# Patient Record
Sex: MALE | Race: WHITE | NOT HISPANIC OR LATINO | Employment: OTHER | URBAN - METROPOLITAN AREA
[De-identification: names, ages, dates, MRNs, and addresses within clinical notes are randomized per-mention and may not be internally consistent; named-entity substitution may affect disease eponyms.]

---

## 2017-01-04 ENCOUNTER — LAB VISIT (OUTPATIENT)
Dept: LAB | Facility: HOSPITAL | Age: 52
End: 2017-01-04
Attending: INTERNAL MEDICINE
Payer: MEDICARE

## 2017-01-04 ENCOUNTER — ANTI-COAG VISIT (OUTPATIENT)
Dept: CARDIOLOGY | Facility: CLINIC | Age: 52
End: 2017-01-04

## 2017-01-04 DIAGNOSIS — Z79.01 LONG TERM (CURRENT) USE OF ANTICOAGULANTS: ICD-10-CM

## 2017-01-04 DIAGNOSIS — I26.99 OTHER PULMONARY EMBOLISM WITHOUT ACUTE COR PULMONALE, UNSPECIFIED CHRONICITY: ICD-10-CM

## 2017-01-04 LAB
INR PPP: 1.9
PROTHROMBIN TIME: 19.3 SEC

## 2017-01-04 PROCEDURE — 85610 PROTHROMBIN TIME: CPT

## 2017-01-04 PROCEDURE — 36415 COLL VENOUS BLD VENIPUNCTURE: CPT

## 2017-01-05 ENCOUNTER — HOSPITAL ENCOUNTER (OUTPATIENT)
Dept: WOUND CARE | Facility: HOSPITAL | Age: 52
Discharge: HOME | End: 2017-01-05
Attending: PODIATRIST
Payer: MEDICARE

## 2017-01-05 DIAGNOSIS — L97.929: ICD-10-CM

## 2017-01-05 DIAGNOSIS — I83.029: ICD-10-CM

## 2017-01-05 PROCEDURE — 29581 APPL MULTLAYER CMPRN SYS LEG: CPT

## 2017-01-05 NOTE — PROGRESS NOTES
Much of the documentation for this visit was completed in the [employer] system. Please see the attached documentation for further details about this patient's care.     Kristine Alberto RN

## 2017-01-09 ENCOUNTER — HOSPITAL ENCOUNTER (OUTPATIENT)
Dept: WOUND CARE | Facility: HOSPITAL | Age: 52
Discharge: HOME | End: 2017-01-09
Attending: PODIATRIST
Payer: MEDICARE

## 2017-01-09 VITALS
HEART RATE: 104 BPM | HEIGHT: 78 IN | BODY MASS INDEX: 35.75 KG/M2 | DIASTOLIC BLOOD PRESSURE: 77 MMHG | SYSTOLIC BLOOD PRESSURE: 128 MMHG | WEIGHT: 309 LBS

## 2017-01-09 DIAGNOSIS — I87.2 VENOUS (PERIPHERAL) INSUFFICIENCY: Primary | ICD-10-CM

## 2017-01-09 DIAGNOSIS — R60.0 BILATERAL EDEMA OF LOWER EXTREMITY: ICD-10-CM

## 2017-01-09 PROCEDURE — 97597 DBRDMT OPN WND 1ST 20 CM/<: CPT

## 2017-01-09 PROCEDURE — 29581 APPL MULTLAYER CMPRN SYS LEG: CPT

## 2017-01-09 PROCEDURE — 99499 NO LOS: ICD-10-PCS | Mod: ,,, | Performed by: PODIATRIST

## 2017-01-09 PROCEDURE — 99499 UNLISTED E&M SERVICE: CPT | Mod: ,,, | Performed by: PODIATRIST

## 2017-01-09 RX ORDER — PROMETHAZINE HYDROCHLORIDE AND DEXTROMETHORPHAN HYDROBROMIDE 6.25; 15 MG/5ML; MG/5ML
5 SYRUP ORAL EVERY 6 HOURS PRN
Refills: 0 | COMMUNITY
Start: 2017-01-03 | End: 2017-07-10

## 2017-01-09 RX ORDER — CIPROFLOXACIN 500 MG/1
500 TABLET ORAL 2 TIMES DAILY
Refills: 0 | COMMUNITY
Start: 2017-01-03 | End: 2017-01-23 | Stop reason: ALTCHOICE

## 2017-01-10 PROCEDURE — 97597 PR DEBRIDEMENT OPEN WOUND 20 SQ CM<: ICD-10-PCS | Mod: ,,, | Performed by: PODIATRIST

## 2017-01-10 PROCEDURE — 97597 DBRDMT OPN WND 1ST 20 CM/<: CPT | Mod: ,,, | Performed by: PODIATRIST

## 2017-01-10 NOTE — PATIENT INSTRUCTIONS
1. LEAVE FOOT DRESSINGS DRY AND INTACT.    2. ELEVATE AND REST FOOT AS MUCH AS POSSIBLE.    3. WEAR SURGICAL BOOT AT ALL TIMES.     4. CALL IMMEDIATELY IF ANY PROBLEMS SUCH AS THE BANDAGE GETTING WET OR FALLING OFF.    5. CALL IMMEDIATELY IF ANY SIGNS OF INFECTION SUCH AS FEVER, CHILLS, SWEATS, INCREASED REDNESS, OR PAIN.

## 2017-01-11 ENCOUNTER — ANTI-COAG VISIT (OUTPATIENT)
Dept: CARDIOLOGY | Facility: CLINIC | Age: 52
End: 2017-01-11

## 2017-01-11 ENCOUNTER — LAB VISIT (OUTPATIENT)
Dept: LAB | Facility: HOSPITAL | Age: 52
End: 2017-01-11
Attending: INTERNAL MEDICINE
Payer: MEDICARE

## 2017-01-11 DIAGNOSIS — Z79.01 LONG TERM (CURRENT) USE OF ANTICOAGULANTS: ICD-10-CM

## 2017-01-11 DIAGNOSIS — I26.99 OTHER PULMONARY EMBOLISM WITHOUT ACUTE COR PULMONALE, UNSPECIFIED CHRONICITY: ICD-10-CM

## 2017-01-11 LAB
INR PPP: 1.6
PROTHROMBIN TIME: 16.3 SEC

## 2017-01-11 PROCEDURE — 85610 PROTHROMBIN TIME: CPT

## 2017-01-11 PROCEDURE — 36415 COLL VENOUS BLD VENIPUNCTURE: CPT

## 2017-01-16 ENCOUNTER — HOSPITAL ENCOUNTER (OUTPATIENT)
Dept: WOUND CARE | Facility: HOSPITAL | Age: 52
Discharge: HOME | End: 2017-01-16
Attending: PODIATRIST
Payer: MEDICARE

## 2017-01-16 VITALS — SYSTOLIC BLOOD PRESSURE: 114 MMHG | HEART RATE: 80 BPM | DIASTOLIC BLOOD PRESSURE: 64 MMHG

## 2017-01-16 DIAGNOSIS — L97.321 ULCER OF ANKLE, LEFT, LIMITED TO BREAKDOWN OF SKIN: ICD-10-CM

## 2017-01-16 PROCEDURE — 29581 APPL MULTLAYER CMPRN SYS LEG: CPT

## 2017-01-16 RX ORDER — METOPROLOL TARTRATE 25 MG/1
25 TABLET, FILM COATED ORAL DAILY
COMMUNITY
End: 2017-02-20 | Stop reason: ALTCHOICE

## 2017-01-18 ENCOUNTER — LAB VISIT (OUTPATIENT)
Dept: LAB | Facility: HOSPITAL | Age: 52
End: 2017-01-18
Attending: INTERNAL MEDICINE
Payer: MEDICARE

## 2017-01-18 ENCOUNTER — ANTI-COAG VISIT (OUTPATIENT)
Dept: CARDIOLOGY | Facility: CLINIC | Age: 52
End: 2017-01-18

## 2017-01-18 DIAGNOSIS — Z79.01 LONG TERM (CURRENT) USE OF ANTICOAGULANTS: ICD-10-CM

## 2017-01-18 DIAGNOSIS — I26.99 OTHER PULMONARY EMBOLISM WITHOUT ACUTE COR PULMONALE, UNSPECIFIED CHRONICITY: ICD-10-CM

## 2017-01-18 LAB
INR PPP: 1.5
PROTHROMBIN TIME: 16.1 SEC

## 2017-01-18 PROCEDURE — 36415 COLL VENOUS BLD VENIPUNCTURE: CPT

## 2017-01-18 PROCEDURE — 85610 PROTHROMBIN TIME: CPT

## 2017-01-23 ENCOUNTER — HOSPITAL ENCOUNTER (OUTPATIENT)
Dept: WOUND CARE | Facility: HOSPITAL | Age: 52
Discharge: HOME | End: 2017-01-23
Attending: PODIATRIST
Payer: MEDICARE

## 2017-01-23 VITALS
HEART RATE: 89 BPM | BODY MASS INDEX: 36.1 KG/M2 | HEIGHT: 78 IN | DIASTOLIC BLOOD PRESSURE: 68 MMHG | SYSTOLIC BLOOD PRESSURE: 151 MMHG | WEIGHT: 312 LBS

## 2017-01-23 DIAGNOSIS — I87.2 VENOUS (PERIPHERAL) INSUFFICIENCY: Primary | ICD-10-CM

## 2017-01-23 PROCEDURE — 99213 OFFICE O/P EST LOW 20 MIN: CPT | Mod: ,,, | Performed by: PODIATRIST

## 2017-01-23 PROCEDURE — 99213 PR OFFICE/OUTPT VISIT, EST, LEVL III, 20-29 MIN: ICD-10-PCS | Mod: ,,, | Performed by: PODIATRIST

## 2017-01-23 PROCEDURE — 99212 OFFICE O/P EST SF 10 MIN: CPT

## 2017-01-24 NOTE — PATIENT INSTRUCTIONS
Elevate feet when resting.    Wear stockings at all times when awake.     Avoid prolonged periods of standing.

## 2017-01-25 ENCOUNTER — LAB VISIT (OUTPATIENT)
Dept: LAB | Facility: HOSPITAL | Age: 52
End: 2017-01-25
Attending: INTERNAL MEDICINE
Payer: MEDICARE

## 2017-01-25 ENCOUNTER — ANTI-COAG VISIT (OUTPATIENT)
Dept: CARDIOLOGY | Facility: CLINIC | Age: 52
End: 2017-01-25

## 2017-01-25 DIAGNOSIS — I26.99 OTHER PULMONARY EMBOLISM WITHOUT ACUTE COR PULMONALE, UNSPECIFIED CHRONICITY: ICD-10-CM

## 2017-01-25 DIAGNOSIS — Z79.01 LONG TERM (CURRENT) USE OF ANTICOAGULANTS: ICD-10-CM

## 2017-01-25 LAB
INR PPP: 2.5
PROTHROMBIN TIME: 26.1 SEC

## 2017-01-25 PROCEDURE — 36415 COLL VENOUS BLD VENIPUNCTURE: CPT

## 2017-01-25 PROCEDURE — 85610 PROTHROMBIN TIME: CPT

## 2017-01-26 NOTE — PROGRESS NOTES
1/26/2017  8:36 AM LEWIS Allen, PharmD Patient Calls       Comment: Pt taking 10mg daily except 15mg we/sa/mo

## 2017-02-01 ENCOUNTER — ANTI-COAG VISIT (OUTPATIENT)
Dept: CARDIOLOGY | Facility: CLINIC | Age: 52
End: 2017-02-01

## 2017-02-01 ENCOUNTER — LAB VISIT (OUTPATIENT)
Dept: LAB | Facility: HOSPITAL | Age: 52
End: 2017-02-01
Attending: INTERNAL MEDICINE
Payer: MEDICARE

## 2017-02-01 DIAGNOSIS — I26.99 OTHER PULMONARY EMBOLISM WITHOUT ACUTE COR PULMONALE, UNSPECIFIED CHRONICITY: ICD-10-CM

## 2017-02-01 DIAGNOSIS — Z79.01 LONG TERM (CURRENT) USE OF ANTICOAGULANTS: ICD-10-CM

## 2017-02-01 LAB
INR PPP: 3.2
PROTHROMBIN TIME: 32.8 SEC

## 2017-02-01 PROCEDURE — 85610 PROTHROMBIN TIME: CPT

## 2017-02-01 PROCEDURE — 36415 COLL VENOUS BLD VENIPUNCTURE: CPT

## 2017-02-08 ENCOUNTER — ANTI-COAG VISIT (OUTPATIENT)
Dept: CARDIOLOGY | Facility: CLINIC | Age: 52
End: 2017-02-08

## 2017-02-08 ENCOUNTER — LAB VISIT (OUTPATIENT)
Dept: LAB | Facility: HOSPITAL | Age: 52
End: 2017-02-08
Attending: INTERNAL MEDICINE
Payer: MEDICARE

## 2017-02-08 DIAGNOSIS — I26.99 OTHER PULMONARY EMBOLISM WITHOUT ACUTE COR PULMONALE, UNSPECIFIED CHRONICITY: ICD-10-CM

## 2017-02-08 DIAGNOSIS — Z79.01 LONG TERM (CURRENT) USE OF ANTICOAGULANTS: ICD-10-CM

## 2017-02-08 LAB
INR PPP: 2.5
PROTHROMBIN TIME: 26.1 SEC

## 2017-02-08 PROCEDURE — 36415 COLL VENOUS BLD VENIPUNCTURE: CPT

## 2017-02-08 PROCEDURE — 85610 PROTHROMBIN TIME: CPT

## 2017-02-20 ENCOUNTER — LAB VISIT (OUTPATIENT)
Dept: LAB | Facility: HOSPITAL | Age: 52
End: 2017-02-20
Attending: INTERNAL MEDICINE
Payer: MEDICARE

## 2017-02-20 ENCOUNTER — OFFICE VISIT (OUTPATIENT)
Dept: CARDIOLOGY | Facility: CLINIC | Age: 52
End: 2017-02-20
Payer: MEDICARE

## 2017-02-20 ENCOUNTER — ANTI-COAG VISIT (OUTPATIENT)
Dept: CARDIOLOGY | Facility: CLINIC | Age: 52
End: 2017-02-20

## 2017-02-20 VITALS
WEIGHT: 315 LBS | SYSTOLIC BLOOD PRESSURE: 125 MMHG | HEIGHT: 76 IN | BODY MASS INDEX: 38.36 KG/M2 | HEART RATE: 83 BPM | DIASTOLIC BLOOD PRESSURE: 82 MMHG

## 2017-02-20 DIAGNOSIS — I48.19 PERSISTENT ATRIAL FIBRILLATION: ICD-10-CM

## 2017-02-20 DIAGNOSIS — Z86.718 HISTORY OF DVT (DEEP VEIN THROMBOSIS): ICD-10-CM

## 2017-02-20 DIAGNOSIS — E66.01 MORBID OBESITY DUE TO EXCESS CALORIES: ICD-10-CM

## 2017-02-20 DIAGNOSIS — I10 ESSENTIAL HYPERTENSION: ICD-10-CM

## 2017-02-20 DIAGNOSIS — Z79.01 LONG TERM (CURRENT) USE OF ANTICOAGULANTS: ICD-10-CM

## 2017-02-20 DIAGNOSIS — I87.1 MAY-THURNER SYNDROME: ICD-10-CM

## 2017-02-20 DIAGNOSIS — I50.42 CHRONIC COMBINED SYSTOLIC AND DIASTOLIC CONGESTIVE HEART FAILURE: Primary | ICD-10-CM

## 2017-02-20 DIAGNOSIS — I26.99 OTHER PULMONARY EMBOLISM WITHOUT ACUTE COR PULMONALE, UNSPECIFIED CHRONICITY: ICD-10-CM

## 2017-02-20 DIAGNOSIS — I87.2 VENOUS (PERIPHERAL) INSUFFICIENCY: ICD-10-CM

## 2017-02-20 LAB
INR PPP: 2
PROTHROMBIN TIME: 20.6 SEC

## 2017-02-20 PROCEDURE — 36415 COLL VENOUS BLD VENIPUNCTURE: CPT

## 2017-02-20 PROCEDURE — 99214 OFFICE O/P EST MOD 30 MIN: CPT | Mod: S$PBB,,, | Performed by: INTERNAL MEDICINE

## 2017-02-20 PROCEDURE — 99213 OFFICE O/P EST LOW 20 MIN: CPT | Mod: PBBFAC,PO | Performed by: INTERNAL MEDICINE

## 2017-02-20 PROCEDURE — 99999 PR PBB SHADOW E&M-EST. PATIENT-LVL III: CPT | Mod: PBBFAC,,, | Performed by: INTERNAL MEDICINE

## 2017-02-20 PROCEDURE — 85610 PROTHROMBIN TIME: CPT

## 2017-02-20 RX ORDER — FUROSEMIDE 40 MG/1
40 TABLET ORAL DAILY
Qty: 90 TABLET | Refills: 3 | Status: SHIPPED | OUTPATIENT
Start: 2017-02-20 | End: 2017-07-10

## 2017-02-20 RX ORDER — METOPROLOL SUCCINATE 50 MG/1
50 TABLET, EXTENDED RELEASE ORAL DAILY
Qty: 90 TABLET | Refills: 3 | Status: SHIPPED | OUTPATIENT
Start: 2017-02-20 | End: 2017-06-13 | Stop reason: SDUPTHER

## 2017-02-20 NOTE — PROGRESS NOTES
Subjective:   Patient ID:  Drew Farrar is a 51 y.o. male who presents for follow up of Atrial Fibrillation; Congestive Heart Failure; Hypertension; Shortness of Breath; and venous insufficiency s/p EVLT, sclerotherapy and iliac stent      HPI:       He is here today complaining of dyspnea with moderate exertion. He has CHF with EF 30%, bi atrial enlargement, LV enlargement, and PASP 38 mmHg on echo in 2/2016. He has persistent afib with a controlled rate and anticoagulated with coumadin. He has an extensive history of venous insufficiency with ulcers that have finally healed. He is s/p deep venous intervention, multiple EVLTs ad sclerotherapy sessions. He has h/o DVTs and PEs as well. He is compliant with his medications. He is lisinopril and lopressor.         Patient Active Problem List    Diagnosis Date Noted    Chronic combined systolic and diastolic congestive heart failure 02/20/2017    Long term (current) use of anticoagulants 10/31/2016    Other pulmonary embolism without acute cor pulmonale, unspecified chronicity 10/28/2016    Acute pulmonary embolism 10/26/2016    Recurrent pulmonary embolism 10/26/2016    History of DVT (deep vein thrombosis) 09/16/2016    Essential hypertension 09/16/2016    Knee pain, right 07/07/2016    Difficulty walking 07/07/2016    Muscle weakness 07/07/2016    Group A streptococcal infection 03/01/2016    Tinea pedis of both feet 03/01/2016    Bilateral edema of lower extremity 03/01/2016    Morbid obesity 03/01/2016    Persistent atrial fibrillation 03/01/2016    Right knee pain 03/01/2016    NARESH (acute kidney injury) 02/20/2016    Depression 12/24/2014    Ulcer of ankle 03/31/2014    Elevated BP 03/24/2014    May-Thurner syndrome 02/26/2014     1. Successful IVUS guided intervention for May Thurner Syndrome 2. Left common iliac vein treated with 22 x 70 mm Wallstent overlapping with 22 x 45 mm Wallstent post dilation 18 x 40 mm balloon               Stenosis of right iliac vein 2014     S/p venoplasty with  20 x 80 mm Wallstent post dilated with 14 mm balloon in 2014      Chronic venous hypertension with ulcer 2013    Edema 2013     R leg      Venous (peripheral) insufficiency 2013     S/p bilateral iliac vein stents    S/p R GSV EVLT with laser 10/2013    S/p US guided accessory vein sclerotherapy of R calf       Obesity, morbid 2012    Varicose ulcer 2012    Ulcer - lesion 2012     Of left foot        DVT (deep venous thrombosis) 2012    Atrial fibrillation 2012     In NSR now             Right Arm BP - Sittin/80  Left Arm BP - Sittin/82        LABS    LAST HbA1c  Lab Results   Component Value Date    HGBA1C 5.4 2014       Lipid panel  Lab Results   Component Value Date    CHOL 131 2016    CHOL 123 2012     Lab Results   Component Value Date    HDL 29 (L) 2016    HDL 32 (L) 2012     Lab Results   Component Value Date    LDLCALC 83.0 2016    LDLCALC 75.4 2012     Lab Results   Component Value Date    TRIG 95 2016    TRIG 78 2012     Lab Results   Component Value Date    CHOLHDL 22.1 2016    CHOLHDL 26.0 2012            Review of Systems   Constitution: Positive for weight loss. Negative for diaphoresis, weakness, night sweats and weight gain.   HENT: Negative for congestion.    Eyes: Negative for blurred vision, discharge and double vision.   Cardiovascular: Positive for dyspnea on exertion. Negative for chest pain, claudication, cyanosis, irregular heartbeat, leg swelling, near-syncope, orthopnea, palpitations, paroxysmal nocturnal dyspnea and syncope.   Respiratory: Negative for cough, shortness of breath and wheezing.    Endocrine: Negative for cold intolerance, heat intolerance and polyphagia.   Hematologic/Lymphatic: Negative for adenopathy and bleeding problem. Does not bruise/bleed easily.   Skin: Negative for  dry skin and nail changes.   Musculoskeletal: Positive for arthritis. Negative for back pain, falls, joint pain, myalgias and neck pain.   Gastrointestinal: Negative for bloating, abdominal pain, change in bowel habit and constipation.   Genitourinary: Negative for bladder incontinence, dysuria, flank pain, genital sores and missed menses.   Neurological: Negative for aphonia, brief paralysis, difficulty with concentration and dizziness.   Psychiatric/Behavioral: Negative for altered mental status and memory loss. The patient does not have insomnia.    Allergic/Immunologic: Negative for environmental allergies.       Objective:   Physical Exam   Constitutional: He is oriented to person, place, and time. He appears well-developed and well-nourished. He is not intubated.   HENT:   Head: Normocephalic and atraumatic.   Right Ear: External ear normal.   Left Ear: External ear normal.   Mouth/Throat: Oropharynx is clear and moist.   Eyes: Conjunctivae and EOM are normal. Pupils are equal, round, and reactive to light. Right eye exhibits no discharge. Left eye exhibits no discharge. No scleral icterus.   Neck: Normal range of motion. Neck supple. Normal carotid pulses, no hepatojugular reflux and no JVD present. Carotid bruit is not present. No tracheal deviation present. No thyromegaly present.   Cardiovascular: Normal rate, S1 normal and S2 normal.  An irregular rhythm present.  No extrasystoles are present. PMI is not displaced.  Exam reveals no gallop, no S3, no distant heart sounds, no friction rub and no midsystolic click.    No murmur heard.  Pulses:       Carotid pulses are 2+ on the right side, and 2+ on the left side.       Radial pulses are 2+ on the right side, and 2+ on the left side.        Femoral pulses are 2+ on the right side, and 2+ on the left side.       Popliteal pulses are 2+ on the right side, and 2+ on the left side.        Dorsalis pedis pulses are 2+ on the right side, and 2+ on the left side.         Posterior tibial pulses are 2+ on the right side, and 2+ on the left side.   Pulmonary/Chest: Effort normal and breath sounds normal. No accessory muscle usage or stridor. No apnea, no tachypnea and no bradypnea. He is not intubated. No respiratory distress. He has no decreased breath sounds. He has no wheezes. He has no rales. He exhibits no tenderness and no bony tenderness.   Abdominal: He exhibits no distension, no pulsatile liver, no abdominal bruit, no ascites, no pulsatile midline mass and no mass. There is no tenderness. There is no rebound and no guarding.   Musculoskeletal: Normal range of motion. He exhibits no edema or tenderness.   Lymphadenopathy:     He has no cervical adenopathy.       Chronic lymphedema of R leg/calf area   Neurological: He is alert and oriented to person, place, and time. He has normal reflexes. No cranial nerve deficit. Coordination normal.   Skin: Skin is warm. No rash noted. No erythema. No pallor.   Healed venous uclers    Varicose veins   Psychiatric: He has a normal mood and affect. His behavior is normal. Judgment and thought content normal.       Assessment:     1. Chronic combined systolic and diastolic congestive heart failure    2. Persistent atrial fibrillation    3. Essential hypertension    4. Morbid obesity due to excess calories    5. May-Thurner syndrome    6. Venous (peripheral) insufficiency    7. Chronic venous hypertension with ulcer, bilateral    8. Long term (current) use of anticoagulants    9. History of DVT (deep vein thrombosis)        Plan:         Low salt diet  Daily weight   Take an extra dose of lasix for 3 lbs weight gain      Stop lopressor  Start toprol xl 50 mg daily        Echo to assess        Establish care in CHF clinic  Further recommendation to follow  As well as a RHC/LHC for ischemic work up        Return sooner for concerns or questions. If symptoms persist go to the ED  I have reviewed all pertinent data on this patient       I  have reviewed the patient's medical history in detail and updated the computerized patient record.    Orders Placed This Encounter   Procedures    Basic metabolic panel     Standing Status:   Future     Number of Occurrences:   1     Standing Expiration Date:   4/21/2018    2D Echo w/ Color Flow Doppler     Standing Status:   Future     Standing Expiration Date:   2/20/2018       Follow up as scheduled. Return sooner for concerns or questions            He expressed verbal understanding and agreed with the plan        Patient's Medications   New Prescriptions    FUROSEMIDE (LASIX) 40 MG TABLET    Take 1 tablet (40 mg total) by mouth once daily.    METOPROLOL SUCCINATE (TOPROL-XL) 50 MG 24 HR TABLET    Take 1 tablet (50 mg total) by mouth once daily.   Previous Medications    ASCORBIC ACID (VITAMIN C) 500 MG TABLET    Take 500 mg by mouth once daily.    CETIRIZINE (ZYRTEC) 10 MG TABLET    Take 1 tablet (10 mg total) by mouth once daily.    FLUTICASONE (FLONASE) 50 MCG/ACTUATION NASAL SPRAY    1 spray by Each Nare route once daily.    LISINOPRIL (PRINIVIL,ZESTRIL) 2.5 MG TABLET    TAKE 1 TABLET (2.5 MG TOTAL) BY MOUTH ONCE DAILY.    METHIMAZOLE (TAPAZOLE) 5 MG TAB    TAKE 1 TABLET (5 MG TOTAL) BY MOUTH 3 (THREE) TIMES DAILY.    PROMETHAZINE-DEXTROMETHORPHAN (PROMETHAZINE-DM) 6.25-15 MG/5 ML SYRP    Take 5 mLs by mouth every 6 (six) hours as needed for Cough.    WARFARIN (COUMADIN) 10 MG TABLET    Take 1.5-2 tablets (15-20 mg total) by mouth once daily.    WARFARIN (COUMADIN) 10 MG TABLET    TAKE 1 TABLET (10 MG TOTAL) BY MOUTH ONCE DAILY.   Modified Medications    No medications on file   Discontinued Medications    METOPROLOL TARTRATE (LOPRESSOR) 25 MG TABLET    Take 25 mg by mouth once daily.

## 2017-02-20 NOTE — PATIENT INSTRUCTIONS
Heart Disease Education    The heart beats 60 to 100 times per minute, 24 hours a day. This equals almost 1000,000 times a day. It pumps blood with oxygen and nutrients to the tissues and organs of the body. But the heart is a muscle and needs its own supply of blood. Blood flow to the heart is supplied by the coronary arteries. Coronary artery disease (atherosclerosis) is a result of cholesterol, saturated fat, and calcium deposits (plaques) that build up inside the walls. This causes inflammation within the coronary arteries. These plaques narrow the artery and reduce blood flow to the heart muscle. The reduction in blood flow to the heart muscle decreases oxygen supply to the heart. If the narrowing is significant enough, the oxygen supply to one or more regions of the heart can be temporarily or permanently shut down. This can cause chest pain, and possibly death of heart tissue (heart attack).  Types of chest pain  Angina is the name for pain in the heart muscle. Angina is a warning sign of serious heart disease. When untreated it can lead to a heart attack, also known as acute myocardial infarction, or AMI. Angina occurs when there is not enough blood and oxygen flowing to the heart for the amount of work it is doing. This most often happens during physical exertion, when the heart is working hardest. It is usually relieved by rest or nitroglycerin. Angina may also occur after a large meal when extra blood is sent to the digestive organs and less goes to the heart. In the case of advanced or unstable heart disease, angina can occur at rest or awaken you from sleep. Angina usually lasts from a few minutes up to 20 minutes or more. When treated early, the effects of angina can be reversed without permanent damage to the heart. Angina is a serious condition and needs to be evaluated by a medical professional immediately.  There are two types of angina -- stable and unstable:  · Stable angina usually occurs  with a predictable level of activity. Being stable, its character, severity, and occurrence do not change much over time. It usually starts with activity, and resolves with rest or taking your medicine as instructed by your doctor. The symptoms usually do not last long.  · Unstable angina changes or gets worse over time. It is different from whatever you are used to. It may feel different or worse, begin without cause, occur with exercise or exertion, wake you up from sleep, and last longer. It may not respond in the same way as it does when you take your usual medicines for an attack. This type of angina can be a warning sign of an impending heart attack.     A heart attack is usually the result of a blood clot that suddenly forms in a coronary artery that has been narrowed with plaque. When this occurs, blood flow may be cut off to a part of the heart muscle, causing the cells to die. This weakens the pumping action of the heart, which affects the delivery of blood to all the other organs in the body including the brain. This damage is not reversible. However, early treatment can limit the amount of damage.  The pain you feel with angina and a heart attack may have a similar quality. However, it is usually different in intensity and duration. Here are some typical descriptions of a heart attack:  · It is most often experienced as a squeezing, crushing, pressure-like sensation in the center of the chest.  · It is sometimes described as something heavy sitting on my chest.  · It may feel more like a bad case of indigestion.  · The pain may spread from the chest to the arm, shoulder, throat or jaw.  · Sometimes the pain is not felt in the chest at all, but only in the arm, shoulder, throat or jaw.  · There may also be nausea, vomiting, dizziness or light-headedness, sweating and trouble breathing.  · Palpitations, or your heart beating rapidly  · A new, irregular heart beat  · Unexplained weakness  You may not be  "able to tell the difference between "bad" angina and a heart attack at home. Seek help if your symptoms are different than usual. Do not be in denial or just try to "tough it out."  Call 911  This is the fastest and safest way to get to the emergency department. The paramedics can also start treatment on the way to the hospital, saving valuable time for your heart.  · If the angina gets worse, if it continues, or if it stops and returns, call 911 immediately. Do not delay. You may be having a heart attack.  · After you call 911, take a second tablet or spray unless instructed otherwise. When repeating doses, sit down if possible, because it can make you feel lightheaded or dizzy. Wait another 5 minutes. If the angina still does not go away, take a third tablet or spray. Do not take more than 3 tablets or sprays within 15 minutes. Stay on the phone with 911 for further instruction.  · Your healthcare provider may give you slightly different instructions than those above. If so, follow them carefully.  Do not wait until symptoms become severe to call 911.  Other reasons to call 911 include:  · Trouble breathing  · Feeling lightheaded, faint, or dizzy  · Rapid heart beat  · Slower than usual heart rate compared to your normal  · Angina with weakness, dizziness, fainting, heavy sweating, nausea, or vomiting  · Extreme drowsiness, confusion  · Weakness of an arm or leg or one side of the face  · Difficulty with speech or vision  When to seek medical care  Remember, the signs and symptoms of a heart attack are not always like they are on TV. Sometimes they are not so obvious. You may only feel weak, or just not right. If it is not clear or if you have any doubt, call for advice.  · Seek help if there is a change in the type of pain, if it feels different, or if your symptoms are mild.  · Do not drive yourself. Have someone else drive you. If no one can drive, call 911.  · Do not delay. Fast diagnosis and treatment can " "prevent or limit the amount of heart damage during a heart attack.  · Do not go to your doctor's office or a clinic as they may not be able to provide all the testing and treatment required for this condition.  · If your doctor has given you medicine to take when symptoms occur, take them but don't delay getting help trying to locate medicines.  What happens in the emergency department  The emergency department is connected to your local emergency medical system (EMS) through 911. That's why during a cardiac emergency, calling 911 is the fastest way to get help. The goal of the emergency department is to rapidly screen, evaluate, and treat people.  Once you are there, an electrocardiogram (ECG or heart tracing) will be done. Blood samples may be taken to look for the presence of heart enzymes that leak from damaged heart cells and show if a heart attack is occurring. You will often be evaluated by a heart specialist (cardiologist) who decides the best course of action. In the case of severe angina or early heart attack, and depending on the circumstances, powerful "clot busting" medicines can be used to dissolve blood clots in the coronary artery. In other cases, you may be taken to a cardiac catheterization lab. Here, a tiny balloon-tipped catheter is advanced through blood vessels to the heart. There the balloon is inflated pushing open the blood vessel restoring blood flow.  Risk factors for heart disease  Risk factors for heart disease are a combination of genetic and lifestyle. Many risk factors work by either directly or indirectly damaging the blood vessels of the heart, or by increasing the risk of forming blood or cholesterol clots, which then clog up and block the arteries.     Examples of physical lifestyle risk factors:  · Cigarette smoking  · High blood pressure  · High blood cholesterol  · Use of stimulant drugs such as cocaine, crack, and amphetamines  · Eating a high-fat, high-cholesterol " meal  · Diabetes   · Obesity which increases risk for diabetes and high blood pressure  · Lack of regular physical activity     Examples of emotional lifestyle factors:  · Chronic high stress levels release stress hormones. These raise blood pressure and cholesterol level and makes blood clot more easily.  · Held-in anger, hostile or cynical attitude  · Social and emotional isolation, lack of intimacy  · Loss of relationship  · Depression  Other factors that increase the risk of heart attack that you cannot control :  · Age. The older you get beyond 40, the greater is your risk of significant coronary artery disease.  · Gender. More men than women get heart disease; but once past menopause, women who are not taking estrogen replacement have the same risk as men for a heart attack.  · Family history. If your mother, father, brother or sister has coronary artery disease, your risk of having it is higher than a person your age without this family history.  What can you do to decrease your risk  To reduce your risk of heart disease:  · Get regular checkups with your doctor.  · Take your medicines for blood pressure, cholesterol or diabetes as directed.  · Watch your diet. Eat a heart healthy diet choosing fresh foods, less salt, cholesterol, and fat  · Stop smoking. Get help if needed.  · Get regular exercise.  · Manage stress.  · Carry a list of medicines and doses in your wallet.  Date Last Reviewed: 12/30/2015  © 4963-0741 [employer]. [address] [place] [address], [place] [address]. All rights reserved. This information is not intended as a substitute for professional medical care. Always follow your healthcare professional's instructions.

## 2017-02-22 ENCOUNTER — TELEPHONE (OUTPATIENT)
Dept: CARDIOLOGY | Facility: CLINIC | Age: 52
End: 2017-02-22

## 2017-02-22 RX ORDER — LISINOPRIL 2.5 MG/1
2.5 TABLET ORAL DAILY
Qty: 30 TABLET | Refills: 2 | Status: SHIPPED | OUTPATIENT
Start: 2017-02-22 | End: 2017-06-13 | Stop reason: SDUPTHER

## 2017-02-22 NOTE — TELEPHONE ENCOUNTER
----- Message from Gomez Lantigua MD sent at 2/20/2017 11:21 AM CST -----    Please call him        He should stop lopressor        He will start toprol xl 50 mg daily and lasix 40 mg daily        He will follow up with Prema in the CHF clinic for further adjustment to his therapy        Thanks        ZN

## 2017-02-22 NOTE — TELEPHONE ENCOUNTER
----- Message from Yulia Hernandez sent at 2/22/2017  2:25 PM CST -----  Contact: [facility] pharmacy   Would like a refill order for lisinopril (PRINIVIL,ZESTRIL) 2.5 MG tablet

## 2017-03-01 NOTE — PROGRESS NOTES
Pt called to R/s today's appt., R/s'ed to 3/08, advised pt against waiting that long since last INR was low, states unable to make it in before 3/08, appt was R/s'ed

## 2017-03-08 ENCOUNTER — ANTI-COAG VISIT (OUTPATIENT)
Dept: CARDIOLOGY | Facility: CLINIC | Age: 52
End: 2017-03-08
Payer: MEDICARE

## 2017-03-08 DIAGNOSIS — Z79.01 LONG TERM (CURRENT) USE OF ANTICOAGULANTS: Primary | ICD-10-CM

## 2017-03-08 DIAGNOSIS — I26.99 OTHER PULMONARY EMBOLISM WITHOUT ACUTE COR PULMONALE, UNSPECIFIED CHRONICITY: ICD-10-CM

## 2017-03-08 LAB — INR PPP: 4.9 (ref 2–3)

## 2017-03-08 PROCEDURE — 85610 PROTHROMBIN TIME: CPT | Mod: PBBFAC

## 2017-03-08 PROCEDURE — 99211 OFF/OP EST MAY X REQ PHY/QHP: CPT | Mod: PBBFAC

## 2017-03-08 PROCEDURE — 99999 PR PBB SHADOW E&M-EST. PATIENT-LVL I: CPT | Mod: PBBFAC,,,

## 2017-03-08 NOTE — PROGRESS NOTES
INR elevated, likely due to recent stomach virus.  Patient reports being ill 2/16-2/28 with significant weight loss.  He reports he is feeling better and has started to regain weight.  He reports 2 recent nosebleeds that resolved without intervention within 15 minutes.  He was advised to continue to monitor and contact us with concerns.  He reports following medication changes:  Stop lopressor, Start toprol xl 50 mg daily and increased lasix and metolazone doses which should not affect INR. He reports eating cabbage every Tuesday and was reminded to be consistent.  Hold warfarin today, reduce dose 3/9 then resume prior stable regimen.  Repeat INR 7-10 days.  Patient advised to contact clinic with any changes, questions, or concerns.

## 2017-03-20 ENCOUNTER — LAB VISIT (OUTPATIENT)
Dept: LAB | Facility: HOSPITAL | Age: 52
End: 2017-03-20
Attending: INTERNAL MEDICINE
Payer: MEDICARE

## 2017-03-20 ENCOUNTER — ANTI-COAG VISIT (OUTPATIENT)
Dept: CARDIOLOGY | Facility: CLINIC | Age: 52
End: 2017-03-20

## 2017-03-20 DIAGNOSIS — Z79.01 LONG TERM (CURRENT) USE OF ANTICOAGULANTS: ICD-10-CM

## 2017-03-20 DIAGNOSIS — I26.99 OTHER PULMONARY EMBOLISM WITHOUT ACUTE COR PULMONALE, UNSPECIFIED CHRONICITY: ICD-10-CM

## 2017-03-20 LAB
INR PPP: 2.9
PROTHROMBIN TIME: 29.9 SEC

## 2017-03-20 PROCEDURE — 85610 PROTHROMBIN TIME: CPT

## 2017-03-20 PROCEDURE — 36415 COLL VENOUS BLD VENIPUNCTURE: CPT

## 2017-04-05 ENCOUNTER — ANTI-COAG VISIT (OUTPATIENT)
Dept: CARDIOLOGY | Facility: CLINIC | Age: 52
End: 2017-04-05

## 2017-04-05 ENCOUNTER — LAB VISIT (OUTPATIENT)
Dept: LAB | Facility: HOSPITAL | Age: 52
End: 2017-04-05
Attending: INTERNAL MEDICINE
Payer: MEDICARE

## 2017-04-05 DIAGNOSIS — I26.99 OTHER PULMONARY EMBOLISM WITHOUT ACUTE COR PULMONALE, UNSPECIFIED CHRONICITY: ICD-10-CM

## 2017-04-05 DIAGNOSIS — Z79.01 LONG TERM (CURRENT) USE OF ANTICOAGULANTS: ICD-10-CM

## 2017-04-05 LAB
INR PPP: 2.3
PROTHROMBIN TIME: 23.9 SEC

## 2017-04-05 PROCEDURE — 85610 PROTHROMBIN TIME: CPT

## 2017-04-05 PROCEDURE — 36415 COLL VENOUS BLD VENIPUNCTURE: CPT

## 2017-04-05 NOTE — TELEPHONE ENCOUNTER
----- Message from Carol Duque sent at 4/5/2017 10:13 AM CDT -----  Contact: [phone]/[facility] pharmacy   Pt requesting a refill on rx warfarin (COUMADIN) 10 MG tablet . Please advise

## 2017-04-06 RX ORDER — WARFARIN 10 MG/1
10 TABLET ORAL DAILY
Qty: 30 TABLET | Refills: 11 | Status: SHIPPED | OUTPATIENT
Start: 2017-04-06 | End: 2017-07-10 | Stop reason: SDUPTHER

## 2017-04-19 ENCOUNTER — ANTI-COAG VISIT (OUTPATIENT)
Dept: CARDIOLOGY | Facility: CLINIC | Age: 52
End: 2017-04-19

## 2017-04-19 ENCOUNTER — LAB VISIT (OUTPATIENT)
Dept: LAB | Facility: HOSPITAL | Age: 52
End: 2017-04-19
Attending: INTERNAL MEDICINE
Payer: MEDICARE

## 2017-04-19 DIAGNOSIS — Z79.01 LONG TERM (CURRENT) USE OF ANTICOAGULANTS: ICD-10-CM

## 2017-04-19 DIAGNOSIS — I26.99 OTHER PULMONARY EMBOLISM WITHOUT ACUTE COR PULMONALE, UNSPECIFIED CHRONICITY: ICD-10-CM

## 2017-04-19 LAB
INR PPP: 1.6
PROTHROMBIN TIME: 16.9 SEC

## 2017-04-19 PROCEDURE — 85610 PROTHROMBIN TIME: CPT

## 2017-04-19 PROCEDURE — 36415 COLL VENOUS BLD VENIPUNCTURE: CPT

## 2017-04-26 ENCOUNTER — ANTI-COAG VISIT (OUTPATIENT)
Dept: CARDIOLOGY | Facility: CLINIC | Age: 52
End: 2017-04-26
Payer: MEDICARE

## 2017-04-26 DIAGNOSIS — I26.99 OTHER PULMONARY EMBOLISM WITHOUT ACUTE COR PULMONALE, UNSPECIFIED CHRONICITY: ICD-10-CM

## 2017-04-26 DIAGNOSIS — Z79.01 LONG TERM (CURRENT) USE OF ANTICOAGULANTS: Primary | ICD-10-CM

## 2017-04-26 LAB — INR PPP: 2.4 (ref 2–3)

## 2017-04-26 PROCEDURE — 99211 OFF/OP EST MAY X REQ PHY/QHP: CPT | Mod: PBBFAC

## 2017-04-26 PROCEDURE — 99999 PR PBB SHADOW E&M-EST. PATIENT-LVL I: CPT | Mod: PBBFAC,,,

## 2017-04-26 PROCEDURE — 85610 PROTHROMBIN TIME: CPT | Mod: PBBFAC

## 2017-04-26 NOTE — PROGRESS NOTES
Patient missed a dose causing his low INR. I am re-challenging this dose until follow-up. He cannot afford to come every week to the clinic so he will go to [facility] for his next INR. He understands he will alternate between the clinic and lab. I advised him to contact us with any changes or problems. I have reviewed the student's initial findings and agree with their assessment.  I have personally spoken with and assessed the patient in clinic to devise care plan.

## 2017-05-03 ENCOUNTER — ANTI-COAG VISIT (OUTPATIENT)
Dept: CARDIOLOGY | Facility: CLINIC | Age: 52
End: 2017-05-03

## 2017-05-03 ENCOUNTER — LAB VISIT (OUTPATIENT)
Dept: LAB | Facility: HOSPITAL | Age: 52
End: 2017-05-03
Attending: INTERNAL MEDICINE
Payer: MEDICARE

## 2017-05-03 DIAGNOSIS — I26.99 OTHER PULMONARY EMBOLISM WITHOUT ACUTE COR PULMONALE, UNSPECIFIED CHRONICITY: ICD-10-CM

## 2017-05-03 DIAGNOSIS — Z79.01 LONG TERM (CURRENT) USE OF ANTICOAGULANTS: ICD-10-CM

## 2017-05-03 LAB
INR PPP: 5
PROTHROMBIN TIME: 50.1 SEC

## 2017-05-03 PROCEDURE — 85610 PROTHROMBIN TIME: CPT

## 2017-05-03 PROCEDURE — 36415 COLL VENOUS BLD VENIPUNCTURE: CPT

## 2017-05-08 NOTE — PROGRESS NOTES
Pt called 5/8/17 to [facility] appt 5/8/17 to 5/10/17. Pt was told that is was very important that he come today, his last inr was (5.0). He said he cannot come until 5/10/17.

## 2017-05-10 ENCOUNTER — ANTI-COAG VISIT (OUTPATIENT)
Dept: CARDIOLOGY | Facility: CLINIC | Age: 52
End: 2017-05-10
Payer: MEDICARE

## 2017-05-10 DIAGNOSIS — I26.99 OTHER PULMONARY EMBOLISM WITHOUT ACUTE COR PULMONALE, UNSPECIFIED CHRONICITY: ICD-10-CM

## 2017-05-10 DIAGNOSIS — Z79.01 LONG TERM (CURRENT) USE OF ANTICOAGULANTS: Primary | ICD-10-CM

## 2017-05-10 LAB — INR PPP: 4.5 (ref 2–3)

## 2017-05-10 PROCEDURE — 99211 OFF/OP EST MAY X REQ PHY/QHP: CPT | Mod: PBBFAC

## 2017-05-10 PROCEDURE — 85610 PROTHROMBIN TIME: CPT | Mod: PBBFAC

## 2017-05-10 PROCEDURE — 99999 PR PBB SHADOW E&M-EST. PATIENT-LVL I: CPT | Mod: PBBFAC,,,

## 2017-05-10 NOTE — PROGRESS NOTES
INR elevated again this week despite holding a dose and the patient missing a dose. I am reducing his weekly dose with close monitoring. Yesterday he experienced fever that went ahead with bed rest. He denies bleeding or bruising. I advised him to contact us with any changes or problems.

## 2017-05-17 ENCOUNTER — LAB VISIT (OUTPATIENT)
Dept: LAB | Facility: HOSPITAL | Age: 52
End: 2017-05-17
Attending: INTERNAL MEDICINE
Payer: MEDICARE

## 2017-05-17 ENCOUNTER — ANTI-COAG VISIT (OUTPATIENT)
Dept: CARDIOLOGY | Facility: CLINIC | Age: 52
End: 2017-05-17

## 2017-05-17 DIAGNOSIS — Z79.01 LONG TERM (CURRENT) USE OF ANTICOAGULANTS: ICD-10-CM

## 2017-05-17 DIAGNOSIS — I26.99 OTHER PULMONARY EMBOLISM WITHOUT ACUTE COR PULMONALE, UNSPECIFIED CHRONICITY: ICD-10-CM

## 2017-05-17 LAB
INR PPP: 2.4
PROTHROMBIN TIME: 24.5 SEC

## 2017-05-17 PROCEDURE — 36415 COLL VENOUS BLD VENIPUNCTURE: CPT

## 2017-05-17 PROCEDURE — 85610 PROTHROMBIN TIME: CPT

## 2017-05-24 ENCOUNTER — ANTI-COAG VISIT (OUTPATIENT)
Dept: CARDIOLOGY | Facility: CLINIC | Age: 52
End: 2017-05-24
Payer: MEDICARE

## 2017-05-24 DIAGNOSIS — I26.99 OTHER PULMONARY EMBOLISM WITHOUT ACUTE COR PULMONALE, UNSPECIFIED CHRONICITY: ICD-10-CM

## 2017-05-24 DIAGNOSIS — Z79.01 LONG TERM (CURRENT) USE OF ANTICOAGULANTS: Primary | ICD-10-CM

## 2017-05-24 LAB
CTP QC/QA: YES
INR PPP: 2.8 (ref 2–3)
PROTHROMBIN TIME, POC: NORMAL (ref 2–3)

## 2017-05-24 PROCEDURE — 99999 PR PBB SHADOW E&M-EST. PATIENT-LVL I: CPT | Mod: PBBFAC,,,

## 2017-05-24 PROCEDURE — 85610 PROTHROMBIN TIME: CPT | Mod: PBBFAC

## 2017-05-24 PROCEDURE — 99211 OFF/OP EST MAY X REQ PHY/QHP: CPT | Mod: PBBFAC

## 2017-05-24 NOTE — PROGRESS NOTES
Patient here for follow up after sub therapeutic INR. INR is therapeutic today with no changes to medication, diet, or health. He reports a nose bleed lasting ~5-10 minutes on 5/22 with no other complications. I counseled him on situations and s/s of bleeding that would lead to a phone call to clinic or hospital visit and advised the two if he continues to have nose bleed that won't stop bleeding. He will continue current dose until follow up INR in 1 week. Patient advised to contact clinic with any changes, questions, or concerns.

## 2017-05-31 ENCOUNTER — LAB VISIT (OUTPATIENT)
Dept: LAB | Facility: HOSPITAL | Age: 52
End: 2017-05-31
Attending: INTERNAL MEDICINE
Payer: MEDICARE

## 2017-05-31 ENCOUNTER — ANTI-COAG VISIT (OUTPATIENT)
Dept: CARDIOLOGY | Facility: CLINIC | Age: 52
End: 2017-05-31

## 2017-05-31 DIAGNOSIS — I26.99 OTHER PULMONARY EMBOLISM WITHOUT ACUTE COR PULMONALE, UNSPECIFIED CHRONICITY: ICD-10-CM

## 2017-05-31 DIAGNOSIS — Z79.01 LONG TERM (CURRENT) USE OF ANTICOAGULANTS: ICD-10-CM

## 2017-05-31 LAB
INR PPP: 3.1
PROTHROMBIN TIME: 31.4 SEC

## 2017-05-31 PROCEDURE — 85610 PROTHROMBIN TIME: CPT

## 2017-05-31 PROCEDURE — 36415 COLL VENOUS BLD VENIPUNCTURE: CPT

## 2017-06-12 ENCOUNTER — ANTI-COAG VISIT (OUTPATIENT)
Dept: CARDIOLOGY | Facility: CLINIC | Age: 52
End: 2017-06-12
Payer: MEDICARE

## 2017-06-12 DIAGNOSIS — Z79.01 LONG TERM (CURRENT) USE OF ANTICOAGULANTS: Primary | ICD-10-CM

## 2017-06-12 DIAGNOSIS — I26.99 OTHER PULMONARY EMBOLISM WITHOUT ACUTE COR PULMONALE, UNSPECIFIED CHRONICITY: ICD-10-CM

## 2017-06-12 LAB — INR PPP: 5.2 (ref 2–3)

## 2017-06-12 PROCEDURE — 85610 PROTHROMBIN TIME: CPT | Mod: PBBFAC,PO

## 2017-06-12 NOTE — PROGRESS NOTES
INR quite elevated today due to a change in diet. He is trying to loose weight and changed his overall eating habits to incorporate less foods overall. He denies bleeding or bruising. I am holding his dose today and reducing his weekly dose until follow-up. He has maintained greens four times a week and plans to maintain this intake. I advised him to contact us with any changes or problems.

## 2017-06-13 RX ORDER — LISINOPRIL 2.5 MG/1
2.5 TABLET ORAL DAILY
Qty: 30 TABLET | Refills: 2 | Status: SHIPPED | OUTPATIENT
Start: 2017-06-13 | End: 2017-08-21 | Stop reason: SDUPTHER

## 2017-06-13 RX ORDER — METOPROLOL SUCCINATE 50 MG/1
50 TABLET, EXTENDED RELEASE ORAL DAILY
Qty: 90 TABLET | Refills: 3 | Status: SHIPPED | OUTPATIENT
Start: 2017-06-13 | End: 2017-07-10

## 2017-06-13 NOTE — TELEPHONE ENCOUNTER
----- Message from Gail Adams sent at 6/13/2017 11:22 AM CDT -----  Contact: [phone]  Pt calling regarding his prescription/medication. Please advise.

## 2017-06-19 ENCOUNTER — ANTI-COAG VISIT (OUTPATIENT)
Dept: CARDIOLOGY | Facility: CLINIC | Age: 52
End: 2017-06-19
Payer: MEDICARE

## 2017-06-19 DIAGNOSIS — Z79.01 LONG TERM (CURRENT) USE OF ANTICOAGULANTS: Primary | ICD-10-CM

## 2017-06-19 DIAGNOSIS — I26.99 OTHER PULMONARY EMBOLISM WITHOUT ACUTE COR PULMONALE, UNSPECIFIED CHRONICITY: ICD-10-CM

## 2017-06-19 LAB — INR PPP: 2.3 (ref 2–3)

## 2017-06-19 PROCEDURE — 99211 OFF/OP EST MAY X REQ PHY/QHP: CPT | Mod: S$PBB,,,

## 2017-06-19 PROCEDURE — 85610 PROTHROMBIN TIME: CPT | Mod: PBBFAC,PO

## 2017-06-19 NOTE — PROGRESS NOTES
INR slightly subtherapeutic but improved from elevated level last week.  Patient reports no greens this past week but did continue usual green tea on weekend.  Reviewed importance of consistent vit k intake for INR stability.  Resume recently lowered warfarin dose and greens.  Reassess intended vit k intake on follow up next week.  Patient advised to contact clinic with any changes, questions, or concerns.

## 2017-06-27 ENCOUNTER — ANTI-COAG VISIT (OUTPATIENT)
Dept: CARDIOLOGY | Facility: CLINIC | Age: 52
End: 2017-06-27
Payer: MEDICARE

## 2017-06-27 DIAGNOSIS — I26.99 OTHER PULMONARY EMBOLISM WITHOUT ACUTE COR PULMONALE, UNSPECIFIED CHRONICITY: ICD-10-CM

## 2017-06-27 DIAGNOSIS — Z79.01 LONG TERM (CURRENT) USE OF ANTICOAGULANTS: Primary | ICD-10-CM

## 2017-06-27 LAB — INR PPP: 2.6 (ref 2–3)

## 2017-06-27 PROCEDURE — 99211 OFF/OP EST MAY X REQ PHY/QHP: CPT | Mod: S$PBB,25,,

## 2017-06-27 PROCEDURE — 85610 PROTHROMBIN TIME: CPT | Mod: PBBFAC,PO

## 2017-06-27 NOTE — PROGRESS NOTES
Today is quick follow up for subtherapeutic INR.  INR therapeutic today.  Patient denies changes to diet, medications, or health that would affect INR.  Patient will continue weekly warfarin regimen at this time.  Repeat INR ~ 2 weeks to assess stability.  Patient advised to contact clinic with any changes, questions, or concerns.

## 2017-07-10 ENCOUNTER — OFFICE VISIT (OUTPATIENT)
Dept: FAMILY MEDICINE | Facility: HOSPITAL | Age: 52
End: 2017-07-10
Attending: FAMILY MEDICINE
Payer: MEDICARE

## 2017-07-10 VITALS
BODY MASS INDEX: 36.45 KG/M2 | HEART RATE: 74 BPM | WEIGHT: 315 LBS | HEIGHT: 78 IN | DIASTOLIC BLOOD PRESSURE: 71 MMHG | SYSTOLIC BLOOD PRESSURE: 135 MMHG

## 2017-07-10 DIAGNOSIS — I10 ESSENTIAL HYPERTENSION: ICD-10-CM

## 2017-07-10 DIAGNOSIS — I48.91 ATRIAL FIBRILLATION, UNSPECIFIED TYPE: ICD-10-CM

## 2017-07-10 DIAGNOSIS — I26.99 OTHER ACUTE PULMONARY EMBOLISM: ICD-10-CM

## 2017-07-10 DIAGNOSIS — Z00.00 HEALTH CARE MAINTENANCE: Primary | ICD-10-CM

## 2017-07-10 DIAGNOSIS — Z01.818 PREOPERATIVE CLEARANCE: ICD-10-CM

## 2017-07-10 DIAGNOSIS — E74.89 OTHER SPECIFIED DISORDERS OF CARBOHYDRATE METABOLISM: ICD-10-CM

## 2017-07-10 DIAGNOSIS — I87.1 MAY-THURNER SYNDROME: ICD-10-CM

## 2017-07-10 PROCEDURE — 99213 OFFICE O/P EST LOW 20 MIN: CPT | Performed by: FAMILY MEDICINE

## 2017-07-11 NOTE — PROGRESS NOTES
Subjective:       Patient ID: Drew Farrar is a 51 y.o. male.    Chief Complaint: 'I need a clearance'    HPI   52 yo female, w/ h/o development delay, HTN, venous stasis, HFrEF, chronic A-fib and multiple DVTs (on coumadin), presents for clearance for a L eye cataract surgery. Patient states he has blurry vision for a while. Had a previous glaucoma surgery in his L eye. Patient is on coumadin and is therapeutic. Patient states otherwise he does not have any complaints.     Review of Systems   Constitutional: Negative for chills and fever.   HENT: Negative for congestion.    Respiratory: Negative for shortness of breath and wheezing.    Cardiovascular: Negative for chest pain, palpitations and leg swelling.   Gastrointestinal: Negative for abdominal pain, constipation, diarrhea, nausea and vomiting.   Genitourinary: Negative for dysuria.   Neurological: Negative for dizziness and headaches.       Objective:      Vitals:    07/10/17 1101   BP: 135/71   Pulse: 74     Physical Exam   Constitutional: He is oriented to person, place, and time. No distress.   HENT:   Head: Normocephalic and atraumatic.   Eyes: Pupils are equal, round, and reactive to light.   Neck: Normal range of motion. Neck supple.   Cardiovascular: Normal rate, regular rhythm, normal heart sounds and intact distal pulses.  Exam reveals no gallop and no friction rub.    No murmur heard.  Pulmonary/Chest: Effort normal and breath sounds normal. He has no wheezes. He has no rales.   Abdominal: Soft. Bowel sounds are normal. He exhibits no distension. There is no tenderness.   Musculoskeletal: He exhibits no edema.   Neurological: He is alert and oriented to person, place, and time.   Skin: Skin is warm and dry.   Psychiatric: He has a normal mood and affect. His behavior is normal. Judgment and thought content normal.       Assessment:       1. Health care maintenance    2. Other acute pulmonary embolism    3. Atrial fibrillation, unspecified type     4. Essential hypertension    5. May-Thurner syndrome    6. Other specified disorders of carbohydrate metabolism     7. Preoperative clearance        Plan:       Health care maintenance  -     CBC auto differential; Future; Expected date: 07/10/2017  -     Comprehensive metabolic panel; Future; Expected date: 07/10/2017  -     TSH; Future; Expected date: 07/10/2017  -     Lipid panel; Future; Expected date: 07/10/2017  -     Hemoglobin A1c; Future; Expected date: 07/10/2017    Other acute pulmonary embolism  -     On coumadin. Is therapeutic    Preoperative clearance  - talked to Dr. Echols about patient's coumadin. It was agreed upon patient will continue on coumadin and will not be stopped  - patient has stable medication conditions. Is medically optimized for cataract surgery. No need for further labs or imaging.    RTC in 1 year.

## 2017-07-11 NOTE — PROGRESS NOTES
I assume primary medical responsibility for this patient, I have reviewed the case history, findings, diagnosis and treatment plan with the resident and agree that the care is reasonable and necessary. This service has been performed by a resident without the presence of a teaching physician under the primary care exception  Reyna Britton  7/11/2017

## 2017-07-17 ENCOUNTER — ANTI-COAG VISIT (OUTPATIENT)
Dept: CARDIOLOGY | Facility: CLINIC | Age: 52
End: 2017-07-17
Payer: MEDICARE

## 2017-07-17 DIAGNOSIS — I26.99 OTHER PULMONARY EMBOLISM WITHOUT ACUTE COR PULMONALE, UNSPECIFIED CHRONICITY: ICD-10-CM

## 2017-07-17 DIAGNOSIS — Z79.01 LONG TERM (CURRENT) USE OF ANTICOAGULANTS: Primary | ICD-10-CM

## 2017-07-17 LAB — INR PPP: 2.4 (ref 2–3)

## 2017-07-17 PROCEDURE — 99211 OFF/OP EST MAY X REQ PHY/QHP: CPT | Mod: S$PBB,25,,

## 2017-07-17 PROCEDURE — 85610 PROTHROMBIN TIME: CPT | Mod: PBBFAC,PO

## 2017-07-17 NOTE — PROGRESS NOTES
INR slightly subtherapeutic.  Patient reports recent nosebleeds with migraine headache that resolved on own.  He reports being off methimazole x 1 week, restarting 7/19; this DDI may explain low INR.  States he was in the ER for fever, decreased greens from 4 to 3x/week and decreased green tea.  Reviewed importance of consistent vit k intake for INR stability. He was advised he does not need to hold warfarin for upcoming cataract surgery. Continue warfarin dose and repeat INR ~ 3 weeks.  Patient advised to contact clinic with any changes, questions, or concerns.

## 2017-07-26 ENCOUNTER — TELEPHONE (OUTPATIENT)
Dept: FAMILY MEDICINE | Facility: HOSPITAL | Age: 52
End: 2017-07-26

## 2017-07-26 DIAGNOSIS — E05.90 HYPERTHYROIDISM: Primary | ICD-10-CM

## 2017-07-26 RX ORDER — METHIMAZOLE 5 MG/1
10 TABLET ORAL 2 TIMES DAILY
Qty: 360 TABLET | Refills: 3 | Status: ON HOLD | OUTPATIENT
Start: 2017-07-26 | End: 2017-09-08

## 2017-07-26 NOTE — TELEPHONE ENCOUNTER
T4 still elevated. Patient was advised to start methimazole 5mg TID, but patient takes it BID. Will increase methimazole to 10mg BID. Placed work up for hyperthyroidism.

## 2017-07-27 ENCOUNTER — TELEPHONE (OUTPATIENT)
Dept: FAMILY MEDICINE | Facility: HOSPITAL | Age: 52
End: 2017-07-27

## 2017-07-27 NOTE — TELEPHONE ENCOUNTER
----- Message from Moni Howell sent at 7/26/2017  3:45 PM CDT -----  [facility] THEY NEED TO CLARIFIED THE DIRECTION ON PRESCRIPTION methimazole (TAPAZOLE) 5 MG Tab  PLEASE CALL [phone]

## 2017-08-07 ENCOUNTER — ANTI-COAG VISIT (OUTPATIENT)
Dept: CARDIOLOGY | Facility: CLINIC | Age: 52
End: 2017-08-07
Payer: MEDICARE

## 2017-08-07 DIAGNOSIS — Z79.01 LONG TERM (CURRENT) USE OF ANTICOAGULANTS: Primary | ICD-10-CM

## 2017-08-07 DIAGNOSIS — I26.99 OTHER PULMONARY EMBOLISM WITHOUT ACUTE COR PULMONALE, UNSPECIFIED CHRONICITY: ICD-10-CM

## 2017-08-07 LAB — INR PPP: 2 (ref 2–3)

## 2017-08-07 PROCEDURE — 85610 PROTHROMBIN TIME: CPT | Mod: PBBFAC,PO

## 2017-08-07 PROCEDURE — 99211 OFF/OP EST MAY X REQ PHY/QHP: CPT | Mod: S$PBB,,,

## 2017-08-07 NOTE — PROGRESS NOTES
Follow-up visit for subtherapeutic INR two weeks ago. Today patient is still subtherapeutic and downtrending with no overt complications. Patient reported having bruising on his right forearm after his last visit to clinic and showed a picture of extensive bruise that cleared up today. It was difficult to ascertain if the point of care test was the root cause but it has been documented.  Instructed patient on scenarios warranting immediate attention, and patient verbalized understanding. Patient denied any other signs/symptoms of bleeding or bruising. Patient denies any changes in diet/vitamin K intake or medications, and counseled on consistent dietary intake. Patient correctly stated weekly warfarin regimen with little prompting, and denies any issues with missed or doubled warfarin doses. Will increase weekly warfarin regimen with plans to follow-up in 2 weeks. Patient advised to contact clinic with any changes, questions, or concerns.

## 2017-08-07 NOTE — PROGRESS NOTES
I agree with the resident's assessment and plan for this patient.  Bruise was in middle of forearm, with no bruising on finger or palm.  Patient denies bruising from prior fingersticks.  We utilized smaller, pediatric lancet today and opposite hand.

## 2017-08-11 NOTE — PROGRESS NOTES
Patient called to report that Wednesday 8/16 he's having L -eye cataract surgery and reports that he was instructed by Dr. Vargas  ([phone])  to hold coumadin starting this coming Sunday, next INR is due 8/21

## 2017-08-11 NOTE — PROGRESS NOTES
"See the above note.  The pt is scheduled for cataract surgery next week and was instructed to hold warfarin for it.  First, we do not usually hold warfarin for cataract procedures.  Second, the pt has a hx of recurrent PE, recurrent DVT, has a hypercoag state and is CHADs = 4 (HTN, CHF, stroke).  Therefore, he cannot hold his coumadin without a lovenox bridge.  I tried to reach the MD office - Dr. Vargas ([phone]) to discuss the need to hold warfarin, but had to LMFCB.  I am advising the pt to continue his warfarin for the current time and I will try to reach the MD again.  The pt did confirm that he will continue his warfarin over the weekend and we will touch base on 8/14 if I cannot reach the MD today.  The pt is 50yo M with Height: 6'8" (varying entries in EPIC), Weight: 345lbs./156.5kg, Scr: 0.8 and CrCl: Greater than 30mL/min.  "

## 2017-08-14 NOTE — PROGRESS NOTES
Spoke with Dr. Vargas's office and explained patient is high risk to stop warfarin and confirmed that 8/16 procedure is cataract removal.  Per Dr. Vargas, OK to continue warfarin without interruption.  Advised patient of such.  Also offered to repeat INR today 8/14 given recent warfarin dose adjustment and patient declined.

## 2017-08-21 ENCOUNTER — ANTI-COAG VISIT (OUTPATIENT)
Dept: CARDIOLOGY | Facility: CLINIC | Age: 52
End: 2017-08-21
Payer: MEDICARE

## 2017-08-21 ENCOUNTER — OFFICE VISIT (OUTPATIENT)
Dept: PULMONOLOGY | Facility: CLINIC | Age: 52
End: 2017-08-21
Payer: MEDICARE

## 2017-08-21 ENCOUNTER — HOSPITAL ENCOUNTER (OUTPATIENT)
Dept: PULMONOLOGY | Facility: CLINIC | Age: 52
Discharge: HOME | End: 2017-08-21
Payer: MEDICARE

## 2017-08-21 ENCOUNTER — HOSPITAL ENCOUNTER (OUTPATIENT)
Dept: RADIOLOGY | Facility: HOSPITAL | Age: 52
Discharge: HOME | End: 2017-08-21
Attending: INTERNAL MEDICINE
Payer: MEDICARE

## 2017-08-21 VITALS
SYSTOLIC BLOOD PRESSURE: 118 MMHG | BODY MASS INDEX: 36.45 KG/M2 | OXYGEN SATURATION: 98 % | HEIGHT: 78 IN | WEIGHT: 315 LBS | DIASTOLIC BLOOD PRESSURE: 72 MMHG | HEART RATE: 95 BPM

## 2017-08-21 DIAGNOSIS — I87.1 MAY-THURNER SYNDROME: ICD-10-CM

## 2017-08-21 DIAGNOSIS — R06.89 DYSPNEA AND RESPIRATORY ABNORMALITY: Primary | ICD-10-CM

## 2017-08-21 DIAGNOSIS — I26.99 OTHER PULMONARY EMBOLISM WITHOUT ACUTE COR PULMONALE, UNSPECIFIED CHRONICITY: ICD-10-CM

## 2017-08-21 DIAGNOSIS — Z79.01 LONG TERM (CURRENT) USE OF ANTICOAGULANTS: Primary | ICD-10-CM

## 2017-08-21 DIAGNOSIS — I48.20 CHRONIC ATRIAL FIBRILLATION: Primary | ICD-10-CM

## 2017-08-21 DIAGNOSIS — I50.22 CHRONIC SYSTOLIC CONGESTIVE HEART FAILURE: ICD-10-CM

## 2017-08-21 DIAGNOSIS — R06.00 DYSPNEA AND RESPIRATORY ABNORMALITY: Primary | ICD-10-CM

## 2017-08-21 DIAGNOSIS — I26.99 OTHER PULMONARY EMBOLISM WITHOUT ACUTE COR PULMONALE, UNSPECIFIED CHRONICITY: Primary | ICD-10-CM

## 2017-08-21 LAB
INR PPP: 2.7 (ref 2–3)
PRE FEV1 FVC: 83
PRE FEV1: 2.36
PRE FVC: 2.86
PREDICTED FEV1 FVC: 78
PREDICTED FEV1: 5.58
PREDICTED FVC: 6.9

## 2017-08-21 PROCEDURE — 99211 OFF/OP EST MAY X REQ PHY/QHP: CPT | Mod: PBBFAC,25,27 | Performed by: PHARMACIST

## 2017-08-21 PROCEDURE — 99999 PR PBB SHADOW E&M-EST. PATIENT-LVL III: CPT | Mod: PBBFAC,,, | Performed by: INTERNAL MEDICINE

## 2017-08-21 PROCEDURE — 99999 PR PBB SHADOW E&M-EST. PATIENT-LVL I: CPT | Mod: PBBFAC,,, | Performed by: PHARMACIST

## 2017-08-21 PROCEDURE — 85610 PROTHROMBIN TIME: CPT | Mod: PBBFAC | Performed by: PHARMACIST

## 2017-08-21 PROCEDURE — 99204 OFFICE O/P NEW MOD 45 MIN: CPT | Mod: 25,S$PBB,, | Performed by: INTERNAL MEDICINE

## 2017-08-21 PROCEDURE — 3074F SYST BP LT 130 MM HG: CPT | Mod: ,,, | Performed by: INTERNAL MEDICINE

## 2017-08-21 PROCEDURE — 94010 BREATHING CAPACITY TEST: CPT | Mod: 26,S$PBB,, | Performed by: INTERNAL MEDICINE

## 2017-08-21 PROCEDURE — 71020 XR CHEST PA AND LATERAL: CPT | Mod: 26,,, | Performed by: RADIOLOGY

## 2017-08-21 PROCEDURE — 3078F DIAST BP <80 MM HG: CPT | Mod: ,,, | Performed by: INTERNAL MEDICINE

## 2017-08-21 RX ORDER — LISINOPRIL 2.5 MG/1
2.5 TABLET ORAL DAILY
Qty: 30 TABLET | Refills: 3 | Status: ON HOLD | OUTPATIENT
Start: 2017-08-21 | End: 2017-09-08

## 2017-08-21 NOTE — PROGRESS NOTES
"Subjective:       Patient ID: Drew Farrar is a 51 y.o. male.    Chief Complaint: Shortness of Breath and Wheezing    HPI  50 yo male with hx of May Thurner  Syndrome come with his mother for assessment of exertional dyspnea. If he walks 200 feet he is winded and hyperventilates. He did a PFT study and he has primarily a restrictive impaiment  FVC"41% of predicted flow rates are normal with a Fev-1: 83%  His effort is affected by his mental impairment. I do not put much filiberto in the value. His resting Sa02 is 98% and after walking the length of the payan and back, he is panting and has a tachycardia with an irregular rhythm but his Sa02: is 97%.. His chest x-ray shows mild cardiomegaly but clear lung fields.  On coumadin for recurring pulmonary emboli. Has had multiple vein strippings in the lower legs and sclerotherapy. Chronic atrial fibrillation.   Review of Systems   Constitutional: Negative.    HENT: Negative.    Eyes: Negative.    Respiratory: Negative.  Positive for dyspnea on extertion.         Hx of PAD closed in his youth.   Cardiovascular: Positive for leg swelling.        Recurring pulmonary emboli,   Venous insufficienty. Hx of sclero therapy.   Genitourinary: Negative.    Musculoskeletal: Negative.         Had a septic knee   Skin: Negative.    Gastrointestinal: Negative.    Neurological: Negative.         Mental retardation.   Psychiatric/Behavioral: Positive for confusion.       Objective:      Physical Exam   Constitutional: He is oriented to person, place, and time. He appears well-developed and well-nourished.   Large frame  Can't up from chair easily.   HENT:   Head: Normocephalic and atraumatic.   Right Ear: External ear normal.   Left Ear: External ear normal.   Eyes: Conjunctivae and EOM are normal. Pupils are equal, round, and reactive to light.   Neck: Normal range of motion. Neck supple.   Cardiovascular: Normal rate, regular rhythm and normal heart sounds.    TAchycarida with irregular, " irregular rhythm   Pulmonary/Chest: Effort normal and breath sounds normal.   Clear without wheezes or rales   Abdominal: Soft. Bowel sounds are normal.   Musculoskeletal: Normal range of motion.   Neurological: He is alert and oriented to person, place, and time. He has normal reflexes.   Not functioning well with balance   Skin: Skin is warm and dry.   Severe varicose veins and venous insufficiency   Psychiatric: He has a normal mood and affect. His behavior is normal. Judgment and thought content normal.         Assessment:       1. Chronic systolic congestive heart failure        Outpatient Encounter Prescriptions as of 8/21/2017   Medication Sig Dispense Refill    lisinopril (PRINIVIL,ZESTRIL) 2.5 MG tablet Take 1 tablet (2.5 mg total) by mouth once daily. 30 tablet 2    methimazole (TAPAZOLE) 5 MG Tab Take 2 tablets (10 mg total) by mouth 2 (two) times daily. TAKE 1 TABLET (5 MG TOTAL) BY MOUTH 2 (Twice) TIMES DAILY. 360 tablet 3    warfarin (COUMADIN) 10 MG tablet Take 1.5-2 tablets (15-20 mg total) by mouth once daily. 60 tablet 3     Facility-Administered Encounter Medications as of 8/21/2017   Medication Dose Route Frequency Provider Last Rate Last Dose    sodium tetradecyl sulfate 1 % (10 mg/mL) Soln 2 mL  2 mL Subcutaneous Once Gomez Lantigua MD         No orders of the defined types were placed in this encounter.    Plan:        I explained to the mother that his oxygen saturation does not change with exercise. His Chest x-ray is clear. I feel that his exertional dyspnea is cardiac in origin. Will refer back to cardiology for assessment of EF and PAP and see if he can get better rate control.

## 2017-08-21 NOTE — PROGRESS NOTES
Today is quick follow from visit to clinic ~2 weeks ago, previously subtherapeutic. INR therapeutic today with no overt complications. Patient denies any signs/symptoms of bleeding or bruising. Patient denies any changes in diet/vitamin K intake or medications. Patient correctly stated weekly warfarin regimen with no prompting, and denies any issues with missed or doubled warfarin doses. Will maintain weekly warfarin regimen with no changes, with plans to follow-up in ~2-3 weeks. Per patient, he is scheduled to have cataract surgery done on his other eye in October. Patient advised to contact clinic with any changes, questions, or concerns.

## 2017-09-05 ENCOUNTER — INITIAL CONSULT (OUTPATIENT)
Dept: ELECTROPHYSIOLOGY | Facility: CLINIC | Age: 52
DRG: 313 | End: 2017-09-05
Payer: MEDICARE

## 2017-09-05 ENCOUNTER — HOSPITAL ENCOUNTER (INPATIENT)
Facility: HOSPITAL | Age: 52
LOS: 1 days | Discharge: HOME | DRG: 313 | End: 2017-09-08
Attending: EMERGENCY MEDICINE | Admitting: EMERGENCY MEDICINE
Payer: MEDICARE

## 2017-09-05 ENCOUNTER — HOSPITAL ENCOUNTER (OUTPATIENT)
Dept: CARDIOLOGY | Facility: CLINIC | Age: 52
Discharge: HOME | DRG: 313 | End: 2017-09-05
Payer: MEDICARE

## 2017-09-05 VITALS
HEIGHT: 78 IN | DIASTOLIC BLOOD PRESSURE: 82 MMHG | BODY MASS INDEX: 36.45 KG/M2 | SYSTOLIC BLOOD PRESSURE: 132 MMHG | WEIGHT: 315 LBS | HEART RATE: 106 BPM

## 2017-09-05 DIAGNOSIS — I48.91 ATRIAL FIBRILLATION, UNSPECIFIED TYPE: ICD-10-CM

## 2017-09-05 DIAGNOSIS — E05.90 HYPERTHYROIDISM: Chronic | ICD-10-CM

## 2017-09-05 DIAGNOSIS — I50.42 CHRONIC COMBINED SYSTOLIC AND DIASTOLIC CONGESTIVE HEART FAILURE: ICD-10-CM

## 2017-09-05 DIAGNOSIS — E66.01 MORBID OBESITY: ICD-10-CM

## 2017-09-05 DIAGNOSIS — R79.89 ELEVATED TROPONIN: ICD-10-CM

## 2017-09-05 DIAGNOSIS — I26.99 RECURRENT PULMONARY EMBOLISM: ICD-10-CM

## 2017-09-05 DIAGNOSIS — E66.01 MORBID OBESITY, UNSPECIFIED OBESITY TYPE: ICD-10-CM

## 2017-09-05 DIAGNOSIS — I10 ESSENTIAL HYPERTENSION: ICD-10-CM

## 2017-09-05 DIAGNOSIS — Z86.718 HISTORY OF DVT (DEEP VEIN THROMBOSIS): ICD-10-CM

## 2017-09-05 DIAGNOSIS — R07.9 CHEST PAIN: Primary | ICD-10-CM

## 2017-09-05 DIAGNOSIS — Z79.01 LONG TERM (CURRENT) USE OF ANTICOAGULANTS: ICD-10-CM

## 2017-09-05 DIAGNOSIS — I48.19 PERSISTENT ATRIAL FIBRILLATION: ICD-10-CM

## 2017-09-05 DIAGNOSIS — I87.1 MAY-THURNER SYNDROME: ICD-10-CM

## 2017-09-05 DIAGNOSIS — I50.22 CHRONIC SYSTOLIC HEART FAILURE: ICD-10-CM

## 2017-09-05 DIAGNOSIS — I48.21 PERMANENT ATRIAL FIBRILLATION: Primary | ICD-10-CM

## 2017-09-05 LAB
ALBUMIN SERPL BCP-MCNC: 3.3 G/DL
ALP SERPL-CCNC: 160 U/L
ALT SERPL W/O P-5'-P-CCNC: 30 U/L
ANION GAP SERPL CALC-SCNC: 7 MMOL/L
AST SERPL-CCNC: 31 U/L
BASOPHILS # BLD AUTO: 0.02 K/UL
BASOPHILS NFR BLD: 0.3 %
BILIRUB SERPL-MCNC: 0.5 MG/DL
BNP SERPL-MCNC: 103 PG/ML
BUN SERPL-MCNC: 28 MG/DL
CALCIUM SERPL-MCNC: 9.3 MG/DL
CHLORIDE SERPL-SCNC: 108 MMOL/L
CO2 SERPL-SCNC: 24 MMOL/L
CREAT SERPL-MCNC: 0.8 MG/DL
DIFFERENTIAL METHOD: ABNORMAL
EOSINOPHIL # BLD AUTO: 0.2 K/UL
EOSINOPHIL NFR BLD: 2.8 %
ERYTHROCYTE [DISTWIDTH] IN BLOOD BY AUTOMATED COUNT: 15.4 %
EST. GFR  (AFRICAN AMERICAN): >60 ML/MIN/1.73 M^2
EST. GFR  (NON AFRICAN AMERICAN): >60 ML/MIN/1.73 M^2
GLUCOSE SERPL-MCNC: 89 MG/DL
HCT VFR BLD AUTO: 37.4 %
HGB BLD-MCNC: 11.9 G/DL
INR PPP: 1.2
LYMPHOCYTES # BLD AUTO: 1.4 K/UL
LYMPHOCYTES NFR BLD: 23.4 %
MCH RBC QN AUTO: 25.3 PG
MCHC RBC AUTO-ENTMCNC: 31.8 G/DL
MCV RBC AUTO: 79 FL
MONOCYTES # BLD AUTO: 0.5 K/UL
MONOCYTES NFR BLD: 7.8 %
NEUTROPHILS # BLD AUTO: 3.8 K/UL
NEUTROPHILS NFR BLD: 65.7 %
PLATELET # BLD AUTO: 146 K/UL
PMV BLD AUTO: 9.1 FL
POTASSIUM SERPL-SCNC: 4.5 MMOL/L
PROT SERPL-MCNC: 7.6 G/DL
PROTHROMBIN TIME: 12.4 SEC
RBC # BLD AUTO: 4.71 M/UL
SODIUM SERPL-SCNC: 139 MMOL/L
TROPONIN I SERPL DL<=0.01 NG/ML-MCNC: 0.01 NG/ML
TROPONIN I SERPL DL<=0.01 NG/ML-MCNC: 0.01 NG/ML
TROPONIN I SERPL DL<=0.01 NG/ML-MCNC: 0.04 NG/ML
WBC # BLD AUTO: 5.76 K/UL

## 2017-09-05 PROCEDURE — 3075F SYST BP GE 130 - 139MM HG: CPT | Mod: ,,, | Performed by: INTERNAL MEDICINE

## 2017-09-05 PROCEDURE — 80053 COMPREHEN METABOLIC PANEL: CPT

## 2017-09-05 PROCEDURE — 25000003 PHARM REV CODE 250: Performed by: PHYSICIAN ASSISTANT

## 2017-09-05 PROCEDURE — G0378 HOSPITAL OBSERVATION PER HR: HCPCS

## 2017-09-05 PROCEDURE — 99214 OFFICE O/P EST MOD 30 MIN: CPT | Mod: S$PBB,,, | Performed by: INTERNAL MEDICINE

## 2017-09-05 PROCEDURE — 83880 ASSAY OF NATRIURETIC PEPTIDE: CPT

## 2017-09-05 PROCEDURE — 99285 EMERGENCY DEPT VISIT HI MDM: CPT | Mod: GC,,, | Performed by: EMERGENCY MEDICINE

## 2017-09-05 PROCEDURE — 84439 ASSAY OF FREE THYROXINE: CPT

## 2017-09-05 PROCEDURE — 84484 ASSAY OF TROPONIN QUANT: CPT | Mod: 91

## 2017-09-05 PROCEDURE — 84443 ASSAY THYROID STIM HORMONE: CPT

## 2017-09-05 PROCEDURE — 93005 ELECTROCARDIOGRAM TRACING: CPT | Mod: PBBFAC | Performed by: INTERNAL MEDICINE

## 2017-09-05 PROCEDURE — 99213 OFFICE O/P EST LOW 20 MIN: CPT | Mod: PBBFAC | Performed by: INTERNAL MEDICINE

## 2017-09-05 PROCEDURE — 93005 ELECTROCARDIOGRAM TRACING: CPT

## 2017-09-05 PROCEDURE — 3079F DIAST BP 80-89 MM HG: CPT | Mod: ,,, | Performed by: INTERNAL MEDICINE

## 2017-09-05 PROCEDURE — 85610 PROTHROMBIN TIME: CPT

## 2017-09-05 PROCEDURE — 36415 COLL VENOUS BLD VENIPUNCTURE: CPT

## 2017-09-05 PROCEDURE — 99285 EMERGENCY DEPT VISIT HI MDM: CPT | Mod: 25,27

## 2017-09-05 PROCEDURE — 25000003 PHARM REV CODE 250: Performed by: EMERGENCY MEDICINE

## 2017-09-05 PROCEDURE — 93010 ELECTROCARDIOGRAM REPORT: CPT | Mod: 77,,, | Performed by: INTERNAL MEDICINE

## 2017-09-05 PROCEDURE — 99999 PR PBB SHADOW E&M-EST. PATIENT-LVL III: CPT | Mod: PBBFAC,,, | Performed by: INTERNAL MEDICINE

## 2017-09-05 PROCEDURE — 96374 THER/PROPH/DIAG INJ IV PUSH: CPT

## 2017-09-05 PROCEDURE — 99220 PR INITIAL OBSERVATION CARE,LEVL III: CPT | Mod: ,,, | Performed by: PHYSICIAN ASSISTANT

## 2017-09-05 PROCEDURE — 85025 COMPLETE CBC W/AUTO DIFF WBC: CPT

## 2017-09-05 PROCEDURE — 63600175 PHARM REV CODE 636 W HCPCS: Performed by: EMERGENCY MEDICINE

## 2017-09-05 PROCEDURE — 93010 ELECTROCARDIOGRAM REPORT: CPT | Mod: S$PBB,76,, | Performed by: INTERNAL MEDICINE

## 2017-09-05 PROCEDURE — 93010 ELECTROCARDIOGRAM REPORT: CPT | Mod: ,,, | Performed by: INTERNAL MEDICINE

## 2017-09-05 RX ORDER — METHIMAZOLE 5 MG/1
5 TABLET ORAL 2 TIMES DAILY
Status: DISCONTINUED | OUTPATIENT
Start: 2017-09-06 | End: 2017-09-07

## 2017-09-05 RX ORDER — ONDANSETRON 2 MG/ML
4 INJECTION INTRAMUSCULAR; INTRAVENOUS EVERY 12 HOURS PRN
Status: DISCONTINUED | OUTPATIENT
Start: 2017-09-05 | End: 2017-09-08 | Stop reason: HOSPADM

## 2017-09-05 RX ORDER — MORPHINE SULFATE 2 MG/ML
2 INJECTION, SOLUTION INTRAMUSCULAR; INTRAVENOUS
Status: COMPLETED | OUTPATIENT
Start: 2017-09-05 | End: 2017-09-05

## 2017-09-05 RX ORDER — LISINOPRIL 2.5 MG/1
2.5 TABLET ORAL DAILY
Status: DISCONTINUED | OUTPATIENT
Start: 2017-09-06 | End: 2017-09-07

## 2017-09-05 RX ORDER — IBUPROFEN 200 MG
24 TABLET ORAL
Status: DISCONTINUED | OUTPATIENT
Start: 2017-09-05 | End: 2017-09-08 | Stop reason: HOSPADM

## 2017-09-05 RX ORDER — METOPROLOL SUCCINATE 50 MG/1
50 TABLET, EXTENDED RELEASE ORAL DAILY
COMMUNITY
End: 2017-09-05 | Stop reason: DRUGHIGH

## 2017-09-05 RX ORDER — HYDROCODONE BITARTRATE AND ACETAMINOPHEN 5; 325 MG/1; MG/1
1 TABLET ORAL EVERY 4 HOURS PRN
Status: DISCONTINUED | OUTPATIENT
Start: 2017-09-05 | End: 2017-09-08 | Stop reason: HOSPADM

## 2017-09-05 RX ORDER — RAMELTEON 8 MG/1
8 TABLET ORAL NIGHTLY PRN
Status: DISCONTINUED | OUTPATIENT
Start: 2017-09-05 | End: 2017-09-08 | Stop reason: HOSPADM

## 2017-09-05 RX ORDER — ONDANSETRON 8 MG/1
8 TABLET, ORALLY DISINTEGRATING ORAL EVERY 8 HOURS PRN
Status: DISCONTINUED | OUTPATIENT
Start: 2017-09-05 | End: 2017-09-08 | Stop reason: HOSPADM

## 2017-09-05 RX ORDER — FUROSEMIDE 10 MG/ML
20 INJECTION INTRAMUSCULAR; INTRAVENOUS ONCE
Status: COMPLETED | OUTPATIENT
Start: 2017-09-05 | End: 2017-09-06

## 2017-09-05 RX ORDER — NITROGLYCERIN 0.4 MG/1
0.4 TABLET SUBLINGUAL EVERY 5 MIN PRN
Status: COMPLETED | OUTPATIENT
Start: 2017-09-05 | End: 2017-09-05

## 2017-09-05 RX ORDER — METHIMAZOLE 5 MG/1
10 TABLET ORAL 2 TIMES DAILY
Status: DISCONTINUED | OUTPATIENT
Start: 2017-09-05 | End: 2017-09-05

## 2017-09-05 RX ORDER — AMOXICILLIN 250 MG
1 CAPSULE ORAL 2 TIMES DAILY
Status: DISCONTINUED | OUTPATIENT
Start: 2017-09-06 | End: 2017-09-08 | Stop reason: HOSPADM

## 2017-09-05 RX ORDER — METOPROLOL SUCCINATE 100 MG/1
100 TABLET, EXTENDED RELEASE ORAL DAILY
Status: DISCONTINUED | OUTPATIENT
Start: 2017-09-06 | End: 2017-09-08 | Stop reason: HOSPADM

## 2017-09-05 RX ORDER — ASPIRIN 325 MG
325 TABLET ORAL
Status: COMPLETED | OUTPATIENT
Start: 2017-09-05 | End: 2017-09-05

## 2017-09-05 RX ORDER — ENOXAPARIN SODIUM 150 MG/ML
1 INJECTION SUBCUTANEOUS
Status: DISCONTINUED | OUTPATIENT
Start: 2017-09-05 | End: 2017-09-08 | Stop reason: HOSPADM

## 2017-09-05 RX ORDER — ACETAMINOPHEN 325 MG/1
650 TABLET ORAL EVERY 4 HOURS PRN
Status: DISCONTINUED | OUTPATIENT
Start: 2017-09-05 | End: 2017-09-08 | Stop reason: HOSPADM

## 2017-09-05 RX ORDER — METOPROLOL SUCCINATE 100 MG/1
100 TABLET, EXTENDED RELEASE ORAL DAILY
Qty: 30 TABLET | Refills: 11 | Status: SHIPPED | OUTPATIENT
Start: 2017-09-05 | End: 2017-09-25 | Stop reason: SDUPTHER

## 2017-09-05 RX ORDER — IBUPROFEN 200 MG
16 TABLET ORAL
Status: DISCONTINUED | OUTPATIENT
Start: 2017-09-05 | End: 2017-09-08 | Stop reason: HOSPADM

## 2017-09-05 RX ORDER — GLUCAGON 1 MG
1 KIT INJECTION
Status: DISCONTINUED | OUTPATIENT
Start: 2017-09-05 | End: 2017-09-08 | Stop reason: HOSPADM

## 2017-09-05 RX ORDER — WARFARIN SODIUM 5 MG/1
15 TABLET ORAL DAILY
Status: DISCONTINUED | OUTPATIENT
Start: 2017-09-06 | End: 2017-09-06

## 2017-09-05 RX ORDER — PANTOPRAZOLE SODIUM 40 MG/1
40 TABLET, DELAYED RELEASE ORAL DAILY
Status: DISCONTINUED | OUTPATIENT
Start: 2017-09-06 | End: 2017-09-08 | Stop reason: HOSPADM

## 2017-09-05 RX ADMIN — METHIMAZOLE 10 MG: 10 TABLET ORAL at 10:09

## 2017-09-05 RX ADMIN — MORPHINE SULFATE 2 MG: 2 INJECTION, SOLUTION INTRAMUSCULAR; INTRAVENOUS at 05:09

## 2017-09-05 RX ADMIN — NITROGLYCERIN 0.4 MG: 0.4 TABLET SUBLINGUAL at 05:09

## 2017-09-05 RX ADMIN — ASPIRIN 325 MG ORAL TABLET 325 MG: 325 PILL ORAL at 05:09

## 2017-09-05 NOTE — LETTER
September 5, 2017      Jeffrey Calixto MD  [address]  [facility] [address]           Kevin Senait - Arrhythmia  [address]  [facility] [address]  Phone: [phone]  Fax: [phone]          Patient: Drew Farrar   MR Number: [id number]   YOB: 1965   Date of Visit: 9/5/2017       Dear Dr. Jeffrey Calixto:    Thank you for referring Drew Farrar to me for evaluation. Attached you will find relevant portions of my assessment and plan of care.    If you have questions, please do not hesitate to call me. I look forward to following Drew Farrar along with you.    Sincerely,    Shin Begum MD    Enclosure  CC:  No Recipients    If you would like to receive this communication electronically, please contact [email or web address] or [phone] to request more information on [employer] access.    For providers and/or their staff who would like to refer a patient to [facility], please contact us through our one-stop-shop provider referral line, [facility], at [phone].    If you feel you have received this communication in error or would no longer like to receive these types of communications, please e-mail [email or web address]

## 2017-09-05 NOTE — PROVIDER PROGRESS NOTES - EMERGENCY DEPT.
Encounter Date: 9/5/2017    ED Physician Progress Notes        Physician Note:   Atrial fib as noted on multiple prior EKGs.   rapid ventricular response, which is new.  No other acute findings.  No STEMI

## 2017-09-05 NOTE — ED NOTES
Dr. Sommers aware that pt's chest pain went from a zero back to a 9/10 pain. Dr. Sommers stated to give third dose of Nitroglycerin, awaiting additional orders.

## 2017-09-05 NOTE — ED NOTES
Dr. Sommers notified nurse is unable to palpate bilateral dorsalis pedis pulse. Doppler used in attempt to obtain dorsalis pedis pulse without success, Dr. Sommers notified

## 2017-09-05 NOTE — ED NOTES
Patient identifiers verified and correct for Drew Farrar.    LOC: The patient is awake, alert and oriented x 4. Pt is speaking appropriately, no slurred speech.  APPEARANCE: Patient resting comfortably and in no acute distress. Pt is clean and well groomed. No JVD visible.  SKIN: Skin is warm and dry. No tenting observed and capillary refill <3 seconds. No clubbing noted to nail beds. Mucus membranes moist and acyanotic. Discoloration noted to bilateral lower extremities.   MUSCULOSKELETAL: Full range of motion present in all extremities. Hand  equal and leg strength strong +2 bilaterally.  RESPIRATORY: Airway is open and patent. Respirations-unlabored, regular rate, equal bilaterally on inspiration and expiration. No accessory muscle use noted. Lungs clear to auscultation in all fields bilaterally anterior and posterior.   CARDIAC: Patient has irregular heart rate and rhythm.  Significant peripheral edema noted with increased swelling noted to RLE.   ABDOMEN: Soft and non-tender to palpation with no distention noted. Normoactive bowel sounds X4 quadrants.   NEUROLOGIC: Eyes open spontaneously and facial expression symmetrical. Pt behavior appropriate to situation, and pt follows commands.  Pt reports sensation present in all extremities when touched with a finger.

## 2017-09-05 NOTE — ED TRIAGE NOTES
"Pt presents with intermittent chest pain and increasing SOB upon activity, pain started severely at 1pm while at primary care doctor. Pt denies n/v, chills, fever, palpitations. Pt states "My EKG showed atrial fibrillation." Pt has hx of atrial fibrillation.   "

## 2017-09-05 NOTE — ED PROVIDER NOTES
Encounter Date: 9/5/2017       History     Chief Complaint   Patient presents with    Chest Pain     hx afib sent from arrhythmia clinic, with chest pain on R     The patient is a morbidly obese 50 y/o with Hyperthyroidism, persistent A.fib, currently on coumadin (10 mg daily), and hx of DVT and PE s/p [place] filter placement, p/w chest pain.  The pain started at 1 pm today, while he was in the electrophysiology clinic, located in the R hemithorax that goes to the L side, he describes it as lightning, comes and goes, stays for ~15 minutes, and he mentions that the pain is similar to the pain at the time of having a pulmonary emboli. He has been having SOB (mostly exertional, but also at rest) for a while that has progressed over the last 2 weeks which is why he was seeing a cardiologist today. His mother mentions that he is not active at all and can not walk more than 50 feet and needs to sit down and catch his breath. The patient and his mother deny any recent change in the swelling of lower extremities compared to the baseline. He denies and N/V, diaphoresis, palpitation, hemoptysis, or pain in the legs.          Review of patient's allergies indicates:   Allergen Reactions    Food allergy formula [glutamine-c-quercet-selen-brom]      Allergic to green peas; Heart failure.    Peas Hives    Ibuprofen Swelling    Latex, natural rubber Hives    Pcn [penicillins] Hives    Butisol [butabarbital] Rash     Peeling skin     Past Medical History:   Diagnosis Date    *Atrial fibrillation     Atrial fibrillation     Atrial fibrillation Feb 23, 2016    Bipolar disorder     Congenital heart disease     s/p surgical intervention 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 of age    Deep vein thrombosis     DVT of leg (deep venous thrombosis)     left leg    History of prior ablation treatment     10/9/13    Hypertension     Obesity     Stroke     Thyroid disease     Venous ulcer      Past Surgical History:   Procedure Laterality Date     ANGIOPLASTY      CARDIAC SURGERY      open heart surgery 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 old    EYE SURGERY      left eye cataract/right eye glaucoma    TIMO FILTER PLACEMENT      Dr Calix ([facility])    KNEE SURGERY      l and r     MULTIPLE TOOTH EXTRACTIONS      SKIN GRAFT      left leg     Family History   Problem Relation Age of Onset    Diabetes Father     Heart disease Father     Heart disease Maternal Grandmother     Diabetes Maternal Grandfather     Heart disease Maternal Grandfather     Stroke Maternal Grandfather      Social History   Substance Use Topics    Smoking status: Former Smoker     Packs/day: 1.00     Years: 6.00     Types: Cigarettes    Smokeless tobacco: Former User      Comment: quit by age 27yrs old    Alcohol use 0.6 oz/week     1 Glasses of wine per week      Comment: 1 glass of wine a week     Review of Systems   Constitutional: Negative for chills, diaphoresis and fever.   Respiratory: Positive for shortness of breath. Negative for cough and chest tightness.    Cardiovascular: Positive for chest pain and leg swelling (chronic).   Gastrointestinal: Negative for abdominal distention, abdominal pain, nausea and vomiting.   Genitourinary: Negative for dysuria, frequency, hematuria and urgency.   Musculoskeletal: Negative for arthralgias, joint swelling and myalgias.   Skin: Negative for pallor and rash.   Neurological: Negative for dizziness, syncope, weakness, light-headedness and headaches.   Psychiatric/Behavioral: Negative for agitation. The patient is not nervous/anxious.        Physical Exam     Initial Vitals [09/05/17 1414]   BP Pulse Resp Temp SpO2   125/79 99 18 98.7 °F (37.1 °C) 96 %      MAP       94.33         Physical Exam    Vitals reviewed.  Constitutional: He is not diaphoretic. He is Obese . He is cooperative. No distress.   HENT:   Head: Normocephalic and atraumatic.   Mouth/Throat: Mucous membranes are normal.   Eyes: Conjunctivae and EOM are normal. Pupils  are equal, round, and reactive to light.   Neck: Normal range of motion. Neck supple.   Cardiovascular: Normal rate. An irregular rhythm present. Exam reveals no gallop and no friction rub.    No murmur heard.  Pulses:       Radial pulses are 2+ on the right side, and 2+ on the left side.   Could not appreciate the DP pulses 2/2 edema   Pulmonary/Chest: Breath sounds normal. No tachypnea. No respiratory distress. He has no wheezes. He has no rales. He exhibits bony tenderness (on the sternum). He exhibits no edema and no deformity.       Abdominal: Soft. Bowel sounds are normal. He exhibits no distension. There is no tenderness.   Musculoskeletal: Normal range of motion. He exhibits edema. He exhibits no tenderness.   BLLE edema R>L, pitting, negative for  erythema, warmth, tenderness   Neurological: He is alert and oriented to person, place, and time. GCS eye subscore is 4. GCS verbal subscore is 5. GCS motor subscore is 6.   Skin: Skin is dry. No erythema.         ED Course   Procedures  Labs Reviewed   CBC W/ AUTO DIFFERENTIAL - Abnormal; Notable for the following:        Result Value    Hemoglobin 11.9 (*)     Hematocrit 37.4 (*)     MCV 79 (*)     MCH 25.3 (*)     MCHC 31.8 (*)     RDW 15.4 (*)     Platelets 146 (*)     MPV 9.1 (*)     All other components within normal limits   COMPREHENSIVE METABOLIC PANEL - Abnormal; Notable for the following:     BUN, Bld 28 (*)     Albumin 3.3 (*)     Alkaline Phosphatase 160 (*)     Anion Gap 7 (*)     All other components within normal limits   TROPONIN I - Abnormal; Notable for the following:     Troponin I 0.042 (*)     All other components within normal limits   B-TYPE NATRIURETIC PEPTIDE - Abnormal; Notable for the following:      (*)     All other components within normal limits   TROPONIN I        ECG Results          EKG 12-lead (Final result)  Result time 09/05/17 16:43:11    Final result by Interface, Lab [facility] (09/05/17 16:43:11)                  Narrative:    Test Reason : R07.9  Blood Pressure : 125/60 mmHG  Vent. Rate : 117 BPM     Atrial Rate : 108 BPM     P-R Int : 000 ms          QRS Dur : 094 ms      QT Int : 260 ms       P-R-T Axes : 000 062 086 degrees     QTc Int : 362 ms    Atrial fibrillation with rapid ventricular response  Abnormal ECG  When compared with ECG of 05-SEP-2017 13:22,  Borderline criteria for Inferior infarct are no longer Present  Confirmed by ELOINA ROMO MD ([id number]) on 9/5/2017 4:43:01 PM    Referred By: MARY ARREOLA           Confirmed By:ELOINA ROMO MD                            Imaging Results          X-Ray Chest 1 View (Final result)  Result time 09/05/17 17:35:59    Final result by Sina Ramirez Jr., MD (09/05/17 17:35:59)                 Narrative:    Chest single view compared to 08/21/2017.  Heart is enlarged.  There is increased groundglass opacification at the left base compared to prior.  Right lung is hypoaerated but clear.  No pneumothorax.    Impression increased ground glass opacity at the left base.  Developing pleural fluid or consolidation not excluded.  PA and lateral film may be helpful.      Electronically signed by: SINA RAMIREZ MD  Date:     09/05/17  Time:    17:35                                  Medical Decision Making:   Initial Assessment:   The patient is a morbidly obese 52 y/o with Hyperthyroidism, persistent A.fib s/p filter placement and on coumadin, and hx of DVT and PE, p/w chest pain in the R hemithorax that goes to the L side since 1 pm today a/w SOB. On exam the pain is reproducible, and BLLE edema (R>L). The patient is high risk for DVT and Pe, will also r/o ACS. Will obtain EKG, cardiac labs (troponin and BNP), CXR and re-evaluate.  Clinical Tests:   Lab Tests: Ordered and Reviewed  Radiological Study: Ordered and Reviewed  Medical Tests: Ordered and Reviewed       APC / Resident Notes:   6:45 PM  EKG revealed A. Fib with RVR with no significant ischemic change  Troponin is elevated  ~0.042  The patient is chest pain free  I let him know that we are planning to admit him for cardiac monitoring and further managements, he is in agreement.  7:25 PM  The patient is getting admitted for observation by IM-F, Dr. Quiles.    Sridevi Temple MD  PGY-1, Internal Medicine  09/05/2017           Attending Attestation:   Physician Attestation Statement for Resident:  As the supervising MD   Physician Attestation Statement: I have personally seen and examined this patient.   I agree with the above history. -: 52 yo M with pmhx morbid obesity, afib, PE on anticoagulation and s/p irwin filter placement presents with chest pain. Right sided with radiation to left thorax. He was sent from EP clinic because of these symptoms. Doubt PE given anticoagulation. Hemodynamics inconsistent with tamponade or dissection. Concern for angina. Administered ASA. Nitro relieved pain. CXR without acute process. EKG revealed afib. Trop minimally elevated at 0.042. He remained in ED chest pain free and was placed in obs for further cardiac risk stratification.   As the supervising MD I agree with the above PE.    As the supervising MD I agree with the above treatment, course, plan, and disposition.  I have reviewed and agree with the residents interpretation of the following: lab data, x-rays and EKG.                    ED Course      Clinical Impression:   The encounter diagnosis was Chest pain.    Disposition:   Disposition: Placed in Observation  Condition: Stable                        Sridevi Temple MD  Resident  09/06/17 0146       Ariel Sommers MD  09/06/17 0920

## 2017-09-05 NOTE — ED NOTES
Rounding on the patient has been done. The patient has been updated on the plan of care and current status. Pain was assessed and is currently a 7/10. Comfort positioning and restroom needs were addressed. Necessary items were placed with in reach and was advised when a reassessment would take place. The call bell remains at the bedside for any additional patient needs. The patient is resting comfortably on the stretcher, respirations are even and labored, skin warm and dry. Will continue to monitor.

## 2017-09-05 NOTE — PROGRESS NOTES
Subjective:    Patient ID:  Drew Farrar is a 51 y.o. male who presents for evaluation of Atrial Fibrillation    Referring Physician: Jeffrey Calixto MD    HPI  I had the pleasure of seeing Mr. Farrar today in our electrophysiology clinic in consultation for his permanent atrial fibrillation. As you are aware he is a pleasant 51 year-old with patent PDA s/p surgical closure 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 of age, May Thurner syndrome with multple leg/pelvic DVTs with pulmonary emboli, chronic systolic congestive heart failure, permanent atrial fibrillation since his 30s, and hyperthyroidism.  He has had multiple endovascular procedures for his venous disease. He is on chronic coumadin.  He was noted to have an LVEF of 45% in 2014 and 30% in 2016. He notes chronic dyspnea on exertion that has been worsening over the past few weeks. He denies orthopnea or PND however he could not walk from the ECG clinic area to the arrhythmia check in area without stopping to catch his breath (<100 yards distance). He notes also over the past few days he is having worsening right sided pleuritic chest pain with worsening shortness of breath. On review of his labs his TSH has been undetectable since 2014.     With respect to his atrial fibrillation he reports he went into atrial fibrillation when he was undergoing a knee operation in his 30s. He and his mother report he was shocked 3 times and remained in atrial fibrillation. They report he was left in atrial fibrillation afterwards. He reports noting fast heart beat sometimes after exerting himself. He has noted it more frequently as of late. He was seeing Dr. Farhan Calixto for his worsening dyspnea on exertion who wanted him evaluated from a cardiology perspective. He is on toprol xl 50mg daily, lisinopril 2.5mg daily, warfarin, and methimazole. He denies any bleeding issues related to warfarin therapy. Of note, when he last saw Dr. Lantigua in February of 2017 the plan was for him to be seen  in heart failure clinic.     It should be noted towards the end of our visit he began experiencing significant right sided chest pain and we escorted him to the emergency room for further evaluation.    ECHO 2016 CONCLUSIONS     1 - Biatrial enlargement.     2 - Mild left ventricular enlargement.     3 - Moderately depressed left ventricular systolic function (EF 30-35%).     4 - The estimated PA systolic pressure is 38 mmHg.     5 - Intermediate central venous pressure.     I reviewed all available electrocardiograms in EPIC which all show atrial fibrillation with ventricular response rates ranging from 60s to 120 (beginning in 2012).    My interpretation of today's in clinic electrocardiogram is atrial fibrillation with a ventricular response rate of 106 bpm.    Review of Systems   Constitution: Negative for weakness and malaise/fatigue.   HENT: Negative.    Cardiovascular: Positive for chest pain (per HPI), dyspnea on exertion, irregular heartbeat, leg swelling and palpitations. Negative for near-syncope, orthopnea, paroxysmal nocturnal dyspnea and syncope.   Respiratory: Positive for shortness of breath and snoring.    Hematologic/Lymphatic: Negative for bleeding problem. Does not bruise/bleed easily.   Skin: Negative.    Gastrointestinal: Negative for abdominal pain, hematochezia and melena.   Neurological: Negative for dizziness, focal weakness and light-headedness.        Objective:    Physical Exam   Constitutional: He is oriented to person, place, and time. He appears well-developed and well-nourished. No distress.   HENT:   Head: Normocephalic and atraumatic.   Eyes: Conjunctivae are normal. Right eye exhibits no discharge. Left eye exhibits no discharge.   Cardiovascular: An irregularly irregular rhythm present. Tachycardia present.  Exam reveals no gallop and no friction rub.    No murmur heard.  Pulmonary/Chest: Effort normal and breath sounds normal. No respiratory distress. He has no wheezes. He has  no rales.   Abdominal: Soft. Bowel sounds are normal. He exhibits no distension. There is no tenderness.   Musculoskeletal: He exhibits edema (chronic lower extremity edema).   Neurological: He is alert and oriented to person, place, and time.   Skin: Skin is warm and dry. He is not diaphoretic.   Psychiatric: He has a normal mood and affect. His behavior is normal. Judgment and thought content normal.   Vitals reviewed.        Assessment:       1. Permanent atrial fibrillation    2. Chronic combined systolic and diastolic congestive heart failure    3. May-Thurner syndrome    4. Recurrent pulmonary embolism         Plan:       In summary, Mr. Farrar is a pleasant 51 year-old with patent PDA s/p surgical closure 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 of age, May Thurner syndrome with multple leg/pelvic DVTs with pulmonary emboli, chronic systolic congestive heart failure, permanent atrial fibrillation since his 30s, and hyperthyroidism presenting for evaluation for his rate control of atrial fibrillation. It is highly unlikely we will be able to restore sinus rhythm if he has been in atrial fibrillation for over 10 years. All ECGs I have date back to 2012 and all show atrial fibrillation. Will increase metoprolol to 100mg daily for improved rate control and heart failure therapy. I am concerned however that his heart failure and rate control will not be effectively treated until his thyroid is adequately treated. All TSH values since 2014 have been undetectable with elevated Free T4. Will refer to endocrinology. He is also young with worsening LVEF over the past few years. This could be idiopathic or related to chronic hyperthyroidism or related to poorly controlled atrial fibrillation or combination of all the above. Will refer to heart failure clinic as well.    Plan  To ER now for severe pleuritic chest pain at end of our visit with history of multiple PEs  Increase metoprolol succinate to 100mg daily  Update function with ECHO  Refer to  endocrinology for continued hyperthyroidism in setting of AF and systolic cardiomyopathy of unclear etiology  Refer to heart failure clinic  RTC in 2 months with preclinic holter monitor    Thank you for allowing me to participate in the care of this patient. Please do not hesitate to call me with any questions or concerns.    Shin Begum MD, PhD  Cardiac Electrophysiology

## 2017-09-06 ENCOUNTER — DOCUMENTATION ONLY (OUTPATIENT)
Dept: CARDIOLOGY | Facility: CLINIC | Age: 52
End: 2017-09-06

## 2017-09-06 DIAGNOSIS — R07.9 CHEST PAIN: Primary | ICD-10-CM

## 2017-09-06 DIAGNOSIS — I48.91 ATRIAL FIBRILLATION, UNSPECIFIED TYPE: Primary | ICD-10-CM

## 2017-09-06 PROBLEM — I50.20 HEART FAILURE WITH REDUCED EJECTION FRACTION, NYHA CLASS II: Status: ACTIVE | Noted: 2017-09-06

## 2017-09-06 LAB
ANION GAP SERPL CALC-SCNC: 7 MMOL/L
AORTIC VALVE REGURGITATION: ABNORMAL
BASOPHILS # BLD AUTO: 0.01 K/UL
BASOPHILS NFR BLD: 0.2 %
BUN SERPL-MCNC: 26 MG/DL
CALCIUM SERPL-MCNC: 9.4 MG/DL
CHLORIDE SERPL-SCNC: 108 MMOL/L
CO2 SERPL-SCNC: 26 MMOL/L
CREAT SERPL-MCNC: 0.8 MG/DL
DIFFERENTIAL METHOD: ABNORMAL
EOSINOPHIL # BLD AUTO: 0.1 K/UL
EOSINOPHIL NFR BLD: 3.1 %
ERYTHROCYTE [DISTWIDTH] IN BLOOD BY AUTOMATED COUNT: 15.7 %
EST. GFR  (AFRICAN AMERICAN): >60 ML/MIN/1.73 M^2
EST. GFR  (NON AFRICAN AMERICAN): >60 ML/MIN/1.73 M^2
ESTIMATED PA SYSTOLIC PRESSURE: 46.94
GLUCOSE SERPL-MCNC: 92 MG/DL
HCT VFR BLD AUTO: 36 %
HGB BLD-MCNC: 11.7 G/DL
LYMPHOCYTES # BLD AUTO: 1 K/UL
LYMPHOCYTES NFR BLD: 24.1 %
MAGNESIUM SERPL-MCNC: 1.7 MG/DL
MCH RBC QN AUTO: 25.1 PG
MCHC RBC AUTO-ENTMCNC: 32.5 G/DL
MCV RBC AUTO: 77 FL
MITRAL VALVE REGURGITATION: ABNORMAL
MONOCYTES # BLD AUTO: 0.4 K/UL
MONOCYTES NFR BLD: 8.4 %
NEUTROPHILS # BLD AUTO: 2.7 K/UL
NEUTROPHILS NFR BLD: 64.2 %
PLATELET # BLD AUTO: 153 K/UL
PMV BLD AUTO: 9.5 FL
POTASSIUM SERPL-SCNC: 4.3 MMOL/L
RBC # BLD AUTO: 4.66 M/UL
RETIRED EF AND QEF - SEE NOTES: 40 (ref 55–65)
SODIUM SERPL-SCNC: 141 MMOL/L
T4 FREE SERPL-MCNC: 1.88 NG/DL
TRICUSPID VALVE REGURGITATION: ABNORMAL
TROPONIN I SERPL DL<=0.01 NG/ML-MCNC: 0.03 NG/ML
TSH SERPL DL<=0.005 MIU/L-ACNC: <0.01 UIU/ML
WBC # BLD AUTO: 4.15 K/UL

## 2017-09-06 PROCEDURE — C8929 TTE W OR WO FOL WCON,DOPPLER: HCPCS

## 2017-09-06 PROCEDURE — 63600175 PHARM REV CODE 636 W HCPCS: Performed by: PHYSICIAN ASSISTANT

## 2017-09-06 PROCEDURE — 36415 COLL VENOUS BLD VENIPUNCTURE: CPT

## 2017-09-06 PROCEDURE — 85025 COMPLETE CBC W/AUTO DIFF WBC: CPT

## 2017-09-06 PROCEDURE — 99233 SBSQ HOSP IP/OBS HIGH 50: CPT | Mod: GC,,, | Performed by: INTERNAL MEDICINE

## 2017-09-06 PROCEDURE — 93306 TTE W/DOPPLER COMPLETE: CPT | Mod: 26,,, | Performed by: INTERNAL MEDICINE

## 2017-09-06 PROCEDURE — 80048 BASIC METABOLIC PNL TOTAL CA: CPT

## 2017-09-06 PROCEDURE — 25500020 PHARM REV CODE 255: Performed by: HOSPITALIST

## 2017-09-06 PROCEDURE — 25000003 PHARM REV CODE 250: Performed by: PHYSICIAN ASSISTANT

## 2017-09-06 PROCEDURE — G0378 HOSPITAL OBSERVATION PER HR: HCPCS

## 2017-09-06 PROCEDURE — 84484 ASSAY OF TROPONIN QUANT: CPT

## 2017-09-06 PROCEDURE — 99225 PR SUBSEQUENT OBSERVATION CARE,LEVEL II: CPT | Mod: ,,, | Performed by: PHYSICIAN ASSISTANT

## 2017-09-06 PROCEDURE — 83735 ASSAY OF MAGNESIUM: CPT

## 2017-09-06 RX ORDER — WARFARIN SODIUM 5 MG/1
15 TABLET ORAL DAILY
Status: DISCONTINUED | OUTPATIENT
Start: 2017-09-07 | End: 2017-09-07

## 2017-09-06 RX ADMIN — METHIMAZOLE 5 MG: 5 TABLET ORAL at 08:09

## 2017-09-06 RX ADMIN — STANDARDIZED SENNA CONCENTRATE AND DOCUSATE SODIUM 1 TABLET: 8.6; 5 TABLET, FILM COATED ORAL at 08:09

## 2017-09-06 RX ADMIN — METOPROLOL SUCCINATE 100 MG: 100 TABLET, EXTENDED RELEASE ORAL at 08:09

## 2017-09-06 RX ADMIN — FUROSEMIDE 20 MG: 10 INJECTION, SOLUTION INTRAMUSCULAR; INTRAVENOUS at 12:09

## 2017-09-06 RX ADMIN — ENOXAPARIN SODIUM 150 MG: 150 INJECTION SUBCUTANEOUS at 11:09

## 2017-09-06 RX ADMIN — LISINOPRIL 2.5 MG: 2.5 TABLET ORAL at 08:09

## 2017-09-06 RX ADMIN — PANTOPRAZOLE SODIUM 40 MG: 40 TABLET, DELAYED RELEASE ORAL at 08:09

## 2017-09-06 RX ADMIN — IOHEXOL 75 ML: 350 INJECTION, SOLUTION INTRAVENOUS at 03:09

## 2017-09-06 RX ADMIN — WARFARIN SODIUM 15 MG: 5 TABLET ORAL at 08:09

## 2017-09-06 NOTE — HPI
Mr. Farrar is a 52 yo man with hx of permanent Af, May Thurner syndrome s/p multiple venous stents, hx of mult/recurrent DVT/PE s/p IVC filter on coumadin, TTE low EF 30% 2016, hyperthyroidism, patent PDA s/p closure at 18mo old presenting from EP clinic with chest pain and increased FRAIRE.  His CP is R sided, worse with expiration, not associated with exertion or painful to touch; though hx has not been entirely consistent btwn different providers. No associated symptoms, no SOB/palpitations. His CP has been ongoing for several yrs, has previously been associated with anxiety and with past PE. In Ed, rec'd 2 nitro- thinks the 2nd dose may have helped. Now pain and dyspnea free.  He reports slowly progressive worsening dyspnea over the last several months. Difficulty quantifying but reports dyspnea with a long hallway or a flight of stairs which is slightly worse. Has seen Pulm and noted to have restrictive type PFTs, they suspected cardiac etiology. No associated Cp, no PND/orthopnea, no palpitations. Chronic SINA unchanged in setting of hx of venous insufficiency. Last TTE 2016 with newly depressed EF 30%.   Also notably has had undetectable TSH since 2014, currently on methimazole. Also subtherapeutic on INR on presentation.

## 2017-09-06 NOTE — CONSULTS
[facility]  Cardiology  Consult Note    Patient Name: Drew Farrar  MRN: [id number]  Admission Date: 9/5/2017  Hospital Length of Stay: 0 days  Code Status: Full Code   Attending Provider: Cathleen Quiles MD   Consulting Provider: Raghu Walsh MD  Primary Care Physician: Robles Archuleta MD  Principal Problem:Chest pain    Patient information was obtained from patient and ER records.     Inpatient consult to Cardiology  Consult performed by: RAGHU WALSH  Consult ordered by: CHARLES BARTLETT        Subjective:     Chief Complaint:  CP and SOB     HPI:   Mr. Farrar is a 50 yo man with hx of permanent Af, May Thurner syndrome s/p multiple venous stents, hx of mult/recurrent DVT/PE s/p IVC filter on coumadin, TTE low EF 30% 2016, hyperthyroidism, patent PDA s/p closure at 18mo old presenting from EP clinic with chest pain and increased FRAIRE.  His CP is R sided, worse with expiration, not associated with exertion or painful to touch; though hx has not been entirely consistent btwn different providers. No associated symptoms, no SOB/palpitations. His CP has been ongoing for several yrs, has previously been associated with anxiety and with past PE. In Ed, rec'd 2 nitro- thinks the 2nd dose may have helped. Now pain and dyspnea free.  He reports slowly progressive worsening dyspnea over the last several months. Difficulty quantifying but reports dyspnea with a long hallway or a flight of stairs which is slightly worse. Has seen Pulm and noted to have restrictive type PFTs, they suspected cardiac etiology. No associated Cp, no PND/orthopnea, no palpitations. Chronic SINA unchanged in setting of hx of venous insufficiency. Last TTE 2016 with newly depressed EF 30%.   Also notably has had undetectable TSH since 2014, currently on methimazole. Also subtherapeutic on INR on presentation.    Past Medical History:   Diagnosis Date    *Atrial fibrillation     Atrial fibrillation     Atrial  fibrillation Feb 23, 2016    Bipolar disorder     Congenital heart disease     s/p surgical intervention 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 of age    Deep vein thrombosis     DVT of leg (deep venous thrombosis)     left leg    History of prior ablation treatment     10/9/13    Hypertension     Obesity     Stroke     Thyroid disease     Venous ulcer        Past Surgical History:   Procedure Laterality Date    ANGIOPLASTY      CARDIAC SURGERY      open heart surgery 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 old    EYE SURGERY      left eye cataract/right eye glaucoma    TMIO FILTER PLACEMENT      Dr Calix ([facility])    KNEE SURGERY      l and r     MULTIPLE TOOTH EXTRACTIONS      SKIN GRAFT      left leg       Review of patient's allergies indicates:   Allergen Reactions    Food allergy formula [glutamine-c-quercet-selen-brom]      Allergic to green peas; Heart failure.    Peas Hives    Ibuprofen Swelling    Latex, natural rubber Hives    Pcn [penicillins] Hives    Butisol [butabarbital] Rash     Peeling skin       Current Facility-Administered Medications on File Prior to Encounter   Medication    sodium tetradecyl sulfate 1 % (10 mg/mL) Soln 2 mL     Current Outpatient Prescriptions on File Prior to Encounter   Medication Sig    lisinopril (PRINIVIL,ZESTRIL) 2.5 MG tablet Take 1 tablet (2.5 mg total) by mouth once daily.    methimazole (TAPAZOLE) 5 MG Tab Take 2 tablets (10 mg total) by mouth 2 (two) times daily. TAKE 1 TABLET (5 MG TOTAL) BY MOUTH 2 (Twice) TIMES DAILY.    metoprolol succinate (TOPROL-XL) 100 MG 24 hr tablet Take 1 tablet (100 mg total) by mouth once daily.    warfarin (COUMADIN) 10 MG tablet Take 1.5-2 tablets (15-20 mg total) by mouth once daily.     Family History     Problem Relation (Age of Onset)    Diabetes Father, Maternal Grandfather    Heart disease Father, Maternal Grandmother, Maternal Grandfather    Stroke Maternal Grandfather        Social History Main Topics    Smoking status: Former Smoker      Packs/day: 1.00     Years: 6.00     Types: Cigarettes    Smokeless tobacco: Former User      Comment: quit by age 27yrs old    Alcohol use 0.6 oz/week     1 Glasses of wine per week      Comment: 1 glass of wine a week    Drug use: No    Sexual activity: Yes     Partners: Female     Birth control/ protection: None     Review of Systems   Constitution: Negative for fever and malaise/fatigue.   Cardiovascular: Positive for leg swelling. Negative for irregular heartbeat, near-syncope, orthopnea, palpitations, paroxysmal nocturnal dyspnea and syncope.   Respiratory: Negative for cough, shortness of breath and wheezing.    Skin: Negative for color change and rash.   Gastrointestinal: Negative for abdominal pain, nausea and vomiting.   Neurological: Negative for dizziness, focal weakness and headaches.     Objective:     Vital Signs (Most Recent):  Temp: 97.4 °F (36.3 °C) (09/06/17 1119)  Pulse: 80 (09/06/17 1149)  Resp: 18 (09/06/17 1119)  BP: 114/60 (09/06/17 1119)  SpO2: 97 % (09/06/17 1119) Vital Signs (24h Range):  Temp:  [97.3 °F (36.3 °C)-98.7 °F (37.1 °C)] 97.4 °F (36.3 °C)  Pulse:  [] 80  Resp:  [18-22] 18  SpO2:  [96 %-100 %] 97 %  BP: (114-149)/(58-83) 114/60     Weight: (!) 149.7 kg (330 lb)  Body mass index is 36.25 kg/m².    SpO2: 97 %  O2 Device (Oxygen Therapy): room air      Intake/Output Summary (Last 24 hours) at 09/06/17 1255  Last data filed at 09/06/17 0600   Gross per 24 hour   Intake              480 ml   Output             1900 ml   Net            -1420 ml       Lines/Drains/Airways     Peripherally Inserted Central Catheter Line                 PICC Double Lumen 02/25/16 1326 right brachial 558 days          Peripheral Intravenous Line                 Peripheral IV - Single Lumen 09/05/17 1650 Left Hand less than 1 day         Peripheral IV - Single Lumen 09/06/17 1205 Right Forearm less than 1 day                Physical Exam   Constitutional: He is oriented to person, place, and  time. He appears well-nourished. No distress.   HENT:   Head: Atraumatic.   Mouth/Throat: Oropharynx is clear and moist.   Eyes: EOM are normal. No scleral icterus.   Neck: Neck supple. No JVD present. No tracheal deviation present. No thyromegaly present.   Cardiovascular: Normal rate and normal heart sounds.  Exam reveals no gallop and no friction rub.    No murmur heard.  Irregularly irregular   Pulmonary/Chest: Breath sounds normal. He has no wheezes. He has no rales. He exhibits no tenderness.   Decreased inspiratory effort   Abdominal: Soft. Bowel sounds are normal. He exhibits no distension and no mass. There is no tenderness.   Musculoskeletal: He exhibits no edema.   Lymphadenopathy:     He has no cervical adenopathy.   Neurological: He is alert and oriented to person, place, and time. No cranial nerve deficit.   Skin: Skin is warm and dry.   Chronic venous stasis bilateral Le, R slightly larger than L, no difference in warmth, no rash, nontender   Psychiatric: He has a normal mood and affect.       Significant Labs:   CMP   Recent Labs  Lab 09/05/17  1656 09/06/17  0839    141   K 4.5 4.3    108   CO2 24 26   GLU 89 92   BUN 28* 26*   CREATININE 0.8 0.8   CALCIUM 9.3 9.4   PROT 7.6  --    ALBUMIN 3.3*  --    BILITOT 0.5  --    ALKPHOS 160*  --    AST 31  --    ALT 30  --    ANIONGAP 7* 7*   ESTGFRAFRICA >60.0 >60.0   EGFRNONAA >60.0 >60.0   , CBC   Recent Labs  Lab 09/05/17  1656 09/06/17  0839   WBC 5.76 4.15   HGB 11.9* 11.7*   HCT 37.4* 36.0*   * 153   , INR   Recent Labs  Lab 09/05/17  1656   INR 1.2   , Lipid Panel No results for input(s): CHOL, HDL, LDLCALC, TRIG, CHOLHDL in the last 48 hours. and Troponin   Recent Labs  Lab 09/05/17  1954 09/05/17  2304 09/06/17  0431   TROPONINI 0.008 0.015 0.027*       Significant Imaging: EKG: AF, RVR low 100s    Assessment and Plan:   50 yo man with hx of permanent Af, May Thurner syndrome s/p multiple venous stents, hx of mult/recurrent  DVT/PE s/p IVC filter on coumadin, TTE low EF 30% 2016, hyperthyroidism, patent PDA s/p closure at 18mo old presenting from EP clinic with chest pain and increased FRAIRE.      * Chest pain    -nonanginal, most suspicious for new Pe, previously also associated with anxiety. Minimally elevated trop, also in setting of borderline RVR  -CTA pending  -Nuclear stress ordered; though nonanginal type pain, has low EF, no prior ischemic workup in our system so warranted in that setting  -agree with lovenox bridge while CTA pending        Heart failure with reduced ejection fraction, NYHA class II    -appears euvolemic  -last EF 30% 2016, suspect NICM in setting of chronic AF +/- uncontrolled hyperthyroidism  -stress test as above to assess for ischemic involvement  -on Toprol 100, has BP to increase Lisinopril to 5        Hyperthyroidism    -longstanding hx with undetectable TSH  -labs ordered anti TPO, TSI as outpt, yet to be drawn  -on methimazole though ?compliance  -given longstanding hx and role with Af, possibly low Ef, consider Endocrine consult or referral          Persistent atrial fibrillation    -metoprolol 100 as per EP recs, rate controlled <110  -anticoagulated with coumadin (lovenox bridge)          Thank you for your consult. Will follow up studies as above.     Raghu Davis MD  Cardiology PGY4  [phone]  [facility]

## 2017-09-06 NOTE — PROGRESS NOTES
Consent obtained. optison given ivp via right forearm sl . Denies transfusion rxn. Tolerated well. Sl flushed before and after with ns.

## 2017-09-06 NOTE — ASSESSMENT & PLAN NOTE
History of DVT  History of PE  May-Thurner Syndrome   - h/o failed eliquis  - continue coumadin  - INR subtherapeutic 1.2, trend daily  - consult Pharm  - lovenox bridge for now

## 2017-09-06 NOTE — SUBJECTIVE & OBJECTIVE
Past Medical History:   Diagnosis Date    *Atrial fibrillation     Atrial fibrillation     Atrial fibrillation Feb 23, 2016    Bipolar disorder     Congenital heart disease     s/p surgical intervention 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 of age    Deep vein thrombosis     DVT of leg (deep venous thrombosis)     left leg    History of prior ablation treatment     10/9/13    Hypertension     Obesity     Stroke     Thyroid disease     Venous ulcer        Past Surgical History:   Procedure Laterality Date    ANGIOPLASTY      CARDIAC SURGERY      open heart surgery 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 old    EYE SURGERY      left eye cataract/right eye glaucoma    TIMO FILTER PLACEMENT      Dr Calix ([facility])    KNEE SURGERY      l and r     MULTIPLE TOOTH EXTRACTIONS      SKIN GRAFT      left leg       Review of patient's allergies indicates:   Allergen Reactions    Food allergy formula [glutamine-c-quercet-selen-brom]      Allergic to green peas; Heart failure.    Peas Hives    Ibuprofen Swelling    Latex, natural rubber Hives    Pcn [penicillins] Hives    Butisol [butabarbital] Rash     Peeling skin       Current Facility-Administered Medications on File Prior to Encounter   Medication    sodium tetradecyl sulfate 1 % (10 mg/mL) Soln 2 mL     Current Outpatient Prescriptions on File Prior to Encounter   Medication Sig    lisinopril (PRINIVIL,ZESTRIL) 2.5 MG tablet Take 1 tablet (2.5 mg total) by mouth once daily.    methimazole (TAPAZOLE) 5 MG Tab Take 2 tablets (10 mg total) by mouth 2 (two) times daily. TAKE 1 TABLET (5 MG TOTAL) BY MOUTH 2 (Twice) TIMES DAILY.    metoprolol succinate (TOPROL-XL) 100 MG 24 hr tablet Take 1 tablet (100 mg total) by mouth once daily.    warfarin (COUMADIN) 10 MG tablet Take 1.5-2 tablets (15-20 mg total) by mouth once daily.     Family History     Problem Relation (Age of Onset)    Diabetes Father, Maternal Grandfather    Heart disease Father, Maternal Grandmother, Maternal Grandfather     Stroke Maternal Grandfather        Social History Main Topics    Smoking status: Former Smoker     Packs/day: 1.00     Years: 6.00     Types: Cigarettes    Smokeless tobacco: Former User      Comment: quit by age 27yrs old    Alcohol use 0.6 oz/week     1 Glasses of wine per week      Comment: 1 glass of wine a week    Drug use: No    Sexual activity: Yes     Partners: Female     Birth control/ protection: None     Review of Systems   Constitution: Negative for fever and malaise/fatigue.   Cardiovascular: Positive for leg swelling. Negative for irregular heartbeat, near-syncope, orthopnea, palpitations, paroxysmal nocturnal dyspnea and syncope.   Respiratory: Negative for cough, shortness of breath and wheezing.    Skin: Negative for color change and rash.   Gastrointestinal: Negative for abdominal pain, nausea and vomiting.   Neurological: Negative for dizziness, focal weakness and headaches.     Objective:     Vital Signs (Most Recent):  Temp: 97.4 °F (36.3 °C) (09/06/17 1119)  Pulse: 80 (09/06/17 1149)  Resp: 18 (09/06/17 1119)  BP: 114/60 (09/06/17 1119)  SpO2: 97 % (09/06/17 1119) Vital Signs (24h Range):  Temp:  [97.3 °F (36.3 °C)-98.7 °F (37.1 °C)] 97.4 °F (36.3 °C)  Pulse:  [] 80  Resp:  [18-22] 18  SpO2:  [96 %-100 %] 97 %  BP: (114-149)/(58-83) 114/60     Weight: (!) 149.7 kg (330 lb)  Body mass index is 36.25 kg/m².    SpO2: 97 %  O2 Device (Oxygen Therapy): room air      Intake/Output Summary (Last 24 hours) at 09/06/17 1255  Last data filed at 09/06/17 0600   Gross per 24 hour   Intake              480 ml   Output             1900 ml   Net            -1420 ml       Lines/Drains/Airways     Peripherally Inserted Central Catheter Line                 PICC Double Lumen 02/25/16 1326 right brachial 558 days          Peripheral Intravenous Line                 Peripheral IV - Single Lumen 09/05/17 1650 Left Hand less than 1 day         Peripheral IV - Single Lumen 09/06/17 1205 Right Forearm  less than 1 day                Physical Exam   Constitutional: He is oriented to person, place, and time. He appears well-nourished. No distress.   HENT:   Head: Atraumatic.   Mouth/Throat: Oropharynx is clear and moist.   Eyes: EOM are normal. No scleral icterus.   Neck: Neck supple. No JVD present. No tracheal deviation present. No thyromegaly present.   Cardiovascular: Normal rate and normal heart sounds.  Exam reveals no gallop and no friction rub.    No murmur heard.  Irregularly irregular   Pulmonary/Chest: Breath sounds normal. He has no wheezes. He has no rales. He exhibits no tenderness.   Decreased inspiratory effort   Abdominal: Soft. Bowel sounds are normal. He exhibits no distension and no mass. There is no tenderness.   Musculoskeletal: He exhibits no edema.   Lymphadenopathy:     He has no cervical adenopathy.   Neurological: He is alert and oriented to person, place, and time. No cranial nerve deficit.   Skin: Skin is warm and dry.   Chronic venous stasis bilateral Le, R slightly larger than L, no difference in warmth, no rash, nontender   Psychiatric: He has a normal mood and affect.       Significant Labs:   CMP   Recent Labs  Lab 09/05/17 1656 09/06/17  0839    141   K 4.5 4.3    108   CO2 24 26   GLU 89 92   BUN 28* 26*   CREATININE 0.8 0.8   CALCIUM 9.3 9.4   PROT 7.6  --    ALBUMIN 3.3*  --    BILITOT 0.5  --    ALKPHOS 160*  --    AST 31  --    ALT 30  --    ANIONGAP 7* 7*   ESTGFRAFRICA >60.0 >60.0   EGFRNONAA >60.0 >60.0   , CBC   Recent Labs  Lab 09/05/17 1656 09/06/17  0839   WBC 5.76 4.15   HGB 11.9* 11.7*   HCT 37.4* 36.0*   * 153   , INR   Recent Labs  Lab 09/05/17 1656   INR 1.2   , Lipid Panel No results for input(s): CHOL, HDL, LDLCALC, TRIG, CHOLHDL in the last 48 hours. and Troponin   Recent Labs  Lab 09/05/17  1954 09/05/17  2304 09/06/17  0431   TROPONINI 0.008 0.015 0.027*       Significant Imaging: EKG: AF, RVR low 100s

## 2017-09-06 NOTE — PLAN OF CARE
Patient awake & alert in bed when CM rounded. No family at the bedside. Patient was admitted with chest pain. Cards consult noted. Patient lives alone & has equipment to assist with ambulation. Plan to discharge patient home alone or home with home health when medically stable. Patient stated that he will arrange transportation at time of discharge with [employer] ([phone]). Hospital follow up appointment scheduled for the patient with Dr. Robles Archuleta (PCP) on 9/13/17 at 0940. Will continue to follow.

## 2017-09-06 NOTE — HPI
Drew Farrar is a pleasant 51M with PMHx of persistent afib on coumadin, patent PDA s/p surgical closure 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 of age, May Thurner syndrome with h/o multiple PEs and DVTs s/p IVC filter, chronic systolic congestive heart failure (EF 30%), hyperthyroidism presents from EP clinic for evaluation of right sided chest pain during his clinic visit. Patient reports worsening FRAIRE for several months, but has been present for years (his clinic note today mentions his inability to ambulate ~100 yards without FRAIRE). Additionally, he reports CP for 2-3 years, spontaneous and not associated with exertion, always on the right side (under his axilla) and radiates to his sternum, no worse with inspiration or position, denies trauma. His CP lasts about 15 minutes and usually resolves if he calms himself down. He has been told previously that his CP is associated with anxiety. Today's episode was no different than his usual episodes at home. The patient is currently CP free and denies SOB at rest, PND, or orthopnea.

## 2017-09-06 NOTE — H&P
[facility] Hospital Medicine  History & Physical    Patient Name: Drew Farrar  MRN: [id number]  Admission Date: 9/5/2017  Attending Physician: Cathleen Quiles MD   Primary Care Provider: Robles Archuleta MD    [facility] Medicine Team: [facility] MED  Terrance Conklin PA-C     Patient information was obtained from patient, past medical records and ER records.     Subjective:     Principal Problem:Chest pain    Chief Complaint:   Chief Complaint   Patient presents with    Chest Pain     hx afib sent from arrhythmia clinic, with chest pain on R        HPI: Drew Farrar is a pleasant 51M with PMHx of persistent afib on coumadin, patent PDA s/p surgical closure 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 of age, May Thurner syndrome with h/o multiple PEs and DVTs s/p IVC filter, chronic systolic congestive heart failure (EF 30%), hyperthyroidism presents from EP clinic for evaluation of right sided chest pain during his clinic visit. Patient reports worsening FRAIRE for several months, but has been present for years (his clinic note today mentions his inability to ambulate ~100 yards without FRAIRE). Additionally, he reports CP for 2-3 years, spontaneous and not associated with exertion, always on the right side (under his axilla) and radiates to his sternum, no worse with inspiration or position, denies trauma. His CP lasts about 15 minutes and usually resolves if he calms himself down. He has been told previously that his CP is associated with anxiety. Today's episode was no different than his usual episodes at home. The patient is currently CP free and denies SOB at rest, PND, or orthopnea.     Past Medical History:   Diagnosis Date    *Atrial fibrillation     Atrial fibrillation     Atrial fibrillation Feb 23, 2016    Bipolar disorder     Congenital heart disease     s/p surgical intervention 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 of age    Deep vein thrombosis     DVT of leg (deep venous thrombosis)     left leg    History of prior ablation  treatment     10/9/13    Hypertension     Obesity     Stroke     Thyroid disease     Venous ulcer        Past Surgical History:   Procedure Laterality Date    ANGIOPLASTY      CARDIAC SURGERY      open heart surgery 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 old    EYE SURGERY      left eye cataract/right eye glaucoma    TIMO FILTER PLACEMENT      Dr Calix ([facility])    KNEE SURGERY      l and r     MULTIPLE TOOTH EXTRACTIONS      SKIN GRAFT      left leg       Review of patient's allergies indicates:   Allergen Reactions    Food allergy formula [glutamine-c-quercet-selen-brom]      Allergic to green peas; Heart failure.    Peas Hives    Ibuprofen Swelling    Latex, natural rubber Hives    Pcn [penicillins] Hives    Butisol [butabarbital] Rash     Peeling skin       Current Facility-Administered Medications on File Prior to Encounter   Medication    sodium tetradecyl sulfate 1 % (10 mg/mL) Soln 2 mL     Current Outpatient Prescriptions on File Prior to Encounter   Medication Sig    lisinopril (PRINIVIL,ZESTRIL) 2.5 MG tablet Take 1 tablet (2.5 mg total) by mouth once daily.    methimazole (TAPAZOLE) 5 MG Tab Take 2 tablets (10 mg total) by mouth 2 (two) times daily. TAKE 1 TABLET (5 MG TOTAL) BY MOUTH 2 (Twice) TIMES DAILY.    metoprolol succinate (TOPROL-XL) 100 MG 24 hr tablet Take 1 tablet (100 mg total) by mouth once daily.    warfarin (COUMADIN) 10 MG tablet Take 1.5-2 tablets (15-20 mg total) by mouth once daily.    [DISCONTINUED] metoprolol succinate (TOPROL-XL) 50 MG 24 hr tablet Take 50 mg by mouth once daily.     Family History     Problem Relation (Age of Onset)    Diabetes Father, Maternal Grandfather    Heart disease Father, Maternal Grandmother, Maternal Grandfather    Stroke Maternal Grandfather        Social History Main Topics    Smoking status: Former Smoker     Packs/day: 1.00     Years: 6.00     Types: Cigarettes    Smokeless tobacco: Former User      Comment: quit by age 27yrs old     Alcohol use 0.6 oz/week     1 Glasses of wine per week      Comment: 1 glass of wine a week    Drug use: No    Sexual activity: Yes     Partners: Female     Birth control/ protection: None     Review of Systems   Constitutional: Negative for chills, fatigue and fever.   Respiratory: Positive for shortness of breath. Negative for cough and wheezing.    Cardiovascular: Positive for chest pain, palpitations and leg swelling (chronic).   Gastrointestinal: Negative for abdominal distention, abdominal pain, diarrhea, nausea and vomiting.   Genitourinary: Negative for difficulty urinating and dysuria.   Musculoskeletal: Positive for arthralgias and gait problem.   Skin: Positive for wound. Negative for rash.   Neurological: Positive for weakness. Negative for dizziness and headaches.   Psychiatric/Behavioral: Negative for agitation and confusion.     Objective:     Vital Signs (Most Recent):  Temp: 97.7 °F (36.5 °C) (09/05/17 2139)  Pulse: 100 (09/05/17 2139)  Resp: (!) 22 (09/05/17 2139)  BP: (!) 149/83 (09/05/17 2139)  SpO2: 100 % (09/05/17 2139) Vital Signs (24h Range):  Temp:  [97.7 °F (36.5 °C)-98.7 °F (37.1 °C)] 97.7 °F (36.5 °C)  Pulse:  [] 100  Resp:  [18-22] 22  SpO2:  [96 %-100 %] 100 %  BP: (115-149)/(58-83) 149/83     Weight: (!) 149.7 kg (330 lb)  Body mass index is 36.25 kg/m².    Physical Exam   Constitutional: He is oriented to person, place, and time. He appears well-developed and well-nourished. No distress.   HENT:   Head: Normocephalic and atraumatic.   edentulous    Eyes: EOM are normal. Pupils are equal, round, and reactive to light.   Cardiovascular: An irregularly irregular rhythm present.   Right sternal border, right lateral rib TTP   Pulmonary/Chest: Effort normal and breath sounds normal. No respiratory distress. He has no wheezes.   Abdominal: Soft. Bowel sounds are normal. He exhibits no distension. There is no tenderness.   Musculoskeletal: Normal range of motion. He exhibits edema  (R>L chronic, woody).   Neurological: He is alert and oriented to person, place, and time. GCS eye subscore is 4. GCS verbal subscore is 5. GCS motor subscore is 6.   Skin: Skin is warm and dry. He is not diaphoretic.   Psychiatric: He has a normal mood and affect. His speech is normal.   Nursing note and vitals reviewed.       Significant Labs:   CBC:   Recent Labs  Lab 09/05/17  1656   WBC 5.76   HGB 11.9*   HCT 37.4*   *     CMP:   Recent Labs  Lab 09/05/17  1656      K 4.5      CO2 24   GLU 89   BUN 28*   CREATININE 0.8   CALCIUM 9.3   PROT 7.6   ALBUMIN 3.3*   BILITOT 0.5   ALKPHOS 160*   AST 31   ALT 30   ANIONGAP 7*   EGFRNONAA >60.0     Cardiac Markers:   Recent Labs  Lab 09/05/17  1656   *       Significant Imaging: CXR: I have reviewed all pertinent results/findings within the past 24 hours and my personal findings are:  consistent with report  EKG: I have reviewed all pertinent results/findings within the past 24 hours and my personal findings are: afib with RBR    Assessment/Plan:     * Chest pain    - aspects of MSK vs PE vs unstable angina vs afib with RVR  - pending CTA, as INR subtherapeutic to r/o new PE  - some TTP lending to MSK origin, avoid NSAIDS 2/2 coumadin  - relieved with nitro in ED, lending to angina  - dobutamine stress echo in AM  - NPO at MN in case of intervention  - consult cardiology        Elevated troponin    - in setting of EKG in afib with RVR  - 0.042>0.008  - continue to trend        Chronic combined systolic and diastolic congestive heart failure    - BNP below baseline, in obese patient  - CXR with possible pleural effusion  - CT as above  - on ACE and BB, not on daily diuretic for unclear reasons  - lasix 20 mg IV x1        Persistent atrial fibrillation    - tele  - increase metoprolol XL to 100 mg daily per EP note today        Hyperthyroidism    - continue tapazole 5 mg BID  - TSH        Long term (current) use of anticoagulants    History of  DVT  History of PE  May-Thurner Syndrome   - h/o failed eliquis  - continue coumadin  - INR subtherapeutic 1.2, trend daily  - consult Pharm  - lovenox bridge for now        Essential hypertension    - lisinopril 2.5 mg daily, metoprolol  mg          VTE Risk Mitigation         Ordered     warfarin (COUMADIN) tablet 15 mg  Daily     Route:  Oral        09/05/17 2034     High Risk of VTE  Once      09/05/17 2034     Reason for No Pharmacological VTE Prophylaxis  Once      09/05/17 2034             Terrance Conklin PA-C  Department of Hospital Medicine   [facility]

## 2017-09-06 NOTE — ASSESSMENT & PLAN NOTE
-longstanding hx, TSH undetectable since 2014  -unknown etiology; anti-tpo, TSI recently ordered but not yet drawn  -consider endocrinology consult or referral  -on methimazole

## 2017-09-06 NOTE — ASSESSMENT & PLAN NOTE
- BNP below baseline, in obese patient  - CXR with possible pleural effusion  - CT as above  - on ACE and BB, not on daily diuretic for unclear reasons  - lasix 20 mg IV x1

## 2017-09-06 NOTE — ASSESSMENT & PLAN NOTE
-nonanginal, most suspicious for new Pe, previously also associated with anxiety  -CTA pending  -Nuclear stress ordered; though nonanginal type pain, has low Ef, no prior ischemic workup in our system so warranted in that setting  -minimally elevated trop, nonspecific  -agree with lovenox bridge while CTA pending

## 2017-09-06 NOTE — PLAN OF CARE
Problem: Patient Care Overview  Goal: Plan of Care Review  Pt free of falls/injuries throughout shift. No c/o chest pain throughout shift . No acute events over night. VSS. No s/s distress noted. Bed in low-locked position. Call light within reach. Will continue to monitor pt

## 2017-09-06 NOTE — ASSESSMENT & PLAN NOTE
-longstanding hx with undetectable TSH  -labs ordered anti TPO, TSI as outpt, yet to be drawn  -on methimazole though ?compliance  -given longstanding hx and role with Af, possibly low Ef, consider Endocrine consult or referral

## 2017-09-06 NOTE — PLAN OF CARE
09/06/17 1322   Discharge Assessment   Assessment Type Discharge Planning Assessment   Confirmed/corrected address and phone number on facesheet? Yes   Assessment information obtained from? Patient   Expected Length of Stay (days) 2   Communicated expected length of stay with patient/caregiver yes   Prior to hospitilization cognitive status: Alert/Oriented   Prior to hospitalization functional status: Assistive Equipment   Current cognitive status: Alert/Oriented   Current Functional Status: Needs Assistance   Lives With alone   Able to Return to Prior Arrangements yes   Is patient able to care for self after discharge? Yes   Patient's perception of discharge disposition home or selfcare   Readmission Within The Last 30 Days no previous admission in last 30 days   Patient currently being followed by outpatient case management? No   Patient currently receives any other outside agency services? No   Equipment Currently Used at Home cane, straight;walker, standard   Do you have any problems affording any of your prescribed medications? No   Is the patient taking medications as prescribed? yes   Does the patient have transportation home? Yes   Transportation Available agency transportation  ([employer] ([phone]))   Does the patient receive services at the Coumadin Clinic? Yes  ([facility] - Dr. Chowdhury)   Discharge Plan A Home   Discharge Plan B Home Health   Patient/Family In Agreement With Plan yes     See note 9/6/17 0900.

## 2017-09-06 NOTE — ASSESSMENT & PLAN NOTE
- aspects of MSK vs PE vs unstable angina vs afib with RVR  - pending CTA, as INR subtherapeutic to r/o new PE  - some TTP lending to MSK origin, avoid NSAIDS 2/2 coumadin  - relieved with nitro in ED, lending to angina  - dobutamine stress echo in AM  - NPO at MN in case of intervention  - consult cardiology

## 2017-09-06 NOTE — ASSESSMENT & PLAN NOTE
-appears euvolemic  -last EF 30% 2016, suspect NICM in setting of chronic AF +/- uncontrolled hyperthyroidism  -stress test as above to assess for ischemic involvement

## 2017-09-06 NOTE — ASSESSMENT & PLAN NOTE
-metoprolol 100 as per EP recs, rate controlled <110  -anticoagulated with coumadin (lovenox bridge)

## 2017-09-06 NOTE — CONSULTS
PharmD Consult received for:  1.) Warfarin dosing (home regimen: 10 mg PO daily):   · Per Coumadin Clinic, pt's INR goal 2.5-3.5  · Current INR = 1.2  · Received 15 mg warfarin at 9am 9/6 (adjusted time to standard 1700 starting tomorrow 9/7)  · Ok with current dose with Lovenox bridge  · INR daily - ORDERED per protocol  · Will follow along.      Thank you for the consult,    Roseann Clemente, PharmD  [id number]      **Note: Consults are reviewed Monday-Friday 7:30-4pm. The above recommendations are only suggested. The recommendations should be considered in conjunction with all patient factors.**

## 2017-09-06 NOTE — SUBJECTIVE & OBJECTIVE
Past Medical History:   Diagnosis Date    *Atrial fibrillation     Atrial fibrillation     Atrial fibrillation Feb 23, 2016    Bipolar disorder     Congenital heart disease     s/p surgical intervention 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 of age    Deep vein thrombosis     DVT of leg (deep venous thrombosis)     left leg    History of prior ablation treatment     10/9/13    Hypertension     Obesity     Stroke     Thyroid disease     Venous ulcer        Past Surgical History:   Procedure Laterality Date    ANGIOPLASTY      CARDIAC SURGERY      open heart surgery 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 old    EYE SURGERY      left eye cataract/right eye glaucoma    TIMO FILTER PLACEMENT      Dr Calix ([facility])    KNEE SURGERY      l and r     MULTIPLE TOOTH EXTRACTIONS      SKIN GRAFT      left leg       Review of patient's allergies indicates:   Allergen Reactions    Food allergy formula [glutamine-c-quercet-selen-brom]      Allergic to green peas; Heart failure.    Peas Hives    Ibuprofen Swelling    Latex, natural rubber Hives    Pcn [penicillins] Hives    Butisol [butabarbital] Rash     Peeling skin       Current Facility-Administered Medications on File Prior to Encounter   Medication    sodium tetradecyl sulfate 1 % (10 mg/mL) Soln 2 mL     Current Outpatient Prescriptions on File Prior to Encounter   Medication Sig    lisinopril (PRINIVIL,ZESTRIL) 2.5 MG tablet Take 1 tablet (2.5 mg total) by mouth once daily.    methimazole (TAPAZOLE) 5 MG Tab Take 2 tablets (10 mg total) by mouth 2 (two) times daily. TAKE 1 TABLET (5 MG TOTAL) BY MOUTH 2 (Twice) TIMES DAILY.    metoprolol succinate (TOPROL-XL) 100 MG 24 hr tablet Take 1 tablet (100 mg total) by mouth once daily.    warfarin (COUMADIN) 10 MG tablet Take 1.5-2 tablets (15-20 mg total) by mouth once daily.    [DISCONTINUED] metoprolol succinate (TOPROL-XL) 50 MG 24 hr tablet Take 50 mg by mouth once daily.     Family History     Problem Relation (Age of Onset)     Diabetes Father, Maternal Grandfather    Heart disease Father, Maternal Grandmother, Maternal Grandfather    Stroke Maternal Grandfather        Social History Main Topics    Smoking status: Former Smoker     Packs/day: 1.00     Years: 6.00     Types: Cigarettes    Smokeless tobacco: Former User      Comment: quit by age 27yrs old    Alcohol use 0.6 oz/week     1 Glasses of wine per week      Comment: 1 glass of wine a week    Drug use: No    Sexual activity: Yes     Partners: Female     Birth control/ protection: None     Review of Systems   Constitutional: Negative for chills, fatigue and fever.   Respiratory: Positive for shortness of breath. Negative for cough and wheezing.    Cardiovascular: Positive for chest pain, palpitations and leg swelling (chronic).   Gastrointestinal: Negative for abdominal distention, abdominal pain, diarrhea, nausea and vomiting.   Genitourinary: Negative for difficulty urinating and dysuria.   Musculoskeletal: Positive for arthralgias and gait problem.   Skin: Positive for wound. Negative for rash.   Neurological: Positive for weakness. Negative for dizziness and headaches.   Psychiatric/Behavioral: Negative for agitation and confusion.     Objective:     Vital Signs (Most Recent):  Temp: 97.7 °F (36.5 °C) (09/05/17 2139)  Pulse: 100 (09/05/17 2139)  Resp: (!) 22 (09/05/17 2139)  BP: (!) 149/83 (09/05/17 2139)  SpO2: 100 % (09/05/17 2139) Vital Signs (24h Range):  Temp:  [97.7 °F (36.5 °C)-98.7 °F (37.1 °C)] 97.7 °F (36.5 °C)  Pulse:  [] 100  Resp:  [18-22] 22  SpO2:  [96 %-100 %] 100 %  BP: (115-149)/(58-83) 149/83     Weight: (!) 149.7 kg (330 lb)  Body mass index is 36.25 kg/m².    Physical Exam   Constitutional: He is oriented to person, place, and time. He appears well-developed and well-nourished. No distress.   HENT:   Head: Normocephalic and atraumatic.   edentulous    Eyes: EOM are normal. Pupils are equal, round, and reactive to light.   Cardiovascular: An  irregularly irregular rhythm present.   Right sternal border, right lateral rib TTP   Pulmonary/Chest: Effort normal and breath sounds normal. No respiratory distress. He has no wheezes.   Abdominal: Soft. Bowel sounds are normal. He exhibits no distension. There is no tenderness.   Musculoskeletal: Normal range of motion. He exhibits edema (R>L chronic, woody).   Neurological: He is alert and oriented to person, place, and time. GCS eye subscore is 4. GCS verbal subscore is 5. GCS motor subscore is 6.   Skin: Skin is warm and dry. He is not diaphoretic.   Psychiatric: He has a normal mood and affect. His speech is normal.   Nursing note and vitals reviewed.       Significant Labs:   CBC:   Recent Labs  Lab 09/05/17  1656   WBC 5.76   HGB 11.9*   HCT 37.4*   *     CMP:   Recent Labs  Lab 09/05/17  1656      K 4.5      CO2 24   GLU 89   BUN 28*   CREATININE 0.8   CALCIUM 9.3   PROT 7.6   ALBUMIN 3.3*   BILITOT 0.5   ALKPHOS 160*   AST 31   ALT 30   ANIONGAP 7*   EGFRNONAA >60.0     Cardiac Markers:   Recent Labs  Lab 09/05/17  1656   *       Significant Imaging: CXR: I have reviewed all pertinent results/findings within the past 24 hours and my personal findings are:  consistent with report  EKG: I have reviewed all pertinent results/findings within the past 24 hours and my personal findings are: afib with RBR

## 2017-09-07 ENCOUNTER — TELEPHONE (OUTPATIENT)
Dept: ENDOCRINOLOGY | Facility: CLINIC | Age: 52
End: 2017-09-07

## 2017-09-07 ENCOUNTER — CLINICAL SUPPORT (OUTPATIENT)
Dept: CARDIOLOGY | Facility: CLINIC | Age: 52
DRG: 313 | End: 2017-09-07
Payer: MEDICARE

## 2017-09-07 DIAGNOSIS — E05.90 HYPERTHYROIDISM: Primary | Chronic | ICD-10-CM

## 2017-09-07 PROBLEM — I34.0 NON-RHEUMATIC MITRAL REGURGITATION: Status: ACTIVE | Noted: 2017-09-07

## 2017-09-07 PROBLEM — I50.22 CHRONIC SYSTOLIC HEART FAILURE: Status: ACTIVE | Noted: 2017-09-06

## 2017-09-07 LAB
ANION GAP SERPL CALC-SCNC: 9 MMOL/L
BASOPHILS # BLD AUTO: 0.02 K/UL
BASOPHILS NFR BLD: 0.5 %
BUN SERPL-MCNC: 23 MG/DL
CALCIUM SERPL-MCNC: 9.1 MG/DL
CHLORIDE SERPL-SCNC: 106 MMOL/L
CO2 SERPL-SCNC: 25 MMOL/L
CREAT SERPL-MCNC: 0.8 MG/DL
DIASTOLIC DYSFUNCTION: NO
DIFFERENTIAL METHOD: ABNORMAL
EOSINOPHIL # BLD AUTO: 0.1 K/UL
EOSINOPHIL NFR BLD: 3.7 %
ERYTHROCYTE [DISTWIDTH] IN BLOOD BY AUTOMATED COUNT: 15.4 %
EST. GFR  (AFRICAN AMERICAN): >60 ML/MIN/1.73 M^2
EST. GFR  (NON AFRICAN AMERICAN): >60 ML/MIN/1.73 M^2
GLUCOSE SERPL-MCNC: 87 MG/DL
HCT VFR BLD AUTO: 35.8 %
HGB BLD-MCNC: 11.5 G/DL
INR PPP: 1.7
LYMPHOCYTES # BLD AUTO: 0.9 K/UL
LYMPHOCYTES NFR BLD: 24.8 %
MCH RBC QN AUTO: 25.3 PG
MCHC RBC AUTO-ENTMCNC: 32.1 G/DL
MCV RBC AUTO: 79 FL
MONOCYTES # BLD AUTO: 0.4 K/UL
MONOCYTES NFR BLD: 9.6 %
NEUTROPHILS # BLD AUTO: 2.3 K/UL
NEUTROPHILS NFR BLD: 61.1 %
PLATELET # BLD AUTO: 150 K/UL
PMV BLD AUTO: 9.2 FL
POTASSIUM SERPL-SCNC: 4.1 MMOL/L
PROTHROMBIN TIME: 17.4 SEC
RBC # BLD AUTO: 4.55 M/UL
SODIUM SERPL-SCNC: 140 MMOL/L
WBC # BLD AUTO: 3.75 K/UL

## 2017-09-07 PROCEDURE — 80048 BASIC METABOLIC PNL TOTAL CA: CPT

## 2017-09-07 PROCEDURE — 78492 MYOCRD IMG PET MLT RST&STRS: CPT | Mod: 26,,, | Performed by: INTERNAL MEDICINE

## 2017-09-07 PROCEDURE — 99222 1ST HOSP IP/OBS MODERATE 55: CPT | Mod: GC,,, | Performed by: INTERNAL MEDICINE

## 2017-09-07 PROCEDURE — 36415 COLL VENOUS BLD VENIPUNCTURE: CPT

## 2017-09-07 PROCEDURE — 93016 CV STRESS TEST SUPVJ ONLY: CPT | Mod: ,,, | Performed by: INTERNAL MEDICINE

## 2017-09-07 PROCEDURE — 25000003 PHARM REV CODE 250: Performed by: PHYSICIAN ASSISTANT

## 2017-09-07 PROCEDURE — 85610 PROTHROMBIN TIME: CPT

## 2017-09-07 PROCEDURE — 11000001 HC ACUTE MED/SURG PRIVATE ROOM

## 2017-09-07 PROCEDURE — 25000003 PHARM REV CODE 250: Performed by: INTERNAL MEDICINE

## 2017-09-07 PROCEDURE — 99233 SBSQ HOSP IP/OBS HIGH 50: CPT | Mod: ,,, | Performed by: INTERNAL MEDICINE

## 2017-09-07 PROCEDURE — 85025 COMPLETE CBC W/AUTO DIFF WBC: CPT

## 2017-09-07 PROCEDURE — 93018 CV STRESS TEST I&R ONLY: CPT | Mod: ,,, | Performed by: INTERNAL MEDICINE

## 2017-09-07 PROCEDURE — 99225 PR SUBSEQUENT OBSERVATION CARE,LEVEL II: CPT | Mod: ,,, | Performed by: PHYSICIAN ASSISTANT

## 2017-09-07 PROCEDURE — 63600175 PHARM REV CODE 636 W HCPCS: Performed by: PHYSICIAN ASSISTANT

## 2017-09-07 RX ORDER — LISINOPRIL 10 MG/1
10 TABLET ORAL DAILY
Status: DISCONTINUED | OUTPATIENT
Start: 2017-09-08 | End: 2017-09-08 | Stop reason: HOSPADM

## 2017-09-07 RX ORDER — METHIMAZOLE 5 MG/1
20 TABLET ORAL DAILY
Status: DISCONTINUED | OUTPATIENT
Start: 2017-09-08 | End: 2017-09-08 | Stop reason: HOSPADM

## 2017-09-07 RX ORDER — ROSUVASTATIN CALCIUM 10 MG/1
10 TABLET, COATED ORAL NIGHTLY
Status: DISCONTINUED | OUTPATIENT
Start: 2017-09-07 | End: 2017-09-08 | Stop reason: HOSPADM

## 2017-09-07 RX ORDER — ASPIRIN 81 MG/1
81 TABLET ORAL DAILY
Status: DISCONTINUED | OUTPATIENT
Start: 2017-09-08 | End: 2017-09-08 | Stop reason: HOSPADM

## 2017-09-07 RX ORDER — SPIRONOLACTONE 25 MG/1
25 TABLET ORAL DAILY
Status: DISCONTINUED | OUTPATIENT
Start: 2017-09-07 | End: 2017-09-08 | Stop reason: HOSPADM

## 2017-09-07 RX ORDER — LISINOPRIL 5 MG/1
5 TABLET ORAL DAILY
Status: DISCONTINUED | OUTPATIENT
Start: 2017-09-07 | End: 2017-09-07

## 2017-09-07 RX ORDER — FUROSEMIDE 20 MG/1
20 TABLET ORAL DAILY
Status: DISCONTINUED | OUTPATIENT
Start: 2017-09-08 | End: 2017-09-08 | Stop reason: HOSPADM

## 2017-09-07 RX ORDER — LISINOPRIL 5 MG/1
5 TABLET ORAL ONCE
Status: COMPLETED | OUTPATIENT
Start: 2017-09-07 | End: 2017-09-07

## 2017-09-07 RX ORDER — WARFARIN SODIUM 5 MG/1
10 TABLET ORAL DAILY
Status: DISCONTINUED | OUTPATIENT
Start: 2017-09-07 | End: 2017-09-08 | Stop reason: HOSPADM

## 2017-09-07 RX ADMIN — ENOXAPARIN SODIUM 150 MG: 150 INJECTION SUBCUTANEOUS at 11:09

## 2017-09-07 RX ADMIN — WARFARIN SODIUM 10 MG: 5 TABLET ORAL at 06:09

## 2017-09-07 RX ADMIN — LISINOPRIL 5 MG: 5 TABLET ORAL at 10:09

## 2017-09-07 RX ADMIN — LISINOPRIL 5 MG: 5 TABLET ORAL at 02:09

## 2017-09-07 RX ADMIN — STANDARDIZED SENNA CONCENTRATE AND DOCUSATE SODIUM 1 TABLET: 8.6; 5 TABLET, FILM COATED ORAL at 10:09

## 2017-09-07 RX ADMIN — METHIMAZOLE 5 MG: 5 TABLET ORAL at 10:09

## 2017-09-07 RX ADMIN — SPIRONOLACTONE 25 MG: 25 TABLET, FILM COATED ORAL at 02:09

## 2017-09-07 RX ADMIN — METOPROLOL SUCCINATE 100 MG: 100 TABLET, EXTENDED RELEASE ORAL at 10:09

## 2017-09-07 RX ADMIN — ROSUVASTATIN CALCIUM 10 MG: 10 TABLET, FILM COATED ORAL at 09:09

## 2017-09-07 RX ADMIN — PANTOPRAZOLE SODIUM 40 MG: 40 TABLET, DELAYED RELEASE ORAL at 10:09

## 2017-09-07 RX ADMIN — STANDARDIZED SENNA CONCENTRATE AND DOCUSATE SODIUM 1 TABLET: 8.6; 5 TABLET, FILM COATED ORAL at 09:09

## 2017-09-07 NOTE — ASSESSMENT & PLAN NOTE
- in setting of EKG in afib with RVR  - Trop .042->.015->.027  - NPO at MN for nuclear stress tomorrow.

## 2017-09-07 NOTE — HPI
Reason for Consult: Management of hyperthyroidism        HPI:   Patient is a 51 y.o. male with a diagnosis of May Thurner Syndrome, atrial fib, multiple PE's and DVT's whom presented from EP clinic on 9/5/17 with chest pain and dyspnea on exertion. Has had evaluation for new pe, however cta was negative. Currently being optimized from medical standpoint for heart failure. Takes methimazole 5mg BID at home. Endorses compliance with medication. No family history of thyroid disease. Denies fracture history. Denies symptoms related to hyperthyroidism including weight loss, palpitations, tremors, increased BM frequency.

## 2017-09-07 NOTE — SUBJECTIVE & OBJECTIVE
Interval History: No acute events overnight. This AM, pt sitting upright in bed. Pt has no complaints at this time; denies chest pain. Discussed plan of care. Pt aware of NPO status for stress test.     Review of Systems   Constitutional: Negative for chills, diaphoresis, fatigue and fever.   Respiratory: Positive for shortness of breath. Negative for cough and wheezing.    Cardiovascular: Positive for chest pain, palpitations and leg swelling (chronic).   Gastrointestinal: Negative for abdominal distention, abdominal pain, diarrhea, nausea and vomiting.   Musculoskeletal: Positive for arthralgias and gait problem.   Skin: Positive for wound. Negative for rash.   Neurological: Positive for weakness. Negative for dizziness and headaches.   Psychiatric/Behavioral: Negative for agitation and confusion.     Objective:     Vital Signs (Most Recent):  Temp: 98.1 °F (36.7 °C) (09/07/17 1100)  Pulse: 78 (09/07/17 1100)  Resp: 18 (09/07/17 1100)  BP: 106/62 (09/07/17 1100)  SpO2: 100 % (09/07/17 1100) Vital Signs (24h Range):  Temp:  [97.7 °F (36.5 °C)-98.1 °F (36.7 °C)] 98.1 °F (36.7 °C)  Pulse:  [] 78  Resp:  [18-20] 18  SpO2:  [95 %-100 %] 100 %  BP: (106-137)/(52-80) 106/62     Weight: (!) 149.7 kg (330 lb)  Body mass index is 36.25 kg/m².    Intake/Output Summary (Last 24 hours) at 09/07/17 9507  Last data filed at 09/07/17 0000   Gross per 24 hour   Intake                0 ml   Output              325 ml   Net             -325 ml      Physical Exam   Constitutional: He is oriented to person, place, and time. He appears well-developed and well-nourished. No distress.   Cardiovascular: Normal rate, normal heart sounds and intact distal pulses.  An irregularly irregular rhythm present.   No murmur heard.  Pulmonary/Chest: Effort normal and breath sounds normal. No respiratory distress. He has no wheezes.   Abdominal: Soft. Bowel sounds are normal. He exhibits no distension. There is no tenderness.   Musculoskeletal:  Normal range of motion. He exhibits edema (R>L chronic, woody).   Neurological: He is alert and oriented to person, place, and time. No cranial nerve deficit.   Skin: Skin is warm and dry. He is not diaphoretic. No erythema.   Psychiatric: He has a normal mood and affect. His speech is normal.   Nursing note and vitals reviewed.      Significant Labs: All pertinent labs within the past 24 hours have been reviewed.    Significant Imaging: I have reviewed all pertinent imaging results/findings within the past 24 hours.

## 2017-09-07 NOTE — SUBJECTIVE & OBJECTIVE
Interval History: No acute events overnight. This AM, pt sitting upright in bed. Pt denies complaints including chest pain. Discussed plan of care, including NPO and CTA.    Review of Systems   Constitutional: Negative for chills, diaphoresis, fatigue and fever.   Respiratory: Positive for shortness of breath. Negative for cough and wheezing.    Cardiovascular: Positive for chest pain, palpitations and leg swelling (chronic).   Gastrointestinal: Negative for abdominal distention, abdominal pain, diarrhea, nausea and vomiting.   Musculoskeletal: Positive for arthralgias and gait problem.   Skin: Positive for wound. Negative for rash.   Neurological: Positive for weakness. Negative for dizziness and headaches.   Psychiatric/Behavioral: Negative for agitation and confusion.     Objective:     Vital Signs (Most Recent):  Temp: 97.4 °F (36.3 °C) (09/06/17 1119)  Pulse: 92 (09/06/17 1900)  Resp: 18 (09/06/17 1119)  BP: 114/60 (09/06/17 1119)  SpO2: 97 % (09/06/17 1119) Vital Signs (24h Range):  Temp:  [97.3 °F (36.3 °C)-97.7 °F (36.5 °C)] 97.4 °F (36.3 °C)  Pulse:  [] 92  Resp:  [18-22] 18  SpO2:  [97 %-100 %] 97 %  BP: (114-149)/(60-83) 114/60     Weight: (!) 149.7 kg (330 lb)  Body mass index is 36.25 kg/m².    Intake/Output Summary (Last 24 hours) at 09/06/17 2003  Last data filed at 09/06/17 0600   Gross per 24 hour   Intake              480 ml   Output             1900 ml   Net            -1420 ml      Physical Exam   Constitutional: He is oriented to person, place, and time. He appears well-developed and well-nourished. No distress.   Cardiovascular: Normal rate, normal heart sounds and intact distal pulses.  An irregularly irregular rhythm present.   No murmur heard.  Pulmonary/Chest: Effort normal and breath sounds normal. No respiratory distress. He has no wheezes.   Abdominal: Soft. Bowel sounds are normal. He exhibits no distension. There is no tenderness.   Musculoskeletal: Normal range of motion. He  exhibits edema (R>L chronic, woody).   Neurological: He is alert and oriented to person, place, and time. No cranial nerve deficit.   Skin: Skin is warm and dry. He is not diaphoretic. No erythema.   Psychiatric: He has a normal mood and affect. His speech is normal.   Nursing note and vitals reviewed.      Significant Labs: All pertinent labs within the past 24 hours have been reviewed.    Significant Imaging: I have reviewed all pertinent imaging results/findings within the past 24 hours.

## 2017-09-07 NOTE — ASSESSMENT & PLAN NOTE
- BNP below baseline, in obese patient  - CXR with possible pleural effusion  - CT as above  - on ACE and BB, not on daily diuretic for unclear reasons  - lasix 20 mg IV x1  - Repeat 2d shows EF 40%, pulm HTN, mild AR, severe MR, mod TR

## 2017-09-07 NOTE — CONSULTS
PharmD Consult received for:  1.) Warfarin dosing (home regimen: 10 mg PO daily):   · Per [facility]madin Clinic, pt's INR goal 2.5-3.5  · Current INR = 1.7  · Lovenox bridge  · Since INR jumped >0.2-0.3, recommend changing back to pt's home dose of 10 mg daily.  · Will follow along.      Thank you for the consult,    Roseann Clemente PharmD  [id number]      **Note: Consults are reviewed Monday-Friday 7:30-4pm. The above recommendations are only suggested. The recommendations should be considered in conjunction with all patient factors.**

## 2017-09-07 NOTE — PLAN OF CARE
Problem: Patient Care Overview  Goal: Plan of Care Review  Outcome: Ongoing (interventions implemented as appropriate)  Plan of care reviewed with patient. Patient took medications without complaint. No major concerns noted.

## 2017-09-07 NOTE — ASSESSMENT & PLAN NOTE
- aspects of MSK vs PE vs unstable angina vs afib with RVR  - pending CTA, as INR subtherapeutic to r/o new PE  - some TTP lending to MSK origin, avoid NSAIDS 2/2 coumadin  - relieved with nitro in ED, lending to angina  - consult cardiology and appreciate assistance   9/6: Denies active chest pain. CTA negative for PE. NPO at [facility] for nuclear stress tomorrow.

## 2017-09-07 NOTE — PLAN OF CARE
PET stress test read and reviewed.   Negative for ischemia. Flow heterogeneity suggestive of nonobstructive CAD.  Can discharge home from Cardiology perspective.   Can continue on ASA/statin on discharge.  Other meds as per progress note: continue Toprol 100, increase lisinopril to 10, daily furosemide 20, add spironolactone 25.  Will need follow up with his cardiologist Dr. Lantigua in the next several weeks with BMP.

## 2017-09-07 NOTE — HOSPITAL COURSE
Pt admitted for further work up of chest pain. Cardiology consulted. Trop .042->.015->.027. CTA negative for PE. PET stress negative for ischemia with flow heterogeneity suggestive of nonobstructive CAD. Per card recs, continue Toprol 100; start ASA, statin, lisinopril 10 daily, lasix 20 daily, and spironolactone 25 daily. Pt to follow up with Dr. Lantigua in the next several weeks with BMP. Endocrine consulted for longstanding hyperthyroidism with undetectable TSH; per recs, increase methimazole to 20 mg daily. Plan for outpatient NM thyroid uptake scan and f/u with endocrine. Pt discharged home with medication changes per cardiology and endocrine recs in addition to lovenox bridge given subtherapeutic INR. Pt understands lovenox bridge and reports use in the past. Pt educated on importance of medication compliance. Pt to follow up with coumadin clinic, PCP, cardiology, and endocrinology as an outpatient.

## 2017-09-07 NOTE — PROGRESS NOTES
[facility] Hospital Medicine  Progress Note    Patient Name: Drew Farrar  MRN: [id number]  Patient Class: OP- Observation   Admission Date: 9/5/2017  Length of Stay: 0 days  Attending Physician: Cathleen Quiles MD  Primary Care Provider: Robles Archuleta MD    [facility] Medicine Team: [facility] F Roseann Mora PA-C    Subjective:     Principal Problem:Chest pain    HPI:  Drew Farrar is a pleasant 51M with PMHx of persistent afib on coumadin, patent PDA s/p surgical closure 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 of age, May Thurner syndrome with h/o multiple PEs and DVTs s/p IVC filter, chronic systolic congestive heart failure (EF 30%), hyperthyroidism presents from EP clinic for evaluation of right sided chest pain during his clinic visit. Patient reports worsening FRAIRE for several months, but has been present for years (his clinic note today mentions his inability to ambulate ~100 yards without FRAIRE). Additionally, he reports CP for 2-3 years, spontaneous and not associated with exertion, always on the right side (under his axilla) and radiates to his sternum, no worse with inspiration or position, denies trauma. His CP lasts about 15 minutes and usually resolves if he calms himself down. He has been told previously that his CP is associated with anxiety. Today's episode was no different than his usual episodes at home. The patient is currently CP free and denies SOB at rest, PND, or orthopnea.     Hospital Course:  Pt admitted for further work up of chest pain. Cardiology consulted. Trop .042->.015->.027. CTA negative for PE. NPO at [facility] for nuclear stress tomorrow.     Interval History: No acute events overnight. This AM, pt sitting upright in bed. Pt denies complaints including chest pain. Discussed plan of care, including NPO and CTA.    Review of Systems   Constitutional: Negative for chills, diaphoresis, fatigue and fever.   Respiratory: Positive for shortness of breath. Negative for cough and wheezing.     Cardiovascular: Positive for chest pain, palpitations and leg swelling (chronic).   Gastrointestinal: Negative for abdominal distention, abdominal pain, diarrhea, nausea and vomiting.   Musculoskeletal: Positive for arthralgias and gait problem.   Skin: Positive for wound. Negative for rash.   Neurological: Positive for weakness. Negative for dizziness and headaches.   Psychiatric/Behavioral: Negative for agitation and confusion.     Objective:     Vital Signs (Most Recent):  Temp: 97.4 °F (36.3 °C) (09/06/17 1119)  Pulse: 92 (09/06/17 1900)  Resp: 18 (09/06/17 1119)  BP: 114/60 (09/06/17 1119)  SpO2: 97 % (09/06/17 1119) Vital Signs (24h Range):  Temp:  [97.3 °F (36.3 °C)-97.7 °F (36.5 °C)] 97.4 °F (36.3 °C)  Pulse:  [] 92  Resp:  [18-22] 18  SpO2:  [97 %-100 %] 97 %  BP: (114-149)/(60-83) 114/60     Weight: (!) 149.7 kg (330 lb)  Body mass index is 36.25 kg/m².    Intake/Output Summary (Last 24 hours) at 09/06/17 2003  Last data filed at 09/06/17 0600   Gross per 24 hour   Intake              480 ml   Output             1900 ml   Net            -1420 ml      Physical Exam   Constitutional: He is oriented to person, place, and time. He appears well-developed and well-nourished. No distress.   Cardiovascular: Normal rate, normal heart sounds and intact distal pulses.  An irregularly irregular rhythm present.   No murmur heard.  Pulmonary/Chest: Effort normal and breath sounds normal. No respiratory distress. He has no wheezes.   Abdominal: Soft. Bowel sounds are normal. He exhibits no distension. There is no tenderness.   Musculoskeletal: Normal range of motion. He exhibits edema (R>L chronic, woody).   Neurological: He is alert and oriented to person, place, and time. No cranial nerve deficit.   Skin: Skin is warm and dry. He is not diaphoretic. No erythema.   Psychiatric: He has a normal mood and affect. His speech is normal.   Nursing note and vitals reviewed.      Significant Labs: All pertinent labs  within the past 24 hours have been reviewed.    Significant Imaging: I have reviewed all pertinent imaging results/findings within the past 24 hours.    Assessment/Plan:      * Chest pain    - aspects of MSK vs PE vs unstable angina vs afib with RVR  - pending CTA, as INR subtherapeutic to r/o new PE  - some TTP lending to MSK origin, avoid NSAIDS 2/2 coumadin  - relieved with nitro in ED, lending to angina  - consult cardiology and appreciate assistance   9/6: Denies active chest pain. CTA negative for PE. NPO at [facility] for nuclear stress tomorrow.           Elevated troponin    - in setting of EKG in afib with RVR  - Trop .042->.015->.027  - NPO at [facility] for nuclear stress tomorrow.         Chronic combined systolic and diastolic congestive heart failure    - BNP below baseline, in obese patient  - CXR with possible pleural effusion  - CT as above  - on ACE and BB, not on daily diuretic for unclear reasons  - lasix 20 mg IV x1  - Repeat 2d shows EF 40%, pulm HTN, mild AR, severe MR, mod TR        Essential hypertension    - lisinopril 2.5 mg daily, metoprolol  mg  - BP controlled. Will continue to monitor        Long term (current) use of anticoagulants    History of DVT  History of PE  May-Thurner Syndrome   - h/o failed eliquis  - continue coumadin  - INR subtherapeutic 1.2, trend daily  - consult Pharm  - lovenox bridge for now        Persistent atrial fibrillation    - tele  - increase metoprolol XL to 100 mg daily per EP note today        Hyperthyroidism    - continue tapazole 5 mg BID  - TSH <0.010  - Ref to endocrine on discharge          VTE Risk Mitigation         Ordered     warfarin (COUMADIN) tablet 15 mg  Daily     Route:  Oral        09/06/17 1316     High Risk of VTE  Once      09/05/17 2034     Reason for No Pharmacological VTE Prophylaxis  Once      09/05/17 2034              Roseann Mora PA-C  Department of Hospital Medicine   [facility]

## 2017-09-07 NOTE — SUBJECTIVE & OBJECTIVE
Interval HPI:   Overnight events: none  Eating:   NPO  Nausea: No  Hypoglycemia and intervention: No  Fever: No  TPN and/or TF: No  If yes, type of TF/TPN and rate: n/a    PMH, PSH, FH, SH updated and reviewed       Review of Systems   Constitutional: Negative for weight changes.  Eyes: Negative for visual disturbance.  Respiratory: +dyspnea, Negative for cough.   Cardiovascular: + chest pain.  Gastrointestinal: Negative for nausea.  Endocrine: Negative for polyuria, polydipsia.  Musculoskeletal: Negative for back pain.  Skin: Negative for rash.  Neurological: Negative for syncope.  Psychiatric/Behavioral: Negative for depression.      PHYSICAL EXAMINATION:  Vitals:    09/07/17 1100   BP: 106/62   Pulse: 78   Resp: 18   Temp: 98.1 °F (36.7 °C)     Body mass index is 36.25 kg/m².    Physical Exam   Constitutional: obese, Well developed, well nourished, NAD.  ENT: External ears no masses with nose patent; normal hearing.   Neck:  Supple; trachea midline; no thyromegaly.   Cardiovascular: irregular irregular rhythm, no LE edema.     Lungs:  Normal effort; lungs anterior bilaterally clear to auscultation.  Abdomen:  Soft, no masses,  no hernias.  MS: No clubbing or cyanosis of nails noted; normal gait  Skin: No rashes, lesions, or ulcers; no nodules.  Psychiatric: Good judgement and insight; normal mood and affect.  Neurological: Cranial nerves are grossly intact.

## 2017-09-07 NOTE — PROGRESS NOTES
Cardiology Progress Note    SUBJECTIVE:   NAEON. TTE yesterday with EF 40-45%, severe MR (not prev commented on past 2 echos), severe LAE. CTA negative for Pe.  Feeling well this morning, feels at baseline. No Cp, SOB.    OBJECTIVE:     Vital Signs (Most Recent)  Temp: 98.1 °F (36.7 °C) (09/07/17 0746)  Pulse: 102 (09/07/17 0746)  Resp: 18 (09/07/17 0746)  BP: 137/80 (09/07/17 0746)  SpO2: 95 % (09/07/17 0746)    Vital Signs Range (Last 24H):  Temp:  [97.4 °F (36.3 °C)-98.1 °F (36.7 °C)]   Pulse:  []   Resp:  [18-20]   BP: (114-137)/(52-80)   SpO2:  [95 %-100 %]     Physical Exam:  GEN: NAD, alert and oriented  HEENT: EOMI, no LAD, no JVD  Cv: irregularly irregular, no m/r/g  PULM: CTAB, EWOB  ABD: soft, NT/ND  EXT: chronic venous stasis, nonpitting edema, R slightly larger than L  Neuro: no focal deficits    Laboratory:  Chemistry:  Lab Results   Component Value Date     09/07/2017    K 4.1 09/07/2017     09/07/2017    CO2 25 09/07/2017    BUN 23 (H) 09/07/2017    CREATININE 0.8 09/07/2017    CALCIUM 9.1 09/07/2017     (H) 09/05/2017     CBC:   Lab Results   Component Value Date    WBC 3.75 (L) 09/07/2017    HGB 11.5 (L) 09/07/2017    HCT 35.8 (L) 09/07/2017    MCV 79 (L) 09/07/2017     09/07/2017       ASSESSMENT/PLAN:   50 yo man with hx of permanent Af, May Thurner syndrome s/p multiple venous stents, hx of mult/recurrent DVT/PE s/p IVC filter on coumadin, TTE low EF 30% 2016, hyperthyroidism, patent PDA s/p closure at 18mo old presenting from EP clinic with chest pain and increased FRAIRE.         * Chest pain     -nonanginal, most suspicious for new Pe, previously also associated with anxiety. Minimally elevated trop, also in setting of borderline RVR. Though nonanginal pain, no prior stress in our system to workup low EF  -CTA negative  -Nuclear stress unavailable, abnormal echo, thus will obtain PET stress today (on schedule)       Reduced ejection fraction     -without other  clinical signs or sx of CHF with exception of FRAIRE. FRAIRE may represent CHF vs. Severe MR vs OHS vs. Pulmonary hypertension. Less likely pulmonary etiology per last Pulm note  appears euvolemic  -last EF 30% 2016, suspect NICM in setting of chronic AF +/- uncontrolled hyperthyroidism, or now with severe MR seen on echo  -stress test as above to assess for ischemic involvement  -on Toprol 100, has BP to increase Lisinopril to 5  -EF 40-45% yesterday, now also with severe MR; not prev noted in past echos. Will review study, will need to assess for primary vs functional.        Hyperthyroidism     -longstanding hx with undetectable TSH  -labs ordered anti TPO, TSI as outpt, yet to be drawn  -on methimazole though ?compliance  -given longstanding hx and role with AF, low EF, consider Endocrine consult or referral          Persistent atrial fibrillation     -metoprolol 100 as per EP recs, rate controlled <110  -anticoagulated with coumadin  -CV 1 for htn. INR trending toward therapeutic             Plan discussed with attending , further recommendations as per attending addendum. Please call with any questions or concerns.    Raghu Davis MD  Cardiology PGY4  [phone]

## 2017-09-07 NOTE — ASSESSMENT & PLAN NOTE
Etiologies include Grave's diease vs toxic nodule  Agree with methimazole, will up-titrate dose to 20mg daily  Will need nm thyroid uptake scan as outpatient in determining Grave's disease vs toxic nodule -- will have to hold methimazole for at least 5 days prior to this study  TRAB ordered in further evaluating for Grave's disease vs toxic nodule

## 2017-09-08 ENCOUNTER — TELEPHONE (OUTPATIENT)
Dept: CARDIOLOGY | Facility: HOSPITAL | Age: 52
End: 2017-09-08

## 2017-09-08 ENCOUNTER — OUTPATIENT CASE MANAGEMENT (OUTPATIENT)
Dept: ADMINISTRATIVE | Facility: OTHER | Age: 52
End: 2017-09-08

## 2017-09-08 VITALS
RESPIRATION RATE: 18 BRPM | OXYGEN SATURATION: 97 % | HEART RATE: 88 BPM | BODY MASS INDEX: 36.45 KG/M2 | DIASTOLIC BLOOD PRESSURE: 68 MMHG | SYSTOLIC BLOOD PRESSURE: 122 MMHG | HEIGHT: 78 IN | TEMPERATURE: 98 F | WEIGHT: 315 LBS

## 2017-09-08 DIAGNOSIS — E03.9 HYPOTHYROIDISM, UNSPECIFIED TYPE: Primary | ICD-10-CM

## 2017-09-08 LAB
ANION GAP SERPL CALC-SCNC: 11 MMOL/L
BASOPHILS # BLD AUTO: 0.01 K/UL
BASOPHILS NFR BLD: 0.3 %
BUN SERPL-MCNC: 17 MG/DL
CALCIUM SERPL-MCNC: 9.1 MG/DL
CHLORIDE SERPL-SCNC: 107 MMOL/L
CO2 SERPL-SCNC: 22 MMOL/L
CREAT SERPL-MCNC: 0.9 MG/DL
DIFFERENTIAL METHOD: ABNORMAL
EOSINOPHIL # BLD AUTO: 0.1 K/UL
EOSINOPHIL NFR BLD: 4 %
ERYTHROCYTE [DISTWIDTH] IN BLOOD BY AUTOMATED COUNT: 15.2 %
EST. GFR  (AFRICAN AMERICAN): >60 ML/MIN/1.73 M^2
EST. GFR  (NON AFRICAN AMERICAN): >60 ML/MIN/1.73 M^2
GLUCOSE SERPL-MCNC: 81 MG/DL
HCT VFR BLD AUTO: 34.4 %
HGB BLD-MCNC: 11.1 G/DL
INR PPP: 1.9
LYMPHOCYTES # BLD AUTO: 0.9 K/UL
LYMPHOCYTES NFR BLD: 28.3 %
MCH RBC QN AUTO: 25.1 PG
MCHC RBC AUTO-ENTMCNC: 32.3 G/DL
MCV RBC AUTO: 78 FL
MONOCYTES # BLD AUTO: 0.4 K/UL
MONOCYTES NFR BLD: 11.5 %
NEUTROPHILS # BLD AUTO: 1.8 K/UL
NEUTROPHILS NFR BLD: 55.9 %
PLATELET # BLD AUTO: 149 K/UL
PMV BLD AUTO: 9.3 FL
POTASSIUM SERPL-SCNC: 4 MMOL/L
PROTHROMBIN TIME: 18.8 SEC
RBC # BLD AUTO: 4.42 M/UL
SODIUM SERPL-SCNC: 140 MMOL/L
WBC # BLD AUTO: 3.21 K/UL

## 2017-09-08 PROCEDURE — 63600175 PHARM REV CODE 636 W HCPCS: Performed by: PHYSICIAN ASSISTANT

## 2017-09-08 PROCEDURE — 99239 HOSP IP/OBS DSCHRG MGMT >30: CPT | Mod: ,,, | Performed by: PHYSICIAN ASSISTANT

## 2017-09-08 PROCEDURE — 25000003 PHARM REV CODE 250: Performed by: PHYSICIAN ASSISTANT

## 2017-09-08 PROCEDURE — 25000003 PHARM REV CODE 250: Performed by: INTERNAL MEDICINE

## 2017-09-08 PROCEDURE — 85025 COMPLETE CBC W/AUTO DIFF WBC: CPT

## 2017-09-08 PROCEDURE — 83520 IMMUNOASSAY QUANT NOS NONAB: CPT

## 2017-09-08 PROCEDURE — 85610 PROTHROMBIN TIME: CPT

## 2017-09-08 PROCEDURE — 80048 BASIC METABOLIC PNL TOTAL CA: CPT

## 2017-09-08 PROCEDURE — 36415 COLL VENOUS BLD VENIPUNCTURE: CPT

## 2017-09-08 RX ORDER — ROSUVASTATIN CALCIUM 10 MG/1
10 TABLET, COATED ORAL NIGHTLY
Qty: 30 TABLET | Refills: 1 | Status: SHIPPED | OUTPATIENT
Start: 2017-09-08 | End: 2017-11-27

## 2017-09-08 RX ORDER — ASPIRIN 81 MG/1
81 TABLET ORAL DAILY
Refills: 0 | COMMUNITY
Start: 2017-09-09 | End: 2017-09-25

## 2017-09-08 RX ORDER — METHIMAZOLE 10 MG/1
20 TABLET ORAL DAILY
Qty: 60 TABLET | Refills: 1 | Status: SHIPPED | OUTPATIENT
Start: 2017-09-08 | End: 2017-10-19 | Stop reason: SDUPTHER

## 2017-09-08 RX ORDER — POTASSIUM CHLORIDE 750 MG/1
10 TABLET, EXTENDED RELEASE ORAL DAILY
Qty: 30 TABLET | Refills: 1 | Status: SHIPPED | OUTPATIENT
Start: 2017-09-08 | End: 2017-10-19

## 2017-09-08 RX ORDER — ENOXAPARIN SODIUM 150 MG/ML
1 INJECTION SUBCUTANEOUS EVERY 12 HOURS
Qty: 14 ML | Refills: 0 | Status: SHIPPED | OUTPATIENT
Start: 2017-09-08 | End: 2017-09-13

## 2017-09-08 RX ORDER — FUROSEMIDE 20 MG/1
20 TABLET ORAL DAILY
Qty: 30 TABLET | Refills: 1 | Status: SHIPPED | OUTPATIENT
Start: 2017-09-09 | End: 2017-11-27

## 2017-09-08 RX ORDER — SPIRONOLACTONE 25 MG/1
25 TABLET ORAL DAILY
Qty: 30 TABLET | Refills: 1 | Status: SHIPPED | OUTPATIENT
Start: 2017-09-09 | End: 2017-10-19

## 2017-09-08 RX ADMIN — METHIMAZOLE 20 MG: 5 TABLET ORAL at 08:09

## 2017-09-08 RX ADMIN — STANDARDIZED SENNA CONCENTRATE AND DOCUSATE SODIUM 1 TABLET: 8.6; 5 TABLET, FILM COATED ORAL at 09:09

## 2017-09-08 RX ADMIN — ENOXAPARIN SODIUM 150 MG: 150 INJECTION SUBCUTANEOUS at 10:09

## 2017-09-08 RX ADMIN — FUROSEMIDE 20 MG: 20 TABLET ORAL at 08:09

## 2017-09-08 RX ADMIN — LISINOPRIL 10 MG: 10 TABLET ORAL at 08:09

## 2017-09-08 RX ADMIN — PANTOPRAZOLE SODIUM 40 MG: 40 TABLET, DELAYED RELEASE ORAL at 08:09

## 2017-09-08 RX ADMIN — SPIRONOLACTONE 25 MG: 25 TABLET, FILM COATED ORAL at 08:09

## 2017-09-08 RX ADMIN — METOPROLOL SUCCINATE 100 MG: 100 TABLET, EXTENDED RELEASE ORAL at 08:09

## 2017-09-08 NOTE — ASSESSMENT & PLAN NOTE
- BNP below baseline, in obese patient  - CXR with possible pleural effusion  - CT as above  - on ACE and BB, not on daily diuretic for unclear reasons  - lasix 20 mg IV x1  - Repeat 2d shows EF 40%, pulm HTN, mild AR, severe MR, mod TR  9/7: Optimal medical therapy per card recs. See above

## 2017-09-08 NOTE — PLAN OF CARE
Patient in agreement with plan to discharge home with no needs today. CM informed the patient that he will discharge on a lovenox bridge & that the lovenox prescriptions will be sent to [facility]. Patient verbalized understanding, stated that he has received lovenox in the past, & stated that he is comfortable administering lovenox. CM reminded patient of hospital follow up appointment scheduled for the patient with Dr. Robles Archuleta (PCP). Patient stated that he would arrange transportation with [employer] ([phone]) for himself at the time of discharge. CM informed the patient's nurse, Flavio ([id number]), of discharge status. Will continue to follow.

## 2017-09-08 NOTE — PLAN OF CARE
Problem: Patient Care Overview  Goal: Plan of Care Review  Outcome: Ongoing (interventions implemented as appropriate)  VSS and afebrile. Free from injury and falls. AOx4.    Monitor for pain/adverse events.

## 2017-09-08 NOTE — PLAN OF CARE
Problem: Patient Care Overview  Goal: Plan of Care Review  Outcome: Ongoing (interventions implemented as appropriate)  Plan of care reviewed with patient. Patient took medications as ordered. Patient had no major concerns during shift.

## 2017-09-08 NOTE — ASSESSMENT & PLAN NOTE
- aspects of MSK vs PE vs unstable angina vs afib with RVR  - pending CTA, as INR subtherapeutic to r/o new PE  - some TTP lending to MSK origin, avoid NSAIDS 2/2 coumadin  - relieved with nitro in ED, lending to angina  - consult cardiology and appreciate assistance   9/6: CTA negative for PE  9/7: PET stress negative for ischemia with flow heterogeneity suggestive of nonobstructive CAD. Per card recs, continue Toprol 100; start ASA, statin, lisinopril 10 daily, lasix 20 daily, and spironolactone 25 daily. Pt to follow up with Dr. Lantigua in the next several weeks with BMP.   9/8: Pt discharged home with medication changes per cardiology recs. Pt educated on importance of medication compliance. Pt to follow up with PCP and cardiology as an outpatient.

## 2017-09-08 NOTE — ASSESSMENT & PLAN NOTE
- continue tapazole 5 mg BID  - TSH <0.010  - Endocrine consulted for longstanding hyperthyroidism with undetectable TSH; per recs, increase methimazole to 20 mg daily. Plan for outpatient NM thyroid uptake scan and f/u with endocrine (endo to arrange f/u).

## 2017-09-08 NOTE — ASSESSMENT & PLAN NOTE
History of DVT  History of PE  May-Thurner Syndrome   - h/o failed eliquis  - continue coumadin  - Pharm D following  - lovenox bridge while inpatient 2/2 INR subtherapeutic   - Pt discharged home with continued coumadin dose and lovenox bridge given subtherapeutic INR. Pt understands lovenox bridge and reports use in the past. Pt educated on importance of medication compliance. Pt to follow up with coumadin clinic 9/12.

## 2017-09-08 NOTE — ASSESSMENT & PLAN NOTE
History of DVT  History of PE  May-Thurner Syndrome   - h/o failed eliquis  - continue coumadin  - consult Pharm  - lovenox bridge while INR subtherapeutic

## 2017-09-08 NOTE — PLAN OF CARE
09/08/17 1257   Final Note   Assessment Type Final Discharge Note     Patient discharged home with no needs 9/8/17.

## 2017-09-08 NOTE — PROGRESS NOTES
PharmD Consult received for:  1.) Warfarin dosing (home regimen: 10 mg PO daily):   · Per Coumadin Clinic, pt's INR goal 2.5-3.5  · Current INR = 1.9, up from 1.7 yesterday  · Lovenox bridge  · Would continue warfarin 10mg PO daily  · Will follow along.      Thank you for the consult,    Zainab Santoyo, Kamille  [phone]    **Note: Consults are reviewed Monday-Friday 7:30-4pm. The above recommendations are only suggested. The recommendations should be considered in conjunction with all patient factors.**

## 2017-09-08 NOTE — PLAN OF CARE
Problem: Patient Care Overview  Goal: Plan of Care Review  Outcome: Outcome(s) achieved Date Met: 09/08/17  Pt to be D/C to home via personal transport.    VSS and afebrile. Free from injury and falls. AOx4.    AVS to be provided c/ pertinent F/U info indicated.

## 2017-09-08 NOTE — TELEPHONE ENCOUNTER
2 week repeat thyroid labs scheduled and 4 week follow up with Dr Lopez scheduled and mailed to patient

## 2017-09-08 NOTE — PROGRESS NOTES
[facility] Hospital Medicine  Progress Note    Patient Name: Drew Farrar  MRN: [id number]  Patient Class: OP- Observation   Admission Date: 9/5/2017  Length of Stay: 0 days  Attending Physician: Cathleen Quiles MD  Primary Care Provider: Robles Archuleta MD    [facility] Medicine Team: [facility]OSP MED F Roseann Mora PA-C    Subjective:     Principal Problem:Chest pain    HPI:  Drew Farrar is a pleasant 51M with PMHx of persistent afib on coumadin, patent PDA s/p surgical closure 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 of age, May Thurner syndrome with h/o multiple PEs and DVTs s/p IVC filter, chronic systolic congestive heart failure (EF 30%), hyperthyroidism presents from EP clinic for evaluation of right sided chest pain during his clinic visit. Patient reports worsening FRAIRE for several months, but has been present for years (his clinic note today mentions his inability to ambulate ~100 yards without FRAIRE). Additionally, he reports CP for 2-3 years, spontaneous and not associated with exertion, always on the right side (under his axilla) and radiates to his sternum, no worse with inspiration or position, denies trauma. His CP lasts about 15 minutes and usually resolves if he calms himself down. He has been told previously that his CP is associated with anxiety. Today's episode was no different than his usual episodes at home. The patient is currently CP free and denies SOB at rest, PND, or orthopnea.     Hospital Course:  Pt admitted for further work up of chest pain. Cardiology consulted. Trop .042->.015->.027. CTA negative for PE. PET stress negative for ischemia with flow heterogeneity suggestive of nonobstructive CAD. Per card recs, continue Toprol 100; start ASA, statin, lisinopril 10 daily, lasix 20 daily, and spironolactone 25 daily. Pt to follow up with Dr. Lantigua in the next several weeks with BMP. Endocrine consulted for longstanding hyperthyroidism with undetectable TSH; per recs, increase methimazole to 20  mg daily. Plan for outpatient NM thyroid uptake scan and f/u with endocrine.    Interval History: No acute events overnight. This AM, pt sitting upright in bed. Pt has no complaints at this time; denies chest pain. Discussed plan of care. Pt aware of NPO status for stress test.     Review of Systems   Constitutional: Negative for chills, diaphoresis, fatigue and fever.   Respiratory: Positive for shortness of breath. Negative for cough and wheezing.    Cardiovascular: Positive for chest pain, palpitations and leg swelling (chronic).   Gastrointestinal: Negative for abdominal distention, abdominal pain, diarrhea, nausea and vomiting.   Musculoskeletal: Positive for arthralgias and gait problem.   Skin: Positive for wound. Negative for rash.   Neurological: Positive for weakness. Negative for dizziness and headaches.   Psychiatric/Behavioral: Negative for agitation and confusion.     Objective:     Vital Signs (Most Recent):  Temp: 98.1 °F (36.7 °C) (09/07/17 1100)  Pulse: 78 (09/07/17 1100)  Resp: 18 (09/07/17 1100)  BP: 106/62 (09/07/17 1100)  SpO2: 100 % (09/07/17 1100) Vital Signs (24h Range):  Temp:  [97.7 °F (36.5 °C)-98.1 °F (36.7 °C)] 98.1 °F (36.7 °C)  Pulse:  [] 78  Resp:  [18-20] 18  SpO2:  [95 %-100 %] 100 %  BP: (106-137)/(52-80) 106/62     Weight: (!) 149.7 kg (330 lb)  Body mass index is 36.25 kg/m².    Intake/Output Summary (Last 24 hours) at 09/07/17 5187  Last data filed at 09/07/17 0000   Gross per 24 hour   Intake                0 ml   Output              325 ml   Net             -325 ml      Physical Exam   Constitutional: He is oriented to person, place, and time. He appears well-developed and well-nourished. No distress.   Cardiovascular: Normal rate, normal heart sounds and intact distal pulses.  An irregularly irregular rhythm present.   No murmur heard.  Pulmonary/Chest: Effort normal and breath sounds normal. No respiratory distress. He has no wheezes.   Abdominal: Soft. Bowel sounds  are normal. He exhibits no distension. There is no tenderness.   Musculoskeletal: Normal range of motion. He exhibits edema (R>L chronic, woody).   Neurological: He is alert and oriented to person, place, and time. No cranial nerve deficit.   Skin: Skin is warm and dry. He is not diaphoretic. No erythema.   Psychiatric: He has a normal mood and affect. His speech is normal.   Nursing note and vitals reviewed.      Significant Labs: All pertinent labs within the past 24 hours have been reviewed.    Significant Imaging: I have reviewed all pertinent imaging results/findings within the past 24 hours.    Assessment/Plan:      * Chest pain    - aspects of MSK vs PE vs unstable angina vs afib with RVR  - pending CTA, as INR subtherapeutic to r/o new PE  - some TTP lending to MSK origin, avoid NSAIDS 2/2 coumadin  - relieved with nitro in ED, lending to angina  - consult cardiology and appreciate assistance   9/6: CTA negative for PE  9/7: PET stress negative for ischemia with flow heterogeneity suggestive of nonobstructive CAD. Per card recs, continue Toprol 100; start ASA, statin, lisinopril 10 daily, lasix 20 daily, and spironolactone 25 daily. Pt to follow up with Dr. Lantigua in the next several weeks with BMP.         Elevated troponin    - in setting of EKG in afib with RVR  - Trop .042->.015->.027  - See above        Chronic combined systolic and diastolic congestive heart failure    - BNP below baseline, in obese patient  - CXR with possible pleural effusion  - CT as above  - on ACE and BB, not on daily diuretic for unclear reasons  - lasix 20 mg IV x1  - Repeat 2d shows EF 40%, pulm HTN, mild AR, severe MR, mod TR  9/7: Optimal medical therapy per card recs. See above        Essential hypertension    - lisinopril 2.5 mg daily, metoprolol  mg  9/7: Increased to lisinopril 10 and added spironolactone 25 daily. BP controlled. Will continue to monitor        Long term (current) use of anticoagulants    History  of DVT  History of PE  May-Thurner Syndrome   - h/o failed eliquis  - continue coumadin  - consult Pharm  - lovenox bridge while INR subtherapeutic         Persistent atrial fibrillation    - tele  - increase metoprolol XL to 100 mg daily per EP note today        Hyperthyroidism    - continue tapazole 5 mg BID  - TSH <0.010  - Endocrine consulted for longstanding hyperthyroidism with undetectable TSH; per recs, increase methimazole to 20 mg daily. Plan for outpatient NM thyroid uptake scan and f/u with endocrine.          VTE Risk Mitigation         Ordered     warfarin (COUMADIN) tablet 10 mg  Daily     Route:  Oral        09/07/17 0917     High Risk of VTE  Once      09/05/17 2034     Reason for No Pharmacological VTE Prophylaxis  Once      09/05/17 2034              Roseann Mora PA-C  Department of Hospital Medicine   [facility]

## 2017-09-08 NOTE — PROGRESS NOTES
The following patient has been assigned to Kristal Gusman RN with Outpatient Complex Care Management for high risk screening.    Reason: High Risk    Please contact OPCM at [phone] with any questions.    Thank you,    Kristan Juares, SSC

## 2017-09-08 NOTE — TELEPHONE ENCOUNTER
----- Message from Aide Lee MD sent at 9/8/2017  8:51 AM CDT -----  Pt was admitted to [facility] from EP clinic. PE ruled out. Cardiac PET revealed no significant disease. His hyperthyroidism is probably contributing to his cardiomyopathy. Shin Begum (EP) and I strongly recommend that he be evaluated and treated by an Endocrinologist for the hyperthyroidism.  [place]  BOBBY Christensen, FACC, SHERICE   Preventive Cardiology  Tel: [phone]  Cell: [phone]

## 2017-09-08 NOTE — ASSESSMENT & PLAN NOTE
- aspects of MSK vs PE vs unstable angina vs afib with RVR  - pending CTA, as INR subtherapeutic to r/o new PE  - some TTP lending to MSK origin, avoid NSAIDS 2/2 coumadin  - relieved with nitro in ED, lending to angina  - consult cardiology and appreciate assistance   9/6: CTA negative for PE  9/7: PET stress negative for ischemia with flow heterogeneity suggestive of nonobstructive CAD. Per card recs, continue Toprol 100; start ASA, statin, lisinopril 10 daily, lasix 20 daily, and spironolactone 25 daily. Pt to follow up with Dr. Lantigua in the next several weeks with BMP.

## 2017-09-08 NOTE — ASSESSMENT & PLAN NOTE
- lisinopril 2.5 mg daily, metoprolol  mg  9/7: Increased to lisinopril 10 and added spironolactone 25 daily. BP controlled. Will continue to monitor

## 2017-09-08 NOTE — DISCHARGE SUMMARY
[facility] Hospital Medicine  Discharge Summary      Patient Name: Drew Farrar  MRN: [id number]  Admission Date: 9/5/2017  Hospital Length of Stay: 1 days  Discharge Date and Time:  09/08/2017 11:50 AM  Attending Physician: Cathleen Quiles MD   Discharging Provider: Roseann Mora PA-C  Primary Care Provider: Robles Archuleta MD  Hospital Medicine Team: [facility] Roseann Mora PA-C    HPI:   Drew Farrar is a pleasant 51M with PMHx of persistent afib on coumadin, patent PDA s/p surgical closure 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 of age, May Thurner syndrome with h/o multiple PEs and DVTs s/p IVC filter, chronic systolic congestive heart failure (EF 30%), hyperthyroidism presents from EP clinic for evaluation of right sided chest pain during his clinic visit. Patient reports worsening FRAIRE for several months, but has been present for years (his clinic note today mentions his inability to ambulate ~100 yards without FRAIRE). Additionally, he reports CP for 2-3 years, spontaneous and not associated with exertion, always on the right side (under his axilla) and radiates to his sternum, no worse with inspiration or position, denies trauma. His CP lasts about 15 minutes and usually resolves if he calms himself down. He has been told previously that his CP is associated with anxiety. Today's episode was no different than his usual episodes at home. The patient is currently CP free and denies SOB at rest, PND, or orthopnea.     * No surgery found *      Indwelling Lines/Drains at time of discharge:   Lines/Drains/Airways     Peripherally Inserted Central Catheter Line                 PICC Double Lumen 02/25/16 1326 right brachial 560 days              Hospital Course:   Pt admitted for further work up of chest pain. Cardiology consulted. Trop .042->.015->.027. CTA negative for PE. PET stress negative for ischemia with flow heterogeneity suggestive of nonobstructive CAD. Per card recs, continue Toprol 100; start ASA,  statin, lisinopril 10 daily, lasix 20 daily, and spironolactone 25 daily. Pt to follow up with Dr. Lantigua in the next several weeks with BMP. Endocrine consulted for longstanding hyperthyroidism with undetectable TSH; per recs, increase methimazole to 20 mg daily. Plan for outpatient NM thyroid uptake scan and f/u with endocrine. Pt discharged home with medication changes per cardiology and endocrine recs in addition to lovenox bridge given subtherapeutic INR. Pt understands lovenox bridge and reports use in the past. Pt educated on importance of medication compliance. Pt to follow up with coumadin clinic, PCP, cardiology, and endocrinology as an outpatient.     Consults:   Consults         Status Ordering Provider     [facility] Medicine PharmD Consult  Once     Provider:  (Not yet assigned)    Acknowledged CHARLES BARTLETT     Inpatient consult to Cardiology  Once     Provider:  (Not yet assigned)    Completed CHARLES BARTLETT     Inpatient consult to Endocrinology  Once     Provider:  (Not yet assigned)    Completed NENITA CLAROS          Significant Diagnostic Studies: Radiology: X-Ray: CXR: X-Ray Chest 1 View (CXR):   Results for orders placed or performed during the hospital encounter of 09/05/17   X-Ray Chest 1 View    Narrative    Chest single view compared to 08/21/2017.  Heart is enlarged.  There is increased groundglass opacification at the left base compared to prior.  Right lung is hypoaerated but clear.  No pneumothorax.    Impression increased ground glass opacity at the left base.  Developing pleural fluid or consolidation not excluded.  PA and lateral film may be helpful.      Electronically signed by: SINA RAMIREZ MD  Date:     09/05/17  Time:    17:35      CT scan: CTA  Cardiac Graphics: Echocardiogram:   2D echo with color flow doppler:   Results for orders placed or performed during the hospital encounter of 09/05/17   2D echo with color flow doppler   Result Value Ref Range    EF 40 (A) 55 - 65     Mitral Valve Regurgitation SEVERE (A)     Aortic Valve Regurgitation MILD     Est. PA Systolic Pressure 46.94 (A)     Tricuspid Valve Regurgitation MODERATE (A)     and Stress Test    Pending Diagnostic Studies:     Procedure Component Value Units Date/Time    Thyrotropin receptor antibody [[id number]] Collected:  09/08/17 0403    Order Status:  Sent Lab Status:  In process Updated:  09/08/17 0509    Specimen:  Blood from Blood         Final Active Diagnoses:    Diagnosis Date Noted POA    PRINCIPAL PROBLEM:  Chest pain [R07.9] 12/24/2014 Yes    Elevated troponin [R74.8] 09/05/2017 Yes    Chronic combined systolic and diastolic congestive heart failure [I50.42] 02/20/2017 Yes    Essential hypertension [I10] 09/16/2016 Yes    Long term (current) use of anticoagulants [Z79.01] 10/31/2016 Not Applicable    Persistent atrial fibrillation [I48.1] 03/01/2016 Yes    Hyperthyroidism [E05.90] 09/05/2017 Yes     Chronic    Non-rheumatic mitral regurgitation [I34.0] 09/07/2017 Yes    Chronic systolic heart failure [I50.22] 09/06/2017 Yes    Recurrent pulmonary embolism [I26.99] 10/26/2016 Yes    History of DVT (deep vein thrombosis) [Z86.718] 09/16/2016 Not Applicable    May-Thurner syndrome [I87.1] 02/26/2014 Yes      Problems Resolved During this Admission:    Diagnosis Date Noted Date Resolved POA      * Chest pain    - aspects of MSK vs PE vs unstable angina vs afib with RVR  - pending CTA, as INR subtherapeutic to r/o new PE  - some TTP lending to MSK origin, avoid NSAIDS 2/2 coumadin  - relieved with nitro in ED, lending to angina  - consult cardiology and appreciate assistance   9/6: CTA negative for PE  9/7: PET stress negative for ischemia with flow heterogeneity suggestive of nonobstructive CAD. Per card recs, continue Toprol 100; start ASA, statin, lisinopril 10 daily, lasix 20 daily, and spironolactone 25 daily. Pt to follow up with Dr. Lantigua in the next several weeks with BMP.   9/8: Pt discharged  home with medication changes per cardiology recs. Pt educated on importance of medication compliance. Pt to follow up with PCP and cardiology as an outpatient.        Elevated troponin    - in setting of EKG in afib with RVR  - Trop .042->.015->.027  - See above        Chronic combined systolic and diastolic congestive heart failure    - BNP below baseline, in obese patient  - CXR with possible pleural effusion  - CT as above  - on ACE and BB, not on daily diuretic for unclear reasons  - lasix 20 mg IV x1  - Repeat 2d shows EF 40%, pulm HTN, mild AR, severe MR, mod TR  9/7: Optimal medical therapy per card recs. See above        Essential hypertension    - lisinopril 2.5 mg daily, metoprolol  mg  9/7: Increased to lisinopril 10 and added spironolactone 25 daily. BP controlled. Will continue to monitor        Long term (current) use of anticoagulants    History of DVT  History of PE  May-Thurner Syndrome   - h/o failed eliquis  - continue coumadin  - Pharm D following  - lovenox bridge while inpatient 2/2 INR subtherapeutic   - Pt discharged home with continued coumadin dose and lovenox bridge given subtherapeutic INR. Pt understands lovenox bridge and reports use in the past. Pt educated on importance of medication compliance. Pt to follow up with coumadin clinic 9/12.        Persistent atrial fibrillation    - tele  - increase metoprolol XL to 100 mg daily per EP note today        Hyperthyroidism    - continue tapazole 5 mg BID  - TSH <0.010  - Endocrine consulted for longstanding hyperthyroidism with undetectable TSH; per recs, increase methimazole to 20 mg daily. Plan for outpatient NM thyroid uptake scan and f/u with endocrine (endo to arrange f/u).            Discharged Condition: stable    Disposition: Home or Self Care    Follow Up:  Follow-up Information     Robles Archuleta MD On 9/13/2017.    Specialty:  Family Medicine  Why:  at 9:40 AM  Contact information:  [address]  [address]  [place]  [address]  [phone]             Gomez Lantigua MD In 2 weeks.    Specialties:  Cardiology, INTERVENTIONAL CARDIOLOGY  Why:  hospital follow up  Contact information:  [address] [place] [address]  [phone]                 Patient Instructions:     Basic metabolic panel   Standing Status: Future  Standing Exp. Date: 11/07/18     Diet general     Diet Low Sodium, 2gm     Diet Cardiac     Activity as tolerated     Call MD for:  temperature >100.4     Call MD for:  redness, tenderness, or signs of infection (pain, swelling, redness, odor or green/yellow discharge around incision site)     Call MD for:  severe uncontrolled pain     Call MD for:  difficulty breathing or increased cough       Medications:  Reconciled Home Medications:   Current Discharge Medication List      START taking these medications    Details   aspirin (ECOTRIN) 81 MG EC tablet Take 1 tablet (81 mg total) by mouth once daily.  Refills: 0      enoxaparin (LOVENOX) 150 mg/mL Syrg Inject 1 mL (150 mg total) into the skin every 12 (twelve) hours.  Qty: 14 mL, Refills: 0      furosemide (LASIX) 20 MG tablet Take 1 tablet (20 mg total) by mouth once daily.  Qty: 30 tablet, Refills: 1      potassium chloride SA (K-DUR,KLOR-CON) 10 MEQ tablet Take 1 tablet (10 mEq total) by mouth once daily.  Qty: 30 tablet, Refills: 1      rosuvastatin (CRESTOR) 10 MG tablet Take 1 tablet (10 mg total) by mouth every evening.  Qty: 30 tablet, Refills: 1      spironolactone (ALDACTONE) 25 MG tablet Take 1 tablet (25 mg total) by mouth once daily.  Qty: 30 tablet, Refills: 1         CONTINUE these medications which have CHANGED    Details   lisinopril 10 MG Tab Take 1 tablet (10 mg total) by mouth once daily.  Qty: 30 tablet, Refills: 1      methimazole (TAPAZOLE) 10 MG Tab Take 2 tablets (20 mg total) by mouth once daily.  Qty: 60 tablet, Refills: 1    Associated Diagnoses: Hyperthyroidism         CONTINUE these medications which have NOT CHANGED     Details   metoprolol succinate (TOPROL-XL) 100 MG 24 hr tablet Take 1 tablet (100 mg total) by mouth once daily.  Qty: 30 tablet, Refills: 11    Associated Diagnoses: Permanent atrial fibrillation; Chronic combined systolic and diastolic congestive heart failure      warfarin (COUMADIN) 10 MG tablet Take 1.5-2 tablets (15-20 mg total) by mouth once daily.  Qty: 60 tablet, Refills: 3    Associated Diagnoses: Long term (current) use of anticoagulants; Other pulmonary embolism without acute cor pulmonale, unspecified chronicity           Time spent on the discharge of patient: 30 minutes    HOS POC IP DISCHARGE SUMMARY    Roseann Mora PA-C  Department of Hospital Medicine  [facility]

## 2017-09-08 NOTE — ASSESSMENT & PLAN NOTE
- continue tapazole 5 mg BID  - TSH <0.010  - Endocrine consulted for longstanding hyperthyroidism with undetectable TSH; per recs, increase methimazole to 20 mg daily. Plan for outpatient NM thyroid uptake scan and f/u with endocrine.

## 2017-09-11 ENCOUNTER — OUTPATIENT CASE MANAGEMENT (OUTPATIENT)
Dept: ADMINISTRATIVE | Facility: OTHER | Age: 52
End: 2017-09-11

## 2017-09-11 ENCOUNTER — OFFICE VISIT (OUTPATIENT)
Dept: FAMILY MEDICINE | Facility: HOSPITAL | Age: 52
End: 2017-09-11
Attending: FAMILY MEDICINE
Payer: MEDICARE

## 2017-09-11 VITALS
BODY MASS INDEX: 36.45 KG/M2 | HEART RATE: 114 BPM | SYSTOLIC BLOOD PRESSURE: 139 MMHG | WEIGHT: 315 LBS | HEIGHT: 78 IN | DIASTOLIC BLOOD PRESSURE: 82 MMHG

## 2017-09-11 DIAGNOSIS — E05.90 HYPERTHYROIDISM: Primary | Chronic | ICD-10-CM

## 2017-09-11 LAB — TSH RECEP AB SER-ACNC: 33 IU/L

## 2017-09-11 PROCEDURE — 99213 OFFICE O/P EST LOW 20 MIN: CPT | Performed by: FAMILY MEDICINE

## 2017-09-11 NOTE — PROGRESS NOTES
Subjective:       Patient ID: Drew Farrar is a 51 y.o. male.    Chief Complaint: Follow-up (90L)    HPI   52 yo male, w/ h/o development delay, persistent afib on coumadin, patent PDA s/p surgical closure 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 of age, May Thurner syndrome with h/o multiple PEs and DVTs s/p IVC filter, chronic systolic HFrEF (EF 40%), hyperthyroidism,  presents for 90L. Patient was recently admitted into the hosp for cp. Work up was negative for PE. Stress test was neg as well. Patient is currently asymptomatic. Patient's TSH was low and T4 was 1.88. Patient methimazole was increased to 20mg BID. An endo referral was placed as well.     Review of Systems   Constitutional: Negative for chills and fever.   HENT: Negative for congestion.    Respiratory: Negative for shortness of breath and wheezing.    Cardiovascular: Negative for chest pain, palpitations and leg swelling.   Gastrointestinal: Negative for abdominal pain, constipation, diarrhea, nausea and vomiting.   Genitourinary: Negative for dysuria.   Neurological: Negative for light-headedness and headaches.       Objective:      Vitals:    09/11/17 1142   BP: 139/82   Pulse: (!) 114     Physical Exam   Constitutional: He is oriented to person, place, and time. No distress.   HENT:   Head: Normocephalic and atraumatic.   Eyes: Conjunctivae are normal.   Neck: Neck supple. No JVD present.   Cardiovascular: Normal rate and normal heart sounds.  An irregularly irregular rhythm present. Exam reveals no gallop and no friction rub.    No murmur heard.  Pulmonary/Chest: Effort normal and breath sounds normal. He has no wheezes. He has no rales.   Abdominal: Soft. Bowel sounds are normal. He exhibits no distension. There is no tenderness.   Musculoskeletal: He exhibits edema (1+ pitting b/l).   Neurological: He is alert and oriented to person, place, and time.   Skin: Skin is warm and dry.   Psychiatric: He has a normal mood and affect. His behavior is normal. Judgment and  thought content normal.       Assessment:       1. Hyperthyroidism        Plan:       Hyperthyroidism  On methimazole 20mg BID  Patient did get his hyperthyroidism workup up from last visit.   Referral for endo was placed by other MDs  Has a cards appt in 2 weeks per patient    Patient has refills on his medication. Stated he will increase his compliance with his medications. He has a follow up in 2 days for hospital follow up    RTC in 2 days

## 2017-09-11 NOTE — PROGRESS NOTES
First attempt to perform initial assessment for OCPM; left voice message to return call.  SULAIMAN Gusman, OPCM-RN

## 2017-09-12 ENCOUNTER — OUTPATIENT CASE MANAGEMENT (OUTPATIENT)
Dept: ADMINISTRATIVE | Facility: OTHER | Age: 52
End: 2017-09-12

## 2017-09-12 ENCOUNTER — ANTI-COAG VISIT (OUTPATIENT)
Dept: CARDIOLOGY | Facility: CLINIC | Age: 52
End: 2017-09-12
Payer: MEDICARE

## 2017-09-12 ENCOUNTER — PATIENT OUTREACH (OUTPATIENT)
Dept: ADMINISTRATIVE | Facility: CLINIC | Age: 52
End: 2017-09-12

## 2017-09-12 DIAGNOSIS — I26.99 OTHER PULMONARY EMBOLISM WITHOUT ACUTE COR PULMONALE, UNSPECIFIED CHRONICITY: ICD-10-CM

## 2017-09-12 DIAGNOSIS — R07.9 CHEST PAIN, UNSPECIFIED TYPE: Primary | ICD-10-CM

## 2017-09-12 DIAGNOSIS — Z79.01 LONG TERM (CURRENT) USE OF ANTICOAGULANTS: Primary | ICD-10-CM

## 2017-09-12 LAB — INR PPP: 4.3 (ref 2–3)

## 2017-09-12 PROCEDURE — 99211 OFF/OP EST MAY X REQ PHY/QHP: CPT | Mod: S$PBB,25,, | Performed by: INTERNAL MEDICINE

## 2017-09-12 PROCEDURE — 85610 PROTHROMBIN TIME: CPT | Mod: PBBFAC,PO

## 2017-09-12 NOTE — PROGRESS NOTES
Second attempt to perform initial assessment for OPCM; left voice message to return call.  Letter sent.  SULAIMAN Gusman OPCM-RN

## 2017-09-12 NOTE — PATIENT INSTRUCTIONS

## 2017-09-12 NOTE — LETTER
September 12, 2017    Drew Farrar  [address] [place] [address]             [facility] [address] [place] [address] Dear Drew,     I work with [facility] Outpatient Case Management Department. I have been unsuccessful at reaching you to follow-up to see how you have been doing. If you require any future assistance or if any new concerns or problems arise, please do not hesitate to call.     The Outpatient Case Management Department can be reached at [phone] from 8:00AM to 4:30 PM on Monday thru Friday. [facility] also has a program where a nurse is available 24/7 to answer questions or provide medical advice, their number is [phone].    Thanks,      Kristal Gusman, RN  Outpatient Case Management

## 2017-09-12 NOTE — PROGRESS NOTES
INR high. Patient was in the hospital last week for CP. Cardiac work up negative. Endocrine consulted for longstanding hyperthyroidism with undetectable TSH; per recs, increase methimazole to 20 mg daily. Plan for outpatient NM thyroid uptake scan and f/u with endocrine (endo to arrange f/u). INR was very low. Patient admits to missing at least 1 dose prior to admit. Patient reports he was advised to take 20mg daily except 15mg tu. He has already taken his dose today. He was also d/c on lovenox bridge. He reports he took last lovenox dose last night. He reports a few controlled nosebleeds since d/c. We will hold a dose tomorrow since he has already taken his dose today and plan a new dose. Dose was stable at 10mg daily but now with increased methimazole, he will need a higher dose. Repeat INR Monday.

## 2017-09-13 ENCOUNTER — DOCUMENTATION ONLY (OUTPATIENT)
Dept: ADMINISTRATIVE | Facility: OTHER | Age: 52
End: 2017-09-13

## 2017-09-13 ENCOUNTER — OFFICE VISIT (OUTPATIENT)
Dept: FAMILY MEDICINE | Facility: HOSPITAL | Age: 52
End: 2017-09-13
Attending: FAMILY MEDICINE
Payer: MEDICARE

## 2017-09-13 ENCOUNTER — TELEPHONE (OUTPATIENT)
Dept: TRANSPLANT | Facility: CLINIC | Age: 52
End: 2017-09-13

## 2017-09-13 VITALS
WEIGHT: 315 LBS | DIASTOLIC BLOOD PRESSURE: 61 MMHG | SYSTOLIC BLOOD PRESSURE: 118 MMHG | BODY MASS INDEX: 36.45 KG/M2 | HEART RATE: 76 BPM | HEIGHT: 78 IN

## 2017-09-13 DIAGNOSIS — Z79.01 LONG TERM (CURRENT) USE OF ANTICOAGULANTS: ICD-10-CM

## 2017-09-13 DIAGNOSIS — Z86.718 HISTORY OF DVT (DEEP VEIN THROMBOSIS): ICD-10-CM

## 2017-09-13 DIAGNOSIS — I10 ESSENTIAL HYPERTENSION: ICD-10-CM

## 2017-09-13 DIAGNOSIS — I50.22 CHRONIC SYSTOLIC CONGESTIVE HEART FAILURE: Primary | ICD-10-CM

## 2017-09-13 DIAGNOSIS — E05.90 HYPERTHYROIDISM: Primary | Chronic | ICD-10-CM

## 2017-09-13 PROCEDURE — 99214 OFFICE O/P EST MOD 30 MIN: CPT | Performed by: FAMILY MEDICINE

## 2017-09-13 NOTE — PROGRESS NOTES
Subjective:       Patient ID: Drew Farrar is a 51 y.o. male.    Chief Complaint: 90L and Hospital Follow Up (chest pain)    HPI   50 yo male, w/ h/o development delay, persistent afib on coumadin, patent PDA s/p surgical closure 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 of age, May Thurner syndrome with h/o multiple PEs and DVTs s/p IVC filter, chronic systolic HFrEF (EF 40%), hyperthyroidism, presents for hospital follow up. Patient was admitted to the hosp on 9/5/17 for CP. Work up was negative for PE. Patient did not require a stent. States he feels better overall. Is compliant with his medications.     Review of Systems   Constitutional: Negative for chills and fever.   Respiratory: Negative for shortness of breath and wheezing.    Cardiovascular: Negative for chest pain, palpitations and leg swelling.   Gastrointestinal: Negative for abdominal pain, constipation, diarrhea, nausea and vomiting.   Genitourinary: Negative for dysuria.       Objective:      Vitals:    09/13/17 1007   BP: 118/61   Pulse: 76     Physical Exam   Constitutional: He is oriented to person, place, and time. No distress.   HENT:   Head: Normocephalic and atraumatic.   Eyes: Conjunctivae are normal.   Neck: Neck supple.   Cardiovascular: Normal rate, regular rhythm, normal heart sounds and intact distal pulses.  Exam reveals no gallop and no friction rub.    No murmur heard.  Pulmonary/Chest: Effort normal and breath sounds normal. He has no wheezes. He has no rales.   Abdominal: Soft. Bowel sounds are normal. He exhibits no distension. There is no tenderness.   Musculoskeletal: He exhibits edema (1+ pitting edema).   Neurological: He is alert and oriented to person, place, and time.   Skin: Skin is warm and dry.   Psychiatric: He has a normal mood and affect. His behavior is normal. Judgment and thought content normal.       Assessment:       1. Hyperthyroidism    2. History of DVT (deep vein thrombosis)    3. Long term (current) use of anticoagulants    4.  Essential hypertension        Plan:       Hyperthyroidism  - has a f/u with endocrine.   - continue methimazole 20mg BID for now    History of DVT (deep vein thrombosis)  - lovenox was stopped. On coumadin 10mg  - f/u with coumadin clinic to check PT/INR    Essential hypertension, HFrEF  - continue medications as prescribed    RTc in 3 months

## 2017-09-13 NOTE — PROGRESS NOTES
Please note this patient is currently being followed by Kristal Gusman RN in Outpatient Case Management. New referral information will be forwarded to the .    Reason for referral: Chest pain, unspecified type    Please contact [facility] at [phone] with any questions.    Thank you,  Esther Durham

## 2017-09-13 NOTE — PROGRESS NOTES
I was present in clinic when the patient was seen and I discussed the case with  Dr. Archuleta.  I have reviewed and agree with the assessment and plan.

## 2017-09-18 ENCOUNTER — OUTPATIENT CASE MANAGEMENT (OUTPATIENT)
Dept: ADMINISTRATIVE | Facility: OTHER | Age: 52
End: 2017-09-18

## 2017-09-18 ENCOUNTER — ANTI-COAG VISIT (OUTPATIENT)
Dept: CARDIOLOGY | Facility: CLINIC | Age: 52
End: 2017-09-18

## 2017-09-18 ENCOUNTER — HOSPITAL ENCOUNTER (OUTPATIENT)
Dept: CARDIOLOGY | Facility: CLINIC | Age: 52
Discharge: HOME | End: 2017-09-18
Payer: MEDICARE

## 2017-09-18 DIAGNOSIS — I26.99 OTHER PULMONARY EMBOLISM WITHOUT ACUTE COR PULMONALE, UNSPECIFIED CHRONICITY: ICD-10-CM

## 2017-09-18 DIAGNOSIS — I50.42 CHRONIC COMBINED SYSTOLIC AND DIASTOLIC CONGESTIVE HEART FAILURE: ICD-10-CM

## 2017-09-18 DIAGNOSIS — Z79.01 LONG TERM (CURRENT) USE OF ANTICOAGULANTS: ICD-10-CM

## 2017-09-18 NOTE — PROGRESS NOTES
After two failed attempts to perform initial assessment for OPCM, letter will be sent.  Case close.  SULAIMAN Gusman, OPCM-RN

## 2017-09-18 NOTE — LETTER
September 18, 2017    Drew Farrar  [address] [place] [address]             [facility] [address] [place] [address] Dear Drew,    I work with [facility]'s Outpatient Case Management Department. I have been unsuccessful at reaching you to follow-up to see how you have been doing. If you require any future assistance or if any new concerns or problems arise, please do not hesitate to call.     The Outpatient Case Management Department can be reached at [phone] from 8:00AM to 4:30 PM on Monday thru Friday. [facility] also has a program where a nurse is available 24/7 to answer questions or provide medical advice, their number is [phone].    Thanks,      Kristal Gusman, RN  Outpatient Case Management

## 2017-09-25 ENCOUNTER — LAB VISIT (OUTPATIENT)
Dept: LAB | Facility: HOSPITAL | Age: 52
End: 2017-09-25
Attending: INTERNAL MEDICINE
Payer: MEDICARE

## 2017-09-25 ENCOUNTER — ANTI-COAG VISIT (OUTPATIENT)
Dept: CARDIOLOGY | Facility: CLINIC | Age: 52
End: 2017-09-25

## 2017-09-25 ENCOUNTER — INITIAL CONSULT (OUTPATIENT)
Dept: TRANSPLANT | Facility: CLINIC | Age: 52
End: 2017-09-25
Payer: MEDICARE

## 2017-09-25 VITALS
HEART RATE: 69 BPM | SYSTOLIC BLOOD PRESSURE: 108 MMHG | BODY MASS INDEX: 36.45 KG/M2 | HEIGHT: 78 IN | WEIGHT: 315 LBS | DIASTOLIC BLOOD PRESSURE: 57 MMHG

## 2017-09-25 DIAGNOSIS — I50.22 CHRONIC SYSTOLIC CONGESTIVE HEART FAILURE: ICD-10-CM

## 2017-09-25 DIAGNOSIS — Z79.01 LONG TERM (CURRENT) USE OF ANTICOAGULANTS: ICD-10-CM

## 2017-09-25 DIAGNOSIS — I48.21 PERMANENT ATRIAL FIBRILLATION: ICD-10-CM

## 2017-09-25 DIAGNOSIS — I26.99 OTHER PULMONARY EMBOLISM WITHOUT ACUTE COR PULMONALE, UNSPECIFIED CHRONICITY: ICD-10-CM

## 2017-09-25 DIAGNOSIS — I10 ESSENTIAL HYPERTENSION: ICD-10-CM

## 2017-09-25 DIAGNOSIS — I48.19 PERSISTENT ATRIAL FIBRILLATION: ICD-10-CM

## 2017-09-25 DIAGNOSIS — I87.1 MAY-THURNER SYNDROME: ICD-10-CM

## 2017-09-25 DIAGNOSIS — R60.0 BILATERAL EDEMA OF LOWER EXTREMITY: ICD-10-CM

## 2017-09-25 DIAGNOSIS — E66.01 MORBID OBESITY, UNSPECIFIED OBESITY TYPE: ICD-10-CM

## 2017-09-25 DIAGNOSIS — I50.42 CHRONIC COMBINED SYSTOLIC AND DIASTOLIC CONGESTIVE HEART FAILURE: Primary | ICD-10-CM

## 2017-09-25 DIAGNOSIS — I34.0 NON-RHEUMATIC MITRAL REGURGITATION: ICD-10-CM

## 2017-09-25 LAB
ANION GAP SERPL CALC-SCNC: 11 MMOL/L
BNP SERPL-MCNC: 27 PG/ML
BUN SERPL-MCNC: 34 MG/DL
CALCIUM SERPL-MCNC: 9.9 MG/DL
CHLORIDE SERPL-SCNC: 106 MMOL/L
CO2 SERPL-SCNC: 22 MMOL/L
CREAT SERPL-MCNC: 1.1 MG/DL
EST. GFR  (AFRICAN AMERICAN): >60 ML/MIN/1.73 M^2
EST. GFR  (NON AFRICAN AMERICAN): >60 ML/MIN/1.73 M^2
GLUCOSE SERPL-MCNC: 95 MG/DL
INR PPP: 3.4
POTASSIUM SERPL-SCNC: 4.5 MMOL/L
PROTHROMBIN TIME: 34 SEC
SODIUM SERPL-SCNC: 139 MMOL/L

## 2017-09-25 PROCEDURE — 99213 OFFICE O/P EST LOW 20 MIN: CPT | Mod: PBBFAC | Performed by: INTERNAL MEDICINE

## 2017-09-25 PROCEDURE — 36415 COLL VENOUS BLD VENIPUNCTURE: CPT

## 2017-09-25 PROCEDURE — 83880 ASSAY OF NATRIURETIC PEPTIDE: CPT

## 2017-09-25 PROCEDURE — 85610 PROTHROMBIN TIME: CPT

## 2017-09-25 PROCEDURE — 3078F DIAST BP <80 MM HG: CPT | Mod: ,,, | Performed by: INTERNAL MEDICINE

## 2017-09-25 PROCEDURE — 99999 PR PBB SHADOW E&M-EST. PATIENT-LVL III: CPT | Mod: PBBFAC,,, | Performed by: INTERNAL MEDICINE

## 2017-09-25 PROCEDURE — 99214 OFFICE O/P EST MOD 30 MIN: CPT | Mod: S$PBB,,, | Performed by: INTERNAL MEDICINE

## 2017-09-25 PROCEDURE — 3074F SYST BP LT 130 MM HG: CPT | Mod: ,,, | Performed by: INTERNAL MEDICINE

## 2017-09-25 PROCEDURE — 80048 BASIC METABOLIC PNL TOTAL CA: CPT

## 2017-09-25 RX ORDER — METOPROLOL SUCCINATE 100 MG/1
100 TABLET, EXTENDED RELEASE ORAL DAILY
Qty: 30 TABLET | Refills: 11 | Status: SHIPPED | OUTPATIENT
Start: 2017-09-25 | End: 2017-11-08 | Stop reason: SDUPTHER

## 2017-09-25 NOTE — PROGRESS NOTES
Subjective:    Patient ID:  Drew Farrar is a 51 y.o. male who presents for evaluation of Congestive Heart Failure      HPI  Hospital Course:     M with PMHx of persistent afib on coumadin, patent PDA s/p surgical closure 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 of age, May Thurner syndrome with h/o multiple PEs and DVTs s/p IVC filter, chronic systolic congestive heart failure (EF 30%), hyperthyroidism here for hospital follow up. See below     Pt admitted for further work up of chest pain. Cardiology consulted. Trop .042->.015->.027. CTA negative for PE. PET stress negative for ischemia with flow heterogeneity suggestive of nonobstructive CAD. Per card recs, continue Toprol 100; start ASA, statin, lisinopril 10 daily, lasix 20 daily, and spironolactone 25 daily. Pt to follow up with Dr. Lantigua in the next several weeks with BMP. Endocrine consulted for longstanding hyperthyroidism with undetectable TSH; per recs, increase methimazole to 20 mg daily. Plan for outpatient NM thyroid uptake scan and f/u with endocrine. Pt discharged home with medication changes per cardiology and endocrine recs in addition to lovenox bridge given subtherapeutic INR. Pt understands lovenox bridge and reports use in the past. Pt educated on importance of medication compliance. Pt to follow up with coumadin clinic, PCP, cardiology, and endocrinology as an outpatient.     Review of Systems   Constitution: Negative for chills, decreased appetite, diaphoresis, fever, weakness, malaise/fatigue, night sweats, weight gain and weight loss.   HENT: Negative.    Eyes: Negative.    Cardiovascular: Positive for dyspnea on exertion and irregular heartbeat. Negative for chest pain, claudication, cyanosis, leg swelling, near-syncope, orthopnea and palpitations.   Respiratory: Negative for cough, hemoptysis, shortness of breath, sleep disturbances due to breathing, snoring, sputum production and wheezing.    Endocrine: Negative.    Hematologic/Lymphatic: Negative.    Skin:  Negative.    Musculoskeletal: Negative.    Gastrointestinal: Negative for abdominal pain and diarrhea.   Genitourinary: Negative.    Neurological: Negative.    Psychiatric/Behavioral: Negative.         Objective:    Physical Exam   Constitutional: He is oriented to person, place, and time. He appears well-developed and well-nourished.   HENT:   Head: Normocephalic and atraumatic.   Eyes: Conjunctivae and EOM are normal. Pupils are equal, round, and reactive to light.   Neck: Normal range of motion. Neck supple. No JVD present.   Cardiovascular: Normal rate.  An irregularly irregular rhythm present. Exam reveals no gallop and no friction rub.    Murmur heard.  High-pitched blowing holosystolic murmur is present with a grade of 2/6  at the apex  Pulmonary/Chest: Breath sounds normal. No respiratory distress. He has no wheezes. He has no rales. He exhibits no tenderness.   Abdominal: Soft. Bowel sounds are normal. He exhibits no distension and no mass. There is no hepatosplenomegaly, splenomegaly or hepatomegaly. There is no tenderness. There is no rebound, no guarding and no CVA tenderness.   No hepatoslenomegaly   Musculoskeletal: Normal range of motion. He exhibits edema (+4). He exhibits no tenderness.   Neurological: He is alert and oriented to person, place, and time. He has normal reflexes. No cranial nerve deficit. He exhibits normal muscle tone. Coordination normal.   Skin: Skin is warm and dry.   Psychiatric: He has a normal mood and affect.         Assessment:       1. Chronic combined systolic and diastolic congestive heart failure    2. Essential hypertension    3. May-Thurner syndrome    4. Non-rheumatic mitral regurgitation    5. Persistent atrial fibrillation    6. Morbid obesity, unspecified obesity type    7. Bilateral edema of lower extremity         Plan:           Stable. RTC 2 months

## 2017-10-02 ENCOUNTER — ANTI-COAG VISIT (OUTPATIENT)
Dept: CARDIOLOGY | Facility: CLINIC | Age: 52
End: 2017-10-02
Payer: MEDICARE

## 2017-10-02 ENCOUNTER — OFFICE VISIT (OUTPATIENT)
Dept: CARDIOLOGY | Facility: CLINIC | Age: 52
End: 2017-10-02
Payer: MEDICARE

## 2017-10-02 ENCOUNTER — LAB VISIT (OUTPATIENT)
Dept: LAB | Facility: HOSPITAL | Age: 52
End: 2017-10-02
Attending: FAMILY MEDICINE
Payer: MEDICARE

## 2017-10-02 VITALS
HEART RATE: 70 BPM | HEIGHT: 78 IN | DIASTOLIC BLOOD PRESSURE: 64 MMHG | SYSTOLIC BLOOD PRESSURE: 100 MMHG | BODY MASS INDEX: 36.45 KG/M2 | WEIGHT: 315 LBS

## 2017-10-02 DIAGNOSIS — I87.2 VENOUS (PERIPHERAL) INSUFFICIENCY: ICD-10-CM

## 2017-10-02 DIAGNOSIS — Z86.718 HISTORY OF DVT (DEEP VEIN THROMBOSIS): ICD-10-CM

## 2017-10-02 DIAGNOSIS — I26.99 OTHER PULMONARY EMBOLISM WITHOUT ACUTE COR PULMONALE, UNSPECIFIED CHRONICITY: ICD-10-CM

## 2017-10-02 DIAGNOSIS — I50.22 CHRONIC SYSTOLIC HEART FAILURE: ICD-10-CM

## 2017-10-02 DIAGNOSIS — I87.1 STENOSIS OF RIGHT ILIAC VEIN: ICD-10-CM

## 2017-10-02 DIAGNOSIS — I48.19 PERSISTENT ATRIAL FIBRILLATION: Primary | ICD-10-CM

## 2017-10-02 DIAGNOSIS — E66.01 MORBID OBESITY: ICD-10-CM

## 2017-10-02 DIAGNOSIS — I87.1 MAY-THURNER SYNDROME: ICD-10-CM

## 2017-10-02 DIAGNOSIS — Z79.01 LONG TERM CURRENT USE OF ANTICOAGULANT THERAPY: ICD-10-CM

## 2017-10-02 DIAGNOSIS — Z79.01 LONG-TERM (CURRENT) USE OF ANTICOAGULANTS: Primary | ICD-10-CM

## 2017-10-02 LAB — INR PPP: 3.1 (ref 2–3)

## 2017-10-02 PROCEDURE — 99213 OFFICE O/P EST LOW 20 MIN: CPT | Mod: PBBFAC,PO | Performed by: INTERNAL MEDICINE

## 2017-10-02 PROCEDURE — 93005 ELECTROCARDIOGRAM TRACING: CPT | Mod: PBBFAC,PO | Performed by: INTERNAL MEDICINE

## 2017-10-02 PROCEDURE — 99211 OFF/OP EST MAY X REQ PHY/QHP: CPT | Mod: S$PBB,,,

## 2017-10-02 PROCEDURE — 93010 ELECTROCARDIOGRAM REPORT: CPT | Mod: S$PBB,,, | Performed by: INTERNAL MEDICINE

## 2017-10-02 PROCEDURE — 85610 PROTHROMBIN TIME: CPT | Mod: PBBFAC,PO

## 2017-10-02 PROCEDURE — 99999 PR PBB SHADOW E&M-EST. PATIENT-LVL III: CPT | Mod: PBBFAC,,, | Performed by: INTERNAL MEDICINE

## 2017-10-02 PROCEDURE — 99214 OFFICE O/P EST MOD 30 MIN: CPT | Mod: S$PBB,,, | Performed by: INTERNAL MEDICINE

## 2017-10-02 NOTE — PROGRESS NOTES
Patient reported bruising. His INR is within normal limits. He is on a slight downward trend however he remained within range today. He will continue this dose of warfarin until follow-up. I advised him to contact us with any changes or problems.

## 2017-10-02 NOTE — PATIENT INSTRUCTIONS
Left-Sided Congestive Heart Failure (CHF)    The heart is a large muscle that pumps blood throughout the body. Blood carries oxygen to all the organs (including the brain), muscles, and skin of your body. After the body takes the oxygen out of the blood, the blood returns to the heart. The right side of the heart collects that blood and pumps it to the lungs to receive fresh oxygen. This oxygen-rich blood from the lungs then returns to the left side of the heart, where it is pumped back out to the brain and rest of the body, starting the process all over.   Heart failure occurs when the heart muscle is weakened. This can occur after a heart attack or with significant coronary artery disease. This affects the pumping action of the heart.   When the left side of the heart is weakened, it cant handle the blood it is getting from the lungs. Pressure then builds up in the veins of the lungs, causing fluid to leak into the lung tissues. This may be referred to as congestive heart failure. This causes you to feel short of breath, weak, or dizzy. These symptoms are often worse with exertion, such as climbing stairs or walking up hills. Lying flat is uncomfortable and can make your breathing worse. This may make sleeping difficult and force you to use extra pillows to elevate your upper body to help you sleep well.  Causes of heart failure include:  · Coronary artery disease  · Heart attack in the past (also known as acute myocardial infarction, or AMI)  · High blood pressure  · Damaged heart valve  · Diabetes  · Obesity  · Cigarette smoking  · Alcohol abuse  Treatment  Heart failure is a chronic condition. There is no cure. The purpose of medical treatment is to improve the pumping action of the heart, and remove excess water and fluids from the body. A number of medicines can help reach this goal, improve symptoms and keep the heart from becoming weaker. In some cases of severe heart failure, a mechanical device will be  placed in the heart to help the heart pump. Another major goal is to better treat the causes of heart failure, such as diabetes, high blood pressure, and your lifestyle.  Home care  · Check your weight every day. A sudden increase in weight gain could mean worsening heart failure.  ¨ Use the same scale every day.  ¨ Weigh yourself at the same time every day.  ¨ Make sure the scale is on the floor, not on a rug.  ¨ Keep a record of your weight every day, so your healthcare provider can see it. If you are not given a log sheet for this, keep a separate journal for this purpose.   · Cut back on how much salt (sodium) you eat:  ¨ Your provider will tell you how much salt to have daily, usually 2,000 mg or less.  ¨ Avoid high-salt foods. These include olives, pickles, smoked meats, processed foods, and salted potato chips.  ¨ Don't add salt to your food at the table. Use only small amounts of salt when cooking.  ¨ Avoid binging on salt-heavy meals.  · Follow your healthcare provider's recommendations about how much fluid you should have.  · Stop smoking.  · Cut back on the amount of alcohol you drink.  · Lose weight if you are overweight. The excess weight adds a lot of stress on the workload of the heart.  · Stay active. Talk to your provider about an exercise program that is safe for your heart.  · Keep your feet elevated to reduce swelling. Ask your provider about support hose as a preventive treatment for daytime leg swelling.  · Follow your healthcare provider's instructions closely.  Besides taking your medicine as instructed, an important part of treatment includes lifestyle changes. These include diet, physical activity, stopping smoking, and weight control.  Improve your diet. Often in the hospital, people are given a heart healthy diet. This includes more fresh foods, lower saturated fat, less processed foods, and lower salt.  Follow-up care  Follow up with your healthcare provider, or as advised. Make sure to  keep any appointments that were made for you. This can help better control heart failure.  If an X-ray was done, you will be told of any new findings that may affect your care.  Call 911  Call 911 if you:  · Become severely short of breath  · Feel lightheaded, or feel like you might pass out or faint  · Have chest pain or discomfort that is different than usual, the medicines your provider told you to use for this don't help, or the pain lasts longer than 10 to 15 minutes  · Develop a rapid heart rate suddenly  When to seek medical advice  Call your healthcare provider right away if you have any of these signs of worsening heart failure:  · Sudden weight gain --  more than 2 pounds in 1 day, or 5 pounds in 1 week, or whatever weight gain you were told to report by your provider  · Trouble breathing not related to being active  · New or increased swelling of your legs or ankles  · Swelling or pain in your abdomen  · Breathing trouble at night, waking up short of breath or needing more pillows to elevate your upper body to help you breathe  · Frequent coughing that doesnt go away  · Feeling much more tired than usual  Date Last Reviewed: 1/4/2016  © 1279-2205 [employer]. [address] [place] [address], [place] [address]. All rights reserved. This information is not intended as a substitute for professional medical care. Always follow your healthcare professional's instructions.        Understanding Atrial Fibrillation    An arrhythmia is any problem with the speed or pattern of the heartbeat. Atrial fibrillation (AFib) is a common type of arrhythmia. It causes fast, chaotic electrical signals in the atria. This leads to poor functioning of the heart. It also affects how much blood your heart can pump out to the body.  Afib may occur once in a while and go away on its own. Or it may continue for longer periods and need treatment.  AFib can lead to serious problems, such as stroke. Your healthcare provider will  need to monitor and manage it.  What happens during atrial fibrillation?   The heart has an electrical system that sends signals to control the heartbeat. As signals move through the heart, they tell the hearts upper chambers (atria) and lower chambers (ventricles) when to squeeze (contract) and relax. This lets blood move through the heart and out to the body and lungs.  With AFib, the atria receive abnormal signals. This causes them to contract in a fast and irregular way, and out of sync with the ventricles. When this happens, the atria also have a harder time moving blood into the ventricles. Blood may then pool in the atria, which increases the risk for blood clots and stroke. The ventricles also may contract too quickly and irregularly. As a result, they may not pump blood to the body and lungs as well as they should. This can weaken the heart muscle over time and cause heart failure.  What causes atrial fibrillation?  AFib is more common in older adults. It has many possible causes including:  · Coronary artery disease  · Heart valve disease  · Heart attack  · Heart surgery  · High blood pressure  · Thyroid disease  · Diabetes  · Lung disease  · Sleep apnea  · Heavy alcohol use  In some cases of AFib, doctors do not know the cause.  What are the symptoms of atrial fibrillation?  AFib may or may not cause symptoms. If symptoms do occur, they may include:  · A fast, pounding, irregular heartbeat  · Shortness of breath  · Tiredness  · Dizziness or fainting  · Chest pain  How is atrial fibrillation treated?  Treatments for AFib can include any of the options below.  · Medicines. You may be prescribed:  ¨ Heart rate medicines to help slow down the heartbeat  ¨ Heart rhythm medicines to help the heart beat more regularly  ¨ Anti-clotting medicines to help reduce the risk for blood clots and stroke  · Electrical cardioversion. Your healthcare provider uses special pads or paddles to send one or more brief  electrical shocks to the heart. This can help reset the heartbeat to normal.  · Ablation. Long, thin tubes called catheters are threaded through a blood vessel to the heart. There, the catheters send out hot or cold energy to the areas causing the abnormal signals. This energy destroys the problem tissue or cells. This improves the chances that your heart will stay in normal rhythm without using medicines. If your heart rate and rhythm cant be controlled, you may need ablation and a pacemaker. These will help control the heart rate and regularity of the heartbeat.  · Surgery. During surgery, your healthcare provider may use different methods to create scar tissue in the areas of the heart causing the abnormal signals. The scar tissue disrupts the abnormal signals and may stop AFib from occurring.  What are the complications of atrial fibrillation?  These can include:  · Blood clots  · Stroke  · Heart failure. This problem occurs when the heart muscle weakens so much that it can no longer pump blood well.  When should I call my healthcare provider?  Call your healthcare provider right away if you have any of these:  · Symptoms that dont get better with treatment, or get worse  · New symptoms   Date Last Reviewed: 5/1/2016  © 0903-0151 [employer]. [address] [place] [address], [place] [address]. All rights reserved. This information is not intended as a substitute for professional medical care. Always follow your healthcare professional's instructions.

## 2017-10-14 ENCOUNTER — HOSPITAL ENCOUNTER (EMERGENCY)
Facility: HOSPITAL | Age: 52
Discharge: HOME | End: 2017-10-15
Attending: EMERGENCY MEDICINE
Payer: MEDICARE

## 2017-10-14 DIAGNOSIS — E87.5 HYPERKALEMIA: ICD-10-CM

## 2017-10-14 DIAGNOSIS — R07.9 CHEST PAIN, UNSPECIFIED TYPE: Primary | ICD-10-CM

## 2017-10-14 DIAGNOSIS — R79.1 SUPRATHERAPEUTIC INR: ICD-10-CM

## 2017-10-14 LAB
ALBUMIN SERPL BCP-MCNC: 3.6 G/DL
ALP SERPL-CCNC: 143 U/L
ALT SERPL W/O P-5'-P-CCNC: 26 U/L
ANION GAP SERPL CALC-SCNC: 7 MMOL/L
AST SERPL-CCNC: 29 U/L
BASOPHILS # BLD AUTO: 0.02 K/UL
BASOPHILS NFR BLD: 0.3 %
BILIRUB SERPL-MCNC: 0.4 MG/DL
BNP SERPL-MCNC: 88 PG/ML
BUN SERPL-MCNC: 40 MG/DL
CALCIUM SERPL-MCNC: 9.2 MG/DL
CHLORIDE SERPL-SCNC: 111 MMOL/L
CO2 SERPL-SCNC: 19 MMOL/L
CREAT SERPL-MCNC: 1.1 MG/DL
DIFFERENTIAL METHOD: ABNORMAL
EOSINOPHIL # BLD AUTO: 0.2 K/UL
EOSINOPHIL NFR BLD: 3.6 %
ERYTHROCYTE [DISTWIDTH] IN BLOOD BY AUTOMATED COUNT: 16.3 %
EST. GFR  (AFRICAN AMERICAN): >60 ML/MIN/1.73 M^2
EST. GFR  (NON AFRICAN AMERICAN): >60 ML/MIN/1.73 M^2
GLUCOSE SERPL-MCNC: 94 MG/DL
HCT VFR BLD AUTO: 33.4 %
HGB BLD-MCNC: 10.7 G/DL
INR PPP: 4.2
LYMPHOCYTES # BLD AUTO: 1.3 K/UL
LYMPHOCYTES NFR BLD: 21.3 %
MCH RBC QN AUTO: 25.6 PG
MCHC RBC AUTO-ENTMCNC: 32 G/DL
MCV RBC AUTO: 80 FL
MONOCYTES # BLD AUTO: 0.4 K/UL
MONOCYTES NFR BLD: 7.5 %
NEUTROPHILS # BLD AUTO: 4 K/UL
NEUTROPHILS NFR BLD: 67.1 %
PLATELET # BLD AUTO: 171 K/UL
PMV BLD AUTO: 9.1 FL
POCT GLUCOSE: 89 MG/DL (ref 70–110)
POTASSIUM SERPL-SCNC: 5.6 MMOL/L
PROT SERPL-MCNC: 8 G/DL
PROTHROMBIN TIME: 42.7 SEC
RBC # BLD AUTO: 4.18 M/UL
SODIUM SERPL-SCNC: 137 MMOL/L
TROPONIN I SERPL DL<=0.01 NG/ML-MCNC: 0.01 NG/ML
WBC # BLD AUTO: 5.88 K/UL

## 2017-10-14 PROCEDURE — 83880 ASSAY OF NATRIURETIC PEPTIDE: CPT

## 2017-10-14 PROCEDURE — 94644 CONT INHLJ TX 1ST HOUR: CPT

## 2017-10-14 PROCEDURE — 25000242 PHARM REV CODE 250 ALT 637 W/ HCPCS: Performed by: EMERGENCY MEDICINE

## 2017-10-14 PROCEDURE — 63600175 PHARM REV CODE 636 W HCPCS: Performed by: EMERGENCY MEDICINE

## 2017-10-14 PROCEDURE — 85610 PROTHROMBIN TIME: CPT

## 2017-10-14 PROCEDURE — 85025 COMPLETE CBC W/AUTO DIFF WBC: CPT

## 2017-10-14 PROCEDURE — 99285 EMERGENCY DEPT VISIT HI MDM: CPT | Mod: 25

## 2017-10-14 PROCEDURE — 96365 THER/PROPH/DIAG IV INF INIT: CPT

## 2017-10-14 PROCEDURE — 93005 ELECTROCARDIOGRAM TRACING: CPT

## 2017-10-14 PROCEDURE — 80053 COMPREHEN METABOLIC PANEL: CPT

## 2017-10-14 PROCEDURE — 84484 ASSAY OF TROPONIN QUANT: CPT

## 2017-10-14 PROCEDURE — 82962 GLUCOSE BLOOD TEST: CPT

## 2017-10-14 PROCEDURE — 25000003 PHARM REV CODE 250: Performed by: EMERGENCY MEDICINE

## 2017-10-14 PROCEDURE — 96375 TX/PRO/DX INJ NEW DRUG ADDON: CPT

## 2017-10-14 RX ORDER — ALBUTEROL SULFATE 0.83 MG/ML
10 SOLUTION RESPIRATORY (INHALATION)
Status: COMPLETED | OUTPATIENT
Start: 2017-10-14 | End: 2017-10-14

## 2017-10-14 RX ORDER — DEXTROSE 50 % IN WATER (D50W) INTRAVENOUS SYRINGE
25
Status: COMPLETED | OUTPATIENT
Start: 2017-10-14 | End: 2017-10-14

## 2017-10-14 RX ADMIN — ALBUTEROL SULFATE 10 MG: 2.5 SOLUTION RESPIRATORY (INHALATION) at 10:10

## 2017-10-14 RX ADMIN — INSULIN HUMAN 10 UNITS: 100 INJECTION, SOLUTION PARENTERAL at 10:10

## 2017-10-14 RX ADMIN — CALCIUM GLUCONATE 1 G: 94 INJECTION, SOLUTION INTRAVENOUS at 10:10

## 2017-10-14 RX ADMIN — DEXTROSE MONOHYDRATE 25 G: 25 INJECTION, SOLUTION INTRAVENOUS at 10:10

## 2017-10-15 VITALS
BODY MASS INDEX: 36.45 KG/M2 | HEIGHT: 78 IN | HEART RATE: 80 BPM | RESPIRATION RATE: 20 BRPM | WEIGHT: 315 LBS | DIASTOLIC BLOOD PRESSURE: 62 MMHG | OXYGEN SATURATION: 100 % | TEMPERATURE: 99 F | SYSTOLIC BLOOD PRESSURE: 134 MMHG

## 2017-10-15 LAB
ANION GAP SERPL CALC-SCNC: 7 MMOL/L
BUN SERPL-MCNC: 35 MG/DL
CALCIUM SERPL-MCNC: 9.2 MG/DL
CHLORIDE SERPL-SCNC: 112 MMOL/L
CO2 SERPL-SCNC: 19 MMOL/L
CREAT SERPL-MCNC: 1.1 MG/DL
EST. GFR  (AFRICAN AMERICAN): >60 ML/MIN/1.73 M^2
EST. GFR  (NON AFRICAN AMERICAN): >60 ML/MIN/1.73 M^2
GLUCOSE SERPL-MCNC: 71 MG/DL
POTASSIUM SERPL-SCNC: 4.6 MMOL/L
SODIUM SERPL-SCNC: 138 MMOL/L
TROPONIN I SERPL DL<=0.01 NG/ML-MCNC: 0.01 NG/ML

## 2017-10-15 PROCEDURE — 80048 BASIC METABOLIC PNL TOTAL CA: CPT

## 2017-10-15 PROCEDURE — 84484 ASSAY OF TROPONIN QUANT: CPT

## 2017-10-15 NOTE — DISCHARGE INSTRUCTIONS
Hold the next two doses of coumadin and have your INR checked on Monday    Follow up with your primary doctor this week    Return to ED immediately if symptoms worsen

## 2017-10-15 NOTE — ED PROVIDER NOTES
"Encounter Date: 10/14/2017       History     Chief Complaint   Patient presents with    Chest Pain     pt presents to ed per  ems with c/o cp that began approx 2 hours PTA while at a party. pt reports worsening of pain with coughing and deep breaths.      52 y/o male with PMHx of CHF, CM, persistent afib with a controlled rate and anticoagulated with coumadin, DVT, multiple PE's s/p IVC filter, hyperthyroidism, and venous insufficiency with ulcers presents with right anterior chest wall pain that began approximately 2 hours PTA while he was seated at a party.  The patient describes the pain as a "lightning strike" on the right side of his chest, 2 hours ago, pain lasted 10 minutes then resolved.  No associated sob, cough, hemoptysis, FRAIRE, palpitations, syncope or weakness.  Pt reports that he has cp weekly.  No chest pressure.  Pt is compliant with his coumadin.   Pt reports that he feels well at this time.  No etoh or drug use.    Pt has recently been evaluated by both cardiology and endocrinology.            Review of patient's allergies indicates:   Allergen Reactions    Food allergy formula [glutamine-c-quercet-selen-brom]      Allergic to green peas; Heart failure.    Peas Hives    Ibuprofen Swelling    Latex, natural rubber Hives    Pcn [penicillins] Hives    Butisol [butabarbital] Rash     Peeling skin     Past Medical History:   Diagnosis Date    *Atrial fibrillation     Atrial fibrillation     Atrial fibrillation Feb 23, 2016    Bipolar disorder     Congenital heart disease     s/p surgical intervention 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 of age    Deep vein thrombosis     DVT of leg (deep venous thrombosis)     left leg    History of prior ablation treatment     10/9/13    Hypertension     Obesity     Stroke     Thyroid disease     Venous ulcer      Past Surgical History:   Procedure Laterality Date    ANGIOPLASTY      CARDIAC SURGERY      open heart surgery 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 old    EYE SURGERY      left eye " cataract/right eye glaucoma    TIMO FILTER PLACEMENT      Dr Calix ([facility])    KNEE SURGERY      l and r     MULTIPLE TOOTH EXTRACTIONS      SKIN GRAFT      left leg     Family History   Problem Relation Age of Onset    Diabetes Father     Heart disease Father     Heart disease Maternal Grandmother     Diabetes Maternal Grandfather     Heart disease Maternal Grandfather     Stroke Maternal Grandfather      Social History   Substance Use Topics    Smoking status: Former Smoker     Packs/day: 1.00     Years: 6.00     Types: Cigarettes    Smokeless tobacco: Former User      Comment: quit by age 27yrs old    Alcohol use 0.6 oz/week     1 Glasses of wine per week      Comment: 1 glass of wine a week     Review of Systems   Constitutional: Negative for chills, diaphoresis, fatigue and fever.   HENT: Negative for congestion and sinus pressure.    Eyes: Negative for visual disturbance.   Respiratory: Negative for cough, chest tightness and shortness of breath.    Cardiovascular: Positive for chest pain and leg swelling (chronic leg swelling, R>L). Negative for palpitations.   Gastrointestinal: Negative for abdominal pain, diarrhea, nausea and vomiting.   Endocrine: Negative for polydipsia and polyphagia.   Genitourinary: Negative for difficulty urinating and dysuria.   Skin: Negative for pallor.   Neurological: Negative for dizziness, syncope, weakness and headaches.   Psychiatric/Behavioral: Negative for agitation and confusion.   All other systems reviewed and are negative.      Physical Exam     Initial Vitals [10/14/17 2021]   BP Pulse Resp Temp SpO2   130/67 72 20 99.1 °F (37.3 °C) 99 %      MAP       88         Physical Exam    Nursing note and vitals reviewed.  Constitutional: He appears well-developed and well-nourished. He is not diaphoretic. No distress.   HENT:   Head: Normocephalic and atraumatic.   Mouth/Throat: Oropharynx is clear and moist.   Eyes: Conjunctivae and EOM are normal.  No scleral icterus.   Neck: Normal range of motion. Neck supple.   Cardiovascular: Normal rate, regular rhythm and normal heart sounds. Exam reveals no gallop and no friction rub.    No murmur heard.  Pulmonary/Chest: Breath sounds normal. No respiratory distress. He has no wheezes. He has no rhonchi. He has no rales. He exhibits tenderness. He exhibits no crepitus and no edema.       Abdominal: Soft. Bowel sounds are normal. He exhibits no distension. There is no tenderness.   Musculoskeletal: Normal range of motion. He exhibits edema. He exhibits no tenderness.   BLE edema, R>L (this is chronic)   Lymphadenopathy:     He has no cervical adenopathy.   Neurological: He is alert and oriented to person, place, and time. He has normal strength. No sensory deficit.   Skin: Skin is warm and dry. No rash noted. No erythema. No pallor.   Psychiatric: He has a normal mood and affect. His behavior is normal. Judgment and thought content normal.         ED Course   Critical Care  Date/Time: 10/15/2017 1:51 AM  Performed by: SAMEER ROJAS  Authorized by: SAMEER ROJAS   Direct patient critical care time: 15 minutes  Additional history critical care time: 15 minutes  Ordering / reviewing critical care time: 10 minutes  Documentation critical care time: 5 minutes  Total critical care time (exclusive of procedural time) : 45 minutes  Critical care time was exclusive of separately billable procedures and treating other patients and teaching time.  Critical care was necessary to treat or prevent imminent or life-threatening deterioration of the following conditions: metabolic crisis.  Critical care was time spent personally by me on the following activities: evaluation of patient's response to treatment, obtaining history from patient or surrogate, ordering and review of laboratory studies, pulse oximetry, review of old charts, development of treatment plan with patient or surrogate, examination of patient,  re-evaluation of patient's condition, ordering and review of radiographic studies and ordering and performing treatments and interventions.        Labs Reviewed   CBC W/ AUTO DIFFERENTIAL - Abnormal; Notable for the following:        Result Value    RBC 4.18 (*)     Hemoglobin 10.7 (*)     Hematocrit 33.4 (*)     MCV 80 (*)     MCH 25.6 (*)     RDW 16.3 (*)     MPV 9.1 (*)     All other components within normal limits   COMPREHENSIVE METABOLIC PANEL - Abnormal; Notable for the following:     Potassium 5.6 (*)     Chloride 111 (*)     CO2 19 (*)     BUN, Bld 40 (*)     Alkaline Phosphatase 143 (*)     Anion Gap 7 (*)     All other components within normal limits   PROTIME-INR - Abnormal; Notable for the following:     Prothrombin Time 42.7 (*)     INR 4.2 (*)     All other components within normal limits   TROPONIN I   B-TYPE NATRIURETIC PEPTIDE   TROPONIN I     Imaging Results          X-Ray Chest AP Portable (Final result)  Result time 10/14/17 21:14:12    Final result by Shadi Callahan MD (10/14/17 21:14:12)                 Impression:      Stable prominent cardiomegaly with possible mild pulmonary edema.      Electronically signed by: SHADI CALLAHAN MD  Date:     10/14/17  Time:    21:14              Narrative:    Chest AP single view.  Comparison: 9/5/17.    There is stable prominent cardiomegaly.  There is mild diffuse interstitial prominence which could reflect mild pulmonary edema.  No evidence of new focal consolidative process.  No evidence of pneumothorax or significant effusion.  No free air seen beneath the diaphragm.                              EKG Readings: (Independently Interpreted)   Initial Reading: No STEMI. Rhythm: Atrial Fibrillation. Heart Rate: 69. Ectopy: No Ectopy. Clinical Impression: Atrial Fibrillation       X-Rays:   Independently Interpreted Readings:   Chest X-Ray: Normal heart size.  No infiltrates.  No acute abnormalities.     Medical Decision Making:   History:   Old Medical  Records: I decided to obtain old medical records.  Old Records Summarized: other records.       <> Summary of Records:  Recent echo:  EF 40%, Severe MR, Moderate TR, PASP 47, and Severe LAE.   Recent PET stress on 09/07/17: no ischemia or significant disease   CTA chest on 09/06/07:  No PE  Initial Assessment:   52 y/o M with extensive cardiac history presents with right anterior chest pain that began 2 hours pta and resolved after 10 minutes.  Pt reports this is the same pain that he gets weekly.  Pt has recently had PET stress, echo, and CTA chest all done with in the past 6 weeks.    Differential Diagnosis:   DDX:  Atrial fibrillation with RVR, ACS, metabolic derangement, coumadin toxicity, anxiety, costochondritis  Independently Interpreted Test(s):   I have ordered and independently interpreted X-rays - see prior notes.  I have ordered and independently interpreted EKG Reading(s) - see prior notes  Clinical Tests:   Lab Tests: Ordered and Reviewed  Radiological Study: Ordered and Reviewed  Medical Tests: Ordered and Reviewed  ED Management:    Pt has two neg trop and recent neg cardiac work up with neg PET stress, echo, and CTA.  I do not feel this pain is cardiac in nature.  Pt has bee observed on the monitor for several hours and remains rate controlled.      Pt is noted to be hyperkalemic and will be treated with IV meds and albuterol for elevated potassium.  INR is elevated as well.  PT will be instructed to hold his next two doses and f/u with INR clinic in [place] on Monday for recheck.  Otherwise his labs are wnl and he is well appearing.  No repeat episodes of pain in ED.  Pt remains in atrial fib.      Hyperkalemia has been adequately treated.  Second trop is neg.  Pt remains asymptomatic and well appearing.  He has will f/u as previously discussed.  Pt understands that he must return to ED with any worsening of condition.     Pt counseled on their diagnosis and the importance of following up with  PCP.  Pt also cautioned on when to return to ED.  Pt verbalizes understanding of discharge plan and will return to ED immediately if symptoms worsen                     ED Course      Clinical Impression:   The primary encounter diagnosis was Chest pain, unspecified type. Diagnoses of Hyperkalemia and Supratherapeutic INR were also pertinent to this visit.    Disposition:   Disposition: Discharged  Condition: Stable                        Jennifer Spear MD  10/15/17 0144       Jennifer Spear MD  10/15/17 0152

## 2017-10-19 ENCOUNTER — OFFICE VISIT (OUTPATIENT)
Dept: ENDOCRINOLOGY | Facility: CLINIC | Age: 52
End: 2017-10-19
Payer: MEDICARE

## 2017-10-19 ENCOUNTER — ANTI-COAG VISIT (OUTPATIENT)
Dept: CARDIOLOGY | Facility: CLINIC | Age: 52
End: 2017-10-19
Payer: MEDICARE

## 2017-10-19 ENCOUNTER — TELEPHONE (OUTPATIENT)
Dept: ENDOCRINOLOGY | Facility: CLINIC | Age: 52
End: 2017-10-19

## 2017-10-19 ENCOUNTER — LAB VISIT (OUTPATIENT)
Dept: LAB | Facility: HOSPITAL | Age: 52
End: 2017-10-19
Payer: MEDICARE

## 2017-10-19 VITALS
HEIGHT: 78 IN | BODY MASS INDEX: 36.45 KG/M2 | DIASTOLIC BLOOD PRESSURE: 70 MMHG | HEART RATE: 62 BPM | RESPIRATION RATE: 18 BRPM | WEIGHT: 315 LBS | SYSTOLIC BLOOD PRESSURE: 140 MMHG

## 2017-10-19 DIAGNOSIS — R29.898 TALL STATURE: ICD-10-CM

## 2017-10-19 DIAGNOSIS — Z79.01 LONG-TERM (CURRENT) USE OF ANTICOAGULANTS: Primary | ICD-10-CM

## 2017-10-19 DIAGNOSIS — I26.99 OTHER PULMONARY EMBOLISM WITHOUT ACUTE COR PULMONALE, UNSPECIFIED CHRONICITY: ICD-10-CM

## 2017-10-19 DIAGNOSIS — E05.90 HYPERTHYROIDISM: Primary | Chronic | ICD-10-CM

## 2017-10-19 DIAGNOSIS — R76.8 THYROID ANTIBODY POSITIVE: ICD-10-CM

## 2017-10-19 DIAGNOSIS — E66.9 OBESITY (BMI 35.0-39.9 WITHOUT COMORBIDITY): ICD-10-CM

## 2017-10-19 DIAGNOSIS — E05.90 HYPERTHYROIDISM: Chronic | ICD-10-CM

## 2017-10-19 LAB
ALBUMIN SERPL BCP-MCNC: 3.3 G/DL
ALP SERPL-CCNC: 150 U/L
ALT SERPL W/O P-5'-P-CCNC: 25 U/L
ANION GAP SERPL CALC-SCNC: 8 MMOL/L
AST SERPL-CCNC: 27 U/L
BILIRUB SERPL-MCNC: 0.6 MG/DL
BUN SERPL-MCNC: 26 MG/DL
CALCIUM SERPL-MCNC: 9.3 MG/DL
CHLORIDE SERPL-SCNC: 111 MMOL/L
CO2 SERPL-SCNC: 22 MMOL/L
CREAT SERPL-MCNC: 1.1 MG/DL
EST. GFR  (AFRICAN AMERICAN): >60 ML/MIN/1.73 M^2
EST. GFR  (NON AFRICAN AMERICAN): >60 ML/MIN/1.73 M^2
GLUCOSE SERPL-MCNC: 92 MG/DL
POTASSIUM SERPL-SCNC: 4.8 MMOL/L
PROT SERPL-MCNC: 8 G/DL
SODIUM SERPL-SCNC: 141 MMOL/L
T4 FREE SERPL-MCNC: 1.37 NG/DL
T4 FREE SERPL-MCNC: 1.37 NG/DL
TSH SERPL DL<=0.005 MIU/L-ACNC: <0.01 UIU/ML

## 2017-10-19 PROCEDURE — 99213 OFFICE O/P EST LOW 20 MIN: CPT | Mod: PBBFAC | Performed by: INTERNAL MEDICINE

## 2017-10-19 PROCEDURE — 99213 OFFICE O/P EST LOW 20 MIN: CPT | Mod: S$PBB,GC,, | Performed by: INTERNAL MEDICINE

## 2017-10-19 PROCEDURE — 84443 ASSAY THYROID STIM HORMONE: CPT

## 2017-10-19 PROCEDURE — 36415 COLL VENOUS BLD VENIPUNCTURE: CPT

## 2017-10-19 PROCEDURE — 84439 ASSAY OF FREE THYROXINE: CPT

## 2017-10-19 PROCEDURE — 99211 OFF/OP EST MAY X REQ PHY/QHP: CPT | Mod: PBBFAC,27 | Performed by: PHARMACIST

## 2017-10-19 PROCEDURE — 99999 PR PBB SHADOW E&M-EST. PATIENT-LVL I: CPT | Mod: PBBFAC,,, | Performed by: PHARMACIST

## 2017-10-19 PROCEDURE — 99999 PR PBB SHADOW E&M-EST. PATIENT-LVL III: CPT | Mod: PBBFAC,GC,, | Performed by: INTERNAL MEDICINE

## 2017-10-19 PROCEDURE — 80053 COMPREHEN METABOLIC PANEL: CPT

## 2017-10-19 PROCEDURE — 84305 ASSAY OF SOMATOMEDIN: CPT

## 2017-10-19 RX ORDER — METHIMAZOLE 10 MG/1
20 TABLET ORAL 2 TIMES DAILY
Qty: 120 TABLET | Refills: 11 | Status: SHIPPED | OUTPATIENT
Start: 2017-10-19 | End: 2018-01-04 | Stop reason: SDUPTHER

## 2017-10-19 NOTE — PROGRESS NOTES
"Subjective:      Patient ID: Drew Farrar is a 51 y.o. male.    Chief Complaint: No chief complaint on file.     is presenting for follow up of hyperthyroidism.  Dr. Rene    Medical history includes May thurner  Diagnosed with hyperthyroidism around 2012  Last seen hospital as consult for hyperthyroidism.  Suppressed tsh and elevated ft4 from 9/2017 labs  TRAB positive    When hospitalized we had recommended increasing methimazole from 10mg to 20mg daily. Mr. Farrar reports taking 20mg BID since hospital discharge in September.    Mom with history of hyperthyroidism s/p BURGOS  Other family members with thyroid disease.    Gained 20lb in the past year  Reports palpitations    Not currently employed    mildy enlarged jaw. Tall stature at 6'8. Height much taller than expected height based on parental height.    Review of Systems   Constitutional: Positive for unexpected weight change.   Eyes: Negative for visual disturbance.   Respiratory: Negative for shortness of breath.    Cardiovascular: Positive for palpitations. Negative for chest pain.   Gastrointestinal: Negative for abdominal pain.   Musculoskeletal: Negative for myalgias.   Skin: Negative for wound.   Neurological: Negative for headaches.   Hematological: Does not bruise/bleed easily.   Psychiatric/Behavioral: Negative for sleep disturbance.       Objective:     BP (!) 140/70   Pulse 62   Resp 18   Ht 6' 8" (2.032 m)   Wt (!) 151.4 kg (333 lb 12.4 oz)   BMI 36.67 kg/m²      Physical Exam   Constitutional: He is oriented to person, place, and time. He appears well-developed and well-nourished.   Tall stature   HENT:   Head: Normocephalic and atraumatic.   Neck: Normal range of motion. No thyromegaly present.   Cardiovascular: Normal rate and regular rhythm.    Pulmonary/Chest: Effort normal and breath sounds normal.   Musculoskeletal: He exhibits edema.   Neurological: He is alert and oriented to person, place, and time.   Skin: Skin is warm " and dry.   Psychiatric: He has a normal mood and affect. His behavior is normal.   Vitals reviewed.      Assessment:     1. Hyperthyroidism    2. Tall stature    3. Thyroid antibody positive    4. Obesity (BMI 35.0-39.9 without comorbidity)        Plan:   1. 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 Recheck TFT's today and will decide further methimazole dosing. Currently on methiamzole 40mg daily. 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   Recent CTA from this month precludes us from performing thyroid uptake scan within the next few months.  2. Check IGF-1 screening for acromegaly.  3. Management for hyperthyroidism as above.  4. Recommend weight loss, exercise. Periodic a1c monitoring.      Return in about 6 months (around 4/19/2018).    Discussed with Dr. Lazcano    [phone] patient  [phone] Josey ([facility])    Joaquin Lopez MD

## 2017-10-19 NOTE — PROGRESS NOTES
Patient reports going to ER on 10/14 for chest pain and his INR was checked and found to be elevated. He reports however that his coumadin was not adjusted at that time but received a call from a nurse of  a few days later telling him to hold his coumadin Sunday and Monday, which he did. He denies bleeding or bruising issues. INR may have been elevated due to fluid retention he said he experienced. INR is good today but he did miss 2 doses. I will lower weekly dose and keep close watch. Patient was re-educated on situations that would require placing a call to the coumadin clinic, including bleeding or unusual bruising issues, changes in health, diet or medications, upcoming procedures that require warfarin interruption, and missed coumadin dose(s). Patient expressed understanding that avoidance of consistency with these parameters could cause fluctuations in INR, leading to more frequent visits and increase risk of adverse events.

## 2017-10-19 NOTE — PROGRESS NOTES
I have reviewed and concur with the fellow's history, physical, assessment, and plan.  I have personally interviewed the patient and all questions were answered.

## 2017-10-23 ENCOUNTER — TELEPHONE (OUTPATIENT)
Dept: ENDOCRINOLOGY | Facility: CLINIC | Age: 52
End: 2017-10-23

## 2017-10-23 LAB
IGF-I SERPL-MCNC: 84 NG/ML (ref 40–259)
IGF-I Z-SCORE SERPL: -0.84 SD

## 2017-10-25 ENCOUNTER — OFFICE VISIT (OUTPATIENT)
Dept: FAMILY MEDICINE | Facility: HOSPITAL | Age: 52
End: 2017-10-25
Attending: FAMILY MEDICINE
Payer: MEDICARE

## 2017-10-25 VITALS
WEIGHT: 315 LBS | RESPIRATION RATE: 20 BRPM | HEIGHT: 78 IN | HEART RATE: 96 BPM | DIASTOLIC BLOOD PRESSURE: 55 MMHG | BODY MASS INDEX: 36.45 KG/M2 | SYSTOLIC BLOOD PRESSURE: 114 MMHG

## 2017-10-25 DIAGNOSIS — I50.42 CHRONIC COMBINED SYSTOLIC AND DIASTOLIC CONGESTIVE HEART FAILURE: Primary | ICD-10-CM

## 2017-10-25 PROCEDURE — 99214 OFFICE O/P EST MOD 30 MIN: CPT | Performed by: FAMILY MEDICINE

## 2017-10-25 RX ORDER — LISINOPRIL 2.5 MG/1
10 TABLET ORAL DAILY
COMMUNITY
Start: 2017-10-04 | End: 2018-03-26 | Stop reason: SDUPTHER

## 2017-10-25 RX ORDER — LISINOPRIL 10 MG/1
20 TABLET ORAL DAILY
COMMUNITY
Start: 2017-10-05 | End: 2017-11-27 | Stop reason: SDUPTHER

## 2017-10-25 NOTE — PROGRESS NOTES
I assume primary medical responsibility for this patient, I have reviewed the case history, findings, diagnosis and treatment plan with the resident and agree that the care is reasonable and necessary. This service has been performed by a resident without the presence of a teaching physician under the primary care exception  Reyna Britton  10/25/2017

## 2017-10-25 NOTE — PROGRESS NOTES
Subjective:       Patient ID: Drew Farrar is a 52 y.o. male.    Chief Complaint: 'here for ED visit'    HPI     52 year old male with Hx of afib, CHF, and intellectual disability presents for F/U post ED visit on the 14th (10/14/17). The pt reports becoming SOB and developing CP while at a dance. At the ED he was Dx with high potassium and was given F/U on 11/1/17for a holter monitor and a f/u on 11/8/17 for Cards appt. He currently denies any Cp, but does report some SOb while walking from the parking lot to the office. He reports slight leg swelling recently. He denies Headache, palpitations, CP, abd pain, and lightheadedness currently.     Review of Systems   Constitutional: Negative for appetite change, chills, diaphoresis, fatigue and fever.   HENT: Negative for ear pain and rhinorrhea.    Eyes: Negative for pain and redness.   Respiratory: Positive for shortness of breath (slight, when walking). Negative for cough and wheezing.    Cardiovascular: Negative for chest pain, palpitations and leg swelling.   Gastrointestinal: Negative for abdominal distention, abdominal pain, constipation, diarrhea, nausea and vomiting.   Genitourinary: Negative for dysuria and flank pain.   Musculoskeletal: Negative for arthralgias and back pain.   Neurological: Negative for dizziness, syncope, numbness and headaches.       Objective:      Vitals:    10/25/17 1419   BP: (!) 114/55   Pulse: 96   Resp: 20     Physical Exam   Constitutional: He is oriented to person, place, and time. He appears well-developed and well-nourished.   HENT:   Head: Normocephalic.   Eyes: Pupils are equal, round, and reactive to light.   Neck: Normal range of motion. Neck supple.   Cardiovascular: Normal heart sounds and intact distal pulses.  Exam reveals no gallop and no friction rub.    No murmur heard.  Irregularly irregular   Pulmonary/Chest: Effort normal and breath sounds normal. No respiratory distress. He has no wheezes. He has no rales. He  exhibits no tenderness.   Abdominal: Soft. Bowel sounds are normal. He exhibits no distension. There is no tenderness.   Musculoskeletal: Normal range of motion. He exhibits edema (slight leg swelling).   Neurological: He is alert and oriented to person, place, and time.   Skin: Skin is warm and dry.   Psychiatric: He has a normal mood and affect. His behavior is normal. Judgment and thought content normal.       Assessment:       1. Chronic combined systolic and diastolic congestive heart failure        Plan:       Chronic combined systolic and diastolic congestive heart failure    - Pt with cards F/U in November  - Slight weight gain and leg swelling noted, instructed to double lasix for 2-3 days  - return on normal F/U schedule    RTC 1 month

## 2017-10-30 ENCOUNTER — ANTI-COAG VISIT (OUTPATIENT)
Dept: CARDIOLOGY | Facility: CLINIC | Age: 52
End: 2017-10-30
Payer: MEDICARE

## 2017-10-30 DIAGNOSIS — I26.99 OTHER PULMONARY EMBOLISM WITHOUT ACUTE COR PULMONALE, UNSPECIFIED CHRONICITY: ICD-10-CM

## 2017-10-30 DIAGNOSIS — Z79.01 LONG-TERM (CURRENT) USE OF ANTICOAGULANTS: Primary | ICD-10-CM

## 2017-10-30 LAB — INR PPP: 3.7 (ref 2–3)

## 2017-10-30 PROCEDURE — 85610 PROTHROMBIN TIME: CPT | Mod: PBBFAC,PO

## 2017-10-30 NOTE — PROGRESS NOTES
Patient is incorporating less greens in his diet attributing to his INR. He is trying to loose weight and reducing his overall diet. I am reducing his weekly dose due to the change in diet. I advised him to contact us with any changes or problems.

## 2017-11-01 ENCOUNTER — CLINICAL SUPPORT (OUTPATIENT)
Dept: ELECTROPHYSIOLOGY | Facility: CLINIC | Age: 52
End: 2017-11-01
Payer: MEDICARE

## 2017-11-01 DIAGNOSIS — I48.21 PERMANENT ATRIAL FIBRILLATION: ICD-10-CM

## 2017-11-01 PROCEDURE — 93227 XTRNL ECG REC<48 HR R&I: CPT | Mod: S$PBB,,, | Performed by: INTERNAL MEDICINE

## 2017-11-01 PROCEDURE — 93226 XTRNL ECG REC<48 HR SCAN A/R: CPT | Mod: PBBFAC | Performed by: INTERNAL MEDICINE

## 2017-11-08 ENCOUNTER — OFFICE VISIT (OUTPATIENT)
Dept: ELECTROPHYSIOLOGY | Facility: CLINIC | Age: 52
End: 2017-11-08
Payer: MEDICARE

## 2017-11-08 VITALS
HEIGHT: 78 IN | BODY MASS INDEX: 36.45 KG/M2 | DIASTOLIC BLOOD PRESSURE: 56 MMHG | HEART RATE: 81 BPM | SYSTOLIC BLOOD PRESSURE: 112 MMHG | WEIGHT: 315 LBS

## 2017-11-08 DIAGNOSIS — I87.2 VENOUS (PERIPHERAL) INSUFFICIENCY: ICD-10-CM

## 2017-11-08 DIAGNOSIS — I50.22 CHRONIC SYSTOLIC HEART FAILURE: ICD-10-CM

## 2017-11-08 DIAGNOSIS — I50.42 CHRONIC COMBINED SYSTOLIC AND DIASTOLIC CONGESTIVE HEART FAILURE: ICD-10-CM

## 2017-11-08 DIAGNOSIS — I87.1 MAY-THURNER SYNDROME: ICD-10-CM

## 2017-11-08 DIAGNOSIS — I48.91 ATRIAL FIBRILLATION, UNSPECIFIED TYPE: ICD-10-CM

## 2017-11-08 DIAGNOSIS — I48.21 PERMANENT ATRIAL FIBRILLATION: Primary | ICD-10-CM

## 2017-11-08 DIAGNOSIS — I10 ESSENTIAL HYPERTENSION: ICD-10-CM

## 2017-11-08 PROCEDURE — 93005 ELECTROCARDIOGRAM TRACING: CPT | Mod: PBBFAC | Performed by: INTERNAL MEDICINE

## 2017-11-08 PROCEDURE — 99999 PR PBB SHADOW E&M-EST. PATIENT-LVL III: CPT | Mod: PBBFAC,,, | Performed by: INTERNAL MEDICINE

## 2017-11-08 PROCEDURE — 99213 OFFICE O/P EST LOW 20 MIN: CPT | Mod: PBBFAC | Performed by: INTERNAL MEDICINE

## 2017-11-08 PROCEDURE — 99214 OFFICE O/P EST MOD 30 MIN: CPT | Mod: S$PBB,,, | Performed by: INTERNAL MEDICINE

## 2017-11-08 PROCEDURE — 93010 ELECTROCARDIOGRAM REPORT: CPT | Mod: ,,, | Performed by: INTERNAL MEDICINE

## 2017-11-08 RX ORDER — METOPROLOL SUCCINATE 100 MG/1
200 TABLET, EXTENDED RELEASE ORAL DAILY
Qty: 60 TABLET | Refills: 11 | Status: SHIPPED | OUTPATIENT
Start: 2017-11-08 | End: 2018-09-21 | Stop reason: SDUPTHER

## 2017-11-08 NOTE — PROGRESS NOTES
Subjective:    Patient ID:  Drew Farrar is a 52 y.o. male who presents for follow-up of Permanent atrial fibrillation    Referring Physician: Jeffrey Calixto MD  Primary Cardiologist: Gomez Lantigua MD    HPI  I had the pleasure of seeing Mr. Farrar today in our electrophysiology clinic in consultation for his permanent atrial fibrillation. As you are aware he is a pleasant 52 year-old with patent PDA s/p surgical closure 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 of age, May Thurner syndrome with multple leg/pelvic DVTs with pulmonary emboli, chronic systolic congestive heart failure, permanent atrial fibrillation since his 30s, and hyperthyroidism.  He has had multiple endovascular procedures for his venous disease. He is on chronic coumadin.  He was noted to have an LVEF of 45% in 2014 and 30% in 2016. On review of his labs his TSH has been undetectable since 2014.      With respect to his atrial fibrillation he reports he went into atrial fibrillation when he was undergoing a knee operation in his 30s. He and his mother report he was shocked 3 times and remained in atrial fibrillation. They report he was left in atrial fibrillation afterwards. He reports noting fast heart beat sometimes after exerting himself. He has noted it more frequently as of late. He was seeing Dr. Farhan Calixto for his worsening dyspnea on exertion who wanted him evaluated from a cardiology perspective. He is on toprol xl 50mg daily, lisinopril 2.5mg daily, warfarin, and methimazole. He denies any bleeding issues related to warfarin therapy. Of note, when he last saw Dr. Lantigua in February of 2017 the plan was for him to be seen in heart failure clinic.      At our initial visit he began experiencing significant right sided chest pain and we escorted him to the emergency room for further evaluation. CT was performed noting no PE. An echocardiogram was performed noting LVEF of 40-45% (see below). A Cardiac PET stress test was negative for ischemia. He has  started seeing Dr. Castañeda in heart failure clinic and has established care with endocrinology who are working on his hyperthyroidism. Holter monitor noted controlled heart rates with average of 82 bpm.     CONCLUSIONS     1 - Eccentric hypertrophy.     2 - Mildly to moderately depressed left ventricular systolic function (EF 40-45%).     3 - Right ventricular enlargement with moderately depressed systolic function.     4 - Severe left atrial enlargement.     5 - Pulmonary hypertension. The estimated PA systolic pressure is 47 mmHg.     6 - Mild aortic regurgitation.     7 - Severe mitral regurgitation.     8 - Moderate tricuspid regurgitation.     9 - Intermediate central venous pressure.      I reviewed all available electrocardiograms in EPIC which all show atrial fibrillation with ventricular response rates ranging from 60s to 120 (beginning in 2012).     My interpretation of today's in clinic electrocardiogram is atrial fibrillation with a ventricular response rate of 81 bpm.    Review of Systems   Constitution: Negative for weakness and malaise/fatigue.   Cardiovascular: Positive for dyspnea on exertion and palpitations. Negative for irregular heartbeat, orthopnea and paroxysmal nocturnal dyspnea.   Respiratory: Negative for cough and shortness of breath.    Hematologic/Lymphatic: Negative for bleeding problem. Does not bruise/bleed easily.   Gastrointestinal: Negative for bloating, abdominal pain, hematochezia and melena.   Neurological: Negative for dizziness and light-headedness.        Objective:    Physical Exam   Constitutional: He is oriented to person, place, and time. He appears well-developed and well-nourished. No distress.   HENT:   Head: Normocephalic and atraumatic.   Eyes: Conjunctivae are normal. Right eye exhibits no discharge. Left eye exhibits no discharge.   Neck: Neck supple.   Cardiovascular: Normal rate.  An irregularly irregular rhythm present. Exam reveals no gallop and no friction  rub.    No murmur heard.  Pulmonary/Chest: Effort normal and breath sounds normal. No respiratory distress. He has no wheezes.   Abdominal: Soft. Bowel sounds are normal. He exhibits no distension. There is no tenderness.   Musculoskeletal: He exhibits no edema.   Neurological: He is alert and oriented to person, place, and time.   Skin: Skin is warm and dry. He is not diaphoretic.   Psychiatric: He has a normal mood and affect. His behavior is normal. Thought content normal.   Vitals reviewed.        Assessment:       1. Permanent atrial fibrillation    2. May-Thurner syndrome    3. Essential hypertension    4. Chronic systolic heart failure    5. Venous (peripheral) insufficiency    6. Chronic combined systolic and diastolic congestive heart failure         Plan:       In summary, Mr. Farrar is a pleasant 52 year-old with patent PDA s/p surgical closure 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 of age, May Thurner syndrome with multple leg/pelvic DVTs with pulmonary emboli, chronic systolic congestive heart failure (EF 40-45%), permanent atrial fibrillation since his 30s, and hyperthyroidism presenting for evaluation for his rate control of atrial fibrillation. It is highly unlikely we will be able to restore sinus rhythm if he has been in atrial fibrillation for over 10 years. All ECGs I have date back to 2012 and all show atrial fibrillation. Will increase metoprolol to 200mg daily for improved rate control and heart failure therapy. Continue follow-up with endocrinology to achieve euthyroid status. All TSH values since 2014 have been undetectable with elevated Free T4. Continue follow-up with Dr. Castañeda in [facility].    Plan  Increase metoprolol succinate to 200mg daily  RTC in 3 months

## 2017-11-13 ENCOUNTER — HOSPITAL ENCOUNTER (OUTPATIENT)
Dept: WOUND CARE | Facility: HOSPITAL | Age: 52
Discharge: HOME | End: 2017-11-13
Attending: PODIATRIST

## 2017-11-13 NOTE — PROGRESS NOTES
Much of the documentation for this visit was completed in the [facility] [employer] system. Please see the attached documentation for further details about this patient's care.     Lizette Beckett RN

## 2017-11-14 ENCOUNTER — ANTI-COAG VISIT (OUTPATIENT)
Dept: CARDIOLOGY | Facility: CLINIC | Age: 52
End: 2017-11-14
Payer: MEDICARE

## 2017-11-14 DIAGNOSIS — I26.99 OTHER PULMONARY EMBOLISM WITHOUT ACUTE COR PULMONALE, UNSPECIFIED CHRONICITY: ICD-10-CM

## 2017-11-14 DIAGNOSIS — Z79.01 LONG-TERM (CURRENT) USE OF ANTICOAGULANTS: Primary | ICD-10-CM

## 2017-11-14 LAB — INR PPP: 4 (ref 2–3)

## 2017-11-14 PROCEDURE — 99211 OFF/OP EST MAY X REQ PHY/QHP: CPT | Mod: S$PBB,,,

## 2017-11-14 PROCEDURE — 85610 PROTHROMBIN TIME: CPT | Mod: PBBFAC,PO

## 2017-11-15 NOTE — PROGRESS NOTES
INR a little high today. Patient reports increased swelling in legs. No other pertinent changes. No signs or symptoms of bleeding. He already took coumadin today, so we will  tomorrow's dose. Repeat INR Monday and reevaluate dosing. He has never been on a lower dose.

## 2017-11-20 ENCOUNTER — ANTI-COAG VISIT (OUTPATIENT)
Dept: CARDIOLOGY | Facility: CLINIC | Age: 52
End: 2017-11-20

## 2017-11-20 ENCOUNTER — HOSPITAL ENCOUNTER (OUTPATIENT)
Dept: WOUND CARE | Facility: HOSPITAL | Age: 52
Discharge: HOME | End: 2017-11-20
Attending: PODIATRIST
Payer: MEDICARE

## 2017-11-20 ENCOUNTER — LAB VISIT (OUTPATIENT)
Dept: LAB | Facility: HOSPITAL | Age: 52
End: 2017-11-20
Payer: MEDICARE

## 2017-11-20 VITALS — SYSTOLIC BLOOD PRESSURE: 136 MMHG | DIASTOLIC BLOOD PRESSURE: 56 MMHG | TEMPERATURE: 98 F | HEART RATE: 77 BPM

## 2017-11-20 DIAGNOSIS — I26.99 OTHER PULMONARY EMBOLISM WITHOUT ACUTE COR PULMONALE, UNSPECIFIED CHRONICITY: ICD-10-CM

## 2017-11-20 DIAGNOSIS — Z79.01 LONG TERM CURRENT USE OF ANTICOAGULANT THERAPY: ICD-10-CM

## 2017-11-20 DIAGNOSIS — E05.90 HYPERTHYROIDISM: Chronic | ICD-10-CM

## 2017-11-20 LAB
INR PPP: 2.6
PROTHROMBIN TIME: 26 SEC
T4 FREE SERPL-MCNC: 1.05 NG/DL
TSH SERPL DL<=0.005 MIU/L-ACNC: <0.01 UIU/ML

## 2017-11-20 PROCEDURE — 27201912 HC WOUND CARE DEBRIDEMENT SUPPLIES

## 2017-11-20 PROCEDURE — 99212 PR OFFICE/OUTPT VISIT, EST, LEVL II, 10-19 MIN: ICD-10-PCS | Mod: 25,,, | Performed by: PODIATRIST

## 2017-11-20 PROCEDURE — 99212 OFFICE O/P EST SF 10 MIN: CPT | Mod: 25,,, | Performed by: PODIATRIST

## 2017-11-20 PROCEDURE — 36415 COLL VENOUS BLD VENIPUNCTURE: CPT

## 2017-11-20 PROCEDURE — 29580 STRAPPING UNNA BOOT: CPT

## 2017-11-20 PROCEDURE — 11042 DBRDMT SUBQ TIS 1ST 20SQCM/<: CPT

## 2017-11-20 PROCEDURE — 11042 PR DEBRIDEMENT, SKIN, SUB-Q TISSUE,=<20 SQ CM: ICD-10-PCS | Mod: ,,, | Performed by: PODIATRIST

## 2017-11-20 PROCEDURE — 84443 ASSAY THYROID STIM HORMONE: CPT

## 2017-11-20 PROCEDURE — 99213 OFFICE O/P EST LOW 20 MIN: CPT | Mod: 25

## 2017-11-20 PROCEDURE — 11042 DBRDMT SUBQ TIS 1ST 20SQCM/<: CPT | Mod: ,,, | Performed by: PODIATRIST

## 2017-11-20 PROCEDURE — 84439 ASSAY OF FREE THYROXINE: CPT

## 2017-11-20 PROCEDURE — 85610 PROTHROMBIN TIME: CPT

## 2017-11-20 RX ORDER — POTASSIUM CHLORIDE 750 MG/1
10 TABLET, EXTENDED RELEASE ORAL ONCE
COMMUNITY
End: 2017-11-27

## 2017-11-25 ENCOUNTER — PATIENT MESSAGE (OUTPATIENT)
Dept: ENDOCRINOLOGY | Facility: HOSPITAL | Age: 52
End: 2017-11-25

## 2017-11-25 ENCOUNTER — TELEPHONE (OUTPATIENT)
Dept: ENDOCRINOLOGY | Facility: CLINIC | Age: 52
End: 2017-11-25

## 2017-11-25 DIAGNOSIS — E05.90 HYPERTHYROIDISM: Primary | Chronic | ICD-10-CM

## 2017-11-27 ENCOUNTER — OFFICE VISIT (OUTPATIENT)
Dept: TRANSPLANT | Facility: CLINIC | Age: 52
End: 2017-11-27
Payer: MEDICARE

## 2017-11-27 VITALS
WEIGHT: 315 LBS | DIASTOLIC BLOOD PRESSURE: 65 MMHG | HEIGHT: 78 IN | BODY MASS INDEX: 36.45 KG/M2 | HEART RATE: 77 BPM | SYSTOLIC BLOOD PRESSURE: 102 MMHG

## 2017-11-27 DIAGNOSIS — I87.1 MAY-THURNER SYNDROME: ICD-10-CM

## 2017-11-27 DIAGNOSIS — I48.20 CHRONIC ATRIAL FIBRILLATION: Primary | ICD-10-CM

## 2017-11-27 DIAGNOSIS — I34.0 NON-RHEUMATIC MITRAL REGURGITATION: ICD-10-CM

## 2017-11-27 DIAGNOSIS — I50.22 CHRONIC SYSTOLIC HEART FAILURE: ICD-10-CM

## 2017-11-27 DIAGNOSIS — R60.9 EDEMA, UNSPECIFIED TYPE: ICD-10-CM

## 2017-11-27 DIAGNOSIS — I48.21 PERMANENT ATRIAL FIBRILLATION: ICD-10-CM

## 2017-11-27 DIAGNOSIS — I10 ESSENTIAL HYPERTENSION: ICD-10-CM

## 2017-11-27 PROCEDURE — 99213 OFFICE O/P EST LOW 20 MIN: CPT | Mod: PBBFAC | Performed by: INTERNAL MEDICINE

## 2017-11-27 PROCEDURE — 99215 OFFICE O/P EST HI 40 MIN: CPT | Mod: S$PBB,,, | Performed by: INTERNAL MEDICINE

## 2017-11-27 PROCEDURE — 99999 PR PBB SHADOW E&M-EST. PATIENT-LVL III: CPT | Mod: PBBFAC,,, | Performed by: INTERNAL MEDICINE

## 2017-11-27 RX ORDER — TORSEMIDE 20 MG/1
20 TABLET ORAL DAILY
Qty: 30 TABLET | Refills: 11 | Status: SHIPPED | OUTPATIENT
Start: 2017-11-27 | End: 2018-12-16 | Stop reason: SDUPTHER

## 2017-11-27 NOTE — PROGRESS NOTES
Subjective:    Patient ID:  Drew Farrar is a 52 y.o. male who presents for evaluation of Congestive Heart Failure      Congestive Heart Failure   Pertinent negatives include no abdominal pain, chest pain, chills, coughing, diaphoresis, fever or weakness.     Hospital Course:     M with PMHx of persistent afib on coumadin, patent PDA s/p surgical closure 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 of age, May Thurner syndrome with h/o multiple PEs and DVTs s/p IVC filter, chronic systolic congestive heart failure (EF 30%), hyperthyroidism here for follow up.       Review of Systems   Constitution: Negative for chills, decreased appetite, diaphoresis, fever, weakness, malaise/fatigue, night sweats, weight gain and weight loss.   HENT: Negative.    Eyes: Negative.    Cardiovascular: Positive for dyspnea on exertion and irregular heartbeat. Negative for chest pain, claudication, cyanosis, leg swelling, near-syncope, orthopnea and palpitations.   Respiratory: Negative for cough, hemoptysis, shortness of breath, sleep disturbances due to breathing, snoring, sputum production and wheezing.    Endocrine: Negative.    Hematologic/Lymphatic: Negative.    Skin: Negative.    Musculoskeletal: Negative.    Gastrointestinal: Negative for abdominal pain and diarrhea.   Genitourinary: Negative.    Neurological: Negative.    Psychiatric/Behavioral: Negative.         Objective:    Physical Exam   Constitutional: He is oriented to person, place, and time. He appears well-developed and well-nourished.   HENT:   Head: Normocephalic and atraumatic.   Eyes: Conjunctivae and EOM are normal. Pupils are equal, round, and reactive to light.   Neck: Normal range of motion. Neck supple. No JVD present.   Cardiovascular: Normal rate.  An irregularly irregular rhythm present. Exam reveals no gallop and no friction rub.    Murmur heard.  High-pitched blowing holosystolic murmur is present with a grade of 2/6  at the apex  Pulmonary/Chest: Breath sounds normal. No respiratory  distress. He has no wheezes. He has no rales. He exhibits no tenderness.   Abdominal: Soft. Bowel sounds are normal. He exhibits no distension and no mass. There is no hepatosplenomegaly, splenomegaly or hepatomegaly. There is no tenderness. There is no rebound, no guarding and no CVA tenderness.   No hepatoslenomegaly   Musculoskeletal: Normal range of motion. He exhibits edema (+4). He exhibits no tenderness.   Neurological: He is alert and oriented to person, place, and time. He has normal reflexes. No cranial nerve deficit. He exhibits normal muscle tone. Coordination normal.   Skin: Skin is warm and dry.   Psychiatric: He has a normal mood and affect.         Assessment:       1. Chronic atrial fibrillation    2. Chronic systolic heart failure    3. Essential hypertension    4. May-Thurner syndrome    5. Non-rheumatic mitral regurgitation    6. Permanent atrial fibrillation    7. Edema, unspecified type         Plan:       DC furosemide start demadex 20 mg daily    Stable. RTC 2 months

## 2017-12-05 ENCOUNTER — ANTI-COAG VISIT (OUTPATIENT)
Dept: CARDIOLOGY | Facility: CLINIC | Age: 52
End: 2017-12-05
Payer: MEDICARE

## 2017-12-05 DIAGNOSIS — I26.99 OTHER PULMONARY EMBOLISM WITHOUT ACUTE COR PULMONALE, UNSPECIFIED CHRONICITY: ICD-10-CM

## 2017-12-05 DIAGNOSIS — Z79.01 LONG-TERM (CURRENT) USE OF ANTICOAGULANTS: Primary | ICD-10-CM

## 2017-12-05 LAB — INR PPP: 3.4 (ref 2–3)

## 2017-12-05 PROCEDURE — 85610 PROTHROMBIN TIME: CPT | Mod: PBBFAC,PO

## 2017-12-05 PROCEDURE — 99211 OFF/OP EST MAY X REQ PHY/QHP: CPT | Mod: S$PBB,,, | Performed by: PHARMACIST

## 2017-12-05 NOTE — PROGRESS NOTES
Patient reports no bruising and no new medications.  He reports that he has had roughly 3 nosebleeds in the past week, that have occurred at different times through the day.  He also reports that he has been eating salads roughly 6x/week lately, but intends on cutting back down to his previous 2x/week.  With his nosebleeds and decreasing intake of vit k in mind, I am lowering his coumadin dose at this time.  I reminded the patient to call with any problems, changes or questions before the next visit.

## 2017-12-11 ENCOUNTER — HOSPITAL ENCOUNTER (OUTPATIENT)
Dept: WOUND CARE | Facility: HOSPITAL | Age: 52
Discharge: HOME | End: 2017-12-11
Attending: PODIATRIST
Payer: MEDICARE

## 2017-12-11 VITALS
HEIGHT: 72 IN | HEART RATE: 89 BPM | DIASTOLIC BLOOD PRESSURE: 67 MMHG | WEIGHT: 315 LBS | SYSTOLIC BLOOD PRESSURE: 130 MMHG | BODY MASS INDEX: 42.66 KG/M2

## 2017-12-11 DIAGNOSIS — R60.0 BILATERAL EDEMA OF LOWER EXTREMITY: Primary | ICD-10-CM

## 2017-12-11 PROCEDURE — 97597 PR DEBRIDEMENT OPEN WOUND 20 SQ CM<: ICD-10-PCS | Mod: ,,, | Performed by: PODIATRIST

## 2017-12-11 PROCEDURE — 97597 DBRDMT OPN WND 1ST 20 CM/<: CPT

## 2017-12-11 PROCEDURE — 29581 APPL MULTLAYER CMPRN SYS LEG: CPT

## 2017-12-11 PROCEDURE — 99499 UNLISTED E&M SERVICE: CPT | Mod: ,,, | Performed by: PODIATRIST

## 2017-12-11 PROCEDURE — 99499 NO LOS: ICD-10-PCS | Mod: ,,, | Performed by: PODIATRIST

## 2017-12-11 PROCEDURE — 97597 DBRDMT OPN WND 1ST 20 CM/<: CPT | Mod: ,,, | Performed by: PODIATRIST

## 2017-12-18 ENCOUNTER — HOSPITAL ENCOUNTER (OUTPATIENT)
Dept: WOUND CARE | Facility: HOSPITAL | Age: 52
Discharge: HOME | End: 2017-12-18
Attending: PODIATRIST
Payer: MEDICARE

## 2017-12-18 ENCOUNTER — ANTI-COAG VISIT (OUTPATIENT)
Dept: CARDIOLOGY | Facility: CLINIC | Age: 52
End: 2017-12-18
Payer: MEDICARE

## 2017-12-18 VITALS — HEART RATE: 79 BPM | SYSTOLIC BLOOD PRESSURE: 107 MMHG | DIASTOLIC BLOOD PRESSURE: 58 MMHG

## 2017-12-18 DIAGNOSIS — I87.2 VENOUS (PERIPHERAL) INSUFFICIENCY: ICD-10-CM

## 2017-12-18 DIAGNOSIS — Z79.01 LONG-TERM (CURRENT) USE OF ANTICOAGULANTS: Primary | ICD-10-CM

## 2017-12-18 DIAGNOSIS — I26.99 OTHER PULMONARY EMBOLISM WITHOUT ACUTE COR PULMONALE, UNSPECIFIED CHRONICITY: ICD-10-CM

## 2017-12-18 LAB — INR PPP: 1.7 (ref 2–3)

## 2017-12-18 PROCEDURE — 29581 APPL MULTLAYER CMPRN SYS LEG: CPT

## 2017-12-18 PROCEDURE — 97597 DBRDMT OPN WND 1ST 20 CM/<: CPT

## 2017-12-18 PROCEDURE — 99499 UNLISTED E&M SERVICE: CPT | Mod: ,,, | Performed by: PODIATRIST

## 2017-12-18 PROCEDURE — 99499 NO LOS: ICD-10-PCS | Mod: ,,, | Performed by: PODIATRIST

## 2017-12-18 PROCEDURE — 97597 DBRDMT OPN WND 1ST 20 CM/<: CPT | Mod: ,,, | Performed by: PODIATRIST

## 2017-12-18 PROCEDURE — 85610 PROTHROMBIN TIME: CPT | Mod: PBBFAC,PO

## 2017-12-18 PROCEDURE — 99211 OFF/OP EST MAY X REQ PHY/QHP: CPT | Mod: S$PBB,,, | Performed by: PHARMACIST

## 2017-12-18 PROCEDURE — 97597 PR DEBRIDEMENT OPEN WOUND 20 SQ CM<: ICD-10-PCS | Mod: ,,, | Performed by: PODIATRIST

## 2017-12-18 NOTE — PROGRESS NOTES
Patient reports no new medications and no diet changes.  He reports having bruising on his legs and a nosebleed on 12/14, that lasted ~15 minutes.  He missed his warfarin that evening and has not had another nosebleed episode.  Due to missing his dose on 12/14, he took extra on 12/17. His INR is low today, most likely due to this missed dose of warfarin last week.  I am boosting his dose tonight and re-challenging.  I advised nasal saline sprays and a humidifier to cut down on nosebleeds.  Of note, he mentioned that he may be having a c-scope in the near future.  I asked that he calls us if/when that procedure is scheduled and let us know of the date.  He has a hx of recurrent PE, recurrent DVT, has a hypercoag state and is CHADs = 4 (HTN, CHF, stroke).  Therefore, he cannot hold his coumadin without a lovenox bridge.  I reminded the patient to call with any problems, changes or questions before the next visit.

## 2017-12-28 ENCOUNTER — HOSPITAL ENCOUNTER (OUTPATIENT)
Dept: WOUND CARE | Facility: HOSPITAL | Age: 52
Discharge: HOME | End: 2017-12-28
Attending: PODIATRIST
Payer: MEDICARE

## 2017-12-28 DIAGNOSIS — I87.2 VENOUS (PERIPHERAL) INSUFFICIENCY: Primary | ICD-10-CM

## 2017-12-28 PROCEDURE — 29581 APPL MULTLAYER CMPRN SYS LEG: CPT

## 2018-01-03 ENCOUNTER — OFFICE VISIT (OUTPATIENT)
Dept: ORTHOPEDICS | Facility: CLINIC | Age: 53
End: 2018-01-03
Payer: MEDICARE

## 2018-01-03 ENCOUNTER — HOSPITAL ENCOUNTER (OUTPATIENT)
Dept: RADIOLOGY | Facility: HOSPITAL | Age: 53
Discharge: HOME | End: 2018-01-03
Attending: ORTHOPAEDIC SURGERY
Payer: MEDICARE

## 2018-01-03 VITALS — HEIGHT: 78 IN | WEIGHT: 315 LBS | BODY MASS INDEX: 36.45 KG/M2

## 2018-01-03 DIAGNOSIS — M25.561 RIGHT KNEE PAIN, UNSPECIFIED CHRONICITY: ICD-10-CM

## 2018-01-03 DIAGNOSIS — M17.11 PRIMARY OSTEOARTHRITIS OF RIGHT KNEE: Primary | ICD-10-CM

## 2018-01-03 DIAGNOSIS — M25.561 RIGHT KNEE PAIN, UNSPECIFIED CHRONICITY: Primary | ICD-10-CM

## 2018-01-03 PROCEDURE — 73560 X-RAY EXAM OF KNEE 1 OR 2: CPT | Mod: TC,FY,LT

## 2018-01-03 PROCEDURE — 99999 PR PBB SHADOW E&M-EST. PATIENT-LVL III: CPT | Mod: PBBFAC,,, | Performed by: ORTHOPAEDIC SURGERY

## 2018-01-03 PROCEDURE — 20610 DRAIN/INJ JOINT/BURSA W/O US: CPT | Mod: S$PBB,RT,, | Performed by: ORTHOPAEDIC SURGERY

## 2018-01-03 PROCEDURE — 20610 DRAIN/INJ JOINT/BURSA W/O US: CPT | Mod: PBBFAC,PO | Performed by: ORTHOPAEDIC SURGERY

## 2018-01-03 PROCEDURE — 73560 X-RAY EXAM OF KNEE 1 OR 2: CPT | Mod: 26,59,LT, | Performed by: RADIOLOGY

## 2018-01-03 PROCEDURE — 99203 OFFICE O/P NEW LOW 30 MIN: CPT | Mod: S$PBB,25,, | Performed by: ORTHOPAEDIC SURGERY

## 2018-01-03 PROCEDURE — 73562 X-RAY EXAM OF KNEE 3: CPT | Mod: 26,RT,, | Performed by: RADIOLOGY

## 2018-01-03 PROCEDURE — 99213 OFFICE O/P EST LOW 20 MIN: CPT | Mod: PBBFAC,25,PO | Performed by: ORTHOPAEDIC SURGERY

## 2018-01-03 PROCEDURE — 73562 X-RAY EXAM OF KNEE 3: CPT | Mod: TC,FY,RT

## 2018-01-03 RX ORDER — TRIAMCINOLONE ACETONIDE 40 MG/ML
40 INJECTION, SUSPENSION INTRA-ARTICULAR; INTRAMUSCULAR
Status: COMPLETED | OUTPATIENT
Start: 2018-01-03 | End: 2018-01-03

## 2018-01-03 RX ADMIN — TRIAMCINOLONE ACETONIDE 40 MG: 40 INJECTION, SUSPENSION INTRA-ARTICULAR; INTRAMUSCULAR at 12:01

## 2018-01-03 NOTE — PROCEDURES
Large Joint Aspiration/Injection  Date/Time: 1/3/2018 12:27 PM  Performed by: DAILY GARVIN.  Authorized by: DAILY GARVIN.       After obtaining verbal informed consent the patient's right knee was prepped aseptically and injected through an inferior lateral approach using 40 mg of triamcinolone and 1 cc of 1% plain Xylocaine.  The patient was warned about postinjection flare and how to manage it with ice, rest and over-the-counter analgesics.  They're advised to contact me for any severe, uncontrolled pain.

## 2018-01-03 NOTE — PROGRESS NOTES
Subjective:      Patient ID: Drew Farrar is a 52 y.o. male.    Chief Complaint: Pain and Injury of the Right Knee and Knee Injury (twisted right knee 1 week ago)    Knee Injury    Pertinent negatives include no fever.   Pain   Associated symptoms include joint swelling. Pertinent negatives include no abdominal pain, chest pain, congestion or fever.   Injury   Associated symptoms include joint swelling. Pertinent negatives include no abdominal pain, chest pain, congestion or fever.         Drew Farrar is seen for evaluation and treatment of right knee pain.  They have experienced problems with their right knee over the past 1 week Pain is located medially and  anteriorly. Associated symptoms include swelling and giving wayThey have been treated with cane/walker.   Symptoms have recently stayed the same. Ambulation reportedly has been impaired. Self care ADLs are painful.  The patient's history is relevant for prior arthroscopic right knee surgery and patellar realignment done over 10 years ago at another facility.    Review of Systems   Constitution: Negative for fever and weight loss.   HENT: Negative for congestion.    Eyes: Negative for visual disturbance.   Cardiovascular: Positive for palpitations. Negative for chest pain.   Respiratory: Negative for shortness of breath.    Hematologic/Lymphatic: Negative for bleeding problem. Does not bruise/bleed easily.   Skin: Negative for poor wound healing.   Musculoskeletal: Positive for arthritis, joint pain and joint swelling.   Gastrointestinal: Negative for abdominal pain.   Genitourinary: Negative for dysuria.   Neurological: Negative for seizures.   Psychiatric/Behavioral: Negative for altered mental status.   Allergic/Immunologic: Negative for persistent infections.         Objective:            Ortho/SPM Exam    Right Knee        The patient is not in acute distress.   Body habitus is::obese.   The patient walks with a limp.   The skin over the knee  is::intact.  Knee effusion trace   Tendernes is located medial  Range of motion- Flexion diminished range with pain, 120 deg, Extension full.   Ligament exam Lachman: stable, LCL: stable, MCL: stable and PCL: stable  Patellar crepitation absent  Dorsalis Pedis:  present  Posterior Tibial:  present.  Motor normal 5/5 strength in all tested muscle groups.   Sensory normal.    I reviewed for radiographic views of the right knee today.  There is advanced loss the medial joint space with postsurgical changes around the tibial tubercle            Assessment-acute flare of chronic osteoarthritis, aggravated by obesity    Plan-I explained the diagnosis to patient using a model.  Injection was recommended and consent was given.  Weight loss was recommended.  Over-the-counter analgesics.  Patient advised to use a walker or cane.    I also discussed the role of arthroplasty in the management of osteoarthritis.  Considering the patient's comorbidity, it is not safe to recommend arthroplasty in the perceivable future.

## 2018-01-04 ENCOUNTER — OFFICE VISIT (OUTPATIENT)
Dept: ENDOCRINOLOGY | Facility: CLINIC | Age: 53
End: 2018-01-04
Payer: MEDICARE

## 2018-01-04 ENCOUNTER — ANTI-COAG VISIT (OUTPATIENT)
Dept: CARDIOLOGY | Facility: CLINIC | Age: 53
End: 2018-01-04
Payer: MEDICARE

## 2018-01-04 VITALS
SYSTOLIC BLOOD PRESSURE: 110 MMHG | DIASTOLIC BLOOD PRESSURE: 70 MMHG | WEIGHT: 315 LBS | BODY MASS INDEX: 36.45 KG/M2 | HEIGHT: 78 IN | HEART RATE: 78 BPM

## 2018-01-04 DIAGNOSIS — Z79.01 LONG-TERM (CURRENT) USE OF ANTICOAGULANTS: Primary | ICD-10-CM

## 2018-01-04 DIAGNOSIS — I26.99 OTHER PULMONARY EMBOLISM WITHOUT ACUTE COR PULMONALE, UNSPECIFIED CHRONICITY: ICD-10-CM

## 2018-01-04 DIAGNOSIS — I48.20 CHRONIC ATRIAL FIBRILLATION: ICD-10-CM

## 2018-01-04 DIAGNOSIS — I50.42 CHRONIC COMBINED SYSTOLIC AND DIASTOLIC CONGESTIVE HEART FAILURE: ICD-10-CM

## 2018-01-04 DIAGNOSIS — E05.90 HYPERTHYROIDISM: Primary | Chronic | ICD-10-CM

## 2018-01-04 LAB — INR PPP: 1.3 (ref 2–3)

## 2018-01-04 PROCEDURE — 99211 OFF/OP EST MAY X REQ PHY/QHP: CPT | Mod: S$PBB,25,, | Performed by: PHARMACIST

## 2018-01-04 PROCEDURE — 85610 PROTHROMBIN TIME: CPT | Mod: PBBFAC

## 2018-01-04 PROCEDURE — 99214 OFFICE O/P EST MOD 30 MIN: CPT | Mod: S$PBB,,, | Performed by: INTERNAL MEDICINE

## 2018-01-04 PROCEDURE — 99213 OFFICE O/P EST LOW 20 MIN: CPT | Mod: PBBFAC | Performed by: INTERNAL MEDICINE

## 2018-01-04 PROCEDURE — 99999 PR PBB SHADOW E&M-EST. PATIENT-LVL III: CPT | Mod: PBBFAC,,, | Performed by: INTERNAL MEDICINE

## 2018-01-04 RX ORDER — METHIMAZOLE 10 MG/1
20 TABLET ORAL DAILY
Qty: 60 TABLET | Refills: 11
Start: 2018-01-04 | End: 2019-01-17 | Stop reason: SDUPTHER

## 2018-01-04 NOTE — PROGRESS NOTES
Subjective:      Patient ID: Drew Farrar is a 52 y.o. male.    Chief Complaint:  Hyperthyroidism    Referral from Dr. Begum    History of Present Illness   is presenting for follow up of hyperthyroidism. Last seen by Dr. Lopez in 10/2017.     Medical history includes May-thurner, syndrome, chronic HF, and chronic A Fib  Diagnosed with hyperthyroidism around 2012  We were initially consulted in the hospital for hyperthyroidism in 2017.     TRAB positive     When hospitalized we had recommended increasing methimazole from 10mg to 20mg daily.     Currently taking methimazole 20 mg in the morning.  Last FT4 in 11/2017 was normal. TSH chronically suppressed    Still having intermittent A Fib which has been chronic for several years.      Mom with history of hyperthyroidism s/p BURGOS  Other family members with thyroid disease.    Doesn't smoke.    Weight has been stable  Reports palpitations that are stable  No heat or cold intolerance  Bowel movements are regular    Denies proptosis, eyelid swelling, conjunctival erythema or eye pain.      Mildy enlarged jaw. Tall stature at 6'8. Height much taller than expected height based on parental height. IGF 1 was normal.     Review of Systems   Constitutional: Negative for chills and fever.   Gastrointestinal: Negative for nausea and vomiting.       Objective:   Physical Exam   Nursing note and vitals reviewed.  Thyroid upper limits of normal in size on exam  Has fine tremor of outstretched hands  DTR's 2 +    Lab Review:   Results for DREW FARRAR (MRN [id number]) as of 1/4/2018 07:15   Ref. Range 9/5/2017 23:04 9/8/2017 04:03 10/19/2017 15:37 10/19/2017 15:37 11/20/2017 13:10   TSH Latest Ref Range: 0.400 - 4.000 uIU/mL <0.010 (L)  <0.010 (L)  <0.010 (L)   Free T4 Latest Ref Range: 0.71 - 1.51 ng/dL 1.88 (H)  1.37 1.37 1.05   Thyrotropin Receptor Ab Latest Ref Range: 0.00 - 1.75 IU/L  33 (H)          Assessment:     1. Hyperthyroidism    2. Chronic atrial  fibrillation    3. Chronic combined systolic and diastolic congestive heart failure      Plan:     --Likely secondary to Graves disease given positive TRAb  --Will repeat TFT's today  --FT4 has been stable, TSH can take up to a year to normalize with chronic Grave's  --Given cardiac status and chronic uncontrolled Grave's would recommend BURGOS ablation for definitive treatment and he is in agreement  --Had CTA in 9/2017 with IV contrast  --Will get NM scan in 2 months which will be 6 months from receiving IV contrast  --Will then plan to do ablation at that time  --May need ultrasound if cold nodules seen on NM scan  --Already on high dose beta blocker  --Will need to make sure FT4 normal prior to giving BURGOS given cardiac status  --TFT's today to ensure current methimazole dose adequate    Copy to Dr. Kel Lazcano M.D. Staff Endocrinology

## 2018-01-04 NOTE — PROGRESS NOTES
"INR low today. Patient states that he eats Vitamin K foods twice a day almost every day. He had previously states that he eats Vitamin K foods 2x/week.Re educated patient on the impact of Vitamin K foods on coumadin levels. Patient states that he understands and confirmed that he has the literature regarding Vitamin K foods. Patient states that he had an injection on 1/3/18 in his knee. No DDI. I suspect that this patient has missed a dose because he stated that he was instructed to hold his coumadin by his orthopedic. After reviewing the discharge summary from his last orthopedic appointment, I did not find any order indicating he should have held his coumadin. I discussed this finding with the patient and he stated that he did take his coumadin and did not miss any doses. Due to his history of PE and his high risk of clots we will boost his dose today and start this patient on Lovenox 150mg daily starting today and completeing his Lovenox on 1/7. Dosing instructions given both verbally and written to start first dose tonight, then twice daily 1/5-1/7 and one last dose on 1/7. We will follow up with this patient on 1/8/18. Patient denies any bleeding, bruising or other changes.Advised patient to notify us of any changes or concerns.  "This note will not be shared with the patient."     "

## 2018-01-04 NOTE — LETTER
March 6, 2018    Drew JAVIER Leni  [address] [place] [address]             [facility]  [address]  [facility] [address]  Phone: [phone]  Fax: [phone] Dear MR. Farrar:    Our records indicate an appointment was missed to have a PT/INR checked.  We have made multiple attempts to contact you by phone and were unsuccessful.  It is extremely important that our clinic is contacted immediately in order to reschedule the missed appointment, as it is important that you are monitored regularly while taking Coumadin (warfarin).  If monitoring by our clinic is no longer required, please call and inform our clinic so that we may update our records and discontinue further attempts to reach you.      Please call the Coumadin Clinic immediately upon receiving this letter, failure to do so may result in discharge from the clinic.  No response to this letter within 7 days will result in discharge from our clinic.      Contact information for the Coumadin Clinic:    Phone: [phone]    Fax: [phone]      Thank You,    [facility] Staff

## 2018-01-04 NOTE — LETTER
January 4, 2018      Shin Begum MD  [address]  [facility] [address]           [facility] End[facility]/Diab[facility]Metab  [address]  [facility] [address]  Phone: [phone]  Fax: [phone]          Patient: Drew Farrar   MR Number: [id number]   YOB: 1965   Date of Visit: 1/4/2018       Dear Dr. Shin Begum:    Thank you for referring Drew Farrar to me for evaluation. Attached you will find relevant portions of my assessment and plan of care.    If you have questions, please do not hesitate to call me. I look forward to following Drew Farrar along with you.    Sincerely,    Gustavo Lazcano MD    Enclosure  CC:  No Recipients    If you would like to receive this communication electronically, please contact [email or web address] or [phone] to request more information on [employer] access.    For providers and/or their staff who would like to refer a patient to [facility], please contact us through our one-stop-shop provider referral line, [facility], at [phone].    If you feel you have received this communication in error or would no longer like to receive these types of communications, please e-mail [email or web address]

## 2018-01-05 ENCOUNTER — TELEPHONE (OUTPATIENT)
Dept: ENDOCRINOLOGY | Facility: CLINIC | Age: 53
End: 2018-01-05

## 2018-01-05 DIAGNOSIS — E05.90 HYPERTHYROIDISM: Primary | Chronic | ICD-10-CM

## 2018-01-05 NOTE — PROGRESS NOTES
Clarification from previous note, lovenox dose is 150mg every 12 hours and instructions specific for each day given to patient per previous note.   Pt seen by Jacquie LOPEZ . I have reviewed her initial findings and agree with her assessment and plan

## 2018-01-05 NOTE — TELEPHONE ENCOUNTER
Please advise patient that I reviewed his thyroid levels and they are in a good range. I recommend that he continue methimazole 20 mg once daily. Will plan to repeat thyroid studies in 2 months, and will contact him to set up nuclear medicine scan at that time.

## 2018-01-15 ENCOUNTER — HOSPITAL ENCOUNTER (OUTPATIENT)
Dept: WOUND CARE | Facility: HOSPITAL | Age: 53
Discharge: HOME | End: 2018-01-15
Attending: PODIATRIST
Payer: MEDICARE

## 2018-01-15 VITALS
SYSTOLIC BLOOD PRESSURE: 112 MMHG | BODY MASS INDEX: 36.45 KG/M2 | HEIGHT: 78 IN | HEART RATE: 102 BPM | WEIGHT: 315 LBS | DIASTOLIC BLOOD PRESSURE: 72 MMHG

## 2018-01-15 DIAGNOSIS — R60.0 BILATERAL EDEMA OF LOWER EXTREMITY: ICD-10-CM

## 2018-01-15 DIAGNOSIS — I87.2 VENOUS (PERIPHERAL) INSUFFICIENCY: ICD-10-CM

## 2018-01-15 PROCEDURE — 27201912 HC WOUND CARE DEBRIDEMENT SUPPLIES

## 2018-01-15 PROCEDURE — 11042 PR DEBRIDEMENT, SKIN, SUB-Q TISSUE,=<20 SQ CM: ICD-10-PCS | Mod: ,,, | Performed by: PODIATRIST

## 2018-01-15 PROCEDURE — 11042 DBRDMT SUBQ TIS 1ST 20SQCM/<: CPT

## 2018-01-15 PROCEDURE — 11042 DBRDMT SUBQ TIS 1ST 20SQCM/<: CPT | Mod: ,,, | Performed by: PODIATRIST

## 2018-01-15 PROCEDURE — 29581 APPL MULTLAYER CMPRN SYS LEG: CPT

## 2018-01-15 PROCEDURE — 99499 NO LOS: ICD-10-PCS | Mod: ,,, | Performed by: PODIATRIST

## 2018-01-15 PROCEDURE — 99499 UNLISTED E&M SERVICE: CPT | Mod: ,,, | Performed by: PODIATRIST

## 2018-01-15 NOTE — PROGRESS NOTES
Much of the documentation for this visit was completed in the [facility] system. Please see the attached documentation for further details about this patient's care.     Melody Abernathy RN

## 2018-01-22 ENCOUNTER — HOSPITAL ENCOUNTER (OUTPATIENT)
Dept: WOUND CARE | Facility: HOSPITAL | Age: 53
Discharge: HOME | End: 2018-01-22
Attending: PODIATRIST
Payer: MEDICARE

## 2018-01-22 VITALS
DIASTOLIC BLOOD PRESSURE: 82 MMHG | BODY MASS INDEX: 36.45 KG/M2 | HEART RATE: 72 BPM | SYSTOLIC BLOOD PRESSURE: 186 MMHG | WEIGHT: 315 LBS | HEIGHT: 78 IN

## 2018-01-22 DIAGNOSIS — I87.2 VENOUS (PERIPHERAL) INSUFFICIENCY: ICD-10-CM

## 2018-01-22 DIAGNOSIS — Z86.718 HISTORY OF DVT (DEEP VEIN THROMBOSIS): ICD-10-CM

## 2018-01-22 DIAGNOSIS — R60.0 BILATERAL EDEMA OF LOWER EXTREMITY: Primary | ICD-10-CM

## 2018-01-22 PROCEDURE — 99213 PR OFFICE/OUTPT VISIT, EST, LEVL III, 20-29 MIN: ICD-10-PCS | Mod: ,,, | Performed by: PODIATRIST

## 2018-01-22 PROCEDURE — 99213 OFFICE O/P EST LOW 20 MIN: CPT | Mod: ,,, | Performed by: PODIATRIST

## 2018-01-22 PROCEDURE — 99499 NO LOS: ICD-10-PCS | Mod: ,,, | Performed by: PODIATRIST

## 2018-01-22 PROCEDURE — 99499 UNLISTED E&M SERVICE: CPT | Mod: ,,, | Performed by: PODIATRIST

## 2018-01-22 PROCEDURE — 29581 APPL MULTLAYER CMPRN SYS LEG: CPT

## 2018-01-29 ENCOUNTER — HOSPITAL ENCOUNTER (OUTPATIENT)
Dept: WOUND CARE | Facility: HOSPITAL | Age: 53
Discharge: HOME | End: 2018-01-29
Attending: PODIATRIST
Payer: MEDICARE

## 2018-01-29 PROCEDURE — 29581 APPL MULTLAYER CMPRN SYS LEG: CPT

## 2018-02-05 ENCOUNTER — HOSPITAL ENCOUNTER (OUTPATIENT)
Dept: WOUND CARE | Facility: HOSPITAL | Age: 53
Discharge: HOME | End: 2018-02-05
Attending: PODIATRIST
Payer: MEDICARE

## 2018-02-05 VITALS
SYSTOLIC BLOOD PRESSURE: 119 MMHG | WEIGHT: 315 LBS | HEIGHT: 78 IN | DIASTOLIC BLOOD PRESSURE: 69 MMHG | HEART RATE: 96 BPM | BODY MASS INDEX: 36.45 KG/M2

## 2018-02-05 PROCEDURE — 99213 OFFICE O/P EST LOW 20 MIN: CPT | Mod: ,,, | Performed by: PODIATRIST

## 2018-02-05 PROCEDURE — 29581 APPL MULTLAYER CMPRN SYS LEG: CPT

## 2018-02-05 PROCEDURE — 99213 PR OFFICE/OUTPT VISIT, EST, LEVL III, 20-29 MIN: ICD-10-PCS | Mod: ,,, | Performed by: PODIATRIST

## 2018-03-06 ENCOUNTER — TELEPHONE (OUTPATIENT)
Dept: CARDIOLOGY | Facility: HOSPITAL | Age: 53
End: 2018-03-06

## 2018-03-06 NOTE — PROGRESS NOTES
Our records indicate an appointment was missed to have a PT/INR checked.  We have made multiple attempts to contact you by phone and were unsuccessful.  It is extremely important that our clinic is contacted immediately in order to reschedule the missed appointment, as it is important that you are monitored regularly while taking Coumadin (warfarin).  If monitoring by our clinic is no longer required, please call and inform our clinic so that we may update our records and discontinue further attempts to reach you.      Please call the Coumadin Clinic immediately upon receiving this letter, failure to do so may result in discharge from the clinic.  No response to this letter within 7 days will result in discharge from our clinic.      Contact information for the Coumadin Clinic:    Phone: [phone]    Fax: [phone]      Thank You,    [facility] Staff

## 2018-03-06 NOTE — TELEPHONE ENCOUNTER
----- Message from Lakisha Umana LPN sent at 3/6/2018  1:17 PM CST -----  Dr. Patel,     We are leeting you know that we are mailing this missed letter to your patient and if he doesn't respond we will discharge him from our clinic.    Our records indicate an appointment was missed to have a PT/INR checked.  We have made multiple attempts to contact you by phone and were unsuccessful.  It is extremely important that our clinic is contacted immediately in order to reschedule the missed appointment, as it is important that you are monitored regularly while taking Coumadin (warfarin).  If monitoring by our clinic is no longer required, please call and inform our clinic so that we may update our records and discontinue further attempts to reach you.      Please call the Coumadin Clinic immediately upon receiving this letter, failure to do so may result in discharge from the clinic.  No response to this letter within 7 days will result in discharge from our clinic.      Contact information for the Coumadin Clinic:    Phone: [phone]    Fax: [phone]      Thank You,    [facility] Staff

## 2018-03-07 ENCOUNTER — LAB VISIT (OUTPATIENT)
Dept: LAB | Facility: HOSPITAL | Age: 53
End: 2018-03-07
Attending: INTERNAL MEDICINE
Payer: MEDICARE

## 2018-03-07 DIAGNOSIS — E05.90 HYPERTHYROIDISM: ICD-10-CM

## 2018-03-07 LAB
T4 FREE SERPL-MCNC: 0.96 NG/DL
TSH SERPL DL<=0.005 MIU/L-ACNC: 0.28 UIU/ML

## 2018-03-07 PROCEDURE — 36415 COLL VENOUS BLD VENIPUNCTURE: CPT

## 2018-03-07 PROCEDURE — 84443 ASSAY THYROID STIM HORMONE: CPT

## 2018-03-07 PROCEDURE — 84439 ASSAY OF FREE THYROXINE: CPT

## 2018-03-12 ENCOUNTER — HOSPITAL ENCOUNTER (OUTPATIENT)
Dept: WOUND CARE | Facility: HOSPITAL | Age: 53
Discharge: HOME | End: 2018-03-12
Attending: PODIATRIST
Payer: MEDICARE

## 2018-03-12 VITALS
BODY MASS INDEX: 36.45 KG/M2 | WEIGHT: 315 LBS | TEMPERATURE: 99 F | HEART RATE: 90 BPM | SYSTOLIC BLOOD PRESSURE: 110 MMHG | HEIGHT: 78 IN | DIASTOLIC BLOOD PRESSURE: 67 MMHG

## 2018-03-12 DIAGNOSIS — I10 ESSENTIAL HYPERTENSION: ICD-10-CM

## 2018-03-12 DIAGNOSIS — Z86.718 HISTORY OF DVT (DEEP VEIN THROMBOSIS): ICD-10-CM

## 2018-03-12 DIAGNOSIS — I87.2 VENOUS (PERIPHERAL) INSUFFICIENCY: ICD-10-CM

## 2018-03-12 PROCEDURE — 97597 DBRDMT OPN WND 1ST 20 CM/<: CPT

## 2018-03-12 PROCEDURE — 99499 NO LOS: ICD-10-PCS | Mod: ,,, | Performed by: PODIATRIST

## 2018-03-12 PROCEDURE — 97597 DBRDMT OPN WND 1ST 20 CM/<: CPT | Mod: ,,, | Performed by: PODIATRIST

## 2018-03-12 PROCEDURE — 97597 PR DEBRIDEMENT OPEN WOUND 20 SQ CM<: ICD-10-PCS | Mod: ,,, | Performed by: PODIATRIST

## 2018-03-12 PROCEDURE — 99499 UNLISTED E&M SERVICE: CPT | Mod: ,,, | Performed by: PODIATRIST

## 2018-03-12 PROCEDURE — 29581 APPL MULTLAYER CMPRN SYS LEG: CPT | Mod: 59,LT

## 2018-03-12 NOTE — PROGRESS NOTES
Much of the documentation for this visit was completed in the [facility] system. Please see the attached documentation for further details about this patient's care.     Devi Hargrove LPN

## 2018-03-12 NOTE — PATIENT INSTRUCTIONS
Patient instructed to keep dressing dry and intact and to elevate legs when resting.    1. LEAVE FOOT DRESSINGS DRY AND INTACT.    2. ELEVATE AND REST FOOT AS MUCH AS POSSIBLE.    3. WEAR SURGICAL BOOT AT ALL TIMES.     4. CALL IMMEDIATELY IF ANY PROBLEMS SUCH AS THE BANDAGE GETTING WET OR FALLING OFF.    5. CALL IMMEDIATELY IF ANY SIGNS OF INFECTION SUCH AS FEVER, CHILLS, SWEATS, INCREASED REDNESS, OR PAIN.

## 2018-03-16 ENCOUNTER — ANTI-COAG VISIT (OUTPATIENT)
Dept: CARDIOLOGY | Facility: CLINIC | Age: 53
End: 2018-03-16
Payer: MEDICARE

## 2018-03-16 DIAGNOSIS — I26.99 OTHER PULMONARY EMBOLISM WITHOUT ACUTE COR PULMONALE, UNSPECIFIED CHRONICITY: ICD-10-CM

## 2018-03-16 DIAGNOSIS — Z79.01 LONG TERM (CURRENT) USE OF ANTICOAGULANTS: Primary | ICD-10-CM

## 2018-03-16 LAB — INR PPP: 3.8 (ref 2–3)

## 2018-03-16 PROCEDURE — 85610 PROTHROMBIN TIME: CPT | Mod: PBBFAC,PO

## 2018-03-16 PROCEDURE — 99211 OFF/OP EST MAY X REQ PHY/QHP: CPT | Mod: S$PBB,,, | Performed by: PHARMACIST

## 2018-03-16 NOTE — PROGRESS NOTES
INR elevated today. Patient hasn't been here since 1/4 when he was low. He states that he does not want to eat greens anymore, this a decrease from twice weekly. He already took his dose today, will decrease weekly dose so that he gets decrease tomorrow. He has bruises from use and some small nosebleeds, denies any other changes. Follow-up in 10 days. Advised him to call with any changes or concerns.

## 2018-03-16 NOTE — PROGRESS NOTES
Pt seen by Lakisha LOPEZ. I have reviewed her documentation and agree with her assessment and plan.

## 2018-03-19 ENCOUNTER — HOSPITAL ENCOUNTER (OUTPATIENT)
Dept: WOUND CARE | Facility: HOSPITAL | Age: 53
Discharge: HOME | End: 2018-03-19
Attending: PODIATRIST
Payer: MEDICARE

## 2018-03-19 VITALS — HEART RATE: 103 BPM | DIASTOLIC BLOOD PRESSURE: 59 MMHG | SYSTOLIC BLOOD PRESSURE: 116 MMHG

## 2018-03-19 DIAGNOSIS — I87.2 VENOUS (PERIPHERAL) INSUFFICIENCY: ICD-10-CM

## 2018-03-19 DIAGNOSIS — I10 ESSENTIAL HYPERTENSION: ICD-10-CM

## 2018-03-19 DIAGNOSIS — Z86.718 HISTORY OF DVT (DEEP VEIN THROMBOSIS): ICD-10-CM

## 2018-03-19 PROCEDURE — 99213 PR OFFICE/OUTPT VISIT, EST, LEVL III, 20-29 MIN: ICD-10-PCS | Mod: ,,, | Performed by: PODIATRIST

## 2018-03-19 PROCEDURE — 99211 OFF/OP EST MAY X REQ PHY/QHP: CPT

## 2018-03-19 PROCEDURE — 99213 OFFICE O/P EST LOW 20 MIN: CPT | Mod: ,,, | Performed by: PODIATRIST

## 2018-03-19 NOTE — PATIENT INSTRUCTIONS
Patient instructed to elevate legs to reduce swelling and to call [facility] PRN wound exacerbation and/or new skin breakdown. Patient instructed to wear compression stockings to BLE daily and to remove at bedtime.    Elevate feet when resting.    Wear stockings at all times when awake.     Avoid prolonged periods of standing.

## 2018-03-26 ENCOUNTER — OFFICE VISIT (OUTPATIENT)
Dept: ELECTROPHYSIOLOGY | Facility: CLINIC | Age: 53
End: 2018-03-26
Payer: MEDICARE

## 2018-03-26 ENCOUNTER — HOSPITAL ENCOUNTER (OUTPATIENT)
Dept: CARDIOLOGY | Facility: CLINIC | Age: 53
Discharge: HOME | End: 2018-03-26
Payer: MEDICARE

## 2018-03-26 ENCOUNTER — OFFICE VISIT (OUTPATIENT)
Dept: TRANSPLANT | Facility: CLINIC | Age: 53
End: 2018-03-26
Payer: MEDICARE

## 2018-03-26 ENCOUNTER — ANTI-COAG VISIT (OUTPATIENT)
Dept: CARDIOLOGY | Facility: CLINIC | Age: 53
End: 2018-03-26
Payer: MEDICARE

## 2018-03-26 VITALS
SYSTOLIC BLOOD PRESSURE: 105 MMHG | HEART RATE: 61 BPM | DIASTOLIC BLOOD PRESSURE: 70 MMHG | HEIGHT: 78 IN | WEIGHT: 315 LBS | BODY MASS INDEX: 36.45 KG/M2

## 2018-03-26 VITALS
HEART RATE: 75 BPM | HEIGHT: 78 IN | DIASTOLIC BLOOD PRESSURE: 64 MMHG | WEIGHT: 315 LBS | SYSTOLIC BLOOD PRESSURE: 104 MMHG | BODY MASS INDEX: 36.45 KG/M2

## 2018-03-26 DIAGNOSIS — I34.0 NON-RHEUMATIC MITRAL REGURGITATION: ICD-10-CM

## 2018-03-26 DIAGNOSIS — E05.90 HYPERTHYROIDISM: Chronic | ICD-10-CM

## 2018-03-26 DIAGNOSIS — Z86.718 HISTORY OF DVT (DEEP VEIN THROMBOSIS): ICD-10-CM

## 2018-03-26 DIAGNOSIS — E66.01 MORBID OBESITY: ICD-10-CM

## 2018-03-26 DIAGNOSIS — Z79.01 LONG TERM (CURRENT) USE OF ANTICOAGULANTS: ICD-10-CM

## 2018-03-26 DIAGNOSIS — I10 ESSENTIAL HYPERTENSION: ICD-10-CM

## 2018-03-26 DIAGNOSIS — I26.99 OTHER PULMONARY EMBOLISM WITHOUT ACUTE COR PULMONALE, UNSPECIFIED CHRONICITY: ICD-10-CM

## 2018-03-26 DIAGNOSIS — I48.91 ATRIAL FIBRILLATION, UNSPECIFIED TYPE: ICD-10-CM

## 2018-03-26 DIAGNOSIS — R60.0 BILATERAL EDEMA OF LOWER EXTREMITY: ICD-10-CM

## 2018-03-26 DIAGNOSIS — I50.42 CHRONIC COMBINED SYSTOLIC AND DIASTOLIC CONGESTIVE HEART FAILURE: ICD-10-CM

## 2018-03-26 DIAGNOSIS — I48.20 CHRONIC ATRIAL FIBRILLATION: Primary | ICD-10-CM

## 2018-03-26 DIAGNOSIS — Z79.01 LONG TERM (CURRENT) USE OF ANTICOAGULANTS: Primary | ICD-10-CM

## 2018-03-26 DIAGNOSIS — I48.20 CHRONIC ATRIAL FIBRILLATION: ICD-10-CM

## 2018-03-26 DIAGNOSIS — I87.1 MAY-THURNER SYNDROME: ICD-10-CM

## 2018-03-26 LAB — INR PPP: 2 (ref 2–3)

## 2018-03-26 PROCEDURE — 99999 PR PBB SHADOW E&M-EST. PATIENT-LVL III: CPT | Mod: PBBFAC,,, | Performed by: NURSE PRACTITIONER

## 2018-03-26 PROCEDURE — 93005 ELECTROCARDIOGRAM TRACING: CPT | Mod: PBBFAC | Performed by: INTERNAL MEDICINE

## 2018-03-26 PROCEDURE — 93010 ELECTROCARDIOGRAM REPORT: CPT | Mod: S$PBB,,, | Performed by: INTERNAL MEDICINE

## 2018-03-26 PROCEDURE — 99214 OFFICE O/P EST MOD 30 MIN: CPT | Mod: S$PBB,,, | Performed by: NURSE PRACTITIONER

## 2018-03-26 PROCEDURE — 99213 OFFICE O/P EST LOW 20 MIN: CPT | Mod: PBBFAC,27 | Performed by: NURSE PRACTITIONER

## 2018-03-26 PROCEDURE — 85610 PROTHROMBIN TIME: CPT | Mod: PBBFAC

## 2018-03-26 PROCEDURE — 99213 OFFICE O/P EST LOW 20 MIN: CPT | Mod: PBBFAC,25 | Performed by: INTERNAL MEDICINE

## 2018-03-26 PROCEDURE — 99999 PR PBB SHADOW E&M-EST. PATIENT-LVL III: CPT | Mod: PBBFAC,,, | Performed by: INTERNAL MEDICINE

## 2018-03-26 PROCEDURE — 99215 OFFICE O/P EST HI 40 MIN: CPT | Mod: S$PBB,,, | Performed by: INTERNAL MEDICINE

## 2018-03-26 RX ORDER — LISINOPRIL 2.5 MG/1
10 TABLET ORAL DAILY
Qty: 120 TABLET | Refills: 11 | Status: SHIPPED | OUTPATIENT
Start: 2018-03-26 | End: 2019-04-03 | Stop reason: SDUPTHER

## 2018-03-26 NOTE — PROGRESS NOTES
Subjective:    Patient ID:  Drew Farrar is a 52 y.o. male who presents for follow-up of Atrial Fibrillation.     Drew Farrar is a patient of Dr. Begum.     HPI     Referring Physician: Jeffrey Calixto MD  Primary Cardiologist: Gomez Lantigua MD    HPI  I had the pleasure of seeing Mr. Farrar today in our electrophysiology clinic in follow up for his permanent atrial fibrillation. As you are aware he is a pleasant 52 year-old with patent PDA s/p surgical closure 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 of age, May Thurner syndrome with multiple leg/pelvic DVTs with pulmonary emboli, chronic systolic congestive heart failure, permanent atrial fibrillation since his 30s, and hyperthyroidism.  He has had multiple endovascular procedures for his venous disease. He is on chronic coumadin.  He was noted to have an LVEF of 45% in 2014 and 30% in 2016. On review of his labs his TSH has been undetectable since 2014.      With respect to his atrial fibrillation he reports he went into atrial fibrillation when he was undergoing a knee operation in his 30s. He and his mother report he was shocked 3 times and remained in atrial fibrillation. They report he was left in atrial fibrillation thereafter. He reports noting fast heart beat sometimes after exerting himself. He has noted it more frequently as of late. He was seeing Dr. Farhan Calixto for his worsening dyspnea on exertion who wanted him evaluated from a cardiology perspective. He is on Toprol-XL 50 mg daily, lisinopril 2.5mg daily, warfarin, and methimazole. He denied any bleeding issues related to warfarin therapy. Of note, when he last saw Dr. Lantigua in February of 2017 the plan was for him to be seen in heart failure clinic.      At his initial [facility] EP visit with Dr. Begum, he reported experiencing significant right-sided CP and was escorted to the ED for further evaluation. A CT was performed noting no PE. An Echocardiogram was performed noting LVEF of 40-45%. A Cardiac PET stress  "test was negative for ischemia. He subsequently started seeing Dr. Castañeda in heart failure clinic and had established care with endocrinology who had been working on his hyperthyroidism. A Holter Monitor around that time noted controlled heart rates with average of 82 bpm.  At his last office visit, Toprol-XL was increased to 200 mg daily for improved rate control and heart failure therapy.     Since his last office visit, Mr. Farrar states that he is unable to walk distances without becoming SOB/FRAIRE, experiencing palpitations, or becoming fatigued. He states that his HR was elevated at a recent [facility]'s parade>>had to stop walking>>was seen by [facility] at the parade>>was told to rest, and he reports that his HRs were "fast on the screen">>he noted SOB. His mother states that he can't walk for any extended period of time. He denies recurrence, but states that he has not really tried to walk since. He notes occasional CP (intermittent>>sees Dr. Castañeda). He notes SOB when walking <1 block. He notes continued palpitations and increased fatigue. He denies pre-syncope/syncope.    I reviewed today's ECG which demonstrated AF at 79 bpm; , and QTc 449.    Review of Systems   Constitution: Positive for weakness and malaise/fatigue. Negative for diaphoresis.   HENT: Negative for nosebleeds.    Eyes: Negative for double vision.   Cardiovascular: Positive for chest pain, dyspnea on exertion, irregular heartbeat and palpitations. Negative for near-syncope and syncope.   Respiratory: Positive for shortness of breath.    Skin: Negative.    Musculoskeletal: Positive for muscle weakness and stiffness.   Gastrointestinal: Negative for hematemesis and hematochezia.   Genitourinary: Negative for hematuria.   Neurological: Positive for light-headedness.   Psychiatric/Behavioral: Negative for altered mental status.        Objective:    Physical Exam   Constitutional: He is oriented to person, place, and time. " He appears well-developed and well-nourished.   HENT:   Head: Normocephalic and atraumatic.   Eyes: Pupils are equal, round, and reactive to light.   Cardiovascular: Normal rate.  An irregularly irregular rhythm present.   Pulmonary/Chest: Effort normal.   Musculoskeletal:   Mr. Farrar is in a wheelchair today.   Neurological: He is alert and oriented to person, place, and time.   Vitals reviewed.        Assessment:       1. Chronic atrial fibrillation    2. Chronic combined systolic and diastolic congestive heart failure    3. Essential hypertension    4. Non-rheumatic mitral regurgitation    5. History of DVT (deep vein thrombosis)    6. Hyperthyroidism    7. Long term (current) use of anticoagulants    8. Morbid obesity         Plan:       Mr. Farrar is doing well from a rhythm perspective in rate-controlled AF today at 79 bpm; rate-controlled on Toprol-XL and anticoagulated on warfarin. Despite this, he describes an episode at a recent parade, when he had to stop walking 2/2 a rapid HR, SOB/FRAIRE, and fatigue>>no recurrence.     24-Hour Holter now to assess current rate-control.   Follow up with Endocrinology as scheduled (July 2018).   For now, continue current medication regimen.   Follow up in clinic in 3 months, sooner as needed.     Shirin Mcclain, [place], APRN, FNP-C      Mr. Farrar was personally evaluated by Dr. Begum, who agrees with the aforementioned plan.   (A copy of today's note was sent to Dr. Begum.)

## 2018-03-26 NOTE — PROGRESS NOTES
INR low today. Patient reports a nosebleed yesterday and occasional bruising from use. He denies any other changes or concerns. Will increase weekly dose and follow up in 2 weeks. Advised patient to call coumadin clinic with any changes or concerns.

## 2018-03-26 NOTE — PROGRESS NOTES
Subjective:    Patient ID:  Drew Farrar is a 52 y.o. male who presents for evaluation of Congestive Heart Failure (4mo visit)      Congestive Heart Failure   Pertinent negatives include no abdominal pain, chest pain, chills, coughing, diaphoresis, fever or weakness.     Hospital Course:     M with PMHx of persistent afib on coumadin, patent PDA s/p surgical closure 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 of age, May Thurner syndrome with h/o multiple PEs and DVTs s/p IVC filter, chronic systolic congestive heart failure (EF 30%), hyperthyroid, atrial fibrillation here for follow up. FRAIRE FC II-III NYHA      Review of Systems   Constitution: Negative for chills, decreased appetite, diaphoresis, fever, weakness, malaise/fatigue, night sweats, weight gain and weight loss.   HENT: Negative.    Eyes: Negative.    Cardiovascular: Positive for dyspnea on exertion and irregular heartbeat. Negative for chest pain, claudication, cyanosis, leg swelling, near-syncope, orthopnea and palpitations.   Respiratory: Negative for cough, hemoptysis, shortness of breath, sleep disturbances due to breathing, snoring, sputum production and wheezing.    Endocrine: Negative.    Hematologic/Lymphatic: Negative.    Skin: Negative.    Musculoskeletal: Negative.    Gastrointestinal: Negative for abdominal pain and diarrhea.   Genitourinary: Negative.    Neurological: Negative.    Psychiatric/Behavioral: Negative.         Objective:    Physical Exam   Constitutional: He is oriented to person, place, and time. He appears well-developed and well-nourished.   HENT:   Head: Normocephalic and atraumatic.   Eyes: Conjunctivae and EOM are normal. Pupils are equal, round, and reactive to light.   Neck: Normal range of motion. Neck supple. No JVD present.   Cardiovascular: Normal rate.  An irregularly irregular rhythm present. Exam reveals no gallop and no friction rub.    Murmur heard.  High-pitched blowing holosystolic murmur is present with a grade of 2/6  at the  apex  Pulmonary/Chest: Breath sounds normal. No respiratory distress. He has no wheezes. He has no rales. He exhibits no tenderness.   Abdominal: Soft. Bowel sounds are normal. He exhibits no distension and no mass. There is no hepatosplenomegaly, splenomegaly or hepatomegaly. There is no tenderness. There is no rebound, no guarding and no CVA tenderness.   No hepatoslenomegaly   Musculoskeletal: Normal range of motion. He exhibits edema (+4). He exhibits no tenderness.   Neurological: He is alert and oriented to person, place, and time. He has normal reflexes. No cranial nerve deficit. He exhibits normal muscle tone. Coordination normal.   Skin: Skin is warm and dry.   Psychiatric: He has a normal mood and affect.         Assessment:       1. Chronic venous hypertension with ulcer involving left side    2. Chronic atrial fibrillation    3. Chronic combined systolic and diastolic congestive heart failure    4. Essential hypertension    5. May-Thurner syndrome    6. Non-rheumatic mitral regurgitation    7. Long term (current) use of anticoagulants    8. Other pulmonary embolism without acute cor pulmonale, unspecified chronicity    9. Hyperthyroidism    10. Bilateral edema of lower extremity         Plan:       Increase demadex to 40 mg po once a day    Dr Begum to see him

## 2018-03-26 NOTE — PROGRESS NOTES
Pt seen by Jacquie LOPEZ . I have reviewed her initial findings and agree with her assessment and plan

## 2018-04-09 ENCOUNTER — ANTI-COAG VISIT (OUTPATIENT)
Dept: CARDIOLOGY | Facility: CLINIC | Age: 53
End: 2018-04-09
Payer: MEDICARE

## 2018-04-09 DIAGNOSIS — I26.99 OTHER PULMONARY EMBOLISM WITHOUT ACUTE COR PULMONALE, UNSPECIFIED CHRONICITY: ICD-10-CM

## 2018-04-09 DIAGNOSIS — Z79.01 LONG TERM (CURRENT) USE OF ANTICOAGULANTS: Primary | ICD-10-CM

## 2018-04-09 LAB — INR PPP: 2 (ref 2–3)

## 2018-04-09 PROCEDURE — 85610 PROTHROMBIN TIME: CPT | Mod: PBBFAC,PO

## 2018-04-09 NOTE — PROGRESS NOTES
Quick follow-up for low INR 3/26. INR still low. Patient with bruises from use, denies any bleeding or changes. Will boost dose today and increase weekly dose until follow-up next week. Advised him to call with any changes or concerns.

## 2018-04-10 RX ORDER — WARFARIN 10 MG/1
10 TABLET ORAL DAILY
Qty: 30 TABLET | Refills: 8 | Status: SHIPPED | OUTPATIENT
Start: 2018-04-10 | End: 2018-07-17

## 2018-04-12 ENCOUNTER — APPOINTMENT (OUTPATIENT)
Dept: ELECTROPHYSIOLOGY | Facility: CLINIC | Age: 53
End: 2018-04-12
Attending: NURSE PRACTITIONER
Payer: MEDICARE

## 2018-04-12 PROCEDURE — 93226 XTRNL ECG REC<48 HR SCAN A/R: CPT | Mod: PBBFAC | Performed by: INTERNAL MEDICINE

## 2018-04-12 PROCEDURE — 93227 XTRNL ECG REC<48 HR R&I: CPT | Mod: S$PBB,,, | Performed by: INTERNAL MEDICINE

## 2018-04-17 ENCOUNTER — ANTI-COAG VISIT (OUTPATIENT)
Dept: CARDIOLOGY | Facility: CLINIC | Age: 53
End: 2018-04-17
Payer: MEDICARE

## 2018-04-17 DIAGNOSIS — I26.99 OTHER PULMONARY EMBOLISM WITHOUT ACUTE COR PULMONALE, UNSPECIFIED CHRONICITY: ICD-10-CM

## 2018-04-17 DIAGNOSIS — Z79.01 LONG TERM (CURRENT) USE OF ANTICOAGULANTS: Primary | ICD-10-CM

## 2018-04-17 LAB — INR PPP: 4.2 (ref 2–3)

## 2018-04-17 PROCEDURE — 85610 PROTHROMBIN TIME: CPT | Mod: PBBFAC,PO

## 2018-04-17 NOTE — PROGRESS NOTES
Quick follow-up for low INR and increased dose 4/9. INR elevated today. Patient took higher then scheduled daily/weekly dose. He already took his dose today. Will hold coumadin tomorrow and give reduced dose 4/19 then start new dose until follow-up in 2 weeks. Advised him to call with any changes or concerns.

## 2018-05-01 ENCOUNTER — ANTI-COAG VISIT (OUTPATIENT)
Dept: CARDIOLOGY | Facility: CLINIC | Age: 53
End: 2018-05-01
Payer: MEDICARE

## 2018-05-01 DIAGNOSIS — I26.99 OTHER PULMONARY EMBOLISM WITHOUT ACUTE COR PULMONALE, UNSPECIFIED CHRONICITY: ICD-10-CM

## 2018-05-01 DIAGNOSIS — Z79.01 LONG TERM (CURRENT) USE OF ANTICOAGULANTS: Primary | ICD-10-CM

## 2018-05-01 LAB — INR PPP: 1.5 (ref 2–3)

## 2018-05-01 PROCEDURE — 85610 PROTHROMBIN TIME: CPT | Mod: PBBFAC,PO

## 2018-05-01 NOTE — PROGRESS NOTES
Quick follow-up for elevated INR and decreased dose 4/17. INR low today. Patient states he was out of town and ate extra greens this past week and he will resume no green intake again now. He has bruises from use, denies any bleeding or other changes. Will boost coumadin today and re challenge weekly dose until follow-up in 10 days. Advised him to call with any changes or concerns.

## 2018-05-11 ENCOUNTER — ANTI-COAG VISIT (OUTPATIENT)
Dept: CARDIOLOGY | Facility: CLINIC | Age: 53
End: 2018-05-11
Payer: MEDICARE

## 2018-05-11 DIAGNOSIS — I26.99 OTHER PULMONARY EMBOLISM WITHOUT ACUTE COR PULMONALE, UNSPECIFIED CHRONICITY: ICD-10-CM

## 2018-05-11 DIAGNOSIS — Z79.01 LONG TERM (CURRENT) USE OF ANTICOAGULANTS: Primary | ICD-10-CM

## 2018-05-11 LAB — INR PPP: 2.1 (ref 2–3)

## 2018-05-11 PROCEDURE — 85610 PROTHROMBIN TIME: CPT | Mod: PBBFAC,PO

## 2018-05-11 NOTE — PROGRESS NOTES
Quick follow-up for low INR 5/1. INR improving but still low. Patient with bruises from use, denies any bleeding or changes. Will increase weekly dose so he gets increase today and follow-up in 10 days. Advised him to call with any changes or concerns.

## 2018-05-19 ENCOUNTER — HOSPITAL ENCOUNTER (EMERGENCY)
Facility: HOSPITAL | Age: 53
Discharge: HOME | End: 2018-05-19
Attending: EMERGENCY MEDICINE
Payer: MEDICARE

## 2018-05-19 VITALS
TEMPERATURE: 98 F | HEART RATE: 60 BPM | HEIGHT: 78 IN | BODY MASS INDEX: 36.45 KG/M2 | SYSTOLIC BLOOD PRESSURE: 108 MMHG | DIASTOLIC BLOOD PRESSURE: 56 MMHG | RESPIRATION RATE: 16 BRPM | WEIGHT: 315 LBS | OXYGEN SATURATION: 100 %

## 2018-05-19 DIAGNOSIS — R07.89 RIGHT-SIDED CHEST WALL PAIN: Primary | ICD-10-CM

## 2018-05-19 DIAGNOSIS — R07.9 CHEST PAIN: ICD-10-CM

## 2018-05-19 LAB
ALBUMIN SERPL BCP-MCNC: 4.2 G/DL
ALP SERPL-CCNC: 157 U/L
ALT SERPL W/O P-5'-P-CCNC: 20 U/L
ANION GAP SERPL CALC-SCNC: 11 MMOL/L
AST SERPL-CCNC: 32 U/L
BASOPHILS # BLD AUTO: 0.04 K/UL
BASOPHILS NFR BLD: 0.6 %
BILIRUB SERPL-MCNC: 0.5 MG/DL
BNP SERPL-MCNC: 111 PG/ML
BUN SERPL-MCNC: 26 MG/DL
CALCIUM SERPL-MCNC: 9.1 MG/DL
CHLORIDE SERPL-SCNC: 110 MMOL/L
CO2 SERPL-SCNC: 21 MMOL/L
CREAT SERPL-MCNC: 1.2 MG/DL
DIFFERENTIAL METHOD: ABNORMAL
EOSINOPHIL # BLD AUTO: 0.3 K/UL
EOSINOPHIL NFR BLD: 3.6 %
ERYTHROCYTE [DISTWIDTH] IN BLOOD BY AUTOMATED COUNT: 16.7 %
EST. GFR  (AFRICAN AMERICAN): >60 ML/MIN/1.73 M^2
EST. GFR  (NON AFRICAN AMERICAN): >60 ML/MIN/1.73 M^2
GLUCOSE SERPL-MCNC: 97 MG/DL
HCT VFR BLD AUTO: 38.8 %
HGB BLD-MCNC: 12.3 G/DL
INR PPP: 3.3
LYMPHOCYTES # BLD AUTO: 1.6 K/UL
LYMPHOCYTES NFR BLD: 23.4 %
MCH RBC QN AUTO: 27.3 PG
MCHC RBC AUTO-ENTMCNC: 31.7 G/DL
MCV RBC AUTO: 86 FL
MONOCYTES # BLD AUTO: 0.5 K/UL
MONOCYTES NFR BLD: 7.6 %
NEUTROPHILS # BLD AUTO: 4.5 K/UL
NEUTROPHILS NFR BLD: 64.5 %
PLATELET # BLD AUTO: 180 K/UL
PMV BLD AUTO: 9.8 FL
POTASSIUM SERPL-SCNC: 4.9 MMOL/L
PROT SERPL-MCNC: 8.5 G/DL
PROTHROMBIN TIME: 34.5 SEC
RBC # BLD AUTO: 4.51 M/UL
SODIUM SERPL-SCNC: 142 MMOL/L
TROPONIN I SERPL DL<=0.01 NG/ML-MCNC: 0.01 NG/ML
TROPONIN I SERPL DL<=0.01 NG/ML-MCNC: 0.02 NG/ML
WBC # BLD AUTO: 7.01 K/UL

## 2018-05-19 PROCEDURE — 80053 COMPREHEN METABOLIC PANEL: CPT

## 2018-05-19 PROCEDURE — 85025 COMPLETE CBC W/AUTO DIFF WBC: CPT

## 2018-05-19 PROCEDURE — 99284 EMERGENCY DEPT VISIT MOD MDM: CPT

## 2018-05-19 PROCEDURE — 83880 ASSAY OF NATRIURETIC PEPTIDE: CPT

## 2018-05-19 PROCEDURE — 84484 ASSAY OF TROPONIN QUANT: CPT | Mod: 91

## 2018-05-19 PROCEDURE — 85610 PROTHROMBIN TIME: CPT

## 2018-05-19 PROCEDURE — 25000003 PHARM REV CODE 250: Performed by: PHYSICIAN ASSISTANT

## 2018-05-19 PROCEDURE — 93005 ELECTROCARDIOGRAM TRACING: CPT

## 2018-05-19 RX ORDER — CYCLOBENZAPRINE HCL 10 MG
10 TABLET ORAL
Status: COMPLETED | OUTPATIENT
Start: 2018-05-19 | End: 2018-05-19

## 2018-05-19 RX ORDER — CYCLOBENZAPRINE HCL 10 MG
10 TABLET ORAL 3 TIMES DAILY PRN
Qty: 15 TABLET | Refills: 0 | Status: SHIPPED | OUTPATIENT
Start: 2018-05-19 | End: 2018-05-24

## 2018-05-19 RX ORDER — ASPIRIN 325 MG
325 TABLET ORAL
Status: COMPLETED | OUTPATIENT
Start: 2018-05-19 | End: 2018-05-19

## 2018-05-19 RX ADMIN — CYCLOBENZAPRINE HYDROCHLORIDE 10 MG: 10 TABLET, FILM COATED ORAL at 06:05

## 2018-05-19 RX ADMIN — ASPIRIN 325 MG ORAL TABLET 325 MG: 325 PILL ORAL at 06:05

## 2018-05-19 NOTE — ED NOTES
Assumed care of a 52 year old male complaint of chest pain that started at 0500 today ,was just visiting with friends .. Walked into room 23 with heavy respirations holding chest and grunting and moaning, skin damp to touch but color is within normal limits. Placed on cardiac monitor . A fib noted rate 88.. describes the pain and constant in nature ,feels like pressure and an elephant sitting on my chest with sob ..Alert and oriented times three    history of hypertension , a fib and venous insuffiencey , report he is complaint with medication   Dr Sanchez to room for patient assessment    both feet and lower legs purplish gray and scaly . With 2 plus edema noted to left and 3 + to right

## 2018-05-19 NOTE — ED NOTES
Pt resting with eyes closed ,snoring respirations noted . o2 SATs 99 -100 % on 02 at 2 l/m .easily aroused

## 2018-05-19 NOTE — ED PROVIDER NOTES
Encounter Date: 5/19/2018    SCRIBE #1 NOTE: I, Ysabel Owen, am scribing for, and in the presence of,  Dr. Sanchez. I have scribed the following portions of the note - the APC attestation. Other sections scribed: the disposition.       History     Chief Complaint   Patient presents with    Chest Pain     Onset this morning, worsening over the day.  Feels like pressure on chest.       Drew Farrar, a 52 y.o. male with PMH significant for a-fib, CHD, May Thurner syndrome, HTN, PE that presents to the ED for evaluation of sudden onset of CP that feels like a pressure sensation to the middle and left side of chest.  Patient states that the pain started around 5 am this morning.  Pain is worse with inspiration and touch.  There is no radiation of his pain.  Alleviating factors include slow breathing.  Associated symptoms include SOB, worse with ambulation.  Patient was SOB and clutching his chest during his walk to his ED bed.  He denies any N/V.  No treatments tried.        The history is provided by the patient.     Review of patient's allergies indicates:   Allergen Reactions    Food allergy formula [glutamine-c-quercet-selen-brom]      Allergic to green peas; Heart failure.    Peas Hives    Ibuprofen Swelling    Latex, natural rubber Hives    Pcn [penicillins] Hives    Butisol [butabarbital] Rash     Peeling skin     Past Medical History:   Diagnosis Date    *Atrial fibrillation     Atrial fibrillation     Atrial fibrillation Feb 23, 2016    Bipolar disorder     Congenital heart disease     s/p surgical intervention 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 of age    Deep vein thrombosis     DVT of leg (deep venous thrombosis)     left leg    History of prior ablation treatment     10/9/13    Hypertension     Obesity     Stroke     Thyroid disease     Venous ulcer      Past Surgical History:   Procedure Laterality Date    ANGIOPLASTY      CARDIAC SURGERY      open heart surgery 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 old    EYE SURGERY       left eye cataract/right eye glaucoma    TIMO FILTER PLACEMENT      [place]    KNEE SURGERY      l and r     MULTIPLE TOOTH EXTRACTIONS      SKIN GRAFT      left leg     Family History   Problem Relation Age of Onset    Diabetes Father     Heart disease Father     Heart disease Maternal Grandmother     Diabetes Maternal Grandfather     Heart disease Maternal Grandfather     Stroke Maternal Grandfather      Social History   Substance Use Topics    Smoking status: Former Smoker     Packs/day: 1.00     Years: 6.00     Types: Cigarettes    Smokeless tobacco: Former User      Comment: quit by age 27yrs old    Alcohol use 0.6 oz/week     1 Glasses of wine per week      Comment: 1 glass of wine a week     Review of Systems   Constitutional: Negative for fever.   Respiratory: Positive for shortness of breath. Negative for cough and wheezing.    Cardiovascular: Positive for chest pain and leg swelling. Negative for palpitations.   Gastrointestinal: Negative for abdominal pain, nausea and vomiting.   Musculoskeletal: Negative for back pain.   Skin: Positive for color change (bilateral legs ).   Allergic/Immunologic: Negative for immunocompromised state.   Neurological: Negative for speech difficulty and headaches.   Hematological: Bruises/bleeds easily (pt on coumadin ).   Psychiatric/Behavioral: Negative for agitation and confusion.   All other systems reviewed and are negative.      Physical Exam     Initial Vitals [05/19/18 1706]   BP Pulse Resp Temp SpO2   136/89 89 (!) 24 98.4 °F (36.9 °C) 95 %      MAP       104.67         Physical Exam    Nursing note and vitals reviewed.  Constitutional: He appears well-developed and well-nourished. He is Obese . He appears distressed.   HENT:   Head: Normocephalic and atraumatic.   Right Ear: External ear normal.   Left Ear: External ear normal.   Nose: Nose normal.   Mouth/Throat: Oropharynx is clear and moist.   Eyes: EOM are normal.   Neck:  Normal range of motion. Neck supple.   Cardiovascular: Normal rate and regular rhythm.   Pulmonary/Chest: Breath sounds normal. Tachypnea noted. He has no decreased breath sounds. He has no wheezes. He has no rhonchi. He has no rales.         He exhibits tenderness.   Abdominal: Soft. Bowel sounds are normal. He exhibits no distension. There is no tenderness. There is no rebound and no guarding.   Musculoskeletal: Normal range of motion. He exhibits no edema or tenderness.   Lymphadenopathy:   Lymphedema noted to right leg.     Neurological: He is alert and oriented to person, place, and time. No cranial nerve deficit.   Skin: Skin is warm and dry. Capillary refill takes less than 2 seconds. Rash (dry, crusted, silvery rash to bilateral lower legs ) noted. No abscess noted. No erythema.   Psychiatric: He has a normal mood and affect. Thought content normal.         ED Course   Procedures  Labs Reviewed   CBC W/ AUTO DIFFERENTIAL - Abnormal; Notable for the following:        Result Value    RBC 4.51 (*)     Hemoglobin 12.3 (*)     Hematocrit 38.8 (*)     MCHC 31.7 (*)     RDW 16.7 (*)     All other components within normal limits   COMPREHENSIVE METABOLIC PANEL - Abnormal; Notable for the following:     CO2 21 (*)     BUN, Bld 26 (*)     Total Protein 8.5 (*)     Alkaline Phosphatase 157 (*)     All other components within normal limits   B-TYPE NATRIURETIC PEPTIDE - Abnormal; Notable for the following:      (*)     All other components within normal limits   PROTIME-INR - Abnormal; Notable for the following:     Prothrombin Time 34.5 (*)     INR 3.3 (*)     All other components within normal limits   TROPONIN I   TROPONIN I        Imaging Results          X-Ray Chest AP Portable (Final result)  Result time 05/19/18 17:33:41    Final result by Gabriel Toledo MD (05/19/18 17:33:41)                 Impression:      1. Cardiomegaly with congestion/early edema, no large focal consolidation.      Electronically  signed by: Gabriel Toledo MD  Date:    2018  Time:    17:33             Narrative:    EXAMINATION:  XR CHEST AP PORTABLE    CLINICAL HISTORY:  Chest Pain;    TECHNIQUE:  Single frontal view of the chest was performed.    COMPARISON:  10/14/2017    FINDINGS:  The cardiomediastinal silhouette is enlarged, similar to the previous exam.  There is no pleural effusion.  The trachea is midline.  The lungs are symmetrically expanded bilaterally with increased interstitial attenuation bilaterally, suggesting congestive change/early edema.  No large focal consolidation seen.  There is no pneumothorax.  The osseous structures are remarkable for degenerative change..                                   Medical Decision Making:   Initial Assessment:   Chest pain with pressure, SOB   Differential Diagnosis:   ACS, arrhythmia, costochondritis,   Clinical Tests:   Lab Tests: Ordered and Reviewed  The following lab test(s) were unremarkable: CBC, CMP, Troponin, BNP and PT  Radiological Study: Ordered and Reviewed  ED Management:  Patient presents to ED for evaluation of chest pain that started at 5AM this morning.  CP is reproducible.  Aspirin, flexeril and fluids given with improvement of symptoms.                Attending Attestation:     Physician Attestation Statement for NP/PA:   I have conducted a face to face encounter with this patient in addition to the NP/PA, due to NP/PA Request    Other NP/PA Attestation Additions:      Medical Decision Makin:07 PM: On reevaluation of the pt the pain is much improved with just a little discomfort. PA reported pt resting comfortably about 10 minutes ago.    9:04 PM: Repeat Troponin is negative. Overall impression is muscoskeletal, right sided chest pain.  Patient comfortable and stable for discharge.  Rx for Flexeril, otherwise Tylenol as needed for pain.    Clinical impression and plan discussed with patient.    Pt is to call for follow up with PCP in 7 days.  Pt to return to  the ED for any new or concerning symptoms.  Aftercare instructions and return precautions provided to patient.   Pt expressed understanding and agrees with plan.                      Clinical Impression:   The primary encounter diagnosis was Right-sided chest wall pain. A diagnosis of Chest pain was also pertinent to this visit.    Disposition:   Disposition: Discharged  Condition: Stable       I, Dr. Abel Sanchez, personally performed the services described in this documentation.   All medical record entries made by the scribe were at my direction and in my presence.   I have reviewed the chart and agree that the record is accurate and complete.   Abel Sanchez MD.                    Abel Sanchez MD  05/19/18 9188

## 2018-05-20 NOTE — ED NOTES
Patient continues to have some chest pain with movement but feels better . Prescription given to dc instructions

## 2018-05-20 NOTE — DISCHARGE INSTRUCTIONS
Thank you for choosing [facility]! We appreciate you coming to us for your medical care. We hope you feel better soon! Please come back to [facility] for all of your future medical needs.      Sincerely,    Abel Sanchez MD  Medical Director  Emergency Department

## 2018-05-21 ENCOUNTER — ANTI-COAG VISIT (OUTPATIENT)
Dept: CARDIOLOGY | Facility: CLINIC | Age: 53
End: 2018-05-21
Payer: MEDICARE

## 2018-05-21 DIAGNOSIS — I26.99 OTHER PULMONARY EMBOLISM WITHOUT ACUTE COR PULMONALE, UNSPECIFIED CHRONICITY: ICD-10-CM

## 2018-05-21 DIAGNOSIS — Z79.01 LONG TERM (CURRENT) USE OF ANTICOAGULANTS: Primary | ICD-10-CM

## 2018-05-21 LAB — INR PPP: 3.7 (ref 2–3)

## 2018-05-21 PROCEDURE — 85610 PROTHROMBIN TIME: CPT | Mod: PBBFAC,PO

## 2018-05-21 NOTE — PROGRESS NOTES
INR slightly elevated today. Patient states that he went to ER 5/19 for muscle spasms, he is taking flexeril, no DDI expected but med is making him sleepy so he is overall eating less. He had a small nosebleed this am and has some bruises from use. He already took his coumadin dose today so will have him eat a serving of greens today. Will give 1/2 dose tomorrow and then resume weekly dose and diet until follow-up in 2 weeks. Advised him to call with any changes or concerns.

## 2018-06-08 ENCOUNTER — ANTI-COAG VISIT (OUTPATIENT)
Dept: CARDIOLOGY | Facility: CLINIC | Age: 53
End: 2018-06-08
Payer: MEDICARE

## 2018-06-08 DIAGNOSIS — I26.99 OTHER PULMONARY EMBOLISM WITHOUT ACUTE COR PULMONALE, UNSPECIFIED CHRONICITY: ICD-10-CM

## 2018-06-08 DIAGNOSIS — Z79.01 LONG TERM (CURRENT) USE OF ANTICOAGULANTS: Primary | ICD-10-CM

## 2018-06-08 LAB — INR PPP: 4.5 (ref 2–3)

## 2018-06-08 PROCEDURE — 85610 PROTHROMBIN TIME: CPT | Mod: PBBFAC,PO

## 2018-06-08 NOTE — PROGRESS NOTES
Quick follow-up for elevated INR 5/21. INR higher today. Patient states that he has increased back pain and swelling in his leg which is normal for him. He has bruises from use, denies any bleeding or other changes. Will hold coumadin tomorrow since he already took today's dose and have him eat a serving of greens today and then decrease weekly dose until follow-up in 10 days. Advised him to call with any changes or concerns.

## 2018-06-09 NOTE — PROGRESS NOTES
Patient seen by Lakisha LOPEZ. I have reviewed her initial findings and agree with her assessment.  Care plan made together.

## 2018-06-11 DIAGNOSIS — M54.50 LUMBAR SPINE PAIN: Primary | ICD-10-CM

## 2018-06-18 ENCOUNTER — ANTI-COAG VISIT (OUTPATIENT)
Dept: CARDIOLOGY | Facility: CLINIC | Age: 53
End: 2018-06-18
Payer: MEDICARE

## 2018-06-18 DIAGNOSIS — I26.99 OTHER PULMONARY EMBOLISM WITHOUT ACUTE COR PULMONALE, UNSPECIFIED CHRONICITY: ICD-10-CM

## 2018-06-18 DIAGNOSIS — Z79.01 LONG TERM (CURRENT) USE OF ANTICOAGULANTS: Primary | ICD-10-CM

## 2018-06-18 LAB — INR PPP: 1.6 (ref 2–3)

## 2018-06-18 PROCEDURE — 85610 PROTHROMBIN TIME: CPT | Mod: PBBFAC,PO

## 2018-06-18 PROCEDURE — 99211 OFF/OP EST MAY X REQ PHY/QHP: CPT | Mod: S$PBB,25,, | Performed by: INTERNAL MEDICINE

## 2018-06-18 NOTE — PROGRESS NOTES
Quick follow-up for elevated INR and decreased dose 6/8. INR low today. Patient with bruises from use, denies any bleeding or changes(nosebleeds stopped, last one was Tues 6/12). Will boost coumadin today and resume weekly dose until follow-up in 1 week. Advised him to call with any changes or concerns.

## 2018-06-25 ENCOUNTER — OFFICE VISIT (OUTPATIENT)
Dept: ORTHOPEDICS | Facility: CLINIC | Age: 53
End: 2018-06-25
Payer: MEDICARE

## 2018-06-25 ENCOUNTER — ANTI-COAG VISIT (OUTPATIENT)
Dept: CARDIOLOGY | Facility: CLINIC | Age: 53
End: 2018-06-25
Payer: MEDICARE

## 2018-06-25 ENCOUNTER — HOSPITAL ENCOUNTER (OUTPATIENT)
Dept: RADIOLOGY | Facility: HOSPITAL | Age: 53
Discharge: HOME | End: 2018-06-25
Attending: PHYSICIAN ASSISTANT
Payer: MEDICARE

## 2018-06-25 VITALS — BODY MASS INDEX: 36.45 KG/M2 | HEIGHT: 78 IN | WEIGHT: 315 LBS

## 2018-06-25 DIAGNOSIS — G89.29 CHRONIC BILATERAL LOW BACK PAIN WITHOUT SCIATICA: Primary | ICD-10-CM

## 2018-06-25 DIAGNOSIS — M54.50 LUMBAR SPINE PAIN: ICD-10-CM

## 2018-06-25 DIAGNOSIS — M54.50 CHRONIC BILATERAL LOW BACK PAIN WITHOUT SCIATICA: Primary | ICD-10-CM

## 2018-06-25 DIAGNOSIS — I26.99 OTHER PULMONARY EMBOLISM WITHOUT ACUTE COR PULMONALE, UNSPECIFIED CHRONICITY: ICD-10-CM

## 2018-06-25 DIAGNOSIS — Z79.01 LONG TERM (CURRENT) USE OF ANTICOAGULANTS: Primary | ICD-10-CM

## 2018-06-25 LAB — INR PPP: 2.3 (ref 2–3)

## 2018-06-25 PROCEDURE — 72100 X-RAY EXAM L-S SPINE 2/3 VWS: CPT | Mod: TC

## 2018-06-25 PROCEDURE — 99213 OFFICE O/P EST LOW 20 MIN: CPT | Mod: PBBFAC,25 | Performed by: PHYSICIAN ASSISTANT

## 2018-06-25 PROCEDURE — 99214 OFFICE O/P EST MOD 30 MIN: CPT | Mod: S$PBB,,, | Performed by: PHYSICIAN ASSISTANT

## 2018-06-25 PROCEDURE — 99211 OFF/OP EST MAY X REQ PHY/QHP: CPT | Mod: S$PBB,25,,

## 2018-06-25 PROCEDURE — 72120 X-RAY BEND ONLY L-S SPINE: CPT | Mod: 26,,, | Performed by: RADIOLOGY

## 2018-06-25 PROCEDURE — 99999 PR PBB SHADOW E&M-EST. PATIENT-LVL III: CPT | Mod: PBBFAC,,, | Performed by: PHYSICIAN ASSISTANT

## 2018-06-25 PROCEDURE — 85610 PROTHROMBIN TIME: CPT | Mod: PBBFAC

## 2018-06-25 PROCEDURE — 72100 X-RAY EXAM L-S SPINE 2/3 VWS: CPT | Mod: 26,,, | Performed by: RADIOLOGY

## 2018-06-25 NOTE — PROGRESS NOTES
Quick follow up for subtherapeutic INR and dose boost on 6/18. INR subtherapeutic today. Patient denies any bleeding, bruising or other changes that may affect warfarin therapy. We will gently increase his weekly dose and follow up in 1 week. Patient reminded to call coumadin clinic with any changes or concerns.

## 2018-06-25 NOTE — PROGRESS NOTES
DATE: 6/25/2018  PATIENT: Drew Farrar    Supervising Physician: Shin Pena M.D.    CHIEF COMPLAINT: back pain    HISTORY:  Drew Farrar is a 52 y.o. male here for initial evaluation of low back pain (Back - 9). The pain has been present for many years. The patient describes the pain as aching and sharp.  The pain is worse with bending and standing and improved by sitting. There is no associated numbness and tingling. There is no subjective weakness. Prior treatments have included vicodin, but no physical therapy, ESIs or surgery.    The patient denies myelopathic symptoms such as handwriting changes or difficulty with buttons/coins/keys. Denies perineal paresthesias, bowel/bladder dysfunction.    PAST MEDICAL/SURGICAL HISTORY:  Past Medical History:   Diagnosis Date    *Atrial fibrillation     Atrial fibrillation     Atrial fibrillation Feb 23, 2016    Bipolar disorder     Congenital heart disease     s/p surgical intervention 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 of age    Deep vein thrombosis     DVT of leg (deep venous thrombosis)     left leg    History of prior ablation treatment     10/9/13    Hypertension     Obesity     Stroke     Thyroid disease     Venous ulcer      Past Surgical History:   Procedure Laterality Date    ANGIOPLASTY      CARDIAC SURGERY      open heart surgery 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 old    EYE SURGERY      left eye cataract/right eye glaucoma    TIMO FILTER PLACEMENT      Dr Calix ([facility])    KNEE SURGERY      l and r     MULTIPLE TOOTH EXTRACTIONS      SKIN GRAFT      left leg       Current Medications:   Current Outpatient Prescriptions:     lisinopril (PRINIVIL,ZESTRIL) 2.5 MG tablet, Take 4 tablets (10 mg total) by mouth once daily. (Patient taking differently: Take 10 mg by mouth once daily. 1 tab morning, 3 tabs at night), Disp: 120 tablet, Rfl: 11    methIMAzole (TAPAZOLE) 10 MG Tab, Take 2 tablets (20 mg total) by mouth once daily., Disp: 60 tablet, Rfl: 11     metoprolol succinate (TOPROL-XL) 100 MG 24 hr tablet, Take 2 tablets (200 mg total) by mouth once daily. (Patient taking differently: Take 200 mg by mouth 2 (two) times daily. ), Disp: 60 tablet, Rfl: 11    torsemide (DEMADEX) 20 MG Tab, Take 1 tablet (20 mg total) by mouth once daily., Disp: 30 tablet, Rfl: 11    warfarin (COUMADIN) 10 MG tablet, Take 1.5-2 tablets (15-20 mg total) by mouth once daily. (Patient taking differently: Take 15 mg by mouth Daily. Tuesday and Thursday), Disp: 60 tablet, Rfl: 3    warfarin (COUMADIN) 10 MG tablet, TAKE 1 TABLET (10 MG TOTAL) BY MOUTH ONCE DAILY., Disp: 30 tablet, Rfl: 8  No current facility-administered medications for this visit.     Facility-Administered Medications Ordered in Other Visits:     sodium tetradecyl sulfate 1 % (10 mg/mL) Soln 2 mL, 2 mL, Subcutaneous, Once, Gomez Lantigua MD    Social History:   Social History     Social History    Marital status: Single     Spouse name: N/A    Number of children: 2    Years of education: N/A     Occupational History    Not on file.     Social History Main Topics    Smoking status: Former Smoker     Packs/day: 1.00     Years: 6.00     Types: Cigarettes    Smokeless tobacco: Former User      Comment: quit by age 27yrs old    Alcohol use 0.6 oz/week     1 Glasses of wine per week      Comment: 1 glass of wine a week    Drug use: No    Sexual activity: Yes     Partners: Female     Birth control/ protection: None     Other Topics Concern    Not on file     Social History Narrative    No narrative on file       REVIEW OF SYSTEMS:  Constitution: Negative. Negative for chills, fever and night sweats.   Cardiovascular: Negative for chest pain and syncope.   Respiratory: Negative for cough and shortness of breath.   Gastrointestinal: See HPI. Negative for nausea/vomiting. Negative for abdominal pain.  Genitourinary: See HPI. Negative for discoloration or dysuria.  Skin: Negative for dry skin, itching and rash.  "  Hematologic/Lymphatic: Negative for bleeding problem. Does not bruise/bleed easily.   Musculoskeletal: Negative for falls and muscle weakness.   Neurological: See HPI. No seizures.   Endocrine: Negative for polydipsia, polyphagia and polyuria.   Allergic/Immunologic: Negative for hives and persistent infections.    PHYSICAL EXAMINATION:    Ht 6' 8" (2.032 m)   Wt (!) 145.2 kg (320 lb 1.7 oz)   BMI 35.17 kg/m²     General: The patient is a pleasant 52 y.o. male in no apparent distress, the patient is oriented to person, place and time.   Psych: Normal mood and affect  HEENT: Vision grossly intact, hearing intact to the spoken word.  Lungs: Respirations unlabored.  Gait: Antalgic station and gait.   Skin: Dorsal lumbar skin negative for rashes, lesions, hairy patches and surgical scars.  Range of motion: Lumbar range of motion is acceptable. There is no lumbar tenderness to palpation.  Spinal Balance: Global saggital and coronal spinal balance acceptable, no significant for scoliosis and kyphosis.  Musculoskeletal: No pain with the range of motion of the bilateral hips. No trochanteric tenderness to palpation.  Vascular: Bilateral lower extremities warm and well perfused, Dorsalis pedis pulses 2+ bilaterally.  Neurological: Normal strength and tone in all major motor groups in the bilateral lower extremities. Normal sensation to light touch in the L2-S1 dermatomes bilaterally.  Deep tendon reflexes symmetric 1+ in the bilateral lower extremities.  Negative Babinski bilaterally.  Straight leg raise negative bilaterally.     IMAGING:   Today I personally reviewed AP, Lat and Flex/Ex upright L-spine films that demonstrate normal disc spacing and alignment.  No acute abnormalities.     Body mass index is 35.17 kg/m².    Hemoglobin A1C   Date Value Ref Range Status   07/10/2017 5.1 4.0 - 5.6 % Final     Comment:     According to ADA guidelines, hemoglobin A1c <7.0% represents  optimal control in non-pregnant diabetic " patients. Different  metrics may apply to specific patient populations.   Standards of Medical Care in Diabetes-2016.  For the purpose of screening for the presence of diabetes:  <5.7%     Consistent with the absence of diabetes  5.7-6.4%  Consistent with increasing risk for diabetes   (prediabetes)  >or=6.5%  Consistent with diabetes  Currently, no consensus exists for use of hemoglobin A1c  for diagnosis of diabetes for children.  This Hemoglobin A1c assay has significant interference with fetal   hemoglobin   (HbF). The results are invalid for patients with abnormal amounts of   HbF,   including those with known Hereditary Persistence   of Fetal Hemoglobin. Heterozygous hemoglobin variants (HbAS, HbAC,   HbAD, HbAE, HbA2) do not significantly interfere with this assay;   however, presence of multiple variants in a sample may impact the %   interference.     02/14/2014 5.4 4.5 - 6.2 % Final   08/25/2012 5.2 4.0 - 6.2 % Final         ASSESSMENT/PLAN:    Diagnoses and all orders for this visit:    Chronic bilateral low back pain without sciatica  -     Ambulatory Referral to Physical/Occupational Therapy      Referral for PT placed today.  Follow up after therapy if symptoms persist.     We discussed the department policy regarding pain medications.  Patient understands and agrees.       Follow-up if symptoms worsen or fail to improve.

## 2018-06-28 ENCOUNTER — OFFICE VISIT (OUTPATIENT)
Dept: TRANSPLANT | Facility: CLINIC | Age: 53
End: 2018-06-28
Payer: MEDICARE

## 2018-06-28 VITALS
WEIGHT: 315 LBS | BODY MASS INDEX: 42.66 KG/M2 | DIASTOLIC BLOOD PRESSURE: 62 MMHG | SYSTOLIC BLOOD PRESSURE: 119 MMHG | HEIGHT: 72 IN | HEART RATE: 85 BPM

## 2018-06-28 DIAGNOSIS — I10 ESSENTIAL HYPERTENSION: ICD-10-CM

## 2018-06-28 DIAGNOSIS — I87.1 MAY-THURNER SYNDROME: ICD-10-CM

## 2018-06-28 DIAGNOSIS — I50.42 CHRONIC COMBINED SYSTOLIC AND DIASTOLIC CONGESTIVE HEART FAILURE: ICD-10-CM

## 2018-06-28 DIAGNOSIS — I48.20 CHRONIC ATRIAL FIBRILLATION: ICD-10-CM

## 2018-06-28 DIAGNOSIS — E66.01 MORBID OBESITY: ICD-10-CM

## 2018-06-28 DIAGNOSIS — I82.4Y9 DEEP VEIN THROMBOSIS (DVT) OF PROXIMAL LOWER EXTREMITY, UNSPECIFIED CHRONICITY, UNSPECIFIED LATERALITY: ICD-10-CM

## 2018-06-28 DIAGNOSIS — I26.99 OTHER PULMONARY EMBOLISM WITHOUT ACUTE COR PULMONALE, UNSPECIFIED CHRONICITY: ICD-10-CM

## 2018-06-28 DIAGNOSIS — I26.99 RECURRENT PULMONARY EMBOLISM: ICD-10-CM

## 2018-06-28 DIAGNOSIS — R60.9 EDEMA, UNSPECIFIED TYPE: Primary | ICD-10-CM

## 2018-06-28 PROCEDURE — 99215 OFFICE O/P EST HI 40 MIN: CPT | Mod: S$PBB,,, | Performed by: INTERNAL MEDICINE

## 2018-06-28 PROCEDURE — 99999 PR PBB SHADOW E&M-EST. PATIENT-LVL III: CPT | Mod: PBBFAC,,, | Performed by: INTERNAL MEDICINE

## 2018-06-28 PROCEDURE — 99213 OFFICE O/P EST LOW 20 MIN: CPT | Mod: PBBFAC | Performed by: INTERNAL MEDICINE

## 2018-06-28 NOTE — PROGRESS NOTES
Subjective:    Patient ID:  Drew Farrar is a 52 y.o. male who presents for evaluation of Congestive Heart Failure      Congestive Heart Failure   Pertinent negatives include no abdominal pain, chest pain, chills, coughing, diaphoresis, fever or weakness.     Hospital Course:     M with PMHx of persistent afib on coumadin, patent PDA s/p surgical closure 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 of age, May Thurner syndrome with h/o multiple PEs and DVTs s/p IVC filter, chronic systolic congestive heart failure (EF 30%), hyperthyroid, atrial fibrillation here for follow up. FRAIRE FC II-III NYHA. Compliants of back pain      Review of Systems   Constitution: Negative for chills, decreased appetite, diaphoresis, fever, weakness, malaise/fatigue, night sweats, weight gain and weight loss.   HENT: Negative.    Eyes: Negative.    Cardiovascular: Positive for dyspnea on exertion and irregular heartbeat. Negative for chest pain, claudication, cyanosis, leg swelling, near-syncope, orthopnea and palpitations.   Respiratory: Negative for cough, hemoptysis, shortness of breath, sleep disturbances due to breathing, snoring, sputum production and wheezing.    Endocrine: Negative.    Hematologic/Lymphatic: Negative.    Skin: Negative.    Musculoskeletal: Negative.    Gastrointestinal: Negative for abdominal pain and diarrhea.   Genitourinary: Negative.    Neurological: Negative.    Psychiatric/Behavioral: Negative.         Objective:    Physical Exam   Constitutional: He is oriented to person, place, and time. He appears well-developed and well-nourished.   HENT:   Head: Normocephalic and atraumatic.   Eyes: Conjunctivae and EOM are normal. Pupils are equal, round, and reactive to light.   Neck: Normal range of motion. Neck supple. No JVD present.   Cardiovascular: Normal rate.  An irregularly irregular rhythm present. Exam reveals no gallop and no friction rub.    Murmur heard.  High-pitched blowing holosystolic murmur is present with a grade of 2/6  at  the apex  Pulmonary/Chest: Breath sounds normal. No respiratory distress. He has no wheezes. He has no rales. He exhibits no tenderness.   Abdominal: Soft. Bowel sounds are normal. He exhibits no distension and no mass. There is no hepatosplenomegaly, splenomegaly or hepatomegaly. There is no tenderness. There is no rebound, no guarding and no CVA tenderness.   No hepatoslenomegaly   Musculoskeletal: Normal range of motion. He exhibits edema (+4). He exhibits no tenderness.   Neurological: He is alert and oriented to person, place, and time. He has normal reflexes. No cranial nerve deficit. He exhibits normal muscle tone. Coordination normal.   Skin: Skin is warm and dry.   Psychiatric: He has a normal mood and affect.         Assessment:       1. Edema, unspecified type    2. Chronic venous hypertension with ulcer involving left side    3. Chronic atrial fibrillation    4. Chronic combined systolic and diastolic congestive heart failure    5. Essential hypertension    6. May-Thurner syndrome    7. Deep vein thrombosis (DVT) of proximal lower extremity, unspecified chronicity, unspecified laterality    8. Other pulmonary embolism without acute cor pulmonale, unspecified chronicity    9. Recurrent pulmonary embolism    10. Morbid obesity         Plan:       Stable Continue same plan  Dr Begum to see

## 2018-07-03 ENCOUNTER — ANTI-COAG VISIT (OUTPATIENT)
Dept: CARDIOLOGY | Facility: CLINIC | Age: 53
End: 2018-07-03
Payer: MEDICARE

## 2018-07-03 DIAGNOSIS — I26.99 OTHER PULMONARY EMBOLISM WITHOUT ACUTE COR PULMONALE, UNSPECIFIED CHRONICITY: ICD-10-CM

## 2018-07-03 DIAGNOSIS — Z79.01 LONG TERM (CURRENT) USE OF ANTICOAGULANTS: Primary | ICD-10-CM

## 2018-07-03 LAB — INR PPP: 2.1 (ref 2–3)

## 2018-07-03 PROCEDURE — 85610 PROTHROMBIN TIME: CPT | Mod: PBBFAC,PO

## 2018-07-03 NOTE — PROGRESS NOTES
Quick follow-up for low INR and increased dose 5/25. INR lower today. Patient states that he is not feeling well with congestion and a cough, he is not taking any meds for this. He also has some increased swelling in his legs and is calling his doctor today to address this. He has bruises from use, denies any bleeding or other changes. Will increase weekly dose so that he gets the increase today and follow-up in 10 days. INGRID HUGHES assisted with dosing. Advised him to call with changes or concerns.

## 2018-07-16 ENCOUNTER — ANTI-COAG VISIT (OUTPATIENT)
Dept: CARDIOLOGY | Facility: CLINIC | Age: 53
End: 2018-07-16
Payer: MEDICARE

## 2018-07-16 DIAGNOSIS — Z79.01 LONG TERM (CURRENT) USE OF ANTICOAGULANTS: Primary | ICD-10-CM

## 2018-07-16 DIAGNOSIS — I26.99 OTHER PULMONARY EMBOLISM WITHOUT ACUTE COR PULMONALE, UNSPECIFIED CHRONICITY: ICD-10-CM

## 2018-07-16 LAB — INR PPP: 1.4 (ref 2–3)

## 2018-07-16 PROCEDURE — 99211 OFF/OP EST MAY X REQ PHY/QHP: CPT | Mod: S$PBB,25,,

## 2018-07-16 PROCEDURE — 85610 PROTHROMBIN TIME: CPT | Mod: PBBFAC,PO

## 2018-07-16 NOTE — PROGRESS NOTES
INR low. Patient reports being on a diet and trying to lose weight. He denies high vit K foods but did have a large smoothie over the weekend. The smoothie may have had vit K in it. No other changes. No signs or symptoms of bleeding. We will boost dose and plan and increase. Watch closely. Repeat INR in 3 days. If INR is not moving quickly, we will consider lovenox bridge. He will go to lab on Thursday in [place].

## 2018-07-17 ENCOUNTER — OFFICE VISIT (OUTPATIENT)
Dept: ELECTROPHYSIOLOGY | Facility: CLINIC | Age: 53
End: 2018-07-17
Payer: MEDICARE

## 2018-07-17 ENCOUNTER — HOSPITAL ENCOUNTER (OUTPATIENT)
Dept: CARDIOLOGY | Facility: CLINIC | Age: 53
Discharge: HOME | End: 2018-07-17
Payer: MEDICARE

## 2018-07-17 ENCOUNTER — CLINICAL SUPPORT (OUTPATIENT)
Dept: REHABILITATION | Facility: HOSPITAL | Age: 53
End: 2018-07-17
Payer: MEDICARE

## 2018-07-17 VITALS
SYSTOLIC BLOOD PRESSURE: 128 MMHG | WEIGHT: 315 LBS | BODY MASS INDEX: 36.45 KG/M2 | OXYGEN SATURATION: 97 % | HEIGHT: 78 IN | DIASTOLIC BLOOD PRESSURE: 68 MMHG | HEART RATE: 78 BPM

## 2018-07-17 DIAGNOSIS — R26.89 IMPAIRED GAIT AND MOBILITY: ICD-10-CM

## 2018-07-17 DIAGNOSIS — G89.29 CHRONIC MIDLINE LOW BACK PAIN WITHOUT SCIATICA: Primary | ICD-10-CM

## 2018-07-17 DIAGNOSIS — I87.1 MAY-THURNER SYNDROME: ICD-10-CM

## 2018-07-17 DIAGNOSIS — I50.42 CHRONIC COMBINED SYSTOLIC AND DIASTOLIC CONGESTIVE HEART FAILURE: ICD-10-CM

## 2018-07-17 DIAGNOSIS — M54.50 CHRONIC MIDLINE LOW BACK PAIN WITHOUT SCIATICA: Primary | ICD-10-CM

## 2018-07-17 DIAGNOSIS — I48.91 ATRIAL FIBRILLATION, UNSPECIFIED TYPE: ICD-10-CM

## 2018-07-17 DIAGNOSIS — I48.21 PERMANENT ATRIAL FIBRILLATION: Primary | ICD-10-CM

## 2018-07-17 DIAGNOSIS — Z79.01 LONG TERM (CURRENT) USE OF ANTICOAGULANTS: ICD-10-CM

## 2018-07-17 DIAGNOSIS — I10 ESSENTIAL HYPERTENSION: ICD-10-CM

## 2018-07-17 PROCEDURE — 99213 OFFICE O/P EST LOW 20 MIN: CPT | Mod: S$PBB,,, | Performed by: INTERNAL MEDICINE

## 2018-07-17 PROCEDURE — 97161 PT EVAL LOW COMPLEX 20 MIN: CPT | Mod: PN

## 2018-07-17 PROCEDURE — G8978 MOBILITY CURRENT STATUS: HCPCS | Mod: CK,PN

## 2018-07-17 PROCEDURE — 93010 ELECTROCARDIOGRAM REPORT: CPT | Mod: S$PBB,,, | Performed by: INTERNAL MEDICINE

## 2018-07-17 PROCEDURE — G8979 MOBILITY GOAL STATUS: HCPCS | Mod: CJ,PN

## 2018-07-17 PROCEDURE — 99213 OFFICE O/P EST LOW 20 MIN: CPT | Mod: PBBFAC,25 | Performed by: INTERNAL MEDICINE

## 2018-07-17 PROCEDURE — 93005 ELECTROCARDIOGRAM TRACING: CPT | Mod: PBBFAC | Performed by: INTERNAL MEDICINE

## 2018-07-17 PROCEDURE — 99999 PR PBB SHADOW E&M-EST. PATIENT-LVL III: CPT | Mod: PBBFAC,,, | Performed by: INTERNAL MEDICINE

## 2018-07-17 NOTE — PLAN OF CARE
TIME RECORD    Date: 7/17/2018    Start Time:  1610  Stop Time:  1630    PROCEDURES:    TIMED  Procedure Min.                         UNTIMED  Procedure Min.   IE 20         Total Timed Minutes: 0  Total Timed Units: 0  Total Untimed Units:  1  Charges Billed/# of units:  LCE-1     [facility]    PHYSICAL THERAPY EVALUATION  Onset Date: ~3 years ago  Medical Diagnosis: M54.5,G89.29 (ICD-10-CM) - Chronic bilateral low back pain without sciatica  Treatment Diagnosis:   1. Chronic midline low back pain without sciatica     2. Impaired gait and mobility       Physician: Casandra Molina PA-C  Past Medical History:   Diagnosis Date    *Atrial fibrillation     Atrial fibrillation     Atrial fibrillation Feb 23, 2016    Bipolar disorder     Congenital heart disease     s/p surgical intervention 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 of age    Deep vein thrombosis     DVT of leg (deep venous thrombosis)     left leg    History of prior ablation treatment     10/9/13    Hypertension     Obesity     Stroke     Thyroid disease     Venous ulcer      Precautions: Standard, cognitive impairment   Prior Therapy: PT for R knee  Medications: Drew Farrar has a current medication list which includes the following prescription(s): hydrocodone/acetaminophen, lisinopril, methimazole, metoprolol succinate, torsemide, and warfarin, and the following Facility-Administered Medications: sodium tetradecyl sulfate.  Nutrition:  Obese  History of Present Illness: Chronic midline low back pain  Prior Level of Function: Independent  Social History: Pt on disability  Place of Residence (Steps/Adaptations): Pt reports he is in [place]. Pt has grab bars in bathroom and pool. 1 SEBLE home without rails.  Functional Deficits Leading to Referral/Nature of Injury: Chronic midline low back pain  Patient Therapy Goals: Pt defers therapy    Subjective     Drew Farrar reports pain in midline of lower back beginning ~3 years ago, pt reports  it worsened 1 month ago while stretching in the pool and consequently throwing his back out. Pain is the same as 1 month ago. Pt referred to orthopedist, pt given muscle relaxer and is also taking vicodin for pain. X-rays performed to lumbar spine, pt reports normal findings. Pt declined MRI. Pt reports he does not want to participate in therapy, would rather see a chiropractor to have his lower back cracked and to receive EStim and manual therapy.    Pain:  Location: Midline of lower back  Description: Sharp, ache  Activities Which Increase Pain: Deep inhale, R lumbar rotation, supine bicycle  Activities Which Decrease Pain: DKC, hip and knee flexion  Pain Scale: 0/10 at best 0/10 now  10/10 at worst    Objective     Observation: R LE edema   Posture: Standing: B toe out and increased hip abduction; forward trunk and mild R lean, B shoulder abduction, forward head  Gait: no AD; B shoulder abduction and increased arm swing; forward and R lateral trunk lean; wide DARA and decreased vannessa; decreased R stance time and L step length; increased B hip abduction and flexion, decreased B knee flexion, B toe out    Deferred remainder of evaluation    CMS Impairment/Limitation/Restriction for FOTO Lumbar Survey    Therapist reviewed FOTO scores for Drew CONCEPCION GleasonLeni on 7/17/2018.   FOTO documents entered into [employer] - see Media section.    Limitation Score: 42%  Category: Mobility    Current : CK = at least 40% but < 60% impaired, limited or restricted  Goal: CJ = at least 20% but < 40% impaired, limited or restricted       TREATMENT     Pt educated on purpose and importance of physical therapy in decreasing pt's subjective low back pain. Educated pt that therapy would include manual therapy techniques as well as exercise, especially core and hip strengthening, to improve stability and decrease mechanical stresses on lower back for decreased pain and improved QOL. Following education, pt reported he still does not wish to  participate.    Assessment     Initial Assessment (Pertinent finding, problem list and factors affecting outcome): N/A, pt defers treatment     Barriers to Rehab: Willingness to participate    Plan     Certification Period: 7/17/18 to 7/17/18  Recommended Treatment Plan: N/A  Other Recommendations: follow up with referring PASWETHA/MD Xenia Roque, PT  7/17/2018

## 2018-07-17 NOTE — PROGRESS NOTES
Subjective:    Patient ID:  Drew Farrar is a 52 y.o. male who presents for follow-up of Atrial Fibrillation    Referring Physician: Jeffrey Calixto MD  Primary Cardiologist: Gomez Lantigua MD  Advanced Heart Failure Cardiologist: Edward Castañeda MD    HPI  Prior Hx:  I had the pleasure of seeing Mr. Farrar today in our electrophysiology clinic in consultation for his permanent atrial fibrillation. As you are aware he is a pleasant 52 year-old with patent PDA s/p surgical closure 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 of age, May Thurner syndrome with multple leg/pelvic DVTs with pulmonary emboli, chronic systolic congestive heart failure, permanent atrial fibrillation since his 30s, and hyperthyroidism.  He has had multiple endovascular procedures for his venous disease. He is on chronic coumadin.  He was noted to have an LVEF of 45% in 2014 and 30% in 2016. On review of his labs his TSH has been undetectable since 2014.      With respect to his atrial fibrillation he reports he went into atrial fibrillation when he was undergoing a knee operation in his 30s. He and his mother report he was shocked 3 times and remained in atrial fibrillation. They report he was left in atrial fibrillation afterwards. He reports noting fast heart beat sometimes after exerting himself. He has noted it more frequently as of late. He was seeing Dr. Farhan Calixto for his worsening dyspnea on exertion who wanted him evaluated from a cardiology perspective. He is on toprol xl 50mg daily, lisinopril 2.5mg daily, warfarin, and methimazole. He denies any bleeding issues related to warfarin therapy. Of note, when he last saw Dr. Lantigua in February of 2017 the plan was for him to be seen in heart failure clinic.      At our initial visit he began experiencing significant right sided chest pain and we escorted him to the emergency room for further evaluation. CT was performed noting no PE. An echocardiogram was performed noting LVEF of 40-45%. A Cardiac PET  stress test was negative for ischemia. He has started seeing Dr. Castañeda in heart failure clinic and has established care with endocrinology who are working on his hyperthyroidism. Holter monitor noted controlled heart rates with average of 82 bpm.    Interim Hx:  He presents today for 6 month follow-up. He has been feeling well from a heart failure standpoint. No palpitations, chest discomfort, shortness of breath.  Tolerating coumadin.    My interpretation of today's in clinic ECG is AF with controlled ventricular rate of 86 bpm.    Review of Systems   Constitution: Negative for fever, weakness and malaise/fatigue.   HENT: Negative for congestion and sore throat.    Eyes: Negative for blurred vision and visual disturbance.   Cardiovascular: Positive for leg swelling. Negative for chest pain, dyspnea on exertion, irregular heartbeat, near-syncope, orthopnea, palpitations, paroxysmal nocturnal dyspnea and syncope.   Respiratory: Negative for cough and shortness of breath.    Hematologic/Lymphatic: Negative for bleeding problem. Does not bruise/bleed easily.   Skin: Negative.    Musculoskeletal: Negative.    Gastrointestinal: Negative for hematochezia and melena.   Neurological: Negative for dizziness and focal weakness.        Objective:    Physical Exam   Constitutional: He is oriented to person, place, and time. He appears well-developed and well-nourished. No distress.   HENT:   Head: Normocephalic and atraumatic.   Eyes: Conjunctivae are normal. Right eye exhibits no discharge. Left eye exhibits no discharge.   Neck: Neck supple. No JVD present.   Cardiovascular: Normal rate.  An irregularly irregular rhythm present. Exam reveals no gallop and no friction rub.    No murmur heard.  Pulmonary/Chest: Effort normal and breath sounds normal. No respiratory distress. He has no wheezes. He has no rales.   Abdominal: Soft. Bowel sounds are normal. He exhibits no distension. There is no tenderness.   Musculoskeletal: He  exhibits edema.   Neurological: He is alert and oriented to person, place, and time.   Skin: Skin is warm and dry. He is not diaphoretic.   Psychiatric: He has a normal mood and affect. His behavior is normal. Judgment and thought content normal.   Vitals reviewed.        Assessment:       1. Permanent atrial fibrillation    2. Chronic combined systolic and diastolic congestive heart failure    3. Essential hypertension    4. May-Thurner syndrome    5. Long term (current) use of anticoagulants         Plan:       In summary, Mr. Farrar is a pleasant 52 year-old with patent PDA s/p surgical closure 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 of age, May Thurner syndrome with multple leg/pelvic DVTs with pulmonary emboli, chronic systolic congestive heart failure (EF 40-45%), permanent atrial fibrillation since his 30s, and hyperthyroidism presenting for evaluation for his rate control of atrial fibrillation. It is highly unlikely we will be able to restore sinus rhythm if he has been in atrial fibrillation for over 10 years. All ECGs I have date back to 2012 and all show atrial fibrillation. Well rate controlled on 200mg of metoprolol daily. No heart failure symptoms. Thyroid much better now that he is seeing endocrinology.     Plan  Continue current therapy  RTC in 6 months, will order repeat ECHO at that time.    Thank you for allowing me to participate in the care of this patient. Please do not hesitate to call me with any questions or concerns.    Shin Begum MD, PhD  Cardiac Electrophysiology

## 2018-07-19 NOTE — PROGRESS NOTES
Pt called 07/19/18 to [facility] appt 07/19/18 to 07/20/18 they forgot about his appt today. He ride transportation bus.

## 2018-07-20 ENCOUNTER — ANTI-COAG VISIT (OUTPATIENT)
Dept: CARDIOLOGY | Facility: CLINIC | Age: 53
End: 2018-07-20

## 2018-07-20 ENCOUNTER — LAB VISIT (OUTPATIENT)
Dept: LAB | Facility: HOSPITAL | Age: 53
End: 2018-07-20
Attending: INTERNAL MEDICINE
Payer: MEDICARE

## 2018-07-20 DIAGNOSIS — Z79.01 LONG TERM (CURRENT) USE OF ANTICOAGULANTS: ICD-10-CM

## 2018-07-20 DIAGNOSIS — I26.99 OTHER PULMONARY EMBOLISM WITHOUT ACUTE COR PULMONALE, UNSPECIFIED CHRONICITY: ICD-10-CM

## 2018-07-20 LAB
INR PPP: 1.8
PROTHROMBIN TIME: 18.6 SEC

## 2018-07-20 PROCEDURE — 85610 PROTHROMBIN TIME: CPT

## 2018-07-20 PROCEDURE — 36415 COLL VENOUS BLD VENIPUNCTURE: CPT

## 2018-07-24 ENCOUNTER — LAB VISIT (OUTPATIENT)
Dept: LAB | Facility: HOSPITAL | Age: 53
End: 2018-07-24
Attending: INTERNAL MEDICINE
Payer: MEDICARE

## 2018-07-24 ENCOUNTER — ANTI-COAG VISIT (OUTPATIENT)
Dept: CARDIOLOGY | Facility: CLINIC | Age: 53
End: 2018-07-24

## 2018-07-24 DIAGNOSIS — I26.99 OTHER PULMONARY EMBOLISM WITHOUT ACUTE COR PULMONALE, UNSPECIFIED CHRONICITY: ICD-10-CM

## 2018-07-24 DIAGNOSIS — Z79.01 LONG TERM (CURRENT) USE OF ANTICOAGULANTS: ICD-10-CM

## 2018-07-24 LAB
INR PPP: 2.5
PROTHROMBIN TIME: 26.1 SEC

## 2018-07-24 PROCEDURE — 36415 COLL VENOUS BLD VENIPUNCTURE: CPT

## 2018-07-24 PROCEDURE — 85610 PROTHROMBIN TIME: CPT

## 2018-07-26 ENCOUNTER — HOSPITAL ENCOUNTER (OUTPATIENT)
Dept: WOUND CARE | Facility: HOSPITAL | Age: 53
Discharge: HOME | End: 2018-07-26
Attending: PODIATRIST
Payer: MEDICARE

## 2018-07-26 VITALS
BODY MASS INDEX: 36.45 KG/M2 | HEIGHT: 78 IN | DIASTOLIC BLOOD PRESSURE: 67 MMHG | WEIGHT: 315 LBS | SYSTOLIC BLOOD PRESSURE: 146 MMHG | HEART RATE: 75 BPM

## 2018-07-26 DIAGNOSIS — L97.329 NON-PRESSURE CHRONIC ULCER OF LEFT ANKLE, WITH UNSPECIFIED SEVERITY: ICD-10-CM

## 2018-07-26 DIAGNOSIS — L97.321 ULCER OF ANKLE, LEFT, LIMITED TO BREAKDOWN OF SKIN: ICD-10-CM

## 2018-07-26 DIAGNOSIS — N17.9 AKI (ACUTE KIDNEY INJURY): ICD-10-CM

## 2018-07-26 DIAGNOSIS — R60.0 BILATERAL EDEMA OF LOWER EXTREMITY: ICD-10-CM

## 2018-07-26 PROCEDURE — 11042 DBRDMT SUBQ TIS 1ST 20SQCM/<: CPT | Mod: ,,, | Performed by: PODIATRIST

## 2018-07-26 PROCEDURE — 29581 APPL MULTLAYER CMPRN SYS LEG: CPT

## 2018-07-26 PROCEDURE — 99213 OFFICE O/P EST LOW 20 MIN: CPT | Mod: 25

## 2018-07-26 PROCEDURE — 27201912 HC WOUND CARE DEBRIDEMENT SUPPLIES

## 2018-07-26 PROCEDURE — 11042 PR DEBRIDEMENT, SKIN, SUB-Q TISSUE,=<20 SQ CM: ICD-10-PCS | Mod: ,,, | Performed by: PODIATRIST

## 2018-07-26 PROCEDURE — 11042 DBRDMT SUBQ TIS 1ST 20SQCM/<: CPT

## 2018-07-26 PROCEDURE — 99499 UNLISTED E&M SERVICE: CPT | Mod: ,,, | Performed by: PODIATRIST

## 2018-07-26 PROCEDURE — 99499 NO LOS: ICD-10-PCS | Mod: ,,, | Performed by: PODIATRIST

## 2018-07-26 NOTE — PROGRESS NOTES
Subjective:       Patient ID: Drew Farrar is a 52 y.o. male.    Chief Complaint: Wound Consult    HPI  Review of Systems    Objective:      Physical Exam    Assessment:       1. Chronic venous hypertension with ulcer involving left side    2. Ulcer of ankle, left, limited to breakdown of skin    3. Non-pressure chronic ulcer of left ankle, with unspecified severity    4. Bilateral edema of lower extremity    5. NAERSH (acute kidney injury)        Plan:       IN ORDER TO FIND TODAY'S PROGRESS NOTE IN WOUND EXPERT, LOOK IN THE MEDIA TAB.

## 2018-08-02 ENCOUNTER — APPOINTMENT (OUTPATIENT)
Dept: LAB | Facility: HOSPITAL | Age: 53
End: 2018-08-02
Attending: INTERNAL MEDICINE
Payer: MEDICARE

## 2018-08-02 ENCOUNTER — ANTI-COAG VISIT (OUTPATIENT)
Dept: CARDIOLOGY | Facility: CLINIC | Age: 53
End: 2018-08-02

## 2018-08-02 DIAGNOSIS — Z79.01 LONG TERM (CURRENT) USE OF ANTICOAGULANTS: ICD-10-CM

## 2018-08-02 DIAGNOSIS — I26.99 OTHER PULMONARY EMBOLISM WITHOUT ACUTE COR PULMONALE, UNSPECIFIED CHRONICITY: ICD-10-CM

## 2018-08-07 ENCOUNTER — LAB VISIT (OUTPATIENT)
Dept: LAB | Facility: HOSPITAL | Age: 53
End: 2018-08-07
Attending: INTERNAL MEDICINE
Payer: MEDICARE

## 2018-08-07 DIAGNOSIS — Z79.01 LONG TERM (CURRENT) USE OF ANTICOAGULANTS: ICD-10-CM

## 2018-08-07 DIAGNOSIS — I26.99 OTHER PULMONARY EMBOLISM WITHOUT ACUTE COR PULMONALE, UNSPECIFIED CHRONICITY: ICD-10-CM

## 2018-08-07 LAB
INR PPP: 1.5
PROTHROMBIN TIME: 15.6 SEC

## 2018-08-07 PROCEDURE — 85610 PROTHROMBIN TIME: CPT

## 2018-08-07 PROCEDURE — 36415 COLL VENOUS BLD VENIPUNCTURE: CPT

## 2018-08-09 ENCOUNTER — HOSPITAL ENCOUNTER (OUTPATIENT)
Dept: WOUND CARE | Facility: HOSPITAL | Age: 53
Discharge: HOME | End: 2018-08-09
Attending: PODIATRIST
Payer: MEDICARE

## 2018-08-09 ENCOUNTER — ANTI-COAG VISIT (OUTPATIENT)
Dept: CARDIOLOGY | Facility: CLINIC | Age: 53
End: 2018-08-09

## 2018-08-09 VITALS
SYSTOLIC BLOOD PRESSURE: 123 MMHG | WEIGHT: 315 LBS | HEART RATE: 85 BPM | TEMPERATURE: 97 F | DIASTOLIC BLOOD PRESSURE: 58 MMHG | HEIGHT: 78 IN | BODY MASS INDEX: 36.45 KG/M2

## 2018-08-09 DIAGNOSIS — B35.3 TINEA PEDIS OF BOTH FEET: ICD-10-CM

## 2018-08-09 DIAGNOSIS — L97.329 NON-PRESSURE CHRONIC ULCER OF LEFT ANKLE, WITH UNSPECIFIED SEVERITY: ICD-10-CM

## 2018-08-09 DIAGNOSIS — Z79.01 LONG TERM (CURRENT) USE OF ANTICOAGULANTS: ICD-10-CM

## 2018-08-09 DIAGNOSIS — R60.0 BILATERAL EDEMA OF LOWER EXTREMITY: ICD-10-CM

## 2018-08-09 DIAGNOSIS — I26.99 OTHER PULMONARY EMBOLISM WITHOUT ACUTE COR PULMONALE, UNSPECIFIED CHRONICITY: ICD-10-CM

## 2018-08-09 PROCEDURE — 11042 DBRDMT SUBQ TIS 1ST 20SQCM/<: CPT

## 2018-08-09 PROCEDURE — 27201912 HC WOUND CARE DEBRIDEMENT SUPPLIES

## 2018-08-09 PROCEDURE — 99499 NO LOS: ICD-10-PCS | Mod: ,,, | Performed by: PODIATRIST

## 2018-08-09 PROCEDURE — 11042 PR DEBRIDEMENT, SKIN, SUB-Q TISSUE,=<20 SQ CM: ICD-10-PCS | Mod: ,,, | Performed by: PODIATRIST

## 2018-08-09 PROCEDURE — 99499 UNLISTED E&M SERVICE: CPT | Mod: ,,, | Performed by: PODIATRIST

## 2018-08-09 PROCEDURE — 11042 DBRDMT SUBQ TIS 1ST 20SQCM/<: CPT | Mod: ,,, | Performed by: PODIATRIST

## 2018-08-09 NOTE — PROGRESS NOTES
Subjective:       Patient ID: Drew Farrar is a 52 y.o. male.    Chief Complaint: Foot Ulcer    HPI  Review of Systems    Objective:      Physical Exam    Assessment:       1. Chronic venous hypertension with ulcer involving left side    2. Tinea pedis of both feet    3. Bilateral edema of lower extremity    4. Non-pressure chronic ulcer of left ankle, with unspecified severity        Plan:       IN ORDER TO FIND TODAY'S PROGRESS NOTE IN WOUND EXPERT, LOOK IN THE MEDIA TAB.

## 2018-08-09 NOTE — PROGRESS NOTES
INR subtherapeutic today.  Patient confirms dose but reports a nosebleed on Tuesday that lasted 5 min and resolved on it's own.

## 2018-08-16 ENCOUNTER — HOSPITAL ENCOUNTER (OUTPATIENT)
Dept: WOUND CARE | Facility: HOSPITAL | Age: 53
Discharge: HOME | End: 2018-08-16
Attending: PODIATRIST
Payer: MEDICARE

## 2018-08-16 DIAGNOSIS — I83.009 VENOUS STASIS ULCER OF LOWER EXTREMITY, UNSPECIFIED LATERALITY: Primary | ICD-10-CM

## 2018-08-16 DIAGNOSIS — L97.909 VENOUS STASIS ULCER OF LOWER EXTREMITY, UNSPECIFIED LATERALITY: Primary | ICD-10-CM

## 2018-08-16 PROCEDURE — 29581 APPL MULTLAYER CMPRN SYS LEG: CPT

## 2018-08-16 NOTE — PROGRESS NOTES
Much of the documentation for this visit was completed in the Wound Expert system. Please see the attached documentation for further details about this patient's care.     Nona Vargas RN

## 2018-08-17 ENCOUNTER — ANTI-COAG VISIT (OUTPATIENT)
Dept: CARDIOLOGY | Facility: CLINIC | Age: 53
End: 2018-08-17
Payer: MEDICARE

## 2018-08-17 DIAGNOSIS — I26.99 OTHER PULMONARY EMBOLISM WITHOUT ACUTE COR PULMONALE, UNSPECIFIED CHRONICITY: ICD-10-CM

## 2018-08-17 DIAGNOSIS — Z79.01 LONG TERM (CURRENT) USE OF ANTICOAGULANTS: Primary | ICD-10-CM

## 2018-08-17 LAB — INR PPP: 3.3 (ref 2–3)

## 2018-08-17 PROCEDURE — 85610 PROTHROMBIN TIME: CPT | Mod: PBBFAC

## 2018-08-17 NOTE — PROGRESS NOTES
Quick follow up from low INR on 8/7. Patient INR in therapeutic range today. Reports no bleeding, bruising or other changes. Will maintain current weekly dose until follow up in 10 days. Advised patient to call with any concerns or changes.

## 2018-08-23 ENCOUNTER — HOSPITAL ENCOUNTER (OUTPATIENT)
Dept: WOUND CARE | Facility: HOSPITAL | Age: 53
Discharge: HOME | End: 2018-08-23
Attending: PODIATRIST
Payer: MEDICARE

## 2018-08-23 DIAGNOSIS — I83.009 VENOUS STASIS ULCER OF LOWER EXTREMITY, UNSPECIFIED LATERALITY: Primary | ICD-10-CM

## 2018-08-23 DIAGNOSIS — L97.909 VENOUS STASIS ULCER OF LOWER EXTREMITY, UNSPECIFIED LATERALITY: Primary | ICD-10-CM

## 2018-08-23 PROCEDURE — 29581 APPL MULTLAYER CMPRN SYS LEG: CPT

## 2018-08-27 ENCOUNTER — LAB VISIT (OUTPATIENT)
Dept: LAB | Facility: HOSPITAL | Age: 53
End: 2018-08-27
Attending: INTERNAL MEDICINE
Payer: MEDICARE

## 2018-08-27 ENCOUNTER — ANTI-COAG VISIT (OUTPATIENT)
Dept: CARDIOLOGY | Facility: CLINIC | Age: 53
End: 2018-08-27

## 2018-08-27 DIAGNOSIS — Z79.01 LONG TERM (CURRENT) USE OF ANTICOAGULANTS: ICD-10-CM

## 2018-08-27 DIAGNOSIS — I26.99 OTHER PULMONARY EMBOLISM WITHOUT ACUTE COR PULMONALE, UNSPECIFIED CHRONICITY: ICD-10-CM

## 2018-08-27 LAB
INR PPP: 2.5
PROTHROMBIN TIME: 26.3 SEC

## 2018-08-27 PROCEDURE — 36415 COLL VENOUS BLD VENIPUNCTURE: CPT

## 2018-08-27 PROCEDURE — 85610 PROTHROMBIN TIME: CPT

## 2018-08-30 ENCOUNTER — HOSPITAL ENCOUNTER (OUTPATIENT)
Dept: WOUND CARE | Facility: HOSPITAL | Age: 53
Discharge: HOME | End: 2018-08-30
Attending: PODIATRIST
Payer: MEDICARE

## 2018-08-30 VITALS
SYSTOLIC BLOOD PRESSURE: 143 MMHG | BODY MASS INDEX: 36.45 KG/M2 | WEIGHT: 315 LBS | HEIGHT: 78 IN | DIASTOLIC BLOOD PRESSURE: 66 MMHG | HEART RATE: 87 BPM

## 2018-08-30 DIAGNOSIS — I83.009 VENOUS STASIS ULCER OF LOWER EXTREMITY, UNSPECIFIED LATERALITY: Primary | ICD-10-CM

## 2018-08-30 DIAGNOSIS — L97.909 VENOUS STASIS ULCER OF LOWER EXTREMITY, UNSPECIFIED LATERALITY: Primary | ICD-10-CM

## 2018-08-30 PROCEDURE — 99499 NO LOS: ICD-10-PCS | Mod: ,,, | Performed by: PODIATRIST

## 2018-08-30 PROCEDURE — 99499 UNLISTED E&M SERVICE: CPT | Mod: ,,, | Performed by: PODIATRIST

## 2018-08-30 PROCEDURE — 11042 DBRDMT SUBQ TIS 1ST 20SQCM/<: CPT | Mod: ,,, | Performed by: PODIATRIST

## 2018-08-30 PROCEDURE — 27201912 HC WOUND CARE DEBRIDEMENT SUPPLIES

## 2018-08-30 PROCEDURE — 29581 APPL MULTLAYER CMPRN SYS LEG: CPT

## 2018-08-30 PROCEDURE — 11042 PR DEBRIDEMENT, SKIN, SUB-Q TISSUE,=<20 SQ CM: ICD-10-PCS | Mod: ,,, | Performed by: PODIATRIST

## 2018-08-30 PROCEDURE — 11042 DBRDMT SUBQ TIS 1ST 20SQCM/<: CPT

## 2018-08-30 NOTE — PROGRESS NOTES
Much of the documentation for this visit was completed in the Wound Expert system. Please see the attached documentation for further details about this patient's care.     Keri Hernández RN

## 2018-08-30 NOTE — PROGRESS NOTES
Subjective:       Patient ID: Drew Farrar is a 52 y.o. male.    Chief Complaint: Foot Ulcer    HPI  Review of Systems    Objective:      Physical Exam    Assessment:       1. Venous stasis ulcer of lower extremity, unspecified laterality    2. Chronic venous hypertension with ulcer involving left side        Plan:       IN ORDER TO FIND TODAY'S PROGRESS NOTE IN WOUND EXPERT, LOOK IN THE MEDIA TAB.

## 2018-09-06 ENCOUNTER — HOSPITAL ENCOUNTER (OUTPATIENT)
Dept: WOUND CARE | Facility: HOSPITAL | Age: 53
Discharge: HOME | End: 2018-09-06
Attending: PODIATRIST
Payer: MEDICARE

## 2018-09-06 DIAGNOSIS — L97.909 VENOUS STASIS ULCER OF LOWER EXTREMITY, UNSPECIFIED LATERALITY: Primary | ICD-10-CM

## 2018-09-06 DIAGNOSIS — I83.009 VENOUS STASIS ULCER OF LOWER EXTREMITY, UNSPECIFIED LATERALITY: Primary | ICD-10-CM

## 2018-09-06 PROCEDURE — 29581 APPL MULTLAYER CMPRN SYS LEG: CPT

## 2018-09-13 ENCOUNTER — HOSPITAL ENCOUNTER (OUTPATIENT)
Dept: WOUND CARE | Facility: HOSPITAL | Age: 53
Discharge: HOME | End: 2018-09-13
Attending: PODIATRIST
Payer: MEDICARE

## 2018-09-13 DIAGNOSIS — L97.909 VENOUS STASIS ULCER OF LOWER EXTREMITY, UNSPECIFIED LATERALITY: Primary | ICD-10-CM

## 2018-09-13 DIAGNOSIS — I83.009 VENOUS STASIS ULCER OF LOWER EXTREMITY, UNSPECIFIED LATERALITY: Primary | ICD-10-CM

## 2018-09-13 PROCEDURE — 29581 APPL MULTLAYER CMPRN SYS LEG: CPT

## 2018-09-14 ENCOUNTER — ANTI-COAG VISIT (OUTPATIENT)
Dept: CARDIOLOGY | Facility: CLINIC | Age: 53
End: 2018-09-14
Payer: MEDICARE

## 2018-09-14 DIAGNOSIS — I26.99 OTHER PULMONARY EMBOLISM WITHOUT ACUTE COR PULMONALE, UNSPECIFIED CHRONICITY: ICD-10-CM

## 2018-09-14 DIAGNOSIS — Z79.01 LONG TERM (CURRENT) USE OF ANTICOAGULANTS: Primary | ICD-10-CM

## 2018-09-14 LAB — INR PPP: 4.4 (ref 2–3)

## 2018-09-14 PROCEDURE — 85610 PROTHROMBIN TIME: CPT | Mod: PBBFAC,PO

## 2018-09-14 PROCEDURE — 99211 OFF/OP EST MAY X REQ PHY/QHP: CPT | Mod: S$PBB,25,, | Performed by: INTERNAL MEDICINE

## 2018-09-14 NOTE — PROGRESS NOTES
INR elevated today. Patient states that he is overall eating less trying to loose weight and plans to continue this change. He has bruises from use, denies any bleeding or other changes. Will give half dose of coumadin today and decrease weekly dose until follow-up in 10 days. Advised him to call with any changes or concerns.  M. Colosino Pharm D assisted with dosing.

## 2018-09-21 ENCOUNTER — OFFICE VISIT (OUTPATIENT)
Dept: TRANSPLANT | Facility: CLINIC | Age: 53
End: 2018-09-21
Payer: MEDICARE

## 2018-09-21 ENCOUNTER — HOSPITAL ENCOUNTER (OUTPATIENT)
Dept: WOUND CARE | Facility: HOSPITAL | Age: 53
Discharge: HOME | End: 2018-09-21
Attending: PODIATRIST
Payer: MEDICARE

## 2018-09-21 VITALS
SYSTOLIC BLOOD PRESSURE: 122 MMHG | BODY MASS INDEX: 36.45 KG/M2 | WEIGHT: 315 LBS | HEIGHT: 78 IN | DIASTOLIC BLOOD PRESSURE: 73 MMHG | HEART RATE: 79 BPM

## 2018-09-21 VITALS — HEIGHT: 78 IN | BODY MASS INDEX: 36.45 KG/M2 | WEIGHT: 315 LBS

## 2018-09-21 DIAGNOSIS — I87.1 MAY-THURNER SYNDROME: ICD-10-CM

## 2018-09-21 DIAGNOSIS — I34.0 NON-RHEUMATIC MITRAL REGURGITATION: ICD-10-CM

## 2018-09-21 DIAGNOSIS — L97.909 VENOUS STASIS ULCER OF LOWER EXTREMITY, UNSPECIFIED LATERALITY: Primary | ICD-10-CM

## 2018-09-21 DIAGNOSIS — L97.329 NON-PRESSURE CHRONIC ULCER OF LEFT ANKLE, WITH UNSPECIFIED SEVERITY: ICD-10-CM

## 2018-09-21 DIAGNOSIS — I48.21 PERMANENT ATRIAL FIBRILLATION: ICD-10-CM

## 2018-09-21 DIAGNOSIS — E05.90 HYPERTHYROIDISM: Chronic | ICD-10-CM

## 2018-09-21 DIAGNOSIS — I50.42 CHRONIC COMBINED SYSTOLIC AND DIASTOLIC CONGESTIVE HEART FAILURE: ICD-10-CM

## 2018-09-21 DIAGNOSIS — I83.009 VENOUS STASIS ULCER OF LOWER EXTREMITY, UNSPECIFIED LATERALITY: Primary | ICD-10-CM

## 2018-09-21 DIAGNOSIS — R60.0 BILATERAL EDEMA OF LOWER EXTREMITY: ICD-10-CM

## 2018-09-21 DIAGNOSIS — E66.01 MORBID OBESITY: ICD-10-CM

## 2018-09-21 PROCEDURE — 99215 OFFICE O/P EST HI 40 MIN: CPT | Mod: S$PBB,,, | Performed by: INTERNAL MEDICINE

## 2018-09-21 PROCEDURE — 29581 APPL MULTLAYER CMPRN SYS LEG: CPT

## 2018-09-21 PROCEDURE — 99213 OFFICE O/P EST LOW 20 MIN: CPT | Mod: PBBFAC,25 | Performed by: INTERNAL MEDICINE

## 2018-09-21 PROCEDURE — 99999 PR PBB SHADOW E&M-EST. PATIENT-LVL III: CPT | Mod: PBBFAC,,, | Performed by: INTERNAL MEDICINE

## 2018-09-21 RX ORDER — METOPROLOL SUCCINATE 100 MG/1
100 TABLET, EXTENDED RELEASE ORAL DAILY
Qty: 30 TABLET | Refills: 11 | Status: SHIPPED | OUTPATIENT
Start: 2018-09-21 | End: 2019-05-14 | Stop reason: SDUPTHER

## 2018-09-21 NOTE — PROGRESS NOTES
Subjective:    Patient ID:  Drew Farrar is a 52 y.o. male who presents for evaluation of Congestive Heart Failure and Shortness of Breath (walking )      Congestive Heart Failure   Pertinent negatives include no abdominal pain, chest pain, chills, coughing, diaphoresis, fever or weakness.   Shortness of Breath   Pertinent negatives include no abdominal pain, chest pain, claudication, fever, hemoptysis, leg swelling, orthopnea, sputum production or wheezing.     Hospital Course:     M with PMHx of persistent afib on coumadin, patent PDA s/p surgical closure 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 of age, May Thurner syndrome with h/o multiple PEs and DVTs s/p IVC filter, chronic systolic congestive heart failure (EF 30%), hyperthyroid, atrial fibrillation here for follow up. FRAIRE FC II-III NYHA. Compliants of back pain      Review of Systems   Constitution: Negative for chills, decreased appetite, diaphoresis, fever, weakness, malaise/fatigue, night sweats, weight gain and weight loss.   HENT: Negative.    Eyes: Negative.    Cardiovascular: Positive for dyspnea on exertion and irregular heartbeat. Negative for chest pain, claudication, cyanosis, leg swelling, near-syncope, orthopnea and palpitations.   Respiratory: Positive for shortness of breath. Negative for cough, hemoptysis, sleep disturbances due to breathing, snoring, sputum production and wheezing.    Endocrine: Negative.    Hematologic/Lymphatic: Negative.    Skin: Negative.    Musculoskeletal: Negative.    Gastrointestinal: Negative for abdominal pain and diarrhea.   Genitourinary: Negative.    Neurological: Negative.    Psychiatric/Behavioral: Negative.         Objective:    Physical Exam   Constitutional: He is oriented to person, place, and time. He appears well-developed and well-nourished.   HENT:   Head: Normocephalic and atraumatic.   Eyes: Conjunctivae and EOM are normal. Pupils are equal, round, and reactive to light.   Neck: Normal range of motion. Neck supple. No JVD  present.   Cardiovascular: Normal rate. An irregularly irregular rhythm present. Exam reveals no gallop and no friction rub.   Murmur heard.  High-pitched blowing holosystolic murmur is present with a grade of 2/6 at the apex.  Pulmonary/Chest: Breath sounds normal. No respiratory distress. He has no wheezes. He has no rales. He exhibits no tenderness.   Abdominal: Soft. Bowel sounds are normal. He exhibits no distension and no mass. There is no hepatosplenomegaly, splenomegaly or hepatomegaly. There is no tenderness. There is no rebound, no guarding and no CVA tenderness.   No hepatoslenomegaly   Musculoskeletal: Normal range of motion. He exhibits edema (+4). He exhibits no tenderness.   Neurological: He is alert and oriented to person, place, and time. He has normal reflexes. No cranial nerve deficit. He exhibits normal muscle tone. Coordination normal.   Skin: Skin is warm and dry.   Psychiatric: He has a normal mood and affect.         Assessment:       1. Chronic venous hypertension with ulcer involving left side    2. Non-pressure chronic ulcer of left ankle, with unspecified severity    3. Chronic combined systolic and diastolic congestive heart failure    4. May-Thurner syndrome    5. Non-rheumatic mitral regurgitation    6. Permanent atrial fibrillation    7. Morbid obesity    8. Hyperthyroidism    9. Bilateral edema of lower extremity         Plan:       Mr. Frarar is a 52 year-old with patent PDA s/p surgical closure 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 of age, May Thurner syndrome with multple leg/pelvic DVTs with pulmonary emboli, chronic systolic congestive heart failure (EF 40-45%), permanent atrial fibrillation since his 30s, and hyperthyroidism      Stable form cardiac standpoint

## 2018-09-25 ENCOUNTER — ANTI-COAG VISIT (OUTPATIENT)
Dept: CARDIOLOGY | Facility: CLINIC | Age: 53
End: 2018-09-25
Payer: MEDICARE

## 2018-09-25 DIAGNOSIS — I26.99 OTHER PULMONARY EMBOLISM WITHOUT ACUTE COR PULMONALE, UNSPECIFIED CHRONICITY: ICD-10-CM

## 2018-09-25 DIAGNOSIS — Z79.01 LONG TERM (CURRENT) USE OF ANTICOAGULANTS: Primary | ICD-10-CM

## 2018-09-25 LAB — INR PPP: 4.6 (ref 2–3)

## 2018-09-25 PROCEDURE — 85610 PROTHROMBIN TIME: CPT | Mod: PBBFAC,PO

## 2018-09-25 PROCEDURE — 99211 OFF/OP EST MAY X REQ PHY/QHP: CPT | Mod: S$PBB,25,, | Performed by: INTERNAL MEDICINE

## 2018-09-25 NOTE — PROGRESS NOTES
Quick follow-up for elevated INR and decreased dose 9/14. INR higher today. Patient with bruises from use, denies any bleeding or changes. Will give reduced dose of coumadin today and decrease weekly dose until follow-up in 2 weeks. Advised him to call with any changes or concerns. GLORIA HUGHES assisted with dosing.

## 2018-09-28 ENCOUNTER — HOSPITAL ENCOUNTER (OUTPATIENT)
Dept: WOUND CARE | Facility: HOSPITAL | Age: 53
Discharge: HOME | End: 2018-09-28
Attending: PODIATRIST
Payer: MEDICARE

## 2018-09-28 VITALS — DIASTOLIC BLOOD PRESSURE: 63 MMHG | SYSTOLIC BLOOD PRESSURE: 121 MMHG | HEART RATE: 72 BPM

## 2018-09-28 DIAGNOSIS — I83.009 VENOUS STASIS ULCER OF LOWER EXTREMITY, UNSPECIFIED LATERALITY: Primary | ICD-10-CM

## 2018-09-28 DIAGNOSIS — L97.909 VENOUS STASIS ULCER OF LOWER EXTREMITY, UNSPECIFIED LATERALITY: Primary | ICD-10-CM

## 2018-09-28 PROCEDURE — 29581 APPL MULTLAYER CMPRN SYS LEG: CPT

## 2018-10-08 ENCOUNTER — HOSPITAL ENCOUNTER (OUTPATIENT)
Dept: WOUND CARE | Facility: HOSPITAL | Age: 53
Discharge: HOME | End: 2018-10-08
Attending: PODIATRIST
Payer: MEDICARE

## 2018-10-08 DIAGNOSIS — L97.909 VENOUS STASIS ULCER OF LOWER EXTREMITY, UNSPECIFIED LATERALITY: Primary | ICD-10-CM

## 2018-10-08 DIAGNOSIS — I83.009 VENOUS STASIS ULCER OF LOWER EXTREMITY, UNSPECIFIED LATERALITY: Primary | ICD-10-CM

## 2018-10-08 DIAGNOSIS — I26.99 OTHER PULMONARY EMBOLISM WITHOUT ACUTE COR PULMONALE, UNSPECIFIED CHRONICITY: ICD-10-CM

## 2018-10-08 DIAGNOSIS — Z79.01 LONG TERM (CURRENT) USE OF ANTICOAGULANTS: ICD-10-CM

## 2018-10-08 PROCEDURE — 29580 STRAPPING UNNA BOOT: CPT

## 2018-10-08 PROCEDURE — 97597 DBRDMT OPN WND 1ST 20 CM/<: CPT

## 2018-10-08 RX ORDER — WARFARIN 10 MG/1
15-20 TABLET ORAL DAILY
Qty: 90 TABLET | Refills: 3 | Status: ON HOLD | OUTPATIENT
Start: 2018-10-08 | End: 2019-07-10 | Stop reason: HOSPADM

## 2018-10-08 NOTE — TELEPHONE ENCOUNTER
----- Message from Kaycee Santacruz sent at 10/8/2018 10:49 AM CDT -----  Contact: [phone]/self  Patient requesting a refill for warfarin (COUMADIN) 10 MG tablet. Edward Santos. Please advise.

## 2018-10-09 ENCOUNTER — ANTI-COAG VISIT (OUTPATIENT)
Dept: CARDIOLOGY | Facility: CLINIC | Age: 53
End: 2018-10-09
Payer: MEDICARE

## 2018-10-09 DIAGNOSIS — I26.99 OTHER PULMONARY EMBOLISM WITHOUT ACUTE COR PULMONALE, UNSPECIFIED CHRONICITY: ICD-10-CM

## 2018-10-09 DIAGNOSIS — Z79.01 LONG TERM (CURRENT) USE OF ANTICOAGULANTS: Primary | ICD-10-CM

## 2018-10-09 LAB — INR PPP: 1.7 (ref 2–3)

## 2018-10-09 PROCEDURE — 99211 OFF/OP EST MAY X REQ PHY/QHP: CPT | Mod: 25,S$PBB,, | Performed by: INTERNAL MEDICINE

## 2018-10-09 PROCEDURE — 85610 PROTHROMBIN TIME: CPT | Mod: PBBFAC,PO

## 2018-10-15 ENCOUNTER — HOSPITAL ENCOUNTER (OUTPATIENT)
Dept: WOUND CARE | Facility: HOSPITAL | Age: 53
Discharge: HOME | End: 2018-10-15
Attending: PODIATRIST
Payer: MEDICARE

## 2018-10-15 VITALS
HEART RATE: 60 BPM | DIASTOLIC BLOOD PRESSURE: 57 MMHG | SYSTOLIC BLOOD PRESSURE: 119 MMHG | BODY MASS INDEX: 36.45 KG/M2 | WEIGHT: 315 LBS | HEIGHT: 78 IN

## 2018-10-15 DIAGNOSIS — E66.01 MORBID OBESITY: ICD-10-CM

## 2018-10-15 DIAGNOSIS — I83.009 VENOUS STASIS ULCER OF LOWER EXTREMITY, UNSPECIFIED LATERALITY: Primary | ICD-10-CM

## 2018-10-15 DIAGNOSIS — L97.909 VENOUS STASIS ULCER OF LOWER EXTREMITY, UNSPECIFIED LATERALITY: Primary | ICD-10-CM

## 2018-10-15 DIAGNOSIS — R60.9 EDEMA, UNSPECIFIED TYPE: ICD-10-CM

## 2018-10-15 PROCEDURE — 99213 OFFICE O/P EST LOW 20 MIN: CPT | Mod: ,,, | Performed by: PODIATRIST

## 2018-10-15 PROCEDURE — 99213 PR OFFICE/OUTPT VISIT, EST, LEVL III, 20-29 MIN: ICD-10-PCS | Mod: ,,, | Performed by: PODIATRIST

## 2018-10-15 PROCEDURE — 29581 APPL MULTLAYER CMPRN SYS LEG: CPT

## 2018-10-15 NOTE — PROGRESS NOTES
Subjective:       Patient ID: Drew Farrar is a 52 y.o. male.    Chief Complaint: Venous Stasis    HPI  Review of Systems    Objective:      Physical Exam    Assessment:       1. Venous stasis ulcer of lower extremity, unspecified laterality    2. Chronic venous hypertension with ulcer involving left side    3. Morbid obesity    4. Edema, unspecified type        Plan:       IN ORDER TO FIND TODAY'S PROGRESS NOTE IN WOUND EXPERT, LOOK IN THE MEDIA TAB.

## 2018-10-23 ENCOUNTER — OFFICE VISIT (OUTPATIENT)
Dept: URGENT CARE | Facility: CLINIC | Age: 53
End: 2018-10-23
Payer: MEDICARE

## 2018-10-23 VITALS
OXYGEN SATURATION: 100 % | SYSTOLIC BLOOD PRESSURE: 130 MMHG | TEMPERATURE: 98 F | HEIGHT: 78 IN | DIASTOLIC BLOOD PRESSURE: 69 MMHG | HEART RATE: 86 BPM | BODY MASS INDEX: 36.45 KG/M2 | WEIGHT: 315 LBS

## 2018-10-23 DIAGNOSIS — R05.9 COUGH: Primary | ICD-10-CM

## 2018-10-23 DIAGNOSIS — J30.9 ALLERGIC SINUSITIS: ICD-10-CM

## 2018-10-23 DIAGNOSIS — H00.011 HORDEOLUM EXTERNUM OF RIGHT UPPER EYELID: ICD-10-CM

## 2018-10-23 PROCEDURE — 99214 OFFICE O/P EST MOD 30 MIN: CPT | Mod: S$GLB,,, | Performed by: PHYSICIAN ASSISTANT

## 2018-10-23 RX ORDER — AZELASTINE 1 MG/ML
1 SPRAY, METERED NASAL 2 TIMES DAILY
Qty: 30 ML | Refills: 0 | Status: SHIPPED | OUTPATIENT
Start: 2018-10-23 | End: 2019-01-17

## 2018-10-23 RX ORDER — POLYMYXIN B SULFATE AND TRIMETHOPRIM 1; 10000 MG/ML; [USP'U]/ML
1 SOLUTION OPHTHALMIC EVERY 6 HOURS
Qty: 10 ML | Refills: 0 | Status: SHIPPED | OUTPATIENT
Start: 2018-10-23 | End: 2018-10-30

## 2018-10-23 RX ORDER — ERYTHROMYCIN 5 MG/G
OINTMENT OPHTHALMIC NIGHTLY
Qty: 3.5 G | Refills: 0 | Status: SHIPPED | OUTPATIENT
Start: 2018-10-23 | End: 2018-10-30

## 2018-10-23 RX ORDER — PROMETHAZINE HYDROCHLORIDE AND CODEINE PHOSPHATE 6.25; 1 MG/5ML; MG/5ML
5 SOLUTION ORAL EVERY 6 HOURS PRN
Qty: 240 ML | Refills: 0 | Status: SHIPPED | OUTPATIENT
Start: 2018-10-23 | End: 2018-11-02

## 2018-10-23 NOTE — PATIENT INSTRUCTIONS
A cough syrup as needed for cough.  Please be aware this medication will cause drowsiness.  Use nasal spray as prescribed to help with postnasal drip.  Follow-up with your primary care provider next week as scheduled.    Please follow up with your primary care provider within 2-5 days if your signs and symptoms have not resolved or worsen.     If your condition worsens or fails to improve we recommend that you receive another evaluation at the emergency room immediately or contact your primary medical clinic to discuss your concerns.   You must understand that you have received an Urgent Care treatment only and that you may be released before all of your medical problems are known or treated. You, the patient, will arrange for follow up care as instructed.

## 2018-10-23 NOTE — PROGRESS NOTES
"Subjective:       Patient ID: Drew Farrar is a 53 y.o. male.    Vitals:  height is 6' 8" (2.032 m) and weight is 173.3 kg (382 lb) (abnormal). His oral temperature is 97.5 °F (36.4 °C). His blood pressure is 130/69 and his pulse is 86. His oxygen saturation is 100%.     Chief Complaint: Cough and Eye Problem    Cough   This is a new problem. Episode onset: 7 days. The problem has been gradually worsening. The problem occurs every few minutes. The cough is productive of sputum. Associated symptoms include eye redness, headaches and postnasal drip. Pertinent negatives include no chest pain, chills, ear pain, fever, myalgias, sore throat, shortness of breath or wheezing. The symptoms are aggravated by lying down. Treatments tried: Mucinex. The treatment provided mild relief. His past medical history is significant for bronchitis and pneumonia.   Eye Problem    The right eye is affected. This is a new problem. The current episode started 1 to 4 weeks ago. The problem occurs constantly. The problem has been rapidly worsening. There was no injury mechanism. The patient is experiencing no pain. Associated symptoms include an eye discharge and eye redness. Pertinent negatives include no blurred vision, double vision, fever, foreign body sensation, itching, nausea, photophobia, recent URI or vomiting. He has tried nothing for the symptoms.     Review of Systems   Constitution: Negative for chills, fever and malaise/fatigue.   HENT: Positive for congestion and postnasal drip. Negative for ear pain, hoarse voice and sore throat.    Eyes: Positive for discharge and redness. Negative for blurred vision, double vision, itching and photophobia.   Cardiovascular: Negative for chest pain, dyspnea on exertion and leg swelling.   Respiratory: Positive for cough and sputum production. Negative for shortness of breath and wheezing.    Musculoskeletal: Negative for myalgias.   Gastrointestinal: Negative for abdominal pain, nausea and " vomiting.   Neurological: Positive for headaches.       Objective:      Physical Exam   Constitutional: He is oriented to person, place, and time. He appears well-developed and well-nourished. He does not appear ill. No distress.   HENT:   Head: Normocephalic and atraumatic.   Right Ear: Hearing, tympanic membrane, external ear and ear canal normal.   Left Ear: Hearing, tympanic membrane, external ear and ear canal normal.   Nose: Nose normal. No mucosal edema. Right sinus exhibits no maxillary sinus tenderness and no frontal sinus tenderness. Left sinus exhibits no maxillary sinus tenderness and no frontal sinus tenderness.   Mouth/Throat: Uvula is midline and oropharynx is clear and moist. No oropharyngeal exudate, posterior oropharyngeal edema or posterior oropharyngeal erythema.   Eyes: EOM and lids are normal. Pupils are equal, round, and reactive to light. Right eye exhibits discharge, exudate and hordeolum (right upper lid; external). Right eye exhibits no chemosis. No foreign body present in the right eye. Left eye exhibits no chemosis, no discharge, no exudate and no hordeolum. No foreign body present in the left eye. Right conjunctiva is injected. Right conjunctiva has no hemorrhage. Left conjunctiva is not injected. Left conjunctiva has no hemorrhage. No scleral icterus.   Neck: Normal range of motion. Neck supple.   Cardiovascular: Normal rate, regular rhythm and normal heart sounds. Exam reveals no gallop and no friction rub.   No murmur heard.  Pulmonary/Chest: Effort normal and breath sounds normal. No stridor. No respiratory distress. He has no decreased breath sounds. He has no wheezes. He has no rhonchi. He has no rales.   Lymphadenopathy:        Head (right side): No submandibular and no tonsillar adenopathy present.        Head (left side): No submandibular and no tonsillar adenopathy present.   Neurological: He is alert and oriented to person, place, and time.   Skin: Skin is warm and dry. No  rash noted. He is not diaphoretic. No erythema. No pallor.   Psychiatric: He has a normal mood and affect. His behavior is normal.   Nursing note and vitals reviewed.      Assessment:       1. Cough    2. Allergic sinusitis    3. Hordeolum externum of right upper eyelid        Plan:         Cough  -     promethazine-codeine 6.25-10 mg/5 ml (PHENERGAN WITH CODEINE) 6.25-10 mg/5 mL syrup; Take 5 mLs by mouth every 6 (six) hours as needed for Cough.  Dispense: 240 mL; Refill: 0  -     azelastine (ASTELIN) 137 mcg (0.1 %) nasal spray; 1 spray (137 mcg total) by Nasal route 2 (two) times daily.  Dispense: 30 mL; Refill: 0    Allergic sinusitis  -     promethazine-codeine 6.25-10 mg/5 ml (PHENERGAN WITH CODEINE) 6.25-10 mg/5 mL syrup; Take 5 mLs by mouth every 6 (six) hours as needed for Cough.  Dispense: 240 mL; Refill: 0  -     azelastine (ASTELIN) 137 mcg (0.1 %) nasal spray; 1 spray (137 mcg total) by Nasal route 2 (two) times daily.  Dispense: 30 mL; Refill: 0    Hordeolum externum of right upper eyelid  -     polymyxin B sulf-trimethoprim (POLYTRIM) 10,000 unit- 1 mg/mL Drop; Place 1 drop into the right eye every 6 (six) hours. for 7 days  Dispense: 10 mL; Refill: 0  -     erythromycin (ROMYCIN) ophthalmic ointment; Place into the right eye every evening. for 7 days  Dispense: 3.5 g; Refill: 0      Patient Instructions   A cough syrup as needed for cough.  Please be aware this medication will cause drowsiness.  Use nasal spray as prescribed to help with postnasal drip.  Follow-up with your primary care provider next week as scheduled.    Please follow up with your primary care provider within 2-5 days if your signs and symptoms have not resolved or worsen.     If your condition worsens or fails to improve we recommend that you receive another evaluation at the emergency room immediately or contact your primary medical clinic to discuss your concerns.   You must understand that you have received an Urgent Care  treatment only and that you may be released before all of your medical problems are known or treated. You, the patient, will arrange for follow up care as instructed.

## 2018-10-28 DIAGNOSIS — R05.9 COUGH: ICD-10-CM

## 2018-10-28 DIAGNOSIS — J30.9 ALLERGIC SINUSITIS: ICD-10-CM

## 2018-10-29 RX ORDER — PROMETHAZINE HYDROCHLORIDE AND CODEINE PHOSPHATE 6.25; 1 MG/5ML; MG/5ML
SOLUTION ORAL
Qty: 240 ML | Refills: 0 | OUTPATIENT
Start: 2018-10-29

## 2018-10-30 ENCOUNTER — ANTI-COAG VISIT (OUTPATIENT)
Dept: CARDIOLOGY | Facility: CLINIC | Age: 53
End: 2018-10-30

## 2018-10-30 ENCOUNTER — HOSPITAL ENCOUNTER (EMERGENCY)
Facility: HOSPITAL | Age: 53
Discharge: HOME | End: 2018-10-30
Attending: EMERGENCY MEDICINE
Payer: MEDICARE

## 2018-10-30 VITALS
RESPIRATION RATE: 20 BRPM | WEIGHT: 315 LBS | OXYGEN SATURATION: 98 % | BODY MASS INDEX: 36.45 KG/M2 | SYSTOLIC BLOOD PRESSURE: 142 MMHG | TEMPERATURE: 98 F | HEART RATE: 79 BPM | DIASTOLIC BLOOD PRESSURE: 81 MMHG | HEIGHT: 78 IN

## 2018-10-30 DIAGNOSIS — I26.99 OTHER PULMONARY EMBOLISM WITHOUT ACUTE COR PULMONALE, UNSPECIFIED CHRONICITY: ICD-10-CM

## 2018-10-30 DIAGNOSIS — J40 BRONCHITIS: ICD-10-CM

## 2018-10-30 DIAGNOSIS — Z79.01 LONG TERM (CURRENT) USE OF ANTICOAGULANTS: ICD-10-CM

## 2018-10-30 DIAGNOSIS — R05.9 COUGH: Primary | ICD-10-CM

## 2018-10-30 PROCEDURE — 99284 EMERGENCY DEPT VISIT MOD MDM: CPT

## 2018-10-30 PROCEDURE — 25000003 PHARM REV CODE 250: Performed by: EMERGENCY MEDICINE

## 2018-10-30 RX ORDER — BENZONATATE 100 MG/1
100 CAPSULE ORAL ONCE
Status: COMPLETED | OUTPATIENT
Start: 2018-10-30 | End: 2018-10-30

## 2018-10-30 RX ORDER — BENZONATATE 100 MG/1
100 CAPSULE ORAL 3 TIMES DAILY PRN
Qty: 20 CAPSULE | Refills: 0 | Status: SHIPPED | OUTPATIENT
Start: 2018-10-30 | End: 2018-11-06

## 2018-10-30 RX ORDER — AZITHROMYCIN 250 MG/1
250 TABLET, FILM COATED ORAL DAILY
Qty: 6 TABLET | Refills: 0 | Status: SHIPPED | OUTPATIENT
Start: 2018-10-30 | End: 2019-01-17

## 2018-10-30 RX ADMIN — BENZONATATE 100 MG: 100 CAPSULE, LIQUID FILLED ORAL at 10:10

## 2018-10-30 NOTE — ED NOTES
Pt here c/o vomiting when he drinks anything or eats anything but hot tea and water-states he is drinking plenty of both and denies urinary. State she has had cough and does get mid-sternal pain with cough for 1-2 weeks-used the cough syrup given to him as prescribed with no improvement. Producing yellow sputum. Breath sounds with middle and power lobes are diminished with exp wheezing. Pt has right lower leg swelling and abnormal skin-pt states this is at baseline and he has been released from wound care and it is improving slowly. Denies fevers.  Denies other problems.

## 2018-10-30 NOTE — ED PROVIDER NOTES
Encounter Date: 10/30/2018       History     Chief Complaint   Patient presents with    Cough     pt went to urgent care 1-2 weeks ago for c/o cough and gagging. Was prescribed codeine, and had brief improvement, but states symptoms have worsened.     Drew Farrar is a 53 y.o. male who  has a past medical history of *Atrial fibrillation, Atrial fibrillation, Atrial fibrillation (Feb 23, 2016), Bipolar disorder, Congenital heart disease, Deep vein thrombosis, DVT of leg (deep venous thrombosis), History of prior ablation treatment, Hypertension, Obesity, Stroke, Thyroid disease, Venous stasis ulcer of lower extremity, unspecified laterality (12/14/2012), and Venous ulcer.    The patient presents to the ED due to cough.  He reports symptoms started 2 weeks ago, initially improved but has gradually gotten worse.  He was seen in urgent care clinic 1 week ago, given cough medicine and nasal spray, which helped initially; however, his symptoms have worsened over the last week.  He reports a fever a few days ago that improved with Tylenol.  He reports productive yellow sputum.  He denies any chills, nausea/vomiting, headache, neck pain, or any other systemic symptoms. He has not taken any other medications at home for symptoms.          Review of patient's allergies indicates:   Allergen Reactions    Food allergy formula [glutamine-c-quercet-selen-brom]      Allergic to green peas; Heart failure.    Peas Hives    Ibuprofen Swelling    Latex, natural rubber Hives    Pcn [penicillins] Hives    Butisol [butabarbital] Rash     Peeling skin     Past Medical History:   Diagnosis Date    *Atrial fibrillation     Atrial fibrillation     Atrial fibrillation Feb 23, 2016    Bipolar disorder     Congenital heart disease     s/p surgical intervention 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 of age    Deep vein thrombosis     DVT of leg (deep venous thrombosis)     left leg    History of prior ablation treatment     10/9/13    Hypertension      Obesity     Stroke     Thyroid disease     Venous stasis ulcer of lower extremity, unspecified laterality 12/14/2012    Venous ulcer      Past Surgical History:   Procedure Laterality Date    ANGIOPLASTY      ARTHROSCOPY-KNEE W/ CHONDROPLASTY Right 2/23/2016    Performed by [facility]ue[facility] at [facility] OR    CARDIAC SURGERY      open heart surgery 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 old    EYE SURGERY      left eye cataract/right eye glaucoma    TIMO FILTER PLACEMENT      Dr Calix ([facility])    KNEE SURGERY      l and r     MULTIPLE TOOTH EXTRACTIONS      SKIN GRAFT      left leg    SYNOVECTOMY-KNEE Right 2/23/2016    Performed by [facility] at [facility] OR     Family History   Problem Relation Age of Onset    Diabetes Father     Heart disease Father     Heart disease Maternal Grandmother     Diabetes Maternal Grandfather     Heart disease Maternal Grandfather     Stroke Maternal Grandfather      Social History     Tobacco Use    Smoking status: Former Smoker     Packs/day: 1.00     Years: 6.00     Pack years: 6.00     Types: Cigarettes    Smokeless tobacco: Former User    Tobacco comment: quit by age 27yrs old   Substance Use Topics    Alcohol use: Yes     Alcohol/week: 0.6 oz     Types: 1 Glasses of wine per week     Comment: 1 glass of wine a week    Drug use: No     Review of Systems   Constitutional: Negative for chills and fever.   HENT: Positive for sore throat and voice change. Negative for congestion, postnasal drip, rhinorrhea, sinus pressure, sinus pain and trouble swallowing.    Respiratory: Positive for cough. Negative for shortness of breath.    Cardiovascular: Negative for chest pain.   Gastrointestinal: Negative for constipation, diarrhea, nausea and vomiting.   Genitourinary: Negative for dysuria, frequency and urgency.   Musculoskeletal: Negative for back pain.   Skin: Negative for rash and wound.   Neurological: Negative for weakness.   Hematological: Does not bruise/bleed easily.    Psychiatric/Behavioral: Negative for agitation, behavioral problems and confusion.       Physical Exam     Initial Vitals [10/30/18 1010]   BP Pulse Resp Temp SpO2   (!) 142/81 95 20 98.6 °F (37 °C) 98 %      MAP       --         Physical Exam    Nursing note and vitals reviewed.  Constitutional: He appears well-developed and well-nourished. He is not diaphoretic. No distress.   Large body habitus.   HENT:   Head: Normocephalic and atraumatic.   Mouth/Throat: Oropharynx is clear and moist.   Eyes: EOM are normal. Pupils are equal, round, and reactive to light.   Dysconjugate gaze.   Neck: No tracheal deviation present.   Cardiovascular: Normal rate, regular rhythm, normal heart sounds and intact distal pulses.   Pulmonary/Chest: No stridor. No respiratory distress. He has decreased breath sounds. He has no wheezes. He has no rhonchi. He has no rales.   Auscultation limited by body habitus.   Abdominal: Soft. He exhibits no distension and no mass. There is no tenderness.   Musculoskeletal: Normal range of motion. He exhibits no edema.   Chronic venous stasis changes to right leg.   Neurological: He is alert and oriented to person, place, and time. No cranial nerve deficit or sensory deficit.   Skin: Skin is warm and dry. Capillary refill takes less than 2 seconds. No rash noted.   Psychiatric: He has a normal mood and affect. His behavior is normal. Thought content normal.         ED Course   Procedures  Labs Reviewed - No data to display       Imaging Results          X-Ray Chest PA And Lateral (Final result)  Result time 10/30/18 11:25:05    Final result by Mingo Andrade MD (10/30/18 11:25:05)                 Impression:      Borderline CHF/pulmonary edema, similar to the 05/19/2018 exam.      Electronically signed by: Mingo Andrade MD  Date:    10/30/2018  Time:    11:25             Narrative:    EXAMINATION:  XR CHEST PA AND LATERAL    CLINICAL HISTORY:  Cough    TECHNIQUE:  PA and lateral views of the chest  were performed.    COMPARISON:  05/19/2018    FINDINGS:  Enlarged cardiac silhouette, similar to the prior exam.  Prominent perihilar vasculature.  The lungs are clear.  No pneumothorax.  No pleural effusions.                                 Medical Decision Making:   Initial Assessment:   53-year-old male with cough x2 weeks.  Productive yellow sputum.  Vitals and exam grossly unremarkable.  Will obtain CXR and reassess.  Differential Diagnosis:   Differential Diagnosis includes, but is not limited to:  Viral URI, Strep pharyngitis, viral pharyngitis, foreign body aspiration/ingestion, bronchitis, asthma exacerbation, CHF exacerbation, COPD exacerbation, allergy/atopy, influenza, pertussis, PE, pneumonia, lung abscess, fungal infection, TB, epiglottitis.    Clinical Tests:   Radiological Study: Ordered and Reviewed  ED Management:  CXR without acute findings.  Given persistent symptoms greater than 2 weeks, will treat as acute bronchitis with azithromycin.  Rx Tessalon for symptomatic relief.  Vitals stable, patient ready for discharge.  Upon re-evaluation, the patient's status has remained stable.  After complete ED evaluation, clinical impression is most consistent with acute bronchitis.  At this time, I feel there is no emergent condition requiring further evaluation or admission. I believe the patient is stable for discharge from the ED. The patient and any additional family present were updated with test results, overall clinical impression, and recommended further plan of care. All questions were answered. The patient expressed understanding and agreed with current plan for discharge with PCP follow-up within 1 week. Strict return precautions were provided, including return/worsening of current symptoms or any other concerns.                         Clinical Impression:   The primary encounter diagnosis was Cough. A diagnosis of Bronchitis was also pertinent to this visit.                             Hans MYERS  MD Giselle  10/30/18 1149

## 2018-11-02 NOTE — PROGRESS NOTES
Left message to call coumadin clinic, Needs to be advised, called Mom's cell # and Left message to have Patient call coumadin clinic, Needs to be advised

## 2018-11-06 ENCOUNTER — LAB VISIT (OUTPATIENT)
Dept: LAB | Facility: HOSPITAL | Age: 53
End: 2018-11-06
Attending: INTERNAL MEDICINE
Payer: MEDICARE

## 2018-11-06 ENCOUNTER — ANTI-COAG VISIT (OUTPATIENT)
Dept: CARDIOLOGY | Facility: CLINIC | Age: 53
End: 2018-11-06

## 2018-11-06 DIAGNOSIS — I26.99 OTHER PULMONARY EMBOLISM WITHOUT ACUTE COR PULMONALE, UNSPECIFIED CHRONICITY: ICD-10-CM

## 2018-11-06 DIAGNOSIS — Z79.01 LONG TERM (CURRENT) USE OF ANTICOAGULANTS: ICD-10-CM

## 2018-11-06 LAB
INR PPP: 2.1
PROTHROMBIN TIME: 21.9 SEC

## 2018-11-06 PROCEDURE — 85610 PROTHROMBIN TIME: CPT

## 2018-11-06 PROCEDURE — 36415 COLL VENOUS BLD VENIPUNCTURE: CPT

## 2018-11-28 ENCOUNTER — LAB VISIT (OUTPATIENT)
Dept: LAB | Facility: HOSPITAL | Age: 53
End: 2018-11-28
Attending: INTERNAL MEDICINE
Payer: MEDICARE

## 2018-11-28 DIAGNOSIS — Z79.01 LONG TERM (CURRENT) USE OF ANTICOAGULANTS: ICD-10-CM

## 2018-11-28 DIAGNOSIS — I26.99 OTHER PULMONARY EMBOLISM WITHOUT ACUTE COR PULMONALE, UNSPECIFIED CHRONICITY: ICD-10-CM

## 2018-11-28 LAB
INR PPP: 2
PROTHROMBIN TIME: 20.8 SEC

## 2018-11-28 PROCEDURE — 36415 COLL VENOUS BLD VENIPUNCTURE: CPT

## 2018-11-28 PROCEDURE — 85610 PROTHROMBIN TIME: CPT

## 2018-11-29 ENCOUNTER — ANTI-COAG VISIT (OUTPATIENT)
Dept: CARDIOLOGY | Facility: CLINIC | Age: 53
End: 2018-11-29

## 2018-11-29 DIAGNOSIS — Z79.01 LONG TERM (CURRENT) USE OF ANTICOAGULANTS: ICD-10-CM

## 2018-11-29 DIAGNOSIS — I26.99 OTHER PULMONARY EMBOLISM WITHOUT ACUTE COR PULMONALE, UNSPECIFIED CHRONICITY: ICD-10-CM

## 2018-12-03 NOTE — PROGRESS NOTES
Patient unable to be reached regarding low INR 11/28. Closing chart for admin purposes. INR too old to address now. Will continue to try and reach patient to get current INR for assessment.

## 2018-12-10 ENCOUNTER — ANTI-COAG VISIT (OUTPATIENT)
Dept: CARDIOLOGY | Facility: CLINIC | Age: 53
End: 2018-12-10
Payer: MEDICARE

## 2018-12-10 DIAGNOSIS — Z79.01 LONG TERM (CURRENT) USE OF ANTICOAGULANTS: Primary | ICD-10-CM

## 2018-12-10 DIAGNOSIS — I26.99 OTHER PULMONARY EMBOLISM WITHOUT ACUTE COR PULMONALE, UNSPECIFIED CHRONICITY: ICD-10-CM

## 2018-12-10 LAB — INR PPP: 1.8 (ref 2–3)

## 2018-12-10 PROCEDURE — 99211 OFF/OP EST MAY X REQ PHY/QHP: CPT | Mod: S$PBB,,,

## 2018-12-10 PROCEDURE — 85610 PROTHROMBIN TIME: CPT | Mod: PBBFAC,PO

## 2018-12-10 NOTE — PROGRESS NOTES
Patient was unable to be reached regarding low INR 11/28. Reports to clinic today and INR still low. He reports taking dose correctly and denies any missed doses or other changes. Will boost and increase dose and f/u INR later this week if possible. If he remains low will need enoxaparin. He is scheduled for another visit in clinic on 12/17

## 2018-12-17 ENCOUNTER — ANTI-COAG VISIT (OUTPATIENT)
Dept: CARDIOLOGY | Facility: CLINIC | Age: 53
End: 2018-12-17
Payer: MEDICARE

## 2018-12-17 DIAGNOSIS — I26.99 OTHER PULMONARY EMBOLISM WITHOUT ACUTE COR PULMONALE, UNSPECIFIED CHRONICITY: ICD-10-CM

## 2018-12-17 DIAGNOSIS — Z79.01 LONG TERM (CURRENT) USE OF ANTICOAGULANTS: Primary | ICD-10-CM

## 2018-12-17 LAB — INR PPP: 3.6 (ref 2–3)

## 2018-12-17 PROCEDURE — 85610 PROTHROMBIN TIME: CPT | Mod: PBBFAC,PO

## 2018-12-17 PROCEDURE — 99211 OFF/OP EST MAY X REQ PHY/QHP: CPT | Mod: S$PBB,25,, | Performed by: INTERNAL MEDICINE

## 2018-12-17 RX ORDER — TORSEMIDE 20 MG/1
20 TABLET ORAL DAILY
Qty: 30 TABLET | Refills: 2 | Status: SHIPPED | OUTPATIENT
Start: 2018-12-17 | End: 2019-06-28 | Stop reason: SDUPTHER

## 2018-12-17 NOTE — PROGRESS NOTES
Quick follow-up for low INR 2/26. INR much improved, just slightly above goal range. Patient with bruises from use, denies any bleeding or other changes. Will maintain newly increased dose and f/u INR in 10 days. Patient advised to call with any changes or problems.

## 2018-12-27 ENCOUNTER — LAB VISIT (OUTPATIENT)
Dept: LAB | Facility: HOSPITAL | Age: 53
End: 2018-12-27
Attending: INTERNAL MEDICINE
Payer: MEDICARE

## 2018-12-27 ENCOUNTER — ANTI-COAG VISIT (OUTPATIENT)
Dept: CARDIOLOGY | Facility: CLINIC | Age: 53
End: 2018-12-27

## 2018-12-27 DIAGNOSIS — I26.99 OTHER PULMONARY EMBOLISM WITHOUT ACUTE COR PULMONALE, UNSPECIFIED CHRONICITY: ICD-10-CM

## 2018-12-27 DIAGNOSIS — Z79.01 LONG TERM (CURRENT) USE OF ANTICOAGULANTS: ICD-10-CM

## 2018-12-27 LAB
INR PPP: 4.2
PROTHROMBIN TIME: 43.1 SEC

## 2018-12-27 PROCEDURE — 36415 COLL VENOUS BLD VENIPUNCTURE: CPT

## 2018-12-27 PROCEDURE — 85610 PROTHROMBIN TIME: CPT

## 2018-12-27 NOTE — PROGRESS NOTES
INR elevated today. Patient reports taking dose as instructed (recent increase). He had a minor nosebleed this week. Will decrease dose tonight and rechallenge new dose (was subtherapeutic on next lowest weekly plan). Patient may need change in tablet size if we cannot get him within range. Incorporating vit K foods could also be an option.

## 2018-12-28 ENCOUNTER — NURSE TRIAGE (OUTPATIENT)
Dept: ADMINISTRATIVE | Facility: CLINIC | Age: 53
End: 2018-12-28

## 2018-12-29 NOTE — TELEPHONE ENCOUNTER
Mother had questions about diet patient should be eating while taking Coumadin. Home care advice given    Reason for Disposition   Information only question and nurse able to answer    Protocols used: ST NO PROTOCOL CALL: INFORMATION ONLY-A-[place]

## 2019-01-04 ENCOUNTER — ANTI-COAG VISIT (OUTPATIENT)
Dept: CARDIOLOGY | Facility: CLINIC | Age: 54
End: 2019-01-04

## 2019-01-04 ENCOUNTER — LAB VISIT (OUTPATIENT)
Dept: LAB | Facility: HOSPITAL | Age: 54
End: 2019-01-04
Attending: INTERNAL MEDICINE
Payer: MEDICARE

## 2019-01-04 DIAGNOSIS — I26.99 OTHER PULMONARY EMBOLISM WITHOUT ACUTE COR PULMONALE, UNSPECIFIED CHRONICITY: ICD-10-CM

## 2019-01-04 DIAGNOSIS — Z79.01 LONG TERM (CURRENT) USE OF ANTICOAGULANTS: ICD-10-CM

## 2019-01-04 LAB
INR PPP: 3.1
PROTHROMBIN TIME: 31.8 SEC

## 2019-01-04 PROCEDURE — 36415 COLL VENOUS BLD VENIPUNCTURE: CPT

## 2019-01-04 PROCEDURE — 85610 PROTHROMBIN TIME: CPT

## 2019-01-04 NOTE — PROGRESS NOTES
Patient was given lab result, verified correct dose of coumadin, reports less food intake -trying to lose weight, may need surgery- will talk to Dr. Patel first, Patient advised to continue same dose of coumadin and redraw lab 1/14, also to call coumadin clinic if and when surgery is scheduled, appointment booked

## 2019-01-14 ENCOUNTER — ANTI-COAG VISIT (OUTPATIENT)
Dept: CARDIOLOGY | Facility: CLINIC | Age: 54
End: 2019-01-14
Payer: MEDICARE

## 2019-01-14 DIAGNOSIS — I26.99 OTHER PULMONARY EMBOLISM WITHOUT ACUTE COR PULMONALE, UNSPECIFIED CHRONICITY: ICD-10-CM

## 2019-01-14 DIAGNOSIS — Z79.01 LONG TERM (CURRENT) USE OF ANTICOAGULANTS: Primary | ICD-10-CM

## 2019-01-14 LAB — INR PPP: 4.1 (ref 2–3)

## 2019-01-14 PROCEDURE — 99211 PR OFFICE/OUTPT VISIT, EST, LEVL I: ICD-10-PCS | Mod: S$PBB,25,, | Performed by: INTERNAL MEDICINE

## 2019-01-14 PROCEDURE — 85610 PROTHROMBIN TIME: CPT | Mod: PBBFAC,PO

## 2019-01-14 PROCEDURE — 99211 OFF/OP EST MAY X REQ PHY/QHP: CPT | Mod: S$PBB,25,, | Performed by: INTERNAL MEDICINE

## 2019-01-14 NOTE — PROGRESS NOTES
INR elevated today. Patient reports taking dose as instructed but has had a change in diet. He is trying to lose weight and is eating less in general (fruit smoothies), but no significant change to high/mod vit K foods. Will decrease dose for tonight and resume most recent weekly plan. May need to go back to 10mg daily if he remains high (though was subtherapeutic on that dose previously). May also need to consider change to tablet size if we can't get him within range. He will f/u INR next week with other appointments. He was advised to call with any changes or problems.

## 2019-01-17 ENCOUNTER — OFFICE VISIT (OUTPATIENT)
Dept: CARDIOLOGY | Facility: CLINIC | Age: 54
End: 2019-01-17
Payer: MEDICARE

## 2019-01-17 VITALS
DIASTOLIC BLOOD PRESSURE: 64 MMHG | WEIGHT: 315 LBS | BODY MASS INDEX: 42.07 KG/M2 | HEART RATE: 97 BPM | SYSTOLIC BLOOD PRESSURE: 120 MMHG | OXYGEN SATURATION: 96 %

## 2019-01-17 DIAGNOSIS — I87.2 VENOUS INSUFFICIENCY OF LOWER EXTREMITY, UNSPECIFIED LATERALITY: ICD-10-CM

## 2019-01-17 DIAGNOSIS — I48.21 PERMANENT ATRIAL FIBRILLATION: ICD-10-CM

## 2019-01-17 DIAGNOSIS — I87.2 VENOUS (PERIPHERAL) INSUFFICIENCY: ICD-10-CM

## 2019-01-17 DIAGNOSIS — I87.1 MAY-THURNER SYNDROME: ICD-10-CM

## 2019-01-17 DIAGNOSIS — I50.42 CHRONIC COMBINED SYSTOLIC AND DIASTOLIC CONGESTIVE HEART FAILURE: ICD-10-CM

## 2019-01-17 DIAGNOSIS — E05.90 HYPERTHYROIDISM: Chronic | ICD-10-CM

## 2019-01-17 DIAGNOSIS — L97.329 NON-PRESSURE CHRONIC ULCER OF LEFT ANKLE, WITH UNSPECIFIED SEVERITY: Primary | ICD-10-CM

## 2019-01-17 DIAGNOSIS — I83.009 VENOUS STASIS ULCER OF LOWER EXTREMITY, UNSPECIFIED LATERALITY: ICD-10-CM

## 2019-01-17 DIAGNOSIS — L97.909 VENOUS STASIS ULCER OF LOWER EXTREMITY, UNSPECIFIED LATERALITY: ICD-10-CM

## 2019-01-17 DIAGNOSIS — I87.1 STENOSIS OF RIGHT ILIAC VEIN: ICD-10-CM

## 2019-01-17 PROCEDURE — 93005 ELECTROCARDIOGRAM TRACING: CPT | Mod: PBBFAC,PO | Performed by: STUDENT IN AN ORGANIZED HEALTH CARE EDUCATION/TRAINING PROGRAM

## 2019-01-17 PROCEDURE — 99215 PR OFFICE/OUTPT VISIT, EST, LEVL V, 40-54 MIN: ICD-10-PCS | Mod: S$PBB,,, | Performed by: INTERNAL MEDICINE

## 2019-01-17 PROCEDURE — 99999 PR PBB SHADOW E&M-EST. PATIENT-LVL III: ICD-10-PCS | Mod: PBBFAC,,, | Performed by: INTERNAL MEDICINE

## 2019-01-17 PROCEDURE — 99215 OFFICE O/P EST HI 40 MIN: CPT | Mod: S$PBB,,, | Performed by: INTERNAL MEDICINE

## 2019-01-17 PROCEDURE — 93010 ELECTROCARDIOGRAM REPORT: CPT | Mod: S$PBB,,, | Performed by: STUDENT IN AN ORGANIZED HEALTH CARE EDUCATION/TRAINING PROGRAM

## 2019-01-17 PROCEDURE — 99999 PR PBB SHADOW E&M-EST. PATIENT-LVL III: CPT | Mod: PBBFAC,,, | Performed by: INTERNAL MEDICINE

## 2019-01-17 PROCEDURE — 99213 OFFICE O/P EST LOW 20 MIN: CPT | Mod: PBBFAC,PO,25 | Performed by: INTERNAL MEDICINE

## 2019-01-17 PROCEDURE — 93010 EKG 12-LEAD: ICD-10-PCS | Mod: S$PBB,,, | Performed by: STUDENT IN AN ORGANIZED HEALTH CARE EDUCATION/TRAINING PROGRAM

## 2019-01-17 RX ORDER — DIPHENHYDRAMINE HCL 25 MG
50 CAPSULE ORAL ONCE
Status: CANCELLED | OUTPATIENT
Start: 2019-01-17 | End: 2019-01-17

## 2019-01-17 RX ORDER — METHIMAZOLE 10 MG/1
20 TABLET ORAL DAILY
Qty: 60 TABLET | Refills: 3 | Status: SHIPPED | OUTPATIENT
Start: 2019-01-17 | End: 2019-05-14

## 2019-01-17 NOTE — TELEPHONE ENCOUNTER
----- Message from Kelsey Krishnamurthy sent at 1/17/2019  4:31 PM CST -----  Contact:      Josey      [phone]  Needs Advice  /  Pt Mother     Reason for call:  Pt needs an  refill on his Thyroid Medication , called in to the [facility]..  Call back to verify           Communication Preference:  Phone      Additional Information:

## 2019-01-17 NOTE — PATIENT INSTRUCTIONS
Chronic Venous Insufficiency: Treating Ulcers    If leg swelling because of chronic venous insufficiency isnt controlled, an open wound (ulcer) can form. Although ulcers vary in size and shape, they usually appear on the inside of the ankle.  Treating an ulcer  · Visit your healthcare provider. Ulcers need frequent medical care. Special dressings may be applied. You may be given antibiotics to fight infection.  · Your healthcare provider may prescribe medicines, such as aspirin or pentoxifylline, to help the ulcer heal.  · Your healthcare provider may prescribe compression hose to help with the swelling.   · Elevate your legs often to reduce swelling. The ulcer needs oxygen-rich blood to heal. This blood cant reach the ulcer until swelling is reduced.  When to call your healthcare provider  Seek immediate medical attention if:   · You have an increase in pain  · You develop a fever of 100.4°F (38°C) or higher, or as directed by your healthcare provider  · The area around the ulcer becomes red, tender, or both  · The ulcer oozes discolored fluid or smells bad  · Swelling increases suddenly or the dressing feels tight   Date Last Reviewed: 5/1/2016  © 2907-5269 [employer] [address] [place] [id number]. All rights reserved. This information is not intended as a substitute for professional medical care. Always follow your healthcare professional's instructions.

## 2019-01-18 NOTE — PROGRESS NOTES
Subjective:   Patient ID:  Derw Farrar is a 53 y.o. male who presents for follow up of Chronic Venous Insufficiency; Venous Ulcer; Atrial Fibrillation; and Congestive Heart Failure      HPI:       He Is here for L lateral foot venous ulcer treatment. He has history of VTE + PTS + lymphedema. He has CHF with a recent that revealed the following findings: EF 40%, Severe MR, Moderate TR, PASP 47, and Severe LAE. He has persistent afib with a controlled rate and anticoagulated with coumadin. He has an extensive history of venous insufficiency with ulcers that have finally healed but relapsed 2 months ago. He is s/p deep venous intervention, multiple EVLTs ad sclerotherapy sessions. He has h/o DVTs and PEs as well. He is compliant with his medications but not with his diet. He is on lisinopril, toprol xl, aldactone, and lasix with /60 mmHg.  PET stress was non ischemic.               Patient Active Problem List    Diagnosis Date Noted    Non-pressure chronic ulcer of left ankle, with unspecified severity 07/26/2018    Non-rheumatic mitral regurgitation 09/07/2017    Chronic systolic heart failure 09/06/2017     -  Echo 9/2017   EF 40%    Severe MR   Moderate TR   PASP 47   Severe LAE   LVEDD 7.1 cm    LVESD 5.2 cm          Hyperthyroidism 09/05/2017    Elevated troponin 09/05/2017    Preoperative clearance 07/10/2017    Chronic combined systolic and diastolic congestive heart failure 02/20/2017    Long term (current) use of anticoagulants 10/31/2016    Other pulmonary embolism without acute cor pulmonale, unspecified chronicity 10/28/2016    Acute pulmonary embolism 10/26/2016    Recurrent pulmonary embolism 10/26/2016    History of DVT (deep vein thrombosis) 09/16/2016    Essential hypertension 09/16/2016    Knee pain, right 07/07/2016    Difficulty walking 07/07/2016    Muscle weakness 07/07/2016    Group A streptococcal infection 03/01/2016    Tinea pedis of both feet 03/01/2016     Bilateral edema of lower extremity 2016    Morbid obesity 2016    Permanent atrial fibrillation 2016    Right knee pain 2016    NARESH (acute kidney injury) 2016    Chest pain 2014    Depression 2014    Elevated BP 2014    May-Thurner syndrome 2014     1. Successful IVUS guided intervention for May Thurner Syndrome 2. Left common iliac vein treated with 22 x 70 mm Wallstent overlapping with 22 x 45 mm Wallstent post dilation 18 x 40 mm balloon              Stenosis of right iliac vein 2014     S/p venoplasty with  20 x 80 mm Wallstent post dilated with 14 mm balloon in 2014      Chronic venous hypertension with ulcer involving left side 2013    Edema 2013     R leg      Venous (peripheral) insufficiency 2013     S/p bilateral iliac vein stents    S/p R GSV EVLT with laser 10/2013    S/p US guided accessory vein sclerotherapy of R calf       Venous stasis ulcer of lower extremity, unspecified laterality 2012    Ulcer - lesion 2012     Of left foot               Right Arm BP - Sittin/64  Left Arm BP - Sittin/70        LABS    LAST HbA1c  Lab Results   Component Value Date    HGBA1C 5.1 07/10/2017       Lipid panel  Lab Results   Component Value Date    CHOL 124 07/10/2017    CHOL 131 2016    CHOL 123 2012     Lab Results   Component Value Date    HDL 23 (L) 07/10/2017    HDL 29 (L) 2016    HDL 32 (L) 2012     Lab Results   Component Value Date    LDLCALC 70.4 07/10/2017    LDLCALC 83.0 2016    LDLCALC 75.4 2012     Lab Results   Component Value Date    TRIG 153 (H) 07/10/2017    TRIG 95 2016    TRIG 78 2012     Lab Results   Component Value Date    CHOLHDL 18.5 (L) 07/10/2017    CHOLHDL 22.1 2016    CHOLHDL 26.0 2012            Review of Systems   Constitution: Positive for weight loss. Negative for diaphoresis, weakness, night sweats and  weight gain.   HENT: Negative for congestion.    Eyes: Negative for blurred vision, discharge and double vision.   Cardiovascular: Positive for dyspnea on exertion. Negative for chest pain, claudication, cyanosis, irregular heartbeat, leg swelling, near-syncope, orthopnea, palpitations, paroxysmal nocturnal dyspnea and syncope.   Respiratory: Positive for wheezing (with activities). Negative for cough and shortness of breath.    Endocrine: Negative for cold intolerance, heat intolerance and polyphagia.   Hematologic/Lymphatic: Negative for adenopathy and bleeding problem. Does not bruise/bleed easily.   Skin: Positive for poor wound healing (healed). Negative for dry skin and nail changes.   Musculoskeletal: Positive for arthritis. Negative for back pain, falls, joint pain, myalgias and neck pain.   Gastrointestinal: Negative for bloating, abdominal pain, change in bowel habit and constipation.   Genitourinary: Negative for bladder incontinence, dysuria, flank pain, genital sores and missed menses.   Neurological: Negative for aphonia, brief paralysis, difficulty with concentration and dizziness.   Psychiatric/Behavioral: Negative for altered mental status and memory loss. The patient does not have insomnia.    Allergic/Immunologic: Negative for environmental allergies.       Objective:   Physical Exam   Constitutional: He is oriented to person, place, and time. He appears well-developed and well-nourished. He is not intubated.   HENT:   Head: Normocephalic and atraumatic.   Right Ear: External ear normal.   Left Ear: External ear normal.   Mouth/Throat: Oropharynx is clear and moist.   Eyes: Conjunctivae and EOM are normal. Pupils are equal, round, and reactive to light. Right eye exhibits no discharge. Left eye exhibits no discharge. No scleral icterus.   Neck: Normal range of motion. Neck supple. Normal carotid pulses, no hepatojugular reflux and no JVD present. Carotid bruit is not present. No tracheal deviation  present. No thyromegaly present.   Cardiovascular: Normal rate, S1 normal and S2 normal. An irregular rhythm present.  No extrasystoles are present. PMI is not displaced. Exam reveals no gallop, no S3, no distant heart sounds, no friction rub and no midsystolic click.   No murmur heard.  Pulses:       Carotid pulses are 2+ on the right side, and 2+ on the left side.       Radial pulses are 2+ on the right side, and 2+ on the left side.        Femoral pulses are 2+ on the right side, and 2+ on the left side.       Popliteal pulses are 2+ on the right side, and 2+ on the left side.        Dorsalis pedis pulses are 2+ on the right side, and 2+ on the left side.        Posterior tibial pulses are 2+ on the right side, and 2+ on the left side.   Pulmonary/Chest: Effort normal and breath sounds normal. No accessory muscle usage or stridor. No apnea, no tachypnea and no bradypnea. He is not intubated. No respiratory distress. He has no decreased breath sounds. He has no wheezes. He has no rales. He exhibits no tenderness and no bony tenderness.   Abdominal: He exhibits no distension, no pulsatile liver, no abdominal bruit, no ascites, no pulsatile midline mass and no mass. There is no tenderness. There is no rebound and no guarding.   Musculoskeletal: Normal range of motion. He exhibits no edema or tenderness.   Lymphadenopathy:     He has no cervical adenopathy.       Chronic lymphedema of R leg/calf area   Neurological: He is alert and oriented to person, place, and time. He has normal reflexes. No cranial nerve deficit. Coordination normal.   Skin: Skin is warm. No rash noted. No erythema. No pallor.       Lateral left foot wound with fat layer exposed       Foul smell      Dark drainage      Varicose veins   Psychiatric: He has a normal mood and affect. His behavior is normal. Judgment and thought content normal.       1/17/2019            Assessment:     1. Non-pressure chronic ulcer of left ankle, with unspecified  severity    2. Venous insufficiency of lower extremity, unspecified laterality    3. Venous (peripheral) insufficiency    4. Venous stasis ulcer of lower extremity, unspecified laterality    5. May-Thurner syndrome    6. Stenosis of right iliac vein    7. Permanent atrial fibrillation    8. Chronic combined systolic and diastolic congestive heart failure        Plan:         Venous insufficiency ultrasound   To assess for reflux   Either EVLT or sclerotherapy   Compression stockings       Exercise  Low salt diet  Daily weight   Take an extra dose of lasix for 3 lbs weight gain        Continue with CHF clinic recommendations  Maintain relationship with [facility]  Follow up with endocrine for treatment of thyroid disorder            Return sooner for concerns or questions. If symptoms persist go to the ED  I have reviewed all pertinent data on this patient       I have reviewed the patient's medical history in detail and updated the computerized patient record.    Orders Placed This Encounter   Procedures    US Lower Extremity Veins Bilateral Insuf     Standing Status:   Future     Standing Expiration Date:   1/17/2020     Order Specific Question:   May the Radiologist modify the order per protocol to meet the clinical needs of the patient?     Answer:   Yes    Ultrasound Lower Extremity Veins Bilateral Insuf (Cupid Only)     Standing Status:   Future     Standing Expiration Date:   1/17/2020    IN OFFICE EKG 12-LEAD (to Muse)     Order Specific Question:   Diagnosis     Answer:   Atrial fibrillation [427.31.ICD-9-CM]    Case Request-Cath Lab: Sclerotherapy, Ablation, Vein, Peripheral, Percutaneous, Endovenous, Using Laser     Standing Status:   Standing     Number of Occurrences:   1     Order Specific Question:   CPT Code:     Answer:   CT ENDOVENOUS LASER, 1ST VEIN [97836]     Order Specific Question:   CPT Code:     Answer:   CT INJECTION THERAPY VEIN,ONE VEIN [75035]     Order Specific Question:    Medical Necessity:     Answer:   Medically Urgent [101]       Follow up as scheduled. Return sooner for concerns or questions            He expressed verbal understanding and agreed with the plan           Medication List           Accurate as of 1/17/19 10:14 PM. If you have any questions, ask your nurse or doctor.               CHANGE how you take these medications    lisinopril 2.5 MG tablet  Commonly known as:  PRINIVIL,ZESTRIL  Take 4 tablets (10 mg total) by mouth once daily.  What changed:  additional instructions        CONTINUE taking these medications    methIMAzole 10 MG Tab  Commonly known as:  TAPAZOLE  Take 2 tablets (20 mg total) by mouth once daily.     metoprolol succinate 100 MG 24 hr tablet  Commonly known as:  TOPROL-XL  Take 1 tablet (100 mg total) by mouth once daily.     torsemide 20 MG Tab  Commonly known as:  DEMADEX  TAKE 1 TABLET (20 MG TOTAL) BY MOUTH ONCE DAILY.     VICODIN ORAL     warfarin 10 MG tablet  Commonly known as:  COUMADIN  Take 1.5-2 tablets (15-20 mg total) by mouth once daily.        STOP taking these medications    azelastine 137 mcg (0.1 %) nasal spray  Commonly known as:  ASTELIN  Stopped by:  Gomez Lantigua MD     azithromycin 250 MG tablet  Commonly known as:  Z-CLARA  Stopped by:  Gomez Lantigua MD           Where to Get Your Medications      These medications were sent to [facility]/pharmacy #[id number] - [place] - [address] AT [facility]  [address] [place] [address]    Phone:  [phone]   · methIMAzole 10 MG Tab              Addendum 1/29/2019        US result       There is no evidence of hemodynamically significant venous reflux (reflux lasting greater than 500 ms) in either right  greater or lesser saphenous vein.  No reflux seen within the left lesser saphenous vein.    Reflux noted within accessory veins left lateral ankle region.    Status post left EVLT    There is no evidence of bilateral lower extremity deep venous  thrombosis.        He will benefit from sclerotherapy of large accessory of lateral aspect of foot causing persistent ulcer from venous hypertension        Orders will be placed

## 2019-01-21 ENCOUNTER — OFFICE VISIT (OUTPATIENT)
Dept: TRANSPLANT | Facility: CLINIC | Age: 54
End: 2019-01-21
Payer: MEDICARE

## 2019-01-21 ENCOUNTER — ANTI-COAG VISIT (OUTPATIENT)
Dept: CARDIOLOGY | Facility: CLINIC | Age: 54
End: 2019-01-21
Payer: MEDICARE

## 2019-01-21 VITALS
SYSTOLIC BLOOD PRESSURE: 126 MMHG | HEART RATE: 94 BPM | BODY MASS INDEX: 36.45 KG/M2 | HEIGHT: 78 IN | DIASTOLIC BLOOD PRESSURE: 71 MMHG | OXYGEN SATURATION: 96 % | WEIGHT: 315 LBS

## 2019-01-21 DIAGNOSIS — Z79.01 LONG TERM (CURRENT) USE OF ANTICOAGULANTS: ICD-10-CM

## 2019-01-21 DIAGNOSIS — I50.22 CHRONIC SYSTOLIC HEART FAILURE: ICD-10-CM

## 2019-01-21 DIAGNOSIS — I50.42 CHRONIC COMBINED SYSTOLIC AND DIASTOLIC CONGESTIVE HEART FAILURE: ICD-10-CM

## 2019-01-21 DIAGNOSIS — L97.329 NON-PRESSURE CHRONIC ULCER OF LEFT ANKLE, WITH UNSPECIFIED SEVERITY: ICD-10-CM

## 2019-01-21 DIAGNOSIS — I87.1 MAY-THURNER SYNDROME: ICD-10-CM

## 2019-01-21 DIAGNOSIS — I26.99 OTHER PULMONARY EMBOLISM WITHOUT ACUTE COR PULMONALE, UNSPECIFIED CHRONICITY: ICD-10-CM

## 2019-01-21 DIAGNOSIS — Z79.01 LONG TERM (CURRENT) USE OF ANTICOAGULANTS: Primary | ICD-10-CM

## 2019-01-21 LAB — INR PPP: 2.6 (ref 2–3)

## 2019-01-21 PROCEDURE — 99999 PR PBB SHADOW E&M-EST. PATIENT-LVL III: ICD-10-PCS | Mod: PBBFAC,,, | Performed by: INTERNAL MEDICINE

## 2019-01-21 PROCEDURE — 99215 OFFICE O/P EST HI 40 MIN: CPT | Mod: S$PBB,,, | Performed by: INTERNAL MEDICINE

## 2019-01-21 PROCEDURE — 99211 OFF/OP EST MAY X REQ PHY/QHP: CPT | Mod: S$PBB,25,, | Performed by: PHARMACIST

## 2019-01-21 PROCEDURE — 99213 OFFICE O/P EST LOW 20 MIN: CPT | Mod: PBBFAC | Performed by: INTERNAL MEDICINE

## 2019-01-21 PROCEDURE — 99999 PR PBB SHADOW E&M-EST. PATIENT-LVL III: CPT | Mod: PBBFAC,,, | Performed by: INTERNAL MEDICINE

## 2019-01-21 PROCEDURE — 99215 PR OFFICE/OUTPT VISIT, EST, LEVL V, 40-54 MIN: ICD-10-PCS | Mod: S$PBB,,, | Performed by: INTERNAL MEDICINE

## 2019-01-21 PROCEDURE — 85610 PROTHROMBIN TIME: CPT | Mod: PBBFAC

## 2019-01-21 PROCEDURE — 99211 PR OFFICE/OUTPT VISIT, EST, LEVL I: ICD-10-PCS | Mod: S$PBB,25,, | Performed by: PHARMACIST

## 2019-01-21 RX ORDER — NEOMYCIN SULFATE, POLYMYXIN B SULFATE, AND DEXAMETHASONE 3.5; 10000; 1 MG/G; [USP'U]/G; MG/G
OINTMENT OPHTHALMIC
COMMUNITY
Start: 2019-01-18 | End: 2019-05-14

## 2019-01-21 RX ORDER — ACETAZOLAMIDE 250 MG/1
TABLET ORAL
COMMUNITY
Start: 2019-01-18 | End: 2019-04-30

## 2019-01-21 RX ORDER — OFLOXACIN 3 MG/ML
SOLUTION/ DROPS OPHTHALMIC
COMMUNITY
Start: 2019-01-18 | End: 2019-04-30

## 2019-01-21 NOTE — PROGRESS NOTES
Subjective:    Patient ID:  Drew Farrar is a 53 y.o. male who presents for evaluation of Congestive Heart Failure (4 mos. f/u)      Congestive Heart Failure   Pertinent negatives include no abdominal pain, chest pain, chills, coughing, diaphoresis, fever or weakness.   Shortness of Breath   Pertinent negatives include no abdominal pain, chest pain, claudication, fever, hemoptysis, leg swelling, orthopnea, sputum production or wheezing.     Hospital Course:     M with PMHx of persistent afib on coumadin, patent PDA s/p surgical closure 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 of age, May Thurner syndrome with h/o multiple PEs and DVTs s/p IVC filter, chronic systolic congestive heart failure (EF 30%), hyperthyroid, atrial fibrillation here for follow up. Since last visit he is stable  FRAIRE FC II-III NYHA.       Review of Systems   Constitution: Negative for chills, decreased appetite, diaphoresis, fever, weakness, malaise/fatigue, night sweats, weight gain and weight loss.   HENT: Negative.    Eyes: Negative.    Cardiovascular: Positive for dyspnea on exertion and irregular heartbeat. Negative for chest pain, claudication, cyanosis, leg swelling, near-syncope, orthopnea and palpitations.   Respiratory: Positive for shortness of breath. Negative for cough, hemoptysis, sleep disturbances due to breathing, snoring, sputum production and wheezing.    Endocrine: Negative.    Hematologic/Lymphatic: Negative.    Skin: Negative.    Musculoskeletal: Negative.    Gastrointestinal: Negative for abdominal pain and diarrhea.   Genitourinary: Negative.    Neurological: Negative.    Psychiatric/Behavioral: Negative.         Objective:    Physical Exam   Constitutional: He is oriented to person, place, and time. He appears well-developed and well-nourished.   HENT:   Head: Normocephalic and atraumatic.   Eyes: Conjunctivae and EOM are normal. Pupils are equal, round, and reactive to light.   Neck: Normal range of motion. Neck supple. No JVD present.    Cardiovascular: Normal rate. An irregularly irregular rhythm present. Exam reveals no gallop and no friction rub.   Murmur heard.  High-pitched blowing holosystolic murmur is present with a grade of 2/6 at the apex.  Pulmonary/Chest: Breath sounds normal. No respiratory distress. He has no wheezes. He has no rales. He exhibits no tenderness.   Abdominal: Soft. Bowel sounds are normal. He exhibits no distension and no mass. There is no hepatosplenomegaly, splenomegaly or hepatomegaly. There is no tenderness. There is no rebound, no guarding and no CVA tenderness.   No hepatoslenomegaly   Musculoskeletal: Normal range of motion. He exhibits edema (+4). He exhibits no tenderness.   Neurological: He is alert and oriented to person, place, and time. He has normal reflexes. No cranial nerve deficit. He exhibits normal muscle tone. Coordination normal.   Skin: Skin is warm and dry.   Psychiatric: He has a normal mood and affect.         Assessment:       1. Chronic venous hypertension with ulcer involving left side    2. Non-pressure chronic ulcer of left ankle, with unspecified severity    3. Chronic combined systolic and diastolic congestive heart failure    4. Chronic systolic heart failure    5. Long term (current) use of anticoagulants    6. May-Thurner syndrome         Plan:       Mr. Farrar is a 52 year-old with patent PDA s/p surgical closure 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 of age, May Thurner syndrome with multple leg/pelvic DVTs with pulmonary emboli, chronic systolic congestive heart failure (EF 40-45%), permanent atrial fibrillation since his 30s, and hyperthyroidism      Stable form cardiac standpoint    RTC 4 months

## 2019-01-21 NOTE — PROGRESS NOTES
Quick follow up for elevated INR on 1/14. INR within therapeutic range today. Patient states that he is going to have eye surgery on 1/30 and 2/13  and will need to hold coumadin X 3 days for each procedure. We will resume his current dose. Start coumadin hold on 1/27 and follow up on 1/28 for further instructions. Patient states that he understands instructions given to him today. Patient was re-educated on situations that would require placing a call to the Coumadin Clinic, including bleeding or unusual bruising issues, changes in health, diet or medications,upcoming procedures that require warfarin interruption, and missed Coumadin dose(s). Patient expressed understanding that avoidance of consistency with these parameters could cause fluctuations in INR, leading to more frequent visits and increase risk of adverse events. Care plan made with J Cordaro Pharm D.

## 2019-01-22 ENCOUNTER — HOSPITAL ENCOUNTER (OUTPATIENT)
Dept: RADIOLOGY | Facility: HOSPITAL | Age: 54
Discharge: HOME | End: 2019-01-22
Attending: INTERNAL MEDICINE
Payer: MEDICARE

## 2019-01-22 DIAGNOSIS — I87.2 VENOUS INSUFFICIENCY OF LOWER EXTREMITY, UNSPECIFIED LATERALITY: ICD-10-CM

## 2019-01-22 PROCEDURE — 93970 EXTREMITY STUDY: CPT | Mod: 26,,, | Performed by: RADIOLOGY

## 2019-01-22 PROCEDURE — 93970 US LOWER EXTREMITY VEINS BILATERAL INSUFFICIENCY: ICD-10-PCS | Mod: 26,,, | Performed by: RADIOLOGY

## 2019-01-22 PROCEDURE — 93970 EXTREMITY STUDY: CPT | Mod: TC

## 2019-01-23 ENCOUNTER — PATIENT MESSAGE (OUTPATIENT)
Dept: CARDIOLOGY | Facility: CLINIC | Age: 54
End: 2019-01-23

## 2019-01-23 NOTE — PROGRESS NOTES
He has a hx of recurrent PE, recurrent DVT, has a hypercoag state and is CHADs = 4 (HTN, CHF, stroke).  Therefore, he cannot hold his coumadin without a lovenox bridge. We will give him specific instructions on coumadin and lovenox at next week's appointment.

## 2019-01-28 NOTE — PROGRESS NOTES
Patient missed appointment today. HE was unable to go to lab as well. Patient states that he will go to lab tomorrow. Patient has surgery on 1/30 and will continue to hold until further instructions. We will have to bring him with lovenox post procedure. Labs ordered for tomorrow.

## 2019-01-29 ENCOUNTER — APPOINTMENT (OUTPATIENT)
Dept: LAB | Facility: HOSPITAL | Age: 54
End: 2019-01-29
Attending: INTERNAL MEDICINE
Payer: MEDICARE

## 2019-01-29 ENCOUNTER — OFFICE VISIT (OUTPATIENT)
Dept: FAMILY MEDICINE | Facility: HOSPITAL | Age: 54
End: 2019-01-29
Attending: FAMILY MEDICINE
Payer: MEDICARE

## 2019-01-29 ENCOUNTER — LAB VISIT (OUTPATIENT)
Dept: LAB | Facility: HOSPITAL | Age: 54
End: 2019-01-29
Attending: INTERNAL MEDICINE

## 2019-01-29 VITALS
BODY MASS INDEX: 36.45 KG/M2 | HEIGHT: 78 IN | WEIGHT: 315 LBS | DIASTOLIC BLOOD PRESSURE: 53 MMHG | RESPIRATION RATE: 20 BRPM | HEART RATE: 85 BPM | SYSTOLIC BLOOD PRESSURE: 132 MMHG

## 2019-01-29 DIAGNOSIS — I48.21 PERMANENT ATRIAL FIBRILLATION: ICD-10-CM

## 2019-01-29 DIAGNOSIS — Z01.818 PREOPERATIVE CLEARANCE: Primary | ICD-10-CM

## 2019-01-29 DIAGNOSIS — I10 ESSENTIAL HYPERTENSION: ICD-10-CM

## 2019-01-29 DIAGNOSIS — Z79.01 LONG TERM (CURRENT) USE OF ANTICOAGULANTS: ICD-10-CM

## 2019-01-29 DIAGNOSIS — I50.42 CHRONIC COMBINED SYSTOLIC AND DIASTOLIC CONGESTIVE HEART FAILURE: ICD-10-CM

## 2019-01-29 DIAGNOSIS — I26.99 OTHER PULMONARY EMBOLISM WITHOUT ACUTE COR PULMONALE, UNSPECIFIED CHRONICITY: ICD-10-CM

## 2019-01-29 LAB
CREAT SERPL-MCNC: 1.1 MG/DL
EST. GFR  (AFRICAN AMERICAN): >60 ML/MIN/1.73 M^2
EST. GFR  (NON AFRICAN AMERICAN): >60 ML/MIN/1.73 M^2
INR PPP: 1.7
PROTHROMBIN TIME: 17.4 SEC

## 2019-01-29 PROCEDURE — 82565 ASSAY OF CREATININE: CPT

## 2019-01-29 PROCEDURE — 85610 PROTHROMBIN TIME: CPT

## 2019-01-29 PROCEDURE — 36415 COLL VENOUS BLD VENIPUNCTURE: CPT | Mod: PO

## 2019-01-29 PROCEDURE — 99214 OFFICE O/P EST MOD 30 MIN: CPT | Performed by: FAMILY MEDICINE

## 2019-01-29 RX ORDER — DUREZOL 0.5 MG/ML
EMULSION OPHTHALMIC
COMMUNITY
Start: 2019-01-23 | End: 2019-04-30

## 2019-01-29 NOTE — PROGRESS NOTES
There is reflux in the veins of the left foot     We will schedule you for sclerotherapy       Thanks      Maribel

## 2019-01-30 ENCOUNTER — ANTI-COAG VISIT (OUTPATIENT)
Dept: CARDIOLOGY | Facility: CLINIC | Age: 54
End: 2019-01-30

## 2019-01-30 DIAGNOSIS — I26.99 OTHER PULMONARY EMBOLISM WITHOUT ACUTE COR PULMONALE, UNSPECIFIED CHRONICITY: ICD-10-CM

## 2019-01-30 DIAGNOSIS — Z79.01 LONG TERM (CURRENT) USE OF ANTICOAGULANTS: ICD-10-CM

## 2019-01-30 NOTE — PROGRESS NOTES
I have reviewed the notes, assessments, and/or procedures performed by Dr. clinton, I concur with her/his documentation of Drew Farrar.

## 2019-02-04 NOTE — PROGRESS NOTES
INR from 1/29 not able to be addressed. Patient was not able to be reached and did not return calls. Current INR needed to address and plan for next procedure. Book in CC asap

## 2019-02-08 ENCOUNTER — HOSPITAL ENCOUNTER (EMERGENCY)
Facility: HOSPITAL | Age: 54
Discharge: HOME | End: 2019-02-08
Attending: EMERGENCY MEDICINE
Payer: MEDICARE

## 2019-02-08 VITALS
BODY MASS INDEX: 36.45 KG/M2 | TEMPERATURE: 98 F | OXYGEN SATURATION: 98 % | RESPIRATION RATE: 20 BRPM | WEIGHT: 315 LBS | SYSTOLIC BLOOD PRESSURE: 122 MMHG | DIASTOLIC BLOOD PRESSURE: 80 MMHG | HEART RATE: 80 BPM | HEIGHT: 78 IN

## 2019-02-08 DIAGNOSIS — M54.50 ACUTE EXACERBATION OF CHRONIC LOW BACK PAIN: Primary | ICD-10-CM

## 2019-02-08 DIAGNOSIS — L97.529 ULCER OF LEFT FOOT, UNSPECIFIED ULCER STAGE: ICD-10-CM

## 2019-02-08 DIAGNOSIS — G89.29 ACUTE EXACERBATION OF CHRONIC LOW BACK PAIN: Primary | ICD-10-CM

## 2019-02-08 DIAGNOSIS — M54.9 BACK PAIN: ICD-10-CM

## 2019-02-08 LAB
INR PPP: 0.9
PROTHROMBIN TIME: 9.7 SEC

## 2019-02-08 PROCEDURE — 25000003 PHARM REV CODE 250: Performed by: NURSE PRACTITIONER

## 2019-02-08 PROCEDURE — 85610 PROTHROMBIN TIME: CPT

## 2019-02-08 PROCEDURE — 99284 EMERGENCY DEPT VISIT MOD MDM: CPT | Mod: 25

## 2019-02-08 RX ORDER — METHOCARBAMOL 750 MG/1
1500 TABLET, FILM COATED ORAL
Status: COMPLETED | OUTPATIENT
Start: 2019-02-08 | End: 2019-02-08

## 2019-02-08 RX ORDER — OXYCODONE AND ACETAMINOPHEN 7.5; 325 MG/1; MG/1
1 TABLET ORAL EVERY 4 HOURS PRN
Status: DISCONTINUED | OUTPATIENT
Start: 2019-02-08 | End: 2019-02-08 | Stop reason: HOSPADM

## 2019-02-08 RX ORDER — HYDROCODONE BITARTRATE AND ACETAMINOPHEN 5; 325 MG/1; MG/1
1 TABLET ORAL EVERY 6 HOURS PRN
Qty: 10 TABLET | Refills: 0 | Status: SHIPPED | OUTPATIENT
Start: 2019-02-08 | End: 2019-12-09

## 2019-02-08 RX ADMIN — OXYCODONE HYDROCHLORIDE AND ACETAMINOPHEN 1 TABLET: 7.5; 325 TABLET ORAL at 03:02

## 2019-02-08 RX ADMIN — METHOCARBAMOL TABLETS 1500 MG: 750 TABLET, COATED ORAL at 03:02

## 2019-02-08 NOTE — ED NOTES
53 year old male presents to ed via ems cc of back pain. Patient reports was trying to get out of bath tub when back gave out on him denies hitting head no loc. Patient states hx of back pain.

## 2019-02-08 NOTE — ED NOTES
APPEARANCE: Alert, oriented and in no acute distress.  CARDIAC: Normal rate and rhythm, no murmur heard.   PERIPHERAL VASCULAR: peripheral pulses present. Normal cap refill. No edema. Warm to touch.    RESPIRATORY:Normal rate and effort, breath sounds clear bilaterally throughout chest. Respirations are equal and unlabored no obvious signs of distress.  GASTRO: soft, bowel sounds normal, no tenderness, patient is morbidly obese denies any gastro symptoms   NEURO: 5/5 strength major flexors/extensors bilaterally. Sensory intact to light touch bilaterally. [place] coma scale: eyes open spontaneously-4, oriented & converses-5, obeys commands-6. No neurological abnormalities.   MENTAL STATUS: awake, alert and aware of environment.  EYE: PERRL, both eyes: pupils brisk and reactive to light. Normal size.  ENT: EARS: no obvious drainage. NOSE: no active bleeding.

## 2019-02-08 NOTE — ED PROVIDER NOTES
Encounter Date: 2/8/2019       History     Chief Complaint   Patient presents with    Back Pain     chronic back pain patient arrived ej ems. ems states patient was taking a bath tried to get out tub and couldnt get out of tub.      53-year-old male presents the ED via EMS for back pain. Patient reports history of chronic back pain. He was previously prescribed Vicodin, but he has been out for about a week.  He reports that his PCP is no longer prescribing pain medication for him.  Today he got in the bathtub but reports that he was unable to get out due to back pain. He reports lumbar back pain. No trauma, fever/chills, weakness, numbness/tingling, abdominal pain, loss of bowel or bladder control, saddle anesthesia, or leg weakness. Patient reports pain is worse with movement.  Patient reports this pain feels similar to his chronic back pain. No other complaints at this time.      The history is provided by the patient.   Back Pain    This is a chronic problem. The problem occurs constantly. The problem has been unchanged. The pain is associated with no known injury. The pain is the same all the time. Pertinent negatives include no chest pain, no fever, no numbness, no abdominal pain, no bowel incontinence, no perianal numbness, no bladder incontinence, no dysuria, no pelvic pain, no leg pain, no paresthesias, no paresis, no tingling and no weakness. He has tried nothing for the symptoms. Risk factors include obesity and lack of exercise.     Review of patient's allergies indicates:   Allergen Reactions    Food allergy formula [glutamine-c-quercet-selen-brom]      Allergic to green peas; Heart failure.    Peas Hives    Ibuprofen Swelling    Latex, natural rubber Hives    Pcn [penicillins] Hives    Butisol [butabarbital] Rash     Peeling skin     Past Medical History:   Diagnosis Date    *Atrial fibrillation     Atrial fibrillation     Atrial fibrillation Feb 23, 2016    Bipolar disorder     Congenital  heart disease     s/p surgical intervention 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 of age    Deep vein thrombosis     DVT of leg (deep venous thrombosis)     left leg    History of prior ablation treatment     10/9/13    Hypertension     Obesity     Stroke     Thyroid disease     Venous stasis ulcer of lower extremity, unspecified laterality 12/14/2012    Venous ulcer      Past Surgical History:   Procedure Laterality Date    ANGIOPLASTY      ARTHROSCOPY-KNEE W/ CHONDROPLASTY Right 2/23/2016    Performed by [facility]ue[facility] at [facility] OR    CARDIAC SURGERY      open heart surgery 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 old    EYE SURGERY      left eye cataract/right eye glaucoma    TIMO FILTER PLACEMENT      Dr Calix ([facility])    KNEE SURGERY      l and r     MULTIPLE TOOTH EXTRACTIONS      SKIN GRAFT      left leg    SYNOVECTOMY-KNEE Right 2/23/2016    Performed by [facility] at [facility] OR     Family History   Problem Relation Age of Onset    Diabetes Father     Heart disease Father     Heart disease Maternal Grandmother     Diabetes Maternal Grandfather     Heart disease Maternal Grandfather     Stroke Maternal Grandfather      Social History     Tobacco Use    Smoking status: Former Smoker     Packs/day: 1.00     Years: 6.00     Pack years: 6.00     Types: Cigarettes    Smokeless tobacco: Former User    Tobacco comment: quit by age 27yrs old   Substance Use Topics    Alcohol use: Yes     Alcohol/week: 0.6 oz     Types: 1 Glasses of wine per week     Comment: 1 glass of wine a week    Drug use: No     Review of Systems   Constitutional: Positive for activity change. Negative for fever.   HENT: Negative for congestion.    Respiratory: Negative for cough and shortness of breath.    Cardiovascular: Negative for chest pain.   Gastrointestinal: Negative for abdominal pain, bowel incontinence, diarrhea and vomiting.   Genitourinary: Negative for bladder incontinence, difficulty urinating, dysuria and pelvic pain.   Musculoskeletal:  Positive for back pain. Negative for gait problem.   Skin: Positive for wound (left foot wound- chronic).   Neurological: Negative for tingling, weakness, numbness and paresthesias.   Hematological: Bruises/bleeds easily.   Psychiatric/Behavioral: Negative for confusion.       Physical Exam     Initial Vitals [02/08/19 1431]   BP Pulse Resp Temp SpO2   (!) 117/58 70 20 98.1 °F (36.7 °C) 98 %      MAP       --         Physical Exam    Nursing note and vitals reviewed.  Constitutional: Vital signs are normal. He appears well-developed and well-nourished. He is Obese . He is active and cooperative. He is easily aroused.  Non-toxic appearance. He does not have a sickly appearance. He does not appear ill. No distress.   HENT:   Head: Normocephalic and atraumatic.   Eyes: Conjunctivae are normal.   Neck: Normal range of motion.   Cardiovascular: Normal rate, regular rhythm and normal heart sounds.   Pulses:       Dorsalis pedis pulses are 2+ on the right side, and 2+ on the left side.   Pulmonary/Chest: Effort normal and breath sounds normal.   Abdominal: Soft. Normal appearance and bowel sounds are normal. There is no tenderness.   Neurological: He is alert, oriented to person, place, and time and easily aroused. He has normal strength. No sensory deficit. GCS eye subscore is 4. GCS verbal subscore is 5. GCS motor subscore is 6.   SLR positive on right  5/5 strength bilateral hips, knees, and ankles.    Skin: Skin is warm, dry and intact. Capillary refill takes less than 2 seconds. No rash noted.   Venous stasis changes to bilateral lower legs.    Venous ulcer to left lateral foot without drainage.   Psychiatric: He has a normal mood and affect. His speech is normal and behavior is normal. Judgment and thought content normal. Cognition and memory are normal.         ED Course   Procedures  Labs Reviewed - No data to display       Imaging Results          X-Ray Lumbar Spine Ap And Lateral (Final result)  Result time  02/08/19 15:37:22    Final result by Stan Kaufman Jr., MD (02/08/19 15:37:22)                 Impression:      No significant abnormality identified though the lateral is under penetrated.      Electronically signed by: Stan Kaufman MD  Date:    02/08/2019  Time:    15:37             Narrative:    EXAMINATION:  XR LUMBAR SPINE AP AND LATERAL    CLINICAL HISTORY:  low back pain;Dorsalgia, unspecified    TECHNIQUE:  AP, lateral and spot images were performed of the lumbar spine.    COMPARISON:  June 25, 2018.    FINDINGS:  Bones are demineralized.  There appears to be facet arthropathy particularly lower lumbar spine.  Vertebral body heights disc spaces and alignment is satisfactory.  Radiographs are under penetrated.  Venous vascular stents and IVC filter noted.                                 Medical Decision Making:   Initial Assessment:   53-year-old male past medical history of chronic back pain is here for an exacerbation of back pain.  No trauma. No red flag signs or symptoms.  He is out of his vicodin. The patient has positive SLR on right.  He has a venous ulcer on his left lateral foot without signs of infection.  Differential Diagnosis:   Chronic back pain, strain, sprain, spasm, discogenic pain, lumbar radiculopathy, venous ulcer, cellulitis  Clinical Tests:   Radiological Study: Ordered and Reviewed  ED Management:  X-ray, p.o. pain medications  X-rays negative for acute change.  Pt has no signs of cellulitis.  His foot ulcer was cleaned and dressed in the ED.  Advised f/u with podiatry within one week.      Pt reports improvement of back pain in the ED.  He is ambulatory.      Drew Farrar presents with  nontraumatic back pain secondary to chronic back pain.  No red flags on presentation or physical exam. Unlikely to be spinal stenosis as there are no neurologic findings, does not appear to be infectious given no fever, no point tenderness, coulg be DJD or disc herniation but xray normal and no  red flags that would prompt further imaging. Likely exacerbation of chronic pain. I will d/c home with [place]. I did review narcotic prescription precautions.  INR low, advised calling his provider tomorrow for dose adjustment.  An Epic message was sent to his pharmacist, , regarding his INR.  Pt states that he is supposed to stop coumadin this week for cataract surgery. Will instruct pt to follow up with PCP in one week if symptoms do not improve. We disscused at length warning signs for return including but not limited to increased pain, change in gait, sensation changes around the rectum or perineum, bowel or bladder issues, fever and the pt understands. The pt is comfortable going home at this time.     After taking into careful account the historical factors and physical exam findings of the patient's presentation today, in conjunction with the empirical and objective data obtained on ED workup, no acute emergent medical condition requiring admission has been identified. The patient appears to be low risk for an emergent medical condition and I feel it is safe and appropriate at this time for the patient to be discharged to follow-up as detailed in their discharge instructions for reevaluation and possible continued outpatient workup and management. I have discussed the specifics of the workup with the patient and the patient has verbalized understanding of the details of the workup, the diagnosis, the treatment plan, and the need for outpatient follow-up.  Although the patient has no emergent etiology today this does not preclude the development of an emergent condition so in addition, I have advised the patient that they can return to the ED and/or activate EMS at any time with worsening of their symptoms, change of their symptoms, or with any other medical complaint.  The patient remained comfortable and stable during their visit in the ED.  Discharge and follow-up instructions discussed with the  patient who expressed understanding and willingness to comply with my recommendations.       This medical record was prepared using voice dictation software. There may be phonetic errors.                        Clinical Impression:   The primary encounter diagnosis was Acute exacerbation of chronic low back pain. Diagnoses of Back pain and Ulcer of left foot, unspecified ulcer stage were also pertinent to this visit.                             PAZ Bañuelos  02/08/19 1820       PAZ Bañuelos  02/08/19 1822

## 2019-02-11 ENCOUNTER — ANTI-COAG VISIT (OUTPATIENT)
Dept: CARDIOLOGY | Facility: CLINIC | Age: 54
End: 2019-02-11

## 2019-02-11 DIAGNOSIS — I26.99 OTHER PULMONARY EMBOLISM WITHOUT ACUTE COR PULMONALE, UNSPECIFIED CHRONICITY: ICD-10-CM

## 2019-02-11 DIAGNOSIS — Z79.01 LONG TERM (CURRENT) USE OF ANTICOAGULANTS: Primary | ICD-10-CM

## 2019-02-13 RX ORDER — ENOXAPARIN SODIUM 150 MG/ML
150 INJECTION SUBCUTANEOUS EVERY 12 HOURS
Qty: 20 SYRINGE | Refills: 0 | Status: SHIPPED | OUTPATIENT
Start: 2019-02-13 | End: 2019-04-30

## 2019-02-13 NOTE — PROGRESS NOTES
INR baseline and from ER 2/8. patient has been unreachable for the past 2 weeks until today (2/13). He should have been on lovenox bridging as noted in clearance notes. He reports holding his coumadin for eye surgery today and will be starting it back tomorrow. He will also need to start on lovenox. Patient does not need to hold coumadin for cataract surgeries

## 2019-02-15 ENCOUNTER — HOSPITAL ENCOUNTER (OUTPATIENT)
Dept: WOUND CARE | Facility: HOSPITAL | Age: 54
Discharge: HOME | End: 2019-02-15
Attending: EMERGENCY MEDICINE
Payer: MEDICARE

## 2019-02-15 VITALS
SYSTOLIC BLOOD PRESSURE: 139 MMHG | WEIGHT: 315 LBS | DIASTOLIC BLOOD PRESSURE: 57 MMHG | HEART RATE: 57 BPM | HEIGHT: 78 IN | BODY MASS INDEX: 36.45 KG/M2

## 2019-02-15 DIAGNOSIS — L97.909 VENOUS STASIS ULCER OF LOWER EXTREMITY, UNSPECIFIED LATERALITY: Primary | ICD-10-CM

## 2019-02-15 DIAGNOSIS — R60.9 EDEMA, UNSPECIFIED TYPE: ICD-10-CM

## 2019-02-15 DIAGNOSIS — I83.009 VENOUS STASIS ULCER OF LOWER EXTREMITY, UNSPECIFIED LATERALITY: Primary | ICD-10-CM

## 2019-02-15 PROCEDURE — 29581 APPL MULTLAYER CMPRN SYS LEG: CPT

## 2019-02-15 PROCEDURE — 99213 OFFICE O/P EST LOW 20 MIN: CPT | Mod: 25

## 2019-02-15 NOTE — PROGRESS NOTES
Subjective:       Patient ID: Drew Farrar is a 53 y.o. male.    Chief Complaint: Wound Consult (readmit)    2/15/19: Readmitted for venous ulcer to left lateral foot which developed about 2 weeks ago. Pulses noted with doppler and capillary refill <3.Dr Gutierrez assessed patient and wound care plan ordered for compression therapy and hydrafera blue to wound bed. No apparent signs of infection noted. Patient to follw-up in clinic in 2 weeks.DB     No c/o noted.  Review of Systems    Objective:    Wound as noted w/o s/s of infection.  Physical Exam    Assessment:       1. Venous stasis ulcer of lower extremity, unspecified laterality    2. Edema, unspecified type           Wound 02/15/19 1000 Venous Ulcer lateral Foot #1 (Active)   02/15/19 1000    Pre-existing: No   Primary Wound Type: Venous ulcer   Side: Left   Orientation: lateral   Location: Foot   Wound/PI Number (optional): #1   Ankle-Brachial Index:    Pulses:  doppler pulses, capillary refill<3   Removal Indication and Assessment:    Wound Outcome:    (Retired) Wound Type:    (Retired) Wound Length (cm):    (Retired) Wound Width (cm):    (Retired) Depth (cm):    Wound Description (Comments):    Removal Indications:    Wound Image   2/15/2019 11:27 AM   Dressing Appearance Open to air 2/15/2019 11:06 AM   Drainage Amount Moderate 2/15/2019 11:06 AM   Drainage Characteristics/Odor Serosanguineous 2/15/2019 11:06 AM   Appearance Red;Yellow 2/15/2019 11:06 AM   Tissue loss description Full thickness 2/15/2019 11:06 AM   Red (%), Wound Tissue Color 95 % 2/15/2019 11:06 AM   Yellow (%), Wound Tissue Color 5 % 2/15/2019 11:06 AM   Periwound Area Intact;Dry;Hemosiderin Staining 2/15/2019 11:06 AM   Wound Edges Defined 2/15/2019 11:06 AM   Wound Length (cm) 1.2 cm 2/15/2019 11:06 AM   Wound Width (cm) 1.9 cm 2/15/2019 11:06 AM   Wound Depth (cm) 0.2 cm 2/15/2019 11:06 AM   Wound Volume (cm^3) 0.46 cm^3 2/15/2019 11:06 AM   Wound Surface Area (cm^2) 2.28 cm^2  2/15/2019 11:06 AM   Care Cleansed with:;Sterile normal saline;Applied:;Moisturizing agent 2/15/2019 11:06 AM   Dressing Applied;Foam;Compression wrap;Other (see comments) 2/15/2019 11:06 AM   Periwound Care Moisturizer applied 2/15/2019 11:06 AM   Compression Four layer compression 2/15/2019 11:06 AM   Off Loading Off loading shoe 2/15/2019 11:06 AM   Dressing Change Due 03/01/19 2/15/2019 11:06 AM          Cleanse wound with:NS  Lidocaine: PRN     Periwound care:Moisture barrier PRN  Primary dressing:Hydrafera blue classic moistened with Normal Saline.  Secondary dressing:Cover with adaptic and plain foam(taina).  Offloading:Foam and Darco shoe  Edema control: Profore toes to knee  Frequency:Weekly  Follow-up:2 weeks  Patient instructed on wound care at home for week not seen in clinic, Voiced understanding.  Plan:                Follow-up in about 2 weeks (around 3/1/2019).

## 2019-02-18 ENCOUNTER — ANTI-COAG VISIT (OUTPATIENT)
Dept: CARDIOLOGY | Facility: CLINIC | Age: 54
End: 2019-02-18
Payer: MEDICARE

## 2019-02-18 DIAGNOSIS — Z79.01 LONG TERM (CURRENT) USE OF ANTICOAGULANTS: Primary | ICD-10-CM

## 2019-02-18 DIAGNOSIS — I26.99 OTHER PULMONARY EMBOLISM WITHOUT ACUTE COR PULMONALE, UNSPECIFIED CHRONICITY: ICD-10-CM

## 2019-02-18 LAB — INR PPP: 2.7 (ref 2–3)

## 2019-02-18 PROCEDURE — 99211 OFF/OP EST MAY X REQ PHY/QHP: CPT | Mod: S$PBB,25,, | Performed by: INTERNAL MEDICINE

## 2019-02-18 PROCEDURE — 85610 PROTHROMBIN TIME: CPT | Mod: PBBFAC,PO

## 2019-02-18 PROCEDURE — 99211 PR OFFICE/OUTPT VISIT, EST, LEVL I: ICD-10-PCS | Mod: S$PBB,25,, | Performed by: INTERNAL MEDICINE

## 2019-02-18 NOTE — PROGRESS NOTES
INR within goal range today. Patient with bruises on arms and hands from use. Denies any bleeding. Patient was scheduled to hold and bridge for a procedure but found out it was not necessary for the laser eye surgery so he says he continued his usual warfarin dose. He does, however have a vascular procedure this week and says he was told by Dr. Roa to skip his warfarin the night prior. Will boost following and recheck INR in one week. Patient also says he has cataract surgery on 2/27 and thinks he is supposed to hold. We don't typically interrupt warfarin with those procedures so I will touch base with the ophthalmologist just to be sure.    Update - spoke to Dr. Echols's office ([phone]). Since patient did not hold for the last eye procedure he had she says it is ok to continue warfarin for the 2/27 procedure.

## 2019-02-21 ENCOUNTER — HOSPITAL ENCOUNTER (OUTPATIENT)
Facility: HOSPITAL | Age: 54
Discharge: STILL ADMITTED OR ADMINISTRATIVE | End: 2019-02-21
Attending: INTERNAL MEDICINE | Admitting: INTERNAL MEDICINE
Payer: MEDICARE

## 2019-02-21 VITALS
HEIGHT: 78 IN | TEMPERATURE: 98 F | BODY MASS INDEX: 36.45 KG/M2 | OXYGEN SATURATION: 96 % | WEIGHT: 315 LBS | DIASTOLIC BLOOD PRESSURE: 52 MMHG | HEART RATE: 71 BPM | SYSTOLIC BLOOD PRESSURE: 95 MMHG | RESPIRATION RATE: 20 BRPM

## 2019-02-21 DIAGNOSIS — R07.9 CHEST PAIN: ICD-10-CM

## 2019-02-21 DIAGNOSIS — I87.2 VENOUS INSUFFICIENCY OF LOWER EXTREMITY, UNSPECIFIED LATERALITY: ICD-10-CM

## 2019-02-21 DIAGNOSIS — I87.2 VENOUS (PERIPHERAL) INSUFFICIENCY: ICD-10-CM

## 2019-02-21 DIAGNOSIS — I83.009 VENOUS STASIS ULCER OF LOWER EXTREMITY, UNSPECIFIED LATERALITY: ICD-10-CM

## 2019-02-21 DIAGNOSIS — L97.329 NON-PRESSURE CHRONIC ULCER OF LEFT ANKLE, WITH UNSPECIFIED SEVERITY: ICD-10-CM

## 2019-02-21 DIAGNOSIS — L97.909 VENOUS STASIS ULCER OF LOWER EXTREMITY, UNSPECIFIED LATERALITY: ICD-10-CM

## 2019-02-21 PROCEDURE — 76942 ECHO GUIDE FOR BIOPSY: CPT | Performed by: INTERNAL MEDICINE

## 2019-02-21 PROCEDURE — 93005 ELECTROCARDIOGRAM TRACING: CPT | Mod: 59

## 2019-02-21 PROCEDURE — 76942 PR U/S GUIDANCE FOR NEEDLE GUIDANCE: ICD-10-PCS | Mod: 26,,, | Performed by: INTERNAL MEDICINE

## 2019-02-21 PROCEDURE — 36471 PR INJECTION THERAPY VEIN,MULT VEINS: ICD-10-PCS | Mod: LT,,, | Performed by: INTERNAL MEDICINE

## 2019-02-21 PROCEDURE — 25000003 PHARM REV CODE 250: Performed by: INTERNAL MEDICINE

## 2019-02-21 PROCEDURE — 76942 ECHO GUIDE FOR BIOPSY: CPT | Mod: 26,,, | Performed by: INTERNAL MEDICINE

## 2019-02-21 PROCEDURE — 36471 NJX SCLRSNT MLT INCMPTNT VN: CPT | Performed by: INTERNAL MEDICINE

## 2019-02-21 PROCEDURE — 63600175 PHARM REV CODE 636 W HCPCS: Performed by: INTERNAL MEDICINE

## 2019-02-21 PROCEDURE — 36471 NJX SCLRSNT MLT INCMPTNT VN: CPT | Mod: LT,,, | Performed by: INTERNAL MEDICINE

## 2019-02-21 RX ORDER — HYDROCODONE BITARTRATE AND ACETAMINOPHEN 7.5; 325 MG/1; MG/1
TABLET ORAL
Status: DISCONTINUED | OUTPATIENT
Start: 2019-02-21 | End: 2019-02-21 | Stop reason: HOSPADM

## 2019-02-21 RX ORDER — DIAZEPAM 5 MG/1
TABLET ORAL
Status: DISCONTINUED | OUTPATIENT
Start: 2019-02-21 | End: 2019-02-21 | Stop reason: HOSPADM

## 2019-02-21 RX ORDER — DIPHENHYDRAMINE HCL 25 MG
50 CAPSULE ORAL ONCE
Status: DISCONTINUED | OUTPATIENT
Start: 2019-02-21 | End: 2019-02-21

## 2019-02-21 RX ORDER — HYDROCODONE BITARTRATE AND ACETAMINOPHEN 7.5; 325 MG/1; MG/1
1 TABLET ORAL EVERY 6 HOURS PRN
Status: DISCONTINUED | OUTPATIENT
Start: 2019-02-21 | End: 2019-02-21 | Stop reason: HOSPADM

## 2019-02-21 RX ORDER — DIAZEPAM 5 MG/1
10 TABLET ORAL ONCE
Status: DISCONTINUED | OUTPATIENT
Start: 2019-02-21 | End: 2019-02-21 | Stop reason: HOSPADM

## 2019-02-21 RX ORDER — NITROGLYCERIN 0.4 MG/1
0.4 TABLET SUBLINGUAL EVERY 5 MIN PRN
Status: DISCONTINUED | OUTPATIENT
Start: 2019-02-21 | End: 2019-02-21 | Stop reason: HOSPADM

## 2019-02-21 RX ADMIN — NITROGLYCERIN 0.4 MG: 0.4 TABLET SUBLINGUAL at 12:02

## 2019-02-21 NOTE — NURSING
Pt being discharged per MD order. Vitals stable. Pt ambulated in unit with no problems and dressed self. Left lower leg site WNL, no swelling, bleeding, oozing, tenderness, or hematoma present. All discharge instructions given and pt verbalizes full understanding to all instructions and all questions answered. Pt was taken down to private car via wheelchair. Upon arrival to front of hospital pt grabbed his chest and stated he had 7/10 CP. MD Ndcorinnau notified immediately. Pt taken back up to cath pre/post and stat EKG order.

## 2019-02-21 NOTE — NURSING
MD Roa arrived at  and observed 12 lead EKG. Pt vitals stable but still complaining of 7/10 chest pain. Non-radiating. No SOB. MD ordered to take pt to ED to get work up. Pt taken down to ED. Mother notified via patient. Vitals stable. Chest pain unchanged. Pt with no other complaints.

## 2019-02-22 ENCOUNTER — HOSPITAL ENCOUNTER (OUTPATIENT)
Dept: WOUND CARE | Facility: HOSPITAL | Age: 54
Discharge: HOME | End: 2019-02-22
Attending: EMERGENCY MEDICINE
Payer: MEDICARE

## 2019-02-22 DIAGNOSIS — R60.0 BILATERAL EDEMA OF LOWER EXTREMITY: ICD-10-CM

## 2019-02-22 DIAGNOSIS — L97.909 VENOUS STASIS ULCER OF LOWER EXTREMITY, UNSPECIFIED LATERALITY: Primary | ICD-10-CM

## 2019-02-22 DIAGNOSIS — I83.009 VENOUS STASIS ULCER OF LOWER EXTREMITY, UNSPECIFIED LATERALITY: Primary | ICD-10-CM

## 2019-02-22 DIAGNOSIS — R60.9 EDEMA, UNSPECIFIED TYPE: ICD-10-CM

## 2019-02-22 PROCEDURE — 29581 APPL MULTLAYER CMPRN SYS LEG: CPT

## 2019-02-22 NOTE — PROGRESS NOTES
Subjective:       Patient ID: Drew Farrar is a 53 y.o. male.    Chief Complaint: Venous Ulcer and Wound Care    2/15/19: Readmitted for venous ulcer to left lateral foot which developed about 2 weeks ago. Pulses noted with doppler and capillary refill <3.Dr Gutierrez assessed patient and wound care plan ordered for compression therapy and hydrafera blue to wound bed. No apparent signs of infection noted. Patient to follw-up in clinic in 2 weeks.DB   2/21/19: Nurse visit for dressing change. Refused care to skin tear on left 2nd toe. See notes. RTC 1 week for  Visit.      Review of Systems    Objective:      Physical Exam    Assessment:       1. Venous stasis ulcer of lower extremity, unspecified laterality    2. Edema, unspecified type    3. Chronic venous hypertension with ulcer involving left side    4. Bilateral edema of lower extremity           Wound 02/15/19 1000 Venous Ulcer lateral Foot #1 (Active)   02/15/19 1000    Pre-existing: No   Primary Wound Type: Venous ulcer   Side: Left   Orientation: lateral   Location: Foot   Wound/PI Number (optional): #1   Ankle-Brachial Index:    Pulses:  doppler pulses, capillary refill<3   Removal Indication and Assessment:    Wound Outcome:    (Retired) Wound Type:    (Retired) Wound Length (cm):    (Retired) Wound Width (cm):    (Retired) Depth (cm):    Wound Description (Comments):    Removal Indications:    Dressing Appearance Open to air 2/22/2019 11:00 AM   Drainage Amount Moderate 2/22/2019 11:00 AM   Drainage Characteristics/Odor Serosanguineous;No odor 2/22/2019 11:00 AM   Appearance Red;Yellow;Fibrin;Slough;Granulating 2/22/2019 11:00 AM   Tissue loss description Full thickness 2/22/2019 11:00 AM   Red (%), Wound Tissue Color 95 % 2/22/2019 11:00 AM   Yellow (%), Wound Tissue Color 5 % 2/22/2019 11:00 AM   Periwound Area Intact;Dry 2/22/2019 11:00 AM   Wound Edges Defined 2/22/2019 11:00 AM   Wound Length (cm) 1.2 cm 2/22/2019 11:00 AM   Wound Width (cm) 1.9  cm 2/22/2019 11:00 AM   Wound Depth (cm) 0.2 cm 2/22/2019 11:00 AM   Wound Volume (cm^3) 0.46 cm^3 2/22/2019 11:00 AM   Wound Surface Area (cm^2) 2.28 cm^2 2/22/2019 11:00 AM   Care Cleansed with:;Sterile normal saline 2/22/2019 11:00 AM   Dressing Applied;Methylene blue/gentian violet;Non-adherent;Foam;Compression wrap 2/22/2019 11:00 AM   Periwound Care Dry periwound area maintained;Absorptive dressing applied 2/22/2019 11:00 AM   Compression Four layer compression 2/22/2019 11:00 AM   Dressing Change Due 03/01/19 2/22/2019 11:00 AM       Here for nurse visit. Arrived with skin tear to left second toe. Refused dressing. Cleansed with normal saline and wrapped loosely with 4x4 gauze. Wound care provided as ordered. Denies any pain or problems. Ambulating without cane. Mild edema. Wearing own shoe.    Cleanse wound with:NS  Lidocaine: PRN     Periwound care:Moisture barrier PRN  Primary dressing:Hydrafera blue classic moistened with Normal Saline.  Secondary dressing:Cover with adaptic and plain foam(taina).  Offloading:Foam and Darco shoe  Edema control: Profore toes to knee  Frequency:Weekly  Follow-up:3/1/19 Dr. Gutierrez    Plan:                Follow-up in about 7 days (around 3/1/2019) for Dr. Gutierrez.

## 2019-02-25 NOTE — PROGRESS NOTES
On 2/25/19 and 2/26/19, I left a voice message instructing the patient per PharmD he does not have to hold Coumadin for 2/27/19 eye procedure and reminded him he has a lab INR apptoinment today.    UPDATE:  2/27/19   He returned my call and was notified he does not have to hold Coumadin for eye procedure on 2/27/19 and he states he was started on long term use of Doxycycline 100 mg BID which he started on 2/18/19 and has 2 refills to be taken.  PharmD notified of new medication.

## 2019-02-27 ENCOUNTER — LAB VISIT (OUTPATIENT)
Dept: LAB | Facility: HOSPITAL | Age: 54
End: 2019-02-27
Attending: INTERNAL MEDICINE
Payer: MEDICARE

## 2019-02-27 ENCOUNTER — ANTI-COAG VISIT (OUTPATIENT)
Dept: CARDIOLOGY | Facility: CLINIC | Age: 54
End: 2019-02-27

## 2019-02-27 DIAGNOSIS — Z79.01 LONG TERM (CURRENT) USE OF ANTICOAGULANTS: ICD-10-CM

## 2019-02-27 DIAGNOSIS — I26.99 OTHER PULMONARY EMBOLISM WITHOUT ACUTE COR PULMONALE, UNSPECIFIED CHRONICITY: ICD-10-CM

## 2019-02-27 LAB
INR PPP: 5.8
PROTHROMBIN TIME: 58.8 SEC

## 2019-02-27 PROCEDURE — 85610 PROTHROMBIN TIME: CPT

## 2019-02-27 PROCEDURE — 36415 COLL VENOUS BLD VENIPUNCTURE: CPT

## 2019-02-27 NOTE — PROGRESS NOTES
Patient called to reschedule 2/25/19 missed appointment to today 2/27/19, also reports having cataract surgery today -2/27, reports he's taking Doxycycline since last week and has 2 more refills, verified coumadin: last Thursday -2/21 -15mg, 2/22 -15mg, 2/23 -15mg, 2/24 -20mg, 2/25-0mg, 2/26 -0mg

## 2019-02-27 NOTE — PROGRESS NOTES
INR very high. Patient reported a different dose to MA earlier. Both times he said he held dose Monday and Tue. Difficult to see how INR could be so high if he held 2 doses. Either he took too much coumadin this past week or possible lab error. There is a possible interaction with doxycyline but we do not expect this much of a change. Will adjust dose and repeat INR stat Friday AM.     Update: now pt says doxy d/c. No notes to reflect this. But dose plan stays the same for now

## 2019-02-27 NOTE — PROGRESS NOTES
Aubree with [facility] called with critical INR 5.8 drawn today.  I routed to Chely Deleon MA to question patient then advise per Pharm D orders.  I notified INGRID Mirza Pharm D of critical result.

## 2019-02-28 NOTE — H&P
Patient ID:  Drew Farrar is a 53 y.o. male who presents for follow up of Chronic Venous Insufficiency; Venous Ulcer; Atrial Fibrillation; and Congestive Heart Failure        HPI:         He Is here for L lateral foot venous ulcer treatment. He has history of VTE + PTS + lymphedema. He has CHF with a recent that revealed the following findings: EF 40%, Severe MR, Moderate TR, PASP 47, and Severe LAE. He has persistent afib with a controlled rate and anticoagulated with coumadin. He has an extensive history of venous insufficiency with ulcers that have finally healed but relapsed 2 months ago. He is s/p deep venous intervention, multiple EVLTs ad sclerotherapy sessions. He has h/o DVTs and PEs as well. He is compliant with his medications but not with his diet. He is on lisinopril, toprol xl, aldactone, and lasix with /60 mmHg.  PET stress was non ischemic.                           Patient Active Problem List     Diagnosis Date Noted    Non-pressure chronic ulcer of left ankle, with unspecified severity 07/26/2018    Non-rheumatic mitral regurgitation 09/07/2017    Chronic systolic heart failure 09/06/2017       -  Echo 9/2017              EF 40%               Severe MR              Moderate TR              PASP 47              Severe LAE              LVEDD 7.1 cm               LVESD 5.2 cm             Hyperthyroidism 09/05/2017    Elevated troponin 09/05/2017    Preoperative clearance 07/10/2017    Chronic combined systolic and diastolic congestive heart failure 02/20/2017    Long term (current) use of anticoagulants 10/31/2016    Other pulmonary embolism without acute cor pulmonale, unspecified chronicity 10/28/2016    Acute pulmonary embolism 10/26/2016    Recurrent pulmonary embolism 10/26/2016    History of DVT (deep vein thrombosis) 09/16/2016    Essential hypertension 09/16/2016    Knee pain, right 07/07/2016    Difficulty walking 07/07/2016    Muscle weakness 07/07/2016     Group A streptococcal infection 2016    Tinea pedis of both feet 2016    Bilateral edema of lower extremity 2016    Morbid obesity 2016    Permanent atrial fibrillation 2016    Right knee pain 2016    NARESH (acute kidney injury) 2016    Chest pain 2014    Depression 2014    Elevated BP 2014    May-Thurner syndrome 2014       1. Successful IVUS guided intervention for May Thurner Syndrome 2. Left common iliac vein treated with 22 x 70 mm Wallstent overlapping with 22 x 45 mm Wallstent post dilation 18 x 40 mm balloon                 Stenosis of right iliac vein 2014       S/p venoplasty with  20 x 80 mm Wallstent post dilated with 14 mm balloon in 2014       Chronic venous hypertension with ulcer involving left side 2013    Edema 2013       R leg       Venous (peripheral) insufficiency 2013       S/p bilateral iliac vein stents     S/p R GSV EVLT with laser 10/2013     S/p US guided accessory vein sclerotherapy of R calf        Venous stasis ulcer of lower extremity, unspecified laterality 2012    Ulcer - lesion 2012       Of left foot                     Right Arm BP - Sittin/64  Left Arm BP - Sittin/70           LABS     LAST HbA1c        Lab Results   Component Value Date     HGBA1C 5.1 07/10/2017         Lipid panel        Lab Results   Component Value Date     CHOL 124 07/10/2017     CHOL 131 2016     CHOL 123 2012            Lab Results   Component Value Date     HDL 23 (L) 07/10/2017     HDL 29 (L) 2016     HDL 32 (L) 2012            Lab Results   Component Value Date     LDLCALC 70.4 07/10/2017     LDLCALC 83.0 2016     LDLCALC 75.4 2012            Lab Results   Component Value Date     TRIG 153 (H) 07/10/2017     TRIG 95 2016     TRIG 78 2012            Lab Results   Component Value Date     CHOLHDL 18.5 (L) 07/10/2017      CHOLHDL 22.1 11/21/2016     CHOLHDL 26.0 08/25/2012               Review of Systems   Constitution: Positive for weight loss. Negative for diaphoresis, weakness, night sweats and weight gain.   HENT: Negative for congestion.    Eyes: Negative for blurred vision, discharge and double vision.   Cardiovascular: Positive for dyspnea on exertion. Negative for chest pain, claudication, cyanosis, irregular heartbeat, leg swelling, near-syncope, orthopnea, palpitations, paroxysmal nocturnal dyspnea and syncope.   Respiratory: Positive for wheezing (with activities). Negative for cough and shortness of breath.    Endocrine: Negative for cold intolerance, heat intolerance and polyphagia.   Hematologic/Lymphatic: Negative for adenopathy and bleeding problem. Does not bruise/bleed easily.   Skin: Positive for poor wound healing (healed). Negative for dry skin and nail changes.   Musculoskeletal: Positive for arthritis. Negative for back pain, falls, joint pain, myalgias and neck pain.   Gastrointestinal: Negative for bloating, abdominal pain, change in bowel habit and constipation.   Genitourinary: Negative for bladder incontinence, dysuria, flank pain, genital sores and missed menses.   Neurological: Negative for aphonia, brief paralysis, difficulty with concentration and dizziness.   Psychiatric/Behavioral: Negative for altered mental status and memory loss. The patient does not have insomnia.    Allergic/Immunologic: Negative for environmental allergies.         Objective:   Physical Exam   Constitutional: He is oriented to person, place, and time. He appears well-developed and well-nourished. He is not intubated.   HENT:   Head: Normocephalic and atraumatic.   Right Ear: External ear normal.   Left Ear: External ear normal.   Mouth/Throat: Oropharynx is clear and moist.   Eyes: Conjunctivae and EOM are normal. Pupils are equal, round, and reactive to light. Right eye exhibits no discharge. Left eye exhibits no discharge. No  scleral icterus.   Neck: Normal range of motion. Neck supple. Normal carotid pulses, no hepatojugular reflux and no JVD present. Carotid bruit is not present. No tracheal deviation present. No thyromegaly present.   Cardiovascular: Normal rate, S1 normal and S2 normal. An irregular rhythm present.  No extrasystoles are present. PMI is not displaced. Exam reveals no gallop, no S3, no distant heart sounds, no friction rub and no midsystolic click.   No murmur heard.  Pulses:       Carotid pulses are 2+ on the right side, and 2+ on the left side.       Radial pulses are 2+ on the right side, and 2+ on the left side.        Femoral pulses are 2+ on the right side, and 2+ on the left side.       Popliteal pulses are 2+ on the right side, and 2+ on the left side.        Dorsalis pedis pulses are 2+ on the right side, and 2+ on the left side.        Posterior tibial pulses are 2+ on the right side, and 2+ on the left side.   Pulmonary/Chest: Effort normal and breath sounds normal. No accessory muscle usage or stridor. No apnea, no tachypnea and no bradypnea. He is not intubated. No respiratory distress. He has no decreased breath sounds. He has no wheezes. He has no rales. He exhibits no tenderness and no bony tenderness.   Abdominal: He exhibits no distension, no pulsatile liver, no abdominal bruit, no ascites, no pulsatile midline mass and no mass. There is no tenderness. There is no rebound and no guarding.   Musculoskeletal: Normal range of motion. He exhibits no edema or tenderness.   Lymphadenopathy:     He has no cervical adenopathy.       Chronic lymphedema of R leg/calf area   Neurological: He is alert and oriented to person, place, and time. He has normal reflexes. No cranial nerve deficit. Coordination normal.   Skin: Skin is warm. No rash noted. No erythema. No pallor.       Lateral left foot wound with fat layer exposed       Foul smell      Dark drainage      Varicose veins   Psychiatric: He has a normal mood  and affect. His behavior is normal. Judgment and thought content normal.         1/17/2019                Assessment:      1. Non-pressure chronic ulcer of left ankle, with unspecified severity    2. Venous insufficiency of lower extremity, unspecified laterality    3. Venous (peripheral) insufficiency    4. Venous stasis ulcer of lower extremity, unspecified laterality    5. May-Thurner syndrome    6. Stenosis of right iliac vein    7. Permanent atrial fibrillation    8. Chronic combined systolic and diastolic congestive heart failure          Plan:            Venous insufficiency ultrasound   To assess for reflux              Either EVLT or sclerotherapy              Compression stockings                   Exercise  Low salt diet  Daily weight              Take an extra dose of lasix for 3 lbs weight gain           Continue with CHF clinic recommendations  Maintain relationship with [facility]  Follow up with endocrine for treatment of thyroid disorder                 Return sooner for concerns or questions. If symptoms persist go to the ED  I have reviewed all pertinent data on this patient         I have reviewed the patient's medical history in detail and updated the computerized patient record.           Orders Placed This Encounter   Procedures    US Lower Extremity Veins Bilateral Insuf       Standing Status:   Future       Standing Expiration Date:   1/17/2020       Order Specific Question:   May the Radiologist modify the order per protocol to meet the clinical needs of the patient?       Answer:   Yes    Ultrasound Lower Extremity Veins Bilateral Insuf (Cupid Only)       Standing Status:   Future       Standing Expiration Date:   1/17/2020    IN OFFICE EKG 12-LEAD (to Muse)       Order Specific Question:   Diagnosis       Answer:   Atrial fibrillation [427.31.ICD-9-CM]    Case Request-Cath Lab: Sclerotherapy, Ablation, Vein, Peripheral, Percutaneous, Endovenous, Using Laser       Standing Status:    Standing       Number of Occurrences:   1       Order Specific Question:   CPT Code:       Answer:   OR ENDOVENOUS LASER, 1ST VEIN [13186]       Order Specific Question:   CPT Code:       Answer:   OR INJECTION THERAPY VEIN,ONE VEIN [10798]       Order Specific Question:   Medical Necessity:       Answer:   Medically Urgent [101]         Follow up as scheduled. Return sooner for concerns or questions                 He expressed verbal understanding and agreed with the plan                      Medication List                     Accurate as of 1/17/19 10:14 PM. If you have any questions, ask your nurse or doctor.                              CHANGE how you take these medications    lisinopril 2.5 MG tablet  Commonly known as:  PRINIVIL,ZESTRIL  Take 4 tablets (10 mg total) by mouth once daily.  What changed:  additional instructions                      CONTINUE taking these medications          methIMAzole 10 MG Tab  Commonly known as:  TAPAZOLE  Take 2 tablets (20 mg total) by mouth once daily.      metoprolol succinate 100 MG 24 hr tablet  Commonly known as:  TOPROL-XL  Take 1 tablet (100 mg total) by mouth once daily.      torsemide 20 MG Tab  Commonly known as:  DEMADEX  TAKE 1 TABLET (20 MG TOTAL) BY MOUTH ONCE DAILY.      VICODIN ORAL      warfarin 10 MG tablet  Commonly known as:  COUMADIN  Take 1.5-2 tablets (15-20 mg total) by mouth once daily.                      STOP taking these medications          azelastine 137 mcg (0.1 %) nasal spray  Commonly known as:  ASTELIN  Stopped by:  Gomez Lantigua MD      azithromycin 250 MG tablet  Commonly known as:  Z-CLARA  Stopped by:  Gomez Lantigua MD                          Where to Get Your Medications             These medications were sent to [facility]/pharmacy #[id number] - [place] - [address] AT [facility]  [address] [place] [address]     Phone:  [phone]   · methIMAzole 10 MG Tab                  Addendum  1/29/2019           US result         There is no evidence of hemodynamically significant venous reflux (reflux lasting greater than 500 ms) in either right  greater or lesser saphenous vein.  No reflux seen within the left lesser saphenous vein.    Reflux noted within accessory veins left lateral ankle region.    Status post left EVLT    There is no evidence of bilateral lower extremity deep venous thrombosis.           He will benefit from sclerotherapy of large accessory of lateral aspect of foot causing persistent ulcer from venous hypertension           Orders will be placed

## 2019-03-01 ENCOUNTER — HOSPITAL ENCOUNTER (OUTPATIENT)
Dept: WOUND CARE | Facility: HOSPITAL | Age: 54
Discharge: HOME | End: 2019-03-01
Attending: EMERGENCY MEDICINE
Payer: MEDICARE

## 2019-03-01 ENCOUNTER — ANTI-COAG VISIT (OUTPATIENT)
Dept: CARDIOLOGY | Facility: CLINIC | Age: 54
End: 2019-03-01

## 2019-03-01 VITALS
TEMPERATURE: 99 F | SYSTOLIC BLOOD PRESSURE: 121 MMHG | DIASTOLIC BLOOD PRESSURE: 71 MMHG | HEART RATE: 71 BPM | RESPIRATION RATE: 20 BRPM

## 2019-03-01 DIAGNOSIS — I83.009 VENOUS STASIS ULCER OF LOWER EXTREMITY, UNSPECIFIED LATERALITY: Primary | ICD-10-CM

## 2019-03-01 DIAGNOSIS — I26.99 OTHER PULMONARY EMBOLISM WITHOUT ACUTE COR PULMONALE, UNSPECIFIED CHRONICITY: ICD-10-CM

## 2019-03-01 DIAGNOSIS — I87.2 VENOUS (PERIPHERAL) INSUFFICIENCY: ICD-10-CM

## 2019-03-01 DIAGNOSIS — R60.9 EDEMA, UNSPECIFIED TYPE: ICD-10-CM

## 2019-03-01 DIAGNOSIS — L97.909 VENOUS STASIS ULCER OF LOWER EXTREMITY, UNSPECIFIED LATERALITY: Primary | ICD-10-CM

## 2019-03-01 DIAGNOSIS — Z79.01 LONG TERM (CURRENT) USE OF ANTICOAGULANTS: ICD-10-CM

## 2019-03-01 DIAGNOSIS — L97.329 NON-PRESSURE CHRONIC ULCER OF LEFT ANKLE, WITH UNSPECIFIED SEVERITY: ICD-10-CM

## 2019-03-01 PROCEDURE — 87186 SC STD MICRODIL/AGAR DIL: CPT | Mod: 59

## 2019-03-01 PROCEDURE — 87077 CULTURE AEROBIC IDENTIFY: CPT

## 2019-03-01 PROCEDURE — 87070 CULTURE OTHR SPECIMN AEROBIC: CPT | Mod: 59

## 2019-03-01 PROCEDURE — 87076 CULTURE ANAEROBE IDENT EACH: CPT

## 2019-03-01 PROCEDURE — 29580 STRAPPING UNNA BOOT: CPT

## 2019-03-01 PROCEDURE — 87075 CULTR BACTERIA EXCEPT BLOOD: CPT

## 2019-03-01 NOTE — PROGRESS NOTES
Subjective:       Patient ID: Drew Farrar is a 53 y.o. male.    Chief Complaint: Venous Ulcer (BLE)    2/15/19: Readmitted for venous ulcer to left lateral foot which developed about 2 weeks ago. Pulses noted with doppler and capillary refill <3.Dr Gutierrez assessed patient and wound care plan ordered for compression therapy and hydrafera blue to wound bed. No apparent signs of infection noted. Patient to follw-up in clinic in 2 weeks.DB   2/21/19: Nurse visit for dressing change. Refused care to skin tear on left 2nd toe. See notes. RTC 1 week for DrRosy Visit.  3/1/19: Follow up with Dr. Waller today in clinic new wound to right posterior leg, culture of both wounds taken today in clinic new wound care orders to both legs for xeroform and unna with calamine toe to knee follow up in 1 week with Dr. Gutierrez      Review of Systems   Constitutional: Negative.    HENT: Negative.    Eyes: Negative.    Respiratory: Negative.    Cardiovascular: Positive for leg swelling.   Gastrointestinal: Negative.    Genitourinary: Negative.    Musculoskeletal: Negative.    Skin: Positive for color change, rash and wound.   Neurological: Negative.    Psychiatric/Behavioral: Negative.        Objective:      Physical Exam   Constitutional: He is oriented to person, place, and time. He appears well-developed and well-nourished.   HENT:   Head: Normocephalic.   Eyes: Conjunctivae and EOM are normal. Pupils are equal, round, and reactive to light.   Neck: Normal range of motion. Neck supple.   Cardiovascular: Normal rate, regular rhythm, normal heart sounds and intact distal pulses.   Pulmonary/Chest: Effort normal and breath sounds normal.   Abdominal: Soft. Bowel sounds are normal.   Musculoskeletal: Normal range of motion.   Neurological: He is alert and oriented to person, place, and time. He has normal reflexes.   Skin: Skin is warm and dry.            Assessment:       1. Venous stasis ulcer of lower extremity, unspecified  laterality    2. Venous (peripheral) insufficiency    3. Edema, unspecified type    4. Chronic venous hypertension with ulcer involving left side           Wound 02/15/19 1000 Venous Ulcer lateral Foot #1 (Active)   02/15/19 1000    Pre-existing: No   Primary Wound Type: Venous ulcer   Side: Left   Orientation: lateral   Location: Foot   Wound/PI Number (optional): #1   Ankle-Brachial Index:    Pulses:  doppler pulses, capillary refill<3   Removal Indication and Assessment:    Wound Outcome:    (Retired) Wound Type:    (Retired) Wound Length (cm):    (Retired) Wound Width (cm):    (Retired) Depth (cm):    Wound Description (Comments):    Removal Indications:    Wound Image   3/1/2019 11:57 AM   Dressing Appearance Intact;Moist drainage 3/1/2019 11:57 AM   Drainage Amount Small 3/1/2019 11:57 AM   Drainage Characteristics/Odor Serosanguineous 3/1/2019 11:57 AM   Appearance Red;Yellow;Moist;Fibrin;Slough 3/1/2019 11:57 AM   Tissue loss description Partial thickness 3/1/2019 11:57 AM   Red (%), Wound Tissue Color 50 % 3/1/2019 11:57 AM   Yellow (%), Wound Tissue Color 50 % 3/1/2019 11:57 AM   Periwound Area Intact;Edematous;Redness 3/1/2019 11:57 AM   Wound Edges Defined 3/1/2019 11:57 AM   Wound Length (cm) 1.2 cm 3/1/2019 11:57 AM   Wound Width (cm) 1.9 cm 3/1/2019 11:57 AM   Wound Depth (cm) 0.2 cm 3/1/2019 11:57 AM   Wound Volume (cm^3) 0.46 cm^3 3/1/2019 11:57 AM   Wound Surface Area (cm^2) 2.28 cm^2 3/1/2019 11:57 AM   Care Cleansed with:;Soap and water 3/1/2019 11:57 AM   Dressing Applied;Foam;Non-adherent;Compression wrap 3/1/2019 11:57 AM   Periwound Care Absorptive dressing applied 3/1/2019 11:57 AM            Wound 03/01/19 1110 Venous Ulcer Leg #2 (Active)   03/01/19 1110    Pre-existing: Yes   Primary Wound Type: Venous ulcer   Side:    Orientation:    Location: Leg   Wound/PI Number (optional): #2   Ankle-Brachial Index:    Pulses:    Removal Indication and Assessment:    Wound Outcome:    (Retired) Wound  Type:    (Retired) Wound Length (cm):    (Retired) Wound Width (cm):    (Retired) Depth (cm):    Wound Description (Comments):    Removal Indications:    Dressing Appearance Open to air;Moist drainage 3/1/2019 11:57 AM   Drainage Amount Small 3/1/2019 11:57 AM   Drainage Characteristics/Odor Serosanguineous 3/1/2019 11:57 AM   Appearance Red;Yellow;Slough;Fibrin 3/1/2019 11:57 AM   Tissue loss description Partial thickness 3/1/2019 11:57 AM   Red (%), Wound Tissue Color 90 % 3/1/2019 11:57 AM   Yellow (%), Wound Tissue Color 10 % 3/1/2019 11:57 AM   Periwound Area Intact;Dry;Hemosiderin Staining;Edematous 3/1/2019 11:57 AM   Wound Edges Irregular 3/1/2019 11:57 AM   Wound Length (cm) 3.4 cm 3/1/2019 11:57 AM   Wound Width (cm) 6 cm 3/1/2019 11:57 AM   Wound Depth (cm) 0.1 cm 3/1/2019 11:57 AM   Wound Volume (cm^3) 2.04 cm^3 3/1/2019 11:57 AM   Wound Surface Area (cm^2) 20.4 cm^2 3/1/2019 11:57 AM   Care Cleansed with:;Sterile normal saline 3/1/2019 11:57 AM   Dressing Applied;Non-adherent;Compression wrap 3/1/2019 11:57 AM   Periwound Care Absorptive dressing applied;Moisturizer applied 3/1/2019 11:57 AM   Compression Unna's Boot 3/1/2019 11:57 AM   Dressing Change Due 03/08/19 3/1/2019 11:57 AM   Left Foot #1    Cleanse wound with:NS  Lidocaine: PRN   Periwound care:Moisture barrier PRN  Primary dressing:Xeroform  Secondary dressing: small abd pad and plain foam(taina) to dorsal foot   Offloading:Foam and Darco shoe  Edema control: Calamine Unna Boot toes to knee  Frequency:Weekly  Follow-up: week Dr. Gutierrez    Right Posterior Leg #2    Cleanse wound with:NS  Lidocaine: PRN   Periwound care:  Primary dressing:Xeroform  Secondary dressing: small abd pad    Offloading: Patient's  shoe  Edema control: Calamine Unna Boot toes to knee  Frequency:Weekly  Follow-up: week Dr. Gutierrez  Culture of both wounds taken today in clinic  Plan:                1 week Dr. Gutierrez

## 2019-03-06 LAB
BACTERIA SPEC AEROBE CULT: NORMAL

## 2019-03-07 LAB
BACTERIA SPEC ANAEROBE CULT: NORMAL

## 2019-03-07 NOTE — PROGRESS NOTES
3/7/19 - patient called to report that he is restarting doxycyline QID. He was advised to keep INR 3/8 as scheduled and we will make a plan with him tomorrow to include DDI.

## 2019-03-07 NOTE — DISCHARGE SUMMARY
[facility]  Cardiology  Discharge Summary      Patient Name: Drew Farrar  MRN: [id number]  Admission Date: 2/21/2019  Hospital Length of Stay: 0 days  Discharge Date and Time: 2/21/2019  Attending Physician: No att. providers found  Discharging Provider: Gomez Lantigua MD  Primary Care Physician: Robles Archuleta MD    HPI:     · Sclerotherapy of left medial and lateral ankle ankle accessory veins                    : Gomez Lantigua           Procedure: sclerotherapy of left medial and lateral ankle ankle accessory veins for venous insufficiency complicated with a non healing ulcer despite 20-30 mmHg stockings, wound care, and EVLT.        Patient was brought to the cath lab pre and post area under sterile condition. After consent was obtained explaining in details the risks and benefits of sclerotherapy consent was signed and placed in the chart. Left ankle was prepped. With ultrasound guidance access was obtained in the lateral veins above the wound and medial veins at the level of the ankle. These veinsn were injected with a total of 40 mg polidocanol 1% solution (20 mg/2 ml). He tolerated the procedure well. The access was covered with a sterile dressing.         Gomez Lantigua    Procedure(s) (LRB):  Sclerotherapy (N/A)     Indwelling Lines/Drains at time of discharge:  Lines/Drains/Airways          None          Hospital Course     See HPI    Consults:     Significant Diagnostic Studies:     images    Pending Diagnostic Studies:     None          Final Active Diagnoses:    Diagnosis Date Noted POA    Non-pressure chronic ulcer of left ankle, with unspecified severity [L97.329] 07/26/2018 Yes      Problems Resolved During this Admission:       Discharged Condition: stable     Follow Up:    DC home      Follow up with PCP      Follow up with Dr. Lantigua or primary cardiologist as scheduled      Cardiac diet      Resume normal activities in 3 days    Patient Instructions:        Medications:      Discharge Medication List as of 2/21/2019 11:51 AM      CONTINUE these medications which have NOT CHANGED    Details   acetaZOLAMIDE (DIAMOX) 250 MG tablet Starting Fri 1/18/2019, Historical Med      DUREZOL 0.05 % Drop ophthalmic solution Starting Wed 1/23/2019, Historical Med      HYDROcodone-acetaminophen (NORCO) 5-325 mg per tablet Take 1 tablet by mouth every 6 (six) hours as needed for Pain., Starting Fri 2/8/2019, Print      lisinopril (PRINIVIL,ZESTRIL) 2.5 MG tablet Take 4 tablets (10 mg total) by mouth once daily., Starting Mon 3/26/2018, Normal      methIMAzole (TAPAZOLE) 10 MG Tab Take 2 tablets (20 mg total) by mouth once daily., Starting Thu 1/17/2019, Normal      metoprolol succinate (TOPROL-XL) 100 MG 24 hr tablet Take 1 tablet (100 mg total) by mouth once daily., Starting Fri 9/21/2018, Until Sat 9/21/2019, Normal      ofloxacin (OCUFLOX) 0.3 % ophthalmic solution Starting Fri 1/18/2019, Historical Med      torsemide (DEMADEX) 20 MG Tab TAKE 1 TABLET (20 MG TOTAL) BY MOUTH ONCE DAILY., Starting Mon 12/17/2018, Normal      warfarin (COUMADIN) 10 MG tablet Take 1.5-2 tablets (15-20 mg total) by mouth once daily., Starting Mon 10/8/2018, Normal      enoxaparin (LOVENOX) 150 mg/mL Syrg Inject 1 mL (150 mg total) into the skin every 12 (twelve) hours., Starting Wed 2/13/2019, Normal      neomycin-polymyxin-dexamethasone (DEXACINE) 3.5 mg/g-10,000 unit/g-0.1 % Oint Starting Fri 1/18/2019, Historical Med                Time spent on the discharge of patient: 25 minutes    Gomez Lantigua MD  Cardiology  [facility]

## 2019-03-08 ENCOUNTER — PATIENT MESSAGE (OUTPATIENT)
Dept: CARDIOLOGY | Facility: CLINIC | Age: 54
End: 2019-03-08

## 2019-03-08 ENCOUNTER — HOSPITAL ENCOUNTER (OUTPATIENT)
Dept: WOUND CARE | Facility: HOSPITAL | Age: 54
Discharge: HOME | End: 2019-03-08
Attending: EMERGENCY MEDICINE
Payer: MEDICARE

## 2019-03-08 ENCOUNTER — ANTI-COAG VISIT (OUTPATIENT)
Dept: CARDIOLOGY | Facility: CLINIC | Age: 54
End: 2019-03-08

## 2019-03-08 VITALS
SYSTOLIC BLOOD PRESSURE: 143 MMHG | WEIGHT: 315 LBS | HEIGHT: 78 IN | HEART RATE: 84 BPM | DIASTOLIC BLOOD PRESSURE: 70 MMHG | BODY MASS INDEX: 36.45 KG/M2

## 2019-03-08 DIAGNOSIS — I83.009 VENOUS STASIS ULCER OF LOWER EXTREMITY, UNSPECIFIED LATERALITY: ICD-10-CM

## 2019-03-08 DIAGNOSIS — I26.99 OTHER PULMONARY EMBOLISM WITHOUT ACUTE COR PULMONALE, UNSPECIFIED CHRONICITY: ICD-10-CM

## 2019-03-08 DIAGNOSIS — I87.2 VENOUS (PERIPHERAL) INSUFFICIENCY: ICD-10-CM

## 2019-03-08 DIAGNOSIS — R60.9 EDEMA, UNSPECIFIED TYPE: Primary | ICD-10-CM

## 2019-03-08 DIAGNOSIS — Z79.01 LONG TERM (CURRENT) USE OF ANTICOAGULANTS: ICD-10-CM

## 2019-03-08 DIAGNOSIS — L97.909 VENOUS STASIS ULCER OF LOWER EXTREMITY, UNSPECIFIED LATERALITY: ICD-10-CM

## 2019-03-08 PROCEDURE — 29581 APPL MULTLAYER CMPRN SYS LEG: CPT | Mod: 50

## 2019-03-08 NOTE — PROGRESS NOTES
Subjective:       Patient ID: Drew Farrar is a 53 y.o. male.    Chief Complaint: Venous Ulcer (BLE)    2/15/19: Readmitted for venous ulcer to left lateral foot which developed about 2 weeks ago. Pulses noted with doppler and capillary refill <3.Dr Gutierrez assessed patient and wound care plan ordered for compression therapy and hydrafera blue to wound bed. No apparent signs of infection noted. Patient to follw-up in clinic in 2 weeks.DB   2/21/19: Nurse visit for dressing change. Refused care to skin tear on left 2nd toe. See notes. RTC 1 week for  Visit.  3/1/19: Follow up with Dr. Waller today in clinic new wound to right posterior leg, culture of both wounds taken today in clinic new wound care orders to both legs for xeroform and unna with calamine toe to knee follow up in 1 week with Dr. Gutierrez  3/8/19: Follow up with Dr. Gutierrez today in clinic wounds improving, edema noted to BLE, profore toe to knee applied to both legs, RX given to patient for PO Doxycycline, follow up in 1 week.     No c/o noted.  Review of Systems    Objective:    Wounds as noted w/o Sophia's sign.  C&S as per report.  Physical Exam    Assessment:       No diagnosis found.       Wound 02/15/19 1000 Venous Ulcer lateral Foot #1 (Active)   02/15/19 1000    Pre-existing: No   Primary Wound Type: Venous ulcer   Side: Left   Orientation: lateral   Location: Foot   Wound/PI Number (optional): #1   Ankle-Brachial Index:    Pulses:  doppler pulses, capillary refill<3   Removal Indication and Assessment:    Wound Outcome:    (Retired) Wound Type:    (Retired) Wound Length (cm):    (Retired) Wound Width (cm):    (Retired) Depth (cm):    Wound Description (Comments):    Removal Indications:    Dressing Appearance Intact;Moist drainage 3/8/2019 11:49 AM   Drainage Amount Small 3/8/2019 11:49 AM   Drainage Characteristics/Odor Serosanguineous 3/8/2019 11:49 AM   Appearance Red;Yellow;Moist;Slough;Fibrin 3/8/2019 11:49 AM   Tissue loss  description Partial thickness 3/8/2019 11:49 AM   Red (%), Wound Tissue Color 60 % 3/8/2019 11:49 AM   Yellow (%), Wound Tissue Color 40 % 3/8/2019 11:49 AM   Periwound Area Intact;Edematous;Redness 3/8/2019 11:49 AM   Wound Edges Defined 3/8/2019 11:49 AM   Wound Length (cm) 1.2 cm 3/8/2019 11:49 AM   Wound Width (cm) 1.6 cm 3/8/2019 11:49 AM   Wound Depth (cm) 0.2 cm 3/8/2019 11:49 AM   Wound Volume (cm^3) 0.38 cm^3 3/8/2019 11:49 AM   Wound Surface Area (cm^2) 1.92 cm^2 3/8/2019 11:49 AM   Care Cleansed with:;Sterile normal saline 3/8/2019 11:49 AM   Dressing Applied;Compression wrap;Foam 3/8/2019 11:49 AM   Periwound Care Moisturizer applied;Absorptive dressing applied 3/8/2019 11:49 AM   Compression Four layer compression 3/8/2019 11:49 AM   Dressing Change Due 03/15/19 3/8/2019 11:49 AM            Wound 03/01/19 1110 Venous Ulcer Leg #2 (Active)   03/01/19 1110    Pre-existing: Yes   Primary Wound Type: Venous ulcer   Side:    Orientation:    Location: Leg   Wound/PI Number (optional): #2   Ankle-Brachial Index:    Pulses:    Removal Indication and Assessment:    Wound Outcome:    (Retired) Wound Type:    (Retired) Wound Length (cm):    (Retired) Wound Width (cm):    (Retired) Depth (cm):    Wound Description (Comments):    Removal Indications:    Wound Image   3/8/2019 11:49 AM   Dressing Appearance Intact;Dried drainage 3/8/2019 11:49 AM   Drainage Amount Scant 3/8/2019 11:49 AM   Drainage Characteristics/Odor Serosanguineous 3/8/2019 11:49 AM   Appearance Red;Granulating;Pink 3/8/2019 11:49 AM   Tissue loss description Partial thickness 3/8/2019 11:49 AM   Red (%), Wound Tissue Color 100 % 3/8/2019 11:49 AM   Periwound Area Intact;Dry;Hemosiderin Staining;Edematous 3/8/2019 11:49 AM   Wound Edges Irregular 3/8/2019 11:49 AM   Wound Length (cm) 3 cm 3/8/2019 11:49 AM   Wound Width (cm) 5.5 cm 3/8/2019 11:49 AM   Wound Depth (cm) 0.1 cm 3/8/2019 11:49 AM   Wound Volume (cm^3) 1.65 cm^3 3/8/2019 11:49 AM    Wound Surface Area (cm^2) 16.5 cm^2 3/8/2019 11:49 AM   Care Cleansed with:;Sterile normal saline 3/8/2019 11:49 AM   Dressing Abd pad;Compression wrap 3/8/2019 11:49 AM   Periwound Care Absorptive dressing applied;Moisturizer applied 3/8/2019 11:49 AM   Compression Four layer compression 3/8/2019 11:49 AM   Dressing Change Due 03/15/19 3/8/2019 11:49 AM       Left Foot #1    Cleanse wound with:NS  Lidocaine: PRN   Periwound care:Moisture barrier PRN  Primary dressing:small abd pad.  Secondary dressing: na  Offloading:Foam and Darco shoe  Edema control: profore toes to knee  Frequency:Weekly  Follow-up:1 week Dr. Gutierrez    Right Posterior Leg #2    Cleanse wound with:NS  Lidocaine: PRN   Periwound care:  Primary dressing:small abd pad   Secondary dressing:na  Offloading: Darco shoe  Edema control: Profore toes to knee  Frequency:Weekly  Follow-up: week Dr. Gutierrez  RX written today for PO Doxycycline, patient instructed to take twice a day until entire bottle is gone with lots of water, verbalized understanding.    Plan:                Follow up 1 week Dr. Gutierrez

## 2019-03-08 NOTE — PROGRESS NOTES
Patient called and was given lab result, reports 3/07 he started Doxycycline -100mg -3 times a day- #30 not sure for how long but has 2 refills left, and today started Doxycycline -600mg -twice a day for 14 days, had nose bleed 3/07/19 lasting 45 minutes, verified coumadin: 10mg daily except 5mg-Thursday but also skipped -3/06, and took 5mg-yesterday Thursday

## 2019-03-08 NOTE — PROGRESS NOTES
INR slightly elevated today. Patient has restarted doxycycline yesterday so will lower weekly dose and f/u closely.

## 2019-03-08 NOTE — PROGRESS NOTES
Patient's report of new mediations does not make sense (doxycycine would not be dosed at 600mg BID and even 100mg TID is unusual). I called the [facility] and [facility] on file and they only report recent Rx fill of doxycycline so unsure what this second mediation is. Based on doxycycline TID #30 w/2 refills - should last patient about a month. Will continue same lowered dose plan with close f/u.

## 2019-03-14 ENCOUNTER — TELEPHONE (OUTPATIENT)
Dept: CARDIOLOGY | Facility: HOSPITAL | Age: 54
End: 2019-03-14

## 2019-03-14 DIAGNOSIS — I87.2 VENOUS INSUFFICIENCY OF BOTH LOWER EXTREMITIES: ICD-10-CM

## 2019-03-14 DIAGNOSIS — I87.8 VENOUS STASIS: Primary | ICD-10-CM

## 2019-03-14 NOTE — TELEPHONE ENCOUNTER
He can have another ultrasound         Regarding the infection he will be referred to infectious disease      Follow up with me after ultrasound        Thanks        ZN

## 2019-03-15 ENCOUNTER — ANTI-COAG VISIT (OUTPATIENT)
Dept: CARDIOLOGY | Facility: CLINIC | Age: 54
End: 2019-03-15

## 2019-03-15 ENCOUNTER — HOSPITAL ENCOUNTER (OUTPATIENT)
Dept: WOUND CARE | Facility: HOSPITAL | Age: 54
Discharge: HOME | End: 2019-03-15
Attending: EMERGENCY MEDICINE
Payer: MEDICARE

## 2019-03-15 VITALS
BODY MASS INDEX: 36.45 KG/M2 | DIASTOLIC BLOOD PRESSURE: 72 MMHG | HEART RATE: 82 BPM | SYSTOLIC BLOOD PRESSURE: 120 MMHG | HEIGHT: 78 IN | WEIGHT: 315 LBS

## 2019-03-15 DIAGNOSIS — I87.2 VENOUS (PERIPHERAL) INSUFFICIENCY: ICD-10-CM

## 2019-03-15 DIAGNOSIS — L97.909 VENOUS STASIS ULCER OF LOWER EXTREMITY, UNSPECIFIED LATERALITY: ICD-10-CM

## 2019-03-15 DIAGNOSIS — I83.009 VENOUS STASIS ULCER OF LOWER EXTREMITY, UNSPECIFIED LATERALITY: ICD-10-CM

## 2019-03-15 DIAGNOSIS — R60.9 EDEMA, UNSPECIFIED TYPE: Primary | ICD-10-CM

## 2019-03-15 LAB — INR PPP: 8.3

## 2019-03-15 PROCEDURE — 29581 APPL MULTLAYER CMPRN SYS LEG: CPT

## 2019-03-15 NOTE — PROGRESS NOTES
Subjective:       Patient ID: Drew Farrar is a 53 y.o. male.    Chief Complaint: Venous Ulcer and Wound Care    2/15/19: Readmitted for venous ulcer to left lateral foot which developed about 2 weeks ago. Pulses noted with doppler and capillary refill <3.Dr Gutierrez assessed patient and wound care plan ordered for compression therapy and hydrafera blue to wound bed. No apparent signs of infection noted. Patient to follw-up in clinic in 2 weeks.DB   2/21/19: Nurse visit for dressing change. Refused care to skin tear on left 2nd toe. See notes. RTC 1 week for DrRosy Visit.  3/1/19: Follow up with Dr. Waller today in clinic new wound to right posterior leg, culture of both wounds taken today in clinic new wound care orders to both legs for xeroform and unna with calamine toe to knee follow up in 1 week with Dr. Gutierrez  3/8/19: Follow up with Dr. Gutierrez today in clinic wounds improving, edema noted to BLE, profore toe to knee applied to both legs, RX given to patient for PO Doxycycline, follow up in 1 week.   3/15/19: Here for f/u with Dr. Gutierrez. U/S scheduled Thursday. Will need right leg rewrapped. Patient states eye DrRosy Gave him the same antibiotic dose and strength after eye surgery. Dr instructed patient to take one bottle of antibiotics for both wounds and eyes until finished. Gentamycin added to wound care orders.     Pt. Denies pain @ wound sites.  Review of Systems    Objective:    Wounds as noted w/o Sophia's sign or s/s of infection.  Physical Exam    Assessment:       1. Edema, unspecified type    2. Venous stasis ulcer of lower extremity, unspecified laterality    3. Venous (peripheral) insufficiency           Wound 02/15/19 1000 Venous Ulcer lateral Foot #1 (Active)   02/15/19 1000    Pre-existing: No   Primary Wound Type: Venous ulcer   Side: Left   Orientation: lateral   Location: Foot   Wound/PI Number (optional): #1   Ankle-Brachial Index:    Pulses:  doppler pulses, capillary refill<3    Removal Indication and Assessment:    Wound Outcome:    (Retired) Wound Type:    (Retired) Wound Length (cm):    (Retired) Wound Width (cm):    (Retired) Depth (cm):    Wound Description (Comments):    Removal Indications:    Dressing Appearance Intact;Moist drainage 3/15/2019 12:00 PM   Drainage Amount Small 3/15/2019 12:00 PM   Drainage Characteristics/Odor Serosanguineous 3/15/2019 12:00 PM   Appearance Red;Yellow;Wet;Fibrin;Slough 3/15/2019 12:00 PM   Tissue loss description Partial thickness 3/15/2019 12:00 PM   Red (%), Wound Tissue Color 60 % 3/15/2019 12:00 PM   Yellow (%), Wound Tissue Color 40 % 3/15/2019 12:00 PM   Periwound Area Intact;Edematous;Redness 3/15/2019 12:00 PM   Wound Edges Defined 3/15/2019 12:00 PM   Wound Length (cm) 1.1 cm 3/15/2019 12:00 PM   Wound Width (cm) 1.6 cm 3/15/2019 12:00 PM   Wound Depth (cm) 0.2 cm 3/15/2019 12:00 PM   Wound Volume (cm^3) 0.35 cm^3 3/15/2019 12:00 PM   Wound Surface Area (cm^2) 1.76 cm^2 3/15/2019 12:00 PM   Care Cleansed with:;Sterile normal saline 3/15/2019 12:00 PM   Dressing Applied;Abd pad;Foam;Compression wrap;Other (see comments) 3/15/2019 12:00 PM   Periwound Care Absorptive dressing applied 3/15/2019 12:00 PM   Compression Four layer compression 3/15/2019 12:00 PM   Dressing Change Due 03/22/19 3/15/2019 12:00 PM            Wound 03/01/19 1110 Venous Ulcer Leg #2 (Active)   03/01/19 1110    Pre-existing: Yes   Primary Wound Type: Venous ulcer   Side: Right   Orientation:    Location: Leg   Wound/PI Number (optional): #2   Ankle-Brachial Index:    Pulses:    Removal Indication and Assessment:    Wound Outcome:    (Retired) Wound Type:    (Retired) Wound Length (cm):    (Retired) Wound Width (cm):    (Retired) Depth (cm):    Wound Description (Comments):    Removal Indications:    Dressing Appearance Intact;Dry 3/15/2019 12:00 PM   Drainage Amount Scant 3/15/2019 12:00 PM   Drainage Characteristics/Odor Serosanguineous;Malodorous 3/15/2019 12:00 PM    Appearance Granulating;Hypergranulation;Pink;Yellow;Fibrin;Slough 3/15/2019 12:00 PM   Tissue loss description Partial thickness 3/15/2019 12:00 PM   Red (%), Wound Tissue Color 90 % 3/15/2019 12:00 PM   Yellow (%), Wound Tissue Color 10 % 3/15/2019 12:00 PM   Periwound Area Intact;Dry;Hemosiderin Staining;Edematous 3/15/2019 12:00 PM   Wound Edges Irregular 3/15/2019 12:00 PM   Wound Length (cm) 4.5 cm 3/15/2019 12:00 PM   Wound Width (cm) 2.2 cm 3/15/2019 12:00 PM   Wound Depth (cm) 0.1 cm 3/15/2019 12:00 PM   Wound Volume (cm^3) 0.99 cm^3 3/15/2019 12:00 PM   Wound Surface Area (cm^2) 9.9 cm^2 3/15/2019 12:00 PM   Care Cleansed with:;Sterile normal saline 3/15/2019 12:00 PM   Dressing Applied;Abd pad;Compression wrap;Other (see comments) 3/15/2019 12:00 PM   Periwound Care Absorptive dressing applied 3/15/2019 12:00 PM   Compression Four layer compression 3/15/2019 12:00 PM   Dressing Change Due 03/21/19 3/15/2019 12:00 PM   Ambulating well in darco shoes bilateral. Denies any pain.     Here for f/u with Dr. Gutierrez. U/S scheduled Thursday. Will need right leg rewrapped. Patient states eye  Gave him the same antibiotic dose and strength after eye surgery. Dr instructed patient to take one bottle of antibiotics for both wounds and eyes until finished. Gentamycin added to wound care orders.     Left Foot #1    Cleanse wound with:NS  Lidocaine: PRN   Periwound care:Moisture barrier PRN  Primary dressing: gentamycin to wound bed, small abd pad.  Secondary dressing: na  Offloading:Foam and Darco shoe  Edema control: profore toes to knee  Frequency:Weekly  Follow-up:1 week Dr. Gutierrez    Right Posterior Leg #2    Cleanse wound with:NS  Lidocaine: PRN   Periwound care:  Primary dressing:gentamycin to wound bed, small abd pad   Secondary dressing:na  Offloading: Darco shoe  Edema control: Profore toes to knee  Frequency:Weekly  Follow-up: week Dr. Gutierrez  Other orders:Continue PO Doxycycline, patient instructed  to take twice a day until entire bottle is gone with lots of water, verbalized understanding.    Plan:                Follow-up in about 1 week (around 3/22/2019) for Dr. Gutierrez.

## 2019-03-18 ENCOUNTER — ANTI-COAG VISIT (OUTPATIENT)
Dept: CARDIOLOGY | Facility: CLINIC | Age: 54
End: 2019-03-18

## 2019-03-18 DIAGNOSIS — I26.99 OTHER PULMONARY EMBOLISM WITHOUT ACUTE COR PULMONALE, UNSPECIFIED CHRONICITY: ICD-10-CM

## 2019-03-18 DIAGNOSIS — Z79.01 LONG TERM (CURRENT) USE OF ANTICOAGULANTS: ICD-10-CM

## 2019-03-20 PROCEDURE — 29580 STRAPPING UNNA BOOT: CPT

## 2019-03-21 ENCOUNTER — HOSPITAL ENCOUNTER (OUTPATIENT)
Dept: RADIOLOGY | Facility: HOSPITAL | Age: 54
Discharge: HOME | End: 2019-03-21
Attending: INTERNAL MEDICINE
Payer: MEDICARE

## 2019-03-21 DIAGNOSIS — I87.8 VENOUS STASIS: ICD-10-CM

## 2019-03-21 DIAGNOSIS — I87.2 VENOUS INSUFFICIENCY OF BOTH LOWER EXTREMITIES: ICD-10-CM

## 2019-03-21 PROCEDURE — 93970 EXTREMITY STUDY: CPT | Mod: TC

## 2019-03-21 PROCEDURE — 93970 EXTREMITY STUDY: CPT | Mod: 26,,, | Performed by: RADIOLOGY

## 2019-03-21 PROCEDURE — 93970 US LOWER EXTREMITY VEINS BILATERAL INSUFFICIENCY: ICD-10-PCS | Mod: 26,,, | Performed by: RADIOLOGY

## 2019-03-22 ENCOUNTER — HOSPITAL ENCOUNTER (OUTPATIENT)
Dept: WOUND CARE | Facility: HOSPITAL | Age: 54
Discharge: HOME | End: 2019-03-22
Attending: EMERGENCY MEDICINE
Payer: MEDICARE

## 2019-03-22 DIAGNOSIS — L97.909 VENOUS STASIS ULCER OF LOWER EXTREMITY, UNSPECIFIED LATERALITY: ICD-10-CM

## 2019-03-22 DIAGNOSIS — Z12.11 COLON CANCER SCREENING: ICD-10-CM

## 2019-03-22 DIAGNOSIS — I83.009 VENOUS STASIS ULCER OF LOWER EXTREMITY, UNSPECIFIED LATERALITY: ICD-10-CM

## 2019-03-22 DIAGNOSIS — R60.9 EDEMA, UNSPECIFIED TYPE: Primary | ICD-10-CM

## 2019-03-22 DIAGNOSIS — I87.2 VENOUS (PERIPHERAL) INSUFFICIENCY: ICD-10-CM

## 2019-03-22 DIAGNOSIS — L97.329 NON-PRESSURE CHRONIC ULCER OF LEFT ANKLE, WITH UNSPECIFIED SEVERITY: ICD-10-CM

## 2019-03-22 PROCEDURE — 29581 APPL MULTLAYER CMPRN SYS LEG: CPT | Mod: 50

## 2019-03-22 NOTE — PROGRESS NOTES
Subjective:       Patient ID: Drew Farrar is a 53 y.o. male.    Chief Complaint: Venous Ulcer and Wound Care    HPI  Review of Systems    Objective:      Physical Exam    Assessment:       1. Edema, unspecified type    2. Venous stasis ulcer of lower extremity, unspecified laterality    3. Non-pressure chronic ulcer of left ankle, with unspecified severity    4. Chronic venous hypertension with ulcer involving left side    5. Venous (peripheral) insufficiency           Wound 02/15/19 1000 Venous Ulcer lateral Foot #1 (Active)   02/15/19 1000    Pre-existing: No   Primary Wound Type: Venous ulcer   Side: Left   Orientation: lateral   Location: Foot   Wound/PI Number (optional): #1   Ankle-Brachial Index:    Pulses:  doppler pulses, capillary refill<3   Removal Indication and Assessment:    Wound Outcome:    (Retired) Wound Type:    (Retired) Wound Length (cm):    (Retired) Wound Width (cm):    (Retired) Depth (cm):    Wound Description (Comments):    Removal Indications:    Dressing Appearance Intact;Moist drainage 3/22/2019 10:00 AM   Drainage Amount Small 3/22/2019 10:00 AM   Drainage Characteristics/Odor Serosanguineous 3/22/2019 10:00 AM   Appearance Red;Yellow;Wet;Fibrin;Slough 3/22/2019 10:00 AM   Tissue loss description Partial thickness 3/22/2019 10:00 AM   Red (%), Wound Tissue Color 60 % 3/22/2019 10:00 AM   Yellow (%), Wound Tissue Color 40 % 3/22/2019 10:00 AM   Periwound Area Intact;Edematous;Redness 3/22/2019 10:00 AM   Wound Edges Defined 3/22/2019 10:00 AM   Wound Length (cm) 1.1 cm 3/22/2019 10:00 AM   Wound Width (cm) 1.6 cm 3/22/2019 10:00 AM   Wound Depth (cm) 0.2 cm 3/22/2019 10:00 AM   Wound Volume (cm^3) 0.35 cm^3 3/22/2019 10:00 AM   Wound Surface Area (cm^2) 1.76 cm^2 3/22/2019 10:00 AM   Care Cleansed with:;Sterile normal saline;Soap and water 3/22/2019 10:00 AM   Dressing Applied;Compression wrap;Other (see comments) 3/22/2019 10:00 AM   Periwound Care Absorptive dressing applied  3/22/2019 10:00 AM   Compression Four layer compression 3/22/2019 10:00 AM   Dressing Change Due 03/29/19 3/22/2019 10:00 AM            Wound 03/01/19 1110 Venous Ulcer Leg #2 (Active)   03/01/19 1110    Pre-existing: Yes   Primary Wound Type: Venous ulcer   Side: Right   Orientation:    Location: Leg   Wound/PI Number (optional): #2   Ankle-Brachial Index:    Pulses:    Removal Indication and Assessment:    Wound Outcome:    (Retired) Wound Type:    (Retired) Wound Length (cm):    (Retired) Wound Width (cm):    (Retired) Depth (cm):    Wound Description (Comments):    Removal Indications:    Dressing Appearance Intact;Dry 3/22/2019 10:00 AM   Drainage Amount Scant 3/22/2019 10:00 AM   Drainage Characteristics/Odor Serosanguineous 3/22/2019 10:00 AM   Appearance Granulating;Pink;Yellow;Fibrin;Slough;Hypergranulation 3/22/2019 10:00 AM   Tissue loss description Partial thickness 3/22/2019 10:00 AM   Red (%), Wound Tissue Color 90 % 3/22/2019 10:00 AM   Yellow (%), Wound Tissue Color 10 % 3/22/2019 10:00 AM   Periwound Area Intact;Dry;Edematous;Hemosiderin Staining 3/22/2019 10:00 AM   Wound Edges Irregular 3/22/2019 10:00 AM   Wound Length (cm) 4.5 cm 3/22/2019 10:00 AM   Wound Width (cm) 2.2 cm 3/22/2019 10:00 AM   Wound Depth (cm) 0.1 cm 3/22/2019 10:00 AM   Wound Volume (cm^3) 0.99 cm^3 3/22/2019 10:00 AM   Wound Surface Area (cm^2) 9.9 cm^2 3/22/2019 10:00 AM   Care Cleansed with:;Soap and water;Sterile normal saline 3/22/2019 10:00 AM   Dressing Applied;Compression wrap;Abd pad 3/22/2019 10:00 AM   Periwound Care Absorptive dressing applied;Dry periwound area maintained 3/22/2019 10:00 AM   Compression Four layer compression 3/22/2019 10:00 AM   Dressing Change Due 03/29/19 3/22/2019 10:00 AM       Patient here for nurse visit. States had ultrasound done on right leg yesterday. Leg edematous and unwrapped. No drainage or open areas noted. Left leg has large round reoccurring rash red, raised and itching.  Betamethasone and miconazole applied to area. Antifungal powder applied to periwound.  Wound care orders carried out. RTC in 1 week for f/u with Dr. Gutierrez.  Left Foot #1    Cleanse wound with:NS  Lidocaine: PRN   Periwound care:Moisture barrier PRN  Primary dressing: gentamycin to wound bed, small abd pad.  Secondary dressing: na  Offloading:Foam and Darco shoe  Edema control: profore toes to knee  Frequency:Weekly  Follow-up:1 week Dr. Gutierrez    Right Posterior Leg #2    Cleanse wound with:NS  Lidocaine: PRN   Periwound care:  Primary dressing:gentamycin to wound bed, small abd pad   Secondary dressing:na  Offloading: Darco shoe  Edema control: Profore toes to knee  Frequency:Weekly  Follow-up: week Dr. Gutierrez  Other orders:Continue PO Doxycycline, patient instructed to take twice a day until entire bottle is gone with lots of water, verbalized understanding.    Plan:                Follow-up in about 1 week (around 3/29/2019) for Dr. Gutierrez.

## 2019-03-25 ENCOUNTER — ANTI-COAG VISIT (OUTPATIENT)
Dept: CARDIOLOGY | Facility: CLINIC | Age: 54
End: 2019-03-25

## 2019-03-25 ENCOUNTER — LAB VISIT (OUTPATIENT)
Dept: LAB | Facility: HOSPITAL | Age: 54
End: 2019-03-25
Attending: INTERNAL MEDICINE
Payer: MEDICARE

## 2019-03-25 DIAGNOSIS — Z79.01 LONG TERM (CURRENT) USE OF ANTICOAGULANTS: ICD-10-CM

## 2019-03-25 DIAGNOSIS — I26.99 OTHER PULMONARY EMBOLISM WITHOUT ACUTE COR PULMONALE, UNSPECIFIED CHRONICITY: ICD-10-CM

## 2019-03-25 LAB
INR PPP: 1.9 (ref 0.8–1.2)
PROTHROMBIN TIME: 19.5 SEC (ref 9–12.5)

## 2019-03-25 PROCEDURE — 36415 COLL VENOUS BLD VENIPUNCTURE: CPT

## 2019-03-25 PROCEDURE — 85610 PROTHROMBIN TIME: CPT

## 2019-03-25 NOTE — PROGRESS NOTES
No c/o noted.  Wound has > granulation & < slough than on prev. Exam; no s/s of infection noted.  Wound care as ordered; f/u as per appt.

## 2019-03-29 ENCOUNTER — PATIENT MESSAGE (OUTPATIENT)
Dept: CARDIOLOGY | Facility: CLINIC | Age: 54
End: 2019-03-29

## 2019-03-29 ENCOUNTER — HOSPITAL ENCOUNTER (OUTPATIENT)
Dept: WOUND CARE | Facility: HOSPITAL | Age: 54
Discharge: HOME | End: 2019-03-29
Attending: EMERGENCY MEDICINE
Payer: MEDICARE

## 2019-03-29 VITALS
BODY MASS INDEX: 36.45 KG/M2 | HEIGHT: 78 IN | DIASTOLIC BLOOD PRESSURE: 63 MMHG | WEIGHT: 315 LBS | HEART RATE: 95 BPM | SYSTOLIC BLOOD PRESSURE: 138 MMHG

## 2019-03-29 DIAGNOSIS — L97.909 VENOUS STASIS ULCER OF LOWER EXTREMITY, UNSPECIFIED LATERALITY: Primary | ICD-10-CM

## 2019-03-29 DIAGNOSIS — I83.009 VENOUS STASIS ULCER OF LOWER EXTREMITY, UNSPECIFIED LATERALITY: Primary | ICD-10-CM

## 2019-03-29 DIAGNOSIS — I87.2 VENOUS (PERIPHERAL) INSUFFICIENCY: ICD-10-CM

## 2019-03-29 PROCEDURE — 11042 DBRDMT SUBQ TIS 1ST 20SQCM/<: CPT

## 2019-03-29 PROCEDURE — 29581 APPL MULTLAYER CMPRN SYS LEG: CPT | Mod: RT

## 2019-03-29 PROCEDURE — 27201912 HC WOUND CARE DEBRIDEMENT SUPPLIES

## 2019-03-29 NOTE — PROGRESS NOTES
Subjective:       Patient ID: Drew Farrar is a 53 y.o. male.    Chief Complaint: Wound Care and Venous Stasis    2/15/19: Readmitted for venous ulcer to left lateral foot which developed about 2 weeks ago. Pulses noted with doppler and capillary refill <3.Dr Gutierrez assessed patient and wound care plan ordered for compression therapy and hydrafera blue to wound bed. No apparent signs of infection noted. Patient to follw-up in clinic in 2 weeks.DB   2/21/19: Nurse visit for dressing change. Refused care to skin tear on left 2nd toe. See notes. RTC 1 week for DrRosy Visit.  3/1/19: Follow up with Dr. Waller today in clinic new wound to right posterior leg, culture of both wounds taken today in clinic new wound care orders to both legs for xeroform and unna with calamine toe to knee follow up in 1 week with Dr. Gutierrez  3/8/19: Follow up with Dr. Gutierrez today in clinic wounds improving, edema noted to BLE, profore toe to knee applied to both legs, RX given to patient for PO Doxycycline, follow up in 1 week.   3/15/19: Here for f/u with Dr. Gutierrez. U/S scheduled Thursday. Will need right leg rewrapped. Patient states eye DrRosy Gave him the same antibiotic dose and strength after eye surgery. Dr instructed patient to take one bottle of antibiotics for both wounds and eyes until finished. Gentamycin added to wound care orders.   3/29/19:  Wound slowly improving. No changes in wound care orders. Wound debrided per Dr Gutierrez.  No c/o noted.  Review of Systems    Objective:    Wound appears smaller w/o s/s of infection; some slough & biofilm has recurred.  No Sophia's sign noted.  Physical Exam    Assessment:       1. Venous stasis ulcer of lower extremity, unspecified laterality    2. Venous (peripheral) insufficiency           Wound 02/15/19 1000 Venous Ulcer lateral Foot #1 (Active)   02/15/19 1000    Pre-existing: No   Primary Wound Type: Venous ulcer   Side: Left   Orientation: lateral   Location: Foot    Wound/PI Number (optional): #1   Ankle-Brachial Index:    Pulses:  doppler pulses, capillary refill<3   Removal Indication and Assessment:    Wound Outcome:    (Retired) Wound Type:    (Retired) Wound Length (cm):    (Retired) Wound Width (cm):    (Retired) Depth (cm):    Wound Description (Comments):    Removal Indications:    Wound WDL ex 3/29/2019 11:00 AM   Dressing Appearance Dry;Intact 3/29/2019 11:00 AM   Drainage Amount Small 3/29/2019 11:00 AM   Drainage Characteristics/Odor Serosanguineous 3/29/2019 11:00 AM   Appearance Red;Yellow 3/29/2019 11:00 AM   Tissue loss description Partial thickness 3/29/2019 11:00 AM   Red (%), Wound Tissue Color 90 % 3/29/2019 11:00 AM   Yellow (%), Wound Tissue Color 10 % 3/29/2019 11:00 AM   Periwound Area Dry;Intact 3/29/2019 11:00 AM   Wound Edges Defined 3/29/2019 11:00 AM   Wound Length (cm) 1.5 cm 3/29/2019 11:00 AM   Wound Width (cm) 1 cm 3/29/2019 11:00 AM   Wound Depth (cm) 0.2 cm 3/29/2019 11:00 AM   Wound Volume (cm^3) 0.3 cm^3 3/29/2019 11:00 AM   Wound Surface Area (cm^2) 1.5 cm^2 3/29/2019 11:00 AM   Care Cleansed with:;Sterile normal saline 3/29/2019 11:00 AM   Dressing Applied;Abd pad;Other (see comments) 3/29/2019 11:00 AM   Periwound Care Moisturizer applied;Absorptive dressing applied 3/29/2019 11:00 AM   Compression Four layer compression 3/29/2019 11:00 AM   Dressing Change Due 04/02/19 3/29/2019 11:00 AM            Wound 03/01/19 1110 Venous Ulcer Leg #2 (Active)   03/01/19 1110    Pre-existing: Yes   Primary Wound Type: Venous ulcer   Side: Right   Orientation:    Location: Leg   Wound/PI Number (optional): #2   Ankle-Brachial Index:    Pulses:    Removal Indication and Assessment:    Wound Outcome:    (Retired) Wound Type:    (Retired) Wound Length (cm):    (Retired) Wound Width (cm):    (Retired) Depth (cm):    Wound Description (Comments):    Removal Indications:    Dressing Appearance Dry;Intact 3/29/2019 11:00 AM   Periwound Area Intact;Dry  3/29/2019 11:00 AM   Care Cleansed with:;Soap and water 3/29/2019 11:00 AM   Dressing Other (see comments) 3/29/2019 11:00 AM   Compression Other (see comments) 3/29/2019 11:00 AM   Dressing Change Due 04/05/19 3/29/2019 11:00 AM       Left Foot #1    Cleanse wound with:NS  Lidocaine: PRN   Periwound care:Moisture barrier PRN  Primary dressing: gentamycin to wound bed, small abd pad.  Secondary dressing: na  Offloading:Foam and Darco shoe  Edema control: profore toes to knee  Frequency:Weekly  Follow-up:1 week Dr. Gutierrez    Right Posterior Leg #2    Cleanse wound with:NS  Lidocaine: PRN   Periwound care:  Primary dressing:gentamycin to wound bed, small abd pad   Secondary dressing:na  Offloading: Darco shoe  Edema control: Profore toes to knee  Frequency:Weekly  Follow-up: week Dr. Gutierrez  Wound debrided per Dr Gutierrez.post measurements 1.5x1.0x0.2    Plan:                Follow up in about 4 days (around 4/2/2019) for nurse visit.

## 2019-04-01 ENCOUNTER — OFFICE VISIT (OUTPATIENT)
Dept: CARDIOLOGY | Facility: CLINIC | Age: 54
End: 2019-04-01
Payer: MEDICARE

## 2019-04-01 VITALS
OXYGEN SATURATION: 93 % | HEART RATE: 104 BPM | SYSTOLIC BLOOD PRESSURE: 124 MMHG | WEIGHT: 315 LBS | DIASTOLIC BLOOD PRESSURE: 74 MMHG | BODY MASS INDEX: 40.2 KG/M2

## 2019-04-01 DIAGNOSIS — R60.0 BILATERAL EDEMA OF LOWER EXTREMITY: ICD-10-CM

## 2019-04-01 DIAGNOSIS — I48.21 PERMANENT ATRIAL FIBRILLATION: ICD-10-CM

## 2019-04-01 DIAGNOSIS — I10 ESSENTIAL HYPERTENSION: ICD-10-CM

## 2019-04-01 DIAGNOSIS — I50.42 CHRONIC COMBINED SYSTOLIC AND DIASTOLIC CONGESTIVE HEART FAILURE: ICD-10-CM

## 2019-04-01 DIAGNOSIS — I87.1 STENOSIS OF RIGHT ILIAC VEIN: ICD-10-CM

## 2019-04-01 DIAGNOSIS — I50.22 CHRONIC SYSTOLIC HEART FAILURE: ICD-10-CM

## 2019-04-01 DIAGNOSIS — I87.2 VENOUS (PERIPHERAL) INSUFFICIENCY: ICD-10-CM

## 2019-04-01 DIAGNOSIS — L97.909 VENOUS STASIS ULCER OF LOWER EXTREMITY, UNSPECIFIED LATERALITY: ICD-10-CM

## 2019-04-01 DIAGNOSIS — I83.009 VENOUS STASIS ULCER OF LOWER EXTREMITY, UNSPECIFIED LATERALITY: ICD-10-CM

## 2019-04-01 PROCEDURE — 99213 OFFICE O/P EST LOW 20 MIN: CPT | Mod: PBBFAC,PO | Performed by: INTERNAL MEDICINE

## 2019-04-01 PROCEDURE — 99999 PR PBB SHADOW E&M-EST. PATIENT-LVL III: CPT | Mod: PBBFAC,,, | Performed by: INTERNAL MEDICINE

## 2019-04-01 PROCEDURE — 99215 PR OFFICE/OUTPT VISIT, EST, LEVL V, 40-54 MIN: ICD-10-PCS | Mod: S$PBB,,, | Performed by: INTERNAL MEDICINE

## 2019-04-01 PROCEDURE — 99999 PR PBB SHADOW E&M-EST. PATIENT-LVL III: ICD-10-PCS | Mod: PBBFAC,,, | Performed by: INTERNAL MEDICINE

## 2019-04-01 PROCEDURE — 99215 OFFICE O/P EST HI 40 MIN: CPT | Mod: S$PBB,,, | Performed by: INTERNAL MEDICINE

## 2019-04-01 NOTE — PROCEDURES
"Debridement  Date/Time: 3/29/2019 10:16 AM  Performed by: Pool Gutierrez MD  Authorized by: Pool Gutierrez MD     Time out: Immediately prior to procedure a "time out" was called to verify the correct patient, procedure, equipment, support staff and site/side marked as required.    Consent Done?:  Yes (Verbal)    Preparation: Patient was prepped and draped in usual sterile fashion    Local anesthesia used?: Yes    Local anesthetic:  Topical anesthetic    Wound Details:    Location:  Right leg    Type of Debridement:  Excisional       Length (cm):  1.5       Area (sq cm):  1.5       Width (cm):  1       Percent Debrided (%):  100       Depth (cm):  0.2       Total Area Debrided (sq cm):  1.5    Depth of debridement:  Subcutaneous tissue    Tissue debrided:  Subcutaneous and Dermis    Devitalized tissue debrided:  Biofilm, Fibrin and Slough    Instruments:  Curette    Bleeding:  Minimal  Patient tolerance:  Patient tolerated the procedure well with no immediate complications      "

## 2019-04-01 NOTE — PATIENT INSTRUCTIONS
Chronic Venous Insufficiency: Treating Ulcers    If leg swelling because of chronic venous insufficiency isnt controlled, an open wound (ulcer) can form. Although ulcers vary in size and shape, they usually appear on the inside of the ankle.  Treating an ulcer  · Visit your healthcare provider. Ulcers need frequent medical care. Special dressings may be applied. You may be given antibiotics to fight infection.  · Your healthcare provider may prescribe medicines, such as aspirin or pentoxifylline, to help the ulcer heal.  · Your healthcare provider may prescribe compression hose to help with the swelling.   · Elevate your legs often to reduce swelling. The ulcer needs oxygen-rich blood to heal. This blood cant reach the ulcer until swelling is reduced.  When to call your healthcare provider  Seek immediate medical attention if:   · You have an increase in pain  · You develop a fever of 100.4°F (38°C) or higher, or as directed by your healthcare provider  · The area around the ulcer becomes red, tender, or both  · The ulcer oozes discolored fluid or smells bad  · Swelling increases suddenly or the dressing feels tight   Date Last Reviewed: 5/1/2016  © 9133-7799 [employer]. [address] [place] [id number]. All rights reserved. This information is not intended as a substitute for professional medical care. Always follow your healthcare professional's instructions.

## 2019-04-03 ENCOUNTER — LAB VISIT (OUTPATIENT)
Dept: LAB | Facility: HOSPITAL | Age: 54
End: 2019-04-03
Attending: INTERNAL MEDICINE
Payer: MEDICARE

## 2019-04-03 DIAGNOSIS — I26.99 OTHER PULMONARY EMBOLISM WITHOUT ACUTE COR PULMONALE, UNSPECIFIED CHRONICITY: ICD-10-CM

## 2019-04-03 DIAGNOSIS — Z79.01 LONG TERM (CURRENT) USE OF ANTICOAGULANTS: ICD-10-CM

## 2019-04-03 LAB
CREAT SERPL-MCNC: 0.8 MG/DL (ref 0.5–1.4)
EST. GFR  (AFRICAN AMERICAN): >60 ML/MIN/1.73 M^2
EST. GFR  (NON AFRICAN AMERICAN): >60 ML/MIN/1.73 M^2

## 2019-04-03 PROCEDURE — 82565 ASSAY OF CREATININE: CPT

## 2019-04-03 PROCEDURE — 36415 COLL VENOUS BLD VENIPUNCTURE: CPT

## 2019-04-03 RX ORDER — LISINOPRIL 2.5 MG/1
10 TABLET ORAL DAILY
Qty: 120 TABLET | Refills: 6 | Status: SHIPPED | OUTPATIENT
Start: 2019-04-03 | End: 2019-06-20

## 2019-04-04 ENCOUNTER — HOSPITAL ENCOUNTER (OUTPATIENT)
Dept: WOUND CARE | Facility: HOSPITAL | Age: 54
Discharge: HOME | End: 2019-04-04
Attending: EMERGENCY MEDICINE
Payer: MEDICARE

## 2019-04-04 ENCOUNTER — ANTI-COAG VISIT (OUTPATIENT)
Dept: CARDIOLOGY | Facility: CLINIC | Age: 54
End: 2019-04-04
Payer: MEDICARE

## 2019-04-04 DIAGNOSIS — R60.0 BILATERAL EDEMA OF LOWER EXTREMITY: ICD-10-CM

## 2019-04-04 DIAGNOSIS — I26.99 OTHER PULMONARY EMBOLISM WITHOUT ACUTE COR PULMONALE, UNSPECIFIED CHRONICITY: ICD-10-CM

## 2019-04-04 DIAGNOSIS — Z79.01 LONG TERM (CURRENT) USE OF ANTICOAGULANTS: ICD-10-CM

## 2019-04-04 DIAGNOSIS — I83.009 VENOUS STASIS ULCER OF LOWER EXTREMITY, UNSPECIFIED LATERALITY: Primary | ICD-10-CM

## 2019-04-04 DIAGNOSIS — R60.9 EDEMA, UNSPECIFIED TYPE: ICD-10-CM

## 2019-04-04 DIAGNOSIS — L97.329 NON-PRESSURE CHRONIC ULCER OF LEFT ANKLE, WITH UNSPECIFIED SEVERITY: ICD-10-CM

## 2019-04-04 DIAGNOSIS — I87.2 VENOUS (PERIPHERAL) INSUFFICIENCY: ICD-10-CM

## 2019-04-04 DIAGNOSIS — L97.909 VENOUS STASIS ULCER OF LOWER EXTREMITY, UNSPECIFIED LATERALITY: Primary | ICD-10-CM

## 2019-04-04 PROCEDURE — 29581 APPL MULTLAYER CMPRN SYS LEG: CPT

## 2019-04-04 PROCEDURE — 93793 ANTICOAG MGMT PT WARFARIN: CPT | Mod: ,,, | Performed by: PHARMACIST

## 2019-04-04 PROCEDURE — 93793 PR ANTICOAGULANT MGMT FOR PT TAKING WARFARIN: ICD-10-PCS | Mod: ,,, | Performed by: PHARMACIST

## 2019-04-04 NOTE — PROGRESS NOTES
"Subjective:       Patient ID: Drew Farrar is a 53 y.o. male.    Chief Complaint: Wound Care      2/15/19: Readmitted for venous ulcer to left lateral foot which developed about 2 weeks ago. Pulses noted with doppler and capillary refill <3.Dr Gutierrez assessed patient and wound care plan ordered for compression therapy and hydrafera blue to wound bed. No apparent signs of infection noted. Patient to follw-up in clinic in 2 weeks.DB   2/21/19: Nurse visit for dressing change. Refused care to skin tear on left 2nd toe. See notes. RTC 1 week for DrRosy Visit.  3/1/19: Follow up with Dr. Waller today in clinic new wound to right posterior leg, culture of both wounds taken today in clinic new wound care orders to both legs for xeroform and unna with calamine toe to knee follow up in 1 week with Dr. Gutierrez  3/8/19: Follow up with Dr. Gutierrez today in clinic wounds improving, edema noted to BLE, profore toe to knee applied to both legs, RX given to patient for PO Doxycycline, follow up in 1 week.   3/15/19: Here for f/u with Dr. Gutierrez. U/S scheduled Thursday. Will need right leg rewrapped. Patient states eye DrRosy Gave him the same antibiotic dose and strength after eye surgery. Dr instructed patient to take one bottle of antibiotics for both wounds and eyes until finished. Gentamycin added to wound care orders.   3/29/19:  Wound slowly improving. No changes in wound care orders. Wound debrided per Dr Gutierrez.  4/4/19: Patient showed up to clinic today stated " I need my dressing changed. It fell off."  Doppler pluses checked and present.  Patient refusing care to right, no stocking present on leg at this visit. Patient stated " No they said this leg is discharged, Dr. Lantigua has to drain the fluid out this leg with a needle. No wound care to this leg anymore when I come."      Review of Systems    Objective:      Physical Exam    Assessment:       1. Venous stasis ulcer of lower extremity, unspecified laterality  "   2. Venous (peripheral) insufficiency    3. Non-pressure chronic ulcer of left ankle, with unspecified severity    4. Bilateral edema of lower extremity           Wound 02/15/19 1000 Venous Ulcer lateral Foot #1 (Active)   02/15/19 1000    Pre-existing: No   Primary Wound Type: Venous ulcer   Side: Left   Orientation: lateral   Location: Foot   Wound/PI Number (optional): #1   Ankle-Brachial Index:    Pulses:  doppler pulses, capillary refill<3   Removal Indication and Assessment:    Wound Outcome:    (Retired) Wound Type:    (Retired) Wound Length (cm):    (Retired) Wound Width (cm):    (Retired) Depth (cm):    Wound Description (Comments):    Removal Indications:    Wound Image   4/4/2019 11:23 AM   Dressing Appearance Open to air;Intact;Moist drainage 4/4/2019 10:54 AM   Drainage Amount Small 4/4/2019 10:54 AM   Drainage Characteristics/Odor Serosanguineous 4/4/2019 10:54 AM   Appearance Red;Yellow;Fibrin;Slough;Granulating;Moist 4/4/2019 10:54 AM   Tissue loss description Full thickness 4/4/2019 10:54 AM   Red (%), Wound Tissue Color 95 % 4/4/2019 10:54 AM   Yellow (%), Wound Tissue Color 5 % 4/4/2019 10:54 AM   Periwound Area Intact;Dry;Edematous 4/4/2019 10:54 AM   Wound Edges Defined 4/4/2019 10:54 AM   Wound Length (cm) 1.5 cm 4/4/2019 10:54 AM   Wound Width (cm) 1 cm 4/4/2019 10:54 AM   Wound Depth (cm) 0.2 cm 4/4/2019 10:54 AM   Wound Volume (cm^3) 0.3 cm^3 4/4/2019 10:54 AM   Wound Surface Area (cm^2) 1.5 cm^2 4/4/2019 10:54 AM   Care Cleansed with:;Sterile normal saline;Soap and water 4/4/2019 10:54 AM   Dressing Applied;Compression wrap;Abd pad;Foam 4/4/2019 10:54 AM   Periwound Care Absorptive dressing applied;Dry periwound area maintained 4/4/2019 10:54 AM   Compression Four layer compression 4/4/2019 10:54 AM   Off Loading Off loading shoe 4/4/2019 10:54 AM   Dressing Change Due 04/08/19 4/4/2019 10:54 AM            Wound 03/01/19 1110 Venous Ulcer Leg #2 (Active)   03/01/19 1110    Pre-existing:  Yes   Primary Wound Type: Venous ulcer   Side: Right   Orientation:    Location: Leg   Wound/PI Number (optional): #2   Ankle-Brachial Index:    Pulses:    Removal Indication and Assessment:    Wound Outcome:    (Retired) Wound Type:    (Retired) Wound Length (cm):    (Retired) Wound Width (cm):    (Retired) Depth (cm):    Wound Description (Comments):    Removal Indications:    Wound Image   4/4/2019 11:23 AM   Dressing Appearance Open to air;Intact;Dry 4/4/2019 10:56 AM   Drainage Amount None 4/4/2019 11:23 AM   Appearance Epithelialization 4/4/2019 10:56 AM   Tissue loss description Not applicable 4/4/2019 11:23 AM   Periwound Area Edematous 4/4/2019 10:56 AM   Wound Length (cm) 0 cm 4/4/2019 10:56 AM   Wound Width (cm) 0 cm 4/4/2019 10:56 AM   Wound Depth (cm) 0 cm 4/4/2019 10:56 AM   Wound Volume (cm^3) 0 cm^3 4/4/2019 10:56 AM   Wound Surface Area (cm^2) 0 cm^2 4/4/2019 10:56 AM         Left Foot #1    Cleanse wound with:NS  Lidocaine: PRN   Periwound care:Moisture barrier PRN  Primary dressing: gentamycin to wound bed, aquacel foam. Apply antifungal ointment left posterior leg itching.  Secondary dressing: na  Offloading:Foam and Darco shoe  Edema control: profore toes to knee  Frequency:Weekly  Follow-up: Dr. Gutierrez 4/8/19    Right Posterior Leg #2  Patient Refuses care to this leg    Cleanse wound with:Wash leg with soap and water  Offloading: Darco shoe  Edema control: Compression stocking  Frequency:Weekly  Follow-up: 1week Dr. Guteirrez                Plan:                Monday 4/8/19 Dr. Gutierrez

## 2019-04-04 NOTE — PROGRESS NOTES
INR subtherapeutic today.  Patient reports that he took a lower dose than prescribed.  No other changes reported.

## 2019-04-08 ENCOUNTER — HOSPITAL ENCOUNTER (OUTPATIENT)
Dept: WOUND CARE | Facility: HOSPITAL | Age: 54
Discharge: HOME | End: 2019-04-08
Attending: EMERGENCY MEDICINE
Payer: MEDICARE

## 2019-04-08 VITALS
DIASTOLIC BLOOD PRESSURE: 59 MMHG | WEIGHT: 315 LBS | HEART RATE: 96 BPM | HEIGHT: 78 IN | SYSTOLIC BLOOD PRESSURE: 116 MMHG | BODY MASS INDEX: 36.45 KG/M2

## 2019-04-08 DIAGNOSIS — L97.909 VENOUS STASIS ULCER OF LOWER EXTREMITY, UNSPECIFIED LATERALITY: Primary | ICD-10-CM

## 2019-04-08 DIAGNOSIS — I83.009 VENOUS STASIS ULCER OF LOWER EXTREMITY, UNSPECIFIED LATERALITY: Primary | ICD-10-CM

## 2019-04-08 DIAGNOSIS — R60.9 EDEMA, UNSPECIFIED TYPE: ICD-10-CM

## 2019-04-08 DIAGNOSIS — R60.0 BILATERAL EDEMA OF LOWER EXTREMITY: ICD-10-CM

## 2019-04-08 PROCEDURE — 11720 DEBRIDE NAIL 1-5: CPT

## 2019-04-08 PROCEDURE — 27201912 HC WOUND CARE DEBRIDEMENT SUPPLIES

## 2019-04-08 PROCEDURE — 11042 DBRDMT SUBQ TIS 1ST 20SQCM/<: CPT

## 2019-04-08 PROCEDURE — 29581 APPL MULTLAYER CMPRN SYS LEG: CPT

## 2019-04-08 NOTE — PROGRESS NOTES
"Subjective:       Patient ID: Drew Farrar is a 53 y.o. male.    Chief Complaint: Venous Ulcer and Wound Care      2/15/19: Readmitted for venous ulcer to left lateral foot which developed about 2 weeks ago. Pulses noted with doppler and capillary refill <3.Dr Gutierrez assessed patient and wound care plan ordered for compression therapy and hydrafera blue to wound bed. No apparent signs of infection noted. Patient to follw-up in clinic in 2 weeks.DB   2/21/19: Nurse visit for dressing change. Refused care to skin tear on left 2nd toe. See notes. RTC 1 week for DrRosy Visit.  3/1/19: Follow up with Dr. Waller today in clinic new wound to right posterior leg, culture of both wounds taken today in clinic new wound care orders to both legs for xeroform and unna with calamine toe to knee follow up in 1 week with Dr. Gutierrez  3/8/19: Follow up with Dr. Gutierrez today in clinic wounds improving, edema noted to BLE, profore toe to knee applied to both legs, RX given to patient for PO Doxycycline, follow up in 1 week.   3/15/19: Here for f/u with Dr. Gutierrez. U/S scheduled Thursday. Will need right leg rewrapped. Patient states eye DrRosy Gave him the same antibiotic dose and strength after eye surgery. Dr instructed patient to take one bottle of antibiotics for both wounds and eyes until finished. Gentamycin added to wound care orders.   3/29/19:  Wound slowly improving. No changes in wound care orders. Wound debrided per Dr Gutierrez.  4/4/19: Patient showed up to clinic today stated " I need my dressing changed. It fell off."  Doppler pluses checked and present.  Patient refusing care to right, no stocking present on leg at this visit. Patient stated " No they said this leg is discharged, Dr. Lantigua has to drain the fluid out this leg with a needle. No wound care to this leg anymore when I come."    4/8/19: Here for f/u with Dr. Gutierrez. Lidocaine 4% applied to wound bed. Toenails on right foot trimmed, wound debrided " with curette per Dr. Gutierrez.  Pt. Denies pain @ wound site.  Review of Systems    Objective:    No Sophia's sign or s/s of infection, but brownish-yellow adherent slough & biofilm have recurred.  Physical Exam    Assessment:       1. Venous stasis ulcer of lower extremity, unspecified laterality    2. Bilateral edema of lower extremity           Wound 02/15/19 1000 Venous Ulcer lateral Foot #1 (Active)   02/15/19 1000    Pre-existing: No   Primary Wound Type: Venous ulcer   Side: Left   Orientation: lateral   Location: Foot   Wound/PI Number (optional): #1   Ankle-Brachial Index:    Pulses:  doppler pulses, capillary refill<3   Removal Indication and Assessment:    Wound Outcome:    (Retired) Wound Type:    (Retired) Wound Length (cm):    (Retired) Wound Width (cm):    (Retired) Depth (cm):    Wound Description (Comments):    Removal Indications:    Dressing Appearance Open to air 4/8/2019 12:00 PM   Drainage Amount Small 4/8/2019 12:00 PM   Drainage Characteristics/Odor Serosanguineous 4/8/2019 12:00 PM   Appearance Red;Yellow;Moist 4/8/2019 12:00 PM   Tissue loss description Full thickness 4/8/2019 12:00 PM   Red (%), Wound Tissue Color 75 % 4/8/2019 12:00 PM   Yellow (%), Wound Tissue Color 25 % 4/8/2019 12:00 PM   Periwound Area Intact;Dry 4/8/2019 12:00 PM   Wound Edges Defined 4/8/2019 12:00 PM   Wound Length (cm) 1.2 cm 4/8/2019 12:00 PM   Wound Width (cm) 1.7 cm 4/8/2019 12:00 PM   Wound Depth (cm) 0.2 cm 4/8/2019 12:00 PM   Wound Volume (cm^3) 0.41 cm^3 4/8/2019 12:00 PM   Wound Surface Area (cm^2) 2.04 cm^2 4/8/2019 12:00 PM   Care Cleansed with:;Soap and water;Sterile normal saline;Applied:;Skin Barrier;Debrided 4/8/2019 12:00 PM   Dressing Applied;Foam;Cast padding;Compression wrap 4/8/2019 12:00 PM   Periwound Care Absorptive dressing applied 4/8/2019 12:00 PM   Compression Four layer compression 4/8/2019 12:00 PM   Off Loading Off loading shoe 4/8/2019 12:00 PM   Dressing Change Due 04/15/19 4/8/2019  12:00 PM            Wound 03/01/19 1110 Venous Ulcer Leg #2 (Active)   03/01/19 1110    Pre-existing: Yes   Primary Wound Type: Venous ulcer   Side: Right   Orientation:    Location: Leg   Wound/PI Number (optional): #2   Ankle-Brachial Index:    Pulses:    Removal Indication and Assessment:    Wound Outcome:    (Retired) Wound Type:    (Retired) Wound Length (cm):    (Retired) Wound Width (cm):    (Retired) Depth (cm):    Wound Description (Comments):    Removal Indications:    Compression Compression Stocking (30-40 mmHg) 4/8/2019 12:00 PM   Here for f/u with Dr. Gutierrez. Lidocaine 4% applied to wound bed. Toenails on right foot trimmed, wound debrided with curette per Dr. Gutierrez.      Left Foot #1    Cleanse wound with:NS  Lidocaine: PRN   Periwound care:Moisture barrier PRN  Primary dressing: gentamycin to wound bed, aquacel foam. Apply antifungal ointment left posterior leg itching.  Secondary dressing: na  Offloading:Foam and Darco shoe  Edema control: profore toes to knee  Frequency:Weekly  Follow-up: Dr. Gutierrez 4/22/19    Right Posterior Leg #2  Patient Refuses care to this leg    Cleanse wound with:Wash leg with soap and water  Offloading: Darco shoe  Edema control: Compression stocking  Frequency:Weekly  Follow-up: 4/22/19 Dr. Gutierrez    Plan:                Follow up in about 1 week (around 4/15/2019) for Dr. Guiterrez.

## 2019-04-08 NOTE — PROCEDURES
"Debridement  Date/Time: 4/8/2019 4:48 PM  Performed by: Pool Gutierrez MD  Authorized by: Pool Gutierrez MD     Time out: Immediately prior to procedure a "time out" was called to verify the correct patient, procedure, equipment, support staff and site/side marked as required.    Consent Done?:  Yes (Verbal)    Preparation: Patient was prepped and draped in usual sterile fashion    Local anesthesia used?: Yes    Local anesthetic:  Topical anesthetic    Wound Details:    Location:  Left leg    Type of Debridement:  Excisional       Length (cm):  1.2       Area (sq cm):  2.04       Width (cm):  1.7       Percent Debrided (%):  100       Depth (cm):  0.2       Total Area Debrided (sq cm):  2.04    Depth of debridement:  Subcutaneous tissue    Tissue debrided:  Subcutaneous and Dermis    Devitalized tissue debrided:  Biofilm, Fibrin and Slough    Instruments:  Curette    Bleeding:  Minimal  Patient tolerance:  Patient tolerated the procedure well with no immediate complications      "

## 2019-04-15 ENCOUNTER — HOSPITAL ENCOUNTER (EMERGENCY)
Facility: HOSPITAL | Age: 54
Discharge: HOME | End: 2019-04-15
Attending: EMERGENCY MEDICINE
Payer: MEDICARE

## 2019-04-15 VITALS
OXYGEN SATURATION: 98 % | RESPIRATION RATE: 18 BRPM | HEIGHT: 78 IN | BODY MASS INDEX: 36.45 KG/M2 | DIASTOLIC BLOOD PRESSURE: 75 MMHG | HEART RATE: 80 BPM | TEMPERATURE: 98 F | WEIGHT: 315 LBS | SYSTOLIC BLOOD PRESSURE: 128 MMHG

## 2019-04-15 DIAGNOSIS — R79.1 SUPRATHERAPEUTIC INR: Primary | ICD-10-CM

## 2019-04-15 DIAGNOSIS — R50.9 FEVER, UNSPECIFIED FEVER CAUSE: ICD-10-CM

## 2019-04-15 DIAGNOSIS — R11.10 NON-INTRACTABLE VOMITING, PRESENCE OF NAUSEA NOT SPECIFIED, UNSPECIFIED VOMITING TYPE: ICD-10-CM

## 2019-04-15 DIAGNOSIS — L97.921 NON-PRESSURE CHRONIC ULCER OF LEFT LOWER LEG, LIMITED TO BREAKDOWN OF SKIN: ICD-10-CM

## 2019-04-15 LAB
ALBUMIN SERPL BCP-MCNC: 3.4 G/DL (ref 3.5–5.2)
ALP SERPL-CCNC: 117 U/L (ref 55–135)
ALT SERPL W/O P-5'-P-CCNC: 20 U/L (ref 10–44)
ANION GAP SERPL CALC-SCNC: 9 MMOL/L (ref 8–16)
AST SERPL-CCNC: 22 U/L (ref 10–40)
BASOPHILS # BLD AUTO: 0.03 K/UL (ref 0–0.2)
BASOPHILS NFR BLD: 0.3 % (ref 0–1.9)
BILIRUB SERPL-MCNC: 0.5 MG/DL (ref 0.1–1)
BUN SERPL-MCNC: 32 MG/DL (ref 6–20)
CALCIUM SERPL-MCNC: 9.2 MG/DL (ref 8.7–10.5)
CHLORIDE SERPL-SCNC: 109 MMOL/L (ref 95–110)
CO2 SERPL-SCNC: 20 MMOL/L (ref 23–29)
CREAT SERPL-MCNC: 0.9 MG/DL (ref 0.5–1.4)
DIFFERENTIAL METHOD: ABNORMAL
EOSINOPHIL # BLD AUTO: 0.1 K/UL (ref 0–0.5)
EOSINOPHIL NFR BLD: 1.2 % (ref 0–8)
ERYTHROCYTE [DISTWIDTH] IN BLOOD BY AUTOMATED COUNT: 14.9 % (ref 11.5–14.5)
EST. GFR  (AFRICAN AMERICAN): >60 ML/MIN/1.73 M^2
EST. GFR  (NON AFRICAN AMERICAN): >60 ML/MIN/1.73 M^2
GLUCOSE SERPL-MCNC: 105 MG/DL (ref 70–110)
HCT VFR BLD AUTO: 34 % (ref 40–54)
HGB BLD-MCNC: 10.6 G/DL (ref 14–18)
INR PPP: 4.2 (ref 0.8–1.2)
LYMPHOCYTES # BLD AUTO: 1 K/UL (ref 1–4.8)
LYMPHOCYTES NFR BLD: 9.2 % (ref 18–48)
MCH RBC QN AUTO: 26 PG (ref 27–31)
MCHC RBC AUTO-ENTMCNC: 31.2 G/DL (ref 32–36)
MCV RBC AUTO: 84 FL (ref 82–98)
MONOCYTES # BLD AUTO: 0.9 K/UL (ref 0.3–1)
MONOCYTES NFR BLD: 8.1 % (ref 4–15)
NEUTROPHILS # BLD AUTO: 8.6 K/UL (ref 1.8–7.7)
NEUTROPHILS NFR BLD: 81.2 % (ref 38–73)
PLATELET # BLD AUTO: 227 K/UL (ref 150–350)
PMV BLD AUTO: 9.7 FL (ref 9.2–12.9)
POTASSIUM SERPL-SCNC: 4.6 MMOL/L (ref 3.5–5.1)
PROT SERPL-MCNC: 7.4 G/DL (ref 6–8.4)
PROTHROMBIN TIME: 42.8 SEC (ref 9–12.5)
RBC # BLD AUTO: 4.07 M/UL (ref 4.6–6.2)
SODIUM SERPL-SCNC: 138 MMOL/L (ref 136–145)
WBC # BLD AUTO: 10.58 K/UL (ref 3.9–12.7)

## 2019-04-15 PROCEDURE — 25000003 PHARM REV CODE 250: Performed by: EMERGENCY MEDICINE

## 2019-04-15 PROCEDURE — 85610 PROTHROMBIN TIME: CPT

## 2019-04-15 PROCEDURE — 99284 EMERGENCY DEPT VISIT MOD MDM: CPT

## 2019-04-15 PROCEDURE — 85025 COMPLETE CBC W/AUTO DIFF WBC: CPT

## 2019-04-15 PROCEDURE — 80053 COMPREHEN METABOLIC PANEL: CPT

## 2019-04-15 RX ORDER — DOXYCYCLINE 100 MG/1
100 CAPSULE ORAL EVERY 12 HOURS
Qty: 14 CAPSULE | Refills: 0 | Status: SHIPPED | OUTPATIENT
Start: 2019-04-15 | End: 2019-04-22

## 2019-04-15 RX ORDER — ONDANSETRON 4 MG/1
4 TABLET, FILM COATED ORAL EVERY 8 HOURS PRN
Qty: 12 TABLET | Refills: 0 | Status: SHIPPED | OUTPATIENT
Start: 2019-04-15 | End: 2019-04-30

## 2019-04-15 RX ORDER — ONDANSETRON 4 MG/1
4 TABLET, ORALLY DISINTEGRATING ORAL
Status: COMPLETED | OUTPATIENT
Start: 2019-04-15 | End: 2019-04-15

## 2019-04-15 RX ADMIN — ONDANSETRON 4 MG: 4 TABLET, ORALLY DISINTEGRATING ORAL at 11:04

## 2019-04-15 RX ADMIN — MICONAZOLE NITRATE: 2 OINTMENT TOPICAL at 12:04

## 2019-04-15 NOTE — CONSULTS
Wound consult for left foot wound.     Pt normally seen at [facility] wound [facility], wound cleaned and dressed according to order by Dr. Meek.     Wound cleaned with NS. Gentamycin applied to wound bed, covered with Aquacel foam dressing, left leg wrapped with a Profore 4 layer compression wrap.    Wound is pink, small dry ss drainage on dressing, wound bed is red/pink, no odor, slight amount of slough in wound, vick wound is red and pt states this is normal for him.

## 2019-04-15 NOTE — DISCHARGE INSTRUCTIONS
Please do not take your coumadin for one dose and follow up with coumadin clinic in 1 day. Please follow up with your PCP and wound care. Please return if you notice persistent fever, vomiting, worsening pain to your lower extremities or any other concern.

## 2019-04-15 NOTE — ED NOTES
53 year old male presents to ed cc of fever with n/v x 3/4 days. Patient states has had decreased appetite at home last emesis yesterday at 1700. Patient has hx of chronic bilateral leg wounds that are seen and elevated by wound care.

## 2019-04-15 NOTE — PROGRESS NOTES
Patient called to reschedule today's coumadin clinic appointment to 4/22 due to having vomiting/fever/coughing yesterday and today, states he has called his PCP and was instructed to take Robitussin w/codeine, he reports fever today is 104, advised patient to call PCP again today and give fever temperature reading, last INR was 1.4 on 4/04, please advise, patient's call back # [phone]

## 2019-04-15 NOTE — ED NOTES
APPEARANCE: Alert, oriented and in no acute distress.  CARDIAC: Normal rate and rhythm, no murmur heard.   RESPIRATORY:Normal rate and effort, breath sounds clear bilaterally throughout chest. Respirations are equal and unlabored no obvious signs of distress.  SKIN: Skin is warm and dry, normal skin turgor, mucous membranes moist.  NEURO: 5/5 strength major flexors/extensors bilaterally. Sensory intact to light touch bilaterally. Ehsan coma scale: eyes open spontaneously-4, oriented & converses-5, obeys commands-6. No neurological abnormalities.   MENTAL STATUS: awake, alert and aware of environment.  EYE: PERRL, both eyes: pupils brisk and reactive to light. Normal size.  ENT: EARS: no obvious drainage. NOSE: no active bleeding.

## 2019-04-15 NOTE — ED PROVIDER NOTES
Encounter Date: 4/15/2019    SCRIBE #1 NOTE: I, Suma Paul, am scribing for, and in the presence of,  Dr. Meek. I have scribed the entire note.       History     Chief Complaint   Patient presents with    Fever     pt has chronic wound to left foot, and complains of fever, with n/v that began last night      Drew Farrar is a 53 y.o. male who  has a past medical history of *Atrial fibrillation, Atrial fibrillation, Atrial fibrillation (Feb 23, 2016), Bipolar disorder, Congenital heart disease, Deep vein thrombosis, DVT of leg (deep venous thrombosis), History of prior ablation treatment, Hypertension, Obesity, Stroke, Thyroid disease, Venous stasis ulcer of lower extremity, unspecified laterality (12/14/2012), and Venous ulcer.    The patient presents to the ED due to fever. He reports onset of symptoms was last night. The patient notes last night he had a temperature of 100.4 that increased to 100.6.  The patient also had 2 episodes of vomiting with last episode of vomiting being this morning. He denies having any pain currently. The patient denies any pain in arm, back, chest or abdomen. He also denies any cough, congestion, sore throat or body aches. He has been taking Ibuprofen. The patient does note he has a chronic left leg wound which he states appears the same.    The history is provided by the patient.     Review of patient's allergies indicates:   Allergen Reactions    Food allergy formula [glutamine-c-quercet-selen-brom]      Allergic to green peas; Heart failure.    Peas Hives    Ibuprofen Swelling    Latex, natural rubber Hives    Pcn [penicillins] Hives    Butisol [butabarbital] Rash     Peeling skin     Past Medical History:   Diagnosis Date    *Atrial fibrillation     Atrial fibrillation     Atrial fibrillation Feb 23, 2016    Bipolar disorder     Congenital heart disease     s/p surgical intervention 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 of age    Deep vein thrombosis     DVT of leg (deep venous  thrombosis)     left leg    History of prior ablation treatment     10/9/13    Hypertension     Obesity     Stroke     Thyroid disease     Venous stasis ulcer of lower extremity, unspecified laterality 12/14/2012    Venous ulcer      Past Surgical History:   Procedure Laterality Date    ANGIOPLASTY      ARTHROSCOPY-KNEE W/ CHONDROPLASTY Right 2/23/2016    [facility] OR    CARDIAC SURGERY      open heart surgery 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 old    EYE SURGERY      left eye cataract/right eye glaucoma    TIMO FILTER PLACEMENT      Dr Calix ([facility])    KNEE SURGERY      l and r     MULTIPLE TOOTH EXTRACTIONS      Sclerotherapy N/A 2/21/2019    Performed by Gomez Lantigua [facility] at [facility] CATH LAB/EP    SKIN GRAFT      left leg    SYNOVECTOMY-KNEE Right 2/23/2016    Performed by [facility] OR     Family History   Problem Relation Age of Onset    Diabetes Father     Heart disease Father     Heart disease Maternal Grandmother     Diabetes Maternal Grandfather     Heart disease Maternal Grandfather     Stroke Maternal Grandfather      Social History     Tobacco Use    Smoking status: Former Smoker     Packs/day: 1.00     Years: 6.00     Pack years: 6.00     Types: Cigarettes    Smokeless tobacco: Former User    Tobacco comment: quit by age 27yrs old   Substance Use Topics    Alcohol use: Yes     Alcohol/week: 0.6 oz     Types: 1 Glasses of wine per week     Comment: 1 glass of wine a week    Drug use: No     Review of Systems   Constitutional: Positive for fever. Negative for chills.   HENT: Negative for congestion, ear pain, rhinorrhea and sore throat.    Respiratory: Negative for cough, shortness of breath and wheezing.    Cardiovascular: Negative for chest pain and palpitations.   Gastrointestinal: Negative for abdominal pain, diarrhea, nausea and vomiting.   Genitourinary: Negative for dysuria and hematuria.   Musculoskeletal: Negative for back pain, myalgias  and neck pain.   Skin: Negative for rash.   Neurological: Negative for dizziness, weakness, light-headedness and headaches.   Psychiatric/Behavioral: Negative for confusion.       Physical Exam     Initial Vitals [04/15/19 1048]   BP Pulse Resp Temp SpO2   133/66 101 20 99.1 °F (37.3 °C) 97 %      MAP       --         Physical Exam    Nursing note and vitals reviewed.  Constitutional: He appears well-developed and well-nourished. He is not diaphoretic. No distress.   HENT:   Head: Normocephalic and atraumatic.   Mouth/Throat: Oropharynx is clear and moist.   Eyes: EOM are normal. Pupils are equal, round, and reactive to light.   Neck: No tracheal deviation present.   Cardiovascular: Normal rate, regular rhythm, normal heart sounds and intact distal pulses.   Pulmonary/Chest: Breath sounds normal. No stridor. No respiratory distress.   Abdominal: Soft. He exhibits no distension and no mass. There is no tenderness.   Musculoskeletal: Normal range of motion. He exhibits no edema.   Neurological: He is alert and oriented to person, place, and time. No cranial nerve deficit or sensory deficit.   Skin: Skin is warm and dry. Capillary refill takes less than 2 seconds. No rash noted.   Venous stasis changes to bilateral LE   Psychiatric: He has a normal mood and affect. His behavior is normal. Thought content normal.         ED Course   Procedures  Labs Reviewed   CBC W/ AUTO DIFFERENTIAL - Abnormal; Notable for the following components:       Result Value    RBC 4.07 (*)     Hemoglobin 10.6 (*)     Hematocrit 34.0 (*)     MCH 26.0 (*)     MCHC 31.2 (*)     RDW 14.9 (*)     Gran # (ANC) 8.6 (*)     Gran% 81.2 (*)     Lymph% 9.2 (*)     All other components within normal limits   COMPREHENSIVE METABOLIC PANEL - Abnormal; Notable for the following components:    CO2 20 (*)     BUN, Bld 32 (*)     Albumin 3.4 (*)     All other components within normal limits   PROTIME-INR - Abnormal; Notable for the following components:     Prothrombin Time 42.8 (*)     INR 4.2 (*)     All other components within normal limits          Imaging Results          X-Ray Chest AP Portable (Final result)  Result time 04/15/19 11:51:54    Final result by Florian Clement MD (04/15/19 11:51:54)                 Impression:      1. Enlarged cardiac silhouette and mild pulmonary vascular congestion.      Electronically signed by: Florian Clement MD  Date:    04/15/2019  Time:    11:51             Narrative:    EXAMINATION:  XR CHEST AP PORTABLE    CLINICAL HISTORY:  cough;    TECHNIQUE:  Single frontal view of the chest was performed.    COMPARISON:  Radiograph 02/21/2019.    FINDINGS:  Mediastinal structures are midline.  There is haziness of the hilar contours.  Cardiac silhouette is enlarged.  Lung volumes are symmetric.  There is mild pulmonary vascular congestion.  No consolidation.  No pneumothorax or pleural effusions.  No free air beneath the diaphragm.  No acute osseous abnormalities.                                 Medical Decision Making:   Initial Assessment:   This is a 53 y.o male presents low grade temperature since earlier today. He reports no change to chronic wounds of LE. Will obtain labs, urine, chest xray and reassess.   Differential Diagnosis:   Differential Diagnosis includes, but is not limited to:  Sepsis, bacteremia, UTI, pneumonia, cellulitis, abscess, indwelling line/catheter infection, cholecystitis, viral URI, gastroenteritis, viral syndrome, sinusitis, otitis media/externa, neoplasm, drug reaction, serotonin syndrome, intoxication/withdrawal syndrome.    Clinical Tests:   Lab Tests: Ordered and Reviewed  Radiological Study: Ordered and Reviewed  ED Management:  11:10 AM Patient refuses removal of LE bandage    Consulted wound care who changed bandages and dressed wounds. On re-evaluation, patient with venous stasis changes, no bullae crepitus or drainage.     VItal signs and lab work do not suggest sepsis. He did have emesis  however is currently PO tolerant. Given presence of chronic wounds and low grade feve will DC with doxycycline and recommend wound care follow up, however patient currently without complaints and the etiology of the fever is unknown. There are no findings to suggest pneumonia, meningitis or acute life threatening etiology.      INR noted to be elevated - will recommend holding today. Recommend follow up at coumadin clinic and with PCP  Return precautions for worsening fever, vomiting, redness to legs dyspnea or any other concerns.     After taking into careful account the historical factors and physical exam findings of the patient's presentation today, in conjunction with the empirical and objective data obtained on ED workup, no acute emergent medical condition has been identified. The patient appears to be low risk for an emergent medical condition and I feel it is safe and appropriate at this time for the patient to be discharged to follow-up as detailed in their discharge instructions for reevaluation and possible continued outpatient workup and management. I have discussed the specifics of the workup with the patient and the patient has verbalized understanding of the details of the workup, the diagnosis, the treatment plan, and the need for outpatient follow-up.  Although the patient has no emergent etiology today this does not preclude the development of an emergent condition so in addition, I have advised the patient that they can return to the ED and/or activate EMS at any time with worsening of their symptoms, change of their symptoms, or with any other medical complaint.  The patient remained comfortable and stable during their visit in the ED.  Discharge and follow-up instructions discussed with the patient who expressed understanding and willingness to comply with my recommendations.                       ED Course as of Apr 18 1019   Mon Apr 15, 2019   1323 Discussed with Dr. Gray, cardiology,  recommends holding coumadin for one dose and to follow up in coumadin clinic    [RN]   7056 Patient did not want to wait to check urine and would like to go home.     [RN]      ED Course User Index  [RN] Zane Meek Jr., MD     Clinical Impression:     1. Supratherapeutic INR    2. Non-pressure chronic ulcer of left lower leg, limited to breakdown of skin    3. Fever, unspecified fever cause    4. Non-intractable vomiting, presence of nausea not specified, unspecified vomiting type        Disposition:   Disposition: Discharged  Condition: Stable    Scribe attestation I, Zane Meek,  personally performed the services described in this documentation. All medical record entries made by the scribe were at my direction and in my presence.  I have reviewed the chart and agree that the record reflects my personal performance and is accurate and complete. Zane Meek M.D. 10:26 AM04/18/2019                      Zane Meek Jr., MD  04/18/19 1027

## 2019-04-15 NOTE — PROGRESS NOTES
I spoke to the patient and states he started on 4/14/19 vomiting, productive cough, and having fever which he states his thermometer gave a reading of 104.  He is still vomiting and has a productive cough today and has not taking his temperature.  He called his PCP who can not see him for an appointment today.  I advised him to go to the Emergency Department to be evaluated by a physician which he verbalized understanding.

## 2019-04-16 ENCOUNTER — ANTI-COAG VISIT (OUTPATIENT)
Dept: CARDIOLOGY | Facility: CLINIC | Age: 54
End: 2019-04-16
Payer: MEDICARE

## 2019-04-16 DIAGNOSIS — I26.99 OTHER PULMONARY EMBOLISM WITHOUT ACUTE COR PULMONALE, UNSPECIFIED CHRONICITY: ICD-10-CM

## 2019-04-16 DIAGNOSIS — Z79.01 LONG TERM (CURRENT) USE OF ANTICOAGULANTS: ICD-10-CM

## 2019-04-16 PROCEDURE — 93793 PR ANTICOAGULANT MGMT FOR PT TAKING WARFARIN: ICD-10-PCS | Mod: ,,,

## 2019-04-16 PROCEDURE — 93793 ANTICOAG MGMT PT WARFARIN: CPT | Mod: ,,,

## 2019-04-16 NOTE — PROGRESS NOTES
Patient went to the Emergency Department on 4/15/19 for Supratherapeutic INR; Non-pressure chronic ulcer of left lower leg, limited to breakdown of skin;Fever, unspecified fever cause; Non-intractable vomiting, presence of nausea not specified, unspecified vomiting type, he discharged with Doxcycline hyclate 100 mg every 12 hours for 7 days and Zofran 4 mg every 8 hours prn, per discharge AVS he was continued on his Coumadin with an INR of 4.2 per INR with labs done during visit

## 2019-04-22 ENCOUNTER — HOSPITAL ENCOUNTER (OUTPATIENT)
Dept: WOUND CARE | Facility: HOSPITAL | Age: 54
Discharge: HOME | End: 2019-04-22
Attending: EMERGENCY MEDICINE
Payer: MEDICARE

## 2019-04-22 ENCOUNTER — ANTI-COAG VISIT (OUTPATIENT)
Dept: CARDIOLOGY | Facility: CLINIC | Age: 54
End: 2019-04-22
Payer: MEDICARE

## 2019-04-22 DIAGNOSIS — I83.009 VENOUS STASIS ULCER OF LOWER EXTREMITY, UNSPECIFIED LATERALITY: Primary | ICD-10-CM

## 2019-04-22 DIAGNOSIS — Z79.01 LONG TERM (CURRENT) USE OF ANTICOAGULANTS: ICD-10-CM

## 2019-04-22 DIAGNOSIS — I26.99 OTHER PULMONARY EMBOLISM WITHOUT ACUTE COR PULMONALE, UNSPECIFIED CHRONICITY: ICD-10-CM

## 2019-04-22 DIAGNOSIS — L97.909 VENOUS STASIS ULCER OF LOWER EXTREMITY, UNSPECIFIED LATERALITY: Primary | ICD-10-CM

## 2019-04-22 PROCEDURE — 29581 APPL MULTLAYER CMPRN SYS LEG: CPT

## 2019-04-22 PROCEDURE — 11042 DBRDMT SUBQ TIS 1ST 20SQCM/<: CPT

## 2019-04-22 PROCEDURE — 93793 PR ANTICOAGULANT MGMT FOR PT TAKING WARFARIN: ICD-10-PCS | Mod: ,,,

## 2019-04-22 PROCEDURE — 93793 ANTICOAG MGMT PT WARFARIN: CPT | Mod: ,,,

## 2019-04-22 PROCEDURE — 27201912 HC WOUND CARE DEBRIDEMENT SUPPLIES

## 2019-04-22 NOTE — PROGRESS NOTES
"Subjective:       Patient ID: Drew Farrra is a 53 y.o. male.    Chief Complaint: Non-healing Wound (left foot)      2/15/19: Readmitted for venous ulcer to left lateral foot which developed about 2 weeks ago. Pulses noted with doppler and capillary refill <3.Dr Gutierrez assessed patient and wound care plan ordered for compression therapy and hydrafera blue to wound bed. No apparent signs of infection noted. Patient to follw-up in clinic in 2 weeks.DB   2/21/19: Nurse visit for dressing change. Refused care to skin tear on left 2nd toe. See notes. RTC 1 week for DrRosy Visit.  3/1/19: Follow up with Dr. Waller today in clinic new wound to right posterior leg, culture of both wounds taken today in clinic new wound care orders to both legs for xeroform and unna with calamine toe to knee follow up in 1 week with Dr. Gutierrez  3/8/19: Follow up with Dr. Gutierrez today in clinic wounds improving, edema noted to BLE, profore toe to knee applied to both legs, RX given to patient for PO Doxycycline, follow up in 1 week.   3/15/19: Here for f/u with Dr. Gutierrez. U/S scheduled Thursday. Will need right leg rewrapped. Patient states eye DrRosy Gave him the same antibiotic dose and strength after eye surgery. Dr instructed patient to take one bottle of antibiotics for both wounds and eyes until finished. Gentamycin added to wound care orders.   3/29/19:  Wound slowly improving. No changes in wound care orders. Wound debrided per Dr Gutierrez.  4/4/19: Patient showed up to clinic today stated " I need my dressing changed. It fell off."  Doppler pluses checked and present.  Patient refusing care to right, no stocking present on leg at this visit. Patient stated " No they said this leg is discharged, Dr. Lantigua has to drain the fluid out this leg with a needle. No wound care to this leg anymore when I come."    4/8/19: Here for f/u with Dr. Gutierrez. Lidocaine 4% applied to wound bed. Toenails on right foot trimmed, wound debrided " with curette per Dr. Gutierrez.  4/22/19: F/U with Dr. Gutierrez. Seen in ER 4/15/19 for elevated temp, Increased pain, swelling, and redness of left foot. Treated with Doxycycline, now completed. Left anterior #1 wound healed. Has new site #3 to left lateral foot.  Pt. Denies pain @ wound site.  Review of Systems    Objective:    No Sophia's sign or s/s of infection, but brownish-yellow adherent slough & biofilm have recurred.  Physical Exam    Assessment:       No diagnosis found.       Wound 03/01/19 1110 Venous Ulcer Leg #2 (Active)   03/01/19 1110    Pre-existing: Yes   Primary Wound Type: Venous ulcer   Side: Right   Orientation:    Location: Leg   Wound/PI Number (optional): #2   Ankle-Brachial Index:    Pulses:    Removal Indication and Assessment:    Wound Outcome:    (Retired) Wound Type:    (Retired) Wound Length (cm):    (Retired) Wound Width (cm):    (Retired) Depth (cm):    Wound Description (Comments):    Removal Indications:    Dressing Appearance Open to air 4/22/2019  1:17 PM   Drainage Amount None 4/22/2019  1:17 PM   Appearance Pink;Epithelialization 4/22/2019  1:17 PM   Tissue loss description Not applicable 4/22/2019  1:17 PM   Periwound Area Edematous 4/22/2019  1:17 PM   Wound Length (cm) 0 cm 4/22/2019  1:17 PM   Wound Width (cm) 0 cm 4/22/2019  1:17 PM   Wound Depth (cm) 0 cm 4/22/2019  1:17 PM   Wound Volume (cm^3) 0 cm^3 4/22/2019  1:17 PM   Wound Surface Area (cm^2) 0 cm^2 4/22/2019  1:17 PM   Care Cleansed with:;Soap and water 4/22/2019  1:17 PM   Compression Compression Stocking (30-40 mmHg) 4/22/2019  1:17 PM   Dressing Change Due 04/29/19 4/22/2019  1:17 PM            Wound 04/22/19 1130 Venous Ulcer lateral Foot #3 (Active)   04/22/19 1130    Pre-existing: Yes   Primary Wound Type: Venous ulcer   Side: Left   Orientation: lateral   Location: Foot   Wound/PI Number (optional): #3   Ankle-Brachial Index:    Pulses: doppler   Removal Indication and Assessment:    Wound Outcome:     (Retired) Wound Type:    (Retired) Wound Length (cm):    (Retired) Wound Width (cm):    (Retired) Depth (cm):    Wound Description (Comments):    Removal Indications:    Wound Image   4/22/2019  1:17 PM   Dressing Appearance Intact;Dried drainage 4/22/2019  1:17 PM   Drainage Amount Scant 4/22/2019  1:17 PM   Drainage Characteristics/Odor Serosanguineous 4/22/2019  1:17 PM   Appearance Yellow;Red 4/22/2019  1:17 PM   Tissue loss description Partial thickness 4/22/2019  1:17 PM   Red (%), Wound Tissue Color 70 % 4/22/2019  1:17 PM   Yellow (%), Wound Tissue Color 30 % 4/22/2019  1:17 PM   Periwound Area Dry;[place] 4/22/2019  1:17 PM   Wound Edges Jagged 4/22/2019  1:17 PM   Wound Length (cm) 1.5 cm 4/22/2019  1:17 PM   Wound Width (cm) 1 cm 4/22/2019  1:17 PM   Wound Depth (cm) 0.2 cm 4/22/2019  1:17 PM   Wound Volume (cm^3) 0.3 cm^3 4/22/2019  1:17 PM   Wound Surface Area (cm^2) 1.5 cm^2 4/22/2019  1:17 PM   Care Cleansed with:;Sterile normal saline 4/22/2019  1:17 PM   Dressing Calcium alginate;Foam;Compression wrap;Other (see comments) 4/22/2019  1:17 PM   Periwound Care Skin barrier film applied 4/22/2019  1:17 PM   Compression Four layer compression 4/22/2019  1:17 PM   Dressing Change Due 04/29/19 4/22/2019  1:17 PM       [REMOVED]      Wound 02/15/19 1000 Venous Ulcer lateral Foot #1 (Removed)   02/15/19 1000    Pre-existing: No   Primary Wound Type: Venous ulcer   Side: Left   Orientation: lateral   Location: Foot   Wound/PI Number (optional): #1   Ankle-Brachial Index:    Pulses:  doppler pulses, capillary refill<3   Removal Indication and Assessment:    Wound Outcome: Healed   (Retired) Wound Type:    (Retired) Wound Length (cm):    (Retired) Wound Width (cm):    (Retired) Depth (cm):    Wound Description (Comments):    Removal Indications:    Removed 04/22/19 1130   Wound Image   4/22/2019  1:17 PM   Dressing Appearance Dry;Intact 4/22/2019  1:17 PM   Drainage Amount None 4/22/2019  1:17 PM   Red (%),  Wound Tissue Color 100 % 4/22/2019  1:17 PM   Periwound Area Dry;Intact 4/22/2019  1:17 PM   Wound Length (cm) 0 cm 4/22/2019  1:17 PM   Wound Width (cm) 0 cm 4/22/2019  1:17 PM   Wound Depth (cm) 0 cm 4/22/2019  1:17 PM   Wound Volume (cm^3) 0 cm^3 4/22/2019  1:17 PM   Wound Surface Area (cm^2) 0 cm^2 4/22/2019  1:17 PM   Here for f/u with Dr. Gutierrez. Lidocaine 4% applied to wound bed. Wound debrided with curette per Dr. Gutierrez.      Left Foot #3    Cleanse wound with:NS  Lidocaine: PRN   Periwound care:Moisture barrier PRN  Primary dressing: Santyl, aquacel foam over site, and aquacel foam over left ankle abrasions.  Secondary dressing: Profore toes to knee  Offloading: Darco shoe  Edema control: profore toes to knee  Frequency:Weekly  Follow-up: Dr. Gutierrez 4/29/19    Right Posterior Leg #2  Patient Refuses care to this leg    Cleanse wound with:Wash leg with soap and water  Offloading: Darco shoe  Edema control: Compression stocking  Frequency:Weekly  Follow-up: 4/29/19 Dr. Gutierrez    Plan:                No follow-ups on file.

## 2019-04-24 ENCOUNTER — HOSPITAL ENCOUNTER (INPATIENT)
Facility: HOSPITAL | Age: 54
LOS: 3 days | Discharge: HOME HEALTH | DRG: 872 | End: 2019-04-27
Attending: EMERGENCY MEDICINE | Admitting: FAMILY MEDICINE
Payer: MEDICARE

## 2019-04-24 DIAGNOSIS — I48.91 ATRIAL FIBRILLATION WITH RAPID VENTRICULAR RESPONSE: ICD-10-CM

## 2019-04-24 DIAGNOSIS — I10 ESSENTIAL HYPERTENSION: ICD-10-CM

## 2019-04-24 DIAGNOSIS — I87.1 MAY-THURNER SYNDROME: ICD-10-CM

## 2019-04-24 DIAGNOSIS — L03.119 CELLULITIS OF LOWER EXTREMITY, UNSPECIFIED LATERALITY: Primary | ICD-10-CM

## 2019-04-24 DIAGNOSIS — K52.9 ACUTE GASTROENTERITIS: ICD-10-CM

## 2019-04-24 DIAGNOSIS — R60.0 LOWER EXTREMITY EDEMA: ICD-10-CM

## 2019-04-24 DIAGNOSIS — L03.818 CELLULITIS OF OTHER SPECIFIED SITE: ICD-10-CM

## 2019-04-24 DIAGNOSIS — R50.81 FEVER IN OTHER DISEASES: ICD-10-CM

## 2019-04-24 DIAGNOSIS — R53.81 PHYSICAL DECONDITIONING: ICD-10-CM

## 2019-04-24 DIAGNOSIS — I50.9 CHF (CONGESTIVE HEART FAILURE): ICD-10-CM

## 2019-04-24 DIAGNOSIS — I50.22 CHRONIC SYSTOLIC HEART FAILURE: ICD-10-CM

## 2019-04-24 DIAGNOSIS — R07.9 CHEST PAIN: ICD-10-CM

## 2019-04-24 PROBLEM — A41.9 SEPSIS: Status: ACTIVE | Noted: 2019-04-24

## 2019-04-24 PROBLEM — B37.49 CANDIDA INFECTION OF GENITAL REGION: Status: ACTIVE | Noted: 2019-04-24

## 2019-04-24 LAB
ALBUMIN SERPL BCP-MCNC: 3.5 G/DL (ref 3.5–5.2)
ALP SERPL-CCNC: 116 U/L (ref 55–135)
ALT SERPL W/O P-5'-P-CCNC: 22 U/L (ref 10–44)
ANION GAP SERPL CALC-SCNC: 10 MMOL/L (ref 8–16)
AST SERPL-CCNC: 24 U/L (ref 10–40)
BASOPHILS # BLD AUTO: 0.01 K/UL (ref 0–0.2)
BASOPHILS NFR BLD: 0.1 % (ref 0–1.9)
BILIRUB SERPL-MCNC: 0.6 MG/DL (ref 0.1–1)
BILIRUB UR QL STRIP: NEGATIVE
BUN SERPL-MCNC: 19 MG/DL (ref 6–20)
CALCIUM SERPL-MCNC: 9.2 MG/DL (ref 8.7–10.5)
CHLORIDE SERPL-SCNC: 113 MMOL/L (ref 95–110)
CLARITY UR: CLEAR
CO2 SERPL-SCNC: 19 MMOL/L (ref 23–29)
COLOR UR: YELLOW
CREAT SERPL-MCNC: 0.9 MG/DL (ref 0.5–1.4)
DIFFERENTIAL METHOD: ABNORMAL
EOSINOPHIL # BLD AUTO: 0.1 K/UL (ref 0–0.5)
EOSINOPHIL NFR BLD: 0.8 % (ref 0–8)
ERYTHROCYTE [DISTWIDTH] IN BLOOD BY AUTOMATED COUNT: 15.5 % (ref 11.5–14.5)
EST. GFR  (AFRICAN AMERICAN): >60 ML/MIN/1.73 M^2
EST. GFR  (NON AFRICAN AMERICAN): >60 ML/MIN/1.73 M^2
GLUCOSE SERPL-MCNC: 93 MG/DL (ref 70–110)
GLUCOSE UR QL STRIP: NEGATIVE
HCT VFR BLD AUTO: 35.2 % (ref 40–54)
HGB BLD-MCNC: 10.6 G/DL (ref 14–18)
HGB UR QL STRIP: ABNORMAL
INR PPP: 2.4 (ref 0.8–1.2)
KETONES UR QL STRIP: NEGATIVE
LACTATE SERPL-SCNC: 1.3 MMOL/L (ref 0.5–2.2)
LEUKOCYTE ESTERASE UR QL STRIP: NEGATIVE
LYMPHOCYTES # BLD AUTO: 0.5 K/UL (ref 1–4.8)
LYMPHOCYTES NFR BLD: 4.7 % (ref 18–48)
MCH RBC QN AUTO: 25.3 PG (ref 27–31)
MCHC RBC AUTO-ENTMCNC: 30.1 G/DL (ref 32–36)
MCV RBC AUTO: 84 FL (ref 82–98)
MONOCYTES # BLD AUTO: 0.3 K/UL (ref 0.3–1)
MONOCYTES NFR BLD: 3.4 % (ref 4–15)
NEUTROPHILS # BLD AUTO: 9.1 K/UL (ref 1.8–7.7)
NEUTROPHILS NFR BLD: 90.8 % (ref 38–73)
NITRITE UR QL STRIP: NEGATIVE
PH UR STRIP: 5 [PH] (ref 5–8)
PLATELET # BLD AUTO: 187 K/UL (ref 150–350)
PMV BLD AUTO: 9.5 FL (ref 9.2–12.9)
POTASSIUM SERPL-SCNC: 4.8 MMOL/L (ref 3.5–5.1)
PROT SERPL-MCNC: 7.2 G/DL (ref 6–8.4)
PROT UR QL STRIP: NEGATIVE
PROTHROMBIN TIME: 24.5 SEC (ref 9–12.5)
RBC # BLD AUTO: 4.19 M/UL (ref 4.6–6.2)
SODIUM SERPL-SCNC: 142 MMOL/L (ref 136–145)
SP GR UR STRIP: 1.02 (ref 1–1.03)
TROPONIN I SERPL DL<=0.01 NG/ML-MCNC: <0.006 NG/ML (ref 0–0.03)
URN SPEC COLLECT METH UR: ABNORMAL
UROBILINOGEN UR STRIP-ACNC: NEGATIVE EU/DL
WBC # BLD AUTO: 9.98 K/UL (ref 3.9–12.7)

## 2019-04-24 PROCEDURE — 80053 COMPREHEN METABOLIC PANEL: CPT

## 2019-04-24 PROCEDURE — 83605 ASSAY OF LACTIC ACID: CPT

## 2019-04-24 PROCEDURE — 93010 ELECTROCARDIOGRAM REPORT: CPT | Mod: ,,, | Performed by: INTERNAL MEDICINE

## 2019-04-24 PROCEDURE — 99285 EMERGENCY DEPT VISIT HI MDM: CPT | Mod: 25

## 2019-04-24 PROCEDURE — 63600175 PHARM REV CODE 636 W HCPCS: Performed by: EMERGENCY MEDICINE

## 2019-04-24 PROCEDURE — 87040 BLOOD CULTURE FOR BACTERIA: CPT

## 2019-04-24 PROCEDURE — 96361 HYDRATE IV INFUSION ADD-ON: CPT

## 2019-04-24 PROCEDURE — 96374 THER/PROPH/DIAG INJ IV PUSH: CPT

## 2019-04-24 PROCEDURE — 63600175 PHARM REV CODE 636 W HCPCS: Performed by: STUDENT IN AN ORGANIZED HEALTH CARE EDUCATION/TRAINING PROGRAM

## 2019-04-24 PROCEDURE — 11000001 HC ACUTE MED/SURG PRIVATE ROOM

## 2019-04-24 PROCEDURE — 84484 ASSAY OF TROPONIN QUANT: CPT

## 2019-04-24 PROCEDURE — 85610 PROTHROMBIN TIME: CPT

## 2019-04-24 PROCEDURE — 81003 URINALYSIS AUTO W/O SCOPE: CPT

## 2019-04-24 PROCEDURE — 25000003 PHARM REV CODE 250: Performed by: STUDENT IN AN ORGANIZED HEALTH CARE EDUCATION/TRAINING PROGRAM

## 2019-04-24 PROCEDURE — 25500020 PHARM REV CODE 255: Performed by: FAMILY MEDICINE

## 2019-04-24 PROCEDURE — C9113 INJ PANTOPRAZOLE SODIUM, VIA: HCPCS | Performed by: STUDENT IN AN ORGANIZED HEALTH CARE EDUCATION/TRAINING PROGRAM

## 2019-04-24 PROCEDURE — 96375 TX/PRO/DX INJ NEW DRUG ADDON: CPT

## 2019-04-24 PROCEDURE — 85025 COMPLETE CBC W/AUTO DIFF WBC: CPT

## 2019-04-24 PROCEDURE — 93005 ELECTROCARDIOGRAM TRACING: CPT

## 2019-04-24 PROCEDURE — 93010 EKG 12-LEAD: ICD-10-PCS | Mod: ,,, | Performed by: INTERNAL MEDICINE

## 2019-04-24 PROCEDURE — 25000003 PHARM REV CODE 250: Performed by: EMERGENCY MEDICINE

## 2019-04-24 RX ORDER — CEFEPIME HYDROCHLORIDE 2 G/50ML
2 INJECTION, SOLUTION INTRAVENOUS
Status: DISCONTINUED | OUTPATIENT
Start: 2019-04-24 | End: 2019-04-24

## 2019-04-24 RX ORDER — ACETAMINOPHEN 500 MG
1000 TABLET ORAL
Status: COMPLETED | OUTPATIENT
Start: 2019-04-24 | End: 2019-04-24

## 2019-04-24 RX ORDER — METOPROLOL SUCCINATE 25 MG/1
100 TABLET, EXTENDED RELEASE ORAL DAILY
Status: DISCONTINUED | OUTPATIENT
Start: 2019-04-25 | End: 2019-04-27 | Stop reason: HOSPADM

## 2019-04-24 RX ORDER — LISINOPRIL 2.5 MG/1
2.5 TABLET ORAL DAILY
Status: DISCONTINUED | OUTPATIENT
Start: 2019-04-25 | End: 2019-04-27 | Stop reason: HOSPADM

## 2019-04-24 RX ORDER — ONDANSETRON 2 MG/ML
4 INJECTION INTRAMUSCULAR; INTRAVENOUS
Status: COMPLETED | OUTPATIENT
Start: 2019-04-24 | End: 2019-04-24

## 2019-04-24 RX ORDER — DILTIAZEM HYDROCHLORIDE 5 MG/ML
15 INJECTION INTRAVENOUS
Status: DISCONTINUED | OUTPATIENT
Start: 2019-04-24 | End: 2019-04-24

## 2019-04-24 RX ORDER — PANTOPRAZOLE SODIUM 40 MG/10ML
40 INJECTION, POWDER, LYOPHILIZED, FOR SOLUTION INTRAVENOUS DAILY
Status: DISCONTINUED | OUTPATIENT
Start: 2019-04-24 | End: 2019-04-26

## 2019-04-24 RX ORDER — ACETAMINOPHEN 500 MG
1000 TABLET ORAL EVERY 6 HOURS PRN
Status: DISCONTINUED | OUTPATIENT
Start: 2019-04-24 | End: 2019-04-27 | Stop reason: HOSPADM

## 2019-04-24 RX ORDER — SODIUM CHLORIDE 9 MG/ML
INJECTION, SOLUTION INTRAVENOUS CONTINUOUS
Status: DISCONTINUED | OUTPATIENT
Start: 2019-04-24 | End: 2019-04-26

## 2019-04-24 RX ORDER — CEFEPIME HYDROCHLORIDE 2 G/50ML
2 INJECTION, SOLUTION INTRAVENOUS
Status: DISCONTINUED | OUTPATIENT
Start: 2019-04-24 | End: 2019-04-25

## 2019-04-24 RX ORDER — WARFARIN SODIUM 5 MG/1
10 TABLET ORAL DAILY
Status: DISCONTINUED | OUTPATIENT
Start: 2019-04-25 | End: 2019-04-25

## 2019-04-24 RX ORDER — DOXYLAMINE SUCCINATE 25 MG
TABLET ORAL 2 TIMES DAILY
Status: DISCONTINUED | OUTPATIENT
Start: 2019-04-24 | End: 2019-04-27 | Stop reason: HOSPADM

## 2019-04-24 RX ADMIN — CEFEPIME HYDROCHLORIDE 2 G: 2 INJECTION, SOLUTION INTRAVENOUS at 10:04

## 2019-04-24 RX ADMIN — IOHEXOL 100 ML: 350 INJECTION, SOLUTION INTRAVENOUS at 05:04

## 2019-04-24 RX ADMIN — VANCOMYCIN HYDROCHLORIDE 3000 MG: 100 INJECTION, POWDER, LYOPHILIZED, FOR SOLUTION INTRAVENOUS at 05:04

## 2019-04-24 RX ADMIN — SODIUM CHLORIDE 500 ML: 0.9 INJECTION, SOLUTION INTRAVENOUS at 04:04

## 2019-04-24 RX ADMIN — SODIUM CHLORIDE: 0.9 INJECTION, SOLUTION INTRAVENOUS at 09:04

## 2019-04-24 RX ADMIN — SODIUM CHLORIDE 500 ML: 0.9 INJECTION, SOLUTION INTRAVENOUS at 03:04

## 2019-04-24 RX ADMIN — ONDANSETRON 4 MG: 2 INJECTION INTRAMUSCULAR; INTRAVENOUS at 03:04

## 2019-04-24 RX ADMIN — PANTOPRAZOLE SODIUM 40 MG: 40 INJECTION, POWDER, LYOPHILIZED, FOR SOLUTION INTRAVENOUS at 06:04

## 2019-04-24 RX ADMIN — MICONAZOLE NITRATE: 20 CREAM TOPICAL at 09:04

## 2019-04-24 RX ADMIN — ACETAMINOPHEN 1000 MG: 500 TABLET ORAL at 03:04

## 2019-04-24 NOTE — ED NOTES
53 year old male presents to ed cc of nausea/emesis x 2 days patient arrived via ems hr noted to be in 130's patient states cp and sob.

## 2019-04-24 NOTE — SUBJECTIVE & OBJECTIVE
Past Medical History:   Diagnosis Date    *Atrial fibrillation     Atrial fibrillation     Atrial fibrillation Feb 23, 2016    Bipolar disorder     Congenital heart disease     s/p surgical intervention 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 of age    Deep vein thrombosis     DVT of leg (deep venous thrombosis)     left leg    History of prior ablation treatment     10/9/13    Hypertension     Obesity     Stroke     Thyroid disease     Venous stasis ulcer of lower extremity, unspecified laterality 12/14/2012    Venous ulcer        Past Surgical History:   Procedure Laterality Date    ANGIOPLASTY      ARTHROSCOPY-KNEE W/ CHONDROPLASTY Right 2/23/2016    Performed by [facility] OR    CARDIAC SURGERY      open heart surgery 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 old    EYE SURGERY      left eye cataract/right eye glaucoma    TIMO FILTER PLACEMENT      Dr Calix ([facility])    KNEE SURGERY      l and r     MULTIPLE TOOTH EXTRACTIONS      Sclerotherapy N/A 2/21/2019    Performed by Gomez [facility]antigua MD at [facility] CATH LAB/EP    SKIN GRAFT      left leg    SYNOVECTOMY-KNEE Right 2/23/2016    Per[facility] by [facility] at [facility] OR       Review of patient's allergies indicates:   Allergen Reactions    Food allergy formula [glutamine-c-quercet-selen-brom]      Allergic to green peas; Heart failure.    Peas Hives    Ibuprofen Swelling    Latex, natural rubber Hives    Pcn [penicillins] Hives    Butisol [butabarbital] Rash     Peeling skin       No current facility-administered medications on file prior to encounter.      Current Outpatient Medications on File Prior to Encounter   Medication Sig    acetaZOLAMIDE (DIAMOX) 250 MG tablet     DUREZOL 0.05 % Drop ophthalmic solution     enoxaparin (LOVENOX) 150 mg/mL Syrg Inject 1 mL (150 mg total) into the skin every 12 (twelve) hours.    HYDROcodone-acetaminophen (NORCO) 5-325 mg per tablet Take 1 tablet by mouth every 6 (six) hours as needed for Pain.    lisinopril  (PRINIVIL,ZESTRIL) 2.5 MG tablet TAKE 4 TABLETS (10 MG TOTAL) BY MOUTH ONCE DAILY.    methIMAzole (TAPAZOLE) 10 MG Tab Take 2 tablets (20 mg total) by mouth once daily.    metoprolol succinate (TOPROL-XL) 100 MG 24 hr tablet Take 1 tablet (100 mg total) by mouth once daily.    neomycin-polymyxin-dexamethasone (DEXACINE) 3.5 mg/g-10,000 unit/g-0.1 % Oint     ofloxacin (OCUFLOX) 0.3 % ophthalmic solution     ondansetron (ZOFRAN) 4 MG tablet Take 1 tablet (4 mg total) by mouth every 8 (eight) hours as needed.    torsemide (DEMADEX) 20 MG Tab TAKE 1 TABLET (20 MG TOTAL) BY MOUTH ONCE DAILY. (Patient taking differently: Take 40 mg by mouth once daily. )    warfarin (COUMADIN) 10 MG tablet Take 1.5-2 tablets (15-20 mg total) by mouth once daily.     Family History     Problem Relation (Age of Onset)    Diabetes Father, Maternal Grandfather    Heart disease Father, Maternal Grandmother, Maternal Grandfather    Stroke Maternal Grandfather        Tobacco Use    Smoking status: Former Smoker     Packs/day: 1.00     Years: 6.00     Pack years: 6.00     Types: Cigarettes    Smokeless tobacco: Former User    Tobacco comment: quit by age 27yrs old   Substance and Sexual Activity    Alcohol use: Yes     Alcohol/week: 0.6 oz     Types: 1 Glasses of wine per week     Comment: 1 glass of wine a week    Drug use: No    Sexual activity: Yes     Partners: Female     Birth control/protection: None     Review of Systems   Constitutional: Positive for fever. Negative for activity change, chills and fatigue.   HENT: Negative for congestion and ear pain.    Eyes: Negative for pain.   Respiratory: Negative for apnea, chest tightness, shortness of breath and wheezing.    Cardiovascular: Positive for leg swelling (chronically). Negative for chest pain and palpitations.   Gastrointestinal: Positive for abdominal pain, nausea and vomiting. Negative for abdominal distention, constipation, diarrhea and rectal pain.   Endocrine:  Negative for polydipsia.   Genitourinary: Negative for dysuria, flank pain and hematuria.   Musculoskeletal: Negative for arthralgias, back pain, neck pain and neck stiffness.   Skin: Positive for rash (groin ) and wound (chronic BLE).   Neurological: Negative for dizziness, tremors, facial asymmetry, speech difficulty and light-headedness.   Psychiatric/Behavioral: Negative for agitation.     Objective:     Vital Signs (Most Recent):  Temp: 99.4 °F (37.4 °C) (04/24/19 1715)  Pulse: (!) 123 (04/24/19 1414)  Resp: (!) 22 (04/24/19 1414)  BP: (!) 127/59 (04/24/19 1414)  SpO2: 95 % (04/24/19 1414) Vital Signs (24h Range):  Temp:  [99.4 °F (37.4 °C)-102.6 °F (39.2 °C)] 99.4 °F (37.4 °C)  Pulse:  [123] 123  Resp:  [22] 22  SpO2:  [95 %] 95 %  BP: (127)/(59) 127/59     Weight: (!) 164.7 kg (363 lb)  Body mass index is 39.88 kg/m².    Physical Exam   Constitutional: He appears well-developed and well-nourished. He is cooperative.  Non-toxic appearance. Nasal cannula in place.   Morbidly obese    HENT:   Head: Normocephalic and atraumatic.   Right Ear: External ear normal.   Left Ear: External ear normal.   Nose: Nose normal.   Mouth/Throat: Oropharynx is clear and moist.   Eyes:   strabismus    Cardiovascular: An irregularly irregular rhythm present. Tachycardia present.   Pulmonary/Chest: Effort normal.   Abdominal: Normal appearance and bowel sounds are normal. There is tenderness in the epigastric area. There is no rigidity, no guarding, no tenderness at McBurney's point and negative Frederick's sign.   Musculoskeletal: Normal range of motion. He exhibits edema and tenderness. He exhibits no deformity.   RLE significantly larger than the LLE   Tenderness to palpation RLE inner thigh with surrounding erythema    Feet:   Right Foot:   Skin Integrity: Positive for skin breakdown, callus and dry skin.   Left Foot:   Skin Integrity: Positive for skin breakdown, callus and dry skin.   Neurological: He is alert.   Skin: Skin is  warm and dry. Rash (groin) noted. Rash is maculopapular.   Chronic venous stasis wounds BLE , with skin changes    Nursing note and vitals reviewed.        Significant Labs:   CBC:   Recent Labs   Lab 04/24/19  1528   WBC 9.98   HGB 10.6*   HCT 35.2*        CMP:   Recent Labs   Lab 04/24/19  1451      K 4.8   *   CO2 19*   GLU 93   BUN 19   CREATININE 0.9   CALCIUM 9.2   PROT 7.2   ALBUMIN 3.5   BILITOT 0.6   ALKPHOS 116   AST 24   ALT 22   ANIONGAP 10   EGFRNONAA >60     Lactic Acid:   Recent Labs   Lab 04/24/19  1508   LACTATE 1.3     Urine Studies: No results for input(s): COLORU, APPEARANCEUA, PHUR, SPECGRAV, PROTEINUA, GLUCUA, KETONESU, BILIRUBINUA, OCCULTUA, NITRITE, UROBILINOGEN, LEUKOCYTESUR, RBCUA, WBCUA, BACTERIA, SQUAMEPITHEL, HYALINECASTS in the last 48 hours.    Invalid input(s): WRIGHTSUR  All pertinent labs within the past 24 hours have been reviewed.    Significant Imaging:   CT Leg (Tibia-Fibula) Without Contrast Right    (Results Pending)   CT Thigh Without Contrast Right    (Results Pending)   X-Ray Chest PA And Lateral    (Results Pending)   CTA Chest Non Coronary    (Results Pending)      I have reviewed and interpreted all pertinent imaging results/findings within the past 24 hours.     EKG: Atrial fibrillation with raid ventricular response. When compared to previous EKG ventricular rate has increased by 58 BPM

## 2019-04-24 NOTE — PROGRESS NOTES
"Vancomycin Dosing and Monitoring Pharmacy Protocol    Drew Farrar is a 53 y.o. male    Height: 6' 8" (2.032 m)   Wt Readings from Last 3 Encounters:   04/24/19 (!) 164.7 kg (363 lb)   04/15/19 (!) 166.5 kg (367 lb)   04/08/19 (!) 165.6 kg (365 lb)       Temp Readings from Last 3 Encounters:   04/24/19 (!) 102.6 °F (39.2 °C)   04/15/19 98.2 °F (36.8 °C) (Oral)   03/01/19 98.5 °F (36.9 °C) (Oral)      Lab Results   Component Value Date/Time    WBC 9.98 04/24/2019 03:28 PM    WBC 10.58 04/15/2019 11:24 AM    WBC 5.24 02/21/2019 12:50 PM      Lab Results   Component Value Date/Time    CREATININE 0.9 04/24/2019 02:51 PM    CREATININE 0.9 04/15/2019 11:24 AM    CREATININE 0.8 04/03/2019 10:43 AM      Lab Results   Component Value Date/Time    LACTATE 1.3 04/24/2019 03:08 PM       Serum creatinine: 0.9 mg/dL 04/24/19 1451  Estimated creatinine clearance: 169.6 mL/min    Antibiotics (From admission, onward)      Start     Stop Route Frequency Ordered    04/24/19 1645  vancomycin (VANCOCIN) 3,000 mg in dextrose 5 % 500 mL IVPB      04/25 0459 IV ED 1 Time 04/24/19 1645          Antifungals (From admission, onward)      None            Microbiology Results (last 7 days)       Procedure Component Value Units Date/Time    Blood Culture #2 **CANNOT BE ORDERED STAT** [[id number]] Collected:  04/24/19 1523    Order Status:  Sent Specimen:  Blood from Peripheral, Antecubital, Right Updated:  04/24/19 1529    Blood Culture #1 **CANNOT BE ORDERED STAT** [[id number]] Collected:  04/24/19 1453    Order Status:  Sent Specimen:  Blood from Peripheral, Forearm, Left Updated:  04/24/19 1513            Indication/Target trough:   Skin and skin structure (Target trough: 10-15 mcg/ml)    Hemodialysis:   N/A    Dosing Weight:   Wt Readings from Last 1 Encounters:   04/24/19 (!) 164.7 kg (363 lb)       Last Vancomycin dose: N/A   Date/Time given: N/A         Vancomycin level:  No results for input(s): VANCOMYCIN-TROUGH in the last 72 " hours.  No results for input(s): VANCOMYCIN, RANDOM in the last 72 hours.    Per Protocol Initial/Adjustments Dosin. Initial/Adjustment Dose: LOADING DOSE Vancomycin will be adjusted from 2000mg IVPB x 1 to Vancomycin 3000mg IVPB x 1.  2. Vancomycin N/A     Pharmacy will continue to follow.    Please contact if you have any further questions. Thank you.    Farzana Daniels, PharmD  [phone]

## 2019-04-24 NOTE — H&P
[facility] Medicine  History & Physical    Patient Name: Drew Farrar  MRN: [id number]  Admission Date: 2019  Attending Physician: Dayami Miguel MD   Primary Care Provider: David Larsen MD         Patient information was obtained from patient and ER records.     Subjective:     Principal Problem:Sepsis    Chief Complaint:   Chief Complaint   Patient presents with    Nausea     feeling nauseated so he called 911, they found him to be in A fib with RVR at rate in 130's        HPI: Drew Farrar is a 53 y.o. male with history of  CHF  (EF 40-45%) , HTN, A-fib (rate controlled), chronic venous stasis of BLE's, DVT (on coumadin) and May-Thurner syndrome who presents to the ED for evaluation of nausea, vomiting, epigastric abdominal pain and fever x 1 day. The patient states he has had multiple episodes of emesis at home. The patient states he has been unable to keep food down due to nausea and vomiting. The emesis is described as orange on color. Abdominal pain is located to the epigastrium and does not radiate, it is 7/10 in burning in nature. H is not aware how how his subjective fevers were. He did not take any OTC medication at home. He denies trying any new, undercooked or  foods. No one at home has similar symptoms. He denies any diarrhea, hematemesis or dark tarry stools.     The patient also notes that his right lower leg is red and warm to the touch. He states the redness started a few weeks ago, but has progressed in the last couple days. He chronically has venous stasis ulcer wounds and lymphedema to the right leg. He follows the wound care center.     In the ED the patient was SIRS 3/4: , temp 102.6, RR 22 requiring nasal canula. WBC 9.89. Lactic acid 1.3. Started on vancomycin in the ED. 1 Liter bolus given in ED     Past Medical History:   Diagnosis Date    *Atrial fibrillation     Atrial fibrillation     Atrial fibrillation 2016     Bipolar disorder     Congenital heart disease     s/p surgical intervention 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 of age    Deep vein thrombosis     DVT of leg (deep venous thrombosis)     left leg    History of prior ablation treatment     10/9/13    Hypertension     Obesity     Stroke     Thyroid disease     Venous stasis ulcer of lower extremity, unspecified laterality 12/14/2012    Venous ulcer        Past Surgical History:   Procedure Laterality Date    ANGIOPLASTY      ARTHROSCOPY-KNEE W/ CHONDROPLASTY Right 2/23/2016    [facility] OR    CARDIAC SURGERY      open heart surgery 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 old    EYE SURGERY      left eye cataract/right eye glaucoma    TIMO FILTER PLACEMENT      Dr Calix ([facility])    KNEE SURGERY      l and r     MULTIPLE TOOTH EXTRACTIONS      Sclerotherapy N/A 2/21/2019    Per[facility] at [facility] CATH LAB/EP    SKIN GRAFT      left leg    SYNOVECTOMY-KNEE Right 2/23/2016    Per[facility] by [facility] at [facility] OR       Review of patient's allergies indicates:   Allergen Reactions    Food allergy formula [glutamine-c-quercet-selen-brom]      Allergic to green peas; Heart failure.    Peas Hives    Ibuprofen Swelling    Latex, natural rubber Hives    Pcn [penicillins] Hives    Butisol [butabarbital] Rash     Peeling skin       No current facility-administered medications on file prior to encounter.      Current Outpatient Medications on File Prior to Encounter   Medication Sig    acetaZOLAMIDE (DIAMOX) 250 MG tablet     DUREZOL 0.05 % Drop ophthalmic solution     enoxaparin (LOVENOX) 150 mg/mL Syrg Inject 1 mL (150 mg total) into the skin every 12 (twelve) hours.    HYDROcodone-acetaminophen (NORCO) 5-325 mg per tablet Take 1 tablet by mouth every 6 (six) hours as needed for Pain.    lisinopril (PRINIVIL,ZESTRIL) 2.5 MG tablet TAKE 4 TABLETS (10 MG TOTAL) BY MOUTH ONCE DAILY.    methIMAzole (TAPAZOLE) 10 MG Tab Take 2 tablets (20 mg  total) by mouth once daily.    metoprolol succinate (TOPROL-XL) 100 MG 24 hr tablet Take 1 tablet (100 mg total) by mouth once daily.    neomycin-polymyxin-dexamethasone (DEXACINE) 3.5 mg/g-10,000 unit/g-0.1 % Oint     ofloxacin (OCUFLOX) 0.3 % ophthalmic solution     ondansetron (ZOFRAN) 4 MG tablet Take 1 tablet (4 mg total) by mouth every 8 (eight) hours as needed.    torsemide (DEMADEX) 20 MG Tab TAKE 1 TABLET (20 MG TOTAL) BY MOUTH ONCE DAILY. (Patient taking differently: Take 40 mg by mouth once daily. )    warfarin (COUMADIN) 10 MG tablet Take 1.5-2 tablets (15-20 mg total) by mouth once daily.     Family History     Problem Relation (Age of Onset)    Diabetes Father, Maternal Grandfather    Heart disease Father, Maternal Grandmother, Maternal Grandfather    Stroke Maternal Grandfather        Tobacco Use    Smoking status: Former Smoker     Packs/day: 1.00     Years: 6.00     Pack years: 6.00     Types: Cigarettes    Smokeless tobacco: Former User    Tobacco comment: quit by age 27yrs old   Substance and Sexual Activity    Alcohol use: Yes     Alcohol/week: 0.6 oz     Types: 1 Glasses of wine per week     Comment: 1 glass of wine a week    Drug use: No    Sexual activity: Yes     Partners: Female     Birth control/protection: None     Review of Systems   Constitutional: Positive for fever. Negative for activity change, chills and fatigue.   HENT: Negative for congestion and ear pain.    Eyes: Negative for pain.   Respiratory: Negative for apnea, chest tightness, shortness of breath and wheezing.    Cardiovascular: Positive for leg swelling (chronically). Negative for chest pain and palpitations.   Gastrointestinal: Positive for abdominal pain, nausea and vomiting. Negative for abdominal distention, constipation, diarrhea and rectal pain.   Endocrine: Negative for polydipsia.   Genitourinary: Negative for dysuria, flank pain and hematuria.   Musculoskeletal: Negative for arthralgias, back pain,  neck pain and neck stiffness.   Skin: Positive for rash (groin ) and wound (chronic BLE).   Neurological: Negative for dizziness, tremors, facial asymmetry, speech difficulty and light-headedness.   Psychiatric/Behavioral: Negative for agitation.     Objective:     Vital Signs (Most Recent):  Temp: 99.4 °F (37.4 °C) (04/24/19 1715)  Pulse: (!) 123 (04/24/19 1414)  Resp: (!) 22 (04/24/19 1414)  BP: (!) 127/59 (04/24/19 1414)  SpO2: 95 % (04/24/19 1414) Vital Signs (24h Range):  Temp:  [99.4 °F (37.4 °C)-102.6 °F (39.2 °C)] 99.4 °F (37.4 °C)  Pulse:  [123] 123  Resp:  [22] 22  SpO2:  [95 %] 95 %  BP: (127)/(59) 127/59     Weight: (!) 164.7 kg (363 lb)  Body mass index is 39.88 kg/m².    Physical Exam   Constitutional: He appears well-developed and well-nourished. He is cooperative.  Non-toxic appearance. Nasal cannula in place.   Morbidly obese    HENT:   Head: Normocephalic and atraumatic.   Right Ear: External ear normal.   Left Ear: External ear normal.   Nose: Nose normal.   Mouth/Throat: Oropharynx is clear and moist.   Eyes:   strabismus    Cardiovascular: An irregularly irregular rhythm present. Tachycardia present.   Pulmonary/Chest: Effort normal.   Abdominal: Normal appearance and bowel sounds are normal. There is tenderness in the epigastric area. There is no rigidity, no guarding, no tenderness at McBurney's point and negative Frederick's sign.   Musculoskeletal: Normal range of motion. He exhibits edema and tenderness. He exhibits no deformity.   RLE significantly larger than the LLE   Tenderness to palpation RLE inner thigh with surrounding erythema    Feet:   Right Foot:   Skin Integrity: Positive for skin breakdown, callus and dry skin.   Left Foot:   Skin Integrity: Positive for skin breakdown, callus and dry skin.   Neurological: He is alert.   Skin: Skin is warm and dry. Rash (groin) noted. Rash is maculopapular.   Chronic venous stasis wounds BLE , with skin changes    Nursing note and vitals  reviewed.        Significant Labs:   CBC:   Recent Labs   Lab 04/24/19  1528   WBC 9.98   HGB 10.6*   HCT 35.2*        CMP:   Recent Labs   Lab 04/24/19  1451      K 4.8   *   CO2 19*   GLU 93   BUN 19   CREATININE 0.9   CALCIUM 9.2   PROT 7.2   ALBUMIN 3.5   BILITOT 0.6   ALKPHOS 116   AST 24   ALT 22   ANIONGAP 10   EGFRNONAA >60     Lactic Acid:   Recent Labs   Lab 04/24/19  1508   LACTATE 1.3     Urine Studies: No results for input(s): COLORU, APPEARANCEUA, PHUR, SPECGRAV, PROTEINUA, GLUCUA, KETONESU, BILIRUBINUA, OCCULTUA, NITRITE, UROBILINOGEN, LEUKOCYTESUR, RBCUA, WBCUA, BACTERIA, SQUAMEPITHEL, HYALINECASTS in the last 48 hours.    Invalid input(s): WRIGHTSUR  All pertinent labs within the past 24 hours have been reviewed.    Significant Imaging:   CT Leg (Tibia-Fibula) Without Contrast Right    (Results Pending)   CT Thigh Without Contrast Right    (Results Pending)   X-Ray Chest PA And Lateral    (Results Pending)   CTA Chest Non Coronary    (Results Pending)      I have reviewed and interpreted all pertinent imaging results/findings within the past 24 hours.     EKG: Atrial fibrillation with raid ventricular response. When compared to previous EKG ventricular rate has increased by 58 BPM     Assessment/Plan:     * Sepsis  SIRS 3/4  Heart rate 123  Temp 102.6  RR 22  WBC 9.89  Possible sources of infection from chronic venous stasis wounds ?  Given 1 liter NS   Due to severity of CHF, use fluids modestly   Started on Vancomycin in ED   Will add cefepime to broaden abx   Bcx pending   Ucx pending   CXR pending       Candida infection of genital region      Miconazole cream bid      Cellulitis of lower extremity    Patient seen the day previous in the wound care center   Noted increased rubor, dolar and calor to the RLE   Venous doppler pending   Start broad spectrum antibiotics            Acute gastroenteritis  Patient with complaint of nausea, vomiting, epigastric abd pain x 1 day    protonoix 40 mg IV   Maintenance IVF @ 100 cc/hr   Advance diet as tolerated       Chronic systolic heart failure  Echo 09/2017 EF 40% with severe MR   Repeat echo   Daily weight   Strict I&O         Essential hypertension  BP on admission 127/59  Resume home medications   Will continue to monitor         VTE Risk Mitigation (From admission, onward)    None             D'Amico C Johnson, MD  Department of Hospital Medicine   [facility]

## 2019-04-24 NOTE — HOSPITAL COURSE
04/24: In the ED the patient was SIRS 3/4: , temp 102.6, RR 22 requiring  9 L O2 on nasal canula. WBC 9.89. Lactic acid 1.3. Started on vancomycin in the ED. 1 Liter bolus given in ED. Started on vancomycin and cefepime. Pending CXR, venous doppler BLE, CTA and CT leg. CXR shows Heart is enlarged with possible mild pulmonary edema. CTA chest No evidence of PE,  Dilated pulmonary artery which may be seen in setting of pulmonary artery hypertension. CT RLE  Shows extensive subcutaneous soft tissue edema throughout the lower leg with diffuse circumferential skin thickening which may be seen in the setting of cellulitis. Venous doppler BLE shows no evidence of deep venous thrombosis in either lower extremity. Wcx obtained     04/25: Patietn afebrile over night, WBC 6.85 trending down, H/H remains stable. On room air with oxygen saturation  95-98. Working with PT/OT. PT/OT Recommend home with home health. De-escalate the abx and continue just vancomycin. Cardiology saw the patient and recommend CTA abdomen/pelvis with triple phase for evaluation of patency of bilateral CIV and IVC. Patietn started on lasix     04/26: Patient UOP 4L Over night. Over night the patient had two rapid responses called last night for crushing chest pain. No EKG changes were seen. Troponin was negative.  No change on CXR. Chest pain somewhat relieved with NTG. Chest pain completely resolved in the morning. The patient notes he has had similar episodes in the past. WBC trending down 5.37. Afebrile. CTa Abdomen and pelvis shows Patent venous stents. Cardiology recommend restarting home Demadex. The patient can follow up out patient with them and wound care. Abx  De-escalated to clindamycin PO     04/27: Patient UOP 2.6 L last night. patietn remained afebrile. No leukocytosis. No chest pain. Great improvement in cellulitis on the leg. Wcx show skin ori,  no predominant organism. Can discharge home on PO Clindamycin for 6 more days      Theo back to baseline is ready for discharge. The patient will have clindamycin 300 mg TID sent over to pharmacy. The pharmacy is open and has received the medication, and it will be ready within the hour.  Home health has been set up, the patient has received his wheel chair. The patient has appointments with cardiology (Dr Lantigua) and has an appointment for the wound care center. The patient was explained the discharge plan. He notes understanding of the discharge plan and is in agreement of the plan. His girlfriend assures that he will not miss any appointments. All questions were answered.  VSS. The patient can be discharged home with home health.

## 2019-04-24 NOTE — ASSESSMENT & PLAN NOTE
Patient with complaint of nausea, vomiting, epigastric abd pain x 1 day   protonoix 40 mg IV   Maintenance IVF @ 100 cc/hr   Advance diet as tolerated

## 2019-04-24 NOTE — ASSESSMENT & PLAN NOTE
Patient seen the day previous in the wound care center   Noted increased rubor, dolar and calor to the RLE   Venous doppler pending   Start broad spectrum antibiotics

## 2019-04-24 NOTE — ED PROVIDER NOTES
Encounter Date: 4/24/2019    SCRIBE #1 NOTE: I, Sophy Dickens, am scribing for, and in the presence of,  Dr. Lennon. I have scribed the entire note.       History     Chief Complaint   Patient presents with    Nausea     feeling nauseated so he called 911, they found him to be in A fib with RVR at rate in 130's     This is a 53 y.o. male with PMHx of atrial fibrilation, DVT, HTN, and Stroke who presents to the Emergency Department with chief complaint of nausea with multiple episodes of emesis today. The patient reports he began feeling nauseous and called 911. He describes the hue of his emesis episodes as orange and yellow. He reports of associating chest pain and SOB. Per EMS, patient presented with Atrial fibrilation with RVR at rate in 130s. The patient does not report of any other complaint at this time.    The history is provided by the patient and the EMS personnel.     Review of patient's allergies indicates:   Allergen Reactions    Food allergy formula [glutamine-c-quercet-selen-brom]      Allergic to green peas; Heart failure.    Peas Hives    Ibuprofen Swelling    Latex, natural rubber Hives    Pcn [penicillins] Hives    Butisol [butabarbital] Rash     Peeling skin     Past Medical History:   Diagnosis Date    *Atrial fibrillation     Atrial fibrillation     Atrial fibrillation Feb 23, 2016    Bipolar disorder     Congenital heart disease     s/p surgical intervention 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 of age    Deep vein thrombosis     DVT of leg (deep venous thrombosis)     left leg    History of prior ablation treatment     10/9/13    Hypertension     Obesity     Stroke     Thyroid disease     Venous stasis ulcer of lower extremity, unspecified laterality 12/14/2012    Venous ulcer      Past Surgical History:   Procedure Laterality Date    ANGIOPLASTY      ARTHROSCOPY-KNEE W/ CHONDROPLASTY Right 2/23/2016    Performed by [facility] at [facility] OR    CARDIAC SURGERY      open heart surgery 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  old    EYE SURGERY      left eye cataract/right eye glaucoma    TIMO FILTER PLACEMENT      Dr Calix ([facility])    KNEE SURGERY      l and r     MULTIPLE TOOTH EXTRACTIONS      Sclerotherapy N/A 2/21/2019    Performed by [facility]igua [facility] at [facility] CATH LAB/EP    SKIN GRAFT      left leg    SYNOVECTOMY-KNEE Right 2/23/2016    Performed by [facility] at [facility] OR     Family History   Problem Relation Age of Onset    Diabetes Father     Heart disease Father     Heart disease Maternal Grandmother     Diabetes Maternal Grandfather     Heart disease Maternal Grandfather     Stroke Maternal Grandfather      Social History     Tobacco Use    Smoking status: Former Smoker     Packs/day: 1.00     Years: 6.00     Pack years: 6.00     Types: Cigarettes    Smokeless tobacco: Former User    Tobacco comment: quit by age 27yrs old   Substance Use Topics    Alcohol use: Yes     Alcohol/week: 0.6 oz     Types: 1 Glasses of wine per week     Comment: 1 glass of wine a week    Drug use: No     Review of Systems   Respiratory: Positive for shortness of breath.    Cardiovascular: Positive for chest pain.   Gastrointestinal: Positive for nausea and vomiting.   All other systems reviewed and are negative.      Physical Exam     Initial Vitals [04/24/19 1414]   BP Pulse Resp Temp SpO2   (!) 127/59 (!) 123 (!) 22 (!) 100.5 °F (38.1 °C) 95 %      MAP       --         Physical Exam    Nursing note and vitals reviewed.  Constitutional: He appears well-developed and well-nourished.   Morbidly obese   HENT:   Head: Normocephalic and atraumatic.   Eyes: EOM are normal. Pupils are equal, round, and reactive to light.   Neck: Normal range of motion. Neck supple.   Cardiovascular: Normal rate and normal heart sounds. A regularly irregular rhythm present.    No murmur heard.  Pulmonary/Chest: Breath sounds normal. No respiratory distress.   diminished breath sounds bilaterally   Abdominal: Soft. Bowel sounds are normal.  There is no tenderness.   Musculoskeletal: Normal range of motion. He exhibits edema. He exhibits no tenderness.   Bilateral lower extremity edema with chronic skin changes  RLE tense edema with warmth and erythema up to the mid thigh.   Neurological: He is alert and oriented to person, place, and time. He has normal strength.   neurologically intact   Skin: Skin is warm and dry. Capillary refill takes less than 2 seconds.         ED Course   Procedures  Labs Reviewed   COMPREHENSIVE METABOLIC PANEL - Abnormal; Notable for the following components:       Result Value    Chloride 113 (*)     CO2 19 (*)     All other components within normal limits   PROTIME-INR - Abnormal; Notable for the following components:    Prothrombin Time 24.5 (*)     INR 2.4 (*)     All other components within normal limits   CBC W/ AUTO DIFFERENTIAL - Abnormal; Notable for the following components:    RBC 4.19 (*)     Hemoglobin 10.6 (*)     Hematocrit 35.2 (*)     MCH 25.3 (*)     MCHC 30.1 (*)     RDW 15.5 (*)     Gran # (ANC) 9.1 (*)     Lymph # 0.5 (*)     Gran% 90.8 (*)     Lymph% 4.7 (*)     Mono% 3.4 (*)     All other components within normal limits   URINALYSIS, REFLEX TO URINE CULTURE - Abnormal; Notable for the following components:    Occult Blood UA Trace (*)     All other components within normal limits    Narrative:     Preferred Collection Type->Urine, Clean Catch   CULTURE, BLOOD   CULTURE, BLOOD   TROPONIN I   LACTIC ACID, PLASMA     EKG Readings: (Independently Interpreted)   Atrial fibrillation with RVR at rate of 123 bpm       Imaging Results          X-Ray Chest PA And Lateral (Final result)  Result time 04/24/19 18:18:11    Final result by Sohail Kline MD (04/24/19 18:18:11)                 Impression:      As above.      Electronically signed by: Sohail Kline MD  Date:    04/24/2019  Time:    18:18             Narrative:    EXAMINATION:  XR CHEST PA AND LATERAL    CLINICAL HISTORY:  SOB;    TECHNIQUE:  PA and  lateral views of the chest were performed.    COMPARISON:  CTA chest from the same date at 17:51.    FINDINGS:  Heart is enlarged with possible mild pulmonary edema.  No evidence of pneumothorax, focal consolidation, or significant pleural effusion.  No acute osseous abnormality identified.  See separate CTA report for full intrathoracic details.                               CTA Chest Non Coronary (Final result)  Result time 04/24/19 18:14:02    Final result by Sohail Kline MD (04/24/19 18:14:02)                 Impression:      1. No evidence of PE.  2. Dilated pulmonary artery which may be seen in setting of pulmonary artery hypertension.  3. Mild patchy scattered ground-glass attenuation seen in the lungs which may in part relate to respiratory motion artifact, however similar findings can be seen with mild pulmonary edema or nonspecific small airways infectious or non infectious inflammatory process.  No focal consolidation seen.  4. Additional findings as detailed above.      Electronically signed by: Sohail Kline MD  Date:    04/24/2019  Time:    18:14             Narrative:    EXAMINATION:  CTA CHEST NON CORONARY    CLINICAL HISTORY:  SOB;    TECHNIQUE:  Low dose axial images, sagittal and coronal reformations were obtained from the thoracic inlet to the lung bases following the IV administration of 100 mL of Omnipaque 350.  Contrast timing was optimized to evaluate the pulmonary arteries.  MIP images were performed.    COMPARISON:  CTA chest from September 2017.    FINDINGS:  Structures at the base of the neck are unremarkable.  Aorta is non-aneurysmal.  The heart is enlarged without pericardial effusion.  No intraluminal filling defects within the pulmonary arteries to suggest pulmonary thromboembolism the level of the proximal segmental branches.  Pulmonary artery is dilated which may be seen in setting of pulmonary artery hypertension.  There is no evidence of mediastinal, axillary, or hilar lymph  node enlargement.  The esophagus is unremarkable along its course.    The trachea and bronchi are patent.  The lungs are symmetrically expanded.  Mild plate atelectatic changes are seen within lower lobes.  Mild patchy scattered ground-glass attenuation is seen within the lungs.  No evidence of pulmonary mass, confluent consolidation, pneumothorax, or pleural effusion.  No evidence of pulmonary hemorrhage or infarction.    Spleen is enlarged measuring 17.8 cm.  Chronic nonspecific haziness appearance is seen of the upper mesentery peripancreatic region.  This appears unchanged previous CT exams.  Osseous structures demonstrate degenerative change.  Extrathoracic soft tissues are unremarkable.                               CT Thigh Without Contrast Right (Final result)  Result time 04/24/19 18:05:54    Final result by Sohail Kline MD (04/24/19 18:05:54)                 Impression:      Extensive subcutaneous soft tissue edema throughout the lower leg with diffuse circumferential skin thickening which may be seen in the setting of cellulitis.      Electronically signed by: Sohail Kline MD  Date:    04/24/2019  Time:    18:05             Narrative:    EXAMINATION:  CT THIGH WITHOUT CONTRAST RIGHT; CT LEG (TIBIA-FIBULA) WITHOUT CONTRAST RIGHT    CLINICAL HISTORY:  Upper leg erythema, swelling, cellulitis suspected;; Lower leg erythema, swelling, cellulitis suspected;    TECHNIQUE:  CT of the right thigh and right tibia fibula were obtained without the use of IV contrast.    COMPARISON:  None    FINDINGS:  Extensive subcutaneous soft tissue edema and circumferential skin thickening is seen involving the lower leg and proximal visualized portion of the hindfoot.  No organized focal fluid collections or abscess seen, allowing for lack of intravenous contrast.  Muscle bulk appears within normal limits.  No evidence of acute fracture, dislocation, or osseous destructive process.  Postsurgical ghost tracks are noted  the level of the tibial tuberosity.                               CT Leg (Tibia-Fibula) Without Contrast Right (Final result)  Result time 04/24/19 18:05:54    Final result by Sohail Kline MD (04/24/19 18:05:54)                 Impression:      Extensive subcutaneous soft tissue edema throughout the lower leg with diffuse circumferential skin thickening which may be seen in the setting of cellulitis.      Electronically signed by: Sohail Kline MD  Date:    04/24/2019  Time:    18:05             Narrative:    EXAMINATION:  CT THIGH WITHOUT CONTRAST RIGHT; CT LEG (TIBIA-FIBULA) WITHOUT CONTRAST RIGHT    CLINICAL HISTORY:  Upper leg erythema, swelling, cellulitis suspected;; Lower leg erythema, swelling, cellulitis suspected;    TECHNIQUE:  CT of the right thigh and right tibia fibula were obtained without the use of IV contrast.    COMPARISON:  None    FINDINGS:  Extensive subcutaneous soft tissue edema and circumferential skin thickening is seen involving the lower leg and proximal visualized portion of the hindfoot.  No organized focal fluid collections or abscess seen, allowing for lack of intravenous contrast.  Muscle bulk appears within normal limits.  No evidence of acute fracture, dislocation, or osseous destructive process.  Postsurgical ghost tracks are noted the level of the tibial tuberosity.                                 Medical Decision Making:   Independently Interpreted Test(s):   I have ordered and independently interpreted EKG Reading(s) - see prior notes  Clinical Tests:   Lab Tests: Ordered and Reviewed  Medical Tests: Ordered and Reviewed  ED Management:  4:15 PM Case discussed with Dr. Jones who will accept the patient for admission               medical decision making patient febrile with cellulitis presents in rapid atrial fibrillation admitted for IV antibiotics and further evaluation of cardiac status.        Clinical Impression:       ICD-10-CM ICD-9-CM   1. Cellulitis of lower  extremity, unspecified laterality L03.119 682.6   2. Fever in other diseases R50.81 780.61   3. Lower extremity edema R60.0 782.3   4. Atrial fibrillation with rapid ventricular response I48.91 427.31   5. Acute gastroenteritis K52.9 558.9   6. May-Thurner syndrome I87.1 459.2   7. CHF (congestive heart failure) I50.9 428.0          I, Dr. Jeffrey Lennon, personally performed the services described in this documentation.   All medical record entries made by the scribe were at my direction and in my presence.   I have reviewed the chart and agree that the record is accurate and complete.   Jeffrey Lennon MD.  7:04 PM 04/24/2019                    Jeffrey Lennon MD  04/24/19 1908

## 2019-04-24 NOTE — HPI
Drew Farrar is a 53 y.o. male with history of  CHF (EF 40-45%) , HTN, A-fib (rate controlled), chronic venous stasis of BLE's, DVT (on coumadin) and May-Thurner syndrome who presents to the ED for evaluation of nausea, vomiting, epigastric abdominal pain and fever x 1 day. The patient states he has had multiple episodes of emesis at home. The patient states he has been unable to keep food down due to nausea and vomiting. The emesis is described as orange on color. Abdominal pain is located to the epigastrium and does not radiate, it is 7/10 in burning in nature. H is not aware how how his subjective fevers were. He did not take any OTC medication at home. He denies trying any new, undercooked or  foods. No one at home has similar symptoms. He denies any diarrhea, hematemesis or dark tarry stools.     The patient also notes that his right lower leg is red and warm to the touch. He states the redness started a few weeks ago, but has progressed in the last couple days. He chronically has venous stasis ulcer wounds and lymphedema to the right leg. He follows the wound care center.     In the ED the patient was SIRS 3/4: , temp 102.6, RR 22 requiring nasal canula. WBC 9.89. Lactic acid 1.3. Started on vancomycin in the ED. 1 Liter bolus given

## 2019-04-24 NOTE — ED NOTES
Dr. Lennon notified of patinet's fever at this time; verbal order received to hold Diltiazem and treat the fever at this time.

## 2019-04-24 NOTE — ASSESSMENT & PLAN NOTE
SIRS 3/4  Heart rate 123  Temp 102.6  RR 22  WBC 9.89  Possible sources of infection from chronic venous stasis wounds ?  Given 1 liter NS   Due to severity of CHF, use fluids modestly   Started on Vancomycin in ED   Will add cefepime to broaden abx   Bcx pending   Ucx pending   CXR pending

## 2019-04-25 LAB
ALBUMIN SERPL BCP-MCNC: 3 G/DL (ref 3.5–5.2)
ALBUMIN SERPL BCP-MCNC: 3.2 G/DL (ref 3.5–5.2)
ALLENS TEST: ABNORMAL
ALP SERPL-CCNC: 108 U/L (ref 55–135)
ALP SERPL-CCNC: 95 U/L (ref 55–135)
ALT SERPL W/O P-5'-P-CCNC: 17 U/L (ref 10–44)
ALT SERPL W/O P-5'-P-CCNC: 18 U/L (ref 10–44)
ANION GAP SERPL CALC-SCNC: 5 MMOL/L (ref 8–16)
ANION GAP SERPL CALC-SCNC: 7 MMOL/L (ref 8–16)
AORTIC ROOT ANNULUS: 3.32 CM
AORTIC VALVE CUSP SEPERATION: 1.91 CM
AST SERPL-CCNC: 20 U/L (ref 10–40)
AST SERPL-CCNC: 21 U/L (ref 10–40)
AV INDEX (PROSTH): 0.69
AV MEAN GRADIENT: 5.27 MMHG
AV PEAK GRADIENT: 8.53 MMHG
AV VALVE AREA: 3.08 CM2
AV VELOCITY RATIO: 0.73
BASOPHILS # BLD AUTO: 0.01 K/UL (ref 0–0.2)
BASOPHILS # BLD AUTO: 0.02 K/UL (ref 0–0.2)
BASOPHILS NFR BLD: 0.2 % (ref 0–1.9)
BASOPHILS NFR BLD: 0.3 % (ref 0–1.9)
BILIRUB SERPL-MCNC: 0.7 MG/DL (ref 0.1–1)
BILIRUB SERPL-MCNC: 0.8 MG/DL (ref 0.1–1)
BNP SERPL-MCNC: 214 PG/ML (ref 0–99)
BSA FOR ECHO PROCEDURE: 3.06 M2
BUN SERPL-MCNC: 17 MG/DL (ref 6–20)
BUN SERPL-MCNC: 19 MG/DL (ref 6–20)
CALCIUM SERPL-MCNC: 8.6 MG/DL (ref 8.7–10.5)
CALCIUM SERPL-MCNC: 8.6 MG/DL (ref 8.7–10.5)
CHLORIDE SERPL-SCNC: 105 MMOL/L (ref 95–110)
CHLORIDE SERPL-SCNC: 109 MMOL/L (ref 95–110)
CO2 SERPL-SCNC: 24 MMOL/L (ref 23–29)
CO2 SERPL-SCNC: 25 MMOL/L (ref 23–29)
CREAT SERPL-MCNC: 0.9 MG/DL (ref 0.5–1.4)
CREAT SERPL-MCNC: 1.1 MG/DL (ref 0.5–1.4)
CV ECHO LV RWT: 0.32 CM
DELSYS: ABNORMAL
DIFFERENTIAL METHOD: ABNORMAL
DIFFERENTIAL METHOD: ABNORMAL
DOP CALC AO PEAK VEL: 1.46 M/S
DOP CALC AO VTI: 27.91 CM
DOP CALC LVOT AREA: 4.48 CM2
DOP CALC LVOT DIAMETER: 2.39 CM
DOP CALC LVOT PEAK VEL: 1.07 M/S
DOP CALC LVOT STROKE VOLUME: 86.05 CM3
DOP CALCLVOT PEAK VEL VTI: 19.19 CM
E WAVE DECELERATION TIME: 132.04 MSEC
E/A RATIO: 5.38
ECHO LV POSTERIOR WALL: 1.08 CM (ref 0.6–1.1)
EOSINOPHIL # BLD AUTO: 0.1 K/UL (ref 0–0.5)
EOSINOPHIL # BLD AUTO: 0.2 K/UL (ref 0–0.5)
EOSINOPHIL NFR BLD: 1.9 % (ref 0–8)
EOSINOPHIL NFR BLD: 3.7 % (ref 0–8)
ERYTHROCYTE [DISTWIDTH] IN BLOOD BY AUTOMATED COUNT: 15.3 % (ref 11.5–14.5)
ERYTHROCYTE [DISTWIDTH] IN BLOOD BY AUTOMATED COUNT: 15.6 % (ref 11.5–14.5)
EST. GFR  (AFRICAN AMERICAN): >60 ML/MIN/1.73 M^2
EST. GFR  (AFRICAN AMERICAN): >60 ML/MIN/1.73 M^2
EST. GFR  (NON AFRICAN AMERICAN): >60 ML/MIN/1.73 M^2
EST. GFR  (NON AFRICAN AMERICAN): >60 ML/MIN/1.73 M^2
FRACTIONAL SHORTENING: 36 % (ref 28–44)
GLUCOSE SERPL-MCNC: 100 MG/DL (ref 70–110)
GLUCOSE SERPL-MCNC: 81 MG/DL (ref 70–110)
HCO3 UR-SCNC: 24.6 MMOL/L (ref 24–28)
HCT VFR BLD AUTO: 30.5 % (ref 40–54)
HCT VFR BLD AUTO: 33.4 % (ref 40–54)
HGB BLD-MCNC: 10.1 G/DL (ref 14–18)
HGB BLD-MCNC: 9.1 G/DL (ref 14–18)
INR PPP: 2.9 (ref 0.8–1.2)
INTERVENTRICULAR SEPTUM: 0.9 CM (ref 0.6–1.1)
IVRT: 0.04 MSEC
LACTATE SERPL-SCNC: 1.1 MMOL/L (ref 0.5–2.2)
LEFT ATRIUM SIZE: 5.35 CM
LEFT INTERNAL DIMENSION IN SYSTOLE: 4.31 CM (ref 2.1–4)
LEFT VENTRICLE DIASTOLIC VOLUME INDEX: 79.11 ML/M2
LEFT VENTRICLE DIASTOLIC VOLUME: 235.05 ML
LEFT VENTRICLE MASS INDEX: 100.4 G/M2
LEFT VENTRICLE SYSTOLIC VOLUME INDEX: 28.1 ML/M2
LEFT VENTRICLE SYSTOLIC VOLUME: 83.4 ML
LEFT VENTRICULAR INTERNAL DIMENSION IN DIASTOLE: 6.75 CM (ref 3.5–6)
LEFT VENTRICULAR MASS: 298.28 G
LYMPHOCYTES # BLD AUTO: 0.6 K/UL (ref 1–4.8)
LYMPHOCYTES # BLD AUTO: 0.6 K/UL (ref 1–4.8)
LYMPHOCYTES NFR BLD: 10 % (ref 18–48)
LYMPHOCYTES NFR BLD: 9.1 % (ref 18–48)
MAGNESIUM SERPL-MCNC: 1.5 MG/DL (ref 1.6–2.6)
MAGNESIUM SERPL-MCNC: 1.6 MG/DL (ref 1.6–2.6)
MCH RBC QN AUTO: 25.3 PG (ref 27–31)
MCH RBC QN AUTO: 25.5 PG (ref 27–31)
MCHC RBC AUTO-ENTMCNC: 29.8 G/DL (ref 32–36)
MCHC RBC AUTO-ENTMCNC: 30.2 G/DL (ref 32–36)
MCV RBC AUTO: 84 FL (ref 82–98)
MCV RBC AUTO: 85 FL (ref 82–98)
MONOCYTES # BLD AUTO: 0.5 K/UL (ref 0.3–1)
MONOCYTES # BLD AUTO: 0.6 K/UL (ref 0.3–1)
MONOCYTES NFR BLD: 8.4 % (ref 4–15)
MONOCYTES NFR BLD: 8.5 % (ref 4–15)
MV PEAK A VEL: 0.26 M/S
MV PEAK E VEL: 1.4 M/S
NEUTROPHILS # BLD AUTO: 4.4 K/UL (ref 1.8–7.7)
NEUTROPHILS # BLD AUTO: 5.5 K/UL (ref 1.8–7.7)
NEUTROPHILS NFR BLD: 77.5 % (ref 38–73)
NEUTROPHILS NFR BLD: 80.2 % (ref 38–73)
PCO2 BLDA: 49.8 MMHG (ref 35–45)
PH SMN: 7.3 [PH] (ref 7.35–7.45)
PHOSPHATE SERPL-MCNC: 3.4 MG/DL (ref 2.7–4.5)
PHOSPHATE SERPL-MCNC: 3.7 MG/DL (ref 2.7–4.5)
PISA TR MAX VEL: 2.99 M/S
PLATELET # BLD AUTO: 152 K/UL (ref 150–350)
PLATELET # BLD AUTO: 155 K/UL (ref 150–350)
PMV BLD AUTO: 9.2 FL (ref 9.2–12.9)
PMV BLD AUTO: 9.3 FL (ref 9.2–12.9)
PO2 BLDA: 26 MMHG (ref 40–60)
POC BE: -2 MMOL/L
POC SATURATED O2: 40 % (ref 95–100)
POC TCO2: 26 MMOL/L (ref 24–29)
POCT GLUCOSE: 115 MG/DL (ref 70–110)
POTASSIUM SERPL-SCNC: 4.1 MMOL/L (ref 3.5–5.1)
POTASSIUM SERPL-SCNC: 4.6 MMOL/L (ref 3.5–5.1)
PROT SERPL-MCNC: 6.8 G/DL (ref 6–8.4)
PROT SERPL-MCNC: 7.3 G/DL (ref 6–8.4)
PROTHROMBIN TIME: 29.4 SEC (ref 9–12.5)
RBC # BLD AUTO: 3.59 M/UL (ref 4.6–6.2)
RBC # BLD AUTO: 3.96 M/UL (ref 4.6–6.2)
RIGHT VENTRICULAR END-DIASTOLIC DIMENSION: 3.36 CM
SAMPLE: ABNORMAL
SITE: ABNORMAL
SODIUM SERPL-SCNC: 137 MMOL/L (ref 136–145)
SODIUM SERPL-SCNC: 138 MMOL/L (ref 136–145)
TR MAX PG: 35.76 MMHG
TROPONIN I SERPL DL<=0.01 NG/ML-MCNC: 0.01 NG/ML (ref 0–0.03)
WBC # BLD AUTO: 5.69 K/UL (ref 3.9–12.7)
WBC # BLD AUTO: 6.85 K/UL (ref 3.9–12.7)

## 2019-04-25 PROCEDURE — C9113 INJ PANTOPRAZOLE SODIUM, VIA: HCPCS | Performed by: STUDENT IN AN ORGANIZED HEALTH CARE EDUCATION/TRAINING PROGRAM

## 2019-04-25 PROCEDURE — 85025 COMPLETE CBC W/AUTO DIFF WBC: CPT | Mod: 91

## 2019-04-25 PROCEDURE — 97165 OT EVAL LOW COMPLEX 30 MIN: CPT

## 2019-04-25 PROCEDURE — 85610 PROTHROMBIN TIME: CPT

## 2019-04-25 PROCEDURE — 83735 ASSAY OF MAGNESIUM: CPT

## 2019-04-25 PROCEDURE — 83605 ASSAY OF LACTIC ACID: CPT

## 2019-04-25 PROCEDURE — 63600175 PHARM REV CODE 636 W HCPCS: Performed by: STUDENT IN AN ORGANIZED HEALTH CARE EDUCATION/TRAINING PROGRAM

## 2019-04-25 PROCEDURE — 99221 1ST HOSP IP/OBS SF/LOW 40: CPT | Mod: ,,, | Performed by: STUDENT IN AN ORGANIZED HEALTH CARE EDUCATION/TRAINING PROGRAM

## 2019-04-25 PROCEDURE — 25000003 PHARM REV CODE 250: Performed by: FAMILY MEDICINE

## 2019-04-25 PROCEDURE — 80053 COMPREHEN METABOLIC PANEL: CPT

## 2019-04-25 PROCEDURE — 93010 ELECTROCARDIOGRAM REPORT: CPT | Mod: ,,, | Performed by: INTERNAL MEDICINE

## 2019-04-25 PROCEDURE — 97530 THERAPEUTIC ACTIVITIES: CPT

## 2019-04-25 PROCEDURE — 93005 ELECTROCARDIOGRAM TRACING: CPT

## 2019-04-25 PROCEDURE — 99900035 HC TECH TIME PER 15 MIN (STAT)

## 2019-04-25 PROCEDURE — 97116 GAIT TRAINING THERAPY: CPT

## 2019-04-25 PROCEDURE — 27000221 HC OXYGEN, UP TO 24 HOURS

## 2019-04-25 PROCEDURE — 84100 ASSAY OF PHOSPHORUS: CPT

## 2019-04-25 PROCEDURE — 63600175 PHARM REV CODE 636 W HCPCS: Performed by: FAMILY MEDICINE

## 2019-04-25 PROCEDURE — 80053 COMPREHEN METABOLIC PANEL: CPT | Mod: 91

## 2019-04-25 PROCEDURE — 36415 COLL VENOUS BLD VENIPUNCTURE: CPT

## 2019-04-25 PROCEDURE — 84484 ASSAY OF TROPONIN QUANT: CPT

## 2019-04-25 PROCEDURE — 63600175 PHARM REV CODE 636 W HCPCS

## 2019-04-25 PROCEDURE — 94761 N-INVAS EAR/PLS OXIMETRY MLT: CPT

## 2019-04-25 PROCEDURE — 84100 ASSAY OF PHOSPHORUS: CPT | Mod: 91

## 2019-04-25 PROCEDURE — 83735 ASSAY OF MAGNESIUM: CPT | Mod: 91

## 2019-04-25 PROCEDURE — 63600175 PHARM REV CODE 636 W HCPCS: Performed by: EMERGENCY MEDICINE

## 2019-04-25 PROCEDURE — 93010 EKG 12-LEAD: ICD-10-PCS | Mod: ,,, | Performed by: INTERNAL MEDICINE

## 2019-04-25 PROCEDURE — 11000001 HC ACUTE MED/SURG PRIVATE ROOM

## 2019-04-25 PROCEDURE — 25500020 PHARM REV CODE 255: Performed by: FAMILY MEDICINE

## 2019-04-25 PROCEDURE — 99221 PR INITIAL HOSPITAL CARE,LEVL I: ICD-10-PCS | Mod: ,,, | Performed by: STUDENT IN AN ORGANIZED HEALTH CARE EDUCATION/TRAINING PROGRAM

## 2019-04-25 PROCEDURE — 97161 PT EVAL LOW COMPLEX 20 MIN: CPT

## 2019-04-25 PROCEDURE — 83880 ASSAY OF NATRIURETIC PEPTIDE: CPT

## 2019-04-25 PROCEDURE — 87070 CULTURE OTHR SPECIMN AEROBIC: CPT

## 2019-04-25 PROCEDURE — 87075 CULTR BACTERIA EXCEPT BLOOD: CPT

## 2019-04-25 PROCEDURE — 82803 BLOOD GASES ANY COMBINATION: CPT

## 2019-04-25 PROCEDURE — 25000003 PHARM REV CODE 250: Performed by: STUDENT IN AN ORGANIZED HEALTH CARE EDUCATION/TRAINING PROGRAM

## 2019-04-25 RX ORDER — MORPHINE SULFATE 2 MG/ML
4 INJECTION, SOLUTION INTRAMUSCULAR; INTRAVENOUS ONCE
Status: COMPLETED | OUTPATIENT
Start: 2019-04-25 | End: 2019-04-25

## 2019-04-25 RX ORDER — NITROGLYCERIN 0.4 MG/1
0.4 TABLET SUBLINGUAL EVERY 5 MIN PRN
Status: COMPLETED | OUTPATIENT
Start: 2019-04-25 | End: 2019-04-25

## 2019-04-25 RX ORDER — MORPHINE SULFATE 4 MG/ML
INJECTION, SOLUTION INTRAMUSCULAR; INTRAVENOUS
Status: DISPENSED
Start: 2019-04-25 | End: 2019-04-26

## 2019-04-25 RX ORDER — FUROSEMIDE 10 MG/ML
40 INJECTION INTRAMUSCULAR; INTRAVENOUS ONCE
Status: COMPLETED | OUTPATIENT
Start: 2019-04-25 | End: 2019-04-25

## 2019-04-25 RX ORDER — NITROGLYCERIN 0.4 MG/1
TABLET SUBLINGUAL
Status: COMPLETED
Start: 2019-04-25 | End: 2019-04-25

## 2019-04-25 RX ORDER — MORPHINE SULFATE 2 MG/ML
4 INJECTION, SOLUTION INTRAMUSCULAR; INTRAVENOUS ONCE
Status: DISCONTINUED | OUTPATIENT
Start: 2019-04-25 | End: 2019-04-27 | Stop reason: HOSPADM

## 2019-04-25 RX ORDER — DIPHENHYDRAMINE HYDROCHLORIDE 50 MG/ML
50 INJECTION INTRAMUSCULAR; INTRAVENOUS ONCE
Status: COMPLETED | OUTPATIENT
Start: 2019-04-25 | End: 2019-04-25

## 2019-04-25 RX ORDER — WARFARIN SODIUM 5 MG/1
10 TABLET ORAL DAILY
Status: DISCONTINUED | OUTPATIENT
Start: 2019-04-25 | End: 2019-04-27 | Stop reason: HOSPADM

## 2019-04-25 RX ORDER — HYDROCODONE BITARTRATE AND ACETAMINOPHEN 5; 325 MG/1; MG/1
1 TABLET ORAL EVERY 6 HOURS PRN
Status: DISCONTINUED | OUTPATIENT
Start: 2019-04-25 | End: 2019-04-27 | Stop reason: HOSPADM

## 2019-04-25 RX ORDER — MAGNESIUM SULFATE HEPTAHYDRATE 40 MG/ML
2 INJECTION, SOLUTION INTRAVENOUS ONCE
Status: COMPLETED | OUTPATIENT
Start: 2019-04-25 | End: 2019-04-26

## 2019-04-25 RX ADMIN — IOHEXOL 100 ML: 350 INJECTION, SOLUTION INTRAVENOUS at 06:04

## 2019-04-25 RX ADMIN — NITROGLYCERIN 0.4 MG: 0.4 TABLET, ORALLY DISINTEGRATING SUBLINGUAL at 07:04

## 2019-04-25 RX ADMIN — LISINOPRIL 2.5 MG: 2.5 TABLET ORAL at 09:04

## 2019-04-25 RX ADMIN — VANCOMYCIN HYDROCHLORIDE 2000 MG: 100 INJECTION, POWDER, LYOPHILIZED, FOR SOLUTION INTRAVENOUS at 09:04

## 2019-04-25 RX ADMIN — PANTOPRAZOLE SODIUM 40 MG: 40 INJECTION, POWDER, LYOPHILIZED, FOR SOLUTION INTRAVENOUS at 09:04

## 2019-04-25 RX ADMIN — WARFARIN SODIUM 10 MG: 5 TABLET ORAL at 05:04

## 2019-04-25 RX ADMIN — MICONAZOLE NITRATE: 20 CREAM TOPICAL at 09:04

## 2019-04-25 RX ADMIN — MORPHINE SULFATE 4 MG: 2 INJECTION, SOLUTION INTRAMUSCULAR; INTRAVENOUS at 08:04

## 2019-04-25 RX ADMIN — CEFEPIME HYDROCHLORIDE 2 G: 2 INJECTION, SOLUTION INTRAVENOUS at 12:04

## 2019-04-25 RX ADMIN — METOPROLOL SUCCINATE 100 MG: 25 TABLET, EXTENDED RELEASE ORAL at 09:04

## 2019-04-25 RX ADMIN — CEFEPIME HYDROCHLORIDE 2 G: 2 INJECTION, SOLUTION INTRAVENOUS at 03:04

## 2019-04-25 RX ADMIN — HYDROCODONE BITARTRATE AND ACETAMINOPHEN 1 TABLET: 5; 325 TABLET ORAL at 05:04

## 2019-04-25 RX ADMIN — NITROGLYCERIN 0.4 MG: 0.4 TABLET SUBLINGUAL at 07:04

## 2019-04-25 RX ADMIN — DIPHENHYDRAMINE HYDROCHLORIDE 50 MG: 50 INJECTION, SOLUTION INTRAMUSCULAR; INTRAVENOUS at 07:04

## 2019-04-25 RX ADMIN — FUROSEMIDE 40 MG: 10 INJECTION, SOLUTION INTRAVENOUS at 06:04

## 2019-04-25 NOTE — ASSESSMENT & PLAN NOTE
Echo 09/2017 EF 40% with severe MR   Repeat Echo Normal left ventricular systolic function. The estimated ejection fraction is 55%  · Mild left atrial enlargement.  · Mild right atrial enlargement.  · Normal right ventricular systolic function.  · Atrial fibrillation observed.  Daily weight   Strict I&O   Resume Ace and BB

## 2019-04-25 NOTE — ASSESSMENT & PLAN NOTE
Cardiology consulted and recommend   Previous bilateral CIV stents and PTA to IVC in 2014  BLE venous ultrasound with no evidence of DVT; INR 2.4 yesterday but subtherapeutic earlier this month  Profound swelling to LLE concerning for possibility of occlusion to right CIV stent or IVC-unable to see with ultrasound  Will order CTA abdomen/pelvis with triple phase for evaluation of patency of bilateral CIV and IVC  Continue Coumadin for now; if CTA abnormal then will need repeat venogram but timing to be determined given candidal infection to groin area

## 2019-04-25 NOTE — PT/OT/SLP EVAL
"Physical Therapy Evaluation    Patient Name:  Drew Farrar   MRN:  [id number]    Recommendations:     Discharge Recommendations:  home health PT, home health OT   Discharge Equipment Recommendations: walker, rolling, wheelchair, manual(bariatric)   Barriers to discharge: None    Assessment:     Drew Farrar is a 53 y.o. male admitted with a medical diagnosis of Cellulitis of lower extremity.  He presents with the following impairments/functional limitations:  impaired endurance, gait instability, impaired functional mobilty, impaired self care skills, impaired balance, impaired cognition, impaired skin, edema, decreased ROM, pain, decreased lower extremity function, weakness .    Rehab Prognosis: Good; patient would benefit from acute skilled PT services to address these deficits and reach maximum level of function.    Recent Surgery: * No surgery found *      Plan:     During this hospitalization, patient to be seen 6 x/week to address the identified rehab impairments via gait training, therapeutic activities, therapeutic exercises and progress toward the following goals:    · Plan of Care Expires:  05/25/19    Subjective     Chief Complaint: Patient reports pain in RLE and swelling; mother present reports pt is "lazy" at home; limited activities and amb   Patient/Family Comments/goals: pt's mother would like pt to be able to increase his activity and be better able to care for himself; pt reports increased falls at home   Pain/Comfort:  · Pain Rating 1: (did not rate)  · Location - Side 1: Right  · Location - Orientation 1: generalized  · Location 1: leg  · Pain Addressed 1: Reposition, Nurse notified, Distraction, Cessation of Activity  · Pain Rating Post-Intervention 1: (did not rate)    Patients cultural, spiritual, Restoration conflicts given the current situation: no    Living Environment:  Pt lives with significant other in [facility], threshold [place]; tub/shower combo  Prior to admission, patients level of " function was assisted by S.O. For LBD RLE 2/2 pain and increased swelling; states Maribel for amb without DME, but reports falls- pt cruises inside the home using furniture and walls for support as needed; S.O. Performs IADLs.  Equipment used at home: cane, straight, bath bench, walker, standard(walker is not adequate or safe for the pt-needs bariatric rolling walker).  Upon discharge, patient will have assistance from S.O.; insurance transportation for needs.    Objective:     Communicated with nurse prior to session.  Patient found with peripheral IV  upon PT entry to room.    General Precautions: Standard, fall   Orthopedic Precautions:N/A   Braces: N/A     Exams:  · Cognitive Exam:  Patient is oriented to Person, Place, Time, Situation and follows multistep commands  · Gross Motor Coordination:  slow movement 2/2 increased LE swelling and pain  · Postural Exam:  Patient presented with the following abnormalities:    · -       Rounded shoulders  · Sensation: denies paresthesias  · Skin Integrity/Edema:      · -       Skin integrity: Wound LLE; RLE with redness and swelling  · -       Edema: Severe RLE  · RLE ROM: WFL  · RLE Strength: 3- to 4- functionally 2/2 pain and swelling through observation  · LLE ROM: WFL  · LLE Strength: ~4- to 4 grossly    Functional Mobility:  · Bed Mobility:     · Scooting: stand by assistance  · Supine to Sit: stand by assistance  · Sit to Supine: stand by assistance  · Transfers:     · Sit to Stand:  contact guard assistance and minimum assistance with rolling walker  · Gait: Patient amb 15ft using RW and CGA; VCs/TCs for proper technique with use of RW and erect posture; instructed pt in using RW to reduce wt on RLE to decrease pain while amb; slow vannessa, forward trunk and FHP causing RW to be placed too far ahead  · Balance: sit~fair+; stand/amb~fair      Therapeutic Activities and Exercises:   Pt performed skills as described above; Educated pt on importance of balance between  rest/elevation of RLE and activity; instructed in pursed lip breathing as pt demonstrates SOB during min exertional activities and as an exercise with return demo; VCs given during amb as described above; Educated pt and mom on DME recs    AM-PAC 6 CLICK MOBILITY  Total Score:17     Patient left HOB elevated with all lines intact, call button in reach, bed alarm on, nurse notified, mother present and elevated lower end of bed..    GOALS:   Multidisciplinary Problems     Physical Therapy Goals        Problem: Physical Therapy Goal    Goal Priority Disciplines Outcome Goal Variances Interventions   Physical Therapy Goal     PT, PT/OT Ongoing (interventions implemented as appropriate)     Description:  Goals to be met by: 2019     Patient will increase functional independence with mobility by performin. Supine to sit with Modified [place]  2. Sit to supine with Modified [place]  3. Sit to stand transfer with Supervision  4. Gait  x 50 feet with Supervision using bariatric Rolling Walker.   5. Lower extremity exercise program x10 reps with supervision                      History:     Past Medical History:   Diagnosis Date    *Atrial fibrillation     Atrial fibrillation     Atrial fibrillation 2016    Bipolar disorder     Congenital heart disease     s/p surgical intervention 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 of age    Deep vein thrombosis     DVT of leg (deep venous thrombosis)     left leg    History of prior ablation treatment     10/9/13    Hypertension     Obesity     Stroke     Thyroid disease     Venous stasis ulcer of lower extremity, unspecified laterality 2012    Venous ulcer        Past Surgical History:   Procedure Laterality Date    ANGIOPLASTY      ARTHROSCOPY-KNEE W/ CHONDROPLASTY Right 2016    [facility]    CARDIAC SURGERY      open heart surgery 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 old    EYE SURGERY      left eye cataract/right eye glaucoma    TIMO  FILTER PLACEMENT      Dr Calix ([facility])    KNEE SURGERY      l and r     MULTIPLE TOOTH EXTRACTIONS      Sclerotherapy N/A 2/21/2019    Performed by [facility] at [facility] CATH LAB/EP    SKIN GRAFT      left leg    SYNOVECTOMY-KNEE Right 2/23/2016    Per[facility] by [facility] at [facility] OR       Time Tracking:     PT Received On: 04/25/19  PT Start Time: 1133     PT Stop Time: 1209  PT Total Time (min): 36 min with OT    Billable Minutes: Evaluation 15 minutes GT 8 minutes      Colette Rojas, PT  04/25/2019

## 2019-04-25 NOTE — ASSESSMENT & PLAN NOTE
SIRS 3/4  Heart rate 123  Temp 102.6  RR 22  WBC 9.89  Possible sources of infection from chronic venous stasis wounds ?  Wound cx from previous show micro microbial growth including MRSA   Given 1 liter NS   Due to severity of CHF, use fluids modestly   Started on Vancomycin in ED   Will add cefepime to broaden abx   Bcx NGTD   Ucx pending  Wcx pending   CXR concerning for pulmonary edema   Imaging of the legs concerning for cellulitis only   WBC trending down 6.85  Patient afebrile over night   De-escalate abx to only vancomycin  Believe this to be only cellulitis, based on new evidence

## 2019-04-25 NOTE — PLAN OF CARE
Problem: Physical Therapy Goal  Goal: Physical Therapy Goal  Goals to be met by: 2019     Patient will increase functional independence with mobility by performin. Supine to sit with Modified [place]  2. Sit to supine with Modified [place]  3. Sit to stand transfer with Supervision  4. Gait  x 50 feet with Supervision using bariatric Rolling Walker.   5. Lower extremity exercise program x10 reps with supervision     Outcome: Ongoing (interventions implemented as appropriate)  Patient evaluated; full report to follow; recommending acute PT services while in hospital and follow up with HH PT/OT to maximize functional independence; also recommend bariatric RW and bariatric w/c.

## 2019-04-25 NOTE — PROGRESS NOTES
Pharmacy Warfarin Education Note     Drew Farrar was offered Warfarin Education on 04/25/2019.  The patient and/or family/caregiver was present and accepted education, using teachback method. .    Patient specific concerns or situations: n/a    Warfarin education materials provided and information discussed during the education process included:  1. What warfarin is  2. Indication, current dose,how to take warfarin, what time of day to take warfarin,      Follow-up appointment for monitoring  3. What to do if you miss a dose  4. Drug interactions associated with the use of anticoagulants (I.e. Warfarin), such as        the use of over the counter medications (e.g. Aspirin, acetaminophen, ibuprofen),        prescription drugs, and herbal supplements. Notify prescribing doctor of any        changes in medications.  5.  Side effects of taking warfarin, increased bleeding risk, when to call the prescribing       physician, when to seek emergency help  6.  Monitoring blood levels (PT/INR)  7.  Eating habits and being consistent, Vitamin K rich foods, effects of diet on blood       Levels  8.  Recommendation of purchasing a medical bracelet or carrying a ID card to alert medical staff if you become ill  9.  Notifying physicians of procedures, medical or dental        Anticoagulants       Ordered     Route Frequency Start Stop    04/25/19 1132  Coumadin      Oral Daily 04/25/19 1700 --          Lab Results   Component Value Date/Time    INR 2.9 (H) 04/25/2019 07:48 AM    INR 2.4 (H) 04/24/2019 02:51 PM    INR 1.5 (H) 04/22/2019 10:45 AM    INR 8.3 03/15/2019   ;  Lab Results   Component Value Date/Time    HGB 9.1 (L) 04/25/2019 07:48 AM    HGB 10.6 (L) 04/24/2019 03:28 PM    HGB 10.6 (L) 04/15/2019 11:24 AM

## 2019-04-25 NOTE — PLAN OF CARE
Pt reports he lives with family ; no hh. Pt stated  his mother or friend Frederick will pick him up when discharged.  Pt sees Dr. Gutierrez [facility].    Tn gave pt d/c brochure and card and encouraged to call for any needs/concerrns.         04/25/19 1139   Discharge Assessment   Assessment Type Discharge Planning Assessment   Confirmed/corrected address and phone number on facesheet? Yes   Assessment information obtained from? Patient   Expected Length of Stay (days) 2   Communicated expected length of stay with patient/caregiver yes   Prior to hospitilization cognitive status: Alert/Oriented   Prior to hospitalization functional status: Independent;Assistive Equipment   Current cognitive status: Alert/Oriented   Current Functional Status: Independent;Assistive Equipment   Lives With spouse   Able to Return to Prior Arrangements yes   Is patient able to care for self after discharge? Yes   Who are your caregiver(s) and their phone number(s)? Naima (mother) [phone]   Patient's perception of discharge disposition home or selfcare   Readmission Within the Last 30 Days no previous admission in last 30 days   Patient currently being followed by outpatient case management? No   Patient currently receives any other outside agency services? No   Equipment Currently Used at Home walker, standard;cane, straight   Do you have any problems affording any of your prescribed medications? No   Is the patient taking medications as prescribed? yes   Does the patient have transportation home? Yes   Transportation Anticipated family or friend will provide;health plan transportation   Does the patient receive services at the [facility]? No   Discharge Plan A Home with family   Discharge Plan B Home with family;Home Health   DME Needed Upon Discharge  none   Patient/Family in Agreement with Plan yes

## 2019-04-25 NOTE — ASSESSMENT & PLAN NOTE
-previous bilateral CIV stents and PTA to IVC in 2014  -BLE venous ultrasound with no evidence of DVT; INR 2.4 yesterday but subtherapeutic earlier this month  -profound swelling to LLE concerning for possibility of occlusion to right CIV stent or IVC-unable to see with ultrasound  -will order CTA abdomen/pelvis with triple phase for evaluation of patency of bilateral CIV and IVC  -continue Coumadin for now; if CTA abnormal then will need repeat venogram but timing to be determined given candidal infection to groin area

## 2019-04-25 NOTE — PLAN OF CARE
Progress notes reviewed. Evening rounds completed.Admit questions to be completed once pt. Is awake . Pt has eyes closed resps even unlabored . Spoke with Dr. Rosenberg for Oxygen orders. BLE ultrasound also completed.   Introduced self as VN for this shift. Educated pt on VN's role in patient's care.  Plan of care reviewed with patient. Opportunity given for pt's questions. No questions or concerns expressed at this time. VN to continue to monitor.

## 2019-04-25 NOTE — HPI
52yo male with history of May Thurner syndrome s/p bilateral CIV stents s/p IVC PTA, DVTs on Coumadin, HTN, chronic systolic heart failure, MR, permanent afib and venous insufficiency who presented to the ER with complaints of fever, abdominal pain, nausea and vomiting. He complains of GI symptoms for the past few days followed by fever prompting presentation to the ER. He reports compliance with his medication and continued adequate food/fluid intake. He denies any chest pain or SOB. He does complain of BLE edema right greater than left for the past month. He is followed in the Wound care clinic for venous stasis ulcers. He was treated with Doxycycline for some eye issue. He takes Coumadin as well with variable INRs over the past month

## 2019-04-25 NOTE — SUBJECTIVE & OBJECTIVE
Interval History: The patient was seen and examined this morning at bedside, the patient is resting comfortably in NAD. The patient has no acute complaints this morning. The patient is tolerating diet well, good urinary output and has worked with PT/OT. The patient feels significantly better than on admission. The nurse reports no acute events over night. The patient denies any chest pain, SOB or focal neuro deficits       Review of Systems   Constitutional: Positive for fever (resolved). Negative for activity change, chills and fatigue.   HENT: Negative for congestion and ear pain.    Eyes: Negative for pain.   Respiratory: Negative for apnea, chest tightness, shortness of breath and wheezing.    Cardiovascular: Positive for leg swelling (chronically). Negative for chest pain and palpitations.   Gastrointestinal: Positive for abdominal pain (resolved), nausea (resolved) and vomiting (resolved). Negative for abdominal distention, constipation, diarrhea and rectal pain.   Endocrine: Negative for polydipsia.   Genitourinary: Negative for dysuria, flank pain and hematuria.   Musculoskeletal: Negative for arthralgias, back pain, neck pain and neck stiffness.   Skin: Positive for rash (groin ) and wound (chronic BLE).   Neurological: Negative for dizziness, tremors, facial asymmetry, speech difficulty and light-headedness.   Psychiatric/Behavioral: Negative for agitation.     Objective:     Vital Signs (Most Recent):  Temp: 98.5 °F (36.9 °C) (04/25/19 1215)  Pulse: 93 (04/25/19 1215)  Resp: 18 (04/25/19 1215)  BP: 123/62 (04/25/19 1215)  SpO2: 97 % (04/25/19 0028) Vital Signs (24h Range):  Temp:  [97.5 °F (36.4 °C)-102.6 °F (39.2 °C)] 98.5 °F (36.9 °C)  Pulse:  [] 93  Resp:  [18-26] 18  SpO2:  [95 %-98 %] 97 %  BP: (107-129)/(56-65) 123/62     Weight: (!) 165.8 kg (365 lb 8.4 oz)  Body mass index is 40.15 kg/m².    Intake/Output Summary (Last 24 hours) at 4/25/2019 1253  Last data filed at 4/25/2019 0940  Gross per  24 hour   Intake 1265 ml   Output 950 ml   Net 315 ml      Physical Exam   Constitutional: He appears well-developed and well-nourished. He is cooperative.  Non-toxic appearance.   Morbidly obese    HENT:   Head: Normocephalic and atraumatic.   Right Ear: External ear normal.   Left Ear: External ear normal.   Nose: Nose normal.   Mouth/Throat: Oropharynx is clear and moist.   Eyes:   strabismus    Cardiovascular: Normal rate. An irregularly irregular rhythm present.   No murmur heard.  Pulmonary/Chest: Effort normal.   Abdominal: Normal appearance and bowel sounds are normal. There is tenderness in the epigastric area. There is no rigidity, no guarding, no tenderness at McBurney's point and negative Frederick's sign.   Musculoskeletal: Normal range of motion. He exhibits edema and tenderness. He exhibits no deformity.   RLE significantly larger than the LLE   Tenderness to palpation RLE inner thigh with surrounding erythema much improved from previous day    Feet:   Right Foot:   Skin Integrity: Positive for skin breakdown, callus and dry skin.   Left Foot:   Skin Integrity: Positive for skin breakdown, callus and dry skin.   Neurological: He is alert.   Skin: Skin is warm and dry. Rash (groin) noted. Rash is maculopapular.   Chronic venous stasis wounds BLE , with skin changes    Nursing note and vitals reviewed.      Significant Labs:   A1C: No results for input(s): HGBA1C in the last 4320 hours.  Blood Culture:   Recent Labs   Lab 04/24/19  1453 04/24/19  1523   LABBLOO No Growth to date No Growth to date     CBC:   Recent Labs   Lab 04/24/19  1528 04/25/19  0748   WBC 9.98 6.85   HGB 10.6* 9.1*   HCT 35.2* 30.5*    152     CMP:   Recent Labs   Lab 04/24/19  1451 04/25/19  0748    138   K 4.8 4.1   * 109   CO2 19* 24   GLU 93 81   BUN 19 17   CREATININE 0.9 0.9   CALCIUM 9.2 8.6*   PROT 7.2 6.8   ALBUMIN 3.5 3.0*   BILITOT 0.6 0.8   ALKPHOS 116 95   AST 24 20   ALT 22 17   ANIONGAP 10 5*    EGFRNONAA >60 >60     Lactic Acid:   Recent Labs   Lab 04/24/19  1508   LACTATE 1.3     Magnesium:   Recent Labs   Lab 04/25/19  0748   MG 1.6     TSH: No results for input(s): TSH in the last 4320 hours.  Urine Studies:   Recent Labs   Lab 04/24/19  1748   COLORU Yellow   APPEARANCEUA Clear   PHUR 5.0   SPECGRAV 1.025   PROTEINUA Negative   GLUCUA Negative   KETONESU Negative   BILIRUBINUA Negative   OCCULTUA Trace*   NITRITE Negative   UROBILINOGEN Negative   LEUKOCYTESUR Negative       Significant Imaging:  US Lower Extremity Veins Bilateral   Final Result      No evidence of deep venous thrombosis in either lower extremity.         Electronically signed by: Drew Vazquez MD   Date:    04/24/2019   Time:    22:19      X-Ray Chest PA And Lateral   Final Result      As above.         Electronically signed by: Sohail Kline MD   Date:    04/24/2019   Time:    18:18      CTA Chest Non Coronary   Final Result      1. No evidence of PE.   2. Dilated pulmonary artery which may be seen in setting of pulmonary artery hypertension.   3. Mild patchy scattered ground-glass attenuation seen in the lungs which may in part relate to respiratory motion artifact, however similar findings can be seen with mild pulmonary edema or nonspecific small airways infectious or non infectious inflammatory process.  No focal consolidation seen.   4. Additional findings as detailed above.         Electronically signed by: Sohail Kline MD   Date:    04/24/2019   Time:    18:14      CT Thigh Without Contrast Right   Final Result      Extensive subcutaneous soft tissue edema throughout the lower leg with diffuse circumferential skin thickening which may be seen in the setting of cellulitis.         Electronically signed by: Sohail Kline MD   Date:    04/24/2019   Time:    18:05      CT Leg (Tibia-Fibula) Without Contrast Right   Final Result      Extensive subcutaneous soft tissue edema throughout the lower leg with diffuse  circumferential skin thickening which may be seen in the setting of cellulitis.         Electronically signed by: Sohail Kline MD   Date:    04/24/2019   Time:    18:05      CTA Abdomen and Pelvis    (Results Pending)      I have reviewed and interpreted all pertinent imaging results/findings within the past 24 hours.

## 2019-04-25 NOTE — ASSESSMENT & PLAN NOTE
Patient seen the day previous in the wound care center   Noted increased rubor, dolar and calor to the RLE   Venous doppler negative DVT   CT leg shows  Extensive subcutaneous soft tissue edema throughout the lower leg with diffuse circumferential skin thickening which may be seen in the setting of cellulitis   Wound cx pending   Start broad spectrum antibiotics, given hx of MRSA will de-escalate and continue on vancomycin   Contact precautions

## 2019-04-25 NOTE — PROGRESS NOTES
Pharmacokinetic Assessment Follow Up: IV Vancomycin    Vancomycin Regimen Plan:    Loading dose of 3g given on 4/24@1709. Will initiate 2g q12h as maintenance regimen to begin today. Trough ordered for prior to the 4th total dose on 4/26@0900.    Pharmacy will continue to follow and monitor vancomycin.    Please contact pharmacy for questions regarding this assessment.    Thank you for the consult,   Pastora Portillo     Patient brief summary:  Drew Farrar is a 53 y.o. male initiated on antimicrobial therapy with IV Vancomycin for treatment of suspected skin & soft tissue    The patient received a loading dose, followed by the current treatment regimen: vancomycin 2000 mg IV every 12 hours    Drug Allergies:   Review of patient's allergies indicates:   Allergen Reactions    Food allergy formula [glutamine-c-quercet-selen-brom]      Allergic to green peas; Heart failure.    Peas Hives    Ibuprofen Swelling    Latex, natural rubber Hives    Pcn [penicillins] Hives    Butisol [butabarbital] Rash     Peeling skin       Actual Body Weight:   164kg    Renal Function:   Estimated Creatinine Clearance: 170.2 mL/min (based on SCr of 0.9 mg/dL).,     Dialysis Method (if applicable):  none     CBC (last 72 hours):  Recent Labs   Lab Result Units 04/24/19  1528 04/25/19  0748   WBC K/uL 9.98 6.85   Hemoglobin g/dL 10.6* 9.1*   Hematocrit % 35.2* 30.5*   Platelets K/uL 187 152   Gran% % 90.8*  --    Lymph% % 4.7*  --    Mono% % 3.4*  --    Eosinophil% % 0.8  --    Basophil% % 0.1  --    Differential Method  Automated  --        Metabolic Panel (last 72 hours):  Recent Labs   Lab Result Units 04/24/19  1451 04/24/19  1748   Sodium mmol/L 142  --    Potassium mmol/L 4.8  --    Chloride mmol/L 113*  --    CO2 mmol/L 19*  --    Glucose mg/dL 93  --    Glucose, UA   --  Negative   BUN, Bld mg/dL 19  --    Creatinine mg/dL 0.9  --    Albumin g/dL 3.5  --    Total Bilirubin mg/dL 0.6  --    Alkaline Phosphatase U/L 116  --    AST U/L  24  --    ALT U/L 22  --        Vancomycin Administrations:  [unfilled]    Drug levels (last 3 results):  No results for input(s): VANCOMYCINRA, VANCOMYCINPE, VANCOMYCINTR, VANCOTROUGH in the last 72 hours.    Microbiologic Results:  Microbiology Results (last 7 days)       Procedure Component Value Units Date/Time    Culture, Anaerobe [[id number]] Collected:  04/25/19 0131    Order Status:  Sent Specimen:  Wound from Foot, Left Updated:  04/25/19 0433    Aerobic culture [[id number]] Collected:  04/25/19 0131    Order Status:  Sent Specimen:  Wound from Foot, Left Updated:  04/25/19 0433    Blood Culture #2 **CANNOT BE ORDERED STAT** [[id number]] Collected:  04/24/19 1523    Order Status:  Completed Specimen:  Blood from Peripheral, Antecubital, Right Updated:  04/25/19 0315     Blood Culture, Routine No Growth to date    Blood Culture #1 **CANNOT BE ORDERED STAT** [[id number]] Collected:  04/24/19 1453    Order Status:  Completed Specimen:  Blood from Peripheral, Forearm, Left Updated:  04/25/19 0315     Blood Culture, Routine No Growth to date    Blood culture [[id number]]     Order Status:  Canceled Specimen:  Blood     Blood culture [[id number]]     Order Status:  Canceled Specimen:  Blood

## 2019-04-25 NOTE — PLAN OF CARE
Problem: Fall Injury Risk  Goal: Absence of Fall and Fall-Related Injury  Outcome: Ongoing (interventions implemented as appropriate)       Problem: Adult Inpatient Plan of Care  Goal: Plan of Care Review  Outcome: Ongoing (interventions implemented as appropriate)  Patient AAOx4, VSS, Patient tolerating activity, ambulating to bathroom with standby assist. Free from falls. Bed alarm in place. SCDs In place. IV antibiotics given per MD order. Patient denies any pain. Patient tolerating diet, no nausea or vomiting. Family at bedside. Safety maintained, will continue to monitor.     Problem: Wound  Goal: Optimal Wound Healing  Outcome: Ongoing (interventions implemented as appropriate)       Problem: Infection  Goal: Infection Symptom Resolution  Outcome: Ongoing (interventions implemented as appropriate)

## 2019-04-25 NOTE — NURSING
Dr. Rosebnerg  Informed pt is having pain to his knee . Pain rate is currently 9. Will continue to monitor.

## 2019-04-25 NOTE — CONSULTS
[facility]  Cardiology  Consult Note    Patient Name: Drew Farrar  MRN: [id number]  Admission Date: 4/24/2019  Hospital Length of Stay: 1 days  Code Status: Full Code   Attending Provider: Dayami Miguel MD   Consulting Provider: BONIFACIO Cooper ANP  Primary Care Physician: David Larsen MD  Principal Problem:Sepsis    Patient information was obtained from patient, past medical records and ER records.     Inpatient consult to [facility]  Consult performed by: BONIFACIO Ryan, BENNETT  Consult ordered by: D'Amico C. Johnson, MD  Reason for consult: BLE swelling; May Thurner syndrom         Subjective:     Chief Complaint:  Abdominal pain; fever; n/v     HPI:   52yo male with history of May Thurner syndrome s/p bilateral CIV stents s/p IVC PTA, DVTs on Coumadin, HTN, chronic systolic heart failure, MR, permanent afib and venous insufficiency who presented to the ER with complaints of fever, abdominal pain, nausea and vomiting. He complains of GI symptoms for the past few days followed by fever prompting presentation to the ER. He reports compliance with his medication and continued adequate food/fluid intake. He denies any chest pain or SOB. He does complain of BLE edema right greater than left for the past month. He is followed in the Wound care clinic for venous stasis ulcers. He was treated with Doxycycline for some eye issue. He takes Coumadin as well with variable INRs over the past month     Hospital Course:    4/24/2019 Presented to the ER with complaints of fever, abdominal pain, nausea and vomiting. Temp 100.5 and WBC 9K upon admission. Swelling noted to LE with more pronounced swelling to RLE. CTA with no evidence of PE and notation of dilated PA. CT thigh & tib-fib with significant swelling concerning for cellulitis. BLE venous ultrasound with no evidence of DVT. CBC and BMP WNL. INR 2.4. Admitted to [facility] for sepsis/cellulitis   4/25/2019  Cardiology consulted for May Thurner syndrome. Echocardiogram pending. HR and BP stable.   Past Medical History:   Diagnosis Date    *Atrial fibrillation     Atrial fibrillation     Atrial fibrillation Feb 23, 2016    Bipolar disorder     Congenital heart disease     s/p surgical intervention 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 of age    Deep vein thrombosis     DVT of leg (deep venous thrombosis)     left leg    History of prior ablation treatment     10/9/13    Hypertension     Obesity     Stroke     Thyroid disease     Venous stasis ulcer of lower extremity, unspecified laterality 12/14/2012    Venous ulcer        Past Surgical History:   Procedure Laterality Date    ANGIOPLASTY      ARTHROSCOPY-KNEE W/ CHONDROPLASTY Right 2/23/2016    [facility]    CARDIAC SURGERY      open heart surgery 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 old    EYE SURGERY      left eye cataract/right eye glaucoma    TIMO FILTER PLACEMENT      Dr Calix ([facility])    KNEE SURGERY      l and r     MULTIPLE TOOTH EXTRACTIONS      Sclerotherapy N/A 2/21/2019    Performed by [facility]igua [facility] at [facility] CATH LAB/EP    SKIN GRAFT      left leg    SYNOVECTOMY-KNEE Right 2/23/2016    Per[facility] by [facility] at [facility] OR       Review of patient's allergies indicates:   Allergen Reactions    Food allergy formula [glutamine-c-quercet-selen-brom]      Allergic to green peas; Heart failure.    Peas Hives    Ibuprofen Swelling    Latex, natural rubber Hives    Pcn [penicillins] Hives    Butisol [butabarbital] Rash     Peeling skin       No current facility-administered medications on file prior to encounter.      Current Outpatient Medications on File Prior to Encounter   Medication Sig    acetaZOLAMIDE (DIAMOX) 250 MG tablet     DUREZOL 0.05 % Drop ophthalmic solution     enoxaparin (LOVENOX) 150 mg/mL Syrg Inject 1 mL (150 mg total) into the skin every 12 (twelve) hours.    HYDROcodone-acetaminophen (NORCO) 5-325 mg per tablet  Take 1 tablet by mouth every 6 (six) hours as needed for Pain.    lisinopril (PRINIVIL,ZESTRIL) 2.5 MG tablet TAKE 4 TABLETS (10 MG TOTAL) BY MOUTH ONCE DAILY.    methIMAzole (TAPAZOLE) 10 MG Tab Take 2 tablets (20 mg total) by mouth once daily.    metoprolol succinate (TOPROL-XL) 100 MG 24 hr tablet Take 1 tablet (100 mg total) by mouth once daily.    neomycin-polymyxin-dexamethasone (DEXACINE) 3.5 mg/g-10,000 unit/g-0.1 % Oint     ofloxacin (OCUFLOX) 0.3 % ophthalmic solution     ondansetron (ZOFRAN) 4 MG tablet Take 1 tablet (4 mg total) by mouth every 8 (eight) hours as needed.    torsemide (DEMADEX) 20 MG Tab TAKE 1 TABLET (20 MG TOTAL) BY MOUTH ONCE DAILY. (Patient taking differently: Take 40 mg by mouth once daily. )    warfarin (COUMADIN) 10 MG tablet Take 1.5-2 tablets (15-20 mg total) by mouth once daily.     Family History     Problem Relation (Age of Onset)    Diabetes Father, Maternal Grandfather    Heart disease Father, Maternal Grandmother, Maternal Grandfather    Stroke Maternal Grandfather        Tobacco Use    Smoking status: Former Smoker     Packs/day: 1.00     Years: 6.00     Pack years: 6.00     Types: Cigarettes    Smokeless tobacco: Former User    Tobacco comment: quit by age 27yrs old   Substance and Sexual Activity    Alcohol use: Yes     Alcohol/week: 0.6 oz     Types: 1 Glasses of wine per week     Comment: 1 glass of wine a week    Drug use: No    Sexual activity: Yes     Partners: Female     Birth control/protection: None     Review of Systems   Constitution: Positive for fever. Negative for chills, decreased appetite and diaphoresis.   Cardiovascular: Positive for leg swelling. Negative for chest pain, claudication, cyanosis, dyspnea on exertion, irregular heartbeat, near-syncope, orthopnea, palpitations, paroxysmal nocturnal dyspnea and syncope.   Respiratory: Negative for cough, hemoptysis, shortness of breath and wheezing.    Gastrointestinal: Positive for abdominal  pain, nausea and vomiting. Negative for bloating, constipation, diarrhea and melena.   Neurological: Negative for dizziness and weakness.     Objective:     Vital Signs (Most Recent):  Temp: 97.8 °F (36.6 °C) (04/25/19 0801)  Pulse: 89 (04/25/19 0801)  Resp: 18 (04/25/19 0801)  BP: 119/62 (04/25/19 0801)  SpO2: 97 % (04/25/19 0028) Vital Signs (24h Range):  Temp:  [97.5 °F (36.4 °C)-102.6 °F (39.2 °C)] 97.8 °F (36.6 °C)  Pulse:  [] 89  Resp:  [18-26] 18  SpO2:  [95 %-98 %] 97 %  BP: (107-129)/(56-65) 119/62     Weight: (!) 165.8 kg (365 lb 8.4 oz)  Body mass index is 40.15 kg/m².    SpO2: 97 %  O2 Device (Oxygen Therapy): nasal cannula      Intake/Output Summary (Last 24 hours) at 4/25/2019 1107  Last data filed at 4/25/2019 0940  Gross per 24 hour   Intake 1265 ml   Output 950 ml   Net 315 ml       Lines/Drains/Airways     Peripheral Intravenous Line                 Peripheral IV - Single Lumen 04/24/19 1501 22 G Left Hand less than 1 day                Physical Exam   Constitutional: He is oriented to person, place, and time. He appears well-developed and well-nourished. No distress.   Cardiovascular: Normal rate and regular rhythm. Exam reveals no gallop.   Murmur heard.  Pulmonary/Chest: Effort normal and breath sounds normal. No respiratory distress. He has no wheezes.   Abdominal: Soft. Bowel sounds are normal. He exhibits no distension. There is no tenderness.   Musculoskeletal: He exhibits edema (trace to 1+ LLE edema present; significant swelling to RLE up to 4+).   Neurological: He is alert and oriented to person, place, and time.   Skin: Skin is warm and dry.   Discoloration to BLE consistent with venous insufficiency        Significant Labs:     Recent Labs   Lab 04/25/19  0748      K 4.1      CO2 24   BUN 17   CREATININE 0.9   MG 1.6     Recent Labs   Lab 04/25/19  0748   WBC 6.85   RBC 3.59*   HGB 9.1*   HCT 30.5*      MCV 85   MCH 25.3*   MCHC 29.8*         Significant  Imaging:     TTE 4/25/2019 ordered with pending results    Assessment and Plan:     Chronic systolic heart failure  -previous echo with EF 40-45% and severe MR  -repeat echo pending today  -CXR with mild pulmonary edema; on Demadex at home but on hold for now due to concern for sepsis  -reasonable to continue to hold diuretics; does not appear to be ADHF on PE-lying flat with no SOB and no rales noted on exam  -continue BB and ACEI  -monitor fluid volume status with strict I&Os and daily weights; if ADHF occurs then will need IV diuretics otherwise may just need oral diuretics resumed once sepsis improves       Essential hypertension  -SBP 100s-120s  -continue BB and ACEI  -monitor BP and adjust meds prn     May-Thurner syndrome  -previous bilateral CIV stents and PTA to IVC in 2014  -BLE venous ultrasound with no evidence of DVT; INR 2.4 yesterday but subtherapeutic earlier this month  -profound swelling to LLE concerning for possibility of occlusion to right CIV stent or IVC-unable to see with ultrasound  -will order CTA abdomen/pelvis with triple phase for evaluation of patency of bilateral CIV and IVC  -continue Coumadin for now; if CTA abnormal then will need repeat venogram but timing to be determined given candidal infection to groin area         VTE Risk Mitigation (From admission, onward)        Ordered     warfarin (COUMADIN) tablet 10 mg  Daily      04/24/19 1952     IP VTE HIGH RISK PATIENT  Once      04/24/19 1952     Place TIM hose  Until discontinued      04/24/19 1952     Place sequential compression device  Until discontinued      04/24/19 1952          Thank you for your consult. I will follow-up with patient. Please contact us if you have any additional questions.    Prema Biswas APRN, ANP  Cardiology   [facility]

## 2019-04-25 NOTE — PT/OT/SLP EVAL
"Occupational Therapy   Evaluation    Name: Drew Farrar  MRN: [id number]  Admitting Diagnosis:  Cellulitis of lower extremity      Recommendations:     Discharge Recommendations: home health PT, home health OT  Discharge Equipment Recommendations:  walker, rolling, wheelchair, manual  Barriers to discharge:  None    Assessment:     Drew Farrar is a 53 y.o. male with a medical diagnosis of Cellulitis of lower extremity.  He presents with RLE pain; SOB; deconditioning. Performance deficits affecting function: impaired self care skills, impaired balance, impaired endurance, impaired functional mobilty, gait instability, decreased lower extremity function, impaired cognition, pain, decreased ROM, impaired skin, edema.      Pt would benefit from cont OT services in order to maximize functional independence. Recommending  HHOT/PT at d/c as well as bariatric RW and w/c     Rehab Prognosis: Good; patient would benefit from acute skilled OT services to address these deficits and reach maximum level of function.       Plan:     Patient to be seen 5 x/week to address the above listed problems via self-care/home management, therapeutic activities, therapeutic exercises  · Plan of Care Expires: 05/25/19  · Plan of Care Reviewed with: patient, mother    Subjective     Chief Complaint: Pt reporting pain in RLE and swelling; mother present reports pt is "lazy" at home; limited axs and ambulation   Patient/Family Comments/goals: mother reports for pt to increase ax and be able to better care for self; pt reports increased falls at home as well     Occupational Profile:  Living Environment: with significant other in [facility], threshold to enter, t/s combo  Previous level of function: pt reports assist from significant other for LBD RLE 2/2 pain and increased swelling; states mod I ambulation without DME, but reports falls; S.O. Performs IADLs   Equipment Used at Home:  cane, straight, walker, standard, bath bench  Assistance upon " Discharge: from S.O.; insurance transportation for needs     Pain/Comfort:  · Pain Rating 1: (did not rate )  · Location - Side 1: Right  · Location - Orientation 1: generalized  · Location 1: leg  · Pain Addressed 1: Reposition, Nurse notified, Distraction, Cessation of Activity  · Pain Rating Post-Intervention 1: (did not rate )    Patients cultural, spiritual, Cheondoism conflicts given the current situation:      Objective:     Communicated with: nsg prior to session.  General Precautions: Standard, fall   Orthopedic Precautions:N/A   Braces: N/A     Occupational Performance:    Bed Mobility:    · Patient completed Scooting/Bridging with stand by assistance  · Patient completed Supine to Sit with stand by assistance  · Patient completed Sit to Supine with stand by assistance    Functional Mobility/Transfers:  · Patient completed Sit <> Stand Transfer with contact guard assistance and minimum assistance  with  rolling walker   · Functional Mobility: CGA with RW; cues for proper/safe use of RW and upright posture; adaptive sequence education 2/2 RLE pain and swelling     Activities of Daily Living:  · Lower Body Dressing: SBA L sock ;unable to perform R sock   · Toileting: supervision seated use of urinal alongside EOB     Cognitive/Visual Perceptual:  Cognitive/Psychosocial Skills:     -       Oriented to: Person, Place, Time and Situation   -       Follows Commands/attention:Follows multistep  commands  -       Communication: clear/fluent  -       Memory: Impaired LTM  -       Safety awareness/insight to disability: impaired   -       Mood/Affect/Coping skills/emotional control: Appropriate to situation    Physical Exam:  Balance:    -       WFL sitting balance; CGA- Min A ambulation   Postural examination/scapula alignment:    -       Rounded shoulders  Skin integrity: Wound LLE; RLE with significant reddness and swelling   Edema:  Severe RLE   Dominant hand:    -       right   Upper Extremity Range of Motion:    BUE ROM WFL   Upper Extremity Strength:  BUE WFL    Strength:  Good     AMPA 6 Click ADL:  AMPA Total Score: 19    Treatment & Education:  Pt performing skills as above   Reporting RLE pain/edema   Increased time spent educating pt and mother on importance of ax tolerance and balancing rest/elevation of RLE   Focused on pursed lip breathing as pt demonstrating SOB with minimal axs; poor follow through of axs   Sat EOB for use of urinal with supervision/mod I   Functional mobility performed with RW; cues for correct/safe use of RW, adaptive sequence performed 2/2 RLE pain with increased ambulation and WB   Educated on DME recs   Education:    Patient left supine with all lines intact, call button in reach, nsg notified and family  present    GOALS:   Multidisciplinary Problems     Occupational Therapy Goals        Problem: Occupational Therapy Goal    Goal Priority Disciplines Outcome Interventions   Occupational Therapy Goal     OT, PT/OT Ongoing (interventions implemented as appropriate)    Description:  Goals to be met by: 5/25/18     Patient will increase functional independence with ADLs by performing:    LE Dressing with Supervision.  Grooming while standing with Supervision.  Toileting from toilet with Supervision for hygiene and clothing management.   Supine to sit with Supervision.  Step transfer with Supervision  Toilet transfer to toilet with Supervision.  Upper extremity exercise program x10 reps per handout, with independence.                      History:     Past Medical History:   Diagnosis Date    *Atrial fibrillation     Atrial fibrillation     Atrial fibrillation Feb 23, 2016    Bipolar disorder     Congenital heart disease     s/p surgical intervention 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 of age    Deep vein thrombosis     DVT of leg (deep venous thrombosis)     left leg    History of prior ablation treatment     10/9/13    Hypertension     Obesity     Stroke     Thyroid disease     Venous stasis  ulcer of lower extremity, unspecified laterality 12/14/2012    Venous ulcer        Past Surgical History:   Procedure Laterality Date    ANGIOPLASTY      ARTHROSCOPY-KNEE W/ CHONDROPLASTY Right 2/23/2016    Per[facility] OR    CARDIAC SURGERY      open heart surgery 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 old    EYE SURGERY      left eye cataract/right eye glaucoma    TIMO FILTER PLACEMENT      Dr Calix ([facility])    KNEE SURGERY      l and r     MULTIPLE TOOTH EXTRACTIONS      Sclerotherapy N/A 2/21/2019    Performed by [facility] at [facility] CATH LAB/EP    SKIN GRAFT      left leg    SYNOVECTOMY-KNEE Right 2/23/2016    Performed by [facility] at [facility] OR       Time Tracking:     OT Date of Treatment: 04/25/19  OT Start Time: 1134  OT Stop Time: 1210  OT Total Time (min): 36 min    Billable Minutes:Evaluation 10  Therapeutic Activity 13 co treatment with PT     Dana Powell, OT  4/25/2019

## 2019-04-25 NOTE — PLAN OF CARE
Problem: Occupational Therapy Goal  Goal: Occupational Therapy Goal  Goals to be met by: 5/25/18     Patient will increase functional independence with ADLs by performing:    LE Dressing with Supervision.  Grooming while standing with Supervision.  Toileting from toilet with Supervision for hygiene and clothing management.   Supine to sit with Supervision.  Step transfer with Supervision  Toilet transfer to toilet with Supervision.  Upper extremity exercise program x10 reps per handout, with independence.    Outcome: Ongoing (interventions implemented as appropriate)  Pt would benefit from cont OT services in order to maximize functional independence. Recommending  HHOT/PT at d/c as well as bariatric RW and w/c

## 2019-04-25 NOTE — SUBJECTIVE & OBJECTIVE
Past Medical History:   Diagnosis Date    *Atrial fibrillation     Atrial fibrillation     Atrial fibrillation Feb 23, 2016    Bipolar disorder     Congenital heart disease     s/p surgical intervention 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 of age    Deep vein thrombosis     DVT of leg (deep venous thrombosis)     left leg    History of prior ablation treatment     10/9/13    Hypertension     Obesity     Stroke     Thyroid disease     Venous stasis ulcer of lower extremity, unspecified laterality 12/14/2012    Venous ulcer        Past Surgical History:   Procedure Laterality Date    ANGIOPLASTY      ARTHROSCOPY-KNEE W/ CHONDROPLASTY Right 2/23/2016    [facility]    CARDIAC SURGERY      open heart surgery 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 old    EYE SURGERY      left eye cataract/right eye glaucoma    TIMO FILTER PLACEMENT      [facility] Calix [facility])    KNEE SURGERY      l and r     MULTIPLE TOOTH EXTRACTIONS      Sclerotherapy N/A 2/21/2019    [facility] at [facility] CATH LAB/EP    SKIN GRAFT      left leg    SYNOVECTOMY-KNEE Right 2/23/2016    Per[facility] by [facility] at [facility] OR       Review of patient's allergies indicates:   Allergen Reactions    Food allergy formula [glutamine-c-quercet-selen-brom]      Allergic to green peas; Heart failure.    Peas Hives    Ibuprofen Swelling    Latex, natural rubber Hives    Pcn [penicillins] Hives    Butisol [butabarbital] Rash     Peeling skin       No current facility-administered medications on file prior to encounter.      Current Outpatient Medications on File Prior to Encounter   Medication Sig    acetaZOLAMIDE (DIAMOX) 250 MG tablet     DUREZOL 0.05 % Drop ophthalmic solution     enoxaparin (LOVENOX) 150 mg/mL Syrg Inject 1 mL (150 mg total) into the skin every 12 (twelve) hours.    HYDROcodone-acetaminophen (NORCO) 5-325 mg per tablet Take 1 tablet by mouth every 6 (six) hours as needed for Pain.    lisinopril  (PRINIVIL,ZESTRIL) 2.5 MG tablet TAKE 4 TABLETS (10 MG TOTAL) BY MOUTH ONCE DAILY.    methIMAzole (TAPAZOLE) 10 MG Tab Take 2 tablets (20 mg total) by mouth once daily.    metoprolol succinate (TOPROL-XL) 100 MG 24 hr tablet Take 1 tablet (100 mg total) by mouth once daily.    neomycin-polymyxin-dexamethasone (DEXACINE) 3.5 mg/g-10,000 unit/g-0.1 % Oint     ofloxacin (OCUFLOX) 0.3 % ophthalmic solution     ondansetron (ZOFRAN) 4 MG tablet Take 1 tablet (4 mg total) by mouth every 8 (eight) hours as needed.    torsemide (DEMADEX) 20 MG Tab TAKE 1 TABLET (20 MG TOTAL) BY MOUTH ONCE DAILY. (Patient taking differently: Take 40 mg by mouth once daily. )    warfarin (COUMADIN) 10 MG tablet Take 1.5-2 tablets (15-20 mg total) by mouth once daily.     Family History     Problem Relation (Age of Onset)    Diabetes Father, Maternal Grandfather    Heart disease Father, Maternal Grandmother, Maternal Grandfather    Stroke Maternal Grandfather        Tobacco Use    Smoking status: Former Smoker     Packs/day: 1.00     Years: 6.00     Pack years: 6.00     Types: Cigarettes    Smokeless tobacco: Former User    Tobacco comment: quit by age 27yrs old   Substance and Sexual Activity    Alcohol use: Yes     Alcohol/week: 0.6 oz     Types: 1 Glasses of wine per week     Comment: 1 glass of wine a week    Drug use: No    Sexual activity: Yes     Partners: Female     Birth control/protection: None     Review of Systems   Constitution: Positive for fever. Negative for chills, decreased appetite and diaphoresis.   Cardiovascular: Positive for leg swelling. Negative for chest pain, claudication, cyanosis, dyspnea on exertion, irregular heartbeat, near-syncope, orthopnea, palpitations, paroxysmal nocturnal dyspnea and syncope.   Respiratory: Negative for cough, hemoptysis, shortness of breath and wheezing.    Gastrointestinal: Positive for abdominal pain, nausea and vomiting. Negative for bloating, constipation, diarrhea and  melena.   Neurological: Negative for dizziness and weakness.     Objective:     Vital Signs (Most Recent):  Temp: 97.8 °F (36.6 °C) (04/25/19 0801)  Pulse: 89 (04/25/19 0801)  Resp: 18 (04/25/19 0801)  BP: 119/62 (04/25/19 0801)  SpO2: 97 % (04/25/19 0028) Vital Signs (24h Range):  Temp:  [97.5 °F (36.4 °C)-102.6 °F (39.2 °C)] 97.8 °F (36.6 °C)  Pulse:  [] 89  Resp:  [18-26] 18  SpO2:  [95 %-98 %] 97 %  BP: (107-129)/(56-65) 119/62     Weight: (!) 165.8 kg (365 lb 8.4 oz)  Body mass index is 40.15 kg/m².    SpO2: 97 %  O2 Device (Oxygen Therapy): nasal cannula      Intake/Output Summary (Last 24 hours) at 4/25/2019 1107  Last data filed at 4/25/2019 0940  Gross per 24 hour   Intake 1265 ml   Output 950 ml   Net 315 ml       Lines/Drains/Airways     Peripheral Intravenous Line                 Peripheral IV - Single Lumen 04/24/19 1501 22 G Left Hand less than 1 day                Physical Exam   Constitutional: He is oriented to person, place, and time. He appears well-developed and well-nourished. No distress.   Cardiovascular: Normal rate and regular rhythm. Exam reveals no gallop.   Murmur heard.  Pulmonary/Chest: Effort normal and breath sounds normal. No respiratory distress. He has no wheezes.   Abdominal: Soft. Bowel sounds are normal. He exhibits no distension. There is no tenderness.   Musculoskeletal: He exhibits edema (trace to 1+ LLE edema present; significant swelling to RLE up to 4+).   Neurological: He is alert and oriented to person, place, and time.   Skin: Skin is warm and dry.   Discoloration to BLE consistent with venous insufficiency        Significant Labs:     Recent Labs   Lab 04/25/19  0748      K 4.1      CO2 24   BUN 17   CREATININE 0.9   MG 1.6     Recent Labs   Lab 04/25/19  0748   WBC 6.85   RBC 3.59*   HGB 9.1*   HCT 30.5*      MCV 85   MCH 25.3*   MCHC 29.8*         Significant Imaging:     TTE 4/25/2019 ordered with pending results

## 2019-04-25 NOTE — PLAN OF CARE
Problem: Adult Inpatient Plan of Care  Goal: Plan of Care Review  Outcome: Ongoing (interventions implemented as appropriate)  Pt is aaox4. No c/o pain or nausea. All medication tolerated well. Iv is cdi and infusing. Pt is being cardiac monitored and on 3L nc. Vss. No signs of distress. Bed is in lowest position with call light in reach. Will continue to monitor

## 2019-04-25 NOTE — ASSESSMENT & PLAN NOTE
Patient with complaint of nausea, vomiting, epigastric abd pain x 1 day   protonoix 40 mg IV   Maintenance IVF @ 100 cc/hr   Diet advanced to cardiac diet   Tolerating food well   No nausea, vomiting or abd pain   Improved

## 2019-04-25 NOTE — HOSPITAL COURSE
4/24/2019 Presented to the ER with complaints of fever, abdominal pain, nausea and vomiting. Temp 100.5 and WBC 9K upon admission. Swelling noted to LE with more pronounced swelling to RLE. CTA with no evidence of PE and notation of dilated PA. CT thigh & tib-fib with significant swelling concerning for cellulitis. BLE venous ultrasound with no evidence of DVT. CBC and BMP WNL. INR 2.4. Admitted to [facility] for sepsis/cellulitis   4/25/2019 Cardiology consulted for May Thurner syndrome. Echocardiogram pending. HR and BP stable.   4/26/2019 Echocardiogram yesterday with normal LV function with EF 55%, mild MR and mild LAE/JIM. Received IV Lasix x 2 with 4.1 liters out and negative 3.6 liters since admission. CTA abdomen and pelvis with patent bilateral CIV stents as well as patent IVC. BMP and CBC stable.

## 2019-04-25 NOTE — PROGRESS NOTES
[facility] Hospital Medicine  Progress Note    Patient Name: Drew Farrar  MRN: [id number]  Patient Class: IP- Inpatient   Admission Date: 2019  Length of Stay: 1 days  Attending Physician: Dayami Miguel MD  Primary Care Provider: David Larsen MD        Subjective:     Principal Problem:Cellulitis of lower extremity    HPI:  Drew Farrar is a 53 y.o. male with history of  CHF  (EF 40-45%) , HTN, A-fib (rate controlled), chronic venous stasis of BLE's, DVT (on coumadin) and May-Thurner syndrome who presents to the ED for evaluation of nausea, vomiting, epigastric abdominal pain and fever x 1 day. The patient states he has had multiple episodes of emesis at home. The patient states he has been unable to keep food down due to nausea and vomiting. The emesis is described as orange on color. Abdominal pain is located to the epigastrium and does not radiate, it is 7/10 in burning in nature. H is not aware how how his subjective fevers were. He did not take any OTC medication at home. He denies trying any new, undercooked or  foods. No one at home has similar symptoms. He denies any diarrhea, hematemesis or dark tarry stools.     The patient also notes that his right lower leg is red and warm to the touch. He states the redness started a few weeks ago, but has progressed in the last couple days. He chronically has venous stasis ulcer wounds and lymphedema to the right leg. He follows the wound care center.     In the ED the patient was SIRS 3/4: , temp 102.6, RR 22 requiring nasal canula. WBC 9.89. Lactic acid 1.3. Started on vancomycin in the ED. 1 Liter bolus given in ED     Hospital Course:  : In the ED the patient was SIRS 3/4: , temp 102.6, RR 22 requiring nasal canula. WBC 9.89. Lactic acid 1.3. Started on vancomycin in the ED. 1 Liter bolus given in ED. Started on vancomycin. Pending CXR, venous doppler BLE, CTA and CT leg     Interval History: The patient  was seen and examined this morning at bedside, the patient is resting comfortably in NAD. The patient has no acute complaints this morning. The patient is tolerating diet well, good urinary output and has worked with PT/OT. The patient feels significantly better than on admission. The nurse reports no acute events over night. The patient denies any chest pain, SOB or focal neuro deficits       Review of Systems   Constitutional: Positive for fever (resolved). Negative for activity change, chills and fatigue.   HENT: Negative for congestion and ear pain.    Eyes: Negative for pain.   Respiratory: Negative for apnea, chest tightness, shortness of breath and wheezing.    Cardiovascular: Positive for leg swelling (chronically). Negative for chest pain and palpitations.   Gastrointestinal: Positive for abdominal pain (resolved), nausea (resolved) and vomiting (resolved). Negative for abdominal distention, constipation, diarrhea and rectal pain.   Endocrine: Negative for polydipsia.   Genitourinary: Negative for dysuria, flank pain and hematuria.   Musculoskeletal: Negative for arthralgias, back pain, neck pain and neck stiffness.   Skin: Positive for rash (groin ) and wound (chronic BLE).   Neurological: Negative for dizziness, tremors, facial asymmetry, speech difficulty and light-headedness.   Psychiatric/Behavioral: Negative for agitation.     Objective:     Vital Signs (Most Recent):  Temp: 98.5 °F (36.9 °C) (04/25/19 1215)  Pulse: 93 (04/25/19 1215)  Resp: 18 (04/25/19 1215)  BP: 123/62 (04/25/19 1215)  SpO2: 97 % (04/25/19 0028) Vital Signs (24h Range):  Temp:  [97.5 °F (36.4 °C)-102.6 °F (39.2 °C)] 98.5 °F (36.9 °C)  Pulse:  [] 93  Resp:  [18-26] 18  SpO2:  [95 %-98 %] 97 %  BP: (107-129)/(56-65) 123/62     Weight: (!) 165.8 kg (365 lb 8.4 oz)  Body mass index is 40.15 kg/m².    Intake/Output Summary (Last 24 hours) at 4/25/2019 1253  Last data filed at 4/25/2019 0940  Gross per 24 hour   Intake 1265 ml    Output 950 ml   Net 315 ml      Physical Exam   Constitutional: He appears well-developed and well-nourished. He is cooperative.  Non-toxic appearance.   Morbidly obese    HENT:   Head: Normocephalic and atraumatic.   Right Ear: External ear normal.   Left Ear: External ear normal.   Nose: Nose normal.   Mouth/Throat: Oropharynx is clear and moist.   Eyes:   strabismus    Cardiovascular: Normal rate. An irregularly irregular rhythm present.   No murmur heard.  Pulmonary/Chest: Effort normal.   Abdominal: Normal appearance and bowel sounds are normal. There is tenderness in the epigastric area. There is no rigidity, no guarding, no tenderness at McBurney's point and negative Frederick's sign.   Musculoskeletal: Normal range of motion. He exhibits edema and tenderness. He exhibits no deformity.   RLE significantly larger than the LLE   Tenderness to palpation RLE inner thigh with surrounding erythema much improved from previous day    Feet:   Right Foot:   Skin Integrity: Positive for skin breakdown, callus and dry skin.   Left Foot:   Skin Integrity: Positive for skin breakdown, callus and dry skin.   Neurological: He is alert.   Skin: Skin is warm and dry. Rash (groin) noted. Rash is maculopapular.   Chronic venous stasis wounds BLE , with skin changes    Nursing note and vitals reviewed.      Significant Labs:   A1C: No results for input(s): HGBA1C in the last 4320 hours.  Blood Culture:   Recent Labs   Lab 04/24/19  1453 04/24/19  1523   LABBLOO No Growth to date No Growth to date     CBC:   Recent Labs   Lab 04/24/19  1528 04/25/19  0748   WBC 9.98 6.85   HGB 10.6* 9.1*   HCT 35.2* 30.5*    152     CMP:   Recent Labs   Lab 04/24/19  1451 04/25/19  0748    138   K 4.8 4.1   * 109   CO2 19* 24   GLU 93 81   BUN 19 17   CREATININE 0.9 0.9   CALCIUM 9.2 8.6*   PROT 7.2 6.8   ALBUMIN 3.5 3.0*   BILITOT 0.6 0.8   ALKPHOS 116 95   AST 24 20   ALT 22 17   ANIONGAP 10 5*   EGFRNONAA >60 >60     Lactic  Acid:   Recent Labs   Lab 04/24/19  1508   LACTATE 1.3     Magnesium:   Recent Labs   Lab 04/25/19  0748   MG 1.6     TSH: No results for input(s): TSH in the last 4320 hours.  Urine Studies:   Recent Labs   Lab 04/24/19  1748   COLORU Yellow   APPEARANCEUA Clear   PHUR 5.0   SPECGRAV 1.025   PROTEINUA Negative   GLUCUA Negative   KETONESU Negative   BILIRUBINUA Negative   OCCULTUA Trace*   NITRITE Negative   UROBILINOGEN Negative   LEUKOCYTESUR Negative       Significant Imaging:  US Lower Extremity Veins Bilateral   Final Result      No evidence of deep venous thrombosis in either lower extremity.         Electronically signed by: Drew Vazquez MD   Date:    04/24/2019   Time:    22:19      X-Ray Chest PA And Lateral   Final Result      As above.         Electronically signed by: Sohail Kline MD   Date:    04/24/2019   Time:    18:18      CTA Chest Non Coronary   Final Result      1. No evidence of PE.   2. Dilated pulmonary artery which may be seen in setting of pulmonary artery hypertension.   3. Mild patchy scattered ground-glass attenuation seen in the lungs which may in part relate to respiratory motion artifact, however similar findings can be seen with mild pulmonary edema or nonspecific small airways infectious or non infectious inflammatory process.  No focal consolidation seen.   4. Additional findings as detailed above.         Electronically signed by: Sohail Kline MD   Date:    04/24/2019   Time:    18:14      CT Thigh Without Contrast Right   Final Result      Extensive subcutaneous soft tissue edema throughout the lower leg with diffuse circumferential skin thickening which may be seen in the setting of cellulitis.         Electronically signed by: Sohail Kline MD   Date:    04/24/2019   Time:    18:05      CT Leg (Tibia-Fibula) Without Contrast Right   Final Result      Extensive subcutaneous soft tissue edema throughout the lower leg with diffuse circumferential skin thickening which may  be seen in the setting of cellulitis.         Electronically signed by: Sohail Kline MD   Date:    04/24/2019   Time:    18:05      CTA Abdomen and Pelvis    (Results Pending)      I have reviewed and interpreted all pertinent imaging results/findings within the past 24 hours.    Assessment/Plan:      * Cellulitis of lower extremity  Patient seen the day previous in the wound care center   Noted increased rubor, dolar and calor to the RLE   Venous doppler negative DVT   CT leg shows  Extensive subcutaneous soft tissue edema throughout the lower leg with diffuse circumferential skin thickening which may be seen in the setting of cellulitis   Wound cx pending   Start broad spectrum antibiotics, given hx of MRSA will de-escalate and continue on vancomycin   Contact precautions           Candida infection of genital region  Miconazole cream bid      Sepsis  SIRS 3/4  Heart rate 123  Temp 102.6  RR 22  WBC 9.89  Possible sources of infection from chronic venous stasis wounds ?  Wound cx from previous show micro microbial growth including MRSA   Given 1 liter NS   Due to severity of CHF, use fluids modestly   Started on Vancomycin in ED   Will add cefepime to broaden abx   Bcx NGTD   Ucx pending  Wcx pending   CXR concerning for pulmonary edema   Imaging of the legs concerning for cellulitis only   WBC trending down 6.85  Patient afebrile over night   De-escalate abx to only vancomycin  Believe this to be only cellulitis, based on new evidence       Acute gastroenteritis  Patient with complaint of nausea, vomiting, epigastric abd pain x 1 day   protonoix 40 mg IV   Maintenance IVF @ 100 cc/hr   Diet advanced to cardiac diet   Tolerating food well   No nausea, vomiting or abd pain   Improved       Chronic systolic heart failure  Echo 09/2017 EF 40% with severe MR   Repeat Echo Normal left ventricular systolic function. The estimated ejection fraction is 55%  · Mild left atrial enlargement.  · Mild right atrial  enlargement.  · Normal right ventricular systolic function.  · Atrial fibrillation observed.  Daily weight   Strict I&O   Resume Ace and BB         Essential hypertension  BP on admission 127/59  Resume home medications   Will continue to monitor       May-Thurner syndrome  Cardiology consulted and recommend   Previous bilateral CIV stents and PTA to IVC in 2014  BLE venous ultrasound with no evidence of DVT; INR 2.4 yesterday but subtherapeutic earlier this month  Profound swelling to LLE concerning for possibility of occlusion to right CIV stent or IVC-unable to see with ultrasound  Will order CTA abdomen/pelvis with triple phase for evaluation of patency of bilateral CIV and IVC  Continue Coumadin for now; if CTA abnormal then will need repeat venogram but timing to be determined given candidal infection to groin area           VTE Risk Mitigation (From admission, onward)        Ordered     warfarin (COUMADIN) tablet 10 mg  Daily      04/25/19 1132     IP VTE HIGH RISK PATIENT  Once      04/24/19 1952     Place TIM hose  Until discontinued      04/24/19 1952     Place sequential compression device  Until discontinued      04/24/19 1952          Dispo pending clinical improvement    D'Amico C Johnson, MD  Department of Hospital Medicine   [facility]

## 2019-04-25 NOTE — ASSESSMENT & PLAN NOTE
-previous echo with EF 40-45% and severe MR  -repeat echo pending today  -CXR with mild pulmonary edema; on Demadex at home but on hold for now due to concern for sepsis  -reasonable to continue to hold diuretics; does not appear to be ADHF on PE-lying flat with no SOB and no rales noted on exam  -continue BB and ACEI  -monitor fluid volume status with strict I&Os and daily weights; if ADHF occurs then will need IV diuretics otherwise may just need oral diuretics resumed once sepsis improves

## 2019-04-26 LAB
ALBUMIN SERPL BCP-MCNC: 3.3 G/DL (ref 3.5–5.2)
ALP SERPL-CCNC: 101 U/L (ref 55–135)
ALT SERPL W/O P-5'-P-CCNC: 21 U/L (ref 10–44)
ANION GAP SERPL CALC-SCNC: 10 MMOL/L (ref 8–16)
AST SERPL-CCNC: 24 U/L (ref 10–40)
BASOPHILS # BLD AUTO: 0.02 K/UL (ref 0–0.2)
BASOPHILS NFR BLD: 0.4 % (ref 0–1.9)
BILIRUB SERPL-MCNC: 0.7 MG/DL (ref 0.1–1)
BUN SERPL-MCNC: 19 MG/DL (ref 6–20)
CALCIUM SERPL-MCNC: 8.6 MG/DL (ref 8.7–10.5)
CHLORIDE SERPL-SCNC: 103 MMOL/L (ref 95–110)
CO2 SERPL-SCNC: 26 MMOL/L (ref 23–29)
CREAT SERPL-MCNC: 1 MG/DL (ref 0.5–1.4)
DIFFERENTIAL METHOD: ABNORMAL
EOSINOPHIL # BLD AUTO: 0.2 K/UL (ref 0–0.5)
EOSINOPHIL NFR BLD: 4.3 % (ref 0–8)
ERYTHROCYTE [DISTWIDTH] IN BLOOD BY AUTOMATED COUNT: 15.2 % (ref 11.5–14.5)
EST. GFR  (AFRICAN AMERICAN): >60 ML/MIN/1.73 M^2
EST. GFR  (NON AFRICAN AMERICAN): >60 ML/MIN/1.73 M^2
GLUCOSE SERPL-MCNC: 89 MG/DL (ref 70–110)
HCT VFR BLD AUTO: 32.7 % (ref 40–54)
HGB BLD-MCNC: 9.9 G/DL (ref 14–18)
INR PPP: 2.1 (ref 0.8–1.2)
LYMPHOCYTES # BLD AUTO: 0.7 K/UL (ref 1–4.8)
LYMPHOCYTES NFR BLD: 12.3 % (ref 18–48)
MAGNESIUM SERPL-MCNC: 1.8 MG/DL (ref 1.6–2.6)
MCH RBC QN AUTO: 25.3 PG (ref 27–31)
MCHC RBC AUTO-ENTMCNC: 30.3 G/DL (ref 32–36)
MCV RBC AUTO: 83 FL (ref 82–98)
MONOCYTES # BLD AUTO: 0.5 K/UL (ref 0.3–1)
MONOCYTES NFR BLD: 9.9 % (ref 4–15)
NEUTROPHILS # BLD AUTO: 3.9 K/UL (ref 1.8–7.7)
NEUTROPHILS NFR BLD: 73.1 % (ref 38–73)
PHOSPHATE SERPL-MCNC: 3.9 MG/DL (ref 2.7–4.5)
PLATELET # BLD AUTO: 170 K/UL (ref 150–350)
PMV BLD AUTO: 8.9 FL (ref 9.2–12.9)
POTASSIUM SERPL-SCNC: 4 MMOL/L (ref 3.5–5.1)
PROT SERPL-MCNC: 7.5 G/DL (ref 6–8.4)
PROTHROMBIN TIME: 22.1 SEC (ref 9–12.5)
RBC # BLD AUTO: 3.92 M/UL (ref 4.6–6.2)
SODIUM SERPL-SCNC: 139 MMOL/L (ref 136–145)
TROPONIN I SERPL DL<=0.01 NG/ML-MCNC: 0.01 NG/ML (ref 0–0.03)
VANCOMYCIN TROUGH SERPL-MCNC: 25.2 UG/ML (ref 10–22)
WBC # BLD AUTO: 5.37 K/UL (ref 3.9–12.7)

## 2019-04-26 PROCEDURE — 97535 SELF CARE MNGMENT TRAINING: CPT

## 2019-04-26 PROCEDURE — 93010 ELECTROCARDIOGRAM REPORT: CPT | Mod: 76,,, | Performed by: INTERNAL MEDICINE

## 2019-04-26 PROCEDURE — 84100 ASSAY OF PHOSPHORUS: CPT

## 2019-04-26 PROCEDURE — 93010 ELECTROCARDIOGRAM REPORT: CPT | Mod: ,,, | Performed by: INTERNAL MEDICINE

## 2019-04-26 PROCEDURE — 63600175 PHARM REV CODE 636 W HCPCS: Performed by: STUDENT IN AN ORGANIZED HEALTH CARE EDUCATION/TRAINING PROGRAM

## 2019-04-26 PROCEDURE — 99233 PR SUBSEQUENT HOSPITAL CARE,LEVL III: ICD-10-PCS | Mod: 25,,, | Performed by: INTERNAL MEDICINE

## 2019-04-26 PROCEDURE — 84484 ASSAY OF TROPONIN QUANT: CPT

## 2019-04-26 PROCEDURE — 27000221 HC OXYGEN, UP TO 24 HOURS

## 2019-04-26 PROCEDURE — 80053 COMPREHEN METABOLIC PANEL: CPT

## 2019-04-26 PROCEDURE — 80202 ASSAY OF VANCOMYCIN: CPT

## 2019-04-26 PROCEDURE — 25000003 PHARM REV CODE 250: Performed by: FAMILY MEDICINE

## 2019-04-26 PROCEDURE — 99233 SBSQ HOSP IP/OBS HIGH 50: CPT | Mod: 25,,, | Performed by: INTERNAL MEDICINE

## 2019-04-26 PROCEDURE — 11000001 HC ACUTE MED/SURG PRIVATE ROOM

## 2019-04-26 PROCEDURE — 63600175 PHARM REV CODE 636 W HCPCS: Performed by: FAMILY MEDICINE

## 2019-04-26 PROCEDURE — 85025 COMPLETE CBC W/AUTO DIFF WBC: CPT

## 2019-04-26 PROCEDURE — 97116 GAIT TRAINING THERAPY: CPT

## 2019-04-26 PROCEDURE — 83735 ASSAY OF MAGNESIUM: CPT

## 2019-04-26 PROCEDURE — 25000003 PHARM REV CODE 250: Performed by: STUDENT IN AN ORGANIZED HEALTH CARE EDUCATION/TRAINING PROGRAM

## 2019-04-26 PROCEDURE — 93005 ELECTROCARDIOGRAM TRACING: CPT

## 2019-04-26 PROCEDURE — 94761 N-INVAS EAR/PLS OXIMETRY MLT: CPT

## 2019-04-26 PROCEDURE — C9113 INJ PANTOPRAZOLE SODIUM, VIA: HCPCS | Performed by: STUDENT IN AN ORGANIZED HEALTH CARE EDUCATION/TRAINING PROGRAM

## 2019-04-26 PROCEDURE — 85610 PROTHROMBIN TIME: CPT

## 2019-04-26 PROCEDURE — 36415 COLL VENOUS BLD VENIPUNCTURE: CPT

## 2019-04-26 PROCEDURE — 93010 EKG 12-LEAD: ICD-10-PCS | Mod: ,,, | Performed by: INTERNAL MEDICINE

## 2019-04-26 RX ORDER — NITROGLYCERIN 0.4 MG/1
TABLET SUBLINGUAL
Status: DISPENSED
Start: 2019-04-26 | End: 2019-04-26

## 2019-04-26 RX ORDER — MORPHINE SULFATE 2 MG/ML
4 INJECTION, SOLUTION INTRAMUSCULAR; INTRAVENOUS ONCE
Status: COMPLETED | OUTPATIENT
Start: 2019-04-26 | End: 2019-04-26

## 2019-04-26 RX ORDER — TORSEMIDE 20 MG/1
20 TABLET ORAL DAILY
Status: DISCONTINUED | OUTPATIENT
Start: 2019-04-26 | End: 2019-04-27 | Stop reason: HOSPADM

## 2019-04-26 RX ORDER — PANTOPRAZOLE SODIUM 40 MG/1
40 TABLET, DELAYED RELEASE ORAL
Status: DISCONTINUED | OUTPATIENT
Start: 2019-04-27 | End: 2019-04-27 | Stop reason: HOSPADM

## 2019-04-26 RX ORDER — NITROGLYCERIN 0.4 MG/1
0.4 TABLET SUBLINGUAL EVERY 5 MIN PRN
Status: COMPLETED | OUTPATIENT
Start: 2019-04-26 | End: 2019-04-26

## 2019-04-26 RX ORDER — CLINDAMYCIN HYDROCHLORIDE 150 MG/1
300 CAPSULE ORAL 2 TIMES DAILY
Status: DISCONTINUED | OUTPATIENT
Start: 2019-04-26 | End: 2019-04-26 | Stop reason: DRUGHIGH

## 2019-04-26 RX ORDER — CLINDAMYCIN HYDROCHLORIDE 150 MG/1
300 CAPSULE ORAL EVERY 6 HOURS
Status: DISCONTINUED | OUTPATIENT
Start: 2019-04-26 | End: 2019-04-27 | Stop reason: HOSPADM

## 2019-04-26 RX ORDER — FUROSEMIDE 10 MG/ML
40 INJECTION INTRAMUSCULAR; INTRAVENOUS ONCE
Status: COMPLETED | OUTPATIENT
Start: 2019-04-26 | End: 2019-04-26

## 2019-04-26 RX ADMIN — HYDROCODONE BITARTRATE AND ACETAMINOPHEN 1 TABLET: 5; 325 TABLET ORAL at 08:04

## 2019-04-26 RX ADMIN — VANCOMYCIN HYDROCHLORIDE 2000 MG: 100 INJECTION, POWDER, LYOPHILIZED, FOR SOLUTION INTRAVENOUS at 10:04

## 2019-04-26 RX ADMIN — NITROGLYCERIN 0.4 MG: 0.4 TABLET, ORALLY DISINTEGRATING SUBLINGUAL at 03:04

## 2019-04-26 RX ADMIN — LIDOCAINE HYDROCHLORIDE: 20 SOLUTION ORAL; TOPICAL at 03:04

## 2019-04-26 RX ADMIN — MAGNESIUM SULFATE IN WATER 2 G: 40 INJECTION, SOLUTION INTRAVENOUS at 12:04

## 2019-04-26 RX ADMIN — CLINDAMYCIN HYDROCHLORIDE 300 MG: 150 CAPSULE ORAL at 10:04

## 2019-04-26 RX ADMIN — HYDROCODONE BITARTRATE AND ACETAMINOPHEN 1 TABLET: 5; 325 TABLET ORAL at 05:04

## 2019-04-26 RX ADMIN — TORSEMIDE 20 MG: 20 TABLET ORAL at 05:04

## 2019-04-26 RX ADMIN — MICONAZOLE NITRATE: 20 CREAM TOPICAL at 10:04

## 2019-04-26 RX ADMIN — WARFARIN SODIUM 10 MG: 5 TABLET ORAL at 05:04

## 2019-04-26 RX ADMIN — FUROSEMIDE 40 MG: 10 INJECTION, SOLUTION INTRAVENOUS at 03:04

## 2019-04-26 RX ADMIN — PANTOPRAZOLE SODIUM 40 MG: 40 INJECTION, POWDER, LYOPHILIZED, FOR SOLUTION INTRAVENOUS at 09:04

## 2019-04-26 RX ADMIN — MORPHINE SULFATE 4 MG: 2 INJECTION, SOLUTION INTRAMUSCULAR; INTRAVENOUS at 03:04

## 2019-04-26 RX ADMIN — LISINOPRIL 2.5 MG: 2.5 TABLET ORAL at 09:04

## 2019-04-26 RX ADMIN — METOPROLOL SUCCINATE 100 MG: 25 TABLET, EXTENDED RELEASE ORAL at 09:04

## 2019-04-26 RX ADMIN — MICONAZOLE NITRATE: 20 CREAM TOPICAL at 09:04

## 2019-04-26 NOTE — PLAN OF CARE
Tn rounded with Dr. Jones this am and md anticipating discharge home tomorrow.Tn informed MD therapy recommending hh and RW and bariatric w/c. TN requested Dr. Jones place orders today.   Tn provided pt with a list of hh agencies and pt informed Tn he has used [facility] in past and prefers to have their services again. Tn sent facesheet via [facility] to [facility] with note of anticipating d/c tomorrow .    1200- Tn sent hh order to [facility] via [facility] and DME orders to [facility] via [facility].    Future Appointments   Date Time Provider Department Center   4/29/2019 11:00 AM APPOINTMENT LAB, [facility] LAB [facility]   4/29/2019 11:35 AM Pool Gutierrez MD [facility] WOUND [facility]   5/7/2019  2:00 PM David Lasren MD [facility]            04/26/19 0931   Discharge Reassessment   Assessment Type Discharge Planning Reassessment   Provided patient/caregiver education on the expected discharge date and the discharge plan Yes   Do you have any problems affording any of your prescribed medications? No   Discharge Plan A Home with family;Home Health   Discharge Plan B Home with family;Home Health   DME Needed Upon Discharge  walker, rolling;wheelchair   Anticipated Discharge Disposition Home-Health   Can the patient answer the patient profile reliably? Yes, cognitively intact   How does the patient rate their overall health at the present time? Good   Describe the patient's ability to walk at the present time. Walks with the help of equipment   How often would a person be available to care for the patient? Whenever needed   Number of comorbid conditions (as recorded on the chart) Five or more   During the past month, has the patient often been bothered by feeling down, depressed or hopeless? No   During the past month, has the patient often been bothered by little interest or pleasure in doing things? No   Post-Acute Status   Post-Acute Authorization Home Health/Hospice   Home  Health/Hospice Status Referrals Sent

## 2019-04-26 NOTE — PROGRESS NOTES
MET called at change of shift. Patient begin to complain of shortness of breath and crushing chest pain when VN rounded.

## 2019-04-26 NOTE — PROGRESS NOTES
Respiratory responds to MET called overhead. SpO2 WNL. EKG obtained. Resp remained on standby during remainder of MET.

## 2019-04-26 NOTE — PROGRESS NOTES
[facility] Hospital Medicine  Progress Note    Patient Name: Drew Farrar  MRN: [id number]  Patient Class: IP- Inpatient   Admission Date: 2019  Length of Stay: 2 days  Attending Physician: Dayami Miguel MD  Primary Care Provider: David Larsen MD        Subjective:     Principal Problem:Cellulitis of lower extremity    HPI:  Drew Farrar is a 53 y.o. male with history of  CHF  (EF 40-45%) , HTN, A-fib (rate controlled), chronic venous stasis of BLE's, DVT (on coumadin) and May-Thurner syndrome who presents to the ED for evaluation of nausea, vomiting, epigastric abdominal pain and fever x 1 day. The patient states he has had multiple episodes of emesis at home. The patient states he has been unable to keep food down due to nausea and vomiting. The emesis is described as orange on color. Abdominal pain is located to the epigastrium and does not radiate, it is 7/10 in burning in nature. H is not aware how how his subjective fevers were. He did not take any OTC medication at home. He denies trying any new, undercooked or  foods. No one at home has similar symptoms. He denies any diarrhea, hematemesis or dark tarry stools.     The patient also notes that his right lower leg is red and warm to the touch. He states the redness started a few weeks ago, but has progressed in the last couple days. He chronically has venous stasis ulcer wounds and lymphedema to the right leg. He follows the wound care center.     In the ED the patient was SIRS 3/4: , temp 102.6, RR 22 requiring nasal canula. WBC 9.89. Lactic acid 1.3. Started on vancomycin in the ED. 1 Liter bolus given     Hospital Course:  : In the ED the patient was SIRS 3/4: , temp 102.6, RR 22 requiring  9 L O2 on nasal canula. WBC 9.89. Lactic acid 1.3. Started on vancomycin in the ED. 1 Liter bolus given in ED. Started on vancomycin and cefepime. Pending CXR, venous doppler BLE, CTA and CT leg. CXR shows Heart  is enlarged with possible mild pulmonary edema. CTA chest No evidence of PE,  Dilated pulmonary artery which may be seen in setting of pulmonary artery hypertension. CT RLE  Shows extensive subcutaneous soft tissue edema throughout the lower leg with diffuse circumferential skin thickening which may be seen in the setting of cellulitis. Venous doppler BLE shows no evidence of deep venous thrombosis in either lower extremity. Wcx obtained     04/25: Patietn afebrile over night, WBC 6.85 trending down, H/H remains stable. On room air with oxygen saturation  95-98. Working with PT/OT. PT/OT Recommend home with home health. De-escalate the abx and continue just vancomycin. Cardiology saw the patient and recommend CTA abdomen/pelvis with triple phase for evaluation of patency of bilateral CIV and IVC. Humaetn started on lasix     04/26: Patient UOP 4L Over night. Over night the patient had two rapid responses called last night for crushing chest pain. No EKG changes were seen. Troponin was negative.  No change on CXR. Chest pain somewhat relieved with NTG. Chest pain completely resolved in the morning. The patient notes he has had similar episodes in the past. WBC trending down 5.37. Afebrile. CTa Abdomen and pelvis shows Patent venous stents. Cardiology recommend restarting home Demadex. The patient can follow up out patient with them and wound care. Abx  De-escalated to clindamycin         Interval History: The patient was seen and examined this morning at bedside, the patient is resting comfortably in NAD. The patient has no acute complaints this morning. The patient is tolerating diet well, good urinary output (4.1 L) and has worked with PT/OT. The patient feels significantly better than on admission, even though he had episodes of chest pain last night. The chest was pressure like in nature. He describes previous episodes in the past. No changes on CXR, EKG and negative troponin. Chest pain relieved with NTG. Chest  pain resolved completely this AM.       Review of Systems   Constitutional: Positive for fever (resolved). Negative for activity change, chills and fatigue.   HENT: Negative for congestion and ear pain.    Eyes: Negative for pain.   Respiratory: Negative for apnea, chest tightness, shortness of breath and wheezing.    Cardiovascular: Positive for chest pain (resolved) and leg swelling (chronically). Negative for palpitations.   Gastrointestinal: Positive for abdominal pain (resolved), nausea (resolved) and vomiting (resolved). Negative for abdominal distention, constipation, diarrhea and rectal pain.   Endocrine: Negative for polydipsia.   Genitourinary: Negative for dysuria, flank pain and hematuria.   Musculoskeletal: Negative for arthralgias, back pain, neck pain and neck stiffness.   Skin: Positive for rash (groin ) and wound (chronic BLE).   Neurological: Negative for dizziness, tremors, facial asymmetry, speech difficulty and light-headedness.   Psychiatric/Behavioral: Negative for agitation.     Objective:     Vital Signs (Most Recent):  Temp: 97.7 °F (36.5 °C) (04/26/19 1131)  Pulse: 89 (04/26/19 1200)  Resp: 20 (04/26/19 1131)  BP: 121/62 (04/26/19 1131)  SpO2: 100 % (04/26/19 0339) Vital Signs (24h Range):  Temp:  [96.1 °F (35.6 °C)-98.1 °F (36.7 °C)] 97.7 °F (36.5 °C)  Pulse:  [] 89  Resp:  [15-22] 20  SpO2:  [84 %-100 %] 100 %  BP: (104-167)/(55-87) 121/62     Weight: (!) 165.8 kg (365 lb 8.4 oz)  Body mass index is 40.15 kg/m².    Intake/Output Summary (Last 24 hours) at 4/26/2019 1312  Last data filed at 4/26/2019 1200  Gross per 24 hour   Intake --   Output 5200 ml   Net -5200 ml      Physical Exam   Constitutional: He appears well-developed and well-nourished. He is cooperative.  Non-toxic appearance.   Morbidly obese    HENT:   Head: Normocephalic and atraumatic.   Right Ear: External ear normal.   Left Ear: External ear normal.   Nose: Nose normal.   Mouth/Throat: Oropharynx is clear and  moist.   Eyes:   strabismus    Cardiovascular: Normal rate. An irregularly irregular rhythm present.   No murmur heard.  Pulmonary/Chest: Effort normal.   Abdominal: Normal appearance and bowel sounds are normal. He exhibits no distension. There is no tenderness. There is no rigidity, no guarding, no tenderness at McBurney's point and negative Frederick's sign.   Musculoskeletal: Normal range of motion. He exhibits edema and tenderness. He exhibits no deformity.   RLE significantly larger than the LLE   RLE inner thigh with surrounding erythema much improved from previous day    Feet:   Right Foot:   Skin Integrity: Positive for skin breakdown, callus and dry skin.   Left Foot:   Skin Integrity: Positive for skin breakdown, callus and dry skin.   Neurological: He is alert.   Skin: Skin is warm and dry. Rash (groin) noted. Rash is maculopapular.   Chronic venous stasis wounds BLE , with skin changes    Nursing note and vitals reviewed.      Significant Labs:   CBC:   Recent Labs   Lab 04/25/19  0748 04/25/19 1947 04/26/19 0326   WBC 6.85 5.69 5.37   HGB 9.1* 10.1* 9.9*   HCT 30.5* 33.4* 32.7*    155 170     CMP:   Recent Labs   Lab 04/24/19  1451 04/25/19  0748 04/25/19 1947    138 137   K 4.8 4.1 4.6   * 109 105   CO2 19* 24 25   GLU 93 81 100   BUN 19 17 19   CREATININE 0.9 0.9 1.1   CALCIUM 9.2 8.6* 8.6*   PROT 7.2 6.8 7.3   ALBUMIN 3.5 3.0* 3.2*   BILITOT 0.6 0.8 0.7   ALKPHOS 116 95 108   AST 24 20 21   ALT 22 17 18   ANIONGAP 10 5* 7*   EGFRNONAA >60 >60 >60     Magnesium:   Recent Labs   Lab 04/25/19  0748 04/25/19 1947   MG 1.6 1.5*     POCT Glucose:   Recent Labs   Lab 04/25/19 1933   POCTGLUCOSE 115*     Troponin:   Recent Labs   Lab 04/24/19  1451 04/25/19 1947 04/26/19 0326   TROPONINI <0.006 0.007 0.014     All pertinent labs within the past 24 hours have been reviewed.    Significant Imaging:    I have reviewed and interpreted all pertinent imaging results/findings within the past  24 hours.    Assessment/Plan:      * Cellulitis of lower extremity  Patient seen the day previous in the wound care center   Noted increased rubor, dolar and calor to the RLE   Venous doppler negative DVT   CT leg shows  Extensive subcutaneous soft tissue edema throughout the lower leg with diffuse circumferential skin thickening which may be seen in the setting of cellulitis   Wound cx pending   Start broad spectrum antibiotics, given hx of MRSA will de-escalate and continue on vancomycin   Contact precautions   Patient improving redness shrinking, will transition to PO clindamycin           Sepsis  SIRS 3/4  Heart rate 123  Temp 102.6  RR 22  WBC 9.89  Possible sources of infection from chronic venous stasis wounds ?  Wound cx from previous show micro microbial growth including MRSA   Given 1 liter NS   Due to severity of CHF, use fluids modestly   Started on Vancomycin in ED   Stopped cefepime   Bcx NGTD   Wcx pending   CXR concerning for pulmonary edema   Imaging of the legs concerning for cellulitis only   WBC trending down 5.37  Patient afebrile over night   Stop vancomycin   De-escalate abx to PO clindamycin  Believe this to be only cellulitis, based on new evidence       May-Thurner syndrome  Cardiology consulted and recommend   Previous bilateral CIV stents and PTA to IVC in 2014  BLE venous ultrasound with no evidence of DVT; INR 2.4 yesterday but subtherapeutic earlier this month  Profound swelling to LLE concerning for possibility of occlusion to right CIV stent or IVC-unable to see with ultrasound  CTA abdomen pelvis shows Patent venous stents  Will follow up with cardiology on discharge           Chronic systolic heart failure  Echo 09/2017 EF 40% with severe MR   Repeat Echo Normal left ventricular systolic function. The estimated ejection fraction is 55%  · Mild left atrial enlargement.  · Mild right atrial enlargement.  · Normal right ventricular systolic function.  · Atrial fibrillation  observed.  Daily weight   Strict I&O   UOP 4.1 L   Resume Ace and BB   Resume home Demadex         Candida infection of genital region  Miconazole cream bid      Acute gastroenteritis  Patient with complaint of nausea, vomiting, epigastric abd pain x 1 day   protonoix 40 mg IV   Maintenance IVF @ 100 cc/hr   Diet advanced to cardiac diet   Tolerating food well   No nausea, vomiting or abd pain   Resolved       Essential hypertension  BP on admission 127/59  Resume home medications   Will continue to monitor       VTE Risk Mitigation (From admission, onward)        Ordered     warfarin (COUMADIN) tablet 10 mg  Daily      04/25/19 1132     IP VTE HIGH RISK PATIENT  Once      04/24/19 1952     Place TIM hose  Until discontinued      04/24/19 1952     Place sequential compression device  Until discontinued      04/24/19 1952          Dispo pending clinical improvement     D'Amico C Johnson, MD  Department of Hospital Medicine   [facility]

## 2019-04-26 NOTE — PROGRESS NOTES
[facility]  Cardiology  Progress Note    Patient Name: Drew Farrar  MRN: [id number]  Admission Date: 4/24/2019  Hospital Length of Stay: 2 days  Code Status: Full Code   Attending Physician: Dayami Miguel MD   Primary Care Physician: David Larsen MD  Expected Discharge Date:   Principal Problem:Cellulitis of lower extremity    Subjective:     Hospital Course:   4/24/2019 Presented to the ER with complaints of fever, abdominal pain, nausea and vomiting. Temp 100.5 and WBC 9K upon admission. Swelling noted to LE with more pronounced swelling to RLE. CTA with no evidence of PE and notation of dilated PA. CT thigh & tib-fib with significant swelling concerning for cellulitis. BLE venous ultrasound with no evidence of DVT. CBC and BMP WNL. INR 2.4. Admitted to [facility] for sepsis/cellulitis   4/25/2019 Cardiology consulted for May Thurner syndrome. Echocardiogram pending. HR and BP stable.   4/26/2019 Echocardiogram yesterday with normal LV function with EF 55%, mild MR and mild LAE/JIM. Received IV Lasix x 2 with 4.1 liters out and negative 3.6 liters since admission. CTA abdomen and pelvis with patent bilateral CIV stents as well as patent IVC. BMP and CBC stable.         Review of Systems   Constitution: Negative for chills, decreased appetite, diaphoresis and fever.   Cardiovascular: Positive for leg swelling. Negative for chest pain, claudication, cyanosis, dyspnea on exertion, irregular heartbeat, near-syncope, orthopnea, palpitations, paroxysmal nocturnal dyspnea and syncope.   Respiratory: Negative for cough, hemoptysis, shortness of breath and wheezing.    Gastrointestinal: Negative for bloating, abdominal pain, constipation, diarrhea, melena, nausea and vomiting.   Neurological: Negative for dizziness and weakness.     Objective:     Vital Signs (Most Recent):  Temp: 96.1 °F (35.6 °C) (04/26/19 0805)  Pulse: 71 (04/26/19 0822)  Resp: 19 (04/26/19 0805)  BP: 133/61  (04/26/19 0805)  SpO2: 100 % (04/26/19 0339) Vital Signs (24h Range):  Temp:  [96.1 °F (35.6 °C)-98.5 °F (36.9 °C)] 96.1 °F (35.6 °C)  Pulse:  [] 71  Resp:  [15-22] 19  SpO2:  [84 %-100 %] 100 %  BP: (104-167)/(55-87) 133/61     Weight: (!) 165.8 kg (365 lb 8.4 oz)  Body mass index is 40.15 kg/m².     SpO2: 100 %  O2 Device (Oxygen Therapy): nasal cannula      Intake/Output Summary (Last 24 hours) at 4/26/2019 1113  Last data filed at 4/26/2019 0835  Gross per 24 hour   Intake --   Output 5050 ml   Net -5050 ml       Lines/Drains/Airways     Peripheral Intravenous Line                 Peripheral IV - Single Lumen 04/24/19 1501 22 G Left Hand 1 day         Peripheral IV - Single Lumen 04/25/19 1845 20 G Anterior;Right Antecubital less than 1 day                Physical Exam   Constitutional: He is oriented to person, place, and time. He appears well-developed and well-nourished. No distress.   Cardiovascular: Normal rate and regular rhythm. Exam reveals no gallop.   No murmur heard.  Pulmonary/Chest: Effort normal and breath sounds normal. No respiratory distress. He has no wheezes.   Abdominal: Soft. Bowel sounds are normal. He exhibits no distension. There is no tenderness.   Musculoskeletal: He exhibits edema (trace LLE edema present; RLE with significant edema although slightly improved in comparison to yesterday ).   Neurological: He is alert and oriented to person, place, and time.   Skin: Skin is warm and dry.   Discoloration to BLE consistent with venous insufficiency        Significant Labs:     Recent Labs   Lab 04/26/19  0326   WBC 5.37   RBC 3.92*   HGB 9.9*   HCT 32.7*      MCV 83   MCH 25.3*   MCHC 30.3*     Recent Labs   Lab 04/25/19 1947   CALCIUM 8.6*   PROT 7.3      K 4.6   CO2 25      BUN 19   CREATININE 1.1   ALKPHOS 108   ALT 18   AST 21   BILITOT 0.7       Significant Imaging:     TTE 4/25/2019    · Normal left ventricular systolic function. The estimated ejection  fraction is 55%  · Mild left atrial enlargement.  · Mild right atrial enlargement.  · Normal right ventricular systolic function.  · Atrial fibrillation observed.  · Mild mitral regurgitation.  · Poor bubble study with poor visualization. No obvious signs of PFO or ASd seen however.    Assessment and Plan:     Brief HPI: Seen on AM NP rounds while resting in bed. Reviewed cardiac POC with patient as detailed above-verbalized understanding and agrees with POC     Chronic systolic heart failure  -previous echo with EF 40-45% and severe MR  -repeat echo yesterday with normal LVEF and mild MR   -on IV Lasix due to profound LE swelling; no s/s of ADHF on PE   -4.1 liters out overnight and negative 3.6 liters since admission; anticipate resumption of Demadex upon discharge and follow up with wound care for continued ACE wraps for venous insuffiency  -continue BB and ACEI      Essential hypertension  -SBP 100s-110s  -continue BB and ACEI  -monitor BP and adjust meds prn     May-Thurner syndrome  -previous bilateral CIV stents and PTA to IVC in 2014  -BLE venous ultrasound with no evidence of DVT; INR 2.1 today with  subtherapeutic readings earlier this month  -profound swelling to LLE concerning for possibility of occlusion to right CIV stent or IVC; CTA abdomen and pelvis with patent bilateral CIV and IVC stents   -continue Coumadin for now  -profound swelling likely related to combination of venous insufficiency and lymphedema; on IV diuretics with plans for resumption of oral Demadex upon discharge and follow up in wound care for continued ACE wraps         VTE Risk Mitigation (From admission, onward)        Ordered     warfarin (COUMADIN) tablet 10 mg  Daily      04/25/19 1132     IP VTE HIGH RISK PATIENT  Once      04/24/19 1952     Place TIM hose  Until discontinued      04/24/19 1952     Place sequential compression device  Until discontinued      04/24/19 1952          Prema Biswas, APRN,  [facility]  Cardiology  [facility]

## 2019-04-26 NOTE — SIGNIFICANT EVENT
Another MET was called for crushing chest pain 10/10. Patient received 3 of Nitro, 4mg of Morphine, Lasix 40mg IV, and a GI cocktail. Patient reports that he does have similar episodes of pain at home with his last episode 1 year ago. Patient reports that the pain last 1 hour and he takes over the counter pain medication for it. However, patient's wife reported that patient has chest pain 3 times a week and only occurs at night. She reports that the chest pain would last 1-2 minutes.     EKG showed A. Fib. Chest X- ray showed no changes from previous x-ray. Trop remained within normal limits.     Pain resolved and MET was canceled.       Judy Rosenberg, PGY-1  [facility]

## 2019-04-26 NOTE — ASSESSMENT & PLAN NOTE
-previous echo with EF 40-45% and severe MR  -repeat echo yesterday with normal LVEF and mild MR   -on IV Lasix due to profound LE swelling; no s/s of ADHF on PE   -4.1 liters out overnight and negative 3.6 liters since admission; anticipate resumption of Demadex upon discharge and follow up with wound care for continued ACE wraps for venous insuffiency  -continue BB and ACEI

## 2019-04-26 NOTE — PLAN OF CARE
Vanc trough 25.2. Bedside nurse notified and informed MD team. Ok to hold vanc for now per MD. Bedside nurse aware. Will cont to be available as needed.

## 2019-04-26 NOTE — ASSESSMENT & PLAN NOTE
-previous bilateral CIV stents and PTA to IVC in 2014  -BLE venous ultrasound with no evidence of DVT; INR 2.1 today with  subtherapeutic readings earlier this month  -profound swelling to LLE concerning for possibility of occlusion to right CIV stent or IVC; CTA abdomen and pelvis with patent bilateral CIV and IVC stents   -continue Coumadin for now  -profound swelling likely related to combination of venous insufficiency and lymphedema; on IV diuretics with plans for resumption of oral Demadex upon discharge and follow up in wound care for continued ACE wraps

## 2019-04-26 NOTE — PROGRESS NOTES
VBG results reported to Dr. Gardner     Results for VALERIE GOODEN (MRN [id number]) as of 4/25/2019 20:21   Ref. Range 4/25/2019 19:33 4/25/2019 20:16   POCT Glucose Latest Ref Range: 70 - 110 mg/dL 115 (H)    POC PH Latest Ref Range: 7.35 - 7.45   7.301 (L)   POC PCO2 Latest Ref Range: 35 - 45 mmHg  49.8 (H)   POC PO2 Latest Ref Range: 40 - 60 mmHg  26 (LL)   POC BE Latest Ref Range: -2 to 2 mmol/L  -2   POC HCO3 Latest Ref Range: 24 - 28 mmol/L  24.6   POC SATURATED O2 Latest Ref Range: 95 - 100 %  40 (L)   POC TCO2 Latest Ref Range: 24 - 29 mmol/L  26   Sample Unknown  VENOUS   DelSys Unknown  Nasal Can   Allens Test Unknown  Pass   Site Unknown  RR

## 2019-04-26 NOTE — SUBJECTIVE & OBJECTIVE
Interval History: The patient was seen and examined this morning at bedside, the patient is resting comfortably in NAD. The patient has no acute complaints this morning. The patient is tolerating diet well, good urinary output (4.1 L) and has worked with PT/OT. The patient feels significantly better than on admission, even though he had episodes of chest pain last night. The chest was pressure like in nature. He describes previous episodes in the past. No changes on CXR, EKG and negative troponin. Chest pain relieved with NTG. Chest pain resolved completely this AM.       Review of Systems   Constitutional: Positive for fever (resolved). Negative for activity change, chills and fatigue.   HENT: Negative for congestion and ear pain.    Eyes: Negative for pain.   Respiratory: Negative for apnea, chest tightness, shortness of breath and wheezing.    Cardiovascular: Positive for chest pain (resolved) and leg swelling (chronically). Negative for palpitations.   Gastrointestinal: Positive for abdominal pain (resolved), nausea (resolved) and vomiting (resolved). Negative for abdominal distention, constipation, diarrhea and rectal pain.   Endocrine: Negative for polydipsia.   Genitourinary: Negative for dysuria, flank pain and hematuria.   Musculoskeletal: Negative for arthralgias, back pain, neck pain and neck stiffness.   Skin: Positive for rash (groin ) and wound (chronic BLE).   Neurological: Negative for dizziness, tremors, facial asymmetry, speech difficulty and light-headedness.   Psychiatric/Behavioral: Negative for agitation.     Objective:     Vital Signs (Most Recent):  Temp: 97.7 °F (36.5 °C) (04/26/19 1131)  Pulse: 89 (04/26/19 1200)  Resp: 20 (04/26/19 1131)  BP: 121/62 (04/26/19 1131)  SpO2: 100 % (04/26/19 0339) Vital Signs (24h Range):  Temp:  [96.1 °F (35.6 °C)-98.1 °F (36.7 °C)] 97.7 °F (36.5 °C)  Pulse:  [] 89  Resp:  [15-22] 20  SpO2:  [84 %-100 %] 100 %  BP: (104-167)/(55-87) 121/62     Weight:  (!) 165.8 kg (365 lb 8.4 oz)  Body mass index is 40.15 kg/m².    Intake/Output Summary (Last 24 hours) at 4/26/2019 1312  Last data filed at 4/26/2019 1200  Gross per 24 hour   Intake --   Output 5200 ml   Net -5200 ml      Physical Exam   Constitutional: He appears well-developed and well-nourished. He is cooperative.  Non-toxic appearance.   Morbidly obese    HENT:   Head: Normocephalic and atraumatic.   Right Ear: External ear normal.   Left Ear: External ear normal.   Nose: Nose normal.   Mouth/Throat: Oropharynx is clear and moist.   Eyes:   strabismus    Cardiovascular: Normal rate. An irregularly irregular rhythm present.   No murmur heard.  Pulmonary/Chest: Effort normal.   Abdominal: Normal appearance and bowel sounds are normal. He exhibits no distension. There is no tenderness. There is no rigidity, no guarding, no tenderness at McBurney's point and negative Frederick's sign.   Musculoskeletal: Normal range of motion. He exhibits edema and tenderness. He exhibits no deformity.   RLE significantly larger than the LLE   RLE inner thigh with surrounding erythema much improved from previous day    Feet:   Right Foot:   Skin Integrity: Positive for skin breakdown, callus and dry skin.   Left Foot:   Skin Integrity: Positive for skin breakdown, callus and dry skin.   Neurological: He is alert.   Skin: Skin is warm and dry. Rash (groin) noted. Rash is maculopapular.   Chronic venous stasis wounds BLE , with skin changes    Nursing note and vitals reviewed.      Significant Labs:   CBC:   Recent Labs   Lab 04/25/19  0748 04/25/19 1947 04/26/19  0326   WBC 6.85 5.69 5.37   HGB 9.1* 10.1* 9.9*   HCT 30.5* 33.4* 32.7*    155 170     CMP:   Recent Labs   Lab 04/24/19  1451 04/25/19  0748 04/25/19 1947    138 137   K 4.8 4.1 4.6   * 109 105   CO2 19* 24 25   GLU 93 81 100   BUN 19 17 19   CREATININE 0.9 0.9 1.1   CALCIUM 9.2 8.6* 8.6*   PROT 7.2 6.8 7.3   ALBUMIN 3.5 3.0* 3.2*   BILITOT 0.6 0.8 0.7    ALKPHOS 116 95 108   AST 24 20 21   ALT 22 17 18   ANIONGAP 10 5* 7*   EGFRNONAA >60 >60 >60     Magnesium:   Recent Labs   Lab 04/25/19  0748 04/25/19 1947   MG 1.6 1.5*     POCT Glucose:   Recent Labs   Lab 04/25/19  1933   POCTGLUCOSE 115*     Troponin:   Recent Labs   Lab 04/24/19  1451 04/25/19  1947 04/26/19  0326   TROPONINI <0.006 0.007 0.014     All pertinent labs within the past 24 hours have been reviewed.    Significant Imaging:    I have reviewed and interpreted all pertinent imaging results/findings within the past 24 hours.

## 2019-04-26 NOTE — SIGNIFICANT EVENT
I was called due to patient having crushing chest pain 10/10 after returning from CT where he received contrast. When I arrived, patient appears uncomfortable and in severe pain. Chest pain was crushing, mid-sternal and non-radiating. It was decided to call a MET. Patient reports having similar type pain years ago at another hospital following contrast. [facility] labs were drawn for CBC, CMP, Trop, Lactic Acid, Mg, Phos, BMP. EKG, Chest X-ray, and ABG was ordered.    Patient's oxygen sats were 87% and patient was placed on 3L of oxygen and responded well. Blood pressure was 167/78 with HR of 105.     Patient received 2 doses of Nitro and chest pain was relieved. However, chest pain returned at 10/10 in the same region. Patient was then given 50mg of IV Benadryl and 4mg of Morphine. Pain was still not relieved. Patient then received his 3rd dose of Nitro. Pain improved to 3/10. Patient appeared comfortable.     EKG showed A. Fib with no ST changes. Troponin was negative. Glucose was 115. Lactic was normal. Magnesium was 1.5. Phos was 3.7. Chest X-ray showed slightly increased mild edema.       Judy Rosenberg, PGY-1  [facility]

## 2019-04-26 NOTE — ASSESSMENT & PLAN NOTE
Patient seen the day previous in the wound care center   Noted increased rubor, dolar and calor to the RLE   Venous doppler negative DVT   CT leg shows  Extensive subcutaneous soft tissue edema throughout the lower leg with diffuse circumferential skin thickening which may be seen in the setting of cellulitis   Wound cx pending   Start broad spectrum antibiotics, given hx of MRSA will de-escalate and continue on vancomycin   Contact precautions   Patient improving redness shrinking, will transition to PO clindamycin

## 2019-04-26 NOTE — NURSING
Called to room per Charge nurse Gerda Peñaloza RN for MET . Patient c/o chest pain 9/10 rate . Reports pounding feeling Remains Afib on Monitor per Dr. Rosenberg .

## 2019-04-26 NOTE — PROGRESS NOTES
Therapy with vancomycin complete and/or consult discontinued by provider.  Pharmacy will sign off, please re-consult as needed.    Thank you for allowing us to participate in this patients care.     Pastora Portillo, PharmD, BCPS

## 2019-04-26 NOTE — ASSESSMENT & PLAN NOTE
Echo 09/2017 EF 40% with severe MR   Repeat Echo Normal left ventricular systolic function. The estimated ejection fraction is 55%  · Mild left atrial enlargement.  · Mild right atrial enlargement.  · Normal right ventricular systolic function.  · Atrial fibrillation observed.  Daily weight   Strict I&O   UOP 4.1 L   Resume Ace and BB   Resume home Demadex

## 2019-04-26 NOTE — PROGRESS NOTES
Pharmacokinetic Assessment Follow Up: IV Vancomycin    Vancomycin serum concentration assessment(s):    The trough level was drawned correctly and can be used to guide therapy at this time.    Vancomycin Regimen Plan:    Re-dose when the random level is less than 20 mcg/mL, next level to be drawn at 1100 on 4/27     Pharmacy will continue to follow and monitor vancomycin.    Please contact pharmacy  for questions regarding this assessment.    Thank you for the consult,   Pastora Portillo     Patient brief summary:  Drew Farrar is a 53 y.o. male initiated on antimicrobial therapy with IV Vancomycin for treatment of suspected skin & soft tissue    The patient received a loading dose, followed by the current treatment regimen: vancomycin 2000 mg IV every 12 hours    Drug Allergies:   Review of patient's allergies indicates:   Allergen Reactions    Contrast media Other (See Comments)     Severe chest pain    Food allergy formula [glutamine-c-quercet-selen-brom]      Allergic to green peas; Heart failure.    Peas Hives    Ibuprofen Swelling    Latex, natural rubber Hives    Pcn [penicillins] Hives    Butisol [butabarbital] Rash     Peeling skin       Actual Body Weight:   164    Renal Function:   Estimated Creatinine Clearance: 139.2 mL/min (based on SCr of 1.1 mg/dL).,     Dialysis Method (if applicable):  na     CBC (last 72 hours):  Recent Labs   Lab Result Units 04/24/19  1528 04/25/19  0748 04/25/19  1947 04/26/19  0326   WBC K/uL 9.98 6.85 5.69 5.37   Hemoglobin g/dL 10.6* 9.1* 10.1* 9.9*   Hematocrit % 35.2* 30.5* 33.4* 32.7*   Platelets K/uL 187 152 155 170   Gran% % 90.8* 80.2* 77.5* 73.1*   Lymph% % 4.7* 9.1* 10.0* 12.3*   Mono% % 3.4* 8.5 8.4 9.9   Eosinophil% % 0.8 1.9 3.7 4.3   Basophil% % 0.1 0.3 0.2 0.4   Differential Method  Automated Automated Automated Automated       Metabolic Panel (last 72 hours):  Recent Labs   Lab Result Units 04/24/19  1451 04/24/19  1748 04/25/19  0748 04/25/19  1947   Sodium  mmol/L 142  --  138 137   Potassium mmol/L 4.8  --  4.1 4.6   Chloride mmol/L 113*  --  109 105   CO2 mmol/L 19*  --  24 25   Glucose mg/dL 93  --  81 100   Glucose, UA   --  Negative  --   --    BUN, Bld mg/dL 19  --  17 19   Creatinine mg/dL 0.9  --  0.9 1.1   Albumin g/dL 3.5  --  3.0* 3.2*   Total Bilirubin mg/dL 0.6  --  0.8 0.7   Alkaline Phosphatase U/L 116  --  95 108   AST U/L 24  --  20 21   ALT U/L 22  --  17 18   Magnesium mg/dL  --   --  1.6 1.5*   Phosphorus mg/dL  --   --  3.4 3.7       Vancomycin Administrations:  [unfilled]    Drug levels (last 3 results):  Recent Labs   Lab Result Units 04/26/19  0908   Vancomycin-Trough ug/mL 25.2*       Microbiologic Results:  Microbiology Results (last 7 days)       Procedure Component Value Units Date/Time    Culture, Anaerobe [[id number]] Collected:  04/25/19 0131    Order Status:  Completed Specimen:  Wound from Foot, Left Updated:  04/26/19 0712     Anaerobic Culture Culture in progress    Blood Culture #1 **CANNOT BE ORDERED STAT** [[id number]] Collected:  04/24/19 1453    Order Status:  Completed Specimen:  Blood from Peripheral, Forearm, Left Updated:  04/25/19 2212     Blood Culture, Routine No Growth to date     Blood Culture, Routine No Growth to date    Blood Culture #2 **CANNOT BE ORDERED STAT** [[id number]] Collected:  04/24/19 1523    Order Status:  Completed Specimen:  Blood from Peripheral, Antecubital, Right Updated:  04/25/19 2022     Blood Culture, Routine No Growth to date     Blood Culture, Routine No Growth to date    Aerobic culture [[id number]] Collected:  04/25/19 0131    Order Status:  Sent Specimen:  Wound from Foot, Left Updated:  04/25/19 0433    Blood culture [[id number]]     Order Status:  Canceled Specimen:  Blood     Blood culture [[id number]]     Order Status:  Canceled Specimen:  Blood

## 2019-04-26 NOTE — ASSESSMENT & PLAN NOTE
Patient with complaint of nausea, vomiting, epigastric abd pain x 1 day   protonoix 40 mg IV   Maintenance IVF @ 100 cc/hr   Diet advanced to cardiac diet   Tolerating food well   No nausea, vomiting or abd pain   Resolved

## 2019-04-26 NOTE — PLAN OF CARE
Problem: Physical Therapy Goal  Goal: Physical Therapy Goal  Goals to be met by: 2019     Patient will increase functional independence with mobility by performin. Supine to sit with Modified Reagan  2. Sit to supine with Modified Reagan  3. Sit to stand transfer with Supervision  4. Gait  x 50 feet with Supervision using bariatric Rolling Walker.   5. Lower extremity exercise program x10 reps with supervision     Outcome: Ongoing (interventions implemented as appropriate)  Pt progressing toward goals; pt amb 55ft using bariatric RW; will cont with POC

## 2019-04-26 NOTE — ASSESSMENT & PLAN NOTE
Cardiology consulted and recommend   Previous bilateral CIV stents and PTA to IVC in 2014  BLE venous ultrasound with no evidence of DVT; INR 2.4 yesterday but subtherapeutic earlier this month  Profound swelling to LLE concerning for possibility of occlusion to right CIV stent or IVC-unable to see with ultrasound  CTA abdomen pelvis shows Patent venous stents  Will follow up with cardiology on discharge

## 2019-04-26 NOTE — PROGRESS NOTES
Pharmacist Intervention IV to PO Note    Drew Farrar is a 53 y.o. male being treated with IV medication pantoprazole    Patient Data:    Vital Signs (Most Recent):  Temp: 96.1 °F (35.6 °C) (04/26/19 0805)  Pulse: 71 (04/26/19 0822)  Resp: 19 (04/26/19 0805)  BP: 133/61 (04/26/19 0805)  SpO2: 100 % (04/26/19 0339) Vital Signs (72h Range):  Temp:  [96.1 °F (35.6 °C)-102.6 °F (39.2 °C)]   Pulse:  []   Resp:  [15-26]   BP: (104-167)/(55-87)   SpO2:  [84 %-100 %]      CBC:  Recent Labs   Lab 04/25/19  0748 04/25/19 1947 04/26/19  0326   WBC 6.85 5.69 5.37   RBC 3.59* 3.96* 3.92*   HGB 9.1* 10.1* 9.9*   HCT 30.5* 33.4* 32.7*    155 170   MCV 85 84 83   MCH 25.3* 25.5* 25.3*   MCHC 29.8* 30.2* 30.3*     CMP:     Recent Labs   Lab 04/24/19  1451 04/25/19  0748 04/25/19 1947   GLU 93 81 100   CALCIUM 9.2 8.6* 8.6*   ALBUMIN 3.5 3.0* 3.2*   PROT 7.2 6.8 7.3    138 137   K 4.8 4.1 4.6   CO2 19* 24 25   * 109 105   BUN 19 17 19   CREATININE 0.9 0.9 1.1   ALKPHOS 116 95 108   ALT 22 17 18   AST 24 20 21   BILITOT 0.6 0.8 0.7       Dietary Orders:  Diet Orders            Diet Cardiac: Cardiac starting at 04/24 1953            Based on the following criteria, this patient qualifies for intravenous to oral conversion:  [x] The patient?s gastrointestinal tract is functioning (tolerating medications via oral or enteral route for 24 hours and tolerating food or enteral feeds for 24 hours).  [x] The patient is hemodynamically stable for 24 hours (heart rate <100 beats per minute, systolic blood pressure >99 mm Hg, and respiratory rate <20 breaths per minute).  [x] The patient shows clinical improvement (afebrile for at least 24 hours and white blood cell count downtrending or normalized). Additionally, the patient must be non-neutropenic (absolute neutrophil count >500 cells/mm3).  [x] For antimicrobials, the patient has received IV therapy for at least 24 hours.    IV medication pantoprazole 40mg daily  will be changed to oral medication pantoprazole 40mg daily    Pharmacist's Name: Pastora Portillo

## 2019-04-26 NOTE — NURSING
Attempted to do VN rounds , at this time patient complained of feeling SOB and c/o Chest pain 10/10 across his chest . PT. Diaphoretic with hand on chest. Chen Sy RN Alerted to room . Charge nurse Dana also in room . O2 Sats 84% . Placed o2 3 LNC . VSS obtained . MET announced. Dr. Rosenberg Notified .

## 2019-04-26 NOTE — SUBJECTIVE & OBJECTIVE
Review of Systems   Constitution: Negative for chills, decreased appetite, diaphoresis and fever.   Cardiovascular: Positive for leg swelling. Negative for chest pain, claudication, cyanosis, dyspnea on exertion, irregular heartbeat, near-syncope, orthopnea, palpitations, paroxysmal nocturnal dyspnea and syncope.   Respiratory: Negative for cough, hemoptysis, shortness of breath and wheezing.    Gastrointestinal: Negative for bloating, abdominal pain, constipation, diarrhea, melena, nausea and vomiting.   Neurological: Negative for dizziness and weakness.     Objective:     Vital Signs (Most Recent):  Temp: 96.1 °F (35.6 °C) (04/26/19 0805)  Pulse: 71 (04/26/19 0822)  Resp: 19 (04/26/19 0805)  BP: 133/61 (04/26/19 0805)  SpO2: 100 % (04/26/19 0339) Vital Signs (24h Range):  Temp:  [96.1 °F (35.6 °C)-98.5 °F (36.9 °C)] 96.1 °F (35.6 °C)  Pulse:  [] 71  Resp:  [15-22] 19  SpO2:  [84 %-100 %] 100 %  BP: (104-167)/(55-87) 133/61     Weight: (!) 165.8 kg (365 lb 8.4 oz)  Body mass index is 40.15 kg/m².     SpO2: 100 %  O2 Device (Oxygen Therapy): nasal cannula      Intake/Output Summary (Last 24 hours) at 4/26/2019 1113  Last data filed at 4/26/2019 0835  Gross per 24 hour   Intake --   Output 5050 ml   Net -5050 ml       Lines/Drains/Airways     Peripheral Intravenous Line                 Peripheral IV - Single Lumen 04/24/19 1501 22 G Left Hand 1 day         Peripheral IV - Single Lumen 04/25/19 1845 20 G Anterior;Right Antecubital less than 1 day                Physical Exam   Constitutional: He is oriented to person, place, and time. He appears well-developed and well-nourished. No distress.   Cardiovascular: Normal rate and regular rhythm. Exam reveals no gallop.   No murmur heard.  Pulmonary/Chest: Effort normal and breath sounds normal. No respiratory distress. He has no wheezes.   Abdominal: Soft. Bowel sounds are normal. He exhibits no distension. There is no tenderness.   Musculoskeletal: He exhibits  edema (trace LLE edema present; RLE with significant edema although slightly improved in comparison to yesterday ).   Neurological: He is alert and oriented to person, place, and time.   Skin: Skin is warm and dry.   Discoloration to BLE consistent with venous insufficiency        Significant Labs:     Recent Labs   Lab 04/26/19  0326   WBC 5.37   RBC 3.92*   HGB 9.9*   HCT 32.7*      MCV 83   MCH 25.3*   MCHC 30.3*     Recent Labs   Lab 04/25/19  1947   CALCIUM 8.6*   PROT 7.3      K 4.6   CO2 25      BUN 19   CREATININE 1.1   ALKPHOS 108   ALT 18   AST 21   BILITOT 0.7       Significant Imaging:     TTE 4/25/2019    · Normal left ventricular systolic function. The estimated ejection fraction is 55%  · Mild left atrial enlargement.  · Mild right atrial enlargement.  · Normal right ventricular systolic function.  · Atrial fibrillation observed.  · Mild mitral regurgitation.  · Poor bubble study with poor visualization. No obvious signs of PFO or ASd seen however.

## 2019-04-26 NOTE — PLAN OF CARE
Problem: Occupational Therapy Goal  Goal: Occupational Therapy Goal  Goals to be met by: 5/25/18     Patient will increase functional independence with ADLs by performing:    LE Dressing with Supervision.  Grooming while standing with Supervision.  Toileting from toilet with Supervision for hygiene and clothing management.   Supine to sit with Supervision.  Step transfer with Supervision  Toilet transfer to toilet with Supervision.  Upper extremity exercise program x10 reps per handout, with independence.     Outcome: Outcome(s) achieved Date Met: 04/26/19  Pt progressing well towards goals. Improvement noted in ADLs and functional mobility today's date. HHOT/PT at d/c. Bariatric RW and w/c at d/c.

## 2019-04-26 NOTE — PLAN OF CARE
Problem: Adult Inpatient Plan of Care  Goal: Plan of Care Review  Outcome: Ongoing (interventions implemented as appropriate)  Patient on oxygen with documented flow.  Will attempt to wean per O2 order protocol. MET called overhead. Resp responds appropriately. SpO2 WNL. EKG obtained by AD. Will continue to monitor.

## 2019-04-26 NOTE — ASSESSMENT & PLAN NOTE
SIRS 3/4  Heart rate 123  Temp 102.6  RR 22  WBC 9.89  Possible sources of infection from chronic venous stasis wounds ?  Wound cx from previous show micro microbial growth including MRSA   Given 1 liter NS   Due to severity of CHF, use fluids modestly   Started on Vancomycin in ED   Stopped cefepime   Bcx NGTD   Wcx pending   CXR concerning for pulmonary edema   Imaging of the legs concerning for cellulitis only   WBC trending down 5.37  Patient afebrile over night   Stop vancomycin   De-escalate abx to PO clindamycin  Believe this to be only cellulitis, based on new evidence

## 2019-04-26 NOTE — PT/OT/SLP PROGRESS
Occupational Therapy   Treatment    Name: Drew Farrar  MRN: [id number]  Admitting Diagnosis:  Cellulitis of lower extremity       Recommendations:     Discharge Recommendations: home health PT, home health OT  Discharge Equipment Recommendations:  walker, rolling, wheelchair, manual  Barriers to discharge:  None    Assessment:     Drew Farrar is a 53 y.o. male with a medical diagnosis of Cellulitis of lower extremity.  He presents with decreased ax tolerance . Performance deficits affecting function are weakness, impaired self care skills, impaired balance, impaired functional mobilty, impaired endurance, gait instability, impaired cardiopulmonary response to activity, pain, decreased safety awareness, impaired skin, edema.   Pt progressing well towards goals. Improvement noted in ADLs and functional mobility today's date. HHOT/PT at d/c. Bariatric RW and w/c at d/c.     Rehab Prognosis:  Good; patient would benefit from acute skilled OT services to address these deficits and reach maximum level of function.       Plan:     Patient to be seen 3 x/week to address the above listed problems via self-care/home management, therapeutic activities, therapeutic exercises  · Plan of Care Expires: 05/25/19  · Plan of Care Reviewed with: patient    Subjective     Pain/Comfort:  · Pain Rating 1: 0/10    Objective:     Communicated with: kristan prior to session.   General Precautions: Standard, fall   Orthopedic Precautions:N/A   Braces: N/A     Occupational Performance:     Bed Mobility:    · Patient completed Scooting/Bridging with modified independence  · Patient completed Supine to Sit with modified independence     Functional Mobility/Transfers:  · Patient completed Sit <> Stand Transfer with stand by assistance and contact guard assistance  with  rolling walker   · Patient completed Bed <> Chair Transfer using Step Transfer technique with stand by assistance and contact guard assistance with rolling walker  · Functional  Mobility: SBA-CGA; cues for proximity to RW; upright posture; adaptive sequence     Activities of Daily Living:  · Upper Body Dressing: modified independence    · Lower Body Dressing: supervision tyrone GARCIA shoes covers/surgical booties and pants while long sitting in bed       [facility] 6 Click ADL: 22    Treatment & Education:  Pt performed LBD and UBD as above   Therapist demonstrating adaptive technique for performance while EOB   Demonstrated adaptive sequence with RW to reduce pain in RLE with ambulation   Functional mobiltiy performed in hallway with pursed lip breathing  Educated on energy conservation techniques       Patient left up in chair with all lines intact, call button in reach, chair alarm on, nsg notified and significant other  presentEducation:      GOALS:   Multidisciplinary Problems     Occupational Therapy Goals     Not on file          Multidisciplinary Problems (Resolved)        Problem: Occupational Therapy Goal    Goal Priority Disciplines Outcome Interventions   Occupational Therapy Goal   (Resolved)     OT, PT/OT Outcome(s) achieved    Description:  Goals to be met by: 5/25/18     Patient will increase functional independence with ADLs by performing:    LE Dressing with Supervision.  Grooming while standing with Supervision.  Toileting from toilet with Supervision for hygiene and clothing management.   Supine to sit with Supervision.  Step transfer with Supervision  Toilet transfer to toilet with Supervision.  Upper extremity exercise program x10 reps per handout, with independence.                      Time Tracking:     OT Date of Treatment: 04/26/19  OT Start Time: 1137  OT Stop Time: 1208  OT Total Time (min): 31 min    Billable Minutes:Self Care/Home Management 15 co treatment with PT     Dana Powell, OT  4/26/2019

## 2019-04-26 NOTE — PT/OT/SLP PROGRESS
Physical Therapy Treatment    Patient Name:  Drew Farrar   MRN:  [id number]    Recommendations:     Discharge Recommendations:  home health PT, home health OT   Discharge Equipment Recommendations: walker, rolling, wheelchair, manual   Barriers to discharge: None    Assessment:     Drew Farrar is a 53 y.o. male admitted with a medical diagnosis of Cellulitis of lower extremity.  He presents with the following impairments/functional limitations:  weakness, impaired endurance, gait instability, impaired functional mobilty, impaired self care skills, impaired balance, impaired cardiopulmonary response to activity, pain, decreased safety awareness, impaired skin, edema, decreased lower extremity function Progressing toward goals.    Rehab Prognosis: Good; patient would benefit from acute skilled PT services to address these deficits and reach maximum level of function.    Recent Surgery: * No surgery found *      Plan:     During this hospitalization, patient to be seen 6 x/week to address the identified rehab impairments via gait training, therapeutic activities, therapeutic exercises and progress toward the following goals:    · Plan of Care Expires:  05/25/19    Subjective     Pain/Comfort:  · Pain Rating 1: 0/10  · Location - Side 1: Right  · Location - Orientation 1: generalized  · Location 1: leg  · Pain Addressed 1: Reposition, Nurse notified, Distraction, Cessation of Activity  · Pain Rating Post-Intervention 1: (did not rate)      Objective:     Communicated with nurse prior to session.  Patient found with peripheral IV upon PT entry to room.     General Precautions: Standard, fall   Orthopedic Precautions:N/A   Braces: N/A     Functional Mobility:  · Bed Mobility:     · Scooting: modified independence  · Supine to Sit: modified independence  · Transfers:     · Sit to Stand:  stand by assistance and contact guard assistance with rolling walker  · Bed to Chair: stand by assistance and contact guard  assistance with  rolling walker  using  Step Transfer  · Gait: Patient amb 55ft using RW with SBA/CGA; cues to keep RW close, upright posture, sequencing of steps      AM-PAC 6 CLICK MOBILITY  Turning over in bed (including adjusting bedclothes, sheets and blankets)?: 3  Sitting down on and standing up from a chair with arms (e.g., wheelchair, bedside commode, etc.): 3  Moving from lying on back to sitting on the side of the bed?: 3  Moving to and from a bed to a chair (including a wheelchair)?: 3  Need to walk in hospital room?: 3  Climbing 3-5 steps with a railing?: 2  Basic Mobility Total Score: 17       Therapeutic Activities and Exercises:   Patient performed GT above with VCs as described to reduce pain LLE and improve posture; pursed lip breathing throughout as needed.    Patient left up in chair with all lines intact, call button in reach, bed alarm on, nurse notified and S.O. present..    GOALS:   Multidisciplinary Problems     Physical Therapy Goals        Problem: Physical Therapy Goal    Goal Priority Disciplines Outcome Goal Variances Interventions   Physical Therapy Goal     PT, PT/OT Ongoing (interventions implemented as appropriate)     Description:  Goals to be met by: 2019     Patient will increase functional independence with mobility by performin. Supine to sit with Modified Marvell  2. Sit to supine with Modified Marvell  3. Sit to stand transfer with Supervision  4. Gait  x 50 feet with Supervision using bariatric Rolling Walker.   5. Lower extremity exercise program x10 reps with supervision                      Time Tracking:     PT Received On: 19  PT Start Time: 1137     PT Stop Time: 1208  PT Total Time (min): 31 min with OT    Billable Minutes: Gait Training 16 minutes    Treatment Type: Treatment  PT/PTA: PT     PTA Visit Number: 0     Colette Rojas, PT  2019

## 2019-04-26 NOTE — PLAN OF CARE
VN rounds: VN cued into pt's room with pt's permission. Pt resting in bed noted to be on oxygen. NAD noted. Pt denies any CP or discomfort at this present time and stated the medications have helped. Allowed time for questions. Fall risk protocol discussed with pt. VN instructed pt to call for assistance. Pt aware and agreeable. Will cont to be available and intervene as needed.        04/26/19 1023   Type of Frequent Check   Type Patient Rounds;Other (see comments)  (VN rounds)   Safety/Activity   Patient Rounds visualized patient;call light in patient/parent reach   Safety Promotion/Fall Prevention Fall Risk reviewed with patient/family;assistive device/personal item within reach;instructed to call staff for mobility   Positioning   Body Position sitting up in bed   Pain/Comfort/Sleep   Preferred Pain Scale word (verbal rating pain scale)   Pain Rating: Rest 0 - no pain   Sleep/Rest/Relaxation awake;no problem identified   Assessments (Pre/Post)   Level of Consciousness (AVPU) alert

## 2019-04-27 VITALS
HEART RATE: 97 BPM | TEMPERATURE: 98 F | DIASTOLIC BLOOD PRESSURE: 61 MMHG | BODY MASS INDEX: 36.45 KG/M2 | WEIGHT: 315 LBS | OXYGEN SATURATION: 96 % | RESPIRATION RATE: 18 BRPM | SYSTOLIC BLOOD PRESSURE: 116 MMHG | HEIGHT: 78 IN

## 2019-04-27 LAB
ALBUMIN SERPL BCP-MCNC: 3.5 G/DL (ref 3.5–5.2)
ALP SERPL-CCNC: 101 U/L (ref 55–135)
ALT SERPL W/O P-5'-P-CCNC: 21 U/L (ref 10–44)
ANION GAP SERPL CALC-SCNC: 12 MMOL/L (ref 8–16)
AST SERPL-CCNC: 25 U/L (ref 10–40)
BACTERIA SPEC AEROBE CULT: NORMAL
BASOPHILS # BLD AUTO: 0.01 K/UL (ref 0–0.2)
BASOPHILS NFR BLD: 0.2 % (ref 0–1.9)
BILIRUB SERPL-MCNC: 0.7 MG/DL (ref 0.1–1)
BUN SERPL-MCNC: 19 MG/DL (ref 6–20)
CALCIUM SERPL-MCNC: 9.1 MG/DL (ref 8.7–10.5)
CHLORIDE SERPL-SCNC: 102 MMOL/L (ref 95–110)
CO2 SERPL-SCNC: 26 MMOL/L (ref 23–29)
CREAT SERPL-MCNC: 1.1 MG/DL (ref 0.5–1.4)
DIFFERENTIAL METHOD: ABNORMAL
EOSINOPHIL # BLD AUTO: 0.3 K/UL (ref 0–0.5)
EOSINOPHIL NFR BLD: 6.3 % (ref 0–8)
ERYTHROCYTE [DISTWIDTH] IN BLOOD BY AUTOMATED COUNT: 15.1 % (ref 11.5–14.5)
EST. GFR  (AFRICAN AMERICAN): >60 ML/MIN/1.73 M^2
EST. GFR  (NON AFRICAN AMERICAN): >60 ML/MIN/1.73 M^2
GLUCOSE SERPL-MCNC: 90 MG/DL (ref 70–110)
HCT VFR BLD AUTO: 33.8 % (ref 40–54)
HGB BLD-MCNC: 10.5 G/DL (ref 14–18)
INR PPP: 2.2 (ref 0.8–1.2)
LYMPHOCYTES # BLD AUTO: 0.6 K/UL (ref 1–4.8)
LYMPHOCYTES NFR BLD: 13.7 % (ref 18–48)
MAGNESIUM SERPL-MCNC: 1.8 MG/DL (ref 1.6–2.6)
MCH RBC QN AUTO: 25.5 PG (ref 27–31)
MCHC RBC AUTO-ENTMCNC: 31.1 G/DL (ref 32–36)
MCV RBC AUTO: 82 FL (ref 82–98)
MONOCYTES # BLD AUTO: 0.4 K/UL (ref 0.3–1)
MONOCYTES NFR BLD: 8.7 % (ref 4–15)
NEUTROPHILS # BLD AUTO: 3.3 K/UL (ref 1.8–7.7)
NEUTROPHILS NFR BLD: 71.1 % (ref 38–73)
PHOSPHATE SERPL-MCNC: 3.6 MG/DL (ref 2.7–4.5)
PLATELET # BLD AUTO: 209 K/UL (ref 150–350)
PMV BLD AUTO: 9.3 FL (ref 9.2–12.9)
POTASSIUM SERPL-SCNC: 3.9 MMOL/L (ref 3.5–5.1)
PROT SERPL-MCNC: 7.8 G/DL (ref 6–8.4)
PROTHROMBIN TIME: 22.6 SEC (ref 9–12.5)
RBC # BLD AUTO: 4.11 M/UL (ref 4.6–6.2)
SODIUM SERPL-SCNC: 140 MMOL/L (ref 136–145)
WBC # BLD AUTO: 4.59 K/UL (ref 3.9–12.7)

## 2019-04-27 PROCEDURE — 25000003 PHARM REV CODE 250: Performed by: FAMILY MEDICINE

## 2019-04-27 PROCEDURE — 83735 ASSAY OF MAGNESIUM: CPT

## 2019-04-27 PROCEDURE — 85610 PROTHROMBIN TIME: CPT

## 2019-04-27 PROCEDURE — 25000003 PHARM REV CODE 250: Performed by: STUDENT IN AN ORGANIZED HEALTH CARE EDUCATION/TRAINING PROGRAM

## 2019-04-27 PROCEDURE — 84100 ASSAY OF PHOSPHORUS: CPT

## 2019-04-27 PROCEDURE — 85025 COMPLETE CBC W/AUTO DIFF WBC: CPT

## 2019-04-27 PROCEDURE — 80053 COMPREHEN METABOLIC PANEL: CPT

## 2019-04-27 RX ORDER — CLINDAMYCIN HYDROCHLORIDE 300 MG/1
300 CAPSULE ORAL 3 TIMES DAILY
Qty: 18 CAPSULE | Refills: 0 | Status: SHIPPED | OUTPATIENT
Start: 2019-04-27 | End: 2019-05-03

## 2019-04-27 RX ORDER — CLINDAMYCIN HYDROCHLORIDE 300 MG/1
300 CAPSULE ORAL EVERY 6 HOURS
Qty: 24 CAPSULE | Refills: 0 | Status: SHIPPED | OUTPATIENT
Start: 2019-04-27 | End: 2019-04-27

## 2019-04-27 RX ORDER — DOXYLAMINE SUCCINATE 25 MG
TABLET ORAL 2 TIMES DAILY
Refills: 0 | COMMUNITY
Start: 2019-04-27 | End: 2019-05-14

## 2019-04-27 RX ADMIN — TORSEMIDE 20 MG: 20 TABLET ORAL at 10:04

## 2019-04-27 RX ADMIN — MICONAZOLE NITRATE: 20 CREAM TOPICAL at 09:04

## 2019-04-27 RX ADMIN — PANTOPRAZOLE SODIUM 40 MG: 40 TABLET, DELAYED RELEASE ORAL at 05:04

## 2019-04-27 RX ADMIN — CLINDAMYCIN HYDROCHLORIDE 300 MG: 150 CAPSULE ORAL at 05:04

## 2019-04-27 RX ADMIN — WARFARIN SODIUM 10 MG: 5 TABLET ORAL at 05:04

## 2019-04-27 RX ADMIN — LISINOPRIL 2.5 MG: 2.5 TABLET ORAL at 10:04

## 2019-04-27 RX ADMIN — CLINDAMYCIN HYDROCHLORIDE 300 MG: 150 CAPSULE ORAL at 12:04

## 2019-04-27 RX ADMIN — METOPROLOL SUCCINATE 100 MG: 25 TABLET, EXTENDED RELEASE ORAL at 10:04

## 2019-04-27 NOTE — PROGRESS NOTES
[facility] Hospital Medicine  Progress Note    Patient Name: Drew Farrar  MRN: [id number]  Patient Class: IP- Inpatient   Admission Date: 2019  Length of Stay: 3 days  Attending Physician: Dayami Miguel MD  Primary Care Provider: David Larsen MD        Subjective:     Principal Problem:Cellulitis of lower extremity    HPI:  Drew Farrar is a 53 y.o. male with history of  CHF  (EF 40-45%) , HTN, A-fib (rate controlled), chronic venous stasis of BLE's, DVT (on coumadin) and May-Thurner syndrome who presents to the ED for evaluation of nausea, vomiting, epigastric abdominal pain and fever x 1 day. The patient states he has had multiple episodes of emesis at home. The patient states he has been unable to keep food down due to nausea and vomiting. The emesis is described as orange on color. Abdominal pain is located to the epigastrium and does not radiate, it is 7/10 in burning in nature. H is not aware how how his subjective fevers were. He did not take any OTC medication at home. He denies trying any new, undercooked or  foods. No one at home has similar symptoms. He denies any diarrhea, hematemesis or dark tarry stools.     The patient also notes that his right lower leg is red and warm to the touch. He states the redness started a few weeks ago, but has progressed in the last couple days. He chronically has venous stasis ulcer wounds and lymphedema to the right leg. He follows the wound care center.     In the ED the patient was SIRS 3/4: , temp 102.6, RR 22 requiring nasal canula. WBC 9.89. Lactic acid 1.3. Started on vancomycin in the ED. 1 Liter bolus given     Hospital Course:  : In the ED the patient was SIRS 3/4: , temp 102.6, RR 22 requiring  9 L O2 on nasal canula. WBC 9.89. Lactic acid 1.3. Started on vancomycin in the ED. 1 Liter bolus given in ED. Started on vancomycin and cefepime. Pending CXR, venous doppler BLE, CTA and CT leg. CXR shows Heart  is enlarged with possible mild pulmonary edema. CTA chest No evidence of PE,  Dilated pulmonary artery which may be seen in setting of pulmonary artery hypertension. CT RLE  Shows extensive subcutaneous soft tissue edema throughout the lower leg with diffuse circumferential skin thickening which may be seen in the setting of cellulitis. Venous doppler BLE shows no evidence of deep venous thrombosis in either lower extremity. Wcx obtained     04/25: Patietn afebrile over night, WBC 6.85 trending down, H/H remains stable. On room air with oxygen saturation  95-98. Working with PT/OT. PT/OT Recommend home with home health. De-escalate the abx and continue just vancomycin. Cardiology saw the patient and recommend CTA abdomen/pelvis with triple phase for evaluation of patency of bilateral CIV and IVC. Humaetn started on lasix     04/26: Patient UOP 4L Over night. Over night the patient had two rapid responses called last night for crushing chest pain. No EKG changes were seen. Troponin was negative.  No change on CXR. Chest pain somewhat relieved with NTG. Chest pain completely resolved in the morning. The patient notes he has had similar episodes in the past. WBC trending down 5.37. Afebrile. CTa Abdomen and pelvis shows Patent venous stents. Cardiology recommend restarting home Demadex. The patient can follow up out patient with them and wound care. Abx  De-escalated to clindamycin         Interval History: The patient was seen and examined this morning at bedside, the patient is resting comfortably in NAD. The patient has no acute complaints this morning. The patient is tolerating diet well, good urinary output and has worked with PT/OT. The patient feels significantly better than on admission. The nurse reports no acute events over night. The patient denies any chest pain, SOB or focal neuro deficits       Review of Systems   Constitutional: Positive for fever (resolved). Negative for activity change, chills and  fatigue.   HENT: Negative for congestion and ear pain.    Eyes: Negative for pain.   Respiratory: Negative for apnea, chest tightness, shortness of breath and wheezing.    Cardiovascular: Positive for chest pain (resolved) and leg swelling (chronically). Negative for palpitations.   Gastrointestinal: Positive for abdominal pain (resolved), nausea (resolved) and vomiting (resolved). Negative for abdominal distention, constipation, diarrhea and rectal pain.   Endocrine: Negative for polydipsia.   Genitourinary: Negative for dysuria, flank pain and hematuria.   Musculoskeletal: Negative for arthralgias, back pain, neck pain and neck stiffness.   Skin: Positive for rash (groin ) and wound (chronic BLE).   Neurological: Negative for dizziness, tremors, facial asymmetry, speech difficulty and light-headedness.   Psychiatric/Behavioral: Negative for agitation.     Objective:     Vital Signs (Most Recent):  Temp: 98.2 °F (36.8 °C) (04/27/19 0753)  Pulse: 90 (04/27/19 0842)  Resp: 17 (04/27/19 0842)  BP: (!) 124/58 (04/27/19 0753)  SpO2: 96 % (04/27/19 0842) Vital Signs (24h Range):  Temp:  [96.3 °F (35.7 °C)-98.2 °F (36.8 °C)] 98.2 °F (36.8 °C)  Pulse:  [68-97] 90  Resp:  [17-20] 17  SpO2:  [95 %-96 %] 96 %  BP: (106-124)/(58-72) 124/58     Weight: (!) 165.8 kg (365 lb 8.4 oz)  Body mass index is 40.15 kg/m².    Intake/Output Summary (Last 24 hours) at 4/27/2019 0937  Last data filed at 4/27/2019 0600  Gross per 24 hour   Intake 380 ml   Output 1550 ml   Net -1170 ml      Physical Exam   Constitutional: He appears well-developed and well-nourished. He is cooperative.  Non-toxic appearance.   Morbidly obese    HENT:   Head: Normocephalic and atraumatic.   Right Ear: External ear normal.   Left Ear: External ear normal.   Nose: Nose normal.   Mouth/Throat: Oropharynx is clear and moist.   Eyes:   strabismus    Cardiovascular: Normal rate and regular rhythm.   No murmur heard.  Pulmonary/Chest: Effort normal.   Abdominal:  Normal appearance and bowel sounds are normal. He exhibits no distension. There is no tenderness. There is no rigidity, no guarding, no tenderness at McBurney's point and negative Frederick's sign.   Musculoskeletal: Normal range of motion. He exhibits edema and tenderness. He exhibits no deformity.   RLE significantly larger than the LLE   RLE inner thigh with surrounding erythema much improved from previous day    Feet:   Right Foot:   Skin Integrity: Positive for skin breakdown, callus and dry skin.   Left Foot:   Skin Integrity: Positive for skin breakdown, callus and dry skin.   Neurological: He is alert.   Skin: Skin is warm and dry. Rash (groin) noted. Rash is maculopapular.   Chronic venous stasis wounds BLE , with skin changes    Nursing note and vitals reviewed.      Significant Labs:   CBC:   Recent Labs   Lab 04/25/19 1947 04/26/19 0326 04/27/19  0557   WBC 5.69 5.37 4.59   HGB 10.1* 9.9* 10.5*   HCT 33.4* 32.7* 33.8*    170 209     CMP:   Recent Labs   Lab 04/25/19 1947 04/26/19 0325 04/27/19  0557    139 140   K 4.6 4.0 3.9    103 102   CO2 25 26 26    89 90   BUN 19 19 19   CREATININE 1.1 1.0 1.1   CALCIUM 8.6* 8.6* 9.1   PROT 7.3 7.5 7.8   ALBUMIN 3.2* 3.3* 3.5   BILITOT 0.7 0.7 0.7   ALKPHOS 108 101 101   AST 21 24 25   ALT 18 21 21   ANIONGAP 7* 10 12   EGFRNONAA >60 >60 >60     Magnesium:   Recent Labs   Lab 04/25/19 1947 04/26/19 0325 04/27/19  0557   MG 1.5* 1.8 1.8     All pertinent labs within the past 24 hours have been reviewed.    Significant Imaging: I have reviewed and interpreted all pertinent imaging results/findings within the past 24 hours.    Assessment/Plan:      * Cellulitis of lower extremity  CT leg shows  Extensive subcutaneous soft tissue edema throughout the lower leg with diffuse circumferential skin thickening which may be seen in the setting of cellulitis   Wound cx pending   Patient improving redness shrinking, on PO clindamycin            Sepsis  SIRS 3/4  Heart rate 123  Temp 102.6  RR 22  WBC 9.89  Possible sources of infection from chronic venous stasis wounds ?  Wound cx from previous show micro microbial growth including MRSA   Given 1 liter NS   Due to severity of CHF, use fluids modestly   Started on Vancomycin in ED   Stopped cefepime   Bcx NGTD   Wcx pending   CXR concerning for pulmonary edema   Imaging of the legs concerning for cellulitis only   Patient afebrile over night   Stop vancomycin   De-escalate abx to PO clindamycin  Believe this to be only cellulitis, based on new evidence   Resolved       May-Thurner syndrome  Cardiology consulted and recommend   Previous bilateral CIV stents and PTA to IVC in 2014  BLE venous ultrasound with no evidence of DVT; INR 2.4 yesterday but subtherapeutic earlier this month  Profound swelling to LLE concerning for possibility of occlusion to right CIV stent or IVC-unable to see with ultrasound  CTA abdomen pelvis shows Patent venous stents  Will follow up with cardiology on discharge           Chronic systolic heart failure  Echo 09/2017 EF 40% with severe MR   Repeat Echo Normal left ventricular systolic function. The estimated ejection fraction is 55%  · Mild left atrial enlargement.  · Mild right atrial enlargement.  · Normal right ventricular systolic function.  · Atrial fibrillation observed.  Daily weight   Strict I&O   UOP 2.6 L   Resume Ace and BB   Resume home Demadex         Candida infection of genital region  Miconazole cream bid      Acute gastroenteritis  Patient with complaint of nausea, vomiting, epigastric abd pain x 1 day   protonoix 40 mg IV   Maintenance IVF @ 100 cc/hr   Diet advanced to cardiac diet   Tolerating food well   No nausea, vomiting or abd pain   Resolved       Essential hypertension  BP on admission 127/59  Resume home medications   Will continue to monitor         VTE Risk Mitigation (From admission, onward)        Ordered     warfarin (COUMADIN) tablet 10  mg  Daily      04/25/19 1132     IP VTE HIGH RISK PATIENT  Once      04/24/19 1952     Place TIM hose  Until discontinued      04/24/19 1952     Place sequential compression device  Until discontinued      04/24/19 1952          Disposition today     D'Amico C Johnson, MD  Department of Hospital Medicine   [facility]

## 2019-04-27 NOTE — ASSESSMENT & PLAN NOTE
CT leg shows  Extensive subcutaneous soft tissue edema throughout the lower leg with diffuse circumferential skin thickening which may be seen in the setting of cellulitis   Wound cx Skin ori,  no predominant organism   Patient improving redness shrinking, on PO clindamycin   Discharge home on Clindamycin 300 mg TID x 6 days  Patient will follow up in Wound care center

## 2019-04-27 NOTE — ASSESSMENT & PLAN NOTE
SIRS 3/4  Heart rate 123  Temp 102.6  RR 22  WBC 9.89  Possible sources of infection from chronic venous stasis wounds ?  Wound cx from previous show micro microbial growth including MRSA   Given 1 liter NS   Due to severity of CHF, use fluids modestly   Started on Vancomycin in ED   Stopped cefepime   Bcx NGTD   Wcx Skin ori,  no predominant organism   CXR concerning for pulmonary edema   Imaging of the legs concerning for cellulitis only   Patient afebrile over night   Stop vancomycin   De-escalate abx to PO clindamycin  Believe this to be only cellulitis, based on new evidence   Resolved

## 2019-04-27 NOTE — DISCHARGE INSTRUCTIONS
Cellulitis, Discharge Instructions for (English) View Edit Remove  Miconazole skin cream (English) View Edit Remove  Clindamycin capsules (English) View Edit Remove

## 2019-04-27 NOTE — ASSESSMENT & PLAN NOTE
Echo 09/2017 EF 40% with severe MR   Repeat Echo Normal left ventricular systolic function. The estimated ejection fraction is 55%  · Mild left atrial enlargement.  · Mild right atrial enlargement.  · Normal right ventricular systolic function.  · Atrial fibrillation observed.  Daily weight   Strict I&O   UOP 2.6 L   Resume Ace and BB   Resume home Demadex   Resume home meds as instructed  Follow up with Cardiology   Stable

## 2019-04-27 NOTE — ASSESSMENT & PLAN NOTE
Miconazole cream bid  Rash improving   Will discharge home on miconazole   Prescription sent to pharmacy

## 2019-04-27 NOTE — ASSESSMENT & PLAN NOTE
CT leg shows  Extensive subcutaneous soft tissue edema throughout the lower leg with diffuse circumferential skin thickening which may be seen in the setting of cellulitis   Wound cx pending   Patient improving redness shrinking, on PO clindamycin

## 2019-04-27 NOTE — SUBJECTIVE & OBJECTIVE
Interval History: The patient was seen and examined this morning at bedside, the patient is resting comfortably in NAD. The patient has no acute complaints this morning. The patient is tolerating diet well, good urinary output and has worked with PT/OT. The patient feels significantly better than on admission. The nurse reports no acute events over night. The patient denies any chest pain, SOB or focal neuro deficits       Review of Systems   Constitutional: Positive for fever (resolved). Negative for activity change, chills and fatigue.   HENT: Negative for congestion and ear pain.    Eyes: Negative for pain.   Respiratory: Negative for apnea, chest tightness, shortness of breath and wheezing.    Cardiovascular: Positive for chest pain (resolved) and leg swelling (chronically). Negative for palpitations.   Gastrointestinal: Positive for abdominal pain (resolved), nausea (resolved) and vomiting (resolved). Negative for abdominal distention, constipation, diarrhea and rectal pain.   Endocrine: Negative for polydipsia.   Genitourinary: Negative for dysuria, flank pain and hematuria.   Musculoskeletal: Negative for arthralgias, back pain, neck pain and neck stiffness.   Skin: Positive for rash (groin ) and wound (chronic BLE).   Neurological: Negative for dizziness, tremors, facial asymmetry, speech difficulty and light-headedness.   Psychiatric/Behavioral: Negative for agitation.     Objective:     Vital Signs (Most Recent):  Temp: 98.2 °F (36.8 °C) (04/27/19 0753)  Pulse: 90 (04/27/19 0842)  Resp: 17 (04/27/19 0842)  BP: (!) 124/58 (04/27/19 0753)  SpO2: 96 % (04/27/19 0842) Vital Signs (24h Range):  Temp:  [96.3 °F (35.7 °C)-98.2 °F (36.8 °C)] 98.2 °F (36.8 °C)  Pulse:  [68-97] 90  Resp:  [17-20] 17  SpO2:  [95 %-96 %] 96 %  BP: (106-124)/(58-72) 124/58     Weight: (!) 165.8 kg (365 lb 8.4 oz)  Body mass index is 40.15 kg/m².    Intake/Output Summary (Last 24 hours) at 4/27/2019 0937  Last data filed at 4/27/2019  0600  Gross per 24 hour   Intake 380 ml   Output 1550 ml   Net -1170 ml      Physical Exam   Constitutional: He appears well-developed and well-nourished. He is cooperative.  Non-toxic appearance.   Morbidly obese    HENT:   Head: Normocephalic and atraumatic.   Right Ear: External ear normal.   Left Ear: External ear normal.   Nose: Nose normal.   Mouth/Throat: Oropharynx is clear and moist.   Eyes:   strabismus    Cardiovascular: Normal rate and regular rhythm.   No murmur heard.  Pulmonary/Chest: Effort normal.   Abdominal: Normal appearance and bowel sounds are normal. He exhibits no distension. There is no tenderness. There is no rigidity, no guarding, no tenderness at McBurney's point and negative Frederick's sign.   Musculoskeletal: Normal range of motion. He exhibits edema and tenderness. He exhibits no deformity.   RLE significantly larger than the LLE   RLE inner thigh with surrounding erythema much improved from previous day    Feet:   Right Foot:   Skin Integrity: Positive for skin breakdown, callus and dry skin.   Left Foot:   Skin Integrity: Positive for skin breakdown, callus and dry skin.   Neurological: He is alert.   Skin: Skin is warm and dry. Rash (groin) noted. Rash is maculopapular.   Chronic venous stasis wounds BLE , with skin changes    Nursing note and vitals reviewed.      Significant Labs:   CBC:   Recent Labs   Lab 04/25/19 1947 04/26/19 0326 04/27/19  0557   WBC 5.69 5.37 4.59   HGB 10.1* 9.9* 10.5*   HCT 33.4* 32.7* 33.8*    170 209     CMP:   Recent Labs   Lab 04/25/19 1947 04/26/19 0325 04/27/19  0557    139 140   K 4.6 4.0 3.9    103 102   CO2 25 26 26    89 90   BUN 19 19 19   CREATININE 1.1 1.0 1.1   CALCIUM 8.6* 8.6* 9.1   PROT 7.3 7.5 7.8   ALBUMIN 3.2* 3.3* 3.5   BILITOT 0.7 0.7 0.7   ALKPHOS 108 101 101   AST 21 24 25   ALT 18 21 21   ANIONGAP 7* 10 12   EGFRNONAA >60 >60 >60     Magnesium:   Recent Labs   Lab 04/25/19 1947 04/26/19  0321  04/27/19  0557   MG 1.5* 1.8 1.8     All pertinent labs within the past 24 hours have been reviewed.    Significant Imaging: I have reviewed and interpreted all pertinent imaging results/findings within the past 24 hours.

## 2019-04-27 NOTE — ASSESSMENT & PLAN NOTE
Echo 09/2017 EF 40% with severe MR   Repeat Echo Normal left ventricular systolic function. The estimated ejection fraction is 55%  · Mild left atrial enlargement.  · Mild right atrial enlargement.  · Normal right ventricular systolic function.  · Atrial fibrillation observed.  Daily weight   Strict I&O   UOP 2.6 L   Resume Ace and BB   Resume home Demadex

## 2019-04-27 NOTE — DISCHARGE SUMMARY
[facility] Hospital Medicine  Discharge Summary      Patient Name: Drew Farrar  MRN: [id number]  Admission Date: 2019  Hospital Length of Stay: 3 days  Discharge Date and Time:  2019 3:07 PM  Attending Physician: Dayami Miguel MD   Discharging Provider: D'Amico C Johnson, MD  Primary Care Provider: David Larsen MD      HPI:   Drew Farrar is a 53 y.o. male with history of  CHF  (EF 40-45%) , HTN, A-fib (rate controlled), chronic venous stasis of BLE's, DVT (on coumadin) and May-Thurner syndrome who presents to the ED for evaluation of nausea, vomiting, epigastric abdominal pain and fever x 1 day. The patient states he has had multiple episodes of emesis at home. The patient states he has been unable to keep food down due to nausea and vomiting. The emesis is described as orange on color. Abdominal pain is located to the epigastrium and does not radiate, it is 7/10 in burning in nature. H is not aware how how his subjective fevers were. He did not take any OTC medication at home. He denies trying any new, undercooked or  foods. No one at home has similar symptoms. He denies any diarrhea, hematemesis or dark tarry stools.     The patient also notes that his right lower leg is red and warm to the touch. He states the redness started a few weeks ago, but has progressed in the last couple days. He chronically has venous stasis ulcer wounds and lymphedema to the right leg. He follows the wound care center.     In the ED the patient was SIRS 3/4: , temp 102.6, RR 22 requiring nasal canula. WBC 9.89. Lactic acid 1.3. Started on vancomycin in the ED. 1 Liter bolus given     * No surgery found *      Hospital Course:   : In the ED the patient was SIRS 3/4: , temp 102.6, RR 22 requiring  9 L O2 on nasal canula. WBC 9.89. Lactic acid 1.3. Started on vancomycin in the ED. 1 Liter bolus given in ED. Started on vancomycin and cefepime. Pending CXR, venous doppler  BLE, CTA and CT leg. CXR shows Heart is enlarged with possible mild pulmonary edema. CTA chest No evidence of PE,  Dilated pulmonary artery which may be seen in setting of pulmonary artery hypertension. CT RLE  Shows extensive subcutaneous soft tissue edema throughout the lower leg with diffuse circumferential skin thickening which may be seen in the setting of cellulitis. Venous doppler BLE shows no evidence of deep venous thrombosis in either lower extremity. Wcx obtained     04/25: Patietn afebrile over night, WBC 6.85 trending down, H/H remains stable. On room air with oxygen saturation  95-98. Working with PT/OT. PT/OT Recommend home with home health. De-escalate the abx and continue just vancomycin. Cardiology saw the patient and recommend CTA abdomen/pelvis with triple phase for evaluation of patency of bilateral CIV and IVC. Patietn started on lasix     04/26: Patient UOP 4L Over night. Over night the patient had two rapid responses called last night for crushing chest pain. No EKG changes were seen. Troponin was negative.  No change on CXR. Chest pain somewhat relieved with NTG. Chest pain completely resolved in the morning. The patient notes he has had similar episodes in the past. WBC trending down 5.37. Afebrile. CTa Abdomen and pelvis shows Patent venous stents. Cardiology recommend restarting home Demadex. The patient can follow up out patient with them and wound care. Abx  De-escalated to clindamycin PO     04/27: Patient UOP 2.6 L last night. patietn remained afebrile. No leukocytosis. No chest pain. Great improvement in cellulitis on the leg. Wcx show skin ori,  no predominant organism. Can discharge home on PO Clindamycin for 6 more days     Patietn back to baseline is ready for discharge. The patient will have clindamycin 300 mg TID sent over to pharmacy. The pharmacy is open and has received the medication, and it will be ready within the hour.  Home health has been set up, the patient has  received his wheel chair. The patient has appointments with cardiology (Dr Lantigua) and has an appointment for the wound care center. The patient was explained the discharge plan. He notes understanding of the discharge plan and is in agreement of the plan. His girlfriend assures that he will not miss any appointments. All questions were answered.  VSS. The patient can be discharged home with home health.        Consults:   Consults (From admission, onward)        Status Ordering Provider     Inpatient consult to [facility]  Once     Provider:  Gomez Lantigua MD    Completed JOHNSON, D'AMICO C.     Inpatient consult to Social Work  Once     Provider:  (Not yet assigned)    Acknowledged JOHNSON, D'AMICO C.          * Cellulitis of lower extremity  CT leg shows  Extensive subcutaneous soft tissue edema throughout the lower leg with diffuse circumferential skin thickening which may be seen in the setting of cellulitis   Wound cx Skin ori,  no predominant organism   Patient improving redness shrinking, on PO clindamycin   Discharge home on Clindamycin 300 mg TID x 6 days  Patient will follow up in Wound care center          Sepsis  SIRS 3/4  Heart rate 123  Temp 102.6  RR 22  WBC 9.89  Possible sources of infection from chronic venous stasis wounds ?  Wound cx from previous show micro microbial growth including MRSA   Given 1 liter NS   Due to severity of CHF, use fluids modestly   Started on Vancomycin in ED   Stopped cefepime   Bcx NGTD   Wcx Skin ori,  no predominant organism   CXR concerning for pulmonary edema   Imaging of the legs concerning for cellulitis only   Patient afebrile over night   Stop vancomycin   De-escalate abx to PO clindamycin  Believe this to be only cellulitis, based on new evidence   Resolved       May-Thurner syndrome  Cardiology consulted and recommend   Previous bilateral CIV stents and PTA to IVC in 2014  BLE venous ultrasound with no evidence of DVT; INR 2.4 yesterday but  subtherapeutic earlier this month  Profound swelling to LLE concerning for possibility of occlusion to right CIV stent or IVC-unable to see with ultrasound  CTA abdomen pelvis shows Patent venous stents  Will follow up with cardiology on discharge           Chronic systolic heart failure  Echo 09/2017 EF 40% with severe MR   Repeat Echo Normal left ventricular systolic function. The estimated ejection fraction is 55%  · Mild left atrial enlargement.  · Mild right atrial enlargement.  · Normal right ventricular systolic function.  · Atrial fibrillation observed.  Daily weight   Strict I&O   UOP 2.6 L   Resume Ace and BB   Resume home Demadex   Resume home meds as instructed  Follow up with Cardiology   Stable       Candida infection of genital region  Miconazole cream bid  Rash improving   Will discharge home on miconazole   Prescription sent to pharmacy       Essential hypertension  BP on admission 127/59  Resume home medications   BP were stable during hospitalization         Final Active Diagnoses:    Diagnosis Date Noted POA    PRINCIPAL PROBLEM:  Cellulitis of lower extremity [L03.119] 04/24/2019 Yes    Sepsis [A41.9] 04/24/2019 Yes    May-Thurner syndrome [I87.1] 02/26/2014 Yes    Chronic systolic heart failure [I50.22] 09/06/2017 Yes    Acute gastroenteritis [K52.9] 04/24/2019 Unknown    Candida infection of genital region [B37.49] 04/24/2019 Unknown    Essential hypertension [I10] 09/16/2016 Yes    Cellulitis [L03.90] 02/13/2014 Unknown      Problems Resolved During this Admission:       Discharged Condition: good    Disposition: Home or Self Care    Follow Up:  Follow-up Information     David Larsen MD On 5/7/2019.    Specialty:  Family Medicine  Why:  time: 2:00  Contact information:  [address] [place] [address] [place] [address]  [phone]             Pool Gutierrez MD On 4/29/2019.    Specialty:  HYPERBARIC MEDICINE  Why:  time:11:35  Contact information:  [address]  [address]  [place] [id number] [phone]             Schedule an appointment as soon as possible for a visit with Gomez Lantigua MD.    Specialties:  INTERVENTIONAL CARDIOLOGY, Cardiology  Why:  Offices closed for weekend. Patient to schedule own follow up appointment.  Contact information:  [address]  [place] [address] [phone]             [facility] [place].    Specialty:  Home Health Services  Why:  Home Health nurse will contact patient to arrange a visit for Sunday 4/28/19  Contact information:  [address]  [address]  [place] [id number] [phone]                 Patient Instructions:      WALKER FOR HOME USE     Order Specific Question Answer Comments   Type of Walker: Adult (5'4"-6'6")    With wheels? Yes    Height: 6' 8" (2.032 m)    Weight: 165.8 kg (365 lb 8.4 oz)    Length of need (1-99 months): 99    Does patient have medical equipment at home? cane, straight    Does patient have medical equipment at home? walker, standard    Does patient have medical equipment at home? bath bench    Please check all that apply: Patient's condition impairs ambulation.    Please check all that apply: Patient is unable to safely ambulate without equipment.      WHEELCHAIR FOR HOME USE     Order Specific Question Answer Comments   Hours in W/C per day: 8    Type of Wheelchair: Custom bariatric    Size(Width): 24" bariatric    Leg Support: STD footrests    Leg Support: Elevating leg rests    Lap Belt: Velcro    Cushion: Basic    Height: 6' 8" (2.032 m)    Weight: 165.8 kg (365 lb 8.4 oz)    Does patient have medical equipment at home? cane, straight    Does patient have medical equipment at home? walker, standard    Does patient have medical equipment at home? bath bench    Length of need (1-99 months): 99    Please check all that apply: Caregiver is capable and willing to operate wheelchair safely.    Please check all that apply: Patient's upper body strength is sufficient for " propulsion.    Please check all that apply: The patient has a cast, brace or muscloskeletal condition which prevents 90 degree flexion of the knee.      Ambulatory referral to Home Health   Referral Priority: Routine Referral Type: Home Health   Referral Reason: Specialty Services Required   Requested Specialty: Home Health Services   Number of Visits Requested: 1     Diet Cardiac     Notify your health care provider if you experience any of the following:  temperature >100.4     Notify your health care provider if you experience any of the following:  persistent nausea and vomiting or diarrhea     Notify your health care provider if you experience any of the following:  severe uncontrolled pain     Notify your health care provider if you experience any of the following:  redness, tenderness, or signs of infection (pain, swelling, redness, odor or green/yellow discharge around incision site)     Notify your health care provider if you experience any of the following:  difficulty breathing or increased cough     Notify your health care provider if you experience any of the following:  severe persistent headache     Notify your health care provider if you experience any of the following:  worsening rash     Notify your health care provider if you experience any of the following:  persistent dizziness, light-headedness, or visual disturbances     Notify your health care provider if you experience any of the following:  increased confusion or weakness     Activity as tolerated       Significant Diagnostic Studies: Labs:   CMP   Recent Labs   Lab 04/25/19  1947 04/26/19  0325 04/27/19  0557    139 140   K 4.6 4.0 3.9    103 102   CO2 25 26 26    89 90   BUN 19 19 19   CREATININE 1.1 1.0 1.1   CALCIUM 8.6* 8.6* 9.1   PROT 7.3 7.5 7.8   ALBUMIN 3.2* 3.3* 3.5   BILITOT 0.7 0.7 0.7   ALKPHOS 108 101 101   AST 21 24 25   ALT 18 21 21   ANIONGAP 7* 10 12   ESTGFRAFRICA >60 >60 >60   EGFRNONAA >60 >60 >60   ,  CBC   Recent Labs   Lab 04/25/19  1947 04/26/19  0326 04/27/19  0557   WBC 5.69 5.37 4.59   HGB 10.1* 9.9* 10.5*   HCT 33.4* 32.7* 33.8*    170 209    and All labs within the past 24 hours have been reviewed  Microbiology:   Blood Culture   Lab Results   Component Value Date    LABBLOO No Growth to date 04/24/2019    LABBLOO No Growth to date 04/24/2019    LABBLOO No Growth to date 04/24/2019    and Wound Culture: negative    Pending Diagnostic Studies:     None         Medications:  Reconciled Home Medications:      Medication List      START taking these medications    clindamycin 300 MG capsule  Commonly known as:  CLEOCIN  Take 1 capsule (300 mg total) by mouth 3 (three) times daily. for 6 days     miconazole 2 % cream  Commonly known as:  MICOTIN  Apply topically 2 (two) times daily.        CHANGE how you take these medications    torsemide 20 MG Tab  Commonly known as:  DEMADEX  TAKE 1 TABLET (20 MG TOTAL) BY MOUTH ONCE DAILY.  What changed:  how much to take        CONTINUE taking these medications    acetaZOLAMIDE 250 MG tablet  Commonly known as:  DIAMOX     DUREZOL 0.05 % Drop ophthalmic solution  Generic drug:  difluprednate     enoxaparin 150 mg/mL Syrg  Commonly known as:  LOVENOX  Inject 1 mL (150 mg total) into the skin every 12 (twelve) hours.     HYDROcodone-acetaminophen 5-325 mg per tablet  Commonly known as:  NORCO  Take 1 tablet by mouth every 6 (six) hours as needed for Pain.     lisinopril 2.5 MG tablet  Commonly known as:  PRINIVIL,ZESTRIL  TAKE 4 TABLETS (10 MG TOTAL) BY MOUTH ONCE DAILY.     methIMAzole 10 MG Tab  Commonly known as:  TAPAZOLE  Take 2 tablets (20 mg total) by mouth once daily.     metoprolol succinate 100 MG 24 hr tablet  Commonly known as:  TOPROL-XL  Take 1 tablet (100 mg total) by mouth once daily.     neomycin-polymyxin-dexamethasone 3.5 mg/g-10,000 unit/g-0.1 % Oint  Commonly known as:  DEXACINE     ofloxacin 0.3 % ophthalmic solution  Commonly known as:   OCUFLOX     ondansetron 4 MG tablet  Commonly known as:  ZOFRAN  Take 1 tablet (4 mg total) by mouth every 8 (eight) hours as needed.     warfarin 10 MG tablet  Commonly known as:  COUMADIN  Take 1.5-2 tablets (15-20 mg total) by mouth once daily.            Indwelling Lines/Drains at time of discharge:   Lines/Drains/Airways          None          Time spent on the discharge of patient: 34 minutes  Patient was seen and examined on the date of discharge and determined to be suitable for discharge.         D'Amico C Johnson, MD  Department of Hospital Medicine  [facility]

## 2019-04-27 NOTE — PROGRESS NOTES
"  DME orders noted, orders placed for a bariatric 24" -WC with elevating leg rests as well as a RW.    TN informed pt that insurance will not cover both items and inquired which one the pt would prefer. Pt stated that he would like the RW as he currently has a standard WC.    TN contacted [facility] [phone] to confirm that they will see pt on Sunday 4/28/19. Orders were sent and accepted through [facility] on Friday.      "

## 2019-04-27 NOTE — NURSING
VN rounds: Cued into patient's room. Asked permission to view on camera. Pt consented. Denies pain at present. Wife at bedside. Siderails x3 up with bed alarm activated and is on. Education done on fall precautions. Patient verbalized understanding. Patient denies any questions or concerns about care. Respirations easy and regular. On room air. Will continue to be available to intervene as needed

## 2019-04-27 NOTE — PLAN OF CARE
AVS and educational attachments shared with patient  via [employer]. Discussed thoroughly. Patient verbalized clear understanding using teach back method. Notified bedside nurse of completion of education. At present no distress noted. Patient to be discharged via w/c with escort service and family with all of patient's belonings. Will cont to be available to patient and family and intervene prn.

## 2019-04-27 NOTE — PLAN OF CARE
"  DME orders noted, orders placed for a bariatric 24" -WC with elevating leg rests as well as a RW.     TN informed pt that insurance will not cover both items and inquired which one the pt would prefer. Pt stated that he would like the RW as he currently has a standard WC.     TN contacted [facility], Reid [phone] to confirm that they will see pt on Sunday 4/28/19. Orders were sent and accepted through [employer] on Friday.       TN contacted DME companies regarding the RW:    [facility], Mr Gómez [phone]- they do not have a RW to accommodate pt's vanessa and weight, but could special order one  [facility] [phone]- they did not answer  [employer] [phone]- they do not have a RW to accommodate pt's vanessa and weight    [facility] [phone], fax [phone]: TN spoke with April, she contacted her supervisor, he stated that they do have one to accommodate pt's vanessa and weight in stock but they no longer deliver on weekends. They could deliver to pt's home on Monday.    TN spoke with pt and he was agreeable to have RW delivered to his home on Monday. TN verified the delivery address and andrew number and faxed orders to [facility].    Future Appointments   Date Time Provider Department Center   4/29/2019 11:00 AM APPOINTMENT LAB[facility]   4/29/2019 11:35 AM Pool Gutierrez MD [facility] WOUND [facility]   5/7/2019  2:00 PM [facility]       Pt's nurse will go over medications/signs and symptoms prior to discharge       04/27/19 1612   Final Note   Assessment Type Final Discharge Note   Anticipated Discharge Disposition Home-Health  ([facility])   What phone number can be called within the next 1-3 days to see how you are doing after discharge? [phone]   Hospital Follow Up  Appt(s) scheduled? Yes   Right Care Referral Info   Post Acute Recommendation Home-care   Facility Name [facility] "     Thania Clemente RN Transitional Navigator  [phone]

## 2019-04-27 NOTE — ASSESSMENT & PLAN NOTE
SIRS 3/4  Heart rate 123  Temp 102.6  RR 22  WBC 9.89  Possible sources of infection from chronic venous stasis wounds ?  Wound cx from previous show micro microbial growth including MRSA   Given 1 liter NS   Due to severity of CHF, use fluids modestly   Started on Vancomycin in ED   Stopped cefepime   Bcx NGTD   Wcx pending   CXR concerning for pulmonary edema   Imaging of the legs concerning for cellulitis only   Patient afebrile over night   Stop vancomycin   De-escalate abx to PO clindamycin  Believe this to be only cellulitis, based on new evidence   Resolved

## 2019-04-29 ENCOUNTER — ANTI-COAG VISIT (OUTPATIENT)
Dept: CARDIOLOGY | Facility: CLINIC | Age: 54
End: 2019-04-29
Payer: MEDICARE

## 2019-04-29 ENCOUNTER — HOSPITAL ENCOUNTER (OUTPATIENT)
Dept: WOUND CARE | Facility: HOSPITAL | Age: 54
Discharge: HOME | End: 2019-04-29
Attending: EMERGENCY MEDICINE
Payer: MEDICARE

## 2019-04-29 VITALS — HEART RATE: 93 BPM | SYSTOLIC BLOOD PRESSURE: 131 MMHG | DIASTOLIC BLOOD PRESSURE: 73 MMHG

## 2019-04-29 DIAGNOSIS — L97.909 CHRONIC VENOUS HYPERTENSION WITH ULCER, UNSPECIFIED LATERALITY: ICD-10-CM

## 2019-04-29 DIAGNOSIS — I87.319 CHRONIC VENOUS HYPERTENSION WITH ULCER, UNSPECIFIED LATERALITY: ICD-10-CM

## 2019-04-29 DIAGNOSIS — I26.99 OTHER PULMONARY EMBOLISM WITHOUT ACUTE COR PULMONALE, UNSPECIFIED CHRONICITY: ICD-10-CM

## 2019-04-29 DIAGNOSIS — L97.909 VARICOSE ULCER OF LOWER EXTREMITY, UNSPECIFIED LATERALITY: ICD-10-CM

## 2019-04-29 DIAGNOSIS — Z79.01 LONG TERM (CURRENT) USE OF ANTICOAGULANTS: ICD-10-CM

## 2019-04-29 DIAGNOSIS — I83.009 VARICOSE ULCER OF LOWER EXTREMITY, UNSPECIFIED LATERALITY: ICD-10-CM

## 2019-04-29 LAB
BACTERIA BLD CULT: NORMAL
BACTERIA BLD CULT: NORMAL
BACTERIA SPEC ANAEROBE CULT: NORMAL

## 2019-04-29 PROCEDURE — 93793 PR ANTICOAGULANT MGMT FOR PT TAKING WARFARIN: ICD-10-PCS | Mod: ,,,

## 2019-04-29 PROCEDURE — 11042 DBRDMT SUBQ TIS 1ST 20SQCM/<: CPT

## 2019-04-29 PROCEDURE — 93793 ANTICOAG MGMT PT WARFARIN: CPT | Mod: ,,,

## 2019-04-29 PROCEDURE — 27201912 HC WOUND CARE DEBRIDEMENT SUPPLIES

## 2019-04-29 NOTE — PROGRESS NOTES
"[facility]ound [facility] Hyperbaric Medicine                Progress Note    Subjective:       Patient ID: Drew Farrar is a 53 y.o. male.    Chief Complaint: Hyperbaric Oxygen Therapy    2/15/19: Readmitted for venous ulcer to left lateral foot which developed about 2 weeks ago. Pulses noted with doppler and capillary refill <3.Dr Gutierrez assessed patient and wound care plan ordered for compression therapy and hydrafera blue to wound bed. No apparent signs of infection noted. Patient to follw-up in clinic in 2 weeks.DB   2/21/19: Nurse visit for dressing change. Refused care to skin tear on left 2nd toe. See notes. RTC 1 week for  Visit.  3/1/19: Follow up with Dr. Waller today in clinic new wound to right posterior leg, culture of both wounds taken today in clinic new wound care orders to both legs for xeroform and unna with calamine toe to knee follow up in 1 week with Dr. Gutierrez  3/8/19: Follow up with Dr. Gutierrez today in clinic wounds improving, edema noted to BLE, profore toe to knee applied to both legs, RX given to patient for PO Doxycycline, follow up in 1 week.   3/15/19: Here for f/u with Dr. Gutierrez. U/S scheduled Thursday. Will need right leg rewrapped. Patient states eye DrRosy Gave him the same antibiotic dose and strength after eye surgery. Dr instructed patient to take one bottle of antibiotics for both wounds and eyes until finished. Gentamycin added to wound care orders.   3/29/19:  Wound slowly improving. No changes in wound care orders. Wound debrided per Dr Gutierrez.  4/4/19: Patient showed up to clinic today stated " I need my dressing changed. It fell off."  Doppler pluses checked and present.  Patient refusing care to right, no stocking present on leg at this visit. Patient stated " No they said this leg is discharged, Dr. Lantigua has to drain the fluid out this leg with a needle. No wound care to this leg anymore when I come."    4/8/19: Here for f/u with  " Matt. Lidocaine 4% applied to wound bed. Toenails on right foot trimmed, wound debrided with curette per Dr. Gutierrez.  4/22/19: F/U with Dr. Gutierrez. Seen in ER 4/15/19 for elevated temp, Increased pain, swelling, and redness of left foot. Treated with Doxycycline, now completed. Left anterior #1 wound healed. Has new site #3 to left lateral foot.  Pt. Denies pain @ wound site.  04/29/19  F/U with Dr. Gutierrez.  Wound progressing well. Wound debrided per Dr. Gutierrez.  Dressings changed to anjali AG moistened with sterile water, cover with adaptic and foam dressing.  Continue profore compression therapy.  Patient to follow up in 1 week with Dr. Gutierrez..        Review of Systems    Pt. Denies pain @ wound site.  Objective:      Wound smaller per staff w/ some recurrence of fibrinous slough; no Sophia's sign or s/s of infection noted.  Physical Exam    There were no vitals filed for this visit.    Assessment:         No diagnosis found.         Wound 04/22/19 1130 Venous Ulcer lateral Foot #3 (Active)   04/22/19 1130    Pre-existing: Yes   Primary Wound Type: Venous ulcer   Side: Left   Orientation: lateral   Location: Foot   Wound/PI Number (optional): #3   Ankle-Brachial Index:    Pulses: doppler   Removal Indication and Assessment:    Wound Outcome:    (Retired) Wound Type:    (Retired) Wound Length (cm):    (Retired) Wound Width (cm):    (Retired) Depth (cm):    Wound Description (Comments):    Removal Indications:    Dressing Appearance Intact 4/29/2019 11:00 AM   Drainage Amount Scant 4/29/2019 11:00 AM   Drainage Characteristics/Odor Serosanguineous 4/29/2019 11:00 AM   Appearance Pink;Yellow 4/29/2019 11:00 AM   Tissue loss description Partial thickness 4/29/2019 11:00 AM   Red (%), Wound Tissue Color 70 % 4/29/2019 11:00 AM   Yellow (%), Wound Tissue Color 30 % 4/29/2019 11:00 AM   Periwound Area Dry;Val[place] 4/29/2019 11:00 AM   Wound Edges Irregular 4/29/2019 11:00 AM   Wound Length (cm) 0.8 cm  4/29/2019 11:00 AM   Wound Width (cm) 0.7 cm 4/29/2019 11:00 AM   Wound Depth (cm) 0.2 cm 4/29/2019 11:00 AM   Wound Volume (cm^3) 0.11 cm^3 4/29/2019 11:00 AM   Wound Surface Area (cm^2) 0.56 cm^2 4/29/2019 11:00 AM   Care Sterile normal saline 4/29/2019 11:00 AM   Dressing Calcium alginate 4/29/2019 11:00 AM   Periwound Care Skin barrier film applied 4/29/2019 11:00 AM   Compression Four layer compression 4/29/2019 11:00 AM   Dressing Change Due 05/06/19 4/29/2019 11:00 AM         Left lateral Foot #3    Cleanse wound with:NS  Lidocaine: PRN   Periwound care:Moisture barrier PRN  Primary dressing: Krystin AG moistened with sterile water.  Cover with adaptic touch and foam dressing  Secondary dressing: Profore toes to knee  Offloading: Darco shoe  Edema control: profore toes to knee  Frequency:Weekly  Follow-up: Dr. Gutierrez 5/06/19  Plan:            As above.      [unfilled]

## 2019-04-29 NOTE — PROGRESS NOTES
Noted in calendar that he was admitted last week 4/24-4/27. He was given 10mg 4/25-4/27. Need to confirm d/c dose and new meds.     See pt findings.

## 2019-04-30 ENCOUNTER — PATIENT OUTREACH (OUTPATIENT)
Dept: ADMINISTRATIVE | Facility: CLINIC | Age: 54
End: 2019-04-30

## 2019-04-30 DIAGNOSIS — R07.9 CHEST PAIN, UNSPECIFIED TYPE: Primary | ICD-10-CM

## 2019-04-30 NOTE — PATIENT INSTRUCTIONS
Discharge Instructions for Cellulitis  You have been diagnosed with cellulitis. This is an infection in the deepest layer of the skin. In some cases, the infection also affects the muscle. Cellulitis is caused by bacteria. The bacteria can enter the body through broken skin. This can happen with a cut, scratch, animal bite, or an insect bite that has been scratched. You may have been treated in the hospital with antibiotics and fluids. You will likely be given a prescription for antibiotics to take at home. This sheet will help you take care of yourself at home.  Home care  When you are home:  · Take the prescribed antibiotic medicine you are given as directed until it is gone. Take it even if you feel better. It treats the infection and stops it from returning. Not taking all the medicine can make future infections hard to treat.  · Keep the infected area clean.  · When possible, raise the infected area above the level of your heart. This helps keep swelling down.  · Talk with your healthcare provider if you are in pain. Ask what kind of over-the-counter medicine you can take for pain.  · Apply clean bandages as advised.  · Take your temperature once a day for a week.  · Wash your hands often to prevent spreading the infection.  In the future, wash your hands before and after you touch cuts, scratches, or bandages. This will help prevent infection.   When to call your healthcare provider  Call your healthcare provider immediately if you have any of the following:  · Difficulty or pain when moving the joints above or below the infected area  · Discharge or pus draining from the area  · Fever of 100.4°F (38°C) or higher, or as directed by your healthcare provider  · Pain that gets worse in or around the infected   · Redness that gets worse in or around the infected area, particularly if the area of redness expands to a wider area  · Shaking chills  · Swelling of the infected area  · Vomiting   Date Last Reviewed:  8/1/2016  © 5449-0088 [employer]. [address] [place] [address], [place] [address]. All rights reserved. This information is not intended as a substitute for professional medical care. Always follow your healthcare professional's instructions.

## 2019-05-03 NOTE — PROCEDURES
"Debridement  Date/Time: 4/29/2019 3:23 PM  Performed by: Pool Gutierrez MD  Authorized by: Pool Gutierrez MD     Time out: Immediately prior to procedure a "time out" was called to verify the correct patient, procedure, equipment, support staff and site/side marked as required.    Consent Done?:  Yes (Verbal)    Preparation: Patient was prepped and draped in usual sterile fashion    Local anesthesia used?: Yes    Local anesthetic:  Topical anesthetic    Wound Details:    Location:  Left leg    Type of Debridement:  Excisional       Length (cm):  0.8       Area (sq cm):  0.56       Width (cm):  0.7       Percent Debrided (%):  100       Depth (cm):  0.2       Total Area Debrided (sq cm):  0.56    Depth of debridement:  Subcutaneous tissue    Tissue debrided:  Subcutaneous and Dermis    Devitalized tissue debrided:  Biofilm, Fibrin and Slough    Instruments:  Curette    Bleeding:  Minimal  Patient tolerance:  Patient tolerated the procedure well with no immediate complications      "

## 2019-05-03 NOTE — PROCEDURES
"Debridement  Date/Time: 4/22/2019 3:35 PM  Performed by: Pool Gutierrez MD  Authorized by: Pool Gutierrez MD     Time out: Immediately prior to procedure a "time out" was called to verify the correct patient, procedure, equipment, support staff and site/side marked as required.    Consent Done?:  Yes (Verbal)    Preparation: Patient was prepped and draped in usual sterile fashion    Local anesthesia used?: Yes    Local anesthetic:  Topical anesthetic    Wound Details:    Location:  Left foot    Type of Debridement:  Excisional       Length (cm):  1.5       Area (sq cm):  1.5       Width (cm):  1       Percent Debrided (%):  100       Depth (cm):  0.2       Total Area Debrided (sq cm):  1.5    Depth of debridement:  Subcutaneous tissue    Tissue debrided:  Subcutaneous and Epidermis    Devitalized tissue debrided:  Biofilm, Slough and Fibrin    Instruments:  Curette    Bleeding:  Minimal  Patient tolerance:  Patient tolerated the procedure well with no immediate complications      "

## 2019-05-06 ENCOUNTER — ANTI-COAG VISIT (OUTPATIENT)
Dept: CARDIOLOGY | Facility: CLINIC | Age: 54
End: 2019-05-06
Payer: MEDICAID

## 2019-05-06 ENCOUNTER — HOSPITAL ENCOUNTER (OUTPATIENT)
Dept: WOUND CARE | Facility: HOSPITAL | Age: 54
Discharge: HOME | End: 2019-05-06
Attending: EMERGENCY MEDICINE
Payer: MEDICARE

## 2019-05-06 ENCOUNTER — OFFICE VISIT (OUTPATIENT)
Dept: CARDIOLOGY | Facility: CLINIC | Age: 54
End: 2019-05-06
Payer: MEDICARE

## 2019-05-06 VITALS
HEIGHT: 78 IN | TEMPERATURE: 98 F | SYSTOLIC BLOOD PRESSURE: 123 MMHG | BODY MASS INDEX: 36.45 KG/M2 | HEART RATE: 82 BPM | DIASTOLIC BLOOD PRESSURE: 57 MMHG | WEIGHT: 315 LBS

## 2019-05-06 DIAGNOSIS — R60.0 BILATERAL EDEMA OF LOWER EXTREMITY: ICD-10-CM

## 2019-05-06 DIAGNOSIS — L97.909 CHRONIC VENOUS HYPERTENSION WITH ULCER, UNSPECIFIED LATERALITY: Primary | ICD-10-CM

## 2019-05-06 DIAGNOSIS — Z86.718 HISTORY OF DVT (DEEP VEIN THROMBOSIS): ICD-10-CM

## 2019-05-06 DIAGNOSIS — I87.1 STENOSIS OF RIGHT ILIAC VEIN: ICD-10-CM

## 2019-05-06 DIAGNOSIS — I87.319 CHRONIC VENOUS HYPERTENSION WITH ULCER, UNSPECIFIED LATERALITY: Primary | ICD-10-CM

## 2019-05-06 DIAGNOSIS — R60.9 EDEMA, UNSPECIFIED TYPE: ICD-10-CM

## 2019-05-06 DIAGNOSIS — I48.20 CHRONIC ATRIAL FIBRILLATION: ICD-10-CM

## 2019-05-06 DIAGNOSIS — I87.2 VENOUS (PERIPHERAL) INSUFFICIENCY: ICD-10-CM

## 2019-05-06 DIAGNOSIS — Z79.01 LONG TERM (CURRENT) USE OF ANTICOAGULANTS: ICD-10-CM

## 2019-05-06 DIAGNOSIS — I87.1 MAY-THURNER SYNDROME: ICD-10-CM

## 2019-05-06 DIAGNOSIS — E66.01 MORBID OBESITY: ICD-10-CM

## 2019-05-06 DIAGNOSIS — I26.99 OTHER PULMONARY EMBOLISM WITHOUT ACUTE COR PULMONALE, UNSPECIFIED CHRONICITY: ICD-10-CM

## 2019-05-06 PROCEDURE — 99213 OFFICE O/P EST LOW 20 MIN: CPT | Mod: PBBFAC,25,PO | Performed by: INTERNAL MEDICINE

## 2019-05-06 PROCEDURE — 99215 PR OFFICE/OUTPT VISIT, EST, LEVL V, 40-54 MIN: ICD-10-PCS | Mod: S$PBB,,, | Performed by: INTERNAL MEDICINE

## 2019-05-06 PROCEDURE — 99999 PR PBB SHADOW E&M-EST. PATIENT-LVL III: ICD-10-PCS | Mod: PBBFAC,,, | Performed by: INTERNAL MEDICINE

## 2019-05-06 PROCEDURE — 27201912 HC WOUND CARE DEBRIDEMENT SUPPLIES

## 2019-05-06 PROCEDURE — 99999 PR PBB SHADOW E&M-EST. PATIENT-LVL III: CPT | Mod: PBBFAC,,, | Performed by: INTERNAL MEDICINE

## 2019-05-06 PROCEDURE — 99215 OFFICE O/P EST HI 40 MIN: CPT | Mod: S$PBB,,, | Performed by: INTERNAL MEDICINE

## 2019-05-06 PROCEDURE — 11042 DBRDMT SUBQ TIS 1ST 20SQCM/<: CPT

## 2019-05-06 RX ORDER — DOXYCYCLINE HYCLATE 100 MG
TABLET ORAL
COMMUNITY
Start: 2019-03-19 | End: 2019-05-14

## 2019-05-06 NOTE — PROGRESS NOTES
INR good. Dose was changed last week but he missed follow up labs as planned 5/2. Need to confirm he is taking dose as planned and if any other changes. Send back with any changes.    Update: pt reported incorrect dose of 5mg daily except 10mg sat/sun which is much lower than previous doses. Will adjust our dose plan based on what patient reports but need to watch closely. Repeat INR Thursday

## 2019-05-06 NOTE — PROGRESS NOTES
"Subjective:       Patient ID: Drew Farrar is a 53 y.o. male.    Chief Complaint: No chief complaint on file.    2/15/19: Readmitted for venous ulcer to left lateral foot which developed about 2 weeks ago. Pulses noted with doppler and capillary refill <3.Dr Gutierrez assessed patient and wound care plan ordered for compression therapy and hydrafera blue to wound bed. No apparent signs of infection noted. Patient to follw-up in clinic in 2 weeks.DB   2/21/19: Nurse visit for dressing change. Refused care to skin tear on left 2nd toe. See notes. RTC 1 week for DrRosy Visit.  3/1/19: Follow up with Dr. Waller today in clinic new wound to right posterior leg, culture of both wounds taken today in clinic new wound care orders to both legs for xeroform and unna with calamine toe to knee follow up in 1 week with Dr. Gutierrez  3/8/19: Follow up with Dr. Gutierrez today in clinic wounds improving, edema noted to BLE, profore toe to knee applied to both legs, RX given to patient for PO Doxycycline, follow up in 1 week.   3/15/19: Here for f/u with Dr. Gutierrez. U/S scheduled Thursday. Will need right leg rewrapped. Patient states eye DrRosy Gave him the same antibiotic dose and strength after eye surgery. Dr instructed patient to take one bottle of antibiotics for both wounds and eyes until finished. Gentamycin added to wound care orders.   3/29/19:  Wound slowly improving. No changes in wound care orders. Wound debrided per Dr Gutierrez.  4/4/19: Patient showed up to clinic today stated " I need my dressing changed. It fell off."  Doppler pluses checked and present.  Patient refusing care to right, no stocking present on leg at this visit. Patient stated " No they said this leg is discharged, Dr. Lantigua has to drain the fluid out this leg with a needle. No wound care to this leg anymore when I come."    4/8/19: Here for f/u with Dr. Gutierrez. Lidocaine 4% applied to wound bed. Toenails on right foot trimmed, wound debrided with " "curette per Dr. Gutierrez.  4/22/19: F/U with Dr. Gutierrez. Seen in ER 4/15/19 for elevated temp, Increased pain, swelling, and redness of left foot. Treated with Doxycycline, now completed. Left anterior #1 wound healed. Has new site #3 to left lateral foot.  Pt. Denies pain @ wound site.  04/29/19  F/U with Dr. Gutierrez.  Wound progressing well. Wound debrided per Dr. Gutierrez.  Dressings changed to anjali AG moistened with sterile water, cover with adaptic and foam dressing.  Continue profore compression therapy.  Patient to follow up in 1 week with Dr. Gutierrez..    5/6/19: Patient seen in clinic today by Dr. Gutierrez and Dr. Lantigua. Doppler pulses present, wound debrided by Dr. Gutierrez with curette.  Dr. Lantigua seen patient, stated " wound looks great." and reassured patient stents were ok on last CT of [facility].  No recommendations at this time per Dr. Lantigua.  Wound care continued as ordered, Profore toe to knee.    Review of Systems    Objective:    Pt. Denies pain @ wound site.  WOUND AS NOTED W/O JOCELYN'S SIGN OR S/S OF INFECTION  Physical Exam    Assessment:       1. Chronic venous hypertension with ulcer, unspecified laterality    2. Bilateral edema of lower extremity           Wound 04/22/19 1130 Venous Ulcer lateral Foot #3 (Active)   04/22/19 1130    Pre-existing: Yes   Primary Wound Type: Venous ulcer   Side: Left   Orientation: lateral   Location: Foot   Wound/PI Number (optional): #3   Ankle-Brachial Index:    Pulses: doppler   Removal Indication and Assessment:    Wound Outcome:    (Retired) Wound Type:    (Retired) Wound Length (cm):    (Retired) Wound Width (cm):    (Retired) Depth (cm):    Wound Description (Comments):    Removal Indications:    Wound Image    5/6/2019 12:11 PM   Dressing Appearance Intact;Moist drainage 5/6/2019 12:11 PM   Drainage Amount Small 5/6/2019 12:11 PM   Drainage Characteristics/Odor Serosanguineous 5/6/2019 12:11 PM   Appearance Red;Yellow;Moist;Granulating " 5/6/2019 12:11 PM   Tissue loss description Full thickness 5/6/2019 12:11 PM   Red (%), Wound Tissue Color 75 % 5/6/2019 12:11 PM   Yellow (%), Wound Tissue Color 25 % 5/6/2019 12:11 PM   Periwound Area Intact;Pink;Dry;Edematous;Hemosiderin Staining 5/6/2019 12:11 PM   Wound Edges Defined 5/6/2019 12:11 PM   Wound Length (cm) 0.7 cm 5/6/2019 12:11 PM   Wound Width (cm) 0.7 cm 5/6/2019 12:11 PM   Wound Depth (cm) 0.2 cm 5/6/2019 12:11 PM   Wound Volume (cm^3) 0.1 cm^3 5/6/2019 12:11 PM   Wound Surface Area (cm^2) 0.49 cm^2 5/6/2019 12:11 PM   Care Sterile normal saline;Debrided 5/6/2019 12:11 PM   Dressing Applied;Non-adherent;Collagen;Foam;Compression wrap 5/6/2019 12:11 PM   Periwound Care Absorptive dressing applied;Dry periwound area maintained 5/6/2019 12:11 PM   Compression Four layer compression 5/6/2019 12:11 PM   Off Loading Off loading shoe 5/6/2019 12:11 PM   Dressing Change Due 05/13/19 5/6/2019 12:11 PM       Left lateral Foot #3    Cleanse wound with:NS  Lidocaine: PRN   Periwound care:Moisture barrier PRN  Primary dressing: Krystin AG moistened with sterile water.  Cover with adaptic touch and foam dressing  Secondary dressing: Profore toes to knee  Offloading: Darco shoe  Edema control: profore toes to knee  Frequency:Weekly  Follow-up: Dr. Gutierrez 5/13/19    Plan:                1 week Dr. Gutierrez

## 2019-05-09 ENCOUNTER — OUTPATIENT CASE MANAGEMENT (OUTPATIENT)
Dept: ADMINISTRATIVE | Facility: OTHER | Age: 54
End: 2019-05-09

## 2019-05-09 NOTE — LETTER
May 9, 2019    Drew Farrar  [address] [place] [address]             [facility] [address] [place] [address] Dear Mr. Farrar,     I work with [facility]'s Outpatient Case Management Department. We received a referral to call you to discuss your medical history.These services are free of charge and are offered to [facility] patients who have recently been discharged from any of our facilities or who have complex medical conditions that may require the skill of a nurse to assist with management.             I am a Registered Nurse who specializes in connecting patients with available medical and financial resources as well as addressing any educational needs that may be indicated.      I attempted to reach you by telephone, but I was unsuccessful. Please call our department so that we can go over some questions with you regarding your health.    The Outpatient Case Management Department can be reached at [phone] from 8:00AM to 4:30 PM on Monday thru Friday. [facility] also has a program where a nurse is available 24/7 to answer questions or provide medical advice. Their number is [phone].      Thank you,      Megan Lainez RN  Outpatient Case Management  [phone]

## 2019-05-09 NOTE — PROGRESS NOTES
5/9/19 - Summary: 1st Attempt made today to complete Initial Screen for OPCM with no answer.   Interventions: Voice mail left and letter mailed requesting return call.  Plan:  CM will attempt to contact again at a later time. Megan Dumont RN    5/10/19 - Summary: Phone contact made with pt today for completion of Initial Screen for OPCM.  Interventions: OPCM explained and completion of initial screen offered, but pt declined.  Plan: D/C from OPCM. Megan Dumont RN

## 2019-05-10 NOTE — PROCEDURES
"Debridement  Date/Time: 5/6/2019 4:28 PM  Performed by: Pool Gutierrez MD  Authorized by: Pool Gutierrez MD     Time out: Immediately prior to procedure a "time out" was called to verify the correct patient, procedure, equipment, support staff and site/side marked as required.    Consent Done?:  Yes (Verbal)    Preparation: Patient was prepped and draped in usual sterile fashion    Local anesthesia used?: Yes    Local anesthetic:  Topical anesthetic    Wound Details:    Location:  Left leg    Type of Debridement:  Excisional       Length (cm):  0.7       Area (sq cm):  0.49       Width (cm):  0.7       Percent Debrided (%):  100       Depth (cm):  0.2       Total Area Debrided (sq cm):  0.49    Depth of debridement:  Subcutaneous tissue    Tissue debrided:  Subcutaneous and Dermis    Devitalized tissue debrided:  Biofilm, Fibrin and Slough    Instruments:  Curette    Bleeding:  Minimal  Patient tolerance:  Patient tolerated the procedure well with no immediate complications      "

## 2019-05-13 ENCOUNTER — ANTI-COAG VISIT (OUTPATIENT)
Dept: CARDIOLOGY | Facility: CLINIC | Age: 54
End: 2019-05-13
Payer: MEDICARE

## 2019-05-13 ENCOUNTER — LAB VISIT (OUTPATIENT)
Dept: LAB | Facility: HOSPITAL | Age: 54
End: 2019-05-13
Attending: INTERNAL MEDICINE
Payer: MEDICARE

## 2019-05-13 DIAGNOSIS — Z79.01 LONG TERM (CURRENT) USE OF ANTICOAGULANTS: ICD-10-CM

## 2019-05-13 DIAGNOSIS — I26.99 OTHER PULMONARY EMBOLISM WITHOUT ACUTE COR PULMONALE, UNSPECIFIED CHRONICITY: ICD-10-CM

## 2019-05-13 LAB
INR PPP: 1.7 (ref 0.8–1.2)
PROTHROMBIN TIME: 17.3 SEC (ref 9–12.5)

## 2019-05-13 PROCEDURE — 93793 ANTICOAG MGMT PT WARFARIN: CPT | Mod: ,,,

## 2019-05-13 PROCEDURE — 93793 PR ANTICOAGULANT MGMT FOR PT TAKING WARFARIN: ICD-10-PCS | Mod: ,,,

## 2019-05-13 PROCEDURE — 85610 PROTHROMBIN TIME: CPT

## 2019-05-13 PROCEDURE — 36415 COLL VENOUS BLD VENIPUNCTURE: CPT

## 2019-05-14 ENCOUNTER — OFFICE VISIT (OUTPATIENT)
Dept: FAMILY MEDICINE | Facility: HOSPITAL | Age: 54
End: 2019-05-14
Attending: FAMILY MEDICINE
Payer: MEDICARE

## 2019-05-14 VITALS
SYSTOLIC BLOOD PRESSURE: 150 MMHG | HEIGHT: 78 IN | WEIGHT: 315 LBS | DIASTOLIC BLOOD PRESSURE: 67 MMHG | BODY MASS INDEX: 36.45 KG/M2 | HEART RATE: 106 BPM | TEMPERATURE: 97 F

## 2019-05-14 VITALS
HEART RATE: 78 BPM | TEMPERATURE: 99 F | WEIGHT: 315 LBS | BODY MASS INDEX: 36.45 KG/M2 | HEIGHT: 78 IN | SYSTOLIC BLOOD PRESSURE: 120 MMHG | DIASTOLIC BLOOD PRESSURE: 70 MMHG

## 2019-05-14 DIAGNOSIS — I48.21 PERMANENT ATRIAL FIBRILLATION: ICD-10-CM

## 2019-05-14 DIAGNOSIS — L03.115 CELLULITIS OF RIGHT LOWER EXTREMITY: Primary | ICD-10-CM

## 2019-05-14 DIAGNOSIS — I50.42 CHRONIC COMBINED SYSTOLIC AND DIASTOLIC CONGESTIVE HEART FAILURE: ICD-10-CM

## 2019-05-14 DIAGNOSIS — B37.49 CANDIDA INFECTION OF GENITAL REGION: ICD-10-CM

## 2019-05-14 PROBLEM — I50.32 CHRONIC DIASTOLIC CONGESTIVE HEART FAILURE: Status: ACTIVE | Noted: 2017-02-20

## 2019-05-14 PROCEDURE — 99213 OFFICE O/P EST LOW 20 MIN: CPT | Performed by: FAMILY MEDICINE

## 2019-05-14 RX ORDER — FLUCONAZOLE 150 MG/1
150 TABLET ORAL DAILY
Qty: 1 TABLET | Refills: 0 | Status: SHIPPED | OUTPATIENT
Start: 2019-05-14 | End: 2019-05-15

## 2019-05-14 RX ORDER — METOPROLOL SUCCINATE 100 MG/1
100 TABLET, EXTENDED RELEASE ORAL DAILY
Qty: 30 TABLET | Refills: 5 | Status: SHIPPED | OUTPATIENT
Start: 2019-05-14 | End: 2019-12-12 | Stop reason: SDUPTHER

## 2019-05-14 RX ORDER — ERYTHROMYCIN 5 MG/G
OINTMENT OPHTHALMIC
COMMUNITY
Start: 2019-02-13 | End: 2019-05-14

## 2019-05-14 RX ORDER — NYSTATIN 100000 [USP'U]/G
POWDER TOPICAL 2 TIMES DAILY
Qty: 60 G | Refills: 1 | Status: SHIPPED | OUTPATIENT
Start: 2019-05-14 | End: 2019-11-12

## 2019-05-14 NOTE — PROGRESS NOTES
Subjective:   Patient ID:  Drew Farrar is a 53 y.o. male who presents for follow up of Non-healing Wound; Chronic Venous Insufficiency; and Atrial Fibrillation      HPI:       He Is here for L lateral foot venous ulcer treatment. He has history of VTE + PTS + lymphedema. He has CHF with a recent that revealed the following findings: EF 40%, Severe MR, Moderate TR, PASP 47, and Severe LAE. He has persistent afib with a controlled rate and anticoagulated with coumadin. He has an extensive history of venous insufficiency with ulcers that have finally healed but relapsed 2 months ago. He is s/p deep venous intervention, multiple EVLTs ad sclerotherapy sessions. He has h/o DVTs and PEs as well. He is compliant with his medications but not with his diet. He is on lisinopril, toprol xl, aldactone, and lasix with /60 mmHg.  PET stress was non ischemic.       S/p L accessory veins (left ankle and foot) slcerotherapy 2/21/2019        US 3/2019     R GSV 4.7 mm -no reflux   R LSV 7.4 mm + > 500 msec; 736 msec      L GSV occluded   L accessory GSV reflux + > 500 msec; 1060 msec    L LSV 4.8 mm         No DVT         Wound measurements 1.5x1.0x0.23 (3/29/2019 post debridement) and significantly better after sclerotherapy. He denies any pain. He was admitted in 4/2019 for cellulitis of R leg. CT venogram revealed patent bilateral iliac stents.       Patient Active Problem List    Diagnosis Date Noted    Non-pressure chronic ulcer of left ankle, with unspecified severity 07/26/2018     Priority: Low    Acute gastroenteritis 04/24/2019    Sepsis 04/24/2019    Cellulitis of lower extremity 04/24/2019    Candida infection of genital region 04/24/2019    Non-rheumatic mitral regurgitation 09/07/2017    Chronic systolic heart failure 09/06/2017     -  Echo 9/2017   EF 40%    Severe MR   Moderate TR   PASP 47   Severe LAE   LVEDD 7.1 cm    LVESD 5.2 cm          Hyperthyroidism 09/05/2017    Elevated troponin  2017    Preoperative clearance 07/10/2017    Chronic diastolic congestive heart failure 2017    Long term (current) use of anticoagulants 10/31/2016    Other pulmonary embolism without acute cor pulmonale, unspecified chronicity 10/28/2016    Acute pulmonary embolism 10/26/2016    Recurrent pulmonary embolism 10/26/2016    History of DVT (deep vein thrombosis) 2016    Essential hypertension 2016    Knee pain, right 2016    Difficulty walking 2016    Muscle weakness 2016    Group A streptococcal infection 2016    Tinea pedis of both feet 2016    Bilateral edema of lower extremity 2016    Morbid obesity 2016    Permanent atrial fibrillation 2016    Right knee pain 2016    NARESH (acute kidney injury) 2016    Chest pain 2014    Depression 2014    Elevated BP 2014    May-Thurner syndrome 2014     1. Successful IVUS guided intervention for May Thurner Syndrome 2. Left common iliac vein treated with 22 x 70 mm Wallstent overlapping with 22 x 45 mm Wallstent post dilation 18 x 40 mm balloon              Stenosis of right iliac vein 2014     S/p venoplasty with  20 x 80 mm Wallstent post dilated with 14 mm balloon in 2014      Cellulitis 2014    Chronic venous hypertension with ulcer 2013    Edema 2013     R leg      Venous (peripheral) insufficiency 2013     S/p bilateral iliac vein stents    S/p R GSV EVLT with laser 10/2013    S/p US guided accessory vein sclerotherapy of R calf       Venous stasis ulcer of lower extremity, unspecified laterality 2012    Ulcer - lesion 2012     Of left foot               Right Arm BP - Sittin/70  Left Arm BP - Sittin/70        LABS    LAST HbA1c  Lab Results   Component Value Date    HGBA1C 5.1 07/10/2017       Lipid panel  Lab Results   Component Value Date    CHOL 124 07/10/2017    CHOL 131  11/21/2016    CHOL 123 08/25/2012     Lab Results   Component Value Date    HDL 23 (L) 07/10/2017    HDL 29 (L) 11/21/2016    HDL 32 (L) 08/25/2012     Lab Results   Component Value Date    LDLCALC 70.4 07/10/2017    LDLCALC 83.0 11/21/2016    LDLCALC 75.4 08/25/2012     Lab Results   Component Value Date    TRIG 153 (H) 07/10/2017    TRIG 95 11/21/2016    TRIG 78 08/25/2012     Lab Results   Component Value Date    CHOLHDL 18.5 (L) 07/10/2017    CHOLHDL 22.1 11/21/2016    CHOLHDL 26.0 08/25/2012            Review of Systems   Constitution: Positive for weight loss. Negative for diaphoresis, night sweats and weight gain.   HENT: Negative for congestion.    Eyes: Negative for blurred vision, discharge and double vision.   Cardiovascular: Positive for dyspnea on exertion. Negative for chest pain, claudication, cyanosis, irregular heartbeat, leg swelling, near-syncope, orthopnea, palpitations, paroxysmal nocturnal dyspnea and syncope.   Respiratory: Positive for wheezing (with activities). Negative for cough and shortness of breath.    Endocrine: Negative for cold intolerance, heat intolerance and polyphagia.   Hematologic/Lymphatic: Negative for adenopathy and bleeding problem. Does not bruise/bleed easily.   Skin: Positive for poor wound healing (healed). Negative for dry skin and nail changes.   Musculoskeletal: Positive for arthritis. Negative for back pain, falls, joint pain, myalgias and neck pain.   Gastrointestinal: Negative for bloating, abdominal pain, change in bowel habit and constipation.   Genitourinary: Negative for bladder incontinence, dysuria, flank pain, genital sores and missed menses.   Neurological: Negative for aphonia, brief paralysis, difficulty with concentration, dizziness and weakness.   Psychiatric/Behavioral: Negative for altered mental status and memory loss. The patient does not have insomnia.    Allergic/Immunologic: Negative for environmental allergies.       Objective:   Physical Exam    Constitutional: He is oriented to person, place, and time. He appears well-developed and well-nourished. He is not intubated.   HENT:   Head: Normocephalic and atraumatic.   Right Ear: External ear normal.   Left Ear: External ear normal.   Mouth/Throat: Oropharynx is clear and moist.   Eyes: Pupils are equal, round, and reactive to light. Conjunctivae and EOM are normal. Right eye exhibits no discharge. Left eye exhibits no discharge. No scleral icterus.   Neck: Normal range of motion. Neck supple. Normal carotid pulses, no hepatojugular reflux and no JVD present. Carotid bruit is not present. No tracheal deviation present. No thyromegaly present.   Cardiovascular: Normal rate, S1 normal and S2 normal. An irregular rhythm present.  No extrasystoles are present. PMI is not displaced. Exam reveals no gallop, no S3, no distant heart sounds, no friction rub and no midsystolic click.   No murmur heard.  Pulses:       Carotid pulses are 2+ on the right side, and 2+ on the left side.       Radial pulses are 2+ on the right side, and 2+ on the left side.        Femoral pulses are 2+ on the right side, and 2+ on the left side.       Popliteal pulses are 2+ on the right side, and 2+ on the left side.        Dorsalis pedis pulses are 2+ on the right side, and 2+ on the left side.        Posterior tibial pulses are 2+ on the right side, and 2+ on the left side.   Pulmonary/Chest: Effort normal and breath sounds normal. No accessory muscle usage or stridor. No apnea, no tachypnea and no bradypnea. He is not intubated. No respiratory distress. He has no decreased breath sounds. He has no wheezes. He has no rales. He exhibits no tenderness and no bony tenderness.   Abdominal: He exhibits no distension, no pulsatile liver, no abdominal bruit, no ascites, no pulsatile midline mass and no mass. There is no tenderness. There is no rebound and no guarding.   Musculoskeletal: Normal range of motion. He exhibits no edema or  tenderness.   Lymphadenopathy:     He has no cervical adenopathy.       Chronic lymphedema of R leg/calf area   Neurological: He is alert and oriented to person, place, and time. He has normal reflexes. No cranial nerve deficit. Coordination normal.   Skin: Skin is warm. No rash noted. No erythema. No pallor.       Lateral left foot wound with fat layer exposed       Foul smell      Dark drainage      Varicose veins   Psychiatric: He has a normal mood and affect. His behavior is normal. Judgment and thought content normal.       1/17/2019                                Assessment:     1. Chronic venous hypertension with ulcer involving left side    2. Edema, unspecified type    3. Morbid obesity    4. History of DVT (deep vein thrombosis)    5. Chronic atrial fibrillation    6. May-Thurner syndrome    7. Stenosis of right iliac vein    8. Venous (peripheral) insufficiency        Plan:       Continue with wound care  Continue with compression stockings  Antibiotics for infected venous wounds         Exercise  Low salt diet  Daily weight   Take an extra dose of lasix for 3 lbs weight gain        Continue with CHF clinic recommendations  Maintain relationship with [facility]  Follow up with endocrine for treatment of thyroid disorder          Return sooner for concerns or questions. If symptoms persist go to the ED  I have reviewed all pertinent data on this patient       Follow up as scheduled. Return sooner for concerns or questions  Follow up in 4 weeks         He expressed verbal understanding and agreed with the plan        Patient's Medications   New Prescriptions    No medications on file   Previous Medications    DOXYCYCLINE (VIBRA-TABS) 100 MG TABLET        HYDROCODONE-ACETAMINOPHEN (NORCO) 5-325 MG PER TABLET    Take 1 tablet by mouth every 6 (six) hours as needed for Pain.    LISINOPRIL (PRINIVIL,ZESTRIL) 2.5 MG TABLET    TAKE 4 TABLETS (10 MG TOTAL) BY MOUTH ONCE DAILY.    METHIMAZOLE (TAPAZOLE) 10 MG  TAB    Take 2 tablets (20 mg total) by mouth once daily.    METOPROLOL SUCCINATE (TOPROL-XL) 100 MG 24 HR TABLET    Take 1 tablet (100 mg total) by mouth once daily.    MICONAZOLE (MICOTIN) 2 % CREAM    Apply topically 2 (two) times daily.    NEOMYCIN-POLYMYXIN-DEXAMETHASONE (DEXACINE) 3.5 MG/G-10,000 UNIT/G-0.1 % OINT        TORSEMIDE (DEMADEX) 20 MG TAB    TAKE 1 TABLET (20 MG TOTAL) BY MOUTH ONCE DAILY.    WARFARIN (COUMADIN) 10 MG TABLET    Take 1.5-2 tablets (15-20 mg total) by mouth once daily.   Modified Medications    No medications on file   Discontinued Medications    No medications on file

## 2019-05-14 NOTE — PATIENT INSTRUCTIONS
Chronic Venous Insufficiency: Treating Ulcers    If leg swelling because of chronic venous insufficiency isnt controlled, an open wound (ulcer) can form. Although ulcers vary in size and shape, they usually appear on the inside of the ankle.  Treating an ulcer  · Visit your healthcare provider. Ulcers need frequent medical care. Special dressings may be applied. You may be given antibiotics to fight infection.  · Your healthcare provider may prescribe medicines, such as aspirin or pentoxifylline, to help the ulcer heal.  · Your healthcare provider may prescribe compression hose to help with the swelling.   · Elevate your legs often to reduce swelling. The ulcer needs oxygen-rich blood to heal. This blood cant reach the ulcer until swelling is reduced.  When to call your healthcare provider  Seek immediate medical attention if:   · You have an increase in pain  · You develop a fever of 100.4°F (38°C) or higher, or as directed by your healthcare provider  · The area around the ulcer becomes red, tender, or both  · The ulcer oozes discolored fluid or smells bad  · Swelling increases suddenly or the dressing feels tight   Date Last Reviewed: 5/1/2016  © 6249-4559 [employer]. [address] [place] [address]. All rights reserved. This information is not intended as a substitute for professional medical care. Always follow your healthcare professional's instructions.

## 2019-05-14 NOTE — PROGRESS NOTES
Subjective:       Patient ID: Drew Farrar is a 53 y.o. male.    Chief Complaint: cellulitis f/u    HPI   52 y/o with extensive PMH here for cellulitis f/u. He was admitted to hospital for 3 days for cellulitis, received IV Abx and was discharged with PO clindamycin to complete 10 days total Abx Rx. He states his right leg pain is much better. He has been following with wound care every Monday and also has appt in 2 days for dressing changes.  Denies any fever, chills, HA, new or worsening SOB, dysuria, worsening LE edema, or recent weight gain.       Review of Systems   Constitutional: Negative for chills and fever.   HENT: Negative for congestion, rhinorrhea and sore throat.    Eyes: Negative for discharge and redness.   Respiratory: Negative for cough, shortness of breath, wheezing and stridor.    Cardiovascular: Positive for leg swelling (right leg). Negative for chest pain and palpitations.   Gastrointestinal: Negative for abdominal distention, abdominal pain, blood in stool, constipation, diarrhea, nausea and vomiting.   Genitourinary: Negative for difficulty urinating, dysuria and hematuria.   Musculoskeletal: Negative for back pain and neck pain.   Skin: Negative for rash.   Neurological: Negative for dizziness, seizures, light-headedness and headaches.   Psychiatric/Behavioral: Negative for agitation, behavioral problems and confusion.   All other systems reviewed and are negative.      Objective:      Vitals:    05/14/19 1404   BP: (!) 150/67   Pulse: 106   Temp: 96.8 °F (36 °C)     Physical Exam   Constitutional: He is oriented to person, place, and time. He appears well-developed and well-nourished. No distress.   HENT:   Head: Normocephalic and atraumatic.   Eyes: Pupils are equal, round, and reactive to light. Conjunctivae and EOM are normal. Right eye exhibits no discharge. Left eye exhibits no discharge. No scleral icterus.   Neck: Normal range of motion. Neck supple. No tracheal deviation  present. No thyromegaly present.   Cardiovascular: Normal rate, regular rhythm, normal heart sounds and intact distal pulses. Exam reveals no gallop and no friction rub.   No murmur heard.  Pulmonary/Chest: Effort normal and breath sounds normal. No respiratory distress. He has no wheezes. He has no rales. He exhibits no tenderness.   Abdominal: Soft. Bowel sounds are normal. He exhibits no distension and no mass. There is no tenderness.   Musculoskeletal: Normal range of motion. He exhibits edema (BLE's worse on right ). He exhibits no tenderness.   Lymphadenopathy:     He has no cervical adenopathy.   Neurological: He is alert and oriented to person, place, and time.   Skin: Skin is warm. Rash (genital rash, with excoriations and satellite erythematous areas) noted. He is not diaphoretic.   Chronic skin changes to BLE's: color change (darkening) and  lichenification   Psychiatric: He has a normal mood and affect. His behavior is normal. Judgment and thought content normal.   Nursing note and vitals reviewed.      Assessment:       1. Cellulitis of right lower extremity    2. Candida infection of genital region    3. Permanent atrial fibrillation    4. Chronic combined systolic and diastolic congestive heart failure        Plan:       Cellulitis of right lower extremity  Improving. Off antibiotics now. Advised he continue following with wound care. Return precautions given. High likelihood of recurrence given his chronic venous stasis and other co-morbidities.     Candida infection of genital region  -     nystatin (MYCOSTATIN) powder; Apply topically 2 (two) times daily.  Dispense: 60 g; Refill: 1        -     fluconazole (DIFLUCAN) 150 MG Tab; Take 1 tablet (150 mg total) by mouth once daily. for 1 day  Dispense: 1 tablet; Refill: 0    Permanent atrial fibrillation  -     metoprolol succinate (TOPROL-XL) 100 MG 24 hr tablet; Take 1 tablet (100 mg total) by mouth once daily.  Dispense: 30 tablet; Refill: 5  Rate  controlled. Continue AC with coumadin.     Chronic combined systolic and diastolic congestive heart failure  -     metoprolol succinate (TOPROL-XL) 100 MG 24 hr tablet; Take 1 tablet (100 mg total) by mouth once daily.  Dispense: 30 tablet; Refill: 5  Appears euvolemic. Continue current regimen.     Follow up in about 3 months (around 8/14/2019) for CHF follow up.      5/15/2019 David Larsen M.D.  [facility] Resident PGY3

## 2019-05-15 ENCOUNTER — HOSPITAL ENCOUNTER (EMERGENCY)
Facility: HOSPITAL | Age: 54
Discharge: HOME | End: 2019-05-15
Attending: EMERGENCY MEDICINE
Payer: MEDICARE

## 2019-05-15 VITALS
RESPIRATION RATE: 28 BRPM | SYSTOLIC BLOOD PRESSURE: 119 MMHG | HEART RATE: 93 BPM | OXYGEN SATURATION: 97 % | BODY MASS INDEX: 36.45 KG/M2 | DIASTOLIC BLOOD PRESSURE: 56 MMHG | HEIGHT: 78 IN | TEMPERATURE: 99 F | WEIGHT: 315 LBS

## 2019-05-15 DIAGNOSIS — I83.018 VENOUS STASIS ULCER OF OTHER PART OF RIGHT LOWER LEG, UNSPECIFIED ULCER STAGE, UNSPECIFIED WHETHER VARICOSE VEINS PRESENT: ICD-10-CM

## 2019-05-15 DIAGNOSIS — L97.819 VENOUS STASIS ULCER OF OTHER PART OF RIGHT LOWER LEG, UNSPECIFIED ULCER STAGE, UNSPECIFIED WHETHER VARICOSE VEINS PRESENT: ICD-10-CM

## 2019-05-15 DIAGNOSIS — R50.9 ACUTE FEBRILE ILLNESS: Primary | ICD-10-CM

## 2019-05-15 DIAGNOSIS — I48.91 ATRIAL FIBRILLATION WITH RVR: ICD-10-CM

## 2019-05-15 DIAGNOSIS — R07.9 CHEST PAIN: ICD-10-CM

## 2019-05-15 LAB
ALBUMIN SERPL BCP-MCNC: 3.5 G/DL (ref 3.5–5.2)
ALP SERPL-CCNC: 122 U/L (ref 55–135)
ALT SERPL W/O P-5'-P-CCNC: 27 U/L (ref 10–44)
ANION GAP SERPL CALC-SCNC: 7 MMOL/L (ref 8–16)
APTT BLDCRRT: 38.6 SEC (ref 21–32)
AST SERPL-CCNC: 33 U/L (ref 10–40)
BASOPHILS # BLD AUTO: 0.01 K/UL (ref 0–0.2)
BASOPHILS NFR BLD: 0.1 % (ref 0–1.9)
BILIRUB SERPL-MCNC: 0.5 MG/DL (ref 0.1–1)
BILIRUB UR QL STRIP: NEGATIVE
BUN SERPL-MCNC: 30 MG/DL (ref 6–20)
CALCIUM SERPL-MCNC: 9.1 MG/DL (ref 8.7–10.5)
CHLORIDE SERPL-SCNC: 111 MMOL/L (ref 95–110)
CLARITY UR: CLEAR
CO2 SERPL-SCNC: 23 MMOL/L (ref 23–29)
COLOR UR: YELLOW
CREAT SERPL-MCNC: 1 MG/DL (ref 0.5–1.4)
DIFFERENTIAL METHOD: ABNORMAL
EOSINOPHIL # BLD AUTO: 0.1 K/UL (ref 0–0.5)
EOSINOPHIL NFR BLD: 0.3 % (ref 0–8)
ERYTHROCYTE [DISTWIDTH] IN BLOOD BY AUTOMATED COUNT: 16 % (ref 11.5–14.5)
EST. GFR  (AFRICAN AMERICAN): >60 ML/MIN/1.73 M^2
EST. GFR  (NON AFRICAN AMERICAN): >60 ML/MIN/1.73 M^2
GLUCOSE SERPL-MCNC: 104 MG/DL (ref 70–110)
GLUCOSE UR QL STRIP: NEGATIVE
HCT VFR BLD AUTO: 34 % (ref 40–54)
HGB BLD-MCNC: 10.5 G/DL (ref 14–18)
HGB UR QL STRIP: NEGATIVE
INR PPP: 2 (ref 0.8–1.2)
KETONES UR QL STRIP: NEGATIVE
LACTATE SERPL-SCNC: 1.5 MMOL/L (ref 0.5–2.2)
LEUKOCYTE ESTERASE UR QL STRIP: NEGATIVE
LYMPHOCYTES # BLD AUTO: 0.9 K/UL (ref 1–4.8)
LYMPHOCYTES NFR BLD: 6.3 % (ref 18–48)
MCH RBC QN AUTO: 25.5 PG (ref 27–31)
MCHC RBC AUTO-ENTMCNC: 30.9 G/DL (ref 32–36)
MCV RBC AUTO: 83 FL (ref 82–98)
MONOCYTES # BLD AUTO: 0.2 K/UL (ref 0.3–1)
MONOCYTES NFR BLD: 1.4 % (ref 4–15)
NEUTROPHILS # BLD AUTO: 13.6 K/UL (ref 1.8–7.7)
NEUTROPHILS NFR BLD: 91.7 % (ref 38–73)
NITRITE UR QL STRIP: NEGATIVE
PH UR STRIP: 6 [PH] (ref 5–8)
PLATELET # BLD AUTO: 214 K/UL (ref 150–350)
PMV BLD AUTO: 9 FL (ref 9.2–12.9)
POTASSIUM SERPL-SCNC: 5 MMOL/L (ref 3.5–5.1)
PROT SERPL-MCNC: 7.7 G/DL (ref 6–8.4)
PROT UR QL STRIP: NEGATIVE
PROTHROMBIN TIME: 20.6 SEC (ref 9–12.5)
RBC # BLD AUTO: 4.11 M/UL (ref 4.6–6.2)
SODIUM SERPL-SCNC: 141 MMOL/L (ref 136–145)
SP GR UR STRIP: 1.02 (ref 1–1.03)
TROPONIN I SERPL DL<=0.01 NG/ML-MCNC: 0.01 NG/ML (ref 0–0.03)
URN SPEC COLLECT METH UR: NORMAL
UROBILINOGEN UR STRIP-ACNC: NEGATIVE EU/DL
WBC # BLD AUTO: 14.8 K/UL (ref 3.9–12.7)

## 2019-05-15 PROCEDURE — 63600175 PHARM REV CODE 636 W HCPCS: Performed by: EMERGENCY MEDICINE

## 2019-05-15 PROCEDURE — 96372 THER/PROPH/DIAG INJ SC/IM: CPT

## 2019-05-15 PROCEDURE — 85610 PROTHROMBIN TIME: CPT

## 2019-05-15 PROCEDURE — 99285 EMERGENCY DEPT VISIT HI MDM: CPT | Mod: 25

## 2019-05-15 PROCEDURE — 85025 COMPLETE CBC W/AUTO DIFF WBC: CPT

## 2019-05-15 PROCEDURE — 84484 ASSAY OF TROPONIN QUANT: CPT

## 2019-05-15 PROCEDURE — 87040 BLOOD CULTURE FOR BACTERIA: CPT

## 2019-05-15 PROCEDURE — 83605 ASSAY OF LACTIC ACID: CPT

## 2019-05-15 PROCEDURE — 25000003 PHARM REV CODE 250: Performed by: EMERGENCY MEDICINE

## 2019-05-15 PROCEDURE — 93005 ELECTROCARDIOGRAM TRACING: CPT

## 2019-05-15 PROCEDURE — 80053 COMPREHEN METABOLIC PANEL: CPT

## 2019-05-15 PROCEDURE — 81003 URINALYSIS AUTO W/O SCOPE: CPT

## 2019-05-15 PROCEDURE — 85730 THROMBOPLASTIN TIME PARTIAL: CPT

## 2019-05-15 RX ORDER — MORPHINE SULFATE 2 MG/ML
6 INJECTION, SOLUTION INTRAMUSCULAR; INTRAVENOUS
Status: COMPLETED | OUTPATIENT
Start: 2019-05-15 | End: 2019-05-15

## 2019-05-15 RX ORDER — ACETAMINOPHEN 500 MG
1000 TABLET ORAL
Status: COMPLETED | OUTPATIENT
Start: 2019-05-15 | End: 2019-05-15

## 2019-05-15 RX ORDER — DOXYCYCLINE 100 MG/1
100 CAPSULE ORAL 2 TIMES DAILY
Qty: 14 CAPSULE | Refills: 0 | Status: SHIPPED | OUTPATIENT
Start: 2019-05-15 | End: 2019-05-22

## 2019-05-15 RX ORDER — DILTIAZEM HYDROCHLORIDE 5 MG/ML
10 INJECTION INTRAVENOUS
Status: COMPLETED | OUTPATIENT
Start: 2019-05-15 | End: 2019-05-15

## 2019-05-15 RX ADMIN — ACETAMINOPHEN 1000 MG: 500 TABLET ORAL at 03:05

## 2019-05-15 RX ADMIN — MORPHINE SULFATE 6 MG: 2 INJECTION, SOLUTION INTRAMUSCULAR; INTRAVENOUS at 04:05

## 2019-05-15 RX ADMIN — DILTIAZEM HYDROCHLORIDE 10 MG: 5 INJECTION INTRAVENOUS at 03:05

## 2019-05-15 NOTE — ED TRIAGE NOTES
"Pt presents ED with complaints of chest pain. Pt states pain is on the right side of his chest and states pain is 10 out of 10. When pt was asked how long he has had this pain he responded with "all his life". Pt denies fever and N/V.  "

## 2019-05-15 NOTE — ED NOTES
Pt is requesting IV via ultrasound. Notified charge nurse, Chely. Chely to place IV. IV supplies and ultrasound at bedside.

## 2019-05-15 NOTE — ED PROVIDER NOTES
Encounter Date: 5/15/2019    SCRIBE #1 NOTE: I, Kota Montaño, am scribing for, and in the presence of,  Dr. Conde. I have scribed the entire note.       History     Chief Complaint   Patient presents with    Chest Pain     midsternal cp that began while playing play station. reports pain 10/10. states it does not radiate and is not associated with any other symp. per EMS pt has been coughing and is currently on antibiotics.      Drew Farrar is a 53 y.o. male who  has a past medical history of Atrial fibrillation (Feb 23, 2016), Bipolar disorder, Congenital heart disease, Deep vein thrombosis, DVT of leg (deep venous thrombosis), History of prior ablation treatment, Hypertension, Obesity, Stroke, Thyroid disease, Venous stasis ulcer of lower extremity, unspecified laterality (12/14/2012), and Venous ulcer.    The patient presents to the ED via EMS due to midsternal CP beginning today while at rest. He describes a constant nonradiating pain which is rated a 10/10. The patient reports associated SOB and cough, as well as a single episode of nonbloody emesis PTA to the ED. He reports fever of 100.7 F at home beginning today, and arrives to the ED with a temp of 100.6 F and HR of 105. Patient denies any associated abdominal pain or increase in leg swelling or redness. Pt states he presented 6 weeks ago with similar symptoms, and reports he completed his prescribed antibiotics.  Per EMRs, pt was seen by Dr. Patel on 5/6/2019 and prescribed doxycycline for infected venous stasis ulcer.    The history is provided by the patient.     Review of patient's allergies indicates:   Allergen Reactions    Contrast media Other (See Comments)     Severe chest pain    Food allergy formula [glutamine-c-quercet-selen-brom]      Allergic to green peas; Heart failure.    Iodinated contrast- oral and iv dye Other (See Comments)     Chest pain    Peas Hives    Ibuprofen Swelling    Latex, natural rubber Hives    Pcn [penicillins]  Hives    Butisol [butabarbital] Rash     Peeling skin     Past Medical History:   Diagnosis Date    *Atrial fibrillation     Atrial fibrillation     Atrial fibrillation Feb 23, 2016    Bipolar disorder     Congenital heart disease     s/p surgical intervention 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 of age    Deep vein thrombosis     DVT of leg (deep venous thrombosis)     left leg    History of prior ablation treatment     10/9/13    Hypertension     Obesity     Stroke     Thyroid disease     Venous stasis ulcer of lower extremity, unspecified laterality 12/14/2012    Venous ulcer      Past Surgical History:   Procedure Laterality Date    ANGIOPLASTY      ARTHROSCOPY-KNEE W/ CHONDROPLASTY Right 2/23/2016    Performed [facility] at [facility] OR    CARDIAC SURGERY      open heart surgery 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 old    EYE SURGERY      left eye cataract/right eye glaucoma    TIMO FILTER PLACEMENT      Dr Calix ([facility])    KNEE SURGERY      l and r     MULTIPLE TOOTH EXTRACTIONS      Sclerotherapy N/A 2/21/2019    Performed by Gomez Lantigua [facility] at [facility] CATH LAB/EP    SKIN GRAFT      left leg    SYNOVECTOMY-KNEE Right 2/23/2016    Performed by [facility] at [facility] OR     Family History   Problem Relation Age of Onset    Diabetes Father     Heart disease Father     Heart disease Maternal Grandmother     Diabetes Maternal Grandfather     Heart disease Maternal Grandfather     Stroke Maternal Grandfather      Social History     Tobacco Use    Smoking status: Former Smoker     Packs/day: 1.00     Years: 6.00     Pack years: 6.00     Types: Cigarettes    Smokeless tobacco: Former User    Tobacco comment: quit by age 27yrs old   Substance Use Topics    Alcohol use: Yes     Alcohol/week: 0.6 oz     Types: 1 Glasses of wine per week     Frequency: Monthly or less     Comment: 1 glass of wine a week    Drug use: No     Review of Systems   Constitutional: Positive for fever.   Respiratory: Positive for cough and  shortness of breath.    Cardiovascular: Positive for chest pain. Negative for leg swelling.   Gastrointestinal: Positive for nausea and vomiting. Negative for abdominal pain.   Skin: Negative for rash.   All other systems reviewed and are negative.      Physical Exam     Initial Vitals   BP Pulse Resp Temp SpO2   05/15/19 1434 05/15/19 1434 05/15/19 1434 05/15/19 1434 05/15/19 1501   (!) 144/66 105 (!) 22 (!) 100.6 °F (38.1 °C) 97 %      MAP       --                Physical Exam    Nursing note and vitals reviewed.  Constitutional: He appears well-developed and well-nourished. No distress.   HENT:   Head: Normocephalic and atraumatic.   Eyes: Conjunctivae are normal.   Neck: Normal range of motion.   Cardiovascular: Normal rate and regular rhythm.   No murmur heard.  Pulmonary/Chest: Breath sounds normal. No respiratory distress.   Abdominal: Bowel sounds are normal. He exhibits no distension.   Musculoskeletal: Normal range of motion.   Entire right lower leg is red and swollen with chronic venous stasis skin changes - pt states chronic and unchanged; chronic venous stasis changes in LLE with single healing ulcer on left lateral foot - no surrounding erythema (improved from clinic pictures on 5/6/2019)   Neurological: He is alert and oriented to person, place, and time.   Skin: Skin is warm and dry.   Chronic apprearing yeast dermatitis on lower abdomen   Psychiatric: He has a normal mood and affect. His behavior is normal.         ED Course   Procedures  Labs Reviewed   CBC W/ AUTO DIFFERENTIAL - Abnormal; Notable for the following components:       Result Value    WBC 14.80 (*)     RBC 4.11 (*)     Hemoglobin 10.5 (*)     Hematocrit 34.0 (*)     Mean Corpuscular Hemoglobin 25.5 (*)     Mean Corpuscular Hemoglobin Conc 30.9 (*)     RDW 16.0 (*)     MPV 9.0 (*)     Gran # (ANC) 13.6 (*)     Lymph # 0.9 (*)     Mono # 0.2 (*)     Gran% 91.7 (*)     Lymph% 6.3 (*)     Mono% 1.4 (*)     All other components within  normal limits   COMPREHENSIVE METABOLIC PANEL - Abnormal; Notable for the following components:    Chloride 111 (*)     BUN, Bld 30 (*)     Anion Gap 7 (*)     All other components within normal limits   APTT - Abnormal; Notable for the following components:    aPTT 38.6 (*)     All other components within normal limits   PROTIME-INR - Abnormal; Notable for the following components:    Prothrombin Time 20.6 (*)     INR 2.0 (*)     All other components within normal limits   CULTURE, BLOOD   CULTURE, BLOOD   LACTIC ACID, PLASMA   URINALYSIS, REFLEX TO URINE CULTURE    Narrative:     Preferred Collection Type->Urine, Clean Catch   TROPONIN I        ECG Results          EKG 12-lead (Final result)  Result time 05/15/19 16:03:10    Final result by Interface, Lab In [facility] (05/15/19 16:03:10)                 Narrative:    Test Reason : R07.9,    Vent. Rate : 105 BPM     Atrial Rate : 178 BPM     P-R Int : 000 ms          QRS Dur : 098 ms      QT Int : 310 ms       P-R-T Axes : 000 059 073 degrees     QTc Int : 409 ms    Atrial fibrillation with rapid ventricular response with premature  ventricular or aberrantly conducted complexes  early takeoff ST laterally   Abnormal ECG  When compared with ECG of 26-APR-2019 02:58,  No significant change was found  Confirmed by Mohan GARCIA, Stefan ([id number]) on 5/15/2019 4:03:00 PM    Referred By: System System           Confirmed By:Stefan Preston MD                              X-Rays:   Independently Interpreted Readings:   Other Readings:  Reviewed by myself, read by radiology.     Imaging Results          X-Ray Chest AP Portable (Final result)  Result time 05/15/19 16:13:30    Final result by Caty Zapien MD (05/15/19 16:13:30)                 Impression:      No acute cardiopulmonary abnormality.      Electronically signed by: Caty Zapien  Date:    05/15/2019  Time:    16:13             Narrative:    EXAMINATION:  XR CHEST AP PORTABLE    CLINICAL  HISTORY:  Sepsis;    TECHNIQUE:  Single frontal view of the chest was performed.    COMPARISON:  Multiple priors, most recent 04/26/2019    FINDINGS:  Stable cardiomegaly.  Low lung volumes.  No focal consolidation, pleural effusion or pneumothorax.                              Medical Decision Making:   History:   Old Records Summarized: records from clinic visits.       <> Summary of Records: Patient was seen yesterday by Dr. Lantigua, cardiology. He does have CHF with an EF of 40% and persistent a fib with controlled rate and anticoagulated on Coumadin. He also has a history of DVT's and PE's. The patient is currently on Doxycline for infected venous stasis wounds.  Clinical Tests:   Lab Tests: Ordered and Reviewed  Radiological Study: Ordered and Reviewed  Medical Tests: Ordered and Reviewed  ED Management:  Chest pain - no signs of AMI on EKG and normal troponin.  Normal CXR.  Pt is in afib with RVR (persistent afib but usually rate controlled).  He was given IV cardizem and tylenol for fever and HR improved.  Also given morphine for CP.  CP resolved.  I do not feel this is due to AMI.  Last echo with normal EF and no hx of CAD.    Fever - does not appear toxic.  No infiltrate on CXR and no UTI.  He does have low grade fever and elevated WBCs.  Source is likely lower leg cellulitis and ulcer.  Will restart doxycycline and have pt follow up with PCP or Wound care on Monday.  Return to ER sooner for worsening symptoms.      1815 - Spoke with Dr. Gray, cardiology, who agrees that there is no need to admit from a cardiac standpoint.    Pt was discharged in stable condition and expressed understanding of discharge/follow up instructions.    Other:   I have discussed this case with another health care provider.                      Clinical Impression:       ICD-10-CM ICD-9-CM   1. Acute febrile illness R50.9 780.60   2. Chest pain R07.9 786.50   3. Atrial fibrillation with RVR I48.91 427.31   4. Venous stasis ulcer  of other part of right lower leg, unspecified ulcer stage, unspecified whether varicose veins present I83.018 454.0    L97.819      Disposition:   Disposition: Discharged  Condition: Stable       I, Dr. Kelli Conde, personally performed the services described in this documentation.   All medical record entries made by the scribe were at my direction and in my presence.   I have reviewed the chart and agree that the record is accurate and complete.   Kelli Conde MD.  4:19 PM 05/15/2019        Kelli Conde MD  05/15/19 1934

## 2019-05-15 NOTE — DISCHARGE INSTRUCTIONS
Your chest pain was likely related to your high heart rate from your fever and atrial fibrillation.  Take tylenol 1000mg every 6 hours for fever and stay cool - remove clothes or take a cool shower.  Your fever is most likely from your infected ulcers - take antibiotics as directed and follow up with your primary care doctor or Dr. Infante on Monday.  If you are not improving in 48 hours, return to the ER for further evaluation.  Continue your current medications.

## 2019-05-16 ENCOUNTER — HOSPITAL ENCOUNTER (OUTPATIENT)
Dept: WOUND CARE | Facility: HOSPITAL | Age: 54
Discharge: HOME | End: 2019-05-16
Attending: EMERGENCY MEDICINE
Payer: MEDICARE

## 2019-05-16 DIAGNOSIS — L97.909 CHRONIC VENOUS HYPERTENSION WITH ULCER, UNSPECIFIED LATERALITY: Primary | ICD-10-CM

## 2019-05-16 DIAGNOSIS — R60.0 BILATERAL EDEMA OF LOWER EXTREMITY: ICD-10-CM

## 2019-05-16 DIAGNOSIS — I87.319 CHRONIC VENOUS HYPERTENSION WITH ULCER, UNSPECIFIED LATERALITY: Primary | ICD-10-CM

## 2019-05-16 PROCEDURE — 29581 APPL MULTLAYER CMPRN SYS LEG: CPT

## 2019-05-16 NOTE — PROGRESS NOTES
"Subjective:       Patient ID: Drew Farrar is a 53 y.o. male.    Chief Complaint: Venous Ulcer    2/15/19: Readmitted for venous ulcer to left lateral foot which developed about 2 weeks ago. Pulses noted with doppler and capillary refill <3.Dr Gutierrez assessed patient and wound care plan ordered for compression therapy and hydrafera blue to wound bed. No apparent signs of infection noted. Patient to follw-up in clinic in 2 weeks.DB   2/21/19: Nurse visit for dressing change. Refused care to skin tear on left 2nd toe. See notes. RTC 1 week for DrRosy Visit.  3/1/19: Follow up with Dr. Waller today in clinic new wound to right posterior leg, culture of both wounds taken today in clinic new wound care orders to both legs for xeroform and unna with calamine toe to knee follow up in 1 week with Dr. Gutierrez  3/8/19: Follow up with Dr. Gutierrez today in clinic wounds improving, edema noted to BLE, profore toe to knee applied to both legs, RX given to patient for PO Doxycycline, follow up in 1 week.   3/15/19: Here for f/u with Dr. Gutierrez. U/S scheduled Thursday. Will need right leg rewrapped. Patient states eye DrRosy Gave him the same antibiotic dose and strength after eye surgery. Dr instructed patient to take one bottle of antibiotics for both wounds and eyes until finished. Gentamycin added to wound care orders.   3/29/19:  Wound slowly improving. No changes in wound care orders. Wound debrided per Dr Gutierrez.  4/4/19: Patient showed up to clinic today stated " I need my dressing changed. It fell off."  Doppler pluses checked and present.  Patient refusing care to right, no stocking present on leg at this visit. Patient stated " No they said this leg is discharged, Dr. Lantigua has to drain the fluid out this leg with a needle. No wound care to this leg anymore when I come."    4/8/19: Here for f/u with Dr. Gutierrez. Lidocaine 4% applied to wound bed. Toenails on right foot trimmed, wound debrided with curette per " "Dr. Gutierrez.  4/22/19: F/U with Dr. Gutierrez. Seen in ER 4/15/19 for elevated temp, Increased pain, swelling, and redness of left foot. Treated with Doxycycline, now completed. Left anterior #1 wound healed. Has new site #3 to left lateral foot.  Pt. Denies pain @ wound site.  04/29/19  F/U with Dr. Gutierrez.  Wound progressing well. Wound debrided per Dr. Gutierrez.  Dressings changed to anjali AG moistened with sterile water, cover with adaptic and foam dressing.  Continue profore compression therapy.  Patient to follow up in 1 week with Dr. Gutierrez..    5/6/19: Patient seen in clinic today by Dr. Gutierrez and Dr. Patel. Doppler pulses present, wound debrided by Dr. Gutierrez with curette.  Dr. Patel seen patient, stated " wound looks great." and reassured patient stents were ok on last CT of [facility].  No recommendations at this time per Dr. Patel.  Wound care continued as ordered, Profore toe to knee.  05/16/19: Nurse visit today for wound care and dressing change. Patient reports going to the ED yesterday for chest pain. He states that he was treated and is "feeling much better today." Patient also reports being prescribed po doxycycline per Dr. Patel for right knee, which he will begin taking on Monday 05/20/19 twice a day.    Review of Systems    Objective:      Physical Exam    Assessment:       1. Chronic venous hypertension with ulcer, unspecified laterality    2. Bilateral edema of lower extremity           Wound 04/22/19 1130 Venous Ulcer lateral Foot #3 (Active)   04/22/19 1130    Pre-existing: Yes   Primary Wound Type: Venous ulcer   Side: Left   Orientation: lateral   Location: Foot   Wound/PI Number (optional): #3   Ankle-Brachial Index:    Pulses: doppler   Removal Indication and Assessment:    Wound Outcome:    (Retired) Wound Type:    (Retired) Wound Length (cm):    (Retired) Wound Width (cm):    (Retired) Depth (cm):    Wound Description (Comments):    Removal Indications:    Dressing " Appearance Intact 5/16/2019  3:00 PM   Drainage Amount Small 5/16/2019  3:00 PM   Drainage Characteristics/Odor Serosanguineous 5/16/2019  3:00 PM   Appearance Red;Yellow;Dry 5/16/2019  3:00 PM   Tissue loss description Full thickness 5/16/2019  3:00 PM   Red (%), Wound Tissue Color 75 % 5/16/2019  3:00 PM   Yellow (%), Wound Tissue Color 25 % 5/16/2019  3:00 PM   Periwound Area Intact;Dry 5/16/2019  3:00 PM   Wound Edges Defined 5/16/2019  3:00 PM   Wound Length (cm) 0.7 cm 5/16/2019  3:00 PM   Wound Width (cm) 0.7 cm 5/16/2019  3:00 PM   Wound Depth (cm) 0.2 cm 5/16/2019  3:00 PM   Wound Volume (cm^3) 0.1 cm^3 5/16/2019  3:00 PM   Wound Surface Area (cm^2) 0.49 cm^2 5/16/2019  3:00 PM   Care Cleansed with:;Sterile normal saline 5/16/2019  3:00 PM   Dressing Applied;Foam;Non-adherent;Compression wrap;Other (see comments) 5/16/2019  3:00 PM   Periwound Care Moisture barrier applied 5/16/2019  3:00 PM   Compression Four layer compression 5/16/2019  3:00 PM   Off Loading Off loading shoe 5/16/2019  3:00 PM   Dressing Change Due 05/20/19 5/16/2019  3:00 PM       Left lateral Foot #3    Cleanse wound with:NS  Lidocaine: PRN   Periwound care:Moisture barrier PRN  Primary dressing: Krystin AG moistened with sterile water.    Secondary dressing: Cover with adaptic touch and foam dressing  Offloading: Darco shoe  Edema control: 4 layer compression wrap toes to knee    Plan:                Follow up in about 4 days (around 5/20/2019) for Dr. Gutierrez.

## 2019-05-16 NOTE — PROGRESS NOTES
I reviewed the note and discussed with Dr. Larsen. Improved cellulitis and stasis ulcer. Tinea cruris severe and agree with systemic tx.

## 2019-05-20 ENCOUNTER — HOSPITAL ENCOUNTER (OUTPATIENT)
Dept: WOUND CARE | Facility: HOSPITAL | Age: 54
Discharge: HOME | End: 2019-05-20
Attending: EMERGENCY MEDICINE
Payer: MEDICARE

## 2019-05-20 ENCOUNTER — ANTI-COAG VISIT (OUTPATIENT)
Dept: CARDIOLOGY | Facility: CLINIC | Age: 54
End: 2019-05-20
Payer: MEDICARE

## 2019-05-20 VITALS
DIASTOLIC BLOOD PRESSURE: 73 MMHG | SYSTOLIC BLOOD PRESSURE: 132 MMHG | BODY MASS INDEX: 36.45 KG/M2 | HEIGHT: 78 IN | WEIGHT: 315 LBS | HEART RATE: 99 BPM

## 2019-05-20 DIAGNOSIS — L97.909 CHRONIC VENOUS HYPERTENSION WITH ULCER, UNSPECIFIED LATERALITY: Primary | ICD-10-CM

## 2019-05-20 DIAGNOSIS — I83.009 VENOUS STASIS ULCER OF LOWER EXTREMITY, UNSPECIFIED LATERALITY: ICD-10-CM

## 2019-05-20 DIAGNOSIS — I26.99 OTHER PULMONARY EMBOLISM WITHOUT ACUTE COR PULMONALE, UNSPECIFIED CHRONICITY: ICD-10-CM

## 2019-05-20 DIAGNOSIS — I87.319 CHRONIC VENOUS HYPERTENSION WITH ULCER, UNSPECIFIED LATERALITY: Primary | ICD-10-CM

## 2019-05-20 DIAGNOSIS — L97.909 VENOUS STASIS ULCER OF LOWER EXTREMITY, UNSPECIFIED LATERALITY: ICD-10-CM

## 2019-05-20 DIAGNOSIS — Z79.01 LONG TERM (CURRENT) USE OF ANTICOAGULANTS: ICD-10-CM

## 2019-05-20 LAB
BACTERIA BLD CULT: NORMAL

## 2019-05-20 PROCEDURE — 11042 DBRDMT SUBQ TIS 1ST 20SQCM/<: CPT

## 2019-05-20 PROCEDURE — 27201912 HC WOUND CARE DEBRIDEMENT SUPPLIES

## 2019-05-20 PROCEDURE — 93793 ANTICOAG MGMT PT WARFARIN: CPT | Mod: ,,,

## 2019-05-20 PROCEDURE — 29581 APPL MULTLAYER CMPRN SYS LEG: CPT

## 2019-05-20 PROCEDURE — 93793 PR ANTICOAGULANT MGMT FOR PT TAKING WARFARIN: ICD-10-PCS | Mod: ,,,

## 2019-05-20 NOTE — PROGRESS NOTES
"Subjective:       Patient ID: Drew Farrar is a 53 y.o. male.    Chief Complaint: Non-healing Wound (left foot)    2/15/19: Readmitted for venous ulcer to left lateral foot which developed about 2 weeks ago. Pulses noted with doppler and capillary refill <3.Dr Gutierrez assessed patient and wound care plan ordered for compression therapy and hydrafera blue to wound bed. No apparent signs of infection noted. Patient to follw-up in clinic in 2 weeks.DB   2/21/19: Nurse visit for dressing change. Refused care to skin tear on left 2nd toe. See notes. RTC 1 week for DrRosy Visit.  3/1/19: Follow up with Dr. Waller today in clinic new wound to right posterior leg, culture of both wounds taken today in clinic new wound care orders to both legs for xeroform and unna with calamine toe to knee follow up in 1 week with Dr. Gutierrez  3/8/19: Follow up with Dr. Gutierrez today in clinic wounds improving, edema noted to BLE, profore toe to knee applied to both legs, RX given to patient for PO Doxycycline, follow up in 1 week.   3/15/19: Here for f/u with Dr. Gutierrez. U/S scheduled Thursday. Will need right leg rewrapped. Patient states eye DrRosy Gave him the same antibiotic dose and strength after eye surgery. Dr instructed patient to take one bottle of antibiotics for both wounds and eyes until finished. Gentamycin added to wound care orders.   3/29/19:  Wound slowly improving. No changes in wound care orders. Wound debrided per Dr Gutierrez.  4/4/19: Patient showed up to clinic today stated " I need my dressing changed. It fell off."  Doppler pluses checked and present.  Patient refusing care to right, no stocking present on leg at this visit. Patient stated " No they said this leg is discharged, Dr. Lantigua has to drain the fluid out this leg with a needle. No wound care to this leg anymore when I come."    4/8/19: Here for f/u with Dr. Gutierrez. Lidocaine 4% applied to wound bed. Toenails on right foot trimmed, wound debrided " "with curette per Dr. Gutierrez.  4/22/19: F/U with Dr. Gutierrez. Seen in ER 4/15/19 for elevated temp, Increased pain, swelling, and redness of left foot. Treated with Doxycycline, now completed. Left anterior #1 wound healed. Has new site #3 to left lateral foot.  Pt. Denies pain @ wound site.  04/29/19  F/U with Dr. Gutierrez.  Wound progressing well. Wound debrided per Dr. Gutierrez.  Dressings changed to anjali AG moistened with sterile water, cover with adaptic and foam dressing.  Continue profore compression therapy.  Patient to follow up in 1 week with Dr. Gutierrez..    5/6/19: Patient seen in clinic today by Dr. Gutierrez and Dr. Patel. Doppler pulses present, wound debrided by Dr. Gutierrez with curette.  Dr. Patel seen patient, stated " wound looks great." and reassured patient stents were ok on last CT of [facility].  No recommendations at this time per Dr. Patel.  Wound care continued as ordered, Profore toe to knee.  05/16/19: Nurse visit today for wound care and dressing change. Patient reports going to the ED yesterday for chest pain. He states that he was treated and is "feeling much better today." Patient also reports being prescribed po doxycycline per Dr. Patel for right knee, which he will begin taking on Monday 05/20/19 twice a day.  5/20/19: Seen by Dr. Gutierrez. Site debrided. Continue same wound orders.  No present c/o noted.  Review of Systems    Objective:    Wound as noted w/o Sophia's sign or s/s of infection.  Physical Exam    Assessment:       1. Chronic venous hypertension with ulcer, unspecified laterality    2. Venous stasis ulcer of lower extremity, unspecified laterality           Wound 04/22/19 1130 Venous Ulcer lateral Foot #3 (Active)   04/22/19 1130    Pre-existing: Yes   Primary Wound Type: Venous ulcer   Side: Left   Orientation: lateral   Location: Foot   Wound/PI Number (optional): #3   Ankle-Brachial Index:    Pulses: doppler   Removal Indication and Assessment:    Wound " Outcome:    (Retired) Wound Type:    (Retired) Wound Length (cm):    (Retired) Wound Width (cm):    (Retired) Depth (cm):    Wound Description (Comments):    Removal Indications:    Dressing Appearance Intact;Dried drainage 5/20/2019 12:09 PM   Drainage Amount Small 5/20/2019 12:09 PM   Drainage Characteristics/Odor Serosanguineous 5/20/2019 12:09 PM   Appearance Red;Yellow 5/20/2019 12:09 PM   Tissue loss description Full thickness 5/20/2019 12:09 PM   Red (%), Wound Tissue Color 80 % 5/20/2019 12:09 PM   Yellow (%), Wound Tissue Color 0 % 5/20/2019 12:09 PM   Periwound Area Intact;[place];Dry 5/20/2019 12:09 PM   Wound Edges Defined 5/20/2019 12:09 PM   Wound Length (cm) 1.5 cm 5/20/2019 12:09 PM   Wound Width (cm) 0.7 cm 5/20/2019 12:09 PM   Wound Depth (cm) 0.2 cm 5/20/2019 12:09 PM   Wound Volume (cm^3) 0.21 cm^3 5/20/2019 12:09 PM   Wound Surface Area (cm^2) 1.05 cm^2 5/20/2019 12:09 PM   Care Cleansed with:;Sterile normal saline;Debrided 5/20/2019 12:09 PM   Dressing Silver;Compression wrap;Foam 5/20/2019 12:09 PM   Periwound Care Moisture barrier applied 5/20/2019 12:09 PM   Compression Four layer compression 5/20/2019 12:09 PM   Off Loading Off loading shoe 5/20/2019 12:09 PM   Dressing Change Due 05/27/19 5/20/2019 12:09 PM     Debrided per Dr. Gutierrez. Post measurements: 1.5 x 0.8 x 0.2cm    Left lateral Foot #3    Cleanse wound with:NS  Lidocaine: PRN   Periwound care:Moisture barrier PRN  Primary dressing: Krystin AG moistened with sterile water.    Secondary dressing: Cover with adaptic touch and foam dressing  Offloading: Darco shoe  Edema control: 4 layer compression wrap toes to knee    Plan:       Nurse visit Monday 5/27/19.         No follow-ups on file.    F/u as per appt.

## 2019-05-20 NOTE — PROGRESS NOTES
INR from ER and too old to dose. We need current INR for assessment. Patient should continue on dose as planned until current INR

## 2019-05-23 ENCOUNTER — ANTI-COAG VISIT (OUTPATIENT)
Dept: CARDIOLOGY | Facility: CLINIC | Age: 54
End: 2019-05-23
Payer: MEDICARE

## 2019-05-23 ENCOUNTER — LAB VISIT (OUTPATIENT)
Dept: LAB | Facility: HOSPITAL | Age: 54
End: 2019-05-23
Attending: INTERNAL MEDICINE
Payer: MEDICARE

## 2019-05-23 DIAGNOSIS — I26.99 OTHER PULMONARY EMBOLISM WITHOUT ACUTE COR PULMONALE, UNSPECIFIED CHRONICITY: ICD-10-CM

## 2019-05-23 DIAGNOSIS — Z79.01 LONG TERM (CURRENT) USE OF ANTICOAGULANTS: ICD-10-CM

## 2019-05-23 LAB
INR PPP: 4.7 (ref 0.8–1.2)
PROTHROMBIN TIME: 48.1 SEC (ref 9–12.5)

## 2019-05-23 PROCEDURE — 36415 COLL VENOUS BLD VENIPUNCTURE: CPT

## 2019-05-23 PROCEDURE — 93793 ANTICOAG MGMT PT WARFARIN: CPT | Mod: ,,,

## 2019-05-23 PROCEDURE — 85610 PROTHROMBIN TIME: CPT

## 2019-05-23 PROCEDURE — 93793 PR ANTICOAGULANT MGMT FOR PT TAKING WARFARIN: ICD-10-PCS | Mod: ,,,

## 2019-05-23 NOTE — PROGRESS NOTES
"INR not at goal.    "Per pt Dr Lantigua is giving him instruction for his Coumadin.he told him to take 10 mg daily of Coumadin. I asked him why he don't call us back when it's time to get the instruction. Because he giving him his dose. And he started Doxy on Monday for 7 days."    See calendar for adjustments to dose and follow up plan.      "

## 2019-05-29 ENCOUNTER — HOSPITAL ENCOUNTER (OUTPATIENT)
Dept: WOUND CARE | Facility: HOSPITAL | Age: 54
Discharge: HOME | End: 2019-05-29
Attending: EMERGENCY MEDICINE
Payer: MEDICARE

## 2019-05-29 ENCOUNTER — ANTI-COAG VISIT (OUTPATIENT)
Dept: CARDIOLOGY | Facility: CLINIC | Age: 54
End: 2019-05-29
Payer: MEDICARE

## 2019-05-29 DIAGNOSIS — I26.99 OTHER PULMONARY EMBOLISM WITHOUT ACUTE COR PULMONALE, UNSPECIFIED CHRONICITY: ICD-10-CM

## 2019-05-29 DIAGNOSIS — Z79.01 LONG TERM (CURRENT) USE OF ANTICOAGULANTS: ICD-10-CM

## 2019-05-29 DIAGNOSIS — L97.909 VENOUS STASIS ULCER OF LOWER EXTREMITY, UNSPECIFIED LATERALITY: ICD-10-CM

## 2019-05-29 DIAGNOSIS — L97.909 CHRONIC VENOUS HYPERTENSION WITH ULCER, UNSPECIFIED LATERALITY: Primary | ICD-10-CM

## 2019-05-29 DIAGNOSIS — I83.009 VENOUS STASIS ULCER OF LOWER EXTREMITY, UNSPECIFIED LATERALITY: ICD-10-CM

## 2019-05-29 DIAGNOSIS — I87.319 CHRONIC VENOUS HYPERTENSION WITH ULCER, UNSPECIFIED LATERALITY: Primary | ICD-10-CM

## 2019-05-29 PROCEDURE — 93793 PR ANTICOAGULANT MGMT FOR PT TAKING WARFARIN: ICD-10-PCS | Mod: ,,,

## 2019-05-29 PROCEDURE — 93793 ANTICOAG MGMT PT WARFARIN: CPT | Mod: ,,,

## 2019-05-29 PROCEDURE — 29581 APPL MULTLAYER CMPRN SYS LEG: CPT

## 2019-05-29 NOTE — PROGRESS NOTES
"Subjective:       Patient ID: Drew Farrar is a 53 y.o. male.    Chief Complaint: Venous Ulcer    2/15/19: Readmitted for venous ulcer to left lateral foot which developed about 2 weeks ago. Pulses noted with doppler and capillary refill <3.Dr Gutierrez assessed patient and wound care plan ordered for compression therapy and hydrafera blue to wound bed. No apparent signs of infection noted. Patient to follw-up in clinic in 2 weeks.DB   2/21/19: Nurse visit for dressing change. Refused care to skin tear on left 2nd toe. See notes. RTC 1 week for DrRosy Visit.  3/1/19: Follow up with Dr. Waller today in clinic new wound to right posterior leg, culture of both wounds taken today in clinic new wound care orders to both legs for xeroform and unna with calamine toe to knee follow up in 1 week with Dr. Gutierrez  3/8/19: Follow up with Dr. Gutierrez today in clinic wounds improving, edema noted to BLE, profore toe to knee applied to both legs, RX given to patient for PO Doxycycline, follow up in 1 week.   3/15/19: Here for f/u with Dr. Gutierrez. U/S scheduled Thursday. Will need right leg rewrapped. Patient states eye DrRosy Gave him the same antibiotic dose and strength after eye surgery. Dr instructed patient to take one bottle of antibiotics for both wounds and eyes until finished. Gentamycin added to wound care orders.   3/29/19:  Wound slowly improving. No changes in wound care orders. Wound debrided per Dr Gutierrez.  4/4/19: Patient showed up to clinic today stated " I need my dressing changed. It fell off."  Doppler pluses checked and present.  Patient refusing care to right, no stocking present on leg at this visit. Patient stated " No they said this leg is discharged, Dr. Lantigua has to drain the fluid out this leg with a needle. No wound care to this leg anymore when I come."    4/8/19: Here for f/u with Dr. Gutierrez. Lidocaine 4% applied to wound bed. Toenails on right foot trimmed, wound debrided with curette per " "Dr. Gutierrez.  4/22/19: F/U with Dr. Gutierrez. Seen in ER 4/15/19 for elevated temp, Increased pain, swelling, and redness of left foot. Treated with Doxycycline, now completed. Left anterior #1 wound healed. Has new site #3 to left lateral foot.  Pt. Denies pain @ wound site.  04/29/19  F/U with Dr. Gutierrez.  Wound progressing well. Wound debrided per Dr. Gutierrez.  Dressings changed to anjali AG moistened with sterile water, cover with adaptic and foam dressing.  Continue profore compression therapy.  Patient to follow up in 1 week with Dr. Gutierrez..    5/6/19: Patient seen in clinic today by Dr. Gutierrez and Dr. Patel. Doppler pulses present, wound debrided by Dr. Gutierrez with curette.  Dr. Patel seen patient, stated " wound looks great." and reassured patient stents were ok on last CT of [facility].  No recommendations at this time per Dr. Patel.  Wound care continued as ordered, Profore toe to knee.  05/16/19: Nurse visit today for wound care and dressing change. Patient reports going to the ED yesterday for chest pain. He states that he was treated and is "feeling much better today." Patient also reports being prescribed po doxycycline per Dr. Patel for right knee, which he will begin taking on Monday 05/20/19 twice a day.  5/20/19: Seen by Dr. Gutierrez. Site debrided. Continue same wound orders.  05/29/19: F/u nurse visit for wound care and dressing change. Moist irritation and erythema noted on intact skin surrounding wound. Moisture barrier applied. Continued with wound care treatment as ordered.    Review of Systems    Objective:      Physical Exam    Assessment:       1. Chronic venous hypertension with ulcer, unspecified laterality    2. Venous stasis ulcer of lower extremity, unspecified laterality           Wound 04/22/19 1130 Venous Ulcer lateral Foot #3 (Active)   04/22/19 1130    Pre-existing: Yes   Primary Wound Type: Venous ulcer   Side: Left   Orientation: lateral   Location: Foot "   Wound/PI Number (optional): #3   Ankle-Brachial Index:    Pulses: doppler   Removal Indication and Assessment:    Wound Outcome:    (Retired) Wound Type:    (Retired) Wound Length (cm):    (Retired) Wound Width (cm):    (Retired) Depth (cm):    Wound Description (Comments):    Removal Indications:    Dressing Appearance Intact 5/29/2019  1:00 PM   Drainage Amount Small 5/29/2019  1:00 PM   Drainage Characteristics/Odor Serosanguineous 5/29/2019  1:00 PM   Appearance Pink;Yellow;Moist 5/29/2019  1:00 PM   Tissue loss description Full thickness 5/29/2019  1:00 PM   Red (%), Wound Tissue Color 90 % 5/29/2019  1:00 PM   Yellow (%), Wound Tissue Color 10 % 5/29/2019  1:00 PM   Periwound Area Intact;[place];Other (see comments) 5/29/2019  1:00 PM   Wound Edges Defined 5/29/2019  1:00 PM   Wound Length (cm) 1.5 cm 5/29/2019  1:00 PM   Wound Width (cm) 0.7 cm 5/29/2019  1:00 PM   Wound Depth (cm) 0.2 cm 5/29/2019  1:00 PM   Wound Volume (cm^3) 0.21 cm^3 5/29/2019  1:00 PM   Wound Surface Area (cm^2) 1.05 cm^2 5/29/2019  1:00 PM   Care Cleansed with:;Sterile normal saline 5/29/2019  1:00 PM   Dressing Applied;Other (see comments);Foam;Non-adherent;Compression wrap 5/29/2019  1:00 PM   Periwound Care Moisture barrier applied 5/29/2019  1:00 PM   Compression Four layer compression 5/29/2019  1:00 PM   Off Loading Off loading shoe 5/29/2019  1:00 PM   Dressing Change Due 06/03/19 5/29/2019  1:00 PM       Left lateral Foot #3    Cleanse wound with:NS  Lidocaine: PRN   Periwound care:Moisture barrier  Primary dressing: Krysitn AG moistened with sterile water.    Secondary dressing: Cover with adaptic touch and foam dressing  Offloading: Darco shoe  Edema control: 4 layer compression wrap toes to knee        Plan:                Follow up in about 5 days (around 6/3/2019) for Dr. Gutierrez.

## 2019-05-29 NOTE — PROGRESS NOTES
Patient called to reschedule 5/27 missed appointment to today -5/29/19, appointment was already rescheduled by another department

## 2019-05-29 NOTE — PROGRESS NOTES
Pt reports he finished taking Doxycycline on 05/26/19. He took 10 mg on 05/28/19 and followed calendar dose otherwise. INRs have been erratic due to DDI's & health changes. Hopefully things will settle down and INR can stabilize

## 2019-06-03 ENCOUNTER — OFFICE VISIT (OUTPATIENT)
Dept: CARDIOLOGY | Facility: CLINIC | Age: 54
End: 2019-06-03
Payer: MEDICARE

## 2019-06-03 VITALS
SYSTOLIC BLOOD PRESSURE: 129 MMHG | WEIGHT: 315 LBS | HEIGHT: 78 IN | DIASTOLIC BLOOD PRESSURE: 77 MMHG | HEART RATE: 98 BPM | BODY MASS INDEX: 36.45 KG/M2 | OXYGEN SATURATION: 96 %

## 2019-06-03 DIAGNOSIS — L97.909 VENOUS STASIS ULCER OF LOWER EXTREMITY, UNSPECIFIED LATERALITY: Primary | ICD-10-CM

## 2019-06-03 DIAGNOSIS — I10 ESSENTIAL HYPERTENSION: ICD-10-CM

## 2019-06-03 DIAGNOSIS — I87.1 STENOSIS OF RIGHT ILIAC VEIN: ICD-10-CM

## 2019-06-03 DIAGNOSIS — I87.1 MAY-THURNER SYNDROME: ICD-10-CM

## 2019-06-03 DIAGNOSIS — I83.009 VENOUS STASIS ULCER OF LOWER EXTREMITY, UNSPECIFIED LATERALITY: Primary | ICD-10-CM

## 2019-06-03 DIAGNOSIS — I48.21 PERMANENT ATRIAL FIBRILLATION: ICD-10-CM

## 2019-06-03 DIAGNOSIS — I50.32 CHRONIC DIASTOLIC CONGESTIVE HEART FAILURE: ICD-10-CM

## 2019-06-03 DIAGNOSIS — I87.2 VENOUS (PERIPHERAL) INSUFFICIENCY: ICD-10-CM

## 2019-06-03 PROCEDURE — 99999 PR PBB SHADOW E&M-EST. PATIENT-LVL III: CPT | Mod: PBBFAC,,, | Performed by: INTERNAL MEDICINE

## 2019-06-03 PROCEDURE — 99214 OFFICE O/P EST MOD 30 MIN: CPT | Mod: S$PBB,,, | Performed by: INTERNAL MEDICINE

## 2019-06-03 PROCEDURE — 99214 PR OFFICE/OUTPT VISIT, EST, LEVL IV, 30-39 MIN: ICD-10-PCS | Mod: S$PBB,,, | Performed by: INTERNAL MEDICINE

## 2019-06-03 PROCEDURE — 99213 OFFICE O/P EST LOW 20 MIN: CPT | Mod: PBBFAC,PO | Performed by: INTERNAL MEDICINE

## 2019-06-03 PROCEDURE — 99999 PR PBB SHADOW E&M-EST. PATIENT-LVL III: ICD-10-PCS | Mod: PBBFAC,,, | Performed by: INTERNAL MEDICINE

## 2019-06-03 NOTE — PROGRESS NOTES
Subjective:   Patient ID:  Drew Farrar is a 53 y.o. male who presents for follow up of Chronic Venous Insufficiency; Venous Stasis; and Atrial Fibrillation      HPI:       He Is here for L lateral foot venous ulcer treatment. He has history of VTE + PTS + lymphedema. He has CHF with a recent that revealed the following findings: EF 40%, Severe MR, Moderate TR, PASP 47, and Severe LAE. He has persistent afib with a controlled rate and anticoagulated with coumadin. He has an extensive history of venous insufficiency with ulcers that have finally healed but relapsed 2 months ago. He is s/p deep venous intervention, multiple EVLTs ad sclerotherapy sessions. He has h/o DVTs and PEs as well. He is compliant with his medications but not with his diet. He is on lisinopril, toprol xl, aldactone, and lasix with /77 mmHg.  PET stress was non ischemic.       S/p L accessory veins (left ankle and foot) slcerotherapy 2/21/2019        US 3/2019     R GSV 4.7 mm -no reflux   R LSV 7.4 mm + > 500 msec; 736 msec      L GSV occluded   L accessory GSV reflux + > 500 msec; 1060 msec    L LSV 4.8 mm          No DVT       US 4/2019      No DVT       Left dorsum venous wound is significantly better and almost healed.       Patient Active Problem List    Diagnosis Date Noted    Non-pressure chronic ulcer of left ankle, with unspecified severity 07/26/2018     Priority: Low    Acute gastroenteritis 04/24/2019    Sepsis 04/24/2019    Cellulitis of lower extremity 04/24/2019    Candida infection of genital region 04/24/2019    Non-rheumatic mitral regurgitation 09/07/2017    Chronic systolic heart failure 09/06/2017     -  Echo 9/2017   EF 40%    Severe MR   Moderate TR   PASP 47   Severe LAE   LVEDD 7.1 cm    LVESD 5.2 cm          Hyperthyroidism 09/05/2017    Elevated troponin 09/05/2017    Preoperative clearance 07/10/2017    Chronic diastolic congestive heart failure 02/20/2017    Long term (current) use of  anticoagulants 10/31/2016    Other pulmonary embolism without acute cor pulmonale, unspecified chronicity 10/28/2016    Acute pulmonary embolism 10/26/2016    Recurrent pulmonary embolism 10/26/2016    History of DVT (deep vein thrombosis) 2016    Essential hypertension 2016    Knee pain, right 2016    Difficulty walking 2016    Muscle weakness 2016    Group A streptococcal infection 2016    Tinea pedis of both feet 2016    Bilateral edema of lower extremity 2016    Morbid obesity 2016    Permanent atrial fibrillation 2016    Right knee pain 2016    NARESH (acute kidney injury) 2016    Chest pain 2014    Depression 2014    Elevated BP 2014    May-Thurner syndrome 2014     1. Successful IVUS guided intervention for May Thurner Syndrome 2. Left common iliac vein treated with 22 x 70 mm Wallstent overlapping with 22 x 45 mm Wallstent post dilation 18 x 40 mm balloon              Stenosis of right iliac vein 2014     S/p venoplasty with  20 x 80 mm Wallstent post dilated with 14 mm balloon in 2014      Cellulitis 2014    Chronic venous hypertension with ulcer 2013    Edema 2013     R leg      Venous (peripheral) insufficiency 2013     S/p bilateral iliac vein stents    S/p R GSV EVLT with laser 10/2013    S/p US guided accessory vein sclerotherapy of R calf       Venous stasis ulcer of lower extremity, unspecified laterality 2012    Ulcer - lesion 2012     Of left foot               Right Arm BP - Sittin/77  Left Arm BP - Sittin/77        LABS    LAST HbA1c  Lab Results   Component Value Date    HGBA1C 5.1 07/10/2017       Lipid panel  Lab Results   Component Value Date    CHOL 124 07/10/2017    CHOL 131 2016    CHOL 123 2012     Lab Results   Component Value Date    HDL 23 (L) 07/10/2017    HDL 29 (L) 2016    HDL 32 (L)  08/25/2012     Lab Results   Component Value Date    LDLCALC 70.4 07/10/2017    LDLCALC 83.0 11/21/2016    LDLCALC 75.4 08/25/2012     Lab Results   Component Value Date    TRIG 153 (H) 07/10/2017    TRIG 95 11/21/2016    TRIG 78 08/25/2012     Lab Results   Component Value Date    CHOLHDL 18.5 (L) 07/10/2017    CHOLHDL 22.1 11/21/2016    CHOLHDL 26.0 08/25/2012            Review of Systems   Constitution: Positive for weight loss. Negative for diaphoresis, night sweats and weight gain.   HENT: Negative for congestion.    Eyes: Negative for blurred vision, discharge and double vision.   Cardiovascular: Positive for dyspnea on exertion. Negative for chest pain, claudication, cyanosis, irregular heartbeat, leg swelling, near-syncope, orthopnea, palpitations, paroxysmal nocturnal dyspnea and syncope.   Respiratory: Positive for wheezing (with activities). Negative for cough and shortness of breath.    Endocrine: Negative for cold intolerance, heat intolerance and polyphagia.   Hematologic/Lymphatic: Negative for adenopathy and bleeding problem. Does not bruise/bleed easily.   Skin: Positive for poor wound healing (healed). Negative for dry skin and nail changes.   Musculoskeletal: Positive for arthritis. Negative for back pain, falls, joint pain, myalgias and neck pain.   Gastrointestinal: Negative for bloating, abdominal pain, change in bowel habit and constipation.   Genitourinary: Negative for bladder incontinence, dysuria, flank pain, genital sores and missed menses.   Neurological: Negative for aphonia, brief paralysis, difficulty with concentration, dizziness and weakness.   Psychiatric/Behavioral: Negative for altered mental status and memory loss. The patient does not have insomnia.    Allergic/Immunologic: Negative for environmental allergies.       Objective:   Physical Exam   Constitutional: He is oriented to person, place, and time. He appears well-developed and well-nourished. He is not intubated.   HENT:    Head: Normocephalic and atraumatic.   Right Ear: External ear normal.   Left Ear: External ear normal.   Mouth/Throat: Oropharynx is clear and moist.   Eyes: Pupils are equal, round, and reactive to light. Conjunctivae and EOM are normal. Right eye exhibits no discharge. Left eye exhibits no discharge. No scleral icterus.   Neck: Normal range of motion. Neck supple. Normal carotid pulses, no hepatojugular reflux and no JVD present. Carotid bruit is not present. No tracheal deviation present. No thyromegaly present.   Cardiovascular: Normal rate, S1 normal and S2 normal. An irregular rhythm present.  No extrasystoles are present. PMI is not displaced. Exam reveals no gallop, no S3, no distant heart sounds, no friction rub and no midsystolic click.   No murmur heard.  Pulses:       Carotid pulses are 2+ on the right side, and 2+ on the left side.       Radial pulses are 2+ on the right side, and 2+ on the left side.        Femoral pulses are 2+ on the right side, and 2+ on the left side.       Popliteal pulses are 2+ on the right side, and 2+ on the left side.        Dorsalis pedis pulses are 2+ on the right side, and 2+ on the left side.        Posterior tibial pulses are 2+ on the right side, and 2+ on the left side.   Pulmonary/Chest: Effort normal and breath sounds normal. No accessory muscle usage or stridor. No apnea, no tachypnea and no bradypnea. He is not intubated. No respiratory distress. He has no decreased breath sounds. He has no wheezes. He has no rales. He exhibits no tenderness and no bony tenderness.   Abdominal: He exhibits no distension, no pulsatile liver, no abdominal bruit, no ascites, no pulsatile midline mass and no mass. There is no tenderness. There is no rebound and no guarding.   Musculoskeletal: Normal range of motion. He exhibits no edema or tenderness.   Lymphadenopathy:     He has no cervical adenopathy.       Chronic lymphedema of R leg/calf area   Neurological: He is alert and  oriented to person, place, and time. He has normal reflexes. No cranial nerve deficit. Coordination normal.   Skin: Skin is warm. No rash noted. No erythema. No pallor.       Lateral left foot wound with fat layer exposed       Foul smell      Dark drainage      Varicose veins   Psychiatric: He has a normal mood and affect. His behavior is normal. Judgment and thought content normal.       1/17/2019 5/6/2019            Assessment:     1. Venous stasis ulcer of lower extremity, unspecified laterality    2. Venous (peripheral) insufficiency    3. Permanent atrial fibrillation    4. Stenosis of right iliac vein    5. Essential hypertension    6. May-Thurner syndrome    7. Chronic diastolic congestive heart failure        Plan:       Continue with wound care  If there is no improvement with the wound, we can consider sclerotherapy or phlebectomy of L knee accessory for wound healing.       Regarding the R leg we can do EVLT of R LSV if he has refractory symptoms      Continue with compression stockings  Antibiotics for infected venous wounds         Exercise  Low salt diet  Daily weight   Take an extra dose of lasix for 3 lbs weight gain        Continue with CHF clinic recommendations  Maintain relationship with [facility]  Follow up with endocrine for treatment of thyroid disorder          Return sooner for concerns or questions. If symptoms persist go to the ED  I have reviewed all pertinent data on this patient       Follow up as scheduled. Return sooner for concerns or questions          He expressed verbal understanding and agreed with the plan        Follow up in 6 months with weekly wound care updates  Sooner if there is any deterioration of wound   He asked for norco for pain. I declined and advised him to ask pcp          Patient's Medications   New Prescriptions    No medications on file   Previous Medications    HYDROCODONE-ACETAMINOPHEN (NORCO) 5-325 MG PER TABLET    Take 1 tablet by  mouth every 6 (six) hours as needed for Pain.    LISINOPRIL (PRINIVIL,ZESTRIL) 2.5 MG TABLET    TAKE 4 TABLETS (10 MG TOTAL) BY MOUTH ONCE DAILY.    METOPROLOL SUCCINATE (TOPROL-XL) 100 MG 24 HR TABLET    Take 1 tablet (100 mg total) by mouth once daily.    NYSTATIN (MYCOSTATIN) POWDER    Apply topically 2 (two) times daily.    TORSEMIDE (DEMADEX) 20 MG TAB    TAKE 1 TABLET (20 MG TOTAL) BY MOUTH ONCE DAILY.    WARFARIN (COUMADIN) 10 MG TABLET    Take 1.5-2 tablets (15-20 mg total) by mouth once daily.   Modified Medications    No medications on file   Discontinued Medications    No medications on file

## 2019-06-03 NOTE — PROCEDURES
"Debridement  Date/Time: 5/20/2019 10:49 AM  Performed by: Pool Gutierrez MD  Authorized by: Pool Gutierrez MD     Time out: Immediately prior to procedure a "time out" was called to verify the correct patient, procedure, equipment, support staff and site/side marked as required.    Consent Done?:  Yes (Verbal)    Preparation: Patient was prepped and draped in usual sterile fashion    Local anesthesia used?: Yes    Local anesthetic:  Topical anesthetic    Wound Details:    Location:  Left leg    Type of Debridement:  Excisional       Length (cm):  1.5       Area (sq cm):  1.05       Width (cm):  0.7       Percent Debrided (%):  100       Depth (cm):  0.2       Total Area Debrided (sq cm):  1.05    Depth of debridement:  Subcutaneous tissue    Tissue debrided:  Subcutaneous and Dermis    Devitalized tissue debrided:  Biofilm, Fibrin and Slough    Instruments:  Curette    Bleeding:  Minimal  Patient tolerance:  Patient tolerated the procedure well with no immediate complications      "

## 2019-06-03 NOTE — PATIENT INSTRUCTIONS
Understanding Chronic Venous Insufficiency  Problems with the veins in the legs may lead to chronic venous insufficiency (CVI). CVI means that there is a long-term problem with the veins not being able to pump blood back to your heart. When this happens, blood stays in the legs and causes swelling and aching.   Two problems that may lead to chronic venous insufficiency are:  · Damaged valves. Valves keep blood flowing from the legs through the blood vessels and back to the heart. When the valves are damaged, blood does not flow as well.   · Deep vein thrombosis (DVT). Blood clots may form in the deep veins of the legs. This may cause pain, redness, and swelling in the legs. It may also block the flow of blood back to the heart. Seek immediate medical care if you have these symptoms.  · A blood clot in the leg can also break off and travel to the lungs. This is called pulmonary embolism (PE). In the lungs, the clot can cut off the flow of blood. This may cause chest pain, trouble breathing, sweating, a fast heartbeat, coughing (may cough up blood), and fainting. It is a medical emergency and may cause death. Call 911 if you have these symptoms.  · Healthcare providers call the two conditions, DVT and PE, venous thromboembolism (VTE).  CVI cant be cured, but you can control leg swelling to reduce the likelihood of ulcers (sores).  Recognizing the symptoms  Be aware of the following:  · If you stand or sit with your feet down for long periods, your legs may ache or feel heavy.  · Swollen ankles are possibly the most common symptom of CVI.  · As swelling increases, the skin over your ankles may show red spots or a brownish tinge. The skin may feel leathery or scaly, and may start to itch.  · If swelling is not controlled, an ulcer (open wound) may form.  What you can do  Reduce your risk of developing ulcers by doing the following:  · Increase blood flow back to your heart by elevating your legs, exercising daily,  and wearing elastic stockings.  · Boost blood flow in your legs by losing excess weight.  · If you must stand or sit in one place for a period of time, keep your blood moving by wiggling your toes, shifting your body position, and rising up on the balls of your feet.    Date Last Reviewed: 5/1/2016  © 5831-3342 [employer]. [address] [place] [address]. All rights reserved. This information is not intended as a substitute for professional medical care. Always follow your healthcare professional's instructions.        Chronic Venous Insufficiency: Treating Ulcers    If leg swelling because of chronic venous insufficiency isnt controlled, an open wound (ulcer) can form. Although ulcers vary in size and shape, they usually appear on the inside of the ankle.  Treating an ulcer  · Visit your healthcare provider. Ulcers need frequent medical care. Special dressings may be applied. You may be given antibiotics to fight infection.  · Your healthcare provider may prescribe medicines, such as aspirin or pentoxifylline, to help the ulcer heal.  · Your healthcare provider may prescribe compression hose to help with the swelling.   · Elevate your legs often to reduce swelling. The ulcer needs oxygen-rich blood to heal. This blood cant reach the ulcer until swelling is reduced.  When to call your healthcare provider  Seek immediate medical attention if:   · You have an increase in pain  · You develop a fever of 100.4°F (38°C) or higher, or as directed by your healthcare provider  · The area around the ulcer becomes red, tender, or both  · The ulcer oozes discolored fluid or smells bad  · Swelling increases suddenly or the dressing feels tight   Date Last Reviewed: 5/1/2016 © 2000-2017 [employer]. [address] [place] [address]. All rights reserved. This information is not intended as a substitute for professional medical care. Always follow your healthcare professional's  instructions.

## 2019-06-10 ENCOUNTER — HOSPITAL ENCOUNTER (OUTPATIENT)
Dept: WOUND CARE | Facility: HOSPITAL | Age: 54
Discharge: HOME | End: 2019-06-10
Attending: EMERGENCY MEDICINE
Payer: MEDICARE

## 2019-06-10 VITALS
DIASTOLIC BLOOD PRESSURE: 77 MMHG | HEIGHT: 78 IN | HEART RATE: 100 BPM | SYSTOLIC BLOOD PRESSURE: 133 MMHG | BODY MASS INDEX: 36.45 KG/M2 | WEIGHT: 315 LBS

## 2019-06-10 DIAGNOSIS — I83.009 VARICOSE ULCER OF LOWER EXTREMITY, UNSPECIFIED LATERALITY: ICD-10-CM

## 2019-06-10 DIAGNOSIS — L97.321 ULCER OF ANKLE, LEFT, LIMITED TO BREAKDOWN OF SKIN: ICD-10-CM

## 2019-06-10 DIAGNOSIS — I87.319 CHRONIC VENOUS HYPERTENSION WITH ULCER, UNSPECIFIED LATERALITY: Primary | ICD-10-CM

## 2019-06-10 DIAGNOSIS — L97.909 VARICOSE ULCER OF LOWER EXTREMITY, UNSPECIFIED LATERALITY: ICD-10-CM

## 2019-06-10 DIAGNOSIS — R60.0 BILATERAL EDEMA OF LOWER EXTREMITY: ICD-10-CM

## 2019-06-10 DIAGNOSIS — I87.2 VENOUS (PERIPHERAL) INSUFFICIENCY: ICD-10-CM

## 2019-06-10 DIAGNOSIS — L97.909 CHRONIC VENOUS HYPERTENSION WITH ULCER, UNSPECIFIED LATERALITY: Primary | ICD-10-CM

## 2019-06-10 PROCEDURE — 27201912 HC WOUND CARE DEBRIDEMENT SUPPLIES

## 2019-06-10 PROCEDURE — 29581 APPL MULTLAYER CMPRN SYS LEG: CPT

## 2019-06-10 PROCEDURE — 11042 DBRDMT SUBQ TIS 1ST 20SQCM/<: CPT

## 2019-06-10 NOTE — PROGRESS NOTES
"Subjective:       Patient ID: Drew Farrar is a 53 y.o. male.    Chief Complaint: No chief complaint on file.    2/15/19: Readmitted for venous ulcer to left lateral foot which developed about 2 weeks ago. Pulses noted with doppler and capillary refill <3.Dr Gutierrez assessed patient and wound care plan ordered for compression therapy and hydrafera blue to wound bed. No apparent signs of infection noted. Patient to follw-up in clinic in 2 weeks.DB   2/21/19: Nurse visit for dressing change. Refused care to skin tear on left 2nd toe. See notes. RTC 1 week for DrRosy Visit.  3/1/19: Follow up with Dr. Waller today in clinic new wound to right posterior leg, culture of both wounds taken today in clinic new wound care orders to both legs for xeroform and unna with calamine toe to knee follow up in 1 week with Dr. Gutierrez  3/8/19: Follow up with Dr. Gutierrez today in clinic wounds improving, edema noted to BLE, profore toe to knee applied to both legs, RX given to patient for PO Doxycycline, follow up in 1 week.   3/15/19: Here for f/u with Dr. Gutierrez. U/S scheduled Thursday. Will need right leg rewrapped. Patient states eye DrRosy Gave him the same antibiotic dose and strength after eye surgery. Dr instructed patient to take one bottle of antibiotics for both wounds and eyes until finished. Gentamycin added to wound care orders.   3/29/19:  Wound slowly improving. No changes in wound care orders. Wound debrided per Dr Gutierrez.  4/4/19: Patient showed up to clinic today stated " I need my dressing changed. It fell off."  Doppler pluses checked and present.  Patient refusing care to right, no stocking present on leg at this visit. Patient stated " No they said this leg is discharged, Dr. Lantigua has to drain the fluid out this leg with a needle. No wound care to this leg anymore when I come."    4/8/19: Here for f/u with Dr. Gutierrez. Lidocaine 4% applied to wound bed. Toenails on right foot trimmed, wound debrided with " "curette per Dr. Gutierrez.  4/22/19: F/U with Dr. Gutierrez. Seen in ER 4/15/19 for elevated temp, Increased pain, swelling, and redness of left foot. Treated with Doxycycline, now completed. Left anterior #1 wound healed. Has new site #3 to left lateral foot.  Pt. Denies pain @ wound site.  04/29/19  F/U with Dr. Gutierrez.  Wound progressing well. Wound debrided per Dr. Gutierrez.  Dressings changed to anjali AG moistened with sterile water, cover with adaptic and foam dressing.  Continue profore compression therapy.  Patient to follow up in 1 week with Dr. Gutierrez..    5/6/19: Patient seen in clinic today by Dr. Gutierrez and Dr. Patel. Doppler pulses present, wound debrided by Dr. Gutierrez with curette.  Dr. Patel seen patient, stated " wound looks great." and reassured patient stents were ok on last CT of [facility].  No recommendations at this time per Dr. Patel.  Wound care continued as ordered, Profore toe to knee.  05/16/19: Nurse visit today for wound care and dressing change. Patient reports going to the ED yesterday for chest pain. He states that he was treated and is "feeling much better today." Patient also reports being prescribed po doxycycline per Dr. Patel for right knee, which he will begin taking on Monday 05/20/19 twice a day.  5/20/19: Seen by Dr. Gutierrez. Site debrided. Continue same wound orders.  05/29/19: F/u nurse visit for wound care and dressing change. Moist irritation and erythema noted on intact skin surrounding wound. Moisture barrier applied. Continued with wound care treatment as ordered.  6/10/19: Patient seen today by Dr. Gutierrez, wound debrided with curette by Dr. Gutierrez, patient tolerated well.  Four layer compression applied toe to knee.  No c/o noted.  Review of Systems    Objective:    Wound appears smaller w/o Sophia's sign or s/s of infection, but some slough & biofilm have recurred.  Physical Exam    Assessment:       No diagnosis found.       Wound 04/22/19 1130 " Venous Ulcer lateral Foot #3 (Active)   04/22/19 1130    Pre-existing: Yes   Primary Wound Type: Venous ulcer   Side: Left   Orientation: lateral   Location: Foot   Wound/PI Number (optional): #3   Ankle-Brachial Index:    Pulses: doppler   Removal Indication and Assessment:    Wound Outcome:    (Retired) Wound Type:    (Retired) Wound Length (cm):    (Retired) Wound Width (cm):    (Retired) Depth (cm):    Wound Description (Comments):    Removal Indications:    Wound Image    6/10/2019 11:49 AM   Dressing Appearance Intact;Moist drainage 6/10/2019 11:49 AM   Drainage Amount Small 6/10/2019 11:49 AM   Drainage Characteristics/Odor Serosanguineous 6/10/2019 11:49 AM   Appearance Red;Moist;Granulating;Yellow;Fibrin 6/10/2019 11:49 AM   Tissue loss description Full thickness 6/10/2019 11:49 AM   Red (%), Wound Tissue Color 90 % 6/10/2019 11:49 AM   Yellow (%), Wound Tissue Color 10 % 6/10/2019 11:49 AM   Periwound Area Intact;Edematous;Hemosiderin Staining;Pink 6/10/2019 11:49 AM   Wound Edges Defined 6/10/2019 11:49 AM   Wound Length (cm) 0.8 cm 6/10/2019 11:49 AM   Wound Width (cm) 0.9 cm 6/10/2019 11:49 AM   Wound Depth (cm) 0.2 cm 6/10/2019 11:49 AM   Wound Volume (cm^3) 0.14 cm^3 6/10/2019 11:49 AM   Wound Surface Area (cm^2) 0.72 cm^2 6/10/2019 11:49 AM   Care Cleansed with:;Sterile normal saline 6/10/2019 11:49 AM   Dressing Calcium alginate;Silver;Abd pad;Foam;Compression wrap 6/10/2019 11:49 AM   Periwound Care Absorptive dressing applied;Skin barrier film applied;Moisturizer applied 6/10/2019 11:49 AM   Off Loading Off loading shoe 6/10/2019 11:49 AM   Dressing Change Due 06/17/19 6/10/2019 11:49 AM         Left lateral Foot #3    Cleanse wound with:NS  Lidocaine: PRN Debridement  Periwound care:Moisture barrier PRN  Primary dressing: Krystin AG moistened with sterile water.  Cover with adaptic touch and 1/2 taina foam dressing. 1/2 taina to medial ankle  Secondary dressing: Profore toes to  knee  Offloading: Darco shoe  Edema control: profore toes to knee  Frequency:Weekly  Follow-up: Monday 6/17/19 Dr. Gutierrez              Plan:                6/17/19: Dr. Gutierrez

## 2019-06-14 NOTE — PROCEDURES
"Debridement  Date/Time: 6/10/2019 2:10 PM  Performed by: Pool Gutierrez MD  Authorized by: Pool Gutierrez MD     Time out: Immediately prior to procedure a "time out" was called to verify the correct patient, procedure, equipment, support staff and site/side marked as required.    Consent Done?:  Yes (Verbal)    Preparation: Patient was prepped and draped in usual sterile fashion    Local anesthesia used?: Yes    Local anesthetic:  Topical anesthetic    Wound Details:    Location:  Left leg    Type of Debridement:  Excisional       Length (cm):  0.8       Area (sq cm):  0.72       Width (cm):  0.9       Percent Debrided (%):  100       Depth (cm):  0.2       Total Area Debrided (sq cm):  0.72    Depth of debridement:  Subcutaneous tissue    Tissue debrided:  Subcutaneous and Dermis    Devitalized tissue debrided:  Biofilm, Fibrin and Slough    Instruments:  Curette    Bleeding:  Minimal  Patient tolerance:  Patient tolerated the procedure well with no immediate complications      "

## 2019-06-14 NOTE — PROGRESS NOTES
Patient missed 6/03 lab appointment, calling today to report that on 6/13 he had a nose bleed lasting about 15 minutes, reports no new medicines or changes, nothing else new, patient's call back # [phone]

## 2019-06-14 NOTE — PROGRESS NOTES
I called the patient and left a voice message that he needs to call me back to discuss nose bleed from 6/13/19 and to be scheduled for INR as soon as possible because he missed his INR on 6/3/19.    6/17/19  I spoke to patient and he states he has a spontaneous nose bleed to both nares on 6/14/19 that was not from injury that bleeding was controlled with no other nose bleeds since and no other signs/symptoms of bleeding.  He was scheduled for lab INR today as he is past due.

## 2019-06-17 ENCOUNTER — HOSPITAL ENCOUNTER (OUTPATIENT)
Dept: WOUND CARE | Facility: HOSPITAL | Age: 54
Discharge: HOME | End: 2019-06-17
Attending: EMERGENCY MEDICINE
Payer: MEDICARE

## 2019-06-17 ENCOUNTER — ANTI-COAG VISIT (OUTPATIENT)
Dept: CARDIOLOGY | Facility: CLINIC | Age: 54
End: 2019-06-17
Payer: MEDICARE

## 2019-06-17 VITALS
HEIGHT: 78 IN | SYSTOLIC BLOOD PRESSURE: 106 MMHG | DIASTOLIC BLOOD PRESSURE: 60 MMHG | BODY MASS INDEX: 36.45 KG/M2 | WEIGHT: 315 LBS | HEART RATE: 119 BPM

## 2019-06-17 DIAGNOSIS — I26.99 OTHER PULMONARY EMBOLISM WITHOUT ACUTE COR PULMONALE, UNSPECIFIED CHRONICITY: ICD-10-CM

## 2019-06-17 DIAGNOSIS — L97.909 VARICOSE ULCER OF LOWER EXTREMITY, UNSPECIFIED LATERALITY: ICD-10-CM

## 2019-06-17 DIAGNOSIS — I83.009 VARICOSE ULCER OF LOWER EXTREMITY, UNSPECIFIED LATERALITY: ICD-10-CM

## 2019-06-17 DIAGNOSIS — I83.009 VENOUS STASIS ULCER OF LOWER EXTREMITY, UNSPECIFIED LATERALITY: Primary | ICD-10-CM

## 2019-06-17 DIAGNOSIS — Z79.01 LONG TERM (CURRENT) USE OF ANTICOAGULANTS: ICD-10-CM

## 2019-06-17 DIAGNOSIS — L97.909 VENOUS STASIS ULCER OF LOWER EXTREMITY, UNSPECIFIED LATERALITY: Primary | ICD-10-CM

## 2019-06-17 PROCEDURE — 29581 APPL MULTLAYER CMPRN SYS LEG: CPT

## 2019-06-17 PROCEDURE — 11042 DBRDMT SUBQ TIS 1ST 20SQCM/<: CPT

## 2019-06-17 PROCEDURE — 93793 ANTICOAG MGMT PT WARFARIN: CPT | Mod: ,,,

## 2019-06-17 PROCEDURE — 27201912 HC WOUND CARE DEBRIDEMENT SUPPLIES

## 2019-06-17 PROCEDURE — 93793 PR ANTICOAGULANT MGMT FOR PT TAKING WARFARIN: ICD-10-PCS | Mod: ,,,

## 2019-06-17 NOTE — PROGRESS NOTES
"[facility]                Progress Note    Subjective:       Patient ID: Drew Farrar is a 53 y.o. male.    Chief Complaint: Non-healing Wound and Wound Care    2/15/19: Readmitted for venous ulcer to left lateral foot which developed about 2 weeks ago. Pulses noted with doppler and capillary refill <3.Dr Gutierrez assessed patient and wound care plan ordered for compression therapy and hydrafera blue to wound bed. No apparent signs of infection noted. Patient to follw-up in clinic in 2 weeks.DB   2/21/19: Nurse visit for dressing change. Refused care to skin tear on left 2nd toe. See notes. RTC 1 week for  Visit.  3/1/19: Follow up with Dr. Waller today in clinic new wound to right posterior leg, culture of both wounds taken today in clinic new wound care orders to both legs for xeroform and unna with calamine toe to knee follow up in 1 week with Dr. Gutierrez  3/8/19: Follow up with Dr. Gutierrez today in clinic wounds improving, edema noted to BLE, profore toe to knee applied to both legs, RX given to patient for PO Doxycycline, follow up in 1 week.   3/15/19: Here for f/u with Dr. Gutierrez. U/S scheduled Thursday. Will need right leg rewrapped. Patient states eye DrRosy Gave him the same antibiotic dose and strength after eye surgery. Dr instructed patient to take one bottle of antibiotics for both wounds and eyes until finished. Gentamycin added to wound care orders.   3/29/19:  Wound slowly improving. No changes in wound care orders. Wound debrided per Dr Gutierrez.  4/4/19: Patient showed up to clinic today stated " I need my dressing changed. It fell off."  Doppler pluses checked and present.  Patient refusing care to right, no stocking present on leg at this visit. Patient stated " No they said this leg is discharged, Dr. Lantigua has to drain the fluid out this leg with a needle. No wound care to this leg anymore when I come."    4/8/19: Here for f/u " "with Dr. Gutierrez. Lidocaine 4% applied to wound bed. Toenails on right foot trimmed, wound debrided with curette per Dr. Gutierrez.  4/22/19: F/U with Dr. Gutierrez. Seen in ER 4/15/19 for elevated temp, Increased pain, swelling, and redness of left foot. Treated with Doxycycline, now completed. Left anterior #1 wound healed. Has new site #3 to left lateral foot.  Pt. Denies pain @ wound site.  04/29/19  F/U with Dr. Gutierrez.  Wound progressing well. Wound debrided per Dr. Gutierrez.  Dressings changed to anjali AG moistened with sterile water, cover with adaptic and foam dressing.  Continue profore compression therapy.  Patient to follow up in 1 week with Dr. Gutierrez..    5/6/19: Patient seen in clinic today by Dr. Gutierrez and Dr. Patel. Doppler pulses present, wound debrided by Dr. Gutierrez with curette.  Dr. Patel seen patient, stated " wound looks great." and reassured patient stents were ok on last CT of [facility].  No recommendations at this time per Dr. Patel.  Wound care continued as ordered, Profore toe to knee.  05/16/19: Nurse visit today for wound care and dressing change. Patient reports going to the ED yesterday for chest pain. He states that he was treated and is "feeling much better today." Patient also reports being prescribed po doxycycline per Dr. Patel for right knee, which he will begin taking on Monday 05/20/19 twice a day.  5/20/19: Seen by Dr. Gutierrez. Site debrided. Continue same wound orders.  05/29/19: F/u nurse visit for wound care and dressing change. Moist irritation and erythema noted on intact skin surrounding wound. Moisture barrier applied. Continued with wound care treatment as ordered.  6/10/19: Patient seen today by Dr. Gutierrez, wound debrided with curette by Dr. Gutierrez, patient tolerated well.  Four layer compression applied toe to knee.  No c/o noted.  6/17/2019: Clinic visit with Dr. Gutierrez. Pedal pulses present per doppler. C/o itching to left foot, redness " present. New dressing orders received by Dr. Gutierrez and followed. Debridment to left lateral foot wound done by Dr. Gutierrez using a curette, scissors and forceps.     No c/o noted.  Review of Systems      Objective:   Wound smaller per staff w/o Sophia's sign or s/s of infection.  Some recurrence of slough & biofilm noted.     Physical Exam    Vitals:    06/17/19 1139   BP: 106/60   Pulse: (!) 119       Assessment:           ICD-10-CM ICD-9-CM   1. Venous stasis ulcer of lower extremity, unspecified laterality I83.009 454.0    L97.909    2. Varicose ulcer of lower extremity, unspecified laterality I83.009 454.0    L97.909             Wound 04/22/19 1130 Venous Ulcer lateral Foot #3 (Active)   04/22/19 1130    Pre-existing: Yes   Primary Wound Type: Venous ulcer   Side: Left   Orientation: lateral   Location: Foot   Wound/PI Number (optional): #3   Ankle-Brachial Index:    Pulses: doppler   Removal Indication and Assessment:    Wound Outcome:    (Retired) Wound Type:    (Retired) Wound Length (cm):    (Retired) Wound Width (cm):    (Retired) Depth (cm):    Wound Description (Comments):    Removal Indications:    Wound Image   6/17/2019 12:00 PM   Dressing Appearance Intact;Moist drainage 6/17/2019 12:00 PM   Drainage Amount Small 6/17/2019 12:00 PM   Drainage Characteristics/Odor Serosanguineous 6/17/2019 12:00 PM   Appearance Red;Moist;Granulating;Fibrin;Yellow 6/17/2019 12:00 PM   Tissue loss description Full thickness 6/17/2019 12:00 PM   Red (%), Wound Tissue Color 90 % 6/17/2019 12:00 PM   Yellow (%), Wound Tissue Color 10 % 6/17/2019 12:00 PM   Periwound Area Intact;Edematous;Hemosiderin Staining;Pink 6/17/2019 12:00 PM   Wound Edges Defined 6/17/2019 12:00 PM   Wound Length (cm) 0.8 cm 6/17/2019 12:00 PM   Wound Width (cm) 0.8 cm 6/17/2019 12:00 PM   Wound Depth (cm) 0.2 cm 6/17/2019 12:00 PM   Wound Volume (cm^3) 0.13 cm^3 6/17/2019 12:00 PM   Wound Surface Area (cm^2) 0.64 cm^2 6/17/2019 12:00 PM   Care  Cleansed with:;Sterile normal saline 6/17/2019 12:00 PM   Dressing Applied;Calcium alginate;Non-adherent;Compression wrap 6/17/2019 12:00 PM   Periwound Care Absorptive dressing applied 6/17/2019 12:00 PM   Off Loading Off loading shoe 6/17/2019 12:00 PM   Dressing Change Due 06/24/19 6/17/2019 12:00 PM         Left lateral Foot #3  Cleanse wound with: NS  Lidocaine: PRN Debridement  Periwound care: Moisture barrier PRN, miconazole, betamethasone cream to left foot redness, itching areas   Primary dressing: Krystin AG moistened with sterile water.  Cover with adaptic touch and 1/2 taina foam dressing. 1/2 taina to medial ankle  Secondary dressing: Profore toes to knee    Plan:               Follow up in about 1 week (around 6/24/2019). Clinic visit with Dr. Gutierrez

## 2019-06-19 DIAGNOSIS — I26.99 OTHER PULMONARY EMBOLISM WITHOUT ACUTE COR PULMONALE, UNSPECIFIED CHRONICITY: ICD-10-CM

## 2019-06-19 DIAGNOSIS — Z79.01 LONG TERM (CURRENT) USE OF ANTICOAGULANTS: ICD-10-CM

## 2019-06-19 DIAGNOSIS — I48.21 PERMANENT ATRIAL FIBRILLATION: ICD-10-CM

## 2019-06-19 DIAGNOSIS — I50.42 CHRONIC COMBINED SYSTOLIC AND DIASTOLIC CONGESTIVE HEART FAILURE: ICD-10-CM

## 2019-06-20 DIAGNOSIS — I50.22 CHRONIC SYSTOLIC CONGESTIVE HEART FAILURE: Primary | ICD-10-CM

## 2019-06-20 RX ORDER — LISINOPRIL 10 MG/1
10 TABLET ORAL DAILY
Qty: 30 TABLET | Refills: 6 | Status: SHIPPED | OUTPATIENT
Start: 2019-06-20 | End: 2020-02-13

## 2019-06-21 NOTE — PROCEDURES
"Debridement  Date/Time: 6/17/2019 11:40 AM  Performed by: Pool Gutierrez MD  Authorized by: Pool Gutierrez MD     Time out: Immediately prior to procedure a "time out" was called to verify the correct patient, procedure, equipment, support staff and site/side marked as required.    Consent Done?:  Yes (Verbal)    Preparation: Patient was prepped and draped in usual sterile fashion    Local anesthesia used?: Yes    Local anesthetic:  Topical anesthetic    Wound Details:    Location:  Left leg    Type of Debridement:  Excisional       Length (cm):  0.8       Area (sq cm):  0.64       Width (cm):  0.8       Percent Debrided (%):  100       Depth (cm):  0.2       Total Area Debrided (sq cm):  0.64    Depth of debridement:  Subcutaneous tissue    Tissue debrided:  Subcutaneous and Dermis    Devitalized tissue debrided:  Biofilm, Fibrin and Slough    Instruments:  Curette    Bleeding:  Minimal  Patient tolerance:  Patient tolerated the procedure well with no immediate complications      "

## 2019-06-24 ENCOUNTER — HOSPITAL ENCOUNTER (OUTPATIENT)
Dept: WOUND CARE | Facility: HOSPITAL | Age: 54
Discharge: HOME | End: 2019-06-24
Attending: EMERGENCY MEDICINE
Payer: MEDICARE

## 2019-06-24 ENCOUNTER — ANTI-COAG VISIT (OUTPATIENT)
Dept: CARDIOLOGY | Facility: CLINIC | Age: 54
End: 2019-06-24
Payer: MEDICARE

## 2019-06-24 VITALS — DIASTOLIC BLOOD PRESSURE: 68 MMHG | SYSTOLIC BLOOD PRESSURE: 117 MMHG | HEART RATE: 104 BPM

## 2019-06-24 DIAGNOSIS — Z79.01 LONG TERM (CURRENT) USE OF ANTICOAGULANTS: ICD-10-CM

## 2019-06-24 DIAGNOSIS — I83.009 VENOUS STASIS ULCER OF LOWER EXTREMITY, UNSPECIFIED LATERALITY: Primary | ICD-10-CM

## 2019-06-24 DIAGNOSIS — R60.9 EDEMA, UNSPECIFIED TYPE: ICD-10-CM

## 2019-06-24 DIAGNOSIS — I26.99 OTHER PULMONARY EMBOLISM WITHOUT ACUTE COR PULMONALE, UNSPECIFIED CHRONICITY: ICD-10-CM

## 2019-06-24 DIAGNOSIS — L97.909 VENOUS STASIS ULCER OF LOWER EXTREMITY, UNSPECIFIED LATERALITY: Primary | ICD-10-CM

## 2019-06-24 PROCEDURE — 93793 ANTICOAG MGMT PT WARFARIN: CPT | Mod: ,,,

## 2019-06-24 PROCEDURE — 29581 APPL MULTLAYER CMPRN SYS LEG: CPT

## 2019-06-24 PROCEDURE — 93793 PR ANTICOAGULANT MGMT FOR PT TAKING WARFARIN: ICD-10-PCS | Mod: ,,,

## 2019-06-28 RX ORDER — TORSEMIDE 20 MG/1
40 TABLET ORAL DAILY
Qty: 180 TABLET | Refills: 3 | Status: SHIPPED | OUTPATIENT
Start: 2019-06-28 | End: 2020-05-22 | Stop reason: SDUPTHER

## 2019-07-01 ENCOUNTER — EXTERNAL HOME HEALTH (OUTPATIENT)
Dept: HOME HEALTH SERVICES | Facility: HOSPITAL | Age: 54
End: 2019-07-01
Payer: MEDICARE

## 2019-07-01 ENCOUNTER — ANTI-COAG VISIT (OUTPATIENT)
Dept: CARDIOLOGY | Facility: CLINIC | Age: 54
End: 2019-07-01
Payer: MEDICARE

## 2019-07-01 ENCOUNTER — HOSPITAL ENCOUNTER (OUTPATIENT)
Dept: WOUND CARE | Facility: HOSPITAL | Age: 54
Discharge: HOME | End: 2019-07-01
Attending: EMERGENCY MEDICINE
Payer: MEDICARE

## 2019-07-01 VITALS
SYSTOLIC BLOOD PRESSURE: 135 MMHG | WEIGHT: 315 LBS | HEIGHT: 75 IN | HEART RATE: 107 BPM | DIASTOLIC BLOOD PRESSURE: 64 MMHG | BODY MASS INDEX: 39.17 KG/M2

## 2019-07-01 DIAGNOSIS — L97.909 VENOUS STASIS ULCER OF LOWER EXTREMITY, UNSPECIFIED LATERALITY: Primary | ICD-10-CM

## 2019-07-01 DIAGNOSIS — Z79.01 LONG TERM (CURRENT) USE OF ANTICOAGULANTS: ICD-10-CM

## 2019-07-01 DIAGNOSIS — I26.99 OTHER PULMONARY EMBOLISM WITHOUT ACUTE COR PULMONALE, UNSPECIFIED CHRONICITY: ICD-10-CM

## 2019-07-01 DIAGNOSIS — I83.009 VENOUS STASIS ULCER OF LOWER EXTREMITY, UNSPECIFIED LATERALITY: Primary | ICD-10-CM

## 2019-07-01 DIAGNOSIS — R60.9 EDEMA, UNSPECIFIED TYPE: ICD-10-CM

## 2019-07-01 DIAGNOSIS — R60.0 BILATERAL EDEMA OF LOWER EXTREMITY: ICD-10-CM

## 2019-07-01 PROCEDURE — 93793 PR ANTICOAGULANT MGMT FOR PT TAKING WARFARIN: ICD-10-PCS | Mod: ,,,

## 2019-07-01 PROCEDURE — 97597 DBRDMT OPN WND 1ST 20 CM/<: CPT

## 2019-07-01 PROCEDURE — 93793 ANTICOAG MGMT PT WARFARIN: CPT | Mod: ,,,

## 2019-07-01 NOTE — PROGRESS NOTES
"Subjective:       Patient ID: Drew Farrar is a 53 y.o. male.    Chief Complaint: Venous Ulcer    2/15/19: Readmitted for venous ulcer to left lateral foot which developed about 2 weeks ago. Pulses noted with doppler and capillary refill <3.Dr Gutierrez assessed patient and wound care plan ordered for compression therapy and hydrafera blue to wound bed. No apparent signs of infection noted. Patient to follw-up in clinic in 2 weeks.DB   2/21/19: Nurse visit for dressing change. Refused care to skin tear on left 2nd toe. See notes. RTC 1 week for DrRosy Visit.  3/1/19: Follow up with Dr. Waller today in clinic new wound to right posterior leg, culture of both wounds taken today in clinic new wound care orders to both legs for xeroform and unna with calamine toe to knee follow up in 1 week with Dr. Gutierrez  3/8/19: Follow up with Dr. Gutierrez today in clinic wounds improving, edema noted to BLE, profore toe to knee applied to both legs, RX given to patient for PO Doxycycline, follow up in 1 week.   3/15/19: Here for f/u with Dr. Gutierrez. U/S scheduled Thursday. Will need right leg rewrapped. Patient states eye DrRosy Gave him the same antibiotic dose and strength after eye surgery. Dr instructed patient to take one bottle of antibiotics for both wounds and eyes until finished. Gentamycin added to wound care orders.   3/29/19:  Wound slowly improving. No changes in wound care orders. Wound debrided per Dr Gutierrez.  4/4/19: Patient showed up to clinic today stated " I need my dressing changed. It fell off."  Doppler pluses checked and present.  Patient refusing care to right, no stocking present on leg at this visit. Patient stated " No they said this leg is discharged, Dr. Lantigua has to drain the fluid out this leg with a needle. No wound care to this leg anymore when I come."    4/8/19: Here for f/u with Dr. Gutierrez. Lidocaine 4% applied to wound bed. Toenails on right foot trimmed, wound debrided with curette per " "Dr. Gutierrez.  4/22/19: F/U with Dr. Gutierrez. Seen in ER 4/15/19 for elevated temp, Increased pain, swelling, and redness of left foot. Treated with Doxycycline, now completed. Left anterior #1 wound healed. Has new site #3 to left lateral foot.  Pt. Denies pain @ wound site.  04/29/19  F/U with Dr. Gutierrez.  Wound progressing well. Wound debrided per Dr. Gutierrez.  Dressings changed to anjali AG moistened with sterile water, cover with adaptic and foam dressing.  Continue profore compression therapy.  Patient to follow up in 1 week with Dr. Gutierrez..    5/6/19: Patient seen in clinic today by Dr. Gutierrez and Dr. Patel. Doppler pulses present, wound debrided by Dr. Gutierrez with curette.  Dr. Patel seen patient, stated " wound looks great." and reassured patient stents were ok on last CT of [facility].  No recommendations at this time per Dr. Patel.  Wound care continued as ordered, Profore toe to knee.  05/16/19: Nurse visit today for wound care and dressing change. Patient reports going to the ED yesterday for chest pain. He states that he was treated and is "feeling much better today." Patient also reports being prescribed po doxycycline per Dr. Patel for right knee, which he will begin taking on Monday 05/20/19 twice a day.  5/20/19: Seen by Dr. Gutierrez. Site debrided. Continue same wound orders.  05/29/19: F/u nurse visit for wound care and dressing change. Moist irritation and erythema noted on intact skin surrounding wound. Moisture barrier applied. Continued with wound care treatment as ordered.  6/10/19: Patient seen today by Dr. Gutierrez, wound debrided with curette by Dr. Gutierrez, patient tolerated well.  Four layer compression applied toe to knee.  No c/o noted.  6/17/2019: Clinic visit with Dr. Gutierrez. Pedal pulses present per doppler. C/o itching to left foot, redness present. New dressing orders received by Dr. Gutierrez and followed. Debridment to left lateral foot wound done by Dr." "Matt using a curette, scissors and forceps.   6/24/19: Followup visit with Dr Gutierrez. Pulses positive with doppler. New wound care orders noted. Wound improving. No apparent signs or symptoms of infection noted.  7/1/19: Follow up with Dr. Gutierrez today patient stated " my wound is healed, my dressing came off Saturday I been wearing my compression socks my my got me." Wound measurement the same as last week, covered with dry yellow. Wound debrided with curette by Dr. Gutierrez post debridement measurement 0.1cmx 0.1cmx 0.1cm  Order to apply Aquacel AG border dressing to wound patient instructed to leave dressing on until Friday or if dressing comes off okay to leave open to air, and wear compression stockings daily verbalized understanding. Patient discharged from wound care clinic instructed to call if any problems or concerns arise or if wound reopens, verbalized understanding.  As above; pt. Denies pain @ wound site.  Review of Systems    Objective:    Wound site covered w/ apparent dried serous material; no Sophia's sign or s/s of infection.  Physical Exam    Assessment:       No diagnosis found.       Wound 04/22/19 1130 Venous Ulcer lateral Foot #3 (Active)   04/22/19 1130    Pre-existing: Yes   Primary Wound Type: Venous ulcer   Side: Left   Orientation: lateral   Location: Foot   Wound/PI Number (optional): #3   Ankle-Brachial Index:    Pulses: doppler   Removal Indication and Assessment:    Wound Outcome:    (Retired) Wound Type:    (Retired) Wound Length (cm):    (Retired) Wound Width (cm):    (Retired) Depth (cm):    Wound Description (Comments):    Removal Indications:    Wound Image   7/1/2019 11:47 AM   Dressing Appearance Open to air;Dry 7/1/2019 11:47 AM   Drainage Amount None 7/1/2019 11:47 AM   Appearance Yellow;Dry 7/1/2019 11:47 AM   Tissue loss description Partial thickness 7/1/2019 11:47 AM   Yellow (%), Wound Tissue Color 100 % 7/1/2019 11:47 AM   Periwound Area Dry;Intact;Edematous " 7/1/2019 11:47 AM   Wound Edges Defined 7/1/2019 11:47 AM   Wound Length (cm) 0.5 cm 7/1/2019 11:47 AM   Wound Width (cm) 0.5 cm 7/1/2019 11:47 AM   Wound Depth (cm) 0.1 cm 7/1/2019 11:47 AM   Wound Volume (cm^3) 0.02 cm^3 7/1/2019 11:47 AM   Wound Surface Area (cm^2) 0.25 cm^2 7/1/2019 11:47 AM   Care Cleansed with:;Sterile normal saline 7/1/2019 11:47 AM   Periwound Care Moisturizer applied 7/1/2019 11:47 AM   Compression Compression Stocking (30-40 mmHg) 7/1/2019 11:47 AM       Left lateral Foot #3  Cleanse wound with:NS  Lidocaine: na  Periwound care: Sween to dry skin   Primary dressing:  Aquacel AG border dressing.  Secondary dressing:na  Offloading: Patient's shoe  Edema control: Patient's own compression stockings  Follow-up: Healed Wound Instructions:Your wound is healed, it is extremely fragile at this stage; protect it from friction, wash it gently and pat dry.  If the wound should re-open, please call [phone] for furthur instructions.    Patient to leave Aquacel AG border dressing on until Friday 7/5/19 or if it comes off before then leave wound open to air, and wear compression stockings daily verbalized understanding.         Plan:       Healed Wound Instructions:Your wound is healed, it is extremely fragile at this stage; protect it from friction, wash it gently and pat dry.  If the wound should re-open, please call [phone] for furthur instructions.           As above.  Dried serous drainage curetted off to reveal intact skin.

## 2019-07-01 NOTE — PROGRESS NOTES
INR increased too much and is now a little high on current dose. Pt denies changes. Will decrease dose slightly. Recheck INR in 1 week

## 2019-07-01 NOTE — PROGRESS NOTES
Patient advised to take (Warfarin 5mg Mon, Fri, except 10mg AOD) . No ([facility]). Patient verbalized understanding.

## 2019-07-08 ENCOUNTER — HOSPITAL ENCOUNTER (OUTPATIENT)
Facility: HOSPITAL | Age: 54
Discharge: HOME | DRG: 872 | End: 2019-07-10
Attending: EMERGENCY MEDICINE | Admitting: FAMILY MEDICINE
Payer: MEDICARE

## 2019-07-08 ENCOUNTER — ANTI-COAG VISIT (OUTPATIENT)
Dept: CARDIOLOGY | Facility: CLINIC | Age: 54
End: 2019-07-08

## 2019-07-08 DIAGNOSIS — I26.99 OTHER PULMONARY EMBOLISM WITHOUT ACUTE COR PULMONALE, UNSPECIFIED CHRONICITY: ICD-10-CM

## 2019-07-08 DIAGNOSIS — L03.115 CELLULITIS OF RIGHT LOWER EXTREMITY: ICD-10-CM

## 2019-07-08 DIAGNOSIS — Z86.718 HISTORY OF DVT (DEEP VEIN THROMBOSIS): ICD-10-CM

## 2019-07-08 DIAGNOSIS — A41.9 SEPSIS, DUE TO UNSPECIFIED ORGANISM: Primary | ICD-10-CM

## 2019-07-08 DIAGNOSIS — L03.115 CELLULITIS OF RIGHT LEG: ICD-10-CM

## 2019-07-08 DIAGNOSIS — Z79.01 LONG TERM (CURRENT) USE OF ANTICOAGULANTS: ICD-10-CM

## 2019-07-08 DIAGNOSIS — R50.9 FEVER: ICD-10-CM

## 2019-07-08 DIAGNOSIS — R07.9 CHEST PAIN: ICD-10-CM

## 2019-07-08 DIAGNOSIS — I87.1 MAY-THURNER SYNDROME: ICD-10-CM

## 2019-07-08 DIAGNOSIS — I48.21 PERMANENT ATRIAL FIBRILLATION: ICD-10-CM

## 2019-07-08 PROBLEM — E83.42 HYPOMAGNESEMIA: Status: ACTIVE | Noted: 2019-07-08

## 2019-07-08 LAB
ALBUMIN SERPL BCP-MCNC: 3.5 G/DL (ref 3.5–5.2)
ALP SERPL-CCNC: 181 U/L (ref 55–135)
ALT SERPL W/O P-5'-P-CCNC: 27 U/L (ref 10–44)
ANION GAP SERPL CALC-SCNC: 10 MMOL/L (ref 8–16)
AST SERPL-CCNC: 30 U/L (ref 10–40)
BASOPHILS # BLD AUTO: 0.01 K/UL (ref 0–0.2)
BASOPHILS NFR BLD: 0.1 % (ref 0–1.9)
BILIRUB SERPL-MCNC: 0.8 MG/DL (ref 0.1–1)
BILIRUB UR QL STRIP: NEGATIVE
BNP SERPL-MCNC: 220 PG/ML (ref 0–99)
BUN SERPL-MCNC: 27 MG/DL (ref 6–20)
CALCIUM SERPL-MCNC: 8.7 MG/DL (ref 8.7–10.5)
CHLORIDE SERPL-SCNC: 106 MMOL/L (ref 95–110)
CLARITY UR: CLEAR
CO2 SERPL-SCNC: 23 MMOL/L (ref 23–29)
COLOR UR: YELLOW
CREAT SERPL-MCNC: 1.1 MG/DL (ref 0.5–1.4)
CRP SERPL-MCNC: 17.9 MG/L (ref 0–8.2)
DIFFERENTIAL METHOD: ABNORMAL
EOSINOPHIL # BLD AUTO: 0 K/UL (ref 0–0.5)
EOSINOPHIL NFR BLD: 0.3 % (ref 0–8)
ERYTHROCYTE [DISTWIDTH] IN BLOOD BY AUTOMATED COUNT: 16.6 % (ref 11.5–14.5)
ERYTHROCYTE [SEDIMENTATION RATE] IN BLOOD BY WESTERGREN METHOD: 54 MM/HR (ref 0–10)
EST. GFR  (AFRICAN AMERICAN): >60 ML/MIN/1.73 M^2
EST. GFR  (NON AFRICAN AMERICAN): >60 ML/MIN/1.73 M^2
GLUCOSE SERPL-MCNC: 107 MG/DL (ref 70–110)
GLUCOSE UR QL STRIP: NEGATIVE
HCT VFR BLD AUTO: 33.4 % (ref 40–54)
HGB BLD-MCNC: 10.3 G/DL (ref 14–18)
HGB UR QL STRIP: NEGATIVE
KETONES UR QL STRIP: NEGATIVE
LACTATE SERPL-SCNC: 1.5 MMOL/L (ref 0.5–2.2)
LEUKOCYTE ESTERASE UR QL STRIP: NEGATIVE
LYMPHOCYTES # BLD AUTO: 0.3 K/UL (ref 1–4.8)
LYMPHOCYTES NFR BLD: 3 % (ref 18–48)
MAGNESIUM SERPL-MCNC: 1.3 MG/DL (ref 1.6–2.6)
MCH RBC QN AUTO: 24.3 PG (ref 27–31)
MCHC RBC AUTO-ENTMCNC: 30.8 G/DL (ref 32–36)
MCV RBC AUTO: 79 FL (ref 82–98)
MONOCYTES # BLD AUTO: 0.7 K/UL (ref 0.3–1)
MONOCYTES NFR BLD: 6.4 % (ref 4–15)
NEUTROPHILS # BLD AUTO: 10 K/UL (ref 1.8–7.7)
NEUTROPHILS NFR BLD: 90.2 % (ref 38–73)
NITRITE UR QL STRIP: NEGATIVE
PH UR STRIP: 5 [PH] (ref 5–8)
PHOSPHATE SERPL-MCNC: 2.9 MG/DL (ref 2.7–4.5)
PLATELET # BLD AUTO: 178 K/UL (ref 150–350)
PMV BLD AUTO: 8.8 FL (ref 9.2–12.9)
POTASSIUM SERPL-SCNC: 4.7 MMOL/L (ref 3.5–5.1)
PROT SERPL-MCNC: 7.3 G/DL (ref 6–8.4)
PROT UR QL STRIP: NEGATIVE
RBC # BLD AUTO: 4.23 M/UL (ref 4.6–6.2)
SODIUM SERPL-SCNC: 139 MMOL/L (ref 136–145)
SP GR UR STRIP: 1.02 (ref 1–1.03)
TROPONIN I SERPL DL<=0.01 NG/ML-MCNC: 0.01 NG/ML (ref 0–0.03)
TROPONIN I SERPL DL<=0.01 NG/ML-MCNC: 0.01 NG/ML (ref 0–0.03)
URN SPEC COLLECT METH UR: NORMAL
UROBILINOGEN UR STRIP-ACNC: NEGATIVE EU/DL
WBC # BLD AUTO: 11.15 K/UL (ref 3.9–12.7)

## 2019-07-08 PROCEDURE — 99291 CRITICAL CARE FIRST HOUR: CPT | Mod: 25

## 2019-07-08 PROCEDURE — 25000003 PHARM REV CODE 250: Performed by: STUDENT IN AN ORGANIZED HEALTH CARE EDUCATION/TRAINING PROGRAM

## 2019-07-08 PROCEDURE — 63600175 PHARM REV CODE 636 W HCPCS: Performed by: EMERGENCY MEDICINE

## 2019-07-08 PROCEDURE — 86140 C-REACTIVE PROTEIN: CPT

## 2019-07-08 PROCEDURE — 81003 URINALYSIS AUTO W/O SCOPE: CPT

## 2019-07-08 PROCEDURE — 96365 THER/PROPH/DIAG IV INF INIT: CPT

## 2019-07-08 PROCEDURE — 85025 COMPLETE CBC W/AUTO DIFF WBC: CPT

## 2019-07-08 PROCEDURE — 96367 TX/PROPH/DG ADDL SEQ IV INF: CPT

## 2019-07-08 PROCEDURE — 25000003 PHARM REV CODE 250: Performed by: EMERGENCY MEDICINE

## 2019-07-08 PROCEDURE — 84484 ASSAY OF TROPONIN QUANT: CPT

## 2019-07-08 PROCEDURE — 87040 BLOOD CULTURE FOR BACTERIA: CPT | Mod: 59

## 2019-07-08 PROCEDURE — 63600175 PHARM REV CODE 636 W HCPCS: Performed by: STUDENT IN AN ORGANIZED HEALTH CARE EDUCATION/TRAINING PROGRAM

## 2019-07-08 PROCEDURE — 83880 ASSAY OF NATRIURETIC PEPTIDE: CPT

## 2019-07-08 PROCEDURE — 83605 ASSAY OF LACTIC ACID: CPT

## 2019-07-08 PROCEDURE — 96366 THER/PROPH/DIAG IV INF ADDON: CPT

## 2019-07-08 PROCEDURE — 85652 RBC SED RATE AUTOMATED: CPT

## 2019-07-08 PROCEDURE — G0378 HOSPITAL OBSERVATION PER HR: HCPCS

## 2019-07-08 PROCEDURE — 93005 ELECTROCARDIOGRAM TRACING: CPT

## 2019-07-08 PROCEDURE — 80053 COMPREHEN METABOLIC PANEL: CPT

## 2019-07-08 PROCEDURE — 84484 ASSAY OF TROPONIN QUANT: CPT | Mod: 91

## 2019-07-08 PROCEDURE — 11000001 HC ACUTE MED/SURG PRIVATE ROOM

## 2019-07-08 PROCEDURE — 83735 ASSAY OF MAGNESIUM: CPT

## 2019-07-08 PROCEDURE — 84100 ASSAY OF PHOSPHORUS: CPT

## 2019-07-08 RX ORDER — METOPROLOL SUCCINATE 50 MG/1
100 TABLET, EXTENDED RELEASE ORAL NIGHTLY
Status: DISCONTINUED | OUTPATIENT
Start: 2019-07-08 | End: 2019-07-10 | Stop reason: HOSPADM

## 2019-07-08 RX ORDER — SODIUM CHLORIDE 9 MG/ML
500 INJECTION, SOLUTION INTRAVENOUS
Status: COMPLETED | OUTPATIENT
Start: 2019-07-08 | End: 2019-07-08

## 2019-07-08 RX ORDER — LISINOPRIL 5 MG/1
10 TABLET ORAL NIGHTLY
Status: DISCONTINUED | OUTPATIENT
Start: 2019-07-08 | End: 2019-07-10 | Stop reason: HOSPADM

## 2019-07-08 RX ORDER — TORSEMIDE 20 MG/1
40 TABLET ORAL NIGHTLY
Status: DISCONTINUED | OUTPATIENT
Start: 2019-07-08 | End: 2019-07-09

## 2019-07-08 RX ORDER — ACETAMINOPHEN 325 MG/1
650 TABLET ORAL EVERY 8 HOURS PRN
Status: DISCONTINUED | OUTPATIENT
Start: 2019-07-08 | End: 2019-07-10 | Stop reason: HOSPADM

## 2019-07-08 RX ORDER — ENOXAPARIN SODIUM 100 MG/ML
40 INJECTION SUBCUTANEOUS EVERY 24 HOURS
Status: DISCONTINUED | OUTPATIENT
Start: 2019-07-08 | End: 2019-07-10

## 2019-07-08 RX ORDER — AMOXICILLIN 250 MG
1 CAPSULE ORAL 2 TIMES DAILY
Status: DISCONTINUED | OUTPATIENT
Start: 2019-07-08 | End: 2019-07-10 | Stop reason: HOSPADM

## 2019-07-08 RX ORDER — MORPHINE SULFATE 4 MG/ML
4 INJECTION, SOLUTION INTRAMUSCULAR; INTRAVENOUS EVERY 4 HOURS PRN
Status: DISCONTINUED | OUTPATIENT
Start: 2019-07-08 | End: 2019-07-10 | Stop reason: HOSPADM

## 2019-07-08 RX ORDER — ACETAMINOPHEN 325 MG/1
650 TABLET ORAL
Status: COMPLETED | OUTPATIENT
Start: 2019-07-08 | End: 2019-07-08

## 2019-07-08 RX ORDER — NITROGLYCERIN 0.4 MG/1
0.4 TABLET SUBLINGUAL EVERY 5 MIN PRN
Status: COMPLETED | OUTPATIENT
Start: 2019-07-08 | End: 2019-07-09

## 2019-07-08 RX ORDER — IBUPROFEN 200 MG
24 TABLET ORAL
Status: DISCONTINUED | OUTPATIENT
Start: 2019-07-08 | End: 2019-07-10 | Stop reason: HOSPADM

## 2019-07-08 RX ORDER — CEFEPIME HYDROCHLORIDE 2 G/50ML
2 INJECTION, SOLUTION INTRAVENOUS
Status: DISCONTINUED | OUTPATIENT
Start: 2019-07-09 | End: 2019-07-10 | Stop reason: HOSPADM

## 2019-07-08 RX ORDER — MAGNESIUM SULFATE HEPTAHYDRATE 40 MG/ML
2 INJECTION, SOLUTION INTRAVENOUS ONCE
Status: COMPLETED | OUTPATIENT
Start: 2019-07-08 | End: 2019-07-08

## 2019-07-08 RX ORDER — SODIUM CHLORIDE 0.9 % (FLUSH) 0.9 %
5 SYRINGE (ML) INJECTION
Status: DISCONTINUED | OUTPATIENT
Start: 2019-07-08 | End: 2019-07-10 | Stop reason: HOSPADM

## 2019-07-08 RX ORDER — SODIUM CHLORIDE 450 MG/100ML
INJECTION, SOLUTION INTRAVENOUS CONTINUOUS
Status: DISCONTINUED | OUTPATIENT
Start: 2019-07-08 | End: 2019-07-09

## 2019-07-08 RX ORDER — IBUPROFEN 200 MG
16 TABLET ORAL
Status: DISCONTINUED | OUTPATIENT
Start: 2019-07-08 | End: 2019-07-10 | Stop reason: HOSPADM

## 2019-07-08 RX ORDER — ACETAMINOPHEN 325 MG/1
650 TABLET ORAL ONCE
Status: COMPLETED | OUTPATIENT
Start: 2019-07-08 | End: 2019-07-08

## 2019-07-08 RX ORDER — ACETAMINOPHEN 325 MG/1
650 TABLET ORAL EVERY 4 HOURS PRN
Status: DISCONTINUED | OUTPATIENT
Start: 2019-07-08 | End: 2019-07-10 | Stop reason: HOSPADM

## 2019-07-08 RX ORDER — RAMELTEON 8 MG/1
8 TABLET ORAL NIGHTLY PRN
Status: DISCONTINUED | OUTPATIENT
Start: 2019-07-08 | End: 2019-07-10 | Stop reason: HOSPADM

## 2019-07-08 RX ORDER — GLUCAGON 1 MG
1 KIT INJECTION
Status: DISCONTINUED | OUTPATIENT
Start: 2019-07-08 | End: 2019-07-10 | Stop reason: HOSPADM

## 2019-07-08 RX ORDER — ONDANSETRON 8 MG/1
8 TABLET, ORALLY DISINTEGRATING ORAL EVERY 6 HOURS PRN
Status: DISCONTINUED | OUTPATIENT
Start: 2019-07-08 | End: 2019-07-10 | Stop reason: HOSPADM

## 2019-07-08 RX ADMIN — VANCOMYCIN HYDROCHLORIDE 2000 MG: 1 INJECTION, POWDER, LYOPHILIZED, FOR SOLUTION INTRAVENOUS at 06:07

## 2019-07-08 RX ADMIN — ACETAMINOPHEN 650 MG: 325 TABLET ORAL at 09:07

## 2019-07-08 RX ADMIN — SODIUM CHLORIDE 500 ML: 0.9 INJECTION, SOLUTION INTRAVENOUS at 05:07

## 2019-07-08 RX ADMIN — MAGNESIUM SULFATE IN WATER 2 G: 40 INJECTION, SOLUTION INTRAVENOUS at 09:07

## 2019-07-08 RX ADMIN — SODIUM CHLORIDE 500 ML: 0.9 INJECTION, SOLUTION INTRAVENOUS at 09:07

## 2019-07-08 RX ADMIN — CEFEPIME 2 G: 2 INJECTION, POWDER, FOR SOLUTION INTRAVENOUS at 05:07

## 2019-07-08 RX ADMIN — ACETAMINOPHEN 650 MG: 325 TABLET ORAL at 05:07

## 2019-07-08 RX ADMIN — NITROGLYCERIN 0.4 MG: 0.4 TABLET, ORALLY DISINTEGRATING SUBLINGUAL at 10:07

## 2019-07-08 NOTE — PROCEDURES
"Debridement  Date/Time: 7/1/2019 11:04 AM  Performed by: Pool Gutierrez MD  Authorized by: Pool Gutierrez MD     Time out: Immediately prior to procedure a "time out" was called to verify the correct patient, procedure, equipment, support staff and site/side marked as required.    Consent Done?:  Yes (Verbal)  Local anesthesia used?: No      Wound Details:    Location:  Left leg    Type of Debridement:  Excisional       Length (cm):  0.5       Area (sq cm):  0.25       Width (cm):  0.5       Percent Debrided (%):  100       Depth (cm):  0.1       Total Area Debrided (sq cm):  0.25    Depth of debridement:  Epidermis/Dermis    Devitalized tissue debrided:  Exudate and Fibrin    Instruments:  Curette      "

## 2019-07-08 NOTE — ED NOTES
Patient stated he started with fever and chills x1day ago, he reported having nausea and vomiting and unable to keep fluids down. MD placed orders and lab at bedside to assist with draw specimens

## 2019-07-08 NOTE — ED PROVIDER NOTES
Encounter Date: 7/8/2019    SCRIBE #1 NOTE: I, Galdino Bolanos, am scribing for, and in the presence of,  Dr. Desai. I have scribed the entire note.       History     Chief Complaint   Patient presents with    Fever     53 year old male presents to ed cc of fever and body aches that began this morning     This is a 53 y.o. male who has a past medical history of *Atrial fibrillation, Atrial fibrillation, Atrial fibrillation (Feb 23, 2016), Bipolar disorder, Congenital heart disease, Deep vein thrombosis, DVT of leg (deep venous thrombosis), History of prior ablation treatment, Hypertension, Obesity, Stroke, Thyroid disease, Venous stasis ulcer of lower extremity, unspecified laterality (12/14/2012), and Venous ulcer.     The patient presents to the Emergency Department due to fever.   The patient notes he had lab work done earlier today, and afterward he started to feel fatigued, and noticed he had a fever. He reports associated nausea with an episode of vomiting.  He denies any associated cough, congestion, CP, SOB, abdominal pain, flank pain, or extremity pain.   He endorses a chronic history of right leg swelling, and states he saw Dr. Lantigua last week and was told everything was fine.  He reports compliance with anticoagulation for his history of DVT as well as atrial fibrillation.  The patient reports no additional symptoms at this time.       The history is provided by the patient.     Review of patient's allergies indicates:   Allergen Reactions    Contrast media Other (See Comments)     Severe chest pain    Food allergy formula [glutamine-c-quercet-selen-brom]      Allergic to green peas; Heart failure.    Iodinated contrast- oral and iv dye Other (See Comments)     Chest pain    Peas Hives    Ibuprofen Swelling    Latex, natural rubber Hives    Pcn [penicillins] Hives    Butisol [butabarbital] Rash     Peeling skin     Past Medical History:   Diagnosis Date    *Atrial fibrillation     Atrial  fibrillation     Atrial fibrillation Feb 23, 2016    Bipolar disorder     Congenital heart disease     s/p surgical intervention 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 of age    Deep vein thrombosis     DVT of leg (deep venous thrombosis)     left leg    History of prior ablation treatment     10/9/13    Hypertension     Obesity     Stroke     Thyroid disease     Venous stasis ulcer of lower extremity, unspecified laterality 12/14/2012    Venous ulcer      Past Surgical History:   Procedure Laterality Date    ANGIOPLASTY      ARTHROSCOPY-KNEE W/ CHONDROPLASTY Right 2/23/2016    [facility]    CARDIAC SURGERY      open heart surgery 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 old    EYE SURGERY      left eye cataract/right eye glaucoma    TIMO FILTER PLACEMENT      Dr Calix ([facility])    KNEE SURGERY      l and r     MULTIPLE TOOTH EXTRACTIONS      Sclerotherapy N/A 2/21/2019    Performed by [facility]ig[facility] CATH LAB/EP    SKIN GRAFT      left leg    SYNOVECTOMY-KNEE Right 2/23/2016    [facility] OR     Family History   Problem Relation Age of Onset    Diabetes Father     Heart disease Father     Heart disease Maternal Grandmother     Diabetes Maternal Grandfather     Heart disease Maternal Grandfather     Stroke Maternal Grandfather      Social History     Tobacco Use    Smoking status: Former Smoker     Packs/day: 1.00     Years: 6.00     Pack years: 6.00     Types: Cigarettes    Smokeless tobacco: Former User    Tobacco comment: quit by age 27yrs old   Substance Use Topics    Alcohol use: Yes     Alcohol/week: 0.6 oz     Types: 1 Glasses of wine per week     Frequency: Monthly or less     Comment: 1 glass of wine a week    Drug use: No     Review of Systems   Constitutional: Positive for chills and fever. Negative for activity change and appetite change.   HENT: Negative for congestion, ear pain, rhinorrhea and sore throat.    Respiratory: Negative for cough,  shortness of breath and wheezing.    Cardiovascular: Positive for leg swelling. Negative for chest pain and palpitations.   Gastrointestinal: Positive for nausea and vomiting. Negative for abdominal pain and diarrhea.   Genitourinary: Negative for dysuria and hematuria.   Musculoskeletal: Negative for back pain, myalgias and neck pain.   Skin: Positive for rash and wound.   Neurological: Negative for dizziness, weakness, light-headedness and headaches.   Psychiatric/Behavioral: Negative for confusion.       Physical Exam     Initial Vitals [07/08/19 1609]   BP Pulse Resp Temp SpO2   (!) 169/77 (!) 114 20 (!) 100.9 °F (38.3 °C) 98 %      MAP       --         Physical Exam    Nursing note and vitals reviewed.  Constitutional: He appears well-developed and well-nourished. He is not diaphoretic. No distress.   HENT:   Head: Normocephalic and atraumatic.   Mouth/Throat: Oropharynx is clear and moist.   Eyes: Conjunctivae and EOM are normal. Pupils are equal, round, and reactive to light.   Neck: Normal range of motion. Neck supple.   Cardiovascular: Normal rate, regular rhythm and normal heart sounds.   No murmur heard.  Pulmonary/Chest: Breath sounds normal. He has no wheezes. He has no rhonchi. He has no rales.   Abdominal: Soft. There is no tenderness. There is no rebound and no guarding.   Musculoskeletal: Normal range of motion. He exhibits edema. He exhibits no tenderness.   Diffuse right leg swelling, warmth, and erythema tracking above knee.   Chronic venous stasis changes to both extremities.    Neurological: He is alert and oriented to person, place, and time. He has normal strength.   Skin: Skin is warm and dry.         ED Course   Procedures  Labs Reviewed   CBC W/ AUTO DIFFERENTIAL - Abnormal; Notable for the following components:       Result Value    RBC 4.23 (*)     Hemoglobin 10.3 (*)     Hematocrit 33.4 (*)     Mean Corpuscular Volume 79 (*)     Mean Corpuscular Hemoglobin 24.3 (*)     Mean Corpuscular  Hemoglobin Conc 30.8 (*)     RDW 16.6 (*)     MPV 8.8 (*)     Gran # (ANC) 10.0 (*)     Lymph # 0.3 (*)     Gran% 90.2 (*)     Lymph% 3.0 (*)     All other components within normal limits   COMPREHENSIVE METABOLIC PANEL - Abnormal; Notable for the following components:    BUN, Bld 27 (*)     Alkaline Phosphatase 181 (*)     All other components within normal limits   MAGNESIUM - Abnormal; Notable for the following components:    Magnesium 1.3 (*)     All other components within normal limits   B-TYPE NATRIURETIC PEPTIDE - Abnormal; Notable for the following components:     (*)     All other components within normal limits   CULTURE, BLOOD   CULTURE, BLOOD   LACTIC ACID, PLASMA   URINALYSIS, REFLEX TO URINE CULTURE    Narrative:     Preferred Collection Type->Urine, Clean Catch   PHOSPHORUS   TROPONIN I     EKG Readings: (Independently Interpreted)   Initial Reading: No STEMI. Rhythm: Atrial Fibrillation. Clinical Impression: Atrial Fibrillation with RVR   AFib with RVR, rate 137, nonspecific ST changes inferior leads, no reciprocal elevations, no signs of ischemia.  Compared to prior EKG 05/2019, ST changes appear new, otherwise grossly stable.       Imaging Results          X-Ray Chest AP Portable (Final result)  Result time 07/08/19 18:11:45    Final result by Sohail Kline MD (07/08/19 18:11:45)                 Impression:      Cardiomegaly with otherwise no acute cardiopulmonary process identified.      Electronically signed by: Sohail Kline MD  Date:    07/08/2019  Time:    18:11             Narrative:    EXAMINATION:  XR CHEST AP PORTABLE    CLINICAL HISTORY:  fever;    TECHNIQUE:  Single frontal view of the chest was performed.    COMPARISON:  05/15/2019.    FINDINGS:  Cardiac silhouette is enlarged but stable in size.  Lungs are symmetrically expanded.  Mild coarse interstitial lung changes are again seen.  No evidence of focal consolidative process, pneumothorax, or significant effusion.  No  acute osseous abnormality identified.                                 Medical Decision Making:   Differential Diagnosis:   Differential Diagnosis includes, but is not limited to:  Sepsis, bacteremia, UTI, pneumonia, cellulitis, abscess, indwelling line/catheter infection, cholecystitis, viral URI, gastroenteritis, viral syndrome, sinusitis, otitis media/externa, neoplasm, drug reaction, serotonin syndrome, intoxication/withdrawal syndrome.      Clinical Tests:   Lab Tests: Ordered and Reviewed  Radiological Study: Ordered and Reviewed  Medical Tests: Ordered and Reviewed  Sepsis Perfusion Assessment: I attest, a sepsis perfusion exam was performed within 6 hours of Septic Shock presentation, following fluid resuscitation.  ED Management:  WBC 11k.  Labs grossly stable. Lactate normal. BP remained stable, gentle IV hydration continued.  On reassessment, patient persistently tachycardic and febrile.  Will request admission for further management of cellulitis with likely sepsis, as well as management of a-fib RVR, likely secondary to infection.    Upon re-evaluation, the patient's status has remained critical.  At this time, I believe the patient should be admitted to the hospital for further evaluation and management of sepsis, a-fib with RVR.  [facility] FM service was contacted and the case was discussed.   The consulting physician/team agrees with plan and will admit under their service.   The patient and family were updated with test results, overall impression, and further plan of care. All questions were answered. The patient expressed understanding and agrees with the current plan.                Attending Attestation:         Attending Critical Care:   Critical Care Times:   Direct Patient Care (initial evaluation, reassessments, and time considering the case)................................................................15 minutes.   Additional History from reviewing old medical records or taking additional  history from the family, EMS, PCP, etc.......................8 minutes.   Ordering, Reviewing, and Interpreting Diagnostic Studies...............................................................................................................10 minutes.   Documentation..................................................................................................................................................................................12 minutes.   Consultation with other Physicians. .................................................................................................................................................5 minutes.   Consultation with the patient's family directly relating to the patient's condition, care, and DNR status (when patient unable)......5 minutes.   ==============================================================  · Total Critical Care Time - exclusive of procedural time: 55 minutes.  ==============================================================  Critical care was necessary to treat or prevent imminent or life-threatening deterioration of the following conditions: sepsis and cardiac arrhythmia.   Critical care was time spent personally by me on the following activities: examination of patient, review of old charts, ordering lab, x-rays, and/or EKG, ordering and performing treatments and interventions, evaluation of patient's response to treatment, discussion with consultants and re-evaluation of patient's conition.   Critical Care Condition: critical               ED Course as of Jul 08 1922   Mon Jul 08, 2019   1624 54 yo M with history of May-Thurner, AFib, DVT, currently on Coumadin, presents to ED with fever.  Vitals on arrival with fever/tachycardia. Exam with diffuse RLE erythema, warmth, and edema, concerning for cellulitis.   Given concern for SIRS/sepsis, blood cultures, labs, IV ABX ordered.  Will treat fever, given IVF, obtain infectious workup, and continue to  monitor.    [SS]      ED Course User Index  [SS] Hans Desai MD     Clinical Impression:       ICD-10-CM ICD-9-CM   1. Sepsis, due to unspecified organism A41.9 038.9     995.91   2. Fever R50.9 780.60   3. Cellulitis of right leg L03.115 682.6         Disposition:   Disposition: Placed in Observation  Condition: Stable        I, Dr. Hans Desai, personally performed the services described in this documentation. All medical record entries made by the scribe were at my direction and in my presence.  I have reviewed the chart and agree that the record reflects my personal performance and is accurate and complete.     Hans Desai MD.  5:58 PM 07/08/2019                       Hans Desai MD  07/08/19 1925

## 2019-07-09 LAB
ALBUMIN SERPL BCP-MCNC: 2.9 G/DL (ref 3.5–5.2)
ALP SERPL-CCNC: 147 U/L (ref 55–135)
ALT SERPL W/O P-5'-P-CCNC: 23 U/L (ref 10–44)
ANION GAP SERPL CALC-SCNC: 8 MMOL/L (ref 8–16)
AST SERPL-CCNC: 24 U/L (ref 10–40)
BASOPHILS # BLD AUTO: 0.01 K/UL (ref 0–0.2)
BASOPHILS NFR BLD: 0.1 % (ref 0–1.9)
BILIRUB SERPL-MCNC: 1 MG/DL (ref 0.1–1)
BILIRUB UR QL STRIP: NEGATIVE
BUN SERPL-MCNC: 30 MG/DL (ref 6–20)
CALCIUM SERPL-MCNC: 8.5 MG/DL (ref 8.7–10.5)
CHLORIDE SERPL-SCNC: 105 MMOL/L (ref 95–110)
CLARITY UR: CLEAR
CO2 SERPL-SCNC: 25 MMOL/L (ref 23–29)
COLOR UR: YELLOW
CREAT SERPL-MCNC: 1.1 MG/DL (ref 0.5–1.4)
DIFFERENTIAL METHOD: ABNORMAL
EOSINOPHIL # BLD AUTO: 0.1 K/UL (ref 0–0.5)
EOSINOPHIL NFR BLD: 0.7 % (ref 0–8)
ERYTHROCYTE [DISTWIDTH] IN BLOOD BY AUTOMATED COUNT: 17.1 % (ref 11.5–14.5)
EST. GFR  (AFRICAN AMERICAN): >60 ML/MIN/1.73 M^2
EST. GFR  (NON AFRICAN AMERICAN): >60 ML/MIN/1.73 M^2
ESTIMATED AVG GLUCOSE: 105 MG/DL (ref 68–131)
GLUCOSE SERPL-MCNC: 124 MG/DL (ref 70–110)
GLUCOSE UR QL STRIP: NEGATIVE
HBA1C MFR BLD HPLC: 5.3 % (ref 4–5.6)
HCT VFR BLD AUTO: 30.9 % (ref 40–54)
HGB BLD-MCNC: 9.4 G/DL (ref 14–18)
HGB UR QL STRIP: ABNORMAL
INR PPP: 3.2 (ref 0.8–1.2)
KETONES UR QL STRIP: NEGATIVE
LEUKOCYTE ESTERASE UR QL STRIP: NEGATIVE
LYMPHOCYTES # BLD AUTO: 0.9 K/UL (ref 1–4.8)
LYMPHOCYTES NFR BLD: 7.6 % (ref 18–48)
MAGNESIUM SERPL-MCNC: 1.8 MG/DL (ref 1.6–2.6)
MAGNESIUM SERPL-MCNC: 1.9 MG/DL (ref 1.6–2.6)
MCH RBC QN AUTO: 24.2 PG (ref 27–31)
MCHC RBC AUTO-ENTMCNC: 30.4 G/DL (ref 32–36)
MCV RBC AUTO: 80 FL (ref 82–98)
MONOCYTES # BLD AUTO: 0.7 K/UL (ref 0.3–1)
MONOCYTES NFR BLD: 5.9 % (ref 4–15)
NEUTROPHILS # BLD AUTO: 9.9 K/UL (ref 1.8–7.7)
NEUTROPHILS NFR BLD: 85.7 % (ref 38–73)
NITRITE UR QL STRIP: NEGATIVE
PH UR STRIP: 5 [PH] (ref 5–8)
PHOSPHATE SERPL-MCNC: 3.7 MG/DL (ref 2.7–4.5)
PHOSPHATE SERPL-MCNC: 3.7 MG/DL (ref 2.7–4.5)
PLATELET # BLD AUTO: 169 K/UL (ref 150–350)
PMV BLD AUTO: 9.1 FL (ref 9.2–12.9)
POTASSIUM SERPL-SCNC: 4.1 MMOL/L (ref 3.5–5.1)
PROT SERPL-MCNC: 7 G/DL (ref 6–8.4)
PROT UR QL STRIP: NEGATIVE
PROTHROMBIN TIME: 32.5 SEC (ref 9–12.5)
RBC # BLD AUTO: 3.88 M/UL (ref 4.6–6.2)
SODIUM SERPL-SCNC: 138 MMOL/L (ref 136–145)
SP GR UR STRIP: 1.02 (ref 1–1.03)
T4 FREE SERPL-MCNC: 2.21 NG/DL (ref 0.71–1.51)
TROPONIN I SERPL DL<=0.01 NG/ML-MCNC: 0.01 NG/ML (ref 0–0.03)
TROPONIN I SERPL DL<=0.01 NG/ML-MCNC: 0.02 NG/ML (ref 0–0.03)
TSH SERPL DL<=0.005 MIU/L-ACNC: <0.01 UIU/ML (ref 0.4–4)
URN SPEC COLLECT METH UR: ABNORMAL
UROBILINOGEN UR STRIP-ACNC: NEGATIVE EU/DL
WBC # BLD AUTO: 11.53 K/UL (ref 3.9–12.7)

## 2019-07-09 PROCEDURE — 84100 ASSAY OF PHOSPHORUS: CPT | Mod: 91

## 2019-07-09 PROCEDURE — 63600175 PHARM REV CODE 636 W HCPCS: Performed by: FAMILY MEDICINE

## 2019-07-09 PROCEDURE — 83036 HEMOGLOBIN GLYCOSYLATED A1C: CPT

## 2019-07-09 PROCEDURE — 63600175 PHARM REV CODE 636 W HCPCS: Performed by: STUDENT IN AN ORGANIZED HEALTH CARE EDUCATION/TRAINING PROGRAM

## 2019-07-09 PROCEDURE — 94761 N-INVAS EAR/PLS OXIMETRY MLT: CPT

## 2019-07-09 PROCEDURE — 25000003 PHARM REV CODE 250: Performed by: FAMILY MEDICINE

## 2019-07-09 PROCEDURE — G0378 HOSPITAL OBSERVATION PER HR: HCPCS

## 2019-07-09 PROCEDURE — 84443 ASSAY THYROID STIM HORMONE: CPT

## 2019-07-09 PROCEDURE — 97161 PT EVAL LOW COMPLEX 20 MIN: CPT

## 2019-07-09 PROCEDURE — 84439 ASSAY OF FREE THYROXINE: CPT

## 2019-07-09 PROCEDURE — 25000003 PHARM REV CODE 250: Performed by: STUDENT IN AN ORGANIZED HEALTH CARE EDUCATION/TRAINING PROGRAM

## 2019-07-09 PROCEDURE — 84484 ASSAY OF TROPONIN QUANT: CPT

## 2019-07-09 PROCEDURE — 84484 ASSAY OF TROPONIN QUANT: CPT | Mod: 91

## 2019-07-09 PROCEDURE — 93005 ELECTROCARDIOGRAM TRACING: CPT

## 2019-07-09 PROCEDURE — 11000001 HC ACUTE MED/SURG PRIVATE ROOM

## 2019-07-09 PROCEDURE — 81003 URINALYSIS AUTO W/O SCOPE: CPT

## 2019-07-09 PROCEDURE — 84100 ASSAY OF PHOSPHORUS: CPT

## 2019-07-09 PROCEDURE — 80053 COMPREHEN METABOLIC PANEL: CPT

## 2019-07-09 PROCEDURE — 36415 COLL VENOUS BLD VENIPUNCTURE: CPT

## 2019-07-09 PROCEDURE — 83735 ASSAY OF MAGNESIUM: CPT

## 2019-07-09 PROCEDURE — 85610 PROTHROMBIN TIME: CPT

## 2019-07-09 PROCEDURE — 83735 ASSAY OF MAGNESIUM: CPT | Mod: 91

## 2019-07-09 PROCEDURE — 85025 COMPLETE CBC W/AUTO DIFF WBC: CPT

## 2019-07-09 RX ORDER — TORSEMIDE 20 MG/1
40 TABLET ORAL DAILY
Status: DISCONTINUED | OUTPATIENT
Start: 2019-07-09 | End: 2019-07-09

## 2019-07-09 RX ORDER — FUROSEMIDE 10 MG/ML
40 INJECTION INTRAMUSCULAR; INTRAVENOUS DAILY
Status: DISCONTINUED | OUTPATIENT
Start: 2019-07-09 | End: 2019-07-10

## 2019-07-09 RX ORDER — LIDOCAINE 50 MG/G
1 PATCH TOPICAL ONCE
Status: COMPLETED | OUTPATIENT
Start: 2019-07-09 | End: 2019-07-10

## 2019-07-09 RX ADMIN — SENNOSIDES,DOCUSATE SODIUM 1 TABLET: 8.6; 5 TABLET, FILM COATED ORAL at 08:07

## 2019-07-09 RX ADMIN — MORPHINE SULFATE 4 MG: 4 INJECTION INTRAVENOUS at 05:07

## 2019-07-09 RX ADMIN — LISINOPRIL 10 MG: 5 TABLET ORAL at 12:07

## 2019-07-09 RX ADMIN — ENOXAPARIN SODIUM 40 MG: 100 INJECTION SUBCUTANEOUS at 04:07

## 2019-07-09 RX ADMIN — ACETAMINOPHEN 650 MG: 325 TABLET ORAL at 06:07

## 2019-07-09 RX ADMIN — NITROGLYCERIN 0.4 MG: 0.4 TABLET, ORALLY DISINTEGRATING SUBLINGUAL at 04:07

## 2019-07-09 RX ADMIN — TORSEMIDE 40 MG: 20 TABLET ORAL at 09:07

## 2019-07-09 RX ADMIN — VANCOMYCIN HYDROCHLORIDE 2000 MG: 100 INJECTION, POWDER, LYOPHILIZED, FOR SOLUTION INTRAVENOUS at 05:07

## 2019-07-09 RX ADMIN — SENNOSIDES,DOCUSATE SODIUM 1 TABLET: 8.6; 5 TABLET, FILM COATED ORAL at 09:07

## 2019-07-09 RX ADMIN — VANCOMYCIN HYDROCHLORIDE 2000 MG: 100 INJECTION, POWDER, LYOPHILIZED, FOR SOLUTION INTRAVENOUS at 06:07

## 2019-07-09 RX ADMIN — FUROSEMIDE 40 MG: 10 INJECTION, SOLUTION INTRAVENOUS at 08:07

## 2019-07-09 RX ADMIN — LIDOCAINE HYDROCHLORIDE: 20 SOLUTION ORAL; TOPICAL at 06:07

## 2019-07-09 RX ADMIN — SENNOSIDES,DOCUSATE SODIUM 1 TABLET: 8.6; 5 TABLET, FILM COATED ORAL at 12:07

## 2019-07-09 RX ADMIN — LIDOCAINE 1 PATCH: 50 PATCH TOPICAL at 06:07

## 2019-07-09 RX ADMIN — METOPROLOL SUCCINATE 100 MG: 50 TABLET, EXTENDED RELEASE ORAL at 12:07

## 2019-07-09 RX ADMIN — SODIUM CHLORIDE: 0.45 INJECTION, SOLUTION INTRAVENOUS at 12:07

## 2019-07-09 RX ADMIN — CEFEPIME HYDROCHLORIDE 2 G: 2 INJECTION, SOLUTION INTRAVENOUS at 04:07

## 2019-07-09 RX ADMIN — CEFEPIME HYDROCHLORIDE 2 G: 2 INJECTION, SOLUTION INTRAVENOUS at 05:07

## 2019-07-09 NOTE — ASSESSMENT & PLAN NOTE
Mg in ED found to be 1.3  Will replace with 2 grams IV mag sulfate over 2 hours   This AM, Mg is 1.9  Resolved

## 2019-07-09 NOTE — ASSESSMENT & PLAN NOTE
Hx of Hyperthyroidism with treatment with Methimazole  Patient discontinued Methimazole due to weight gain after discussion with Endocrinology  On admission, TSH: <0.010 with Free T4: 2.21  Will discuss restarting Methimazole

## 2019-07-09 NOTE — PROGRESS NOTES
[facility] Medicine  Progress Note    Patient Name: Drew Farrar  MRN: [id number]  Patient Class: IP- Inpatient   Admission Date: 7/8/2019  Length of Stay: 1 days  Attending Physician: Severyn Yaroshevsky, MD  Primary Care Provider: David Larsen MD        Subjective:     Principal Problem:Hypomagnesemia      HPI:  This is a 53 y.o. male who has a past medical history of *Atrial fibrillation,  Bipolar disorder, Congenital heart disease, Deep vein thrombosis, History of prior ablation treatment, Hypertension, Obesity, Stroke, Thyroid disease, Venous stasis ulcer of lower extremity, presents to the ED with right lower extremity pain and fever. He is currently following in coumadin clinic for chronic atrial fibrillation and states he felt tired and noticed he had a fever to 103 at home associated with nausea and vomiting x2, NBNB, denies any sick contacts. He denies cough, congestion, chest pain, SOB, abdominal pain, or flank pain. He has chronic right leg swelling, and was previously followed in wound care clinic for chronic venous statis ulcers, but was discharged months ago. He is complaint with anticoagulation for his history of DVT and atrial fibrillation and had an INR of 4.1 today. He denies any other symptoms at time of interview.        Overview/Hospital Course:  No notes on file    Interval History: Patient had 1 episode of chest pain yesterday that resolved. Denies any current chest pain. Tolerating diet with no nausea or vomiting. Patient continues to complain about right lower extremity pain that initially brought him to the hospital. Denies any fever, chills, sob, abdominal pain, dysuria or hematuria.     Review of Systems   Constitutional: Positive for fever. Negative for activity change, appetite change, chills, fatigue and unexpected weight change.   HENT: Negative for rhinorrhea and sore throat.    Respiratory: Negative for cough, choking, shortness of breath and  wheezing.    Cardiovascular: Positive for leg swelling. Negative for chest pain and palpitations.   Gastrointestinal: Negative for abdominal pain, constipation, diarrhea, nausea and vomiting.   Endocrine: Negative for polydipsia, polyphagia and polyuria.   Genitourinary: Negative for difficulty urinating, dysuria, frequency, hematuria and urgency.   Musculoskeletal: Negative for back pain and myalgias.   Skin: Positive for wound. Negative for color change and rash.   Neurological: Negative for dizziness, weakness, light-headedness and headaches.   Hematological: Does not bruise/bleed easily.     Objective:     Vital Signs (Most Recent):  Temp: 97.8 °F (36.6 °C) (07/09/19 0518)  Pulse: 74 (07/09/19 0757)  Resp: (!) 21 (07/09/19 0518)  BP: (!) 100/50 (07/09/19 0518)  SpO2: 97 % (07/09/19 0759) Vital Signs (24h Range):  Temp:  [97.8 °F (36.6 °C)-100.9 °F (38.3 °C)] 97.8 °F (36.6 °C)  Pulse:  [] 74  Resp:  [20-37] 21  SpO2:  [94 %-100 %] 97 %  BP: (100-187)/(50-84) 100/50     Weight: (!) 167.4 kg (369 lb 0.8 oz)  Body mass index is 40.54 kg/m².    Intake/Output Summary (Last 24 hours) at 7/9/2019 0803  Last data filed at 7/9/2019 0600  Gross per 24 hour   Intake 1910 ml   Output 575 ml   Net 1335 ml      Physical Exam   Constitutional: He is oriented to person, place, and time. He appears well-developed and well-nourished. No distress.   HENT:   Head: Normocephalic and atraumatic.   Nose: Nose normal.   Mouth/Throat: Oropharynx is clear and moist. No oropharyngeal exudate.   Eyes: Pupils are equal, round, and reactive to light. Conjunctivae and EOM are normal. Right eye exhibits no discharge. Left eye exhibits no discharge. No scleral icterus.   Neck: Normal range of motion. Neck supple.   Cardiovascular: Normal rate. Exam reveals no gallop and no friction rub.   No murmur heard.  Irregular rate and rhythm   Pulmonary/Chest: Effort normal and breath sounds normal. No stridor. No respiratory distress. He has no  wheezes. He has no rales. He exhibits no tenderness.   Abdominal: Soft. Bowel sounds are normal. He exhibits no distension and no mass. There is no tenderness. There is no rebound.   Musculoskeletal: Normal range of motion. He exhibits edema and tenderness. He exhibits no deformity.   Right leg and calf tenderness, erythema and warm of skin, no purulence or fluctuance   Chronic skin changes and thickening of the shins and medial maleolus     Lymphadenopathy:     He has no cervical adenopathy.   Neurological: He is alert and oriented to person, place, and time. No sensory deficit.   Skin: Skin is warm and dry. Capillary refill takes less than 2 seconds. No rash noted. He is not diaphoretic. There is erythema (Erythema in the right lower extremity; slightly improved from outline drawn yesterday). No pallor.   Psychiatric: He has a normal mood and affect. His behavior is normal.   Nursing note and vitals reviewed.      Significant Labs:   Recent Lab Results       07/09/19  0342   07/09/19  0218   07/08/19  2211   07/08/19  2127   07/08/19  1810        Albumin   2.9           Alkaline Phosphatase   147           ALT   23           Anion Gap   8           Appearance, UA Clear       Clear     AST   24           Baso #   0.01           Basophil%   0.1           Bilirubin (UA) Negative       Negative     BILIRUBIN TOTAL   1.0  Comment:  For infants and newborns, interpretation of results should be based  on gestational age, weight and in agreement with clinical  observations.  Premature Infant recommended reference ranges:  Up to 24 hours.............<8.0 mg/dL  Up to 48 hours............<12.0 mg/dL  3-5 days..................<15.0 mg/dL  6-29 days.................<15.0 mg/dL             Blood Culture, Routine               BNP               BUN, Bld   30           Calcium   8.5           Chloride   105           CO2   25           Color, UA Yellow       Yellow     Creatinine   1.1           CRP               Differential  Method   Automated           eGFR if    >60           eGFR if non    >60  Comment:  Calculation used to obtain the estimated glomerular filtration  rate (eGFR) is the CKD-EPI equation.              Eos #   0.1           Eosinophil%   0.7           Estimated Avg Glucose   105           Free T4   2.21           Glucose   124           Glucose, UA Negative       Negative     Gran # (ANC)   9.9           Gran%   85.7           Hematocrit   30.9           Hemoglobin   9.4           Hemoglobin A1C External   5.3  Comment:  ADA Screening Guidelines:  5.7-6.4%  Consistent with prediabetes  >or=6.5%  Consistent with diabetes  High levels of fetal hemoglobin interfere with the HbA1C  assay. Heterozygous hemoglobin variants (HbS, HgC, etc)do  not significantly interfere with this assay.   However, presence of multiple variants may affect accuracy.             Coumadin Monitoring INR               Ketones, UA Negative       Negative     Lactate, Christiano               Leukocytes, UA Negative       Negative     Lymph #   0.9           Lymph%   7.6           Magnesium   1.8              1.9           MCH   24.2           MCHC   30.4           MCV   80           Mono #   0.7           Mono%   5.9           MPV   9.1           NITRITE UA Negative       Negative     Occult Blood UA Trace       Negative     pH, UA 5.0       5.0     Phosphorus   3.7              3.7           Platelets   169           Potassium   4.1           PROTEIN TOTAL   7.0           Protein, UA Negative  Comment:  Recommend a 24 hour urine protein or a urine   protein/creatinine ratio if globulin induced proteinuria is  clinically suspected.         Negative  Comment:  Recommend a 24 hour urine protein or a urine   protein/creatinine ratio if globulin induced proteinuria is  clinically suspected.       Protime               RBC   3.88           RDW   17.1           Sed Rate       54       Sodium   138           Specific [place], UA  1.025       1.020     Specimen UA Urine, Unspecified       Urine, Clean Catch     Troponin I   0.019  Comment:  The reference interval for Troponin I represents the 99th percentile   cutoff   for our facility and is consistent with 3rd generation assay   performance.   0.015  Comment:  The reference interval for Troponin I represents the 99th percentile   cutoff   for our facility and is consistent with 3rd generation assay   performance.           TSH   <0.010           UROBILINOGEN UA Negative       Negative     WBC   11.53                            07/08/19  1717   07/08/19  1716   07/08/19  1126        Albumin   3.5       Alkaline Phosphatase   181       ALT   27       Anion Gap   10       Appearance, UA           AST   30       Baso #   0.01       Basophil%   0.1       Bilirubin (UA)           BILIRUBIN TOTAL   0.8  Comment:  For infants and newborns, interpretation of results should be based  on gestational age, weight and in agreement with clinical  observations.  Premature Infant recommended reference ranges:  Up to 24 hours.............<8.0 mg/dL  Up to 48 hours............<12.0 mg/dL  3-5 days..................<15.0 mg/dL  6-29 days.................<15.0 mg/dL         Blood Culture, Routine No Growth to date[P] No Growth to date[P]       BNP   220  Comment:  Values of less than 100 pg/ml are consistent with non-CHF populations.       BUN, Bld   27       Calcium   8.7       Chloride   106       CO2   23       Color, UA           Creatinine   1.1       CRP   17.9       Differential Method   Automated       eGFR if    >60       eGFR if non    >60  Comment:  Calculation used to obtain the estimated glomerular filtration  rate (eGFR) is the CKD-EPI equation.          Eos #   0.0       Eosinophil%   0.3       Estimated Avg Glucose           Free T4           Glucose   107       Glucose, UA           Gran # (ANC)   10.0       Gran%   90.2       Hematocrit   33.4       Hemoglobin    10.3       Hemoglobin A1C External           Coumadin Monitoring INR     4.1  Comment:  Coumadin Therapy:  2.0 - 3.0 for INR for all indicators except mechanical heart valves  and antiphospholipid syndromes which should use 2.5 - 3.5.       Ketones, UA           Lactate, Christiano   1.5  Comment:  Falsely low lactic acid results can be found in samples   containing >=13.0 mg/dL total bilirubin and/or >=3.5 mg/dL   direct bilirubin.         Leukocytes, UA           Lymph #   0.3       Lymph%   3.0       Magnesium   1.3       MCH   24.3       MCHC   30.8       MCV   79       Mono #   0.7       Mono%   6.4       MPV   8.8       NITRITE UA           Occult Blood UA           pH, UA           Phosphorus   2.9       Platelets   178       Potassium   4.7       PROTEIN TOTAL   7.3       Protein, UA           Protime     41.7     RBC   4.23       RDW   16.6       Sed Rate           Sodium   139       Specific Gravity, UA           Specimen UA           Troponin I   0.013  Comment:  The reference interval for Troponin I represents the 99th percentile   cutoff   for our facility and is consistent with 3rd generation assay   performance.         TSH           UROBILINOGEN UA           WBC   11.15             Significant Imaging:   Imaging Results          X-Ray Chest AP Portable (Final result)  Result time 07/08/19 18:11:45    Final result by Sohail Kline MD (07/08/19 18:11:45)                 Impression:      Cardiomegaly with otherwise no acute cardiopulmonary process identified.      Electronically signed by: Sohail Kline MD  Date:    07/08/2019  Time:    18:11             Narrative:    EXAMINATION:  XR CHEST AP PORTABLE    CLINICAL HISTORY:  fever;    TECHNIQUE:  Single frontal view of the chest was performed.    COMPARISON:  05/15/2019.    FINDINGS:  Cardiac silhouette is enlarged but stable in size.  Lungs are symmetrically expanded.  Mild coarse interstitial lung changes are again seen.  No evidence of focal  consolidative process, pneumothorax, or significant effusion.  No acute osseous abnormality identified.                                    Assessment/Plan:      * Hypomagnesemia  Mg in ED found to be 1.3  Will replace with 2 grams IV mag sulfate over 2 hours   This AM, Mg is 1.9  Resolved    Cellulitis of lower extremity  Chronic right sided LE swelling   Reports worsening redness and swelling on the morning of admission associated with fevers, nausea and vomiting   Started IV fluids, Vancomycin and Cefepime  Blood cultures: NGTD  Continue IV antibiotics   Order LE US  Continue to monitor     Sepsis  In the ED, patient was febrile to 100.6 and tachycardic with redness and swelling of right lower extremity. Started on fluids in ED, vancomycin and cefepime   WBC 11.15  Continue maintenance fluids   Blood Cultures: NGTD  Continue to monitor       Hyperthyroidism  Hx of Hyperthyroidism with treatment with Methimazole  Patient discontinued Methimazole due to weight gain after discussion with Endocrinology  On admission, TSH: <0.010 with Free T4: 2.21  Will discuss restarting Methimazole      Long term (current) use of anticoagulants  Warfarin 5mg on Mondays and Fridays and 10 mg AOD   INR elevated to 4.1 on admission  Hold Warfarin at this time   Goal INR 2-3  Repeat INR  Continue to monitor     HTN (hypertension)  Continue home medications  Goal BP <140/80  Hydralazine 10 mg IV PRN for SBP >175        Chronic atrial fibrillation  Patient is in chronic atrial flutter   On Warfarin, INR 4.1 will hold warfarin for now and recheck INR   Monitor patient on telemetry   Denies chest pain, palpitations or shortness of breath   Continue Metoprolol 100 mg   Continue to monitor         VTE Risk Mitigation (From admission, onward)        Ordered     IP VTE HIGH RISK PATIENT  Once      07/08/19 2345     enoxaparin injection 40 mg  Daily      07/08/19 2345          Judy Rosenberg, PGY-2  [facility]  07/09/2019 8:55 AM

## 2019-07-09 NOTE — ASSESSMENT & PLAN NOTE
Chronic right sided LE swelling   Reports worsening redness and swelling on the morning of admission associated with fevers, nausea and vomiting   Started IV fluids, Vancomycin and Cefepime  Blood cultures pending  Continue IV antibiotics   Continue to monitor

## 2019-07-09 NOTE — PLAN OF CARE
Problem: Adult Inpatient Plan of Care  Goal: Plan of Care Review  Outcome: Ongoing (interventions implemented as appropriate)  Patient awake, alert, and oriented x 4. Patient safety maintained. Side rails up x 2. Significant other at bedside. Scheduled medications administered per MD order. Continuous IV fluids initiated. IV antibiotic therapy initiated. Afebrile. No complaints of pain, chest pain, or nausea. Will continue to monitor.

## 2019-07-09 NOTE — SUBJECTIVE & OBJECTIVE
Interval History: Patient had 1 episode of chest pain yesterday that resolved. Denies any current chest pain. Tolerating diet with no nausea or vomiting. Patient continues to complain about right lower extremity pain that initially brought him to the hospital. Denies any fever, chills, sob, abdominal pain, dysuria or hematuria.     Review of Systems   Constitutional: Positive for fever. Negative for activity change, appetite change, chills, fatigue and unexpected weight change.   HENT: Negative for rhinorrhea and sore throat.    Respiratory: Negative for cough, choking, shortness of breath and wheezing.    Cardiovascular: Positive for leg swelling. Negative for chest pain and palpitations.   Gastrointestinal: Negative for abdominal pain, constipation, diarrhea, nausea and vomiting.   Endocrine: Negative for polydipsia, polyphagia and polyuria.   Genitourinary: Negative for difficulty urinating, dysuria, frequency, hematuria and urgency.   Musculoskeletal: Negative for back pain and myalgias.   Skin: Positive for wound. Negative for color change and rash.   Neurological: Negative for dizziness, weakness, light-headedness and headaches.   Hematological: Does not bruise/bleed easily.     Objective:     Vital Signs (Most Recent):  Temp: 97.8 °F (36.6 °C) (07/09/19 0518)  Pulse: 74 (07/09/19 0757)  Resp: (!) 21 (07/09/19 0518)  BP: (!) 100/50 (07/09/19 0518)  SpO2: 97 % (07/09/19 0759) Vital Signs (24h Range):  Temp:  [97.8 °F (36.6 °C)-100.9 °F (38.3 °C)] 97.8 °F (36.6 °C)  Pulse:  [] 74  Resp:  [20-37] 21  SpO2:  [94 %-100 %] 97 %  BP: (100-187)/(50-84) 100/50     Weight: (!) 167.4 kg (369 lb 0.8 oz)  Body mass index is 40.54 kg/m².    Intake/Output Summary (Last 24 hours) at 7/9/2019 0803  Last data filed at 7/9/2019 0600  Gross per 24 hour   Intake 1910 ml   Output 575 ml   Net 1335 ml      Physical Exam   Constitutional: He is oriented to person, place, and time. He appears well-developed and well-nourished.  No distress.   HENT:   Head: Normocephalic and atraumatic.   Nose: Nose normal.   Mouth/Throat: Oropharynx is clear and moist. No oropharyngeal exudate.   Eyes: Pupils are equal, round, and reactive to light. Conjunctivae and EOM are normal. Right eye exhibits no discharge. Left eye exhibits no discharge. No scleral icterus.   Neck: Normal range of motion. Neck supple.   Cardiovascular: Normal rate. Exam reveals no gallop and no friction rub.   No murmur heard.  Irregular rate and rhythm   Pulmonary/Chest: Effort normal and breath sounds normal. No stridor. No respiratory distress. He has no wheezes. He has no rales. He exhibits no tenderness.   Abdominal: Soft. Bowel sounds are normal. He exhibits no distension and no mass. There is no tenderness. There is no rebound.   Musculoskeletal: Normal range of motion. He exhibits edema and tenderness. He exhibits no deformity.   Right leg and calf tenderness, erythema and warm of skin, no purulence or fluctuance   Chronic skin changes and thickening of the shins and medial maleolus     Lymphadenopathy:     He has no cervical adenopathy.   Neurological: He is alert and oriented to person, place, and time. No sensory deficit.   Skin: Skin is warm and dry. Capillary refill takes less than 2 seconds. No rash noted. He is not diaphoretic. There is erythema (Erythema in the right lower extremity; slightly improved from outline drawn yesterday). No pallor.   Psychiatric: He has a normal mood and affect. His behavior is normal.   Nursing note and vitals reviewed.      Significant Labs:   Recent Lab Results       07/09/19  0342   07/09/19  0218   07/08/19  2211   07/08/19  2127   07/08/19  1810        Albumin   2.9           Alkaline Phosphatase   147           ALT   23           Anion Gap   8           Appearance, UA Clear       Clear     AST   24           Baso #   0.01           Basophil%   0.1           Bilirubin (UA) Negative       Negative     BILIRUBIN TOTAL    1.0  Comment:  For infants and newborns, interpretation of results should be based  on gestational age, weight and in agreement with clinical  observations.  Premature Infant recommended reference ranges:  Up to 24 hours.............<8.0 mg/dL  Up to 48 hours............<12.0 mg/dL  3-5 days..................<15.0 mg/dL  6-29 days.................<15.0 mg/dL             Blood Culture, Routine               BNP               BUN, Bld   30           Calcium   8.5           Chloride   105           CO2   25           Color, UA Yellow       Yellow     Creatinine   1.1           CRP               Differential Method   Automated           eGFR if    >60           eGFR if non    >60  Comment:  Calculation used to obtain the estimated glomerular filtration  rate (eGFR) is the CKD-EPI equation.              Eos #   0.1           Eosinophil%   0.7           Estimated Avg Glucose   105           Free T4   2.21           Glucose   124           Glucose, UA Negative       Negative     Gran # (ANC)   9.9           Gran%   85.7           Hematocrit   30.9           Hemoglobin   9.4           Hemoglobin A1C External   5.3  Comment:  ADA Screening Guidelines:  5.7-6.4%  Consistent with prediabetes  >or=6.5%  Consistent with diabetes  High levels of fetal hemoglobin interfere with the HbA1C  assay. Heterozygous hemoglobin variants (HbS, HgC, etc)do  not significantly interfere with this assay.   However, presence of multiple variants may affect accuracy.             Coumadin Monitoring INR               Ketones, UA Negative       Negative     Lactate, Christiano               Leukocytes, UA Negative       Negative     Lymph #   0.9           Lymph%   7.6           Magnesium   1.8              1.9           MCH   24.2           MCHC   30.4           MCV   80           Mono #   0.7           Mono%   5.9           MPV   9.1           NITRITE UA Negative       Negative     Occult Blood UA Trace       Negative      pH, UA 5.0       5.0     Phosphorus   3.7              3.7           Platelets   169           Potassium   4.1           PROTEIN TOTAL   7.0           Protein, UA Negative  Comment:  Recommend a 24 hour urine protein or a urine   protein/creatinine ratio if globulin induced proteinuria is  clinically suspected.         Negative  Comment:  Recommend a 24 hour urine protein or a urine   protein/creatinine ratio if globulin induced proteinuria is  clinically suspected.       Protime               RBC   3.88           RDW   17.1           Sed Rate       54       Sodium   138           Specific Gravity, UA 1.025       1.020     Specimen UA Urine, Unspecified       Urine, Clean Catch     Troponin I   0.019  Comment:  The reference interval for Troponin I represents the 99th percentile   cutoff   for our facility and is consistent with 3rd generation assay   performance.   0.015  Comment:  The reference interval for Troponin I represents the 99th percentile   cutoff   for our facility and is consistent with 3rd generation assay   performance.           TSH   <0.010           UROBILINOGEN UA Negative       Negative     WBC   11.53                            07/08/19  1717   07/08/19  1716   07/08/19  1126        Albumin   3.5       Alkaline Phosphatase   181       ALT   27       Anion Gap   10       Appearance, UA           AST   30       Baso #   0.01       Basophil%   0.1       Bilirubin (UA)           BILIRUBIN TOTAL   0.8  Comment:  For infants and newborns, interpretation of results should be based  on gestational age, weight and in agreement with clinical  observations.  Premature Infant recommended reference ranges:  Up to 24 hours.............<8.0 mg/dL  Up to 48 hours............<12.0 mg/dL  3-5 days..................<15.0 mg/dL  6-29 days.................<15.0 mg/dL         Blood Culture, Routine No Growth to date[P] No Growth to date[P]       BNP   220  Comment:  Values of less than 100 pg/ml are consistent with  non-CHF populations.       BUN, Bld   27       Calcium   8.7       Chloride   106       CO2   23       Color, UA           Creatinine   1.1       CRP   17.9       Differential Method   Automated       eGFR if    >60       eGFR if non    >60  Comment:  Calculation used to obtain the estimated glomerular filtration  rate (eGFR) is the CKD-EPI equation.          Eos #   0.0       Eosinophil%   0.3       Estimated Avg Glucose           Free T4           Glucose   107       Glucose, UA           Gran # (ANC)   10.0       Gran%   90.2       Hematocrit   33.4       Hemoglobin   10.3       Hemoglobin A1C External           Coumadin Monitoring INR     4.1  Comment:  Coumadin Therapy:  2.0 - 3.0 for INR for all indicators except mechanical heart valves  and antiphospholipid syndromes which should use 2.5 - 3.5.       Ketones, UA           Lactate, Christiano   1.5  Comment:  Falsely low lactic acid results can be found in samples   containing >=13.0 mg/dL total bilirubin and/or >=3.5 mg/dL   direct bilirubin.         Leukocytes, UA           Lymph #   0.3       Lymph%   3.0       Magnesium   1.3       MCH   24.3       MCHC   30.8       MCV   79       Mono #   0.7       Mono%   6.4       MPV   8.8       NITRITE UA           Occult Blood UA           pH, UA           Phosphorus   2.9       Platelets   178       Potassium   4.7       PROTEIN TOTAL   7.3       Protein, UA           Protime     41.7     RBC   4.23       RDW   16.6       Sed Rate           Sodium   139       Specific Gravity, UA           Specimen UA           Troponin I   0.013  Comment:  The reference interval for Troponin I represents the 99th percentile   cutoff   for our facility and is consistent with 3rd generation assay   performance.         TSH           UROBILINOGEN UA           WBC   11.15             Significant Imaging:   Imaging Results          X-Ray Chest AP Portable (Final result)  Result time 07/08/19 18:11:45    Final  result by Sohail Kilne MD (07/08/19 18:11:45)                 Impression:      Cardiomegaly with otherwise no acute cardiopulmonary process identified.      Electronically signed by: Sohail Kline MD  Date:    07/08/2019  Time:    18:11             Narrative:    EXAMINATION:  XR CHEST AP PORTABLE    CLINICAL HISTORY:  fever;    TECHNIQUE:  Single frontal view of the chest was performed.    COMPARISON:  05/15/2019.    FINDINGS:  Cardiac silhouette is enlarged but stable in size.  Lungs are symmetrically expanded.  Mild coarse interstitial lung changes are again seen.  No evidence of focal consolidative process, pneumothorax, or significant effusion.  No acute osseous abnormality identified.

## 2019-07-09 NOTE — ASSESSMENT & PLAN NOTE
In the ED, patient was febrile to 100.6 and tachycardic with redness and swelling of right lower extremity. Started on fluids in ED, vancomycin and cefepime   WBC 11.15  Continue maintenance fluids   Blood Cultures: NGTD  Continue to monitor

## 2019-07-09 NOTE — H&P
[facility] Medicine  History & Physical    Patient Name: Drew Farrar  MRN: [id number]  Admission Date: 7/8/2019  Attending Physician: Severyn Yaroshevsky, MD   Primary Care Provider: David Larsen MD         Patient information was obtained from patient, spouse/SO and ER records.     Subjective:     Principal Problem:Hypomagnesemia    Chief Complaint:   Chief Complaint   Patient presents with    Fever     53 year old male presents to ed cc of fever and body aches that began this morning        HPI: This is a 53 y.o. male who has a past medical history of *Atrial fibrillation,  Bipolar disorder, Congenital heart disease, Deep vein thrombosis, History of prior ablation treatment, Hypertension, Obesity, Stroke, Thyroid disease, Venous stasis ulcer of lower extremity, presents to the ED with right lower extremity pain and fever. He is currently following in coumadin clinic for chronic atrial fibrillation and states he felt tired and noticed he had a fever to 103 at home associated with nausea and vomiting x2, NBNB, denies any sick contacts. He denies cough, congestion, chest pain, SOB, abdominal pain, or flank pain. He has chronic right leg swelling, and was previously followed in wound care clinic for chronic venous statis ulcers, but was discharged months ago. He is complaint with anticoagulation for his history of DVT and atrial fibrillation and had an INR of 4.1 today. He denies any other symptoms at time of interview.        Past Medical History:   Diagnosis Date    *Atrial fibrillation     Atrial fibrillation     Atrial fibrillation Feb 23, 2016    Bipolar disorder     Congenital heart disease     s/p surgical intervention 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 of age    Deep vein thrombosis     DVT of leg (deep venous thrombosis)     left leg    History of prior ablation treatment     10/9/13    Hypertension     Obesity     Stroke     Thyroid disease     Venous stasis ulcer of lower  extremity, unspecified laterality 12/14/2012    Venous ulcer        Past Surgical History:   Procedure Laterality Date    ANGIOPLASTY      ARTHROSCOPY-KNEE W/ CHONDROPLASTY Right 2/23/2016    Performed [facility] at [facility] OR    CARDIAC SURGERY      open heart surgery 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 old    EYE SURGERY      left eye cataract/right eye glaucoma    TIMO FILTER PLACEMENT      Dr Calix ([facility])    KNEE SURGERY      l and r     MULTIPLE TOOTH EXTRACTIONS      Sclerotherapy N/A 2/21/2019    Performed by [facility]igua [facility] at [facility] CATH LAB/EP    SKIN GRAFT      left leg    SYNOVECTOMY-KNEE Right 2/23/2016    Performed by [facility] at [facility] OR       Review of patient's allergies indicates:   Allergen Reactions    Contrast media Other (See Comments)     Severe chest pain    Food allergy formula [glutamine-[id number]-selen-brom]      Allergic to green peas; Heart failure.    Iodinated contrast- oral and iv dye Other (See Comments)     Chest pain    Peas Hives    Ibuprofen Swelling    Latex, natural rubber Hives    Pcn [penicillins] Hives    Butisol [butabarbital] Rash     Peeling skin       No current facility-administered medications on file prior to encounter.      Current Outpatient Medications on File Prior to Encounter   Medication Sig    HYDROcodone-acetaminophen (NORCO) 5-325 mg per tablet Take 1 tablet by mouth every 6 (six) hours as needed for Pain.    lisinopril 10 MG tablet Take 1 tablet (10 mg total) by mouth once daily.    metoprolol succinate (TOPROL-XL) 100 MG 24 hr tablet Take 1 tablet (100 mg total) by mouth once daily.    nystatin (MYCOSTATIN) powder Apply topically 2 (two) times daily.    torsemide (DEMADEX) 20 MG Tab Take 2 tablets (40 mg total) by mouth once daily.    warfarin (COUMADIN) 10 MG tablet Take 1.5-2 tablets (15-20 mg total) by mouth once daily.     Family History     Problem Relation (Age of Onset)    Diabetes Father, Maternal Grandfather    Heart  disease Father, Maternal Grandmother, Maternal Grandfather    Stroke Maternal Grandfather        Tobacco Use    Smoking status: Former Smoker     Packs/day: 1.00     Years: 6.00     Pack years: 6.00     Types: Cigarettes    Smokeless tobacco: Former User    Tobacco comment: quit by age 27yrs old   Substance and Sexual Activity    Alcohol use: Yes     Alcohol/week: 0.6 oz     Types: 1 Glasses of wine per week     Frequency: Monthly or less     Comment: 1 glass of wine a week    Drug use: No    Sexual activity: Yes     Partners: Female     Birth control/protection: None     Review of Systems   Constitutional: Negative for activity change, appetite change, fatigue and unexpected weight change.   HENT: Negative for congestion, hearing loss, postnasal drip, rhinorrhea and sinus pain.    Eyes: Negative for photophobia and visual disturbance.   Respiratory: Negative for apnea, cough, choking, chest tightness, shortness of breath and wheezing.    Cardiovascular: Positive for leg swelling (chronic right leg swelling that acutely worsened this AM ). Negative for chest pain and palpitations.   Gastrointestinal: Positive for nausea and vomiting (x2). Negative for abdominal distention, abdominal pain, constipation and diarrhea.   Endocrine: Negative for cold intolerance, heat intolerance and polyuria.   Genitourinary: Negative for difficulty urinating, flank pain, frequency and urgency.   Musculoskeletal: Positive for gait problem. Negative for arthralgias, back pain, joint swelling and myalgias.   Skin: Positive for color change and wound. Negative for rash.   Neurological: Negative for dizziness, weakness, light-headedness, numbness and headaches.     Objective:     Vital Signs (Most Recent):  Temp: 97.8 °F (36.6 °C) (07/08/19 2101)  Pulse: (!) 111 (07/08/19 2212)  Resp: (!) 33 (07/08/19 2212)  BP: (!) 162/70 (07/08/19 2212)  SpO2: 100 % (07/08/19 2212) Vital Signs (24h Range):  Temp:  [97.8 °F (36.6 °C)-100.9 °F (38.3  °C)] 97.8 °F (36.6 °C)  Pulse:  [104-135] 111  Resp:  [20-37] 33  SpO2:  [94 %-100 %] 100 %  BP: (124-187)/(59-84) 162/70     Weight: (!) 172.4 kg (380 lb)  Body mass index is 47.5 kg/m².    Physical Exam   Constitutional: He is oriented to person, place, and time. He appears well-developed and well-nourished. No distress.   HENT:   Head: Normocephalic and atraumatic.   Nose: Nose normal.   Mouth/Throat: Oropharynx is clear and moist. No oropharyngeal exudate.   Eyes: Pupils are equal, round, and reactive to light. Conjunctivae and EOM are normal. Right eye exhibits no discharge. Left eye exhibits no discharge. No scleral icterus.   Neck: Normal range of motion. Neck supple.   Cardiovascular: Normal rate. Exam reveals no gallop and no friction rub.   No murmur heard.  Irregular rate and rhythm   Pulmonary/Chest: Effort normal and breath sounds normal. No stridor. No respiratory distress. He has no wheezes. He has no rales. He exhibits no tenderness.   Abdominal: Soft. Bowel sounds are normal. He exhibits no distension and no mass. There is no tenderness. There is no rebound.   Musculoskeletal: Normal range of motion. He exhibits edema and tenderness. He exhibits no deformity.   Right leg and calf tenderness, erythema and warm of skin, no purulence or fluctuance   Chronic skin changes and thickening of the shins and medial maleolus     Lymphadenopathy:     He has no cervical adenopathy.   Neurological: He is alert and oriented to person, place, and time. No sensory deficit.   Skin: Skin is warm and dry. Capillary refill takes less than 2 seconds. No rash noted. He is not diaphoretic. There is erythema. No pallor.   Psychiatric: He has a normal mood and affect. His behavior is normal.   Nursing note and vitals reviewed.        CRANIAL NERVES     CN III, IV, VI   Pupils are equal, round, and reactive to light.  Extraocular motions are normal.   Left Extremity:     Right extremity:        Significant Labs:   Recent Labs      07/08/19  1716   WBC 11.15   HGB 10.3*   HCT 33.4*      MCV 79*   RDW 16.6*       Recent Labs     07/08/19  1716      K 4.7      CO2 23      BUN 27*   CREATININE 1.1   CALCIUM 8.7   PROT 7.3   ALBUMIN 3.5   BILITOT 0.8   ALKPHOS 181*   AST 30   ALT 27   ANIONGAP 10   ESTGFRAFRICA >60   EGFRNONAA >60       Recent Labs     07/08/19  1716   MG 1.3*   PHOS 2.9       Coags  Lab Results   Component Value Date    INR 4.1 (H) 07/08/2019    INR 3.6 (H) 07/01/2019    INR 2.3 (H) 06/24/2019    APTT 38.6 (H) 05/15/2019    APTT 29.6 10/26/2016    APTT 29.2 12/24/2014     Recent Labs   Lab 07/08/19  1126   INR 4.1*       A1c:   Lab Results   Component Value Date    HGBA1C 5.1 07/10/2017   , Last Gluc: No results for input(s): POCTGLUCOSE in the last 168 hours.    TSH:   Lab Results   Component Value Date    TSH 0.278 (L) 03/07/2018         Cardiac Enzymes  Recent Labs     07/08/19  1716   TROPONINI 0.013         Urinalysis  Urinalysis  Recent Labs   Lab 07/08/19  1810   COLORU Yellow   SPECGRAV 1.020   PHUR 5.0   PROTEINUA Negative   NITRITE Negative   LEUKOCYTESUR Negative   UROBILINOGEN Negative     Recent Labs   Lab 07/08/19  1810   COLORU Yellow   SPECGRAV 1.020   PHUR 5.0   PROTEINUA Negative       Micro  Microbiology Results (last 7 days)     Procedure Component Value Units Date/Time    Blood culture x two cultures. Draw prior to antibiotics. [[id number]] Collected:  07/08/19 1716    Order Status:  Sent Specimen:  Blood from Wrist, Right Updated:  07/08/19 1935    Blood culture x two cultures. Draw prior to antibiotics. [[id number]] Collected:  07/08/19 1717    Order Status:  Sent Specimen:  Blood from Peripheral, Wrist, Left Updated:  07/08/19 1935             Imaging   Imaging Results          X-Ray Chest AP Portable (Final result)  Result time 07/08/19 18:11:45    Final result by Sohail Kline MD (07/08/19 18:11:45)                 Impression:      Cardiomegaly with otherwise no acute  cardiopulmonary process identified.      Electronically signed by: Sohail Kline MD  Date:    07/08/2019  Time:    18:11             Narrative:    EXAMINATION:  XR CHEST AP PORTABLE    CLINICAL HISTORY:  fever;    TECHNIQUE:  Single frontal view of the chest was performed.    COMPARISON:  05/15/2019.    FINDINGS:  Cardiac silhouette is enlarged but stable in size.  Lungs are symmetrically expanded.  Mild coarse interstitial lung changes are again seen.  No evidence of focal consolidative process, pneumothorax, or significant effusion.  No acute osseous abnormality identified.                                  Assessment/Plan:     * Hypomagnesemia  Mg in ED found to be 1.3  Will replace with 2 grams IV mag sulfate over 2 hours   Check magnesium daily   Continue to monitor     Sepsis  Patient was found febrile to 100.6 and tachycardic with redness and swelling of right lower extremity  Started on fluids in ED, vancomycin and cefepime   WBC 11.15  Continue maintenance fluids   FU blood cultures   Continue to monitor       Chronic atrial fibrillation  Patient is in chronic atrial flutter   On Warfarin, INR 4.1 will hold warfarin for now and recheck INR   Monitor patient on telemetry   Denies chest pain, palpitations or shortness of breath   Continue Metoprolol 100 mg   Continue to monitor       Cellulitis of lower extremity  Chronic right sided LE swelling   Reports worsening redness and swelling on the morning of admission associated with fevers, nausea and vomiting   Started IV fluids, Vancomycin and Cefepime  Blood cultures pending  Continue IV antibiotics   Continue to monitor     HTN (hypertension)  Continue home medications  Goal BP <140/80  Hydralazine 10 mg IV PRN for SBP >175        Long term (current) use of anticoagulants  Warfarin 5mg on Mondays and Fridays and 10 mg AOD   INR elevated to 4.1 on admission  Hold Warfarin at this time   Goal INR 2-3  Continue to monitor       VTE Risk Mitigation (From  admission, onward)    None        DVT ppx: Lovenox  Code: Full   Diet: Cardiac   Dispo: Pending clinical improvement     Akanksha Vogel MD  Department of Hospital Medicine   [facility]

## 2019-07-09 NOTE — NURSING
Vn cued in to pt's room with permission. Admission questions completed. Home medications reviewed and updated. Dr Vogel informed that pt states he takes 4 tablets of 10 mg of Lisinopril at night (40 mg total) and that Lisinopril 10 mg was ordered also notified md that pt takes coumadin at home. md states she will keep lisinopril as ordered for now and that they are holding coumadin secondary to elevated INR and that team is going to check pt's INR in the afternoon again.

## 2019-07-09 NOTE — PLAN OF CARE
Problem: Adult Inpatient Plan of Care  Goal: Plan of Care Review  Outcome: Ongoing (interventions implemented as appropriate)  Plan of care reviewed with patient. Voiced understanding. AFIB on monitor with no red alarms noted. Right leg wrapped today per orders. No acute distress noted. Side rails x3, bed low, call bell within reach. Maintain bed alarm for patient safety. Patient will be monitored overnight.

## 2019-07-09 NOTE — ED NOTES
Recd report assumed  Care at this time. Pt in bed NAD, No complaints at this time with Vanco infusing to #20 R interior forearm. Female at bedside. Pt on CM and cont pulse ox.

## 2019-07-09 NOTE — ASSESSMENT & PLAN NOTE
Mg in ED found to be 1.3  Will replace with 2 grams IV mag sulfate over 2 hours   Check magnesium daily   Continue to monitor

## 2019-07-09 NOTE — PT/OT/SLP PROGRESS
Occupational Therapy  D/C OT eval/tx orders    Patient Name:  Drew Farrar   MRN:  [id number]    Pt found sitting EOB eating lunch. Edu on role of OT & pt verbalized mult refusals for OT svcs at this time. Instructed pt to ask nsg/MD to re-order OT svcs if need arises during acute stay. Pt verbalized understanding.    RUPERT Francois  7/9/2019

## 2019-07-09 NOTE — PROGRESS NOTES
Patient went to ER 7/8 for fever and wound care. He is still admitted today. Resume care once d/c    7/10 still admitted. F/u for d/c

## 2019-07-09 NOTE — NURSING
Patient arrived from ER via stretcher. Appears to be in no distress. Vitals stable. Magnesium infusion maintained. Will continue to monitor.

## 2019-07-09 NOTE — PROGRESS NOTES
Pharmacokinetic Initial Assessment: IV Vancomycin    Assessment/Plan:    Initiate intravenous vancomycin with loading dose of  mg once followed by a maintenance dose of vancomycin 2000mg IV every 12 hours  Desired empiric serum trough concentration is 10 to 20 mcg/mL.  Draw vancomycin trough level 30 min prior to fourth dose on 7-10 at approximately 06:30   Pharmacy will continue to follow and monitor vancomycin.      Please contact pharmacy at extension [phone] with any questions regarding this assessment.     Thank you for the consult,   Quan ROSS [place]     Patient brief summary:  Drew Farrar is a 53 y.o. male initiated on antimicrobial therapy with IV Vancomycin for treatment of suspected skin & soft tissue    Drug Allergies:   Review of patient's allergies indicates:   Allergen Reactions    Contrast media Other (See Comments)     Severe chest pain    Food allergy formula [glutamine-c-quercet-selen-brom]      Allergic to green peas; Heart failure.    Iodinated contrast- oral and iv dye Other (See Comments)     Chest pain    Peas Hives    Ibuprofen Swelling    Latex, natural rubber Hives    Pcn [penicillins] Hives    Butisol [butabarbital] Rash     Peeling skin       Actual Body Weight:   167.4 kg    Renal Function:   Estimated Creatinine Clearance: 140 mL/min (based on SCr of 1.1 mg/dL).,     Dialysis Method (if applicable):  N/A     CBC (last 72 hours):  Recent Labs   Lab Result Units 07/08/19  1716   WBC K/uL 11.15   Hemoglobin g/dL 10.3*   Hematocrit % 33.4*   Platelets K/uL 178   Gran% % 90.2*   Lymph% % 3.0*   Mono% % 6.4   Eosinophil% % 0.3   Basophil% % 0.1   Differential Method  Automated       Metabolic Panel (last 72 hours):  Recent Labs   Lab Result Units 07/08/19  1716 07/08/19  1810   Sodium mmol/L 139  --    Potassium mmol/L 4.7  --    Chloride mmol/L 106  --    CO2 mmol/L 23  --    Glucose mg/dL 107  --    Glucose, UA   --  Negative   BUN, Bld mg/dL 27*  --    Creatinine mg/dL 1.1  --     Albumin g/dL 3.5  --    Total Bilirubin mg/dL 0.8  --    Alkaline Phosphatase U/L 181*  --    AST U/L 30  --    ALT U/L 27  --    Magnesium mg/dL 1.3*  --    Phosphorus mg/dL 2.9  --        Drug levels (last 3 results):  No results for input(s): VANCOMYCINRA, VANCOMYCINPE, VANCOMYCINTR in the last 72 hours.    Microbiologic Results:  Microbiology Results (last 7 days)       Procedure Component Value Units Date/Time    Blood culture x two cultures. Draw prior to antibiotics. [[id number]] Collected:  07/08/19 1716    Order Status:  Sent Specimen:  Blood from Wrist, Right Updated:  07/08/19 1935    Blood culture x two cultures. Draw prior to antibiotics. [[id number]] Collected:  07/08/19 1717    Order Status:  Sent Specimen:  Blood from Peripheral, Wrist, Left Updated:  07/08/19 1935          Update trough to be drawn 05:30 7-10

## 2019-07-09 NOTE — HPI
This is a 53 y.o. male who has a past medical history of *Atrial fibrillation,  Bipolar disorder, Congenital heart disease, Deep vein thrombosis, History of prior ablation treatment, Hypertension, Obesity, Stroke, Thyroid disease, Venous stasis ulcer of lower extremity, presents to the ED with right lower extremity pain and fever. He is currently following in coumadin clinic for chronic atrial fibrillation and states he felt tired and noticed he had a fever to 103 at home associated with nausea and vomiting x2, NBNB, denies any sick contacts. He denies cough, congestion, chest pain, SOB, abdominal pain, or flank pain. He has chronic right leg swelling, and was previously followed in wound care clinic for chronic venous statis ulcers, but was discharged months ago. He is complaint with anticoagulation for his history of DVT and atrial fibrillation and had an INR of 4.1 today. He denies any other symptoms at time of interview.

## 2019-07-09 NOTE — ASSESSMENT & PLAN NOTE
Patient was found febrile to 100.6 and tachycardic with redness and swelling of right lower extremity  Started on fluids in ED, vancomycin and cefepime   WBC 11.15  Continue maintenance fluids   FU blood cultures   Continue to monitor

## 2019-07-09 NOTE — PLAN OF CARE
Visit with pt for d/c planning.  Pt states he lives at home with his spouse Aric and has assistance if needed.  Uses RW given after previous admission months ago, has a w/c at home when needed.  Pt states his coumadin is managed by [facility].  He denies any need for assistance at home but states he has used [facility] in past and would select agency if need be after this d/c.    Discharge planning brochure provided. White board updated with CM name & contact info.  Pt encouraged to call with any questions or needs. CM will continue to follow patient throughout the transitions of care, and assist with any discharge needs.       07/09/19 1513   Discharge Assessment   Assessment Type Discharge Planning Assessment   Confirmed/corrected address and phone number on facesheet? Yes   Assessment information obtained from? Patient   Expected Length of Stay (days) 2   Communicated expected length of stay with patient/caregiver yes   Prior to hospitilization cognitive status: Alert/Oriented   Prior to hospitalization functional status: Assistive Equipment;Independent   Current cognitive status: Alert/Oriented   Current Functional Status: Independent;Assistive Equipment   Lives With spouse   Able to Return to Prior Arrangements yes   Is patient able to care for self after discharge? Yes   Who are your caregiver(s) and their phone number(s)?   (spouse Aric [phone])   Readmission Within the Last 30 Days no previous admission in last 30 days   Patient currently being followed by outpatient case management? No   Patient currently receives any other outside agency services? No   Equipment Currently Used at Home wheelchair;walker, rolling   Do you have any problems affording any of your prescribed medications? No   Is the patient taking medications as prescribed? yes   Does the patient have transportation home? Yes   Transportation Anticipated family or friend will provide   Does the patient  receive services at the Coumadin Clinic? Yes   Discharge Plan A Home with family   Discharge Plan B Home with family;Home Health  ([facility] if needed at d/c)   Patient/Family in Agreement with Plan yes       Alice Real RN    [phone]

## 2019-07-09 NOTE — ASSESSMENT & PLAN NOTE
Warfarin 5mg on Mondays and Fridays and 10 mg AOD   INR elevated to 4.1 on admission  Hold Warfarin at this time   Goal INR 2-3  Continue to monitor

## 2019-07-09 NOTE — ASSESSMENT & PLAN NOTE
Patient is in chronic atrial flutter   On Warfarin, INR 4.1 will hold warfarin for now and recheck INR   Monitor patient on telemetry   Denies chest pain, palpitations or shortness of breath   Continue Metoprolol 100 mg   Continue to monitor

## 2019-07-09 NOTE — ASSESSMENT & PLAN NOTE
Warfarin 5mg on Mondays and Fridays and 10 mg AOD   INR elevated to 4.1 on admission  Hold Warfarin at this time   Goal INR 2-3  Repeat INR  Continue to monitor

## 2019-07-09 NOTE — NURSING
After ambulating with therapy patient c/o 8/10 midsternal chest pain. States pain is like someone sitting on his chest. MD notified. STAT ekg, nitroglycerin, troponin ordered.

## 2019-07-09 NOTE — SUBJECTIVE & OBJECTIVE
Past Medical History:   Diagnosis Date    *Atrial fibrillation     Atrial fibrillation     Atrial fibrillation Feb 23, 2016    Bipolar disorder     Congenital heart disease     s/p surgical intervention 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 of age    Deep vein thrombosis     DVT of leg (deep venous thrombosis)     left leg    History of prior ablation treatment     10/9/13    Hypertension     Obesity     Stroke     Thyroid disease     Venous stasis ulcer of lower extremity, unspecified laterality 12/14/2012    Venous ulcer        Past Surgical History:   Procedure Laterality Date    ANGIOPLASTY      ARTHROSCOPY-KNEE W/ CHONDROPLASTY Right 2/23/2016    Performed by Alejandro Villanueva MD at [facility]    CARDIAC SURGERY      open heart surgery 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 old    EYE SURGERY      left eye cataract/right eye glaucoma    TIMO FILTER PLACEMENT      Dr Calix ([facility])    KNEE SURGERY      l and r     MULTIPLE TOOTH EXTRACTIONS      Sclerotherapy N/A 2/21/2019    Performed by Gomez Lantigua MD at [facility] CATH LAB/EP    SKIN GRAFT      left leg    SYNOVECTOMY-KNEE Right 2/23/2016    Performed by Alejandro Villanueva MD at [facility]       Review of patient's allergies indicates:   Allergen Reactions    Contrast media Other (See Comments)     Severe chest pain    Food allergy formula [glutamine-c-quercet-selen-brom]      Allergic to green peas; Heart failure.    Iodinated contrast- oral and iv dye Other (See Comments)     Chest pain    Peas Hives    Ibuprofen Swelling    Latex, natural rubber Hives    Pcn [penicillins] Hives    Butisol [butabarbital] Rash     Peeling skin       No current facility-administered medications on file prior to encounter.      Current Outpatient Medications on File Prior to Encounter   Medication Sig    HYDROcodone-acetaminophen (NORCO) 5-325 mg per tablet Take 1 tablet by mouth every 6 (six) hours as needed for Pain.    lisinopril 10 MG tablet Take 1 tablet (10 mg total) by mouth once daily.     metoprolol succinate (TOPROL-XL) 100 MG 24 hr tablet Take 1 tablet (100 mg total) by mouth once daily.    nystatin (MYCOSTATIN) powder Apply topically 2 (two) times daily.    torsemide (DEMADEX) 20 MG Tab Take 2 tablets (40 mg total) by mouth once daily.    warfarin (COUMADIN) 10 MG tablet Take 1.5-2 tablets (15-20 mg total) by mouth once daily.     Family History     Problem Relation (Age of Onset)    Diabetes Father, Maternal Grandfather    Heart disease Father, Maternal Grandmother, Maternal Grandfather    Stroke Maternal Grandfather        Tobacco Use    Smoking status: Former Smoker     Packs/day: 1.00     Years: 6.00     Pack years: 6.00     Types: Cigarettes    Smokeless tobacco: Former User    Tobacco comment: quit by age 27yrs old   Substance and Sexual Activity    Alcohol use: Yes     Alcohol/week: 0.6 oz     Types: 1 Glasses of wine per week     Frequency: Monthly or less     Comment: 1 glass of wine a week    Drug use: No    Sexual activity: Yes     Partners: Female     Birth control/protection: None     Review of Systems   Constitutional: Negative for activity change, appetite change, fatigue and unexpected weight change.   HENT: Negative for congestion, hearing loss, postnasal drip, rhinorrhea and sinus pain.    Eyes: Negative for photophobia and visual disturbance.   Respiratory: Negative for apnea, cough, choking, chest tightness, shortness of breath and wheezing.    Cardiovascular: Positive for leg swelling (chronic right leg swelling that acutely worsened this AM ). Negative for chest pain and palpitations.   Gastrointestinal: Positive for nausea and vomiting (x2). Negative for abdominal distention, abdominal pain, constipation and diarrhea.   Endocrine: Negative for cold intolerance, heat intolerance and polyuria.   Genitourinary: Negative for difficulty urinating, flank pain, frequency and urgency.   Musculoskeletal: Positive for gait problem. Negative for arthralgias, back pain, joint  swelling and myalgias.   Skin: Positive for color change and wound. Negative for rash.   Neurological: Negative for dizziness, weakness, light-headedness, numbness and headaches.     Objective:     Vital Signs (Most Recent):  Temp: 97.8 °F (36.6 °C) (07/08/19 2101)  Pulse: (!) 111 (07/08/19 2212)  Resp: (!) 33 (07/08/19 2212)  BP: (!) 162/70 (07/08/19 2212)  SpO2: 100 % (07/08/19 2212) Vital Signs (24h Range):  Temp:  [97.8 °F (36.6 °C)-100.9 °F (38.3 °C)] 97.8 °F (36.6 °C)  Pulse:  [104-135] 111  Resp:  [20-37] 33  SpO2:  [94 %-100 %] 100 %  BP: (124-187)/(59-84) 162/70     Weight: (!) 172.4 kg (380 lb)  Body mass index is 47.5 kg/m².    Physical Exam   Constitutional: He is oriented to person, place, and time. He appears well-developed and well-nourished. No distress.   HENT:   Head: Normocephalic and atraumatic.   Nose: Nose normal.   Mouth/Throat: Oropharynx is clear and moist. No oropharyngeal exudate.   Eyes: Pupils are equal, round, and reactive to light. Conjunctivae and EOM are normal. Right eye exhibits no discharge. Left eye exhibits no discharge. No scleral icterus.   Neck: Normal range of motion. Neck supple.   Cardiovascular: Normal rate. Exam reveals no gallop and no friction rub.   No murmur heard.  Irregular rate and rhythm   Pulmonary/Chest: Effort normal and breath sounds normal. No stridor. No respiratory distress. He has no wheezes. He has no rales. He exhibits no tenderness.   Abdominal: Soft. Bowel sounds are normal. He exhibits no distension and no mass. There is no tenderness. There is no rebound.   Musculoskeletal: Normal range of motion. He exhibits edema and tenderness. He exhibits no deformity.   Right leg and calf tenderness, erythema and warm of skin, no purulence or fluctuance   Chronic skin changes and thickening of the shins and medial maleolus     Lymphadenopathy:     He has no cervical adenopathy.   Neurological: He is alert and oriented to person, place, and time. No sensory  deficit.   Skin: Skin is warm and dry. Capillary refill takes less than 2 seconds. No rash noted. He is not diaphoretic. There is erythema. No pallor.   Psychiatric: He has a normal mood and affect. His behavior is normal.   Nursing note and vitals reviewed.        CRANIAL NERVES     CN III, IV, VI   Pupils are equal, round, and reactive to light.  Extraocular motions are normal.   Left Extremity:     Right extremity:        Significant Labs:   Recent Labs     07/08/19  1716   WBC 11.15   HGB 10.3*   HCT 33.4*      MCV 79*   RDW 16.6*       Recent Labs     07/08/19  1716      K 4.7      CO2 23      BUN 27*   CREATININE 1.1   CALCIUM 8.7   PROT 7.3   ALBUMIN 3.5   BILITOT 0.8   ALKPHOS 181*   AST 30   ALT 27   ANIONGAP 10   ESTGFRAFRICA >60   EGFRNONAA >60       Recent Labs     07/08/19  1716   MG 1.3*   PHOS 2.9       Coags  Lab Results   Component Value Date    INR 4.1 (H) 07/08/2019    INR 3.6 (H) 07/01/2019    INR 2.3 (H) 06/24/2019    APTT 38.6 (H) 05/15/2019    APTT 29.6 10/26/2016    APTT 29.2 12/24/2014     Recent Labs   Lab 07/08/19  1126   INR 4.1*       A1c:   Lab Results   Component Value Date    HGBA1C 5.1 07/10/2017   , Last Gluc: No results for input(s): POCTGLUCOSE in the last 168 hours.    TSH:   Lab Results   Component Value Date    TSH 0.278 (L) 03/07/2018         Cardiac Enzymes  Recent Labs     07/08/19  1716   TROPONINI 0.013         Urinalysis  Urinalysis  Recent Labs   Lab 07/08/19  1810   COLORU Yellow   SPECGRAV 1.020   PHUR 5.0   PROTEINUA Negative   NITRITE Negative   LEUKOCYTESUR Negative   UROBILINOGEN Negative     Recent Labs   Lab 07/08/19  1810   COLORU Yellow   SPECGRAV 1.020   PHUR 5.0   PROTEINUA Negative       Micro  Microbiology Results (last 7 days)     Procedure Component Value Units Date/Time    Blood culture x two cultures. Draw prior to antibiotics. [[id number]] Collected:  07/08/19 1716    Order Status:  Sent Specimen:  Blood from Wrist, Right  Updated:  07/08/19 1935    Blood culture x two cultures. Draw prior to antibiotics. [[id number]] Collected:  07/08/19 1717    Order Status:  Sent Specimen:  Blood from Peripheral, Wrist, Left Updated:  07/08/19 1935             Imaging   Imaging Results          X-Ray Chest AP Portable (Final result)  Result time 07/08/19 18:11:45    Final result by Sohail Kline MD (07/08/19 18:11:45)                 Impression:      Cardiomegaly with otherwise no acute cardiopulmonary process identified.      Electronically signed by: Sohail Kline MD  Date:    07/08/2019  Time:    18:11             Narrative:    EXAMINATION:  XR CHEST AP PORTABLE    CLINICAL HISTORY:  fever;    TECHNIQUE:  Single frontal view of the chest was performed.    COMPARISON:  05/15/2019.    FINDINGS:  Cardiac silhouette is enlarged but stable in size.  Lungs are symmetrically expanded.  Mild coarse interstitial lung changes are again seen.  No evidence of focal consolidative process, pneumothorax, or significant effusion.  No acute osseous abnormality identified.

## 2019-07-09 NOTE — ASSESSMENT & PLAN NOTE
Chronic right sided LE swelling   Reports worsening redness and swelling on the morning of admission associated with fevers, nausea and vomiting   Started IV fluids, Vancomycin and Cefepime  Blood cultures: NGTD  Continue IV antibiotics   Order LE US  Continue to monitor

## 2019-07-10 ENCOUNTER — TELEPHONE (OUTPATIENT)
Dept: HOME HEALTH SERVICES | Facility: HOSPITAL | Age: 54
End: 2019-07-10
Payer: MEDICARE

## 2019-07-10 VITALS
TEMPERATURE: 98 F | HEIGHT: 78 IN | DIASTOLIC BLOOD PRESSURE: 64 MMHG | SYSTOLIC BLOOD PRESSURE: 107 MMHG | HEART RATE: 118 BPM | OXYGEN SATURATION: 97 % | RESPIRATION RATE: 18 BRPM | WEIGHT: 315 LBS | BODY MASS INDEX: 36.45 KG/M2

## 2019-07-10 PROBLEM — A41.9 SEPSIS: Status: RESOLVED | Noted: 2019-04-24 | Resolved: 2019-07-10

## 2019-07-10 LAB
ALBUMIN SERPL BCP-MCNC: 3.1 G/DL (ref 3.5–5.2)
ALP SERPL-CCNC: 145 U/L (ref 55–135)
ALT SERPL W/O P-5'-P-CCNC: 26 U/L (ref 10–44)
ANION GAP SERPL CALC-SCNC: 10 MMOL/L (ref 8–16)
AST SERPL-CCNC: 28 U/L (ref 10–40)
BASOPHILS # BLD AUTO: 0.01 K/UL (ref 0–0.2)
BASOPHILS NFR BLD: 0.2 % (ref 0–1.9)
BILIRUB SERPL-MCNC: 1.2 MG/DL (ref 0.1–1)
BUN SERPL-MCNC: 34 MG/DL (ref 6–20)
CALCIUM SERPL-MCNC: 8.7 MG/DL (ref 8.7–10.5)
CHLORIDE SERPL-SCNC: 101 MMOL/L (ref 95–110)
CO2 SERPL-SCNC: 28 MMOL/L (ref 23–29)
CREAT SERPL-MCNC: 1.2 MG/DL (ref 0.5–1.4)
DIFFERENTIAL METHOD: ABNORMAL
EOSINOPHIL # BLD AUTO: 0.2 K/UL (ref 0–0.5)
EOSINOPHIL NFR BLD: 3.3 % (ref 0–8)
ERYTHROCYTE [DISTWIDTH] IN BLOOD BY AUTOMATED COUNT: 16.9 % (ref 11.5–14.5)
EST. GFR  (AFRICAN AMERICAN): >60 ML/MIN/1.73 M^2
EST. GFR  (NON AFRICAN AMERICAN): >60 ML/MIN/1.73 M^2
GLUCOSE SERPL-MCNC: 92 MG/DL (ref 70–110)
HCT VFR BLD AUTO: 30.8 % (ref 40–54)
HGB BLD-MCNC: 9.5 G/DL (ref 14–18)
INR PPP: 1.9 (ref 0.8–1.2)
LYMPHOCYTES # BLD AUTO: 0.7 K/UL (ref 1–4.8)
LYMPHOCYTES NFR BLD: 11.9 % (ref 18–48)
MAGNESIUM SERPL-MCNC: 1.6 MG/DL (ref 1.6–2.6)
MCH RBC QN AUTO: 24.3 PG (ref 27–31)
MCHC RBC AUTO-ENTMCNC: 30.8 G/DL (ref 32–36)
MCV RBC AUTO: 79 FL (ref 82–98)
MONOCYTES # BLD AUTO: 0.5 K/UL (ref 0.3–1)
MONOCYTES NFR BLD: 8.9 % (ref 4–15)
NEUTROPHILS # BLD AUTO: 4.5 K/UL (ref 1.8–7.7)
NEUTROPHILS NFR BLD: 75.7 % (ref 38–73)
PHOSPHATE SERPL-MCNC: 4.9 MG/DL (ref 2.7–4.5)
PLATELET # BLD AUTO: 185 K/UL (ref 150–350)
PMV BLD AUTO: 9.2 FL (ref 9.2–12.9)
POTASSIUM SERPL-SCNC: 3.8 MMOL/L (ref 3.5–5.1)
PROT SERPL-MCNC: 7.5 G/DL (ref 6–8.4)
PROTHROMBIN TIME: 19.8 SEC (ref 9–12.5)
RBC # BLD AUTO: 3.91 M/UL (ref 4.6–6.2)
SODIUM SERPL-SCNC: 139 MMOL/L (ref 136–145)
VANCOMYCIN TROUGH SERPL-MCNC: 35.4 UG/ML (ref 10–22)
WBC # BLD AUTO: 5.98 K/UL (ref 3.9–12.7)

## 2019-07-10 PROCEDURE — G0378 HOSPITAL OBSERVATION PER HR: HCPCS

## 2019-07-10 PROCEDURE — 63600175 PHARM REV CODE 636 W HCPCS: Performed by: FAMILY MEDICINE

## 2019-07-10 PROCEDURE — 85025 COMPLETE CBC W/AUTO DIFF WBC: CPT

## 2019-07-10 PROCEDURE — 83735 ASSAY OF MAGNESIUM: CPT

## 2019-07-10 PROCEDURE — 36415 COLL VENOUS BLD VENIPUNCTURE: CPT

## 2019-07-10 PROCEDURE — 94761 N-INVAS EAR/PLS OXIMETRY MLT: CPT

## 2019-07-10 PROCEDURE — 85610 PROTHROMBIN TIME: CPT

## 2019-07-10 PROCEDURE — 25000003 PHARM REV CODE 250: Performed by: STUDENT IN AN ORGANIZED HEALTH CARE EDUCATION/TRAINING PROGRAM

## 2019-07-10 PROCEDURE — 80202 ASSAY OF VANCOMYCIN: CPT

## 2019-07-10 PROCEDURE — 25000003 PHARM REV CODE 250: Performed by: FAMILY MEDICINE

## 2019-07-10 PROCEDURE — 80053 COMPREHEN METABOLIC PANEL: CPT

## 2019-07-10 PROCEDURE — 84100 ASSAY OF PHOSPHORUS: CPT

## 2019-07-10 RX ORDER — SULFAMETHOXAZOLE AND TRIMETHOPRIM 800; 160 MG/1; MG/1
1 TABLET ORAL DAILY
Qty: 10 TABLET | Refills: 0 | Status: SHIPPED | OUTPATIENT
Start: 2019-07-10 | End: 2019-07-10 | Stop reason: HOSPADM

## 2019-07-10 RX ORDER — TORSEMIDE 20 MG/1
40 TABLET ORAL DAILY
Status: DISCONTINUED | OUTPATIENT
Start: 2019-07-10 | End: 2019-07-10 | Stop reason: HOSPADM

## 2019-07-10 RX ORDER — METOPROLOL SUCCINATE 50 MG/1
50 TABLET, EXTENDED RELEASE ORAL ONCE
Status: COMPLETED | OUTPATIENT
Start: 2019-07-10 | End: 2019-07-10

## 2019-07-10 RX ORDER — WARFARIN 7.5 MG/1
7.5 TABLET ORAL DAILY
Qty: 30 TABLET | Refills: 11 | Status: SHIPPED | OUTPATIENT
Start: 2019-07-10 | End: 2019-07-17 | Stop reason: SDUPTHER

## 2019-07-10 RX ORDER — DOXYCYCLINE HYCLATE 100 MG
100 TABLET ORAL EVERY 12 HOURS
Qty: 20 TABLET | Refills: 0 | Status: SHIPPED | OUTPATIENT
Start: 2019-07-10 | End: 2019-07-25

## 2019-07-10 RX ADMIN — SENNOSIDES,DOCUSATE SODIUM 1 TABLET: 8.6; 5 TABLET, FILM COATED ORAL at 09:07

## 2019-07-10 RX ADMIN — WARFARIN SODIUM 7.5 MG: 2.5 TABLET ORAL at 12:07

## 2019-07-10 RX ADMIN — METOPROLOL SUCCINATE 50 MG: 50 TABLET, EXTENDED RELEASE ORAL at 09:07

## 2019-07-10 RX ADMIN — TORSEMIDE 40 MG: 20 TABLET ORAL at 11:07

## 2019-07-10 RX ADMIN — CEFEPIME HYDROCHLORIDE 2 G: 2 INJECTION, SOLUTION INTRAVENOUS at 04:07

## 2019-07-10 NOTE — PROGRESS NOTES
[facility] Hospital Medicine  Progress Note    Patient Name: Drew Farrar  MRN: [id number]  Patient Class: IP- Inpatient   Admission Date: 7/8/2019  Length of Stay: 2 days  Attending Physician: Severyn Yaroshevsky, MD  Primary Care Provider: David Larsen MD        Subjective:     Principal Problem:Hypomagnesemia      HPI:  This is a 53 y.o. male who has a past medical history of *Atrial fibrillation,  Bipolar disorder, Congenital heart disease, Deep vein thrombosis, History of prior ablation treatment, Hypertension, Obesity, Stroke, Thyroid disease, Venous stasis ulcer of lower extremity, presents to the ED with right lower extremity pain and fever. He is currently following in coumadin clinic for chronic atrial fibrillation and states he felt tired and noticed he had a fever to 103 at home associated with nausea and vomiting x2, NBNB, denies any sick contacts. He denies cough, congestion, chest pain, SOB, abdominal pain, or flank pain. He has chronic right leg swelling, and was previously followed in wound care clinic for chronic venous statis ulcers, but was discharged months ago. He is complaint with anticoagulation for his history of DVT and atrial fibrillation and had an INR of 4.1 today. He denies any other symptoms at time of interview.        Overview/Hospital Course:  No notes on file    Interval History: Reports feeling better. Shortness of breath with exertion. Tolerating diet    Review of Systems   Constitutional: Negative for appetite change and fever.   HENT: Negative for congestion.    Respiratory: Negative for shortness of breath.    Cardiovascular: Positive for leg swelling. Negative for chest pain and palpitations.   Gastrointestinal: Negative for diarrhea and nausea.   Neurological: Negative for dizziness and weakness.     Objective:     Vital Signs (Most Recent):  Temp: 98.1 °F (36.7 °C) (07/10/19 0833)  Pulse: 76 (07/10/19 0833)  Resp: (!) 24 (07/10/19 0833)  BP: (!)  105/51 (07/10/19 0833)  SpO2: 96 % (07/10/19 0236) Vital Signs (24h Range):  Temp:  [96.1 °F (35.6 °C)-98.1 °F (36.7 °C)] 98.1 °F (36.7 °C)  Pulse:  [] 76  Resp:  [19-26] 24  SpO2:  [94 %-99 %] 96 %  BP: ()/(45-66) 105/51     Weight: (!) 167.4 kg (369 lb 0.8 oz)  Body mass index is 40.54 kg/m².    Intake/Output Summary (Last 24 hours) at 7/10/2019 0942  Last data filed at 7/10/2019 0800  Gross per 24 hour   Intake 355 ml   Output 2575 ml   Net -2220 ml      Physical Exam   Constitutional: He appears well-developed and well-nourished. No distress.   HENT:   Head: Normocephalic and atraumatic.   Mouth/Throat: No oropharyngeal exudate.   Eyes: Pupils are equal, round, and reactive to light. EOM are normal.   Neck: Normal range of motion.   Cardiovascular:   No murmur heard.  Irregular rate and rhythm    Pulmonary/Chest: Effort normal. No respiratory distress.   Abdominal: Soft. He exhibits no distension.   Musculoskeletal: He exhibits edema and tenderness.   Right leg and calf tenderness, currently wrapped, dressing c/d/i   Neurological: He is alert. He exhibits normal muscle tone. Coordination normal.   Skin: Skin is warm. He is not diaphoretic.   Nursing note and vitals reviewed.      Significant Labs:   CBC:   Recent Labs   Lab 07/08/19  1716 07/09/19  0218 07/10/19  0502   WBC 11.15 11.53 5.98   HGB 10.3* 9.4* 9.5*   HCT 33.4* 30.9* 30.8*    169 185     CMP:   Recent Labs   Lab 07/08/19 1716 07/09/19 0218 07/10/19  0502    138 139   K 4.7 4.1 3.8    105 101   CO2 23 25 28    124* 92   BUN 27* 30* 34*   CREATININE 1.1 1.1 1.2   CALCIUM 8.7 8.5* 8.7   PROT 7.3 7.0 7.5   ALBUMIN 3.5 2.9* 3.1*   BILITOT 0.8 1.0 1.2*   ALKPHOS 181* 147* 145*   AST 30 24 28   ALT 27 23 26   ANIONGAP 10 8 10   EGFRNONAA >60 >60 >60       Significant Imaging: I have reviewed all pertinent imaging results/findings within the past 24 hours.      Assessment/Plan:      * Hypomagnesemia  Mg in ED found to  be 1.3  Will replace with 2 grams IV mag sulfate over 2 hours   This AM, Mg is 1.9  Resolved    Cellulitis of lower extremity  Chronic right sided LE swelling   Reports worsening redness and swelling on the morning of admission associated with fevers, nausea and vomiting   Started IV fluids, Vancomycin and Cefepime  Blood cultures: NGTD  Continue IV antibiotics   Order LE US- No sonographic evidence of DVT in the bilateral lower extremities  Continue to monitor     Sepsis  In the ED, patient was febrile to 100.6 and tachycardic with redness and swelling of right lower extremity. Started on fluids in ED, vancomycin and cefepime   WBC 11.15  Continue maintenance fluids   Blood Cultures: NGTD  Continue to monitor       Hyperthyroidism  Hx of Hyperthyroidism with treatment with Methimazole  Patient discontinued Methimazole due to weight gain after discussion with Endocrinology  On admission, TSH: <0.010 with Free T4: 2.21  Will discuss restarting Methimazole      Long term (current) use of anticoagulants  Warfarin 5mg on Mondays and Fridays and 10 mg AOD   INR elevated to 4.1 on admission  Hold Warfarin at this time   Goal INR 2-3  Repeat INR  Continue to monitor     HTN (hypertension)  Continue home medications  Goal BP <140/80  Hydralazine 10 mg IV PRN for SBP >175        Chronic atrial fibrillation  Patient is in chronic atrial flutter   On Warfarin, INR 4.1 will hold warfarin for now and recheck INR   Monitor patient on telemetry   Denies chest pain, palpitations or shortness of breath   Continue Metoprolol 100 mg   Continue to monitor         VTE Risk Mitigation (From admission, onward)        Ordered     IP VTE HIGH RISK PATIENT  Once      07/08/19 2345     enoxaparin injection 40 mg  Daily      07/08/19 2345                Krystal Lin MD  Department of Hospital Medicine   [facility]

## 2019-07-10 NOTE — PLAN OF CARE
D/C rounds complete. All questions answered.  Nurse to discuss d/c medications.  Discussed need to keep f/u appts, adherence to medication regimen for health maintenance, verbalized understanding.    Pt has RW at bedside, informed by [facility] that bariatric bscommode will be delivered to his home, unable to pull from DME closet.       07/10/19 1338   Final Note   Assessment Type Final Discharge Note   Anticipated Discharge Disposition Home   Hospital Follow Up  Appt(s) scheduled? Yes   Discharge plans and expectations educations in teach back method with documentation complete? Yes   Right Care Referral Info   Post Acute Recommendation No Care       Alice Real, RN    [phone]

## 2019-07-10 NOTE — ASSESSMENT & PLAN NOTE
Chronic right sided LE swelling   Reports worsening redness and swelling on the morning of admission associated with fevers, nausea and vomiting   Started IV fluids, Vancomycin and Cefepime  Blood cultures: NGTD  Continue IV antibiotics   Order LE US- No sonographic evidence of DVT in the bilateral lower extremities  Continue to monitor

## 2019-07-10 NOTE — PROGRESS NOTES
Patient called today 07/10/19. He was discharged from ([facility]) he went home on (Warfarin) 7.5 daily. He started on (Doxycycline 100mg) 1 tablet q12 hrs) for 10days.

## 2019-07-10 NOTE — PLAN OF CARE
Problem: Adult Inpatient Plan of Care  Goal: Plan of Care Review  Outcome: Ongoing (interventions implemented as appropriate)  Patient remained in bed with bed alarm off (Bed foot will not fit with patient. Extender already in use. yellow wrist band and nonskid yelllow socks on one foot. Bed in lowest position, wheels locked and call light within reach. Plan of care reviewed with patient and spouse. Patient positioned self in bed. Ambulate with assistance. IVs remained intact. Gave IV ABX.  Vital signs remained stable. NSR on telemetry. No chest pain noted during shift. Will continue to monitor.

## 2019-07-10 NOTE — SUBJECTIVE & OBJECTIVE
Interval History: Reports feeling better. Shortness of breath with exertion. Tolerating diet    Review of Systems   Constitutional: Negative for appetite change and fever.   HENT: Negative for congestion.    Respiratory: Negative for shortness of breath.    Cardiovascular: Positive for leg swelling. Negative for chest pain and palpitations.   Gastrointestinal: Negative for diarrhea and nausea.   Neurological: Negative for dizziness and weakness.     Objective:     Vital Signs (Most Recent):  Temp: 98.1 °F (36.7 °C) (07/10/19 0833)  Pulse: 76 (07/10/19 0833)  Resp: (!) 24 (07/10/19 0833)  BP: (!) 105/51 (07/10/19 0833)  SpO2: 96 % (07/10/19 0236) Vital Signs (24h Range):  Temp:  [96.1 °F (35.6 °C)-98.1 °F (36.7 °C)] 98.1 °F (36.7 °C)  Pulse:  [] 76  Resp:  [19-26] 24  SpO2:  [94 %-99 %] 96 %  BP: ()/(45-66) 105/51     Weight: (!) 167.4 kg (369 lb 0.8 oz)  Body mass index is 40.54 kg/m².    Intake/Output Summary (Last 24 hours) at 7/10/2019 0942  Last data filed at 7/10/2019 0800  Gross per 24 hour   Intake 355 ml   Output 2575 ml   Net -2220 ml      Physical Exam   Constitutional: He appears well-developed and well-nourished. No distress.   HENT:   Head: Normocephalic and atraumatic.   Mouth/Throat: No oropharyngeal exudate.   Eyes: Pupils are equal, round, and reactive to light. EOM are normal.   Neck: Normal range of motion.   Cardiovascular:   No murmur heard.  Irregular rate and rhythm    Pulmonary/Chest: Effort normal. No respiratory distress.   Abdominal: Soft. He exhibits no distension.   Musculoskeletal: He exhibits edema and tenderness.   Right leg and calf tenderness, currently wrapped, dressing c/d/i   Neurological: He is alert. He exhibits normal muscle tone. Coordination normal.   Skin: Skin is warm. He is not diaphoretic.   Nursing note and vitals reviewed.      Significant Labs:   CBC:   Recent Labs   Lab 07/08/19  1716 07/09/19  0218 07/10/19  0502   WBC 11.15 11.53 5.98   HGB 10.3* 9.4*  9.5*   HCT 33.4* 30.9* 30.8*    169 185     CMP:   Recent Labs   Lab 07/08/19  1716 07/09/19  0218 07/10/19  0502    138 139   K 4.7 4.1 3.8    105 101   CO2 23 25 28    124* 92   BUN 27* 30* 34*   CREATININE 1.1 1.1 1.2   CALCIUM 8.7 8.5* 8.7   PROT 7.3 7.0 7.5   ALBUMIN 3.5 2.9* 3.1*   BILITOT 0.8 1.0 1.2*   ALKPHOS 181* 147* 145*   AST 30 24 28   ALT 27 23 26   ANIONGAP 10 8 10   EGFRNONAA >60 >60 >60       Significant Imaging: I have reviewed all pertinent imaging results/findings within the past 24 hours.

## 2019-07-10 NOTE — PROGRESS NOTES
Pharmacokinetic Assessment Follow Up: IV Vancomycin    Vancomycin serum concentration assessment(s):    The trough level was drawned correctly and can be used to guide therapy at this time. The measurement is above the desired definitive target range of 15 to 20 mcg/mL.    Vancomycin Regimen Plan:    Discontinue the scheduled vancomycin regimen and re-dose when the random level is less than 15 mcg/mL, next level to be drawn at 0600 on 7/11 0600.    Pharmacy will continue to follow and monitor vancomycin.    Please contact pharmacy at extension [phone] for questions regarding this assessment.    Thank you for the consult,   Clem Schroeder     Patient brief summary:  Drew Farrar is a 53 y.o. male initiated on antimicrobial therapy with IV Vancomycin for treatment of suspected skin & soft tissue    The patient received a loading dose, followed by the current treatment regimen: random levels with plan to redose when level is less than 15 mcg/mL    Drug Allergies:   Review of patient's allergies indicates:   Allergen Reactions    Contrast media Other (See Comments)     Severe chest pain    Food allergy formula [glutamine-c-quercet-selen-brom]      Allergic to green peas; Heart failure.    Iodinated contrast- oral and iv dye Other (See Comments)     Chest pain    Peas Hives    Ibuprofen Swelling    Latex, natural rubber Hives    Pcn [penicillins] Hives    Butisol [butabarbital] Rash     Peeling skin       Actual Body Weight:   167.4kg    Renal Function:   Estimated Creatinine Clearance: 128.3 mL/min (based on SCr of 1.2 mg/dL).,     Dialysis Method (if applicable):        CBC (last 72 hours):  Recent Labs   Lab Result Units 07/08/19  1716 07/09/19  0218 07/10/19  0502   WBC K/uL 11.15 11.53 5.98   Hemoglobin g/dL 10.3* 9.4* 9.5*   Hemoglobin A1C %  --  5.3  --    Hematocrit % 33.4* 30.9* 30.8*   Platelets K/uL 178 169 185   Gran% % 90.2* 85.7* 75.7*   Lymph% % 3.0* 7.6* 11.9*   Mono% % 6.4 5.9 8.9   Eosinophil% % 0.3  0.7 3.3   Basophil% % 0.1 0.1 0.2   Differential Method  Automated Automated Automated       Metabolic Panel (last 72 hours):  Recent Labs   Lab Result Units 07/08/19  1716 07/08/19  1810 07/09/19  0218 07/09/19  0342 07/10/19  0502   Sodium mmol/L 139  --  138  --  139   Potassium mmol/L 4.7  --  4.1  --  3.8   Chloride mmol/L 106  --  105  --  101   CO2 mmol/L 23  --  25  --  28   Glucose mg/dL 107  --  124*  --  92   Glucose, UA   --  Negative  --  Negative  --    BUN, Bld mg/dL 27*  --  30*  --  34*   Creatinine mg/dL 1.1  --  1.1  --  1.2   Albumin g/dL 3.5  --  2.9*  --  3.1*   Total Bilirubin mg/dL 0.8  --  1.0  --  1.2*   Alkaline Phosphatase U/L 181*  --  147*  --  145*   AST U/L 30  --  24  --  28   ALT U/L 27  --  23  --  26   Magnesium mg/dL 1.3*  --  1.8  1.9  --  1.6   Phosphorus mg/dL 2.9  --  3.7  3.7  --  4.9*       Vancomycin Administrations:  vancomycin given in the last 96 hours                     vancomycin (VANCOCIN) 2,000 mg in dextrose 5 % 500 mL IVPB (mg) 2,000 mg New Bag 07/09/19 1809     2,000 mg New Bag  0558    vancomycin (VANCOCIN) 2,000 mg in dextrose 5 % 500 mL IVPB (mg) 2,000 mg New Bag 07/08/19 1821                      Drug levels (last 3 results):  Recent Labs   Lab Result Units 07/10/19  0502   Vancomycin-Trough ug/mL 35.4*       Microbiologic Results:  Microbiology Results (last 7 days)       Procedure Component Value Units Date/Time    Blood culture x two cultures. Draw prior to antibiotics. [[id number]] Collected:  07/08/19 1717    Order Status:  Completed Specimen:  Blood from Peripheral, Wrist, Left Updated:  07/09/19 2012     Blood Culture, Routine No Growth to date      No Growth to date    Narrative:       Aerobic and anaerobic    Blood culture x two cultures. Draw prior to antibiotics. [[id number]] Collected:  07/08/19 1716    Order Status:  Completed Specimen:  Blood from Wrist, Right Updated:  07/09/19 2012     Blood Culture, Routine No Growth to date      No Growth  to date    Narrative:       Aerobic and anaerobic

## 2019-07-10 NOTE — PT/OT/SLP EVAL
Physical Therapy Evaluation/Discharge    Patient Name:  Drew Farrar   MRN:  [id number]    Recommendations:     Discharge Recommendations:  home   Discharge Equipment Recommendations: commode(bariatric)   Barriers to discharge: None    Assessment:     Drew Farrar is a 53 y.o. male admitted with a medical diagnosis of Hypomagnesemia. Patient appears to be at his baseline of function; would recommend bariatric BSC for home use over his low toilet.    Recent Surgery: * No surgery found *      Plan:   Will d/c acute PT services and recommend bariatric BSC for home use over low toilet. Please re-consult if pt's functional status regresses.    Subjective     Chief Complaint: none upon arrival  Patient/Family Comments/goals: I'm walking in the room with my RW  Pain/Comfort:  · Pain Rating 1: 0/10  · Pain Addressed 1: Nurse notified(substernal chest pain )  · Pain Rating Post-Intervention 1: 8/10    Patients cultural, spiritual, Buddhism conflicts given the current situation: no    Living Environment:  Pt lives with S.O. In [facility] with THE; tub/shower combo  Prior to admission, patients level of function was Maribel with use of bariatric RW for transfers and amb; assisted with some ADLs by S.O. .  Equipment used at home: wheelchair, walker, rolling, bath bench(bariatric).  DME owned (not currently used): standard walker and single point cane.  Upon discharge, patient will have assistance from significant other.    Objective:     Patient found with peripheral IV, telemetry(compression bandage R leg/foot)  upon PT entry to room.    General Precautions: Standard, fall   Orthopedic Precautions:N/A   Braces: N/A     Exams:  · Cognitive Exam:  Cognition WFL; follows all commands  · Gross Motor Coordination:  WFL Postural Exam:  Patient presented with the following abnormalities:   · -       Rounded shoulders  · -       Posterior pelvic tilt  · -       Forward Trunk with amb  · Skin Integrity/Edema:      · -       Skin integrity:  R leg/foot with compression bandage; moderate swelling RLE  · BLE ROM: WFL for transfers and amb   · BLE Strength: WFL for transfers and amb    Functional Mobility:  · Bed Mobility:  Matti sup<>sit  · Transfers:     · Sit to Stand:  modified independence/Supervision with rolling walker (bariatric)  · Gait: Patient amb using bariatric RW with slow to fair vannessa, forward trunk lean, increased stride; no LOB ~100ft; Matti/SBA  · Balance: sit~good+, stand~good, amb~good with use of RW      Therapeutic Activities and Exercises:   Received order for eval and tx; pt had declined OT earlier- pt had been up amb in room with RW and was observed; pt agreed to amb using RW to make sure that he was safe; history taken- pt lives with S.O. In [facility] with THE, tub/shower combo; S.O. Assists pt with LBD and other ADLs; Matti/S with use of bariatric RW; also has std cane, bath bench and w/c;  no reports of chest pain, lightheadedness/dizziness or SOB; pt amb with RW without LOB, has forward trunk; talking to pt while amb at fair vannessa to nursing station and back as he said that he has done this before; pt c/o chest pain starting just about outside the door to his room; pt sat EOB and nurse called immediately; pain 8/10 substernal and to R-pressure type pain like someone sitting on his chest; O2 sats 99% -124 /80 -nurse present; moved pt to supine with HOB elevated; nurse called MD and continued to administer nitro, morphine, GI cocktail, stat EKG,etc; by 1749 pain gone; BP and O2 sats remained stable throughout; according to pt, he has had these types of episodes in the past; functionally pt is at baseline of function and does not require further acute PT services; recommend pt continue to amb with nursing mobility program; recommend bariatric BSC for home use over the toilet as pt reports that he has difficulty getting up/down on his low toilet.    AM-PAC 6 CLICK MOBILITY  Total Score:20     Patient left HOB elevated with  all lines intact, call button in reach and nurse present.    GOALS:   Multidisciplinary Problems     Physical Therapy Goals     Not on file          Multidisciplinary Problems (Resolved)        Problem: Physical Therapy Goal    Goal Priority Disciplines Outcome Goal Variances Interventions   Physical Therapy Goal   (Resolved)     PT, PT/OT Outcome(s) achieved                     History:     Past Medical History:   Diagnosis Date    *Atrial fibrillation     Anticoagulant long-term use     Arthritis     Atrial fibrillation     Atrial fibrillation Feb 23, 2016    Bipolar disorder     Congenital heart disease     s/p surgical intervention 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 of age    Deep vein thrombosis     DVT of leg (deep venous thrombosis)     left leg    History of prior ablation treatment     10/9/13    Hypertension     Obesity     Stroke     Thyroid disease     Venous stasis ulcer of lower extremity, unspecified laterality 12/14/2012    Venous ulcer        Past Surgical History:   Procedure Laterality Date    ANGIOPLASTY      ARTHROSCOPY-KNEE W/ CHONDROPLASTY Right 2/23/2016    Per[facility] at [facility] OR    CARDIAC SURGERY      open heart surgery 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 old    EYE SURGERY      left eye cataract/right eye glaucoma    TIMO FILTER PLACEMENT      Dr Calix ([facility])    KNEE SURGERY      l and r     MULTIPLE TOOTH EXTRACTIONS      Sclerotherapy N/A 2/21/2019    Performed by Gomez Lantigua MD at [facility] CATH LAB/EP    SKIN GRAFT      left leg    SYNOVECTOMY-KNEE Right 2/23/2016    Performed by Alejandro [facility]ueva [facility] at [facility] OR       Time Tracking:     PT Received On: 07/09/19  PT Start Time: 1630     PT Stop Time: 1650  PT Total Time (min): 20 min     Billable Minutes: Evaluation 20 minutes      Colette Rojas, PT  07/09/2019

## 2019-07-10 NOTE — DISCHARGE SUMMARY
[facility]  Short Stay  Discharge Summary    Admit Date: 7/8/2019    Discharge Date and Time:  07/10/2019 3:08 PM      Discharge Attending Physician: Severyn Yaroshevsky, MD     HPI:  This is a 53 y.o. male who has a past medical history of atrial fibrillation, bipolar disorder, congenital heart disease, deep vein thrombosis, history of prior ablation treatment, hypertension, obesity, stroke, thyroid disease, venous stasis ulcer of lower extremity, presents to the ED with right lower extremity pain and fever. He is currently following in coumadin clinic for chronic atrial fibrillation and states he felt tired and noticed he had a fever to 103 at home associated with nausea and vomiting x2, NBNB, denies any sick contacts. He denies cough, congestion, chest pain, SOB, abdominal pain, or flank pain. He has chronic right leg swelling, and was previously followed in wound care clinic for chronic venous statis ulcers, but was discharged months ago. He is complaint with anticoagulation for his history of DVT and atrial fibrillation and had an INR of 4.1 today. He denies any other symptoms at time of interview    Hospital Course:  Patient was admitted for sepsis secondary to cellulitis of right lower extremity.  He received fluids and was started on vanc and cefepime.  Blood cultures were NGTD x2 days.  Patient's pain and erythema improved.  His INR was elevated at 4.1 and warfarin was held.  On day of discharge, INR improved to 1.9.  He was discharged to restart warfarin 7.5 mg daily and follow-up in 3 days with repeat INR.      Final Diagnoses:    Principal Problem: Hypomagnesemia     Discharged Condition: stable    Disposition: Home or Self Care    Follow up/Patient Instructions:    Medications:  Reconciled Home Medications:      Medication List      START taking these medications    doxycycline 100 MG tablet  Commonly known as:  VIBRA-TABS  Take 1 tablet (100 mg total) by mouth every 12 (twelve)  "hours.        CHANGE how you take these medications    lisinopril 10 MG tablet  Take 1 tablet (10 mg total) by mouth once daily.  What changed:    · when to take this  · additional instructions     metoprolol succinate 100 MG 24 hr tablet  Commonly known as:  TOPROL-XL  Take 1 tablet (100 mg total) by mouth once daily.  What changed:  when to take this     warfarin 7.5 MG tablet  Commonly known as:  COUMADIN  Take 1 tablet (7.5 mg total) by mouth Daily.  What changed:    · medication strength  · how much to take  · when to take this        CONTINUE taking these medications    HYDROcodone-acetaminophen 5-325 mg per tablet  Commonly known as:  NORCO  Take 1 tablet by mouth every 6 (six) hours as needed for Pain.     nystatin powder  Commonly known as:  MYCOSTATIN  Apply topically 2 (two) times daily.     torsemide 20 MG Tab  Commonly known as:  DEMADEX  Take 2 tablets (40 mg total) by mouth once daily.          Discharge Procedure Orders   3 IN 1 COMMODE FOR HOME USE     Order Specific Question Answer Comments   Type: Heavy duty    Height: 6' 8" (2.032 m)    Weight: 167.4 kg (369 lb 0.8 oz)    Does patient have medical equipment at home? wheelchair bariatric / bariatric / bariatric   Does patient have medical equipment at home? walker, rolling    Does patient have medical equipment at home? bath bench    Length of need (1-99 months): 99    Vendor: [facility] [employer]    Expected Date of Delivery: 7/10/2019      Protime-INR   Standing Status: Future Standing Exp. Date: 09/07/20     Diet Cardiac     Notify your health care provider if you experience any of the following:  temperature >100.4     Notify your health care provider if you experience any of the following:  persistent nausea and vomiting or diarrhea     Notify your health care provider if you experience any of the following:  severe uncontrolled pain     Notify your health care provider if you experience any of the following:  redness, tenderness, or " signs of infection (pain, swelling, redness, odor or green/yellow discharge around incision site)     Activity as tolerated     Follow-up Information     Tiffanie Neff MD On 7/26/2019.    Specialty:  Internal Medicine  Why:  11:00 am   Contact information:  [address] [place] [address] [phone]

## 2019-07-10 NOTE — PLAN OF CARE
VN note: VN cued into patient's room. Patient's wife at bedside. Discharge information given. VN educated patient on new medication and side effects. Medication list reviewed. Per Dr. Lin patient's BP was low today, ok to start BP meds tomorrow. Follow-up information given. VN also educated patient on cellulitis signs and symptoms and when to seek medical attention. He was also aware of lab work to be collected. Patient verbalized understanding and all questions answered. Refer to clinical references for further education.    While VN was reviewing discharge, VN noticed abx had changed. Patient educated, will also have nurse reinforce and print out new AVS. I also called pharmacy since they will need to  old abx and replace with new one.

## 2019-07-10 NOTE — PLAN OF CARE
Problem: Physical Therapy Goal  Goal: Physical Therapy Goal  Outcome: Outcome(s) achieved Date Met: 07/09/19  Received order for eval and tx; pt had declined OT earlier- pt had been up amb in room with RW and was observed; pt agreed to amb using RW to make sure that he was safe; history taken- pt lives with S.O. In [facility] with THE, tub/shower combo; S.O. Assists pt with LBD and other ADLs; Maribel/S with use of bariatric RW; also has std cane, bath bench and w/c;  no reports of chest pain, lightheadedness/dizziness or SOB; pt amb Maribel/supervision with RW without LOB, has forward trunk; talking to pt while amb at fair vannessa to nursing station and back as he said that he has done this before; pt c/o chest pain starting just about outside the door to his room; pt sat EOB and nurse called immediately; pain 8/10 substernal and to R-pressure type pain like someone sitting on his chest; O2 sats 99% -124 /80 -nurse present; moved pt to supine with HOB elevated; nurse called MD and continued to administer nitro, morphine, GI cocktail, stat EKG,etc; by 1749 pain gone; BP and O2 sats remained stable throughout; according to pt, he has had these types of episodes in the past; functionally pt is at baseline of function and does not require further acute PT services; recommend pt continue to amb with nursing mobility program; recommend bariatric BSC for home use over the toilet as pt reports that he has difficulty getting up/down on his low toilet.

## 2019-07-10 NOTE — PLAN OF CARE
Problem: Adult Inpatient Plan of Care  Goal: Plan of Care Review  Outcome: Outcome(s) achieved Date Met: 07/10/19  Discharge instruction and education packet provided. VN notified. IV site removed cath tip intact. Telemetry discontinued without adverse reaction. Patient shows no acute distress. Wife at bedside.

## 2019-07-10 NOTE — PLAN OF CARE
VN note: VN cued into patient's room for introduction. VN informed patient that VN would be working closely along side bedside nurse, PCT, and the rest of care team and making rounds throughout the shift. Fall risk education provided. He verbalized understanding. Allowed time for questions. VN will continue to be available to patient and intervene prn.       07/10/19 1043   Type of Frequent Check   Type Patient Rounds;Other (see comments)  (VN Rounds)   Safety/Activity   Patient Rounds bed in low position;visualized patient   Safety Promotion/Fall Prevention side rails raised x 2;other (see comments)  (sitting on couch)   Activity Management activity adjusted per tolerance   Positioning   Body Position positioned/repositioned independently   Head of Bed (HOB) HOB at 30-45 degrees   Assessments (Pre/Post)   Level of Consciousness (AVPU) alert

## 2019-07-11 ENCOUNTER — PATIENT OUTREACH (OUTPATIENT)
Dept: ADMINISTRATIVE | Facility: CLINIC | Age: 54
End: 2019-07-11

## 2019-07-11 NOTE — PROGRESS NOTES
[facility] Hospital Medicine  Progress Note     Patient Name: Drew Farrar  MRN: [id number]  Patient Class: IP- Inpatient     Admission Date: 7/8/2019  Length of Stay: 2 days  Attending Physician: Severyn Yaroshevsky, MD  Primary Care Provider: David Larsen MD           Subjective:      Principal Problem:Hypomagnesemia        HPI:  This is a 53 y.o. male who has a past medical history of *Atrial fibrillation,  Bipolar disorder, Congenital heart disease, Deep vein thrombosis, History of prior ablation treatment, Hypertension, Obesity, Stroke, Thyroid disease, Venous stasis ulcer of lower extremity, presents to the ED with right lower extremity pain and fever. He is currently following in coumadin clinic for chronic atrial fibrillation and states he felt tired and noticed he had a fever to 103 at home associated with nausea and vomiting x2, NBNB, denies any sick contacts. He denies cough, congestion, chest pain, SOB, abdominal pain, or flank pain. He has chronic right leg swelling, and was previously followed in wound care clinic for chronic venous statis ulcers, but was discharged months ago. He is complaint with anticoagulation for his history of DVT and atrial fibrillation and had an INR of 4.1 today. He denies any other symptoms at time of interview.         Overview/Hospital Course:  No notes on file     Interval History: Reports feeling better. Shortness of breath with exertion. Tolerating diet     Review of Systems   Constitutional: Negative for appetite change and fever.   HENT: Negative for congestion.    Respiratory: Negative for shortness of breath.    Cardiovascular: Positive for leg swelling. Negative for chest pain and palpitations.   Gastrointestinal: Negative for diarrhea and nausea.   Neurological: Negative for dizziness and weakness.      Objective:      Vital Signs (Most Recent):  Temp: 98.1 °F (36.7 °C) (07/10/19 0833)  Pulse: 76 (07/10/19 0833)  Resp: (!) 24 (07/10/19  0833)  BP: (!) 105/51 (07/10/19 0833)  SpO2: 96 % (07/10/19 0236) Vital Signs (24h Range):  Temp:  [96.1 °F (35.6 °C)-98.1 °F (36.7 °C)] 98.1 °F (36.7 °C)  Pulse:  [] 76  Resp:  [19-26] 24  SpO2:  [94 %-99 %] 96 %  BP: ()/(45-66) 105/51      Weight: (!) 167.4 kg (369 lb 0.8 oz)  Body mass index is 40.54 kg/m².     Intake/Output Summary (Last 24 hours) at 7/10/2019 0942  Last data filed at 7/10/2019 0800      Gross per 24 hour   Intake 355 ml   Output 2575 ml   Net -2220 ml      Physical Exam   Constitutional: He appears well-developed and well-nourished. No distress.   HENT:   Head: Normocephalic and atraumatic.   Mouth/Throat: No oropharyngeal exudate.   Eyes: Pupils are equal, round, and reactive to light. EOM are normal.   Neck: Normal range of motion.   Cardiovascular:   No murmur heard.  Irregular rate and rhythm    Pulmonary/Chest: Effort normal. No respiratory distress.   Abdominal: Soft. He exhibits no distension.   Musculoskeletal: He exhibits edema and tenderness.   Right leg and calf tenderness, currently wrapped, dressing c/d/i   Neurological: He is alert. He exhibits normal muscle tone. Coordination normal.   Skin: Skin is warm. He is not diaphoretic.   Nursing note and vitals reviewed.        Significant Labs:   CBC:         Recent Labs   Lab 07/08/19 1716 07/09/19 0218 07/10/19  0502   WBC 11.15 11.53 5.98   HGB 10.3* 9.4* 9.5*   HCT 33.4* 30.9* 30.8*    169 185      CMP:         Recent Labs   Lab 07/08/19 1716 07/09/19  0218 07/10/19  0502    138 139   K 4.7 4.1 3.8    105 101   CO2 23 25 28    124* 92   BUN 27* 30* 34*   CREATININE 1.1 1.1 1.2   CALCIUM 8.7 8.5* 8.7   PROT 7.3 7.0 7.5   ALBUMIN 3.5 2.9* 3.1*   BILITOT 0.8 1.0 1.2*   ALKPHOS 181* 147* 145*   AST 30 24 28   ALT 27 23 26   ANIONGAP 10 8 10   EGFRNONAA >60 >60 >60         Significant Imaging: I have reviewed all pertinent imaging results/findings within the past 24 hours.        Assessment/Plan:       * Hypomagnesemia  Mg in ED found to be 1.3  Will replace with 2 grams IV mag sulfate over 2 hours   This AM, Mg is 1.9  Resolved     Cellulitis of lower extremity  Chronic right sided LE swelling   Reports worsening redness and swelling on the morning of admission associated with fevers, nausea and vomiting   Started IV fluids, Vancomycin and Cefepime  Blood cultures: NGTD  Continue IV antibiotics   Order LE US- No sonographic evidence of DVT in the bilateral lower extremities  Continue to monitor      Sepsis  In the ED, patient was febrile to 100.6 and tachycardic with redness and swelling of right lower extremity. Started on fluids in ED, vancomycin and cefepime   WBC 11.15  Continue maintenance fluids   Blood Cultures: NGTD  Continue to monitor         Hyperthyroidism  Hx of Hyperthyroidism with treatment with Methimazole  Patient discontinued Methimazole due to weight gain after discussion with Endocrinology  On admission, TSH: <0.010 with Free T4: 2.21  Will discuss restarting Methimazole        Long term (current) use of anticoagulants  Warfarin 5mg on Mondays and Fridays and 10 mg AOD   INR elevated to 4.1 on admission  Hold Warfarin at this time   Goal INR 2-3  Repeat INR  Continue to monitor      HTN (hypertension)  Continue home medications  Goal BP <140/80  Hydralazine 10 mg IV PRN for SBP >175           Chronic atrial fibrillation  Patient is in chronic atrial flutter   On Warfarin, INR 4.1 will hold warfarin for now and recheck INR   Monitor patient on telemetry   Denies chest pain, palpitations or shortness of breath   Continue Metoprolol 100 mg   Continue to monitor                   VTE Risk Mitigation (From admission, onward)         Ordered       IP VTE HIGH RISK PATIENT  Once      07/08/19 2345       enoxaparin injection 40 mg  Daily      07/08/19 2345                      Krystal Lin MD  Department of Hospital Medicine   [facility]

## 2019-07-11 NOTE — PATIENT INSTRUCTIONS
Discharge Instructions for Cellulitis  You have been diagnosed with cellulitis. This is an infection in the deepest layer of the skin. In some cases, the infection also affects the muscle. Cellulitis is caused by bacteria. The bacteria can enter the body through broken skin. This can happen with a cut, scratch, animal bite, or an insect bite that has been scratched. You may have been treated in the hospital with antibiotics and fluids. You will likely be given a prescription for antibiotics to take at home. This sheet will help you take care of yourself at home.  Home care  When you are home:  · Take the prescribed antibiotic medicine you are given as directed until it is gone. Take it even if you feel better. It treats the infection and stops it from returning. Not taking all the medicine can make future infections hard to treat.  · Keep the infected area clean.  · When possible, raise the infected area above the level of your heart. This helps keep swelling down.  · Talk with your healthcare provider if you are in pain. Ask what kind of over-the-counter medicine you can take for pain.  · Apply clean bandages as advised.  · Take your temperature once a day for a week.  · Wash your hands often to prevent spreading the infection.  In the future, wash your hands before and after you touch cuts, scratches, or bandages. This will help prevent infection.   When to call your healthcare provider  Call your healthcare provider immediately if you have any of the following:  · Difficulty or pain when moving the joints above or below the infected area  · Discharge or pus draining from the area  · Fever of 100.4°F (38°C) or higher, or as directed by your healthcare provider  · Pain that gets worse in or around the infected   · Redness that gets worse in or around the infected area, particularly if the area of redness expands to a wider area  · Shaking chills  · Swelling of the infected area  · Vomiting   Date Last Reviewed:  8/1/2016  © 5524-9406 [employer]. [address] [place] [address], [place] [address]. All rights reserved. This information is not intended as a substitute for professional medical care. Always follow your healthcare professional's instructions.

## 2019-07-11 NOTE — PROGRESS NOTES
Pharmacy Warfarin Education Note     Drew Farrar was offered Warfarin Education on 07/10/2019.  The patient and/or family/caregiver was present and accepted education, using teachback method. .    Patient specific concerns or situations: n/a    Warfarin education materials provided and information discussed during the education process included:  1. What warfarin is  2. Indication, current dose,how to take warfarin, what time of day to take warfarin,      Follow-up appointment for monitoring  3. What to do if you miss a dose  4. Drug interactions associated with the use of anticoagulants (I.e. Warfarin), such as        the use of over the counter medications (e.g. Aspirin, acetaminophen, ibuprofen),        prescription drugs, and herbal supplements. Notify prescribing doctor of any        changes in medications.  5.  Side effects of taking warfarin, increased bleeding risk, when to call the prescribing       physician, when to seek emergency help  6.  Monitoring blood levels (PT/INR)  7.  Eating habits and being consistent, Vitamin K rich foods, effects of diet on blood       Levels  8.  Recommendation of purchasing a medical bracelet or carrying a ID card to alert medical staff if you become ill  9.  Notifying physicians of procedures, medical or dental        Anticoagulants       None          Lab Results   Component Value Date/Time    INR 1.9 (H) 07/10/2019 05:02 AM    INR 3.2 (H) 07/09/2019 08:29 AM    INR 4.1 (H) 07/08/2019 11:26 AM    INR 8.3 03/15/2019   ;  Lab Results   Component Value Date/Time    HGB 9.5 (L) 07/10/2019 05:02 AM    HGB 9.4 (L) 07/09/2019 02:18 AM    HGB 10.3 (L) 07/08/2019 05:16 PM

## 2019-07-11 NOTE — TELEPHONE ENCOUNTER
C3 nurse attempted to contact patient. The following occurred:   C3 nurse attempted to contact Drew Farrar for a TCC post hospital discharge follow up call. The patient is unable to conduct the call @ this time. The patient requested a callback.    The patient has a scheduled HOSFU appointment with Tiffanie Neff MD on 7/26/2019 AT 11:00 am

## 2019-07-12 NOTE — PROGRESS NOTES
Note about d/c was not routed correctly and not seen until 7/12. Calendar updated. See hospital course from d/c summary below. Patient unable to get to lab due to lack of transportation, but he can go Monday. He confirmed filling new Rx for 7.5mg tablets from the hospital. He was advised to continue dose as planned.     Hospital Course:  Patient was admitted for sepsis secondary to cellulitis of right lower extremity.  He received fluids and was started on vanc and cefepime.  Blood cultures were NGTD x2 days.  Patient's pain and erythema improved.  His INR was elevated at 4.1 and warfarin was held.  On day of discharge, INR improved to 1.9.  He was discharged to restart warfarin 7.5 mg daily and follow-up in 3 days with repeat INR.

## 2019-07-13 LAB
BACTERIA BLD CULT: NORMAL
BACTERIA BLD CULT: NORMAL

## 2019-07-15 ENCOUNTER — LAB VISIT (OUTPATIENT)
Dept: LAB | Facility: HOSPITAL | Age: 54
End: 2019-07-15
Attending: INTERNAL MEDICINE
Payer: MEDICARE

## 2019-07-15 ENCOUNTER — ANTI-COAG VISIT (OUTPATIENT)
Dept: CARDIOLOGY | Facility: CLINIC | Age: 54
End: 2019-07-15
Payer: MEDICARE

## 2019-07-15 DIAGNOSIS — I26.99 OTHER PULMONARY EMBOLISM WITHOUT ACUTE COR PULMONALE, UNSPECIFIED CHRONICITY: ICD-10-CM

## 2019-07-15 DIAGNOSIS — Z79.01 LONG TERM (CURRENT) USE OF ANTICOAGULANTS: ICD-10-CM

## 2019-07-15 DIAGNOSIS — Z79.01 LONG TERM (CURRENT) USE OF ANTICOAGULANTS: Primary | ICD-10-CM

## 2019-07-15 DIAGNOSIS — I48.21 PERMANENT ATRIAL FIBRILLATION: ICD-10-CM

## 2019-07-15 DIAGNOSIS — I87.1 MAY-THURNER SYNDROME: ICD-10-CM

## 2019-07-15 DIAGNOSIS — Z86.718 HISTORY OF DVT (DEEP VEIN THROMBOSIS): ICD-10-CM

## 2019-07-15 LAB
INR PPP: 1.5 (ref 0.8–1.2)
PROTHROMBIN TIME: 15.6 SEC (ref 9–12.5)

## 2019-07-15 PROCEDURE — 36415 COLL VENOUS BLD VENIPUNCTURE: CPT

## 2019-07-15 PROCEDURE — 93793 ANTICOAG MGMT PT WARFARIN: CPT | Mod: ,,,

## 2019-07-15 PROCEDURE — 85610 PROTHROMBIN TIME: CPT

## 2019-07-15 PROCEDURE — 93793 PR ANTICOAGULANT MGMT FOR PT TAKING WARFARIN: ICD-10-PCS | Mod: ,,,

## 2019-07-17 NOTE — PROGRESS NOTES
INR low and from 2 days ago. Pt had to hold 3 doses last week and dose was lowered due to potential DDI and high INRs last week. Will increase dose as it does not seem doxy is causing much of a DDI and will be completed this weekend.

## 2019-07-17 NOTE — TELEPHONE ENCOUNTER
----- Message from Charlene Guerrero sent at 7/17/2019 11:06 AM CDT -----  Contact: hadley / [place] [facility] [phone]  Patient is requesting a refill on the below. Please advise      warfarin (COUMADIN) 7.5 MG tablet 30 tablet     Pharmacy     [address]

## 2019-07-18 RX ORDER — WARFARIN 7.5 MG/1
7.5 TABLET ORAL DAILY
Qty: 30 TABLET | Refills: 11 | Status: SHIPPED | OUTPATIENT
Start: 2019-07-18 | End: 2019-07-24 | Stop reason: DRUGHIGH

## 2019-07-24 RX ORDER — WARFARIN 7.5 MG/1
TABLET ORAL
Qty: 30 TABLET | Refills: 5 | Status: ON HOLD | OUTPATIENT
Start: 2019-07-24 | End: 2019-08-03 | Stop reason: HOSPADM

## 2019-07-24 RX ORDER — WARFARIN 10 MG/1
TABLET ORAL
Qty: 15 TABLET | Refills: 5 | Status: ON HOLD | OUTPATIENT
Start: 2019-07-24 | End: 2019-08-03 | Stop reason: HOSPADM

## 2019-07-25 ENCOUNTER — HOSPITAL ENCOUNTER (OUTPATIENT)
Facility: HOSPITAL | Age: 54
Discharge: HOME | DRG: 603 | End: 2019-07-27
Attending: EMERGENCY MEDICINE | Admitting: FAMILY MEDICINE
Payer: MEDICARE

## 2019-07-25 DIAGNOSIS — I87.2 CHRONIC VENOUS INSUFFICIENCY: Primary | ICD-10-CM

## 2019-07-25 DIAGNOSIS — L03.90 CELLULITIS: ICD-10-CM

## 2019-07-25 DIAGNOSIS — A41.9 SEPSIS: ICD-10-CM

## 2019-07-25 DIAGNOSIS — R60.0 LOWER LEG EDEMA: ICD-10-CM

## 2019-07-25 DIAGNOSIS — R07.9 CHEST PAIN: ICD-10-CM

## 2019-07-25 LAB
ALBUMIN SERPL BCP-MCNC: 3.5 G/DL (ref 3.5–5.2)
ALP SERPL-CCNC: 141 U/L (ref 55–135)
ALT SERPL W/O P-5'-P-CCNC: 25 U/L (ref 10–44)
ANION GAP SERPL CALC-SCNC: 9 MMOL/L (ref 8–16)
AST SERPL-CCNC: 31 U/L (ref 10–40)
BASOPHILS # BLD AUTO: 0.02 K/UL (ref 0–0.2)
BASOPHILS NFR BLD: 0.2 % (ref 0–1.9)
BILIRUB SERPL-MCNC: 0.6 MG/DL (ref 0.1–1)
BILIRUB UR QL STRIP: NEGATIVE
BUN SERPL-MCNC: 35 MG/DL (ref 6–20)
CALCIUM SERPL-MCNC: 9.9 MG/DL (ref 8.7–10.5)
CHLORIDE SERPL-SCNC: 107 MMOL/L (ref 95–110)
CLARITY UR: CLEAR
CO2 SERPL-SCNC: 22 MMOL/L (ref 23–29)
COLOR UR: YELLOW
CREAT SERPL-MCNC: 1.3 MG/DL (ref 0.5–1.4)
DIFFERENTIAL METHOD: ABNORMAL
EOSINOPHIL # BLD AUTO: 0 K/UL (ref 0–0.5)
EOSINOPHIL NFR BLD: 0.2 % (ref 0–8)
ERYTHROCYTE [DISTWIDTH] IN BLOOD BY AUTOMATED COUNT: 16.4 % (ref 11.5–14.5)
EST. GFR  (AFRICAN AMERICAN): >60 ML/MIN/1.73 M^2
EST. GFR  (NON AFRICAN AMERICAN): >60 ML/MIN/1.73 M^2
ESTIMATED AVG GLUCOSE: 111 MG/DL (ref 68–131)
GLUCOSE SERPL-MCNC: 102 MG/DL (ref 70–110)
GLUCOSE UR QL STRIP: NEGATIVE
HBA1C MFR BLD HPLC: 5.5 % (ref 4–5.6)
HCT VFR BLD AUTO: 32.7 % (ref 40–54)
HGB BLD-MCNC: 9.9 G/DL (ref 14–18)
HGB UR QL STRIP: NEGATIVE
INR PPP: 4.8 (ref 0.8–1.2)
KETONES UR QL STRIP: NEGATIVE
LACTATE SERPL-SCNC: 1.2 MMOL/L (ref 0.5–2.2)
LACTATE SERPL-SCNC: 1.9 MMOL/L (ref 0.5–2.2)
LEUKOCYTE ESTERASE UR QL STRIP: NEGATIVE
LYMPHOCYTES # BLD AUTO: 1 K/UL (ref 1–4.8)
LYMPHOCYTES NFR BLD: 9 % (ref 18–48)
MAGNESIUM SERPL-MCNC: 1.7 MG/DL (ref 1.6–2.6)
MCH RBC QN AUTO: 24.3 PG (ref 27–31)
MCHC RBC AUTO-ENTMCNC: 30.3 G/DL (ref 32–36)
MCV RBC AUTO: 80 FL (ref 82–98)
MONOCYTES # BLD AUTO: 0.6 K/UL (ref 0.3–1)
MONOCYTES NFR BLD: 5.4 % (ref 4–15)
NEUTROPHILS # BLD AUTO: 9.2 K/UL (ref 1.8–7.7)
NEUTROPHILS NFR BLD: 85.2 % (ref 38–73)
NITRITE UR QL STRIP: NEGATIVE
PH UR STRIP: 5 [PH] (ref 5–8)
PHOSPHATE SERPL-MCNC: 4.1 MG/DL (ref 2.7–4.5)
PLATELET # BLD AUTO: 244 K/UL (ref 150–350)
PMV BLD AUTO: 9.5 FL (ref 9.2–12.9)
POCT GLUCOSE: 108 MG/DL (ref 70–110)
POTASSIUM SERPL-SCNC: 5.2 MMOL/L (ref 3.5–5.1)
PROCALCITONIN SERPL IA-MCNC: 1.47 NG/ML
PROT SERPL-MCNC: 8 G/DL (ref 6–8.4)
PROT UR QL STRIP: ABNORMAL
PROTHROMBIN TIME: 48.8 SEC (ref 9–12.5)
RBC # BLD AUTO: 4.08 M/UL (ref 4.6–6.2)
SODIUM SERPL-SCNC: 138 MMOL/L (ref 136–145)
SP GR UR STRIP: 1.02 (ref 1–1.03)
T4 FREE SERPL-MCNC: 2.56 NG/DL (ref 0.71–1.51)
TROPONIN I SERPL DL<=0.01 NG/ML-MCNC: 0.01 NG/ML (ref 0–0.03)
TSH SERPL DL<=0.005 MIU/L-ACNC: <0.01 UIU/ML (ref 0.4–4)
URN SPEC COLLECT METH UR: ABNORMAL
UROBILINOGEN UR STRIP-ACNC: NEGATIVE EU/DL
WBC # BLD AUTO: 10.79 K/UL (ref 3.9–12.7)

## 2019-07-25 PROCEDURE — G0378 HOSPITAL OBSERVATION PER HR: HCPCS

## 2019-07-25 PROCEDURE — 83036 HEMOGLOBIN GLYCOSYLATED A1C: CPT

## 2019-07-25 PROCEDURE — 25000003 PHARM REV CODE 250: Performed by: STUDENT IN AN ORGANIZED HEALTH CARE EDUCATION/TRAINING PROGRAM

## 2019-07-25 PROCEDURE — 36415 COLL VENOUS BLD VENIPUNCTURE: CPT

## 2019-07-25 PROCEDURE — 11000001 HC ACUTE MED/SURG PRIVATE ROOM

## 2019-07-25 PROCEDURE — 83735 ASSAY OF MAGNESIUM: CPT

## 2019-07-25 PROCEDURE — 63600175 PHARM REV CODE 636 W HCPCS: Performed by: PHYSICIAN ASSISTANT

## 2019-07-25 PROCEDURE — 87040 BLOOD CULTURE FOR BACTERIA: CPT

## 2019-07-25 PROCEDURE — 63600175 PHARM REV CODE 636 W HCPCS: Performed by: STUDENT IN AN ORGANIZED HEALTH CARE EDUCATION/TRAINING PROGRAM

## 2019-07-25 PROCEDURE — 96374 THER/PROPH/DIAG INJ IV PUSH: CPT

## 2019-07-25 PROCEDURE — 99285 EMERGENCY DEPT VISIT HI MDM: CPT | Mod: 25

## 2019-07-25 PROCEDURE — 93005 ELECTROCARDIOGRAM TRACING: CPT

## 2019-07-25 PROCEDURE — 81003 URINALYSIS AUTO W/O SCOPE: CPT

## 2019-07-25 PROCEDURE — 84439 ASSAY OF FREE THYROXINE: CPT

## 2019-07-25 PROCEDURE — 83605 ASSAY OF LACTIC ACID: CPT

## 2019-07-25 PROCEDURE — 80053 COMPREHEN METABOLIC PANEL: CPT

## 2019-07-25 PROCEDURE — 84484 ASSAY OF TROPONIN QUANT: CPT | Mod: 91

## 2019-07-25 PROCEDURE — 85025 COMPLETE CBC W/AUTO DIFF WBC: CPT

## 2019-07-25 PROCEDURE — 84443 ASSAY THYROID STIM HORMONE: CPT

## 2019-07-25 PROCEDURE — 84145 PROCALCITONIN (PCT): CPT

## 2019-07-25 PROCEDURE — 84100 ASSAY OF PHOSPHORUS: CPT

## 2019-07-25 PROCEDURE — 84484 ASSAY OF TROPONIN QUANT: CPT

## 2019-07-25 PROCEDURE — 85610 PROTHROMBIN TIME: CPT

## 2019-07-25 RX ORDER — LISINOPRIL 10 MG/1
10 TABLET ORAL DAILY
Status: DISCONTINUED | OUTPATIENT
Start: 2019-07-26 | End: 2019-07-27 | Stop reason: HOSPADM

## 2019-07-25 RX ORDER — SODIUM CHLORIDE 0.9 % (FLUSH) 0.9 %
5 SYRINGE (ML) INJECTION
Status: DISCONTINUED | OUTPATIENT
Start: 2019-07-25 | End: 2019-07-27 | Stop reason: HOSPADM

## 2019-07-25 RX ORDER — ONDANSETRON 8 MG/1
8 TABLET, ORALLY DISINTEGRATING ORAL EVERY 6 HOURS PRN
Status: DISCONTINUED | OUTPATIENT
Start: 2019-07-25 | End: 2019-07-27 | Stop reason: HOSPADM

## 2019-07-25 RX ORDER — INSULIN ASPART 100 [IU]/ML
1-10 INJECTION, SOLUTION INTRAVENOUS; SUBCUTANEOUS
Status: DISCONTINUED | OUTPATIENT
Start: 2019-07-25 | End: 2019-07-27 | Stop reason: HOSPADM

## 2019-07-25 RX ORDER — MORPHINE SULFATE 2 MG/ML
4 INJECTION, SOLUTION INTRAMUSCULAR; INTRAVENOUS EVERY 4 HOURS PRN
Status: DISCONTINUED | OUTPATIENT
Start: 2019-07-25 | End: 2019-07-27 | Stop reason: HOSPADM

## 2019-07-25 RX ORDER — ACETAMINOPHEN 325 MG/1
650 TABLET ORAL EVERY 8 HOURS PRN
Status: DISCONTINUED | OUTPATIENT
Start: 2019-07-25 | End: 2019-07-27 | Stop reason: HOSPADM

## 2019-07-25 RX ORDER — IBUPROFEN 200 MG
16 TABLET ORAL
Status: DISCONTINUED | OUTPATIENT
Start: 2019-07-25 | End: 2019-07-27 | Stop reason: HOSPADM

## 2019-07-25 RX ORDER — TORSEMIDE 20 MG/1
40 TABLET ORAL DAILY
Status: DISCONTINUED | OUTPATIENT
Start: 2019-07-26 | End: 2019-07-27 | Stop reason: HOSPADM

## 2019-07-25 RX ORDER — RAMELTEON 8 MG/1
8 TABLET ORAL NIGHTLY PRN
Status: DISCONTINUED | OUTPATIENT
Start: 2019-07-25 | End: 2019-07-27 | Stop reason: HOSPADM

## 2019-07-25 RX ORDER — NITROGLYCERIN 0.4 MG/1
0.4 TABLET SUBLINGUAL EVERY 5 MIN PRN
Status: DISCONTINUED | OUTPATIENT
Start: 2019-07-25 | End: 2019-07-27 | Stop reason: HOSPADM

## 2019-07-25 RX ORDER — DEXTROSE 50 % IN WATER (D50W) INTRAVENOUS SYRINGE
25
Status: DISCONTINUED | OUTPATIENT
Start: 2019-07-25 | End: 2019-07-27 | Stop reason: HOSPADM

## 2019-07-25 RX ORDER — VANCOMYCIN HCL IN 5 % DEXTROSE 1G/250ML
1000 PLASTIC BAG, INJECTION (ML) INTRAVENOUS
Status: DISCONTINUED | OUTPATIENT
Start: 2019-07-25 | End: 2019-07-25 | Stop reason: DRUGHIGH

## 2019-07-25 RX ORDER — AMOXICILLIN 250 MG
1 CAPSULE ORAL 2 TIMES DAILY
Status: DISCONTINUED | OUTPATIENT
Start: 2019-07-25 | End: 2019-07-27 | Stop reason: HOSPADM

## 2019-07-25 RX ORDER — ACETAMINOPHEN 325 MG/1
650 TABLET ORAL EVERY 4 HOURS PRN
Status: DISCONTINUED | OUTPATIENT
Start: 2019-07-25 | End: 2019-07-27 | Stop reason: HOSPADM

## 2019-07-25 RX ORDER — IBUPROFEN 200 MG
24 TABLET ORAL
Status: DISCONTINUED | OUTPATIENT
Start: 2019-07-25 | End: 2019-07-27 | Stop reason: HOSPADM

## 2019-07-25 RX ORDER — LIDOCAINE 50 MG/G
1 PATCH TOPICAL DAILY PRN
Status: DISCONTINUED | OUTPATIENT
Start: 2019-07-25 | End: 2019-07-27 | Stop reason: HOSPADM

## 2019-07-25 RX ORDER — WARFARIN SODIUM 5 MG/1
10 TABLET ORAL
Status: DISCONTINUED | OUTPATIENT
Start: 2019-07-31 | End: 2019-07-25

## 2019-07-25 RX ORDER — METOPROLOL SUCCINATE 25 MG/1
100 TABLET, EXTENDED RELEASE ORAL NIGHTLY
Status: DISCONTINUED | OUTPATIENT
Start: 2019-07-25 | End: 2019-07-27 | Stop reason: HOSPADM

## 2019-07-25 RX ORDER — DEXTROSE 50 % IN WATER (D50W) INTRAVENOUS SYRINGE
12.5
Status: DISCONTINUED | OUTPATIENT
Start: 2019-07-25 | End: 2019-07-27 | Stop reason: HOSPADM

## 2019-07-25 RX ORDER — WARFARIN SODIUM 5 MG/1
10 TABLET ORAL
Status: DISCONTINUED | OUTPATIENT
Start: 2019-07-28 | End: 2019-07-25

## 2019-07-25 RX ORDER — GLUCAGON 1 MG
1 KIT INJECTION
Status: DISCONTINUED | OUTPATIENT
Start: 2019-07-25 | End: 2019-07-27 | Stop reason: HOSPADM

## 2019-07-25 RX ADMIN — NITROGLYCERIN 0.4 MG: 0.4 TABLET, ORALLY DISINTEGRATING SUBLINGUAL at 09:07

## 2019-07-25 RX ADMIN — LIDOCAINE HYDROCHLORIDE: 20 SOLUTION ORAL; TOPICAL at 05:07

## 2019-07-25 RX ADMIN — METOPROLOL SUCCINATE 100 MG: 25 TABLET, FILM COATED, EXTENDED RELEASE ORAL at 09:07

## 2019-07-25 RX ADMIN — LIDOCAINE HYDROCHLORIDE: 20 SOLUTION ORAL; TOPICAL at 10:07

## 2019-07-25 RX ADMIN — VANCOMYCIN HYDROCHLORIDE 1500 MG: 1.5 INJECTION, POWDER, LYOPHILIZED, FOR SOLUTION INTRAVENOUS at 04:07

## 2019-07-25 RX ADMIN — NITROGLYCERIN 0.4 MG: 0.4 TABLET, ORALLY DISINTEGRATING SUBLINGUAL at 10:07

## 2019-07-25 RX ADMIN — SENNOSIDES, DOCUSATE SODIUM 1 TABLET: 50; 8.6 TABLET, FILM COATED ORAL at 09:07

## 2019-07-25 RX ADMIN — MORPHINE SULFATE 4 MG: 2 INJECTION, SOLUTION INTRAMUSCULAR; INTRAVENOUS at 10:07

## 2019-07-25 NOTE — ED NOTES
Pt switched to larger bed with extender to accommodate height. Pt stated he is much more comfortable now.  Lying in bed listening to music. VSS, will continue to monitor

## 2019-07-25 NOTE — ASSESSMENT & PLAN NOTE
Chronic Venous Stasis Dermatitis managed in our clinic previously  Keep legs elevated  Continue to monitor

## 2019-07-25 NOTE — PROGRESS NOTES
Patient called this am 07/25/19, He is going to the ER ASAP. He is experiencing (Fever 101.2, Vomiting) and his (Legs) are turning pink. C/S

## 2019-07-25 NOTE — H&P
"[facility] Hospital Medicine  History & Physical    Patient Name: Drew Farrar  MRN: [id number]  Admission Date: 7/25/2019  Attending Physician: Dayami Saunders MD   Primary Care Provider: David Larsen MD         Patient information was obtained from patient and ER records.     Subjective:     Principal Problem:Cellulitis    Chief Complaint:   Chief Complaint   Patient presents with    Joint Swelling     reports right knee swelling, states "knee is infected" and was red earlier and reports having a fever. Pt reports taking 100 mg of tylenol at 1030. denies injury.         HPI: This is a 53 y.o. male who has a past medical history of atrial fibrillation, bipolar disorder, congenital heart disease, deep vein thrombosis, history of prior ablation treatment, hypertension, obesity, stroke, thyroid disease, venous stasis ulcer of lower extremity, presents to the ED with right lower extremity pain and subjective fever. States he woke up this morning in pain and feeling weak and looked down to see that his leg looked infected. He is currently following in coumadin clinic for chronic atrial fibrillation and states he felt weak and had a fever over 101. He also admits to nausea and vomiting a couple times. He states he is also experiencing some chest pain. He denies cough, congestion, SOB, abdominal pain, or flank pain. He has chronic right leg swelling, and was previously followed in wound care clinic for chronic venous statis ulcers, but was discharged months ago. He is compliant with anticoagulation for his history of DVT and atrial fibrillation. He was last admitted to our ED on 07/08/19 with a cellulitis of the same leg, was treated for sepsis with vanc and cefepime and left with doxycycline that he states he finished about few days ago. In the ED this admission started on Vanc and given GI cocktail.     Past Medical History:   Diagnosis Date    *Atrial fibrillation     Anticoagulant long-term " use     Arthritis     Atrial fibrillation     Atrial fibrillation Feb 23, 2016    Bipolar disorder     Congenital heart disease     s/p surgical intervention 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 of age    Deep vein thrombosis     DVT of leg (deep venous thrombosis)     left leg    History of prior ablation treatment     10/9/13    Hypertension     Obesity     Stroke     Thyroid disease     Venous stasis ulcer of lower extremity, unspecified laterality 12/14/2012    Venous ulcer        Past Surgical History:   Procedure Laterality Date    ANGIOPLASTY      ARTHROSCOPY-KNEE W/ CHONDROPLASTY Right 2/23/2016    [facility] OR    CARDIAC SURGERY      open heart surgery 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 old    EYE SURGERY      left eye cataract/right eye glaucoma    TIMO FILTER PLACEMENT      Dr Calix ([facility])    KNEE SURGERY      l and r     MULTIPLE TOOTH EXTRACTIONS      Sclerotherapy N/A 2/21/2019    [facility]formed [facility] Lantigua [facility] CATH LAB/EP    SKIN GRAFT      left leg    SYNOVECTOMY-KNEE Right 2/23/2016    [facility] OR       Review of patient's allergies indicates:   Allergen Reactions    Contrast media Other (See Comments)     Severe chest pain    Food allergy formula [glutamine-c-quercet-selen-brom]      Allergic to green peas; Heart failure.    Iodinated contrast- oral and iv dye Other (See Comments)     Chest pain    Peas Hives    Ibuprofen Swelling    Latex, natural rubber Hives    Pcn [penicillins] Hives    Butisol [butabarbital] Rash     Peeling skin       No current facility-administered medications on file prior to encounter.      Current Outpatient Medications on File Prior to Encounter   Medication Sig    HYDROcodone-acetaminophen (NORCO) 5-325 mg per tablet Take 1 tablet by mouth every 6 (six) hours as needed for Pain.    lisinopril 10 MG tablet Take 1 tablet (10 mg total) by mouth once daily. (Patient taking differently: Take 7.5 mg by mouth  every evening. )    metoprolol succinate (TOPROL-XL) 100 MG 24 hr tablet Take 1 tablet (100 mg total) by mouth once daily. (Patient taking differently: Take 100 mg by mouth every evening. )    nystatin (MYCOSTATIN) powder Apply topically 2 (two) times daily.    torsemide (DEMADEX) 20 MG Tab Take 2 tablets (40 mg total) by mouth once daily.    warfarin (COUMADIN) 10 MG tablet 7.5mg PO daily except 10mg PO on Sun and Wed - OR AS DIRECTED BY COUMADIN CLINIC (patient uses 7.5mg and 10mg strength tablets)    warfarin (COUMADIN) 7.5 MG tablet 7.5mg daily except 10mg Sun and Wed - OR AS DIRECTED BY COUMADIN CLINIC (patient uses 7.5mg and 10mg strength tablets)    [DISCONTINUED] doxycycline (VIBRA-TABS) 100 MG tablet Take 1 tablet (100 mg total) by mouth every 12 (twelve) hours.     Family History     Problem Relation (Age of Onset)    Diabetes Father, Maternal Grandfather    Heart disease Father, Maternal Grandmother, Maternal Grandfather    Stroke Maternal Grandfather        Tobacco Use    Smoking status: Former Smoker     Packs/day: 1.00     Years: 6.00     Pack years: 6.00     Types: Cigarettes    Smokeless tobacco: Former User    Tobacco comment: quit by age 27yrs old   Substance and Sexual Activity    Alcohol use: Yes     Alcohol/week: 0.6 oz     Types: 1 Glasses of wine per week     Frequency: Monthly or less     Comment: 1 glass of wine a week    Drug use: No    Sexual activity: Yes     Partners: Female     Birth control/protection: None     Review of Systems   Constitutional: Positive for fever.   Eyes: Negative.    Cardiovascular: Positive for chest pain.   Gastrointestinal: Negative.    Endocrine: Negative.    Genitourinary: Negative.    Musculoskeletal: Negative.    Skin: Positive for rash and wound.   Neurological: Positive for weakness.   Psychiatric/Behavioral: Negative.      Objective:     Vital Signs (Most Recent):  Temp: 99.4 °F (37.4 °C) (07/25/19 1141)  Pulse: 95 (07/25/19 1744)  Resp: (!) 24  (07/25/19 1744)  BP: 119/62 (07/25/19 1719)  SpO2: 96 % (07/25/19 1744) Vital Signs (24h Range):  Temp:  [99.4 °F (37.4 °C)] 99.4 °F (37.4 °C)  Pulse:  [] 95  Resp:  [18-37] 24  SpO2:  [96 %-100 %] 96 %  BP: (103-132)/(53-66) 119/62     Weight: (!) 167.4 kg (369 lb)  Body mass index is 40.54 kg/m².    Physical Exam   Constitutional: He is oriented to person, place, and time. He appears well-developed and well-nourished.   HENT:   Head: Normocephalic and atraumatic.   Eyes: Pupils are equal, round, and reactive to light. Conjunctivae and EOM are normal.   Neck: Normal range of motion. Neck supple.   Pulmonary/Chest: Effort normal and breath sounds normal.   Abdominal: Soft. Bowel sounds are normal.   Musculoskeletal: Normal range of motion.   Neurological: He is alert and oriented to person, place, and time.   Skin:   Chronic Stasis Dermatitis on bilateral lower extremities.     RLE cellulitis, redness marked by pen.   Psychiatric: He has a normal mood and affect.         CRANIAL NERVES     CN III, IV, VI   Pupils are equal, round, and reactive to light.  Extraocular motions are normal.        Significant Labs:   BMP:   Recent Labs   Lab 07/25/19  1405         K 5.2*      CO2 22*   BUN 35*   CREATININE 1.3   CALCIUM 9.9     CBC:   Recent Labs   Lab 07/25/19  1405   WBC 10.79   HGB 9.9*   HCT 32.7*        CMP:   Recent Labs   Lab 07/25/19  1405      K 5.2*      CO2 22*      BUN 35*   CREATININE 1.3   CALCIUM 9.9   PROT 8.0   ALBUMIN 3.5   BILITOT 0.6   ALKPHOS 141*   AST 31   ALT 25   ANIONGAP 9   EGFRNONAA >60       Significant Imaging: I have reviewed all pertinent imaging results/findings within the past 24 hours.    Assessment/Plan:     * Cellulitis  Chronic right sided LE swelling   Reports  redness and swelling in the morning of admission associated with subjective fevers, nausea and vomiting   Started Vancomycin  Blood cultures pending, WBC 10.79, LA 1.9, CXR  no acute findings, U/S LE no thrombosis    Admit to floor   Continue IV Vanc  Monitor labs  Marked leg, will monitor cellulitis expansion    Long term (current) use of anticoagulants  Warfarin 7.5 mg Mondays, Tuesdays, Thurdays and Fridays and 10 mg Sundays and Wednesdays   INR 1.5 on 7/15/19   Ordered new INR  Goal INR 2-3  Continue to monitor       HTN (hypertension)  Continue home medications  Goal BP <140/80  Hydralazine PRN      Chronic atrial fibrillation  On Warfarin,  Last INR 1.5 07/05/19 recheck INR   Monitor patient on telemetry   Continue home meds      Venous stasis dermatitis of both lower extremities  Chronic Venous Stasis Dermatitis managed in our clinic previously  Keep legs elevated  Continue to monitor      VTE Risk Mitigation (From admission, onward)    None             Sea Hardwick MD  Department of Hospital Medicine   [facility]

## 2019-07-25 NOTE — SUBJECTIVE & OBJECTIVE
Past Medical History:   Diagnosis Date    *Atrial fibrillation     Anticoagulant long-term use     Arthritis     Atrial fibrillation     Atrial fibrillation Feb 23, 2016    Bipolar disorder     Congenital heart disease     s/p surgical intervention 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 of age    Deep vein thrombosis     DVT of leg (deep venous thrombosis)     left leg    History of prior ablation treatment     10/9/13    Hypertension     Obesity     Stroke     Thyroid disease     Venous stasis ulcer of lower extremity, unspecified laterality 12/14/2012    Venous ulcer        Past Surgical History:   Procedure Laterality Date    ANGIOPLASTY      ARTHROSCOPY-KNEE W/ CHONDROPLASTY Right 2/23/2016    Performed [facility] OR    CARDIAC SURGERY      open heart surgery 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 old    EYE SURGERY      left eye cataract/right eye glaucoma    TIMO FILTER PLACEMENT      Dr Calix ([facility])    KNEE SURGERY      l and r     MULTIPLE TOOTH EXTRACTIONS      Sclerotherapy N/A 2/21/2019    Performed by Gomez Lantigua MD at [facility] CATH LAB/EP    SKIN GRAFT      left leg    SYNOVECTOMY-KNEE Right 2/23/2016    [facility] at [facility] OR       Review of patient's allergies indicates:   Allergen Reactions    Contrast media Other (See Comments)     Severe chest pain    Food allergy formula [glutamine-c-quercet-selen-brom]      Allergic to green peas; Heart failure.    Iodinated contrast- oral and iv dye Other (See Comments)     Chest pain    Peas Hives    Ibuprofen Swelling    Latex, natural rubber Hives    Pcn [penicillins] Hives    Butisol [butabarbital] Rash     Peeling skin       No current facility-administered medications on file prior to encounter.      Current Outpatient Medications on File Prior to Encounter   Medication Sig    HYDROcodone-acetaminophen (NORCO) 5-325 mg per tablet Take 1 tablet by mouth every 6 (six) hours as needed for Pain.    lisinopril 10 MG tablet Take 1  tablet (10 mg total) by mouth once daily. (Patient taking differently: Take 7.5 mg by mouth every evening. )    metoprolol succinate (TOPROL-XL) 100 MG 24 hr tablet Take 1 tablet (100 mg total) by mouth once daily. (Patient taking differently: Take 100 mg by mouth every evening. )    nystatin (MYCOSTATIN) powder Apply topically 2 (two) times daily.    torsemide (DEMADEX) 20 MG Tab Take 2 tablets (40 mg total) by mouth once daily.    warfarin (COUMADIN) 10 MG tablet 7.5mg PO daily except 10mg PO on Sun and Wed - OR AS DIRECTED BY COUMADIN CLINIC (patient uses 7.5mg and 10mg strength tablets)    warfarin (COUMADIN) 7.5 MG tablet 7.5mg daily except 10mg Sun and Wed - OR AS DIRECTED BY COUMADIN CLINIC (patient uses 7.5mg and 10mg strength tablets)    [DISCONTINUED] doxycycline (VIBRA-TABS) 100 MG tablet Take 1 tablet (100 mg total) by mouth every 12 (twelve) hours.     Family History     Problem Relation (Age of Onset)    Diabetes Father, Maternal Grandfather    Heart disease Father, Maternal Grandmother, Maternal Grandfather    Stroke Maternal Grandfather        Tobacco Use    Smoking status: Former Smoker     Packs/day: 1.00     Years: 6.00     Pack years: 6.00     Types: Cigarettes    Smokeless tobacco: Former User    Tobacco comment: quit by age 27yrs old   Substance and Sexual Activity    Alcohol use: Yes     Alcohol/week: 0.6 oz     Types: 1 Glasses of wine per week     Frequency: Monthly or less     Comment: 1 glass of wine a week    Drug use: No    Sexual activity: Yes     Partners: Female     Birth control/protection: None     Review of Systems   Constitutional: Positive for fever.   Eyes: Negative.    Cardiovascular: Positive for chest pain.   Gastrointestinal: Negative.    Endocrine: Negative.    Genitourinary: Negative.    Musculoskeletal: Negative.    Skin: Positive for rash and wound.   Neurological: Positive for weakness.   Psychiatric/Behavioral: Negative.      Objective:     Vital Signs  (Most Recent):  Temp: 99.4 °F (37.4 °C) (07/25/19 1141)  Pulse: 95 (07/25/19 1744)  Resp: (!) 24 (07/25/19 1744)  BP: 119/62 (07/25/19 1719)  SpO2: 96 % (07/25/19 1744) Vital Signs (24h Range):  Temp:  [99.4 °F (37.4 °C)] 99.4 °F (37.4 °C)  Pulse:  [] 95  Resp:  [18-37] 24  SpO2:  [96 %-100 %] 96 %  BP: (103-132)/(53-66) 119/62     Weight: (!) 167.4 kg (369 lb)  Body mass index is 40.54 kg/m².    Physical Exam   Constitutional: He is oriented to person, place, and time. He appears well-developed and well-nourished.   HENT:   Head: Normocephalic and atraumatic.   Eyes: Pupils are equal, round, and reactive to light. Conjunctivae and EOM are normal.   Neck: Normal range of motion. Neck supple.   Pulmonary/Chest: Effort normal and breath sounds normal.   Abdominal: Soft. Bowel sounds are normal.   Musculoskeletal: Normal range of motion.   Neurological: He is alert and oriented to person, place, and time.   Skin:   Chronic Stasis Dermatitis on bilateral lower extremities.     RLE cellulitis, redness marked by pen.   Psychiatric: He has a normal mood and affect.         CRANIAL NERVES     CN III, IV, VI   Pupils are equal, round, and reactive to light.  Extraocular motions are normal.        Significant Labs:   BMP:   Recent Labs   Lab 07/25/19  1405         K 5.2*      CO2 22*   BUN 35*   CREATININE 1.3   CALCIUM 9.9     CBC:   Recent Labs   Lab 07/25/19  1405   WBC 10.79   HGB 9.9*   HCT 32.7*        CMP:   Recent Labs   Lab 07/25/19  1405      K 5.2*      CO2 22*      BUN 35*   CREATININE 1.3   CALCIUM 9.9   PROT 8.0   ALBUMIN 3.5   BILITOT 0.6   ALKPHOS 141*   AST 31   ALT 25   ANIONGAP 9   EGFRNONAA >60       Significant Imaging: I have reviewed all pertinent imaging results/findings within the past 24 hours.

## 2019-07-25 NOTE — ASSESSMENT & PLAN NOTE
On Warfarin,  Last INR 1.5 07/05/19 recheck INR   Monitor patient on telemetry   Continue home meds

## 2019-07-25 NOTE — ED PROVIDER NOTES
"Encounter Date: 7/25/2019       History     Chief Complaint   Patient presents with    Joint Swelling     reports right knee swelling, states "knee is infected" and was red earlier and reports having a fever. Pt reports taking 100 mg of tylenol at 1030. denies injury.      Afebrile 53-year-old male with PMH of atrial fibrillation, DVT of left leg, HTN, obesity, CHF, thyroid disease, CVA, bipolar disorder, venous insufficiency with venous stasis ulcers and arthritis presents to the ED for evaluation of right lower extremity pain and swelling. Patient states that this is recurrence as he has known venous insufficiency venous stasis ulcers.  He states over the past few days he has noticed increased swelling of the right lower extremity.  He does report some erythema of the affected site that has gradually worsened since onset.  He reports subjective fever at home.  He does state that this feels similar to previous cellulitis of right lower extremity.  It was thought that he initially had infected knee however upon further discussion states that he has never had a septic joint and had simple cellulitis.  He does state that he took Tylenol today with little relief in symptoms. He denies any recent trauma or falls, chest pain, shortness of breath, numbness, tingling, extremity weakness or inability to bear weight.  He does state that he completed a course of doxycycline recently for similar episode for which he had resolution in symptoms.    The history is provided by the patient.     Review of patient's allergies indicates:   Allergen Reactions    Contrast media Other (See Comments)     Severe chest pain    Food allergy formula [glutamine-c-quercet-selen-brom]      Allergic to green peas; Heart failure.    Iodinated contrast- oral and iv dye Other (See Comments)     Chest pain    Peas Hives    Ibuprofen Swelling    Latex, natural rubber Hives    Pcn [penicillins] Hives    Butisol [butabarbital] Rash     Peeling " skin     Past Medical History:   Diagnosis Date    *Atrial fibrillation     Anticoagulant long-term use     Arthritis     Atrial fibrillation     Atrial fibrillation Feb 23, 2016    Bipolar disorder     Congenital heart disease     s/p surgical intervention 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 of age    Deep vein thrombosis     DVT of leg (deep venous thrombosis)     left leg    History of prior ablation treatment     10/9/13    Hypertension     Obesity     Stroke     Thyroid disease     Venous stasis ulcer of lower extremity, unspecified laterality 12/14/2012    Venous ulcer      Past Surgical History:   Procedure Laterality Date    ANGIOPLASTY      ARTHROSCOPY-KNEE W/ CHONDROPLASTY Right 2/23/2016    [facility] OR    CARDIAC SURGERY      open heart surgery 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 old    EYE SURGERY      left eye cataract/right eye glaucoma    TIMO FILTER PLACEMENT      Dr Calix ([facility])    KNEE SURGERY      l and r     MULTIPLE TOOTH EXTRACTIONS      Sclerotherapy N/A 2/21/2019    Performed by [facility]igua [facility] CATH LAB/EP    SKIN GRAFT      left leg    SYNOVECTOMY-KNEE Right 2/23/2016    [facility] by [facility] at [facility] OR     Family History   Problem Relation Age of Onset    Diabetes Father     Heart disease Father     Heart disease Maternal Grandmother     Diabetes Maternal Grandfather     Heart disease Maternal Grandfather     Stroke Maternal Grandfather      Social History     Tobacco Use    Smoking status: Former Smoker     Packs/day: 1.00     Years: 6.00     Pack years: 6.00     Types: Cigarettes    Smokeless tobacco: Former User    Tobacco comment: quit by age 27yrs old   Substance Use Topics    Alcohol use: Yes     Alcohol/week: 0.6 oz     Types: 1 Glasses of wine per week     Frequency: Monthly or less     Comment: 1 glass of wine a week    Drug use: No     Review of Systems   Constitutional: Positive for chills and fever (Subjective fever).   HENT:  Negative for congestion and sore throat.    Respiratory: Negative for cough, chest tightness and shortness of breath.    Cardiovascular: Positive for leg swelling. Negative for chest pain and palpitations.   Gastrointestinal: Negative for nausea and vomiting.   Genitourinary: Negative for dysuria.   Musculoskeletal: Positive for arthralgias (Right lower extremity pain and swelling). Negative for back pain.   Skin: Positive for color change and wound (Chronic wounds to bilateral lower extremities; venous stasis ulcers). Negative for rash.   Neurological: Negative for weakness.   Hematological: Bruises/bleeds easily.       Physical Exam     Initial Vitals [07/25/19 1141]   BP Pulse Resp Temp SpO2   (!) 103/53 100 18 99.4 °F (37.4 °C) 97 %      MAP       --         Physical Exam    Nursing note and vitals reviewed.  Constitutional: Vital signs are normal. He appears well-developed and well-nourished. He is cooperative.  Non-toxic appearance. He does not appear ill. He appears distressed.   HENT:   Head: Normocephalic and atraumatic.   Eyes: Conjunctivae and lids are normal.   Neck: Trachea normal and normal range of motion. Neck supple. No stridor present.   Cardiovascular: Normal rate and regular rhythm.   Pulmonary/Chest: No respiratory distress. He has no wheezes. He has no rhonchi.   Abdominal: Soft. Normal appearance. There is no tenderness.   Musculoskeletal:        Right lower leg: He exhibits swelling and edema. He exhibits no bony tenderness.        Legs:  Patient exhibits fair range of motion of all extremities including right affected lower extremity with no signs of decreased range of motion of right knee articulation.  No signs of fluctuance and patient has noted erythema from right foot that extends past the right knee.  No significant calf tenderness and negative Homans sign. Patient has chronic appearing venous stasis ulcers of bilateral lower extremity.  Neurovascularly intact   Neurological: He is  alert and oriented to person, place, and time. No sensory deficit. GCS eye subscore is 4. GCS verbal subscore is 5. GCS motor subscore is 6.   Skin: Skin is warm and dry. No rash noted. There is erythema.   Psychiatric: He has a normal mood and affect. His speech is normal and behavior is normal. Thought content normal.         ED Course   Procedures  Labs Reviewed   CBC W/ AUTO DIFFERENTIAL - Abnormal; Notable for the following components:       Result Value    RBC 4.08 (*)     Hemoglobin 9.9 (*)     Hematocrit 32.7 (*)     Mean Corpuscular Volume 80 (*)     Mean Corpuscular Hemoglobin 24.3 (*)     Mean Corpuscular Hemoglobin Conc 30.3 (*)     RDW 16.4 (*)     Gran # (ANC) 9.2 (*)     Gran% 85.2 (*)     Lymph% 9.0 (*)     All other components within normal limits   COMPREHENSIVE METABOLIC PANEL - Abnormal; Notable for the following components:    Potassium 5.2 (*)     CO2 22 (*)     BUN, Bld 35 (*)     Alkaline Phosphatase 141 (*)     All other components within normal limits   URINALYSIS, REFLEX TO URINE CULTURE - Abnormal; Notable for the following components:    Protein, UA Trace (*)     All other components within normal limits    Narrative:     Preferred Collection Type->Urine, Clean Catch   CULTURE, BLOOD   CULTURE, BLOOD   LACTIC ACID, PLASMA          Imaging Results          US Lower Extremity Veins Bilateral (Final result)  Result time 07/25/19 14:53:50    Final result by Gabriel Toledo MD (07/25/19 14:53:50)                 Impression:      No evidence of deep venous thrombosis in either lower extremity.      Electronically signed by: Gabriel Toledo MD  Date:    07/25/2019  Time:    14:53             Narrative:    EXAMINATION:  US LOWER EXTREMITY VEINS BILATERAL    CLINICAL HISTORY:  Localized edema    TECHNIQUE:  Duplex and color flow Doppler and dynamic compression was performed of the bilateral lower extremity veins was performed.    COMPARISON:  07/09/2019    FINDINGS:  Duplex and color flow  Doppler evaluation does not reveal any evidence of acute venous thrombosis in the common femoral, superficial femoral, greater saphenous, popliteal, peroneal, anterior tibial and posterior tibial veins bilaterally.  There is no reflux to suggest valvular incompetence.                               X-Ray Chest AP Portable (Final result)  Result time 07/25/19 14:38:14    Final result by Stan Kaufman Jr., MD (07/25/19 14:38:14)                 Impression:      Continued cardiomegaly.  No interval change.      Electronically signed by: Stan Kaufman MD  Date:    07/25/2019  Time:    14:38             Narrative:    EXAMINATION:  XR CHEST AP PORTABLE    CLINICAL HISTORY:  Sepsis;    TECHNIQUE:  Single frontal view of the chest was performed.    COMPARISON:  July 9, 2019.    FINDINGS:  Monitoring leads in place.  Heart is enlarged.  Pulmonary vascularity is not engorged.  No confluent consolidation.                                 Medical Decision Making:   Initial Assessment:   53-year-old male presents to the ED for evaluation of right lower extremity pain, swelling and redness. Reports this is recurrence.  Patient had recent admission for cellulitis likely secondary to venous stasis ulcers.  Patient appears ill however nontoxic.  Physical exam reveals Patient exhibits fair range of motion of all extremities including right affected lower extremity with no signs of decreased range of motion of right knee articulation.  No signs of fluctuance and patient has noted erythema from right foot that extends past the right knee.  No significant calf tenderness and negative Homans sign. Patient has chronic appearing venous stasis ulcers of bilateral lower extremity.  Neurovascularly intact. Lungs CTA; heart regular rate and rhythm.    Differential Diagnosis:   Cellulitis, septic joint, abscess, DVT, venous stasis, venous stasis ulcers, sirs, sepsis, electrolyte abnormality,  Clinical Tests:   Lab Tests: Ordered and  Reviewed  Radiological Study: Ordered and Reviewed  Medical Tests: Ordered and Reviewed  ED Management:  As patient did have noted hypotension, tachycardia and low-grade temperature with probable source of right lower extremity infection sepsis protocol was ordered with exclusion of fluid bolus as hypotension did improve upon re-evaluation.  Labs with no significant abnormalities other than mild hyperkalemia at 5.2.  Stable microcytic anemia.  INR noted to be elevated at 4.8 in this patient on Coumadin therapy lactic is normal. Ultrasound obtained and is negative for DVT.  Chest x-ray stable. As he has substantial cellulitis of believe he would benefit from observation with IV antibiotics with culture is pending.  First dose of vancomycin was ordered in the ED as per pharmacies dosage recommendation as patient had previously elevated vancomycin trough.  Discussed with [facility] and they will admit the patient.  Plan was discussed with patient he is agreeable. Strict instructions to follow up with primary care physician or reference provided for further assessment and evaluation. Given instructions to return for any acute symptoms and verbalized understanding of this medical plan.                     ED Course as of Jul 25 1809   Thu Jul 25, 2019   6014 Attestation: Patient seen by me separately from the midlevel/resident. I agree with the history, physical and management of the patient.   The patient presents the emergency department with right lower extremity pain and redness.  The patient recently had cellulitis to his right lower extremity he was admitted to the hospital and received vancomycin and had improvement of his symptoms. He was discharged with doxycycline and now his symptoms have returned.  The patient's white blood cell count is within normal today potassium was 5.2 creatinine is 1.3.  Doppler ultrasound is negative for DVT.  The patient will be admitted to the [facility] service.     [ST]      ED Course User Index  [ST] Sury Whelan MD     Clinical Impression:       ICD-10-CM ICD-9-CM   1. Chronic venous insufficiency I87.2 459.81   2. cellulitis A41.9 038.9     995.91   3. Lower leg edema R60.0 782.3   4. Cellulitis L03.90 682.9                                ROBIN Bragg  07/25/19 1078

## 2019-07-25 NOTE — ED NOTES
Pt provided with meal tray, ate and tolerated well.  Resting in bed, VSS, will continue to monitor

## 2019-07-25 NOTE — HPI
This is a 53 y.o. male who has a past medical history of atrial fibrillation, bipolar disorder, congenital heart disease, deep vein thrombosis, history of prior ablation treatment, hypertension, obesity, stroke, thyroid disease, venous stasis ulcer of lower extremity, presents to the ED with right lower extremity pain and subjective fever. States he woke up this morning in pain and feeling weak and looked down to see that his leg looked infected. He is currently following in coumadin clinic for chronic atrial fibrillation and states he felt weak and had a fever over 101. He also admits to nausea and vomiting a couple times. He states he is also experiencing some chest pain. He denies cough, congestion, SOB, abdominal pain, or flank pain. He has chronic right leg swelling, and was previously followed in wound care clinic for chronic venous statis ulcers, but was discharged months ago. He is compliant with anticoagulation for his history of DVT and atrial fibrillation. He was last admitted to our ED on 07/08/19 with a cellulitis of the same leg, was treated for sepsis with vanc and cefepime and left with doxycycline that he states he finished about few days ago. In the ED this admission started on Vanc and given GI cocktail.

## 2019-07-25 NOTE — ED TRIAGE NOTES
Pt presented to the ED with chronic venous stasis ulcer bilaterally, distal lower extremities.  Swelling, warmth, redness, noted on the right extremity distal,  extending to the right knee. Patient also stated that he has been running a fever today. Popliteal pulse noted. Pt AAOx3.

## 2019-07-25 NOTE — ED NOTES
Pt. Stated new onset of chest pains that he rates at a 5.  No additional symptoms noted. VSS.  Notified the admitting team, EKG obtained.

## 2019-07-25 NOTE — ASSESSMENT & PLAN NOTE
Warfarin 7.5 mg Mondays, Tuesdays, Thurdays and Fridays and 10 mg Sundays and Wednesdays   INR 1.5 on 7/15/19   Ordered new INR  Goal INR 2-3  Continue to monitor

## 2019-07-25 NOTE — ASSESSMENT & PLAN NOTE
Chronic right sided LE swelling   Reports  redness and swelling in the morning of admission associated with subjective fevers, nausea and vomiting   Started Vancomycin  Blood cultures pending, WBC 10.79, LA 1.9, CXR no acute findings, U/S LE no thrombosis    Admit to floor   Continue IV Vanc  Monitor labs  Marked leg, will monitor cellulitis expansion

## 2019-07-25 NOTE — PROGRESS NOTES
Pharmacokinetic Initial Assessment: IV Vancomycin    Assessment/Plan:    Initiate intravenous vancomycin with loading dose of *n/a mg once followed by a maintenance dose of vancomycin 1500 mg IV every 12 hours  Desired empiric serum trough concentration is 10 to 20 mcg/mL.  Draw vancomycin trough level 30 min prior to third dose on 7/26 at approximately 430p   Pharmacy will continue to follow and monitor vancomycin.      Please contact pharmacy at extension [phone] with any questions regarding this assessment.     Thank you for the consult,   Hiral Vargas     Patient brief summary:  Drew Farrar is a 53 y.o. male initiated on antimicrobial therapy with IV Vancomycin for treatment of suspected skin & soft tissue    Drug Allergies:   Review of patient's allergies indicates:   Allergen Reactions    Contrast media Other (See Comments)     Severe chest pain    Food allergy formula [glutamine-c-quercet-selen-brom]      Allergic to green peas; Heart failure.    Iodinated contrast- oral and iv dye Other (See Comments)     Chest pain    Peas Hives    Ibuprofen Swelling    Latex, natural rubber Hives    Pcn [penicillins] Hives    Butisol [butabarbital] Rash     Peeling skin       Actual Body Weight:   118.5kg    Renal Function:   Estimated Creatinine Clearance: 118.5 mL/min (based on SCr of 1.3 mg/dL).,     Dialysis Method (if applicable):  N/a     CBC (last 72 hours):  Recent Labs   Lab Result Units 07/25/19  1405   WBC K/uL 10.79   Hemoglobin g/dL 9.9*   Hematocrit % 32.7*   Platelets K/uL 244   Gran% % 85.2*   Lymph% % 9.0*   Mono% % 5.4   Eosinophil% % 0.2   Basophil% % 0.2   Differential Method  Automated       Metabolic Panel (last 72 hours):  Recent Labs   Lab Result Units 07/25/19  1405 07/25/19  1406   Sodium mmol/L 138  --    Potassium mmol/L 5.2*  --    Chloride mmol/L 107  --    CO2 mmol/L 22*  --    Glucose mg/dL 102  --    Glucose, UA   --  Negative   BUN, Bld mg/dL 35*  --    Creatinine mg/dL 1.3  --     Albumin g/dL 3.5  --    Total Bilirubin mg/dL 0.6  --    Alkaline Phosphatase U/L 141*  --    AST U/L 31  --    ALT U/L 25  --        Drug levels (last 3 results):  No results for input(s): VANCOMYCINRA, VANCOMYCINPE, VANCOMYCINTR in the last 72 hours.    Microbiologic Results:  Microbiology Results (last 7 days)       Procedure Component Value Units Date/Time    Blood culture x two cultures. Draw prior to antibiotics. [[id number]] Collected:  07/25/19 1420    Order Status:  Sent Specimen:  Blood from Peripheral, Wrist, Right Updated:  07/25/19 1420    Blood culture x two cultures. Draw prior to antibiotics. [[id number]] Collected:  07/25/19 1406    Order Status:  Sent Specimen:  Blood from Peripheral, Forearm, Left Updated:  07/25/19 1407

## 2019-07-25 NOTE — NURSING
Received patient from ER.  Edema, redness and warmth noted to right lower extremity.Redness and scab to abdominal area.  No redness to buttocks.  Patient denies pain at this time.  Patient able to make needs known.  IV site intact infusing Vancomycin.  On room air.  No SOB.  1 person assist.

## 2019-07-26 LAB
ALBUMIN SERPL BCP-MCNC: 3.1 G/DL (ref 3.5–5.2)
ALP SERPL-CCNC: 121 U/L (ref 55–135)
ALT SERPL W/O P-5'-P-CCNC: 21 U/L (ref 10–44)
ANION GAP SERPL CALC-SCNC: 8 MMOL/L (ref 8–16)
AST SERPL-CCNC: 26 U/L (ref 10–40)
BASOPHILS # BLD AUTO: 0.01 K/UL (ref 0–0.2)
BASOPHILS NFR BLD: 0.2 % (ref 0–1.9)
BILIRUB SERPL-MCNC: 0.6 MG/DL (ref 0.1–1)
BUN SERPL-MCNC: 35 MG/DL (ref 6–20)
CALCIUM SERPL-MCNC: 9.4 MG/DL (ref 8.7–10.5)
CHLORIDE SERPL-SCNC: 107 MMOL/L (ref 95–110)
CO2 SERPL-SCNC: 23 MMOL/L (ref 23–29)
CREAT SERPL-MCNC: 1.2 MG/DL (ref 0.5–1.4)
DIFFERENTIAL METHOD: ABNORMAL
EOSINOPHIL # BLD AUTO: 0.1 K/UL (ref 0–0.5)
EOSINOPHIL NFR BLD: 1.9 % (ref 0–8)
ERYTHROCYTE [DISTWIDTH] IN BLOOD BY AUTOMATED COUNT: 16.6 % (ref 11.5–14.5)
EST. GFR  (AFRICAN AMERICAN): >60 ML/MIN/1.73 M^2
EST. GFR  (NON AFRICAN AMERICAN): >60 ML/MIN/1.73 M^2
GLUCOSE SERPL-MCNC: 101 MG/DL (ref 70–110)
HCT VFR BLD AUTO: 30.2 % (ref 40–54)
HGB BLD-MCNC: 9.2 G/DL (ref 14–18)
INR PPP: 4.6 (ref 0.8–1.2)
LYMPHOCYTES # BLD AUTO: 0.6 K/UL (ref 1–4.8)
LYMPHOCYTES NFR BLD: 11.5 % (ref 18–48)
MAGNESIUM SERPL-MCNC: 1.9 MG/DL (ref 1.6–2.6)
MCH RBC QN AUTO: 24.3 PG (ref 27–31)
MCHC RBC AUTO-ENTMCNC: 30.5 G/DL (ref 32–36)
MCV RBC AUTO: 80 FL (ref 82–98)
MONOCYTES # BLD AUTO: 0.4 K/UL (ref 0.3–1)
MONOCYTES NFR BLD: 6.9 % (ref 4–15)
NEUTROPHILS # BLD AUTO: 4.1 K/UL (ref 1.8–7.7)
NEUTROPHILS NFR BLD: 79.5 % (ref 38–73)
PHOSPHATE SERPL-MCNC: 4.3 MG/DL (ref 2.7–4.5)
PLATELET # BLD AUTO: 198 K/UL (ref 150–350)
PMV BLD AUTO: 9.1 FL (ref 9.2–12.9)
POCT GLUCOSE: 89 MG/DL (ref 70–110)
POCT GLUCOSE: 98 MG/DL (ref 70–110)
POTASSIUM SERPL-SCNC: 4.8 MMOL/L (ref 3.5–5.1)
PROT SERPL-MCNC: 7.2 G/DL (ref 6–8.4)
PROTHROMBIN TIME: 47.2 SEC (ref 9–12.5)
RBC # BLD AUTO: 3.79 M/UL (ref 4.6–6.2)
SODIUM SERPL-SCNC: 138 MMOL/L (ref 136–145)
TROPONIN I SERPL DL<=0.01 NG/ML-MCNC: 0.01 NG/ML (ref 0–0.03)
VANCOMYCIN TROUGH SERPL-MCNC: 26.9 UG/ML (ref 10–22)
WBC # BLD AUTO: 5.22 K/UL (ref 3.9–12.7)

## 2019-07-26 PROCEDURE — 25000003 PHARM REV CODE 250: Performed by: FAMILY MEDICINE

## 2019-07-26 PROCEDURE — 25000003 PHARM REV CODE 250: Performed by: STUDENT IN AN ORGANIZED HEALTH CARE EDUCATION/TRAINING PROGRAM

## 2019-07-26 PROCEDURE — 94761 N-INVAS EAR/PLS OXIMETRY MLT: CPT

## 2019-07-26 PROCEDURE — 84484 ASSAY OF TROPONIN QUANT: CPT

## 2019-07-26 PROCEDURE — 93005 ELECTROCARDIOGRAM TRACING: CPT

## 2019-07-26 PROCEDURE — 80053 COMPREHEN METABOLIC PANEL: CPT

## 2019-07-26 PROCEDURE — 63600175 PHARM REV CODE 636 W HCPCS: Performed by: PHYSICIAN ASSISTANT

## 2019-07-26 PROCEDURE — 85025 COMPLETE CBC W/AUTO DIFF WBC: CPT

## 2019-07-26 PROCEDURE — 11000001 HC ACUTE MED/SURG PRIVATE ROOM

## 2019-07-26 PROCEDURE — 80202 ASSAY OF VANCOMYCIN: CPT

## 2019-07-26 PROCEDURE — 83735 ASSAY OF MAGNESIUM: CPT

## 2019-07-26 PROCEDURE — G0378 HOSPITAL OBSERVATION PER HR: HCPCS

## 2019-07-26 PROCEDURE — 84484 ASSAY OF TROPONIN QUANT: CPT | Mod: 91

## 2019-07-26 PROCEDURE — 63600175 PHARM REV CODE 636 W HCPCS: Performed by: STUDENT IN AN ORGANIZED HEALTH CARE EDUCATION/TRAINING PROGRAM

## 2019-07-26 PROCEDURE — 84100 ASSAY OF PHOSPHORUS: CPT

## 2019-07-26 PROCEDURE — 85610 PROTHROMBIN TIME: CPT

## 2019-07-26 PROCEDURE — 36415 COLL VENOUS BLD VENIPUNCTURE: CPT

## 2019-07-26 RX ORDER — SIMETHICONE 125 MG
125 TABLET,CHEWABLE ORAL EVERY 4 HOURS PRN
Status: DISCONTINUED | OUTPATIENT
Start: 2019-07-26 | End: 2019-07-27 | Stop reason: HOSPADM

## 2019-07-26 RX ORDER — CALCIUM CARBONATE 200(500)MG
500 TABLET,CHEWABLE ORAL DAILY PRN
Status: DISCONTINUED | OUTPATIENT
Start: 2019-07-26 | End: 2019-07-27 | Stop reason: HOSPADM

## 2019-07-26 RX ADMIN — LISINOPRIL 10 MG: 10 TABLET ORAL at 09:07

## 2019-07-26 RX ADMIN — MORPHINE SULFATE 4 MG: 2 INJECTION, SOLUTION INTRAMUSCULAR; INTRAVENOUS at 03:07

## 2019-07-26 RX ADMIN — LIDOCAINE HYDROCHLORIDE: 20 SOLUTION ORAL; TOPICAL at 04:07

## 2019-07-26 RX ADMIN — METOPROLOL SUCCINATE 100 MG: 25 TABLET, FILM COATED, EXTENDED RELEASE ORAL at 08:07

## 2019-07-26 RX ADMIN — TORSEMIDE 40 MG: 20 TABLET ORAL at 09:07

## 2019-07-26 RX ADMIN — SIMETHICONE 125 MG: 125 TABLET, CHEWABLE ORAL at 01:07

## 2019-07-26 RX ADMIN — VANCOMYCIN HYDROCHLORIDE 1500 MG: 1.5 INJECTION, POWDER, LYOPHILIZED, FOR SOLUTION INTRAVENOUS at 05:07

## 2019-07-26 RX ADMIN — CALCIUM CARBONATE (ANTACID) CHEW TAB 500 MG 500 MG: 500 CHEW TAB at 09:07

## 2019-07-26 RX ADMIN — SENNOSIDES, DOCUSATE SODIUM 1 TABLET: 50; 8.6 TABLET, FILM COATED ORAL at 09:07

## 2019-07-26 RX ADMIN — SIMETHICONE 125 MG: 125 TABLET, CHEWABLE ORAL at 08:07

## 2019-07-26 NOTE — PLAN OF CARE
VN completed admission questions with patient. Patient stated he came to hospital because his knee was swelling and reddened.  Plan of care was discussed including VTE prophylaxis of SCDs.  All questions answered.   Education given on safety measures and using call light before getting out of bed by himself. Patient verbalized understanding. Bed locked and in lowest position. Alarm on.

## 2019-07-26 NOTE — PT/OT/SLP PROGRESS
Occupational Therapy  D/C eval    Patient Name:  Drew Farrar   MRN:  [id number]    OT eval attempted this AM, however pt verbally declined all OT svcs. Edu re: role of OT & benefits of therapy. Pt verbalized understanding, but cont to refuse. Instructed pt to request re-consult for OT svcs if he changes his mind. Pt verbalized understanding.  D/C OT eval/tx orders per pt request.    RUPERT Francois  7/26/2019

## 2019-07-26 NOTE — PT/OT/SLP PROGRESS
"Physical Therapy   Visit Attempt    Patient Name:  Drew Farrar   MRN:  [id number]    Patient not seen today secondary to patient declining.  Pt states upon PT entry into room & introduction, "I don't need it, I don't need Physical Therapy".  Pt DC from PT at this time. Pt if/when appropriate or patient is cooperative to have PT.    Helen Renner, PT    "

## 2019-07-26 NOTE — PLAN OF CARE
Problem: Adult Inpatient Plan of Care  Goal: Plan of Care Review  Outcome: Ongoing (interventions implemented as appropriate)  Patient remains on contact precautions.  Patient on cardiac diet.  Patient had episode of chest pain.  Dr. Rosenberg notified.  New orders received.  Patient refused PT/OT.  Patient refuse 1600 blood sugar.  Patient able to ambulate to and from restroom.  Decrease redness to right extremity.  Patient denies pain to area.  Decrease redness noted.  Skin thick and dry.  IV site intact.  Patient able to make needs known.  Stable at this time.

## 2019-07-26 NOTE — NURSING
Patient c/o right sided chest pain. Vitals as follows: Temp 97.6, Pulse 85, Resp 20 but labored, O2SAT 100%, /73. MD contacted with patient condition. NitroTabs ordered. No relief obtained after 3x doses. Wait 5min after last dose and contacted MD again. Troponin labs ordered. Order for GI Cocktail administered to patient, still without relief. MD noted that morphine was given in ED for similar symptoms with relief. Patient administered PRN IV morphine, chest pain resolved and BP reduced to 138/65. Will continue to monitor for any changes in pain.

## 2019-07-26 NOTE — ASSESSMENT & PLAN NOTE
Patient presented with increased edema and erythema on the RLE and subjective fevers  Continue IV Vancomycin  Blood Cultures: NGTD  WBC: 5.22, No fevers overnight

## 2019-07-26 NOTE — PLAN OF CARE
Pt lives with spouse Marcelo ([phone]).pt requires assistance with ADLs from RW, cane, BSC, shower chair, and WC. Pt is not current with HH; pt prefers [facility] if he would need HH upon discharge. He states Aric provides assistance at home and Bebeto Ras [phone] take him to his doctor's appointments. Pt states a relative will provide transportation at discharge. Discharge brochure and blue discharge folder given to pt. [place] updated contact information on [facility].       07/26/19 1252   Discharge Assessment   Assessment Type Discharge Planning Assessment   Confirmed/corrected address and phone number on facesheet? Yes   Assessment information obtained from? Patient   Communicated expected length of stay with patient/caregiver yes   Prior to hospitilization cognitive status: Alert/Oriented   Prior to hospitalization functional status: Assistive Equipment   Current cognitive status: Alert/Oriented   Current Functional Status: Assistive Equipment   Facility Arrived From: home   Lives With spouse   Able to Return to Prior Arrangements yes   Is patient able to care for self after discharge? Yes   Who are your caregiver(s) and their phone number(s)? Marcelo (spouse) [phone]   Patient's perception of discharge disposition home or selfcare   Readmission Within the Last 30 Days unable to assess   Patient currently being followed by outpatient case management? No   Patient currently receives any other outside agency services? No   Equipment Currently Used at Home shower chair;wheelchair;bedside commode;cane, quad;walker, rolling   Do you have any problems affording any of your prescribed medications? No   Is the patient taking medications as prescribed? yes   Does the patient have transportation home? Yes   Transportation Anticipated family or friend will provide   Does the patient receive services at the [facility]? No   Discharge Plan A Home;Home with family   Discharge Plan B Home Health    DME Needed Upon Discharge  none   Patient/Family in Agreement with Plan yes   Fariha Spencer RN-BC  Transitional Navigator  [phone]

## 2019-07-26 NOTE — PLAN OF CARE
Please place on contact isolation for hx of MRSA to left leg in March.  Contact Infection Control @ [phone] for questions.

## 2019-07-26 NOTE — PLAN OF CARE
"   07/26/19 1254   Readmission Questionnaire   At the time of your discharge, did someone talk to you about what your health problems were? Yes   At the time of discharge, did someone talk to you about what to watch out for regarding worsening of your health problem? Yes   At the time of discharge, did someone talk to you about what to do if you experienced worsening of your health problem? Yes   At the time of discharge, did someone talk to you about which medication to take when you left the hospital and which ones to stop taking? Yes   At the time of discharge, did someone talk to you about when and where to follow up with a doctor after you left the hospital? Yes   What do you believe caused you to be sick enough to be re-admitted? "knee swollen"   How often do you need to have someone help you when you read instructions, pamphlets, or other written material from your doctor or pharmacy? Sometimes   Do you have problems taking your medications as prescribed? No   Do you have any problems affording any of  your prescribed medications? No   Do you have problems obtaining/receiving your medications? No   Does the patient have transportation to healthcare appointments? Yes   Lives With spouse   Living Arrangements house   Does the patient have family/friends to help with healtcare needs after discharge? yes   Who are your caregiver(s) and their phone number(s)? Marcelo (spouse) [phone]   Does your caregiver provide all the help you need? Yes   Are you currently feeling confused? No   Are you currently having problems thinking? No   Are you currently having memory problems? No   Have you felt down, depressed, or hopeless? 0   Have you felt little interest or pleasure in doing things? 0   In the last 7 days, my sleep quality was: poor     "

## 2019-07-26 NOTE — NURSING
Patient c/o midsternal chest pain a 8/10.  Vital signs WNL.  Contacted Dr. Rosenberg, new order received for EKG, Troponin stated to give morphine give via IV push. Sasha RN, Charge Nurse administered with minimal relief.  No change in EKG from previous one completed in ER last night (7/25/2019).  Notified family practice again regarding chest pain, order given for GI cocktail.

## 2019-07-26 NOTE — SUBJECTIVE & OBJECTIVE
Interval History: Patient is doing well. He continues to receive IV Vancomycin. Tolerating diet with no nausea or vomiting. Patient continues to have chest pain that is baseline. Troponin were negative and there was no EKG changes. Patient has been evaluated by Cardiology before with a Holter Monitor.     Review of Systems   Constitutional: Negative for fatigue and fever.   Eyes: Negative.    Cardiovascular: Positive for chest pain and leg swelling.   Gastrointestinal: Negative.  Negative for constipation, diarrhea, nausea and vomiting.   Endocrine: Negative.    Genitourinary: Negative.  Negative for difficulty urinating, dysuria and frequency.   Musculoskeletal: Negative.    Skin: Positive for wound. Negative for rash.   Neurological: Positive for weakness.   Psychiatric/Behavioral: Negative.      Objective:     Vital Signs (Most Recent):  Temp: 96.8 °F (36 °C) (07/26/19 0826)  Pulse: 96 (07/26/19 0826)  Resp: 18 (07/26/19 0826)  BP: (!) 147/76 (07/26/19 0826)  SpO2: 100 % (07/26/19 0800) Vital Signs (24h Range):  Temp:  [96.8 °F (36 °C)-99.4 °F (37.4 °C)] 96.8 °F (36 °C)  Pulse:  [] 96  Resp:  [18-37] 18  SpO2:  [96 %-100 %] 100 %  BP: ()/(53-87) 147/76     Weight: (!) 176.6 kg (389 lb 5.3 oz)  Body mass index is 42.77 kg/m².    Intake/Output Summary (Last 24 hours) at 7/26/2019 0929  Last data filed at 7/26/2019 0511  Gross per 24 hour   Intake 250 ml   Output 625 ml   Net -375 ml      Physical Exam   Constitutional: He is oriented to person, place, and time. He appears well-developed and well-nourished.   HENT:   Head: Normocephalic and atraumatic.   Eyes: Pupils are equal, round, and reactive to light. Conjunctivae and EOM are normal.   Neck: Normal range of motion. Neck supple.   Pulmonary/Chest: Effort normal and breath sounds normal.   Abdominal: Soft. Bowel sounds are normal.   Musculoskeletal: Normal range of motion.   Neurological: He is alert and oriented to person, place, and time.   Skin:    Chronic Stasis Dermatitis on bilateral lower extremities.   RLE with edema and erythema, Outline with a pen; Erythema is improved. Continues to have edema   Psychiatric: He has a normal mood and affect.       Significant Labs:   CBC:   Recent Labs   Lab 07/25/19  1405 07/26/19  0316   WBC 10.79 5.22   HGB 9.9* 9.2*   HCT 32.7* 30.2*    198     CMP:   Recent Labs   Lab 07/25/19  1405 07/26/19  0316    138   K 5.2* 4.8    107   CO2 22* 23    101   BUN 35* 35*   CREATININE 1.3 1.2   CALCIUM 9.9 9.4   PROT 8.0 7.2   ALBUMIN 3.5 3.1*   BILITOT 0.6 0.6   ALKPHOS 141* 121   AST 31 26   ALT 25 21   ANIONGAP 9 8   EGFRNONAA >60 >60     Magnesium:   Recent Labs   Lab 07/25/19  2137 07/26/19  0316   MG 1.7 1.9     Troponin:   Recent Labs   Lab 07/25/19  1405 07/25/19  2347 07/26/19  0316   TROPONINI 0.012 0.012 0.013       Significant Imaging:   Imaging Results          US Lower Extremity Veins Bilateral (Final result)  Result time 07/25/19 14:53:50    Final result by Gabriel Toledo MD (07/25/19 14:53:50)                 Impression:      No evidence of deep venous thrombosis in either lower extremity.      Electronically signed by: Gabriel Toledo MD  Date:    07/25/2019  Time:    14:53             Narrative:    EXAMINATION:  US LOWER EXTREMITY VEINS BILATERAL    CLINICAL HISTORY:  Localized edema    TECHNIQUE:  Duplex and color flow Doppler and dynamic compression was performed of the bilateral lower extremity veins was performed.    COMPARISON:  07/09/2019    FINDINGS:  Duplex and color flow Doppler evaluation does not reveal any evidence of acute venous thrombosis in the common femoral, superficial femoral, greater saphenous, popliteal, peroneal, anterior tibial and posterior tibial veins bilaterally.  There is no reflux to suggest valvular incompetence.                               X-Ray Chest AP Portable (Final result)  Result time 07/25/19 14:38:14    Final result by Stan Kaufman Jr.,  MD (07/25/19 14:38:14)                 Impression:      Continued cardiomegaly.  No interval change.      Electronically signed by: Stan Kaufman MD  Date:    07/25/2019  Time:    14:38             Narrative:    EXAMINATION:  XR CHEST AP PORTABLE    CLINICAL HISTORY:  Sepsis;    TECHNIQUE:  Single frontal view of the chest was performed.    COMPARISON:  July 9, 2019.    FINDINGS:  Monitoring leads in place.  Heart is enlarged.  Pulmonary vascularity is not engorged.  No confluent consolidation.

## 2019-07-26 NOTE — PROGRESS NOTES
[facility] Medicine  Progress Note    Patient Name: Drew Farrar  MRN: [id number]  Patient Class: IP- Inpatient   Admission Date: 7/25/2019  Length of Stay: 1 days  Attending Physician: Dayami Miguel MD  Primary Care Provider: David Larsen MD        Subjective:     Principal Problem:Cellulitis      HPI:  This is a 53 y.o. male who has a past medical history of atrial fibrillation, bipolar disorder, congenital heart disease, deep vein thrombosis, history of prior ablation treatment, hypertension, obesity, stroke, thyroid disease, venous stasis ulcer of lower extremity, presents to the ED with right lower extremity pain and subjective fever. States he woke up this morning in pain and feeling weak and looked down to see that his leg looked infected. He is currently following in coumadin clinic for chronic atrial fibrillation and states he felt weak and had a fever over 101. He also admits to nausea and vomiting a couple times. He states he is also experiencing some chest pain. He denies cough, congestion, SOB, abdominal pain, or flank pain. He has chronic right leg swelling, and was previously followed in wound care clinic for chronic venous statis ulcers, but was discharged months ago. He is compliant with anticoagulation for his history of DVT and atrial fibrillation. He was last admitted to our ED on 07/08/19 with a cellulitis of the same leg, was treated for sepsis with vanc and cefepime and left with doxycycline that he states he finished about few days ago. In the ED this admission started on Vanc and given GI cocktail.     Overview/Hospital Course:  No notes on file    Interval History: Patient is doing well. He continues to receive IV Vancomycin. Tolerating diet with no nausea or vomiting. Patient continues to have chest pain that is baseline. Troponin were negative and there was no EKG changes. Patient has been evaluated by Cardiology before with a Holter Monitor.      Review of Systems   Constitutional: Negative for fatigue and fever.   Eyes: Negative.    Cardiovascular: Positive for chest pain and leg swelling.   Gastrointestinal: Negative.  Negative for constipation, diarrhea, nausea and vomiting.   Endocrine: Negative.    Genitourinary: Negative.  Negative for difficulty urinating, dysuria and frequency.   Musculoskeletal: Negative.    Skin: Positive for wound. Negative for rash.   Neurological: Positive for weakness.   Psychiatric/Behavioral: Negative.      Objective:     Vital Signs (Most Recent):  Temp: 96.8 °F (36 °C) (07/26/19 0826)  Pulse: 96 (07/26/19 0826)  Resp: 18 (07/26/19 0826)  BP: (!) 147/76 (07/26/19 0826)  SpO2: 100 % (07/26/19 0800) Vital Signs (24h Range):  Temp:  [96.8 °F (36 °C)-99.4 °F (37.4 °C)] 96.8 °F (36 °C)  Pulse:  [] 96  Resp:  [18-37] 18  SpO2:  [96 %-100 %] 100 %  BP: ()/(53-87) 147/76     Weight: (!) 176.6 kg (389 lb 5.3 oz)  Body mass index is 42.77 kg/m².    Intake/Output Summary (Last 24 hours) at 7/26/2019 0929  Last data filed at 7/26/2019 0511  Gross per 24 hour   Intake 250 ml   Output 625 ml   Net -375 ml      Physical Exam   Constitutional: He is oriented to person, place, and time. He appears well-developed and well-nourished.   HENT:   Head: Normocephalic and atraumatic.   Eyes: Pupils are equal, round, and reactive to light. Conjunctivae and EOM are normal.   Neck: Normal range of motion. Neck supple.   Pulmonary/Chest: Effort normal and breath sounds normal.   Abdominal: Soft. Bowel sounds are normal.   Musculoskeletal: Normal range of motion.   Neurological: He is alert and oriented to person, place, and time.   Skin:   Chronic Stasis Dermatitis on bilateral lower extremities.   RLE with edema and erythema, Outline with a pen; Erythema is improved. Continues to have edema   Psychiatric: He has a normal mood and affect.       Significant Labs:   CBC:   Recent Labs   Lab 07/25/19  1405 07/26/19  0316   WBC 10.79 5.22    HGB 9.9* 9.2*   HCT 32.7* 30.2*    198     CMP:   Recent Labs   Lab 07/25/19  1405 07/26/19  0316    138   K 5.2* 4.8    107   CO2 22* 23    101   BUN 35* 35*   CREATININE 1.3 1.2   CALCIUM 9.9 9.4   PROT 8.0 7.2   ALBUMIN 3.5 3.1*   BILITOT 0.6 0.6   ALKPHOS 141* 121   AST 31 26   ALT 25 21   ANIONGAP 9 8   EGFRNONAA >60 >60     Magnesium:   Recent Labs   Lab 07/25/19  2137 07/26/19  0316   MG 1.7 1.9     Troponin:   Recent Labs   Lab 07/25/19  1405 07/25/19  2347 07/26/19  0316   TROPONINI 0.012 0.012 0.013       Significant Imaging:   Imaging Results          US Lower Extremity Veins Bilateral (Final result)  Result time 07/25/19 14:53:50    Final result by Gabriel Toledo MD (07/25/19 14:53:50)                 Impression:      No evidence of deep venous thrombosis in either lower extremity.      Electronically signed by: Gabriel Toledo MD  Date:    07/25/2019  Time:    14:53             Narrative:    EXAMINATION:  US LOWER EXTREMITY VEINS BILATERAL    CLINICAL HISTORY:  Localized edema    TECHNIQUE:  Duplex and color flow Doppler and dynamic compression was performed of the bilateral lower extremity veins was performed.    COMPARISON:  07/09/2019    FINDINGS:  Duplex and color flow Doppler evaluation does not reveal any evidence of acute venous thrombosis in the common femoral, superficial femoral, greater saphenous, popliteal, peroneal, anterior tibial and posterior tibial veins bilaterally.  There is no reflux to suggest valvular incompetence.                               X-Ray Chest AP Portable (Final result)  Result time 07/25/19 14:38:14    Final result by Stan Kaufman Jr., MD (07/25/19 14:38:14)                 Impression:      Continued cardiomegaly.  No interval change.      Electronically signed by: Stan Kaufman MD  Date:    07/25/2019  Time:    14:38             Narrative:    EXAMINATION:  XR CHEST AP PORTABLE    CLINICAL HISTORY:  Sepsis;    TECHNIQUE:  Single  frontal view of the chest was performed.    COMPARISON:  July 9, 2019.    FINDINGS:  Monitoring leads in place.  Heart is enlarged.  Pulmonary vascularity is not engorged.  No confluent consolidation.                                    Assessment/Plan:      * Cellulitis  Patient presented with increased edema and erythema on the RLE and subjective fevers  Continue IV Vancomycin  Blood Cultures: NGTD  WBC: 5.22, No fevers overnight    Long term (current) use of anticoagulants  Home med is Warfarin 7.5mg every day except Sunday and Wednesday where patient takes 10mg  INR: 4.6  Will hold Warfarin for now  Goal INR: 2-3  No overt signs of bleeding          HTN (hypertension)  Continue home medications  Hydralazine PRN  BP goal < 140/80      Chronic atrial fibrillation  On home Warfarin  Hold Warfarin for now as INR 4.6  On telemetry          Venous stasis dermatitis of both lower extremities  Chronic Venous Stasis Dermatitis  Keep legs elevated  Continue to monitor      VTE Risk Mitigation (From admission, onward)        Ordered     IP VTE HIGH RISK PATIENT  Once      07/25/19 2024     Place sequential compression device  Until discontinued      07/25/19 2024          Judy Rosenberg, PGY-2  [facility]  07/26/2019 9:45 AM

## 2019-07-26 NOTE — PLAN OF CARE
Problem: Adult Inpatient Plan of Care  Goal: Plan of Care Review  Outcome: Ongoing (interventions implemented as appropriate)  POC discussed with patient. Two episodes of right sided chest pain occurred during this shift. MD contacted on both occasions. Orders were received and implemented. Telemetry placed on patient. EKG and Troponin was recorded on both events. NitroTabs and GI Cocktail were ineffective at treating the pain. MD advised to use morphine due to patient having similar episode in ED. Pain resolved with Morphine. Patient also c/o gas distress and lower abdominal cramping. Simethicone administered per new received order. Pulse to right lower extremity is somewhat weaker than left side. Notable discoloration and swelling to right leg in comparison to left. Patient denies pain to BLE. Patient ambulates to bathroom with standby assistance. Bed alarm inactive due to mis-functioning bed. Will trade out bed for one with working alarm this morning. Patient using call light to voice needs without any difficulty. Patient encouraged to continue using call light for assistance. Will continue to monitor for any changes.

## 2019-07-26 NOTE — ASSESSMENT & PLAN NOTE
Home med is Warfarin 7.5mg every day except Sunday and Wednesday where patient takes 10mg  INR: 4.6  Will hold Warfarin for now  Goal INR: 2-3  No overt signs of bleeding

## 2019-07-27 VITALS
BODY MASS INDEX: 36.45 KG/M2 | RESPIRATION RATE: 18 BRPM | TEMPERATURE: 98 F | OXYGEN SATURATION: 99 % | HEIGHT: 78 IN | DIASTOLIC BLOOD PRESSURE: 56 MMHG | HEART RATE: 87 BPM | WEIGHT: 315 LBS | SYSTOLIC BLOOD PRESSURE: 116 MMHG

## 2019-07-27 LAB
ALBUMIN SERPL BCP-MCNC: 3.2 G/DL (ref 3.5–5.2)
ALP SERPL-CCNC: 123 U/L (ref 55–135)
ALT SERPL W/O P-5'-P-CCNC: 22 U/L (ref 10–44)
ANION GAP SERPL CALC-SCNC: 11 MMOL/L (ref 8–16)
AST SERPL-CCNC: 27 U/L (ref 10–40)
BASOPHILS # BLD AUTO: 0.02 K/UL (ref 0–0.2)
BASOPHILS NFR BLD: 0.5 % (ref 0–1.9)
BILIRUB SERPL-MCNC: 0.7 MG/DL (ref 0.1–1)
BUN SERPL-MCNC: 40 MG/DL (ref 6–20)
CALCIUM SERPL-MCNC: 9.6 MG/DL (ref 8.7–10.5)
CHLORIDE SERPL-SCNC: 104 MMOL/L (ref 95–110)
CO2 SERPL-SCNC: 22 MMOL/L (ref 23–29)
CREAT SERPL-MCNC: 1.2 MG/DL (ref 0.5–1.4)
DIFFERENTIAL METHOD: ABNORMAL
EOSINOPHIL # BLD AUTO: 0.1 K/UL (ref 0–0.5)
EOSINOPHIL NFR BLD: 3.8 % (ref 0–8)
ERYTHROCYTE [DISTWIDTH] IN BLOOD BY AUTOMATED COUNT: 16.3 % (ref 11.5–14.5)
EST. GFR  (AFRICAN AMERICAN): >60 ML/MIN/1.73 M^2
EST. GFR  (NON AFRICAN AMERICAN): >60 ML/MIN/1.73 M^2
GLUCOSE SERPL-MCNC: 92 MG/DL (ref 70–110)
HCT VFR BLD AUTO: 32.2 % (ref 40–54)
HGB BLD-MCNC: 9.9 G/DL (ref 14–18)
INR PPP: 3.4 (ref 0.8–1.2)
LYMPHOCYTES # BLD AUTO: 0.6 K/UL (ref 1–4.8)
LYMPHOCYTES NFR BLD: 16.2 % (ref 18–48)
MAGNESIUM SERPL-MCNC: 1.8 MG/DL (ref 1.6–2.6)
MCH RBC QN AUTO: 24.4 PG (ref 27–31)
MCHC RBC AUTO-ENTMCNC: 30.7 G/DL (ref 32–36)
MCV RBC AUTO: 80 FL (ref 82–98)
MONOCYTES # BLD AUTO: 0.4 K/UL (ref 0.3–1)
MONOCYTES NFR BLD: 10 % (ref 4–15)
NEUTROPHILS # BLD AUTO: 2.6 K/UL (ref 1.8–7.7)
NEUTROPHILS NFR BLD: 69.5 % (ref 38–73)
PHOSPHATE SERPL-MCNC: 5.8 MG/DL (ref 2.7–4.5)
PLATELET # BLD AUTO: 195 K/UL (ref 150–350)
PLATELET BLD QL SMEAR: ABNORMAL
PMV BLD AUTO: 9.5 FL (ref 9.2–12.9)
POTASSIUM SERPL-SCNC: 4.6 MMOL/L (ref 3.5–5.1)
PROT SERPL-MCNC: 7.5 G/DL (ref 6–8.4)
PROTHROMBIN TIME: 34.9 SEC (ref 9–12.5)
RBC # BLD AUTO: 4.05 M/UL (ref 4.6–6.2)
SODIUM SERPL-SCNC: 137 MMOL/L (ref 136–145)
WBC # BLD AUTO: 3.7 K/UL (ref 3.9–12.7)

## 2019-07-27 PROCEDURE — 25000003 PHARM REV CODE 250: Performed by: STUDENT IN AN ORGANIZED HEALTH CARE EDUCATION/TRAINING PROGRAM

## 2019-07-27 PROCEDURE — 85025 COMPLETE CBC W/AUTO DIFF WBC: CPT

## 2019-07-27 PROCEDURE — 84100 ASSAY OF PHOSPHORUS: CPT

## 2019-07-27 PROCEDURE — 85610 PROTHROMBIN TIME: CPT

## 2019-07-27 PROCEDURE — 36415 COLL VENOUS BLD VENIPUNCTURE: CPT

## 2019-07-27 PROCEDURE — 80053 COMPREHEN METABOLIC PANEL: CPT

## 2019-07-27 PROCEDURE — 94761 N-INVAS EAR/PLS OXIMETRY MLT: CPT

## 2019-07-27 PROCEDURE — 83735 ASSAY OF MAGNESIUM: CPT

## 2019-07-27 PROCEDURE — G0378 HOSPITAL OBSERVATION PER HR: HCPCS

## 2019-07-27 RX ORDER — CLINDAMYCIN HYDROCHLORIDE 300 MG/1
300 CAPSULE ORAL EVERY 6 HOURS
Qty: 56 CAPSULE | Refills: 0 | Status: SHIPPED | OUTPATIENT
Start: 2019-07-27 | End: 2019-08-10

## 2019-07-27 RX ORDER — CIPROFLOXACIN 500 MG/1
500 TABLET ORAL 2 TIMES DAILY
Qty: 28 TABLET | Refills: 0 | Status: SHIPPED | OUTPATIENT
Start: 2019-07-27 | End: 2019-08-05

## 2019-07-27 RX ORDER — SULFAMETHOXAZOLE AND TRIMETHOPRIM 800; 160 MG/1; MG/1
1 TABLET ORAL 2 TIMES DAILY
Qty: 28 TABLET | Refills: 0 | Status: SHIPPED | OUTPATIENT
Start: 2019-07-27 | End: 2019-07-27

## 2019-07-27 RX ADMIN — LISINOPRIL 10 MG: 10 TABLET ORAL at 08:07

## 2019-07-27 RX ADMIN — TORSEMIDE 40 MG: 20 TABLET ORAL at 08:07

## 2019-07-27 NOTE — PROGRESS NOTES
[facility] Medicine  Progress Note    Patient Name: Drew Farrar  MRN: [id number]  Patient Class: IP- Inpatient   Admission Date: 7/25/2019  Length of Stay: 2 days  Attending Physician: Dayami Miguel MD  Primary Care Provider: David Larsen MD        Subjective:     Principal Problem:Cellulitis      HPI:  This is a 53 y.o. male who has a past medical history of atrial fibrillation, bipolar disorder, congenital heart disease, deep vein thrombosis, history of prior ablation treatment, hypertension, obesity, stroke, thyroid disease, venous stasis ulcer of lower extremity, presents to the ED with right lower extremity pain and subjective fever. States he woke up this morning in pain and feeling weak and looked down to see that his leg looked infected. He is currently following in coumadin clinic for chronic atrial fibrillation and states he felt weak and had a fever over 101. He also admits to nausea and vomiting a couple times. He states he is also experiencing some chest pain. He denies cough, congestion, SOB, abdominal pain, or flank pain. He has chronic right leg swelling, and was previously followed in wound care clinic for chronic venous statis ulcers, but was discharged months ago. He is compliant with anticoagulation for his history of DVT and atrial fibrillation. He was last admitted to our ED on 07/08/19 with a cellulitis of the same leg, was treated for sepsis with vanc and cefepime and left with doxycycline that he states he finished about few days ago. In the ED this admission started on Vanc and given GI cocktail.     Interval change/Update:  Patient seen and examined this am. He endorses that his RLE both feels better as well as looks better, in fact, almost back to baseline. He denies any SOB/CP this am. Remained Afebrile overnight. He was on RA this am. Yesterday (7/26) he declined both OT/PT services.     ROS:  Constitutional: Negative for fatigue and fever.     Cardiovascular: Positive for  leg swelling.   Gastrointestinal:  Negative for constipation, diarrhea, nausea and vomiting.     Genitourinary:   Negative for difficulty urinating, dysuria and frequency.   Skin: Positive for wound/cellulitis improving   Neurological: Positive for subjective weakness.   Psychiatric/Behavioral: Negative.      Objective:  Vitals:    07/27/19 0413 07/27/19 0454 07/27/19 0736 07/27/19 0833   BP:  108/63  119/66   BP Location:  Left arm     Patient Position:  Sitting     Pulse:  86 102 81   Resp:  19  18   Temp:  97.9 °F (36.6 °C)  98 °F (36.7 °C)   TempSrc:  Oral     SpO2: 98%      Weight:       Height:         Constitutional: He is oriented to person, place, and time. He appears well-developed and well-nourished.   Head: Normocephalic and atraumatic.   Neck: Normal range of motion. Neck supple.   Pulmonary/Chest: Effort normal and breath sounds normal.   Abdominal: Soft. Bowel sounds are normal.   Musculoskeletal: Normal range of motion.   Neurological: He is alert and oriented to person, place, and time.   Skin: No focal neurological deficit.   Chronic Venous Stasis Dermatitis/Changes on bilateral lower extremities.   Cellulitis is improving/almost back to baseline. No abscess and/or area draining michele pus. Most of the skin finding appear chronic.   Psychiatric: Mood and affect are congruent.     Imaging:   Personally reviewed within the past 24 hours.    Assessment/Plan:      Acute RLE Cellulitis  Patient presented with RLE increased edema/erythema concerning for acute cellulitis along with reported subjective fevers  Afebrile overnight  Continue IV Vancomycin (started on 7/25).  Blood Cultures: NGTD and continue to trend  Clinically improving   WBC: 3.7 (5.2)  Resolving    Long term (current) use of anticoagulants presents with supra therapeutic level, H/O DVT  Home medical management with Warfarin 7.5mg qd except both Sunday and Wednesday when he takes 10mg  INR: 3.7 (4.6).   INR  goal: 2-3.  Continue to hold Warfarin  Continue to monitor  Resolving    Essential HTN (hypertension)  Continue home medications  Hydralazine PRN  BP goal < 140/80  BP this am: 119/66  Continue to monitor    Chronic atrial fibrillation  Holding Warfarin 2/2 supra therapeutic level  Continue telemetry and close monitoring  Remains asymptomatic    Venous stasis dermatitis of both lower extremities  Chronic Venous Stasis Dermatitis  Keep legs elevated  Continue to monitor  No obvious ulcers present at this time    Hyperthyroidism  TSH/FT4: <0.010/2.56  Needs Endocrine outpatient follow-up appointment  Continue to monitor    VTE Risk Mitigation (From admission, onward)        Ordered     IP VTE HIGH RISK PATIENT  Once      07/25/19 2024     Place sequential compression device  Until discontinued      07/25/19 2024        Code: Full  Diet: Cardiac    Dispo:  Pending continued clinical improvement of his RLE cellulitis.    Case discussed with attending physician, Dr. Chip Fair Jr., D.O.  [facility],  [place], PGY-2  [facility]

## 2019-07-27 NOTE — HOSPITAL COURSE
INR was supratherapeutic on admission. Warfarin was held and INR came down to 3.4.  He had two episodes of chest pain but EKG and troponins were negative both times. Pain was relieved with morphine and a GI cocktail. The cellulitis of his leg, pain and weakness improved. His TSH was found to be high with an elevated free T4. This could contribute to the episodes of sudden chest pain. Patient agreed to follow up with with endocrinology. Changed antibiotics based off culture sensitivities from March 2019 to Bactrim and Clindamycin upon discharge.

## 2019-07-27 NOTE — PROGRESS NOTES
Pharmacokinetic Assessment Follow Up: IV Vancomycin    Vancomycin serum concentration assessment(s):    The trough level was drawned correctly and can be used to guide therapy at this time. The measurement is above the desired definitive target range of 10 to 15 mcg/mL.    Vancomycin Regimen Plan:    Change regimen to Vancomycin 750 mg IV every 12hour with next serum trough concentration measured at 0430 prior to 4th dose on 7/29/19     Pharmacy will continue to follow and monitor vancomycin.    Please contact pharmacy at extension [phone] for questions regarding this assessment.    Thank you for the consult,   Farzana Daniels     Patient brief summary:  Drew Farrar is a 53 y.o. male initiated on antimicrobial therapy with IV Vancomycin for treatment of suspected skin & soft tissue    The patient did not receive a loading dose, followed by the current treatment regimen: vancomycin 1500 mg IV every 12 hours    Drug Allergies:   Review of patient's allergies indicates:   Allergen Reactions    Contrast media Other (See Comments)     Severe chest pain    Food allergy formula [glutamine-c-quercet-selen-brom]      Allergic to green peas; Heart failure.    Iodinated contrast- oral and iv dye Other (See Comments)     Chest pain    Peas Hives    Ibuprofen Swelling    Latex, natural rubber Hives    Pcn [penicillins] Hives    Butisol [butabarbital] Rash     Peeling skin       Actual Body Weight:   176.6 kg    Renal Function:   Estimated Creatinine Clearance: 132.1 mL/min (based on SCr of 1.2 mg/dL).,     Dialysis Method (if applicable):  NA     CBC (last 72 hours):  Recent Labs   Lab Result Units 07/25/19  1405 07/25/19  2137 07/26/19  0316   WBC K/uL 10.79  --  5.22   Hemoglobin g/dL 9.9*  --  9.2*   Hemoglobin A1C %  --  5.5  --    Hematocrit % 32.7*  --  30.2*   Platelets K/uL 244  --  198   Gran% % 85.2*  --  79.5*   Lymph% % 9.0*  --  11.5*   Mono% % 5.4  --  6.9   Eosinophil% % 0.2  --  1.9   Basophil% % 0.2  --   0.2   Differential Method  Automated  --  Automated       Metabolic Panel (last 72 hours):  Recent Labs   Lab Result Units 07/25/19  1405 07/25/19  1406 07/25/19  2137 07/26/19  0316   Sodium mmol/L 138  --   --  138   Potassium mmol/L 5.2*  --   --  4.8   Chloride mmol/L 107  --   --  107   CO2 mmol/L 22*  --   --  23   Glucose mg/dL 102  --   --  101   Glucose, UA   --  Negative  --   --    BUN, Bld mg/dL 35*  --   --  35*   Creatinine mg/dL 1.3  --   --  1.2   Albumin g/dL 3.5  --   --  3.1*   Total Bilirubin mg/dL 0.6  --   --  0.6   Alkaline Phosphatase U/L 141*  --   --  121   AST U/L 31  --   --  26   ALT U/L 25  --   --  21   Magnesium mg/dL  --   --  1.7 1.9   Phosphorus mg/dL  --   --  4.1 4.3       Vancomycin Administrations:  vancomycin given in the last 96 hours                     vancomycin 1.5 g in dextrose 5 % 250 mL IVPB (ready to mix) (mg) 1,500 mg New Bag 07/26/19 1733     1,500 mg New Bag  0511     1,500 mg New Bag 07/25/19 1656                      Drug levels (last 3 results):  Recent Labs   Lab Result Units 07/26/19  1641   Vancomycin-Trough ug/mL 26.9*       Microbiologic Results:  Microbiology Results (last 7 days)       Procedure Component Value Units Date/Time    Blood culture x two cultures. Draw prior to antibiotics. [[id number]] Collected:  07/25/19 1420    Order Status:  Completed Specimen:  Blood from Peripheral, Wrist, Right Updated:  07/26/19 1812     Blood Culture, Routine No Growth to date      No Growth to date    Narrative:       Aerobic and anaerobic    Blood culture x two cultures. Draw prior to antibiotics. [[id number]] Collected:  07/25/19 1404    Order Status:  Completed Specimen:  Blood from Peripheral, Forearm, Left Updated:  07/26/19 1812     Blood Culture, Routine No Growth to date      No Growth to date    Narrative:       Aerobic and anaerobic

## 2019-07-27 NOTE — PLAN OF CARE
Discharge orders noted. Additional clinical references attached.    Patient's discharge instructions given by bedside RN and reviewed via this VN.  Education provided on new medication, diagnosis, and follow-up appointments. Patient verbalized understanding. All questions answered. Transport to [facility] requested. Floor nurse notified.

## 2019-07-27 NOTE — DISCHARGE SUMMARY
[facility] Medicine  Discharge Summary      Patient Name: Drew Farrar  MRN: [id number]  Admission Date: 7/25/2019  Hospital Length of Stay: 2 days  Discharge Date and Time: 7/27/2019  1:46 PM  Attending Physician: No att. providers found   Discharging Provider: Jonathan Love MD  Primary Care Provider: David Larsen MD      HPI:   This is a 53 y.o. male who has a past medical history of atrial fibrillation, bipolar disorder, congenital heart disease, deep vein thrombosis, history of prior ablation treatment, hypertension, obesity, stroke, thyroid disease, venous stasis ulcer of lower extremity, presents to the ED with right lower extremity pain and subjective fever. States he woke up this morning in pain and feeling weak and looked down to see that his leg looked infected. He is currently following in coumadin clinic for chronic atrial fibrillation and states he felt weak and had a fever over 101. He also admits to nausea and vomiting a couple times. He states he is also experiencing some chest pain. He denies cough, congestion, SOB, abdominal pain, or flank pain. He has chronic right leg swelling, and was previously followed in wound care clinic for chronic venous statis ulcers, but was discharged months ago. He is compliant with anticoagulation for his history of DVT and atrial fibrillation. He was last admitted to our ED on 07/08/19 with a cellulitis of the same leg, was treated for sepsis with vanc and cefepime and left with doxycycline that he states he finished about few days ago. In the ED this admission started on Vanc and given GI cocktail.     * No surgery found *      Hospital Course:   INR was supratherapeutic on admission. Warfarin was held and INR came down to 3.4.  He had two episodes of chest pain but EKG and troponins were negative both times. Pain was relieved with morphine and a GI cocktail. The cellulitis of his leg, pain and weakness improved. His TSH was found  to be high with an elevated free T4. This could contribute to the episodes of sudden chest pain. Patient agreed to follow up with with endocrinology. Changed antibiotics based off culture sensitivities from March 2019 to Bactrim and Clindamycin upon discharge.      Consults:   Consults (From admission, onward)        Status Ordering Provider     Case Management  Once     Provider:  (Not yet assigned)    Acknowledged SHEMAR CHAVEZ     Pharmacy to dose Vancomycin consult  Once     Provider:  (Not yet assigned)    Acknowledged SHEMAR CHAVEZ          * Cellulitis  Improved.   Blood Cultures: NGTD  WBC: 5.22, No fevers overnight  Changed abx to PO. Bactrim and Clindamycin    Chronic venous insufficiency  Continue to follow with interventional cardiology.       Long term (current) use of anticoagulants  Home med is Warfarin 7.5mg every day except Sunday and Wednesday where patient takes 10mg  Continue home Warfarin doses.  Monitor INR and follow up in clinic.           HTN (hypertension)  Continue home medications and follow low sodium diet.       Chronic atrial fibrillation  Continue home Warfarin and follow up in clinic.        Venous stasis dermatitis of both lower extremities  Chronic Venous Stasis Dermatitis  Keep legs elevated  Continue to monitor      Final Active Diagnoses:    Diagnosis Date Noted POA    PRINCIPAL PROBLEM:  Cellulitis [L03.90] 02/13/2014 Yes    Chronic venous insufficiency [I87.2] 07/25/2019 Yes    Long term (current) use of anticoagulants [Z79.01] 10/31/2016 Not Applicable    HTN (hypertension) [I10] 09/16/2016 Yes    Chronic atrial fibrillation [I48.2] 12/14/2012 Yes    Venous stasis dermatitis of both lower extremities [I87.2] 12/14/2012 Yes      Problems Resolved During this Admission:       Discharged Condition: stable    Disposition: Home or Self Care    Follow Up:  Follow-up Information     Sg Medina PA-C On 8/1/2019.    Specialty:  Family Medicine  Why:  time:9:00am Hospital follow  up  Contact information:  [address]  [address]  [place] [address]  [phone]             Schedule an appointment as soon as possible for a visit with David Larsen MD.    Specialty:  Family Medicine  Why:  Offices closed for Weekend. Patient to schedule own follow up appointment.  Contact information:  [address]  [place] [address]  [phone]             Schedule an appointment as soon as possible for a visit with Neema Lazcano MD.    Specialty:  Endocrinology  Why:  Ambulatory referral placed with Endocrinology, Offices closed for Weekend. Patient to schedule own follow up appointment.  Contact information:  [address]  [facility] [address]  [phone]                 Patient Instructions:      Ambulatory referral to Endocrinology   Referral Priority: Routine Referral Type: Consultation   Referral Reason: Specialty Services Required   Referred to Provider: NEEMA LAZCANO Requested Specialty: Endocrinology   Number of Visits Requested: 1       Significant Diagnostic Studies: Labs:   CMP   Recent Labs   Lab 07/26/19  0316 07/27/19  0446    137   K 4.8 4.6    104   CO2 23 22*    92   BUN 35* 40*   CREATININE 1.2 1.2   CALCIUM 9.4 9.6   PROT 7.2 7.5   ALBUMIN 3.1* 3.2*   BILITOT 0.6 0.7   ALKPHOS 121 123   AST 26 27   ALT 21 22   ANIONGAP 8 11   ESTGFRAFRICA >60 >60   EGFRNONAA >60 >60   , CBC   Recent Labs   Lab 07/26/19  0316 07/27/19  0446   WBC 5.22 3.70*   HGB 9.2* 9.9*   HCT 30.2* 32.2*    195   , INR   Lab Results   Component Value Date    INR 3.4 (H) 07/27/2019    INR 4.6 (H) 07/26/2019    INR 4.8 (H) 07/25/2019    and All labs within the past 24 hours have been reviewed    Pending Diagnostic Studies:     None         Medications:  Reconciled Home Medications:      Medication List      START taking these medications    clindamycin 300 MG capsule  Commonly known as:  CLEOCIN  Take 1 capsule (300 mg total) by mouth every 6 (six) hours.  for 14 days     sulfamethoxazole-trimethoprim 800-160mg 800-160 mg Tab  Commonly known as:  BACTRIM DS  Take 1 tablet by mouth 2 (two) times daily. for 14 days        CHANGE how you take these medications    lisinopril 10 MG tablet  Take 1 tablet (10 mg total) by mouth once daily.  What changed:    · how much to take  · when to take this     metoprolol succinate 100 MG 24 hr tablet  Commonly known as:  TOPROL-XL  Take 1 tablet (100 mg total) by mouth once daily.  What changed:  when to take this        CONTINUE taking these medications    HYDROcodone-acetaminophen 5-325 mg per tablet  Commonly known as:  NORCO  Take 1 tablet by mouth every 6 (six) hours as needed for Pain.     nystatin powder  Commonly known as:  MYCOSTATIN  Apply topically 2 (two) times daily.     torsemide 20 MG Tab  Commonly known as:  DEMADEX  Take 2 tablets (40 mg total) by mouth once daily.     * warfarin 7.5 MG tablet  Commonly known as:  COUMADIN  7.5mg daily except 10mg Sun and Wed - OR AS DIRECTED BY COUMADIN CLINIC (patient uses 7.5mg and 10mg strength tablets)     * warfarin 10 MG tablet  Commonly known as:  COUMADIN  7.5mg PO daily except 10mg PO on Sun and Wed - OR AS DIRECTED BY COUMADIN CLINIC (patient uses 7.5mg and 10mg strength tablets)         * This list has 2 medication(s) that are the same as other medications prescribed for you. Read the directions carefully, and ask your doctor or other care provider to review them with you.            STOP taking these medications    doxycycline 100 MG tablet  Commonly known as:  VIBRA-TABS            Indwelling Lines/Drains at time of discharge:   Lines/Drains/Airways          None          Time spent on the discharge of patient: 40 minutes  Patient was seen and examined on the date of discharge and determined to be suitable for discharge.         Jonathan Love MD HO-1  Department of Hospital Medicine  [facility]

## 2019-07-27 NOTE — PLAN OF CARE
Discharge orders noted, no HH or HME ordered.    Future Appointments   Date Time Provider Department Center   8/1/2019  9:00 AM Sg Medina PA-C [facility]   8/5/2019  1:20 PM Edward Castañeda MD [facility] Sapp       Pt's nurse will go over medications/signs and symptoms prior to discharge       07/27/19 1221   Final Note   Assessment Type Final Discharge Note   Anticipated Discharge Disposition Home   What phone number can be called within the next 1-3 days to see how you are doing after discharge? [phone]   Hospital Follow Up  Appt(s) scheduled? No  (Offices closed for Weekend. Patient to schedule own follow up appointment.)   Right Care Referral Info   Post Acute Recommendation No Care     Thania Clemente, RN Transitional Navigator  [phone]

## 2019-07-27 NOTE — ASSESSMENT & PLAN NOTE
Improved.   Blood Cultures: NGTD  WBC: 5.22, No fevers overnight  Changed abx to PO. Bactrim and Clindamycin

## 2019-07-27 NOTE — ASSESSMENT & PLAN NOTE
Home med is Warfarin 7.5mg every day except Sunday and Wednesday where patient takes 10mg  Continue home Warfarin doses.  Monitor INR and follow up in clinic.

## 2019-07-27 NOTE — PROGRESS NOTES
Pt discharged per  orders. IV and telemetry removed. Discharge paperwork given to pt to be reviewed with VN. Pt had no questions or concerns. Safety maintained. Will continue to monitor.

## 2019-07-27 NOTE — PLAN OF CARE
Problem: Adult Inpatient Plan of Care  Goal: Plan of Care Review  Outcome: Ongoing (interventions implemented as appropriate)  Patient medicated for upset stomach with good results. IV antibiotics given for infection as ordered. Remains on telemetry a fib overnight. Remains free from falls, bed alarm in use.

## 2019-07-29 ENCOUNTER — ANTI-COAG VISIT (OUTPATIENT)
Dept: CARDIOLOGY | Facility: CLINIC | Age: 54
End: 2019-07-29
Payer: MEDICARE

## 2019-07-29 ENCOUNTER — PATIENT MESSAGE (OUTPATIENT)
Dept: ENDOCRINOLOGY | Facility: CLINIC | Age: 54
End: 2019-07-29

## 2019-07-29 DIAGNOSIS — Z86.718 HISTORY OF DVT (DEEP VEIN THROMBOSIS): ICD-10-CM

## 2019-07-29 DIAGNOSIS — I48.21 PERMANENT ATRIAL FIBRILLATION: ICD-10-CM

## 2019-07-29 DIAGNOSIS — Z79.01 LONG TERM (CURRENT) USE OF ANTICOAGULANTS: ICD-10-CM

## 2019-07-29 DIAGNOSIS — I87.1 MAY-THURNER SYNDROME: ICD-10-CM

## 2019-07-29 DIAGNOSIS — I26.99 OTHER PULMONARY EMBOLISM WITHOUT ACUTE COR PULMONALE, UNSPECIFIED CHRONICITY: ICD-10-CM

## 2019-07-29 PROCEDURE — 93793 PR ANTICOAGULANT MGMT FOR PT TAKING WARFARIN: ICD-10-PCS | Mod: S$GLB,,,

## 2019-07-29 PROCEDURE — 93793 ANTICOAG MGMT PT WARFARIN: CPT | Mod: S$GLB,,,

## 2019-07-29 NOTE — PROGRESS NOTES
RN notes reviewed. No DDI with clindamycin but possible DDI with ciprofloxacin. See calendar for dose/plan.

## 2019-07-29 NOTE — PROGRESS NOTES
Patient admitted 7/25/19 - 7/27/19 for cellulitis to right lower extremity.  Per patient's chart started on Clindamycin 300 mg ever 6 hours for 14 days, Cipro 500 mg BID for 14 days, no Bactrim in med reconciliation (was on discharge AVS and in notes so maybe they decided against this medication), stopped Doxycycline, and continue Coumadin 10 mg on Sunday/Wednesday and 7.5 mg all other days.  No doses of Coumadin given during admit.    Patient verified he is taking Clindamycin 7/27/19, Cipro started on 7/29/19, last dose of Coumadin he took was prior to his admission as he held his dose 7/28/19, and he is adamant the physician in the hospital told him to not take Coumadin for 14 days due to bleeding risk (his discharge AVS has to continue Coumadin). Chart routed to pharmacist to review.

## 2019-07-29 NOTE — PROGRESS NOTES
Patient called 07/29/19 to inform us that he was discharged from ([facility]) on 7/27/19. Patient is now (Holding/Coumadin) per request of the Drs for the next 14 days. He started on (Clindamycin 300mg) 1 tablet q6 hrs. Also (Bactrim DS) 2 tablet every 12 hours. Please advise

## 2019-07-30 LAB
BACTERIA BLD CULT: NORMAL
BACTERIA BLD CULT: NORMAL

## 2019-07-31 NOTE — PROGRESS NOTES
Not sure why this encounter was opened. INR from hospital stay. See previous notes. Patient is adamant that his doctor told him to hold. He only needed to hold coumadin due to high INR and until follow up with us. Advise patient to resume coumadin now (7/31). Check INR tomorrow with other appts. He can call his doctor if he has questions but this is the order. Resume coumadin now and INR in AM. He can develop another DVT if he stays off coumadin..

## 2019-07-31 NOTE — PROGRESS NOTES
7/31 and 8/1 [place] [facility] [place]

## 2019-07-31 NOTE — PROGRESS NOTES
I told the pt what was written in his progress note on his chart. Pt still refuse to start his Coumadin. He said he not going to take the risk. I told him he would need to talk to his doctor about this.

## 2019-08-01 ENCOUNTER — OFFICE VISIT (OUTPATIENT)
Dept: FAMILY MEDICINE | Facility: HOSPITAL | Age: 54
End: 2019-08-01
Attending: FAMILY MEDICINE
Payer: MEDICARE

## 2019-08-01 VITALS
BODY MASS INDEX: 36.45 KG/M2 | HEART RATE: 90 BPM | WEIGHT: 315 LBS | SYSTOLIC BLOOD PRESSURE: 119 MMHG | HEIGHT: 78 IN | DIASTOLIC BLOOD PRESSURE: 60 MMHG

## 2019-08-01 DIAGNOSIS — L03.115 CELLULITIS OF RIGHT LOWER EXTREMITY: Primary | ICD-10-CM

## 2019-08-01 DIAGNOSIS — I10 ESSENTIAL HYPERTENSION: ICD-10-CM

## 2019-08-01 DIAGNOSIS — L97.329 NON-PRESSURE CHRONIC ULCER OF LEFT ANKLE, WITH UNSPECIFIED SEVERITY: ICD-10-CM

## 2019-08-01 DIAGNOSIS — I48.20 CHRONIC ATRIAL FIBRILLATION: ICD-10-CM

## 2019-08-01 DIAGNOSIS — Z79.01 LONG TERM (CURRENT) USE OF ANTICOAGULANTS: ICD-10-CM

## 2019-08-01 DIAGNOSIS — Z86.718 HISTORY OF DVT (DEEP VEIN THROMBOSIS): ICD-10-CM

## 2019-08-01 PROCEDURE — 99214 OFFICE O/P EST MOD 30 MIN: CPT | Performed by: PHYSICIAN ASSISTANT

## 2019-08-01 NOTE — PROGRESS NOTES
FAMILY MEDICINE  New Visit Progress Note   Recent Hospital Discharge     PRESENTING HISTORY     Chief Complaint/Reason for Admission:  Cellulitis; Follow up Hospital Discharge   Chief Complaint   Patient presents with    Hospital Follow Up    Recurrent Skin Infections     PCP: David Larsen MD    History of Present Illness:  Mr. Drew Farrar is a 53 y.o. male who was recently admitted to the hospital.    Admission Date: 7/25/2019  Hospital Length of Stay: 2 days  Discharge Date and Time: 7/27/2019  1:46 PM  ___________________________________________________________________    Today:   Patient was seen in [facility] today for hospital follow up.  Recently hospitalized for LE cellulitis after presenting to the ED with leg pain with associated fever and n/v.    PMH of AFib, bipolar disorder, congenital heart disease, deep vein thrombosis, history of prior ablation treatment, HTN, obesity, stroke, thyroid disease, chronic venous stasis ulcer of lower extremity.  +Chronic right leg swelling, and was previously followed in wound care clinic for chronic venous statis ulcers, but was discharged months ago.   Reports compliant with anticoagulation for his history of DVT and atrial fibrillation.     INR supratherapeutic on admission.   Warfarin was held and INR came down to 3.4.    He had two episodes of chest pain but EKG and troponins were negative both times.   Pain was relieved with morphine and a GI cocktail.   The cellulitis of his leg, pain and weakness improved.   His TSH was found to be high with an elevated free T4.     Cellulitis:  Reports that his symptoms have much improved since his admission. He denies any continued pain due to the cellulitis, fever, n/v. He is back to baseline. He has been taking his ABx as prescribed. Was discharged on Cipro BID and Clinda q6, but is taking 3 times daily. Patient refusing to take his warfarin while on ABX due to potential interactions and increased  risk of bleeding.      HTN:  Well controlled today on lisinioprl and metoprolol; 119/60 today.      AFib/DVT:  Currently followed by coumadin clinic last INR in therapeutic range. Coumadin clinic recommended that he begin his coumadin again.     Hyperthyroid:  Has follow up tomorrow with Endocrine at 3pm.     Review of Systems  General ROS: negative for chills, fever or weight loss  Psychological ROS: negative for hallucination, depression or suicidal ideation  Ophthalmic ROS: negative for blurry vision, photophobia or eye pain  ENT ROS: negative for epistaxis, sore throat or rhinorrhea  Respiratory ROS: no cough, shortness of breath, or wheezing  Cardiovascular ROS: no chest pain or +dyspnea on exertion  Gastrointestinal ROS: no abdominal pain, change in bowel habits, or black/ bloody stools  Genito-Urinary ROS: no dysuria, trouble voiding, or hematuria  Musculoskeletal ROS: negative for gait disturbance or muscular weakness  Neurological ROS: no syncope or seizures; no ataxia  Dermatological ROS: negative for pruritis, rash and jaundice    PAST HISTORY:     Past Medical History:   Diagnosis Date    *Atrial fibrillation     Anticoagulant long-term use     Arthritis     Atrial fibrillation     Atrial fibrillation Feb 23, 2016    Bipolar disorder     CHF (congestive heart failure)     Congenital heart disease     s/p surgical intervention 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 of age    Deep vein thrombosis     DVT of leg (deep venous thrombosis)     left leg    History of prior ablation treatment     10/9/13    Hypertension     Obesity     Stroke     Thyroid disease     Venous stasis ulcer of lower extremity, unspecified laterality 12/14/2012    Venous ulcer        Past Surgical History:   Procedure Laterality Date    ANGIOPLASTY      ARTHROSCOPY-KNEE W/ CHONDROPLASTY Right 2/23/2016    [facility]    CARDIAC SURGERY      open heart surgery 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 old    EYE SURGERY      left eye  cataract/right eye glaucoma    TIMO FILTER PLACEMENT      Dr Calix ([facility])    KNEE SURGERY      l and r     MULTIPLE TOOTH EXTRACTIONS      Sclerotherapy N/A 2/21/2019    Performed by [facility]igua [facility] at [facility] CATH LAB/EP    SKIN GRAFT      left leg    SYNOVECTOMY-KNEE Right 2/23/2016    [facility] at [facility] OR       Family History   Problem Relation Age of Onset    Diabetes Father     Heart disease Father     Heart disease Maternal Grandmother     Diabetes Maternal Grandfather     Heart disease Maternal Grandfather     Stroke Maternal Grandfather        Social History     Socioeconomic History    Marital status: Single     Spouse name: Not on file    Number of children: 2    Years of education: Not on file    Highest education level: Not on file   Occupational History    Not on file   Social Needs    Financial resource strain: Not on file    Food insecurity:     Worry: Not on file     Inability: Not on file    Transportation needs:     Medical: Not on file     Non-medical: Not on file   Tobacco Use    Smoking status: Former Smoker     Packs/day: 1.00     Years: 6.00     Pack years: 6.00     Types: Cigarettes    Smokeless tobacco: Former User    Tobacco comment: quit by age 27yrs old   Substance and Sexual Activity    Alcohol use: Yes     Alcohol/week: 0.6 oz     Types: 1 Glasses of wine per week     Frequency: Monthly or less     Comment: 1 glass of wine a week    Drug use: No    Sexual activity: Yes     Partners: Female     Birth control/protection: None   Lifestyle    Physical activity:     Days per week: Not on file     Minutes per session: Not on file    Stress: Not on file   Relationships    Social connections:     Talks on phone: Not on file     Gets together: Not on file     Attends Yazdanism service: Not on file     Active member of club or organization: Not on file     Attends meetings of clubs or organizations: Not on file     Relationship  "status: Not on file   Other Topics Concern    Not on file   Social History Narrative    Not on file       MEDICATIONS & ALLERGIES:     Current Outpatient Medications on File Prior to Visit   Medication Sig Dispense Refill    HYDROcodone-acetaminophen (NORCO) 5-325 mg per tablet Take 1 tablet by mouth every 6 (six) hours as needed for Pain. 10 tablet 0    lisinopril 10 MG tablet Take 1 tablet (10 mg total) by mouth once daily. (Patient taking differently: Take 7.5 mg by mouth every evening. ) 30 tablet 6    metoprolol succinate (TOPROL-XL) 100 MG 24 hr tablet Take 1 tablet (100 mg total) by mouth once daily. (Patient taking differently: Take 100 mg by mouth every evening. ) 30 tablet 5    nystatin (MYCOSTATIN) powder Apply topically 2 (two) times daily. 60 g 1    torsemide (DEMADEX) 20 MG Tab Take 2 tablets (40 mg total) by mouth once daily. 180 tablet 3     No current facility-administered medications on file prior to visit.         Review of patient's allergies indicates:   Allergen Reactions    Contrast media Other (See Comments)     Severe chest pain    Food allergy formula [glutamine-c-quercet-selen-brom]      Allergic to green peas; Heart failure.    Iodinated contrast- oral and iv dye Other (See Comments)     Chest pain    Peas Hives    Ibuprofen Swelling    Latex, natural rubber Hives    Pcn [penicillins] Hives    Butisol [butabarbital] Rash     Peeling skin       OBJECTIVE:     Vital Signs:  Vitals:    08/01/19 0902   BP: 119/60   Pulse: 90     Wt Readings from Last 1 Encounters:   08/05/19 1404 (!) 161.8 kg (356 lb 11.3 oz)     Body mass index is 38.87 kg/m².      Physical Exam:  /60   Pulse 90   Ht 6' 8" (2.032 m)   Wt (!) 160.5 kg (353 lb 13.4 oz)   BMI 38.87 kg/m²   General appearance: Alert, cooperative, no distress  Constitutional: Oriented to person, place, and time. +Obese  HEENT: Normocephalic, atraumatic, neck symmetrical, no nasal discharge   Eyes: Conjunctivae/corneas " clear, EOM's intact  Lungs: Clear to auscultation bilaterally  Heart: Regular rate without rub  Abdomen: Soft, non-tender; bowel sounds normoactive  Extremities: +skin changes due to chronic venous stasis; no signs of continued cellulitis  Neurologic: Alert and oriented X 3, normal strength, normal coordination and gait  Psychiatric: no pressured speech; normal affect; no evidence of impaired cognition     Laboratory  Lab Results   Component Value Date    WBC 3.68 (L) 08/03/2019    HGB 9.0 (L) 08/03/2019    HCT 29.8 (L) 08/03/2019    MCV 80 (L) 08/03/2019     08/03/2019     BMP  Lab Results   Component Value Date     08/03/2019    K 4.5 08/03/2019     08/03/2019    CO2 21 (L) 08/03/2019    BUN 33 (H) 08/03/2019    CREATININE 1.0 08/03/2019    CALCIUM 9.3 08/03/2019    ANIONGAP 8 08/03/2019    ESTGFRAFRICA >60 08/03/2019    EGFRNONAA >60 08/03/2019     Lab Results   Component Value Date    ALT 23 08/03/2019    AST 30 08/03/2019    ALKPHOS 117 08/03/2019    BILITOT 0.4 08/03/2019     Lab Results   Component Value Date    INR 1.1 08/02/2019    INR 3.4 (H) 07/27/2019    INR 4.6 (H) 07/26/2019     Lab Results   Component Value Date    HGBA1C 5.5 07/25/2019     No results for input(s): POCTGLUCOSE in the last 72 hours.    Diagnostic Results:    CXR  Impression       Continued cardiomegaly.  No interval change.     LE US  Impression       No evidence of deep venous thrombosis in either lower extremity.     TRANSITION OF CARE:     [facility] On Call Contact Note: 07/29/19    Family and/or Caretaker present at visit?  No.  Diagnostic tests reviewed/disposition: I have reviewed all completed as well as pending diagnostic tests at the time of discharge.  Disease/illness education: Yes  Home health/community services discussion/referrals: Home health nurse brings him to appointments  Establishment or re-establishment of referral orders for community resources: Endocrine and cardiology follow up.   Discussion  with other health care providers: Discussed with Dr. Miguel.     ASSESSMENT & PLAN:     Cellulitis of right lower extremity   -Patient currently being treated with Cipro and Clinda   -Leg much improved; finish entire prescription  Essential hypertension   -/60; well controlled  Chronic atrial fibrillation  History of DVT (deep vein thrombosis)  Long term (current) use of anticoagulants   -Patient is refusing to take is coumadin due to current Abx usage   -Encouraged patient to begin taking coumadin again; coumadin clinic note reviewed   -Encouraged patient that he should follow up with coumadin clinic, repeat INR, and monitor closely while taking concurrent antibiotics    Instructions for the patient:      Scheduled Follow-up :  Future Appointments   Date Time Provider Department Center   8/20/2019 10:40 AM Sg Medina PA-C [facility]       Post Visit Medication List:     Medication List           Accurate as of 8/1/19 11:59 PM. If you have any questions, ask your nurse or doctor.               CHANGE how you take these medications    lisinopril 10 MG tablet  Take 1 tablet (10 mg total) by mouth once daily.  What changed:    · how much to take  · when to take this     metoprolol succinate 100 MG 24 hr tablet  Commonly known as:  TOPROL-XL  Take 1 tablet (100 mg total) by mouth once daily.  What changed:  when to take this        CONTINUE taking these medications    ciprofloxacin HCl 500 MG tablet  Commonly known as:  CIPRO  Take 1 tablet (500 mg total) by mouth 2 (two) times daily. for 14 days     clindamycin 300 MG capsule  Commonly known as:  CLEOCIN  Take 1 capsule (300 mg total) by mouth every 6 (six) hours. for 14 days     HYDROcodone-acetaminophen 5-325 mg per tablet  Commonly known as:  NORCO  Take 1 tablet by mouth every 6 (six) hours as needed for Pain.     nystatin powder  Commonly known as:  MYCOSTATIN  Apply topically 2 (two) times daily.     torsemide 20 MG Tab  Commonly known  as:  DEMADEX  Take 2 tablets (40 mg total) by mouth once daily.     * warfarin 7.5 MG tablet  Commonly known as:  COUMADIN  7.5mg daily except 10mg Sun and Wed - OR AS DIRECTED BY COUMADIN CLINIC (patient uses 7.5mg and 10mg strength tablets)     * warfarin 10 MG tablet  Commonly known as:  COUMADIN  7.5mg PO daily except 10mg PO on Sun and Wed - OR AS DIRECTED BY COUMADIN CLINIC (patient uses 7.5mg and 10mg strength tablets)         * This list has 2 medication(s) that are the same as other medications prescribed for you. Read the directions carefully, and ask your doctor or other care provider to review them with you.                Signing Physician:  Sg Medina PA-C

## 2019-08-02 ENCOUNTER — HOSPITAL ENCOUNTER (OUTPATIENT)
Facility: HOSPITAL | Age: 54
Discharge: HOME | End: 2019-08-03
Attending: EMERGENCY MEDICINE | Admitting: FAMILY MEDICINE
Payer: MEDICARE

## 2019-08-02 DIAGNOSIS — I26.09 OTHER ACUTE PULMONARY EMBOLISM WITH ACUTE COR PULMONALE: ICD-10-CM

## 2019-08-02 DIAGNOSIS — R07.9 CHEST PAIN: ICD-10-CM

## 2019-08-02 DIAGNOSIS — I26.99 OTHER ACUTE PULMONARY EMBOLISM WITHOUT ACUTE COR PULMONALE: ICD-10-CM

## 2019-08-02 DIAGNOSIS — I26.99 PULMONARY EMBOLISM: Primary | ICD-10-CM

## 2019-08-02 DIAGNOSIS — I87.2 VENOUS STASIS DERMATITIS OF BOTH LOWER EXTREMITIES: ICD-10-CM

## 2019-08-02 DIAGNOSIS — E05.90 HYPERTHYROIDISM: Chronic | ICD-10-CM

## 2019-08-02 DIAGNOSIS — I48.20 CHRONIC ATRIAL FIBRILLATION: ICD-10-CM

## 2019-08-02 LAB
ALBUMIN SERPL BCP-MCNC: 3.6 G/DL (ref 3.5–5.2)
ALP SERPL-CCNC: 140 U/L (ref 55–135)
ALT SERPL W/O P-5'-P-CCNC: 26 U/L (ref 10–44)
AMPHET+METHAMPHET UR QL: NEGATIVE
ANION GAP SERPL CALC-SCNC: 10 MMOL/L (ref 8–16)
AST SERPL-CCNC: 34 U/L (ref 10–40)
BARBITURATES UR QL SCN>200 NG/ML: NEGATIVE
BASOPHILS # BLD AUTO: 0.01 K/UL (ref 0–0.2)
BASOPHILS NFR BLD: 0.2 % (ref 0–1.9)
BENZODIAZ UR QL SCN>200 NG/ML: NEGATIVE
BILIRUB SERPL-MCNC: 0.4 MG/DL (ref 0.1–1)
BILIRUB UR QL STRIP: NEGATIVE
BNP SERPL-MCNC: 218 PG/ML (ref 0–99)
BUN SERPL-MCNC: 39 MG/DL (ref 6–20)
BZE UR QL SCN: NEGATIVE
CALCIUM SERPL-MCNC: 9.6 MG/DL (ref 8.7–10.5)
CANNABINOIDS UR QL SCN: NEGATIVE
CHLORIDE SERPL-SCNC: 106 MMOL/L (ref 95–110)
CLARITY UR: CLEAR
CO2 SERPL-SCNC: 23 MMOL/L (ref 23–29)
COLOR UR: YELLOW
CREAT SERPL-MCNC: 1.2 MG/DL (ref 0.5–1.4)
CREAT UR-MCNC: 87.5 MG/DL (ref 23–375)
D DIMER PPP IA.FEU-MCNC: 1.05 MG/L FEU
DIFFERENTIAL METHOD: ABNORMAL
EOSINOPHIL # BLD AUTO: 0.2 K/UL (ref 0–0.5)
EOSINOPHIL NFR BLD: 3.9 % (ref 0–8)
ERYTHROCYTE [DISTWIDTH] IN BLOOD BY AUTOMATED COUNT: 16 % (ref 11.5–14.5)
EST. GFR  (AFRICAN AMERICAN): >60 ML/MIN/1.73 M^2
EST. GFR  (NON AFRICAN AMERICAN): >60 ML/MIN/1.73 M^2
GLUCOSE SERPL-MCNC: 118 MG/DL (ref 70–110)
GLUCOSE UR QL STRIP: NEGATIVE
HCT VFR BLD AUTO: 32 % (ref 40–54)
HGB BLD-MCNC: 9.6 G/DL (ref 14–18)
HGB UR QL STRIP: ABNORMAL
INR PPP: 1.1 (ref 0.8–1.2)
KETONES UR QL STRIP: NEGATIVE
LEUKOCYTE ESTERASE UR QL STRIP: NEGATIVE
LYMPHOCYTES # BLD AUTO: 1.1 K/UL (ref 1–4.8)
LYMPHOCYTES NFR BLD: 23.4 % (ref 18–48)
MCH RBC QN AUTO: 24.2 PG (ref 27–31)
MCHC RBC AUTO-ENTMCNC: 30 G/DL (ref 32–36)
MCV RBC AUTO: 81 FL (ref 82–98)
METHADONE UR QL SCN>300 NG/ML: NEGATIVE
MONOCYTES # BLD AUTO: 0.4 K/UL (ref 0.3–1)
MONOCYTES NFR BLD: 8.3 % (ref 4–15)
NEUTROPHILS # BLD AUTO: 2.9 K/UL (ref 1.8–7.7)
NEUTROPHILS NFR BLD: 64.2 % (ref 38–73)
NITRITE UR QL STRIP: NEGATIVE
OPIATES UR QL SCN: NORMAL
PCP UR QL SCN>25 NG/ML: NEGATIVE
PH UR STRIP: 5 [PH] (ref 5–8)
PLATELET # BLD AUTO: 200 K/UL (ref 150–350)
PMV BLD AUTO: 9.3 FL (ref 9.2–12.9)
POTASSIUM SERPL-SCNC: 5.5 MMOL/L (ref 3.5–5.1)
PROT SERPL-MCNC: 7.6 G/DL (ref 6–8.4)
PROT UR QL STRIP: NEGATIVE
PROTHROMBIN TIME: 11.3 SEC (ref 9–12.5)
RBC # BLD AUTO: 3.96 M/UL (ref 4.6–6.2)
SODIUM SERPL-SCNC: 139 MMOL/L (ref 136–145)
SP GR UR STRIP: 1.02 (ref 1–1.03)
TOXICOLOGY INFORMATION: NORMAL
TROPONIN I SERPL DL<=0.01 NG/ML-MCNC: 0.03 NG/ML (ref 0–0.03)
TROPONIN I SERPL DL<=0.01 NG/ML-MCNC: 0.03 NG/ML (ref 0–0.03)
URN SPEC COLLECT METH UR: ABNORMAL
UROBILINOGEN UR STRIP-ACNC: NEGATIVE EU/DL
WBC # BLD AUTO: 4.58 K/UL (ref 3.9–12.7)

## 2019-08-02 PROCEDURE — 85610 PROTHROMBIN TIME: CPT

## 2019-08-02 PROCEDURE — 83880 ASSAY OF NATRIURETIC PEPTIDE: CPT

## 2019-08-02 PROCEDURE — 84484 ASSAY OF TROPONIN QUANT: CPT

## 2019-08-02 PROCEDURE — 96375 TX/PRO/DX INJ NEW DRUG ADDON: CPT

## 2019-08-02 PROCEDURE — 93010 EKG 12-LEAD: ICD-10-PCS | Mod: ,,, | Performed by: INTERNAL MEDICINE

## 2019-08-02 PROCEDURE — 81003 URINALYSIS AUTO W/O SCOPE: CPT | Mod: 59

## 2019-08-02 PROCEDURE — 83520 IMMUNOASSAY QUANT NOS NONAB: CPT | Mod: 59

## 2019-08-02 PROCEDURE — G0378 HOSPITAL OBSERVATION PER HR: HCPCS

## 2019-08-02 PROCEDURE — 93010 ELECTROCARDIOGRAM REPORT: CPT | Mod: 77,,, | Performed by: STUDENT IN AN ORGANIZED HEALTH CARE EDUCATION/TRAINING PROGRAM

## 2019-08-02 PROCEDURE — 80053 COMPREHEN METABOLIC PANEL: CPT

## 2019-08-02 PROCEDURE — 96374 THER/PROPH/DIAG INJ IV PUSH: CPT | Mod: 59

## 2019-08-02 PROCEDURE — 99291 CRITICAL CARE FIRST HOUR: CPT | Mod: 25

## 2019-08-02 PROCEDURE — 85379 FIBRIN DEGRADATION QUANT: CPT

## 2019-08-02 PROCEDURE — 93005 ELECTROCARDIOGRAM TRACING: CPT

## 2019-08-02 PROCEDURE — 80307 DRUG TEST PRSMV CHEM ANLYZR: CPT

## 2019-08-02 PROCEDURE — 96361 HYDRATE IV INFUSION ADD-ON: CPT

## 2019-08-02 PROCEDURE — 63600175 PHARM REV CODE 636 W HCPCS: Performed by: STUDENT IN AN ORGANIZED HEALTH CARE EDUCATION/TRAINING PROGRAM

## 2019-08-02 PROCEDURE — 25000003 PHARM REV CODE 250: Performed by: EMERGENCY MEDICINE

## 2019-08-02 PROCEDURE — 93010 EKG 12-LEAD: ICD-10-PCS | Mod: 77,,, | Performed by: STUDENT IN AN ORGANIZED HEALTH CARE EDUCATION/TRAINING PROGRAM

## 2019-08-02 PROCEDURE — 63600175 PHARM REV CODE 636 W HCPCS: Performed by: EMERGENCY MEDICINE

## 2019-08-02 PROCEDURE — 93010 ELECTROCARDIOGRAM REPORT: CPT | Mod: ,,, | Performed by: INTERNAL MEDICINE

## 2019-08-02 PROCEDURE — 85025 COMPLETE CBC W/AUTO DIFF WBC: CPT

## 2019-08-02 RX ORDER — MORPHINE SULFATE 4 MG/ML
4 INJECTION, SOLUTION INTRAMUSCULAR; INTRAVENOUS EVERY 4 HOURS PRN
Status: DISCONTINUED | OUTPATIENT
Start: 2019-08-02 | End: 2019-08-03 | Stop reason: HOSPADM

## 2019-08-02 RX ORDER — SODIUM CHLORIDE 9 MG/ML
INJECTION, SOLUTION INTRAVENOUS CONTINUOUS
Status: ACTIVE | OUTPATIENT
Start: 2019-08-02 | End: 2019-08-03

## 2019-08-02 RX ORDER — LIDOCAINE 50 MG/G
1 PATCH TOPICAL DAILY PRN
Status: DISCONTINUED | OUTPATIENT
Start: 2019-08-02 | End: 2019-08-03 | Stop reason: HOSPADM

## 2019-08-02 RX ORDER — SODIUM CHLORIDE 0.9 % (FLUSH) 0.9 %
5 SYRINGE (ML) INJECTION
Status: DISCONTINUED | OUTPATIENT
Start: 2019-08-02 | End: 2019-08-03 | Stop reason: HOSPADM

## 2019-08-02 RX ORDER — HEPARIN SODIUM,PORCINE/D5W 25000/250
18 INTRAVENOUS SOLUTION INTRAVENOUS CONTINUOUS
Status: DISCONTINUED | OUTPATIENT
Start: 2019-08-03 | End: 2019-08-03 | Stop reason: HOSPADM

## 2019-08-02 RX ORDER — ACETAMINOPHEN 325 MG/1
650 TABLET ORAL EVERY 4 HOURS PRN
Status: DISCONTINUED | OUTPATIENT
Start: 2019-08-02 | End: 2019-08-03 | Stop reason: HOSPADM

## 2019-08-02 RX ORDER — SODIUM CHLORIDE 9 MG/ML
1000 INJECTION, SOLUTION INTRAVENOUS
Status: COMPLETED | OUTPATIENT
Start: 2019-08-02 | End: 2019-08-02

## 2019-08-02 RX ORDER — RAMELTEON 8 MG/1
8 TABLET ORAL NIGHTLY PRN
Status: DISCONTINUED | OUTPATIENT
Start: 2019-08-02 | End: 2019-08-03 | Stop reason: HOSPADM

## 2019-08-02 RX ORDER — AMOXICILLIN 250 MG
1 CAPSULE ORAL 2 TIMES DAILY
Status: DISCONTINUED | OUTPATIENT
Start: 2019-08-03 | End: 2019-08-03 | Stop reason: HOSPADM

## 2019-08-02 RX ORDER — MORPHINE SULFATE 2 MG/ML
6 INJECTION, SOLUTION INTRAMUSCULAR; INTRAVENOUS
Status: COMPLETED | OUTPATIENT
Start: 2019-08-02 | End: 2019-08-02

## 2019-08-02 RX ORDER — NITROGLYCERIN 0.4 MG/1
0.4 TABLET SUBLINGUAL
Status: COMPLETED | OUTPATIENT
Start: 2019-08-02 | End: 2019-08-02

## 2019-08-02 RX ORDER — ACETAMINOPHEN 325 MG/1
650 TABLET ORAL EVERY 8 HOURS PRN
Status: DISCONTINUED | OUTPATIENT
Start: 2019-08-02 | End: 2019-08-03 | Stop reason: HOSPADM

## 2019-08-02 RX ORDER — ONDANSETRON 8 MG/1
8 TABLET, ORALLY DISINTEGRATING ORAL EVERY 6 HOURS PRN
Status: DISCONTINUED | OUTPATIENT
Start: 2019-08-02 | End: 2019-08-03 | Stop reason: HOSPADM

## 2019-08-02 RX ADMIN — SODIUM CHLORIDE: 0.9 INJECTION, SOLUTION INTRAVENOUS at 10:08

## 2019-08-02 RX ADMIN — SODIUM CHLORIDE 1000 ML: 0.9 INJECTION, SOLUTION INTRAVENOUS at 04:08

## 2019-08-02 RX ADMIN — LIDOCAINE HYDROCHLORIDE: 20 SOLUTION ORAL; TOPICAL at 04:08

## 2019-08-02 RX ADMIN — NITROGLYCERIN 0.4 MG: 0.4 TABLET, ORALLY DISINTEGRATING SUBLINGUAL at 04:08

## 2019-08-02 RX ADMIN — NITROGLYCERIN 0.4 MG: 0.4 TABLET, ORALLY DISINTEGRATING SUBLINGUAL at 06:08

## 2019-08-02 RX ADMIN — MORPHINE SULFATE 6 MG: 2 INJECTION, SOLUTION INTRAMUSCULAR; INTRAVENOUS at 03:08

## 2019-08-02 NOTE — ED PROVIDER NOTES
Encounter Date: 8/2/2019    SCRIBE #1 NOTE: I, Michelle Ahumada, am scribing for, and in the presence of,  Dr. Joshua. I have scribed the entire note.       History     Chief Complaint   Patient presents with    Chest Pain     Pt reports that chest pain started Tuesday with shortness of breath that went away- pt states that he was on his way to an appointment when he started having chest pain and shortness of breath in the car. Pt states he was admitted last week for leg wound- right leg is very edematous but not painful.      Time seen by provider: 3:15 PM    This is a 53 y.o. male with PMHx of atrial fibrillation, bipolar disorder, congenital heart disease, deep vein thrombosis, history of prior ablation treatment, hypertension, obesity, stroke, thyroid disease, venous stasis ulcer of lower extremity who presents with chief complaint of right sided CP with associated SOB since Tuesday. Patient describes his pain to be a non radiating stabbing pain. Pain is worse with movement or when he takes a deep breath. Pt denies fever, chills, abdominal pain, weakness in legs or arms, or any other complaint at this time. Pt is currently taking Cipro and Cleocin for a right leg infection.    The history is provided by the patient.     Review of patient's allergies indicates:   Allergen Reactions    Contrast media Other (See Comments)     Severe chest pain    Food allergy formula [glutamine-c-quercet-selen-brom]      Allergic to green peas; Heart failure.    Iodinated contrast- oral and iv dye Other (See Comments)     Chest pain    Peas Hives    Ibuprofen Swelling    Latex, natural rubber Hives    Pcn [penicillins] Hives    Butisol [butabarbital] Rash     Peeling skin     Past Medical History:   Diagnosis Date    *Atrial fibrillation     Anticoagulant long-term use     Arthritis     Atrial fibrillation     Atrial fibrillation Feb 23, 2016    Bipolar disorder     CHF (congestive heart failure)     Congenital  heart disease     s/p surgical intervention 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 of age    Deep vein thrombosis     DVT of leg (deep venous thrombosis)     left leg    History of prior ablation treatment     10/9/13    Hypertension     Obesity     Stroke     Thyroid disease     Venous stasis ulcer of lower extremity, unspecified laterality 12/14/2012    Venous ulcer      Past Surgical History:   Procedure Laterality Date    ANGIOPLASTY      ARTHROSCOPY-KNEE W/ CHONDROPLASTY Right 2/23/2016    [facility] OR    CARDIAC SURGERY      open heart surgery 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 old    EYE SURGERY      left eye cataract/right eye glaucoma    TIMO FILTER PLACEMENT      Dr Calix ([facility])    KNEE SURGERY      l and r     MULTIPLE TOOTH EXTRACTIONS      Sclerotherapy N/A 2/21/2019    Performed by [facility]igua [facility] CATH LAB/EP    SKIN GRAFT      left leg    SYNOVECTOMY-KNEE Right 2/23/2016    [facility] OR     Family History   Problem Relation Age of Onset    Diabetes Father     Heart disease Father     Heart disease Maternal Grandmother     Diabetes Maternal Grandfather     Heart disease Maternal Grandfather     Stroke Maternal Grandfather      Social History     Tobacco Use    Smoking status: Former Smoker     Packs/day: 1.00     Years: 6.00     Pack years: 6.00     Types: Cigarettes    Smokeless tobacco: Former User    Tobacco comment: quit by age 27yrs old   Substance Use Topics    Alcohol use: Yes     Alcohol/week: 0.6 oz     Types: 1 Glasses of wine per week     Frequency: Monthly or less     Comment: 1 glass of wine a week    Drug use: No     Review of Systems   Constitutional: Negative for chills and fever.   HENT: Negative for congestion, ear pain, rhinorrhea and sore throat.    Respiratory: Positive for shortness of breath. Negative for cough and wheezing.    Cardiovascular: Positive for chest pain. Negative for palpitations.   Gastrointestinal:  Negative for abdominal pain, diarrhea, nausea and vomiting.   Genitourinary: Negative for dysuria and hematuria.   Musculoskeletal: Negative for back pain, myalgias and neck pain.   Skin: Negative for rash.   Neurological: Negative for dizziness, weakness, light-headedness and headaches.   Psychiatric/Behavioral: Negative for confusion.   All other systems reviewed and are negative.      Physical Exam     Initial Vitals [08/02/19 1447]   BP Pulse Resp Temp SpO2   (!) 149/63 95 20 97.9 °F (36.6 °C) 99 %      MAP       --         Physical Exam    Nursing note and vitals reviewed.  Constitutional: He appears well-developed and well-nourished. No distress.   HENT:   Head: Normocephalic and atraumatic.   Mouth/Throat: Oropharynx is clear and moist.   Eyes: Conjunctivae and EOM are normal. Pupils are equal, round, and reactive to light.   Neck: Normal range of motion. Neck supple. No stridor present. No tracheal deviation present.   Cardiovascular: Normal heart sounds. An irregularly irregular rhythm present.    No murmur heard.  Tachycardic  Irregularly irregular rhythm   Pulmonary/Chest: Breath sounds normal. No respiratory distress. He has no wheezes.   Abdominal: Soft. Bowel sounds are normal. There is no tenderness. There is no rebound and no guarding.   Musculoskeletal: Normal range of motion. He exhibits no edema or tenderness.   Neurological: He is alert and oriented to person, place, and time. No sensory deficit.   Skin: Skin is warm and dry. Capillary refill takes less than 2 seconds.   Changes to right lower extremity consistent with venous insufficiency  No erythema   Psychiatric: He has a normal mood and affect. His behavior is normal.         ED Course   Critical Care  Date/Time: 8/2/2019 7:47 PM  Performed by: Vikas Joshua MD  Authorized by: Vikas Joshua MD   Direct patient critical care time: 35 minutes  Total critical care time (exclusive of procedural time) : 35 minutes  Critical care time  was exclusive of separately billable procedures and treating other patients and teaching time.  Critical care was time spent personally by me on the following activities: blood draw for specimens, development of treatment plan with patient or surrogate, discussions with consultants, interpretation of cardiac output measurements, evaluation of patient's response to treatment, examination of patient, obtaining history from patient or surrogate, ordering and performing treatments and interventions, ordering and review of laboratory studies, ordering and review of radiographic studies, pulse oximetry and re-evaluation of patient's condition.        Labs Reviewed   CBC W/ AUTO DIFFERENTIAL - Abnormal; Notable for the following components:       Result Value    RBC 3.96 (*)     Hemoglobin 9.6 (*)     Hematocrit 32.0 (*)     Mean Corpuscular Volume 81 (*)     Mean Corpuscular Hemoglobin 24.2 (*)     Mean Corpuscular Hemoglobin Conc 30.0 (*)     RDW 16.0 (*)     All other components within normal limits   COMPREHENSIVE METABOLIC PANEL - Abnormal; Notable for the following components:    Potassium 5.5 (*)     Glucose 118 (*)     BUN, Bld 39 (*)     Alkaline Phosphatase 140 (*)     All other components within normal limits   B-TYPE NATRIURETIC PEPTIDE - Abnormal; Notable for the following components:     (*)     All other components within normal limits   URINALYSIS, REFLEX TO URINE CULTURE - Abnormal; Notable for the following components:    Occult Blood UA Trace (*)     All other components within normal limits    Narrative:     Preferred Collection Type->Urine, Clean Catch   D DIMER, QUANTITATIVE - Abnormal; Notable for the following components:    D-Dimer 1.05 (*)     All other components within normal limits   TROPONIN I   DRUG SCREEN PANEL, URINE EMERGENCY    Narrative:     Preferred Collection Type->Urine, Clean Catch   PROTIME-INR   PROTIME-INR   D DIMER, QUANTITATIVE   TROPONIN I     EKG Readings:  (Independently Interpreted)   Atrial fibrillation, rate 80, LBBB, no significant ST elevation or depression, no T wave inversion, no STEMI. Interpreted by myself.       X-Rays:   Independently Interpreted Readings:   Other Readings:  Reviewed by myself, read by radiology.    Imaging Results           US Lower Extremity Veins Bilateral (Final result)  Result time 08/02/19 21:52:55    Final result by Sohail Kline MD (08/02/19 21:52:55)                 Impression:      Deep venous thrombosis within the distal right femoral vein.    No evidence of left lower extremity deep venous thrombosis.    This report was flagged in Epic as abnormal.      Electronically signed by: Sohail Kline MD  Date:    08/02/2019  Time:    21:52             Narrative:    EXAMINATION:  US LOWER EXTREMITY VEINS BILATERAL    CLINICAL HISTORY:  acute PE; Chronic atrial fibrillation    TECHNIQUE:  Duplex and color flow Doppler evaluation of the bilateral lower extremity veins was performed.    COMPARISON:  07/25/2019.    FINDINGS:  Right lower extremity:    There is thrombosis seen within the distal right femoral vein.  No evidence of clot involving the right common femoral, greater saphenous, popliteal, peroneal, posterior tibial and anterior tibial veins.    Left lower extremity:    No evidence of clot involving the bilateral common femoral, greater saphenous, femoral, popliteal, peroneal, anterior and posterior tibial veins.  Left common femoral and greater saphenous veins could not be fully compress but appear patent.  All venous structures demonstrate normal respiratory phasicity and augment adequately.  No evidence of soft tissue mass or Baker's cyst.                               NM Lung Scan Ventilation Perfusion (Final result)  Result time 08/02/19 19:27:55    Final result by Sohail Kline MD (08/02/19 19:27:55)                 Impression:      Single moderate to large perfusion defect within the left lung which would correspond  with intermediate probability for potential pulmonary embolus.  Further evaluation may be performed with CTA chest PE protocol.      Electronically signed by: Sohail Kline MD  Date:    08/02/2019  Time:    19:27             Narrative:    EXAMINATION:  NM LUNG VENTILATION AND PERFUSION IMAGING    CLINICAL HISTORY:  Chest pain, acute, PE suspected, intermed prob, positive D-dimer;    TECHNIQUE:  15 mCi of xenon 133 aerosol and the subsequent IV administration of 5 mCi of Tc-99m-MAA, multiple images of the thorax were obtained in various projections.    COMPARISON:  Chest radiograph from the same date.    FINDINGS:  Single moderate to large perfusion defect is seen within the left midlung zone.  No mismatched perfusion defects are seen within the right lung.  Normal ventilation is seen with no significant air trapping.                               X-Ray Chest 1 View (Final result)  Result time 08/02/19 16:26:25    Final result by Ray Andino MD (08/02/19 16:26:25)                 Impression:      Cardiomegaly with interval development of mild pulmonary vascular congestion.      Electronically signed by: Ray Andino MD  Date:    08/02/2019  Time:    16:26             Narrative:    EXAMINATION:  XR CHEST 1 VIEW    CLINICAL HISTORY:  Chest pain, unspecified    TECHNIQUE:  Single frontal view of the chest was performed.    COMPARISON:  07/25/2019.    FINDINGS:  Monitoring EKG leads are present.  The trachea is unremarkable.  There is unchanged appearance of cardiomegaly.  The hemidiaphragms are within normal limits.  There is no evidence of free air beneath the hemidiaphragms.  There are no pleural effusions.  There is no evidence of a pneumothorax.  There is no evidence of pneumomediastinum.  There is mild pulmonary vascular congestion, new compared to the prior exam.  There are degenerative changes in the osseous structures.                              Medical Decision Making:   Clinical Tests:   Lab Tests: Ordered  and Reviewed  Radiological Study: Ordered and Reviewed  Medical Tests: Ordered and Reviewed  ED Management:  - CXR demonstrates cardiomegaly with interval development of mild pulmonary vascular congestion  - EKG - Atrial fibrillation, rate 80, LBBB, no significant ST elevation or depression, no T wave inversion, no STEMI. Interpreted by myself.  - Drug screen panel positive for opiates, but otherwise WNL  - PT/INR WNL   - Urinalysis WNL  - CBC w/diff with Hb 9.6; no significant leukocytosis  - CMP with K of 5.5; Cr 1.2; Na 139; BUN 39; alk phos 140  - Troponin x 2 WNL   -   - D-dimer 1.05  - Pt's CP controlled with SLN, morphine, IVF  - V/Q scan demonstrates single moderate to large perfusion defect within the left lung which would correspond with intermediate probability for potential pulmonary embolus per final radiology read  - In light of pt's presenting complaints, V/Q scan result, will admit to [facility] for further evaluation and management  - pt in agreement with plan for admission   -     Additional MDM:   Heart Score:    History:          Moderately suspicious.  ECG:             Normal  Age:               45-65 years  Risk factors: >= 3 risk factors or history of atherosclerotic disease  Troponin:       Less than or equal to normal limit  Final Score: 4                       Clinical Impression:     1. Hyperthyroidism    2. Chest pain    3. Chronic atrial fibrillation    4. Pulmonary embolism    5. Venous stasis dermatitis of both lower extremities        Disposition:   Disposition: Admitted  Condition: Fair       I, Vikas Joshua,  personally performed the services described in this documentation. All medical record entries made by the scribe were at my direction and in my presence.  I have reviewed the chart and agree that the record reflects my personal performance and is accurate and complete. Vikas Joshua M.D. 7:58 PM08/03/2019                 Vikas Joshua MD  08/03/19 0889

## 2019-08-02 NOTE — ED NOTES
Pt presents to the ED w/ c/ of midsternal chest pain that started at 1430 today. Pt reports nonradiating midsternal chest pain that he states feel like pressure. Pt reports sob with chest pain. Pt denies n/v or diaphoresis at the onset. Pt reports hx of afib. Pt reports that he was admitted last week to the hospital for infections in his knee. Bilateral lower extremities are noted to be severe edematous.

## 2019-08-02 NOTE — ED NOTES
Pt refused RN to remove compression stocking on the RLE. Pt states he has a wound to the RLE. Unable to assess wound.

## 2019-08-02 NOTE — ED NOTES
"Pt states "my breathing is fine." pt removed nasal cannula. Pt oxygen is 100% on room air. Will continue to monitor.    "

## 2019-08-02 NOTE — PROGRESS NOTES
Reached patient 8/2. He reports resuming coumadin after seeing PA yesterday. He was fearful of taking coumadin and causing bleeding since his INRs were elevated last week. He was advised that his risk of clotting is far greater than his risk of bleeding. He took 7.5mg yesterday. He reports now he is having CP and is blaming it on coumadin. Patient advised that coumadin doesn't cause chest pain. He was advised to go to ER for assessment of CP. He was advised of dosing plan for the weekend if he doesn't go to ER or get admitted. We will plan for close follow up due to potential DDI and recent high INR. Repeat INR MOnday

## 2019-08-03 VITALS
HEART RATE: 62 BPM | BODY MASS INDEX: 36.45 KG/M2 | WEIGHT: 315 LBS | DIASTOLIC BLOOD PRESSURE: 58 MMHG | TEMPERATURE: 98 F | SYSTOLIC BLOOD PRESSURE: 148 MMHG | RESPIRATION RATE: 20 BRPM | OXYGEN SATURATION: 98 % | HEIGHT: 78 IN

## 2019-08-03 LAB
ALBUMIN SERPL BCP-MCNC: 3.2 G/DL (ref 3.5–5.2)
ALP SERPL-CCNC: 117 U/L (ref 55–135)
ALT SERPL W/O P-5'-P-CCNC: 23 U/L (ref 10–44)
ANION GAP SERPL CALC-SCNC: 8 MMOL/L (ref 8–16)
AORTIC ROOT ANNULUS: 3.44 CM
AORTIC VALVE CUSP SEPERATION: 2.29 CM
APTT BLDCRRT: 50.7 SEC (ref 21–32)
APTT BLDCRRT: 86.4 SEC (ref 21–32)
AST SERPL-CCNC: 30 U/L (ref 10–40)
AV INDEX (PROSTH): 0.76
AV MEAN GRADIENT: 7 MMHG
AV PEAK GRADIENT: 12 MMHG
AV VALVE AREA: 3.31 CM2
AV VELOCITY RATIO: 0.6
BASOPHILS # BLD AUTO: 0.01 K/UL (ref 0–0.2)
BASOPHILS NFR BLD: 0.3 % (ref 0–1.9)
BILIRUB SERPL-MCNC: 0.4 MG/DL (ref 0.1–1)
BSA FOR ECHO PROCEDURE: 3.01 M2
BUN SERPL-MCNC: 33 MG/DL (ref 6–20)
CALCIUM SERPL-MCNC: 9.3 MG/DL (ref 8.7–10.5)
CHLORIDE SERPL-SCNC: 109 MMOL/L (ref 95–110)
CO2 SERPL-SCNC: 21 MMOL/L (ref 23–29)
CREAT SERPL-MCNC: 1 MG/DL (ref 0.5–1.4)
CV ECHO LV RWT: 0.43 CM
DIFFERENTIAL METHOD: ABNORMAL
DOP CALC AO PEAK VEL: 1.7 M/S
DOP CALC AO VTI: 33.11 CM
DOP CALC LVOT AREA: 4.4 CM2
DOP CALC LVOT DIAMETER: 2.36 CM
DOP CALC LVOT PEAK VEL: 1.02 M/S
DOP CALC LVOT STROKE VOLUME: 109.7 CM3
DOP CALCLVOT PEAK VEL VTI: 25.09 CM
ECHO LV POSTERIOR WALL: 1.3 CM (ref 0.6–1.1)
EOSINOPHIL # BLD AUTO: 0.2 K/UL (ref 0–0.5)
EOSINOPHIL NFR BLD: 4.3 % (ref 0–8)
ERYTHROCYTE [DISTWIDTH] IN BLOOD BY AUTOMATED COUNT: 16.1 % (ref 11.5–14.5)
EST. GFR  (AFRICAN AMERICAN): >60 ML/MIN/1.73 M^2
EST. GFR  (NON AFRICAN AMERICAN): >60 ML/MIN/1.73 M^2
FRACTIONAL SHORTENING: 36 % (ref 28–44)
GLUCOSE SERPL-MCNC: 101 MG/DL (ref 70–110)
HCT VFR BLD AUTO: 29.8 % (ref 40–54)
HGB BLD-MCNC: 9 G/DL (ref 14–18)
INTERVENTRICULAR SEPTUM: 1.36 CM (ref 0.6–1.1)
LA MAJOR: 6.97 CM
LA WIDTH: 4.42 CM
LEFT ATRIUM SIZE: 5.08 CM
LEFT INTERNAL DIMENSION IN SYSTOLE: 3.83 CM (ref 2.1–4)
LEFT VENTRICLE DIASTOLIC VOLUME INDEX: 61.92 ML/M2
LEFT VENTRICLE DIASTOLIC VOLUME: 181.69 ML
LEFT VENTRICLE MASS INDEX: 124 G/M2
LEFT VENTRICLE SYSTOLIC VOLUME INDEX: 21.5 ML/M2
LEFT VENTRICLE SYSTOLIC VOLUME: 63.04 ML
LEFT VENTRICULAR INTERNAL DIMENSION IN DIASTOLE: 6.02 CM (ref 3.5–6)
LEFT VENTRICULAR MASS: 363.19 G
LYMPHOCYTES # BLD AUTO: 0.9 K/UL (ref 1–4.8)
LYMPHOCYTES NFR BLD: 25.3 % (ref 18–48)
MAGNESIUM SERPL-MCNC: 1.9 MG/DL (ref 1.6–2.6)
MCH RBC QN AUTO: 24.3 PG (ref 27–31)
MCHC RBC AUTO-ENTMCNC: 30.2 G/DL (ref 32–36)
MCV RBC AUTO: 80 FL (ref 82–98)
MONOCYTES # BLD AUTO: 0.5 K/UL (ref 0.3–1)
MONOCYTES NFR BLD: 12.5 % (ref 4–15)
NEUTROPHILS # BLD AUTO: 2.1 K/UL (ref 1.8–7.7)
NEUTROPHILS NFR BLD: 57.6 % (ref 38–73)
PHOSPHATE SERPL-MCNC: 4.1 MG/DL (ref 2.7–4.5)
PISA TR MAX VEL: 2.55 M/S
PLATELET # BLD AUTO: 191 K/UL (ref 150–350)
PMV BLD AUTO: 9.3 FL (ref 9.2–12.9)
POTASSIUM SERPL-SCNC: 4.5 MMOL/L (ref 3.5–5.1)
PROT SERPL-MCNC: 7.2 G/DL (ref 6–8.4)
PULM VEIN S/D RATIO: 1.68
PV PEAK D VEL: 0.37 M/S
PV PEAK S VEL: 0.62 M/S
PV PEAK VELOCITY: 1.57 CM/S
RA MAJOR: 6.22 CM
RA PRESSURE: 3 MMHG
RBC # BLD AUTO: 3.71 M/UL (ref 4.6–6.2)
SODIUM SERPL-SCNC: 138 MMOL/L (ref 136–145)
TR MAX PG: 26 MMHG
TROPONIN I SERPL DL<=0.01 NG/ML-MCNC: 0.02 NG/ML (ref 0–0.03)
TROPONIN I SERPL DL<=0.01 NG/ML-MCNC: 0.02 NG/ML (ref 0–0.03)
TV REST PULMONARY ARTERY PRESSURE: 29 MMHG
WBC # BLD AUTO: 3.68 K/UL (ref 3.9–12.7)

## 2019-08-03 PROCEDURE — 25000003 PHARM REV CODE 250: Performed by: STUDENT IN AN ORGANIZED HEALTH CARE EDUCATION/TRAINING PROGRAM

## 2019-08-03 PROCEDURE — 84484 ASSAY OF TROPONIN QUANT: CPT

## 2019-08-03 PROCEDURE — 85025 COMPLETE CBC W/AUTO DIFF WBC: CPT

## 2019-08-03 PROCEDURE — 84484 ASSAY OF TROPONIN QUANT: CPT | Mod: 91

## 2019-08-03 PROCEDURE — 84445 ASSAY OF TSI GLOBULIN: CPT

## 2019-08-03 PROCEDURE — 36415 COLL VENOUS BLD VENIPUNCTURE: CPT

## 2019-08-03 PROCEDURE — 96365 THER/PROPH/DIAG IV INF INIT: CPT

## 2019-08-03 PROCEDURE — 94761 N-INVAS EAR/PLS OXIMETRY MLT: CPT

## 2019-08-03 PROCEDURE — 96366 THER/PROPH/DIAG IV INF ADDON: CPT

## 2019-08-03 PROCEDURE — 63600175 PHARM REV CODE 636 W HCPCS: Performed by: STUDENT IN AN ORGANIZED HEALTH CARE EDUCATION/TRAINING PROGRAM

## 2019-08-03 PROCEDURE — 83735 ASSAY OF MAGNESIUM: CPT

## 2019-08-03 PROCEDURE — 80053 COMPREHEN METABOLIC PANEL: CPT

## 2019-08-03 PROCEDURE — 84100 ASSAY OF PHOSPHORUS: CPT

## 2019-08-03 PROCEDURE — 93010 EKG 12-LEAD: ICD-10-PCS | Mod: ,,, | Performed by: INTERNAL MEDICINE

## 2019-08-03 PROCEDURE — 99214 OFFICE O/P EST MOD 30 MIN: CPT | Mod: 25,,, | Performed by: INTERNAL MEDICINE

## 2019-08-03 PROCEDURE — 86376 MICROSOMAL ANTIBODY EACH: CPT

## 2019-08-03 PROCEDURE — 85730 THROMBOPLASTIN TIME PARTIAL: CPT

## 2019-08-03 PROCEDURE — 93005 ELECTROCARDIOGRAM TRACING: CPT

## 2019-08-03 PROCEDURE — 96375 TX/PRO/DX INJ NEW DRUG ADDON: CPT | Mod: 59

## 2019-08-03 PROCEDURE — G0378 HOSPITAL OBSERVATION PER HR: HCPCS

## 2019-08-03 PROCEDURE — 99214 PR OFFICE/OUTPT VISIT, EST, LEVL IV, 30-39 MIN: ICD-10-PCS | Mod: 25,,, | Performed by: INTERNAL MEDICINE

## 2019-08-03 PROCEDURE — 96376 TX/PRO/DX INJ SAME DRUG ADON: CPT | Mod: 59

## 2019-08-03 PROCEDURE — 93010 ELECTROCARDIOGRAM REPORT: CPT | Mod: ,,, | Performed by: INTERNAL MEDICINE

## 2019-08-03 RX ORDER — TRAMADOL HYDROCHLORIDE 50 MG/1
50 TABLET ORAL ONCE
Status: COMPLETED | OUTPATIENT
Start: 2019-08-03 | End: 2019-08-03

## 2019-08-03 RX ORDER — METOPROLOL SUCCINATE 50 MG/1
100 TABLET, EXTENDED RELEASE ORAL DAILY
Status: DISCONTINUED | OUTPATIENT
Start: 2019-08-04 | End: 2019-08-03 | Stop reason: HOSPADM

## 2019-08-03 RX ORDER — NITROGLYCERIN 0.4 MG/1
0.4 TABLET SUBLINGUAL
Status: COMPLETED | OUTPATIENT
Start: 2019-08-03 | End: 2019-08-03

## 2019-08-03 RX ORDER — PANTOPRAZOLE SODIUM 40 MG/1
40 TABLET, DELAYED RELEASE ORAL
Status: COMPLETED | OUTPATIENT
Start: 2019-08-03 | End: 2019-08-03

## 2019-08-03 RX ORDER — NITROGLYCERIN 0.4 MG/1
0.4 TABLET SUBLINGUAL ONCE
Status: COMPLETED | OUTPATIENT
Start: 2019-08-03 | End: 2019-08-03

## 2019-08-03 RX ADMIN — NITROGLYCERIN 0.4 MG: 0.4 TABLET, ORALLY DISINTEGRATING SUBLINGUAL at 12:08

## 2019-08-03 RX ADMIN — PANTOPRAZOLE SODIUM 40 MG: 40 TABLET, DELAYED RELEASE ORAL at 02:08

## 2019-08-03 RX ADMIN — HEPARIN SODIUM AND DEXTROSE 18 UNITS/KG/HR: 10000; 5 INJECTION INTRAVENOUS at 06:08

## 2019-08-03 RX ADMIN — HEPARIN SODIUM AND DEXTROSE 18 UNITS/KG/HR: 10000; 5 INJECTION INTRAVENOUS at 01:08

## 2019-08-03 RX ADMIN — TRAMADOL HYDROCHLORIDE 50 MG: 50 TABLET, FILM COATED ORAL at 03:08

## 2019-08-03 RX ADMIN — NITROGLYCERIN 0.4 MG: 0.4 TABLET, ORALLY DISINTEGRATING SUBLINGUAL at 05:08

## 2019-08-03 NOTE — PT/OT/SLP PROGRESS
"Physical Therapy  Eval Attempt and Discharge    Patient Name:  Drew Farrar   MRN:  [id number]    PT eval and treat orders received, chart reviewed, and pt interviewed. Pt reporting "I don't need therapy", stating he has been ambulating to the restroom already and feels he is at his baseline. Pt refusing to participate in therapy.     Educated pt to notify MD if he feels a change in his functional status so that PT can be ordered again.    D/C PT.    Miranda Santacruz, PT   8/3/2019          "

## 2019-08-03 NOTE — PROGRESS NOTES
Pt complaining of right chest pain 7/10. MD notified. One time dose of nitro ordered and administered. Chest pain relieved. Vitals remain stable. Will continue to monitor.

## 2019-08-03 NOTE — PLAN OF CARE
Discharge orders noted, no HH or HME ordered.    Future Appointments   Date Time Provider Department Center   8/5/2019 12:50 PM LAB, APPOINTMENT [place] [facility]   8/5/2019  1:20 PM Edward Castañeda MD [facility] [address]   8/12/2019 10:00 AM Sg Medina PA-C [facility]       Pt's nurse will go over medications/signs and symptoms prior to discharge       08/03/19 1400   Final Note   Assessment Type Final Discharge Note   Anticipated Discharge Disposition Home   What phone number can be called within the next 1-3 days to see how you are doing after discharge? [phone]   Hospital Follow Up  Appt(s) scheduled? No  (Offices closed for Weekend. Patient to schedule own follow up appointment.)   Right Care Referral Info   Post Acute Recommendation No Care     Thania Clemente, RN Transitional Navigator  [phone]

## 2019-08-03 NOTE — PLAN OF CARE
Problem: Adult Inpatient Plan of Care  Goal: Plan of Care Review  Outcome: Ongoing (interventions implemented as appropriate)  Pt AAOx3. Pt admitted to unit, oriented to room, call light and VN. NS infusing at 150 ml/hr. Heparin drip started, currently infusing at 21.9 ml/hr. Edema noted to bilateral lower extremities with venous stasis. Rash noted to lower abdomen. Wound of side of left foot, pt states it is a skin tear. Pt placed on contact precautions for MRSA. 1x complaint of chest pain with relief from 1x SL nitro. Cardiac monitoring initiated. Wife at the bedside, attentive to pt. Bed locked in lowest position, bed alarm set and call bell within reach. Urinal within reach. Will continue to monitor.

## 2019-08-03 NOTE — CONSULTS
[facility]               Consult H&P    Reason for Consult:        Assessment:    1. Pulmonary Embolism  2. DVT          Plan:    Restart Coumadin with therapeutic INR 2-3 or If patient agreeable switch to Xarelto 15mg po bid for 21 days then 20 mg po daily.     No indication for catheter directed thrombolysis.         Flako Gray MD  Cardiology Service      HPI: 54 y/o male with PMH of thrombus on coumadin present with DVT and PE. As per patient he was recently discharge on antibiotics and was told to hold coumadin until stop taking antibiotics. He then presented with chest pain and found to have PE. INR 1.1.  Cardiology consulted regarding anticoagulation.         Review of Systems -Except for what was mentioned above in HPI  Constitution: Negative.   HENT: Negative.   Eyes: Negative.   Cardiovascular: Negative.   Respiratory: Negative.   Endocrine: Negative.   Hematologic/Lymphatic: Negative.   Skin: Negative.   Musculoskeletal: Negative.   Gastrointestinal: Negative.             Past Surgical History:   Procedure Laterality Date    ANGIOPLASTY      ARTHROSCOPY-KNEE W/ CHONDROPLASTY Right 2/23/2016    Performed by Alejandro Villanueva [facility] at [facility] OR    CARDIAC SURGERY      open heart surgery 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 old    EYE SURGERY      left eye cataract/right eye glaucoma    TIMO FILTER PLACEMENT      Dr Calix ([facility])    KNEE SURGERY      l and r     MULTIPLE TOOTH EXTRACTIONS      Sclerotherapy N/A 2/21/2019    Performed by Gomez Lantigua MD at [facility] CATH LAB/EP    SKIN GRAFT      left leg    SYNOVECTOMY-KNEE Right 2/23/2016    Performed by Alejandro [facility]ueva [facility] at [facility] OR       Past Medical History:   Diagnosis Date    *Atrial fibrillation     Anticoagulant long-term use     Arthritis     Atrial fibrillation     Atrial fibrillation Feb 23, 2016    Bipolar disorder     CHF (congestive heart failure)     Congenital heart disease     s/p surgical intervention  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 of age    Deep vein thrombosis     DVT of leg (deep venous thrombosis)     left leg    History of prior ablation treatment     10/9/13    Hypertension     Obesity     Stroke     Thyroid disease     Venous stasis ulcer of lower extremity, unspecified laterality 12/14/2012    Venous ulcer         reports that he has quit smoking. His smoking use included cigarettes. He has a 6.00 pack-year smoking history. He has quit using smokeless tobacco. He reports that he drinks about 0.6 oz of alcohol per week. He reports that he does not use drugs.       Family History   Problem Relation Age of Onset    Diabetes Father     Heart disease Father     Heart disease Maternal Grandmother     Diabetes Maternal Grandfather     Heart disease Maternal Grandfather     Stroke Maternal Grandfather           Review of patient's allergies indicates:   Allergen Reactions    Contrast media Other (See Comments)     Severe chest pain    Food allergy formula [glutamine-c-quercet-selen-brom]      Allergic to green peas; Heart failure.    Iodinated contrast- oral and iv dye Other (See Comments)     Chest pain    Peas Hives    Ibuprofen Swelling    Latex, natural rubber Hives    Pcn [penicillins] Hives    Butisol [butabarbital] Rash     Peeling skin       Current Discharge Medication List      CONTINUE these medications which have NOT CHANGED    Details   ciprofloxacin HCl (CIPRO) 500 MG tablet Take 1 tablet (500 mg total) by mouth 2 (two) times daily. for 14 days  Qty: 28 tablet, Refills: 0      clindamycin (CLEOCIN) 300 MG capsule Take 1 capsule (300 mg total) by mouth every 6 (six) hours. for 14 days  Qty: 56 capsule, Refills: 0      HYDROcodone-acetaminophen (NORCO) 5-325 mg per tablet Take 1 tablet by mouth every 6 (six) hours as needed for Pain.  Qty: 10 tablet, Refills: 0      lisinopril 10 MG tablet Take 1 tablet (10 mg total) by mouth once daily.  Qty: 30 tablet, Refills: 6    Associated Diagnoses:  Chronic systolic congestive heart failure      metoprolol succinate (TOPROL-XL) 100 MG 24 hr tablet Take 1 tablet (100 mg total) by mouth once daily.  Qty: 30 tablet, Refills: 5    Associated Diagnoses: Permanent atrial fibrillation; Chronic combined systolic and diastolic congestive heart failure      nystatin (MYCOSTATIN) powder Apply topically 2 (two) times daily.  Qty: 60 g, Refills: 1    Associated Diagnoses: Candida infection of genital region      !! warfarin (COUMADIN) 10 MG tablet 7.5mg PO daily except 10mg PO on Sun and Wed - OR AS DIRECTED BY COUMADIN CLINIC (patient uses 7.5mg and 10mg strength tablets)  Qty: 15 tablet, Refills: 5    Comments: patient uses 7.5mg and 10mg strength tablets  Associated Diagnoses: Long term (current) use of anticoagulants; Other pulmonary embolism without acute cor pulmonale, unspecified chronicity; History of DVT (deep vein thrombosis); Permanent atrial fibrillation; May-Thurner syndrome      !! warfarin (COUMADIN) 7.5 MG tablet 7.5mg daily except 10mg Sun and Wed - OR AS DIRECTED BY COUMADIN CLINIC (patient uses 7.5mg and 10mg strength tablets)  Qty: 30 tablet, Refills: 5    Comments: patient uses 7.5mg and 10mg strength tablets  Associated Diagnoses: Long term (current) use of anticoagulants; Other pulmonary embolism without acute cor pulmonale, unspecified chronicity; History of DVT (deep vein thrombosis); Permanent atrial fibrillation; May-Thurner syndrome      torsemide (DEMADEX) 20 MG Tab Take 2 tablets (40 mg total) by mouth once daily.  Qty: 180 tablet, Refills: 3       !! - Potential duplicate medications found. Please discuss with provider.           [START ON 8/4/2019] metoprolol succinate  100 mg Oral Daily    senna-docusate 8.6-50 mg  1 tablet Oral BID        heparin (porcine) in D5W 15 Units/kg/hr (08/03/19 1001)       acetaminophen, acetaminophen, heparin (PORCINE), heparin (PORCINE), lidocaine, morphine, ondansetron, promethazine (PHENERGAN) IVPB,  ramelteon, sodium chloride 0.9%    Vitals:    08/03/19 0511 08/03/19 0719 08/03/19 0738 08/03/19 0749   BP: 138/61  (!) 107/53    BP Location: Left arm      Patient Position: Lying  Lying    Pulse: 89  85 84   Resp:   20    Temp: 97.8 °F (36.6 °C)  97.9 °F (36.6 °C)    TempSrc: Oral  Rectal    SpO2:  98%     Weight:       Height:             Physical Examination:  General Appearance: No acute distress  Mental: Alert to person, time and place  HEENT: Perrl  Chest: No pain with palpitations, no chest deformities  Heart: RRR, no murmurs, no gallops or rubs  Lung: CTAB  ABDOMEN: Soft, nontender, nondistended with good bowel sounds heard. No mass or hepatosplenomegaly  EXTREMITIES: Without cyanosis, clubbing or edema.   NEUROLOGICAL: Gross nonfocal. Skin: Warm and dry without any rash. There is no costovertebral angle tenderness.   Skin: no rashes lesions or ulcers      Lab Results   Component Value Date     08/03/2019    K 4.5 08/03/2019     08/03/2019    CO2 21 (L) 08/03/2019    BUN 33 (H) 08/03/2019    CREATININE 1.0 08/03/2019     08/03/2019    HGBA1C 5.5 07/25/2019    MG 1.9 08/03/2019    AST 30 08/03/2019    ALT 23 08/03/2019    ALBUMIN 3.2 (L) 08/03/2019    PROT 7.2 08/03/2019    BILITOT 0.4 08/03/2019    WBC 3.68 (L) 08/03/2019    HGB 9.0 (L) 08/03/2019    HCT 29.8 (L) 08/03/2019    MCV 80 (L) 08/03/2019     08/03/2019    INR 1.1 08/02/2019    INR 8.3 03/15/2019    TSH <0.010 (L) 07/25/2019    CHOL 124 07/10/2017    HDL 23 (L) 07/10/2017    LDLCALC 70.4 07/10/2017    TRIG 153 (H) 07/10/2017       Recent Labs   Lab 08/03/19  0537   TROPONINI 0.022       Recent Labs   Lab 08/03/19  0537   TROPONINI 0.022       Lab Results   Component Value Date    TSH <0.010 (L) 07/25/2019       Lab Results   Component Value Date    HGBA1C 5.5 07/25/2019         Images and labs reviewed        The importance anticoagulation was explained to patient in details. Anticoagulation is to prevent CVA and PE and  Sudden death. With anticoagulation there is risk of bleeding but benefits clearly out weight any risk. Patient advice to report any bleeding to cardiologist as soon as possible and do not discontinue medication without consent of cardiologist. All patient questions were answered regarding medication and patient verbalized understanding.

## 2019-08-03 NOTE — DISCHARGE SUMMARY
Discharge Summary      Admit Date: 8/2/2019    Discharge Date and Time: 08/03/2019    Attending Physician: Gino Ji III, MD     Discharge Physician: Jonathan Love MD    Principal Diagnoses: Acute pulmonary embolism  The primary encounter diagnosis was Pulmonary embolism. Diagnoses of Chest pain, Chronic atrial fibrillation, Hyperthyroidism, Venous stasis dermatitis of both lower extremities, and Other acute pulmonary embolism without acute cor pulmonale were also pertinent to this visit.    Discharged Condition: stable    Hospital Course: Drew Farrar is a 53 y.o. male with pmh  has a past medical history of *Atrial fibrillation, Anticoagulant long-term use, Arthritis, Atrial fibrillation, Atrial fibrillation (Feb 23, 2016), Bipolar disorder, CHF (congestive heart failure), Congenital heart disease, Deep vein thrombosis, DVT of leg (deep venous thrombosis), History of prior ablation treatment, Hypertension, Obesity, Stroke, Thyroid disease, Venous stasis ulcer of lower extremity, unspecified laterality (12/14/2012), and Venous ulcer. who presented with Acute pulmonary embolism.  Patient had chest pain. Vitals signs were stable in the ED. D-Dimer+. V-Q scan intermediate probability for PE. B/l Lower extremity venous U/S - revealed thrombus in the Right distal femoral vein. Patient placed on heparin drip. Echo normal. EKG showed a.fib. Pain relieved with sublingual nitro. Cardiology recommended switching to xarelto and patient agreed. Patient advised to follow up with PCP and cardiology as an outpatient.     Consults: IP CONSULT TO ANESTHESIOLOGY  IP CONSULT TO SOCIAL WORK/CASE MANAGEMENT  IP CONSULT TO SOCIAL WORK/CASE MANAGEMENT  WOUND CARE CONSULT  IP CONSULT TO CARDIOLOGY-[facility]    Significant Diagnostic Studies:   Recent Labs   Lab 08/02/19  1519 08/03/19  0537   WBC 4.58 3.68*   HGB 9.6* 9.0*   HCT 32.0* 29.8*    191     Recent Labs   Lab 08/02/19  1519 08/03/19  0537    138   K 5.5* 4.5     109   CO2 23 21*   BUN 39* 33*   CREATININE 1.2 1.0   CALCIUM 9.6 9.3   PROT 7.6 7.2   BILITOT 0.4 0.4   ALKPHOS 140* 117   ALT 26 23   AST 34 30     No results for input(s): POCTGLUCOSE in the last 168 hours.  Recent Labs   Lab 08/02/19  1840 08/03/19  0015 08/03/19  0537   TROPONINI 0.025 0.016 0.022     Lab Results   Component Value Date    TSH <0.010 (L) 07/25/2019    TSH <0.010 (L) 07/09/2019    TSH 0.278 (L) 03/07/2018     Results for orders placed or performed during the hospital encounter of 08/02/19   EKG 12-lead    Narrative    Test Reason : R07.9,    Vent. Rate : 080 BPM     Atrial Rate : 058 BPM     P-R Int : 000 ms          QRS Dur : 114 ms      QT Int : 356 ms       P-R-T Axes : 000 050 015 degrees     QTc Int : 410 ms    Atrial fibrillation  Abnormal ECG  When compared with ECG of 26-JUL-2019 15:54,  No significant change was found  Confirmed by SHWETA OH MD ([id number]) on 8/3/2019 3:37:59 PM    Referred By: [email or web address]   SELF           Confirmed By:SHWETA OH MD               Disposition: Home or Self Care    Patient Instructions:   Current Discharge Medication List      START taking these medications    Details   !! rivaroxaban (XARELTO) 15 mg Tab Take 1 tablet (15 mg total) by mouth 2 (two) times daily with meals. for 21 days  Qty: 42 tablet, Refills: 0      !! rivaroxaban (XARELTO) 20 mg Tab Take 1 tablet (20 mg total) by mouth daily with dinner or evening meal.  Qty: 30 tablet, Refills: 2       !! - Potential duplicate medications found. Please discuss with provider.      CONTINUE these medications which have NOT CHANGED    Details   ciprofloxacin HCl (CIPRO) 500 MG tablet Take 1 tablet (500 mg total) by mouth 2 (two) times daily. for 14 days  Qty: 28 tablet, Refills: 0      clindamycin (CLEOCIN) 300 MG capsule Take 1 capsule (300 mg total) by mouth every 6 (six) hours. for 14 days  Qty: 56 capsule, Refills: 0      HYDROcodone-acetaminophen (NORCO) 5-325 mg per tablet Take 1 tablet by  mouth every 6 (six) hours as needed for Pain.  Qty: 10 tablet, Refills: 0      lisinopril 10 MG tablet Take 1 tablet (10 mg total) by mouth once daily.  Qty: 30 tablet, Refills: 6    Associated Diagnoses: Chronic systolic congestive heart failure      metoprolol succinate (TOPROL-XL) 100 MG 24 hr tablet Take 1 tablet (100 mg total) by mouth once daily.  Qty: 30 tablet, Refills: 5    Associated Diagnoses: Permanent atrial fibrillation; Chronic combined systolic and diastolic congestive heart failure      nystatin (MYCOSTATIN) powder Apply topically 2 (two) times daily.  Qty: 60 g, Refills: 1    Associated Diagnoses: Candida infection of genital region      torsemide (DEMADEX) 20 MG Tab Take 2 tablets (40 mg total) by mouth once daily.  Qty: 180 tablet, Refills: 3         STOP taking these medications       warfarin (COUMADIN) 10 MG tablet Comments:   Reason for Stopping:         warfarin (COUMADIN) 7.5 MG tablet Comments:   Reason for Stopping:               Discharge Procedure Orders   Diet Adult Regular     Diet Cardiac     Notify your health care provider if you experience any of the following:  temperature >100.4     Notify your health care provider if you experience any of the following:  persistent nausea and vomiting or diarrhea     Notify your health care provider if you experience any of the following:  severe uncontrolled pain     Notify your health care provider if you experience any of the following:  persistent dizziness, light-headedness, or visual disturbances     Notify your health care provider if you experience any of the following:  increased confusion or weakness     Notify your health care provider if you experience any of the following:  difficulty breathing or increased cough     Notify your health care provider if you experience any of the following:  severe persistent headache     Notify your health care provider if you experience any of the following:  redness, tenderness, or signs of  infection (pain, swelling, redness, odor or green/yellow discharge around incision site)     Notify your health care provider if you experience any of the following:  temperature >100.4     Notify your health care provider if you experience any of the following:  persistent nausea and vomiting or diarrhea     Notify your health care provider if you experience any of the following:  severe uncontrolled pain     Notify your health care provider if you experience any of the following:  redness, tenderness, or signs of infection (pain, swelling, redness, odor or green/yellow discharge around incision site)     Notify your health care provider if you experience any of the following:  difficulty breathing or increased cough     Notify your health care provider if you experience any of the following:  persistent dizziness, light-headedness, or visual disturbances     Notify your health care provider if you experience any of the following:  increased confusion or weakness     Activity as tolerated     Activity as tolerated     33 minutes was spent on this discharge summary.     Jonathan Love MD  [facility] HO-1  08/03/2019  5:18 PM

## 2019-08-03 NOTE — NURSING
Upon chart review, VN noted that pt is on heparin gtt @ 21.9ml/hr. No aPTT drawn since May 2019. Only aPTT order is scheduled 8/4/19 @ 0400. VN notified Charge Nurse Aiden. New order noted.

## 2019-08-03 NOTE — ED NOTES
[facility] with [facility] is here to transport pt to have [facility] lung scan. Spoke to Dr. Joshua it is okay for patient to be transported off the unit at this time to have test done.

## 2019-08-03 NOTE — PLAN OF CARE
Cued into patient's room.  Permission received per patient to turn camera to view patient.  Introduced as VN for night shift that will be working with floor nurse and nursing assistant.  Educated patient on VN's role in patient care. Plan of care reviewed with patient. Education per flowsheet.  Opportunity given for questions and questions answered.  Admission assessment questions answered.  C/o chest pain; Maribel LOPEZ to call MD.  No other complaints.  Instructed to call for assistance.  Will cont to monitor.

## 2019-08-03 NOTE — PT/OT/SLP PROGRESS
"Occupational Therapy  Eval Attempt & Discharge     Patient Name:  Drew Farrar   MRN:  [id number]    OT eval and treat orders received, chart reviewed, and pt interviewed. Pt states "I don't need therapy, I can do everything for myself." Pt educated on role of OT vs PT. Pt refusing to participate in OT eval or treatment at this time.     Please re-consult OT if pt presents a change in functional status.     D/C OT.    Sona Stover OT  8/3/2019  "

## 2019-08-03 NOTE — NURSING
Pt ready for DC. IVs removed, DC instructions reviewed with pt. Pt verbalized understanding. VS stable, no C/O pain. Pt will be DC'd to home accompanied by spouse.

## 2019-08-03 NOTE — PLAN OF CARE
Problem: Adult Inpatient Plan of Care  Goal: Plan of Care Review  Outcome: Ongoing (interventions implemented as appropriate)  Pt received on RA.  SPO2  98%.  Pt in no apparent respiratory distress.  Will continue to monitor.

## 2019-08-03 NOTE — H&P
History & Physical  [facility]      SUBJECTIVE:     History of Present Illness:  Patient is a 53 y.o. male history of atrial fibrillation, Hyperthyroidism, deep vein thrombosis,, hypertension, obesity, thyroid disease, venous stasis who  presents to the ED complaining of right sided chest pain that began around 5 pm today. Patient states that is chest pain is right sided, stabbing, 6/10 and associated with SOB. Patient was recently admitted to the  service and treated for a RLE cellullitis with vancomycin. Patient found to have a supratherapuetic INR at that time of 4.6 and was 3.4 prior to discharge. Patient discharged with clindamycin x 14 days. Patient was intructed in the hospital to hold is coumadin until he followed up with Coumadin clinic. However, patient did not take his coumadin, which led to subtherapeutic INR. Vitals signs were stable in the ED. D-Dimer+. V-Q scan intermediate probability for PE. Family Medicine consulted for admission.     PTA Medications   Medication Sig    ciprofloxacin HCl (CIPRO) 500 MG tablet Take 1 tablet (500 mg total) by mouth 2 (two) times daily. for 14 days    clindamycin (CLEOCIN) 300 MG capsule Take 1 capsule (300 mg total) by mouth every 6 (six) hours. for 14 days    HYDROcodone-acetaminophen (NORCO) 5-325 mg per tablet Take 1 tablet by mouth every 6 (six) hours as needed for Pain.    lisinopril 10 MG tablet Take 1 tablet (10 mg total) by mouth once daily. (Patient taking differently: Take 7.5 mg by mouth every evening. )    metoprolol succinate (TOPROL-XL) 100 MG 24 hr tablet Take 1 tablet (100 mg total) by mouth once daily. (Patient taking differently: Take 100 mg by mouth every evening. )    nystatin (MYCOSTATIN) powder Apply topically 2 (two) times daily.    warfarin (COUMADIN) 10 MG tablet 7.5mg PO daily except 10mg PO on Sun and Wed - OR AS DIRECTED BY COUMADIN CLINIC (patient uses 7.5mg and 10mg strength tablets)    warfarin (COUMADIN) 7.5 MG  tablet 7.5mg daily except 10mg Sun and Wed - OR AS DIRECTED BY COUMADIN CLINIC (patient uses 7.5mg and 10mg strength tablets)    torsemide (DEMADEX) 20 MG Tab Take 2 tablets (40 mg total) by mouth once daily.       Review of patient's allergies indicates:   Allergen Reactions    Contrast media Other (See Comments)     Severe chest pain    Food allergy formula [glutamine-c-quercet-selen-brom]      Allergic to green peas; Heart failure.    Iodinated contrast- oral and iv dye Other (See Comments)     Chest pain    Peas Hives    Ibuprofen Swelling    Latex, natural rubber Hives    Pcn [penicillins] Hives    Butisol [butabarbital] Rash     Peeling skin       Past Medical History:   Diagnosis Date    *Atrial fibrillation     Anticoagulant long-term use     Arthritis     Atrial fibrillation     Atrial fibrillation Feb 23, 2016    Bipolar disorder     CHF (congestive heart failure)     Congenital heart disease     s/p surgical intervention 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 of age    Deep vein thrombosis     DVT of leg (deep venous thrombosis)     left leg    History of prior ablation treatment     10/9/13    Hypertension     Obesity     Stroke     Thyroid disease     Venous stasis ulcer of lower extremity, unspecified laterality 12/14/2012    Venous ulcer      Past Surgical History:   Procedure Laterality Date    ANGIOPLASTY      ARTHROSCOPY-KNEE W/ CHONDROPLASTY Right 2/23/2016    [facility] OR    CARDIAC SURGERY      open heart surgery 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 old    EYE SURGERY      left eye cataract/right eye glaucoma    TIMO FILTER PLACEMENT      Dr Calix [facility])    KNEE SURGERY      l and r     MULTIPLE TOOTH EXTRACTIONS      Sclerotherapy N/A 2/21/2019    Performed by Gomez Lantigua MD at [facility] CATH LAB/EP    SKIN GRAFT      left leg    SYNOVECTOMY-KNEE Right 2/23/2016    Performed by [facility] at [facility] OR     Family History   Problem Relation Age of Onset    Diabetes  Father     Heart disease Father     Heart disease Maternal Grandmother     Diabetes Maternal Grandfather     Heart disease Maternal Grandfather     Stroke Maternal Grandfather      Social History     Tobacco Use    Smoking status: Former Smoker     Packs/day: 1.00     Years: 6.00     Pack years: 6.00     Types: Cigarettes    Smokeless tobacco: Former User    Tobacco comment: quit by age 27yrs old   Substance Use Topics    Alcohol use: Yes     Alcohol/week: 0.6 oz     Types: 1 Glasses of wine per week     Frequency: Monthly or less     Comment: 1 glass of wine a week    Drug use: No        Review of Systems:  Review of Systems   Constitutional: Negative for chills and fever.   HENT: Negative for sore throat.    Eyes: Negative for blurred vision and double vision.   Respiratory: Positive for shortness of breath. Negative for cough.    Cardiovascular: Positive for chest pain.   Gastrointestinal: Negative for abdominal pain, constipation, diarrhea, heartburn, nausea and vomiting.   Genitourinary: Negative for dysuria and urgency.   Musculoskeletal: Negative for back pain and falls.   Skin: Negative for itching and rash.   Neurological: Negative for headaches.   Endo/Heme/Allergies: Does not bruise/bleed easily.   Psychiatric/Behavioral: The patient is not nervous/anxious.          OBJECTIVE:     Vital Signs (Most Recent)  Temp: 97.9 °F (36.6 °C) (08/03/19 0036)  Pulse: 79 (08/03/19 0036)  Resp: (!) 22 (08/03/19 0036)  BP: 126/64 (08/03/19 0036)  SpO2: 98 % (08/03/19 0025)    Physical Exam   Constitutional: He is oriented to person, place, and time and well-developed, well-nourished, and in no distress.   HENT:   Head: Normocephalic and atraumatic.   Eyes: EOM are normal.   Neck: Normal range of motion. Neck supple.   Cardiovascular: Normal rate, regular rhythm and normal heart sounds.   Pulmonary/Chest: Effort normal and breath sounds normal.   Abdominal: Soft. Bowel sounds are normal.   Musculoskeletal:  Normal range of motion. He exhibits edema.   Patient with significant Lymphedema in the right lower extremity with chronic venous stasis changes. Mauricio's sign negative b/l.    Neurological: He is alert and oriented to person, place, and time.   Skin: Skin is warm and dry. There is erythema.   Slight erythema noted in the RLE.    Psychiatric: Affect normal.     Laboratory    LABS  CBC  Recent Labs   Lab 07/27/19  0446 08/02/19  1519   WBC 3.70* 4.58   RBC 4.05* 3.96*   HGB 9.9* 9.6*   HCT 32.2* 32.0*    200   MCV 80* 81*   MCH 24.4* 24.2*   MCHC 30.7* 30.0*     BMP  Recent Labs   Lab 07/27/19  0446 08/02/19  1519    139   K 4.6 5.5*   CO2 22* 23    106   BUN 40* 39*   CREATININE 1.2 1.2     POCT-Glucose  No results found for: POCTGLUCOSE    Recent Labs   Lab 07/27/19  0446 08/02/19  1519   CALCIUM 9.6 9.6   MG 1.8  --    PHOS 5.8*  --      LFT  Recent Labs   Lab 07/27/19  0446 08/02/19  1519   PROT 7.5 7.6   ALBUMIN 3.2* 3.6   BILITOT 0.7 0.4   AST 27 34   ALKPHOS 123 140*   ALT 22 26     COAGS  Recent Labs   Lab 07/27/19  0446 08/02/19  1519   INR 3.4* 1.1     CE  Recent Labs   Lab 08/02/19  1519 08/02/19  1840   TROPONINI 0.025 0.025     BNP  Recent Labs   Lab 08/02/19  1519   *     UA  Recent Labs   Lab 08/02/19  1723   COLORU Yellow   SPECGRAV 1.025   PHUR 5.0   PROTEINUA Negative     LAST HbA1c  Lab Results   Component Value Date    HGBA1C 5.5 07/25/2019     Diagnostic Results:    Imaging Results           US Lower Extremity Veins Bilateral (Final result)  Result time 08/02/19 21:52:55    Final result by Sohail Kline MD (08/02/19 21:52:55)                 Impression:      Deep venous thrombosis within the distal right femoral vein.    No evidence of left lower extremity deep venous thrombosis.    This report was flagged in Epic as abnormal.      Electronically signed by: Sohail Kline MD  Date:    08/02/2019  Time:    21:52             Narrative:    EXAMINATION:  US LOWER  EXTREMITY VEINS BILATERAL    CLINICAL HISTORY:  acute PE; Chronic atrial fibrillation    TECHNIQUE:  Duplex and color flow Doppler evaluation of the bilateral lower extremity veins was performed.    COMPARISON:  07/25/2019.    FINDINGS:  Right lower extremity:    There is thrombosis seen within the distal right femoral vein.  No evidence of clot involving the right common femoral, greater saphenous, popliteal, peroneal, posterior tibial and anterior tibial veins.    Left lower extremity:    No evidence of clot involving the bilateral common femoral, greater saphenous, femoral, popliteal, peroneal, anterior and posterior tibial veins.  Left common femoral and greater saphenous veins could not be fully compress but appear patent.  All venous structures demonstrate normal respiratory phasicity and augment adequately.  No evidence of soft tissue mass or Baker's cyst.                               NM Lung Scan Ventilation Perfusion (Final result)  Result time 08/02/19 19:27:55    Final result by Sohail Kline MD (08/02/19 19:27:55)                 Impression:      Single moderate to large perfusion defect within the left lung which would correspond with intermediate probability for potential pulmonary embolus.  Further evaluation may be performed with CTA chest PE protocol.      Electronically signed by: Sohail Kline MD  Date:    08/02/2019  Time:    19:27             Narrative:    EXAMINATION:  NM LUNG VENTILATION AND PERFUSION IMAGING    CLINICAL HISTORY:  Chest pain, acute, PE suspected, intermed prob, positive D-dimer;    TECHNIQUE:  15 mCi of xenon 133 aerosol and the subsequent IV administration of 5 mCi of Tc-99m-MAA, multiple images of the thorax were obtained in various projections.    COMPARISON:  Chest radiograph from the same date.    FINDINGS:  Single moderate to large perfusion defect is seen within the left midlung zone.  No mismatched perfusion defects are seen within the right lung.  Normal  ventilation is seen with no significant air trapping.                               X-Ray Chest 1 View (Final result)  Result time 08/02/19 16:26:25    Final result by Ray nAdino MD (08/02/19 16:26:25)                 Impression:      Cardiomegaly with interval development of mild pulmonary vascular congestion.      Electronically signed by: Ray Andino MD  Date:    08/02/2019  Time:    16:26             Narrative:    EXAMINATION:  XR CHEST 1 VIEW    CLINICAL HISTORY:  Chest pain, unspecified    TECHNIQUE:  Single frontal view of the chest was performed.    COMPARISON:  07/25/2019.    FINDINGS:  Monitoring EKG leads are present.  The trachea is unremarkable.  There is unchanged appearance of cardiomegaly.  The hemidiaphragms are within normal limits.  There is no evidence of free air beneath the hemidiaphragms.  There are no pleural effusions.  There is no evidence of a pneumothorax.  There is no evidence of pneumomediastinum.  There is mild pulmonary vascular congestion, new compared to the prior exam.  There are degenerative changes in the osseous structures.                                ASSESSMENT/PLAN:     Acute Pulmonary Embolism  - Acute onset anginal pain associated with SOB   - Troponin negative   - F/u TTE for evidence of right ventricle strain following acute PE  - D dimer 1.05  - V/Q scan - Single moderate to large perfusion defect within the left lung which would correspond with intermediate probability for potential pulmonary embolus.  - B/l Lower extremity venous U/S - reveals thrombis in the Right distal femoral vein   - Heparin drip started   - F/u PTT and PT/INR (Previously on coumadin)    Chronic Venous Stasis Dermatitis   - Patient recently treated for cellulitis of RLE  - No Wbc elevation and VSS  - Will consult wound care     Chronic Atrial Fibrillation   Will resume Metoprolol 100mg PO ER      Hyperthyroidism   - Patient missed endo appt for work up for long standing dx, likely  contributing to his Afib  - TSH < .0100   - Ft4 2.95  - F/u TSHR AB, TSI and TPOAb    Code: Full   Diet: Cardiac   PPX: heparin and PPI    Dispo: Patient probable PE and acute DVT currently on Heparin drip. Wound care to eval chronic venous stasis.     Vladimir Su MD  [facility], PGY-2

## 2019-08-03 NOTE — ED NOTES
[facility] #[phone], [employer] #[phone] call these numbers once pt is discharged.

## 2019-08-05 ENCOUNTER — OFFICE VISIT (OUTPATIENT)
Dept: TRANSPLANT | Facility: CLINIC | Age: 54
End: 2019-08-05
Payer: MEDICARE

## 2019-08-05 ENCOUNTER — ANTI-COAG VISIT (OUTPATIENT)
Dept: CARDIOLOGY | Facility: CLINIC | Age: 54
End: 2019-08-05

## 2019-08-05 VITALS
WEIGHT: 315 LBS | BODY MASS INDEX: 36.45 KG/M2 | HEART RATE: 69 BPM | SYSTOLIC BLOOD PRESSURE: 137 MMHG | DIASTOLIC BLOOD PRESSURE: 61 MMHG | HEIGHT: 78 IN

## 2019-08-05 DIAGNOSIS — L97.919 VARICOSE ULCER OF RIGHT LOWER EXTREMITY: ICD-10-CM

## 2019-08-05 DIAGNOSIS — I87.1 MAY-THURNER SYNDROME: ICD-10-CM

## 2019-08-05 DIAGNOSIS — Z79.01 LONG TERM (CURRENT) USE OF ANTICOAGULANTS: ICD-10-CM

## 2019-08-05 DIAGNOSIS — I48.20 CHRONIC ATRIAL FIBRILLATION: Primary | ICD-10-CM

## 2019-08-05 DIAGNOSIS — I26.01 ACUTE SEPTIC PULMONARY EMBOLISM WITH ACUTE COR PULMONALE: ICD-10-CM

## 2019-08-05 DIAGNOSIS — I83.019 VARICOSE ULCER OF RIGHT LOWER EXTREMITY: ICD-10-CM

## 2019-08-05 DIAGNOSIS — I26.99 OTHER PULMONARY EMBOLISM WITHOUT ACUTE COR PULMONALE, UNSPECIFIED CHRONICITY: ICD-10-CM

## 2019-08-05 DIAGNOSIS — R60.0 BILATERAL EDEMA OF LOWER EXTREMITY: ICD-10-CM

## 2019-08-05 DIAGNOSIS — I87.2 VENOUS (PERIPHERAL) INSUFFICIENCY: ICD-10-CM

## 2019-08-05 DIAGNOSIS — Z86.718 HISTORY OF DVT (DEEP VEIN THROMBOSIS): ICD-10-CM

## 2019-08-05 DIAGNOSIS — I48.21 PERMANENT ATRIAL FIBRILLATION: ICD-10-CM

## 2019-08-05 DIAGNOSIS — I50.22 CHRONIC SYSTOLIC HEART FAILURE: ICD-10-CM

## 2019-08-05 LAB
THYROPEROXIDASE IGG SERPL-ACNC: 8.8 IU/ML
TSH RECEP AB SER-ACNC: 21 IU/L (ref 0–1.75)

## 2019-08-05 PROCEDURE — 99999 PR PBB SHADOW E&M-EST. PATIENT-LVL III: ICD-10-PCS | Mod: PBBFAC,,, | Performed by: INTERNAL MEDICINE

## 2019-08-05 PROCEDURE — 99215 OFFICE O/P EST HI 40 MIN: CPT | Mod: S$GLB,,, | Performed by: INTERNAL MEDICINE

## 2019-08-05 PROCEDURE — 3075F PR MOST RECENT SYSTOLIC BLOOD PRESS GE 130-139MM HG: ICD-10-PCS | Mod: CPTII,S$GLB,, | Performed by: INTERNAL MEDICINE

## 2019-08-05 PROCEDURE — 3008F PR BODY MASS INDEX (BMI) DOCUMENTED: ICD-10-PCS | Mod: CPTII,S$GLB,, | Performed by: INTERNAL MEDICINE

## 2019-08-05 PROCEDURE — 3078F PR MOST RECENT DIASTOLIC BLOOD PRESSURE < 80 MM HG: ICD-10-PCS | Mod: CPTII,S$GLB,, | Performed by: INTERNAL MEDICINE

## 2019-08-05 PROCEDURE — 3075F SYST BP GE 130 - 139MM HG: CPT | Mod: CPTII,S$GLB,, | Performed by: INTERNAL MEDICINE

## 2019-08-05 PROCEDURE — 3008F BODY MASS INDEX DOCD: CPT | Mod: CPTII,S$GLB,, | Performed by: INTERNAL MEDICINE

## 2019-08-05 PROCEDURE — 99215 PR OFFICE/OUTPT VISIT, EST, LEVL V, 40-54 MIN: ICD-10-PCS | Mod: S$GLB,,, | Performed by: INTERNAL MEDICINE

## 2019-08-05 PROCEDURE — 3078F DIAST BP <80 MM HG: CPT | Mod: CPTII,S$GLB,, | Performed by: INTERNAL MEDICINE

## 2019-08-05 PROCEDURE — 99999 PR PBB SHADOW E&M-EST. PATIENT-LVL III: CPT | Mod: PBBFAC,,, | Performed by: INTERNAL MEDICINE

## 2019-08-05 NOTE — PROGRESS NOTES
Patient went to ER 8/2 for assessment of CP. He was found to have PE and DVT. Coumadin was d/c and pt now on Xarelto. D/c from clinic.

## 2019-08-05 NOTE — PROGRESS NOTES
Subjective:    Patient ID:  Drew Farrar is a 53 y.o. male who presents for evaluation of Congestive Heart Failure (CHF f/u hospital discharge. Pt has severe SOB and swelling in right leg.)      Congestive Heart Failure   Pertinent negatives include no abdominal pain, chest pain, chills, coughing, diaphoresis, fever or weakness.   Shortness of Breath   Pertinent negatives include no abdominal pain, chest pain, claudication, fever, hemoptysis, leg swelling, orthopnea, sputum production or wheezing.       M with PMHx of persistent afib on coumadin, patent PDA s/p surgical closure 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 of age, May Thurner syndrome with h/o multiple PEs and DVTs s/p IVC filter, chronic systolic congestive heart failure (EF 30%), hyperthyroid, atrial fibrillation here for follow up after hospitalization for PE and cellulitis        Hospital Course: Drew Farrar is a 53 y.o. male with pmh  has a past medical history of *Atrial fibrillation, Anticoagulant long-term use, Arthritis, Atrial fibrillation, Atrial fibrillation (Feb 23, 2016), Bipolar disorder, CHF (congestive heart failure), Congenital heart disease, Deep vein thrombosis, DVT of leg (deep venous thrombosis), History of prior ablation treatment, Hypertension, Obesity, Stroke, Thyroid disease, Venous stasis ulcer of lower extremity, unspecified laterality (12/14/2012), and Venous ulcer. who presented with Acute pulmonary embolism.  Patient had chest pain. Vitals signs were stable in the ED. D-Dimer+. V-Q scan intermediate probability for PE. B/l Lower extremity venous U/S - revealed thrombus in the Right distal femoral vein. Patient placed on heparin drip. Echo normal. EKG showed a.fib. Pain relieved with sublingual nitro. Cardiology recommended switching to xarelto and patient agreed. Patient advised to follow up with PCP and cardiology as an outpatient.          Review of Systems   Constitution: Negative for chills, decreased appetite, diaphoresis, fever,  malaise/fatigue, night sweats, weight gain and weight loss.   HENT: Negative.    Eyes: Negative.    Cardiovascular: Positive for dyspnea on exertion and irregular heartbeat. Negative for chest pain, claudication, cyanosis, leg swelling, near-syncope, orthopnea and palpitations.   Respiratory: Positive for shortness of breath. Negative for cough, hemoptysis, sleep disturbances due to breathing, snoring, sputum production and wheezing.    Endocrine: Negative.    Hematologic/Lymphatic: Negative.    Skin: Negative.    Musculoskeletal: Negative.    Gastrointestinal: Negative for abdominal pain and diarrhea.   Genitourinary: Negative.    Neurological: Negative.  Negative for weakness.   Psychiatric/Behavioral: Negative.         Objective:    Physical Exam   Constitutional: He is oriented to person, place, and time. He appears well-developed and well-nourished.   HENT:   Head: Normocephalic and atraumatic.   Eyes: Pupils are equal, round, and reactive to light. Conjunctivae and EOM are normal.   Neck: Normal range of motion. Neck supple. No JVD present.   Cardiovascular: Normal rate. An irregularly irregular rhythm present. Exam reveals no gallop and no friction rub.   Murmur heard.  High-pitched blowing holosystolic murmur is present with a grade of 2/6 at the apex.  Pulmonary/Chest: Breath sounds normal. No respiratory distress. He has no wheezes. He has no rales. He exhibits no tenderness.   Abdominal: Soft. Bowel sounds are normal. He exhibits no distension and no mass. There is no hepatosplenomegaly, splenomegaly or hepatomegaly. There is no tenderness. There is no rebound, no guarding and no CVA tenderness.   No hepatoslenomegaly   Musculoskeletal: Normal range of motion. He exhibits edema (+4). He exhibits no tenderness.   Neurological: He is alert and oriented to person, place, and time. He has normal reflexes. No cranial nerve deficit. He exhibits normal muscle tone. Coordination normal.   Skin: Skin is warm and  dry.   Psychiatric: He has a normal mood and affect.         Assessment:       1. Chronic atrial fibrillation    2. Chronic systolic heart failure    3. May-Thurner syndrome    4. Varicose ulcer of right lower extremity    5. Venous (peripheral) insufficiency    6. Acute septic pulmonary embolism with acute cor pulmonale    7. Bilateral edema of lower extremity         Plan:       Mr. Farrar is a 52 year-old with patent PDA s/p surgical closure 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 of age, May Thurner syndrome with multple leg/pelvic DVTs with pulmonary emboli, chronic systolic congestive heart failure (EF 40-45%), permanent atrial fibrillation since his 30s, and hyperthyroidism      COntinue anticoagulation and clidamycin for cellulitis    RTC 4 months

## 2019-08-05 NOTE — PROGRESS NOTES
Patient called to report that he was in ER 8/02, admitted overnight and discharged 8/03, reports his coumadin was stopped and was started on Xarelto due to having Clots in his leg and lung

## 2019-08-06 LAB — TSI SER-ACNC: 9.47 IU/L

## 2019-08-20 ENCOUNTER — OFFICE VISIT (OUTPATIENT)
Dept: FAMILY MEDICINE | Facility: HOSPITAL | Age: 54
End: 2019-08-20
Attending: SPECIALIST
Payer: MEDICARE

## 2019-08-20 VITALS
DIASTOLIC BLOOD PRESSURE: 69 MMHG | HEART RATE: 71 BPM | BODY MASS INDEX: 36.45 KG/M2 | HEIGHT: 78 IN | WEIGHT: 315 LBS | SYSTOLIC BLOOD PRESSURE: 140 MMHG

## 2019-08-20 DIAGNOSIS — Z79.01 LONG TERM (CURRENT) USE OF ANTICOAGULANTS: ICD-10-CM

## 2019-08-20 DIAGNOSIS — I87.2 CHRONIC VENOUS INSUFFICIENCY: ICD-10-CM

## 2019-08-20 DIAGNOSIS — Z86.718 HISTORY OF DVT (DEEP VEIN THROMBOSIS): ICD-10-CM

## 2019-08-20 DIAGNOSIS — E66.01 MORBID OBESITY: ICD-10-CM

## 2019-08-20 DIAGNOSIS — I10 ESSENTIAL HYPERTENSION: ICD-10-CM

## 2019-08-20 DIAGNOSIS — E05.90 HYPERTHYROIDISM: Chronic | ICD-10-CM

## 2019-08-20 DIAGNOSIS — I26.99 RECURRENT PULMONARY EMBOLISM: Primary | ICD-10-CM

## 2019-08-20 PROCEDURE — 99214 OFFICE O/P EST MOD 30 MIN: CPT | Performed by: PHYSICIAN ASSISTANT

## 2019-08-20 NOTE — PROGRESS NOTES
Subjective:       Patient ID: Drew Farrar is a 53 y.o. male.    Chief Complaint: PE; Hospital Follow Up    Patient was seen in [facility] today for hospital follow up.  Recently hospitalized for pulmonary embolism after presenting to the ED with chest pain and increasing SOB.    Admit Date: 8/2/2019  Discharge Date and Time: 08/03/2019     Patient has PMH of Atrial fib on long term anticoagulant with warfarin, CHF, congenital heart disease, DVT, HTN, obesity, stroke, thyroid disease, and venous stasis ulcer of lower extremity.  Vitals signs were stable in the ED.   D-Dimer+.   V-Q scan intermediate probability for PE.   B/l Lower extremity venous U/S - revealed thrombus in the Right distal femoral vein.    Cardiology recommended switching to xarelto and patient agreed.     Patient is unaccompanied during visit today. Today the patient is doing well and has no complaints. He has been transitioned from coumadin to Xarelto and reports compliance with his anticoagulation, although there seems to be some confusion about his dosing. He reports finishing BID Xarelto dosing on Saturday, although this would have only been 2 weeks into a 3 week course. He is now on 20mg nightly only. Denies any fever, chills, SOB, CP.      HTN:  Well controlled today on lisinioprl and metoprolol; 140/69 today.       AFib:  Switched from coumadin to Xarelto.      Hyperthyroid:  Missed Endocrine follow up, but has another appt scheduled.      Review of Systems   Constitutional: Negative.    HENT: Negative.    Respiratory: Negative.    Cardiovascular: Positive for leg swelling.   Gastrointestinal: Negative.    Endocrine: Negative.    Genitourinary: Negative.    Musculoskeletal: Negative.    Skin: Positive for color change and rash.   Neurological: Negative.    Psychiatric/Behavioral: Negative.        Objective:      Vitals:    08/20/19 1025   BP: (!) 140/69   Pulse: 71     Physical Exam   Constitutional: He is oriented to  person, place, and time. He appears well-developed and well-nourished. No distress.   HENT:   Head: Normocephalic and atraumatic.   Eyes: Conjunctivae and EOM are normal. Right eye exhibits no discharge. Left eye exhibits no discharge. No scleral icterus.   Neck: Normal range of motion. No tracheal deviation present.   Cardiovascular: Normal rate, regular rhythm, normal heart sounds and intact distal pulses. Exam reveals no gallop and no friction rub.   No murmur heard.  Pulmonary/Chest: Effort normal and breath sounds normal. No respiratory distress. He has no wheezes. He has no rales. He exhibits no tenderness.   Abdominal: Soft. Bowel sounds are normal. He exhibits no distension and no mass. There is no tenderness.   Musculoskeletal: Normal range of motion. He exhibits edema (BLE's but right more than left). He exhibits no tenderness.   Neurological: He is alert and oriented to person, place, and time.   Skin: Skin is warm. He is not diaphoretic.   Chronic venous stasis changes to BLE's with skin color changes R>L; no signs of continued cellulitis    Psychiatric: He has a normal mood and affect. His behavior is normal. Judgment and thought content normal.   Nursing note and vitals reviewed.      Assessment:       1. Recurrent pulmonary embolism    2. History of DVT (deep vein thrombosis)    3. Long term (current) use of anticoagulants    4. Essential hypertension    5. Hyperthyroidism    6. Morbid obesity    7. Chronic venous insufficiency        Plan:       Recurrent pulmonary embolism  History of DVT (deep vein thrombosis)  Long term (current) use of anticoagulants   -Continue Xarelto 20mg QHS   -Will need follow up with cardiology  Essential hypertension   -Continue home medications of lisinopril and metroprolol   -BP stable today at 140/69  Hyperthyroidism   -Last TSH <0.01   -Missed appt with Endocrine, but has been rescheduled   -Thyroid studies have also been completed (thyrotropin receptor ab, peroxidase  ab, and TS immunoglobulin)  Chronic venous insufficiency   -Cellulitis resolved; chronic skin changes present   -Encouraged compression stockings   -Lactic acid topical lotion    Follow up in about 4 weeks (around 9/17/2019) for With PCP.

## 2019-08-22 PROBLEM — L03.119 CELLULITIS OF LOWER EXTREMITY: Status: RESOLVED | Noted: 2019-04-24 | Resolved: 2019-08-22

## 2019-09-02 ENCOUNTER — HOSPITAL ENCOUNTER (EMERGENCY)
Facility: HOSPITAL | Age: 54
Discharge: HOME | End: 2019-09-02
Attending: EMERGENCY MEDICINE
Payer: MEDICARE

## 2019-09-02 VITALS
HEART RATE: 92 BPM | BODY MASS INDEX: 36.45 KG/M2 | OXYGEN SATURATION: 98 % | SYSTOLIC BLOOD PRESSURE: 115 MMHG | HEIGHT: 78 IN | WEIGHT: 315 LBS | DIASTOLIC BLOOD PRESSURE: 57 MMHG | TEMPERATURE: 97 F | RESPIRATION RATE: 18 BRPM

## 2019-09-02 DIAGNOSIS — B35.6 TINEA CRURIS: ICD-10-CM

## 2019-09-02 DIAGNOSIS — B35.3 TINEA PEDIS OF BOTH FEET: ICD-10-CM

## 2019-09-02 DIAGNOSIS — R04.0 EPISTAXIS: Primary | ICD-10-CM

## 2019-09-02 DIAGNOSIS — B35.1 ONYCHOMYCOSIS OF TOENAIL: ICD-10-CM

## 2019-09-02 DIAGNOSIS — B35.4 TINEA CORPORIS: ICD-10-CM

## 2019-09-02 DIAGNOSIS — I83.024 VENOUS STASIS ULCER OF LEFT MIDFOOT LIMITED TO BREAKDOWN OF SKIN WITH VARICOSE VEINS: ICD-10-CM

## 2019-09-02 DIAGNOSIS — L97.421 VENOUS STASIS ULCER OF LEFT MIDFOOT LIMITED TO BREAKDOWN OF SKIN WITH VARICOSE VEINS: ICD-10-CM

## 2019-09-02 PROCEDURE — 99283 EMERGENCY DEPT VISIT LOW MDM: CPT

## 2019-09-02 RX ORDER — HYDROXYZINE HYDROCHLORIDE 25 MG/1
25 TABLET, FILM COATED ORAL 4 TIMES DAILY PRN
Qty: 20 TABLET | Refills: 0 | Status: SHIPPED | OUTPATIENT
Start: 2019-09-02 | End: 2022-01-01

## 2019-09-02 RX ORDER — TERBINAFINE HYDROCHLORIDE 250 MG/1
250 TABLET ORAL DAILY
Qty: 45 TABLET | Refills: 0 | Status: SHIPPED | OUTPATIENT
Start: 2019-09-02 | End: 2019-10-17

## 2019-09-02 NOTE — ED NOTES
Pt c/o nosebleed since approx 9 am this morning. Pt not holding pressure to nose. Pt instructed to hold pressure to nose, just below cartilage. Pt a/a/o. NAD noted. Respirations even, unlabored. Visitor at bedside. Will continue to monitor.

## 2019-09-02 NOTE — ED PROVIDER NOTES
"Encounter Date: 9/2/2019    SCRIBE #1 NOTE: I, Grace Rosenberg, am scribing for, and in the presence of,  Dr. Sanchez. I have scribed the entire note.       History     Chief Complaint   Patient presents with    Epistaxis     nose bleed that started this morning      This is a 53 y.o. male who has a past medical history of *Atrial fibrillation, Anticoagulant long-term use, Arthritis, Atrial fibrillation, Atrial fibrillation (Feb 23, 2016), Bipolar disorder, CHF (congestive heart failure), Congenital heart disease, Deep vein thrombosis, DVT of leg (deep venous thrombosis), History of prior ablation treatment, Hypertension, Obesity, Stroke, Thyroid disease, Venous stasis ulcer of lower extremity, unspecified laterality (12/14/2012), and Venous ulcer.     The patient presents to the Emergency Department with epistaxis that started this morning.   Patient notes he blew his nose, when blood started to shoot out "like a water fountain."  He notes he is bleeding from both nostrils.   Patient is currently on Xarelto for venous stasis.   He notes his dose was decreased 2 weeks ago.   Also has complaints of rash to his left arm, abdomen and groin.      The history is provided by the patient.     Review of patient's allergies indicates:   Allergen Reactions    Contrast media Other (See Comments)     Severe chest pain    Food allergy formula [glutamine-c-quercet-selen-brom]      Allergic to green peas; Heart failure.    Iodinated contrast media Other (See Comments)     Chest pain    Peas Hives    Ibuprofen Swelling    Latex, natural rubber Hives    Pcn [penicillins] Hives    Butisol [butabarbital] Rash     Peeling skin     Past Medical History:   Diagnosis Date    *Atrial fibrillation     Anticoagulant long-term use     Arthritis     Atrial fibrillation     Atrial fibrillation Feb 23, 2016    Bipolar disorder     CHF (congestive heart failure)     Congenital heart disease     s/p surgical intervention 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 of " age    Deep vein thrombosis     DVT of leg (deep venous thrombosis)     left leg    History of prior ablation treatment     10/9/13    Hypertension     Obesity     Stroke     Thyroid disease     Venous stasis ulcer of lower extremity, unspecified laterality 12/14/2012    Venous ulcer      Past Surgical History:   Procedure Laterality Date    ANGIOPLASTY      ARTHROSCOPY-KNEE W/ CHONDROPLASTY Right 2/23/2016    Performed by Alejandro [facility] at [facility]    CARDIAC SURGERY      open heart surgery 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 old    EYE SURGERY      left eye cataract/right eye glaucoma    TIMO FILTER PLACEMENT      Dr Calix ([facility])    KNEE SURGERY      l and r     MULTIPLE TOOTH EXTRACTIONS      Sclerotherapy N/A 2/21/2019    Performed by Gomez Lantigua MD at [facility] CATH LAB/EP    SKIN GRAFT      left leg    SYNOVECTOMY-KNEE Right 2/23/2016    Performed by Alejandro [facility]ue[facility] MD at [facility] OR     Family History   Problem Relation Age of Onset    Diabetes Father     Heart disease Father     Heart disease Maternal Grandmother     Diabetes Maternal Grandfather     Heart disease Maternal Grandfather     Stroke Maternal Grandfather      Social History     Tobacco Use    Smoking status: Former Smoker     Packs/day: 1.00     Years: 6.00     Pack years: 6.00     Types: Cigarettes    Smokeless tobacco: Former User    Tobacco comment: quit by age 27yrs old   Substance Use Topics    Alcohol use: Yes     Alcohol/week: 0.6 oz     Types: 1 Glasses of wine per week     Frequency: Monthly or less     Comment: 1 glass of wine a week    Drug use: No     Review of Systems   Constitutional: Negative for activity change.   HENT: Positive for nosebleeds. Negative for rhinorrhea and sore throat.    Respiratory: Negative for shortness of breath.    Cardiovascular: Positive for leg swelling. Negative for chest pain.   Gastrointestinal: Negative for abdominal pain and nausea.   Musculoskeletal: Negative for back pain.   Skin:  Positive for rash and wound.   Neurological: Negative for dizziness and headaches.   Hematological: Bruises/bleeds easily.       Physical Exam     Initial Vitals [09/02/19 0931]   BP Pulse Resp Temp SpO2   134/62 (!) 123 18 98.7 °F (37.1 °C) 96 %      MAP       --         Physical Exam    Nursing note and vitals reviewed.  Constitutional: He appears well-developed and well-nourished. He is not diaphoretic. He appears distressed.   Distressed and anxious.    HENT:   Nose: Epistaxis is observed.   Blood in both nostrils.   Blood in pharynx.    Cardiovascular: Normal rate.   Pulmonary/Chest: No respiratory distress.   Genitourinary:   Genitourinary Comments: Purple discoloration with pink, scaly, round desquamating rash to upper gluteal, bilateral groin, but continues up to abdomen on left side of groin and on toes and inbetween toes. Well demarcated areas. No erythema, discharge, ulceration, bleeding, or vesicles.    Musculoskeletal: Normal range of motion. He exhibits no tenderness.   Skin: Rash noted. No erythema.   Pink, scaly, round desquamating rash to left forearm, left knee, and bilateral abdomen. Well demarcated areas. No erythema, discharge, ulceration, bleeding, or vesicles.   Abnormal appearing toenails with associated tinea pedis infection bilaterally.  Pt has small ulcer to lateral left midfoot - no discharge, no swelling, no erythema, no bleeding. Wound is deep to subcutaneous tissue         ED Course   Procedures  Labs Reviewed - No data to display       Imaging Results    None          Medical Decision Making:   ED Management:  Patient counseled on direct pressure x10 min.  On re-evaluation, bleeding stopped.  Patient had me review his other rashes on his body.  It appears that he has tinea corporis, tinea cruris and tinea pedis.  I do not feel that cream would be sufficient to treat him at this time considering the large area of involvement.  I will put him on terbinafine.  I will also give him atarax  Rx as needed for itching.       Clinical impression and plan discussed with patient.    Pt is to call for follow up with PCP in 7 days.  Pt to return to the ED for any new or concerning symptoms.  Aftercare instructions and return precautions provided to patient.   Pt expressed understanding and agrees with plan.                      Clinical Impression:       ICD-10-CM ICD-9-CM   1. Epistaxis R04.0 784.7   2. Tinea corporis B35.4 110.5   3. Tinea cruris B35.6 110.3   4. Onychomycosis of toenail B35.1 110.1   5. Venous stasis ulcer of left midfoot limited to breakdown of skin with varicose veins I83.024 454.0    L97.421    6. Tinea pedis of both feet B35.3 110.4         Disposition:   Disposition: Discharged  Condition: Stable      I, Dr. Abel Sanchez, personally performed the services described in this documentation.   All medical record entries made by the scribe were at my direction and in my presence.   I have reviewed the chart and agree that the record is accurate and complete.   Abel Sanchez MD.        Abel Sanchez MD  09/02/19 7246

## 2019-09-02 NOTE — ED NOTES
Received report from KAILEY Zimmerman; assuming care of pt. Pt is sitting up in bed, holding pressure to nose. Pt denies needing anything at this time.

## 2019-09-02 NOTE — DISCHARGE INSTRUCTIONS
Thank you for choosing [facility]! We appreciate you coming to us for your medical care. We hope you feel better soon! Please come back to [facility] for all of your future medical needs.    Our goal in the emergency department is to always give you outstanding care and exceptional service. You may receive a survey by mail or e-mail in the next week regarding your experience in our ED. We would greatly appreciate your completing and returning the survey. Your feedback provides us with a way to recognize our staff who give very good care and it helps us learn how to improve when your experience was below our aspiration of excellence.       Sincerely,    Abel Sanchez MD  Medical Director  Emergency Department  [facility]

## 2019-09-09 ENCOUNTER — HOSPITAL ENCOUNTER (OUTPATIENT)
Dept: WOUND CARE | Facility: HOSPITAL | Age: 54
Discharge: HOME | End: 2019-09-09
Attending: EMERGENCY MEDICINE
Payer: MEDICARE

## 2019-09-09 ENCOUNTER — TELEPHONE (OUTPATIENT)
Dept: HOME HEALTH SERVICES | Facility: HOSPITAL | Age: 54
End: 2019-09-09

## 2019-09-09 VITALS
HEIGHT: 78 IN | DIASTOLIC BLOOD PRESSURE: 63 MMHG | BODY MASS INDEX: 36.45 KG/M2 | SYSTOLIC BLOOD PRESSURE: 134 MMHG | WEIGHT: 315 LBS | HEART RATE: 113 BPM

## 2019-09-09 DIAGNOSIS — I87.2 CHRONIC VENOUS INSUFFICIENCY: ICD-10-CM

## 2019-09-09 DIAGNOSIS — L97.329 NON-PRESSURE CHRONIC ULCER OF LEFT ANKLE, WITH UNSPECIFIED SEVERITY: ICD-10-CM

## 2019-09-09 DIAGNOSIS — R60.9 EDEMA, UNSPECIFIED TYPE: Primary | ICD-10-CM

## 2019-09-09 DIAGNOSIS — L97.321 ULCER OF ANKLE, LEFT, LIMITED TO BREAKDOWN OF SKIN: ICD-10-CM

## 2019-09-09 PROCEDURE — 99213 OFFICE O/P EST LOW 20 MIN: CPT | Mod: 25

## 2019-09-09 PROCEDURE — 11042 DBRDMT SUBQ TIS 1ST 20SQCM/<: CPT

## 2019-09-09 PROCEDURE — 27201912 HC WOUND CARE DEBRIDEMENT SUPPLIES

## 2019-09-09 NOTE — PROGRESS NOTES
"Subjective:       Patient ID: Drew Farrar is a 53 y.o. male.    Chief Complaint: No chief complaint on file.      2/15/19: Readmitted for venous ulcer to left lateral foot which developed about 2 weeks ago. Pulses noted with doppler and capillary refill <3.Dr Gutierrez assessed patient and wound care plan ordered for compression therapy and hydrafera blue to wound bed. No apparent signs of infection noted. Patient to follw-up in clinic in 2 weeks.DB   2/21/19: Nurse visit for dressing change. Refused care to skin tear on left 2nd toe. See notes. RTC 1 week for DrRosy Visit.  3/1/19: Follow up with Dr. Waller today in clinic new wound to right posterior leg, culture of both wounds taken today in clinic new wound care orders to both legs for xeroform and unna with calamine toe to knee follow up in 1 week with Dr. Gutierrez  3/8/19: Follow up with Dr. Gutierrez today in clinic wounds improving, edema noted to BLE, profore toe to knee applied to both legs, RX given to patient for PO Doxycycline, follow up in 1 week.   3/15/19: Here for f/u with Dr. Gutierrez. U/S scheduled Thursday. Will need right leg rewrapped. Patient states eye DrRosy Gave him the same antibiotic dose and strength after eye surgery. Dr instructed patient to take one bottle of antibiotics for both wounds and eyes until finished. Gentamycin added to wound care orders.   3/29/19:  Wound slowly improving. No changes in wound care orders. Wound debrided per Dr Gutierrez.  4/4/19: Patient showed up to clinic today stated " I need my dressing changed. It fell off."  Doppler pluses checked and present.  Patient refusing care to right, no stocking present on leg at this visit. Patient stated " No they said this leg is discharged, Dr. Lantigua has to drain the fluid out this leg with a needle. No wound care to this leg anymore when I come."      9/9/19: Readmit to wound care clinic for venous ulcer to left lateral foot.  Patient states it reopened about 3 weeks " "after last discharge from wound care clinic 7/1/19. Per patient wound has been treated by PCP.  Patient reports just using a large band- aid and compression sock. Patient states he was recently treated at [facility] for HBOT for this wound " in the last two or three months after it reopened "  Requesting HBOT here at the wound care clinic.  Dr. Gutierrez examined patient today, doppler pulses present, wound debrided with curette by Dr. Gutierrez patient tolerated well. Wound care orders given to apply hydrorefa blue ready to wound, cover with mepore dressing and apply patient's compression sock. Dr. Manuel discussed with patient obtaining his medical records from [facility], to view to determine if patient will benefit from HBOT, patient verbalized understanding.  Authorization for release of health information sheet signed by patient and faxed to [facility] today in clinic. Follow up in 1 week with MD. Negron as above.  Review of Systems    Objective:      Physical Exam    Assessment:       1. Edema, unspecified type    2. Non-pressure chronic ulcer of left ankle, with unspecified severity    3. Ulcer of ankle, left, limited to breakdown of skin           Wound 09/09/19 1057 Venous Ulcer lateral Foot #1 (Active)   09/09/19 1057    Pre-existing: Yes   Primary Wound Type: Venous ulcer   Side: Left   Orientation: lateral   Location: Foot   Wound/PI Number (optional): #1   Ankle-Brachial Index:    Pulses:    Removal Indication and Assessment:    Wound Outcome:    (Retired) Wound Type:    (Retired) Wound Length (cm):    (Retired) Wound Width (cm):    (Retired) Depth (cm):    Wound Description (Comments):    Removal Indications:    Wound Image   9/9/2019 10:56 AM   Dressing Appearance Open to air 9/9/2019 10:56 AM   Drainage Amount None 9/9/2019 10:56 AM   Appearance Moist;Pink;Yellow 9/9/2019 10:56 AM   Tissue loss description Full thickness 9/9/2019 10:56 AM   Red (%), Wound Tissue Color 60 % 9/9/2019 10:56 AM   Yellow (%), Wound " Tissue Color 40 % 9/9/2019 10:56 AM   Periwound Area Dry;Hemosiderin Staining;Edematous 9/9/2019 10:56 AM   Wound Edges Defined;Irregular 9/9/2019 10:56 AM   Wound Length (cm) 1 cm 9/9/2019 10:56 AM   Wound Width (cm) 0.5 cm 9/9/2019 10:56 AM   Wound Depth (cm) 0.2 cm 9/9/2019 10:56 AM   Wound Volume (cm^3) 0.1 cm^3 9/9/2019 10:56 AM   Wound Surface Area (cm^2) 0.5 cm^2 9/9/2019 10:56 AM   Care Cleansed with:;Sterile normal saline;Debrided 9/9/2019 10:56 AM   Dressing Applied;Other (see comments);Island/border 9/9/2019 10:56 AM   Periwound Care Dry periwound area maintained 9/9/2019 10:56 AM   Compression Other (see comments) 9/9/2019 10:56 AM   Dressing Change Due 09/16/19 9/9/2019 10:56 AM         Left lateral Foot #1  Cleanse wound with:NS  Lidocaine: Prn Debridement  Periwound care: Sween to dry skin Prn  Primary dressing:  Hydrofera blue ready to wound, cover with mepore dressing.  Offloading: Patient's shoe  Edema control: Patient's own compression stockings/socks  Follow-up:1 week 9/16/19    Author[facility] for release of medical information [facility]  Possible HBOT pending.   Plan:                1 week Dr. Gutierrez

## 2019-09-10 ENCOUNTER — HOSPITAL ENCOUNTER (INPATIENT)
Facility: HOSPITAL | Age: 54
LOS: 6 days | Discharge: HOME | DRG: 871 | End: 2019-09-16
Attending: EMERGENCY MEDICINE | Admitting: FAMILY MEDICINE
Payer: MEDICARE

## 2019-09-10 DIAGNOSIS — S91.309A WOUND OF FOOT: ICD-10-CM

## 2019-09-10 DIAGNOSIS — L97.521 SKIN ULCER OF LEFT FOOT, LIMITED TO BREAKDOWN OF SKIN: ICD-10-CM

## 2019-09-10 DIAGNOSIS — R07.9 ACUTE CHEST PAIN: ICD-10-CM

## 2019-09-10 DIAGNOSIS — I10 ESSENTIAL HYPERTENSION: ICD-10-CM

## 2019-09-10 DIAGNOSIS — R60.9 SWELLING: ICD-10-CM

## 2019-09-10 DIAGNOSIS — L03.115 CELLULITIS OF RIGHT LOWER LEG: ICD-10-CM

## 2019-09-10 DIAGNOSIS — I48.20 CHRONIC ATRIAL FIBRILLATION: ICD-10-CM

## 2019-09-10 DIAGNOSIS — R65.21 SEPTIC SHOCK: ICD-10-CM

## 2019-09-10 DIAGNOSIS — A41.9 SEPTIC SHOCK: ICD-10-CM

## 2019-09-10 DIAGNOSIS — I87.2 VENOUS (PERIPHERAL) INSUFFICIENCY: ICD-10-CM

## 2019-09-10 DIAGNOSIS — A41.9 SEVERE SEPSIS: Primary | ICD-10-CM

## 2019-09-10 DIAGNOSIS — A49.8 CLOSTRIDIUM DIFFICILE INFECTION: ICD-10-CM

## 2019-09-10 DIAGNOSIS — E66.01 MORBID OBESITY: ICD-10-CM

## 2019-09-10 DIAGNOSIS — B35.1 ONYCHOMYCOSIS: ICD-10-CM

## 2019-09-10 DIAGNOSIS — R50.9 FEVER, UNSPECIFIED FEVER CAUSE: ICD-10-CM

## 2019-09-10 DIAGNOSIS — I87.2 VENOUS STASIS DERMATITIS OF BOTH LOWER EXTREMITIES: ICD-10-CM

## 2019-09-10 DIAGNOSIS — L53.9 ERYTHEMA: ICD-10-CM

## 2019-09-10 DIAGNOSIS — R07.9 CHEST PAIN: ICD-10-CM

## 2019-09-10 DIAGNOSIS — I50.33 ACUTE ON CHRONIC DIASTOLIC CONGESTIVE HEART FAILURE: ICD-10-CM

## 2019-09-10 DIAGNOSIS — L03.115 CELLULITIS OF RIGHT LOWER EXTREMITY: ICD-10-CM

## 2019-09-10 DIAGNOSIS — R50.9 FEVER: ICD-10-CM

## 2019-09-10 DIAGNOSIS — I50.30 (HFPEF) HEART FAILURE WITH PRESERVED EJECTION FRACTION: ICD-10-CM

## 2019-09-10 DIAGNOSIS — I82.409 ACUTE DEEP VEIN THROMBOSIS (DVT) OF OTHER VEIN OF LOWER EXTREMITY, UNSPECIFIED LATERALITY: ICD-10-CM

## 2019-09-10 DIAGNOSIS — R65.20 SEVERE SEPSIS: Primary | ICD-10-CM

## 2019-09-10 LAB
ALBUMIN SERPL BCP-MCNC: 3.5 G/DL (ref 3.5–5.2)
ALP SERPL-CCNC: 141 U/L (ref 55–135)
ALT SERPL W/O P-5'-P-CCNC: 28 U/L (ref 10–44)
ANION GAP SERPL CALC-SCNC: 11 MMOL/L (ref 8–16)
APTT BLDCRRT: 33.5 SEC (ref 21–32)
AST SERPL-CCNC: 35 U/L (ref 10–40)
BASOPHILS # BLD AUTO: 0.01 K/UL (ref 0–0.2)
BASOPHILS NFR BLD: 0.1 % (ref 0–1.9)
BILIRUB SERPL-MCNC: 1 MG/DL (ref 0.1–1)
BILIRUB UR QL STRIP: NEGATIVE
BNP SERPL-MCNC: 656 PG/ML (ref 0–99)
BUN SERPL-MCNC: 36 MG/DL (ref 6–20)
CALCIUM SERPL-MCNC: 9.6 MG/DL (ref 8.7–10.5)
CHLORIDE SERPL-SCNC: 109 MMOL/L (ref 95–110)
CLARITY UR: ABNORMAL
CO2 SERPL-SCNC: 17 MMOL/L (ref 23–29)
COLOR UR: ABNORMAL
CREAT SERPL-MCNC: 1.4 MG/DL (ref 0.5–1.4)
CRP SERPL-MCNC: 24.5 MG/L (ref 0–8.2)
DIFFERENTIAL METHOD: ABNORMAL
EOSINOPHIL # BLD AUTO: 0 K/UL (ref 0–0.5)
EOSINOPHIL NFR BLD: 0 % (ref 0–8)
ERYTHROCYTE [DISTWIDTH] IN BLOOD BY AUTOMATED COUNT: 16.6 % (ref 11.5–14.5)
ERYTHROCYTE [SEDIMENTATION RATE] IN BLOOD BY WESTERGREN METHOD: 59 MM/HR (ref 0–10)
EST. GFR  (AFRICAN AMERICAN): >60 ML/MIN/1.73 M^2
EST. GFR  (NON AFRICAN AMERICAN): 58 ML/MIN/1.73 M^2
ESTIMATED AVG GLUCOSE: 103 MG/DL (ref 68–131)
GLUCOSE SERPL-MCNC: 92 MG/DL (ref 70–110)
GLUCOSE UR QL STRIP: NEGATIVE
HBA1C MFR BLD HPLC: 5.2 % (ref 4–5.6)
HCT VFR BLD AUTO: 30.7 % (ref 40–54)
HGB BLD-MCNC: 9.5 G/DL (ref 14–18)
HGB UR QL STRIP: NEGATIVE
INFLUENZA A, MOLECULAR: NEGATIVE
INFLUENZA B, MOLECULAR: NEGATIVE
INR PPP: 1.1 (ref 0.8–1.2)
KETONES UR QL STRIP: NEGATIVE
LACTATE SERPL-SCNC: 3.1 MMOL/L (ref 0.5–2.2)
LACTATE SERPL-SCNC: 4 MMOL/L (ref 0.5–2.2)
LEUKOCYTE ESTERASE UR QL STRIP: NEGATIVE
LYMPHOCYTES # BLD AUTO: 0.5 K/UL (ref 1–4.8)
LYMPHOCYTES NFR BLD: 3.9 % (ref 18–48)
MAGNESIUM SERPL-MCNC: 1.3 MG/DL (ref 1.6–2.6)
MAGNESIUM SERPL-MCNC: 1.4 MG/DL (ref 1.6–2.6)
MCH RBC QN AUTO: 25.1 PG (ref 27–31)
MCHC RBC AUTO-ENTMCNC: 30.9 G/DL (ref 32–36)
MCV RBC AUTO: 81 FL (ref 82–98)
MONOCYTES # BLD AUTO: 0.7 K/UL (ref 0.3–1)
MONOCYTES NFR BLD: 5.6 % (ref 4–15)
NEUTROPHILS # BLD AUTO: 11.3 K/UL (ref 1.8–7.7)
NEUTROPHILS NFR BLD: 90.4 % (ref 38–73)
NITRITE UR QL STRIP: NEGATIVE
PH UR STRIP: 6 [PH] (ref 5–8)
PHOSPHATE SERPL-MCNC: 2.9 MG/DL (ref 2.7–4.5)
PHOSPHATE SERPL-MCNC: 4.5 MG/DL (ref 2.7–4.5)
PLATELET # BLD AUTO: 179 K/UL (ref 150–350)
PMV BLD AUTO: 9.3 FL (ref 9.2–12.9)
POTASSIUM SERPL-SCNC: 5.6 MMOL/L (ref 3.5–5.1)
PROCALCITONIN SERPL IA-MCNC: 98.52 NG/ML
PROT SERPL-MCNC: 7.7 G/DL (ref 6–8.4)
PROT UR QL STRIP: ABNORMAL
PROTHROMBIN TIME: 11.5 SEC (ref 9–12.5)
RBC # BLD AUTO: 3.78 M/UL (ref 4.6–6.2)
SODIUM SERPL-SCNC: 137 MMOL/L (ref 136–145)
SP GR UR STRIP: >=1.03 (ref 1–1.03)
SPECIMEN SOURCE: NORMAL
URATE SERPL-MCNC: 9.1 MG/DL (ref 3.4–7)
URN SPEC COLLECT METH UR: ABNORMAL
UROBILINOGEN UR STRIP-ACNC: NEGATIVE EU/DL
WBC # BLD AUTO: 12.59 K/UL (ref 3.9–12.7)

## 2019-09-10 PROCEDURE — 83735 ASSAY OF MAGNESIUM: CPT | Mod: 91

## 2019-09-10 PROCEDURE — 12000002 HC ACUTE/MED SURGE SEMI-PRIVATE ROOM

## 2019-09-10 PROCEDURE — 83880 ASSAY OF NATRIURETIC PEPTIDE: CPT

## 2019-09-10 PROCEDURE — 63600175 PHARM REV CODE 636 W HCPCS: Performed by: PHYSICIAN ASSISTANT

## 2019-09-10 PROCEDURE — 85610 PROTHROMBIN TIME: CPT

## 2019-09-10 PROCEDURE — 87186 SC STD MICRODIL/AGAR DIL: CPT

## 2019-09-10 PROCEDURE — 96375 TX/PRO/DX INJ NEW DRUG ADDON: CPT | Performed by: EMERGENCY MEDICINE

## 2019-09-10 PROCEDURE — 83605 ASSAY OF LACTIC ACID: CPT | Mod: 91

## 2019-09-10 PROCEDURE — S0073 INJECTION, AZTREONAM, 500 MG: HCPCS | Performed by: PHYSICIAN ASSISTANT

## 2019-09-10 PROCEDURE — 96366 THER/PROPH/DIAG IV INF ADDON: CPT | Performed by: EMERGENCY MEDICINE

## 2019-09-10 PROCEDURE — 25000003 PHARM REV CODE 250: Performed by: PHYSICIAN ASSISTANT

## 2019-09-10 PROCEDURE — 83735 ASSAY OF MAGNESIUM: CPT

## 2019-09-10 PROCEDURE — 84145 PROCALCITONIN (PCT): CPT

## 2019-09-10 PROCEDURE — 83036 HEMOGLOBIN GLYCOSYLATED A1C: CPT

## 2019-09-10 PROCEDURE — 93005 ELECTROCARDIOGRAM TRACING: CPT

## 2019-09-10 PROCEDURE — 63600175 PHARM REV CODE 636 W HCPCS: Performed by: FAMILY MEDICINE

## 2019-09-10 PROCEDURE — 84443 ASSAY THYROID STIM HORMONE: CPT

## 2019-09-10 PROCEDURE — 87077 CULTURE AEROBIC IDENTIFY: CPT

## 2019-09-10 PROCEDURE — 93010 EKG 12-LEAD: ICD-10-PCS | Mod: ,,, | Performed by: INTERNAL MEDICINE

## 2019-09-10 PROCEDURE — S0030 INJECTION, METRONIDAZOLE: HCPCS | Performed by: PHYSICIAN ASSISTANT

## 2019-09-10 PROCEDURE — 84550 ASSAY OF BLOOD/URIC ACID: CPT

## 2019-09-10 PROCEDURE — 99291 CRITICAL CARE FIRST HOUR: CPT | Mod: 25

## 2019-09-10 PROCEDURE — 96365 THER/PROPH/DIAG IV INF INIT: CPT

## 2019-09-10 PROCEDURE — 87502 INFLUENZA DNA AMP PROBE: CPT

## 2019-09-10 PROCEDURE — 84100 ASSAY OF PHOSPHORUS: CPT

## 2019-09-10 PROCEDURE — 84100 ASSAY OF PHOSPHORUS: CPT | Mod: 91

## 2019-09-10 PROCEDURE — 80053 COMPREHEN METABOLIC PANEL: CPT

## 2019-09-10 PROCEDURE — G0378 HOSPITAL OBSERVATION PER HR: HCPCS

## 2019-09-10 PROCEDURE — 96361 HYDRATE IV INFUSION ADD-ON: CPT | Performed by: EMERGENCY MEDICINE

## 2019-09-10 PROCEDURE — 84484 ASSAY OF TROPONIN QUANT: CPT

## 2019-09-10 PROCEDURE — 96361 HYDRATE IV INFUSION ADD-ON: CPT

## 2019-09-10 PROCEDURE — 81003 URINALYSIS AUTO W/O SCOPE: CPT

## 2019-09-10 PROCEDURE — 63600175 PHARM REV CODE 636 W HCPCS: Performed by: STUDENT IN AN ORGANIZED HEALTH CARE EDUCATION/TRAINING PROGRAM

## 2019-09-10 PROCEDURE — 84439 ASSAY OF FREE THYROXINE: CPT

## 2019-09-10 PROCEDURE — 86140 C-REACTIVE PROTEIN: CPT

## 2019-09-10 PROCEDURE — 85730 THROMBOPLASTIN TIME PARTIAL: CPT

## 2019-09-10 PROCEDURE — 85652 RBC SED RATE AUTOMATED: CPT

## 2019-09-10 PROCEDURE — 96367 TX/PROPH/DG ADDL SEQ IV INF: CPT

## 2019-09-10 PROCEDURE — 85025 COMPLETE CBC W/AUTO DIFF WBC: CPT

## 2019-09-10 PROCEDURE — 25000003 PHARM REV CODE 250: Performed by: STUDENT IN AN ORGANIZED HEALTH CARE EDUCATION/TRAINING PROGRAM

## 2019-09-10 PROCEDURE — 96375 TX/PRO/DX INJ NEW DRUG ADDON: CPT

## 2019-09-10 PROCEDURE — 87040 BLOOD CULTURE FOR BACTERIA: CPT

## 2019-09-10 PROCEDURE — 93010 ELECTROCARDIOGRAM REPORT: CPT | Mod: ,,, | Performed by: INTERNAL MEDICINE

## 2019-09-10 RX ORDER — FUROSEMIDE 10 MG/ML
40 INJECTION INTRAMUSCULAR; INTRAVENOUS ONCE
Status: COMPLETED | OUTPATIENT
Start: 2019-09-11 | End: 2019-09-11

## 2019-09-10 RX ORDER — ONDANSETRON 2 MG/ML
4 INJECTION INTRAMUSCULAR; INTRAVENOUS
Status: COMPLETED | OUTPATIENT
Start: 2019-09-10 | End: 2019-09-10

## 2019-09-10 RX ORDER — METRONIDAZOLE 500 MG/100ML
500 INJECTION, SOLUTION INTRAVENOUS
Status: COMPLETED | OUTPATIENT
Start: 2019-09-10 | End: 2019-09-10

## 2019-09-10 RX ORDER — NALOXONE HYDROCHLORIDE 1 MG/ML
1 INJECTION INTRAMUSCULAR; INTRAVENOUS; SUBCUTANEOUS
Status: DISCONTINUED | OUTPATIENT
Start: 2019-09-10 | End: 2019-09-11

## 2019-09-10 RX ORDER — METOPROLOL TARTRATE 50 MG/1
50 TABLET ORAL ONCE
Status: COMPLETED | OUTPATIENT
Start: 2019-09-10 | End: 2019-09-10

## 2019-09-10 RX ORDER — MORPHINE SULFATE 4 MG/ML
4 INJECTION, SOLUTION INTRAMUSCULAR; INTRAVENOUS ONCE
Status: COMPLETED | OUTPATIENT
Start: 2019-09-10 | End: 2019-09-10

## 2019-09-10 RX ORDER — METRONIDAZOLE 500 MG/100ML
500 INJECTION, SOLUTION INTRAVENOUS
Status: DISCONTINUED | OUTPATIENT
Start: 2019-09-11 | End: 2019-09-13

## 2019-09-10 RX ORDER — LIDOCAINE HYDROCHLORIDE 10 MG/ML
10 INJECTION INFILTRATION; PERINEURAL
Status: COMPLETED | OUTPATIENT
Start: 2019-09-10 | End: 2019-09-10

## 2019-09-10 RX ORDER — ACETAMINOPHEN 500 MG
1000 TABLET ORAL
Status: COMPLETED | OUTPATIENT
Start: 2019-09-10 | End: 2019-09-10

## 2019-09-10 RX ORDER — VANCOMYCIN HCL IN 5 % DEXTROSE 1G/250ML
1000 PLASTIC BAG, INJECTION (ML) INTRAVENOUS
Status: COMPLETED | OUTPATIENT
Start: 2019-09-10 | End: 2019-09-10

## 2019-09-10 RX ORDER — ENOXAPARIN SODIUM 100 MG/ML
40 INJECTION SUBCUTANEOUS EVERY 24 HOURS
Status: DISCONTINUED | OUTPATIENT
Start: 2019-09-10 | End: 2019-09-10

## 2019-09-10 RX ORDER — SODIUM CHLORIDE, SODIUM LACTATE, POTASSIUM CHLORIDE, CALCIUM CHLORIDE 600; 310; 30; 20 MG/100ML; MG/100ML; MG/100ML; MG/100ML
INJECTION, SOLUTION INTRAVENOUS CONTINUOUS
Status: DISCONTINUED | OUTPATIENT
Start: 2019-09-10 | End: 2019-09-10

## 2019-09-10 RX ORDER — SODIUM CHLORIDE, SODIUM LACTATE, POTASSIUM CHLORIDE, CALCIUM CHLORIDE 600; 310; 30; 20 MG/100ML; MG/100ML; MG/100ML; MG/100ML
INJECTION, SOLUTION INTRAVENOUS CONTINUOUS
Status: DISCONTINUED | OUTPATIENT
Start: 2019-09-10 | End: 2019-09-11

## 2019-09-10 RX ORDER — ONDANSETRON 2 MG/ML
4 INJECTION INTRAMUSCULAR; INTRAVENOUS ONCE
Status: DISCONTINUED | OUTPATIENT
Start: 2019-09-10 | End: 2019-09-11

## 2019-09-10 RX ORDER — HYDROMORPHONE HYDROCHLORIDE 2 MG/ML
2 INJECTION, SOLUTION INTRAMUSCULAR; INTRAVENOUS; SUBCUTANEOUS ONCE
Status: COMPLETED | OUTPATIENT
Start: 2019-09-10 | End: 2019-09-10

## 2019-09-10 RX ORDER — METOPROLOL SUCCINATE 25 MG/1
100 TABLET, EXTENDED RELEASE ORAL NIGHTLY
Status: DISCONTINUED | OUTPATIENT
Start: 2019-09-10 | End: 2019-09-10

## 2019-09-10 RX ORDER — ENOXAPARIN SODIUM 100 MG/ML
30 INJECTION SUBCUTANEOUS EVERY 12 HOURS
Status: DISCONTINUED | OUTPATIENT
Start: 2019-09-10 | End: 2019-09-11

## 2019-09-10 RX ADMIN — MORPHINE SULFATE 4 MG: 4 INJECTION INTRAVENOUS at 06:09

## 2019-09-10 RX ADMIN — VANCOMYCIN HYDROCHLORIDE 2000 MG: 100 INJECTION, POWDER, LYOPHILIZED, FOR SOLUTION INTRAVENOUS at 05:09

## 2019-09-10 RX ADMIN — SODIUM CHLORIDE, SODIUM LACTATE, POTASSIUM CHLORIDE, AND CALCIUM CHLORIDE 1000 ML: .6; .31; .03; .02 INJECTION, SOLUTION INTRAVENOUS at 08:09

## 2019-09-10 RX ADMIN — VANCOMYCIN HYDROCHLORIDE 1000 MG: 1 INJECTION, POWDER, LYOPHILIZED, FOR SOLUTION INTRAVENOUS at 08:09

## 2019-09-10 RX ADMIN — HYDROMORPHONE HYDROCHLORIDE 2 MG: 2 INJECTION, SOLUTION INTRAMUSCULAR; INTRAVENOUS; SUBCUTANEOUS at 10:09

## 2019-09-10 RX ADMIN — METRONIDAZOLE 500 MG: 500 INJECTION, SOLUTION INTRAVENOUS at 04:09

## 2019-09-10 RX ADMIN — AZTREONAM 2000 MG: 2 INJECTION, POWDER, LYOPHILIZED, FOR SOLUTION INTRAMUSCULAR; INTRAVENOUS at 03:09

## 2019-09-10 RX ADMIN — SODIUM CHLORIDE, SODIUM LACTATE, POTASSIUM CHLORIDE, AND CALCIUM CHLORIDE 1000 ML: .6; .31; .03; .02 INJECTION, SOLUTION INTRAVENOUS at 11:09

## 2019-09-10 RX ADMIN — SODIUM CHLORIDE 1000 ML: 0.9 INJECTION, SOLUTION INTRAVENOUS at 07:09

## 2019-09-10 RX ADMIN — NALOXONE HYDROCHLORIDE 1 MG: 1 INJECTION PARENTERAL at 11:09

## 2019-09-10 RX ADMIN — SODIUM CHLORIDE, SODIUM LACTATE, POTASSIUM CHLORIDE, AND CALCIUM CHLORIDE: .6; .31; .03; .02 INJECTION, SOLUTION INTRAVENOUS at 11:09

## 2019-09-10 RX ADMIN — SODIUM CHLORIDE 1000 ML: 0.9 INJECTION, SOLUTION INTRAVENOUS at 03:09

## 2019-09-10 RX ADMIN — LIDOCAINE HYDROCHLORIDE 10 ML: 10 INJECTION, SOLUTION INFILTRATION; PERINEURAL at 05:09

## 2019-09-10 RX ADMIN — ACETAMINOPHEN 1000 MG: 500 TABLET ORAL at 03:09

## 2019-09-10 RX ADMIN — ONDANSETRON 4 MG: 2 INJECTION INTRAMUSCULAR; INTRAVENOUS at 10:09

## 2019-09-10 RX ADMIN — METOPROLOL TARTRATE 50 MG: 50 TABLET ORAL at 08:09

## 2019-09-10 NOTE — HPI
Mr. Farrar is a 53 year old man with PMHx of chronic Afib (on eliquis), HFpEF, PE, Hx of DVT with irwin filter in place, HTN, Venous stasis, Grave's disease, presented to ED with acute onset fevers, chills, nausea, vomiting and right lower extremity pain. He states pain started around 3 AM on the morning of admission. He was recently discharge from hospital about 3 weeks prior due to right LE cellulitis, and completed a 2 week course of ciprofloxacin and clindamycin. He reports improvement in pain and cellulitis up until the morning of admission. He denies trauma or falls to the affected area. He states the pain started all of a sudden and is associated with swelling and warmth to the area. He states the right leg is very painful to touch and with movement.     He states he is compliant with all his blood pressure and anticoagulation medication. He denies Headache, changes in vision, shortness of breath, chest pain, palpitations, abdominal pain, diarrhea or constipation.

## 2019-09-10 NOTE — ASSESSMENT & PLAN NOTE
Pt with chronic afib   HR in 110's-120's   Will continue metoprolol   Maintain Tele   Continue eliquis, lisinopril and torsemide

## 2019-09-10 NOTE — ASSESSMENT & PLAN NOTE
Pt BNP elevated to 600's, base line around 200  No signs of decompensated heartfailure  Will continue to monitor   Continue home medications

## 2019-09-10 NOTE — ED NOTES
Pt presents to the ED w/ c/ of swelling and redness to right knee that started today. Pt reports that today at 0300 he started having abd pain, n/v/d. Reports 6 episodes of vomiting earlier this morning. Pt denies nausea at this time. Pt had loose BM on arrival to ED. Pt reports pain to right knee. Pt is tachycardic with an afib rhythm on monitor. Denies chest pain. Pt reports SOB 2L oxygen placed on patient via nasal cannula. Cellulitis noted on the right lower leg. Pt reports pain to the right knee on ROM.

## 2019-09-10 NOTE — PROGRESS NOTES
Pharmacokinetic Initial Assessment: IV Vancomycin    Assessment/Plan:    Initiate intravenous vancomycin with loading dose of 2000 mg once   Desired empiric serum trough concentration is   Draw vancomycin   Pharmacy will continue to follow and monitor vancomycin.      Please contact pharmacy at extension [phone] with any questions regarding this assessment.     Thank you for the consult,   Addy Beckford       Patient brief summary:  Drew Farrar is a 53 y.o. male initiated on antimicrobial therapy with IV Vancomycin for treatment of suspected sepsis    Drug Allergies:   Review of patient's allergies indicates:   Allergen Reactions    Contrast media Other (See Comments)     Severe chest pain    Food allergy formula [glutamine-c-quercet-selen-brom]      Allergic to green peas; Heart failure.    Iodinated contrast media Other (See Comments)     Chest pain    Peas Hives    Ibuprofen Swelling    Latex, natural rubber Hives    Pcn [penicillins] Hives    Butisol [butabarbital] Rash     Peeling skin       Actual Body Weight:   160kg    Renal Function:   CrCl cannot be calculated (Patient's most recent lab result is older than the maximum 7 days allowed.).,     Dialysis Method (if applicable):  N/A    CBC (last 72 hours):  No results for input(s): WHITE BLOOD CELL COUNT, HEMOGLOBIN, HEMATOCRIT, PLATELETS, GRAN%, LYMPH%, MONO%, EOSINOPHIL%, BASOPHIL%, DIFFERENTIAL METHOD in the last 72 hours.    Metabolic Panel (last 72 hours):  No results for input(s): SODIUM, POTASSIUM, CHLORIDE, CO2, GLUCOSE, BUN BLD, CREATININE, ALBUMIN, BILIRUBIN TOTAL, ALK PHOS, AST, ALT, MAGNESIUM, PHOSPHORUS in the last 72 hours.    Drug levels (last 3 results):  No results for input(s): VANCOMYCINRA, VANCOMYCINPE, VANCOMYCINTR in the last 72 hours.    Microbiologic Results:  Microbiology Results (last 7 days)       Procedure Component Value Units Date/Time    Blood culture x two cultures. Draw prior to antibiotics. [[id number]] Collected:  09/10/19  1443    Order Status:  Sent Specimen:  Blood from Peripheral, Hand, Right Updated:  09/10/19 1443    Blood culture x two cultures. Draw prior to antibiotics. [[id number]] Collected:  09/10/19 1443    Order Status:  Sent Specimen:  Blood from Peripheral, Forearm, Left Updated:  09/10/19 1443    Influenza A & B by Molecular [[id number]]     Order Status:  No result Specimen:  Nasopharyngeal Swab

## 2019-09-10 NOTE — SUBJECTIVE & OBJECTIVE
Past Medical History:   Diagnosis Date    *Atrial fibrillation     Anticoagulant long-term use     Arthritis     Atrial fibrillation     Atrial fibrillation Feb 23, 2016    Bipolar disorder     CHF (congestive heart failure)     Congenital heart disease     s/p surgical intervention 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 of age    Deep vein thrombosis     DVT of leg (deep venous thrombosis)     left leg    History of prior ablation treatment     10/9/13    Hypertension     Obesity     Stroke     Thyroid disease     Venous stasis ulcer of lower extremity, unspecified laterality 12/14/2012    Venous ulcer        Past Surgical History:   Procedure Laterality Date    ANGIOPLASTY      ARTHROSCOPY-KNEE W/ CHONDROPLASTY Right 2/23/2016    Performed [facility]ue[facility] OR    CARDIAC SURGERY      open heart surgery 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 old    EYE SURGERY      left eye cataract/right eye glaucoma    TIMO FILTER PLACEMENT      Dr Calix ([facility])    KNEE SURGERY      l and r     MULTIPLE TOOTH EXTRACTIONS      Sclerotherapy N/A 2/21/2019    Performed by Gomez Lantigua MD at [facility] CATH LAB/EP    SKIN GRAFT      left leg    SYNOVECTOMY-KNEE Right 2/23/2016    Per[facility] by [facility] OR       Review of patient's allergies indicates:   Allergen Reactions    Contrast media Other (See Comments)     Severe chest pain    Food allergy formula [glutamine-c-quercet-selen-brom]      Allergic to green peas; Heart failure.    Iodinated contrast media Other (See Comments)     Chest pain    Peas Hives    Ibuprofen Swelling    Latex, natural rubber Hives    Pcn [penicillins] Hives    Butisol [butabarbital] Rash     Peeling skin       No current facility-administered medications on file prior to encounter.      Current Outpatient Medications on File Prior to Encounter   Medication Sig    HYDROcodone-acetaminophen (NORCO) 5-325 mg per tablet Take 1 tablet by mouth every 6 (six) hours as needed for Pain.     hydrOXYzine HCl (ATARAX) 25 MG tablet Take 1 tablet (25 mg total) by mouth 4 (four) times daily as needed for Itching.    lisinopril 10 MG tablet Take 1 tablet (10 mg total) by mouth once daily. (Patient taking differently: Take 7.5 mg by mouth every evening. )    metoprolol succinate (TOPROL-XL) 100 MG 24 hr tablet Take 1 tablet (100 mg total) by mouth once daily. (Patient taking differently: Take 100 mg by mouth every evening. )    nystatin (MYCOSTATIN) powder Apply topically 2 (two) times daily.    rivaroxaban (XARELTO) 20 mg Tab Take 1 tablet (20 mg total) by mouth daily with dinner or evening meal.    terbinafine HCl (LAMISIL) 250 mg tablet Take 1 tablet (250 mg total) by mouth once daily.    torsemide (DEMADEX) 20 MG Tab Take 2 tablets (40 mg total) by mouth once daily.     Family History     Problem Relation (Age of Onset)    Diabetes Father, Maternal Grandfather    Heart disease Father, Maternal Grandmother, Maternal Grandfather    Stroke Maternal Grandfather        Tobacco Use    Smoking status: Former Smoker     Packs/day: 1.00     Years: 6.00     Pack years: 6.00     Types: Cigarettes    Smokeless tobacco: Former User    Tobacco comment: quit by age 27yrs old   Substance and Sexual Activity    Alcohol use: Yes     Alcohol/week: 0.6 oz     Types: 1 Glasses of wine per week     Frequency: Monthly or less     Comment: 1 glass of wine a week    Drug use: No    Sexual activity: Yes     Partners: Female     Birth control/protection: None     Review of Systems   Constitutional: Positive for activity change, chills, fatigue and fever. Negative for appetite change and unexpected weight change.   HENT: Negative for congestion, hearing loss, postnasal drip, rhinorrhea and sinus pain.    Eyes: Negative for photophobia and visual disturbance.   Respiratory: Negative for apnea, cough, choking, chest tightness, shortness of breath and wheezing.    Cardiovascular: Negative for chest pain, palpitations and leg  swelling.   Gastrointestinal: Negative for abdominal distention, abdominal pain, constipation, diarrhea, nausea and vomiting.   Endocrine: Negative for cold intolerance, heat intolerance and polyuria.   Genitourinary: Negative for difficulty urinating, flank pain, frequency and urgency.   Musculoskeletal: Positive for arthralgias, gait problem, joint swelling and myalgias. Negative for back pain, neck pain and neck stiffness.   Skin: Positive for color change and wound. Negative for rash.   Neurological: Negative for dizziness, weakness, light-headedness, numbness and headaches.   Psychiatric/Behavioral: Negative for behavioral problems, confusion and hallucinations. The patient is not nervous/anxious.      Objective:     Vital Signs (Most Recent):  Temp: 98.5 °F (36.9 °C) (09/10/19 1751)  Pulse: (!) 114 (09/10/19 1814)  Resp: (!) 26 (09/10/19 1814)  BP: 120/60 (09/10/19 1814)  SpO2: 98 % (09/10/19 1814) Vital Signs (24h Range):  Temp:  [98.5 °F (36.9 °C)-102.3 °F (39.1 °C)] 98.5 °F (36.9 °C)  Pulse:  [107-123] 114  Resp:  [18-26] 26  SpO2:  [95 %-98 %] 98 %  BP: (119-144)/(58-83) 120/60     Weight: (!) 160.1 kg (353 lb)  Body mass index is 38.78 kg/m².    Physical Exam   Constitutional: He is oriented to person, place, and time. He appears well-developed and well-nourished. No distress.   HENT:   Head: Normocephalic and atraumatic.   Nose: Nose normal.   Mouth/Throat: Oropharynx is clear and moist. No oropharyngeal exudate.   Eyes: Pupils are equal, round, and reactive to light. Conjunctivae and EOM are normal. Right eye exhibits no discharge. Left eye exhibits no discharge. No scleral icterus.   Neck: Normal range of motion. Neck supple.   Cardiovascular: Normal rate. Exam reveals no gallop and no friction rub.   No murmur heard.  Irregular rate and rhythm   Pulmonary/Chest: Effort normal and breath sounds normal. No stridor. No respiratory distress. He has no wheezes. He has no rales. He exhibits no tenderness.    Abdominal: Soft. Bowel sounds are normal. He exhibits no distension and no mass. There is no tenderness. There is no rebound.   Musculoskeletal: Normal range of motion. He exhibits edema and tenderness. He exhibits no deformity.   Right leg and calf tenderness, erythema and warm of skin, no purulence or fluctuance   Chronic skin changes and thickening of the shins and medial maleolus     Lymphadenopathy:     He has no cervical adenopathy.   Neurological: He is alert and oriented to person, place, and time. No sensory deficit.   Skin: Skin is warm and dry. Capillary refill takes less than 2 seconds. No rash noted. He is not diaphoretic. There is erythema. No pallor.   Psychiatric: He has a normal mood and affect. His behavior is normal.   Nursing note and vitals reviewed.        CRANIAL NERVES     CN III, IV, VI   Pupils are equal, round, and reactive to light.  Extraocular motions are normal.        Significant Labs:  Recent Labs     09/10/19  1442   WBC 12.59   HGB 9.5*   HCT 30.7*      MCV 81*   RDW 16.6*       Recent Labs     09/10/19  1442      K 5.6*      CO2 17*   GLU 92   BUN 36*   CREATININE 1.4   CALCIUM 9.6   PROT 7.7   ALBUMIN 3.5   BILITOT 1.0   ALKPHOS 141*   AST 35   ALT 28   ANIONGAP 11   ESTGFRAFRICA >60   EGFRNONAA 58*       Recent Labs     09/10/19  1442   MG 1.3*   PHOS 2.9       Coags  Lab Results   Component Value Date    INR 1.1 09/10/2019    INR 1.1 08/02/2019    INR 3.4 (H) 07/27/2019    APTT 33.5 (H) 09/10/2019    APTT 50.7 (H) 08/03/2019    APTT 86.4 (H) 08/03/2019     Recent Labs   Lab 09/10/19  1442   INR 1.1   APTT 33.5*       A1c:   Lab Results   Component Value Date    HGBA1C 5.5 07/25/2019   , Last Gluc: No results for input(s): POCTGLUCOSE in the last 168 hours.    TSH:   Lab Results   Component Value Date    TSH <0.010 (L) 07/25/2019         Urinalysis  Urinalysis  Recent Labs   Lab 09/10/19  1638   COLORU Orange*   SPECGRAV >=1.030*   PHUR 6.0   PROTEINUA  Trace*   NITRITE Negative   LEUKOCYTESUR Negative   UROBILINOGEN Negative     Recent Labs   Lab 09/10/19  1638   COLORU Orange*   SPECGRAV >=1.030*   PHUR 6.0   PROTEINUA Trace*       Micro  Microbiology Results (last 7 days)     Procedure Component Value Units Date/Time    Influenza A & B by Molecular [[id number]] Collected:  09/10/19 1521    Order Status:  Completed Specimen:  Nasopharyngeal Swab Updated:  09/10/19 1554     Influenza A, Molecular Negative     Influenza B, Molecular Negative     Flu A & B Source Nasal swab    Blood culture x two cultures. Draw prior to antibiotics. [[id number]] Collected:  09/10/19 1443    Order Status:  Sent Specimen:  Blood from Peripheral, Hand, Right Updated:  09/10/19 1443    Blood culture x two cultures. Draw prior to antibiotics. [[id number]] Collected:  09/10/19 1443    Order Status:  Sent Specimen:  Blood from Peripheral, Forearm, Left Updated:  09/10/19 1443           Imaging   Imaging Results          X-Ray Chest AP Portable (Final result)  Result time 09/10/19 15:51:14    Final result by Judy Farrar MD (09/10/19 15:51:14)                 Impression:      Cardiomegaly unchanged.  No failure or pneumonia.      Electronically signed by: Judy Farrar  Date:    09/10/2019  Time:    15:51             Narrative:    EXAMINATION:  XR CHEST AP PORTABLE    CLINICAL HISTORY:  Sepsis;    TECHNIQUE:  Single frontal view of the chest was performed.    COMPARISON:  08/02/2019 chest    FINDINGS:  Single AP portable view at 15:09.    The heart is moderately enlarged unchanged.  The pulmonary artery is enlarged unchanged consistent with underlying pulmonary artery hypertension.    Trachea appears normal.  No pneumothorax or pleural effusion or interstitial edema consolidation or nodule.  There are overlying leads.  No rib fracture seen.                               X-Ray Knee 3 View Right (Final result)  Result time 09/10/19 15:33:48    Final result by Gopi Whiting MD (09/10/19  15:33:48)                 Impression:      No acute displaced fracture-dislocation identified.      Electronically signed by: Gopi Whiting MD  Date:    09/10/2019  Time:    15:33             Narrative:    EXAMINATION:  XR KNEE 3 VIEW RIGHT    CLINICAL HISTORY:  Edema, unspecified    TECHNIQUE:  AP, lateral, and Merchant views of the right knee were performed.    COMPARISON:  Right knee series 01/03/2018    FINDINGS:  Chronic deformity of the tibial tuberosity similar to prior.  No acute displaced fracture, dislocation or destructive osseous process.  No large suprapatellar joint effusion seen.  Mild tricompartmental degenerative change.  No subcutaneous emphysema or radiodense retained foreign body.

## 2019-09-10 NOTE — ED PROVIDER NOTES
Encounter Date: 9/10/2019       History     Chief Complaint   Patient presents with    Fever     Palpitations with shortness of breath that started today.  He originally called 911 for right knee pain and fever that started last night.     Patient is a 53-year-old male with a history of AFib, on Xarelto, CHF, DVT with [place] filter placement, hypertension, venous stasis, thyroid disease is presenting to the ER for evaluation of fever.  Patient reports having a fever that started this morning.  He states that he has had multiple episodes of diarrhea that started at 3:00 a.m. today.  He also had an episode of vomiting today.  Patient denies any associated abdominal pain or cramping.  Patient reports that he has noticed swelling to the right knee that started today as well.  He states he has increasing pain upon movement of the knee.  No injury trauma.  He denies he infection to that knee with associated drainage. Patient reports that couple weeks ago he was diagnosed with cellulitis to the right lower extremity.  He denies URI symptoms including cough or congestion.  No sore throat. Did not take any medication prior to arrival to the ED.    The history is provided by the patient.     Review of patient's allergies indicates:   Allergen Reactions    Contrast media Other (See Comments)     Severe chest pain    Food allergy formula [glutamine-c-quercet-selen-brom]      Allergic to green peas; Heart failure.    Iodinated contrast media Other (See Comments)     Chest pain    Peas Hives    Ibuprofen Swelling    Latex, natural rubber Hives    Pcn [penicillins] Hives    Butisol [butabarbital] Rash     Peeling skin     Past Medical History:   Diagnosis Date    *Atrial fibrillation     Anticoagulant long-term use     Arthritis     Atrial fibrillation     Atrial fibrillation Feb 23, 2016    Bipolar disorder     CHF (congestive heart failure)     Congenital heart disease     s/p surgical intervention at 18  months of age    Deep vein thrombosis     DVT of leg (deep venous thrombosis)     left leg    History of prior ablation treatment     10/9/13    Hypertension     Obesity     Stroke     Thyroid disease     Venous stasis ulcer of lower extremity, unspecified laterality 12/14/2012    Venous ulcer      Past Surgical History:   Procedure Laterality Date    ANGIOPLASTY      ARTHROSCOPY-KNEE W/ CHONDROPLASTY Right 2/23/2016    Performed by [facility]jandro [facility]ue[facility] at [facility] OR    CARDIAC SURGERY      open heart surgery 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 old    EYE SURGERY      left eye cataract/right eye glaucoma    TIMO FILTER PLACEMENT      Dr Calix [facility])    KNEE SURGERY      l and r     MULTIPLE TOOTH EXTRACTIONS      Sclerotherapy N/A 2/21/2019    Performed by Gomez Lantigua MD at [facility] CATH LAB/EP    SKIN GRAFT      left leg    SYNOVECTOMY-KNEE Right 2/23/2016    Performed by [facility] at [facility] OR     Family History   Problem Relation Age of Onset    Diabetes Father     Heart disease Father     Heart disease Maternal Grandmother     Diabetes Maternal Grandfather     Heart disease Maternal Grandfather     Stroke Maternal Grandfather      Social History     Tobacco Use    Smoking status: Former Smoker     Packs/day: 1.00     Years: 6.00     Pack years: 6.00     Types: Cigarettes    Smokeless tobacco: Former User    Tobacco comment: quit by age 27yrs old   Substance Use Topics    Alcohol use: Yes     Alcohol/week: 0.6 oz     Types: 1 Glasses of wine per week     Frequency: Monthly or less     Comment: 1 glass of wine a week    Drug use: No     Review of Systems   Constitutional: Positive for chills and fever.   HENT: Negative for congestion.    Respiratory: Positive for shortness of breath. Negative for cough.    Cardiovascular: Positive for leg swelling. Negative for chest pain and palpitations.   Gastrointestinal: Positive for diarrhea, nausea and vomiting. Negative for abdominal pain, blood in stool  and constipation.   Genitourinary: Negative for dysuria and flank pain.   Musculoskeletal: Positive for arthralgias and joint swelling.   Skin: Positive for color change. Negative for rash and wound.   Allergic/Immunologic: Negative for immunocompromised state.   Neurological: Negative for dizziness, syncope, weakness, light-headedness and headaches.   Hematological: Does not bruise/bleed easily.   Psychiatric/Behavioral: Negative for confusion.       Physical Exam     Initial Vitals [09/10/19 1418]   BP Pulse Resp Temp SpO2   130/63 107 18 (!) 102.3 °F (39.1 °C) 98 %      MAP       --         Vitals:    09/10/19 2101   BP: (!) 144/73   Pulse: (!) 111   Resp: 18   Temp:        Physical Exam    Vitals reviewed.  Constitutional: He appears well-developed and well-nourished. He is not diaphoretic. No distress.   HENT:   Head: Normocephalic and atraumatic.   Mouth/Throat: Oropharynx is clear and moist.   Eyes: Conjunctivae and EOM are normal.   Neck: Neck supple.   Cardiovascular: Normal heart sounds. An irregularly irregular rhythm present.  Tachycardia present.    No murmur heard.  Lower extremity edema bilaterally   Pulmonary/Chest: Breath sounds normal. No respiratory distress. He has no wheezes.   Abdominal: Soft. He exhibits no distension. There is no tenderness.   Musculoskeletal:        Right knee: He exhibits decreased range of motion, swelling and effusion. He exhibits no erythema. Tenderness (Diffuse) found.   Venous stasis noted bilaterally, hypertrophic skin noted to lower extremities bilaterally.    Warm to touch, right anterior knee   Neurological: He is alert and oriented to person, place, and time.   Skin: Skin is warm and dry. Capillary refill takes less than 2 seconds. No abscess noted. No erythema.         ED Course   Critical Care  Date/Time: 9/10/2019 9:22 PM  Performed by: Tory Haines PA-C  Authorized by: Hans Desai MD   Direct patient critical care time: 10 minutes  Additional history  critical care time: 10 minutes  Ordering / reviewing critical care time: 10 minutes  Documentation critical care time: 5 minutes  Consulting other physicians critical care time: 5 minutes  Total critical care time (exclusive of procedural time) : 40 minutes  Critical care was necessary to treat or prevent imminent or life-threatening deterioration of the following conditions: sepsis.  Critical care was time spent personally by me on the following activities: development of treatment plan with patient or surrogate, discussions with primary provider, interpretation of cardiac output measurements, evaluation of patient's response to treatment, examination of patient, obtaining history from patient or surrogate, ordering and performing treatments and interventions, ordering and review of laboratory studies, ordering and review of radiographic studies, pulse oximetry, re-evaluation of patient's condition and review of old charts.  Subsequent provider of critical care: I assumed direction of critical care for this patient from another provider of my specialty.        Labs Reviewed   CBC W/ AUTO DIFFERENTIAL - Abnormal; Notable for the following components:       Result Value    RBC 3.78 (*)     Hemoglobin 9.5 (*)     Hematocrit 30.7 (*)     Mean Corpuscular Volume 81 (*)     Mean Corpuscular Hemoglobin 25.1 (*)     Mean Corpuscular Hemoglobin Conc 30.9 (*)     RDW 16.6 (*)     Gran # (ANC) 11.3 (*)     Lymph # 0.5 (*)     Gran% 90.4 (*)     Lymph% 3.9 (*)     All other components within normal limits   COMPREHENSIVE METABOLIC PANEL - Abnormal; Notable for the following components:    Potassium 5.6 (*)     CO2 17 (*)     BUN, Bld 36 (*)     Alkaline Phosphatase 141 (*)     eGFR if non  58 (*)     All other components within normal limits   LACTIC ACID, PLASMA - Abnormal; Notable for the following components:    Lactate (Lactic Acid) 4.0 (*)     All other components within normal limits    Narrative:     LA  critical result(s) called and verbal readback obtained from   Ness Arrieta RN on 09/10/19 at 16:15 by Zabrina Almanzar.,   09/10/2019 16:16   URINALYSIS, REFLEX TO URINE CULTURE - Abnormal; Notable for the following components:    Color, UA Orange (*)     Appearance, UA Hazy (*)     Specific Gravity, UA >=1.030 (*)     Protein, UA Trace (*)     All other components within normal limits    Narrative:     Preferred Collection Type->Urine, Clean Catch   PROCALCITONIN - Abnormal; Notable for the following components:    Procalcitonin 98.52 (*)     All other components within normal limits   APTT - Abnormal; Notable for the following components:    aPTT 33.5 (*)     All other components within normal limits   MAGNESIUM - Abnormal; Notable for the following components:    Magnesium 1.3 (*)     All other components within normal limits   B-TYPE NATRIURETIC PEPTIDE - Abnormal; Notable for the following components:     (*)     All other components within normal limits   C-REACTIVE PROTEIN - Abnormal; Notable for the following components:    CRP 24.5 (*)     All other components within normal limits   URIC ACID - Abnormal; Notable for the following components:    Uric Acid 9.1 (*)     All other components within normal limits   SEDIMENTATION RATE - Abnormal; Notable for the following components:    Sed Rate 59 (*)     All other components within normal limits   INFLUENZA A & B BY MOLECULAR   CULTURE, BLOOD   CULTURE, BLOOD   PROTIME-INR   PHOSPHORUS   URIC ACID   SEDIMENTATION RATE   C-REACTIVE PROTEIN   LACTIC ACID, PLASMA   TSH   MAGNESIUM   PHOSPHORUS   TROPONIN I   HEMOGLOBIN A1C        ECG Results          EKG 12-lead (Final result)  Result time 09/10/19 17:07:18    Final result by Interface, Lab In [facility] (09/10/19 17:07:18)                 Narrative:    Test Reason : R50.9,    Vent. Rate : 133 BPM     Atrial Rate : 111 BPM     P-R Int : 000 ms          QRS Dur : 096 ms      QT Int : 248 ms       P-R-T  Axes : 000 098 011 degrees     QTc Int : 369 ms    Atrial fibrillation with rapid ventricular response  Rightward axis  Cannot rule out Anterior infarct ,age undetermined  Abnormal ECG  When compared with ECG of 03-AUG-2019 15:49,  Non-specific change in ST segment in Lateral leads  T wave inversion now evident in Lateral leads  Confirmed by Jeffrey Olivera MD ([id number]) on 9/10/2019 5:07:08 PM    Referred By: System System           Confirmed By:Jeffrey Olivera MD                            Imaging Results          US Lower Extremity Veins Bilateral (In process)  Result time 09/10/19 21:23:42               X-Ray Chest AP Portable (Final result)  Result time 09/10/19 15:51:14    Final result by Judy Farrar MD (09/10/19 15:51:14)                 Impression:      Cardiomegaly unchanged.  No failure or pneumonia.      Electronically signed by: Judy Farrar  Date:    09/10/2019  Time:    15:51             Narrative:    EXAMINATION:  XR CHEST AP PORTABLE    CLINICAL HISTORY:  Sepsis;    TECHNIQUE:  Single frontal view of the chest was performed.    COMPARISON:  08/02/2019 chest    FINDINGS:  Single AP portable view at 15:09.    The heart is moderately enlarged unchanged.  The pulmonary artery is enlarged unchanged consistent with underlying pulmonary artery hypertension.    Trachea appears normal.  No pneumothorax or pleural effusion or interstitial edema consolidation or nodule.  There are overlying leads.  No rib fracture seen.                               X-Ray Knee 3 View Right (Final result)  Result time 09/10/19 15:33:48    Final result by Gopi Whiting MD (09/10/19 15:33:48)                 Impression:      No acute displaced fracture-dislocation identified.      Electronically signed by: Gopi Whiting MD  Date:    09/10/2019  Time:    15:33             Narrative:    EXAMINATION:  XR KNEE 3 VIEW RIGHT    CLINICAL HISTORY:  Edema, unspecified    TECHNIQUE:  AP, lateral, and Merchant views of the right knee were  performed.    COMPARISON:  Right knee series 01/03/2018    FINDINGS:  Chronic deformity of the tibial tuberosity similar to prior.  No acute displaced fracture, dislocation or destructive osseous process.  No large suprapatellar joint effusion seen.  Mild tricompartmental degenerative change.  No subcutaneous emphysema or radiodense retained foreign body.                                       APC / Resident Notes:   Patient seen in the ER promptly upon arrival.  He is febrile 102.3. Tachycardic on arrival.  EKG shows AFib RVR at a rate of 133 beats per minute. Physical examination reveals diffuse tenderness on palpation to the right knee.  There is some erythema to the right side noted. Range of motion of the knee is limited due to pain.  There is significant swelling to the right knee and lower extremity.  There is venous stasis bilateral lower extremity worsening over the right.  Hypertrophic skin noted to the right lower extremity.  Patient does have a healing wound to left lateral foot.  No erythema to that site.  No necrosis.  Heart lung sounds normal. Abdomen soft, nondistended. IV access established, labs ordered.  Sepsis alert was called immediately upon examination.    Patient was initially ordered 30mg/ kg IV fluids but due to patient's significant peripheral edema and CHF discontinued this order.  Patient was initiated on 1 L IV fluid.  He was initiated on vancomycin, aztreonam and Flagyl in the ED.  He does have a penicillin allergy.    Laboratory studies show leukocytosis 12.5.  Hemoglobin stable 9.5.  Chemistries reveal potassium of 5.6 otherwise fairly unremarkable.  Lactic acid markedly elevated to 4.0.  Repeat lactic acid ordered, pending results.  Procalcitonin also significantly elevated to 98.5.  CRP, ESR and uric acid were also all elevated.  X-ray of knee does not reveal any acute abnormality.  X-ray chest reveals cardiomegaly otherwise unremarkable.  No evidence of pneumonia.    Arthrocentesis  was attempted by Dr Desai.  Consent obtained but unsuccessful.  Attempted to call Orthopedics multiple times without a call back.  I discussed this with the admitting team who will attempt arthrocentesis once again.    Patient is meeting severe sepsis criteria.  Will admit to [facility], [facility] for primary admission of patient.  Patient has been stable during his stay in the ED and stable for transfer to the floor at this time.                 Clinical Impression:       ICD-10-CM ICD-9-CM   1. Severe sepsis A41.9 038.9    R65.20 995.92   2. Fever R50.9 780.60   3. Swelling R60.9 782.3   4. Cellulitis of right lower leg L03.115 682.6          Disposition:   Disposition: Admitted  Condition: Stable                        Tory Haines PA-C  09/10/19 0032

## 2019-09-10 NOTE — H&P
[facility] Medicine  History & Physical    Patient Name: Drew Farrar  MRN: [id number]  Admission Date: 9/10/2019  Attending Physician: Gino Ji III, MD   Primary Care Provider: Primary Doctor No         Patient information was obtained from patient and ER records.     Subjective:     Principal Problem:Severe sepsis    Chief Complaint:   Chief Complaint   Patient presents with    Fever     Palpitations with shortness of breath that started today.  He originally called 911 for right knee pain and fever that started last night.        HPI: Mr. Farrar is a 53 year old man with PMHx of chronic Afib (on eliquis), HFpEF, PE, Hx of DVT with irwin filter in place, HTN, Venous stasis, hyperthyroidism, presented to ED with acute onset fevers, chills, nausea, vomiting and right lower extremity pain. He states pain started around 3 AM on the morning of admission. He was recently discharge from hospital about 3 weeks prior to right LE cellulitis, and completed a 2 week course of cipro and clinda about 3 weeks ago. He reports improvement in pain and cellulitis up until this morning. He denies trauma or falls to the affected area. He states the pain started all of a sudden and is associated with swelling and warmth to the area. He states the right leg is very painful to touch and with  Movement.     He states he is compliant with all his blood pressure and anticoagulation medication. He denies Headache, changes in vision, shortness of breath, chest pain, palpitations, abdominal pain, diarrhea or constipation.       Past Medical History:   Diagnosis Date    *Atrial fibrillation     Anticoagulant long-term use     Arthritis     Atrial fibrillation     Atrial fibrillation Feb 23, 2016    Bipolar disorder     CHF (congestive heart failure)     Congenital heart disease     s/p surgical intervention 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 of age    Deep vein thrombosis     DVT of leg (deep venous thrombosis)      left leg    History of prior ablation treatment     10/9/13    Hypertension     Obesity     Stroke     Thyroid disease     Venous stasis ulcer of lower extremity, unspecified laterality 12/14/2012    Venous ulcer        Past Surgical History:   Procedure Laterality Date    ANGIOPLASTY      ARTHROSCOPY-KNEE W/ CHONDROPLASTY Right 2/23/2016    Performed by Alejandro [facility]ueva [facility] at [facility] OR    CARDIAC SURGERY      open heart surgery 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 old    EYE SURGERY      left eye cataract/right eye glaucoma    TIMO FILTER PLACEMENT      Dr Calix ([facility])    KNEE SURGERY      l and r     MULTIPLE TOOTH EXTRACTIONS      Sclerotherapy N/A 2/21/2019    Performed by Gomez Lantigua MD at [facility] CATH LAB/EP    SKIN GRAFT      left leg    SYNOVECTOMY-KNEE Right 2/23/2016    Performed by Alejandro [facility]ueva [facility] at [facility] OR       Review of patient's allergies indicates:   Allergen Reactions    Contrast media Other (See Comments)     Severe chest pain    Food allergy formula [glutamine-c-quercet-selen-brom]      Allergic to green peas; Heart failure.    Iodinated contrast media Other (See Comments)     Chest pain    Peas Hives    Ibuprofen Swelling    Latex, natural rubber Hives    Pcn [penicillins] Hives    Butisol [butabarbital] Rash     Peeling skin       No current facility-administered medications on file prior to encounter.      Current Outpatient Medications on File Prior to Encounter   Medication Sig    HYDROcodone-acetaminophen (NORCO) 5-325 mg per tablet Take 1 tablet by mouth every 6 (six) hours as needed for Pain.    hydrOXYzine HCl (ATARAX) 25 MG tablet Take 1 tablet (25 mg total) by mouth 4 (four) times daily as needed for Itching.    lisinopril 10 MG tablet Take 1 tablet (10 mg total) by mouth once daily. (Patient taking differently: Take 7.5 mg by mouth every evening. )    metoprolol succinate (TOPROL-XL) 100 MG 24 hr tablet Take 1 tablet (100 mg total) by mouth once daily. (Patient taking  differently: Take 100 mg by mouth every evening. )    nystatin (MYCOSTATIN) powder Apply topically 2 (two) times daily.    rivaroxaban (XARELTO) 20 mg Tab Take 1 tablet (20 mg total) by mouth daily with dinner or evening meal.    terbinafine HCl (LAMISIL) 250 mg tablet Take 1 tablet (250 mg total) by mouth once daily.    torsemide (DEMADEX) 20 MG Tab Take 2 tablets (40 mg total) by mouth once daily.     Family History     Problem Relation (Age of Onset)    Diabetes Father, Maternal Grandfather    Heart disease Father, Maternal Grandmother, Maternal Grandfather    Stroke Maternal Grandfather        Tobacco Use    Smoking status: Former Smoker     Packs/day: 1.00     Years: 6.00     Pack years: 6.00     Types: Cigarettes    Smokeless tobacco: Former User    Tobacco comment: quit by age 27yrs old   Substance and Sexual Activity    Alcohol use: Yes     Alcohol/week: 0.6 oz     Types: 1 Glasses of wine per week     Frequency: Monthly or less     Comment: 1 glass of wine a week    Drug use: No    Sexual activity: Yes     Partners: Female     Birth control/protection: None     Review of Systems   Constitutional: Positive for activity change, chills, fatigue and fever. Negative for appetite change and unexpected weight change.   HENT: Negative for congestion, hearing loss, postnasal drip, rhinorrhea and sinus pain.    Eyes: Negative for photophobia and visual disturbance.   Respiratory: Negative for apnea, cough, choking, chest tightness, shortness of breath and wheezing.    Cardiovascular: Negative for chest pain, palpitations and leg swelling.   Gastrointestinal: Negative for abdominal distention, abdominal pain, constipation, diarrhea, nausea and vomiting.   Endocrine: Negative for cold intolerance, heat intolerance and polyuria.   Genitourinary: Negative for difficulty urinating, flank pain, frequency and urgency.   Musculoskeletal: Positive for arthralgias, gait problem, joint swelling and myalgias. Negative  for back pain, neck pain and neck stiffness.   Skin: Positive for color change and wound. Negative for rash.   Neurological: Negative for dizziness, weakness, light-headedness, numbness and headaches.   Psychiatric/Behavioral: Negative for behavioral problems, confusion and hallucinations. The patient is not nervous/anxious.      Objective:     Vital Signs (Most Recent):  Temp: 98.5 °F (36.9 °C) (09/10/19 1751)  Pulse: (!) 114 (09/10/19 1814)  Resp: (!) 26 (09/10/19 1814)  BP: 120/60 (09/10/19 1814)  SpO2: 98 % (09/10/19 1814) Vital Signs (24h Range):  Temp:  [98.5 °F (36.9 °C)-102.3 °F (39.1 °C)] 98.5 °F (36.9 °C)  Pulse:  [107-123] 114  Resp:  [18-26] 26  SpO2:  [95 %-98 %] 98 %  BP: (119-144)/(58-83) 120/60     Weight: (!) 160.1 kg (353 lb)  Body mass index is 38.78 kg/m².    Physical Exam   Constitutional: He is oriented to person, place, and time. He appears well-developed and well-nourished. No distress.   HENT:   Head: Normocephalic and atraumatic.   Nose: Nose normal.   Mouth/Throat: Oropharynx is clear and moist. No oropharyngeal exudate.   Eyes: Pupils are equal, round, and reactive to light. Conjunctivae and EOM are normal. Right eye exhibits no discharge. Left eye exhibits no discharge. No scleral icterus.   Neck: Normal range of motion. Neck supple.   Cardiovascular: Normal rate. Exam reveals no gallop and no friction rub.   No murmur heard.  Irregular rate and rhythm   Pulmonary/Chest: Effort normal and breath sounds normal. No stridor. No respiratory distress. He has no wheezes. He has no rales. He exhibits no tenderness.   Abdominal: Soft. Bowel sounds are normal. He exhibits no distension and no mass. There is no tenderness. There is no rebound.   Musculoskeletal: Normal range of motion. He exhibits edema and tenderness. He exhibits no deformity.   Right leg and calf tenderness, erythema and warm of skin, no purulence or fluctuance   Chronic skin changes and thickening of the shins and medial  maleolus     Lymphadenopathy:     He has no cervical adenopathy.   Neurological: He is alert and oriented to person, place, and time. No sensory deficit.   Skin: Skin is warm and dry. Capillary refill takes less than 2 seconds. No rash noted. He is not diaphoretic. There is erythema. No pallor.   Psychiatric: He has a normal mood and affect. His behavior is normal.   Nursing note and vitals reviewed.        CRANIAL NERVES     CN III, IV, VI   Pupils are equal, round, and reactive to light.  Extraocular motions are normal.        Significant Labs:  Recent Labs     09/10/19  1442   WBC 12.59   HGB 9.5*   HCT 30.7*      MCV 81*   RDW 16.6*       Recent Labs     09/10/19  1442      K 5.6*      CO2 17*   GLU 92   BUN 36*   CREATININE 1.4   CALCIUM 9.6   PROT 7.7   ALBUMIN 3.5   BILITOT 1.0   ALKPHOS 141*   AST 35   ALT 28   ANIONGAP 11   ESTGFRAFRICA >60   EGFRNONAA 58*       Recent Labs     09/10/19  1442   MG 1.3*   PHOS 2.9       Coags  Lab Results   Component Value Date    INR 1.1 09/10/2019    INR 1.1 08/02/2019    INR 3.4 (H) 07/27/2019    APTT 33.5 (H) 09/10/2019    APTT 50.7 (H) 08/03/2019    APTT 86.4 (H) 08/03/2019     Recent Labs   Lab 09/10/19  1442   INR 1.1   APTT 33.5*       A1c:   Lab Results   Component Value Date    HGBA1C 5.5 07/25/2019   , Last Gluc: No results for input(s): POCTGLUCOSE in the last 168 hours.    TSH:   Lab Results   Component Value Date    TSH <0.010 (L) 07/25/2019         Urinalysis  Urinalysis  Recent Labs   Lab 09/10/19  1638   COLORU Orange*   SPECGRAV >=1.030*   PHUR 6.0   PROTEINUA Trace*   NITRITE Negative   LEUKOCYTESUR Negative   UROBILINOGEN Negative     Recent Labs   Lab 09/10/19  1638   COLORU Orange*   SPECGRAV >=1.030*   PHUR 6.0   PROTEINUA Trace*       Micro  Microbiology Results (last 7 days)     Procedure Component Value Units Date/Time    Influenza A & B by Molecular [[id number]] Collected:  09/10/19 1521    Order Status:  Completed Specimen:   Nasopharyngeal Swab Updated:  09/10/19 1554     Influenza A, Molecular Negative     Influenza B, Molecular Negative     Flu A & B Source Nasal swab    Blood culture x two cultures. Draw prior to antibiotics. [[id number]] Collected:  09/10/19 1443    Order Status:  Sent Specimen:  Blood from Peripheral, Hand, Right Updated:  09/10/19 1443    Blood culture x two cultures. Draw prior to antibiotics. [[id number]] Collected:  09/10/19 1443    Order Status:  Sent Specimen:  Blood from Peripheral, Forearm, Left Updated:  09/10/19 1443           Imaging   Imaging Results          X-Ray Chest AP Portable (Final result)  Result time 09/10/19 15:51:14    Final result by Judy Farrar MD (09/10/19 15:51:14)                 Impression:      Cardiomegaly unchanged.  No failure or pneumonia.      Electronically signed by: Judy Farrar  Date:    09/10/2019  Time:    15:51             Narrative:    EXAMINATION:  XR CHEST AP PORTABLE    CLINICAL HISTORY:  Sepsis;    TECHNIQUE:  Single frontal view of the chest was performed.    COMPARISON:  08/02/2019 chest    FINDINGS:  Single AP portable view at 15:09.    The heart is moderately enlarged unchanged.  The pulmonary artery is enlarged unchanged consistent with underlying pulmonary artery hypertension.    Trachea appears normal.  No pneumothorax or pleural effusion or interstitial edema consolidation or nodule.  There are overlying leads.  No rib fracture seen.                               X-Ray Knee 3 View Right (Final result)  Result time 09/10/19 15:33:48    Final result by Gopi Whiting MD (09/10/19 15:33:48)                 Impression:      No acute displaced fracture-dislocation identified.      Electronically signed by: Gopi Whiting MD  Date:    09/10/2019  Time:    15:33             Narrative:    EXAMINATION:  XR KNEE 3 VIEW RIGHT    CLINICAL HISTORY:  Edema, unspecified    TECHNIQUE:  AP, lateral, and Merchant views of the right knee were performed.    COMPARISON:  Right  knee series 01/03/2018    FINDINGS:  Chronic deformity of the tibial tuberosity similar to prior.  No acute displaced fracture, dislocation or destructive osseous process.  No large suprapatellar joint effusion seen.  Mild tricompartmental degenerative change.  No subcutaneous emphysema or radiodense retained foreign body.                                  Assessment/Plan:     * Severe sepsis  Patient presented to the ED with fever of 102.3 and LA of 4   Severe swelling and warmth to the right leg associated with joint effusion of the right knee   Therapeutic and diagnostic arthrocentesis attempted in the ED without success  Will attempt again   Patient was recently hospitalized for cellulitis of the same area, and completed 2 weeks of clinda and cipro  Pain and swelling of the affected area started around 3 AM the morning of admission  Will continue Vancomycin, aztreonam and flagyl for antibiotic coverage  Trend LA Q6 hours   S/p 1L bolus in ED  WIll give additional 1L bolus and repeat LA   Continue to monitor   FU Blood cultures, urine cultures and joint fluid studies      Cellulitis  As above  Will continue IV antibiotics   Continue fluids   Pain management       Chronic atrial fibrillation  Pt with chronic afib   HR in 110's-120's   Will continue metoprolol   Maintain Tele   Continue eliquis, lisinopril and torsemide       (HFpEF) heart failure with preserved ejection fraction  Last TTE with EF of 60%  No signs of heart failure on chest xray or PE   Will continue home medications   Continue to monitor       HTN (hypertension)  Continue home medication   Goal BP <140/90  Hydralazine 10 mg IV PRN for BP >180/90  Continue to monitor       Hyperthyroidism  Fu TSH   Pt not currently on any therapy   No symptoms of hyperthyroidism       Acute on chronic diastolic congestive heart failure  Pt BNP elevated to 600's, base line around 200  No signs of decompensated heartfailure  Will continue to monitor   Continue home  medications           VTE Risk Mitigation (From admission, onward)    None               Akanksha Vogel MD   [facility] MedicinePGY-2   [facility]

## 2019-09-10 NOTE — ASSESSMENT & PLAN NOTE
Continue home medication   Goal BP <140/90  Hydralazine 10 mg IV PRN for BP >180/90  Continue to monitor

## 2019-09-11 PROBLEM — R65.21 SEPTIC SHOCK: Status: ACTIVE | Noted: 2019-04-24

## 2019-09-11 LAB
ALBUMIN SERPL BCP-MCNC: 2.9 G/DL (ref 3.5–5.2)
ALLENS TEST: ABNORMAL
ALP SERPL-CCNC: 100 U/L (ref 55–135)
ALT SERPL W/O P-5'-P-CCNC: 25 U/L (ref 10–44)
ANION GAP SERPL CALC-SCNC: 6 MMOL/L (ref 8–16)
AORTIC ROOT ANNULUS: 3.52 CM
AST SERPL-CCNC: 35 U/L (ref 10–40)
AV INDEX (PROSTH): 0.74
AV MEAN GRADIENT: 4 MMHG
AV PEAK GRADIENT: 7 MMHG
AV VALVE AREA: 3.46 CM2
AV VELOCITY RATIO: 0.66
BASOPHILS # BLD AUTO: 0.01 K/UL (ref 0–0.2)
BASOPHILS NFR BLD: 0.1 % (ref 0–1.9)
BILIRUB SERPL-MCNC: 1.1 MG/DL (ref 0.1–1)
BSA FOR ECHO PROCEDURE: 3.01 M2
BUN SERPL-MCNC: 42 MG/DL (ref 6–20)
C DIFF GDH STL QL: POSITIVE
C DIFF TOX A+B STL QL IA: NEGATIVE
CALCIUM SERPL-MCNC: 8.6 MG/DL (ref 8.7–10.5)
CHLORIDE SERPL-SCNC: 107 MMOL/L (ref 95–110)
CO2 SERPL-SCNC: 22 MMOL/L (ref 23–29)
CREAT SERPL-MCNC: 1.6 MG/DL (ref 0.5–1.4)
CREAT UR-MCNC: 16.7 MG/DL (ref 23–375)
CV ECHO LV RWT: 0.38 CM
DELSYS: ABNORMAL
DIFFERENTIAL METHOD: ABNORMAL
DOP CALC AO PEAK VEL: 1.31 M/S
DOP CALC AO VTI: 25.29 CM
DOP CALC LVOT AREA: 4.7 CM2
DOP CALC LVOT DIAMETER: 2.44 CM
DOP CALC LVOT PEAK VEL: 0.87 M/S
DOP CALC LVOT STROKE VOLUME: 87.44 CM3
DOP CALCLVOT PEAK VEL VTI: 18.71 CM
ECHO LV POSTERIOR WALL: 1.14 CM (ref 0.6–1.1)
EOSINOPHIL # BLD AUTO: 0.1 K/UL (ref 0–0.5)
EOSINOPHIL NFR BLD: 0.5 % (ref 0–8)
ERYTHROCYTE [DISTWIDTH] IN BLOOD BY AUTOMATED COUNT: 17.2 % (ref 11.5–14.5)
EST. GFR  (AFRICAN AMERICAN): 57 ML/MIN/1.73 M^2
EST. GFR  (NON AFRICAN AMERICAN): 49 ML/MIN/1.73 M^2
FRACTIONAL SHORTENING: 31 % (ref 28–44)
GLUCOSE SERPL-MCNC: 114 MG/DL (ref 70–110)
HCO3 UR-SCNC: 17.4 MMOL/L (ref 24–28)
HCT VFR BLD AUTO: 26.8 % (ref 40–54)
HGB BLD-MCNC: 8.2 G/DL (ref 14–18)
INTERVENTRICULAR SEPTUM: 1.28 CM (ref 0.6–1.1)
LA MAJOR: 4.28 CM
LA MINOR: 4.3 CM
LA WIDTH: 3.78 CM
LACTATE SERPL-SCNC: 1.8 MMOL/L (ref 0.5–2.2)
LEFT ATRIUM SIZE: 4.93 CM
LEFT ATRIUM VOLUME INDEX: 23.1 ML/M2
LEFT ATRIUM VOLUME: 67.95 CM3
LEFT INTERNAL DIMENSION IN SYSTOLE: 4.11 CM (ref 2.1–4)
LEFT VENTRICLE DIASTOLIC VOLUME INDEX: 60.09 ML/M2
LEFT VENTRICLE DIASTOLIC VOLUME: 176.45 ML
LEFT VENTRICLE MASS INDEX: 107 G/M2
LEFT VENTRICLE SYSTOLIC VOLUME INDEX: 25.5 ML/M2
LEFT VENTRICLE SYSTOLIC VOLUME: 74.86 ML
LEFT VENTRICULAR INTERNAL DIMENSION IN DIASTOLE: 5.95 CM (ref 3.5–6)
LEFT VENTRICULAR MASS: 313.21 G
LYMPHOCYTES # BLD AUTO: 0.7 K/UL (ref 1–4.8)
LYMPHOCYTES NFR BLD: 6 % (ref 18–48)
MAGNESIUM SERPL-MCNC: 1.4 MG/DL (ref 1.6–2.6)
MCH RBC QN AUTO: 24.9 PG (ref 27–31)
MCHC RBC AUTO-ENTMCNC: 30.6 G/DL (ref 32–36)
MCV RBC AUTO: 82 FL (ref 82–98)
MODE: ABNORMAL
MONOCYTES # BLD AUTO: 0.7 K/UL (ref 0.3–1)
MONOCYTES NFR BLD: 6.8 % (ref 4–15)
NEUTROPHILS # BLD AUTO: 9.5 K/UL (ref 1.8–7.7)
NEUTROPHILS NFR BLD: 86.6 % (ref 38–73)
PCO2 BLDA: 36 MMHG (ref 35–45)
PH SMN: 7.29 [PH] (ref 7.35–7.45)
PHOSPHATE SERPL-MCNC: 4.3 MG/DL (ref 2.7–4.5)
PISA TR MAX VEL: 2.29 M/S
PLATELET # BLD AUTO: 151 K/UL (ref 150–350)
PMV BLD AUTO: 9.6 FL (ref 9.2–12.9)
PO2 BLDA: 102 MMHG (ref 80–100)
POC BE: -9 MMOL/L
POC SATURATED O2: 97 % (ref 95–100)
POC TCO2: 18 MMOL/L (ref 23–27)
POTASSIUM SERPL-SCNC: 4.8 MMOL/L (ref 3.5–5.1)
PROT SERPL-MCNC: 6.7 G/DL (ref 6–8.4)
RA MAJOR: 5.57 CM
RA PRESSURE: 8 MMHG
RBC # BLD AUTO: 3.29 M/UL (ref 4.6–6.2)
RIGHT VENTRICULAR END-DIASTOLIC DIMENSION: 3.35 CM
SAMPLE: ABNORMAL
SITE: ABNORMAL
SODIUM SERPL-SCNC: 135 MMOL/L (ref 136–145)
SODIUM UR-SCNC: 103 MMOL/L (ref 20–250)
T4 FREE SERPL-MCNC: 2.31 NG/DL (ref 0.71–1.51)
T4 FREE SERPL-MCNC: 2.43 NG/DL (ref 0.71–1.51)
TR MAX PG: 21 MMHG
TROPONIN I SERPL DL<=0.01 NG/ML-MCNC: 0.17 NG/ML (ref 0–0.03)
TROPONIN I SERPL DL<=0.01 NG/ML-MCNC: 0.17 NG/ML (ref 0–0.03)
TSH SERPL DL<=0.005 MIU/L-ACNC: <0.01 UIU/ML (ref 0.4–4)
TV REST PULMONARY ARTERY PRESSURE: 29 MMHG
VANCOMYCIN TROUGH SERPL-MCNC: 54.1 UG/ML (ref 10–22)
WBC # BLD AUTO: 10.96 K/UL (ref 3.9–12.7)

## 2019-09-11 PROCEDURE — 96365 THER/PROPH/DIAG IV INF INIT: CPT | Mod: 59 | Performed by: EMERGENCY MEDICINE

## 2019-09-11 PROCEDURE — 99900035 HC TECH TIME PER 15 MIN (STAT)

## 2019-09-11 PROCEDURE — 94761 N-INVAS EAR/PLS OXIMETRY MLT: CPT

## 2019-09-11 PROCEDURE — 96376 TX/PRO/DX INJ SAME DRUG ADON: CPT | Performed by: EMERGENCY MEDICINE

## 2019-09-11 PROCEDURE — S0073 INJECTION, AZTREONAM, 500 MG: HCPCS | Performed by: STUDENT IN AN ORGANIZED HEALTH CARE EDUCATION/TRAINING PROGRAM

## 2019-09-11 PROCEDURE — 93005 ELECTROCARDIOGRAM TRACING: CPT

## 2019-09-11 PROCEDURE — 84300 ASSAY OF URINE SODIUM: CPT

## 2019-09-11 PROCEDURE — 97162 PT EVAL MOD COMPLEX 30 MIN: CPT

## 2019-09-11 PROCEDURE — 63600175 PHARM REV CODE 636 W HCPCS: Performed by: STUDENT IN AN ORGANIZED HEALTH CARE EDUCATION/TRAINING PROGRAM

## 2019-09-11 PROCEDURE — 83605 ASSAY OF LACTIC ACID: CPT

## 2019-09-11 PROCEDURE — 97116 GAIT TRAINING THERAPY: CPT

## 2019-09-11 PROCEDURE — 96366 THER/PROPH/DIAG IV INF ADDON: CPT | Mod: 59 | Performed by: EMERGENCY MEDICINE

## 2019-09-11 PROCEDURE — 87493 C DIFF AMPLIFIED PROBE: CPT

## 2019-09-11 PROCEDURE — A4216 STERILE WATER/SALINE, 10 ML: HCPCS | Performed by: FAMILY MEDICINE

## 2019-09-11 PROCEDURE — 25000003 PHARM REV CODE 250: Performed by: STUDENT IN AN ORGANIZED HEALTH CARE EDUCATION/TRAINING PROGRAM

## 2019-09-11 PROCEDURE — 85025 COMPLETE CBC W/AUTO DIFF WBC: CPT

## 2019-09-11 PROCEDURE — 83735 ASSAY OF MAGNESIUM: CPT

## 2019-09-11 PROCEDURE — 63600175 PHARM REV CODE 636 W HCPCS: Performed by: FAMILY MEDICINE

## 2019-09-11 PROCEDURE — 25000003 PHARM REV CODE 250: Performed by: FAMILY MEDICINE

## 2019-09-11 PROCEDURE — 84100 ASSAY OF PHOSPHORUS: CPT

## 2019-09-11 PROCEDURE — 96361 HYDRATE IV INFUSION ADD-ON: CPT | Performed by: EMERGENCY MEDICINE

## 2019-09-11 PROCEDURE — 36415 COLL VENOUS BLD VENIPUNCTURE: CPT

## 2019-09-11 PROCEDURE — 99223 PR INITIAL HOSPITAL CARE,LEVL III: ICD-10-PCS | Mod: ,,, | Performed by: SURGERY

## 2019-09-11 PROCEDURE — 96367 TX/PROPH/DG ADDL SEQ IV INF: CPT | Mod: 59 | Performed by: EMERGENCY MEDICINE

## 2019-09-11 PROCEDURE — S0030 INJECTION, METRONIDAZOLE: HCPCS | Performed by: STUDENT IN AN ORGANIZED HEALTH CARE EDUCATION/TRAINING PROGRAM

## 2019-09-11 PROCEDURE — 97535 SELF CARE MNGMENT TRAINING: CPT

## 2019-09-11 PROCEDURE — 97166 OT EVAL MOD COMPLEX 45 MIN: CPT

## 2019-09-11 PROCEDURE — 36600 WITHDRAWAL OF ARTERIAL BLOOD: CPT

## 2019-09-11 PROCEDURE — 96375 TX/PRO/DX INJ NEW DRUG ADDON: CPT | Mod: 59 | Performed by: EMERGENCY MEDICINE

## 2019-09-11 PROCEDURE — 82570 ASSAY OF URINE CREATININE: CPT

## 2019-09-11 PROCEDURE — 25000003 PHARM REV CODE 250: Performed by: INTERNAL MEDICINE

## 2019-09-11 PROCEDURE — 84439 ASSAY OF FREE THYROXINE: CPT

## 2019-09-11 PROCEDURE — 93010 EKG 12-LEAD: ICD-10-PCS | Mod: ,,, | Performed by: INTERNAL MEDICINE

## 2019-09-11 PROCEDURE — 84484 ASSAY OF TROPONIN QUANT: CPT

## 2019-09-11 PROCEDURE — 87449 NOS EACH ORGANISM AG IA: CPT

## 2019-09-11 PROCEDURE — 82803 BLOOD GASES ANY COMBINATION: CPT

## 2019-09-11 PROCEDURE — 99223 1ST HOSP IP/OBS HIGH 75: CPT | Mod: ,,, | Performed by: SURGERY

## 2019-09-11 PROCEDURE — 80202 ASSAY OF VANCOMYCIN: CPT

## 2019-09-11 PROCEDURE — 80053 COMPREHEN METABOLIC PANEL: CPT

## 2019-09-11 PROCEDURE — 20000000 HC ICU ROOM

## 2019-09-11 PROCEDURE — 96372 THER/PROPH/DIAG INJ SC/IM: CPT | Mod: 59 | Performed by: EMERGENCY MEDICINE

## 2019-09-11 PROCEDURE — 93010 ELECTROCARDIOGRAM REPORT: CPT | Mod: ,,, | Performed by: INTERNAL MEDICINE

## 2019-09-11 RX ORDER — ACETAMINOPHEN 325 MG/1
650 TABLET ORAL EVERY 4 HOURS PRN
Status: DISCONTINUED | OUTPATIENT
Start: 2019-09-11 | End: 2019-09-16 | Stop reason: HOSPADM

## 2019-09-11 RX ORDER — ENOXAPARIN SODIUM 100 MG/ML
40 INJECTION SUBCUTANEOUS EVERY 24 HOURS
Status: DISCONTINUED | OUTPATIENT
Start: 2019-09-11 | End: 2019-09-11

## 2019-09-11 RX ORDER — GLUCAGON 1 MG
1 KIT INJECTION
Status: DISCONTINUED | OUTPATIENT
Start: 2019-09-11 | End: 2019-09-16 | Stop reason: HOSPADM

## 2019-09-11 RX ORDER — IBUPROFEN 200 MG
24 TABLET ORAL
Status: DISCONTINUED | OUTPATIENT
Start: 2019-09-11 | End: 2019-09-16 | Stop reason: HOSPADM

## 2019-09-11 RX ORDER — TERBINAFINE HYDROCHLORIDE 250 MG/1
250 TABLET ORAL DAILY
Status: DISCONTINUED | OUTPATIENT
Start: 2019-09-11 | End: 2019-09-11

## 2019-09-11 RX ORDER — LOPERAMIDE HYDROCHLORIDE 2 MG/1
4 CAPSULE ORAL ONCE
Status: COMPLETED | OUTPATIENT
Start: 2019-09-11 | End: 2019-09-11

## 2019-09-11 RX ORDER — METRONIDAZOLE 500 MG/100ML
500 INJECTION, SOLUTION INTRAVENOUS
Status: DISCONTINUED | OUTPATIENT
Start: 2019-09-11 | End: 2019-09-11

## 2019-09-11 RX ORDER — LISINOPRIL 10 MG/1
10 TABLET ORAL DAILY
Status: DISCONTINUED | OUTPATIENT
Start: 2019-09-11 | End: 2019-09-11

## 2019-09-11 RX ORDER — SODIUM CHLORIDE 0.9 % (FLUSH) 0.9 %
5 SYRINGE (ML) INJECTION
Status: DISCONTINUED | OUTPATIENT
Start: 2019-09-11 | End: 2019-09-16 | Stop reason: HOSPADM

## 2019-09-11 RX ORDER — ENOXAPARIN SODIUM 100 MG/ML
1 INJECTION SUBCUTANEOUS
Status: COMPLETED | OUTPATIENT
Start: 2019-09-11 | End: 2019-09-11

## 2019-09-11 RX ORDER — SODIUM CHLORIDE 0.9 % (FLUSH) 0.9 %
10 SYRINGE (ML) INJECTION
Status: DISCONTINUED | OUTPATIENT
Start: 2019-09-11 | End: 2019-09-16 | Stop reason: HOSPADM

## 2019-09-11 RX ORDER — ONDANSETRON 8 MG/1
8 TABLET, ORALLY DISINTEGRATING ORAL EVERY 6 HOURS PRN
Status: DISCONTINUED | OUTPATIENT
Start: 2019-09-11 | End: 2019-09-16 | Stop reason: HOSPADM

## 2019-09-11 RX ORDER — NOREPINEPHRINE BITARTRATE/D5W 16MG/250ML
0.02 PLASTIC BAG, INJECTION (ML) INTRAVENOUS CONTINUOUS
Status: DISPENSED | OUTPATIENT
Start: 2019-09-11 | End: 2019-09-12

## 2019-09-11 RX ORDER — RAMELTEON 8 MG/1
8 TABLET ORAL NIGHTLY PRN
Status: DISCONTINUED | OUTPATIENT
Start: 2019-09-11 | End: 2019-09-16 | Stop reason: HOSPADM

## 2019-09-11 RX ORDER — MORPHINE SULFATE 4 MG/ML
4 INJECTION, SOLUTION INTRAMUSCULAR; INTRAVENOUS EVERY 4 HOURS PRN
Status: DISCONTINUED | OUTPATIENT
Start: 2019-09-11 | End: 2019-09-11

## 2019-09-11 RX ORDER — DOXYLAMINE SUCCINATE 25 MG
TABLET ORAL 2 TIMES DAILY
Status: DISCONTINUED | OUTPATIENT
Start: 2019-09-11 | End: 2019-09-16 | Stop reason: HOSPADM

## 2019-09-11 RX ORDER — ENOXAPARIN SODIUM 150 MG/ML
130 INJECTION SUBCUTANEOUS ONCE
Status: COMPLETED | OUTPATIENT
Start: 2019-09-11 | End: 2019-09-11

## 2019-09-11 RX ORDER — TORSEMIDE 20 MG/1
40 TABLET ORAL DAILY
Status: DISCONTINUED | OUTPATIENT
Start: 2019-09-11 | End: 2019-09-16 | Stop reason: HOSPADM

## 2019-09-11 RX ORDER — SODIUM CHLORIDE, SODIUM LACTATE, POTASSIUM CHLORIDE, CALCIUM CHLORIDE 600; 310; 30; 20 MG/100ML; MG/100ML; MG/100ML; MG/100ML
INJECTION, SOLUTION INTRAVENOUS CONTINUOUS
Status: ACTIVE | OUTPATIENT
Start: 2019-09-11 | End: 2019-09-12

## 2019-09-11 RX ORDER — CIPROFLOXACIN 2 MG/ML
400 INJECTION, SOLUTION INTRAVENOUS
Status: DISCONTINUED | OUTPATIENT
Start: 2019-09-11 | End: 2019-09-12

## 2019-09-11 RX ORDER — SODIUM CHLORIDE 0.9 % (FLUSH) 0.9 %
10 SYRINGE (ML) INJECTION EVERY 6 HOURS
Status: DISCONTINUED | OUTPATIENT
Start: 2019-09-11 | End: 2019-09-16 | Stop reason: HOSPADM

## 2019-09-11 RX ORDER — ACETAMINOPHEN 325 MG/1
650 TABLET ORAL EVERY 8 HOURS PRN
Status: DISCONTINUED | OUTPATIENT
Start: 2019-09-11 | End: 2019-09-11

## 2019-09-11 RX ORDER — ACETAMINOPHEN 500 MG
1000 TABLET ORAL ONCE
Status: COMPLETED | OUTPATIENT
Start: 2019-09-11 | End: 2019-09-11

## 2019-09-11 RX ORDER — IBUPROFEN 200 MG
16 TABLET ORAL
Status: DISCONTINUED | OUTPATIENT
Start: 2019-09-11 | End: 2019-09-16 | Stop reason: HOSPADM

## 2019-09-11 RX ORDER — AMOXICILLIN 250 MG
1 CAPSULE ORAL 2 TIMES DAILY PRN
Status: DISCONTINUED | OUTPATIENT
Start: 2019-09-11 | End: 2019-09-12

## 2019-09-11 RX ORDER — NALOXONE HYDROCHLORIDE 1 MG/ML
1 INJECTION INTRAMUSCULAR; INTRAVENOUS; SUBCUTANEOUS ONCE
Status: DISCONTINUED | OUTPATIENT
Start: 2019-09-11 | End: 2019-09-11

## 2019-09-11 RX ADMIN — NALOXONE HYDROCHLORIDE 1 MG: 1 INJECTION PARENTERAL at 12:09

## 2019-09-11 RX ADMIN — ENOXAPARIN SODIUM 160 MG: 100 INJECTION SUBCUTANEOUS at 01:09

## 2019-09-11 RX ADMIN — TORSEMIDE 40 MG: 20 TABLET ORAL at 08:09

## 2019-09-11 RX ADMIN — VANCOMYCIN HYDROCHLORIDE 2000 MG: 1 INJECTION, POWDER, LYOPHILIZED, FOR SOLUTION INTRAVENOUS at 06:09

## 2019-09-11 RX ADMIN — AZTREONAM 2000 MG: 2 INJECTION, POWDER, LYOPHILIZED, FOR SOLUTION INTRAMUSCULAR; INTRAVENOUS at 06:09

## 2019-09-11 RX ADMIN — METRONIDAZOLE 500 MG: 500 INJECTION, SOLUTION INTRAVENOUS at 11:09

## 2019-09-11 RX ADMIN — SODIUM CHLORIDE, SODIUM LACTATE, POTASSIUM CHLORIDE, AND CALCIUM CHLORIDE: .6; .31; .03; .02 INJECTION, SOLUTION INTRAVENOUS at 10:09

## 2019-09-11 RX ADMIN — Medication 10 ML: at 06:09

## 2019-09-11 RX ADMIN — ACETAMINOPHEN 1000 MG: 500 TABLET ORAL at 05:09

## 2019-09-11 RX ADMIN — RAMELTEON 8 MG: 8 TABLET, FILM COATED ORAL at 10:09

## 2019-09-11 RX ADMIN — LOPERAMIDE HYDROCHLORIDE 4 MG: 2 CAPSULE ORAL at 05:09

## 2019-09-11 RX ADMIN — ENOXAPARIN SODIUM 130 MG: 150 INJECTION SUBCUTANEOUS at 12:09

## 2019-09-11 RX ADMIN — METRONIDAZOLE 500 MG: 500 INJECTION, SOLUTION INTRAVENOUS at 02:09

## 2019-09-11 RX ADMIN — FUROSEMIDE 40 MG: 10 INJECTION, SOLUTION INTRAVENOUS at 12:09

## 2019-09-11 RX ADMIN — METRONIDAZOLE 500 MG: 500 INJECTION, SOLUTION INTRAVENOUS at 08:09

## 2019-09-11 RX ADMIN — METRONIDAZOLE 500 MG: 500 INJECTION, SOLUTION INTRAVENOUS at 03:09

## 2019-09-11 RX ADMIN — MICONAZOLE NITRATE 1 APPLICATION: 20 CREAM TOPICAL at 11:09

## 2019-09-11 RX ADMIN — CIPROFLOXACIN 400 MG: 2 INJECTION, SOLUTION INTRAVENOUS at 03:09

## 2019-09-11 RX ADMIN — ENOXAPARIN SODIUM 30 MG: 100 INJECTION SUBCUTANEOUS at 12:09

## 2019-09-11 RX ADMIN — AZTREONAM 2000 MG: 2 INJECTION, POWDER, LYOPHILIZED, FOR SOLUTION INTRAMUSCULAR; INTRAVENOUS at 12:09

## 2019-09-11 RX ADMIN — Medication 10 ML: at 11:09

## 2019-09-11 RX ADMIN — ENOXAPARIN SODIUM 160 MG: 100 INJECTION SUBCUTANEOUS at 11:09

## 2019-09-11 RX ADMIN — DEXTROSE 0.04 MCG/KG/MIN: 5 SOLUTION INTRAVENOUS at 05:09

## 2019-09-11 RX ADMIN — SODIUM CHLORIDE, SODIUM LACTATE, POTASSIUM CHLORIDE, AND CALCIUM CHLORIDE 1000 ML: .6; .31; .03; .02 INJECTION, SOLUTION INTRAVENOUS at 06:09

## 2019-09-11 NOTE — PROCEDURES
"Drew Farrar is a 53 y.o. male patient.    Temp: 98.5 °F (36.9 °C) (09/10/19 2351)  Pulse: 105 (09/11/19 0041)  Resp: (!) 31 (09/11/19 0041)  BP: (!) 97/50 (09/11/19 0041)  SpO2: 98 % (09/11/19 0041)  Weight: (!) 160.1 kg (353 lb) (09/10/19 1418)  Height: 6' 8" (203.2 cm) (09/10/19 1418)    PICC  Date/Time: 9/11/2019 1:16 AM  Consent Done: Yes  Time out: Immediately prior to procedure a time out was called to verify the correct patient, procedure, equipment, support staff and site/side marked as required  Indications: med administration and vascular access  Anesthesia: local infiltration  Local anesthetic: lidocaine 1% without epinephrine  Anesthetic Total (mL): 3  Preparation: skin prepped with ChloraPrep  Skin prep agent dried: skin prep agent completely dried prior to procedure  Sterile barriers: all five maximum sterile barriers used - cap, mask, sterile gown, sterile gloves, and large sterile sheet  Hand hygiene: hand hygiene performed prior to central venous catheter insertion  Location details: right basilic  Catheter type: double lumen  Catheter size: 5 Fr  Catheter Length: 42cm    Ultrasound guidance: yes  Vessel Caliber: medium and patent, compressibility normal  Vascular Doppler: not done  Needle advanced into vessel with real time Ultrasound guidance.  Sterile sheath used.  no esophageal manometryNumber of attempts: 1  Post-procedure: blood return through all ports, chlorhexidine patch and sterile dressing applied  Estimated blood loss (mL): 3  Specimens: No  Implants: No  Assessment: placement verified by x-ray and successful placement  Complications: none          Bo Vaz  9/11/2019  "

## 2019-09-11 NOTE — ED NOTES
Pt able to get to bedside commode to have a small BM. Pt needing max assistance. Pt denies cp, sob, headache, or any other complaints at this time.

## 2019-09-11 NOTE — MEDICAL/APP STUDENT
[facility] Infectious Disease     Primary Team: Dr. Moreno  Consultant Attending: Dr. Connolly  Date of Admit: 9/10/2019    Summary of history   Mr. 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 Gutierrez) who presented to the ED yesterday for evaluation of swelling, redness, and pain of his right knee associated with n/v/d.  On presentation, he was tachycardic with afib rhythm, febrile to 102.3, and with an LA of 4.  Code sepsis was called and he was put on 2L O2 by NC and he was given IVF, vanc, aztreonam, and flagyl and admitted to [facility] for treatment of severe sepsis possibly secondary to RLE cellulitis.  Arthrocentesis attempt of R knee was unsuccessful. Ortho consulted due to concern for septic joint.  After he became hypotensive to 80/40 he was transferred from the floor to the ICU for pressor support and bp stabalized. PICC line placed RUE. Xray R knee showed no acute changes with chronic deformity of tibial tuberosity. US of bilateral LE shows femoral DVT B/L (left femoral DVT is new).  CXR showed cardiomegaly unchanged with no failure or pneumonia. V/Q scan shows intermediate probability PE at lingula.  Troponin positive at 0.168.  Lactic acid downtrending today to 1.8.  Blood cultures (9/10) growing gram negative rods.  Influenza A&B negative.  C. Diff ordered today because of diarrhea. ID consulted to manage antibiotics.  He is currently on aztreonam 2000 mg IV q8, metronidazole 500 mg q8, and vanc 2000 mg q12.      Of note, patient stated that three weeks ago he completed a 2 week course of cipro and clinda for RLE cellulitis.  On 9/2, 9 days ago, the patient went to the ED for evaluation of epistaxis, and ED physician noticed tinea corporis, tinea cruris, and tenia pedis on bilateral lower extremities, bilateral feet, and on abdomen.  ED  "provider discharged patient with terbinafine PO daily and atarax Rx.       Today, patient remains afebrile and bp stable.  On interview, he denies any recent ingestion of raw oysters or lake swimming, but does have a swimming pool at his house which he swam in on  and .  He lives at home with his wife about 2 blocks from the hospital.  He has been taking the terbinafine from the ED doctor 9 days ago with no improvement.  He has an allergy to penicillin and says he breaks out in hives when it is given to him, but he can tolerate keflex.    Subjective     ***    Current Medications:     Infusions:   norepinephrine bitartrate-D5W 0.02 mcg/kg/min (19 1045)        Scheduled:   aztreonam  2,000 mg Intravenous Q8H    enoxaparin  1 mg/kg Subcutaneous Q12H    metronidazole  500 mg Intravenous Q8H    sodium chloride 0.9%  10 mL Intravenous Q6H    torsemide  40 mg Oral Daily    vancomycin (VANCOCIN) IVPB  2,000 mg Intravenous Q12H        PRN:  acetaminophen, Dextrose 10% Bolus, Dextrose 10% Bolus, glucagon (human recombinant), glucose, glucose, ondansetron, promethazine (PHENERGAN) IVPB, ramelteon, senna-docusate 8.6-50 mg, Flushing PICC Protocol **AND** sodium chloride 0.9% **AND** sodium chloride 0.9%, sodium chloride 0.9%    Antibiotics and Day Number of Therapy:  ***    Objective   Last 24 Hour Vital Signs:  BP  Min: 80/42  Max: 152/65  Temp  Av.2 °F (37.3 °C)  Min: 97.8 °F (36.6 °C)  Max: 102.3 °F (39.1 °C)  Pulse  Av.5  Min: 76  Max: 123  Resp  Av.7  Min: 18  Max: 34  SpO2  Av.4 %  Min: 92 %  Max: 100 %  Height  Av' 8" (203.2 cm)  Min: 6' 8" (203.2 cm)  Max: 6' 8" (203.2 cm)  Weight  Avg: 160.7 kg (354 lb 4.8 oz)  Min: 160.1 kg (353 lb)  Max: 161.3 kg (355 lb 9.6 oz)    Lines, drains, airway:  ***    Physical Examination:  Examination  General:  HEENT:  Neck:  Cardiac:  Pulm/Chest:  GI/Rectal:  :  Msk:  Vascular:  Lymph nodes:  Skin/ Extremities:  Neurology/ Mental status: "     Lab data:  CBC:   Lab Results   Component Value Date    WBC 10.96 09/11/2019    HGB 8.2 (L) 09/11/2019     09/11/2019    MCV 82 09/11/2019    RDW 17.2 (H) 09/11/2019       Estimated Creatinine Clearance: 94.3 mL/min (A) (based on SCr of 1.6 mg/dL (H)).    Microbiology Data  ***    Other Lab Data  ***    Other Results:  EKG    Radiology    Results of procedures/ Findings   ***    Assessment     Drew Farrar is a 53 y.o.male with past medical history as above.        Recommendations     ***      Thank you for this consult. We will follow.    Sahara Zavala  U L4

## 2019-09-11 NOTE — ED NOTES
Pt transport to 4th floor via stretcher. Pt aaox3. rr even and unlabored on 2LNC. Pt transported with belongings and consents. Pt stable at time of transfer.

## 2019-09-11 NOTE — CONSULTS
[facility]  Orthopedics  Consult Note    Patient Name: Drew Farrar  MRN: [id number]  Admission Date: 9/10/2019  Hospital Length of Stay: 1 days  Attending Provider: Severyn Yaroshevsky, MD  Primary Care Provider: Garrison Roach MD    Patient information was obtained from patient and past medical records.     Consults  Subjective:     Principal Problem:Severe sepsis    Chief Complaint:   Chief Complaint   Patient presents with    Fever     Palpitations with shortness of breath that started today.  He originally called 911 for right knee pain and fever that started last night.        HPI:  I was consulted to evaluate the patient for sepsis the right knee. He complains of 24 hr increasing pain in his right calf.  He has a well documented history of osteoarthritis of the right knee and reports that the knee itself feels about the same.  Since his admission the patient has had a blood culture positive for gram-negative rods and ultrasound positive for DVT in both lower extremities.  His past history is also positive for previous thromboembolic events.  Of note is the fact the patient has required pressors over the past day.  In addition, there had been to attempts to aspirate the right knee without any fluid being obtained other than a little blood, by the patient's report.    Past Medical History:   Diagnosis Date    *Atrial fibrillation     Anticoagulant long-term use     Arthritis     Atrial fibrillation     Atrial fibrillation Feb 23, 2016    Bipolar disorder     CHF (congestive heart failure)     Congenital heart disease     s/p surgical intervention 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 of age    Deep vein thrombosis     DVT of leg (deep venous thrombosis)     left leg    History of prior ablation treatment     10/9/13    Hypertension     Obesity     Stroke     Thyroid disease     Venous stasis ulcer of lower extremity, unspecified laterality 12/14/2012    Venous ulcer        Past Surgical History:    Procedure Laterality Date    ANGIOPLASTY      ARTHROSCOPY-KNEE W/ CHONDROPLASTY Right 2/23/2016    Per[facility] OR    CARDIAC SURGERY      open heart surgery 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 old    EYE SURGERY      left eye cataract/right eye glaucoma    TIMO FILTER PLACEMENT      Dr Calix ([facility])    KNEE SURGERY      l and r     MULTIPLE TOOTH EXTRACTIONS      Sclerotherapy N/A 2/21/2019    Performed by [facility]igua [facility] at [facility] CATH LAB/EP    SKIN GRAFT      left leg    SYNOVECTOMY-KNEE Right 2/23/2016    Performed by [facility] at [facility] OR       Review of patient's allergies indicates:   Allergen Reactions    Contrast media Other (See Comments)     Severe chest pain    Food allergy formula [glutamine-c-quercet-selen-brom]      Allergic to green peas; Heart failure.    Iodinated contrast media Other (See Comments)     Chest pain    Peas Hives    Ibuprofen Swelling    Latex, natural rubber Hives    Pcn [penicillins] Hives    Butisol [butabarbital] Rash     Peeling skin       Current Facility-Administered Medications   Medication    acetaminophen tablet 650 mg    ciprofloxacin (CIPRO)400mg/200ml D5W IVPB 400 mg    dextrose 10% (D10W) Bolus    dextrose 10% (D10W) Bolus    enoxaparin injection 160 mg    glucagon (human recombinant) injection 1 mg    glucose chewable tablet 16 g    glucose chewable tablet 24 g    lactated ringers infusion    metronidazole IVPB 500 mg    norepinephrine bitartrate-D5W 16 mg/250 mL (64 mcg/mL) infusion    ondansetron disintegrating tablet 8 mg    promethazine (PHENERGAN) 6.25 mg in dextrose 5 % 50 mL IVPB    ramelteon tablet 8 mg    senna-docusate 8.6-50 mg per tablet 1 tablet    sodium chloride 0.9% flush 10 mL    And    sodium chloride 0.9% flush 10 mL    sodium chloride 0.9% flush 5 mL    torsemide tablet 40 mg    vancomycin (VANCOCIN) 2,000 mg in dextrose 5 % 500 mL IVPB     Family History     Problem Relation (Age of  "Onset)    Diabetes Father, Maternal Grandfather    Heart disease Father, Maternal Grandmother, Maternal Grandfather    Stroke Maternal Grandfather        Tobacco Use    Smoking status: Former Smoker     Packs/day: 1.00     Years: 6.00     Pack years: 6.00     Types: Cigarettes    Smokeless tobacco: Former User    Tobacco comment: quit by age 27yrs old   Substance and Sexual Activity    Alcohol use: Yes     Alcohol/week: 0.6 oz     Types: 1 Glasses of wine per week     Frequency: Monthly or less     Comment: occasionally    Drug use: No    Sexual activity: Yes     Partners: Female     Birth control/protection: None     ROS  Objective:     Vital Signs (Most Recent):  Temp: 97.8 °F (36.6 °C) (09/11/19 1104)  Pulse: 100 (09/11/19 1200)  Resp: (!) 33 (09/11/19 1200)  BP: (!) 111/51 (09/11/19 1200)  SpO2: 98 % (09/11/19 1200) Vital Signs (24h Range):  Temp:  [97.8 °F (36.6 °C)-102.3 °F (39.1 °C)] 97.8 °F (36.6 °C)  Pulse:  [] 100  Resp:  [18-34] 33  SpO2:  [92 %-100 %] 98 %  BP: ()/(42-83) 111/51     Weight: (!) 161.3 kg (355 lb 9.6 oz)  Height: 6' 8" (203.2 cm)  Body mass index is 39.06 kg/m².      Intake/Output Summary (Last 24 hours) at 9/11/2019 1414  Last data filed at 9/11/2019 1115  Gross per 24 hour   Intake 1316.6 ml   Output 600 ml   Net 716.6 ml       Ortho/SPM Exam     Right knee    [unfilled]    The patient is not in acute distress. He appears nontoxic, despite his hemodynamics and blood cultures.  Sclerae normal  Body habitus is overweight.  Respiratory distress:  none     Right knee  .   The skin over the knee is has a healed scar.  Knee effusion trace  Tendernes is located diffusely  Range of motion- Flexion 100 deg, Extension 10 deg,   Ligament laxity exam:   MCL 1+     There is severe distal lymphedema in tenderness of the calf.+  Motor normal 5/5 strength in all tested muscle groups.   Sensory normal.    I reviewed the relevant radiographic images for the patient's condition:  Right knee " films show advanced osteoarthritic changes with medial narrowing and remote postsurgical changes. There is no significant interval change since I saw the patient in clinic almost 2 years ago.    Significant Labs: All pertinent labs within the past 24 hours have been reviewed.    Significant Imaging: I have reviewed all pertinent imaging results/findings.    Assessment/Plan:     Active Diagnoses:    Diagnosis Date Noted POA    PRINCIPAL PROBLEM:  Severe sepsis [A41.9, R65.20] 04/24/2019 Unknown    (HFpEF) heart failure with preserved ejection fraction [I50.30] 09/10/2019 Unknown    Hyperthyroidism [E05.90] 09/05/2017 Yes     Chronic    Acute on chronic diastolic congestive heart failure [I50.33] 02/20/2017 Unknown    HTN (hypertension) [I10] 09/16/2016 Yes    Cellulitis [L03.90] 02/13/2014 Yes    Chronic atrial fibrillation [I48.2] 12/14/2012 Yes      Problems Resolved During this Admission:     The patient's history and physical examination are not really consistent with a septic knee. Specifically, he has reasonable range of motion, essentially at baseline.  Also, by palpation, I am unable to appreciate much effusion at all.  I also do not appreciate any obvious radiographic signs of a large effusion.    Spontaneous gram-negative juan sepsis of the knee would be extremely unusual, as most septic knees are Gram-positive.    Considering to previous unsuccessful attempts to obtain joint fluid upon aspiration, I do not recommend further attempts at this time.    My overall impression is that the patient's source of sepsis is not his knee.     Thank you for your consult. I will follow-up with patient. Please contact us if you have any additional questions.    Edgard Timmons MD  Orthopedics  [facility]

## 2019-09-11 NOTE — PT/OT/SLP EVAL
Occupational Therapy   Evaluation    Name: Drew Farrar  MRN: [id number]  Admitting Diagnosis:  Septic shock      Recommendations:     Discharge Recommendations: (TBD pending progress, likely SNF vs HHOT/PT)  Discharge Equipment Recommendations:  (TBD likely none)  Barriers to discharge:  Decreased caregiver support    Assessment:     Drew Farrar is a 53 y.o. male with a medical diagnosis of Septic shock.  He presents with deconditioning, pain RLE limiting occupational performance. Performance deficits affecting function: weakness, impaired self care skills, impaired balance, decreased coordination, decreased ROM, impaired skin, edema, impaired coordination, pain, decreased upper extremity function, decreased lower extremity function, gait instability, impaired sensation, impaired functional mobilty, impaired endurance, impaired cognition.      Rehab Prognosis: Good to Fair+; patient would benefit from acute skilled OT services to address these deficits and reach maximum level of function.       Plan:     Patient to be seen   to address the above listed problems via self-care/home management, therapeutic activities, therapeutic exercises  · Plan of Care Expires: 10/11/19  · Plan of Care Reviewed with: patient    Subjective     Chief Complaint: Pt c/o 10/10 pain RLE  Patient/Family Comments/goals:  Pt states he wants to return to PLOF and for RLE pain relief/edema relief    Occupational Profile:  Pt may be a poor historian as he initially stated he lived alone but later reported his mother and spouse have rooms in the home   Living Environment: Pt lives with spouse full time and mother part time, SSH, 1STE, tub/sh combo with GB and TTB; reports BSC over toilet  Previous level of function: Mod I for ADLs and functional mobility  Roles and Routines: Caretaker to self, reports that he has PCA 5 days/week from 10a-3p who assists with driving to activities and grocery store but states he does the grocery shopping, he  "states he and wife cook,   Equipment Used at Home:  cane, straight, bedside commode, bath bench, rollator, walker, rolling, wheelchair  Assistance upon Discharge: Unknown but reports that spouse is unable to provide physical assistance; pt states that spouse "is on disability" and has "Little Person Syndrome" but may be mistaken as he states that his spouse is 5'4"     Pain/Comfort:  Pain Rating 1: 10/10  Location - Side 1: Right  Location - Orientation 1: generalized  Location 1: knee  Pain Addressed 1: Reposition, Distraction, Cessation of Activity, Nurse notified  Pain Rating Post-Intervention 1: 9/10    Patients cultural, spiritual, Confucianism conflicts given the current situation:      Objective:     Communicated with: nsg prior to session.  Patient found HOB elevated with blood pressure cuff, telemetry, peripheral IV, pulse ox (continuous), PICC line upon OT entry to room.    General Precautions: Standard, fall, special contact   Orthopedic Precautions:N/A   Braces: N/A     Occupational Performance:    Bed Mobility:    · Patient completed Rolling/Turning to Right with moderate assistance with total A at RLE  · Patient completed Scooting/Bridging with SBA to scoot along EOB   · Patient completed Supine to Sit with minimum assistance with total A at RLE  · Patient completed Sit to Supine with minimum assistance with total A at RLE    Functional Mobility/Transfers:  · Patient completed Sit <> Stand Transfer with moderate assistance and of 2 persons  with  rolling walker   Functional Mobility: Pt with fair to fair- dynamic seated and standing balance.     Activities of Daily Living:  · Upper Body Dressing: moderate assistance to don gown as robe  · Lower Body Dressing: total assistance to don/doff shoe cover over RLE, SBA to don/doff L shoe    Cognitive/Visual Perceptual:  Cognitive/Psychosocial Skills:     -       Oriented to: Person, Place, Time and Situation   -       Follows Commands/attention:Follows " multistep  commands  -       Communication: clear/fluent  -       Memory: Impaired STM/LTM and as pt with inability to accurately recal who he lives with and spouse's disability  -       Safety awareness/insight to disability: intact   -       Mood/Affect/Coping skills/emotional control: Appropriate to situation    Physical Exam:  Postural examination/scapula alignment:    -       Rounded shoulders  -       Forward head  Skin integrity: Wound L top of foot and redness RLE  Edema:  Severe RLE  Sensation:  Impaired per pt report at lower shin/foot  Motor Planning:    -       WFL  Dominant hand:    -       R handed  Upper Extremity Range of Motion: BUE WFL except B shoulder flexion minimally limited ~0-150*     Upper Extremity Strength:  BUE grossly 3+/5   Strength:  B hands 4-/5  Fine Motor Coordination:    -       Intact in functional task practice to open toothpaste but became frustrated with thumb/digit opposition and refused to continue attempt  Gross motor coordination:   WFL    AMPAC 6 Click ADL:  AMPAC Total Score: 18    Treatment & Education:  Pt educated on role of OT and POC.   Pt performing skills as listed above.   Pt demonstrated limited endurance in session and required min/mod v/c to pace activities and to complete PLB for improved endurance  Education:    Patient left HOB elevated with all lines intact, call button in reach, nsg notified and nsg present    GOALS:   Multidisciplinary Problems     Occupational Therapy Goals        Problem: Occupational Therapy Goal    Goal Priority Disciplines Outcome Interventions   Occupational Therapy Goal     OT, PT/OT Ongoing (interventions implemented as appropriate)    Description:  Goals to be met by: 10/11/2019      Patient will increase functional independence with ADLs by performing:    UE Dressing with Modified [place].  LE Dressing with Stand-by Assistance.  Grooming while standing with Modified [place].  Toileting from bedside commode with  Contact Guard Assistance for hygiene and clothing management.   Step transfer with Contact Guard Assistance  Toilet transfer to bedside commode with Stand-by Assistance.  Increased functional strength to WFL for self care.  Upper extremity exercise program x10 reps per handout, with assistance as needed.                      History:     Past Medical History:   Diagnosis Date    *Atrial fibrillation     Anticoagulant long-term use     Arthritis     Atrial fibrillation     Atrial fibrillation Feb 23, 2016    Bipolar disorder     CHF (congestive heart failure)     Congenital heart disease     s/p surgical intervention 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 of age    Deep vein thrombosis     DVT of leg (deep venous thrombosis)     left leg    History of prior ablation treatment     10/9/13    Hypertension     Obesity     Stroke     Thyroid disease     Venous stasis ulcer of lower extremity, unspecified laterality 12/14/2012    Venous ulcer          Past Surgical History:   Procedure Laterality Date    ANGIOPLASTY      ARTHROSCOPY-KNEE W/ CHONDROPLASTY Right 2/23/2016    [facility] OR    CARDIAC SURGERY      open heart surgery 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 old    EYE SURGERY      left eye cataract/right eye glaucoma    TIMO FILTER PLACEMENT      Dr Calix ([facility])    KNEE SURGERY      l and r     MULTIPLE TOOTH EXTRACTIONS      Sclerotherapy N/A 2/21/2019    Performed by [facility] at [facility] CATH LAB/EP    SKIN GRAFT      left leg    SYNOVECTOMY-KNEE Right 2/23/2016    Performed by [facility] at [facility] OR       Time Tracking:     OT Date of Treatment: 09/11/19  OT Start Time: 1122  OT Stop Time: 1201  OT Total Time (min): 39 min    Billable Minutes:Evaluation 10  Self Care/Home Management 13 Co Tx PT    Ruth Cardenas, OT  9/11/2019

## 2019-09-11 NOTE — ED NOTES
"Pt denies cp or SOB at this time. Pt states "I only get like that when my knee starts to hurt then I makes me get anxious and my chest starts to hurt." MD at bedside.  "

## 2019-09-11 NOTE — PLAN OF CARE
Patient on Vanc, Aztreonam and flagyl for presumed severe sepsis 2/2 to RLE cellulitis. ESR and CRP elevated.  Patient became acutely SOB with desat to 68% on RA in the ED witnessed at bedside. 4L NC given. Lovenox 1mg/kg q12hr started. PICC team placed line @ right arm. U/s of B/L LE shows femoral DVT B/L ( left femoral DVT is new). V/Q scan shows intermediate probability PE at lingula. Lactic acid initially 4.0 now 1.8. S/p 4,800 ml (30cc/kg). Patient moved to the floor as MAP was maintained, however began to decompensate. ICU charge nurse contacted and accepted transfer to ICU for pressor support. Patient continues to have volume loss from ABx given, unclear as to which one is causing diarrhea at this time. Trop elevated likely 2/2 to Type 2 NSTEMI. Repeat EKG shows Atrial Fibrillation with no acute ST Changes.     Plan:  -Will start Levophed Drip  -Consult Pulmonary Crit   -Follow up orthopedics   -Follow up wound care   -Follow up AM labs   -Continue Lovenox 1mg/kg BID   -Restart rate control following Improvement in BP control     Dr. Fleming evaluated patient at bedside. Attending on call Dr. Ji notified.     Vladimir Su MD   [employer] [facility], PGY-2

## 2019-09-11 NOTE — CONSULTS
[facility] Critical Care/Pulmonology Consult Note    Admitting Team: Family Medicine  Attending Physician: Severyn Yaroshevsky, MD     Consult Attending: MD Jody  Consult Fellow: MD Max  Consult Resident: DO Palmira      Date of Admit: 9/10/2019    Chief Complaint     Right leg pain and fever X1 day    Subjective:      History of Present Illness:  Drew Farrar is a 53 y.o. male who  has a past medical history of atrial fibrillation, HFpEF (EF 60% on 08/03), DVT, PE, hypertension, hyperthyroidism, and venous stasis ulcers. The patient presented to [facility] on 9/10/2019 with a primary complaint of fever and right LE pain X1 day.     The patient reports this RLE pain started around 3 am yesterday morning prior to presenting to the ED. He was discharged from the hospital 3 weeks ago for cellulitis in the same leg and completed a 2 week course of Ciprofloxacin and Clindamycin. On presentation he complained of fever, chills, n/v, diarrhea, and severe R LE tenderness. He states that this pain and tenderness started suddenly and he denies injury or trauma to the area. This morning, the patient reports improvement in his pain and states he is able to get some relief by having it up on a pillow. Movement and palpation of the R knee joint still produces significant pain. He does endorse 5 episodes of chestnut brown diarrhea (describes consistent as soft serve) a day for the past two days without associated abdominal pain. He denies any current fever, chills, n/v, CP, SOB, cough, hemoptysis, headache, dizziness, or confusion.    Of note patient had been from warfarin to Xarelto back in August 2019 for PE, as patient has had difficult to control coumadin levels.    Past Medical History:  Past Medical History:   Diagnosis Date    *Atrial fibrillation     Anticoagulant long-term use     Arthritis     Atrial fibrillation     Atrial fibrillation Feb 23, 2016    Bipolar disorder     CHF (congestive heart  failure)     Congenital heart disease     s/p surgical intervention 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 of age    Deep vein thrombosis     DVT of leg (deep venous thrombosis)     left leg    History of prior ablation treatment     10/9/13    Hypertension     Obesity     Stroke     Thyroid disease     Venous stasis ulcer of lower extremity, unspecified laterality 12/14/2012    Venous ulcer        Past Surgical History:  Past Surgical History:   Procedure Laterality Date    ANGIOPLASTY      ARTHROSCOPY-KNEE W/ CHONDROPLASTY Right 2/23/2016    Performed by Alejandro Vill[facility]ueva [facility] at [facility] OR    CARDIAC SURGERY      open heart surgery 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 old    EYE SURGERY      left eye cataract/right eye glaucoma    TIMO FILTER PLACEMENT      Dr Calix ([facility])    KNEE SURGERY      l and r     MULTIPLE TOOTH EXTRACTIONS      Sclerotherapy N/A 2/21/2019    Performed by Gomez Lantigua MD at [facility] CATH LAB/EP    SKIN GRAFT      left leg    SYNOVECTOMY-KNEE Right 2/23/2016    Performed by Alejandro [facility]ueva MD at [facility] OR       Allergies:  Review of patient's allergies indicates:   Allergen Reactions    Contrast media Other (See Comments)     Severe chest pain    Food allergy formula [glutamine-c-quercet-selen-brom]      Allergic to green peas; Heart failure.    Iodinated contrast media Other (See Comments)     Chest pain    Peas Hives    Ibuprofen Swelling    Latex, natural rubber Hives    Pcn [penicillins] Hives    Butisol [butabarbital] Rash     Peeling skin       Home Medications:  Prior to Admission medications    Medication Sig Start Date End Date Taking? Authorizing Provider   hydrOXYzine HCl (ATARAX) 25 MG tablet Take 1 tablet (25 mg total) by mouth 4 (four) times daily as needed for Itching. 9/2/19  Yes Abel Sanchez MD   lisinopril 10 MG tablet Take 1 tablet (10 mg total) by mouth once daily.  Patient taking differently: Take 7.5 mg by mouth once daily.  6/20/19 6/19/20 Yes Edward Castañeda MD   metoprolol succinate  "(TOPROL-XL) 100 MG 24 hr tablet Take 1 tablet (100 mg total) by mouth once daily.  Patient taking differently: Take 100 mg by mouth every evening.  5/14/19 11/10/19 Yes David Larsen MD   nystatin (MYCOSTATIN) powder Apply topically 2 (two) times daily. 19  Yes David Larsen MD   rivaroxaban (XARELTO) 20 mg Tab Take 1 tablet (20 mg total) by mouth daily with dinner or evening meal. 19  Yes Terrance Fleming MD   torsemide (DEMADEX) 20 MG Tab Take 2 tablets (40 mg total) by mouth once daily. 19  Yes Edward Castañeda MD   HYDROcodone-acetaminophen (NORCO) 5-325 mg per tablet Take 1 tablet by mouth every 6 (six) hours as needed for Pain. 19   PAZ Bañuelos   terbinafine HCl (LAMISIL) 250 mg tablet Take 1 tablet (250 mg total) by mouth once daily. 9/2/19 10/17/19  Abel Sanchez MD       Family History:  Family History   Problem Relation Age of Onset    Diabetes Father     Heart disease Father     Heart disease Maternal Grandmother     Diabetes Maternal Grandfather     Heart disease Maternal Grandfather     Stroke Maternal Grandfather        Social History:  Social History     Tobacco Use    Smoking status: Former Smoker     Packs/day: 1.00     Years: 6.00     Pack years: 6.00     Types: Cigarettes    Smokeless tobacco: Former User    Tobacco comment: quit by age 27yrs old   Substance Use Topics    Alcohol use: Yes     Alcohol/week: 0.6 oz     Types: 1 Glasses of wine per week     Frequency: Monthly or less     Comment: occasionally    Drug use: No       Review of Systems:  All other systems are reviewed and are negative.       Objective:   Last 24 Hour Vital Signs:  BP  Min: 80/42  Max: 152/65  Temp  Av.4 °F (37.4 °C)  Min: 98.2 °F (36.8 °C)  Max: 102.3 °F (39.1 °C)  Pulse  Av.2  Min: 81  Max: 123  Resp  Av.2  Min: 18  Max: 34  SpO2  Av.2 %  Min: 92 %  Max: 100 %  Height  Av' 8" (203.2 cm)  Min: 6' 8" (203.2 cm)  Max: 6' 8" " (203.2 cm)  Weight  Avg: 160.7 kg (354 lb 4.8 oz)  Min: 160.1 kg (353 lb)  Max: 161.3 kg (355 lb 9.6 oz)  Body mass index is 39.06 kg/m².  I/O last 3 completed shifts:  In: 1106 [I.V.:6; IV Piggyback:1100]  Out: 100 [Urine:100]    Physical Examination:  Constitutional: Oriented to person, place, and time. No acute distress.  HENT:  Head: Normocephalic and atraumatic.  Throat: Oropharynx clear and moist, no oropharyngeal erythema or exudate.  Eyes: Pupils equal, round, and reactive to light. EOM are normal. No sclera icterus, erythema, or discharge.  Neck: Neck supple. No tenderness on palpation. No carotid bruit.  Cardiovascular: Irregular rate and rhythm. Tachycardic. No murmurs, gallops, or friction rub.  Pulmonary/Chest: Normal breath sounds bilaterally. Effort normal, no respiratory distress. No wheezes or rales. No tenderness.  Abdominal: Bowel sounds normal. No tenderness or rebound.  Musculoskeletal: Right leg and calf swelling, tenderness, erythema, and warmth. Decreased range of motion in right knee. No purulence. Ulcer on lateral left ankle.  Neurological: Alert. No motor deficits. No sensory deficits.  Skin: Skin is warm and dry, no rash noted. Chronic skin changes and thickening of bilateral LEs.             Laboratory:  Most Recent Data:  CBC:   Lab Results   Component Value Date    WBC 12.59 09/10/2019    HGB 9.5 (L) 09/10/2019    HCT 30.7 (L) 09/10/2019     09/10/2019    MCV 81 (L) 09/10/2019    RDW 16.6 (H) 09/10/2019     BMP:   Lab Results   Component Value Date     09/10/2019    K 5.6 (H) 09/10/2019     09/10/2019    CO2 17 (L) 09/10/2019    BUN 36 (H) 09/10/2019    CREATININE 1.4 09/10/2019    GLU 92 09/10/2019    CALCIUM 9.6 09/10/2019    MG 1.4 (L) 09/10/2019    PHOS 4.5 09/10/2019     LFTs:   Lab Results   Component Value Date    PROT 7.7 09/10/2019    ALBUMIN 3.5 09/10/2019    BILITOT 1.0 09/10/2019    AST 35 09/10/2019    ALKPHOS 141 (H) 09/10/2019    ALT 28 09/10/2019      Coags:   Lab Results   Component Value Date    INR 1.1 09/10/2019     FLP:   Lab Results   Component Value Date    CHOL 124 07/10/2017    HDL 23 (L) 07/10/2017    LDLCALC 70.4 07/10/2017    TRIG 153 (H) 07/10/2017    CHOLHDL 18.5 (L) 07/10/2017     DM:   Lab Results   Component Value Date    HGBA1C 5.2 09/10/2019    HGBA1C 5.5 07/25/2019    HGBA1C 5.3 07/09/2019    LDLCALC 70.4 07/10/2017    CREATININE 1.4 09/10/2019     Thyroid:   Lab Results   Component Value Date    TSH <0.010 (L) 09/10/2019    FREET4 2.31 (H) 09/10/2019    W8PJHLY 65 01/04/2018     Anemia:   Lab Results   Component Value Date    IRON 13 (L) 02/24/2016    TIBC 209 (L) 02/24/2016    FERRITIN 541 (H) 02/24/2016    OOYTBABN26 822 08/25/2012    FOLATE 8.3 08/25/2012     Cardiac:   Lab Results   Component Value Date    TROPONINI 0.168 (H) 09/11/2019     (H) 09/10/2019     Urinalysis:   Lab Results   Component Value Date    COLORU Orange (A) 09/10/2019    SPECGRAV >=1.030 (A) 09/10/2019    NITRITE Negative 09/10/2019    KETONESU Negative 09/10/2019    UROBILINOGEN Negative 09/10/2019       Trended Lab Data:  Recent Labs   Lab 09/10/19  1442   WBC 12.59   HGB 9.5*   HCT 30.7*      MCV 81*   RDW 16.6*      K 5.6*      CO2 17*   BUN 36*   CREATININE 1.4   GLU 92   PROT 7.7   ALBUMIN 3.5   BILITOT 1.0   AST 35   ALKPHOS 141*   ALT 28       Trended Cardiac Data:  Recent Labs   Lab 09/10/19  1442 09/10/19  1848 09/11/19  0309   TROPONINI  --  0.165* 0.168*   *  --   --        Microbiology Data:  Blood Cx: pending    Other Results:  EKG (my interpretation): Reviewed    Radiology:  Imaging Results          NM Lung Ventilation Perfusion Imaging (In process)                X-Ray Chest AP Portable (Final result)  Result time 09/11/19 02:02:29    Final result by Donna Savage MD (09/11/19 02:02:29)                 Impression:      Interval placement of right PICC line with tip projecting over the SVC.      Electronically  signed by: Donna Savage MD  Date:    09/11/2019  Time:    02:02             Narrative:    EXAMINATION:  XR CHEST AP PORTABLE    CLINICAL HISTORY:  SOB; Sepsis, unspecified organism    TECHNIQUE:  Single frontal view of the chest was performed.    COMPARISON:  09/10/2019    FINDINGS:  Cardiac monitoring leads overlie the chest.  There has been interval placement of a right PICC line, the tip of which projects over the SVC.  The cardiomediastinal silhouette is enlarged.  There is no evidence of pneumothorax or significant interval detrimental change in lung aeration.                                US Lower Extremity Veins Bilateral (Final result)  Result time 09/10/19 21:23:38    Final result by Sohail Kline MD (09/10/19 21:23:38)                 Impression:      Nonocclusive deep venous thrombosis seen within the bilateral femoral veins.    This report was flagged in Epic as abnormal.      Electronically signed by: Sohail Kline MD  Date:    09/10/2019  Time:    21:23             Narrative:    EXAMINATION:  US LOWER EXTREMITY VEINS BILATERAL    CLINICAL HISTORY:  Possible DVT; Sepsis, unspecified organism    TECHNIQUE:  Duplex and color flow Doppler evaluation of the bilateral lower extremity veins was performed.    COMPARISON:  08/02/2019.    FINDINGS:  Right lower extremity:    Nonocclusive thrombus is visualized within the mid to distal right femoral vein.  No evidence of clot involving the right common femoral, greater saphenous, popliteal, posterior tibial, anterior tibial, and peroneal veins.    Left lower extremity:    Nonocclusive thrombus is seen within the proximal left femoral vein.  No evidence of clot involving the left common femoral, greater saphenous, popliteal, peroneal, anterior and posterior tibial veins.  No evidence of soft tissue mass or Baker's cyst.                               X-Ray Chest AP Portable (Final result)  Result time 09/10/19 15:51:14    Final result by Judy Farrar MD  (09/10/19 15:51:14)                 Impression:      Cardiomegaly unchanged.  No failure or pneumonia.      Electronically signed by: Judy Farrar  Date:    09/10/2019  Time:    15:51             Narrative:    EXAMINATION:  XR CHEST AP PORTABLE    CLINICAL HISTORY:  Sepsis;    TECHNIQUE:  Single frontal view of the chest was performed.    COMPARISON:  08/02/2019 chest    FINDINGS:  Single AP portable view at 15:09.    The heart is moderately enlarged unchanged.  The pulmonary artery is enlarged unchanged consistent with underlying pulmonary artery hypertension.    Trachea appears normal.  No pneumothorax or pleural effusion or interstitial edema consolidation or nodule.  There are overlying leads.  No rib fracture seen.                               X-Ray Knee 3 View Right (Final result)  Result time 09/10/19 15:33:48    Final result by Gopi Whiting MD (09/10/19 15:33:48)                 Impression:      No acute displaced fracture-dislocation identified.      Electronically signed by: Gopi Whiting MD  Date:    09/10/2019  Time:    15:33             Narrative:    EXAMINATION:  XR KNEE 3 VIEW RIGHT    CLINICAL HISTORY:  Edema, unspecified    TECHNIQUE:  AP, lateral, and Merchant views of the right knee were performed.    COMPARISON:  Right knee series 01/03/2018    FINDINGS:  Chronic deformity of the tibial tuberosity similar to prior.  No acute displaced fracture, dislocation or destructive osseous process.  No large suprapatellar joint effusion seen.  Mild tricompartmental degenerative change.  No subcutaneous emphysema or radiodense retained foreign body.                                 Assessment:     Drew Farrar is a 53 y.o. male who  has a past medical history of atrial fibrillation, HFpEF (EF 60% on 08/03), DVT, PE, hypertension, hyperthyroidism, and venous stasis ulcers. The patient presented to [facility] on 9/10/2019 with a primary complaint of fever and right LE pain X1 day.  Patient found to be hypoxic to 68% on room air while on the floor, with low blood pressures and stepped up to the ICU. Pulmonary/critical care consulted to assist in management.    Plan:     CNS  -No acute issues    Cardiovascular  #Septic Shock 2/2 Cellulitis with GNR bacteremia  -Patient with cellulitis of right LE, found to have low blood pressures after admission and requiring pressors after 5 Liters of IVF  -Titrate and wean Levophed to MAP goa of >65  -Abx & ortho consult as below    #NSTEMI  -Troponin 0.165 on admission -> 0.168 repeat  -Initial EKG with T-wave flattening in V5-6  Repeat shows normal T-waves in lead V5-6  -Trend tropnins and EKG  -Suspect Type 2 NSTEMI in setting of demand from shock    #Chronic Atrial Fibrillation  -CHADSVasc >2  -Currently in atrial fibrillation, rate controlled at this time  -Home regimen includes Xarelto for AC and Toprol-XL for rate control  -Hold home Toprol-XL given shock  -Anticoagulation: Patient reports started on Coumadin 6 months ago, and then switched to Xarelto 1 month ago after PE by cardiology. Patient had labile INRs, so switch to Xarelto believed to be done for once daily dosing. However patient now with new DVT in LLE after changed to Xarelto. Patient is morbidly obese, and thus use of DOAC have not been well studied in this area. Warfarin and LMWH have been better studied in morbidly obese patient. At this time agree with full dose Lovenox. Will have further discussions with patient regarding long-term treatment    #Chronic HFpEF  -EF 60% from 8/2019  -Home regimen includes Toprol-XL and Torsemide 40 mg daily  -BNP elevated to 656  CXR with no pulmonary edema  Repeat TTE pending  -Will need close volume assessment, don't believe patient to be in acute exacerbation    #Hypertension  -Home regimen includes Lisinopril 10 mg daily  -Hold Lisinopril in setting of shock      Respiratory  #Pulmonary Embolism  -Patient with history of PE and DVTs, as been on  Coumadin and past and recently switched to Xarelto in August 2019  -V/Q scan from 9/11 shows large mismatched perfusing defect in lingula, intermediate probablity for PE  -Don't believe CTA necessary as would not , will need anticoagulation for PE, at this time continue with Lovenox  -TTE pending  BNP elevated from baseline  Elevated troponin  -Anticoagulation: Patient reports started on Coumadin 6 months ago, and then switched to Xarelto 1 month ago after PE by cardiology. Patient had labile INRs, so switch to Xarelto believed to be done for once daily dosing. However patient now with new DVT in LLE after changed to Xarelto. Patient is morbidly obese, and thus use of DOAC have not been well studied in this area. Warfarin and LMWH have been better studied in morbidly obese patient. At this time agree with full dose Lovenox. Will have further discussions with patient regarding long-term treatment    #Acute Hypoxic Respiratory Failure, resolved  -Found to be hypoxic to 60s in morning of 9/11  -Secondary to shock? On room air now    Gastrointestinal  #Diarrhea  -Checking for C diff    Fluids: S/p 30 cc/kg bolus  Electrolytes: Monitor and replace as needed  Nutrition: NPO    Renal  #Stage 1 NARESH  -Cr of 1.4 on admission -> 1.6 on morning repeat  Baseline 1.0  -Recommend ordering urine studies, suspect prerenal in setting of shock    Heme/Onc  #Bilateral DVT & Hx of PE  -Patient with history of PE and DVTs, as been on Coumadin and past and recently switched to Xarelto in August 2019  -DVT studies with new clot in LLE  -V/Q scan from 9/11 shows large mismatched perfusing defect in lingula, intermediate probablity for PE  -Don't believe CTA necessary as would not , will need anticoagulation for PE & DVT, at this time continue with Lovenox  -TTE pending  BNP elevated from baseline  Elevated troponin  -Anticoagulation: Patient reports started on Coumadin 6 months ago, and then  switched to Xarelto 1 month ago after PE by cardiology. Patient had labile INRs, so switch to Xarelto believed to be done for once daily dosing. However patient now with new DVT in LLE after changed to Xarelto. Patient is morbidly obese, and thus use of DOAC have not been well studied in this area. Warfarin and LMWH have been better studied in morbidly obese patient. At this time agree with full dose Lovenox. Will have further discussions with patient regarding long-term treatment    Infectious Disease  #Septic Shock 2/2 to RLE Celluitis vs Septic Arthritis with GNR Bacteremia  -Patient with complaint of fever and RLE pain X1 day, hx of cellulitis  -qSOFA 2/3  -Procalcitonin 98  WBC 12  1/4 blood cx growing GNR  -ID consulted for antibiotics, agree with covering broadly for now  -Orthopedic consulted for possible arthrocentesis    Endocrine  #Untreated Hyperthyroidism  -Patient with history of untreated hyperthyoidism, lost to follow up with endocrine  -TSH <0.010 with elevated T4  -Patient has outpatient follow up with endocrine booked    Code: Full code    Dispo: Broad spectrum abx, ortho & ID consult, weaning of pressors    Case discussed with fellow and staff. Please call with questions      Jeovanny Chavis DO  [facility] Internal Medicine [id number]  [facility] Pulmonology Service    Fellow Pager Number: [phone]

## 2019-09-11 NOTE — PROGRESS NOTES
[facility] Hospital Medicine  Progress Note    Patient Name: Drew Farrar  MRN: [id number]  Patient Class: IP- Inpatient   Admission Date: 9/10/2019  Length of Stay: 1 days  Attending Physician: Severyn Yaroshevsky, MD  Primary Care Provider: Primary Doctor No        Subjective:     Principal Problem:Severe sepsis        HPI:  Mr. Farrar is a 53 year old man with PMHx of chronic Afib (on eliquis), HFpEF, PE, Hx of DVT with irwin filter in place, HTN, Venous stasis, hyperthyroidism, presented to ED with acute onset fevers, chills, nausea, vomiting and right lower extremity pain. He states pain started around 3 AM on the morning of admission. He was recently discharge from hospital about 3 weeks prior to right LE cellulitis, and completed a 2 week course of cipro and clinda about 3 weeks ago. He reports improvement in pain and cellulitis up until this morning. He denies trauma or falls to the affected area. He states the pain started all of a sudden and is associated with swelling and warmth to the area. He states the right leg is very painful to touch and with  Movement.     He states he is compliant with all his blood pressure and anticoagulation medication. He denies Headache, changes in vision, shortness of breath, chest pain, palpitations, abdominal pain, diarrhea or constipation.       Overview/Hospital Course:  9/11  Overnight, pt endorsed CP and SOB at midnight, and had a drop in BP to 81/42. Pt required LR bolus and lovenox. PICC line placed and V/Q scan performed. Pt transferred to the ICU after BP falling to 80's/40's at 0500 this AM. Pt infused with levophed in ICU. BP stabilized. Ortho consulted, and will attempt to tap R knee in PM. BC grew gram negative rods, pending sensitivities. C. Diff studies ordered due to complaint of diarrhea and loose stools s/p antibiotic course prior to admission. Pt received Echo this AM, pending results. F/u pulm crit recs on anticoagulation.  Attempting to wean down levophed. Attempting to find and review records from OSH, where pt was hospitalized prior to this admission.     Interval History: Overnight, attempted to tap R knee a second time in ED, but unsuccessful. At 2325, pt had low BP of 81/42 and at midnight, pt reported SOB and mid sternal CP. O2 sats decreased to 68% on RA. Started on 4L O2 NC and Lovenox 160mg for a concern of PE. Pt tolerated LR bolus. PICC line placed and verified by chest x-ray. V/Q scan performed. Pt initially transferred to the floor with 2LNC, after BP stabilization, and resolution of CP and SOB. On the floor, BP dropped again to the 80's/40's at 0500 this AM. Pt transferred to ICU and placed on cont. Pulse ox 2LNC, Afib monitor. Pt placed on levophed infusion at 0.02mcg/kg/hour first, and a second time at 0.04mcg/kg/hour. Pt stable this AM, denied CP and SOB. Currently on RA. Continues to endorse RLE tenderness in knee and leg.     Review of Systems   Constitutional: Positive for activity change, chills, fatigue and fever. Negative for appetite change and unexpected weight change.   HENT: Negative for congestion, hearing loss, postnasal drip, rhinorrhea and sinus pain.    Eyes: Negative for photophobia and visual disturbance.   Respiratory: Negative for apnea, cough, choking, chest tightness, shortness of breath and wheezing.    Cardiovascular: Negative for chest pain, palpitations and leg swelling.   Gastrointestinal: Negative for abdominal distention, abdominal pain, constipation, diarrhea, nausea and vomiting.   Endocrine: Negative for cold intolerance, heat intolerance and polyuria.   Genitourinary: Negative for difficulty urinating, flank pain, frequency and urgency.   Musculoskeletal: Positive for arthralgias, gait problem, joint swelling and myalgias. Negative for back pain, neck pain and neck stiffness.   Skin: Positive for color change and wound. Negative for rash.   Neurological: Negative for dizziness,  weakness, light-headedness, numbness and headaches.   Psychiatric/Behavioral: Negative for behavioral problems, confusion and hallucinations. The patient is not nervous/anxious.      Objective:     Vital Signs (Most Recent):  Temp: 98.3 °F (36.8 °C) (09/11/19 0703)  Pulse: 82 (09/11/19 0806)  Resp: (!) 24 (09/11/19 0806)  BP: (!) 106/55 (09/11/19 0806)  SpO2: 95 % (09/11/19 0806) Vital Signs (24h Range):  Temp:  [98.2 °F (36.8 °C)-102.3 °F (39.1 °C)] 98.3 °F (36.8 °C)  Pulse:  [] 82  Resp:  [18-34] 24  SpO2:  [92 %-100 %] 95 %  BP: ()/(42-83) 106/55     Weight: (!) 161.3 kg (355 lb 9.6 oz)  Body mass index is 39.06 kg/m².    Intake/Output Summary (Last 24 hours) at 9/11/2019 1003  Last data filed at 9/11/2019 0811  Gross per 24 hour   Intake 1106 ml   Output 300 ml   Net 806 ml      Physical Exam   Constitutional: He is oriented to person, place, and time. He appears well-developed and well-nourished. No distress.   HENT:   Head: Normocephalic and atraumatic.   Nose: Nose normal.   Mouth/Throat: Oropharynx is clear and moist. No oropharyngeal exudate.   Eyes: Pupils are equal, round, and reactive to light. Conjunctivae and EOM are normal. Right eye exhibits no discharge. Left eye exhibits no discharge. No scleral icterus.   Neck: Normal range of motion. Neck supple.   Cardiovascular: Normal rate. Exam reveals no gallop and no friction rub.   No murmur heard.  Irregular rate and rhythm   Pulmonary/Chest: Effort normal and breath sounds normal. No stridor. No respiratory distress. He has no wheezes. He has no rales. He exhibits no tenderness.   Abdominal: Soft. Bowel sounds are normal. He exhibits no distension and no mass. There is no tenderness. There is no rebound.   Musculoskeletal: Normal range of motion. He exhibits edema and tenderness. He exhibits no deformity.   Right leg and calf tenderness, erythema and warm of skin, no purulence or fluctuance   Chronic skin changes and thickening of the shins  and medial maleolus     Lymphadenopathy:     He has no cervical adenopathy.   Neurological: He is alert and oriented to person, place, and time. No sensory deficit.   Skin: Skin is warm and dry. Capillary refill takes less than 2 seconds. No rash noted. He is not diaphoretic. There is erythema. No pallor.   Psychiatric: He has a normal mood and affect. His behavior is normal.   Nursing note and vitals reviewed.      Significant Labs:   Recent Labs     09/10/19  1442 09/11/19  0800   WBC 12.59 10.96   HGB 9.5* 8.2*   HCT 30.7* 26.8*    151   MCV 81* 82   RDW 16.6* 17.2*       Recent Labs     09/10/19  1442 09/11/19  0800    135*   K 5.6* 4.8    107   CO2 17* 22*   GLU 92 114*   BUN 36* 42*   CREATININE 1.4 1.6*   CALCIUM 9.6 8.6*   PROT 7.7 6.7   ALBUMIN 3.5 2.9*   BILITOT 1.0 1.1*   ALKPHOS 141* 100   AST 35 35   ALT 28 25   ANIONGAP 11 6*   ESTGFRAFRICA >60 57*   EGFRNONAA 58* 49*       Recent Labs     09/10/19  1442 09/10/19  1848 09/11/19  0800   MG 1.3* 1.4* 1.4*   PHOS 2.9 4.5 4.3       Coags  Lab Results   Component Value Date    INR 1.1 09/10/2019    INR 1.1 08/02/2019    INR 3.4 (H) 07/27/2019    APTT 33.5 (H) 09/10/2019    APTT 50.7 (H) 08/03/2019    APTT 86.4 (H) 08/03/2019     Recent Labs   Lab 09/10/19  1442   INR 1.1   APTT 33.5*       A1c:   Lab Results   Component Value Date    HGBA1C 5.2 09/10/2019   , Last Gluc: No results for input(s): POCTGLUCOSE in the last 168 hours.    TSH:   Lab Results   Component Value Date    TSH <0.010 (L) 09/10/2019         Cardiac Enzymes  Recent Labs     09/10/19  1848 09/11/19  0309   TROPONINI 0.165* 0.168*         Urinalysis  Urinalysis  Recent Labs   Lab 09/10/19  1638   COLORU Orange*   SPECGRAV >=1.030*   PHUR 6.0   PROTEINUA Trace*   NITRITE Negative   LEUKOCYTESUR Negative   UROBILINOGEN Negative     Recent Labs   Lab 09/10/19  1638   COLORU Orange*   SPECGRAV >=1.030*   PHUR 6.0   PROTEINUA Trace*       Micro  Microbiology Results (last 7  days)     Procedure Component Value Units Date/Time    Blood culture x two cultures. Draw prior to antibiotics. [[id number]] Collected:  09/10/19 1518    Order Status:  Completed Specimen:  Blood from Peripheral, Hand, Right Updated:  09/11/19 0315     Blood Culture, Routine No Growth to date    Narrative:       Aerobic and anaerobic    Blood culture x two cultures. Draw prior to antibiotics. [[id number]] Collected:  09/10/19 1443    Order Status:  Completed Specimen:  Blood from Peripheral, Forearm, Left Updated:  09/11/19 0315     Blood Culture, Routine No Growth to date    Narrative:       Aerobic and anaerobic    Influenza A & B by Molecular [[id number]] Collected:  09/10/19 1521    Order Status:  Completed Specimen:  Nasopharyngeal Swab Updated:  09/10/19 1554     Influenza A, Molecular Negative     Influenza B, Molecular Negative     Flu A & B Source Nasal swab             ABG  Recent Labs   Lab 09/11/19  0335   PH 7.292*   PCO2 36.0   PO2 102*   HCO3 17.4*   POCSATURATED 97   BE -9         Imaging   Imaging Results          NM Lung Ventilation Perfusion Imaging (Final result)  Result time 09/11/19 09:56:43    Final result by RADIOLOGIST, DILLON (09/11/19 09:56:43)                 Impression:      Large mismatched perfusion defect in the lingula. This is intermediate probability for PE. Recommend CTA. Thank you for allowing us to participate in the care of your patient. Dictated and Authenticated by: Shun Kelly MD 09/11/2019 2:52 AM Central Time ([place]    Addendum created by Shun Kelly MD on 9/11/2019 2:59 AM Central Time    Findings were discussed with Dr Salgado at 9/11/2019 2:59 AM CDT.      Electronically signed by: Virtual Radiologist  Date:    09/11/2019  Time:    09:56             Narrative:    EXAMINATION:  NM Lung Ventilation and Perfusion Imaging    CLINICAL HISTORY:  53 years old, male; Other: Knee pain    TECHNIQUE:  Imaging protocol: Nuclear pulmonary ventilation with aerosol or gas was performed  followed by perfusion. Views: Ventilation acquired in posterior projection. Perfusion acquired with multiple projections. Radiopharmaceutical: 15 mCi of Xenon -133, inhaled. 5 mCi of Tc-99m MAA, IV.    COMPARISON:  No relevant prior studies available.    FINDINGS:  Ventilation: Normal. No ventilation defects. Perfusion: Large perfusion defect in the lingula.                               X-Ray Chest AP Portable (Final result)  Result time 09/11/19 02:02:29    Final result by Donna Savage MD (09/11/19 02:02:29)                 Impression:      Interval placement of right PICC line with tip projecting over the SVC.      Electronically signed by: Donna Savage MD  Date:    09/11/2019  Time:    02:02             Narrative:    EXAMINATION:  XR CHEST AP PORTABLE    CLINICAL HISTORY:  SOB; Sepsis, unspecified organism    TECHNIQUE:  Single frontal view of the chest was performed.    COMPARISON:  09/10/2019    FINDINGS:  Cardiac monitoring leads overlie the chest.  There has been interval placement of a right PICC line, the tip of which projects over the SVC.  The cardiomediastinal silhouette is enlarged.  There is no evidence of pneumothorax or significant interval detrimental change in lung aeration.                                US Lower Extremity Veins Bilateral (Final result)  Result time 09/10/19 21:23:38    Final result by Sohail Kline MD (09/10/19 21:23:38)                 Impression:      Nonocclusive deep venous thrombosis seen within the bilateral femoral veins.    This report was flagged in Epic as abnormal.      Electronically signed by: Sohail Kline MD  Date:    09/10/2019  Time:    21:23             Narrative:    EXAMINATION:  US LOWER EXTREMITY VEINS BILATERAL    CLINICAL HISTORY:  Possible DVT; Sepsis, unspecified organism    TECHNIQUE:  Duplex and color flow Doppler evaluation of the bilateral lower extremity veins was performed.    COMPARISON:  08/02/2019.    FINDINGS:  Right lower  extremity:    Nonocclusive thrombus is visualized within the mid to distal right femoral vein.  No evidence of clot involving the right common femoral, greater saphenous, popliteal, posterior tibial, anterior tibial, and peroneal veins.    Left lower extremity:    Nonocclusive thrombus is seen within the proximal left femoral vein.  No evidence of clot involving the left common femoral, greater saphenous, popliteal, peroneal, anterior and posterior tibial veins.  No evidence of soft tissue mass or Baker's cyst.                               X-Ray Chest AP Portable (Final result)  Result time 09/10/19 15:51:14    Final result by Judy Farrar MD (09/10/19 15:51:14)                 Impression:      Cardiomegaly unchanged.  No failure or pneumonia.      Electronically signed by: Judy Farrar  Date:    09/10/2019  Time:    15:51             Narrative:    EXAMINATION:  XR CHEST AP PORTABLE    CLINICAL HISTORY:  Sepsis;    TECHNIQUE:  Single frontal view of the chest was performed.    COMPARISON:  08/02/2019 chest    FINDINGS:  Single AP portable view at 15:09.    The heart is moderately enlarged unchanged.  The pulmonary artery is enlarged unchanged consistent with underlying pulmonary artery hypertension.    Trachea appears normal.  No pneumothorax or pleural effusion or interstitial edema consolidation or nodule.  There are overlying leads.  No rib fracture seen.                               X-Ray Knee 3 View Right (Final result)  Result time 09/10/19 15:33:48    Final result by Gopi Whiting MD (09/10/19 15:33:48)                 Impression:      No acute displaced fracture-dislocation identified.      Electronically signed by: Gopi Whiting MD  Date:    09/10/2019  Time:    15:33             Narrative:    EXAMINATION:  XR KNEE 3 VIEW RIGHT    CLINICAL HISTORY:  Edema, unspecified    TECHNIQUE:  AP, lateral, and Merchant views of the right knee were performed.    COMPARISON:  Right knee series  01/03/2018    FINDINGS:  Chronic deformity of the tibial tuberosity similar to prior.  No acute displaced fracture, dislocation or destructive osseous process.  No large suprapatellar joint effusion seen.  Mild tricompartmental degenerative change.  No subcutaneous emphysema or radiodense retained foreign body.                                Assessment/Plan:      * Severe sepsis  Patient presented to the ED with fever of 102.3 and LA of 4   Severe swelling and warmth to the right leg associated with joint effusion of the right knee   Therapeutic and diagnostic arthrocentesis attempted in the ED without success  Will attempt again   Patient was recently hospitalized for cellulitis of the same area, and completed 2 weeks of clinda and cipro  Pain and swelling of the affected area started around 3 AM the morning of admission  ID consulted, appreciate recs   Blood cultures + GNR  Ortho consulted for septic jointShanelle will come evaluate patient  Continue to monitor   Required pressors overnight, continue weaning per protocol      Cellulitis  As above  Will continue IV antibiotics   Continue fluids   Pain management       Chronic atrial fibrillation  Pt with chronic afib   HR in 110's-120's   Will continue metoprolol   Maintain Tele   Continue Xarelto, lisinopril and torsemide   Pt acutely decompensate, V/Q scan showed lingular hypodensity  Patient now failed eliquis, warfarin and xarelto  Pulm crit consulted for AC management, appreciate recs   Patient stable on room air   Continue to monitor       (HFpEF) heart failure with preserved ejection fraction  Last TTE with EF of 60%  Repeat Echo pending   No signs of heart failure on chest xray or PE   Will continue home medications   Continue to monitor       HTN (hypertension)  Continue home medication   Goal BP <140/90  Hydralazine 10 mg IV PRN for BP >180/90  Continue to monitor       Hyperthyroidism  TSH < 0.010, FU F T4  Pt not currently on any therapy   No symptoms  of hyperthyroidism       Acute on chronic diastolic congestive heart failure  Pt BNP elevated to 600's, base line around 200  No signs of decompensated heartfailure  Will continue to monitor   Continue home medications           VTE Risk Mitigation (From admission, onward)        Ordered     enoxaparin injection 160 mg  Every 12 hours (non-standard times)      09/11/19 0450     IP VTE HIGH RISK PATIENT  Once      09/11/19 0526          Critical care time spent on the evaluation and treatment of severe organ dysfunction, review of pertinent labs and imaging studies, discussions with consulting providers and discussions with patient/family: 30 minutes.      Akanksha Vogel MD   [facility] Family MedicinePGY-2   [facility]

## 2019-09-11 NOTE — ASSESSMENT & PLAN NOTE
Patient presented to the ED with fever of 102.3 and LA of 4   Severe swelling and warmth to the right leg associated with joint effusion of the right knee   Therapeutic and diagnostic arthrocentesis attempted in the ED without success  Will attempt again   Patient was recently hospitalized for cellulitis of the same area, and completed 2 weeks of clinda and cipro  Pain and swelling of the affected area started around 3 AM the morning of admission  ID consulted, appreciate recs   Blood cultures + GNR  Ortho consulted for septic joint, Shanelle will come evaluate patient  Continue to monitor   Required pressors overnight, continue weaning per protocol

## 2019-09-11 NOTE — HPI
Mr. 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 Gutierrez) who presented to the ED yesterday for evaluation of swelling, redness, and pain of his right knee associated with n/v/d.  On presentation, he was tachycardic with afib rhythm, febrile to 102.3, and with an Lactic acid of 4.  Code sepsis was called and he was put on 2L O2 by NC and he was given IVF, vanc, aztreonam, and flagyl and admitted to [facility] for treatment of severe sepsis possibly secondary to RLE cellulitis.  Arthrocentesis attempt of R knee was unsuccessful. Ortho consulted due to concern for septic joint.  After he became hypotensive to 80/40 he was transferred from the floor to the ICU for pressor support and bp stabalized. PICC line placed RUE. Xray R knee showed no acute changes with chronic deformity of tibial tuberosity. US of bilateral LE shows femoral DVT B/L (left femoral DVT is new).  CXR showed cardiomegaly unchanged with no failure or pneumonia. V/Q scan shows intermediate probability PE at lingula.  Troponin positive at 0.168.  Lactic acid downtrending today to 1.8.  Blood cultures (9/10) growing gram negative rods.  Influenza A&B negative.  C. Diff ordered today because of diarrhea. ID consulted to manage antibiotics 9/11.  He is currently on aztreonam 2000 mg IV q8, metronidazole 500 mg q8, and vanc 2000 mg q12.       Of note, patient stated that three weeks ago he completed a 2 week course of cipro and clinda for RLE cellulitis.  On 9/2, 9 days ago, the patient went to the ED for evaluation of epistaxis, and ED physician noticed tinea corporis, tinea cruris, and tenia pedis on bilateral lower extremities, bilateral feet, and on abdomen.  ED provider discharged patient with terbinafine PO daily and atarax Rx.        Today, 9/11, patient remains afebrile and bp stable but  on pressors.  On interview, he denies any recent ingestion of raw oysters or lake swimming, but does have a swimming pool at his house which he swam in on 9/8 and 9/9.  He lives at home with his wife about 2 blocks from the hospital.  He has been taking the terbinafine from the ED doctor 9 days ago with no improvement.  He has an allergy to penicillin and says he breaks out in hives when it is given to him, but he can tolerate keflex.    Upon chart review - patient was admitted 2/20/2016 and had group A strep right knee septic arthritis. He was washed out by Ortho - the last one was on 2/25/2016. He was treated with vancomycin since he is PCN allergic and was sent home with long term IV antibiotics.     9/12 - the c diff pcr came back positive - PO vanco started; continue IV flagyl for now for dual therapy; BC from admit 9/10 positive for E cloacae - sensi pending; off pressors

## 2019-09-11 NOTE — PROGRESS NOTES
Spoke with Dr. Vogel, pt seen in wound care clinic normally, recommend consult with Dr. Waller for right lower leg wound.

## 2019-09-11 NOTE — PLAN OF CARE
Pt reports he lives with his spouse and she or his mother can provide help at home and his mother or friend can provide transportation upon d/c. Pt informed Tn he has DME in place and has used [facility] in past but not current. Tn sent message to SULAIMAN [facility] in [facility] to schedule [facility] follow up for pt next week.  Tn gave pt d/c brochure and card and encouraged to call for any needs/concerns.     09/11/19 1222   Discharge Assessment   Assessment Type Discharge Planning Assessment   Confirmed/corrected address and phone number on facesheet? Yes   Assessment information obtained from? Patient   Expected Length of Stay (days) 2   Communicated expected length of stay with patient/caregiver yes   Prior to hospitilization cognitive status: Alert/Oriented   Prior to hospitalization functional status: Assistive Equipment   Current cognitive status: Alert/Oriented   Current Functional Status: Needs Assistance   Lives With spouse   Able to Return to Prior Arrangements yes   Is patient able to care for self after discharge? Unable to determine at this time (comments)   Who are your caregiver(s) and their phone number(s)? Marcelo Coronaspoause) [phone] or Josey Albertoien (mother) [phone]   Patient's perception of discharge disposition home or selfcare   Readmission Within the Last 30 Days no previous admission in last 30 days   Patient currently being followed by outpatient case management? No   Patient currently receives any other outside agency services? No   Equipment Currently Used at Home cane, straight;walker, rolling;bedside commode;shower chair;wheelchair   Do you have any problems affording any of your prescribed medications? No   Is the patient taking medications as prescribed? yes   Does the patient have transportation home? Yes   Transportation Anticipated family or friend will provide   Discharge Plan A Home with family   Discharge Plan B Home Health   DME Needed Upon Discharge    (tbd)   Patient/Family in  Agreement with Plan yes

## 2019-09-11 NOTE — ASSESSMENT & PLAN NOTE
Pt with chronic afib   HR in 110's-120's   Will continue metoprolol   Maintain Tele   Continue Xarelto, lisinopril and torsemide   Pt acutely decompensate, V/Q scan showed lingular hypodensity  Patient now failed eliquis, warfarin and xarelto  Pulm crit consulted for AC management, appreciate recs   Patient stable on room air   Continue to monitor

## 2019-09-11 NOTE — PROGRESS NOTES
Pharmacokinetic Initial Assessment: IV Vancomycin    Assessment/Plan:    Initiate intravenous vancomycin with loading dose of 3000 mg once followed by a maintenance dose of vancomycin 2000mg IV every 12 hours  Desired empiric serum trough concentration is 10 to 15 mcg/mL  Draw vancomycin trough level 30 min prior to fourth dose on 9/12/19 at approximately 0730   Pharmacy will continue to follow and monitor vancomycin.      Please contact pharmacy at extension [phone] with any questions regarding this assessment.     Thank you for the consult,   Farzana Daniels       Patient brief summary:  Drew Farrar is a 53 y.o. male initiated on antimicrobial therapy with IV Vancomycin for treatment of suspected sepsis    Drug Allergies:   Review of patient's allergies indicates:   Allergen Reactions    Contrast media Other (See Comments)     Severe chest pain    Food allergy formula [glutamine-c-quercet-selen-brom]      Allergic to green peas; Heart failure.    Iodinated contrast media Other (See Comments)     Chest pain    Peas Hives    Ibuprofen Swelling    Latex, natural rubber Hives    Pcn [penicillins] Hives    Butisol [butabarbital] Rash     Peeling skin       Actual Body Weight:   160.1 kg    Renal Function:   Estimated Creatinine Clearance: 107.4 mL/min (based on SCr of 1.4 mg/dL).,     Dialysis Method (if applicable):  N/A    CBC (last 72 hours):  Recent Labs   Lab Result Units 09/10/19  1442   WBC K/uL 12.59   Hemoglobin g/dL 9.5*   Hematocrit % 30.7*   Platelets K/uL 179   Gran% % 90.4*   Lymph% % 3.9*   Mono% % 5.6   Eosinophil% % 0.0   Basophil% % 0.1   Differential Method  Automated       Metabolic Panel (last 72 hours):  Recent Labs   Lab Result Units 09/10/19  1442 09/10/19  1638   Sodium mmol/L 137  --    Potassium mmol/L 5.6*  --    Chloride mmol/L 109  --    CO2 mmol/L 17*  --    Glucose mg/dL 92  --    Glucose, UA   --  Negative   BUN, Bld mg/dL 36*  --    Creatinine mg/dL 1.4  --    Albumin g/dL 3.5   --    Total Bilirubin mg/dL 1.0  --    Alkaline Phosphatase U/L 141*  --    AST U/L 35  --    ALT U/L 28  --    Magnesium mg/dL 1.3*  --    Phosphorus mg/dL 2.9  --        Drug levels (last 3 results):  No results for input(s): VANCOMYCINRA, VANCOMYCINPE, VANCOMYCINTR in the last 72 hours.    Microbiologic Results:  Microbiology Results (last 7 days)       Procedure Component Value Units Date/Time    Blood culture x two cultures. Draw prior to antibiotics. [[id number]] Collected:  09/10/19 1443    Order Status:  Sent Specimen:  Blood from Peripheral, Forearm, Left Updated:  09/10/19 1914    Blood culture x two cultures. Draw prior to antibiotics. [[id number]] Collected:  09/10/19 1518    Order Status:  Sent Specimen:  Blood from Peripheral, Hand, Right Updated:  09/10/19 1914    Influenza A & B by Molecular [[id number]] Collected:  09/10/19 1521    Order Status:  Completed Specimen:  Nasopharyngeal Swab Updated:  09/10/19 1554     Influenza A, Molecular Negative     Influenza B, Molecular Negative     Flu A & B Source Nasal swab

## 2019-09-11 NOTE — SUBJECTIVE & OBJECTIVE
Past Medical History:   Diagnosis Date    *Atrial fibrillation     Anticoagulant long-term use     Arthritis     Atrial fibrillation     Atrial fibrillation Feb 23, 2016    Bipolar disorder     CHF (congestive heart failure)     Congenital heart disease     s/p surgical intervention 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 of age    Deep vein thrombosis     DVT of leg (deep venous thrombosis)     left leg    History of prior ablation treatment     10/9/13    Hypertension     Obesity     Stroke     Thyroid disease     Venous stasis ulcer of lower extremity, unspecified laterality 12/14/2012    Venous ulcer        Past Surgical History:   Procedure Laterality Date    ANGIOPLASTY      ARTHROSCOPY-KNEE W/ CHONDROPLASTY Right 2/23/2016    Performed by [facility]jandro Vill[facility]ueva [facility] at [facility] OR    CARDIAC SURGERY      open heart surgery 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 old    EYE SURGERY      left eye cataract/right eye glaucoma    TIMO FILTER PLACEMENT      Dr Calix ([facility])    KNEE SURGERY      l and r     MULTIPLE TOOTH EXTRACTIONS      Sclerotherapy N/A 2/21/2019    Performed by Gomez Lantigua MD at [facility] CATH LAB/EP    SKIN GRAFT      left leg    SYNOVECTOMY-KNEE Right 2/23/2016    Performed by Alejandro Villanueva MD at [facility] OR       Review of patient's allergies indicates:   Allergen Reactions    Contrast media Other (See Comments)     Severe chest pain    Food allergy formula [glutamine-c-quercet-selen-brom]      Allergic to green peas; Heart failure.    Iodinated contrast media Other (See Comments)     Chest pain    Peas Hives    Ibuprofen Swelling    Latex, natural rubber Hives    Pcn [penicillins] Hives    Butisol [butabarbital] Rash     Peeling skin       Medications:  Medications Prior to Admission   Medication Sig    hydrOXYzine HCl (ATARAX) 25 MG tablet Take 1 tablet (25 mg total) by mouth 4 (four) times daily as needed for Itching.    lisinopril 10 MG tablet Take 1 tablet (10 mg total) by mouth once daily. (Patient taking  differently: Take 7.5 mg by mouth once daily. )    metoprolol succinate (TOPROL-XL) 100 MG 24 hr tablet Take 1 tablet (100 mg total) by mouth once daily. (Patient taking differently: Take 100 mg by mouth every evening. )    nystatin (MYCOSTATIN) powder Apply topically 2 (two) times daily.    rivaroxaban (XARELTO) 20 mg Tab Take 1 tablet (20 mg total) by mouth daily with dinner or evening meal.    torsemide (DEMADEX) 20 MG Tab Take 2 tablets (40 mg total) by mouth once daily.    HYDROcodone-acetaminophen (NORCO) 5-325 mg per tablet Take 1 tablet by mouth every 6 (six) hours as needed for Pain.    terbinafine HCl (LAMISIL) 250 mg tablet Take 1 tablet (250 mg total) by mouth once daily.     Antibiotics (From admission, onward)    Start     Stop Route Frequency Ordered    09/11/19 1500  ciprofloxacin (CIPRO)400mg/200ml D5W IVPB 400 mg      -- IV Every 12 hours (non-standard times) 09/11/19 1359    09/11/19 0630  vancomycin (VANCOCIN) 2,000 mg in dextrose 5 % 500 mL IVPB      -- IV Every 12 hours (non-standard times) 09/10/19 1936    09/11/19 0030  metronidazole IVPB 500 mg      -- IV Every 8 hours (non-standard times) 09/10/19 1927        Antifungals (From admission, onward)    None        Antivirals (From admission, onward)    None           Immunization History   Administered Date(s) Administered    Influenza - Quadrivalent - PF (6 months and older) 02/29/2016    PPD Test 02/24/2016       Family History     Problem Relation (Age of Onset)    Diabetes Father, Maternal Grandfather    Heart disease Father, Maternal Grandmother, Maternal Grandfather    Stroke Maternal Grandfather        Social History     Socioeconomic History    Marital status: Single     Spouse name: Not on file    Number of children: 2    Years of education: Not on file    Highest education level: Not on file   Occupational History    Not on file   Social Needs    Financial resource strain: Not on file    Food insecurity:     Worry: Not  on file     Inability: Not on file    Transportation needs:     Medical: Not on file     Non-medical: Not on file   Tobacco Use    Smoking status: Former Smoker     Packs/day: 1.00     Years: 6.00     Pack years: 6.00     Types: Cigarettes    Smokeless tobacco: Former User    Tobacco comment: quit by age 27yrs old   Substance and Sexual Activity    Alcohol use: Yes     Alcohol/week: 0.6 oz     Types: 1 Glasses of wine per week     Frequency: Monthly or less     Comment: occasionally    Drug use: No    Sexual activity: Yes     Partners: Female     Birth control/protection: None   Lifestyle    Physical activity:     Days per week: Not on file     Minutes per session: Not on file    Stress: Not on file   Relationships    Social connections:     Talks on phone: Not on file     Gets together: Not on file     Attends Religion service: Not on file     Active member of club or organization: Not on file     Attends meetings of clubs or organizations: Not on file     Relationship status: Not on file   Other Topics Concern    Not on file   Social History Narrative    Not on file     Review of Systems   Respiratory: Negative for shortness of breath.    Gastrointestinal: Negative for diarrhea, nausea and vomiting.   Musculoskeletal: Positive for joint swelling.        Right knee pain and right leg cellulitis     Objective:     Vital Signs (Most Recent):  Temp: 98.6 °F (37 °C) (09/11/19 1503)  Pulse: (!) 116 (09/11/19 1545)  Resp: (!) 30 (09/11/19 1545)  BP: 131/66 (09/11/19 1545)  SpO2: (!) 93 % (09/11/19 1545) Vital Signs (24h Range):  Temp:  [97.8 °F (36.6 °C)-99 °F (37.2 °C)] 98.6 °F (37 °C)  Pulse:  [] 116  Resp:  [18-40] 30  SpO2:  [92 %-100 %] 93 %  BP: ()/(30-83) 131/66     Weight: (!) 161.3 kg (355 lb 9.6 oz)  Body mass index is 39.06 kg/m².    Estimated Creatinine Clearance: 94.3 mL/min (A) (based on SCr of 1.6 mg/dL (H)).    Physical Exam   Cardiovascular: Normal heart sounds.    Pulmonary/Chest: Breath sounds normal. No respiratory distress.   Abdominal: Bowel sounds are normal. He exhibits no distension. There is no tenderness.   obese   Musculoskeletal: He exhibits edema, tenderness and deformity.   Right leg with erythema up over the knee to thigh and tenderness to palpation of right knee with tree-trunk appearance of right lower leg - elephantiasis with crusting of the foot and shin area. Left leg with normal size and slight crusting of feet and has lateral wound that is about 2 cm opan on mid calf; no erythema of left leg or foot; cassie pedis both feet and overgrown toenails both feet with curling of toenails and ingrown nails.    Skin: Skin is dry. Rash noted.   Bilateral tinea pedis and crusting of feet - right worse than left with crusting up the leg right worse than left       Significant Labs:   Blood Culture:   Recent Labs   Lab 07/08/19  1717 07/25/19  1404 07/25/19  1420 09/10/19  1443 09/10/19  1518   LABBLOO No growth after 5 days. No growth after 5 days. No growth after 5 days. No Growth to date Gram stain walter bottle: Gram negative rods   Results called to and read back by: Marce Leon RN  09/11/2019  10:18     CBC:   Recent Labs   Lab 09/10/19  1442 09/11/19  0800   WBC 12.59 10.96   HGB 9.5* 8.2*   HCT 30.7* 26.8*    151     CMP:   Recent Labs   Lab 09/10/19  1442 09/11/19  0800    135*   K 5.6* 4.8    107   CO2 17* 22*   GLU 92 114*   BUN 36* 42*   CREATININE 1.4 1.6*   CALCIUM 9.6 8.6*   PROT 7.7 6.7   ALBUMIN 3.5 2.9*   BILITOT 1.0 1.1*   ALKPHOS 141* 100   AST 35 35   ALT 28 25   ANIONGAP 11 6*   EGFRNONAA 58* 49*     Lactic Acid:   Recent Labs   Lab 09/10/19  1442 09/10/19  1848 09/11/19  0309   LACTATE 4.0* 3.1* 1.8     Procalcitonin:   Recent Labs   Lab 09/10/19  1442   PROCAL 98.52*     Urine Studies:   Recent Labs   Lab 09/10/19  1638   COLORU Bailey*   APPEARANCEUA Hazy*   PHUR 6.0   SPECGRAV >=1.030*   PROTEINUA Trace*   GLUCUA Negative    KETONESU Negative   BILIRUBINUA Negative   OCCULTUA Negative   NITRITE Negative   UROBILINOGEN Negative   LEUKOCYTESUR Negative     Wound Culture:   Recent Labs   Lab 19  0131   LABAERO Skin ori,  no predominant organism       Significant Imagin/11 - VQ scan -   Impression       Large mismatched perfusion defect in the lingula. This is intermediate probability for PE. Recommend CTA.      cxr - Interval placement of right PICC line with tip projecting over the SVC.    9/10 US bilateral LE - Nonocclusive deep venous thrombosis seen within the bilateral femoral veins.

## 2019-09-11 NOTE — ED NOTES
Report received from KAILEY Arzola. Assumed care of pt at this time. Pt sitting up in bed. rr even and unlabored on ra. Nadn. Pt denies any needs at this time. Pt updated on plan of care. Pt verbalized understanding.

## 2019-09-11 NOTE — ED NOTES
Pt returned from V/Q scan. Pt awake and alert. Pt denies cp, sob, headache, abdominal pain, or weakness at this time. Pt VSS. rr even and unlabored on 2LNC.

## 2019-09-11 NOTE — CONSULTS
[facility]  Infectious Disease  Consult Note    Patient Name: Drew Farrar  MRN: [id number]  Admission Date: 9/10/2019  Hospital Length of Stay: 1 days  Attending Physician: Severyn Yaroshevsky, MD  Primary Care Provider: Garrison Roach MD     Isolation Status: Special Contact    Patient information was obtained from patient, past medical records, chart and ER records.      Inpatient consult to Infectious Diseases  Consult performed by: Ema Connolly MD  Consult ordered by: Wilberto Whittaker MD  Reason for consult: gram negative septic shock        Assessment/Plan:     * Septic shock  52 y/o with chronic A fib on eliquis, CHF, PE, DVT with irwin filter in place, HTN, venous stasis, hyperthyroidism. Admitted 9/10 to [facility] with fevers, chills, nausea, vomiting, SOB, right knee pain. Found to have probable PE.  Patient with recent cellulitis RLE treated with clinda and cipro 3 weeks PTA. 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 Right knee tap in the ED was unsuccessful and Ortho does not think it needs to have repeat attempt. Patient with bilateral tinea pedis - severe and very overgrown toenails with ingrown nails    PCN allergy - listed as hives - patient reports he can take Keflex.     Rec  -continue vanco and aztreonam and flagyl for now  -add cipro IV for gram negative coverage  -check BC report  -please get Podiatry to see patient for overgrown toenails, some ingrown  -tinea pedis - start topical antifungal - may need systemic therapy   -may need imaging of right leg, Knee and thigh if possible          Patient also with diarrhea - rule out c diff since he had recent antibiotic exposure    Thank you for your consult. I will follow-up with patient. Please contact us if you have any additional  questions.[phone]    Ema Connolly MD  Infectious Disease  [facility]    Subjective:     Principal Problem: Septic shock    HPI: Mr. 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 Gutierrez) who presented to the ED yesterday for evaluation of swelling, redness, and pain of his right knee associated with n/v/d.  On presentation, he was tachycardic with afib rhythm, febrile to 102.3, and with an Lactic acid of 4.  Code sepsis was called and he was put on 2L O2 by NC and he was given IVF, vanc, aztreonam, and flagyl and admitted to [facility] for treatment of severe sepsis possibly secondary to RLE cellulitis.  Arthrocentesis attempt of R knee was unsuccessful. Ortho consulted due to concern for septic joint.  After he became hypotensive to 80/40 he was transferred from the floor to the ICU for pressor support and bp stabalized. PICC line placed RUE. Xray R knee showed no acute changes with chronic deformity of tibial tuberosity. US of bilateral LE shows femoral DVT B/L (left femoral DVT is new).  CXR showed cardiomegaly unchanged with no failure or pneumonia. V/Q scan shows intermediate probability PE at lingula.  Troponin positive at 0.168.  Lactic acid downtrending today to 1.8.  Blood cultures (9/10) growing gram negative rods.  Influenza A&B negative.  C. Diff ordered today because of diarrhea. ID consulted to manage antibiotics 9/11.  He is currently on aztreonam 2000 mg IV q8, metronidazole 500 mg q8, and vanc 2000 mg q12.       Of note, patient stated that three weeks ago he completed a 2 week course of cipro and clinda for RLE cellulitis.  On 9/2, 9 days ago, the patient went to the ED for evaluation of epistaxis, and ED physician noticed tinea corporis, tinea cruris, and tenia pedis on bilateral lower extremities, bilateral feet, and  on abdomen.  ED provider discharged patient with terbinafine PO daily and atarax Rx.        Today, 9/11, patient remains afebrile and bp stable but on pressors.  On interview, he denies any recent ingestion of raw oysters or lake swimming, but does have a swimming pool at his house which he swam in on 9/8 and 9/9.  He lives at home with his wife about 2 blocks from the hospital.  He has been taking the terbinafine from the ED doctor 9 days ago with no improvement.  He has an allergy to penicillin and says he breaks out in hives when it is given to him, but he can tolerate keflex.    Upon chart review - patient was admitted 2/20/2016 and had group A strep right knee septic arthritis. He was washed out by Ortho - the last one was on 2/25/2016. He was treated with vancomycin since he is PCN allergic and was sent home with long term IV antibiotics.     Past Medical History:   Diagnosis Date    *Atrial fibrillation     Anticoagulant long-term use     Arthritis     Atrial fibrillation     Atrial fibrillation Feb 23, 2016    Bipolar disorder     CHF (congestive heart failure)     Congenital heart disease     s/p surgical intervention 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 of age    Deep vein thrombosis     DVT of leg (deep venous thrombosis)     left leg    History of prior ablation treatment     10/9/13    Hypertension     Obesity     Stroke     Thyroid disease     Venous stasis ulcer of lower extremity, unspecified laterality 12/14/2012    Venous ulcer        Past Surgical History:   Procedure Laterality Date    ANGIOPLASTY      ARTHROSCOPY-KNEE W/ CHONDROPLASTY Right 2/23/2016    Performed [facility] OR    CARDIAC SURGERY      open heart surgery 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 old    EYE SURGERY      left eye cataract/right eye glaucoma    TIMO FILTER PLACEMENT      Dr Calix ([facility])    KNEE SURGERY      l and r     MULTIPLE TOOTH EXTRACTIONS      Sclerotherapy N/A 2/21/2019    Performed by [facility]igua MD  at [facility] CATH LAB/EP    SKIN GRAFT      left leg    SYNOVECTOMY-KNEE Right 2/23/2016    Performed by [facility] at [facility] OR       Review of patient's allergies indicates:   Allergen Reactions    Contrast media Other (See Comments)     Severe chest pain    Food allergy formula [glutamine-c-quercet-selen-brom]      Allergic to green peas; Heart failure.    Iodinated contrast media Other (See Comments)     Chest pain    Peas Hives    Ibuprofen Swelling    Latex, natural rubber Hives    Pcn [penicillins] Hives    Butisol [butabarbital] Rash     Peeling skin       Medications:  Medications Prior to Admission   Medication Sig    hydrOXYzine HCl (ATARAX) 25 MG tablet Take 1 tablet (25 mg total) by mouth 4 (four) times daily as needed for Itching.    lisinopril 10 MG tablet Take 1 tablet (10 mg total) by mouth once daily. (Patient taking differently: Take 7.5 mg by mouth once daily. )    metoprolol succinate (TOPROL-XL) 100 MG 24 hr tablet Take 1 tablet (100 mg total) by mouth once daily. (Patient taking differently: Take 100 mg by mouth every evening. )    nystatin (MYCOSTATIN) powder Apply topically 2 (two) times daily.    rivaroxaban (XARELTO) 20 mg Tab Take 1 tablet (20 mg total) by mouth daily with dinner or evening meal.    torsemide (DEMADEX) 20 MG Tab Take 2 tablets (40 mg total) by mouth once daily.    HYDROcodone-acetaminophen (NORCO) 5-325 mg per tablet Take 1 tablet by mouth every 6 (six) hours as needed for Pain.    terbinafine HCl (LAMISIL) 250 mg tablet Take 1 tablet (250 mg total) by mouth once daily.     Antibiotics (From admission, onward)    Start     Stop Route Frequency Ordered    09/11/19 1500  ciprofloxacin (CIPRO)400mg/200ml D5W IVPB 400 mg      -- IV Every 12 hours (non-standard times) 09/11/19 1359    09/11/19 0630  vancomycin (VANCOCIN) 2,000 mg in dextrose 5 % 500 mL IVPB      -- IV Every 12 hours (non-standard times) 09/10/19 1936    09/11/19 0030  metronidazole IVPB 500 mg       -- IV Every 8 hours (non-standard times) 09/10/19 1927        Antifungals (From admission, onward)    None        Antivirals (From admission, onward)    None           Immunization History   Administered Date(s) Administered    Influenza - Quadrivalent - PF (6 months and older) 02/29/2016    PPD Test 02/24/2016       Family History     Problem Relation (Age of Onset)    Diabetes Father, Maternal Grandfather    Heart disease Father, Maternal Grandmother, Maternal Grandfather    Stroke Maternal Grandfather        Social History     Socioeconomic History    Marital status: Single     Spouse name: Not on file    Number of children: 2    Years of education: Not on file    Highest education level: Not on file   Occupational History    Not on file   Social Needs    Financial resource strain: Not on file    Food insecurity:     Worry: Not on file     Inability: Not on file    Transportation needs:     Medical: Not on file     Non-medical: Not on file   Tobacco Use    Smoking status: Former Smoker     Packs/day: 1.00     Years: 6.00     Pack years: 6.00     Types: Cigarettes    Smokeless tobacco: Former User    Tobacco comment: quit by age 27yrs old   Substance and Sexual Activity    Alcohol use: Yes     Alcohol/week: 0.6 oz     Types: 1 Glasses of wine per week     Frequency: Monthly or less     Comment: occasionally    Drug use: No    Sexual activity: Yes     Partners: Female     Birth control/protection: None   Lifestyle    Physical activity:     Days per week: Not on file     Minutes per session: Not on file    Stress: Not on file   Relationships    Social connections:     Talks on phone: Not on file     Gets together: Not on file     Attends [employer] service: Not on file     Active member of club or organization: Not on file     Attends meetings of clubs or organizations: Not on file     Relationship status: Not on file   Other Topics Concern    Not on file   Social History Narrative    Not on  file     Review of Systems   Respiratory: Negative for shortness of breath.    Gastrointestinal: Negative for diarrhea, nausea and vomiting.   Musculoskeletal: Positive for joint swelling.        Right knee pain and right leg cellulitis     Objective:     Vital Signs (Most Recent):  Temp: 98.6 °F (37 °C) (09/11/19 1503)  Pulse: (!) 116 (09/11/19 1545)  Resp: (!) 30 (09/11/19 1545)  BP: 131/66 (09/11/19 1545)  SpO2: (!) 93 % (09/11/19 1545) Vital Signs (24h Range):  Temp:  [97.8 °F (36.6 °C)-99 °F (37.2 °C)] 98.6 °F (37 °C)  Pulse:  [] 116  Resp:  [18-40] 30  SpO2:  [92 %-100 %] 93 %  BP: ()/(30-83) 131/66     Weight: (!) 161.3 kg (355 lb 9.6 oz)  Body mass index is 39.06 kg/m².    Estimated Creatinine Clearance: 94.3 mL/min (A) (based on SCr of 1.6 mg/dL (H)).    Physical Exam   Cardiovascular: Normal heart sounds.   Pulmonary/Chest: Breath sounds normal. No respiratory distress.   Abdominal: Bowel sounds are normal. He exhibits no distension. There is no tenderness.   obese   Musculoskeletal: He exhibits edema, tenderness and deformity.   Right leg with erythema up over the knee to thigh and tenderness to palpation of right knee with tree-trunk appearance of right lower leg - elephantiasis with crusting of the foot and shin area. Left leg with normal size and slight crusting of feet and has lateral wound that is about 2 cm opan on mid calf; no erythema of left leg or foot; cassie pedis both feet and overgrown toenails both feet with curling of toenails and ingrown nails.    Skin: Skin is dry. Rash noted.   Bilateral tinea pedis and crusting of feet - right worse than left with crusting up the leg right worse than left       Significant Labs:   Blood Culture:   Recent Labs   Lab 07/08/19  1717 07/25/19  1404 07/25/19  1420 09/10/19  1443 09/10/19  1518   LABBLOO No growth after 5 days. No growth after 5 days. No growth after 5 days. No Growth to date Gram stain walter bottle: Gram negative rods   Results  called to and read back by: Marce Leon RN  2019  10:18     CBC:   Recent Labs   Lab 09/10/19  1442 19  0800   WBC 12.59 10.96   HGB 9.5* 8.2*   HCT 30.7* 26.8*    151     CMP:   Recent Labs   Lab 09/10/19  1442 19  0800    135*   K 5.6* 4.8    107   CO2 17* 22*   GLU 92 114*   BUN 36* 42*   CREATININE 1.4 1.6*   CALCIUM 9.6 8.6*   PROT 7.7 6.7   ALBUMIN 3.5 2.9*   BILITOT 1.0 1.1*   ALKPHOS 141* 100   AST 35 35   ALT 28 25   ANIONGAP 11 6*   EGFRNONAA 58* 49*     Lactic Acid:   Recent Labs   Lab 09/10/19  1442 09/10/19  1848 19  0309   LACTATE 4.0* 3.1* 1.8     Procalcitonin:   Recent Labs   Lab 09/10/19  1442   PROCAL 98.52*     Urine Studies:   Recent Labs   Lab 09/10/19  1638   COLORU [place]oke*   APPEARANCEUA Hazy*   PHUR 6.0   SPECGRAV >=1.030*   PROTEINUA Trace*   GLUCUA Negative   KETONESU Negative   BILIRUBINUA Negative   OCCULTUA Negative   NITRITE Negative   UROBILINOGEN Negative   LEUKOCYTESUR Negative     Wound Culture:   Recent Labs   Lab 19  0131   LABAERO Skin ori,  no predominant organism       Significant Imagin/11 - VQ scan -   Impression       Large mismatched perfusion defect in the lingula. This is intermediate probability for PE. Recommend CTA.      cxr - Interval placement of right PICC line with tip projecting over the SVC.    9/10 US bilateral LE - Nonocclusive deep venous thrombosis seen within the bilateral femoral veins.

## 2019-09-11 NOTE — ASSESSMENT & PLAN NOTE
Last TTE with EF of 60%  Repeat Echo pending   No signs of heart failure on chest xray or PE   Will continue home medications   Continue to monitor

## 2019-09-11 NOTE — CONSULTS
[facility]  INPATIENT CONSULT    REASON FOR CONSULT:  Right lower extremity cellulitis    HPI: Drew Farrar is a 53 y.o. male with complicated past medical history including hypertension, CHF, atrial fibrillation on Eliquis, bilateral lower extremity venous stasis disease, bilateral femoral DVTs, history of May-Thurner syndrome status post venous stenting, history of PE with IVC filter in place, chronic osteoarthritis of the right knee currently admitted for possible sepsis after presenting with fevers, chills, nausea, vomiting and right lower extremity pain.    Recently admitted 3 weeks prior to presentation for right lower extremity cellulitis and completed a 2 week course of Cipro and clinda.  Initially had some improvement in pain and redness until the morning of presentation at which time the patient developed acute pain and redness.  One blood culture from admission shows Gram-negative rods but speciation has not returned.  Patient does have a history of osteomyelitis and septic knee on the right from strep.    On interview the patient reports that his legs are chronically edematous and appear to be at baseline.  He does however have increased pain throughout the entire right lower leg and redness of the right lower leg and some portions of the lower thigh.  Orthopedics has evaluated the patient do not feel he has a septic joint. His range of motion is intact in his edema of the knee joint is not significant.    I have reviewed the patient's chart including prior progress notes, procedures and testing.     ROS:   Review of Systems   Constitutional: Positive for activity change and fever. Negative for appetite change.   HENT: Negative for congestion, trouble swallowing and voice change.    Eyes: Negative for discharge.   Respiratory: Positive for shortness of breath. Negative for apnea and chest tightness.    Cardiovascular: Positive for palpitations and leg swelling. Negative for chest pain.    Gastrointestinal: Negative for abdominal distention, abdominal pain, nausea and vomiting.   Genitourinary: Negative for difficulty urinating, dysuria and hematuria.   Musculoskeletal: Positive for arthralgias, back pain, gait problem and joint swelling.   Skin: Positive for color change. Negative for pallor, rash and wound.   Neurological: Negative for tremors, seizures and syncope.   Psychiatric/Behavioral: Negative for agitation, behavioral problems and confusion.       PROBLEM LIST:  Patient Active Problem List   Diagnosis    Venous stasis dermatitis of both lower extremities    Ulcer - lesion    Chronic atrial fibrillation    Venous (peripheral) insufficiency    Edema    Chronic venous hypertension with ulcer    Cellulitis    May-Thurner syndrome    Stenosis of right iliac vein    Elevated BP    Ulcer of ankle, left, limited to breakdown of skin    Chest pain    Depression    NARESH (acute kidney injury)    Group A streptococcal infection    Tinea pedis of both feet    Bilateral edema of lower extremity    Morbid obesity    Permanent atrial fibrillation    Right knee pain    Knee pain, right    Difficulty walking    Muscle weakness    History of DVT (deep vein thrombosis)    HTN (hypertension)    Acute pulmonary embolism    Recurrent pulmonary embolism    Other pulmonary embolism without acute cor pulmonale, unspecified chronicity    Long term (current) use of anticoagulants    Acute on chronic diastolic congestive heart failure    Preoperative clearance    Hyperthyroidism    Elevated troponin    Chronic systolic heart failure    Non-rheumatic mitral regurgitation    Non-pressure chronic ulcer of left ankle, with unspecified severity    Acute gastroenteritis    Severe sepsis    Candida infection of genital region    Varicose ulcer of lower extremity    Hypomagnesemia    Chronic venous insufficiency    Pulmonary embolism    (HFpEF) heart failure with preserved ejection  fraction         HISTORY  Past Medical History:   Diagnosis Date    *Atrial fibrillation     Anticoagulant long-term use     Arthritis     Atrial fibrillation     Atrial fibrillation Feb 23, 2016    Bipolar disorder     CHF (congestive heart failure)     Congenital heart disease     s/p surgical intervention 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 of age    Deep vein thrombosis     DVT of leg (deep venous thrombosis)     left leg    History of prior ablation treatment     10/9/13    Hypertension     Obesity     Stroke     Thyroid disease     Venous stasis ulcer of lower extremity, unspecified laterality 12/14/2012    Venous ulcer        Past Surgical History:   Procedure Laterality Date    ANGIOPLASTY      ARTHROSCOPY-KNEE W/ CHONDROPLASTY Right 2/23/2016    Performed by [facility] OR    CARDIAC SURGERY      open heart surgery 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 old    EYE SURGERY      left eye cataract/right eye glaucoma    TIMO FILTER PLACEMENT      Dr Calix ([facility])    KNEE SURGERY      l and r     MULTIPLE TOOTH EXTRACTIONS      Sclerotherapy N/A 2/21/2019    Performed by [facility]igua [facility] at [facility] CATH LAB/EP    SKIN GRAFT      left leg    SYNOVECTOMY-KNEE Right 2/23/2016    Performed by [facility] at [facility] OR       Social History     Tobacco Use    Smoking status: Former Smoker     Packs/day: 1.00     Years: 6.00     Pack years: 6.00     Types: Cigarettes    Smokeless tobacco: Former User    Tobacco comment: quit by age 27yrs old   Substance Use Topics    Alcohol use: Yes     Alcohol/week: 0.6 oz     Types: 1 Glasses of wine per week     Frequency: Monthly or less     Comment: occasionally    Drug use: No       Family History   Problem Relation Age of Onset    Diabetes Father     Heart disease Father     Heart disease Maternal Grandmother     Diabetes Maternal Grandfather     Heart disease Maternal Grandfather     Stroke Maternal Grandfather          MEDS:  No current facility-administered  medications on file prior to encounter.      Current Outpatient Medications on File Prior to Encounter   Medication Sig Dispense Refill    hydrOXYzine HCl (ATARAX) 25 MG tablet Take 1 tablet (25 mg total) by mouth 4 (four) times daily as needed for Itching. 20 tablet 0    lisinopril 10 MG tablet Take 1 tablet (10 mg total) by mouth once daily. (Patient taking differently: Take 7.5 mg by mouth once daily. ) 30 tablet 6    metoprolol succinate (TOPROL-XL) 100 MG 24 hr tablet Take 1 tablet (100 mg total) by mouth once daily. (Patient taking differently: Take 100 mg by mouth every evening. ) 30 tablet 5    nystatin (MYCOSTATIN) powder Apply topically 2 (two) times daily. 60 g 1    rivaroxaban (XARELTO) 20 mg Tab Take 1 tablet (20 mg total) by mouth daily with dinner or evening meal. 30 tablet 2    torsemide (DEMADEX) 20 MG Tab Take 2 tablets (40 mg total) by mouth once daily. 180 tablet 3    HYDROcodone-acetaminophen (NORCO) 5-325 mg per tablet Take 1 tablet by mouth every 6 (six) hours as needed for Pain. 10 tablet 0    terbinafine HCl (LAMISIL) 250 mg tablet Take 1 tablet (250 mg total) by mouth once daily. 45 tablet 0       ALLERGIES:  Review of patient's allergies indicates:   Allergen Reactions    Contrast media Other (See Comments)     Severe chest pain    Food allergy formula [glutamine-c-quercet-selen-brom]      Allergic to green peas; Heart failure.    Iodinated contrast media Other (See Comments)     Chest pain    Peas Hives    Ibuprofen Swelling    Latex, natural rubber Hives    Pcn [penicillins] Hives    Butisol [butabarbital] Rash     Peeling skin         VITALS:  Temp:  [97.8 °F (36.6 °C)-99.4 °F (37.4 °C)] 98.6 °F (37 °C)  Pulse:  [] 103  Resp:  [18-40] 36  SpO2:  [92 %-100 %] 96 %  BP: ()/(42-83) 89/43    I/O last 3 completed shifts:  In: 1106 [I.V.:6; IV Piggyback:1100]  Out: 100 [Urine:100]      PHYSICAL EXAM:  Physical Exam   Constitutional: He is oriented to person, place,  and time. He appears well-developed and well-nourished. No distress.   HENT:   Head: Normocephalic and atraumatic.   Nose: Nose normal.   Eyes: Conjunctivae and EOM are normal. No scleral icterus.   Neck: Normal range of motion. Neck supple. No tracheal deviation present.   Cardiovascular:   Irregular rate and rhythm consistent with Afib, radial pulses palpable of her difficult to appreciate pedal pulses due to edema   Pulmonary/Chest: Effort normal and breath sounds normal. No stridor. No respiratory distress.   Abdominal: Soft. He exhibits no distension and no ascites. There is no tenderness.   Musculoskeletal: Normal range of motion. He exhibits edema and tenderness. He exhibits no deformity.        Legs:  Neurological: He is alert and oriented to person, place, and time. He is not disoriented. He exhibits normal muscle tone.   Skin: Skin is warm and dry. He is not diaphoretic. There is erythema ( edema the right lower extremity especially in to deepen it portion).   Psychiatric: He has a normal mood and affect. His behavior is normal. Judgment and thought content normal.   Vitals reviewed.        LABS:  Lab Results   Component Value Date    WBC 10.96 09/11/2019    RBC 3.29 (L) 09/11/2019    HGB 8.2 (L) 09/11/2019    HCT 26.8 (L) 09/11/2019     09/11/2019     Lab Results   Component Value Date     (H) 09/11/2019     (L) 09/11/2019    K 4.8 09/11/2019     09/11/2019    CO2 22 (L) 09/11/2019    BUN 42 (H) 09/11/2019    CREATININE 1.6 (H) 09/11/2019    CALCIUM 8.6 (L) 09/11/2019     Lab Results   Component Value Date    ALT 25 09/11/2019    AST 35 09/11/2019    ALKPHOS 100 09/11/2019    BILITOT 1.1 (H) 09/11/2019     Lab Results   Component Value Date    MG 1.4 (L) 09/11/2019    PHOS 4.3 09/11/2019       STUDIES:  DVT and right lower extremity x-ray images and reports were personally reviewed.          ASSESSMENT & PLAN:  53 y.o. male with     Right lower extremity edema and erythema  -  possible cellulitis, unable to determine if there is underlying abscess  - per the patient, edema is at baseline  - recommend further imaging of the lower extremity to look for undrained fluid collection, recommend CT scan of the right lower extremity (contrast preferred but if patient's allergy to contrast media is prohibitive noncontrast study is okay)  - would prefer to avoid any sort of surgical incision to the lower extremities as the severe edema will will almost certainly lead to wound healing issues    PE  - patient appeared to have a pulmonary embolism despite IVC filter and anticoagulation  - recommend bilateral venous ultrasound of the upper extremities to evaluate for DVTs

## 2019-09-11 NOTE — ASSESSMENT & PLAN NOTE
52 y/o with chronic A fib on eliquis, CHF, PE, DVT with irwin filter in place, HTN, venous stasis, hyperthyroidism. Admitted 9/10 to [facility] [place] with fevers, chills, nausea, vomiting, SOB, right knee pain. Found to have probable PE.  Patient with recent cellulitis RLE treated with clinda and cipro 3 weeks PTA. 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 Right knee tap in the ED was unsuccessful and Ortho does not think it needs to have repeat attempt. Patient with bilateral tinea pedis - severe and very overgrown toenails with ingrown nails    PCN allergy - listed as hives - patient reports he can take Keflex.     Rec  -continue vanco and aztreonam and flagyl for now  -add cipro IV for gram negative coverage  -check BC report  -please get Podiatry to see patient for overgrown toenails, some ingrown  -tinea pedis - start topical antifungal - may need systemic therapy   -may need imaging of right leg, Knee and thigh if possible

## 2019-09-11 NOTE — SUBJECTIVE & OBJECTIVE
Interval History: Overnight, attempted to tap R knee a second time in ED, but unsuccessful. At 2325, pt had low BP of 81/42 and at midnight, pt reported SOB and mid sternal CP. O2 sats decreased to 68% on RA. Started on 4L O2 NC and Lovenox 160mg for a concern of PE. Pt tolerated LR bolus. PICC line placed and verified by chest x-ray. V/Q scan performed. Pt initially transferred to the floor with 2LNC, after BP stabilization, and resolution of CP and SOB. On the floor, BP dropped again to the 80's/40's at 0500 this AM. Pt transferred to ICU and placed on cont. Pulse ox 2LNC, Afib monitor. Pt placed on levophed infusion at 0.02mcg/kg/hour first, and a second time at 0.04mcg/kg/hour. Pt stable this AM, denied CP and SOB. Currently on RA. Continues to endorse RLE tenderness in knee and leg.     Review of Systems   Constitutional: Positive for activity change, chills, fatigue and fever. Negative for appetite change and unexpected weight change.   HENT: Negative for congestion, hearing loss, postnasal drip, rhinorrhea and sinus pain.    Eyes: Negative for photophobia and visual disturbance.   Respiratory: Negative for apnea, cough, choking, chest tightness, shortness of breath and wheezing.    Cardiovascular: Negative for chest pain, palpitations and leg swelling.   Gastrointestinal: Negative for abdominal distention, abdominal pain, constipation, diarrhea, nausea and vomiting.   Endocrine: Negative for cold intolerance, heat intolerance and polyuria.   Genitourinary: Negative for difficulty urinating, flank pain, frequency and urgency.   Musculoskeletal: Positive for arthralgias, gait problem, joint swelling and myalgias. Negative for back pain, neck pain and neck stiffness.   Skin: Positive for color change and wound. Negative for rash.   Neurological: Negative for dizziness, weakness, light-headedness, numbness and headaches.   Psychiatric/Behavioral: Negative for behavioral problems, confusion and hallucinations. The  patient is not nervous/anxious.      Objective:     Vital Signs (Most Recent):  Temp: 98.3 °F (36.8 °C) (09/11/19 0703)  Pulse: 82 (09/11/19 0806)  Resp: (!) 24 (09/11/19 0806)  BP: (!) 106/55 (09/11/19 0806)  SpO2: 95 % (09/11/19 0806) Vital Signs (24h Range):  Temp:  [98.2 °F (36.8 °C)-102.3 °F (39.1 °C)] 98.3 °F (36.8 °C)  Pulse:  [] 82  Resp:  [18-34] 24  SpO2:  [92 %-100 %] 95 %  BP: ()/(42-83) 106/55     Weight: (!) 161.3 kg (355 lb 9.6 oz)  Body mass index is 39.06 kg/m².    Intake/Output Summary (Last 24 hours) at 9/11/2019 1003  Last data filed at 9/11/2019 0811  Gross per 24 hour   Intake 1106 ml   Output 300 ml   Net 806 ml      Physical Exam   Constitutional: He is oriented to person, place, and time. He appears well-developed and well-nourished. No distress.   HENT:   Head: Normocephalic and atraumatic.   Nose: Nose normal.   Mouth/Throat: Oropharynx is clear and moist. No oropharyngeal exudate.   Eyes: Pupils are equal, round, and reactive to light. Conjunctivae and EOM are normal. Right eye exhibits no discharge. Left eye exhibits no discharge. No scleral icterus.   Neck: Normal range of motion. Neck supple.   Cardiovascular: Normal rate. Exam reveals no gallop and no friction rub.   No murmur heard.  Irregular rate and rhythm   Pulmonary/Chest: Effort normal and breath sounds normal. No stridor. No respiratory distress. He has no wheezes. He has no rales. He exhibits no tenderness.   Abdominal: Soft. Bowel sounds are normal. He exhibits no distension and no mass. There is no tenderness. There is no rebound.   Musculoskeletal: Normal range of motion. He exhibits edema and tenderness. He exhibits no deformity.   Right leg and calf tenderness, erythema and warm of skin, no purulence or fluctuance   Chronic skin changes and thickening of the shins and medial maleolus     Lymphadenopathy:     He has no cervical adenopathy.   Neurological: He is alert and oriented to person, place, and time. No  sensory deficit.   Skin: Skin is warm and dry. Capillary refill takes less than 2 seconds. No rash noted. He is not diaphoretic. There is erythema. No pallor.   Psychiatric: He has a normal mood and affect. His behavior is normal.   Nursing note and vitals reviewed.      Significant Labs:   Recent Labs     09/10/19  1442 09/11/19  0800   WBC 12.59 10.96   HGB 9.5* 8.2*   HCT 30.7* 26.8*    151   MCV 81* 82   RDW 16.6* 17.2*       Recent Labs     09/10/19  1442 09/11/19  0800    135*   K 5.6* 4.8    107   CO2 17* 22*   GLU 92 114*   BUN 36* 42*   CREATININE 1.4 1.6*   CALCIUM 9.6 8.6*   PROT 7.7 6.7   ALBUMIN 3.5 2.9*   BILITOT 1.0 1.1*   ALKPHOS 141* 100   AST 35 35   ALT 28 25   ANIONGAP 11 6*   ESTGFRAFRICA >60 57*   EGFRNONAA 58* 49*       Recent Labs     09/10/19  1442 09/10/19  1848 09/11/19  0800   MG 1.3* 1.4* 1.4*   PHOS 2.9 4.5 4.3       Coags  Lab Results   Component Value Date    INR 1.1 09/10/2019    INR 1.1 08/02/2019    INR 3.4 (H) 07/27/2019    APTT 33.5 (H) 09/10/2019    APTT 50.7 (H) 08/03/2019    APTT 86.4 (H) 08/03/2019     Recent Labs   Lab 09/10/19  1442   INR 1.1   APTT 33.5*       A1c:   Lab Results   Component Value Date    HGBA1C 5.2 09/10/2019   , Last Gluc: No results for input(s): POCTGLUCOSE in the last 168 hours.    TSH:   Lab Results   Component Value Date    TSH <0.010 (L) 09/10/2019         Cardiac Enzymes  Recent Labs     09/10/19  1848 09/11/19  0309   TROPONINI 0.165* 0.168*         Urinalysis  Urinalysis  Recent Labs   Lab 09/10/19  1638   COLORU Orange*   SPECGRAV >=1.030*   PHUR 6.0   PROTEINUA Trace*   NITRITE Negative   LEUKOCYTESUR Negative   UROBILINOGEN Negative     Recent Labs   Lab 09/10/19  1638   COLORU Orange*   SPECGRAV >=1.030*   PHUR 6.0   PROTEINUA Trace*       Micro  Microbiology Results (last 7 days)     Procedure Component Value Units Date/Time    Blood culture x two cultures. Draw prior to antibiotics. [[id number]] Collected:  09/10/19 4698     Order Status:  Completed Specimen:  Blood from Peripheral, Hand, Right Updated:  09/11/19 0315     Blood Culture, Routine No Growth to date    Narrative:       Aerobic and anaerobic    Blood culture x two cultures. Draw prior to antibiotics. [[id number]] Collected:  09/10/19 1443    Order Status:  Completed Specimen:  Blood from Peripheral, Forearm, Left Updated:  09/11/19 0315     Blood Culture, Routine No Growth to date    Narrative:       Aerobic and anaerobic    Influenza A & B by Molecular [[id number]] Collected:  09/10/19 1521    Order Status:  Completed Specimen:  Nasopharyngeal Swab Updated:  09/10/19 1554     Influenza A, Molecular Negative     Influenza B, Molecular Negative     Flu A & B Source Nasal swab             ABG  Recent Labs   Lab 09/11/19  0335   PH 7.292*   PCO2 36.0   PO2 102*   HCO3 17.4*   POCSATURATED 97   BE -9         Imaging   Imaging Results          NM Lung Ventilation Perfusion Imaging (Final result)  Result time 09/11/19 09:56:43    Final result by RADIOLOGIST, VIRTUAL (09/11/19 09:56:43)                 Impression:      Large mismatched perfusion defect in the lingula. This is intermediate probability for PE. Recommend CTA. Thank you for allowing us to participate in the care of your patient. Dictated and Authenticated by: Shun Kelly MD 09/11/2019 2:52 AM Central Time ([place]    Addendum created by Shun Kelly MD on 9/11/2019 2:59 AM Central Time    Findings were discussed with Dr Salgado at 9/11/2019 2:59 AM CDT.      Electronically signed by: Virtual Radiologist  Date:    09/11/2019  Time:    09:56             Narrative:    EXAMINATION:  NM Lung Ventilation and Perfusion Imaging    CLINICAL HISTORY:  53 years old, male; Other: Knee pain    TECHNIQUE:  Imaging protocol: Nuclear pulmonary ventilation with aerosol or gas was performed followed by perfusion. Views: Ventilation acquired in posterior projection. Perfusion acquired with multiple projections. Radiopharmaceutical: 15 mCi  of Xenon -133, inhaled. 5 mCi of Tc-99m MAA, IV.    COMPARISON:  No relevant prior studies available.    FINDINGS:  Ventilation: Normal. No ventilation defects. Perfusion: Large perfusion defect in the lingula.                               X-Ray Chest AP Portable (Final result)  Result time 09/11/19 02:02:29    Final result by Donna Savage MD (09/11/19 02:02:29)                 Impression:      Interval placement of right PICC line with tip projecting over the SVC.      Electronically signed by: Donna Savage MD  Date:    09/11/2019  Time:    02:02             Narrative:    EXAMINATION:  XR CHEST AP PORTABLE    CLINICAL HISTORY:  SOB; Sepsis, unspecified organism    TECHNIQUE:  Single frontal view of the chest was performed.    COMPARISON:  09/10/2019    FINDINGS:  Cardiac monitoring leads overlie the chest.  There has been interval placement of a right PICC line, the tip of which projects over the SVC.  The cardiomediastinal silhouette is enlarged.  There is no evidence of pneumothorax or significant interval detrimental change in lung aeration.                                US Lower Extremity Veins Bilateral (Final result)  Result time 09/10/19 21:23:38    Final result by Sohail Kline MD (09/10/19 21:23:38)                 Impression:      Nonocclusive deep venous thrombosis seen within the bilateral femoral veins.    This report was flagged in Epic as abnormal.      Electronically signed by: Sohail Kline MD  Date:    09/10/2019  Time:    21:23             Narrative:    EXAMINATION:  US LOWER EXTREMITY VEINS BILATERAL    CLINICAL HISTORY:  Possible DVT; Sepsis, unspecified organism    TECHNIQUE:  Duplex and color flow Doppler evaluation of the bilateral lower extremity veins was performed.    COMPARISON:  08/02/2019.    FINDINGS:  Right lower extremity:    Nonocclusive thrombus is visualized within the mid to distal right femoral vein.  No evidence of clot involving the right common femoral, greater  saphenous, popliteal, posterior tibial, anterior tibial, and peroneal veins.    Left lower extremity:    Nonocclusive thrombus is seen within the proximal left femoral vein.  No evidence of clot involving the left common femoral, greater saphenous, popliteal, peroneal, anterior and posterior tibial veins.  No evidence of soft tissue mass or Baker's cyst.                               X-Ray Chest AP Portable (Final result)  Result time 09/10/19 15:51:14    Final result by Judy Farrar MD (09/10/19 15:51:14)                 Impression:      Cardiomegaly unchanged.  No failure or pneumonia.      Electronically signed by: Judy Farrar  Date:    09/10/2019  Time:    15:51             Narrative:    EXAMINATION:  XR CHEST AP PORTABLE    CLINICAL HISTORY:  Sepsis;    TECHNIQUE:  Single frontal view of the chest was performed.    COMPARISON:  08/02/2019 chest    FINDINGS:  Single AP portable view at 15:09.    The heart is moderately enlarged unchanged.  The pulmonary artery is enlarged unchanged consistent with underlying pulmonary artery hypertension.    Trachea appears normal.  No pneumothorax or pleural effusion or interstitial edema consolidation or nodule.  There are overlying leads.  No rib fracture seen.                               X-Ray Knee 3 View Right (Final result)  Result time 09/10/19 15:33:48    Final result by Gopi Whiting MD (09/10/19 15:33:48)                 Impression:      No acute displaced fracture-dislocation identified.      Electronically signed by: Gopi Whiting MD  Date:    09/10/2019  Time:    15:33             Narrative:    EXAMINATION:  XR KNEE 3 VIEW RIGHT    CLINICAL HISTORY:  Edema, unspecified    TECHNIQUE:  AP, lateral, and Merchant views of the right knee were performed.    COMPARISON:  Right knee series 01/03/2018    FINDINGS:  Chronic deformity of the tibial tuberosity similar to prior.  No acute displaced fracture, dislocation or destructive osseous process.  No large  suprapatellar joint effusion seen.  Mild tricompartmental degenerative change.  No subcutaneous emphysema or radiodense retained foreign body.

## 2019-09-11 NOTE — ED NOTES
Pt awake and alert. Pt denies cp, sob, headache, weakness, abdominal pain. Cardiac monitoring continued. Call light within reach. Will continue to monitor.

## 2019-09-11 NOTE — PT/OT/SLP EVAL
Physical Therapy Evaluation and treatment    Patient Name:  Drew Farrar   MRN:  [id number]    Recommendations:     Discharge Recommendations:  (TBD-may need post acute versus home with HH )   Discharge Equipment Recommendations: (TBD but likely none)   Barriers to discharge: Decreased caregiver support    Assessment:     Drew Farrar is a 53 y.o. male admitted with a medical diagnosis of Septic shock.  He presents with the following impairments/functional limitations:  weakness, impaired endurance, gait instability, impaired balance, decreased lower extremity function, decreased ROM, pain, impaired functional mobilty, edema, decreased safety awareness, impaired cognition, decreased upper extremity function, decreased coordination, impaired skin .  Pt unable to move R leg on or off the bed 2* pain .  Assist to stand at EOB with BRW.may need post acute pending progress (LTAC or SNF) versus home with HH    Rehab Prognosis: Good; patient would benefit from acute skilled PT services to address these deficits and reach maximum level of function.    Recent Surgery: * No surgery found *      Plan:     During this hospitalization, patient to be seen 5 x/week to address the identified rehab impairments via gait training, therapeutic activities, therapeutic exercises and progress toward the following goals:    · Plan of Care Expires:  10/11/19    Subjective     Chief Complaint: R leg pain  Patient/Family Comments/goals: states he cant move his R leg now-it hurts so bad.   Pain/Comfort:  · Pain Rating 1: 10/10  · Location - Side 1: Right  · Location - Orientation 1: generalized  · Location 1: leg  · Pain Addressed 1: Reposition, Distraction, Cessation of Activity, Nurse notified  · Pain Rating Post-Intervention 1: 9/10    Patients cultural, spiritual, Confucianist conflicts given the current situation: no    Living Environment:  Pt lives with wife in 1 story house with 1 SEBLE.  Mother lives there part time .  Tub shower combo  "with TTB + grab bars. Low toilet with BSC over it.   Prior to admission, patients level of function was mod I with RW .  Pt doesn't drive.  Has PCA 5 days a week from 10-3 that does driving and takes him to grocery or MD appt.  Shares cooking with wife.  Equipment used at home: walker, rolling, bath bench, bedside commode.  DME owned (not currently used): single point cane and wheelchair.  Upon discharge, patient will have assistance from wife who is 5'4" and on disability.    Objective:     Communicated with nurse prior to session.  Patient found HOB elevated with blood pressure cuff, pulse ox (continuous), telemetry, PICC line, peripheral IV  upon PT entry to room.    General Precautions: Standard, fall, special contact   Orthopedic Precautions:N/A   Braces: N/A     Exams:  · Cognitive Exam:  Patient is oriented to Person, Place, Time and Situation  · Gross Motor Coordination:  Impaired on RLE  · Postural Exam:  Patient presented with the following abnormalities:    · -       Rounded shoulders  · -       Forward head  · -       Wide DARA  · Sensation:    · -       Intact  light/touch BLE  · Skin Integrity/Edema:      · -       Skin integrity: Wound L lat ankle , bruising to R knee  · -       Edema: Severe BLE- R>L  · RLE ROM: Deficits: limited in knee 2* pain, ankle WFL  · RLE Strength: Deficits: unable to lift leg-decreased 2* pain, ankle WFL  · LLE ROM: WFL  · LLE Strength: WFL    Functional Mobility:  · Bed Mobility:     · Supine to Sit: total asssit for R LE  · Sit to Supine: total assist for RLE  · Transfers:     · Sit to Stand:  moderate assistance and of 2 persons for safety with rolling walker and elevated bed height  · Gait: stood only at EOB with RW-unable to amb 2* R leg pain.    · Balance: poor standing      Therapeutic Activities and Exercises:   PT eval.  Worked on standing at EOB with RW to wt bear on RLE and get pt into more upright posture.      AM-PAC 6 CLICK MOBILITY  Total Score:11     Patient " left HOB elevated with all lines intact, call button in reach and present present.    GOALS:   Multidisciplinary Problems     Physical Therapy Goals        Problem: Physical Therapy Goal    Goal Priority Disciplines Outcome Goal Variances Interventions   Physical Therapy Goal     PT, PT/OT Ongoing (interventions implemented as appropriate)     Description:    Goals to be met by: 19     Patient will increase functional independence with mobility by performin. Supine to sit with Stand-by Assistance  2. Sit to supine with Contact Guard Assistance  3. Sit to stand transfer with Stand-by Assistance  4. Gait  x 150 feet with Stand-by Assistance using Bariatric Rolling Walker.   5. Ascend/Descend 4 inch curb step with Stand-by Assistance using bariatric Rolling Walker.                      History:     Past Medical History:   Diagnosis Date    *Atrial fibrillation     Anticoagulant long-term use     Arthritis     Atrial fibrillation     Atrial fibrillation 2016    Bipolar disorder     CHF (congestive heart failure)     Congenital heart disease     s/p surgical intervention 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 of age    Deep vein thrombosis     DVT of leg (deep venous thrombosis)     left leg    History of prior ablation treatment     10/9/13    Hypertension     Obesity     Stroke     Thyroid disease     Venous stasis ulcer of lower extremity, unspecified laterality 2012    Venous ulcer        Past Surgical History:   Procedure Laterality Date    ANGIOPLASTY      ARTHROSCOPY-KNEE W/ CHONDROPLASTY Right 2016    Performed [facility]va [facility] at [facility] OR    CARDIAC SURGERY      open heart surgery 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 old    EYE SURGERY      left eye cataract/right eye glaucoma    TIMO FILTER PLACEMENT      Dr Calix ([facility])    KNEE SURGERY      l and r     MULTIPLE TOOTH EXTRACTIONS      Sclerotherapy N/A 2019    Performed by Gomez Lantigua MD at [facility] CATH LAB/EP    SKIN GRAFT       left leg    SYNOVECTOMY-KNEE Right 2/23/2016    Performed by Alejandro [facility] at [facility] OR       Time Tracking:     PT Received On: 09/11/19  PT Start Time: 1122     PT Stop Time: 1202  PT Total Time (min): 40 min     Billable Minutes: Evaluation 15 and Gait Training 10      Leonila Jin, PT  09/11/2019

## 2019-09-11 NOTE — PLAN OF CARE
Problem: Physical Therapy Goal  Goal: Physical Therapy Goal    Goals to be met by: 19     Patient will increase functional independence with mobility by performin. Supine to sit with Stand-by Assistance  2. Sit to supine with Contact Guard Assistance  3. Sit to stand transfer with Stand-by Assistance  4. Gait  x 150 feet with Stand-by Assistance using Bariatric Rolling Walker.   5. Ascend/Descend 4 inch curb step with Stand-by Assistance using bariatric Rolling Walker.    Outcome: Ongoing (interventions implemented as appropriate)  PT eval.  Pt unable to move R leg on or off the bed 2* pain .  Assist to stand at EOB with BRW.  REC:  TBD-may need post acute pending progress (LTAC or SNF) versus home with HH  No DME needs

## 2019-09-11 NOTE — PLAN OF CARE
Problem: Occupational Therapy Goal  Goal: Occupational Therapy Goal  Goals to be met by: 10/11/2019      Patient will increase functional independence with ADLs by performing:    UE Dressing with Modified [place].  LE Dressing with Stand-by Assistance.  Grooming while standing with Modified [place].  Toileting from bedside commode with Contact Guard Assistance for hygiene and clothing management.   Step transfer with Contact Guard Assistance  Toilet transfer to bedside commode with Stand-by Assistance.  Increased functional strength to WFL for self care.  Upper extremity exercise program x10 reps per handout, with assistance as needed.    Outcome: Ongoing (interventions implemented as appropriate)  Pt would benefit from continued OT to address deficits in self care and functional mobility. Recommendations TBD pending progress (SNF vs HHOT/PT); DME needs TBD but likely none

## 2019-09-11 NOTE — NURSING
(0342) Telephone report received from nurse Shavonne Tapia, questions/concerns addressed  (0450) Arrived on unit via bed with reporting nurse transporting with portable cardiac monitoring and 2L NC via portable O2 tank; immediately transferred to unit bed and placed on continuous cardiac monitoring and 2L NC via wall O2, Atrial Fib on the monitor, HR 's, O2 Sat 97-99%; (R) UA PICC dressing with bloody drainage, circumference measured and is 35.5cm; (L) wrist 20G HL has dressing CDI, no S/S of infection or infiltration noted; physical assessment completed.  (0515) 16mg/250ml Levophed infusion initiated @ 0.02mcg/kg/hour, Map 59.  (0530) 16mg/250ml Levophed infusion initiated @ 0.04mcg/kg/hour, MAP 61.  (0555) Scheduled meds administered.  (0642) Scheduled med administered.  (0700) Bedside report given to oncoming nurse, time allowed for questions.

## 2019-09-11 NOTE — HOSPITAL COURSE
" 9/16: MICHELLE. Pt ready for D/C today. Pt educated on administering Lovenox and rocephin at home.     09/15: MICHELLE. Patient to be discharged in the AM prior to wound care appointment at 11[place] [facility] [place] health [place] completed he will complete IM rocephin at home with end date 9/24 and will get lovenox BID indefinitely, patient has fu appointment with Hem/Onc here in [place].     09/14:  MICHELLE. Heme/Onc consulted last night for anticoagulation recs - recommend pt continuing Lovenox BID as inpatient AND outpatient with maximum dosing of 150 mg/kg.     09/13: continues to improve. ID recommend discontinuation of Meropenem and aztreonam. E. Cloacae isolated from blood is very sensitive. Unfortunately unable to use cipro due to slightly prolonged Qtc. Recommend completion of course with Ceftriaxone 2 gm q 24 hours for ease of administration for a total of 14 days. Stop date 9/24/19. Continue p.o vanc for total of 10 days.    9/13  ALISIAON. Pt had some abdominal discomfort, endorsed some gas and bloating, tolerated simethicone. Pt required hydrocodone-acetaminophen for better pain control of R LE pain. Pt still had loose BM at night, states progressively becoming solid and well formed. Did not have BM this AM. Pt able to ambulate short distances around the room. Has dyspnea on exertion which he reports is his baseline. Bilateral US of UE showed no DVT in the R or L. CT of R leg without contrast shows "No fracture, no osseous lesions, no abnormal periosteal reaction.  Circumferential subcutaneous soft tissue edema.  No soft tissue gas noted.  No definite fluid collection noted within the subcutaneous soft tissues, musculature or osseous structures." Gen. Surg. Saw pt this AM, and do not have any further recs. Currently pending sensitivities of Enterobacter Cloacae complex growing from BC obtained on 09/10. Awaiting recs. From pul regarding anticoagulation. Pt does not want to be D/C to LTAC.     9/12  C. Diff EIA " positive and C. Diff toxin PCR positive. 1/2 BC growing gram negative rods. Vanc. Trough at 54, held vanc until next trough later this PM. ID recommends continuing with Vancomycin, aztreonam, flagyl, and IV ciprofloxacin for gram negative coverage. Pt started on topical antifungal for tinea pedis. Podiatry consulted for toenail care, recommends outpatient care. General surgery recommends CT scan of R LE to identify any potential undrained fluid collection, and bilateral venous US of UEs to evaluate for potential DVT. Pt restarted on home metoprolol. F/u on R LE CT, bilateral UE US, and pulm crit recs. For anticoagulation.     9/11  Overnight, pt endorsed CP and SOB at midnight, and had a drop in BP to 81/42. Pt required LR bolus and lovenox. PICC line placed. V/Q scan performed and showed arge mismatched pelrfusion defect in the lingula and an intermediate probability for PE. Recommend CTA. Pt transferred to the ICU after BP falling to 80's/40's at 0500 this AM. Pt infused with levophed in ICU. BP stabilized. BC grew gram negative rods, pending sensitivities. C. Diff studies ordered due to complaint of diarrhea and loose stools s/p antibiotic course prior to admission. Pt received Echo this AM, pending results. F/u pulm crit recs on anticoagulation. Attempting to wean down levophed. Attempting to find and review records from OSH, where pt was hospitalized prior to this admission. Ortho consulted and does not suspect a septic knee, due to reasonable range of motion, that is close to baseline, and no further attempt to obtain joint fluid aspiration.

## 2019-09-12 ENCOUNTER — TELEPHONE (OUTPATIENT)
Dept: FAMILY MEDICINE | Facility: HOSPITAL | Age: 54
End: 2019-09-12

## 2019-09-12 PROBLEM — S91.309A WOUND OF FOOT: Status: ACTIVE | Noted: 2019-09-12

## 2019-09-12 PROBLEM — B35.1 ONYCHOMYCOSIS: Status: ACTIVE | Noted: 2019-09-12

## 2019-09-12 LAB
ALBUMIN SERPL BCP-MCNC: 2.7 G/DL (ref 3.5–5.2)
ALP SERPL-CCNC: 89 U/L (ref 55–135)
ALT SERPL W/O P-5'-P-CCNC: 24 U/L (ref 10–44)
ANION GAP SERPL CALC-SCNC: 8 MMOL/L (ref 8–16)
AST SERPL-CCNC: 30 U/L (ref 10–40)
BASOPHILS # BLD AUTO: 0.01 K/UL (ref 0–0.2)
BASOPHILS NFR BLD: 0.2 % (ref 0–1.9)
BILIRUB SERPL-MCNC: 0.8 MG/DL (ref 0.1–1)
BUN SERPL-MCNC: 47 MG/DL (ref 6–20)
C DIFF TOX GENS STL QL NAA+PROBE: POSITIVE
CALCIUM SERPL-MCNC: 8.5 MG/DL (ref 8.7–10.5)
CHLORIDE SERPL-SCNC: 106 MMOL/L (ref 95–110)
CO2 SERPL-SCNC: 21 MMOL/L (ref 23–29)
CREAT SERPL-MCNC: 1.6 MG/DL (ref 0.5–1.4)
DIFFERENTIAL METHOD: ABNORMAL
EOSINOPHIL # BLD AUTO: 0.1 K/UL (ref 0–0.5)
EOSINOPHIL NFR BLD: 2.2 % (ref 0–8)
ERYTHROCYTE [DISTWIDTH] IN BLOOD BY AUTOMATED COUNT: 17.3 % (ref 11.5–14.5)
EST. GFR  (AFRICAN AMERICAN): 57 ML/MIN/1.73 M^2
EST. GFR  (NON AFRICAN AMERICAN): 49 ML/MIN/1.73 M^2
GLUCOSE SERPL-MCNC: 110 MG/DL (ref 70–110)
HCT VFR BLD AUTO: 26.1 % (ref 40–54)
HGB BLD-MCNC: 8 G/DL (ref 14–18)
LYMPHOCYTES # BLD AUTO: 0.7 K/UL (ref 1–4.8)
LYMPHOCYTES NFR BLD: 10.2 % (ref 18–48)
MAGNESIUM SERPL-MCNC: 1.4 MG/DL (ref 1.6–2.6)
MCH RBC QN AUTO: 24.9 PG (ref 27–31)
MCHC RBC AUTO-ENTMCNC: 30.7 G/DL (ref 32–36)
MCV RBC AUTO: 81 FL (ref 82–98)
MONOCYTES # BLD AUTO: 0.5 K/UL (ref 0.3–1)
MONOCYTES NFR BLD: 8 % (ref 4–15)
NEUTROPHILS # BLD AUTO: 5.1 K/UL (ref 1.8–7.7)
NEUTROPHILS NFR BLD: 79.4 % (ref 38–73)
PHOSPHATE SERPL-MCNC: 5.1 MG/DL (ref 2.7–4.5)
PLATELET # BLD AUTO: 124 K/UL (ref 150–350)
PMV BLD AUTO: 9.1 FL (ref 9.2–12.9)
POTASSIUM SERPL-SCNC: 4.2 MMOL/L (ref 3.5–5.1)
PROT SERPL-MCNC: 6.6 G/DL (ref 6–8.4)
RBC # BLD AUTO: 3.21 M/UL (ref 4.6–6.2)
SODIUM SERPL-SCNC: 135 MMOL/L (ref 136–145)
WBC # BLD AUTO: 6.48 K/UL (ref 3.9–12.7)

## 2019-09-12 PROCEDURE — 63600175 PHARM REV CODE 636 W HCPCS: Performed by: STUDENT IN AN ORGANIZED HEALTH CARE EDUCATION/TRAINING PROGRAM

## 2019-09-12 PROCEDURE — 25000003 PHARM REV CODE 250: Performed by: FAMILY MEDICINE

## 2019-09-12 PROCEDURE — S0030 INJECTION, METRONIDAZOLE: HCPCS | Performed by: STUDENT IN AN ORGANIZED HEALTH CARE EDUCATION/TRAINING PROGRAM

## 2019-09-12 PROCEDURE — 25000003 PHARM REV CODE 250: Performed by: STUDENT IN AN ORGANIZED HEALTH CARE EDUCATION/TRAINING PROGRAM

## 2019-09-12 PROCEDURE — S0073 INJECTION, AZTREONAM, 500 MG: HCPCS | Performed by: STUDENT IN AN ORGANIZED HEALTH CARE EDUCATION/TRAINING PROGRAM

## 2019-09-12 PROCEDURE — 97116 GAIT TRAINING THERAPY: CPT

## 2019-09-12 PROCEDURE — 99222 1ST HOSP IP/OBS MODERATE 55: CPT | Mod: ,,, | Performed by: PODIATRIST

## 2019-09-12 PROCEDURE — 36415 COLL VENOUS BLD VENIPUNCTURE: CPT

## 2019-09-12 PROCEDURE — 80053 COMPREHEN METABOLIC PANEL: CPT

## 2019-09-12 PROCEDURE — 25000003 PHARM REV CODE 250: Performed by: INTERNAL MEDICINE

## 2019-09-12 PROCEDURE — 94761 N-INVAS EAR/PLS OXIMETRY MLT: CPT

## 2019-09-12 PROCEDURE — 85025 COMPLETE CBC W/AUTO DIFF WBC: CPT

## 2019-09-12 PROCEDURE — 97535 SELF CARE MNGMENT TRAINING: CPT

## 2019-09-12 PROCEDURE — 97530 THERAPEUTIC ACTIVITIES: CPT

## 2019-09-12 PROCEDURE — 11000001 HC ACUTE MED/SURG PRIVATE ROOM

## 2019-09-12 PROCEDURE — A4216 STERILE WATER/SALINE, 10 ML: HCPCS | Performed by: FAMILY MEDICINE

## 2019-09-12 PROCEDURE — 83735 ASSAY OF MAGNESIUM: CPT

## 2019-09-12 PROCEDURE — 87040 BLOOD CULTURE FOR BACTERIA: CPT

## 2019-09-12 PROCEDURE — 99222 PR INITIAL HOSPITAL CARE,LEVL II: ICD-10-PCS | Mod: ,,, | Performed by: PODIATRIST

## 2019-09-12 PROCEDURE — 84100 ASSAY OF PHOSPHORUS: CPT

## 2019-09-12 RX ORDER — SIMETHICONE 125 MG
125 TABLET,CHEWABLE ORAL ONCE
Status: COMPLETED | OUTPATIENT
Start: 2019-09-12 | End: 2019-09-12

## 2019-09-12 RX ORDER — SODIUM CHLORIDE, SODIUM LACTATE, POTASSIUM CHLORIDE, CALCIUM CHLORIDE 600; 310; 30; 20 MG/100ML; MG/100ML; MG/100ML; MG/100ML
INJECTION, SOLUTION INTRAVENOUS CONTINUOUS
Status: ACTIVE | OUTPATIENT
Start: 2019-09-12 | End: 2019-09-13

## 2019-09-12 RX ORDER — CIPROFLOXACIN 2 MG/ML
400 INJECTION, SOLUTION INTRAVENOUS
Status: DISCONTINUED | OUTPATIENT
Start: 2019-09-12 | End: 2019-09-13

## 2019-09-12 RX ORDER — HYDROCODONE BITARTRATE AND ACETAMINOPHEN 10; 325 MG/1; MG/1
1 TABLET ORAL EVERY 6 HOURS PRN
Status: DISCONTINUED | OUTPATIENT
Start: 2019-09-12 | End: 2019-09-16 | Stop reason: HOSPADM

## 2019-09-12 RX ORDER — AMOXICILLIN 250 MG
1 CAPSULE ORAL 2 TIMES DAILY
Status: DISCONTINUED | OUTPATIENT
Start: 2019-09-12 | End: 2019-09-12

## 2019-09-12 RX ORDER — SODIUM CHLORIDE, SODIUM LACTATE, POTASSIUM CHLORIDE, CALCIUM CHLORIDE 600; 310; 30; 20 MG/100ML; MG/100ML; MG/100ML; MG/100ML
INJECTION, SOLUTION INTRAVENOUS CONTINUOUS
Status: DISCONTINUED | OUTPATIENT
Start: 2019-09-12 | End: 2019-09-12

## 2019-09-12 RX ORDER — METOPROLOL SUCCINATE 25 MG/1
25 TABLET, EXTENDED RELEASE ORAL NIGHTLY
Status: DISCONTINUED | OUTPATIENT
Start: 2019-09-12 | End: 2019-09-16 | Stop reason: HOSPADM

## 2019-09-12 RX ADMIN — METRONIDAZOLE 500 MG: 500 INJECTION, SOLUTION INTRAVENOUS at 08:09

## 2019-09-12 RX ADMIN — MICONAZOLE NITRATE: 20 CREAM TOPICAL at 08:09

## 2019-09-12 RX ADMIN — AZTREONAM 2000 MG: 2 INJECTION, POWDER, LYOPHILIZED, FOR SOLUTION INTRAMUSCULAR; INTRAVENOUS at 02:09

## 2019-09-12 RX ADMIN — COLLAGENASE SANTYL: 250 OINTMENT TOPICAL at 09:09

## 2019-09-12 RX ADMIN — Medication 10 ML: at 06:09

## 2019-09-12 RX ADMIN — HYDROCODONE BITARTRATE AND ACETAMINOPHEN 1 TABLET: 10; 325 TABLET ORAL at 10:09

## 2019-09-12 RX ADMIN — METOPROLOL SUCCINATE 25 MG: 25 TABLET, FILM COATED, EXTENDED RELEASE ORAL at 01:09

## 2019-09-12 RX ADMIN — Medication 125 MG: at 10:09

## 2019-09-12 RX ADMIN — SODIUM CHLORIDE, SODIUM LACTATE, POTASSIUM CHLORIDE, AND CALCIUM CHLORIDE: .6; .31; .03; .02 INJECTION, SOLUTION INTRAVENOUS at 10:09

## 2019-09-12 RX ADMIN — METRONIDAZOLE 500 MG: 500 INJECTION, SOLUTION INTRAVENOUS at 04:09

## 2019-09-12 RX ADMIN — AZTREONAM 2000 MG: 2 INJECTION, POWDER, LYOPHILIZED, FOR SOLUTION INTRAMUSCULAR; INTRAVENOUS at 06:09

## 2019-09-12 RX ADMIN — CIPROFLOXACIN 400 MG: 2 INJECTION, SOLUTION INTRAVENOUS at 02:09

## 2019-09-12 RX ADMIN — MICONAZOLE NITRATE: 20 CREAM TOPICAL at 10:09

## 2019-09-12 RX ADMIN — TORSEMIDE 40 MG: 20 TABLET ORAL at 08:09

## 2019-09-12 RX ADMIN — Medication 125 MG: at 11:09

## 2019-09-12 RX ADMIN — CIPROFLOXACIN 400 MG: 2 INJECTION, SOLUTION INTRAVENOUS at 01:09

## 2019-09-12 RX ADMIN — AZTREONAM 2000 MG: 2 INJECTION, POWDER, LYOPHILIZED, FOR SOLUTION INTRAMUSCULAR; INTRAVENOUS at 09:09

## 2019-09-12 RX ADMIN — SIMETHICONE 125 MG: 125 TABLET, CHEWABLE ORAL at 10:09

## 2019-09-12 NOTE — PLAN OF CARE
Problem: Physical Therapy Goal  Goal: Physical Therapy Goal    Goals to be met by: 19     Patient will increase functional independence with mobility by performin. Supine to sit with Stand-by Assistance  2. Sit to supine with Contact Guard Assistance  3. Sit to stand transfer with Stand-by Assistance  4. Gait  x 150 feet with Stand-by Assistance using Bariatric Rolling Walker.   5. Ascend/Descend 4 inch curb step with Stand-by Assistance using bariatric Rolling Walker.     Outcome: Ongoing (interventions implemented as appropriate)  Pt found sitting EOB.  Pt tachycardic.  Slight increase in ROM of R knee.  Able to stand but only  amb 4 steps with RW and assist-decreased wt bearing and increased pain on RLE.  REC:  Post acute - SNF vs LTAC

## 2019-09-12 NOTE — PROGRESS NOTES
[facility]  Infectious Disease  Progress Note    Patient Name: Drew Farrar  MRN: [id number]  Admission Date: 9/10/2019  Length of Stay: 2 days  Attending Physician: Severyn Yaroshevsky, MD  Primary Care Provider: Garrison Roach MD    Isolation Status: Special Contact  Assessment/Plan:      * Septic shock  52 y/o with chronic A fib on eliquis, CHF, PE, DVT with irwin filter in place, HTN, venous stasis, hyperthyroidism. Admitted 9/10 to [facility] [place] with fevers, chills, nausea, vomiting, SOB, right knee pain. Found to have probable PE.  Patient with recent cellulitis RLE treated with clinda and cipro 3 weeks PTA. 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 Right knee tap in the ED was unsuccessful and Ortho does not think it needs to have repeat attempt. Patient with bilateral tinea pedis - severe and very overgrown toenails with ingrown nails    PCN allergy - listed as hives - patient reports he can take Keflex.   9/12 BC on admit positive for enterobacter cloacae - sensi pending.   9/12 stool c diff positive PCR from 9/11    Rec  -continue aztreonam and cipro for gram negative coverage pending sensitivities of the E cloacae  -continue IV flagyl and add PO vanco for the c diff coverage  -check imaging of right leg  -stop IV vancomycin   -continue topical antifungal on foot fro tinea pedis.             Anticipated Disposition: unclear at this point     Thank you for your consult. Dr. Whelan will  U ID Service tomorrow [phone]    Ema Connolly MD  Infectious Disease  [facility]    Subjective:     Principal Problem:Septic shock    HPI: Mr. 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 Gutierrez) who presented to the ED yesterday for evaluation of swelling, redness, and pain of his right knee associated with n/v/d.  On presentation, he was tachycardic with afib rhythm, febrile to 102.3, and with an Lactic acid of 4.  Code sepsis was called and he was put on 2L O2 by NC and he was given IVF, vanc, aztreonam, and flagyl and admitted to [facility] for treatment of severe sepsis possibly secondary to RLE cellulitis.  Arthrocentesis attempt of R knee was unsuccessful. Ortho consulted due to concern for septic joint.  After he became hypotensive to 80/40 he was transferred from the floor to the ICU for pressor support and bp stabalized. PICC line placed RUE. Xray R knee showed no acute changes with chronic deformity of tibial tuberosity. US of bilateral LE shows femoral DVT B/L (left femoral DVT is new).  CXR showed cardiomegaly unchanged with no failure or pneumonia. V/Q scan shows intermediate probability PE at lingula.  Troponin positive at 0.168.  Lactic acid downtrending today to 1.8.  Blood cultures (9/10) growing gram negative rods.  Influenza A&B negative.  C. Diff ordered today because of diarrhea. ID consulted to manage antibiotics 9/11.  He is currently on aztreonam 2000 mg IV q8, metronidazole 500 mg q8, and vanc 2000 mg q12.       Of note, patient stated that three weeks ago he completed a 2 week course of cipro and clinda for RLE cellulitis.  On 9/2, 9 days ago, the patient went to the ED for evaluation of epistaxis, and ED physician noticed tinea corporis, tinea cruris, and tenia pedis on bilateral lower extremities, bilateral feet, and on abdomen.  ED provider discharged patient with terbinafine PO daily and atarax Rx.        Today, 9/11, patient remains afebrile and bp stable but on pressors.  On interview, he denies any recent ingestion of raw oysters or lake swimming, but does have a swimming pool at his house which he swam in on 9/8  and 9/9.  He lives at home with his wife about 2 blocks from the hospital.  He has been taking the terbinafine from the ED doctor 9 days ago with no improvement.  He has an allergy to penicillin and says he breaks out in hives when it is given to him, but he can tolerate keflex.    Upon chart review - patient was admitted 2/20/2016 and had group A strep right knee septic arthritis. He was washed out by Ortho - the last one was on 2/25/2016. He was treated with vancomycin since he is PCN allergic and was sent home with long term IV antibiotics.     9/12 - the c diff pcr came back positive - PO vanco started; continue IV flagyl for now for dual therapy; BC from admit 9/10 positive for E cloacae - sensi pending; off pressors      Interval History: Patient had positive c diff PCR (antigen positive, toxin negative) from 9/11. Patient stated he will see Podiatry as outpatient for clipping toenails. BC from admit positive for E cloacae - sensi pending.     Review of Systems   Respiratory: Positive for shortness of breath.         Still with some SOB   Gastrointestinal: Negative for diarrhea, nausea and vomiting.   Skin: Positive for rash.        Tinea pedis better today on right and left, still some flaking     Antibiotics (From admission, onward)    Start     Stop Route Frequency Ordered    09/12/19 1400  ciprofloxacin (CIPRO)400mg/200ml D5W IVPB 400 mg      -- IV Every 12 hours (non-standard times) 09/12/19 0752    09/12/19 1000  vancomycin 250mg / 10ml oral suspension 125 mg      -- Oral Every 6 hours 09/12/19 0941    09/11/19 1845  aztreonam 2 g in dextrose 5 % 100 mL IVPB (ready to mix system)      -- IV Every 8 hours (non-standard times) 09/11/19 1744    09/11/19 0030  metronidazole IVPB 500 mg      -- IV Every 8 hours (non-standard times) 09/10/19 1927        Antifungals (From admission, onward)    Start     Stop Route Frequency Ordered    09/11/19 2315  miconazole 2 % cream      -- Top 2 times daily 09/11/19 2210         Objective:     Vital Signs (Most Recent):  Temp: 98.4 °F (36.9 °C) (09/12/19 1103)  Pulse: (!) 114 (09/12/19 1500)  Resp: (!) 27 (09/12/19 1500)  BP: (!) 113/51 (09/12/19 1400)  SpO2: 95 % (09/12/19 1500) Vital Signs (24h Range):  Temp:  [98.1 °F (36.7 °C)-98.4 °F (36.9 °C)] 98.4 °F (36.9 °C)  Pulse:  [] 114  Resp:  [20-44] 27  SpO2:  [89 %-100 %] 95 %  BP: ()/(39-72) 113/51     Weight: (!) 161.3 kg (355 lb 9.6 oz)  Body mass index is 39.06 kg/m².    Estimated Creatinine Clearance: 94.3 mL/min (A) (based on SCr of 1.6 mg/dL (H)).    Physical Exam   Cardiovascular: Normal heart sounds.   Pulmonary/Chest: Breath sounds normal.   Oxygen Desaturates when he sat up in bed but recovered when he laid back down.    Musculoskeletal: He exhibits edema.   Right leg with elephantiasis and still with some erythema around knee and up to thigh but less than yesterday. Left leg with small open wound about 2 cm lateral ankle area - no erythema   Neurological: He is alert.       Significant Labs:   Blood Culture:   Recent Labs   Lab 07/08/19  1717 07/25/19  1404 07/25/19  1420 09/10/19  1443 09/10/19  1518   LABBLOO No growth after 5 days. No growth after 5 days. No growth after 5 days. No Growth to date  No Growth to date Gram stain walter bottle: Gram negative rods   Results called to and read back by: Marce Leon RN  09/11/2019  10:18  ENTEROBACTER CLOACAE COMPLEX  Susceptibility pending  *     CBC:   Recent Labs   Lab 09/11/19  0800 09/12/19  0359   WBC 10.96 6.48   HGB 8.2* 8.0*   HCT 26.8* 26.1*    124*     CMP:   Recent Labs   Lab 09/11/19  0800 09/12/19  0359   * 135*   K 4.8 4.2    106   CO2 22* 21*   * 110   BUN 42* 47*   CREATININE 1.6* 1.6*   CALCIUM 8.6* 8.5*   PROT 6.7 6.6   ALBUMIN 2.9* 2.7*   BILITOT 1.1* 0.8   ALKPHOS 100 89   AST 35 30   ALT 25 24   ANIONGAP 6* 8   EGFRNONAA 49* 49*     Urine Studies:   Recent Labs   Lab 09/10/19  1638   COLORU [place]*   APPEARANCEUA  [place]*   PHUR 6.0   SPECGRAV >=1.030*   PROTEINUA Trace*   GLUCUA Negative   KETONESU Negative   BILIRUBINUA Negative   OCCULTUA Negative   NITRITE Negative   UROBILINOGEN Negative   LEUKOCYTESUR Negative     Wound Culture:   Recent Labs   Lab 19  0131   LABAERO Skin ori,  no predominant organism       Significant Imagin/12 US UE - No DVT of the right or left upper extremity.   VQ scan -   Large mismatched perfusion defect in the lingula. This is intermediate probability for PE. Recommend CTA.

## 2019-09-12 NOTE — SUBJECTIVE & OBJECTIVE
Scheduled Meds:   aztreonam  2,000 mg Intravenous Q8H    ciprofloxacin  400 mg Intravenous Q12H    metronidazole  500 mg Intravenous Q8H    miconazole   Topical (Top) BID    sodium chloride 0.9%  10 mL Intravenous Q6H    torsemide  40 mg Oral Daily     Continuous Infusions:   lactated ringers Stopped (09/12/19 0430)     PRN Meds:acetaminophen, Dextrose 10% Bolus, Dextrose 10% Bolus, glucagon (human recombinant), glucose, glucose, ondansetron, promethazine (PHENERGAN) IVPB, ramelteon, senna-docusate 8.6-50 mg, Flushing PICC Protocol **AND** sodium chloride 0.9% **AND** sodium chloride 0.9%, sodium chloride 0.9%    Review of patient's allergies indicates:   Allergen Reactions    Contrast media Other (See Comments)     Severe chest pain    Food allergy formula [glutamine-c-quercet-selen-brom]      Allergic to green peas; Heart failure.    Iodinated contrast media Other (See Comments)     Chest pain    Peas Hives    Ibuprofen Swelling    Latex, natural rubber Hives    Pcn [penicillins] Hives    Butisol [butabarbital] Rash     Peeling skin        Past Medical History:   Diagnosis Date    *Atrial fibrillation     Anticoagulant long-term use     Arthritis     Atrial fibrillation     Atrial fibrillation Feb 23, 2016    Bipolar disorder     CHF (congestive heart failure)     Congenital heart disease     s/p surgical intervention 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 of age    Deep vein thrombosis     DVT of leg (deep venous thrombosis)     left leg    History of prior ablation treatment     10/9/13    Hypertension     Obesity     Stroke     Thyroid disease     Venous stasis ulcer of lower extremity, unspecified laterality 12/14/2012    Venous ulcer      Past Surgical History:   Procedure Laterality Date    ANGIOPLASTY      ARTHROSCOPY-KNEE W/ CHONDROPLASTY Right 2/23/2016    Performed by [facility] at [facility] OR    CARDIAC SURGERY      open heart surgery 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 old    EYE SURGERY      left eye  cataract/right eye glaucoma    TIMO FILTER PLACEMENT      Dr Calix ([facility])    KNEE SURGERY      l and r     MULTIPLE TOOTH EXTRACTIONS      Sclerotherapy N/A 2/21/2019    Performed by Gomez [facility]antigua [facility] at [facility] CATH LAB/EP    SKIN GRAFT      left leg    SYNOVECTOMY-KNEE Right 2/23/2016    Performed by [facility] at [facility] OR       Family History     Problem Relation (Age of Onset)    Diabetes Father, Maternal Grandfather    Heart disease Father, Maternal Grandmother, Maternal Grandfather    Stroke Maternal Grandfather        Tobacco Use    Smoking status: Former Smoker     Packs/day: 1.00     Years: 6.00     Pack years: 6.00     Types: Cigarettes    Smokeless tobacco: Former User    Tobacco comment: quit by age 27yrs old   Substance and Sexual Activity    Alcohol use: Yes     Alcohol/week: 0.6 oz     Types: 1 Glasses of wine per week     Frequency: Monthly or less     Comment: occasionally    Drug use: No    Sexual activity: Yes     Partners: Female     Birth control/protection: None     Review of Systems   Constitutional: Negative for chills and fever.   Cardiovascular: Positive for leg swelling.   Gastrointestinal: Negative for nausea and vomiting.   Musculoskeletal: Positive for arthralgias, gait problem and joint swelling.   Skin: Positive for color change and wound.     Objective:     Vital Signs (Most Recent):  Temp: 98.3 °F (36.8 °C) (09/12/19 0703)  Pulse: 106 (09/12/19 0802)  Resp: (!) 28 (09/12/19 0802)  BP: 100/69 (09/12/19 0802)  SpO2: 95 % (09/12/19 0802) Vital Signs (24h Range):  Temp:  [97.8 °F (36.6 °C)-98.6 °F (37 °C)] 98.3 °F (36.8 °C)  Pulse:  [] 106  Resp:  [23-40] 28  SpO2:  [89 %-100 %] 95 %  BP: ()/(30-79) 100/69     Weight: (!) 161.3 kg (355 lb 9.6 oz)  Body mass index is 39.06 kg/m².    Foot Exam    General  General Appearance: appears stated age and healthy   Orientation: alert and oriented to person, place, and time       Right Foot/Ankle      Inspection and Palpation  Ecchymosis: none  Tenderness: none   Swelling: (Chronic, hard edema 2/2 chronic lymphadema)  Skin Exam: abnormal color; (Hardening and hyperpigmented skin 2/2 chronic lymphadema)    Neurovascular  Saphenous nerve sensation: normal  Tibial nerve sensation: normal  Superficial peroneal nerve sensation: normal  Deep peroneal nerve sensation: normal  Sural nerve sensation: normal    Comments  Difficult to palpate pulses 2/2 hard edema    Left Foot/Ankle      Inspection and Palpation  Ecchymosis: none  Tenderness: none   Swelling: none   Skin Exam: abnormal color and ulcer; (hyperpigmentation to the LE)    Neurovascular  Dorsalis pedis: 2+  Posterior tibial: 2+  Saphenous nerve sensation: normal  Tibial nerve sensation: normal  Superficial peroneal nerve sensation: normal  Deep peroneal nerve sensation: normal  Sural nerve sensation: normal            Laboratory:  CBC:   Recent Labs   Lab 09/12/19  0359   WBC 6.48   RBC 3.21*   HGB 8.0*   HCT 26.1*   *   MCV 81*   MCH 24.9*   MCHC 30.7*     CMP:   Recent Labs   Lab 09/12/19  0359      CALCIUM 8.5*   ALBUMIN 2.7*   PROT 6.6   *   K 4.2   CO2 21*      BUN 47*   CREATININE 1.6*   ALKPHOS 89   ALT 24   AST 30   BILITOT 0.8     CRP:   Recent Labs   Lab 09/10/19  1442   CRP 24.5*     ESR:   Recent Labs   Lab 09/10/19  1442   SEDRATE 59*       Diagnostic Results:  I have reviewed all pertinent imaging results/findings within the past 24 hours.    Clinical Findings:  Wound to the dorsal lateral mid to hind foot. No crepitus of fluctuance. No periwound erythema, edema or drainage. Not infected at this time.

## 2019-09-12 NOTE — ASSESSMENT & PLAN NOTE
Mycotic and dystrophic toe nails x 10.  - Patient does not meet criteria for IP nail care.  - Recommend PRN f/u as OP to have nail care in clinic.

## 2019-09-12 NOTE — PT/OT/SLP PROGRESS
"Occupational Therapy   Treatment    Name: Drew Farrar  MRN: [id number]  Admitting Diagnosis:  Septic shock       Recommendations:     Discharge Recommendations: (post acute-SNF versus LTAC)  Discharge Equipment Recommendations:  (TBD likely none)  Barriers to discharge:  Decreased caregiver support    Assessment:     Drew Farrar is a 53 y.o. male with a medical diagnosis of Septic shock.  He presents with deconditioning, tachycardia seated EOB upon OT/PT arrival, HR ~140s-150s. Pt with increased HR during ambulation attempt to ~165 bmp, increased pain in R knee. Pt performed PLB well but required increased time and seated rest breaks to recover after all functional mobility attempts. Performance deficits affecting function are weakness, impaired endurance, impaired sensation, impaired self care skills, impaired functional mobilty, gait instability, impaired balance, decreased coordination, decreased lower extremity function, decreased upper extremity function, pain, decreased ROM, impaired skin, edema, impaired cardiopulmonary response to activity.     Rehab Prognosis:  Fair+ to Good: patient would benefit from acute skilled OT services to address these deficits and reach maximum level of function.       Plan:     Patient to be seen 5 x/week to address the above listed problems via self-care/home management, therapeutic exercises, therapeutic activities  · Plan of Care Expires: 10/11/19  · Plan of Care Reviewed with: patient    Subjective     Pain/Comfort:  · Pain Rating 1: 7/10(in stance)  · Location - Side 1: Right  · Location - Orientation 1: generalized  · Location 1: knee  · Pain Addressed 1: Reposition, Distraction, Cessation of Activity, Nurse notified  · Pain Rating Post-Intervention 1: 0/10(at rest)    Objective:     Communicated with: nsg prior to session.  Patient found seated EOB. Pt reports MD encouraged pt to sit EOB to "regain sensation in RLE and dangle knee" with PICC line, telemetry, pulse ox " (continuous), blood pressure cuff, peripheral IV upon OT entry to room.    General Precautions: Standard, fall, special contact   Orthopedic Precautions:N/A   Braces: N/A     Occupational Performance:     Bed Mobility:    · Patient completed Scooting/Bridging with moderate assistance to scoot EOB after ambulation; maximal assistance/total A to scoot to HOB in supine at end of session with use of drawsheet (pt assisting using BUE/LLE to bridge and pull) but required increased assist 2/2 fatigue and increased HR  · Patient completed Sit to Supine with maximum assistance with total A for RLE managment    Functional Mobility/Transfers:  · Patient completed Sit <> Stand Transfer with minimum assistance and moderate assistance  with  rolling walker , 2nd person for line management and safety in t/f  · Functional Mobility: Pt stood with RW and only able to amb 4 steps with min to mod a-2 person present for safety.  Pt with good dynamic and static seated balance, poor dynamic standing balance.    Activities of Daily Living:  · Upper Body Dressing: minimum assistance to don gown as robe  · Lower Body Dressing: moderate assistance to don R shoe covers but pt able to don L sock and L shoe cover with minimally increased time though seated rest break required 2/2 increased WOB and increased HR per monitors      [facility] 6 Click ADL: 18    Treatment & Education:  Pt educated on role of OT and POC.   Pt performing skills as listed above.   Pt able to take ~4 steps this date with use of RW and demonstrated improved LE dressing compared to prior session. Pt shows good participation in therapies but remains limited 2/2 tachycardia seated EOB, though pt reports that he had been sitting ~2 hrs. Pt repositioning with HOB elevated and all needs met, reported he was comfortable with no additional needs at end of OT/PT session    Patient left HOB elevated with all lines intact, call button in reach, bed alarm on and nsg  notifiedEducation:      GOALS:   Multidisciplinary Problems     Occupational Therapy Goals        Problem: Occupational Therapy Goal    Goal Priority Disciplines Outcome Interventions   Occupational Therapy Goal     OT, PT/OT Ongoing (interventions implemented as appropriate)    Description:  Goals to be met by: 10/11/2019      Patient will increase functional independence with ADLs by performing:    UE Dressing with Modified [place].  LE Dressing with Stand-by Assistance.  Grooming while standing with Modified [place].  Toileting from bedside commode with Contact Guard Assistance for hygiene and clothing management.   Step transfer with Contact Guard Assistance  Toilet transfer to bedside commode with Stand-by Assistance.  Increased functional strength to WFL for self care.  Upper extremity exercise program x10 reps per handout, with assistance as needed.                      Time Tracking:     OT Date of Treatment: 09/12/19  OT Start Time: 1323  OT Stop Time: 1355  OT Total Time (min): 32 min    Billable Minutes:Self Care/Home Management 16 Co TX PT    Ruth Cardenas OT  9/12/2019

## 2019-09-12 NOTE — PROGRESS NOTES
[facility] Hospital Medicine  Progress Note    Patient Name: Drew Farrar  MRN: [id number]  Patient Class: IP- Inpatient   Admission Date: 9/10/2019  Length of Stay: 2 days  Attending Physician: Severyn Yaroshevsky, MD  Primary Care Provider: Garrison Roach MD        Subjective:     Principal Problem:Septic shock        HPI:  Mr. Farrar is a 53 year old man with PMHx of chronic Afib (on eliquis), HFpEF, PE, Hx of DVT with irwin filter in place, HTN, Venous stasis, hyperthyroidism, presented to ED with acute onset fevers, chills, nausea, vomiting and right lower extremity pain. He states pain started around 3 AM on the morning of admission. He was recently discharge from hospital about 3 weeks prior to right LE cellulitis, and completed a 2 week course of cipro and clinda about 3 weeks ago. He reports improvement in pain and cellulitis up until this morning. He denies trauma or falls to the affected area. He states the pain started all of a sudden and is associated with swelling and warmth to the area. He states the right leg is very painful to touch and with  Movement.     He states he is compliant with all his blood pressure and anticoagulation medication. He denies Headache, changes in vision, shortness of breath, chest pain, palpitations, abdominal pain, diarrhea or constipation.       Overview/Hospital Course:  9/12  C. Diff EIA positive and C. Diff toxin PCR positive. 1/2 BC growing gram negative rods. Vanc. Trough at 54, held vanc until next trough later this PM. ID recommends continuing with Vancomycin, aztreonam, flagyl, and IV ciprofloxacin for gram negative coverage. Pt started on topical antifungal for tinea pedis. Podiatry consulted for toenail care, recommends outpatient care. General surgery recommends CT scan of R LE to identify any potential undrained fluid collection, and bilateral venous US of UEs to evaluate for potential DVT. Pt restarted on home metoprolol. F/u on R  LE CT, bilateral UE US, and pulm crit recs. For anticoagulation.     9/11  Overnight, pt endorsed CP and SOB at midnight, and had a drop in BP to 81/42. Pt required LR bolus and lovenox. PICC line placed. V/Q scan performed and showed arge mismatched pelrfusion defect in the lingula and an intermediate probability for PE. Recommend CTA. Pt transferred to the ICU after BP falling to 80's/40's at 0500 this AM. Pt infused with levophed in ICU. BP stabilized. BC grew gram negative rods, pending sensitivities. C. Diff studies ordered due to complaint of diarrhea and loose stools s/p antibiotic course prior to admission. Pt received Echo this AM, pending results. F/u pulm crit recs on anticoagulation. Attempting to wean down levophed. Attempting to find and review records from OSH, where pt was hospitalized prior to this admission. Ortho consulted and does not suspect a septic knee, due to reasonable range of motion, that is close to baseline, and no further attempt to obtain joint fluid aspiration.    Interval History: NAEON. Pt weaned off levophed. Pt denies F/C, N/V, dyspnea, CP, SOB, Abdominal pain. Tolerating RA without difficulty. Improvement in R LE pain. Pt still having loose stools, tolerating PO diet.    Review of Systems   Constitutional: Negative for appetite change, chills and fever.   HENT: Negative for congestion, ear discharge, ear pain, hearing loss, postnasal drip, rhinorrhea, tinnitus and trouble swallowing.    Eyes: Negative for pain, discharge, redness and itching.   Respiratory: Negative for apnea, cough, choking, chest tightness and shortness of breath.    Cardiovascular: Positive for leg swelling. Negative for chest pain and palpitations.   Gastrointestinal: Positive for diarrhea. Negative for abdominal distention, abdominal pain, blood in stool, nausea and vomiting.   Endocrine: Negative for polydipsia, polyphagia and polyuria.   Genitourinary: Negative for difficulty urinating, dysuria, frequency  and urgency.   Musculoskeletal: Positive for joint swelling.        R LE knee and leg swelling, erythema, and skin breakdown. L leg skin break down.    Skin: Positive for color change and wound.        Erythema and warmth of R LE.    Neurological: Negative for dizziness, tremors, weakness, light-headedness, numbness and headaches.   Hematological: Negative for adenopathy. Does not bruise/bleed easily.   Psychiatric/Behavioral: Negative for agitation, behavioral problems, confusion, dysphoric mood and hallucinations.     Objective:     Vital Signs (Most Recent):  Temp: 98.4 °F (36.9 °C) (09/12/19 1103)  Pulse: 104 (09/12/19 1107)  Resp: 20 (09/12/19 1107)  BP: (!) 105/58 (09/12/19 1107)  SpO2: 96 % (09/12/19 1103) Vital Signs (24h Range):  Temp:  [98.1 °F (36.7 °C)-98.6 °F (37 °C)] 98.4 °F (36.9 °C)  Pulse:  [] 104  Resp:  [20-44] 20  SpO2:  [89 %-100 %] 96 %  BP: ()/(30-72) 105/58     Weight: (!) 161.3 kg (355 lb 9.6 oz)  Body mass index is 39.06 kg/m².    Intake/Output Summary (Last 24 hours) at 9/12/2019 1216  Last data filed at 9/12/2019 0945  Gross per 24 hour   Intake 3621.25 ml   Output 2581 ml   Net 1040.25 ml      Physical Exam   Constitutional: He is oriented to person, place, and time. He appears well-developed and well-nourished.   HENT:   Head: Normocephalic and atraumatic.   Eyes: Pupils are equal, round, and reactive to light. Conjunctivae and EOM are normal.   Neck: Normal range of motion. Neck supple.   Cardiovascular: Intact distal pulses. Exam reveals no gallop and no friction rub.   No murmur heard.  Pulmonary/Chest: Effort normal and breath sounds normal. No stridor. No respiratory distress. He has no wheezes. He exhibits no tenderness.   Abdominal: Soft. Bowel sounds are normal. He exhibits no distension. There is no tenderness. There is no guarding.   Musculoskeletal:   R LE swelling and eythema. Difficulty moving leg, able to move big toe.    Neurological: He is alert and oriented  to person, place, and time.   Skin:   Skin breakdown on R and L LE. Erythema and warmth of R LE.    Psychiatric: He has a normal mood and affect. His behavior is normal. Thought content normal.       Significant Labs:   Recent Labs     09/10/19  1442 09/11/19  0800 09/12/19  0359   WBC 12.59 10.96 6.48   HGB 9.5* 8.2* 8.0*   HCT 30.7* 26.8* 26.1*    151 124*   MCV 81* 82 81*   RDW 16.6* 17.2* 17.3*       Recent Labs     09/10/19  1442 09/11/19  0800 09/12/19  0359    135* 135*   K 5.6* 4.8 4.2    107 106   CO2 17* 22* 21*   GLU 92 114* 110   BUN 36* 42* 47*   CREATININE 1.4 1.6* 1.6*   CALCIUM 9.6 8.6* 8.5*   PROT 7.7 6.7 6.6   ALBUMIN 3.5 2.9* 2.7*   BILITOT 1.0 1.1* 0.8   ALKPHOS 141* 100 89   AST 35 35 30   ALT 28 25 24   ANIONGAP 11 6* 8   ESTGFRAFRICA >60 57* 57*   EGFRNONAA 58* 49* 49*       Recent Labs     09/10/19  1848 09/11/19  0800 09/12/19  0359   MG 1.4* 1.4* 1.4*   PHOS 4.5 4.3 5.1*       Coags  Lab Results   Component Value Date    INR 1.1 09/10/2019    INR 1.1 08/02/2019    INR 3.4 (H) 07/27/2019    APTT 33.5 (H) 09/10/2019    APTT 50.7 (H) 08/03/2019    APTT 86.4 (H) 08/03/2019     Recent Labs   Lab 09/10/19  1442   INR 1.1   APTT 33.5*       A1c:   Lab Results   Component Value Date    HGBA1C 5.2 09/10/2019   , Last Gluc: No results for input(s): POCTGLUCOSE in the last 168 hours.    TSH:   Lab Results   Component Value Date    TSH <0.010 (L) 09/10/2019         Cardiac Enzymes  Recent Labs     09/10/19  1848 09/11/19  0309   TROPONINI 0.165* 0.168*       Micro  Microbiology Results (last 7 days)     Procedure Component Value Units Date/Time    Blood culture x two cultures. Draw prior to antibiotics. [[id number]]  (Abnormal) Collected:  09/10/19 1518    Order Status:  Completed Specimen:  Blood from Peripheral, Hand, Right Updated:  09/12/19 1150     Blood Culture, Routine Gram stain walter bottle: Gram negative rods       Results called to and read back by: Marce Leon RN   09/11/2019  10:18      ENTEROBACTER CLOACAE COMPLEX  Susceptibility pending      Narrative:       Aerobic and anaerobic    C Diff Toxin by PCR [[id number]]  (Abnormal) Collected:  09/11/19 1434    Order Status:  Completed Updated:  09/12/19 0016     C. diff PCR Positive    Blood culture x two cultures. Draw prior to antibiotics. [[id number]] Collected:  09/10/19 1443    Order Status:  Completed Specimen:  Blood from Peripheral, Forearm, Left Updated:  09/11/19 2212     Blood Culture, Routine No Growth to date      No Growth to date    Narrative:       Aerobic and anaerobic    Clostridium difficile EIA [[id number]]  (Abnormal) Collected:  09/11/19 1434    Order Status:  Completed Specimen:  Stool Updated:  09/11/19 2041     C. diff Antigen Positive     C difficile Toxins A+B, EIA Negative     Comment: Testing not recommended for children <24 months old.       Blood culture [[id number]]     Order Status:  No result Specimen:  Blood     Blood culture [[id number]]     Order Status:  No result Specimen:  Blood     Influenza A & B by Molecular [[id number]] Collected:  09/10/19 1521    Order Status:  Completed Specimen:  Nasopharyngeal Swab Updated:  09/10/19 1554     Influenza A, Molecular Negative     Influenza B, Molecular Negative     Flu A & B Source Nasal swab             ABG  Recent Labs   Lab 09/11/19  0335   PH 7.292*   PCO2 36.0   PO2 102*   HCO3 17.4*   POCSATURATED 97   BE -9         Imaging   Imaging Results          NM Lung Ventilation Perfusion Imaging (Final result)  Result time 09/11/19 09:56:43    Final result by RADIOLOGIST, VIRTUAL (09/11/19 09:56:43)                 Impression:      Large mismatched perfusion defect in the lingula. This is intermediate probability for PE. Recommend CTA. Thank you for allowing us to participate in the care of your patient. Dictated and Authenticated by: Shun Kelly MD 09/11/2019 2:52 AM Central Time ([place]    Addendum created by Shun Kelly MD on 9/11/2019 2:59 AM Central  Time    Findings were discussed with Dr Salgado at 9/11/2019 2:59 AM CDT.      Electronically signed by: Virtual Radiologist  Date:    09/11/2019  Time:    09:56             Narrative:    EXAMINATION:  NM Lung Ventilation and Perfusion Imaging    CLINICAL HISTORY:  53 years old, male; Other: Knee pain    TECHNIQUE:  Imaging protocol: Nuclear pulmonary ventilation with aerosol or gas was performed followed by perfusion. Views: Ventilation acquired in posterior projection. Perfusion acquired with multiple projections. Radiopharmaceutical: 15 mCi of Xenon -133, inhaled. 5 mCi of Tc-99m MAA, IV.    COMPARISON:  No relevant prior studies available.    FINDINGS:  Ventilation: Normal. No ventilation defects. Perfusion: Large perfusion defect in the lingula.                               X-Ray Chest AP Portable (Final result)  Result time 09/11/19 02:02:29    Final result by Donna Savage MD (09/11/19 02:02:29)                 Impression:      Interval placement of right PICC line with tip projecting over the SVC.      Electronically signed by: Donna Savage MD  Date:    09/11/2019  Time:    02:02             Narrative:    EXAMINATION:  XR CHEST AP PORTABLE    CLINICAL HISTORY:  SOB; Sepsis, unspecified organism    TECHNIQUE:  Single frontal view of the chest was performed.    COMPARISON:  09/10/2019    FINDINGS:  Cardiac monitoring leads overlie the chest.  There has been interval placement of a right PICC line, the tip of which projects over the SVC.  The cardiomediastinal silhouette is enlarged.  There is no evidence of pneumothorax or significant interval detrimental change in lung aeration.                                US Lower Extremity Veins Bilateral (Final result)  Result time 09/10/19 21:23:38    Final result by Sohail Kline MD (09/10/19 21:23:38)                 Impression:      Nonocclusive deep venous thrombosis seen within the bilateral femoral veins.    This report was flagged in Epic as  abnormal.      Electronically signed by: Sohail Kline MD  Date:    09/10/2019  Time:    21:23             Narrative:    EXAMINATION:  US LOWER EXTREMITY VEINS BILATERAL    CLINICAL HISTORY:  Possible DVT; Sepsis, unspecified organism    TECHNIQUE:  Duplex and color flow Doppler evaluation of the bilateral lower extremity veins was performed.    COMPARISON:  08/02/2019.    FINDINGS:  Right lower extremity:    Nonocclusive thrombus is visualized within the mid to distal right femoral vein.  No evidence of clot involving the right common femoral, greater saphenous, popliteal, posterior tibial, anterior tibial, and peroneal veins.    Left lower extremity:    Nonocclusive thrombus is seen within the proximal left femoral vein.  No evidence of clot involving the left common femoral, greater saphenous, popliteal, peroneal, anterior and posterior tibial veins.  No evidence of soft tissue mass or Baker's cyst.                               X-Ray Chest AP Portable (Final result)  Result time 09/10/19 15:51:14    Final result by Judy Farrar MD (09/10/19 15:51:14)                 Impression:      Cardiomegaly unchanged.  No failure or pneumonia.      Electronically signed by: Judy Farrar  Date:    09/10/2019  Time:    15:51             Narrative:    EXAMINATION:  XR CHEST AP PORTABLE    CLINICAL HISTORY:  Sepsis;    TECHNIQUE:  Single frontal view of the chest was performed.    COMPARISON:  08/02/2019 chest    FINDINGS:  Single AP portable view at 15:09.    The heart is moderately enlarged unchanged.  The pulmonary artery is enlarged unchanged consistent with underlying pulmonary artery hypertension.    Trachea appears normal.  No pneumothorax or pleural effusion or interstitial edema consolidation or nodule.  There are overlying leads.  No rib fracture seen.                               X-Ray Knee 3 View Right (Final result)  Result time 09/10/19 15:33:48    Final result by Gopi Whiting MD (09/10/19 15:33:48)                  Impression:      No acute displaced fracture-dislocation identified.      Electronically signed by: Gopi Whiting MD  Date:    09/10/2019  Time:    15:33             Narrative:    EXAMINATION:  XR KNEE 3 VIEW RIGHT    CLINICAL HISTORY:  Edema, unspecified    TECHNIQUE:  AP, lateral, and Merchant views of the right knee were performed.    COMPARISON:  Right knee series 01/03/2018    FINDINGS:  Chronic deformity of the tibial tuberosity similar to prior.  No acute displaced fracture, dislocation or destructive osseous process.  No large suprapatellar joint effusion seen.  Mild tricompartmental degenerative change.  No subcutaneous emphysema or radiodense retained foreign body.                                    Assessment/Plan:      * Septic shock  Patient presented to the ED with fever of 102.3 and LA of 4   Severe swelling and warmth to the right leg associated with joint effusion of the right knee   Therapeutic and diagnostic arthrocentesis attempted x2 without success   Patient was recently hospitalized for cellulitis of the same area, and completed 2 weeks of clinda and cipro  Pain and swelling of the affected area started around 3 AM the morning of admission  ID consulted, appreciate recs   Blood cultures + GNR  Ortho consulted for septic joint, Dr Timmons recommends continued management, and not likely a septic joint   Continue to monitor   Pressors weaned and now BP maintained on own       Cellulitis  As above  Will continue IV antibiotics   Continue fluids   Pain management       Chronic atrial fibrillation  Pt with chronic afib   HR in 110's-120's   Will continue metoprolol   Maintain Tele   Continue Xarelto, lisinopril and torsemide   Pt acutely decompensate, V/Q scan showed lingular hypodensity  Patient now failed eliquis, warfarin and xarelto  Pulm crit consulted for AC management, appreciate recs   Patient stable on room air   Continue to monitor       (HFpEF) heart failure with preserved  ejection fraction  Last TTE with EF of 60%  Repeat Echo pending   No signs of heart failure on chest xray or PE   Will continue home medications   Continue to monitor       HTN (hypertension)  Continue home medication   Goal BP <140/90  Hydralazine 10 mg IV PRN for BP >180/90  Continue to monitor       Hyperthyroidism  TSH < 0.010, FU F T4  Pt not currently on any therapy   No symptoms of hyperthyroidism       Acute on chronic diastolic congestive heart failure  Pt BNP elevated to 600's, base line around 200  No signs of decompensated heartfailure  Will continue to monitor   Continue home medications           VTE Risk Mitigation (From admission, onward)        Ordered     IP VTE HIGH RISK PATIENT  Once      09/11/19 0526          Critical care time spent on the evaluation and treatment of severe organ dysfunction, review of pertinent labs and imaging studies, discussions with consulting providers and discussions with patient/family: 30 minutes.      Akanksha Vogel MD   [facility] Medicine[facility]Y-2

## 2019-09-12 NOTE — PLAN OF CARE
Problem: Occupational Therapy Goal  Goal: Occupational Therapy Goal  Goals to be met by: 10/11/2019      Patient will increase functional independence with ADLs by performing:    UE Dressing with Modified [place].  LE Dressing with Stand-by Assistance.  Grooming while standing with Modified [place].  Toileting from bedside commode with Contact Guard Assistance for hygiene and clothing management.   Step transfer with Contact Guard Assistance  Toilet transfer to bedside commode with Stand-by Assistance.  Increased functional strength to WFL for self care.  Upper extremity exercise program x10 reps per handout, with assistance as needed.     Outcome: Ongoing (interventions implemented as appropriate)  Pt progressing well towards goals, motivated to participate but limited 2/2 pain, weakness, noted tachycardia to ~165 BPM. Cont OT POC

## 2019-09-12 NOTE — PLAN OF CARE
VN Note:  VN cued into room.  Pt transferred from ICU.  Pt belongings and call light in reach.  Oriented to room.  Denies pain at this time.  Will continue to be available as needed.

## 2019-09-12 NOTE — HPI
Drew Farrar is a 53 y.o. male who  has a past medical history of *Atrial fibrillation, Anticoagulant long-term use, Arthritis, Atrial fibrillation, Atrial fibrillation, Bipolar disorder, CHF (congestive heart failure), Congenital heart disease, Deep vein thrombosis, DVT of leg (deep venous thrombosis), History of prior ablation treatment, Hypertension, Obesity, Stroke, Thyroid disease, Venous stasis ulcer of lower extremity, unspecified laterality, and Venous ulcer.    Patient admitted for sepsis.  Consulted to Podiatry for b/l toe nails.  Patient reports chronic changes to his RLE since birth. He denies any F/N/V/C today and SOB with exertion. Denies any pain to the lower extremities today. He ambulates at baseline and [facility].

## 2019-09-12 NOTE — PROGRESS NOTES
[facility]  Orthopedics  Progress Note    Patient Name: Drew Farrar  MRN: [id number]  Admission Date: 9/10/2019  Hospital Length of Stay: 2 days  Attending Provider: Severyn Yaroshevsky, MD  Primary Care Provider: Garrison Roach MD    Subjective:     Principal Problem:Septic shock    Principal Orthopedic Problem:  Right knee pain    Interval History:  The patient indicates that his right lower extremity feel somewhat better.  Recent ICU primary service note appreciated    Review of patient's allergies indicates:   Allergen Reactions    Contrast media Other (See Comments)     Severe chest pain    Food allergy formula [glutamine-c-quercet-selen-brom]      Allergic to green peas; Heart failure.    Iodinated contrast media Other (See Comments)     Chest pain    Peas Hives    Ibuprofen Swelling    Latex, natural rubber Hives    Pcn [penicillins] Hives    Butisol [butabarbital] Rash     Peeling skin       Current Facility-Administered Medications   Medication    acetaminophen tablet 650 mg    aztreonam 2 g in dextrose 5 % 100 mL IVPB (ready to mix system)    ciprofloxacin (CIPRO)400mg/200ml D5W IVPB 400 mg    [START ON 9/13/2019] collagenase ointment    dextrose 10% (D10W) Bolus    dextrose 10% (D10W) Bolus    glucagon (human recombinant) injection 1 mg    glucose chewable tablet 16 g    glucose chewable tablet 24 g    metoprolol succinate (TOPROL-XL) 24 hr tablet 25 mg    metronidazole IVPB 500 mg    miconazole 2 % cream    ondansetron disintegrating tablet 8 mg    promethazine (PHENERGAN) 6.25 mg in dextrose 5 % 50 mL IVPB    ramelteon tablet 8 mg    senna-docusate 8.6-50 mg per tablet 1 tablet    sodium chloride 0.9% flush 10 mL    And    sodium chloride 0.9% flush 10 mL    sodium chloride 0.9% flush 5 mL    torsemide tablet 40 mg    vancomycin 250mg / 10ml oral suspension 125 mg     Objective:     Vital Signs (Most Recent):  Temp: 98.4 °F (36.9 °C) (09/12/19  "1103)  Pulse: 109 (09/12/19 1400)  Resp: (!) 31 (09/12/19 1400)  BP: (!) 113/51 (09/12/19 1400)  SpO2: 96 % (09/12/19 1400) Vital Signs (24h Range):  Temp:  [98.1 °F (36.7 °C)-98.4 °F (36.9 °C)] 98.4 °F (36.9 °C)  Pulse:  [] 109  Resp:  [20-44] 31  SpO2:  [89 %-100 %] 96 %  BP: ()/(30-72) 113/51     Weight: (!) 161.3 kg (355 lb 9.6 oz)  Height: 6' 8" (203.2 cm)  Body mass index is 39.06 kg/m².      Intake/Output Summary (Last 24 hours) at 9/12/2019 1510  Last data filed at 9/12/2019 1400  Gross per 24 hour   Intake 4071.25 ml   Output 2781 ml   Net 1290.25 ml       Ortho/SPM Exam     Appears alert and nontoxic  Right lower extremity appearance remarkable for lymphedema distally.  The right knee itself is nontender without definite effusion.  Right knee range of motion  degrees.  Significant Labs: All pertinent labs within the past 24 hours have been reviewed.    Significant Imaging: I have reviewed and interpreted all pertinent imaging results/findings.    Assessment/Plan:     Active Diagnoses:    Diagnosis Date Noted POA    PRINCIPAL PROBLEM:  Septic shock [A41.9, R65.21] 04/24/2019 Unknown    Onychomycosis [B35.1] 09/12/2019 Yes    Wound of foot [S91.309A] 09/12/2019 Yes    (HFpEF) heart failure with preserved ejection fraction [I50.30] 09/10/2019 Unknown    Hyperthyroidism [E05.90] 09/05/2017 Yes     Chronic    Acute on chronic diastolic congestive heart failure [I50.33] 02/20/2017 Unknown    HTN (hypertension) [I10] 09/16/2016 Yes    Skin ulcer of left foot, limited to breakdown of skin [L97.521] 02/15/2014 Unknown    Cellulitis [L03.90] 02/13/2014 Yes    Chronic atrial fibrillation [I48.2] 12/14/2012 Yes      Problems Resolved During this Admission:      The clinical course seems to be suggestive of sepsis secondary to intra-abdominal process.    That notwithstanding, in the setting of broad-spectrum antibiotics, I cannot completely rule out the possibility of a partially treated " infection of the extremity.  Given the patient's subjective improvement and benign examination of the right knee, I recommend continued observation.  If there is any clinical worsening in the right lower extremity further workup would be considered.    I will sign off for now and see the patient again at your request.    Edgard Timmons MD  Orthopedics  [facility]

## 2019-09-12 NOTE — ASSESSMENT & PLAN NOTE
Patient presented to the ED with fever of 102.3 and LA of 4   Severe swelling and warmth to the right leg associated with joint effusion of the right knee   Therapeutic and diagnostic arthrocentesis attempted x2 without success   Patient was recently hospitalized for cellulitis of the same area, and completed 2 weeks of clinda and cipro  Pain and swelling of the affected area started around 3 AM the morning of admission  ID consulted, appreciate recs   Blood cultures + GNR  Ortho consulted for septic joint, Dr Timmons recommends continued management, and not likely a septic joint   Continue to monitor   Pressors weaned and now BP maintained on own

## 2019-09-12 NOTE — SUBJECTIVE & OBJECTIVE
Interval History: NAEON. Pt weaned off levophed. Pt denies F/C, N/V, dyspnea, CP, SOB, Abdominal pain. Tolerating RA without difficulty. Improvement in R LE pain. Pt still having loose stools, tolerating PO diet.    Review of Systems   Constitutional: Negative for appetite change, chills and fever.   HENT: Negative for congestion, ear discharge, ear pain, hearing loss, postnasal drip, rhinorrhea, tinnitus and trouble swallowing.    Eyes: Negative for pain, discharge, redness and itching.   Respiratory: Negative for apnea, cough, choking, chest tightness and shortness of breath.    Cardiovascular: Positive for leg swelling. Negative for chest pain and palpitations.   Gastrointestinal: Positive for diarrhea. Negative for abdominal distention, abdominal pain, blood in stool, nausea and vomiting.   Endocrine: Negative for polydipsia, polyphagia and polyuria.   Genitourinary: Negative for difficulty urinating, dysuria, frequency and urgency.   Musculoskeletal: Positive for joint swelling.        R LE knee and leg swelling, erythema, and skin breakdown. L leg skin break down.    Skin: Positive for color change and wound.        Erythema and warmth of R LE.    Neurological: Negative for dizziness, tremors, weakness, light-headedness, numbness and headaches.   Hematological: Negative for adenopathy. Does not bruise/bleed easily.   Psychiatric/Behavioral: Negative for agitation, behavioral problems, confusion, dysphoric mood and hallucinations.     Objective:     Vital Signs (Most Recent):  Temp: 98.4 °F (36.9 °C) (09/12/19 1103)  Pulse: 104 (09/12/19 1107)  Resp: 20 (09/12/19 1107)  BP: (!) 105/58 (09/12/19 1107)  SpO2: 96 % (09/12/19 1103) Vital Signs (24h Range):  Temp:  [98.1 °F (36.7 °C)-98.6 °F (37 °C)] 98.4 °F (36.9 °C)  Pulse:  [] 104  Resp:  [20-44] 20  SpO2:  [89 %-100 %] 96 %  BP: ()/(30-72) 105/58     Weight: (!) 161.3 kg (355 lb 9.6 oz)  Body mass index is 39.06 kg/m².    Intake/Output Summary (Last  24 hours) at 9/12/2019 1216  Last data filed at 9/12/2019 0945  Gross per 24 hour   Intake 3621.25 ml   Output 2581 ml   Net 1040.25 ml      Physical Exam   Constitutional: He is oriented to person, place, and time. He appears well-developed and well-nourished.   HENT:   Head: Normocephalic and atraumatic.   Eyes: Pupils are equal, round, and reactive to light. Conjunctivae and EOM are normal.   Neck: Normal range of motion. Neck supple.   Cardiovascular: Intact distal pulses. Exam reveals no gallop and no friction rub.   No murmur heard.  Pulmonary/Chest: Effort normal and breath sounds normal. No stridor. No respiratory distress. He has no wheezes. He exhibits no tenderness.   Abdominal: Soft. Bowel sounds are normal. He exhibits no distension. There is no tenderness. There is no guarding.   Musculoskeletal:   R LE swelling and eythema. Difficulty moving leg, able to move big toe.    Neurological: He is alert and oriented to person, place, and time.   Skin:   Skin breakdown on R and L LE. Erythema and warmth of R LE.    Psychiatric: He has a normal mood and affect. His behavior is normal. Thought content normal.       Significant Labs:   Recent Labs     09/10/19  1442 09/11/19  0800 09/12/19  0359   WBC 12.59 10.96 6.48   HGB 9.5* 8.2* 8.0*   HCT 30.7* 26.8* 26.1*    151 124*   MCV 81* 82 81*   RDW 16.6* 17.2* 17.3*       Recent Labs     09/10/19  1442 09/11/19  0800 09/12/19  0359    135* 135*   K 5.6* 4.8 4.2    107 106   CO2 17* 22* 21*   GLU 92 114* 110   BUN 36* 42* 47*   CREATININE 1.4 1.6* 1.6*   CALCIUM 9.6 8.6* 8.5*   PROT 7.7 6.7 6.6   ALBUMIN 3.5 2.9* 2.7*   BILITOT 1.0 1.1* 0.8   ALKPHOS 141* 100 89   AST 35 35 30   ALT 28 25 24   ANIONGAP 11 6* 8   ESTGFRAFRICA >60 57* 57*   EGFRNONAA 58* 49* 49*       Recent Labs     09/10/19  1848 09/11/19  0800 09/12/19  0359   MG 1.4* 1.4* 1.4*   PHOS 4.5 4.3 5.1*       Coags  Lab Results   Component Value Date    INR 1.1 09/10/2019    INR 1.1  08/02/2019    INR 3.4 (H) 07/27/2019    APTT 33.5 (H) 09/10/2019    APTT 50.7 (H) 08/03/2019    APTT 86.4 (H) 08/03/2019     Recent Labs   Lab 09/10/19  1442   INR 1.1   APTT 33.5*       A1c:   Lab Results   Component Value Date    HGBA1C 5.2 09/10/2019   , Last Gluc: No results for input(s): POCTGLUCOSE in the last 168 hours.    TSH:   Lab Results   Component Value Date    TSH <0.010 (L) 09/10/2019         Cardiac Enzymes  Recent Labs     09/10/19  1848 09/11/19  0309   TROPONINI 0.165* 0.168*       Micro  Microbiology Results (last 7 days)     Procedure Component Value Units Date/Time    Blood culture x two cultures. Draw prior to antibiotics. [[id number]]  (Abnormal) Collected:  09/10/19 1518    Order Status:  Completed Specimen:  Blood from Peripheral, Hand, Right Updated:  09/12/19 1150     Blood Culture, Routine Gram stain walter bottle: Gram negative rods       Results called to and read back by: Marce Leon RN  09/11/2019  10:18      ENTEROBACTER CLOACAE COMPLEX  Susceptibility pending      Narrative:       Aerobic and anaerobic    C Diff Toxin by PCR [[id number]]  (Abnormal) Collected:  09/11/19 1434    Order Status:  Completed Updated:  09/12/19 0016     C. diff PCR Positive    Blood culture x two cultures. Draw prior to antibiotics. [[id number]] Collected:  09/10/19 1443    Order Status:  Completed Specimen:  Blood from Peripheral, Forearm, Left Updated:  09/11/19 2212     Blood Culture, Routine No Growth to date      No Growth to date    Narrative:       Aerobic and anaerobic    Clostridium difficile EIA [[id number]]  (Abnormal) Collected:  09/11/19 1434    Order Status:  Completed Specimen:  Stool Updated:  09/11/19 2041     C. diff Antigen Positive     C difficile Toxins A+B, EIA Negative     Comment: Testing not recommended for children <24 months old.       Blood culture [[id number]]     Order Status:  No result Specimen:  Blood     Blood culture [[id number]]     Order Status:  No result Specimen:  Blood      Influenza A & B by Molecular [[id number]] Collected:  09/10/19 1521    Order Status:  Completed Specimen:  Nasopharyngeal Swab Updated:  09/10/19 1554     Influenza A, Molecular Negative     Influenza B, Molecular Negative     Flu A & B Source Nasal swab             ABG  Recent Labs   Lab 09/11/19  0335   PH 7.292*   PCO2 36.0   PO2 102*   HCO3 17.4*   POCSATURATED 97   BE -9         Imaging   Imaging Results          NM Lung Ventilation Perfusion Imaging (Final result)  Result time 09/11/19 09:56:43    Final result by RADIOLOGIST, VIRTUAL (09/11/19 09:56:43)                 Impression:      Large mismatched perfusion defect in the lingula. This is intermediate probability for PE. Recommend CTA. Thank you for allowing us to participate in the care of your patient. Dictated and Authenticated by: Shun Kelly MD 09/11/2019 2:52 AM Central Time ([place]    Addendum created by Shun Kelly MD on 9/11/2019 2:59 AM Central Time    Findings were discussed with Dr Salgado at 9/11/2019 2:59 AM CDT.      Electronically signed by: Virtual Radiologist  Date:    09/11/2019  Time:    09:56             Narrative:    EXAMINATION:  NM Lung Ventilation and Perfusion Imaging    CLINICAL HISTORY:  53 years old, male; Other: Knee pain    TECHNIQUE:  Imaging protocol: Nuclear pulmonary ventilation with aerosol or gas was performed followed by perfusion. Views: Ventilation acquired in posterior projection. Perfusion acquired with multiple projections. Radiopharmaceutical: 15 mCi of Xenon -133, inhaled. 5 mCi of Tc-99m MAA, IV.    COMPARISON:  No relevant prior studies available.    FINDINGS:  Ventilation: Normal. No ventilation defects. Perfusion: Large perfusion defect in the lingula.                               X-Ray Chest AP Portable (Final result)  Result time 09/11/19 02:02:29    Final result by Donna Savage MD (09/11/19 02:02:29)                 Impression:      Interval placement of right PICC line with tip projecting over the  SVC.      Electronically signed by: Donna Savage MD  Date:    09/11/2019  Time:    02:02             Narrative:    EXAMINATION:  XR CHEST AP PORTABLE    CLINICAL HISTORY:  SOB; Sepsis, unspecified organism    TECHNIQUE:  Single frontal view of the chest was performed.    COMPARISON:  09/10/2019    FINDINGS:  Cardiac monitoring leads overlie the chest.  There has been interval placement of a right PICC line, the tip of which projects over the SVC.  The cardiomediastinal silhouette is enlarged.  There is no evidence of pneumothorax or significant interval detrimental change in lung aeration.                                US Lower Extremity Veins Bilateral (Final result)  Result time 09/10/19 21:23:38    Final result by Sohail Kline MD (09/10/19 21:23:38)                 Impression:      Nonocclusive deep venous thrombosis seen within the bilateral femoral veins.    This report was flagged in Epic as abnormal.      Electronically signed by: Sohail Kline MD  Date:    09/10/2019  Time:    21:23             Narrative:    EXAMINATION:  US LOWER EXTREMITY VEINS BILATERAL    CLINICAL HISTORY:  Possible DVT; Sepsis, unspecified organism    TECHNIQUE:  Duplex and color flow Doppler evaluation of the bilateral lower extremity veins was performed.    COMPARISON:  08/02/2019.    FINDINGS:  Right lower extremity:    Nonocclusive thrombus is visualized within the mid to distal right femoral vein.  No evidence of clot involving the right common femoral, greater saphenous, popliteal, posterior tibial, anterior tibial, and peroneal veins.    Left lower extremity:    Nonocclusive thrombus is seen within the proximal left femoral vein.  No evidence of clot involving the left common femoral, greater saphenous, popliteal, peroneal, anterior and posterior tibial veins.  No evidence of soft tissue mass or Baker's cyst.                               X-Ray Chest AP Portable (Final result)  Result time 09/10/19 15:51:14    Final result  by Judy Farrar MD (09/10/19 15:51:14)                 Impression:      Cardiomegaly unchanged.  No failure or pneumonia.      Electronically signed by: Judy Farrar  Date:    09/10/2019  Time:    15:51             Narrative:    EXAMINATION:  XR CHEST AP PORTABLE    CLINICAL HISTORY:  Sepsis;    TECHNIQUE:  Single frontal view of the chest was performed.    COMPARISON:  08/02/2019 chest    FINDINGS:  Single AP portable view at 15:09.    The heart is moderately enlarged unchanged.  The pulmonary artery is enlarged unchanged consistent with underlying pulmonary artery hypertension.    Trachea appears normal.  No pneumothorax or pleural effusion or interstitial edema consolidation or nodule.  There are overlying leads.  No rib fracture seen.                               X-Ray Knee 3 View Right (Final result)  Result time 09/10/19 15:33:48    Final result by Gopi Whiting MD (09/10/19 15:33:48)                 Impression:      No acute displaced fracture-dislocation identified.      Electronically signed by: Gopi Whiting MD  Date:    09/10/2019  Time:    15:33             Narrative:    EXAMINATION:  XR KNEE 3 VIEW RIGHT    CLINICAL HISTORY:  Edema, unspecified    TECHNIQUE:  AP, lateral, and Merchant views of the right knee were performed.    COMPARISON:  Right knee series 01/03/2018    FINDINGS:  Chronic deformity of the tibial tuberosity similar to prior.  No acute displaced fracture, dislocation or destructive osseous process.  No large suprapatellar joint effusion seen.  Mild tricompartmental degenerative change.  No subcutaneous emphysema or radiodense retained foreign body.

## 2019-09-12 NOTE — PROGRESS NOTES
Pharmacokinetic Assessment Follow Up: IV Vancomycin    Vancomycin serum concentration assessment(s):    The trough level was drawn correctly and can be used to guide therapy at this time. The measurement is above the desired definitive target range of 15 to 20 mcg/mL.    Vancomycin Regimen Plan:    Discontinue the scheduled vancomycin regimen and re-dose when the random level is less than 20 mcg/mL, next level to be drawn at 0400 on 9/13/19.    Drug levels (last 3 results):  Recent Labs   Lab Result Units 09/11/19  1755   Vancomycin-Trough ug/mL 54.1*       Pharmacy will continue to follow and monitor vancomycin.    Please contact pharmacy at extension [phone] for questions regarding this assessment.    Thank you for the consult,   Felipe Robles       Patient brief summary:  Drew Farrar is a 53 y.o. male initiated on antimicrobial therapy with IV Vancomycin for treatment of skin & soft tissue infection    The patient's current regimen is Vancomycin 2000 mg IV q12h.    Drug Allergies:   Review of patient's allergies indicates:   Allergen Reactions    Contrast media Other (See Comments)     Severe chest pain    Food allergy formula [glutamine-c-quercet-selen-brom]      Allergic to green peas; Heart failure.    Iodinated contrast media Other (See Comments)     Chest pain    Peas Hives    Ibuprofen Swelling    Latex, natural rubber Hives    Pcn [penicillins] Hives    Butisol [butabarbital] Rash     Peeling skin       Actual Body Weight:   161.3 kg    Renal Function:   Estimated Creatinine Clearance: 94.3 mL/min (A) (based on SCr of 1.6 mg/dL (H)).,     Dialysis Method (if applicable):  N/A    CBC (last 72 hours):  Recent Labs   Lab Result Units 09/10/19  1442 09/10/19  1848 09/11/19  0800   WBC K/uL 12.59  --  10.96   Hemoglobin g/dL 9.5*  --  8.2*   Hemoglobin A1C %  --  5.2  --    Hematocrit % 30.7*  --  26.8*   Platelets K/uL 179  --  151   Gran% % 90.4*  --  86.6*   Lymph% % 3.9*  --  6.0*   Mono% % 5.6  --  6.8    Eosinophil% % 0.0  --  0.5   Basophil% % 0.1  --  0.1   Differential Method  Automated  --  Automated       Metabolic Panel (last 72 hours):  Recent Labs   Lab Result Units 09/10/19  1442 09/10/19  1638 09/10/19  1848 09/11/19  0800 09/11/19  1755   Sodium mmol/L 137  --   --  135*  --    Sodium Urine Random mmol/L  --   --   --   --  103   Potassium mmol/L 5.6*  --   --  4.8  --    Chloride mmol/L 109  --   --  107  --    CO2 mmol/L 17*  --   --  22*  --    Glucose mg/dL 92  --   --  114*  --    Glucose, UA   --  Negative  --   --   --    BUN, Bld mg/dL 36*  --   --  42*  --    Creatinine mg/dL 1.4  --   --  1.6*  --    Creatinine, Random Ur mg/dL  --   --   --   --  16.7*   Albumin g/dL 3.5  --   --  2.9*  --    Total Bilirubin mg/dL 1.0  --   --  1.1*  --    Alkaline Phosphatase U/L 141*  --   --  100  --    AST U/L 35  --   --  35  --    ALT U/L 28  --   --  25  --    Magnesium mg/dL 1.3*  --  1.4* 1.4*  --    Phosphorus mg/dL 2.9  --  4.5 4.3  --        Vancomycin Administrations:  vancomycin given in the last 96 hours                     vancomycin (VANCOCIN) 2,000 mg in dextrose 5 % 500 mL IVPB (mg) 2,000 mg New Bag 09/11/19 1830     2,000 mg New Bag  0642    vancomycin in dextrose 5 % 1 gram/250 mL IVPB 1,000 mg (mg) 1,000 mg New Bag 09/10/19 2038    vancomycin (VANCOCIN) 2,000 mg in dextrose 5 % 500 mL IVPB (mg) 2,000 mg New Bag 09/10/19 1744                      Microbiologic Results:  Microbiology Results (last 7 days)       Procedure Component Value Units Date/Time    C Diff Toxin by PCR [[id number]] Collected:  09/11/19 1434    Order Status:  No result Updated:  09/11/19 2225    Blood culture x two cultures. Draw prior to antibiotics. [[id number]] Collected:  09/10/19 1443    Order Status:  Completed Specimen:  Blood from Peripheral, Forearm, Left Updated:  09/11/19 2212     Blood Culture, Routine No Growth to date      No Growth to date    Narrative:       Aerobic and anaerobic    Clostridium difficile  EIA [[id number]]  (Abnormal) Collected:  09/11/19 1434    Order Status:  Completed Specimen:  Stool Updated:  09/11/19 2041     C. diff Antigen Positive     C difficile Toxins A+B, EIA Negative     Comment: Testing not recommended for children <24 months old.       Blood culture [[id number]]     Order Status:  No result Specimen:  Blood     Blood culture [[id number]]     Order Status:  No result Specimen:  Blood     Blood culture x two cultures. Draw prior to antibiotics. [[id number]] Collected:  09/10/19 1518    Order Status:  Completed Specimen:  Blood from Peripheral, Hand, Right Updated:  09/11/19 1018     Blood Culture, Routine Gram stain walter bottle: Gram negative rods       Results called to and read back by: Marce Leon RN  09/11/2019  10:18    Narrative:       Aerobic and anaerobic    Influenza A & B by Molecular [[id number]] Collected:  09/10/19 1521    Order Status:  Completed Specimen:  Nasopharyngeal Swab Updated:  09/10/19 1554     Influenza A, Molecular Negative     Influenza B, Molecular Negative     Flu A & B Source Nasal swab

## 2019-09-12 NOTE — ASSESSMENT & PLAN NOTE
Plan:  - Dressing changes MWF: Remove old dressing and cleanse with wound cleanser. Apply santyl and cover with mepilex border.  - Patient follows Dr. Gutierrez at . Recommend he return to him for f/u.  - Podiatry will sign off.

## 2019-09-12 NOTE — NURSING TRANSFER
Nursing Transfer Note      9/12/2019     Transfer To: [id number]    Transfer via bed    Transfer with cardiac monitoring    Transported by INGRID Juares RN and transporter    Medicines sent: Aztreonam IVPB, santyl cream, and miconazole cream    Chart send with patient: Yes    Notified: pt notifying wife via cellphone    Patient reassessed at: 9/12/19 1550    Upon arrival to floor: cardiac monitor applied, patient oriented to room, call bell in reach and bed in lowest position

## 2019-09-12 NOTE — MEDICAL/APP STUDENT
[facility] Infectious Disease Progress Note     Primary Team: Dr. Moreno  Consultant Attending: Dr. Connolly  Date of Admit: 9/10/2019    Summary of history   Mr. 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 Gutierrez) who presented to the ED yesterday for evaluation of swelling, redness, and pain of his right knee associated with n/v/d.  On presentation, he was tachycardic with afib rhythm, febrile to 102.3, and with an LA of 4.  Code sepsis was called and he was put on 2L O2 by NC and he was given IVF, vanc, aztreonam, and flagyl and admitted to [facility] for treatment of severe sepsis possibly secondary to RLE cellulitis.  Arthrocentesis attempt of R knee was unsuccessful. Ortho consulted due to concern for septic joint.  After he became hypotensive to 80/40 he was transferred from the floor to the ICU for pressor support and bp stabalized. PICC line placed RUE. Xray R knee showed no acute changes with chronic deformity of tibial tuberosity. US of bilateral LE shows femoral DVT B/L (left femoral DVT is new).  CXR showed cardiomegaly unchanged with no failure or pneumonia. V/Q scan shows intermediate probability PE at lingula.  Troponin positive at 0.168.  Lactic acid downtrending today to 1.8.  Blood cultures (9/10) growing gram negative rods.  Influenza A&B negative.  C. Diff ordered today because of diarrhea. ID consulted to manage antibiotics.  He is currently on aztreonam 2000 mg IV q8, metronidazole 500 mg q8, and vanc 2000 mg q12.       Of note, patient stated that three weeks ago he completed a 2 week course of cipro and clinda for RLE cellulitis.  On 9/2, 9 days ago, the patient went to the ED for evaluation of epistaxis, and ED physician noticed tinea corporis, tinea cruris, and tenia pedis on bilateral lower extremities, bilateral feet, and on  abdomen.  ED provider discharged patient with terbinafine PO daily and atarax Rx.        :  patient remains afebrile and bp stable.  On interview, he denies any recent ingestion of raw oysters or lake swimming, but does have a swimming pool at his house which he swam in on  and .  He lives at home with his wife about 2 blocks from the hospital.  He has been taking the terbinafine from the ED doctor 9 days ago with no improvement.  He has an allergy to penicillin and says he breaks out in hives when it is given to him, but he can tolerate keflex.    : Patient has no new complaints.  Feeling well and afebrile.  Now off levophed.  C. Diff of  positive by PCR, start on PO vanc.  Blood culture from 9/10 now positive for E. Cloacae.        Subjective     On exam today, Mr. Farrar has no complaints.  He is afebrile and bp stable.  His RLE pain is improving.  No SOB, CP, n/v.  He admits to continued loose stools.     Current Medications:     Infusions:       Scheduled:   aztreonam  2,000 mg Intravenous Q8H    ciprofloxacin  400 mg Intravenous Q12H    [START ON 2019] collagenase   Topical (Top) Every Mon, Wed, Fri    metoprolol succinate  25 mg Oral QHS    metronidazole  500 mg Intravenous Q8H    miconazole   Topical (Top) BID    sodium chloride 0.9%  10 mL Intravenous Q6H    torsemide  40 mg Oral Daily    vancomycin  125 mg Oral Q6H        PRN:  acetaminophen, Dextrose 10% Bolus, Dextrose 10% Bolus, glucagon (human recombinant), glucose, glucose, ondansetron, promethazine (PHENERGAN) IVPB, ramelteon, senna-docusate 8.6-50 mg, Flushing PICC Protocol **AND** sodium chloride 0.9% **AND** sodium chloride 0.9%, sodium chloride 0.9%    Antibiotics and Day Number of Therapy:  Aztreonam 2000 mg IV q8 day 2  Cipro 400 mg IV q12 day 2  Flagyl 500 mg q8 day 2  Vanc 125 mg PO q6    Miconazole 2% cream BID    Objective   Last 24 Hour Vital Signs:  BP  Min: 94/52  Max: 150/65  Temp  Av.2 °F (36.8 °C)   Min: 97.8 °F (36.6 °C)  Max: 98.4 °F (36.9 °C)  Pulse  Av.2  Min: 95  Max: 144  Resp  Av.6  Min: 18  Max: 44  SpO2  Av.3 %  Min: 89 %  Max: 100 %    Physical Examination:  Examination  General: Sitting comfortably in bed. NAD  HEENT: NCAT  Neck: supple  Cardiac: irregularly irregular rhythm  Pulm/Chest: CTA bilaterally, no increased WOB  GI/Rectal: Abdomen nontender, nondistended, hyperactive bowel sounds  Skin/ Extremities: Improving edema and erythema RLE with persistent but improving tree trunk appearance, open 2x2 cm ulcer L foot without surrounding erythema or edema      Lab data:  CBC:   Lab Results   Component Value Date    WBC 6.48 2019    HGB 8.0 (L) 2019     (L) 2019    MCV 81 (L) 2019    RDW 17.3 (H) 2019       Estimated Creatinine Clearance: 94.3 mL/min (A) (based on SCr of 1.6 mg/dL (H)).    Microbiology Data  9/10: Blood culture L forearm: NGx2 d  9/10: Blood culture R hand: Enterobacter cloacae complex  9/10: Influenza A&B molecular:  negative  : C. Diff by PCR: positive  : Blood culture peripheral left hand: pending  : Blood culture antecubital left arm: pending    Other Results:  EKG :   Atrial fibrillation  Abnormal ECG  When compared with ECG of 10-SEP-2019 14:31,  T wave inversion no longer evident in Lateral leads  Confirmed by Moises Guajardo MD ([id number]) on 2019 9:20:07 AM    TTE :   · Mild left atrial enlargement.  · Mild aortic regurgitation.  · Mild tricuspid regurgitation.  · Left ventricular systolic function. The estimated ejection fraction is 50%  · Normal right ventricular systolic function.  · Mild right atrial enlargement.  · Mild mitral regurgitation.  · Mild right ventricular enlargement.  · Intermediate central venous pressure (8 mm Hg).  · The estimated PA systolic pressure is 29 mm Hg    Radiology  9/10: Xray R knee: No acute displaced fracture-dislocation identified.  9/10: CXR: Cardiomegaly unchanged.   No failure or pneumonia.  9/10: US Lower Extremity Veins Bilateral: Nonocclusive deep venous thrombosis seen within the bilateral femoral veins.  9/11: CXR: Interval placement of right PICC line with tip projecting over the SVC.  9/11: Perfusion scan: Large mismatched perfusion defect in the lingula. This is intermediate probability for PE. Recommend CTA  9/12: US upper extremities bilateral: No DVT R or LUE      Assessment     Drew Farrar is a 53 y.o.male with PMHx of May-[place] syndrome resulting in chronic anti-coagulation, multiple past DVTs and a PE, afib, HTN, here with Enterobacter cloacae bacteremia and C. Diff in context of severe tinea corporis of bilateral lower extremities, nonhealing wound of left foot, and edematous RLE with possible septic R knee, currently improving on IV aztreonam, flagyl, merrem, and PO vanc.       Recommendations     - Continue IV merrem, aztreonam, flagyl  - Discontinue IV vanc  - Start PO vanc for C. Diff  - F/u sensitivities for E. cloacae      Thank you for this consult. We will follow.    Sahara Zavala  [facility]

## 2019-09-12 NOTE — PT/OT/SLP PROGRESS
Physical Therapy Treatment    Patient Name:  Drew Farrar   MRN:  [id number]    Recommendations:     Discharge Recommendations:  (post acute-SNF versus LTAC)   Discharge Equipment Recommendations: (defer to post acute)   Barriers to discharge: Decreased caregiver support    Assessment:     Drwe Farrar is a 53 y.o. male admitted with a medical diagnosis of Septic shock.  He presents with the following impairments/functional limitations:  weakness, impaired endurance, impaired self care skills, impaired balance, impaired functional mobilty, gait instability, decreased lower extremity function, decreased safety awareness, decreased ROM, pain, impaired cardiopulmonary response to activity, impaired sensation, decreased coordination . Slight increase in ROM of R knee as compared to yesterday.  Able to stand but only  amb 4 steps with RW and assist-decreased wt bearing and increased pain on RLE.    Rehab Prognosis: Good; patient would benefit from acute skilled PT services to address these deficits and reach maximum level of function.    Recent Surgery: * No surgery found *      Plan:     During this hospitalization, patient to be seen 6 x/week to address the identified rehab impairments via gait training, therapeutic activities, therapeutic exercises and progress toward the following goals:    · Plan of Care Expires:  10/11/19    Subjective     Chief Complaint: knee pain only with movement and standing on it  Patient/Family Comments/goals: pt states he had chest pain for a moment but went right away.    Pain/Comfort:  · Pain Rating 1: 7/10  · Location - Side 1: Right  · Location - Orientation 1: generalized  · Location 1: knee  · Pain Addressed 1: Reposition, Distraction, Cessation of Activity, Nurse notified  · Pain Rating Post-Intervention 1: 0/10(at rest at end of session)      Objective:     Communicated with nurse prior to session.  Patient found sitting at EOB with OT present with blood pressure cuff, telemetry,  peripheral IV, PICC line, pulse ox (continuous) upon PT entry to room.     General Precautions: Standard, special contact, fall   Orthopedic Precautions:N/A   Braces: N/A     Functional Mobility:  · Bed Mobility:     · Supine to Sit: not assessed-found at EOB  · Sit to Supine: maximal assistance for RLE   · Transfers:     · Sit to Stand/stand to sit : min/ moderate assistance with rolling walker  · Gait: pt stood with RW and only able to amb 4 steps with min to mod a-2 person present for safety.  Decreased wt bearing, step and stride length on RLE.  Very slow vannessa.  Unable to amb further so had to step back to sit at  EOB,  · Balance: poor standing       AM-PAC 6 CLICK MOBILITY  Turning over in bed (including adjusting bedclothes, sheets and blankets)?: 3  Sitting down on and standing up from a chair with arms (e.g., wheelchair, bedside commode, etc.): 2  Moving from lying on back to sitting on the side of the bed?: 2  Moving to and from a bed to a chair (including a wheelchair)?: 2  Need to walk in hospital room?: 2  Climbing 3-5 steps with a railing?: 1  Basic Mobility Total Score: 12       Therapeutic Activities and Exercises:   standing and gait as above.  Repositioned pt in bed .  Instructed in and performed HS with assist, Quad sets and ankle pumps on R 10 reps.  Pt instructed t perform throughout the day.      Patient left HOB elevated with all lines intact, call button in reach and nurse notified..    GOALS:   Multidisciplinary Problems     Physical Therapy Goals        Problem: Physical Therapy Goal    Goal Priority Disciplines Outcome Goal Variances Interventions   Physical Therapy Goal     PT, PT/OT Ongoing (interventions implemented as appropriate)     Description:    Goals to be met by: 19     Patient will increase functional independence with mobility by performin. Supine to sit with Stand-by Assistance  2. Sit to supine with Contact Guard Assistance  3. Sit to stand transfer with  Stand-by Assistance  4. Gait  x 150 feet with Stand-by Assistance using Bariatric Rolling Walker.   5. Ascend/Descend 4 inch curb step with Stand-by Assistance using bariatric Rolling Walker.                      Time Tracking:     PT Received On: 09/12/19  PT Start Time: 1412     PT Stop Time: 1438  PT Total Time (min): 26 min     Billable Minutes: Gait Training 12  Co tx with OT   Treatment Type: Treatment  PT/PTA: PT     PTA Visit Number: 0     Leonila Jin, PT  09/12/2019

## 2019-09-12 NOTE — ASSESSMENT & PLAN NOTE
52 y/o with chronic A fib on eliquis, CHF, PE, DVT with irwin filter in place, HTN, venous stasis, hyperthyroidism. Admitted 9/10 to [facility] with fevers, chills, nausea, vomiting, SOB, right knee pain. Found to have probable PE.  Patient with recent cellulitis RLE treated with clinda and cipro 3 weeks PTA. 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 Right knee tap in the ED was unsuccessful and Ortho does not think it needs to have repeat attempt. Patient with bilateral tinea pedis - severe and very overgrown toenails with ingrown nails    PCN allergy - listed as hives - patient reports he can take Keflex.   9/12 BC on admit positive for enterobacter cloacae - sensi pending.   9/12 stool c diff positive PCR from 9/11    Rec  -continue aztreonam and cipro for gram negative coverage pending sensitivities of the E cloacae  -continue IV flagyl and add PO vanco for the c diff coverage  -check imaging of right leg  -stop IV vancomycin   -continue topical antifungal on foot fro tinea pedis.

## 2019-09-12 NOTE — CONSULTS
Wound consult for right leg cellulitis.    Dr. Llanos was consulted for cellulitis.   Pt also has left foot chronic venous wound, Dr. Elda Bowen, podiatry has been consulted for this wound, orders are in place.

## 2019-09-12 NOTE — TELEPHONE ENCOUNTER
----- Message from Nu Pal sent at 9/12/2019 10:18 AM CDT -----  [employer] calling to report that this pt is in the hospital. Any questions please call [phone]

## 2019-09-12 NOTE — CONSULTS
[facility]  Podiatry  Consult Note    Patient Name: Drew Farrar  MRN: [id number]  Admission Date: 9/10/2019  Hospital Length of Stay: 2 days  Attending Physician: Severyn Yaroshevsky, MD  Primary Care Provider: Garrison Roach MD     Inpatient consult to Podiatry  Consult performed by: Wilberto Bowen MD  Consult ordered by: Akanksha Vogel MD        Subjective:     History of Present Illness:  Drew Farrar is a 53 y.o. male who  has a past medical history of *Atrial fibrillation, Anticoagulant long-term use, Arthritis, Atrial fibrillation, Atrial fibrillation, Bipolar disorder, CHF (congestive heart failure), Congenital heart disease, Deep vein thrombosis, DVT of leg (deep venous thrombosis), History of prior ablation treatment, Hypertension, Obesity, Stroke, Thyroid disease, Venous stasis ulcer of lower extremity, unspecified laterality, and Venous ulcer.    Patient admitted for sepsis.  Consulted to Podiatry for b/l toe nails.  Patient reports chronic changes to his RLE since birth. He denies any F/N/V/C today and SOB with exertion. Denies any pain to the lower extremities today. He ambulates at baseline and is followed at [facility] by Dr. Schneider.         Scheduled Meds:   aztreonam  2,000 mg Intravenous Q8H    ciprofloxacin  400 mg Intravenous Q12H    metronidazole  500 mg Intravenous Q8H    miconazole   Topical (Top) BID    sodium chloride 0.9%  10 mL Intravenous Q6H    torsemide  40 mg Oral Daily     Continuous Infusions:   lactated ringers Stopped (09/12/19 0430)     PRN Meds:acetaminophen, Dextrose 10% Bolus, Dextrose 10% Bolus, glucagon (human recombinant), glucose, glucose, ondansetron, promethazine (PHENERGAN) IVPB, ramelteon, senna-docusate 8.6-50 mg, Flushing PICC Protocol **AND** sodium chloride 0.9% **AND** sodium chloride 0.9%, sodium chloride 0.9%    Review of patient's allergies indicates:   Allergen Reactions    Contrast media Other (See Comments)      Severe chest pain    Food allergy formula [glutamine-c-quercet-selen-brom]      Allergic to green peas; Heart failure.    Iodinated contrast media Other (See Comments)     Chest pain    Peas Hives    Ibuprofen Swelling    Latex, natural rubber Hives    Pcn [penicillins] Hives    Butisol [butabarbital] Rash     Peeling skin        Past Medical History:   Diagnosis Date    *Atrial fibrillation     Anticoagulant long-term use     Arthritis     Atrial fibrillation     Atrial fibrillation Feb 23, 2016    Bipolar disorder     CHF (congestive heart failure)     Congenital heart disease     s/p surgical intervention 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 of age    Deep vein thrombosis     DVT of leg (deep venous thrombosis)     left leg    History of prior ablation treatment     10/9/13    Hypertension     Obesity     Stroke     Thyroid disease     Venous stasis ulcer of lower extremity, unspecified laterality 12/14/2012    Venous ulcer      Past Surgical History:   Procedure Laterality Date    ANGIOPLASTY      ARTHROSCOPY-KNEE W/ CHONDROPLASTY Right 2/23/2016    Performed by Alejandro Villanueva MD at [facility] OR    CARDIAC SURGERY      open heart surgery 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 old    EYE SURGERY      left eye cataract/right eye glaucoma    TIMO FILTER PLACEMENT      Dr Calix ([facility])    KNEE SURGERY      l and r     MULTIPLE TOOTH EXTRACTIONS      Sclerotherapy N/A 2/21/2019    Performed by Gomez Lantigua MD at [facility] CATH LAB/EP    SKIN GRAFT      left leg    SYNOVECTOMY-KNEE Right 2/23/2016    Performed by Alejandro Villanueva MD at [facility] OR       Family History     Problem Relation (Age of Onset)    Diabetes Father, Maternal Grandfather    Heart disease Father, Maternal Grandmother, Maternal Grandfather    Stroke Maternal Grandfather        Tobacco Use    Smoking status: Former Smoker     Packs/day: 1.00     Years: 6.00     Pack years: 6.00     Types: Cigarettes    Smokeless tobacco: Former User    Tobacco comment: quit by age  27yrs old   Substance and Sexual Activity    Alcohol use: Yes     Alcohol/week: 0.6 oz     Types: 1 Glasses of wine per week     Frequency: Monthly or less     Comment: occasionally    Drug use: No    Sexual activity: Yes     Partners: Female     Birth control/protection: None     Review of Systems   Constitutional: Negative for chills and fever.   Cardiovascular: Positive for leg swelling.   Gastrointestinal: Negative for nausea and vomiting.   Musculoskeletal: Positive for arthralgias, gait problem and joint swelling.   Skin: Positive for color change and wound.     Objective:     Vital Signs (Most Recent):  Temp: 98.3 °F (36.8 °C) (09/12/19 0703)  Pulse: 106 (09/12/19 0802)  Resp: (!) 28 (09/12/19 0802)  BP: 100/69 (09/12/19 0802)  SpO2: 95 % (09/12/19 0802) Vital Signs (24h Range):  Temp:  [97.8 °F (36.6 °C)-98.6 °F (37 °C)] 98.3 °F (36.8 °C)  Pulse:  [] 106  Resp:  [23-40] 28  SpO2:  [89 %-100 %] 95 %  BP: ()/(30-79) 100/69     Weight: (!) 161.3 kg (355 lb 9.6 oz)  Body mass index is 39.06 kg/m².    Foot Exam    General  General Appearance: appears stated age and healthy   Orientation: alert and oriented to person, place, and time       Right Foot/Ankle     Inspection and Palpation  Ecchymosis: none  Tenderness: none   Swelling: (Chronic, hard edema 2/2 chronic lymphadema)  Skin Exam: abnormal color; (Hardening and hyperpigmented skin 2/2 chronic lymphadema)    Neurovascular  Saphenous nerve sensation: normal  Tibial nerve sensation: normal  Superficial peroneal nerve sensation: normal  Deep peroneal nerve sensation: normal  Sural nerve sensation: normal    Comments  Difficult to palpate pulses 2/2 hard edema    Left Foot/Ankle      Inspection and Palpation  Ecchymosis: none  Tenderness: none   Swelling: none   Skin Exam: abnormal color and ulcer; (hyperpigmentation to the LE)    Neurovascular  Dorsalis pedis: 2+  Posterior tibial: 2+  Saphenous nerve sensation: normal  Tibial nerve sensation:  normal  Superficial peroneal nerve sensation: normal  Deep peroneal nerve sensation: normal  Sural nerve sensation: normal            Laboratory:  CBC:   Recent Labs   Lab 09/12/19  0359   WBC 6.48   RBC 3.21*   HGB 8.0*   HCT 26.1*   *   MCV 81*   MCH 24.9*   MCHC 30.7*     CMP:   Recent Labs   Lab 09/12/19  0359      CALCIUM 8.5*   ALBUMIN 2.7*   PROT 6.6   *   K 4.2   CO2 21*      BUN 47*   CREATININE 1.6*   ALKPHOS 89   ALT 24   AST 30   BILITOT 0.8     CRP:   Recent Labs   Lab 09/10/19  1442   CRP 24.5*     ESR:   Recent Labs   Lab 09/10/19  1442   SEDRATE 59*       Diagnostic Results:  I have reviewed all pertinent imaging results/findings within the past 24 hours.    Clinical Findings:  Wound to the dorsal lateral mid to hind foot. No crepitus of fluctuance. No periwound erythema, edema or drainage. Not infected at this time.                  Assessment/Plan:     Onychomycosis  Mycotic and dystrophic toe nails x 10.  - Patient does not meet criteria for IP nail care.  - Recommend PRN f/u as OP to have nail care in clinic.    Skin ulcer of left foot, limited to breakdown of skin  Plan:  - Dressing changes MWF: Remove old dressing and cleanse with wound cleanser. Apply santyl and cover with mepilex border.  - Patient follows Dr. Gutierrez at . Recommend he return to him for f/u.  - Podiatry will sign off.        Thank you for your consult. I will sign off. Please contact us if you have any additional questions.    Wilberto Bowen MD  Podiatry  [facility]

## 2019-09-12 NOTE — PROGRESS NOTES
[facility]  [facility]    Blood cultures growing enterobacter cloacae, C diff PCR positive, ID on board, currently on aztreonam and cipro for blood cultures, IV Flagyl and p.o. vanc for C diff.    Awaiting imaging of right lower extremity which has been ordered    Will follow up imaging and provide further recs

## 2019-09-12 NOTE — SUBJECTIVE & OBJECTIVE
Interval History: Patient had positive c diff PCR (antigen positive, toxin negative) from 9/11. Patient stated he will see Podiatry as outpatient for clipping toenails. BC from admit positive for E cloacae - sensi pending.     Review of Systems   Respiratory: Positive for shortness of breath.         Still with some SOB   Gastrointestinal: Negative for diarrhea, nausea and vomiting.   Skin: Positive for rash.        Tinea pedis better today on right and left, still some flaking     Antibiotics (From admission, onward)    Start     Stop Route Frequency Ordered    09/12/19 1400  ciprofloxacin (CIPRO)400mg/200ml D5W IVPB 400 mg      -- IV Every 12 hours (non-standard times) 09/12/19 0752    09/12/19 1000  vancomycin 250mg / 10ml oral suspension 125 mg      -- Oral Every 6 hours 09/12/19 0941    09/11/19 1845  aztreonam 2 g in dextrose 5 % 100 mL IVPB (ready to mix system)      -- IV Every 8 hours (non-standard times) 09/11/19 1744    09/11/19 0030  metronidazole IVPB 500 mg      -- IV Every 8 hours (non-standard times) 09/10/19 1927        Antifungals (From admission, onward)    Start     Stop Route Frequency Ordered    09/11/19 2315  miconazole 2 % cream      -- Top 2 times daily 09/11/19 2210        Objective:     Vital Signs (Most Recent):  Temp: 98.4 °F (36.9 °C) (09/12/19 1103)  Pulse: (!) 114 (09/12/19 1500)  Resp: (!) 27 (09/12/19 1500)  BP: (!) 113/51 (09/12/19 1400)  SpO2: 95 % (09/12/19 1500) Vital Signs (24h Range):  Temp:  [98.1 °F (36.7 °C)-98.4 °F (36.9 °C)] 98.4 °F (36.9 °C)  Pulse:  [] 114  Resp:  [20-44] 27  SpO2:  [89 %-100 %] 95 %  BP: ()/(39-72) 113/51     Weight: (!) 161.3 kg (355 lb 9.6 oz)  Body mass index is 39.06 kg/m².    Estimated Creatinine Clearance: 94.3 mL/min (A) (based on SCr of 1.6 mg/dL (H)).    Physical Exam   Cardiovascular: Normal heart sounds.   Pulmonary/Chest: Breath sounds normal.   Oxygen Desaturates when he sat up in bed but recovered when he laid back down.     Musculoskeletal: He exhibits edema.   Right leg with elephantiasis and still with some erythema around knee and up to thigh but less than yesterday. Left leg with small open wound about 2 cm lateral ankle area - no erythema   Neurological: He is alert.       Significant Labs:   Blood Culture:   Recent Labs   Lab 19  1717 19  1404 19  1420 09/10/19  1443 09/10/19  1518   LABBLOO No growth after 5 days. No growth after 5 days. No growth after 5 days. No Growth to date  No Growth to date Gram stain walter bottle: Gram negative rods   Results called to and read back by: Marce Leon RN  2019  10:18  ENTEROBACTER CLOACAE COMPLEX  Susceptibility pending  *     CBC:   Recent Labs   Lab 19  0819  0359   WBC 10.96 6.48   HGB 8.2* 8.0*   HCT 26.8* 26.1*    124*     CMP:   Recent Labs   Lab 19  0819  0359   * 135*   K 4.8 4.2    106   CO2 22* 21*   * 110   BUN 42* 47*   CREATININE 1.6* 1.6*   CALCIUM 8.6* 8.5*   PROT 6.7 6.6   ALBUMIN 2.9* 2.7*   BILITOT 1.1* 0.8   ALKPHOS 100 89   AST 35 30   ALT 25 24   ANIONGAP 6* 8   EGFRNONAA 49* 49*     Urine Studies:   Recent Labs   Lab 09/10/19  1638   COLORU Gillespie*   APPEARANCEUA Hazy*   PHUR 6.0   SPECGRAV >=1.030*   PROTEINUA Trace*   GLUCUA Negative   KETONESU Negative   BILIRUBINUA Negative   OCCULTUA Negative   NITRITE Negative   UROBILINOGEN Negative   LEUKOCYTESUR Negative     Wound Culture:   Recent Labs   Lab 19  0131   LABAERO Skin ori,  no predominant organism       Significant Imagin/12 US UE - No DVT of the right or left upper extremity.   VQ scan -   Large mismatched perfusion defect in the lingula. This is intermediate probability for PE. Recommend CTA.

## 2019-09-13 LAB
ALBUMIN SERPL BCP-MCNC: 2.8 G/DL (ref 3.5–5.2)
ALP SERPL-CCNC: 94 U/L (ref 55–135)
ALT SERPL W/O P-5'-P-CCNC: 24 U/L (ref 10–44)
ANION GAP SERPL CALC-SCNC: 12 MMOL/L (ref 8–16)
AST SERPL-CCNC: 29 U/L (ref 10–40)
BACTERIA BLD CULT: ABNORMAL
BASOPHILS # BLD AUTO: 0.01 K/UL (ref 0–0.2)
BASOPHILS NFR BLD: 0.2 % (ref 0–1.9)
BILIRUB SERPL-MCNC: 0.8 MG/DL (ref 0.1–1)
BUN SERPL-MCNC: 47 MG/DL (ref 6–20)
CALCIUM SERPL-MCNC: 8.4 MG/DL (ref 8.7–10.5)
CHLORIDE SERPL-SCNC: 104 MMOL/L (ref 95–110)
CO2 SERPL-SCNC: 21 MMOL/L (ref 23–29)
CREAT SERPL-MCNC: 1.5 MG/DL (ref 0.5–1.4)
DIFFERENTIAL METHOD: ABNORMAL
EOSINOPHIL # BLD AUTO: 0.2 K/UL (ref 0–0.5)
EOSINOPHIL NFR BLD: 3.9 % (ref 0–8)
ERYTHROCYTE [DISTWIDTH] IN BLOOD BY AUTOMATED COUNT: 17 % (ref 11.5–14.5)
EST. GFR  (AFRICAN AMERICAN): >60 ML/MIN/1.73 M^2
EST. GFR  (NON AFRICAN AMERICAN): 53 ML/MIN/1.73 M^2
GLUCOSE SERPL-MCNC: 92 MG/DL (ref 70–110)
HCT VFR BLD AUTO: 25.2 % (ref 40–54)
HGB BLD-MCNC: 7.7 G/DL (ref 14–18)
LYMPHOCYTES # BLD AUTO: 0.5 K/UL (ref 1–4.8)
LYMPHOCYTES NFR BLD: 12.8 % (ref 18–48)
MAGNESIUM SERPL-MCNC: 1.3 MG/DL (ref 1.6–2.6)
MCH RBC QN AUTO: 24.7 PG (ref 27–31)
MCHC RBC AUTO-ENTMCNC: 30.6 G/DL (ref 32–36)
MCV RBC AUTO: 81 FL (ref 82–98)
MONOCYTES # BLD AUTO: 0.4 K/UL (ref 0.3–1)
MONOCYTES NFR BLD: 10.2 % (ref 4–15)
NEUTROPHILS # BLD AUTO: 3 K/UL (ref 1.8–7.7)
NEUTROPHILS NFR BLD: 72.9 % (ref 38–73)
PHOSPHATE SERPL-MCNC: 5.5 MG/DL (ref 2.7–4.5)
PLATELET # BLD AUTO: 126 K/UL (ref 150–350)
PMV BLD AUTO: 9.3 FL (ref 9.2–12.9)
POTASSIUM SERPL-SCNC: 3.5 MMOL/L (ref 3.5–5.1)
PROT SERPL-MCNC: 6.7 G/DL (ref 6–8.4)
RBC # BLD AUTO: 3.12 M/UL (ref 4.6–6.2)
SODIUM SERPL-SCNC: 137 MMOL/L (ref 136–145)
VANCOMYCIN SERPL-MCNC: 34.5 UG/ML
WBC # BLD AUTO: 4.13 K/UL (ref 3.9–12.7)

## 2019-09-13 PROCEDURE — 25000003 PHARM REV CODE 250: Performed by: STUDENT IN AN ORGANIZED HEALTH CARE EDUCATION/TRAINING PROGRAM

## 2019-09-13 PROCEDURE — 97110 THERAPEUTIC EXERCISES: CPT

## 2019-09-13 PROCEDURE — 11000001 HC ACUTE MED/SURG PRIVATE ROOM

## 2019-09-13 PROCEDURE — 94761 N-INVAS EAR/PLS OXIMETRY MLT: CPT

## 2019-09-13 PROCEDURE — 63600175 PHARM REV CODE 636 W HCPCS: Performed by: STUDENT IN AN ORGANIZED HEALTH CARE EDUCATION/TRAINING PROGRAM

## 2019-09-13 PROCEDURE — S0030 INJECTION, METRONIDAZOLE: HCPCS | Performed by: STUDENT IN AN ORGANIZED HEALTH CARE EDUCATION/TRAINING PROGRAM

## 2019-09-13 PROCEDURE — S0073 INJECTION, AZTREONAM, 500 MG: HCPCS | Performed by: STUDENT IN AN ORGANIZED HEALTH CARE EDUCATION/TRAINING PROGRAM

## 2019-09-13 PROCEDURE — 84100 ASSAY OF PHOSPHORUS: CPT

## 2019-09-13 PROCEDURE — 80053 COMPREHEN METABOLIC PANEL: CPT

## 2019-09-13 PROCEDURE — 99231 SBSQ HOSP IP/OBS SF/LOW 25: CPT | Mod: ,,, | Performed by: SURGERY

## 2019-09-13 PROCEDURE — 85520 HEPARIN ASSAY: CPT

## 2019-09-13 PROCEDURE — 25000003 PHARM REV CODE 250: Performed by: INTERNAL MEDICINE

## 2019-09-13 PROCEDURE — A4216 STERILE WATER/SALINE, 10 ML: HCPCS | Performed by: FAMILY MEDICINE

## 2019-09-13 PROCEDURE — 83735 ASSAY OF MAGNESIUM: CPT

## 2019-09-13 PROCEDURE — 97116 GAIT TRAINING THERAPY: CPT

## 2019-09-13 PROCEDURE — 36415 COLL VENOUS BLD VENIPUNCTURE: CPT

## 2019-09-13 PROCEDURE — 85025 COMPLETE CBC W/AUTO DIFF WBC: CPT

## 2019-09-13 PROCEDURE — 80202 ASSAY OF VANCOMYCIN: CPT

## 2019-09-13 PROCEDURE — 25000003 PHARM REV CODE 250: Performed by: FAMILY MEDICINE

## 2019-09-13 PROCEDURE — 97535 SELF CARE MNGMENT TRAINING: CPT

## 2019-09-13 PROCEDURE — 99231 PR SUBSEQUENT HOSPITAL CARE,LEVL I: ICD-10-PCS | Mod: ,,, | Performed by: SURGERY

## 2019-09-13 RX ORDER — ENOXAPARIN SODIUM 150 MG/ML
150 INJECTION SUBCUTANEOUS
Status: DISCONTINUED | OUTPATIENT
Start: 2019-09-13 | End: 2019-09-16 | Stop reason: HOSPADM

## 2019-09-13 RX ORDER — MAGNESIUM SULFATE HEPTAHYDRATE 40 MG/ML
2 INJECTION, SOLUTION INTRAVENOUS ONCE
Status: COMPLETED | OUTPATIENT
Start: 2019-09-13 | End: 2019-09-13

## 2019-09-13 RX ADMIN — Medication 10 ML: at 07:09

## 2019-09-13 RX ADMIN — ENOXAPARIN SODIUM 150 MG: 150 INJECTION SUBCUTANEOUS at 08:09

## 2019-09-13 RX ADMIN — Medication 10 ML: at 11:09

## 2019-09-13 RX ADMIN — MAGNESIUM SULFATE IN WATER 2 G: 40 INJECTION, SOLUTION INTRAVENOUS at 09:09

## 2019-09-13 RX ADMIN — MAGNESIUM SULFATE IN WATER 2 G: 40 INJECTION, SOLUTION INTRAVENOUS at 04:09

## 2019-09-13 RX ADMIN — CIPROFLOXACIN 400 MG: 2 INJECTION, SOLUTION INTRAVENOUS at 02:09

## 2019-09-13 RX ADMIN — MICONAZOLE NITRATE: 20 CREAM TOPICAL at 09:09

## 2019-09-13 RX ADMIN — TORSEMIDE 40 MG: 20 TABLET ORAL at 08:09

## 2019-09-13 RX ADMIN — METRONIDAZOLE 500 MG: 500 INJECTION, SOLUTION INTRAVENOUS at 12:09

## 2019-09-13 RX ADMIN — Medication 10 ML: at 12:09

## 2019-09-13 RX ADMIN — METRONIDAZOLE 500 MG: 500 INJECTION, SOLUTION INTRAVENOUS at 08:09

## 2019-09-13 RX ADMIN — Medication 125 MG: at 05:09

## 2019-09-13 RX ADMIN — CEFTRIAXONE 2 G: 2 INJECTION, SOLUTION INTRAVENOUS at 05:09

## 2019-09-13 RX ADMIN — METOPROLOL SUCCINATE 25 MG: 25 TABLET, FILM COATED, EXTENDED RELEASE ORAL at 08:09

## 2019-09-13 RX ADMIN — Medication 125 MG: at 04:09

## 2019-09-13 RX ADMIN — Medication 10 ML: at 06:09

## 2019-09-13 RX ADMIN — MICONAZOLE NITRATE: 20 CREAM TOPICAL at 08:09

## 2019-09-13 RX ADMIN — Medication 125 MG: at 11:09

## 2019-09-13 RX ADMIN — AZTREONAM 2000 MG: 2 INJECTION, POWDER, LYOPHILIZED, FOR SOLUTION INTRAMUSCULAR; INTRAVENOUS at 11:09

## 2019-09-13 RX ADMIN — AZTREONAM 2000 MG: 2 INJECTION, POWDER, LYOPHILIZED, FOR SOLUTION INTRAMUSCULAR; INTRAVENOUS at 02:09

## 2019-09-13 NOTE — PLAN OF CARE
"I spoke with pt and discussed his care.  I asked about LTAC and he says "I am not going to a facility."  He says he lives with his wife and father in law and mother in law.  He has PCA's that come to his home Monday-Friday 10:00-3:00.  They take him to appts and shopping as needed.  His in-laws help in the evening as needed after PCA's leave.  He says he has [facility].  He says that if he needs IV abx long-term, he will have home health assist with infusions and wound care.  [facility].  He has BSC, TTB, walker and wheelchair at home.  His wife is Mike [phone].       09/13/19 1020   Discharge Reassessment   Assessment Type Discharge Planning Reassessment   Do you have any problems affording any of your prescribed medications? No   Discharge Plan A Home with family;Home Health   Discharge Plan B Long-term acute care facility (LTAC)   Anticipated Discharge Disposition Home-Health   Can the patient answer the patient profile reliably? Yes, cognitively intact   How often would a person be available to care for the patient? Whenever needed     Vlad Gibson RN,   [phone]    "

## 2019-09-13 NOTE — PLAN OF CARE
Problem: Occupational Therapy Goal  Goal: Occupational Therapy Goal  Goals to be met by: 10/11/2019      Patient will increase functional independence with ADLs by performing:    UE Dressing with Modified Walker.  LE Dressing with Stand-by Assistance.  Grooming while standing with Modified Walker.  Toileting from bedside commode with Contact Guard Assistance for hygiene and clothing management.   Step transfer with Contact Guard Assistance  Toilet transfer to bedside commode with Stand-by Assistance.  Increased functional strength to WFL for self care.  Upper extremity exercise program x10 reps per handout, with assistance as needed.     Outcome: Ongoing (interventions implemented as appropriate)  Pt. making steady gains toward goals but very SOB with fx ambulation and standing ADLS;  Continue with OT POC.

## 2019-09-13 NOTE — PHYSICIAN QUERY
PT Name: Drew Farrar  MR #: [id number]     Physician Query Form - Documentation Clarification      CDS/: Jenni Summers RN CDI          Contact information: [email or web address]    This form is a permanent document in the medical record.     Query Date: September 13, 2019    By submitting this query, we are merely seeking further clarification of documentation. Please utilize your independent clinical judgment when addressing the question(s) below.    The Medical record reflects the following:    Supporting Clinical Findings Location in Medical Record   Septic shock    Blood culture +GNR                                                                                                                                                                                 [facility] medicine   9/13 916 am  ELLEN Jones MD   Blood culture 9/10   ENTEROBACTER CLOACAE COMPLEX   Susceptibility pending     Clostridium difficile infection  On C Diff precautions   Positive cultures    Lab results - micro                                                                            Doctor, Please specify diagnosis or diagnoses associated with above clinical findings.    Provider Use Only       [ x  ] Sepsis with septic shock due to Enterobacter Cloacae complex.       [   ] Sepsis with septic shock due to C. Diff.       [   ] Sepsis with septic shock due to Enterobacter Cloacae complex and C Diff.       [   ] Other, please specify______________.                                                                                                               [  ] Clinically Undetermined

## 2019-09-13 NOTE — NURSING
Dr. Su notified of patients complaint of stomach cramps that felt like gas and knee pain that was not relieved with Tylenol.  New orders noted.

## 2019-09-13 NOTE — PLAN OF CARE
POC reviewed, pt verbalize understanding. Pt denies pain/discomfort. Fall precaution maintained: bed on lowest position, call light within reach, bed alarm set, side rail up x 2, non skid sock on. Will continue to monitor.

## 2019-09-13 NOTE — PROGRESS NOTES
[facility] Hospital Medicine  Progress Note    Patient Name: Drew Farrar  MRN: [id number]  Patient Class: IP- Inpatient   Admission Date: 9/10/2019  Length of Stay: 3 days  Attending Physician: Severyn Yaroshevsky, MD  Primary Care Provider: Garrison Roach MD        Subjective:     Principal Problem:Septic shock        HPI:  Mr. Farrar is a 53 year old man with PMHx of chronic Afib (on eliquis), HFpEF, PE, Hx of DVT with irwin filter in place, HTN, Venous stasis, hyperthyroidism, presented to ED with acute onset fevers, chills, nausea, vomiting and right lower extremity pain. He states pain started around 3 AM on the morning of admission. He was recently discharge from hospital about 3 weeks prior to right LE cellulitis, and completed a 2 week course of cipro and clinda about 3 weeks ago. He reports improvement in pain and cellulitis up until this morning. He denies trauma or falls to the affected area. He states the pain started all of a sudden and is associated with swelling and warmth to the area. He states the right leg is very painful to touch and with  Movement.     He states he is compliant with all his blood pressure and anticoagulation medication. He denies Headache, changes in vision, shortness of breath, chest pain, palpitations, abdominal pain, diarrhea or constipation.       Overview/Hospital Course:  9/12  C. Diff EIA positive and C. Diff toxin PCR positive. 1/2 BC growing gram negative rods. Vanc. Trough at 54, held vanc until next trough later this PM. ID recommends continuing with Vancomycin, aztreonam, flagyl, and IV ciprofloxacin for gram negative coverage. Pt started on topical antifungal for tinea pedis. Podiatry consulted for toenail care, recommends outpatient care. General surgery recommends CT scan of R LE to identify any potential undrained fluid collection, and bilateral venous US of UEs to evaluate for potential DVT. Pt restarted on home metoprolol. F/u on R  LE CT, bilateral UE US, and pulm crit recs. For anticoagulation.     9/11  Overnight, pt endorsed CP and SOB at midnight, and had a drop in BP to 81/42. Pt required LR bolus and lovenox. PICC line placed. V/Q scan performed and showed arge mismatched pelrfusion defect in the lingula and an intermediate probability for PE. Recommend CTA. Pt transferred to the ICU after BP falling to 80's/40's at 0500 this AM. Pt infused with levophed in ICU. BP stabilized. BC grew gram negative rods, pending sensitivities. C. Diff studies ordered due to complaint of diarrhea and loose stools s/p antibiotic course prior to admission. Pt received Echo this AM, pending results. F/u pulm crit recs on anticoagulation. Attempting to wean down levophed. Attempting to find and review records from OSH, where pt was hospitalized prior to this admission. Ortho consulted and does not suspect a septic knee, due to reasonable range of motion, that is close to baseline, and no further attempt to obtain joint fluid aspiration.    Interval History: The patient was seen and examined this morning at bedside, the patient is resting comfortably in NAD. The patient has no acute complaints this morning. The patient is tolerating diet well, good urinary output and has worked with PT/OT. The patient feels moderately better than on admission. The nurse reports no acute events over night. The patient denies any chest pain, SOB or focal neuro deficits       Review of Systems   Constitutional: Negative for appetite change, chills and fever.   HENT: Negative for congestion, ear discharge, ear pain, hearing loss, postnasal drip, rhinorrhea, tinnitus and trouble swallowing.    Eyes: Negative for pain, discharge, redness and itching.   Respiratory: Negative for apnea, cough, choking, chest tightness and shortness of breath.    Cardiovascular: Positive for leg swelling (chronically ). Negative for chest pain and palpitations.   Gastrointestinal: Positive for diarrhea  (resolved). Negative for abdominal distention, abdominal pain, blood in stool, nausea and vomiting.   Endocrine: Negative for polydipsia, polyphagia and polyuria.   Genitourinary: Negative for difficulty urinating, dysuria, frequency and urgency.   Musculoskeletal: Positive for joint swelling (improved).        R LE knee and leg swelling, erythema, and skin breakdown. L leg skin break down.    Skin: Positive for color change (chronic skin changes ) and wound.        Erythema and warmth of R LE.    Neurological: Negative for dizziness, tremors, weakness, light-headedness, numbness and headaches.   Hematological: Negative for adenopathy. Does not bruise/bleed easily.   Psychiatric/Behavioral: Negative for agitation, behavioral problems, confusion, dysphoric mood and hallucinations.     Objective:     Vital Signs (Most Recent):  Temp: 98.3 °F (36.8 °C) (09/13/19 0824)  Pulse: 109 (09/13/19 0824)  Resp: 20 (09/13/19 0824)  BP: 120/64 (09/13/19 0824)  SpO2: 96 % (09/13/19 0824) Vital Signs (24h Range):  Temp:  [97 °F (36.1 °C)-99 °F (37.2 °C)] 98.3 °F (36.8 °C)  Pulse:  [] 109  Resp:  [18-31] 20  SpO2:  [95 %-100 %] 96 %  BP: ()/(51-68) 120/64     Weight: (!) 161.3 kg (355 lb 9.6 oz)  Body mass index is 39.06 kg/m².    Intake/Output Summary (Last 24 hours) at 9/13/2019 0902  Last data filed at 9/13/2019 0600  Gross per 24 hour   Intake 908.33 ml   Output 1101 ml   Net -192.67 ml      Physical Exam   Constitutional: He is oriented to person, place, and time. He appears well-developed and well-nourished.   HENT:   Head: Normocephalic and atraumatic.   Right Ear: External ear normal.   Left Ear: External ear normal.   Nose: Nose normal.   Eyes: Conjunctivae and EOM are normal.   Neck: Normal range of motion. Neck supple.   Cardiovascular: Intact distal pulses. An irregularly irregular rhythm present. Exam reveals no gallop and no friction rub.   No murmur heard.  Pulmonary/Chest: Effort normal and breath sounds  normal. No stridor. No respiratory distress. He has no wheezes. He exhibits no tenderness.   Abdominal: Soft. Bowel sounds are normal. He exhibits no distension. There is no tenderness. There is no guarding.   Musculoskeletal:   R LE swelling and eythema. Difficulty moving leg, able to move big toe.    Neurological: He is alert and oriented to person, place, and time.   Skin:   Skin breakdown on R and L LE. Erythema and warmth of R LE.    Psychiatric: He has a normal mood and affect. His behavior is normal. Thought content normal.   Nursing note and vitals reviewed.      Significant Labs:   CBC:   Recent Labs   Lab 09/12/19 0359 09/13/19 0358   WBC 6.48 4.13   HGB 8.0* 7.7*   HCT 26.1* 25.2*   * 126*     CMP:   Recent Labs   Lab 09/12/19 0359 09/13/19 0358   * 137   K 4.2 3.5    104   CO2 21* 21*    92   BUN 47* 47*   CREATININE 1.6* 1.5*   CALCIUM 8.5* 8.4*   PROT 6.6 6.7   ALBUMIN 2.7* 2.8*   BILITOT 0.8 0.8   ALKPHOS 89 94   AST 30 29   ALT 24 24   ANIONGAP 8 12   EGFRNONAA 49* 53*     Magnesium:   Recent Labs   Lab 09/12/19 0359 09/13/19 0358   MG 1.4* 1.3*     All pertinent labs within the past 24 hours have been reviewed.    Significant Imaging:   CT Leg (Tibia-Fibula) Without Contrast Right   Final Result      Nonspecific subcutaneous soft tissue edema noted circumferentially at the right lower leg.         Electronically signed by: Constanza David MD   Date:    09/13/2019   Time:    09:03      US Upper Extremity Veins Bilateral   Final Result      No DVT of the right or left upper extremity.         Electronically signed by: Mingo Andrade MD   Date:    09/12/2019   Time:    15:13        I have reviewed and interpreted all pertinent imaging results/findings within the past 24 hours.      Assessment/Plan:      * Septic shock  Patient presented to the ED with fever of 102.3 and LA of 4   Severe swelling and warmth to the right leg associated with joint effusion of the right  knee   Therapeutic and diagnostic arthrocentesis attempted x2 without success   Patient was recently hospitalized for cellulitis of the same area, and completed 2 weeks of clinda and cipro  Pain and swelling of the affected area started around 3 AM the morning of admission  ID consulted, appreciate recs   Blood cultures + GNR  Ortho consulted for septic joint, Dr Timmons recommends continued management, and not likely a septic joint   Continue to monitor   Pressors weaned and now BP maintained on own   RESOLVED      Clostridium difficile infection  On C Diff precautions   Positive cultures       Wound of foot  Chronically, followed by wound care       Onychomycosis  Likely chronic in nature secondary to multiple co morbidities          (HFpEF) heart failure with preserved ejection fraction  Last TTE with EF of 60%  Repeat Echo pending   No signs of heart failure on chest xray or PE   Will continue home medications   Continue to monitor       Hypomagnesemia  Mag 1.3 this AM   Replaced will recheck in AM       Hyperthyroidism  TSH < 0.010, FU F T4  Pt not currently on any therapy   No symptoms of hyperthyroidism       Acute on chronic diastolic congestive heart failure  Pt BNP elevated to 600's, base line around 200  No signs of decompensated heartfailure  Will continue to monitor   Continue home medications         HTN (hypertension)  Continue home medication   Goal BP <140/90  Hydralazine 10 mg IV PRN for BP >180/90  Continue to monitor       Skin ulcer of left foot, limited to breakdown of skin  AS above      Cellulitis  As above  Will continue IV antibiotics   Continue fluids   Pain management       Chronic atrial fibrillation  Pt with chronic afib   HR in 110's-120's   Will continue metoprolol   Maintain Tele   Continue Xarelto, lisinopril and torsemide   Pt acutely decompensate, V/Q scan showed lingular hypodensity  Patient now failed eliquis, warfarin and xarelto  Pulm crit consulted for AC management,  appreciate recs   Patient stable on room air   Continue to monitor         VTE Risk Mitigation (From admission, onward)        Ordered     IP VTE HIGH RISK PATIENT  Once      09/11/19 0526          Disposition: Pending clinical improvement of symptoms.       D'Amico C Johnson, MD  Department of Hospital Medicine   [facility]

## 2019-09-13 NOTE — PLAN OF CARE
Problem: Adult Inpatient Plan of Care  Goal: Plan of Care Review  Outcome: Ongoing (interventions implemented as appropriate)  Pt arrived to the floor from ICU this afternoon via stretcher. Pt able to transfer to bed with assistance. Pt AA0x4 and able to call staff for assistance. Continues on IV ABT with 0 ase noted. Continues on contact precautions for c-diff with 0 episodes of loose stool noted at this time. Pt gone for a scan this afternoon. Pt oriented to room and staff. Bed alarm on, call light within reach, and urinal at bedside.

## 2019-09-13 NOTE — PROGRESS NOTES
[facility]  PROGRESS NOTE    HPI: Drew Farrar is a 53 y.o. male with chronic A fib on eliquis, CHF, PE, DVT with irwin filter in place, HTN, venous stasis, hyperthyroidism, chronic bilateral lower extremity edema, chronic left lateral ankle wound. Admitted with worsening right lower extremity pain, erythema and likely PE.    INTERVAL HISTORY:  No acute events overnight.  Transferred to the floor.  Pain has improved in the right lower extremity.  Afebrile.  Range of motion intact.    VITALS:  Temp:  [97 °F (36.1 °C)-99 °F (37.2 °C)] 98.3 °F (36.8 °C)  Pulse:  [] 109  Resp:  [18-31] 20  SpO2:  [95 %-100 %] 96 %  BP: (103-146)/(51-68) 120/64    I&Os:  I/O last 3 completed shifts:  In: 2598.3 [P.O.:340; I.V.:1258.3; IV Piggyback:1000]  Out: 2982 [Urine:2976; Stool:1; Blood:5]    PHYSICAL EXAM:  GEN:  No acute distress, alert orient x3  HEENT:  Anicteric sclera  CV:  Irregular rate and rhythm  RESP:  Nonlabored breathing  ABD:  Soft  EXT:  Bilateral lower extremities with severe edema, right worse than left, chronic skin changes present, erythema of the right lower leg and posterior aspect of the upper thigh, small superficial wound to the left lateral ankle    LABS:  CBC:   Recent Labs   Lab 09/13/19  0358   WBC 4.13   RBC 3.12*   HGB 7.7*   HCT 25.2*   *   MCV 81*   MCH 24.7*   MCHC 30.6*     CMP:   Recent Labs   Lab 09/13/19  0358   GLU 92   CALCIUM 8.4*   ALBUMIN 2.8*   PROT 6.7      K 3.5   CO2 21*      BUN 47*   CREATININE 1.5*   ALKPHOS 94   ALT 24   AST 29   BILITOT 0.8     Labs within the past 24 hours have been reviewed.    IMAGING:  CT scan of the right lower extremity shows diffuse edema however no drainable fluid collections.  There does appear to be some fluid around the knee joint.    FINDINGS:  No fracture, no osseous lesions, no abnormal periosteal reaction.  Circumferential subcutaneous soft tissue edema.  No soft tissue gas noted.  No definite fluid  collection noted within the subcutaneous soft tissues, musculature or osseous structures.  Note that MRI is the exam of choice to evaluate soft tissues and osseous edema associated with early changes of osteomyelitis and could be done if clinically warranted.      Impression       Nonspecific subcutaneous soft tissue edema noted circumferentially at the right lower leg.           ASSESSMENT & PLAN:  53 y.o. male bilateral chronic lower extremity edema, R>L, possible cellulitis of the right lower extremity, bacteremia, C diff infection, history of DVT with PE  - no drainable fluid collection appreciated on CT scan, no surgical intervention warranted  - currently being treated for enterobacter cloacae positive blood cultures and C diff  - continue treatment per Infectious Diseases recs  - general surgery will sign off, please contact with any further questions or concerns

## 2019-09-13 NOTE — PT/OT/SLP PROGRESS
Physical Therapy Treatment    Patient Name:  Drew Farrar   MRN:  [id number]    Recommendations:     Discharge Recommendations:  (post acute-SNF versus LTAC)   Discharge Equipment Recommendations: (defer to post acute)   Barriers to discharge: None    Assessment:     Drew Farrar is a 53 y.o. male admitted with a medical diagnosis of Septic shock.  He presents with the following impairments/functional limitations:  weakness, gait instability, impaired functional mobilty, impaired self care skills, impaired balance, impaired endurance, decreased ROM, decreased lower extremity function, decreased upper extremity function, pain, impaired skin, decreased safety awareness, impaired cognition, impaired cardiopulmonary response to activity . Patient with improved functional mobility. Used RW X 2 ambulated 6 ft each treat with seated rest break. Performed AAROM to AROM ex BLE seated EOB 10 reps each. .    Rehab Prognosis: Good; patient would benefit from acute skilled PT services to address these deficits and reach maximum level of function.    Recent Surgery: * No surgery found *      Plan:     During this hospitalization, patient to be seen 6 x/week to address the identified rehab impairments via gait training, therapeutic activities, therapeutic exercises and progress toward the following goals:    · Plan of Care Expires:  10/11/19    Subjective     Chief Complaint: weakness   Patient/Family Comments/goals: go home  Pain/Comfort:  · Pain Rating 1: other (see comments)(did not rate on scale)  · Location - Side 1: Right  · Location - Orientation 1: generalized  · Location 1: knee  · Pain Addressed 1: Reposition, Distraction      Objective:     Communicated with primary nurse prior to session.  Patient found HOB elevated with PICC line, telemetry upon PT entry to room.     General Precautions: Standard, fall, special contact   Orthopedic Precautions:N/A   Braces: N/A     Functional Mobility:  · Bed Mobility:     · Supine  to Sit: stand by assistance  · Transfers:     · Sit to Stand:  contact guard assistance with rolling walker  · Gait: 6 ft X 2 using BRW and CG assist  · Balance: poor + with RW      AM-PAC 6 CLICK MOBILITY  Turning over in bed (including adjusting bedclothes, sheets and blankets)?: 3  Sitting down on and standing up from a chair with arms (e.g., wheelchair, bedside commode, etc.): 3  Moving from lying on back to sitting on the side of the bed?: 3  Moving to and from a bed to a chair (including a wheelchair)?: 3  Need to walk in hospital room?: 3  Climbing 3-5 steps with a railing?: 1  Basic Mobility Total Score: 16       Therapeutic Activities and Exercises:   see assessment    Patient left HOB elevated with all lines intact, call button in reach and bed alarm on..    GOALS:   Multidisciplinary Problems     Physical Therapy Goals        Problem: Physical Therapy Goal    Goal Priority Disciplines Outcome Goal Variances Interventions   Physical Therapy Goal     PT, PT/OT Ongoing (interventions implemented as appropriate)     Description:    Goals to be met by: 19     Patient will increase functional independence with mobility by performin. Supine to sit with Stand-by Assistance  2. Sit to supine with Contact Guard Assistance  3. Sit to stand transfer with Stand-by Assistance  4. Gait  x 150 feet with Stand-by Assistance using Bariatric Rolling Walker.   5. Ascend/Descend 4 inch curb step with Stand-by Assistance using bariatric Rolling Walker.                      Time Tracking:     PT Received On: 19  PT Start Time: 1100     PT Stop Time: 1125  PT Total Time (min): 25 min     Billable Minutes: Gait Training 15 and Therapeutic Exercise 10    Treatment Type: Treatment  PT/PTA: PT     PTA Visit Number: 0     Stan Grace, PT  2019

## 2019-09-13 NOTE — PLAN OF CARE
Pt states he wants [facility] and he is no longer active with them.  I sent face sheet along with ID note to [facility] in [facility].     09/13/19 1420   Post-Acute Status   Post-Acute Authorization Home Health/Hospice   Home Health/Hospice Status Referrals Sent     Vlad Gibson RN,   [phone]

## 2019-09-13 NOTE — PT/OT/SLP PROGRESS
"Occupational Therapy   Treatment    Name: Drew Farrar  MRN: [id number]  Admitting Diagnosis:  Septic shock       Recommendations:     Discharge Recommendations: (post acute-SNF versus LTAC)  Discharge Equipment Recommendations:  (TBD)  Barriers to discharge:  Decreased caregiver support    Assessment:     Drew Farrar is a 53 y.o. male with a medical diagnosis of Septic shock.  He presents with poor activity tolerance 2/2 severe SOB with exertional ADLs and fx ambulation. Pt. Says he gets like this at home and sits down when he gets this SOB. Continue with OT POC. Performance deficits affecting function are weakness, impaired self care skills, impaired balance, decreased coordination, impaired functional mobilty, impaired endurance, gait instability, decreased lower extremity function, edema, impaired skin, decreased safety awareness.     Rehab Prognosis:  Good; patient would benefit from acute skilled OT services to address these deficits and reach maximum level of function.       Plan:     Patient to be seen 5 x/week to address the above listed problems via self-care/home management, therapeutic exercises, therapeutic activities  · Plan of Care Expires: 10/11/19  · Plan of Care Reviewed with: patient    Subjective     Pain/Comfort:  · Pain Rating 1: 0/10  · Pain Rating Post-Intervention 1: 0/10    Objective:     Communicated with: nurse prior to session.  Patient found seated EOB with telemetry, PICC line upon OT entry to room.    General Precautions: Standard, fall, special contact   Orthopedic Precautions:N/A   Braces: N/A     Occupational Performance:     Bed Mobility:    · Patient completed Scooting/Bridging with minimum assistance for RLE assist.  · Patient completed Sit to Supine with minimum assistance for RLE assist     Functional Mobility/Transfers:  · Patient completed Sit <> Stand Transfer with contact guard assistance  with  rolling walker -bed raised, pt 6'8".  · Functional Mobility: ambulated with " "CGA in room using RW, slow pace, short steps, SOB from bed>sink>back to bed.    Activities of Daily Living:  · Grooming: contact guard assistance standing at sinkl ~2 min washed face and rinsed mouth; resting on forearms on sink and very SOB; PLB breathing implemented but pt. exhaling to rapidly with mouth opened.  · Lower Body Dressing: total assistance shoes covers 2/2 edematous feet  · Toileting: pt decl;ined 2/2 stated "it's too hard to stand up from the commode" .      [facility] 6 Click ADL: 18    Treatment & Education:  Role of OT and POC  PLB breathing instruction with demonstration and VC but pt needed additional demonstration and extended rest periods to recovery.  Session ended 2/2 pt exhausted after and resting in bed.    Patient left HOB elevated with all lines intact, call button in reach and bed alarm onEducation:      GOALS:   Multidisciplinary Problems     Occupational Therapy Goals        Problem: Occupational Therapy Goal    Goal Priority Disciplines Outcome Interventions   Occupational Therapy Goal     OT, PT/OT Ongoing (interventions implemented as appropriate)    Description:  Goals to be met by: 10/11/2019      Patient will increase functional independence with ADLs by performing:    UE Dressing with Modified [place].  LE Dressing with Stand-by Assistance.  Grooming while standing with Modified [place].  Toileting from bedside commode with Contact Guard Assistance for hygiene and clothing management.   Step transfer with Contact Guard Assistance  Toilet transfer to bedside commode with Stand-by Assistance.  Increased functional strength to WFL for self care.  Upper extremity exercise program x10 reps per handout, with assistance as needed.                      Time Tracking:     OT Date of Treatment: 09/13/19  OT Start Time: 1517  OT Stop Time: 1535  OT Total Time (min): 18 min    Billable Minutes:Self Care/Home Management 18  Total Time 18    Sandra Wallis OT  9/13/2019    "

## 2019-09-13 NOTE — SUBJECTIVE & OBJECTIVE
Interval History: The patient was seen and examined this morning at bedside, the patient is resting comfortably in NAD. The patient has no acute complaints this morning. The patient is tolerating diet well, good urinary output and has worked with PT/OT. The patient feels moderately better than on admission. The nurse reports no acute events over night. The patient denies any chest pain, SOB or focal neuro deficits       Review of Systems   Constitutional: Negative for appetite change, chills and fever.   HENT: Negative for congestion, ear discharge, ear pain, hearing loss, postnasal drip, rhinorrhea, tinnitus and trouble swallowing.    Eyes: Negative for pain, discharge, redness and itching.   Respiratory: Negative for apnea, cough, choking, chest tightness and shortness of breath.    Cardiovascular: Positive for leg swelling (chronically ). Negative for chest pain and palpitations.   Gastrointestinal: Positive for diarrhea (resolved). Negative for abdominal distention, abdominal pain, blood in stool, nausea and vomiting.   Endocrine: Negative for polydipsia, polyphagia and polyuria.   Genitourinary: Negative for difficulty urinating, dysuria, frequency and urgency.   Musculoskeletal: Positive for joint swelling (improved).        R LE knee and leg swelling, erythema, and skin breakdown. L leg skin break down.    Skin: Positive for color change (chronic skin changes ) and wound.        Erythema and warmth of R LE.    Neurological: Negative for dizziness, tremors, weakness, light-headedness, numbness and headaches.   Hematological: Negative for adenopathy. Does not bruise/bleed easily.   Psychiatric/Behavioral: Negative for agitation, behavioral problems, confusion, dysphoric mood and hallucinations.     Objective:     Vital Signs (Most Recent):  Temp: 98.3 °F (36.8 °C) (09/13/19 0824)  Pulse: 109 (09/13/19 0824)  Resp: 20 (09/13/19 0824)  BP: 120/64 (09/13/19 0824)  SpO2: 96 % (09/13/19 0824) Vital Signs (24h  Range):  Temp:  [97 °F (36.1 °C)-99 °F (37.2 °C)] 98.3 °F (36.8 °C)  Pulse:  [] 109  Resp:  [18-31] 20  SpO2:  [95 %-100 %] 96 %  BP: ()/(51-68) 120/64     Weight: (!) 161.3 kg (355 lb 9.6 oz)  Body mass index is 39.06 kg/m².    Intake/Output Summary (Last 24 hours) at 9/13/2019 0902  Last data filed at 9/13/2019 0600  Gross per 24 hour   Intake 908.33 ml   Output 1101 ml   Net -192.67 ml      Physical Exam   Constitutional: He is oriented to person, place, and time. He appears well-developed and well-nourished.   HENT:   Head: Normocephalic and atraumatic.   Right Ear: External ear normal.   Left Ear: External ear normal.   Nose: Nose normal.   Eyes: Conjunctivae and EOM are normal.   Neck: Normal range of motion. Neck supple.   Cardiovascular: Intact distal pulses. An irregularly irregular rhythm present. Exam reveals no gallop and no friction rub.   No murmur heard.  Pulmonary/Chest: Effort normal and breath sounds normal. No stridor. No respiratory distress. He has no wheezes. He exhibits no tenderness.   Abdominal: Soft. Bowel sounds are normal. He exhibits no distension. There is no tenderness. There is no guarding.   Musculoskeletal:   R LE swelling and eythema. Difficulty moving leg, able to move big toe.    Neurological: He is alert and oriented to person, place, and time.   Skin:   Skin breakdown on R and L LE. Erythema and warmth of R LE.    Psychiatric: He has a normal mood and affect. His behavior is normal. Thought content normal.   Nursing note and vitals reviewed.      Significant Labs:   CBC:   Recent Labs   Lab 09/12/19 0359 09/13/19 0358   WBC 6.48 4.13   HGB 8.0* 7.7*   HCT 26.1* 25.2*   * 126*     CMP:   Recent Labs   Lab 09/12/19 0359 09/13/19 0358   * 137   K 4.2 3.5    104   CO2 21* 21*    92   BUN 47* 47*   CREATININE 1.6* 1.5*   CALCIUM 8.5* 8.4*   PROT 6.6 6.7   ALBUMIN 2.7* 2.8*   BILITOT 0.8 0.8   ALKPHOS 89 94   AST 30 29   ALT 24 24   ANIONGAP  8 12   EGFRNONAA 49* 53*     Magnesium:   Recent Labs   Lab 09/12/19  0359 09/13/19  0358   MG 1.4* 1.3*     All pertinent labs within the past 24 hours have been reviewed.    Significant Imaging:   CT Leg (Tibia-Fibula) Without Contrast Right   Final Result      Nonspecific subcutaneous soft tissue edema noted circumferentially at the right lower leg.         Electronically signed by: Constanza David MD   Date:    09/13/2019   Time:    09:03      US Upper Extremity Veins Bilateral   Final Result      No DVT of the right or left upper extremity.         Electronically signed by: Mingo Andrade MD   Date:    09/12/2019   Time:    15:13        I have reviewed and interpreted all pertinent imaging results/findings within the past 24 hours.

## 2019-09-13 NOTE — MEDICAL/APP STUDENT
[facility] Infectious Disease Progress Note     Primary Team: Dr. Moreno  Consultant Attending: Dr. Whelan  Date of Admit: 9/10/2019    Summary of history   Mr. 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 Gutierrez) who presented to the ED yesterday for evaluation of swelling, redness, and pain of his right knee associated with n/v/d.  On presentation, he was tachycardic with afib rhythm, febrile to 102.3, and with an LA of 4.  Code sepsis was called and he was put on 2L O2 by NC and he was given IVF, vanc, aztreonam, and flagyl and admitted to [facility] for treatment of severe sepsis possibly secondary to RLE cellulitis.  Arthrocentesis attempt of R knee was unsuccessful. Ortho consulted due to concern for septic joint.  After he became hypotensive to 80/40 he was transferred from the floor to the ICU for pressor support and bp stabalized. PICC line placed RUE. Xray R knee showed no acute changes with chronic deformity of tibial tuberosity. US of bilateral LE shows femoral DVT B/L (left femoral DVT is new).  CXR showed cardiomegaly unchanged with no failure or pneumonia. V/Q scan shows intermediate probability PE at lingula.  Troponin positive at 0.168.  Lactic acid downtrending today to 1.8.  Blood cultures (9/10) growing gram negative rods.  Influenza A&B negative.  C. Diff ordered today because of diarrhea. ID consulted to manage antibiotics.  He is currently on aztreonam 2000 mg IV q8, metronidazole 500 mg q8, and vanc 2000 mg q12.       Of note, patient stated that three weeks ago he completed a 2 week course of cipro and clinda for RLE cellulitis.  On 9/2, 9 days ago, the patient went to the ED for evaluation of epistaxis, and ED physician noticed tinea corporis, tinea cruris, and tenia pedis on bilateral lower extremities, bilateral feet, and on  "abdomen.  ED provider discharged patient with terbinafine PO daily and atarax Rx.        9/11:  patient remains afebrile and bp stable.  On interview, he denies any recent ingestion of raw oysters or lake swimming, but does have a swimming pool at his house which he swam in on 9/8 and 9/9.  He lives at home with his wife about 2 blocks from the hospital.  He has been taking the terbinafine from the ED doctor 9 days ago with no improvement.  He has an allergy to penicillin and says he breaks out in hives when it is given to him, but he can tolerate keflex.     9/12: Patient has no new complaints.  Feeling well and afebrile.  Now off levophed.  C. Diff of 9/11 positive by PCR, start on PO vanc.  Blood culture from 9/10 now positive for E. Cloacae.      Interval events     9/13:   - Afebrile on PO vanc, aztreonam, flagyl, merrem, topical antifungal  - Stepped down to the floor yesterday  - CT RLE showed nonspecific soft tissue edema with no drainable fluid collection. Per surgery, no surgical intervention warranted. Surgery signed off.  - Discharge planning to go home with home health to give abx      Subjective     On exam today, Mr. Farrar states that he feels "okay", but is beginning to feel "depressed" being "stuck in the hospital."  His in-laws are returning from vacation on Tuesday and he wants to be home when they return.  He endorses improvement in RLE pain. Loose BM improving, becoming more formed and now brown instead of green. He denies CP, abdominal pain, n/v. He endorses FRAIRE, but states that this is his baseline.       Current Medications:     Infusions:       Scheduled:   aztreonam  2,000 mg Intravenous Q8H    ciprofloxacin  400 mg Intravenous Q12H    collagenase   Topical (Top) Every Mon, Wed, Fri    magnesium sulfate IVPB  2 g Intravenous Once    metoprolol succinate  25 mg Oral QHS    metronidazole  500 mg Intravenous Q8H    miconazole   Topical (Top) BID    sodium chloride 0.9%  10 mL " Intravenous Q6H    torsemide  40 mg Oral Daily    vancomycin  125 mg Oral Q6H        PRN:  acetaminophen, Dextrose 10% Bolus, Dextrose 10% Bolus, glucagon (human recombinant), glucose, glucose, HYDROcodone-acetaminophen, ondansetron, promethazine (PHENERGAN) IVPB, ramelteon, Flushing PICC Protocol **AND** sodium chloride 0.9% **AND** sodium chloride 0.9%, sodium chloride 0.9%    Antibiotics and Day Number of Therapy:  Aztreonam 2000 mg IV q8 day 3  Cipro 400 mg IV q12 day 3  Flagyl 500 mg q8 day 3  Vanc 125 mg PO q6 day2     Miconazole 2% cream BID    Objective   Last 24 Hour Vital Signs:  BP  Min: 103/55  Max: 120/64  Temp  Av.8 °F (36.6 °C)  Min: 97 °F (36.1 °C)  Max: 99 °F (37.2 °C)  Pulse  Av.1  Min: 78  Max: 114  Resp  Av.1  Min: 18  Max: 31  SpO2  Av.8 %  Min: 95 %  Max: 100 %    Physical Examination:  Examination  General: Sitting up in bed. No acute distress   HEENT: NCAT  Neck: supple  Cardiac: irregularly irregular rhythm  Pulm/Chest: CTAB, no increased WOB  GI/Rectal: abdomen nontender, nondistended  Skin/ Extremities: RLE diffusely edematous, worse below R knee, with tree-trunk appearance most pronounced distally. L foot ulcer   Neurology/ Mental status:  AOx3    Lab data:  CBC:   Lab Results   Component Value Date    WBC 4.13 2019    HGB 7.7 (L) 2019     (L) 2019    MCV 81 (L) 2019    RDW 17.0 (H) 2019       Estimated Creatinine Clearance: 100.6 mL/min (A) (based on SCr of 1.5 mg/dL (H)).    Microbiology Data  9/10: Blood culture L forearm: NGx2 d  9/10: Blood culture R hand: Enterobacter cloacae complex   - S. Cefepime, ceftriaxone, ciprofloxacin, gentamicin, levofloxacin, pip/tazo, tetracycline, tobramycin, trimeth/sulfa  9/10: Influenza A&B molecular:  negative  : C. Diff by PCR: positive  : Blood culture peripheral left hand:NGx1d  : Blood culture antecubital left arm: NGx1d    Other Lab Data  Vanc: 34.5    Other Results:  EKG  9/11:   Atrial fibrillation  Abnormal ECG  When compared with ECG of 10-SEP-2019 14:31,  T wave inversion no longer evident in Lateral leads  Confirmed by Moises Guajardo MD ([id number]) on 9/12/2019 9:20:07 AM     TTE 9/11:   · Mild left atrial enlargement.  · Mild aortic regurgitation.  · Mild tricuspid regurgitation.  · Left ventricular systolic function. The estimated ejection fraction is 50%  · Normal right ventricular systolic function.  · Mild right atrial enlargement.  · Mild mitral regurgitation.  · Mild right ventricular enlargement.  · Intermediate central venous pressure (8 mm Hg).  · The estimated PA systolic pressure is 29 mm Hg     Radiology  9/10: Xray R knee: No acute displaced fracture-dislocation identified.  9/10: CXR: Cardiomegaly unchanged.  No failure or pneumonia.  9/10: US Lower Extremity Veins Bilateral: Nonocclusive deep venous thrombosis seen within the bilateral femoral veins.  9/11: CXR: Interval placement of right PICC line with tip projecting over the SVC.  9/11: Perfusion scan: Large mismatched perfusion defect in the lingula. This is intermediate probability for PE. Recommend CTA  9/12: US upper extremities bilateral: No DVT R or LUE  9/13:  CT leg w/out contrast: Nonspecific subcutaneous soft tissue edema noted circumferentially at the right lower leg.    Assessment   Drew Farrar is a 53 y.o.male with PMHx of May-Benton syndrome resulting in chronic anti-coagulation, multiple past DVTs and a PE, afib, HTN, here with Enterobacter cloacae bacteremia and C. Diff in context of severe tinea corporis of bilateral lower extremities, nonhealing wound of left foot, and cellulitic RLE, currently improving on IV aztreonam, flagyl, merrem, and PO vanc.    Recommendations     - E. Cloacae sensitive to ceftriaxone . Not eligible for cipro as has prolonged QTc (<450 in males).  Can dc flagyl, merrem, aztreonam. Replace with ceftriaxone 2g q24 x14 days.   - Continue PO vanc for C. Diff treatment  for total of 10 day.   - Continue topical miconazole BID      Thank you for this consult. We will follow.    Sahara Zavala  U L4

## 2019-09-13 NOTE — PLAN OF CARE
I sent ID note with recs to [facility] via [facility].  I called [facility] and pt is no longer active with them.      [facility] accepted pt in [facility] to supply home infusion.       09/13/19 1405   Post-Acute Status   Post-Acute Authorization Other  (Home Infusion)     Vlad Gibson RN,   [phone]

## 2019-09-13 NOTE — ASSESSMENT & PLAN NOTE
Patient presented to the ED with fever of 102.3 and LA of 4   Severe swelling and warmth to the right leg associated with joint effusion of the right knee   Therapeutic and diagnostic arthrocentesis attempted x2 without success   Patient was recently hospitalized for cellulitis of the same area, and completed 2 weeks of clinda and cipro  Pain and swelling of the affected area started around 3 AM the morning of admission  ID consulted, appreciate recs   Blood cultures + GNR  Ortho consulted for septic joint, Dr Timmons recommends continued management, and not likely a septic joint   Continue to monitor   Pressors weaned and now BP maintained on own   RESOLVED

## 2019-09-13 NOTE — PLAN OF CARE
Problem: Physical Therapy Goal  Goal: Physical Therapy Goal    Goals to be met by: 19     Patient will increase functional independence with mobility by performin. Supine to sit with Stand-by Assistance  2. Sit to supine with Contact Guard Assistance  3. Sit to stand transfer with Stand-by Assistance  4. Gait  x 150 feet with Stand-by Assistance using Bariatric Rolling Walker.   5. Ascend/Descend 4 inch curb step with Stand-by Assistance using bariatric Rolling Walker.     Outcome: Ongoing (interventions implemented as appropriate)  Post placement recommended however patient reports he is going Home with

## 2019-09-14 PROBLEM — A41.9 SEPTIC SHOCK: Status: RESOLVED | Noted: 2019-04-24 | Resolved: 2019-09-14

## 2019-09-14 PROBLEM — I82.409 ACUTE DEEP VEIN THROMBOSIS (DVT) OF LOWER EXTREMITY: Status: ACTIVE | Noted: 2019-09-14

## 2019-09-14 PROBLEM — R65.21 SEPTIC SHOCK: Status: RESOLVED | Noted: 2019-04-24 | Resolved: 2019-09-14

## 2019-09-14 LAB
ALBUMIN SERPL BCP-MCNC: 2.8 G/DL (ref 3.5–5.2)
ALP SERPL-CCNC: 98 U/L (ref 55–135)
ALT SERPL W/O P-5'-P-CCNC: 25 U/L (ref 10–44)
ANION GAP SERPL CALC-SCNC: 11 MMOL/L (ref 8–16)
AST SERPL-CCNC: 31 U/L (ref 10–40)
BASOPHILS # BLD AUTO: 0.01 K/UL (ref 0–0.2)
BASOPHILS NFR BLD: 0.3 % (ref 0–1.9)
BILIRUB SERPL-MCNC: 0.6 MG/DL (ref 0.1–1)
BUN SERPL-MCNC: 42 MG/DL (ref 6–20)
CALCIUM SERPL-MCNC: 8.6 MG/DL (ref 8.7–10.5)
CHLORIDE SERPL-SCNC: 103 MMOL/L (ref 95–110)
CO2 SERPL-SCNC: 25 MMOL/L (ref 23–29)
CREAT SERPL-MCNC: 1.2 MG/DL (ref 0.5–1.4)
DIFFERENTIAL METHOD: ABNORMAL
EOSINOPHIL # BLD AUTO: 0.2 K/UL (ref 0–0.5)
EOSINOPHIL NFR BLD: 5.7 % (ref 0–8)
ERYTHROCYTE [DISTWIDTH] IN BLOOD BY AUTOMATED COUNT: 16.7 % (ref 11.5–14.5)
EST. GFR  (AFRICAN AMERICAN): >60 ML/MIN/1.73 M^2
EST. GFR  (NON AFRICAN AMERICAN): >60 ML/MIN/1.73 M^2
GLUCOSE SERPL-MCNC: 98 MG/DL (ref 70–110)
HCT VFR BLD AUTO: 24.3 % (ref 40–54)
HGB BLD-MCNC: 7.6 G/DL (ref 14–18)
LYMPHOCYTES # BLD AUTO: 0.6 K/UL (ref 1–4.8)
LYMPHOCYTES NFR BLD: 15.6 % (ref 18–48)
MAGNESIUM SERPL-MCNC: 1.6 MG/DL (ref 1.6–2.6)
MCH RBC QN AUTO: 24.8 PG (ref 27–31)
MCHC RBC AUTO-ENTMCNC: 31.3 G/DL (ref 32–36)
MCV RBC AUTO: 79 FL (ref 82–98)
MONOCYTES # BLD AUTO: 0.4 K/UL (ref 0.3–1)
MONOCYTES NFR BLD: 10.6 % (ref 4–15)
NEUTROPHILS # BLD AUTO: 2.6 K/UL (ref 1.8–7.7)
NEUTROPHILS NFR BLD: 67.8 % (ref 38–73)
PHOSPHATE SERPL-MCNC: 4.4 MG/DL (ref 2.7–4.5)
PLATELET # BLD AUTO: 164 K/UL (ref 150–350)
PMV BLD AUTO: 9.3 FL (ref 9.2–12.9)
POTASSIUM SERPL-SCNC: 3.2 MMOL/L (ref 3.5–5.1)
PROT SERPL-MCNC: 6.9 G/DL (ref 6–8.4)
RBC # BLD AUTO: 3.07 M/UL (ref 4.6–6.2)
SODIUM SERPL-SCNC: 139 MMOL/L (ref 136–145)
WBC # BLD AUTO: 3.85 K/UL (ref 3.9–12.7)

## 2019-09-14 PROCEDURE — 94761 N-INVAS EAR/PLS OXIMETRY MLT: CPT

## 2019-09-14 PROCEDURE — 25000003 PHARM REV CODE 250: Performed by: STUDENT IN AN ORGANIZED HEALTH CARE EDUCATION/TRAINING PROGRAM

## 2019-09-14 PROCEDURE — 63600175 PHARM REV CODE 636 W HCPCS: Performed by: STUDENT IN AN ORGANIZED HEALTH CARE EDUCATION/TRAINING PROGRAM

## 2019-09-14 PROCEDURE — 84100 ASSAY OF PHOSPHORUS: CPT

## 2019-09-14 PROCEDURE — 99223 PR INITIAL HOSPITAL CARE,LEVL III: ICD-10-PCS | Mod: ,,, | Performed by: INTERNAL MEDICINE

## 2019-09-14 PROCEDURE — 97116 GAIT TRAINING THERAPY: CPT

## 2019-09-14 PROCEDURE — 80053 COMPREHEN METABOLIC PANEL: CPT

## 2019-09-14 PROCEDURE — 97530 THERAPEUTIC ACTIVITIES: CPT

## 2019-09-14 PROCEDURE — 11000001 HC ACUTE MED/SURG PRIVATE ROOM

## 2019-09-14 PROCEDURE — 85025 COMPLETE CBC W/AUTO DIFF WBC: CPT

## 2019-09-14 PROCEDURE — 25000003 PHARM REV CODE 250: Performed by: INTERNAL MEDICINE

## 2019-09-14 PROCEDURE — 99223 1ST HOSP IP/OBS HIGH 75: CPT | Mod: ,,, | Performed by: INTERNAL MEDICINE

## 2019-09-14 PROCEDURE — 36415 COLL VENOUS BLD VENIPUNCTURE: CPT

## 2019-09-14 PROCEDURE — 83735 ASSAY OF MAGNESIUM: CPT

## 2019-09-14 PROCEDURE — 25000003 PHARM REV CODE 250: Performed by: FAMILY MEDICINE

## 2019-09-14 PROCEDURE — A4216 STERILE WATER/SALINE, 10 ML: HCPCS | Performed by: FAMILY MEDICINE

## 2019-09-14 RX ORDER — POTASSIUM CHLORIDE 20 MEQ/15ML
40 SOLUTION ORAL ONCE
Status: COMPLETED | OUTPATIENT
Start: 2019-09-14 | End: 2019-09-14

## 2019-09-14 RX ORDER — MAGNESIUM SULFATE HEPTAHYDRATE 40 MG/ML
2 INJECTION, SOLUTION INTRAVENOUS ONCE
Status: COMPLETED | OUTPATIENT
Start: 2019-09-14 | End: 2019-09-14

## 2019-09-14 RX ORDER — POTASSIUM CHLORIDE 20 MEQ/1
40 TABLET, EXTENDED RELEASE ORAL 2 TIMES DAILY
Status: COMPLETED | OUTPATIENT
Start: 2019-09-14 | End: 2019-09-14

## 2019-09-14 RX ADMIN — POTASSIUM CHLORIDE 40 MEQ: 20 SOLUTION ORAL at 01:09

## 2019-09-14 RX ADMIN — ONDANSETRON 8 MG: 8 TABLET, ORALLY DISINTEGRATING ORAL at 06:09

## 2019-09-14 RX ADMIN — Medication 10 ML: at 06:09

## 2019-09-14 RX ADMIN — TORSEMIDE 40 MG: 20 TABLET ORAL at 09:09

## 2019-09-14 RX ADMIN — POTASSIUM CHLORIDE 40 MEQ: 20 TABLET, EXTENDED RELEASE ORAL at 09:09

## 2019-09-14 RX ADMIN — Medication 125 MG: at 05:09

## 2019-09-14 RX ADMIN — CEFTRIAXONE 2 G: 2 INJECTION, SOLUTION INTRAVENOUS at 05:09

## 2019-09-14 RX ADMIN — METOPROLOL SUCCINATE 25 MG: 25 TABLET, FILM COATED, EXTENDED RELEASE ORAL at 09:09

## 2019-09-14 RX ADMIN — Medication 10 ML: at 12:09

## 2019-09-14 RX ADMIN — MAGNESIUM SULFATE IN WATER 2 G: 40 INJECTION, SOLUTION INTRAVENOUS at 09:09

## 2019-09-14 RX ADMIN — ENOXAPARIN SODIUM 150 MG: 150 INJECTION SUBCUTANEOUS at 09:09

## 2019-09-14 RX ADMIN — POTASSIUM CHLORIDE 40 MEQ: 20 TABLET, EXTENDED RELEASE ORAL at 10:09

## 2019-09-14 RX ADMIN — MICONAZOLE NITRATE: 20 CREAM TOPICAL at 10:09

## 2019-09-14 RX ADMIN — Medication 125 MG: at 10:09

## 2019-09-14 RX ADMIN — MICONAZOLE NITRATE: 20 CREAM TOPICAL at 09:09

## 2019-09-14 NOTE — SUBJECTIVE & OBJECTIVE
Interval History: The patient was seen and examined this morning at bedside, the patient is resting comfortably in NAD. The patient has no acute complaints this morning. The patient is tolerating diet well, good urinary output and has worked with PT/OT. The patient feels significantly better than on admission. The nurse reports no acute events over night. The patient denies any chest pain, SOB or focal neuro deficits       Review of Systems   Constitutional: Negative for appetite change, chills and fever.   HENT: Negative for congestion, ear discharge, ear pain, hearing loss, postnasal drip, rhinorrhea, tinnitus and trouble swallowing.    Eyes: Negative for pain, discharge, redness and itching.   Respiratory: Negative for apnea, cough, choking, chest tightness and shortness of breath.    Cardiovascular: Positive for leg swelling (chronically ). Negative for chest pain and palpitations.   Gastrointestinal: Positive for diarrhea (resolved). Negative for abdominal distention, abdominal pain, blood in stool, nausea and vomiting.   Endocrine: Negative for polydipsia, polyphagia and polyuria.   Genitourinary: Negative for difficulty urinating, dysuria, frequency and urgency.   Musculoskeletal: Positive for joint swelling (improved).        R LE knee and leg swelling, erythema, and skin breakdown. L leg skin break down.    Skin: Positive for color change (chronic skin changes ) and wound.        Erythema and warmth of R LE.    Neurological: Negative for dizziness, tremors, weakness, light-headedness, numbness and headaches.   Hematological: Negative for adenopathy. Does not bruise/bleed easily.   Psychiatric/Behavioral: Negative for agitation, behavioral problems, confusion, dysphoric mood and hallucinations.     Objective:     Vital Signs (Most Recent):  Temp: 97.2 °F (36.2 °C) (09/13/19 2354)  Pulse: (!) 124 (09/14/19 0400)  Resp: 20 (09/13/19 2354)  BP: (!) 121/57 (09/13/19 2354)  SpO2: 95 % (09/14/19 0439) Vital Signs  (24h Range):  Temp:  [97.2 °F (36.2 °C)-98.4 °F (36.9 °C)] 97.2 °F (36.2 °C)  Pulse:  [] 124  Resp:  [20-24] 20  SpO2:  [95 %-98 %] 95 %  BP: (100-121)/(55-64) 121/57     Weight: (!) 161.3 kg (355 lb 9.6 oz)  Body mass index is 39.06 kg/m².    Intake/Output Summary (Last 24 hours) at 9/14/2019 0615  Last data filed at 9/14/2019 0100  Gross per 24 hour   Intake 1288 ml   Output 2551 ml   Net -1263 ml      Physical Exam   Constitutional: He is oriented to person, place, and time. He appears well-developed and well-nourished.   HENT:   Head: Normocephalic and atraumatic.   Right Ear: External ear normal.   Left Ear: External ear normal.   Nose: Nose normal.   Eyes: Conjunctivae and EOM are normal.   Neck: Normal range of motion. Neck supple.   Cardiovascular: Normal rate. An irregularly irregular rhythm present. Exam reveals no gallop and no friction rub.   No murmur heard.  Pulmonary/Chest: Effort normal and breath sounds normal. No stridor. No respiratory distress. He has no wheezes. He exhibits no tenderness.   Abdominal: Soft. Bowel sounds are normal. He exhibits no distension. There is no tenderness. There is no guarding.   Musculoskeletal:   R LE swelling and eythema. Difficulty moving leg, able to move big toe.    Neurological: He is alert and oriented to person, place, and time.   Skin:   Skin breakdown on R and L LE. Erythema and warmth of R LE.    Psychiatric: He has a normal mood and affect. His behavior is normal. Thought content normal.   Nursing note and vitals reviewed.      Significant Labs:   CBC:   Recent Labs   Lab 09/13/19 0358 09/14/19 0615   WBC 4.13 3.85*   HGB 7.7* 7.6*   HCT 25.2* 24.3*   * 164     CMP:   Recent Labs   Lab 09/13/19 0358 09/14/19 0615    139   K 3.5 3.2*    103   CO2 21* 25   GLU 92 98   BUN 47* 42*   CREATININE 1.5* 1.2   CALCIUM 8.4* 8.6*   PROT 6.7 6.9   ALBUMIN 2.8* 2.8*   BILITOT 0.8 0.6   ALKPHOS 94 98   AST 29 31   ALT 24 25   ANIONGAP 12 11    EGFRNONAA 53* >60     Magnesium:   Recent Labs   Lab 09/13/19  0358 09/14/19  0615   MG 1.3* 1.6       Significant Imaging: I have reviewed all pertinent imaging results/findings within the past 24 hours.

## 2019-09-14 NOTE — ASSESSMENT & PLAN NOTE
Patient with history of PE and DVTs  US venous bilateral shows Nonocclusive deep venous thrombosis seen within the bilateral femoral veins.  He has  failed Coumadin and DOAC ( rivaroxaban)   He was recently switched to Xarelto in August 2019  - Of note, pt reports not always taking Xarelto w/food   -V/Q scan from 9/11/19  shows large mismatched perfusing defect in lingula, intermediate probablity for PE  Agree that there is at limited data on anti coagulation with DOACs in the morbidly obese patients   Therefore, in this patient recommend for now to continue b.i.d. dosing with Lovenox at the maximum dosing of 150 mg/kg   Monitor renal function and dosage adjustment with renal impairment prn  based on guidelines .        Follow up with Hematology/Oncology as outpatient

## 2019-09-14 NOTE — PLAN OF CARE
Problem: Physical Therapy Goal  Goal: Physical Therapy Goal    Goals to be met by: 19     Patient will increase functional independence with mobility by performin. Supine to sit with Stand-by Assistance  2. Sit to supine with Contact Guard Assistance  3. Sit to stand transfer with Stand-by Assistance  4. Gait  x 150 feet with Stand-by Assistance using Bariatric Rolling Walker.   5. Ascend/Descend 4 inch curb step with Stand-by Assistance using bariatric Rolling Walker.     Outcome: Ongoing (interventions implemented as appropriate)      Pt continues to work to achieve all established goals.

## 2019-09-14 NOTE — PLAN OF CARE
Problem: Adult Inpatient Plan of Care  Goal: Plan of Care Review  Outcome: Ongoing (interventions implemented as appropriate)  Pt in bed AA0x4. Continues to call for assistance. Maintain contact precautions for cdiff. Pt continues on IV ABT with 0 ase noted. 0 falls today. Worked with therapy today on transfers and ambulation. Pt with small amount of loose stool noted. Pt denies pain when asked. Call light within reach and urinal at bedside.

## 2019-09-14 NOTE — PROGRESS NOTES
"[facility] Hospital Medicine  Progress Note    Patient Name: Drew Farrar  MRN: [id number]  Patient Class: IP- Inpatient   Admission Date: 9/10/2019  Length of Stay: 4 days  Attending Physician: Severyn Yaroshevsky, MD  Primary Care Provider: Garrison Roach MD        Subjective:     Principal Problem:Septic shock        HPI:  Mr. Farrar is a 53 year old man with PMHx of chronic Afib (on eliquis), HFpEF, PE, Hx of DVT with irwin filter in place, HTN, Venous stasis, hyperthyroidism, presented to ED with acute onset fevers, chills, nausea, vomiting and right lower extremity pain. He states pain started around 3 AM on the morning of admission. He was recently discharge from hospital about 3 weeks prior to right LE cellulitis, and completed a 2 week course of cipro and clinda about 3 weeks ago. He reports improvement in pain and cellulitis up until this morning. He denies trauma or falls to the affected area. He states the pain started all of a sudden and is associated with swelling and warmth to the area. He states the right leg is very painful to touch and with  Movement.     He states he is compliant with all his blood pressure and anticoagulation medication. He denies Headache, changes in vision, shortness of breath, chest pain, palpitations, abdominal pain, diarrhea or constipation.       Overview/Hospital Course:  9/13  NAEON. Pt had some abdominal discomfort, endorsed some gas and bloating, tolerated simethicone. Pt required hydrocodone-acetaminophen for better pain control of R LE pain. Pt still had loose BM at night, states progressively becoming solid and well formed. Did not have BM this AM. Pt able to ambulate short distances around the room. Has dyspnea on exertion which he reports is his baseline. Bilateral US of UE showed no DVT in the R or L. CT of R leg without contrast shows "No fracture, no osseous lesions, no abnormal periosteal reaction.  Circumferential subcutaneous soft " "tissue edema.  No soft tissue gas noted.  No definite fluid collection noted within the subcutaneous soft tissues, musculature or osseous structures." Gen. Surg. Saw pt this AM, and do not have any further recs. Currently pending sensitivities of Enterobacter Cloacae complex growing from BC obtained on 09/10. Awaiting recs. From pulm regarding anticoagulation. Pt does not want to be D/C to LTAC.     9/12  C. Diff EIA positive and C. Diff toxin PCR positive. 1/2 BC growing gram negative rods. Vanc. Trough at 54, held vanc until next trough later this PM. ID recommends continuing with Vancomycin, aztreonam, flagyl, and IV ciprofloxacin for gram negative coverage. Pt started on topical antifungal for tinea pedis. Podiatry consulted for toenail care, recommends outpatient care. General surgery recommends CT scan of R LE to identify any potential undrained fluid collection, and bilateral venous US of UEs to evaluate for potential DVT. Pt restarted on home metoprolol. F/u on R LE CT, bilateral UE US, and pulm crit recs. For anticoagulation.     9/11  Overnight, pt endorsed CP and SOB at midnight, and had a drop in BP to 81/42. Pt required LR bolus and lovenox. PICC line placed. V/Q scan performed and showed arge mismatched pelrfusion defect in the lingula and an intermediate probability for PE. Recommend CTA. Pt transferred to the ICU after BP falling to 80's/40's at 0500 this AM. Pt infused with levophed in ICU. BP stabilized. BC grew gram negative rods, pending sensitivities. C. Diff studies ordered due to complaint of diarrhea and loose stools s/p antibiotic course prior to admission. Pt received Echo this AM, pending results. F/u pulm crit recs on anticoagulation. Attempting to wean down levophed. Attempting to find and review records from OSH, where pt was hospitalized prior to this admission. Ortho consulted and does not suspect a septic knee, due to reasonable range of motion, that is close to baseline, and no " further attempt to obtain joint fluid aspiration.    09/13: continues to improve. ID recommend discontinuation of Meropenem and aztreonam. E. Cloacae isolated from blood is very sensitive. Unfortunately unable to use cipro due to slightly prolonged Qtc. Recommend completion of course with Ceftriaxone 2 gm q 24 hours for ease of administration for a total of 14 days. Stop date 9/24/19. Continue p.o vanc for total of 10 days.    Interval History: The patient was seen and examined this morning at bedside, the patient is resting comfortably in NAD. The patient has no acute complaints this morning. The patient is tolerating diet well, good urinary output and has worked with PT/OT. The patient feels significantly better than on admission. The nurse reports no acute events over night. The patient denies any chest pain, SOB or focal neuro deficits       Review of Systems   Constitutional: Negative for appetite change, chills and fever.   HENT: Negative for congestion, ear discharge, ear pain, hearing loss, postnasal drip, rhinorrhea, tinnitus and trouble swallowing.    Eyes: Negative for pain, discharge, redness and itching.   Respiratory: Negative for apnea, cough, choking, chest tightness and shortness of breath.    Cardiovascular: Positive for leg swelling (chronically ). Negative for chest pain and palpitations.   Gastrointestinal: Positive for diarrhea (resolved). Negative for abdominal distention, abdominal pain, blood in stool, nausea and vomiting.   Endocrine: Negative for polydipsia, polyphagia and polyuria.   Genitourinary: Negative for difficulty urinating, dysuria, frequency and urgency.   Musculoskeletal: Positive for joint swelling (improved).        R LE knee and leg swelling, erythema, and skin breakdown. L leg skin break down.    Skin: Positive for color change (chronic skin changes ) and wound.        Erythema and warmth of R LE.    Neurological: Negative for dizziness, tremors, weakness, light-headedness,  numbness and headaches.   Hematological: Negative for adenopathy. Does not bruise/bleed easily.   Psychiatric/Behavioral: Negative for agitation, behavioral problems, confusion, dysphoric mood and hallucinations.     Objective:     Vital Signs (Most Recent):  Temp: 97.2 °F (36.2 °C) (09/13/19 2354)  Pulse: (!) 124 (09/14/19 0400)  Resp: 20 (09/13/19 2354)  BP: (!) 121/57 (09/13/19 2354)  SpO2: 95 % (09/14/19 0439) Vital Signs (24h Range):  Temp:  [97.2 °F (36.2 °C)-98.4 °F (36.9 °C)] 97.2 °F (36.2 °C)  Pulse:  [] 124  Resp:  [20-24] 20  SpO2:  [95 %-98 %] 95 %  BP: (100-121)/(55-64) 121/57     Weight: (!) 161.3 kg (355 lb 9.6 oz)  Body mass index is 39.06 kg/m².    Intake/Output Summary (Last 24 hours) at 9/14/2019 0615  Last data filed at 9/14/2019 0100  Gross per 24 hour   Intake 1288 ml   Output 2551 ml   Net -1263 ml      Physical Exam   Constitutional: He is oriented to person, place, and time. He appears well-developed and well-nourished.   HENT:   Head: Normocephalic and atraumatic.   Right Ear: External ear normal.   Left Ear: External ear normal.   Nose: Nose normal.   Eyes: Conjunctivae and EOM are normal.   Neck: Normal range of motion. Neck supple.   Cardiovascular: Normal rate. An irregularly irregular rhythm present. Exam reveals no gallop and no friction rub.   No murmur heard.  Pulmonary/Chest: Effort normal and breath sounds normal. No stridor. No respiratory distress. He has no wheezes. He exhibits no tenderness.   Abdominal: Soft. Bowel sounds are normal. He exhibits no distension. There is no tenderness. There is no guarding.   Musculoskeletal:   R LE swelling and eythema. Difficulty moving leg, able to move big toe.    Neurological: He is alert and oriented to person, place, and time.   Skin:   Skin breakdown on R and L LE. Erythema and warmth of R LE.    Psychiatric: He has a normal mood and affect. His behavior is normal. Thought content normal.   Nursing note and vitals  reviewed.      Significant Labs:   CBC:   Recent Labs   Lab 09/13/19 0358 09/14/19 0615   WBC 4.13 3.85*   HGB 7.7* 7.6*   HCT 25.2* 24.3*   * 164     CMP:   Recent Labs   Lab 09/13/19 0358 09/14/19 0615    139   K 3.5 3.2*    103   CO2 21* 25   GLU 92 98   BUN 47* 42*   CREATININE 1.5* 1.2   CALCIUM 8.4* 8.6*   PROT 6.7 6.9   ALBUMIN 2.8* 2.8*   BILITOT 0.8 0.6   ALKPHOS 94 98   AST 29 31   ALT 24 25   ANIONGAP 12 11   EGFRNONAA 53* >60     Magnesium:   Recent Labs   Lab 09/13/19 0358 09/14/19 0615   MG 1.3* 1.6       Significant Imaging: I have reviewed all pertinent imaging results/findings within the past 24 hours.      Assessment/Plan:      Clostridium difficile infection  On C Diff precautions   Positive cultures   Continue p.o vanc for total of 10 days      Wound of foot  Chronically, followed by wound care   likely cause of infection       Onychomycosis  Likely chronic in nature secondary to multiple co morbidities          (HFpEF) heart failure with preserved ejection fraction  Last TTE with EF of 60%  Repeat Echo pending   No signs of heart failure on chest xray or PE   Will continue home medications   Continue to monitor       Hypomagnesemia  Mag 1.6 this AM   Replaced will recheck in AM   Mag goal >2      Hyperthyroidism  TSH < 0.010, FU F T4  Pt not currently on any therapy   No symptoms of hyperthyroidism       Acute on chronic diastolic congestive heart failure  Pt BNP elevated to 600's, base line around 200  No signs of decompensated heartfailure  Will continue to monitor   Continue home medications         HTN (hypertension)  Continue home medication   Goal BP <140/90  Hydralazine 10 mg IV PRN for BP >180/90  Continue to monitor       Skin ulcer of left foot, limited to breakdown of skin  Chronically   Treatment as above       Cellulitis  Likely cause of septic Shock   ID Recommend discontinuation of Meropenem and aztreonam. E. Cloacae isolated from blood is very sensitive.  Unfortunately we are unable to use cipro due to slightly prolonged Qtc. Recommend completion of course with Ceftriaxone 2 gm q 24 hours for ease of administration for a total of 14 days. Stop date 9/24/19.      Chronic atrial fibrillation  Pt with chronic afib   HR in 110's-120's   Will continue metoprolol   Maintain Tele   Continue Xarelto, lisinopril and torsemide   Pt acutely decompensate, V/Q scan showed lingular hypodensity  Patient now failed eliquis, warfarin and xarelto  Pulm crit consulted for AC management, appreciate recs   Patient stable on room air   Continue to monitor         VTE Risk Mitigation (From admission, onward)        Ordered     enoxaparin injection 150 mg  Every 12 hours (non-standard times)      09/13/19 1742     IP VTE HIGH RISK PATIENT  Once      09/11/19 0526        Disposition: Pending clinical improvement of symptoms.       D'Amico C Johnson, MD  Department of Hospital Medicine   [facility]

## 2019-09-14 NOTE — PLAN OF CARE
Problem: Adult Inpatient Plan of Care  Goal: Plan of Care Review  Outcome: Ongoing (interventions implemented as appropriate)  POC reviewed, pt verbalize understanding. Pt denies pain/discomfort. Fall precaution maintained: bed on lowest position, call light within reach, bed alarm set, side rail up x 2, non skid sock on. Will continue to monitor.

## 2019-09-14 NOTE — CONSULTS
[facility]  Hematology/Oncology  Consult Note    Patient Name: Drew Farrar  MRN: [id number]  Admission Date: 9/10/2019  Hospital Length of Stay: 4 days  Code Status: Full Code   Attending Provider: Severyn Yaroshevsky, MD  Consulting Provider: Jennifer Gaitan MD  Primary Care Physician: Garrison Roach MD  Principal Problem:Septic shock    Inpatient consult to Hematology/Oncology  Consult performed by: Jennifer Gaitan MD  Consult ordered by: Wilberto Whittaker MD        Subjective:   REASON FOR CONSULTATION: Anticoagulation   HPI:  Patient is a 53-year-old male with history of atrial fibrillation on eliquis, CHF, DVT with irwin filter in place,  PE, hypertension, hyperthyroidism and venous stasis ulcers presented to [facility] on 09/10/2019 with fever and right knee pain and N/V.  Patient recently hospitalized 3 weeks ago for  for right lower extremity cellulitis.  And treated with a 2 week course of antibiotics with Cipro and clindamycin.  Patient found to be hypoxic to 68% on room air and hypotensive.  He is admitted with septic shock secondary to cellulitis with gram-negative juan bacteremia.  Patient required pressor post support which has been weaned.  Patient remains on chronic anticoagulation for chronic atrial fibrillation.  Patient reports he started Coumadin 6 months ago and was subsequently switched to Xarelto 1 month ago in August after diagnosed with a PE by his cardiologist.  Patient has now developed a new DVT in left lower extremity.  He is now being treated with full-dose low-molecular weight heparin therapy.  Consult for anticoagulation    Oncology Treatment Plan:   [No treatment plan]    Medications:  Continuous Infusions:  Scheduled Meds:   cefTRIAXone (ROCEPHIN) IVPB  2 g Intravenous Q24H    collagenase   Topical (Top) Every Mon, Wed, Fri    enoxaparin  150 mg Subcutaneous Q12H    metoprolol succinate  25 mg Oral QHS    miconazole    Topical (Top) BID    potassium chloride  40 mEq Oral BID    sodium chloride 0.9%  10 mL Intravenous Q6H    torsemide  40 mg Oral Daily    vancomycin  125 mg Oral Q6H     PRN Meds:acetaminophen, Dextrose 10% Bolus, Dextrose 10% Bolus, glucagon (human recombinant), glucose, glucose, HYDROcodone-acetaminophen, ondansetron, promethazine (PHENERGAN) IVPB, ramelteon, Flushing PICC Protocol **AND** sodium chloride 0.9% **AND** sodium chloride 0.9%, sodium chloride 0.9%     Review of patient's allergies indicates:   Allergen Reactions    Contrast media Other (See Comments)     Severe chest pain    Food allergy formula [glutamine-c-quercet-selen-brom]      Allergic to green peas; Heart failure.    Iodinated contrast media Other (See Comments)     Chest pain    Peas Hives    Ibuprofen Swelling    Latex, natural rubber Hives    Pcn [penicillins] Hives    Butisol [butabarbital] Rash     Peeling skin        Past Medical History:   Diagnosis Date    *Atrial fibrillation     Anticoagulant long-term use     Arthritis     Atrial fibrillation     Atrial fibrillation Feb 23, 2016    Bipolar disorder     CHF (congestive heart failure)     Congenital heart disease     s/p surgical intervention 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 of age    Deep vein thrombosis     DVT of leg (deep venous thrombosis)     left leg    History of prior ablation treatment     10/9/13    Hypertension     Obesity     Stroke     Thyroid disease     Venous stasis ulcer of lower extremity, unspecified laterality 12/14/2012    Venous ulcer      Past Surgical History:   Procedure Laterality Date    ANGIOPLASTY      ARTHROSCOPY-KNEE W/ CHONDROPLASTY Right 2/23/2016    [facility]    CARDIAC SURGERY      open heart surgery 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 old    EYE SURGERY      left eye cataract/right eye glaucoma    TIMO FILTER PLACEMENT      Dr Calix ([facility])    KNEE SURGERY      l and r     MULTIPLE TOOTH EXTRACTIONS       Sclerotherapy N/A 2/21/2019    Performed by Gomez Lantigua MD at [facility] CATH LAB/EP    SKIN GRAFT      left leg    SYNOVECTOMY-KNEE Right 2/23/2016    Performed by [facility] at [facility] OR     Family History     Problem Relation (Age of Onset)    Diabetes Father, Maternal Grandfather    Heart disease Father, Maternal Grandmother, Maternal Grandfather    Stroke Maternal Grandfather        Tobacco Use    Smoking status: Former Smoker     Packs/day: 1.00     Years: 6.00     Pack years: 6.00     Types: Cigarettes    Smokeless tobacco: Former User    Tobacco comment: quit by age 27yrs old   Substance and Sexual Activity    Alcohol use: Yes     Alcohol/week: 0.6 oz     Types: 1 Glasses of wine per week     Frequency: Monthly or less     Comment: occasionally    Drug use: No    Sexual activity: Yes     Partners: Female     Birth control/protection: None       Review of Systems   Constitutional: Negative for appetite change, fatigue, fever and unexpected weight change.   HENT: Negative for mouth sores.    Eyes: Negative for visual disturbance.   Respiratory: Negative for cough and shortness of breath.    Cardiovascular: Negative for chest pain.   Gastrointestinal: Negative for abdominal pain and diarrhea.   Genitourinary: Negative for frequency.   Musculoskeletal: Negative for back pain.   Skin: Negative for rash.   Neurological: Negative for headaches.   Hematological: Negative for adenopathy.   Psychiatric/Behavioral: The patient is not nervous/anxious.      Objective:     Vital Signs (Most Recent):  Temp: 97.5 °F (36.4 °C) (09/14/19 1232)  Pulse: 104 (09/14/19 1232)  Resp: 18 (09/14/19 1232)  BP: (!) 125/57 (09/14/19 1232)  SpO2: 96 % (09/14/19 0742) Vital Signs (24h Range):  Temp:  [96.1 °F (35.6 °C)-98.4 °F (36.9 °C)] 97.5 °F (36.4 °C)  Pulse:  [] 104  Resp:  [18-24] 18  SpO2:  [95 %-98 %] 96 %  BP: (100-126)/(55-64) 125/57     Weight: (!) 161.2 kg (355 lb 6.1 oz)  Body mass index is 39.04 kg/m².  Body  surface area is 3.02 meters squared.      Intake/Output Summary (Last 24 hours) at 9/14/2019 1550  Last data filed at 9/14/2019 1300  Gross per 24 hour   Intake 800 ml   Output 2450 ml   Net -1650 ml       Physical Exam   Constitutional: He is oriented to person, place, and time. He appears well-developed and well-nourished.   HENT:   Head: Normocephalic.   Mouth/Throat: Oropharynx is clear and moist. No oropharyngeal exudate.   Eyes: Pupils are equal, round, and reactive to light. Conjunctivae are normal. No scleral icterus.   Neck: Normal range of motion. Neck supple. No thyromegaly present.   Cardiovascular: Normal rate, regular rhythm and normal heart sounds.   No murmur heard.  Pulmonary/Chest: Effort normal and breath sounds normal. He has no wheezes. He has no rales.   Abdominal: Soft. Bowel sounds are normal. He exhibits no distension and no mass. There is no hepatosplenomegaly. There is no tenderness. There is no rebound and no guarding.   Musculoskeletal: Normal range of motion. He exhibits no edema.   Neurological: He is alert and oriented to person, place, and time. No cranial nerve deficit.   Skin: No rash noted. No erythema.   Psychiatric: He has a normal mood and affect.       Significant Labs:   All pertinent labs within the past 24 hours have been reviewed    Diagnostic Results:  I have reviewed and interpreted all pertinent imaging results/findings within the past 24 hours    US LE veins bilateral   Nonocclusive deep venous thrombosis seen within the bilateral femoral veins.    US upper extremity veins bilateral  No DVT of the right or left upper extremity    Assessment/Plan:     Acute deep vein thrombosis (DVT) of lower extremity  Patient with history of PE and DVTs  US venous bilateral shows Nonocclusive deep venous thrombosis seen within the bilateral femoral veins.  He has  failed Coumadin and DOAC ( rivaroxaban)   He was recently switched to Xarelto in August 2019  -V/Q scan from 9/11/19   shows large mismatched perfusing defect in lingula, intermediate probablity for PE  Agree that there is at limited data on efficacy of anti coagulation with DOACs in the morbidly obese patients   Therefore, in this patient recommend for now to continue b.i.d. dosing with Lovenox at the maximum dosing of 150 mg/kg   Monitor renal function and dosage adjustment with renal impairment based on guidelines   Pt to remain on LMWH as outpatient with bid dosing and with close follow up with Hematology/Oncology as outpatient    Follow up with Hematology/Oncology as outpatient                Thank you for your consult.    Jennifer Gaitan MD  Hematology/Oncology  [facility]

## 2019-09-14 NOTE — PLAN OF CARE
[facility] Infectious Diseases    Mr. Farrar continues to improve. Recommend discontinuation of Meropenem and aztreonam. E. Cloacae isolated from blood is very sensitive. Unfortunately we are unable to use cipro due to slightly prolonged Qtc. Recommend completion of course with Ceftriaxone 2 gm q 24 hours for ease of administration for a total of 14 days. Stop date 9/24/19. Continue p.o vanc for total of 10 days.     Signing off. Please call for any questions and thank you for the consult.      Chen Whelan MD  [facility] Infectious Diseases Staff

## 2019-09-14 NOTE — PLAN OF CARE
Problem: Adult Inpatient Plan of Care  Goal: Plan of Care Review  Outcome: Ongoing (interventions implemented as appropriate)  VN attempted rounds.  Patient asleep.  Did not awaken.

## 2019-09-14 NOTE — ASSESSMENT & PLAN NOTE
Likely cause of septic Shock   ID Recommend discontinuation of Meropenem and aztreonam. E. Cloacae isolated from blood is very sensitive. Unfortunately we are unable to use cipro due to slightly prolonged Qtc. Recommend completion of course with Ceftriaxone 2 gm q 24 hours for ease of administration for a total of 14 days. Stop date 9/24/19.

## 2019-09-14 NOTE — PLAN OF CARE
VN cued into patients room for rounding. VN role explained and informed pt that VN will be working alongside the bedside care team throughout the day. Pt verbalized understanding that VN is available for any questions and education, and nurse and PCT will continue hourly rounding at bedside. Denies any pain at this time. Fall education provided. Contact precautions maintained and reviewed. Allotted time given for questions - all questions answered. Will continue to monitor and intervene PRN.       09/14/19 1038   Type of Frequent Check   Type Other (see comments)  (VN rounds)   Safety/Activity   Patient Rounds bed in low position;bed wheels locked;visualized patient   Safety Promotion/Fall Prevention side rails raised x 2   Activity Management sitting at edge of bed - L2   Pain/Comfort/Sleep   Preferred Pain Scale number (Numeric Rating Pain Scale)   Pain Rating (0-10): Rest 0   Pain Rating (0-10): Activity 0   Sleep/Rest/Relaxation awake

## 2019-09-14 NOTE — HPI
Patient is a 53-year-old male with history of atrial fibrillation on eliquis, CHF, DVT with irwin filter in place,  PE, hypertension, hyperthyroidism and venous stasis ulcers presented to [facility] on 09/10/2019 with fever and right knee pain and N/V.  Patient recently hospitalized 3 weeks ago for  for right lower extremity cellulitis.  And treated with a 2 week course of antibiotics with Cipro and clindamycin.  Patient found to be hypoxic to 68% on room air and hypotensive.  He is admitted with septic shock secondary to cellulitis with gram-negative juan bacteremia.  Patient required pressor post support which has been weaned.  Patient remains on chronic anticoagulation for chronic atrial fibrillation.  Patient reports he started Coumadin 6 months ago and was subsequently switched to Xarelto 1 month ago in August after diagnosed with a PE by his cardiologist.  Patient has now developed a new DVT in left lower extremity.  He is now being treated with full-dose low-molecular weight heparin therapy.  Consulted for anticoagulation

## 2019-09-14 NOTE — SUBJECTIVE & OBJECTIVE
Oncology Treatment Plan:   [No treatment plan]    Medications:  Continuous Infusions:  Scheduled Meds:   cefTRIAXone (ROCEPHIN) IVPB  2 g Intravenous Q24H    collagenase   Topical (Top) Every Mon, Wed, Fri    enoxaparin  150 mg Subcutaneous Q12H    metoprolol succinate  25 mg Oral QHS    miconazole   Topical (Top) BID    potassium chloride  40 mEq Oral BID    sodium chloride 0.9%  10 mL Intravenous Q6H    torsemide  40 mg Oral Daily    vancomycin  125 mg Oral Q6H     PRN Meds:acetaminophen, Dextrose 10% Bolus, Dextrose 10% Bolus, glucagon (human recombinant), glucose, glucose, HYDROcodone-acetaminophen, ondansetron, promethazine (PHENERGAN) IVPB, ramelteon, Flushing PICC Protocol **AND** sodium chloride 0.9% **AND** sodium chloride 0.9%, sodium chloride 0.9%     Review of patient's allergies indicates:   Allergen Reactions    Contrast media Other (See Comments)     Severe chest pain    Food allergy formula [glutamine-c-quercet-selen-brom]      Allergic to green peas; Heart failure.    Iodinated contrast media Other (See Comments)     Chest pain    Peas Hives    Ibuprofen Swelling    Latex, natural rubber Hives    Pcn [penicillins] Hives    Butisol [butabarbital] Rash     Peeling skin        Past Medical History:   Diagnosis Date    *Atrial fibrillation     Anticoagulant long-term use     Arthritis     Atrial fibrillation     Atrial fibrillation Feb 23, 2016    Bipolar disorder     CHF (congestive heart failure)     Congenital heart disease     s/p surgical intervention 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 of age    Deep vein thrombosis     DVT of leg (deep venous thrombosis)     left leg    History of prior ablation treatment     10/9/13    Hypertension     Obesity     Stroke     Thyroid disease     Venous stasis ulcer of lower extremity, unspecified laterality 12/14/2012    Venous ulcer      Past Surgical History:   Procedure Laterality Date    ANGIOPLASTY      ARTHROSCOPY-KNEE W/ CHONDROPLASTY Right  2/23/2016    Performed [facility] OR    CARDIAC SURGERY      open heart surgery 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 old    EYE SURGERY      left eye cataract/right eye glaucoma    TIMO FILTER PLACEMENT      Dr Cailx ([facility])    KNEE SURGERY      l and r     MULTIPLE TOOTH EXTRACTIONS      Sclerotherapy N/A 2/21/2019    Performed by Gomez Lantigua MD at [facility] CATH LAB/EP    SKIN GRAFT      left leg    SYNOVECTOMY-KNEE Right 2/23/2016    Performed by [facility] at [facility] OR     Family History     Problem Relation (Age of Onset)    Diabetes Father, Maternal Grandfather    Heart disease Father, Maternal Grandmother, Maternal Grandfather    Stroke Maternal Grandfather        Tobacco Use    Smoking status: Former Smoker     Packs/day: 1.00     Years: 6.00     Pack years: 6.00     Types: Cigarettes    Smokeless tobacco: Former User    Tobacco comment: quit by age 27yrs old   Substance and Sexual Activity    Alcohol use: Yes     Alcohol/week: 0.6 oz     Types: 1 Glasses of wine per week     Frequency: Monthly or less     Comment: occasionally    Drug use: No    Sexual activity: Yes     Partners: Female     Birth control/protection: None       Review of Systems   Constitutional: Negative for appetite change, fatigue, fever and unexpected weight change.   HENT: Negative for mouth sores.    Eyes: Negative for visual disturbance.   Respiratory: Negative for cough and shortness of breath.    Cardiovascular: Positive for leg swelling. Negative for chest pain.   Gastrointestinal: Negative for abdominal pain and diarrhea.   Genitourinary: Negative for frequency.   Musculoskeletal: Negative for back pain.   Skin: Positive for color change and wound. Negative for rash.   Neurological: Negative for headaches.   Hematological: Negative for adenopathy.   Psychiatric/Behavioral: The patient is not nervous/anxious.      Objective:     Vital Signs (Most Recent):  Temp: 97.5 °F (36.4 °C) (09/14/19 1232)  Pulse: 104  (09/14/19 1232)  Resp: 18 (09/14/19 1232)  BP: (!) 125/57 (09/14/19 1232)  SpO2: 96 % (09/14/19 0742) Vital Signs (24h Range):  Temp:  [96.1 °F (35.6 °C)-98.4 °F (36.9 °C)] 97.5 °F (36.4 °C)  Pulse:  [] 104  Resp:  [18-24] 18  SpO2:  [95 %-98 %] 96 %  BP: (100-126)/(55-64) 125/57     Weight: (!) 161.2 kg (355 lb 6.1 oz)  Body mass index is 39.04 kg/m².  Body surface area is 3.02 meters squared.      Intake/Output Summary (Last 24 hours) at 9/14/2019 1550  Last data filed at 9/14/2019 1300  Gross per 24 hour   Intake 800 ml   Output 2450 ml   Net -1650 ml       Physical Exam   Constitutional: He is oriented to person, place, and time. He appears well-developed.   Morbidly obese   HENT:   Head: Normocephalic.   Mouth/Throat: Oropharynx is clear and moist. No oropharyngeal exudate.   Eyes: Pupils are equal, round, and reactive to light. Conjunctivae are normal. No scleral icterus.   Neck: Normal range of motion. Neck supple. No thyromegaly present.   Cardiovascular: An irregularly irregular rhythm present.   No murmur heard.  Pulmonary/Chest: Effort normal and breath sounds normal. He has no wheezes. He has no rales.   Abdominal: Soft. Bowel sounds are normal. There is no hepatosplenomegaly. There is no tenderness. There is no rebound.   obese   Musculoskeletal: Normal range of motion. He exhibits edema (RLE below knee).   Neurological: He is alert and oriented to person, place, and time. No cranial nerve deficit.   Skin:   RLE skin breakdown and swelling   Psychiatric: He has a normal mood and affect.       Significant Labs:   All pertinent labs within the past 24 hours have been reviewed    Diagnostic Results:  I have reviewed and interpreted all pertinent imaging results/findings within the past 24 hours    US LE veins bilateral   Nonocclusive deep venous thrombosis seen within the bilateral femoral veins.    US upper extremity veins bilateral  No DVT of the right or left upper extremity

## 2019-09-14 NOTE — PLAN OF CARE
Problem: Adult Inpatient Plan of Care  Goal: Plan of Care Review  Outcome: Ongoing (interventions implemented as appropriate)  Chart review compelte.

## 2019-09-14 NOTE — PT/OT/SLP PROGRESS
"Physical Therapy Treatment    Patient Name:  Drew Farrar   MRN:  [id number]    Recommendations:     Discharge Recommendations:  (Post actute-SNF versus LTACH)   Discharge Equipment Recommendations: ( Defer to next level of care)   Barriers to discharge: Decreased caregiver support    Assessment:     Drew Farrar is a 53 y.o. male admitted with a medical diagnosis of Septic shock.  He presents with the following impairments/functional limitations:  weakness, impaired endurance, impaired self care skills, impaired functional mobilty, gait instability, decreased coordination, decreased upper extremity function, decreased lower extremity function, decreased safety awareness, pain, edema, decreased ROM, impaired skin. Pt performed 2 trials of ambulation training ~14-16 ft in length each with bariatric RW, requiring CGA. Pt demonstrated shortness of breath with SpO2 level taken and fluctuated between 90-96% WITHOUT O2 donned.     Rehab Prognosis: Good; patient would benefit from acute skilled PT services to address these deficits and reach maximum level of function.    Recent Surgery: * No surgery found *      Plan:     During this hospitalization, patient to be seen 6 x/week to address the identified rehab impairments via gait training, therapeutic activities, therapeutic exercises and progress toward the following goals:    · Plan of Care Expires:  10/11/19    Subjective     Chief Complaint: None expressed  Patient/Family Comments/goals: "I want to go home and be able to do my therapy at home"  Pain/Comfort:  · Pain Rating 1: (Did not rate)  · Location - Side 1: Right  · Location - Orientation 1: generalized  · Location 1: knee("Whole leg, mainly knee".)  · Pain Addressed 1: Reposition, Distraction, Cessation of Activity  · Pain Rating Post-Intervention 1: (Did not rate)      Objective:     Communicated with  nurse prior to session.  Patient found  sitting at EOB with telemetry, PICC line upon PT entry to room. "     General Precautions: Standard, fall, special contact   Orthopedic Precautions:N/A   Braces: N/A     Functional Mobility:  · Transfers:     · Sit to Stand:  contact guard assistance with  Bariatric RW  · Gait:  ~14-16 ft x 2 trials with bariatric RW, requiring CGA.      AM-PAC 6 CLICK MOBILITY  Turning over in bed (including adjusting bedclothes, sheets and blankets)?: 3  Sitting down on and standing up from a chair with arms (e.g., wheelchair, bedside commode, etc.): 3  Moving from lying on back to sitting on the side of the bed?: 3  Moving to and from a bed to a chair (including a wheelchair)?: 3  Need to walk in hospital room?: 3  Climbing 3-5 steps with a railing?: 1  Basic Mobility Total Score: 16       Therapeutic Activities and Exercises:   Pt found seated at EOB when entered room. Requested to tyrone tennis shoes for ambulation. Pt able to tyrone shoes independently sitting at EOB. Performed 2 trials of ambulation training ~14-16 ft with bariatric RW, requiring CGA. Pt demonstrates significantly increased trunk flexion and does not clear left foot off of floor. Required an approximate 5 minute seated rest break between trials. SpO2 levels taken throughout secondary to pt demonstrating shortness of breath with ambulation. Levels fluctuated between 90-96% no O2 donned.    Patient left  Seated at EOB with all lines intact, call button in reach,  nurse notified and  nurse present..    GOALS:   Multidisciplinary Problems     Physical Therapy Goals        Problem: Physical Therapy Goal    Goal Priority Disciplines Outcome Goal Variances Interventions   Physical Therapy Goal     PT, PT/OT Ongoing (interventions implemented as appropriate)     Description:    Goals to be met by: 19     Patient will increase functional independence with mobility by performin. Supine to sit with Stand-by Assistance  2. Sit to supine with Contact Guard Assistance  3. Sit to stand transfer with Stand-by Assistance  4. Gait  x  150 feet with Stand-by Assistance using Bariatric Rolling Walker.   5. Ascend/Descend 4 inch curb step with Stand-by Assistance using bariatric Rolling Walker.                      Time Tracking:     PT Received On: 09/14/19  PT Start Time: 1004     PT Stop Time: 1037  PT Total Time (min): 33 min     Billable Minutes: Gait Training  21 and Therapeutic Activity  12    Treatment Type: Treatment  PT/PTA: PTA     PTA Visit Number: 1     Lorna Welsh, PTA  09/14/2019

## 2019-09-15 LAB
ALBUMIN SERPL BCP-MCNC: 2.8 G/DL (ref 3.5–5.2)
ALP SERPL-CCNC: 92 U/L (ref 55–135)
ALT SERPL W/O P-5'-P-CCNC: 24 U/L (ref 10–44)
ANION GAP SERPL CALC-SCNC: 8 MMOL/L (ref 8–16)
AST SERPL-CCNC: 30 U/L (ref 10–40)
BACTERIA BLD CULT: NORMAL
BASOPHILS # BLD AUTO: 0.03 K/UL (ref 0–0.2)
BASOPHILS NFR BLD: 0.8 % (ref 0–1.9)
BILIRUB SERPL-MCNC: 0.4 MG/DL (ref 0.1–1)
BUN SERPL-MCNC: 37 MG/DL (ref 6–20)
CALCIUM SERPL-MCNC: 8.7 MG/DL (ref 8.7–10.5)
CHLORIDE SERPL-SCNC: 103 MMOL/L (ref 95–110)
CO2 SERPL-SCNC: 28 MMOL/L (ref 23–29)
CREAT SERPL-MCNC: 1.1 MG/DL (ref 0.5–1.4)
DIFFERENTIAL METHOD: ABNORMAL
EOSINOPHIL # BLD AUTO: 0.3 K/UL (ref 0–0.5)
EOSINOPHIL NFR BLD: 6.8 % (ref 0–8)
ERYTHROCYTE [DISTWIDTH] IN BLOOD BY AUTOMATED COUNT: 16.8 % (ref 11.5–14.5)
EST. GFR  (AFRICAN AMERICAN): >60 ML/MIN/1.73 M^2
EST. GFR  (NON AFRICAN AMERICAN): >60 ML/MIN/1.73 M^2
GLUCOSE SERPL-MCNC: 97 MG/DL (ref 70–110)
HCT VFR BLD AUTO: 24.4 % (ref 40–54)
HGB BLD-MCNC: 7.5 G/DL (ref 14–18)
LYMPHOCYTES # BLD AUTO: 0.6 K/UL (ref 1–4.8)
LYMPHOCYTES NFR BLD: 14.4 % (ref 18–48)
MAGNESIUM SERPL-MCNC: 1.6 MG/DL (ref 1.6–2.6)
MCH RBC QN AUTO: 24.7 PG (ref 27–31)
MCHC RBC AUTO-ENTMCNC: 30.7 G/DL (ref 32–36)
MCV RBC AUTO: 80 FL (ref 82–98)
MONOCYTES # BLD AUTO: 0.5 K/UL (ref 0.3–1)
MONOCYTES NFR BLD: 13.6 % (ref 4–15)
NEUTROPHILS # BLD AUTO: 2.5 K/UL (ref 1.8–7.7)
NEUTROPHILS NFR BLD: 64.4 % (ref 38–73)
PHOSPHATE SERPL-MCNC: 3.7 MG/DL (ref 2.7–4.5)
PLATELET # BLD AUTO: 179 K/UL (ref 150–350)
PMV BLD AUTO: 9.3 FL (ref 9.2–12.9)
POTASSIUM SERPL-SCNC: 3.5 MMOL/L (ref 3.5–5.1)
PROT SERPL-MCNC: 6.9 G/DL (ref 6–8.4)
RBC # BLD AUTO: 3.04 M/UL (ref 4.6–6.2)
SODIUM SERPL-SCNC: 139 MMOL/L (ref 136–145)
WBC # BLD AUTO: 3.96 K/UL (ref 3.9–12.7)

## 2019-09-15 PROCEDURE — 83735 ASSAY OF MAGNESIUM: CPT

## 2019-09-15 PROCEDURE — A4216 STERILE WATER/SALINE, 10 ML: HCPCS | Performed by: FAMILY MEDICINE

## 2019-09-15 PROCEDURE — 25000003 PHARM REV CODE 250: Performed by: FAMILY MEDICINE

## 2019-09-15 PROCEDURE — 25000003 PHARM REV CODE 250: Performed by: STUDENT IN AN ORGANIZED HEALTH CARE EDUCATION/TRAINING PROGRAM

## 2019-09-15 PROCEDURE — 63600175 PHARM REV CODE 636 W HCPCS: Performed by: STUDENT IN AN ORGANIZED HEALTH CARE EDUCATION/TRAINING PROGRAM

## 2019-09-15 PROCEDURE — 94761 N-INVAS EAR/PLS OXIMETRY MLT: CPT

## 2019-09-15 PROCEDURE — 11000001 HC ACUTE MED/SURG PRIVATE ROOM

## 2019-09-15 PROCEDURE — 85025 COMPLETE CBC W/AUTO DIFF WBC: CPT

## 2019-09-15 PROCEDURE — 80053 COMPREHEN METABOLIC PANEL: CPT

## 2019-09-15 PROCEDURE — 84100 ASSAY OF PHOSPHORUS: CPT

## 2019-09-15 RX ORDER — VANCOMYCIN HYDROCHLORIDE 125 MG/1
125 CAPSULE ORAL EVERY 6 HOURS
Qty: 32 CAPSULE | Refills: 0 | Status: SHIPPED | OUTPATIENT
Start: 2019-09-15 | End: 2019-09-23

## 2019-09-15 RX ORDER — ENOXAPARIN SODIUM 150 MG/ML
150 INJECTION SUBCUTANEOUS EVERY 12 HOURS
Qty: 180 ML | Refills: 0 | Status: SHIPPED | OUTPATIENT
Start: 2019-09-15 | End: 2019-11-12 | Stop reason: ALTCHOICE

## 2019-09-15 RX ORDER — CEFTRIAXONE 1 G/1
2 INJECTION, POWDER, FOR SOLUTION INTRAMUSCULAR; INTRAVENOUS DAILY
Qty: 18 G | Refills: 0 | Status: SHIPPED | OUTPATIENT
Start: 2019-09-15 | End: 2019-09-16 | Stop reason: HOSPADM

## 2019-09-15 RX ADMIN — Medication 10 ML: at 05:09

## 2019-09-15 RX ADMIN — ENOXAPARIN SODIUM 150 MG: 150 INJECTION SUBCUTANEOUS at 09:09

## 2019-09-15 RX ADMIN — METOPROLOL SUCCINATE 25 MG: 25 TABLET, FILM COATED, EXTENDED RELEASE ORAL at 09:09

## 2019-09-15 RX ADMIN — MICONAZOLE NITRATE: 20 CREAM TOPICAL at 09:09

## 2019-09-15 RX ADMIN — Medication 10 ML: at 11:09

## 2019-09-15 RX ADMIN — TORSEMIDE 40 MG: 20 TABLET ORAL at 09:09

## 2019-09-15 RX ADMIN — CEFTRIAXONE 2 G: 2 INJECTION, SOLUTION INTRAVENOUS at 05:09

## 2019-09-15 RX ADMIN — Medication 10 ML: at 12:09

## 2019-09-15 NOTE — PROGRESS NOTES
Progress Note    Admit Date: 9/10/2019   LOS: 5 days     SUBJECTIVE:       The patient was seen and examined this morning at the bedside. Patient reports no acute issues overnight. Patient reports that pain is adequately controlled. Patient denies decreased urinary output.Patient denies fevers overnight.      Denies f/c/n/v/d/c/cp/palpitations/freq/urgency/dysuria    Continuous Infusions:  Scheduled Meds:   cefTRIAXone (ROCEPHIN) IVPB  2 g Intravenous Q24H    collagenase   Topical (Top) Every Mon, Wed, Fri    enoxaparin  150 mg Subcutaneous Q12H    metoprolol succinate  25 mg Oral QHS    miconazole   Topical (Top) BID    sodium chloride 0.9%  10 mL Intravenous Q6H    torsemide  40 mg Oral Daily     PRN Meds:acetaminophen, Dextrose 10% Bolus, Dextrose 10% Bolus, glucagon (human recombinant), glucose, glucose, HYDROcodone-acetaminophen, ondansetron, promethazine (PHENERGAN) IVPB, ramelteon, Flushing PICC Protocol **AND** sodium chloride 0.9% **AND** sodium chloride 0.9%, sodium chloride 0.9%    Review of patient's allergies indicates:   Allergen Reactions    Contrast media Other (See Comments)     Severe chest pain    Food allergy formula [glutamine-c-quercet-selen-brom]      Allergic to green peas; Heart failure.    Iodinated contrast media Other (See Comments)     Chest pain    Peas Hives    Ibuprofen Swelling    Latex, natural rubber Hives    Pcn [penicillins] Hives    Butisol [butabarbital] Rash     Peeling skin       OBJECTIVE:     Vital Signs (Most Recent)  Temp: 97.5 °F (36.4 °C) (09/15/19 0833)  Pulse: 95 (09/15/19 0833)  Resp: 12 (09/15/19 0833)  BP: (!) 120/58 (09/15/19 0833)  SpO2: 95 % (09/15/19 0837)    Vital Signs Range (Last 24H):  Temp:  [97.4 °F (36.3 °C)-97.7 °F (36.5 °C)]   Pulse:  []   Resp:  [12-20]   BP: (100-150)/(52-70)   SpO2:  [95 %-100 %]     I & O (Last 24H):    Intake/Output Summary (Last 24 hours) at 9/15/2019 1013  Last data filed at 9/15/2019 0946  Gross per 24 hour    Intake 345 ml   Output 1450 ml   Net -1105 ml     Ventilator Data (Last 24H):          Physical Exam:    GEN - AAOx4, NAD  Oral - MMM; no exudate  CHEST - CTA B ;equal expansion  HEART - irregularly irregular rate and rhythm; no m/r/s3/s4  ABD - soft; nonttp  EXTR - warm; no edema  NEURO - no asterixis or focal deficits   SKIN - swelling of the right leg, cellulitis of right LE improved, no pain or warmth     Lines/Drains:       PICC Double Lumen 09/11/19 0116 right brachial (Active)   Site Assessment Clean;Dry;Intact 9/14/2019  7:40 PM   Lumen 1 Status Flushed 9/14/2019  7:40 PM   Lumen 2 Status Flushed 9/14/2019  7:40 PM   Length wilian (cm) 45 cm 9/11/2019  1:22 AM   Current Exposed Catheter (cm) 3 cm 9/11/2019  1:22 AM   Extremity Circumference (cm) 36 cm 9/11/2019  1:22 AM   Dressing Type Transparent 9/14/2019  7:40 PM   Dressing Status Biopatch in place;Clean;Dry;Intact 9/14/2019  7:40 PM   Dressing Intervention New dressing 9/14/2019  7:40 PM   Dressing Change Due 09/19/19 9/14/2019  7:40 PM   Daily Line Review Performed 9/12/2019 11:07 AM   Number of days: 4       Recent Labs     09/13/19  0358 09/14/19  0615 09/15/19  0605   WBC 4.13 3.85* 3.96   HGB 7.7* 7.6* 7.5*   HCT 25.2* 24.3* 24.4*   * 164 179    139 139   K 3.5 3.2* 3.5    103 103   CO2 21* 25 28   BUN 47* 42* 37*   CREATININE 1.5* 1.2 1.1   BILITOT 0.8 0.6 0.4   AST 29 31 30   ALT 24 25 24   ALKPHOS 94 98 92   CALCIUM 8.4* 8.6* 8.7   ALBUMIN 2.8* 2.8* 2.8*   PROT 6.7 6.9 6.9   MG 1.3* 1.6 1.6   PHOS 5.5* 4.4 3.7         ASSESSMENT/PLAN:     Drew Farrar is a 53 y.o. male with pmh  has a past medical history of *Atrial fibrillation, Anticoagulant long-term use, Arthritis, Atrial fibrillation, Atrial fibrillation (Feb 23, 2016), Bipolar disorder, CHF (congestive heart failure), Congenital heart disease, Deep vein thrombosis, DVT of leg (deep venous thrombosis), History of prior ablation treatment, Hypertension, Obesity, Stroke,  Thyroid disease, Venous stasis ulcer of lower extremity, unspecified laterality (12/14/2012), and Venous ulcer.   who presented with Septic shock. The primary encounter diagnosis was Severe sepsis. Diagnoses of Fever, Swelling, Cellulitis of right lower leg, Acute chest pain, Chest pain, Septic shock, Erythema, Clostridium difficile infection, and Chronic atrial fibrillation were also pertinent to this visit.    Clostridium difficile infection  On C Diff precautions   Positive cultures   Continue p.o vanc for total of 10 days        Wound of foot  Chronically, followed by wound care   likely cause of infection   Continue rocephin for 14 days stop date 9/24/19        Onychomycosis  Likely chronic in nature secondary to multiple co morbidities     Patient will see Podiatry outpatient         (HFpEF) heart failure with preserved ejection fraction  Last TTE with EF of 50%  No signs of heart failure on chest xray or PE   Will continue home medications   Continue to monitor         Hypomagnesemia  Mag 1.6 this AM   Replaced will recheck in AM   Mag goal >2        Hyperthyroidism  TSH < 0.010, FU F T4  Pt not currently on any therapy   No symptoms of hyperthyroidism         Acute on chronic diastolic congestive heart failure  Pt BNP elevated to 600's, base line around 200  No signs of decompensated heartfailure  Will continue to monitor   Continue home medications            HTN (hypertension)  Continue home medication   Goal BP <140/90  Hydralazine 10 mg IV PRN for BP >180/90  Continue to monitor         Skin ulcer of left foot, limited to breakdown of skin  Chronically   Treatment as above         Cellulitis  Likely cause of septic Shock   ID Recommend discontinuation of Meropenem and aztreonam. E. Cloacae isolated from blood is very sensitive. Unfortunately we are unable to use cipro due to slightly prolonged Qtc. Recommend completion of course with Ceftriaxone 2 gm q 24 hours for ease of administration for a total of  14 days. Stop date 9/24/19.        Chronic atrial fibrillation  Pt with chronic afib   HR in 110's-120's   Will continue metoprolol   Maintain Tele   Continue Xarelto, lisinopril and torsemide   Pt acutely decompensate, V/Q scan showed lingular hypodensity  Patient now failed eliquis, warfarin and xarelto  Pulm crit consulted for AC management, appreciate recs   Patient stable on room air   Continue to monitor       Akanksha Vogel MD   [facility]   09/15/2019

## 2019-09-15 NOTE — PLAN OF CARE
VN cued into pt's room for introduction. VN informed pt that VN would be working along side bedside nurse and PCT throughout shift. Level of present pain assessed. At present no distress noted. Thoroughly discussed with patient today's plan of care and upcoming discharge possibly tomorrow.Discussed with patient High fall risk protocol and interventions that have been initiated and cont be in place for safety. Patient verbalized clear understanding and cooperation using teach back method. Bed alarm presently activated and in use. Will cont to be available to patient and intervene prn.

## 2019-09-15 NOTE — PLAN OF CARE
Problem: Adult Inpatient Plan of Care  Goal: Plan of Care Review  Outcome: Ongoing (interventions implemented as appropriate)     09/14/19 2017   Plan of Care Review   Plan of Care Reviewed With patient     Pt AAO x 4.  VSS.  Pt remained afebrile throughout this shift.   Pt remained free of falls this shift.   Pt c/o pain this shift.  PRN analgesics administered as ordered.   Plan of care reviewed. Patient verbalizes understanding.   Pt moving/turing independently.   Bed low, side rails up x 3, wheels locked, call light in reach.   Pt family member remained at bedside most of shift.   Bed alarm maintained for safety.   Patient instructed to call for assistance.   Hourly rounding completed.   Will continue to monitor.

## 2019-09-16 ENCOUNTER — TELEPHONE (OUTPATIENT)
Dept: HEMATOLOGY/ONCOLOGY | Facility: CLINIC | Age: 54
End: 2019-09-16

## 2019-09-16 VITALS
OXYGEN SATURATION: 96 % | HEART RATE: 120 BPM | HEIGHT: 78 IN | SYSTOLIC BLOOD PRESSURE: 116 MMHG | WEIGHT: 315 LBS | BODY MASS INDEX: 36.45 KG/M2 | TEMPERATURE: 98 F | RESPIRATION RATE: 20 BRPM | DIASTOLIC BLOOD PRESSURE: 72 MMHG

## 2019-09-16 PROBLEM — L03.115 CELLULITIS OF RIGHT LOWER LEG: Status: ACTIVE | Noted: 2019-04-24

## 2019-09-16 LAB
ALBUMIN SERPL BCP-MCNC: 2.9 G/DL (ref 3.5–5.2)
ALP SERPL-CCNC: 93 U/L (ref 55–135)
ALT SERPL W/O P-5'-P-CCNC: 22 U/L (ref 10–44)
ANION GAP SERPL CALC-SCNC: 9 MMOL/L (ref 8–16)
AST SERPL-CCNC: 32 U/L (ref 10–40)
BASOPHILS # BLD AUTO: 0.02 K/UL (ref 0–0.2)
BASOPHILS NFR BLD: 0.4 % (ref 0–1.9)
BILIRUB SERPL-MCNC: 0.4 MG/DL (ref 0.1–1)
BUN SERPL-MCNC: 31 MG/DL (ref 6–20)
CALCIUM SERPL-MCNC: 8.9 MG/DL (ref 8.7–10.5)
CHLORIDE SERPL-SCNC: 102 MMOL/L (ref 95–110)
CO2 SERPL-SCNC: 30 MMOL/L (ref 23–29)
CREAT SERPL-MCNC: 1 MG/DL (ref 0.5–1.4)
DIFFERENTIAL METHOD: ABNORMAL
EOSINOPHIL # BLD AUTO: 0.3 K/UL (ref 0–0.5)
EOSINOPHIL NFR BLD: 5.5 % (ref 0–8)
ERYTHROCYTE [DISTWIDTH] IN BLOOD BY AUTOMATED COUNT: 17 % (ref 11.5–14.5)
EST. GFR  (AFRICAN AMERICAN): >60 ML/MIN/1.73 M^2
EST. GFR  (NON AFRICAN AMERICAN): >60 ML/MIN/1.73 M^2
FACT X PPP CHRO-ACNC: 0.36 IU/ML (ref 0.3–0.7)
GLUCOSE SERPL-MCNC: 94 MG/DL (ref 70–110)
HCT VFR BLD AUTO: 25.2 % (ref 40–54)
HGB BLD-MCNC: 7.8 G/DL (ref 14–18)
LYMPHOCYTES # BLD AUTO: 1.1 K/UL (ref 1–4.8)
LYMPHOCYTES NFR BLD: 20.7 % (ref 18–48)
MAGNESIUM SERPL-MCNC: 1.4 MG/DL (ref 1.6–2.6)
MCH RBC QN AUTO: 25.1 PG (ref 27–31)
MCHC RBC AUTO-ENTMCNC: 31 G/DL (ref 32–36)
MCV RBC AUTO: 81 FL (ref 82–98)
MONOCYTES # BLD AUTO: 0.5 K/UL (ref 0.3–1)
MONOCYTES NFR BLD: 9.9 % (ref 4–15)
NEUTROPHILS # BLD AUTO: 3.2 K/UL (ref 1.8–7.7)
NEUTROPHILS NFR BLD: 63.5 % (ref 38–73)
PHOSPHATE SERPL-MCNC: 3.8 MG/DL (ref 2.7–4.5)
PLATELET # BLD AUTO: 206 K/UL (ref 150–350)
PMV BLD AUTO: 9.4 FL (ref 9.2–12.9)
POTASSIUM SERPL-SCNC: 3.4 MMOL/L (ref 3.5–5.1)
PROT SERPL-MCNC: 7.1 G/DL (ref 6–8.4)
RBC # BLD AUTO: 3.11 M/UL (ref 4.6–6.2)
SODIUM SERPL-SCNC: 141 MMOL/L (ref 136–145)
WBC # BLD AUTO: 5.07 K/UL (ref 3.9–12.7)

## 2019-09-16 PROCEDURE — 97530 THERAPEUTIC ACTIVITIES: CPT

## 2019-09-16 PROCEDURE — 25000003 PHARM REV CODE 250: Performed by: FAMILY MEDICINE

## 2019-09-16 PROCEDURE — 85025 COMPLETE CBC W/AUTO DIFF WBC: CPT

## 2019-09-16 PROCEDURE — A4216 STERILE WATER/SALINE, 10 ML: HCPCS | Performed by: FAMILY MEDICINE

## 2019-09-16 PROCEDURE — 80053 COMPREHEN METABOLIC PANEL: CPT

## 2019-09-16 PROCEDURE — 97110 THERAPEUTIC EXERCISES: CPT

## 2019-09-16 PROCEDURE — 83735 ASSAY OF MAGNESIUM: CPT

## 2019-09-16 PROCEDURE — 63600175 PHARM REV CODE 636 W HCPCS: Performed by: STUDENT IN AN ORGANIZED HEALTH CARE EDUCATION/TRAINING PROGRAM

## 2019-09-16 PROCEDURE — 84100 ASSAY OF PHOSPHORUS: CPT

## 2019-09-16 PROCEDURE — 25000003 PHARM REV CODE 250: Performed by: STUDENT IN AN ORGANIZED HEALTH CARE EDUCATION/TRAINING PROGRAM

## 2019-09-16 PROCEDURE — 94761 N-INVAS EAR/PLS OXIMETRY MLT: CPT

## 2019-09-16 PROCEDURE — 63600175 PHARM REV CODE 636 W HCPCS: Mod: JG | Performed by: STUDENT IN AN ORGANIZED HEALTH CARE EDUCATION/TRAINING PROGRAM

## 2019-09-16 RX ORDER — LANOLIN ALCOHOL/MO/W.PET/CERES
400 CREAM (GRAM) TOPICAL
Status: DISCONTINUED | OUTPATIENT
Start: 2019-09-16 | End: 2019-09-16 | Stop reason: HOSPADM

## 2019-09-16 RX ORDER — CEFTRIAXONE 1 G/1
1 INJECTION, POWDER, FOR SOLUTION INTRAMUSCULAR; INTRAVENOUS DAILY
Qty: 8 G | Refills: 0 | Status: SHIPPED | OUTPATIENT
Start: 2019-09-16 | End: 2019-09-24

## 2019-09-16 RX ADMIN — ALTEPLASE 2 MG: 2.2 INJECTION, POWDER, LYOPHILIZED, FOR SOLUTION INTRAVENOUS at 05:09

## 2019-09-16 RX ADMIN — Medication 10 ML: at 05:09

## 2019-09-16 RX ADMIN — Medication 10 ML: at 12:09

## 2019-09-16 RX ADMIN — TORSEMIDE 40 MG: 20 TABLET ORAL at 08:09

## 2019-09-16 RX ADMIN — MAGNESIUM OXIDE TAB 400 MG (241.3 MG ELEMENTAL MG) 400 MG: 400 (241.3 MG) TAB at 01:09

## 2019-09-16 RX ADMIN — MAGNESIUM OXIDE TAB 400 MG (241.3 MG ELEMENTAL MG) 400 MG: 400 (241.3 MG) TAB at 08:09

## 2019-09-16 RX ADMIN — ENOXAPARIN SODIUM 150 MG: 150 INJECTION SUBCUTANEOUS at 08:09

## 2019-09-16 RX ADMIN — MAGNESIUM OXIDE TAB 400 MG (241.3 MG ELEMENTAL MG) 400 MG: 400 (241.3 MG) TAB at 11:09

## 2019-09-16 RX ADMIN — MICONAZOLE NITRATE: 20 CREAM TOPICAL at 08:09

## 2019-09-16 RX ADMIN — MAGNESIUM SULFATE HEPTAHYDRATE 3 G: 500 INJECTION, SOLUTION INTRAMUSCULAR; INTRAVENOUS at 08:09

## 2019-09-16 NOTE — PLAN OF CARE
Problem: Adult Inpatient Plan of Care  Goal: Plan of Care Review  Outcome: Outcome(s) achieved Date Met: 09/16/19  Discharge instruction and education packet provided. VN notified. PICC line clean dry and intact. Telemetry discontinued without adverse reaction. Patient shows no acute distress. Medicaton delivered at bedside. Reinforced medications that are due tonight.

## 2019-09-16 NOTE — PLAN OF CARE
VN cued into room to review discharge instructions.  Reviewed medications, when to call the doctor, home health company, infusion company.  Pt verbalized understanding of home health and infusion companies.  Pt verbalized understanding of PICC line care.  Bedside nurse educated pt on lovenox injections.  Transport requested.

## 2019-09-16 NOTE — PT/OT/SLP PROGRESS
"Physical Therapy      Patient Name:  Drew Farrar   MRN:  [id number]    Patient sitting EOB upon entering the room.  Pt declined tx stating, "I am getting ready to leave soon." Pt being discharged.    Indigo Alberts, MARY LOU    "

## 2019-09-16 NOTE — PROGRESS NOTES
Progress Note    Admit Date: 9/10/2019   LOS: 6 days     SUBJECTIVE:       The patient was seen and examined this morning at the bedside. Patient reports no acute issues overnight. Patient reports that pain is adequately controlled. Patient denies decreased urinary output.Patient denies fevers overnight.      Denies f/c/n/v/d/c/cp/palpitations/freq/urgency/dysuria    Continuous Infusions:  Scheduled Meds:   cefTRIAXone (ROCEPHIN) IVPB  2 g Intravenous Q24H    collagenase   Topical (Top) Every Mon, Wed, Fri    enoxaparin  150 mg Subcutaneous Q12H    magnesium oxide  400 mg Oral Q4H While awake    magnesium sulfate IVPB  3 g Intravenous Once    metoprolol succinate  25 mg Oral QHS    miconazole   Topical (Top) BID    sodium chloride 0.9%  10 mL Intravenous Q6H    torsemide  40 mg Oral Daily     PRN Meds:acetaminophen, Dextrose 10% Bolus, Dextrose 10% Bolus, glucagon (human recombinant), glucose, glucose, HYDROcodone-acetaminophen, influenza, ondansetron, promethazine (PHENERGAN) IVPB, ramelteon, Flushing PICC Protocol **AND** sodium chloride 0.9% **AND** sodium chloride 0.9%, sodium chloride 0.9%    Review of patient's allergies indicates:   Allergen Reactions    Contrast media Other (See Comments)     Severe chest pain    Food allergy formula [glutamine-c-quercet-selen-brom]      Allergic to green peas; Heart failure.    Iodinated contrast media Other (See Comments)     Chest pain    Peas Hives    Ibuprofen Swelling    Latex, natural rubber Hives    Pcn [penicillins] Hives    Butisol [butabarbital] Rash     Peeling skin       OBJECTIVE:     Vital Signs (Most Recent)  Temp: 97.1 °F (36.2 °C) (09/16/19 0712)  Pulse: (!) 111 (09/16/19 0729)  Resp: 20 (09/16/19 0712)  BP: 126/88 (09/16/19 0712)  SpO2: 98 % (09/16/19 0747)    Vital Signs Range (Last 24H):  Temp:  [97 °F (36.1 °C)-98.6 °F (37 °C)]   Pulse:  []   Resp:  [18-20]   BP: (109-138)/(51-88)   SpO2:  [93 %-100 %]     I & O (Last  24H):    Intake/Output Summary (Last 24 hours) at 9/16/2019 0932  Last data filed at 9/16/2019 0500  Gross per 24 hour   Intake 650 ml   Output 1400 ml   Net -750 ml       Physical Exam:    GEN - AAOx4, NAD  Oral - MMM; no exudate  CHEST - CTA B ;equal expansion  HEART -irregularly irregular rate and rhythm; no m/r/s3/s4  ABD - soft; nonttp  EXTR - warm; no edema  NEURO - no asterixis or focal deficits   SKIN - no obvious skin breakdown or rash    Lines/Drains:       PICC Double Lumen 09/11/19 0116 right brachial (Active)   Site Assessment Clean;Dry;Intact;No redness;No swelling 9/15/2019  7:30 PM   Lumen 1 Status Flushed;Normal saline locked 9/15/2019  7:30 PM   Lumen 2 Status Declotted 9/16/2019  6:00 AM   Length wilian (cm) 45 cm 9/11/2019  1:22 AM   Current Exposed Catheter (cm) 3 cm 9/11/2019  1:22 AM   Extremity Circumference (cm) 36 cm 9/11/2019  1:22 AM   Dressing Type Transparent;Securing device 9/15/2019  7:30 PM   Dressing Status Biopatch in place;Clean;Dry;Intact 9/15/2019  7:30 PM   Dressing Intervention New dressing 9/15/2019  7:30 PM   Dressing Change Due 09/19/19 9/15/2019  7:30 PM   Daily Line Review Performed 9/15/2019  7:30 PM   Number of days: 5       Recent Labs     09/14/19  0615 09/15/19  0605 09/16/19  0356   WBC 3.85* 3.96 5.07   HGB 7.6* 7.5* 7.8*   HCT 24.3* 24.4* 25.2*    179 206    139 141   K 3.2* 3.5 3.4*    103 102   CO2 25 28 30*   BUN 42* 37* 31*   CREATININE 1.2 1.1 1.0   BILITOT 0.6 0.4 0.4   AST 31 30 32   ALT 25 24 22   ALKPHOS 98 92 93   CALCIUM 8.6* 8.7 8.9   ALBUMIN 2.8* 2.8* 2.9*   PROT 6.9 6.9 7.1   MG 1.6 1.6 1.4*   PHOS 4.4 3.7 3.8         ASSESSMENT/PLAN:     Drew Farrar is a 53 y.o. male with pmh  has a past medical history of *Atrial fibrillation, Anticoagulant long-term use, Arthritis, Atrial fibrillation, Atrial fibrillation (Feb 23, 2016), Bipolar disorder, CHF (congestive heart failure), Congenital heart disease, Deep vein thrombosis, DVT of  leg (deep venous thrombosis), History of prior ablation treatment, Hypertension, Obesity, Stroke, Thyroid disease, Venous stasis ulcer of lower extremity, unspecified laterality (12/14/2012), and Venous ulcer.   who presented with Cellulitis. The primary encounter diagnosis was Severe sepsis. Diagnoses of Fever, Swelling, Cellulitis of right lower leg, Acute chest pain, Chest pain, Septic shock, Erythema, Clostridium difficile infection, Chronic atrial fibrillation, Cellulitis of right lower extremity, Essential hypertension, Acute deep vein thrombosis (DVT) of other vein of lower extremity, unspecified laterality, Venous stasis dermatitis of both lower extremities, Venous (peripheral) insufficiency, Skin ulcer of left foot, limited to breakdown of skin, Acute on chronic diastolic congestive heart failure, (HFpEF) heart failure with preserved ejection fraction, Morbid obesity, Fever, unspecified fever cause, Onychomycosis, and Wound of foot were also pertinent to this visit.    Clostridium difficile infection  Continue C Diff precautions  Continue p.o vanc for total of 10 days        Wound of foot  Chronically infected wound on foot, followed by wound care   Likely the source of infection  He will continue IM rocephin for 14 days with a stop date of 9/24/19        Onychomycosis  Likely chronic 2/2 multiple comobitidities    Patient has fu appointment with Podiatry out patient          (HFpEF) heart failure with preserved ejection fraction  Last TTE with EF of 50%  No signs of HF on CXR or PE    Continue home medications      Hypomagnesemia  Mag 1.4 this AM   Replaced   Goal Magnesium >2        Hyperthyroidism- Graves Disease   TSH < 0.010, FT4 elevated 2.43  Pt not currently on any therapy, has FU appointment with Endocrinology on 10/30  Currently Asymptomatic          Acute on chronic diastolic congestive heart failure  On arrival BNP elevated to 600 with a baseline around 200  No signs of decompensated HF    Continue to monitor   Will continue home medication         HTN (hypertension)  Continue home medication   Goal BP <140/90  Hydralazine 10 mg IV ORN for BP >180>90  Continue to monitor vitals Q4 hours         Skin ulcer of left foot, limited to breakdown of skin  Chronic  Followed wound care   Treatment as above         Cellulitis  Likely cause of Septic Shock   Per ID recommendations Meropenem and aztreonam were discontinued   He will complete antibiotic therapy on Ceftriaxone 2 gm q 24 hours for a total of 14 days  End Date for antibiotics 9/24/19      Chronic atrial fibrillation  Patient is chronically in afib   HR in 110's to 120's   Continue home metoprolol   Continue Lovenox BID           Akanksha Vogel MD   \[facility] [place] [facility]\"" Family MedicinePGY-2   09/16/2019

## 2019-09-16 NOTE — PLAN OF CARE
I sent home health order to [facility] and sent ceftriaxone order with end date to [facility].  I spoke with Vlad in intake at [facility] and she is reviewing orders now.       09/16/19 4843   Post-Acute Status   Post-Acute Authorization Home Health/Hospice   Home Health/Hospice Status Referrals Sent     Vlad Gibson RN, CM  [phone]

## 2019-09-16 NOTE — PLAN OF CARE
Problem: Adult Inpatient Plan of Care  Goal: Plan of Care Review  Outcome: Ongoing (interventions implemented as appropriate)  1930H Patient is awake and orientedx4. Care plan explained and verbalized understanding. VIP care model explained.On room air, O2 saturation 96%. On heart monitor running Atrial Fibrillation at 89bpm. Right brachial double lumen PICC line; purple port flushed and saline locked. Red port clotted. Right leg swollen and bigger than the left leg. Left foot with wound; mepilex foam applied. Compression socks to the left leg. Maintained on low salt, low fat diet. Maintained on fall precaution. Bed in lowest position, bed alarms refused; OC notified, call light within reach and instructed to call for help when needed. Will continue  to monitor.  Due medications given.   [place] declotted; flushed and saline locked.

## 2019-09-16 NOTE — PT/OT/SLP PROGRESS
Occupational Therapy   Treatment    Name: Drew Farrar  MRN: [id number]  Admitting Diagnosis:  Cellulitis       Recommendations:     Discharge Recommendations: home health PT, home health OT  Discharge Equipment Recommendations:  (TBD)  Barriers to discharge:  Decreased caregiver support    Assessment:     Drew Farrar is a 53 y.o. male with a medical diagnosis of Cellulitis.  He presents with severe edema RLE with reported 9/10 pain with ambulation and to touch limited occupational performance. Performance deficits affecting function are weakness, impaired endurance, impaired sensation, impaired self care skills, impaired functional mobilty, impaired balance, decreased coordination, decreased lower extremity function, pain, impaired skin, edema, impaired cardiopulmonary response to activity.     Rehab Prognosis:  Good; patient would benefit from acute skilled OT services to address these deficits and reach maximum level of function.       Plan:     Patient to be seen 5 x/week to address the above listed problems via therapeutic exercises, self-care/home management, therapeutic activities  · Plan of Care Expires: 10/11/19  · Plan of Care Reviewed with: patient    Subjective     Pain/Comfort:  · Pain Rating 1: 9/10  · Location - Side 1: Right  · Location - Orientation 1: generalized  · Location 1: knee  · Pain Addressed 1: Reposition, Distraction, Cessation of Activity, Nurse notified  · Pain Rating Post-Intervention 1: 9/10    Objective:     Communicated with: nszoe prior to session.  Patient found seated EOB with telemetry upon OT entry to room.    General Precautions: Standard, fall, special contact   Orthopedic Precautions:N/A   Braces: N/A     Occupational Performance:     Functional Mobility/Transfers:  · Patient completed Sit <> Stand Transfer with supervision  with  rolling walker   Functional Mobility: Pt with fair to fair+ dynamic seated and standing balance. Pt attempted to take steps to bathroom this date  "but only able to advance RLE, stated, "I just can't do this" when attempting to advance LLE. Pt with flexed posturing in static stance and reported that he was unable to stand upright with proper knee/trunk extension this date.     Activities of Daily Living:  · Pt dressed in typical clothing upon OT entrance and denied all ADLs offered after pt unable to take steps to bathroom.      [facility] 6 Click ADL:      Treatment & Education:  Pt educated on role of OT and POC.   Pt performing skills as listed above.  Pt educated on use of w/c to bring wheels over step to enter home as pt demonstrated decreased ability to take steps this date. Pt states that he has a PCA and spouse who will assist and reports no concerns to enter home with use of own (preferred) DME.   Pt provided with gentle retrograde massage to RLE to ~mid shin to toleration. Initially, skin to RLE extremely taut and unable to demonstrate any give upon palpitation but after massage, pt's skin exhibited increased pliability to ~mid shin. Pt educated on retrograde massage principles for self massage with increased education provided to perform occasionally and non-aggressively 2/2 CHF comorbidity. Pt required increased v/c to massage rostrally instead of caudally with minimal pressure to ensure he does not exacerbate co-morbidities. Pt performed 2x10 reps seated B hip flexion, B flutter kicks, and ankle pumps to assist with edema management. Pt verbalized understanding of all education provided.    Patient left seated EOB with all lines intact, call button in reach, nsg notified and nsg presentEducation:      GOALS:   Multidisciplinary Problems     Occupational Therapy Goals        Problem: Occupational Therapy Goal    Goal Priority Disciplines Outcome Interventions   Occupational Therapy Goal     OT, PT/OT Ongoing (interventions implemented as appropriate)    Description:  Goals to be met by: 10/11/2019      Patient will increase functional independence with " ADLs by performing:    UE Dressing with Modified [place].  LE Dressing with Stand-by Assistance.  Grooming while standing with Modified [place].  Toileting from bedside commode with Contact Guard Assistance for hygiene and clothing management.   Step transfer with Contact Guard Assistance  Toilet transfer to bedside commode with Stand-by Assistance.  Increased functional strength to WFL for self care.  Upper extremity exercise program x10 reps per handout, with assistance as needed.                      Time Tracking:     OT Date of Treatment: 09/16/19  OT Start Time: 1211  OT Stop Time: 1235  OT Total Time (min): 24 min    Billable Minutes:Therapeutic Activity 12  Therapeutic Exercise 12    Ruth Cardenas OT  9/16/2019

## 2019-09-16 NOTE — PLAN OF CARE
Lovenox to be delivered to bedside before pt discharges.  Dana, bedside nurse, is aware.  Home health, home infusion and all f/u appts set up.  His PCA will transport pt home.  IV Rocephin to be delivered to pt's home around 4pm today.  Pt will administer first dose at home.  Pt was instructed by Gopi with [employer] and Gopi and pt confirmed pt performed return demonstration very well and will do fine with home infusion.  His mother in law and father in law will meet pt at home and will assist pt as needed along with pt's PCA.      Lovenox delivered to bedside.     09/16/19 1417   Final Note   Assessment Type Final Discharge Note   Anticipated Discharge Disposition Home-Health   Hospital Follow Up  Appt(s) scheduled? Yes   Discharge plans and expectations educations in teach back method with documentation complete? Yes   Right Care Referral Info   Post Acute Recommendation Home-care     Vlad Gibson RN,   [phone]

## 2019-09-16 NOTE — PLAN OF CARE
I sent updated orders with correct ceftriaxone infusion information to [facility].  [facility] does not accept pt's insurance.  [facility] accepted pt's care.  Gopi with [facility] will educated pt at bedside.  Waiting for Gopi to see pt.  His PCA will transport pt home.       09/16/19 1209   Post-Acute Status   Post-Acute Authorization Home Health/Hospice   Home Health/Hospice Status Additional Clinical Requested     Vlad Gibson RN,   [phone]

## 2019-09-16 NOTE — PLAN OF CARE
Problem: Adult Inpatient Plan of Care  Goal: Plan of Care Review  Outcome: Ongoing (interventions implemented as appropriate)     09/15/19 2017   Plan of Care Review   Plan of Care Reviewed With patient     Pt AAO x 4.  VSS.  Pt remained afebrile throughout this shift.   Pt remained free of falls this shift.   Pt c/o pain this shift.  PRN analgesics administered as ordered.   Plan of care reviewed. Patient verbalizes understanding.   Pt moving/turing independently.   Bed low, side rails up x 3, wheels locked, call light in reach.   Pt family member remained at bedside most of shift.   Bed alarm maintained for safety.   Patient instructed to call for assistance.   Hourly rounding completed.   Will continue to monitor.

## 2019-09-16 NOTE — DISCHARGE SUMMARY
[facility]  Discharge Summary      Patient Name: Drew Farrar  MRN: [id number]  Admission Date: 9/10/2019  Hospital Length of Stay: 6 days  Discharge Date and Time:  09/16/2019 2:45 PM  Attending Physician: Severyn Yaroshevsky, MD   Discharging Provider: Akanksha Vogel MD  Primary Care Provider: Garrison Roach MD      HPI:   Mr. Farrar is a 53 year old man with PMHx of chronic Afib (on eliquis), HFpEF, PE, Hx of DVT with irwin filter in place, HTN, Venous stasis, Grave's disease, presented to ED with acute onset fevers, chills, nausea, vomiting and right lower extremity pain. He states pain started around 3 AM on the morning of admission. He was recently discharge from hospital about 3 weeks prior due to right LE cellulitis, and completed a 2 week course of ciprofloxacin and clindamycin. He reports improvement in pain and cellulitis up until the morning of admission. He denies trauma or falls to the affected area. He states the pain started all of a sudden and is associated with swelling and warmth to the area. He states the right leg is very painful to touch and with movement.     He states he is compliant with all his blood pressure and anticoagulation medication. He denies Headache, changes in vision, shortness of breath, chest pain, palpitations, abdominal pain, diarrhea or constipation.       * No surgery found *      Hospital Course:    9/16: MICHELLE. Pt ready for D/C today. Pt educated on administering Lovenox and rocephin at home.     09/15: MICHELLE. Patient to be discharged in the AM prior to wound care appointment at 11AM here at [facility] [place] health orders completed he will complete IM rocephin at home with end date 9/24 and will get lovenox BID indefinitely, patient has fu appointment with Hem/Onc here in [place].     09/14:  MICHELLE. Heme/Onc consulted last night for anticoagulation recs - recommend pt continuing Lovenox BID as inpatient AND outpatient with maximum  "dosing of 150 mg/kg.     09/13: continues to improve. ID recommend discontinuation of Meropenem and aztreonam. E. Cloacae isolated from blood is very sensitive. Unfortunately unable to use cipro due to slightly prolonged Qtc. Recommend completion of course with Ceftriaxone 2 gm q 24 hours for ease of administration for a total of 14 days. Stop date 9/24/19. Continue p.o vanc for total of 10 days.    9/13  NAEON. Pt had some abdominal discomfort, endorsed some gas and bloating, tolerated simethicone. Pt required hydrocodone-acetaminophen for better pain control of R LE pain. Pt still had loose BM at night, states progressively becoming solid and well formed. Did not have BM this AM. Pt able to ambulate short distances around the room. Has dyspnea on exertion which he reports is his baseline. Bilateral US of UE showed no DVT in the R or L. CT of R leg without contrast shows "No fracture, no osseous lesions, no abnormal periosteal reaction.  Circumferential subcutaneous soft tissue edema.  No soft tissue gas noted.  No definite fluid collection noted within the subcutaneous soft tissues, musculature or osseous structures." Gen. Surg. Saw pt this AM, and do not have any further recs. Currently pending sensitivities of Enterobacter Cloacae complex growing from BC obtained on 09/10. Awaiting recs. From pulm regarding anticoagulation. Pt does not want to be D/C to LTAC.     9/12  C. Diff EIA positive and C. Diff toxin PCR positive. 1/2 BC growing gram negative rods. Vanc. Trough at 54, held vanc until next trough later this PM. ID recommends continuing with Vancomycin, aztreonam, flagyl, and IV ciprofloxacin for gram negative coverage. Pt started on topical antifungal for tinea pedis. Podiatry consulted for toenail care, recommends outpatient care. General surgery recommends CT scan of R LE to identify any potential undrained fluid collection, and bilateral venous US of UEs to evaluate for potential DVT. Pt restarted on " home metoprolol. F/u on R LE CT, bilateral UE US, and pulm crit recs. For anticoagulation.     9/11  Overnight, pt endorsed CP and SOB at midnight, and had a drop in BP to 81/42. Pt required LR bolus and lovenox. PICC line placed. V/Q scan performed and showed arge mismatched pelrfusion defect in the lingula and an intermediate probability for PE. Recommend CTA. Pt transferred to the ICU after BP falling to 80's/40's at 0500 this AM. Pt infused with levophed in ICU. BP stabilized. BC grew gram negative rods, pending sensitivities. C. Diff studies ordered due to complaint of diarrhea and loose stools s/p antibiotic course prior to admission. Pt received Echo this AM, pending results. F/u pulm crit recs on anticoagulation. Attempting to wean down levophed. Attempting to find and review records from OSH, where pt was hospitalized prior to this admission. Ortho consulted and does not suspect a septic knee, due to reasonable range of motion, that is close to baseline, and no further attempt to obtain joint fluid aspiration.         Consults:   Consults (From admission, onward)        Status Ordering Provider     Case Management  Once     Provider:  (Not yet assigned)    Acknowledged AKSHAT GUERRA     Inpatient consult to General Surgery  Once     Provider:  Hiram Waller MD    Completed AKSHAT GUERRA     Inpatient consult to Hematology/Oncology  Once     Provider:  (Not yet assigned)    Completed WOODY DELACRUZ     Inpatient consult to Infectious Diseases  Once     Provider:  (Not yet assigned)    Completed WOODY DELACRUZ     Inpatient consult to Orthopedic Surgery  Once     Provider:  Edgard Timmons MD    Acknowledged SAMREEN KELLOGG     [facility]  Once     Provider:  (Not yet assigned)    Acknowledged DEVAUGHN MIGUEL     Inpatient consult to Podiatry  Once     Provider:  (Not yet assigned)    Completed AKSHAT GUERRA     Inpatient consult to  Pulmonology  Once     Provider:  Derrick Vera MD    Completed DEVAUGHN MIGUEL     Inpatient consult to Social Work/Case Management  Once     Provider:  (Not yet assigned)    Acknowledged FADUMO LEE III     IP consult to case management  Once     Provider:  (Not yet assigned)    Acknowledged FADUMO LEE III          No new Assessment & Plan notes have been filed under this hospital service since the last note was generated.  Service: Hospital Medicine    Final Active Diagnoses:    Diagnosis Date Noted POA    PRINCIPAL PROBLEM:  Cellulitis [L03.90] 02/13/2014 Yes    Hypomagnesemia [E83.42] 07/08/2019 Yes    Chronic atrial fibrillation [I48.2] 12/14/2012 Yes    (HFpEF) heart failure with preserved ejection fraction [I50.30] 09/10/2019 Unknown    Cellulitis of right lower leg [L03.115] 04/24/2019 Yes    HTN (hypertension) [I10] 09/16/2016 Yes    Hyperthyroidism [E05.90] 09/05/2017 Yes     Chronic    Acute on chronic diastolic congestive heart failure [I50.33] 02/20/2017 Unknown    Erythema [L53.9]  Yes    Fever [R50.9]  Yes    Swelling [R60.9]  Unknown    Acute deep vein thrombosis (DVT) of lower extremity [I82.409] 09/14/2019 Yes    Onychomycosis [B35.1] 09/12/2019 Yes    Wound of foot [S91.309A] 09/12/2019 Yes    Clostridium difficile infection [B96.89]  Yes    Acute chest pain [R07.9] 12/24/2014 Yes    Skin ulcer of left foot, limited to breakdown of skin [L97.521] 02/15/2014 Unknown      Problems Resolved During this Admission:    Diagnosis Date Noted Date Resolved POA    Septic shock [A41.9, R65.21] 04/24/2019 09/14/2019 Yes       Discharged Condition: good    Disposition: Home or Self Care    Follow Up:  Follow-up Information     [facility]    Specialties:  Home Health Services, Physical Therapy, Occupational Therapy  Contact information:  [address] [facility]  [address] [place] [id number]  [phone]             [facility]    Specialty:  Infusion  Provider  Why:  [facility]  Contact information:  [address] [facility]  [place] [address]  [phone]                 Patient Instructions:      Ambulatory referral to Hematology / Oncology   Referral Priority: Routine Referral Type: Consultation   Referral Reason: Specialty Services Required   Referred to Provider: JENNY BRAN Requested Specialty: Hematology and Oncology   Number of Visits Requested: 1     Ambulatory referral to Home Health   Referral Priority: Routine Referral Type: Home Health   Referral Reason: Specialty Services Required   Requested Specialty: Home Health Services   Number of Visits Requested: 1     Ambulatory referral to Home Health   Referral Priority: Routine Referral Type: Home Health   Referral Reason: Specialty Services Required   Requested Specialty: Home Health Services   Number of Visits Requested: 1     Diet Cardiac     Diet Cardiac     Notify your health care provider if you experience any of the following:  temperature >100.4     Notify your health care provider if you experience any of the following:  persistent nausea and vomiting or diarrhea     Notify your health care provider if you experience any of the following:  severe uncontrolled pain     Notify your health care provider if you experience any of the following:  redness, tenderness, or signs of infection (pain, swelling, redness, odor or green/yellow discharge around incision site)     Notify your health care provider if you experience any of the following:  difficulty breathing or increased cough     Notify your health care provider if you experience any of the following:  severe persistent headache     Notify your health care provider if you experience any of the following:  worsening rash     Notify your health care provider if you experience any of the following:  persistent dizziness, light-headedness, or visual disturbances     Notify your health care provider if you experience any of the following:   increased confusion or weakness     Notify your health care provider if you experience any of the following:  temperature >100.4     Notify your health care provider if you experience any of the following:  persistent nausea and vomiting or diarrhea     Notify your health care provider if you experience any of the following:  severe uncontrolled pain     Notify your health care provider if you experience any of the following:  difficulty breathing or increased cough     Notify your health care provider if you experience any of the following:  increased confusion or weakness     Activity as tolerated     Activity as tolerated       Significant Diagnostic Studies:  Recent Labs     09/14/19  0615 09/15/19  0605 09/16/19  0356   WBC 3.85* 3.96 5.07   HGB 7.6* 7.5* 7.8*   HCT 24.3* 24.4* 25.2*    179 206   MCV 79* 80* 81*   RDW 16.7* 16.8* 17.0*       Recent Labs     09/14/19  0615 09/15/19  0605 09/16/19  0356    139 141   K 3.2* 3.5 3.4*    103 102   CO2 25 28 30*   GLU 98 97 94   BUN 42* 37* 31*   CREATININE 1.2 1.1 1.0   CALCIUM 8.6* 8.7 8.9   PROT 6.9 6.9 7.1   ALBUMIN 2.8* 2.8* 2.9*   BILITOT 0.6 0.4 0.4   ALKPHOS 98 92 93   AST 31 30 32   ALT 25 24 22   ANIONGAP 11 8 9   ESTGFRAFRICA >60 >60 >60   EGFRNONAA >60 >60 >60       Recent Labs     09/14/19  0615 09/15/19  0605 09/16/19  0356   MG 1.6 1.6 1.4*   PHOS 4.4 3.7 3.8       Coags  Lab Results   Component Value Date    INR 1.1 09/10/2019    INR 1.1 08/02/2019    INR 3.4 (H) 07/27/2019    APTT 33.5 (H) 09/10/2019    APTT 50.7 (H) 08/03/2019    APTT 86.4 (H) 08/03/2019     Recent Labs   Lab 09/10/19  1442   INR 1.1   APTT 33.5*       A1c:   Lab Results   Component Value Date    HGBA1C 5.2 09/10/2019   , Last Gluc: No results for input(s): POCTGLUCOSE in the last 168 hours.    TSH:   Lab Results   Component Value Date    TSH <0.010 (L) 09/10/2019       Micro  Microbiology Results (last 7 days)     Procedure Component Value Units Date/Time     Blood culture x two cultures. Draw prior to antibiotics. [[id number]] Collected:  09/10/19 1443    Order Status:  Completed Specimen:  Blood from Peripheral, Forearm, Left Updated:  09/15/19 2212     Blood Culture, Routine No growth after 5 days.    Narrative:       Aerobic and anaerobic    Blood culture [[id number]] Collected:  09/12/19 1319    Order Status:  Completed Specimen:  Blood from Peripheral, Left  Hand Updated:  09/15/19 1812     Blood Culture, Routine No Growth to date      No Growth to date      No Growth to date      No Growth to date    Blood culture [[id number]] Collected:  09/12/19 1326    Order Status:  Completed Specimen:  Blood from Antecubital, Left  Arm Updated:  09/15/19 1812     Blood Culture, Routine No Growth to date      No Growth to date      No Growth to date      No Growth to date    Blood culture x two cultures. Draw prior to antibiotics. [[id number]]  (Abnormal)  (Susceptibility) Collected:  09/10/19 1518    Order Status:  Completed Specimen:  Blood from Peripheral, Hand, Right Updated:  09/13/19 1044     Blood Culture, Routine Gram stain walter bottle: Gram negative rods       Results called to and read back by: Marce Leon RN  09/11/2019  10:18      ENTEROBACTER CLOACAE COMPLEX    Narrative:       Aerobic and anaerobic    C Diff Toxin by PCR [[id number]]  (Abnormal) Collected:  09/11/19 1434    Order Status:  Completed Updated:  09/12/19 0016     C. diff PCR Positive    Clostridium difficile EIA [[id number]]  (Abnormal) Collected:  09/11/19 1434    Order Status:  Completed Specimen:  Stool Updated:  09/11/19 2041     C. diff Antigen Positive     C difficile Toxins A+B, EIA Negative     Comment: Testing not recommended for children <24 months old.       Influenza A & B by Molecular [[id number]] Collected:  09/10/19 1521    Order Status:  Completed Specimen:  Nasopharyngeal Swab Updated:  09/10/19 1554     Influenza A, Molecular Negative     Influenza B, Molecular Negative     Flu A & B  Source Nasal swab             ABG  Recent Labs   Lab 09/11/19  0335   PH 7.292*   PCO2 36.0   PO2 102*   HCO3 17.4*   POCSATURATED 97   BE -9         Imaging   Imaging Results          NM Lung Ventilation Perfusion Imaging (Final result)  Result time 09/11/19 09:56:43    Final result by RADIOLOGISTDILLON (09/11/19 09:56:43)                 Impression:      Large mismatched perfusion defect in the lingula. This is intermediate probability for PE. Recommend CTA. Thank you for allowing us to participate in the care of your patient. Dictated and Authenticated by: Shun Kelly MD 09/11/2019 2:52 AM Central Time ([place]    Addendum created by Shun Kelly MD on 9/11/2019 2:59 AM Central Time    Findings were discussed with Dr Salgado at 9/11/2019 2:59 AM CDT.      Electronically signed by: Dillon Radiologist  Date:    09/11/2019  Time:    09:56             Narrative:    EXAMINATION:  NM Lung Ventilation and Perfusion Imaging    CLINICAL HISTORY:  53 years old, male; Other: Knee pain    TECHNIQUE:  Imaging protocol: Nuclear pulmonary ventilation with aerosol or gas was performed followed by perfusion. Views: Ventilation acquired in posterior projection. Perfusion acquired with multiple projections. Radiopharmaceutical: 15 mCi of Xenon -133, inhaled. 5 mCi of Tc-99m MAA, IV.    COMPARISON:  No relevant prior studies available.    FINDINGS:  Ventilation: Normal. No ventilation defects. Perfusion: Large perfusion defect in the lingula.                               X-Ray Chest AP Portable (Final result)  Result time 09/11/19 02:02:29    Final result by Donna Savage MD (09/11/19 02:02:29)                 Impression:      Interval placement of right PICC line with tip projecting over the SVC.      Electronically signed by: Donna Savage MD  Date:    09/11/2019  Time:    02:02             Narrative:    EXAMINATION:  XR CHEST AP PORTABLE    CLINICAL HISTORY:  SOB; Sepsis, unspecified organism    TECHNIQUE:  Single frontal view  of the chest was performed.    COMPARISON:  09/10/2019    FINDINGS:  Cardiac monitoring leads overlie the chest.  There has been interval placement of a right PICC line, the tip of which projects over the SVC.  The cardiomediastinal silhouette is enlarged.  There is no evidence of pneumothorax or significant interval detrimental change in lung aeration.                                US Lower Extremity Veins Bilateral (Final result)  Result time 09/10/19 21:23:38    Final result by Sohail Kline MD (09/10/19 21:23:38)                 Impression:      Nonocclusive deep venous thrombosis seen within the bilateral femoral veins.    This report was flagged in Epic as abnormal.      Electronically signed by: Sohail Kline MD  Date:    09/10/2019  Time:    21:23             Narrative:    EXAMINATION:  US LOWER EXTREMITY VEINS BILATERAL    CLINICAL HISTORY:  Possible DVT; Sepsis, unspecified organism    TECHNIQUE:  Duplex and color flow Doppler evaluation of the bilateral lower extremity veins was performed.    COMPARISON:  08/02/2019.    FINDINGS:  Right lower extremity:    Nonocclusive thrombus is visualized within the mid to distal right femoral vein.  No evidence of clot involving the right common femoral, greater saphenous, popliteal, posterior tibial, anterior tibial, and peroneal veins.    Left lower extremity:    Nonocclusive thrombus is seen within the proximal left femoral vein.  No evidence of clot involving the left common femoral, greater saphenous, popliteal, peroneal, anterior and posterior tibial veins.  No evidence of soft tissue mass or Baker's cyst.                               X-Ray Chest AP Portable (Final result)  Result time 09/10/19 15:51:14    Final result by Judy Farrar MD (09/10/19 15:51:14)                 Impression:      Cardiomegaly unchanged.  No failure or pneumonia.      Electronically signed by: Judy Farrar  Date:    09/10/2019  Time:    15:51             Narrative:     EXAMINATION:  XR CHEST AP PORTABLE    CLINICAL HISTORY:  Sepsis;    TECHNIQUE:  Single frontal view of the chest was performed.    COMPARISON:  08/02/2019 chest    FINDINGS:  Single AP portable view at 15:09.    The heart is moderately enlarged unchanged.  The pulmonary artery is enlarged unchanged consistent with underlying pulmonary artery hypertension.    Trachea appears normal.  No pneumothorax or pleural effusion or interstitial edema consolidation or nodule.  There are overlying leads.  No rib fracture seen.                               X-Ray Knee 3 View Right (Final result)  Result time 09/10/19 15:33:48    Final result by Gopi Whiting MD (09/10/19 15:33:48)                 Impression:      No acute displaced fracture-dislocation identified.      Electronically signed by: Gopi Whiting MD  Date:    09/10/2019  Time:    15:33             Narrative:    EXAMINATION:  XR KNEE 3 VIEW RIGHT    CLINICAL HISTORY:  Edema, unspecified    TECHNIQUE:  AP, lateral, and Merchant views of the right knee were performed.    COMPARISON:  Right knee series 01/03/2018    FINDINGS:  Chronic deformity of the tibial tuberosity similar to prior.  No acute displaced fracture, dislocation or destructive osseous process.  No large suprapatellar joint effusion seen.  Mild tricompartmental degenerative change.  No subcutaneous emphysema or radiodense retained foreign body.                                  Pending Diagnostic Studies:     Procedure Component Value Units Date/Time    X-Ray Chest 1 View for PICC_Central line [[id number]]     Order Status:  Sent Lab Status:  No result          Medications:  Reconciled Home Medications:      Medication List      START taking these medications    cefTRIAXone 1 gram injection  Commonly known as:  ROCEPHIN  Inject 1 g into the vein once daily. for 8 days     enoxaparin 150 mg/mL Syrg  Commonly known as:  LOVENOX  Inject 1 mL (150 mg total) into the skin every 12 (twelve) hours.     vancomycin  125 MG capsule  Commonly known as:  VANCOCIN  Take 1 capsule (125 mg total) by mouth every 6 (six) hours. for 8 days        CHANGE how you take these medications    lisinopril 10 MG tablet  Take 1 tablet (10 mg total) by mouth once daily.  What changed:  how much to take     metoprolol succinate 100 MG 24 hr tablet  Commonly known as:  TOPROL-XL  Take 1 tablet (100 mg total) by mouth once daily.  What changed:  when to take this        CONTINUE taking these medications    HYDROcodone-acetaminophen 5-325 mg per tablet  Commonly known as:  NORCO  Take 1 tablet by mouth every 6 (six) hours as needed for Pain.     hydrOXYzine HCl 25 MG tablet  Commonly known as:  ATARAX  Take 1 tablet (25 mg total) by mouth 4 (four) times daily as needed for Itching.     nystatin powder  Commonly known as:  MYCOSTATIN  Apply topically 2 (two) times daily.     terbinafine HCl 250 mg tablet  Commonly known as:  LAMISIL  Take 1 tablet (250 mg total) by mouth once daily.     torsemide 20 MG Tab  Commonly known as:  DEMADEX  Take 2 tablets (40 mg total) by mouth once daily.        STOP taking these medications    rivaroxaban 20 mg Tab  Commonly known as:  XARELTO            Indwelling Lines/Drains at time of discharge:   Lines/Drains/Airways     Peripherally Inserted Central Catheter Line                 PICC Double Lumen 09/11/19 0116 right brachial 5 days                Time spent on the discharge of patient: 30 minutes  Patient was seen and examined on the date of discharge and determined to be suitable for discharge.           Akanksha Vogel MD   [facility] Medicine[facility]   [facility]  9/16/2019  2:49 PM

## 2019-09-16 NOTE — DISCHARGE INSTRUCTIONS
Sepsis (English) View Edit Remove   Enoxaparin injection (English) View Edit Remove   Vancomycin oral solution (English) View Edit Remove   Ceftriaxone injection (English) View Edit Remove   Deep Vein Thrombosis (DVT) (English) View Edit Remove   Deep Vein Thrombosis, Discharge Instructions for (English) View Edit Remove   Infection, Clostridium Difficile (English) View Edit Remove   Cellulitis, Discharge Instructions for (English) View Edit Remove   Caring for Your Peripherally Inserted Central Catheter (PICC), Discharge Instructions for (English) View Edit Remove   PICC Line Care (English) View Edit Remove

## 2019-09-16 NOTE — PLAN OF CARE
Problem: Occupational Therapy Goal  Goal: Occupational Therapy Goal  Goals to be met by: 10/11/2019      Patient will increase functional independence with ADLs by performing:    UE Dressing with Modified [place].  LE Dressing with Stand-by Assistance.  Grooming while standing with Modified [place].  Toileting from bedside commode with Contact Guard Assistance for hygiene and clothing management.   Step transfer with Contact Guard Assistance  Toilet transfer to bedside commode with Stand-by Assistance.  Increased functional strength to WFL for self care.  Upper extremity exercise program x10 reps per handout, with assistance as needed.     Outcome: Ongoing (interventions implemented as appropriate)  Pt progressing towards goals, however remains limited 2/2 pain RLE and decreased endurance. Cont OT POC

## 2019-09-16 NOTE — PLAN OF CARE
[facility] HOME HEALTH ORDERS  FACE TO FACE ENCOUNTER    Patient Name: Drew Farrar  YOB: 1965    PCP: Garrison Roach MD   PCP Address: [address] [facility] / [place] [address]  PCP Phone Number: [phone]  PCP Fax: [phone]    Encounter Date: 09/16/2019    Admit to Home Health    Diagnoses:  Active Hospital Problems    Diagnosis  POA    *Cellulitis [L03.90]  Yes     Priority: 1 - High    Hypomagnesemia [E83.42]  Yes     Priority: 1 - High    Chronic atrial fibrillation [I48.2]  Yes     Priority: 3     (HFpEF) heart failure with preserved ejection fraction [I50.30]  Unknown     Priority: 4     Cellulitis of right lower leg [L03.115]  Yes     Priority: 4     HTN (hypertension) [I10]  Yes     Priority: 5     Hyperthyroidism [E05.90]  Yes     Priority: 6      Chronic    Acute on chronic diastolic congestive heart failure [I50.33]  Unknown     Priority: 7     Erythema [L53.9]  Yes    Fever [R50.9]  Yes    Swelling [R60.9]  Unknown    Acute deep vein thrombosis (DVT) of lower extremity [I82.409]  Yes    Onychomycosis [B35.1]  Yes    Wound of foot [S91.309A]  Yes    Clostridium difficile infection [B96.89]  Yes    Acute chest pain [R07.9]  Yes    Skin ulcer of left foot, limited to breakdown of skin [L97.521]  Unknown      Resolved Hospital Problems    Diagnosis Date Resolved POA    Septic shock [A41.9, R65.21] 09/14/2019 Yes     Priority: 1 - High       Future Appointments   Date Time Provider Department Center   9/16/2019 11:30 AM Pool Gutierrez MD [facility] WOUND [facility]   9/18/2019  1:45 PM Crista Fragoso DPM [facility] PODIAT [facility]   10/30/2019  3:30 PM Josey Brown NP [facility] MOM[facility] [place]     Follow-up Information     Alan Plascencia MD In 2 weeks.    Specialties:  Hematology and Oncology, Hematology  Why:  Follow-Up, Hematology oncology  Contact information:  [address]  [place] [address]  [phone]             Sg CLEMONS  SKYLA Medina.    Specialty:  Family Medicine  Why:  Discharge Follow-Up  Contact information:  [address]  [address]  [place] [address] [phone]                     I have seen and examined this patient face to face today. My clinical findings that support the need for the home health skilled services and home bound status are the following:  Patient with medication mismanagement issues requiring home bound status as evidenced by  Poor understanding of medication regimen/dosage and Poor adherence to medication regimen/dosage.  Mental confusion making it unsafe for patient to leave home alone due to  Mental Retardation.    Allergies:  Review of patient's allergies indicates:   Allergen Reactions    Contrast media Other (See Comments)     Severe chest pain    Food allergy formula [glutamine-c-quercet-selen-brom]      Allergic to green peas; Heart failure.    Iodinated contrast media Other (See Comments)     Chest pain    Peas Hives    Ibuprofen Swelling    Latex, natural rubber Hives    Pcn [penicillins] Hives    Butisol [butabarbital] Rash     Peeling skin       Diet: cardiac diet    Activities: activity as tolerated    Nursing:   SN to complete comprehensive assessment including routine vital signs. Instruct on disease process and s/s of complications to report to MD. Review/verify medication list sent home with the patient at time of discharge  and instruct patient/caregiver as needed. Frequency may be adjusted depending on start of care date.    Notify MD if SBP > 160 or < 90; DBP > 90 or < 50; HR > 120 or < 50; Temp > 101; Other:   None      CONSULTS:    Physical Therapy to evaluate and treat. Evaluate for home safety and equipment needs; Establish/upgrade home exercise program. Perform / instruct on therapeutic exercises, gait training, transfer training, and Range of Motion.  Occupational Therapy to evaluate and treat. Evaluate home environment for safety and equipment needs. Perform/Instruct  on transfers, ADL training, ROM, and therapeutic exercises.    MISCELLANEOUS CARE:  Home Infusion Therapy:   SN to perform Infusion Therapy/Central Line Care.  Review Central Line Care & Central Line Flush with patient.    Administer (drug and dose): Rocephin 2 gm administer  IV Push over 10  Minutes with syringe    Last dose given: 9/15/19                         Home dose due: 9/16/19    Scrub the Hub: Prior to accessing the line, always perform a 30 second alcohol scrub  Each lumen of the central line is to be flushed at least daily with 10 mL Normal Saline and 3 mL Heparin flush (10 units/mL)  Skilled Nurse (SN) may draw blood from IV access  Blood Draw Procedure:   - Aspirate at least 5 mL of blood   - Discard   - Obtain specimen   - Change injection cap   - Flush with 20 mL Normal Saline followed by a                 3-5 mL Heparin flush (10 units/mL)  Central :   - Sterile dressing changes are done weekly and as needed.   - Use chlor-hexadine scrub to cleanse site, apply Biopatch to insertion site,       apply securement device dressing   - Injection caps are changed weekly and after EVERY lab draw.   - If sterile gauze is under dressing to control oozing,                 dressing change must be performed every 24 hours until gauze is not needed.    WOUND CARE ORDERS  yes:  Please change dressing q2d:  -Cleanse wounds with sterile saline.  -Pat Dry.  -Apply santyl to wound.  -Cover with mepilex border.    Thank you.    Medications: Review discharge medications with patient and family and provide education.      Current Discharge Medication List      START taking these medications    Details   cefTRIAXone (ROCEPHIN) 1 gram injection Inject 1 g into the vein once daily. for 8 days  Qty: 8 g, Refills: 0      enoxaparin (LOVENOX) 150 mg/mL Syrg Inject 1 mL (150 mg total) into the skin every 12 (twelve) hours.  Qty: 180 mL, Refills: 0      vancomycin (VANCOCIN) 125 MG capsule Take 1 capsule (125 mg  total) by mouth every 6 (six) hours. for 8 days  Qty: 32 capsule, Refills: 0         CONTINUE these medications which have NOT CHANGED    Details   hydrOXYzine HCl (ATARAX) 25 MG tablet Take 1 tablet (25 mg total) by mouth 4 (four) times daily as needed for Itching.  Qty: 20 tablet, Refills: 0      lisinopril 10 MG tablet Take 1 tablet (10 mg total) by mouth once daily.  Qty: 30 tablet, Refills: 6    Associated Diagnoses: Chronic systolic congestive heart failure      metoprolol succinate (TOPROL-XL) 100 MG 24 hr tablet Take 1 tablet (100 mg total) by mouth once daily.  Qty: 30 tablet, Refills: 5    Associated Diagnoses: Permanent atrial fibrillation; Chronic combined systolic and diastolic congestive heart failure      nystatin (MYCOSTATIN) powder Apply topically 2 (two) times daily.  Qty: 60 g, Refills: 1    Associated Diagnoses: Candida infection of genital region      torsemide (DEMADEX) 20 MG Tab Take 2 tablets (40 mg total) by mouth once daily.  Qty: 180 tablet, Refills: 3      HYDROcodone-acetaminophen (NORCO) 5-325 mg per tablet Take 1 tablet by mouth every 6 (six) hours as needed for Pain.  Qty: 10 tablet, Refills: 0      terbinafine HCl (LAMISIL) 250 mg tablet Take 1 tablet (250 mg total) by mouth once daily.  Qty: 45 tablet, Refills: 0         STOP taking these medications       rivaroxaban (XARELTO) 20 mg Tab Comments:   Reason for Stopping:               I certify that this patient is confined to his home and needs intermittent skilled nursing care.

## 2019-09-16 NOTE — TELEPHONE ENCOUNTER
appt scheduled     ----- Message from Naomi Hutton sent at 9/16/2019 11:58 AM CDT -----  Contact: Ms Chu /   [phone]  Type:  Follow up Appointment Request    Name of Caller: Ms Chu  When is the first available appointment  Symptoms  Would the patient rather a call back or a response via [facility]?call  Best Call Back Number:call  Additional Information:   Please call patient to reschedule  [phone]

## 2019-09-16 NOTE — PLAN OF CARE
Gopi with [facility] will be here at 12:45 to educate pt on home infusion.    13:15 Gopi is in pt's room now instructing him.     09/16/19 1225   Post-Acute Status   Post-Acute Authorization Home Health/Hospice     Vlad Gibson RN,   [phone]

## 2019-09-17 LAB
BACTERIA BLD CULT: NORMAL
BACTERIA BLD CULT: NORMAL

## 2019-09-23 ENCOUNTER — HOSPITAL ENCOUNTER (OUTPATIENT)
Dept: WOUND CARE | Facility: HOSPITAL | Age: 54
Discharge: HOME | End: 2019-09-23
Attending: EMERGENCY MEDICINE
Payer: MEDICARE

## 2019-09-23 VITALS
BODY MASS INDEX: 36.45 KG/M2 | HEIGHT: 78 IN | WEIGHT: 315 LBS | SYSTOLIC BLOOD PRESSURE: 103 MMHG | DIASTOLIC BLOOD PRESSURE: 54 MMHG | HEART RATE: 96 BPM

## 2019-09-23 DIAGNOSIS — L97.329 NON-PRESSURE CHRONIC ULCER OF LEFT ANKLE, WITH UNSPECIFIED SEVERITY: Primary | ICD-10-CM

## 2019-09-23 PROCEDURE — 11042 DBRDMT SUBQ TIS 1ST 20SQCM/<: CPT

## 2019-09-23 PROCEDURE — 27201912 HC WOUND CARE DEBRIDEMENT SUPPLIES

## 2019-09-23 NOTE — PROCEDURES
"Debridement  Date/Time: 9/9/2019 3:22 PM  Performed by: Pool Gutierrez MD  Authorized by: Pool Gutierrez MD     Time out: Immediately prior to procedure a "time out" was called to verify the correct patient, procedure, equipment, support staff and site/side marked as required.    Consent Done?:  Yes (Verbal)    Preparation: Patient was prepped and draped in usual sterile fashion    Local anesthesia used?: Yes    Local anesthetic:  Topical anesthetic    Wound Details:    Location:  Left foot    Location:  Left Midfoot    Type of Debridement:  Excisional       Length (cm):  1       Area (sq cm):  0.5       Width (cm):  0.5       Percent Debrided (%):  100       Depth (cm):  0.2       Total Area Debrided (sq cm):  0.5    Depth of debridement:  Subcutaneous tissue    Tissue debrided:  Subcutaneous and Dermis    Devitalized tissue debrided:  Biofilm, Fibrin and Slough    Instruments:  Curette    Bleeding:  Minimal  Patient tolerance:  Patient tolerated the procedure well with no immediate complications      "

## 2019-09-23 NOTE — PROGRESS NOTES
"Subjective:       Patient ID: Drew Farrar is a 53 y.o. male.    Chief Complaint: Non-healing Wound (left foot)      2/15/19: Readmitted for venous ulcer to left lateral foot which developed about 2 weeks ago. Pulses noted with doppler and capillary refill <3.Dr Gutierrez assessed patient and wound care plan ordered for compression therapy and hydrafera blue to wound bed. No apparent signs of infection noted. Patient to follw-up in clinic in 2 weeks.DB   2/21/19: Nurse visit for dressing change. Refused care to skin tear on left 2nd toe. See notes. RTC 1 week for DrRosy Visit.  3/1/19: Follow up with Dr. Waller today in clinic new wound to right posterior leg, culture of both wounds taken today in clinic new wound care orders to both legs for xeroform and unna with calamine toe to knee follow up in 1 week with Dr. Gutierrez  3/8/19: Follow up with Dr. Gutierrez today in clinic wounds improving, edema noted to BLE, profore toe to knee applied to both legs, RX given to patient for PO Doxycycline, follow up in 1 week.   3/15/19: Here for f/u with Dr. Gutierrez. U/S scheduled Thursday. Will need right leg rewrapped. Patient states eye DrRosy Gave him the same antibiotic dose and strength after eye surgery. Dr instructed patient to take one bottle of antibiotics for both wounds and eyes until finished. Gentamycin added to wound care orders.   3/29/19:  Wound slowly improving. No changes in wound care orders. Wound debrided per Dr Gutierrez.  4/4/19: Patient showed up to clinic today stated " I need my dressing changed. It fell off."  Doppler pluses checked and present.  Patient refusing care to right, no stocking present on leg at this visit. Patient stated " No they said this leg is discharged, Dr. Lanitgua has to drain the fluid out this leg with a needle. No wound care to this leg anymore when I come."      9/9/19: Readmit to wound care clinic for venous ulcer to left lateral foot.  Patient states it reopened about 3 weeks " "after last discharge from wound care clinic 7/1/19. Per patient wound has been treated by PCP.  Patient reports just using a large band- aid and compression sock. Patient states he was recently treated at [facility] for HBOT for this wound " in the last two or three months after it reopened "  Requesting HBOT here at the wound care clinic.  Dr. Gutierrez examined patient today, doppler pulses present, wound debrided with curette by Dr. Gutierrez patient tolerated well. Wound care orders given to apply hydrorefa blue ready to wound, cover with mepore dressing and apply patient's compression sock. Dr. Manuel discussed with patient obtaining his medical records from [facility], to view to determine if patient will benefit from HBOT, patient verbalized understanding.  Authorization for release of health information sheet signed by patient and faxed to [facility] today in clinic. Follow up in 1 week with MD.  9/23/19: F/U with Dr. Gutierrez. Client recently discharged from hospital. Left foot site debrided. Client being followed by [facility].  No c/o noted.  Review of Systems    Objective:    Wound as noted w/o Sophia's sign or s/s of infection.  Physical Exam    Assessment:       No diagnosis found.       Wound 09/09/19 1057 Venous Ulcer lateral Foot #1 (Active)   09/09/19 1057    Pre-existing: Yes   Primary Wound Type: Venous ulcer   Side: Left   Orientation: lateral   Location: Foot   Wound/PI Number (optional): #1   Ankle-Brachial Index:    Pulses:    Removal Indication and Assessment:    Wound Outcome:    (Retired) Wound Type:    (Retired) Wound Length (cm):    (Retired) Wound Width (cm):    (Retired) Depth (cm):    Wound Description (Comments):    Removal Indications:    Wound Image   9/23/2019  4:31 PM   Dressing Appearance Open to air 9/23/2019  4:31 PM   Drainage Amount Scant 9/23/2019  4:31 PM   Drainage Characteristics/Odor Yellow;Serous 9/23/2019  4:31 PM   Appearance Yellow;Moist 9/23/2019  4:31 PM "   Tissue loss description Full thickness 9/23/2019  4:31 PM   Yellow (%), Wound Tissue Color 100 % 9/23/2019  4:31 PM   Periwound Area Intact;Hemosiderin Staining;Edematous;Pink 9/23/2019  4:31 PM   Wound Edges Irregular 9/23/2019  4:31 PM   Wound Length (cm) 1 cm 9/23/2019  4:31 PM   Wound Width (cm) 2 cm 9/23/2019  4:31 PM   Wound Depth (cm) 0.1 cm 9/23/2019  4:31 PM   Wound Volume (cm^3) 0.2 cm^3 9/23/2019  4:31 PM   Wound Surface Area (cm^2) 2 cm^2 9/23/2019  4:31 PM   Care Cleansed with:;Sterile normal saline;Debrided 9/23/2019  4:31 PM   Dressing Applied;Island/border;Hydrofiber;Other (see comments) 9/23/2019  4:31 PM   Periwound Care Absorptive dressing applied;Dry periwound area maintained 9/23/2019  4:31 PM   Dressing Change Due 09/25/19 9/23/2019  4:31 PM       Debrided per Dr. Gutierrez. Post measurements: 2 x 1 x 0.2cm.  Left lateral Foot #1  Cleanse wound with:NS  Lidocaine: Prn Debridement  Periwound care: Sween to dry skin Prn  Primary dressing:  Santyl, Hydrofera blue ready to wound  Secondary dressing: 3 x 3 aquacel bordered foam  Offloading: Patient's shoe left foot, Darco shoe right foot  Edema control: Patient's own compression stockings/socks to left leg, Tubigrip G x 2 to Right lower leg  Frequency: Monday, Wednesday, Friday, and prn except when in clinic  Follow-up: Dr. Gutierrez Monday 10/7/19    Home Health: [facility] phone# [phone], fax # [phone]. Admit for wound care Monday, Wednesday, Friday, and prn except when in clinic. Orders as above. Client has Santyl. Next visit: Dr. Gutierrez Monday 10/7/19.      Plan:                2 week Dr. Gutierrez

## 2019-09-27 ENCOUNTER — TELEPHONE (OUTPATIENT)
Dept: FAMILY MEDICINE | Facility: HOSPITAL | Age: 54
End: 2019-09-27

## 2019-09-27 NOTE — TELEPHONE ENCOUNTER
----- Message from Nu Pal sent at 9/27/2019  2:54 PM CDT -----  E[facility] calling to ask if  can call her to get  A verbal order for physical therapy. Please [phone] ext [phone]

## 2019-09-30 ENCOUNTER — INITIAL CONSULT (OUTPATIENT)
Dept: HEMATOLOGY/ONCOLOGY | Facility: CLINIC | Age: 54
End: 2019-09-30
Payer: MEDICARE

## 2019-09-30 VITALS
SYSTOLIC BLOOD PRESSURE: 175 MMHG | OXYGEN SATURATION: 96 % | HEIGHT: 78 IN | DIASTOLIC BLOOD PRESSURE: 78 MMHG | WEIGHT: 309.94 LBS | HEART RATE: 103 BPM | RESPIRATION RATE: 22 BRPM | TEMPERATURE: 97 F | BODY MASS INDEX: 35.86 KG/M2

## 2019-09-30 DIAGNOSIS — E66.01 MORBID OBESITY: ICD-10-CM

## 2019-09-30 DIAGNOSIS — D63.8 ANEMIA OF CHRONIC DISEASE: Primary | ICD-10-CM

## 2019-09-30 DIAGNOSIS — I50.22 CHRONIC SYSTOLIC HEART FAILURE: ICD-10-CM

## 2019-09-30 DIAGNOSIS — Z79.01 LONG TERM (CURRENT) USE OF ANTICOAGULANTS: ICD-10-CM

## 2019-09-30 DIAGNOSIS — D53.9 NUTRITIONAL ANEMIA: ICD-10-CM

## 2019-09-30 DIAGNOSIS — Z71.89 ADVANCE CARE PLANNING: ICD-10-CM

## 2019-09-30 DIAGNOSIS — I87.1 MAY-THURNER SYNDROME: ICD-10-CM

## 2019-09-30 DIAGNOSIS — E66.9 OBESITY, CLASS I, BMI 30.0-34.9 (SEE ACTUAL BMI): ICD-10-CM

## 2019-09-30 DIAGNOSIS — I48.21 PERMANENT ATRIAL FIBRILLATION: ICD-10-CM

## 2019-09-30 PROCEDURE — 3008F BODY MASS INDEX DOCD: CPT | Mod: CPTII,S$GLB,, | Performed by: INTERNAL MEDICINE

## 2019-09-30 PROCEDURE — 3078F PR MOST RECENT DIASTOLIC BLOOD PRESSURE < 80 MM HG: ICD-10-PCS | Mod: CPTII,S$GLB,, | Performed by: INTERNAL MEDICINE

## 2019-09-30 PROCEDURE — 3077F PR MOST RECENT SYSTOLIC BLOOD PRESSURE >= 140 MM HG: ICD-10-PCS | Mod: CPTII,S$GLB,, | Performed by: INTERNAL MEDICINE

## 2019-09-30 PROCEDURE — 3008F PR BODY MASS INDEX (BMI) DOCUMENTED: ICD-10-PCS | Mod: CPTII,S$GLB,, | Performed by: INTERNAL MEDICINE

## 2019-09-30 PROCEDURE — 3077F SYST BP >= 140 MM HG: CPT | Mod: CPTII,S$GLB,, | Performed by: INTERNAL MEDICINE

## 2019-09-30 PROCEDURE — 99214 OFFICE O/P EST MOD 30 MIN: CPT | Mod: S$GLB,,, | Performed by: INTERNAL MEDICINE

## 2019-09-30 PROCEDURE — 99999 PR PBB SHADOW E&M-EST. PATIENT-LVL III: ICD-10-PCS | Mod: PBBFAC,,, | Performed by: INTERNAL MEDICINE

## 2019-09-30 PROCEDURE — 99214 PR OFFICE/OUTPT VISIT, EST, LEVL IV, 30-39 MIN: ICD-10-PCS | Mod: S$GLB,,, | Performed by: INTERNAL MEDICINE

## 2019-09-30 PROCEDURE — 99497 PR ADVNCD CARE PLAN 30 MIN: ICD-10-PCS | Mod: S$GLB,,, | Performed by: INTERNAL MEDICINE

## 2019-09-30 PROCEDURE — 3078F DIAST BP <80 MM HG: CPT | Mod: CPTII,S$GLB,, | Performed by: INTERNAL MEDICINE

## 2019-09-30 PROCEDURE — 99999 PR PBB SHADOW E&M-EST. PATIENT-LVL III: CPT | Mod: PBBFAC,,, | Performed by: INTERNAL MEDICINE

## 2019-09-30 PROCEDURE — 99497 ADVNCD CARE PLAN 30 MIN: CPT | Mod: S$GLB,,, | Performed by: INTERNAL MEDICINE

## 2019-09-30 NOTE — PROGRESS NOTES
PATIENT: Drew Farrar  MRN: [id number]  DATE: 9/30/2019    Diagnosis:   1. Anemia of chronic disease    2. Nutritional anemia     3. Chronic systolic heart failure    4. Long term (current) use of anticoagulants    5. Morbid obesity    6. Permanent atrial fibrillation    7. May-Thurner syndrome    8. Obesity, Class I, BMI 30.0-34.9 (see actual BMI)      Chief Complaint: Anticoagulation    This is a 53-year-old male referred for evaluation of anemia and history of multiple DVTs and PEs.    He has a complex medical history.  Past medical history includes persistent atrial fibrillation, PDA status post surgical closure 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 of age, history of multiple PEs and DVTs, history of placement of IVC filter, congestive heart failure with severe mitral regurgitation, bipolar disorder and chronic venous stasis ulcers of lower extremities.    He has been on anticoagulation with Coumadin for several years and is currently on Xarelto 20 mg daily.    He was in the hospital for a week from September 10th until September 16, 2019 for gram-negative sepsis and recently completed a course of IV antibiotics as well as oral vancomycin.    09/11/2019-VQ  scan-large mismatched perfusion defect in the lingula, intermediate probability for pulmonary embolism    09/10/2019-ultrasound bilateral lower extremities show nonocclusive DVT seen within the bilateral femoral veins., for venous stasis ulcers of lower extremities, he has and the wound care and is seen by Dr. Pool Gutierrez.    He was also noted to have worsening anemia.    His baseline hemoglobin is between 9 and 11 grams/deciliter over the last several years.  After more recent hospital admission it has drifted down to 7.8 grams/deciliter    Subjective:     History of Present Illness:     Past Medical History:   Past Medical History:   Diagnosis Date    *Atrial fibrillation     Anticoagulant long-term use     Arthritis     Atrial fibrillation     Atrial fibrillation Feb 23,  2016    Bipolar disorder     CHF (congestive heart failure)     Congenital heart disease     s/p surgical intervention 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 of age    Deep vein thrombosis     DVT of leg (deep venous thrombosis)     left leg    History of prior ablation treatment     10/9/13    Hypertension     Obesity     Stroke     Thyroid disease     Venous stasis ulcer of lower extremity, unspecified laterality 12/14/2012    Venous ulcer        Past Surgical History:   Past Surgical History:   Procedure Laterality Date    ANGIOPLASTY      CARDIAC SURGERY      open heart surgery 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 old    EYE SURGERY      left eye cataract/right eye glaucoma    TIMO FILTER PLACEMENT      Dr Calix ([facility])    KNEE SURGERY      l and r     MULTIPLE TOOTH EXTRACTIONS      SKIN GRAFT      left leg       Family History:   Family History   Problem Relation Age of Onset    Diabetes Father     Heart disease Father     Heart disease Maternal Grandmother     Diabetes Maternal Grandfather     Heart disease Maternal Grandfather     Stroke Maternal Grandfather        Social History:  reports that he has quit smoking. His smoking use included cigarettes. He has a 6.00 pack-year smoking history. He has quit using smokeless tobacco. He reports that he drinks about 1.0 standard drinks of alcohol per week. He reports that he does not use drugs.    Allergies:  Review of patient's allergies indicates:   Allergen Reactions    Contrast media Other (See Comments)     Severe chest pain    Food allergy formula [glutamine-c-quercet-selen-brom]      Allergic to green peas; Heart failure.    Iodinated contrast media Other (See Comments)     Chest pain    Peas Hives    Ibuprofen Swelling    Latex, natural rubber Hives    Pcn [penicillins] Hives    Butisol [butabarbital] Rash     Peeling skin       Medications:  Current Outpatient Medications   Medication Sig Dispense Refill    enoxaparin (LOVENOX) 150 mg/mL Syrg Inject 1 mL  (150 mg total) into the skin every 12 (twelve) hours. (Patient not taking: Reported on 9/30/2019) 180 mL 0    HYDROcodone-acetaminophen (NORCO) 5-325 mg per tablet Take 1 tablet by mouth every 6 (six) hours as needed for Pain. 10 tablet 0    hydrOXYzine HCl (ATARAX) 25 MG tablet Take 1 tablet (25 mg total) by mouth 4 (four) times daily as needed for Itching. 20 tablet 0    lisinopril 10 MG tablet Take 1 tablet (10 mg total) by mouth once daily. (Patient taking differently: Take 7.5 mg by mouth once daily. ) 30 tablet 6    metoprolol succinate (TOPROL-XL) 100 MG 24 hr tablet Take 1 tablet (100 mg total) by mouth once daily. (Patient taking differently: Take 100 mg by mouth every evening. ) 30 tablet 5    nystatin (MYCOSTATIN) powder Apply topically 2 (two) times daily. 60 g 1    terbinafine HCl (LAMISIL) 250 mg tablet Take 1 tablet (250 mg total) by mouth once daily. 45 tablet 0    torsemide (DEMADEX) 20 MG Tab Take 2 tablets (40 mg total) by mouth once daily. 180 tablet 3     No current facility-administered medications for this visit.        Review of Systems   Constitutional: Positive for fatigue. Negative for fever and unexpected weight change.   HENT: Negative for mouth sores and nosebleeds.    Eyes: Negative for photophobia and pain.   Respiratory: Positive for shortness of breath. Negative for wheezing.    Cardiovascular: Positive for leg swelling. Negative for chest pain and palpitations.   Gastrointestinal: Negative for abdominal pain and vomiting.   Genitourinary: Negative for flank pain and hematuria.   Musculoskeletal: Positive for arthralgias and gait problem. Negative for back pain, neck pain and neck stiffness.   Skin: Negative for rash and wound.   Neurological: Negative for seizures and syncope.   Hematological: Negative for adenopathy. Does not bruise/bleed easily.   Psychiatric/Behavioral: Negative for behavioral problems and confusion.   All other systems reviewed and are  "negative.      Objective:     Vitals:    09/30/19 1347   BP: (!) 175/78   Pulse: 103   Resp: (!) 22   Temp: 97 °F (36.1 °C)   SpO2: 96%   Weight: (!) 140.6 kg (309 lb 15.5 oz)   Height: 6' 8" (2.032 m)     BMI: Body mass index is 34.05 kg/m².    Physical Exam   Constitutional: He is oriented to person, place, and time.  Non-toxic appearance. He does not have a sickly appearance. No distress.   HENT:   Nose: No epistaxis.   Mouth/Throat: Oropharynx is clear and moist. Mucous membranes are not pale, not dry and not cyanotic. No lacerations. No oropharyngeal exudate.   Eyes: Conjunctivae are normal. No scleral icterus.   Neck: Neck supple. No thyroid mass and no thyromegaly present.   Cardiovascular: Normal rate and S1 normal. An irregularly irregular rhythm present.   Murmur heard.   Systolic murmur is present.  Pulmonary/Chest: Effort normal and breath sounds normal. No accessory muscle usage or stridor. No respiratory distress. He has no wheezes. He has no rales.   Abdominal: Soft. He exhibits no ascites and no mass. There is no hepatosplenomegaly. There is no tenderness.   Musculoskeletal: He exhibits no edema.   Lymphadenopathy:        Head (right side): No submental and no submandibular adenopathy present.        Head (left side): No submental and no submandibular adenopathy present.     He has no cervical adenopathy.     He has no axillary adenopathy.   Neurological: He is alert and oriented to person, place, and time. He has normal strength. No cranial nerve deficit or sensory deficit. Gait normal.   Skin: No bruising, no petechiae and no rash noted. He is not diaphoretic. No cyanosis.        Psychiatric: He has a normal mood and affect. Judgment and thought content normal. Cognition and memory are normal.   Vitals reviewed.      Assessment:       1. Anemia of chronic disease    2. Nutritional anemia     3. Chronic systolic heart failure    4. Long term (current) use of anticoagulants    5. Morbid obesity    6. " Permanent atrial fibrillation    7. May-Thurner syndrome    8. Obesity, Class I, BMI 30.0-34.9 (see actual BMI)      Plan:   Anemia of chronic disease  -clinical picture consistent with chronic disease anemia  -baseline hemoglobin between 9 and 11 grams/deciliter  -worsening anemia secondary to recent hospitalization and septic shock  -he is doing much better clinically with regards to infections  -he completed IV antibiotics  -will monitor anemia for now  -will repeat CBC, iron studies, ESR, SPEP and free light chain assay and see him back for follow-up in 6-7 weeks    History of multiple DVTs and PE status post IVC filter placement  -his risk factors for further DVTs and PEs are un modifiable  -risk factors include chronic venous stasis ulcers, May-Thurner syndrome obesity  -he already has a vena caval filter in place  -recommend indefinite anticoagulation  -he is tolerating Xarelto 20 mg daily well  -he tells me he had a hard time tolerating Lovenox and was having frequent nose bleeds and is now tolerating Xarelto better    Atrial fibrillation  -he is already on Xarelto  -follow-up with Cardiology, Dr. Lantigua    Venous stasis ulcers of lower extremities  -follow-up with wound care    I discussed the importance of advance care planning today and explained the process to the patient. I reviewed the role for advance directives and their purpose in directing future healthcare if the patient is unable to speak for him/herself. The patient was then provided with detailed information about the importance of designating a Health Care Power of  (HCPOA) and was instructed to communicate with this person about their wishes for future healthcare, should he/she become sick and lose decision-making capacity. At this point in time, the patient does have full decision-making capacity. The patient hasn't previously appointed a HCPOA. After our discussion, the patient completed a HCPOA. The form was witnessed and will be  scanned into EPIC.

## 2019-10-02 ENCOUNTER — TELEPHONE (OUTPATIENT)
Dept: FAMILY MEDICINE | Facility: HOSPITAL | Age: 54
End: 2019-10-02

## 2019-10-02 ENCOUNTER — OFFICE VISIT (OUTPATIENT)
Dept: PODIATRY | Facility: CLINIC | Age: 54
End: 2019-10-02
Payer: MEDICARE

## 2019-10-02 VITALS
SYSTOLIC BLOOD PRESSURE: 124 MMHG | DIASTOLIC BLOOD PRESSURE: 67 MMHG | HEART RATE: 83 BPM | WEIGHT: 309 LBS | HEIGHT: 78 IN | BODY MASS INDEX: 35.75 KG/M2

## 2019-10-02 DIAGNOSIS — B35.1 ONYCHOMYCOSIS: Primary | ICD-10-CM

## 2019-10-02 DIAGNOSIS — I87.2 VENOUS STASIS DERMATITIS OF BOTH LOWER EXTREMITIES: ICD-10-CM

## 2019-10-02 DIAGNOSIS — R60.9 EDEMA, UNSPECIFIED TYPE: ICD-10-CM

## 2019-10-02 DIAGNOSIS — Z79.01 LONG TERM (CURRENT) USE OF ANTICOAGULANTS: ICD-10-CM

## 2019-10-02 DIAGNOSIS — E66.01 MORBID OBESITY: ICD-10-CM

## 2019-10-02 PROCEDURE — 3008F BODY MASS INDEX DOCD: CPT | Mod: CPTII,S$GLB,, | Performed by: PODIATRIST

## 2019-10-02 PROCEDURE — 3078F DIAST BP <80 MM HG: CPT | Mod: CPTII,S$GLB,, | Performed by: PODIATRIST

## 2019-10-02 PROCEDURE — 99999 PR PBB SHADOW E&M-EST. PATIENT-LVL III: CPT | Mod: PBBFAC,,, | Performed by: PODIATRIST

## 2019-10-02 PROCEDURE — 99999 PR PBB SHADOW E&M-EST. PATIENT-LVL III: ICD-10-PCS | Mod: PBBFAC,,, | Performed by: PODIATRIST

## 2019-10-02 PROCEDURE — 99214 OFFICE O/P EST MOD 30 MIN: CPT | Mod: 25,S$GLB,, | Performed by: PODIATRIST

## 2019-10-02 PROCEDURE — 3074F SYST BP LT 130 MM HG: CPT | Mod: CPTII,S$GLB,, | Performed by: PODIATRIST

## 2019-10-02 PROCEDURE — 11721 PR DEBRIDEMENT OF NAILS, 6 OR MORE: ICD-10-PCS | Mod: Q9,S$GLB,, | Performed by: PODIATRIST

## 2019-10-02 PROCEDURE — 3078F PR MOST RECENT DIASTOLIC BLOOD PRESSURE < 80 MM HG: ICD-10-PCS | Mod: CPTII,S$GLB,, | Performed by: PODIATRIST

## 2019-10-02 PROCEDURE — 3008F PR BODY MASS INDEX (BMI) DOCUMENTED: ICD-10-PCS | Mod: CPTII,S$GLB,, | Performed by: PODIATRIST

## 2019-10-02 PROCEDURE — 11721 DEBRIDE NAIL 6 OR MORE: CPT | Mod: Q9,S$GLB,, | Performed by: PODIATRIST

## 2019-10-02 PROCEDURE — 99214 PR OFFICE/OUTPT VISIT, EST, LEVL IV, 30-39 MIN: ICD-10-PCS | Mod: 25,S$GLB,, | Performed by: PODIATRIST

## 2019-10-02 PROCEDURE — 3074F PR MOST RECENT SYSTOLIC BLOOD PRESSURE < 130 MM HG: ICD-10-PCS | Mod: CPTII,S$GLB,, | Performed by: PODIATRIST

## 2019-10-02 RX ORDER — CICLOPIROX 80 MG/ML
SOLUTION TOPICAL NIGHTLY
Qty: 6.6 ML | Refills: 3 | Status: ON HOLD | OUTPATIENT
Start: 2019-10-02 | End: 2020-08-22 | Stop reason: ALTCHOICE

## 2019-10-02 NOTE — TELEPHONE ENCOUNTER
----- Message from Nu Pal sent at 10/1/2019 12:42 PM CDT -----  Pt would like dr to put in orders for physical therapy. Please call Sakina back [phone]

## 2019-10-02 NOTE — LETTER
October 2, 2019      Abel Sanchez MD  [address]  [facility] [address]           [facility] [facility]  [address]  [facility] [address]  Phone: [phone]  Fax: [phone]          Patient: Drew Farrar   MR Number: [id number]   YOB: 1965   Date of Visit: 10/2/2019       Dear Dr. Abel Sanchez:    Thank you for referring Drew Farrar to me for evaluation. Attached you will find relevant portions of my assessment and plan of care.    If you have questions, please do not hesitate to call me. I look forward to following Drew Farrar along with you.    Sincerely,    Crista Fragoso, LINDA    Enclosure  CC:  No Recipients    If you would like to receive this communication electronically, please contact [email or web address] or [phone] to request more information on [employer] access.    For providers and/or their staff who would like to refer a patient to [facility], please contact us through our one-stop-shop provider referral line, [facility], at [phone].    If you feel you have received this communication in error or would no longer like to receive these types of communications, please e-mail [email or web address]

## 2019-10-02 NOTE — PROGRESS NOTES
Subjective:      Patient ID: Drew Farrar is a 53 y.o. male.    Chief Complaint: Nail Care (pt states right 2nd metatarsal is infected)    Drew is a 53 y.o. male who presents to the clinic for evaluation and treatment of high risk feet. Drew has a past medical history of *Atrial fibrillation, Anticoagulant long-term use, Arthritis, Atrial fibrillation, Atrial fibrillation (Feb 23, 2016), Bipolar disorder, CHF (congestive heart failure), Congenital heart disease, Deep vein thrombosis, DVT of leg (deep venous thrombosis), History of prior ablation treatment, Hypertension, Obesity, Stroke, Thyroid disease, Venous stasis ulcer of lower extremity, unspecified laterality (12/14/2012), and Venous ulcer. The patient's chief complaint is long, thick toenails.  Patient is known to Dr. Gutierrez at Wound care for bilateral lower extremity wounds and has home care set up for wounds.     PCP: Garrison Roach MD    Date Last Seen by PCP:  In epic    Current shoe gear:  Affected Foot: Surgical boot     Unaffected Foot: Surgical boot    Last encounter in this department: Visit date not found    Hemoglobin A1C   Date Value Ref Range Status   09/10/2019 5.2 4.0 - 5.6 % Final     Comment:     ADA Screening Guidelines:  5.7-6.4%  Consistent with prediabetes  >or=6.5%  Consistent with diabetes  High levels of fetal hemoglobin interfere with the HbA1C  assay. Heterozygous hemoglobin variants (HbS, HgC, etc)do  not significantly interfere with this assay.   However, presence of multiple variants may affect accuracy.     07/25/2019 5.5 4.0 - 5.6 % Final     Comment:     ADA Screening Guidelines:  5.7-6.4%  Consistent with prediabetes  >or=6.5%  Consistent with diabetes  High levels of fetal hemoglobin interfere with the HbA1C  assay. Heterozygous hemoglobin variants (HbS, HgC, etc)do  not significantly interfere with this assay.   However, presence of multiple variants may affect accuracy.     07/09/2019 5.3 4.0 - 5.6 % Final      Comment:     ADA Screening Guidelines:  5.7-6.4%  Consistent with prediabetes  >or=6.5%  Consistent with diabetes  High levels of fetal hemoglobin interfere with the HbA1C  assay. Heterozygous hemoglobin variants (HbS, HgC, etc)do  not significantly interfere with this assay.   However, presence of multiple variants may affect accuracy.         Review of Systems   Constitution: Negative for chills, decreased appetite, fever and malaise/fatigue.   HENT: Negative for congestion, ear discharge and sore throat.    Eyes: Negative for discharge and pain.   Cardiovascular: Positive for leg swelling. Negative for chest pain and claudication.   Respiratory: Negative for cough and shortness of breath.    Skin: Positive for color change, dry skin and itching. Negative for nail changes and rash.        Bilateral ankle wounds/foot wounds   Musculoskeletal: Positive for joint swelling and stiffness. Negative for arthritis, joint pain and muscle weakness.   Gastrointestinal: Negative for bloating, abdominal pain, diarrhea, nausea and vomiting.   Genitourinary: Negative for flank pain and hematuria.   Neurological: Negative for headaches, numbness and weakness.   Psychiatric/Behavioral: Negative for altered mental status.             Past Medical History:   Diagnosis Date    *Atrial fibrillation     Anticoagulant long-term use     Arthritis     Atrial fibrillation     Atrial fibrillation Feb 23, 2016    Bipolar disorder     CHF (congestive heart failure)     Congenital heart disease     s/p surgical intervention 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 of age    Deep vein thrombosis     DVT of leg (deep venous thrombosis)     left leg    History of prior ablation treatment     10/9/13    Hypertension     Obesity     Stroke     Thyroid disease     Venous stasis ulcer of lower extremity, unspecified laterality 12/14/2012    Venous ulcer        Past Surgical History:   Procedure Laterality Date    ANGIOPLASTY      CARDIAC SURGERY      open  heart surgery 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 old    EYE SURGERY      left eye cataract/right eye glaucoma    TIMO FILTER PLACEMENT      Dr Calix ([facility])    KNEE SURGERY      l and r     MULTIPLE TOOTH EXTRACTIONS      SKIN GRAFT      left leg       Family History   Problem Relation Age of Onset    Diabetes Father     Heart disease Father     Heart disease Maternal Grandmother     Diabetes Maternal Grandfather     Heart disease Maternal Grandfather     Stroke Maternal Grandfather        Social History     Socioeconomic History    Marital status: Single     Spouse name: Not on file    Number of children: 2    Years of education: Not on file    Highest education level: Not on file   Occupational History    Not on file   Social Needs    Financial resource strain: Not on file    Food insecurity:     Worry: Not on file     Inability: Not on file    Transportation needs:     Medical: Not on file     Non-medical: Not on file   Tobacco Use    Smoking status: Former Smoker     Packs/day: 1.00     Years: 6.00     Pack years: 6.00     Types: Cigarettes    Smokeless tobacco: Former User    Tobacco comment: quit by age 27yrs old   Substance and Sexual Activity    Alcohol use: Yes     Alcohol/week: 1.0 standard drinks     Types: 1 Glasses of wine per week     Frequency: Monthly or less     Comment: occasionally    Drug use: No    Sexual activity: Yes     Partners: Female     Birth control/protection: None   Lifestyle    Physical activity:     Days per week: Not on file     Minutes per session: Not on file    Stress: Not on file   Relationships    Social connections:     Talks on phone: Not on file     Gets together: Not on file     Attends Hindu service: Not on file     Active member of club or organization: Not on file     Attends meetings of clubs or organizations: Not on file     Relationship status: Not on file   Other Topics Concern    Not on file   Social History Narrative    Not on file  "      Current Outpatient Medications   Medication Sig Dispense Refill    enoxaparin (LOVENOX) 150 mg/mL Syrg Inject 1 mL (150 mg total) into the skin every 12 (twelve) hours. 180 mL 0    hydrOXYzine HCl (ATARAX) 25 MG tablet Take 1 tablet (25 mg total) by mouth 4 (four) times daily as needed for Itching. 20 tablet 0    lisinopril 10 MG tablet Take 1 tablet (10 mg total) by mouth once daily. (Patient taking differently: Take 7.5 mg by mouth once daily. ) 30 tablet 6    metoprolol succinate (TOPROL-XL) 100 MG 24 hr tablet Take 1 tablet (100 mg total) by mouth once daily. (Patient taking differently: Take 100 mg by mouth every evening. ) 30 tablet 5    nystatin (MYCOSTATIN) powder Apply topically 2 (two) times daily. 60 g 1    terbinafine HCl (LAMISIL) 250 mg tablet Take 1 tablet (250 mg total) by mouth once daily. 45 tablet 0    torsemide (DEMADEX) 20 MG Tab Take 2 tablets (40 mg total) by mouth once daily. 180 tablet 3    ciclopirox (PENLAC) 8 % Soln Apply topically nightly as directed 6.6 mL 3    HYDROcodone-acetaminophen (NORCO) 5-325 mg per tablet Take 1 tablet by mouth every 6 (six) hours as needed for Pain. 10 tablet 0     No current facility-administered medications for this visit.        Review of patient's allergies indicates:   Allergen Reactions    Contrast media Other (See Comments)     Severe chest pain    Food allergy formula [glutamine-c-quercet-selen-brom]      Allergic to green peas; Heart failure.    Iodinated contrast media Other (See Comments)     Chest pain    Peas Hives    Ibuprofen Swelling    Latex, natural rubber Hives    Pcn [penicillins] Hives    Butisol [butabarbital] Rash     Peeling skin       Vitals:    10/02/19 1447   BP: 124/67   Pulse: 83   Weight: (!) 140.2 kg (309 lb)   Height: 6' 8" (2.032 m)   PainSc: 0-No pain       Objective:      Physical Exam    Vascular: Distal DP/PT pulses palpable 0/4. Hair growth absent LE, warm to touch LE cool to touch to toes, + edema " noted to LE.    Dermatologic:   Toenails 1-5 bilaterally are elongated by 2-3 mm, thickened by 2-3 mm, discolored/yellowed, dystrophic, brittle with subungual debris. No incurvation. Mild xerosis noted, bilat with interdigital maceration, hyperpigmentation of the lower extremity with trophic changes. + multiple wounds of the bilateral ankle and foot (currently following at wound care)    Musculoskeletal: MMT 5/5 in DF/PF/Inv/Ev resistance with no reproduction of pain in any direction. Passive range of motion of ankle and pedal joints is painless. No calf tenderness LE, Compartments soft/compressible.     Neurological: Light touch, proprioception, and sharp/dull sensation are all intact. Protective threshold with the Appleton-Wienstein monofilament is intact. Vibratory sensation intact.           Assessment:       Encounter Diagnoses   Name Primary?    Onychomycosis Yes    Venous stasis dermatitis of both lower extremities     Long term (current) use of anticoagulants     Morbid obesity     Edema, unspecified type          Plan:       Drew was seen today for nail care.    Diagnoses and all orders for this visit:    Onychomycosis    Venous stasis dermatitis of both lower extremities    Long term (current) use of anticoagulants    Morbid obesity    Edema, unspecified type    Other orders  -     ciclopirox (PENLAC) 8 % Soln; Apply topically nightly as directed      I counseled the patient on his conditions, their implications and medical management.    53 y.o. male with painful elongated nails x 10, trophic changes of the lower extremity, bilateral foot and ankle wounds, interdigital maceration    -Rx.  Penlac  -compression stocking as needed.   -nails times 10 sharply debrided with nail Nipper.  Tolerated well.   -Discussed reducing caloric intake, increase physical activity, weight loss, exercise, low salt diet, which may include blood sugar control to help with foot and ankle complications.  -Shoe inspection.  General Foot Education. Patient reminded of the importance of good nutrition. Patient instructed on proper foot hygeine. We discussed wearing proper shoe gear, daily foot inspections, never walking without protective shoe gear, caution putting sharp instruments to feet. Discussed general foot care:  Wear comfortable, proper fitting shoes. Wash feet daily. Dry well. After drying, apply moisturizer to feet (no lotion to webspaces). Inspect feet daily for skin breaks, blisters, swelling, or redness. Wear cotton socks (preferably white)  Change socks every day. Do NOT walk barefoot. Do NOT use heating pads or warm/hot water soaks   -It was discussed the importance of wearing shoes with adequate room in toe box to accommodate toe deformities. Recommended New Balance/Asics shoe brands with adequate arch supports to alleviate abnormal pressure and improve stability of foot while walking. Avoid flat shoes and barefoot walking as these will exacerbate or worsen symptoms.   -The nature of the condition, options for management, as well as potential risks and complications were discussed in detail with patient. Patient was amenable to my recommendations and left my office fully informed and will follow up as instructed or sooner if necessary.    -Patient was advised of signs and symptoms of infection including redness, drainage, purulence, odor, streaking, fever, chills and I advised patient to seek medical attention (ER or urgent care) if these symptoms arise.   -f/u prn       Note dictated with voice recognition software, please excuse any grammatical errors.

## 2019-10-03 NOTE — TELEPHONE ENCOUNTER
----- Message from Irineo Adams sent at 10/3/2019  2:15 PM CDT -----  FERNY WITH [facility] IS CALLING TO REQUEST AND ORDER FOR PT EVAL AND TREAT. HE CAN GIVE IT TO HER VERBALLY, HER #[phone]

## 2019-10-04 ENCOUNTER — TELEPHONE (OUTPATIENT)
Dept: FAMILY MEDICINE | Facility: HOSPITAL | Age: 54
End: 2019-10-04

## 2019-10-04 ENCOUNTER — EXTERNAL HOME HEALTH (OUTPATIENT)
Dept: HOME HEALTH SERVICES | Facility: HOSPITAL | Age: 54
End: 2019-10-04

## 2019-10-04 NOTE — TELEPHONE ENCOUNTER
Left message that Dr. Roach does not need any further test. Dr. Roach stated that Dr. Palscencia has orders in for all the lab that is needed.

## 2019-10-04 NOTE — TELEPHONE ENCOUNTER
"Jannet San is a 28 year old female  here today with painful cyst on right breast.  She has had this cyst since August, 2016.  She was seen in [place] in September, 2016.  Reports breast ultrasound with negative findings.  Pt concerned it is still there and is bothering her a lot.  Has had the Nexplanon for almost 2 years.  Drinks coffee every day.  She had been on Lithium for 8 months and quit in March, 2016.      O:   /70 mmHg  Pulse 86  Ht 5' 11\" (1.803 m)  Wt 175 lb (79.379 kg)  BMI 24.42 kg/m2  SpO2 98%  LMP 12/25/2016   Awake and alert.  Breast:  A mobile 2 cm lump on right breast located at the 10 o'clock position approx 5 cm from nipple.  Tender to touch.    A:  Right breast cyst    P:  Ultrasound of right breast/with possible aspiration.  RTO for annual visit    Greater than 10 minutes were spent face to face counseling this patient    FLACO Macias, JENSM      " Patient's mother is calling because she was told Dr. Roach wanted lab work done. Advised that there is no lab order in from Dr. Roach. We will ask him if he wants any labs and call her back.

## 2019-10-07 ENCOUNTER — HOSPITAL ENCOUNTER (OUTPATIENT)
Dept: WOUND CARE | Facility: HOSPITAL | Age: 54
Discharge: HOME | End: 2019-10-07
Attending: EMERGENCY MEDICINE
Payer: MEDICARE

## 2019-10-07 ENCOUNTER — OFFICE VISIT (OUTPATIENT)
Dept: FAMILY MEDICINE | Facility: HOSPITAL | Age: 54
End: 2019-10-07
Attending: FAMILY MEDICINE
Payer: MEDICARE

## 2019-10-07 VITALS
BODY MASS INDEX: 36.45 KG/M2 | DIASTOLIC BLOOD PRESSURE: 78 MMHG | WEIGHT: 315 LBS | HEART RATE: 87 BPM | HEIGHT: 78 IN | SYSTOLIC BLOOD PRESSURE: 138 MMHG

## 2019-10-07 VITALS
HEART RATE: 92 BPM | BODY MASS INDEX: 36.45 KG/M2 | SYSTOLIC BLOOD PRESSURE: 156 MMHG | HEIGHT: 78 IN | WEIGHT: 315 LBS | DIASTOLIC BLOOD PRESSURE: 70 MMHG

## 2019-10-07 DIAGNOSIS — I87.2 VENOUS STASIS DERMATITIS OF BOTH LOWER EXTREMITIES: ICD-10-CM

## 2019-10-07 DIAGNOSIS — Z86.718 HISTORY OF DVT (DEEP VEIN THROMBOSIS): Primary | ICD-10-CM

## 2019-10-07 DIAGNOSIS — Z79.01 LONG TERM (CURRENT) USE OF ANTICOAGULANTS: ICD-10-CM

## 2019-10-07 DIAGNOSIS — I48.20 CHRONIC ATRIAL FIBRILLATION: ICD-10-CM

## 2019-10-07 DIAGNOSIS — I83.009 VENOUS STASIS ULCER OF LOWER EXTREMITY, UNSPECIFIED LATERALITY: Primary | ICD-10-CM

## 2019-10-07 DIAGNOSIS — I10 ESSENTIAL HYPERTENSION: ICD-10-CM

## 2019-10-07 DIAGNOSIS — L03.115 CELLULITIS OF RIGHT LOWER LEG: ICD-10-CM

## 2019-10-07 DIAGNOSIS — L97.329 NON-PRESSURE CHRONIC ULCER OF LEFT ANKLE, WITH UNSPECIFIED SEVERITY: ICD-10-CM

## 2019-10-07 DIAGNOSIS — L97.909 VENOUS STASIS ULCER OF LOWER EXTREMITY, UNSPECIFIED LATERALITY: Primary | ICD-10-CM

## 2019-10-07 DIAGNOSIS — E05.90 HYPERTHYROIDISM: Chronic | ICD-10-CM

## 2019-10-07 DIAGNOSIS — L97.521 SKIN ULCER OF LEFT FOOT, LIMITED TO BREAKDOWN OF SKIN: ICD-10-CM

## 2019-10-07 PROCEDURE — 27201912 HC WOUND CARE DEBRIDEMENT SUPPLIES

## 2019-10-07 PROCEDURE — 99214 OFFICE O/P EST MOD 30 MIN: CPT | Mod: 25 | Performed by: PHYSICIAN ASSISTANT

## 2019-10-07 PROCEDURE — 11042 DBRDMT SUBQ TIS 1ST 20SQCM/<: CPT

## 2019-10-07 NOTE — PROGRESS NOTES
"Subjective:       Patient ID: Drew Farrar is a 53 y.o. male.    Chief Complaint: Venous Ulcer    2/15/19: Readmitted for venous ulcer to left lateral foot which developed about 2 weeks ago. Pulses noted with doppler and capillary refill <3.Dr Gutierrez assessed patient and wound care plan ordered for compression therapy and hydrafera blue to wound bed. No apparent signs of infection noted. Patient to follw-up in clinic in 2 weeks.DB   2/21/19: Nurse visit for dressing change. Refused care to skin tear on left 2nd toe. See notes. RTC 1 week for DrRosy Visit.  3/1/19: Follow up with Dr. Waller today in clinic new wound to right posterior leg, culture of both wounds taken today in clinic new wound care orders to both legs for xeroform and unna with calamine toe to knee follow up in 1 week with Dr. Gutierrez  3/8/19: Follow up with Dr. Gutierrez today in clinic wounds improving, edema noted to BLE, profore toe to knee applied to both legs, RX given to patient for PO Doxycycline, follow up in 1 week.   3/15/19: Here for f/u with Dr. Gutierrez. U/S scheduled Thursday. Will need right leg rewrapped. Patient states eye DrRosy Gave him the same antibiotic dose and strength after eye surgery. Dr instructed patient to take one bottle of antibiotics for both wounds and eyes until finished. Gentamycin added to wound care orders.   3/29/19:  Wound slowly improving. No changes in wound care orders. Wound debrided per Dr Gutierrez.  4/4/19: Patient showed up to clinic today stated " I need my dressing changed. It fell off."  Doppler pluses checked and present.  Patient refusing care to right, no stocking present on leg at this visit. Patient stated " No they said this leg is discharged, Dr. Lantigua has to drain the fluid out this leg with a needle. No wound care to this leg anymore when I come."      9/9/19: Readmit to wound care clinic for venous ulcer to left lateral foot.  Patient states it reopened about 3 weeks after last discharge " "from wound care clinic 7/1/19. Per patient wound has been treated by PCP.  Patient reports just using a large band- aid and compression sock. Patient states he was recently treated at [facility] for HBOT for this wound " in the last two or three months after it reopened "  Requesting HBOT here at the wound care clinic.  Dr. Gutierrez examined patient today, doppler pulses present, wound debrided with curette by Dr. Gutierrez patient tolerated well. Wound care orders given to apply hydrorefa blue ready to wound, cover with mepore dressing and apply patient's compression sock. Dr. Manuel discussed with patient obtaining his medical records from [facility], to view to determine if patient will benefit from HBOT, patient verbalized understanding.  Authorization for release of health information sheet signed by patient and faxed to [facility] today in clinic. Follow up in 1 week with MD.  9/23/19: F/U with Dr. Gutierrez. Client recently discharged from hospital. Left foot site debrided. Client being followed by [facility].    10/07/19: F/u with dr. Gutierrez. Wound debrided in clinic today. Continue with current wound care treatment as ordered. Follow up in clinic in 2 weeks  No c/o noted.  Review of Systems    Objective:    Wound as noted w/o Sophia's sign or s/s of infection.  Physical Exam    Assessment:       1. Venous stasis ulcer of lower extremity, unspecified laterality    2. Non-pressure chronic ulcer of left ankle, with unspecified severity           Wound 09/09/19 1057 Venous Ulcer lateral Foot #1 (Active)   09/09/19 1057    Pre-existing: Yes   Primary Wound Type: Venous ulcer   Side: Left   Orientation: lateral   Location: Foot   Wound/PI Number (optional): #1   Ankle-Brachial Index:    Pulses:    Removal Indication and Assessment:    Wound Outcome:    (Retired) Wound Type:    (Retired) Wound Length (cm):    (Retired) Wound Width (cm):    (Retired) Depth (cm):    Wound Description (Comments):    Removal " Indications:    Dressing Appearance Intact;Moist drainage 10/7/2019 11:00 AM   Drainage Amount Small 10/7/2019 11:00 AM   Drainage Characteristics/Odor Serosanguineous 10/7/2019 11:00 AM   Appearance Pink;Yellow;Moist 10/7/2019 11:00 AM   Tissue loss description Full thickness 10/7/2019 11:00 AM   Red (%), Wound Tissue Color 75 % 10/7/2019 11:00 AM   Yellow (%), Wound Tissue Color 25 % 10/7/2019 11:00 AM   Periwound Area Intact;Hemosiderin Staining;Pink 10/7/2019 11:00 AM   Wound Edges Defined 10/7/2019 11:00 AM   Wound Length (cm) 1.2 cm 10/7/2019 11:00 AM   Wound Width (cm) 1.8 cm 10/7/2019 11:00 AM   Wound Depth (cm) 0.2 cm 10/7/2019 11:00 AM   Wound Volume (cm^3) 0.43 cm^3 10/7/2019 11:00 AM   Wound Surface Area (cm^2) 2.16 cm^2 10/7/2019 11:00 AM   Care Cleansed with:;Sterile normal saline 10/7/2019 11:00 AM   Dressing Applied;Other (see comments);Island/border;Tubular bandage 10/7/2019 11:00 AM   Periwound Care Skin barrier film applied 10/7/2019 11:00 AM   Compression Tubular elasticized bandage 10/7/2019 11:00 AM   Off Loading Off loading shoe 10/7/2019 11:00 AM   Dressing Change Due 10/09/19 10/7/2019 11:00 AM       Left lateral Foot #1  Cleanse wound with:NS  Lidocaine: 4% topical solution  Periwound care: Sween to dry skin Prn  Primary dressing:  Santyl, Hydrofera blue ready to wound  Secondary dressing: 3 x 3 aquacel bordered foam  Offloading: Patient's shoe left foot,Darco shoe right foot  Edema control: Patient's own compression stockings/socks left leg, Tubigrip f x 2 right leg toes to knee  Frequency: Monday, Wednesday, Friday, and prn      Plan:                Follow up in about 2 weeks (around 10/21/2019) for Dr. Gutierrez.

## 2019-10-07 NOTE — PROGRESS NOTES
FAMILY MEDICINE  New Visit Progress Note   Recent Hospital Discharge     PRESENTING HISTORY     Chief Complaint/Reason for Admission:  Sepsis; Follow up Hospital Discharge   Chief Complaint   Patient presents with    Hospital Follow Up    Recurrent Skin Infections    Paperwork     PCP: Garrison Roach MD    History of Present Illness:  Mr. Drew Farrar is a 53 y.o. male who was recently admitted to the hospital.    Admission Date: 9/10/2019  Hospital Length of Stay: 6 days  Discharge Date and Time:  09/16/2019 2:45 PM  ___________________________________________________________________    Today:  Patient was seen in [facility] today for hospital follow up.  Recently hospitalized for sepsis after presenting to the ED with fever, chills, n/v, and right leg pain.    PMHx of chronic Afib, HFpEF, PE, DVT with IVC filter in place, HTN, venous stasis, and grave's disease.  Admitted ~3 weeks prior due to right LE cellulitis and completed a 2 week course of ciprofloxacin and clindamycin. Cellulitis was much improved until the day he presented to the ED with increased swelling and warmth to the area.     Admitted for sepsis with blood cultures positive for gram neg rods (E. Cloacae; pan sensitive) treated with IM rocephin at home through 09/24/19. Was also CDiff positive and treated with 10 days of oral vanc. Heme/onc recommended continued anticoag with Lovenox BID as outpatient.     Today the patient is unaccompanied.  Since discharge he has stopped taking the lovenox and put himself back on Xarelto instead, as he was reporting increased bleeding on Lovenox.   He already had follow up with Heme/Onc, Dr. Plascencia, as outpatient.   Denies any fever, chills, n/v/d, CP, SOB, or increased leg swelling/redness.  He is also requesting paperwork be filled out.    Review of Systems  General ROS: negative for chills, fever or weight loss  Psychological ROS: negative for hallucination, depression or suicidal  ideation  Ophthalmic ROS: negative for blurry vision, photophobia or eye pain  ENT ROS: negative for epistaxis, sore throat or rhinorrhea  Respiratory ROS: no cough, shortness of breath, or wheezing  Cardiovascular ROS: no chest pain or dyspnea on exertion  Gastrointestinal ROS: no abdominal pain, change in bowel habits, or black/ bloody stools  Genito-Urinary ROS: no dysuria, trouble voiding, or hematuria  Musculoskeletal ROS: +Gait disturbance and leg swelling  Neurological ROS: no syncope or seizures; no ataxia  Dermatological ROS: negative for pruritis, rash and jaundice    PAST HISTORY:     Past Medical History:   Diagnosis Date    *Atrial fibrillation     Anticoagulant long-term use     Arthritis     Atrial fibrillation     Atrial fibrillation Feb 23, 2016    Bipolar disorder     CHF (congestive heart failure)     Congenital heart disease     s/p surgical intervention 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 of age    Deep vein thrombosis     DVT of leg (deep venous thrombosis)     left leg    History of prior ablation treatment     10/9/13    Hypertension     Obesity     Stroke     Thyroid disease     Venous stasis ulcer of lower extremity, unspecified laterality 12/14/2012    Venous ulcer        Past Surgical History:   Procedure Laterality Date    ANGIOPLASTY      CARDIAC SURGERY      open heart surgery 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 old    EYE SURGERY      left eye cataract/right eye glaucoma    TIMO FILTER PLACEMENT      Dr Calix ([facility])    KNEE SURGERY      l and r     MULTIPLE TOOTH EXTRACTIONS      SKIN GRAFT      left leg       Family History   Problem Relation Age of Onset    Diabetes Father     Heart disease Father     Heart disease Maternal Grandmother     Diabetes Maternal Grandfather     Heart disease Maternal Grandfather     Stroke Maternal Grandfather        Social History     Socioeconomic History    Marital status: Single     Spouse name: Not on file    Number of children: 2    Years of  education: Not on file    Highest education level: Not on file   Occupational History    Not on file   Social Needs    Financial resource strain: Not on file    Food insecurity:     Worry: Not on file     Inability: Not on file    Transportation needs:     Medical: Not on file     Non-medical: Not on file   Tobacco Use    Smoking status: Former Smoker     Packs/day: 1.00     Years: 6.00     Pack years: 6.00     Types: Cigarettes    Smokeless tobacco: Former User    Tobacco comment: quit by age 27yrs old   Substance and Sexual Activity    Alcohol use: Yes     Alcohol/week: 1.0 standard drinks     Types: 1 Glasses of wine per week     Frequency: Monthly or less     Comment: occasionally    Drug use: No    Sexual activity: Yes     Partners: Female     Birth control/protection: None   Lifestyle    Physical activity:     Days per week: Not on file     Minutes per session: Not on file    Stress: Not on file   Relationships    Social connections:     Talks on phone: Not on file     Gets together: Not on file     Attends Yarsani service: Not on file     Active member of club or organization: Not on file     Attends meetings of clubs or organizations: Not on file     Relationship status: Not on file   Other Topics Concern    Not on file   Social History Narrative    Not on file       MEDICATIONS & ALLERGIES:     Current Outpatient Medications on File Prior to Visit   Medication Sig Dispense Refill    ciclopirox (PENLAC) 8 % Soln Apply topically nightly as directed 6.6 mL 3    lisinopril 10 MG tablet Take 1 tablet (10 mg total) by mouth once daily. (Patient taking differently: Take 7.5 mg by mouth once daily. ) 30 tablet 6    metoprolol succinate (TOPROL-XL) 100 MG 24 hr tablet Take 1 tablet (100 mg total) by mouth once daily. (Patient taking differently: Take 100 mg by mouth every evening. ) 30 tablet 5    nystatin (MYCOSTATIN) powder Apply topically 2 (two) times daily. 60 g 1    terbinafine HCl  "(LAMISIL) 250 mg tablet Take 1 tablet (250 mg total) by mouth once daily. 45 tablet 0    torsemide (DEMADEX) 20 MG Tab Take 2 tablets (40 mg total) by mouth once daily. 180 tablet 3    [DISCONTINUED] rivaroxaban (XARELTO) 20 mg Tab Take 20 mg by mouth daily with dinner or evening meal.      enoxaparin (LOVENOX) 150 mg/mL Syrg Inject 1 mL (150 mg total) into the skin every 12 (twelve) hours. (Patient not taking: Reported on 10/7/2019) 180 mL 0    HYDROcodone-acetaminophen (NORCO) 5-325 mg per tablet Take 1 tablet by mouth every 6 (six) hours as needed for Pain. (Patient not taking: Reported on 10/7/2019) 10 tablet 0    hydrOXYzine HCl (ATARAX) 25 MG tablet Take 1 tablet (25 mg total) by mouth 4 (four) times daily as needed for Itching. (Patient not taking: Reported on 10/7/2019) 20 tablet 0     No current facility-administered medications on file prior to visit.         Review of patient's allergies indicates:   Allergen Reactions    Contrast media Other (See Comments)     Severe chest pain    Food allergy formula [glutamine-c-quercet-selen-brom]      Allergic to green peas; Heart failure.    Iodinated contrast media Other (See Comments)     Chest pain    Peas Hives    Ibuprofen Swelling    Latex, natural rubber Hives    Pcn [penicillins] Hives    Butisol [butabarbital] Rash     Peeling skin       OBJECTIVE:     Vital Signs:  Vitals:    10/07/19 1038   BP: 138/78   Pulse: 87     Wt Readings from Last 1 Encounters:   10/07/19 1128 (!) 155.6 kg (343 lb)     Body mass index is 37.73 kg/m².        Physical Exam:  /78   Pulse 87   Ht 6' 8" (2.032 m)   Wt (!) 155.8 kg (343 lb 7.6 oz)   BMI 37.73 kg/m²   General appearance: Alert, cooperative, no distress +Ambulating with walker  Constitutional: Oriented to person, place, and time. +Obese  HEENT: Normocephalic, atraumatic, neck symmetrical, no nasal discharge   Eyes: Conjunctivae/corneas clear, EOM's intact  Lungs: Clear to auscultation " bilaterally  Heart: +Irregular rhythm  Abdomen: Soft, non-tender; bowel sounds normoactive  Extremities: +Right lower leg with chronic skin changes due to venous stasis; non tender or warm  Neurologic: Alert and oriented X 3, normal strength  Psychiatric: no pressured speech; normal affect; no evidence of impaired cognition     Laboratory  Lab Results   Component Value Date    WBC 5.07 09/16/2019    HGB 7.8 (L) 09/16/2019    HCT 25.2 (L) 09/16/2019    MCV 81 (L) 09/16/2019     09/16/2019     BMP  Lab Results   Component Value Date     09/16/2019    K 3.4 (L) 09/16/2019     09/16/2019    CO2 30 (H) 09/16/2019    BUN 31 (H) 09/16/2019    CREATININE 1.0 09/16/2019    CALCIUM 8.9 09/16/2019    ANIONGAP 9 09/16/2019    ESTGFRAFRICA >60 09/16/2019    EGFRNONAA >60 09/16/2019     Lab Results   Component Value Date    ALT 22 09/16/2019    AST 32 09/16/2019    ALKPHOS 93 09/16/2019    BILITOT 0.4 09/16/2019     Lab Results   Component Value Date    INR 1.1 09/10/2019    INR 1.1 08/02/2019    INR 3.4 (H) 07/27/2019     Lab Results   Component Value Date    HGBA1C 5.2 09/10/2019     No results for input(s): POCTGLUCOSE in the last 72 hours.    Diagnostic Results:    US LE veins bilateral   Nonocclusive deep venous thrombosis seen within the bilateral femoral veins.     US upper extremity veins bilateral  No DVT of the right or left upper extremity    ASSESSMENT & PLAN:       History of DVT (deep vein thrombosis)  -     rivaroxaban (XARELTO) 20 mg Tab; Take 1 tablet (20 mg total) by mouth daily with dinner or evening meal.  Dispense: 90 tablet; Refill: 1    Long term (current) use of anticoagulants   -Heme/onc recommended continued Lovenox due to unclear efficacy of DOACs in morbidly obese patients   -Patient stopped Lovenox and is now taking Xarelto instead  -     rivaroxaban (XARELTO) 20 mg Tab; Take 1 tablet (20 mg total) by mouth daily with dinner or evening meal.  Dispense: 90 tablet; Refill:  1    Chronic atrial fibrillation   -Continue anticoag with Xarelto 20mg and metoprolol 100mg daily    Essential hypertension   -/78 today   -Continue current medications    Venous stasis dermatitis of both lower extremities   -Continue to elevate and use compression stockings    Cellulitis of right lower leg   -Resolved    Hyperthyroidism   -Followed by [facility] (appointment in 2 weeks)    Skin ulcer of left foot, limited to breakdown of skin   -Wound care appointment later today    Instructions for the patient:       Scheduled Follow-up :  Future Appointments   Date Time Provider Department Center   10/28/2019  3:20 PM Garrison Roach MD [facility]   10/30/2019  3:30 PM Josey Brown NP [facility] [address]   11/5/2019  2:00 PM APPOINTMENT POPEYE HERNÁNDEZ [facility] LAB [facility]   11/12/2019  2:00 PM Alan Plascencia MD [facility] HEM ONC [facility]       Post Visit Medication List:     Medication List           Accurate as of October 7, 2019  1:56 PM. If you have any questions, ask your nurse or doctor.               CHANGE how you take these medications    lisinopril 10 MG tablet  Take 1 tablet (10 mg total) by mouth once daily.  What changed:  how much to take     metoprolol succinate 100 MG 24 hr tablet  Commonly known as:  TOPROL-XL  Take 1 tablet (100 mg total) by mouth once daily.  What changed:  when to take this        CONTINUE taking these medications    ciclopirox 8 % Soln  Commonly known as:  PENLAC  Apply topically nightly as directed     enoxaparin 150 mg/mL Syrg  Commonly known as:  LOVENOX  Inject 1 mL (150 mg total) into the skin every 12 (twelve) hours.     HYDROcodone-acetaminophen 5-325 mg per tablet  Commonly known as:  NORCO  Take 1 tablet by mouth every 6 (six) hours as needed for Pain.     hydrOXYzine HCl 25 MG tablet  Commonly known as:  ATARAX  Take 1 tablet (25 mg total) by mouth 4 (four) times daily as needed for Itching.     nystatin powder  Commonly known as:   MYCOSTATIN  Apply topically 2 (two) times daily.     rivaroxaban 20 mg Tab  Commonly known as:  XARELTO  Take 1 tablet (20 mg total) by mouth daily with dinner or evening meal.     terbinafine HCl 250 mg tablet  Commonly known as:  LAMISIL  Take 1 tablet (250 mg total) by mouth once daily.     torsemide 20 MG Tab  Commonly known as:  DEMADEX  Take 2 tablets (40 mg total) by mouth once daily.           Where to Get Your Medications      These medications were sent to [id number] - [place] - [address] AT [facility]  [address] [place] [address]    Phone:  [phone]   · rivaroxaban 20 mg Tab         Signing Physician:  Sg Medina PA-C

## 2019-10-18 NOTE — PROCEDURES
"Debridement  Date/Time: 10/18/2019 3:55 PM  Performed by: Pool Gutierrez MD  Authorized by: Pool Gutierrez MD     Time out: Immediately prior to procedure a "time out" was called to verify the correct patient, procedure, equipment, support staff and site/side marked as required.    Consent Done?:  Yes (Verbal)    Preparation: Patient was prepped and draped in usual sterile fashion    Local anesthesia used?: Yes    Local anesthetic:  Topical anesthetic    Wound Details:    Location:  Left leg    Type of Debridement:  Excisional       Length (cm):  1       Area (sq cm):  2       Width (cm):  2       Percent Debrided (%):  100       Depth (cm):  0.1       Total Area Debrided (sq cm):  2    Depth of debridement:  Subcutaneous tissue    Tissue debrided:  Subcutaneous and Dermis    Devitalized tissue debrided:  Biofilm, Fibrin and Slough    Instruments:  Curette    Bleeding:  Minimal  Patient tolerance:  Patient tolerated the procedure well with no immediate complications      "

## 2019-10-21 ENCOUNTER — HOSPITAL ENCOUNTER (OUTPATIENT)
Dept: WOUND CARE | Facility: HOSPITAL | Age: 54
Discharge: HOME | End: 2019-10-21
Attending: EMERGENCY MEDICINE
Payer: MEDICARE

## 2019-10-21 VITALS
WEIGHT: 315 LBS | HEIGHT: 78 IN | BODY MASS INDEX: 36.45 KG/M2 | HEART RATE: 105 BPM | DIASTOLIC BLOOD PRESSURE: 55 MMHG | SYSTOLIC BLOOD PRESSURE: 122 MMHG

## 2019-10-21 DIAGNOSIS — I83.009 VENOUS STASIS ULCER OF LOWER EXTREMITY, UNSPECIFIED LATERALITY: ICD-10-CM

## 2019-10-21 DIAGNOSIS — R60.9 EDEMA, UNSPECIFIED TYPE: ICD-10-CM

## 2019-10-21 DIAGNOSIS — L97.909 VENOUS STASIS ULCER OF LOWER EXTREMITY, UNSPECIFIED LATERALITY: ICD-10-CM

## 2019-10-21 DIAGNOSIS — L97.329 NON-PRESSURE CHRONIC ULCER OF LEFT ANKLE, WITH UNSPECIFIED SEVERITY: Primary | ICD-10-CM

## 2019-10-21 DIAGNOSIS — R60.0 BILATERAL EDEMA OF LOWER EXTREMITY: ICD-10-CM

## 2019-10-21 PROCEDURE — 27201912 HC WOUND CARE DEBRIDEMENT SUPPLIES

## 2019-10-21 PROCEDURE — 11042 DBRDMT SUBQ TIS 1ST 20SQCM/<: CPT

## 2019-10-21 NOTE — PROGRESS NOTES
"Subjective:       Patient ID: Drew Farrar is a 54 y.o. male.    Chief Complaint: Venous Stasis    2/15/19: Readmitted for venous ulcer to left lateral foot which developed about 2 weeks ago. Pulses noted with doppler and capillary refill <3.Dr Gutierrez assessed patient and wound care plan ordered for compression therapy and hydrafera blue to wound bed. No apparent signs of infection noted. Patient to follw-up in clinic in 2 weeks.DB   2/21/19: Nurse visit for dressing change. Refused care to skin tear on left 2nd toe. See notes. RTC 1 week for DrRosy Visit.  3/1/19: Follow up with Dr. Waller today in clinic new wound to right posterior leg, culture of both wounds taken today in clinic new wound care orders to both legs for xeroform and unna with calamine toe to knee follow up in 1 week with Dr. Gutierrez  3/8/19: Follow up with Dr. Gutierrez today in clinic wounds improving, edema noted to BLE, profore toe to knee applied to both legs, RX given to patient for PO Doxycycline, follow up in 1 week.   3/15/19: Here for f/u with Dr. Gutierrez. U/S scheduled Thursday. Will need right leg rewrapped. Patient states eye DrRosy Gave him the same antibiotic dose and strength after eye surgery. Dr instructed patient to take one bottle of antibiotics for both wounds and eyes until finished. Gentamycin added to wound care orders.   3/29/19:  Wound slowly improving. No changes in wound care orders. Wound debrided per Dr Gutierrez.  4/4/19: Patient showed up to clinic today stated " I need my dressing changed. It fell off."  Doppler pluses checked and present.  Patient refusing care to right, no stocking present on leg at this visit. Patient stated " No they said this leg is discharged, Dr. Lantigua has to drain the fluid out this leg with a needle. No wound care to this leg anymore when I come."      9/9/19: Readmit to wound care clinic for venous ulcer to left lateral foot.  Patient states it reopened about 3 weeks after last discharge " "from wound care clinic 7/1/19. Per patient wound has been treated by PCP.  Patient reports just using a large band- aid and compression sock. Patient states he was recently treated at [facility] for HBOT for this wound " in the last two or three months after it reopened "  Requesting HBOT here at the wound care clinic.  Dr. Gutierrez examined patient today, doppler pulses present, wound debrided with curette by Dr. Gutierrez patient tolerated well. Wound care orders given to apply hydrorefa blue ready to wound, cover with mepore dressing and apply patient's compression sock. Dr. Manuel discussed with patient obtaining his medical records from [facility], to view to determine if patient will benefit from HBOT, patient verbalized understanding.  Authorization for release of health information sheet signed by patient and faxed to [facility] today in clinic. Follow up in 1 week with MD.  9/23/19: F/U with Dr. Gutierrez. Client recently discharged from hospital. Left foot site debrided. Client being followed by [facility].    10/07/19: F/u with dr. Gutierrez. Wound debrided in clinic today. Continue with current wound care treatment as ordered. Follow up in clinic in 2 weeks  10/21/19: Seen today in clinic by Dr. Gutierrez wound debrided in clinic today by Dr. Gutierrez tolerated well. Wound care continued as ordered. Fu 2 wks in clinic.  No c/o noted.  Review of Systems    Objective:    Wound as noted w/o Sophia's sign or s/s of infection.  Physical Exam    Assessment:       1. Non-pressure chronic ulcer of left ankle, with unspecified severity    2. Venous stasis ulcer of lower extremity, unspecified laterality    3. Edema, unspecified type    4. Bilateral edema of lower extremity           Wound 09/09/19 1057 Venous Ulcer lateral Foot #1 (Active)   09/09/19 1057    Pre-existing: Yes   Primary Wound Type: Venous ulcer   Side: Left   Orientation: lateral   Location: Foot   Wound/PI Number (optional): #1   Ankle-Brachial " Index:    Pulses:    Removal Indication and Assessment:    Wound Outcome:    (Retired) Wound Type:    (Retired) Wound Length (cm):    (Retired) Wound Width (cm):    (Retired) Depth (cm):    Wound Description (Comments):    Removal Indications:    Wound Image   10/21/2019 11:07 AM   Dressing Appearance Intact;Moist drainage 10/21/2019 11:07 AM   Drainage Amount Small 10/21/2019 11:07 AM   Drainage Characteristics/Odor Serosanguineous 10/21/2019 11:07 AM   Appearance Pink;Moist;Yellow;Fibrin 10/21/2019 11:07 AM   Tissue loss description Full thickness 10/21/2019 11:07 AM   Red (%), Wound Tissue Color 25 % 10/21/2019 11:07 AM   Yellow (%), Wound Tissue Color 75 % 10/21/2019 11:07 AM   Periwound Area Intact;Pink;Edematous 10/21/2019 11:07 AM   Wound Edges Defined 10/21/2019 11:07 AM   Wound Length (cm) 1.2 cm 10/21/2019 11:07 AM   Wound Width (cm) 1.9 cm 10/21/2019 11:07 AM   Wound Depth (cm) 0.2 cm 10/21/2019 11:07 AM   Wound Volume (cm^3) 0.46 cm^3 10/21/2019 11:07 AM   Wound Surface Area (cm^2) 2.28 cm^2 10/21/2019 11:07 AM   Care Debrided 10/21/2019 11:38 AM   Dressing Tubular bandage;Island/border;Hydrofiber;Other (see comments) 10/21/2019 11:12 AM   Periwound Care Skin barrier film applied 10/21/2019 11:12 AM   Compression Tubular elasticized bandage 10/21/2019 11:12 AM   Off Loading Off loading shoe 10/21/2019 11:12 AM   Dressing Change Due 10/23/19 10/21/2019 11:38 AM        Left lateral Foot #1  Cleanse wound with:NS  Lidocaine: Prn Debridement  Periwound care: Sween to dry skin Prn  Primary dressing:  Santyl, Hydrofera blue ready to wound  Secondary dressing: 3 x 3 aquacel bordered foam  Offloading: Patient's shoe left foot,Darco shoe right foot  Edema control: Patient's own compression stockings/socks left leg, Tubigrip f x 2 right leg toes to knee  Frequency: Monday, Wednesday, Friday, and prn  Follow-up: in 2 weeks with Dr. Gutierrez Monday 11/4/19    [facility]: phone# [phone], fax#  [phone]. Admit for wound care Monday, Wednesday, Friday, and prn. Orders as above. Next visit Dr. Gutierrez Monday 11/4/19.        Plan:                2 week Dr. Gutierrez 11/4/19

## 2019-10-21 NOTE — PROCEDURES
"Debridement  Date/Time: 10/7/2019 5:31 PM  Performed by: Pool Gutierrez MD  Authorized by: Pool Gutierrez MD     Time out: Immediately prior to procedure a "time out" was called to verify the correct patient, procedure, equipment, support staff and site/side marked as required.    Consent Done?:  Yes (Verbal)    Preparation: Patient was prepped and draped in usual sterile fashion    Local anesthesia used?: Yes    Local anesthetic:  Topical anesthetic    Wound Details:    Location:  Left leg    Type of Debridement:  Excisional       Length (cm):  1.2       Area (sq cm):  2.16       Width (cm):  1.8       Percent Debrided (%):  100       Depth (cm):  0.2       Total Area Debrided (sq cm):  2.16    Depth of debridement:  Subcutaneous tissue    Tissue debrided:  Subcutaneous, Epidermis and Dermis    Devitalized tissue debrided:  Biofilm, Fibrin and Slough    Instruments:  Curette    Bleeding:  Minimal  Patient tolerance:  Patient tolerated the procedure well with no immediate complications      "

## 2019-10-28 NOTE — PROGRESS NOTES
Subjective:      Patient ID: Drew Farrar is a 54 y.o. male.    Chief Complaint:  Follow-up (restarting thyroid medication)    History of Present Illness  Drew Farrar is here for follow up of hyperthyroidism.  Previously seen by Dr. Lazcano.  Last seen 1/4/18.  This is their first visit with me.      With regards to hyperthyroidism:  Diagnosed ~2012.     Ref. Range 8/2/2019 22:48 8/3/2019 00:15   Thyrotropin Receptor Ab Latest Ref Range: 0.00 - 1.75 IU/L 21 (H)    Thyroperoxidase Antibodies Latest Ref Range: <6.0 IU/mL  8.8 (H)   Thyroid-Stim IG Quantitative Latest Ref Range: <0.10 IU/L  9.47 (H)        Ref. Range 9/10/2019 18:48 9/11/2019 08:00   TSH Latest Ref Range: 0.400 - 4.000 uIU/mL <0.010 (L)    Free T4 Latest Ref Range: 0.71 - 1.51 ng/dL 2.31 (H) 2.43 (H)     Current Symptoms:   - Palpations  - Weight loss  - Diarrhea  - Hair loss  - Brittle nails  - Skin changes  - Tremor   - Anxiety    Current medication:  MMI (started 10/2016) patient reports this was stopped when he was in the hospital 9/2019 - although chart review shows this was stopped prior  Metoprolol 100mg daily    Any recent (3-6 months) iodine or contrast?  4/25/19 (CT Chest) - patient reports he was NOT given contrast but during chart review it appears he was given some.    Smoke: None    Thyroid tenderness?   None    Review of Systems   Constitutional: Negative for fatigue.   Eyes: Negative for visual disturbance.   Respiratory: Negative for shortness of breath.    Cardiovascular: Negative for chest pain.   Gastrointestinal: Negative for abdominal pain.   Musculoskeletal: Negative for arthralgias.   Skin: Negative for wound.   Neurological: Negative for headaches.   Hematological: Does not bruise/bleed easily.   Psychiatric/Behavioral: Negative for sleep disturbance.     Objective:   Physical Exam   Neck: Thyromegaly present.   Cardiovascular: Normal rate.   No edema present   Pulmonary/Chest: Effort normal.   Abdominal: Soft.   Vitals  reviewed.    Visit Vitals  Wt (!) 152 kg (335 lb)   BMI 36.80 kg/m²       Body mass index is 36.8 kg/m².    Lab Review:   Lab Results   Component Value Date    HGBA1C 5.2 09/10/2019     Lab Results   Component Value Date    CHOL 124 07/10/2017    HDL 23 (L) 07/10/2017    LDLCALC 70.4 07/10/2017    TRIG 153 (H) 07/10/2017    CHOLHDL 18.5 (L) 07/10/2017     Lab Results   Component Value Date     09/16/2019    K 3.4 (L) 09/16/2019     09/16/2019    CO2 30 (H) 09/16/2019    GLU 94 09/16/2019    BUN 31 (H) 09/16/2019    CREATININE 1.0 09/16/2019    CALCIUM 8.9 09/16/2019    PROT 7.1 09/16/2019    ALBUMIN 2.9 (L) 09/16/2019    BILITOT 0.4 09/16/2019    ALKPHOS 93 09/16/2019    AST 32 09/16/2019    ALT 22 09/16/2019    ANIONGAP 9 09/16/2019    ESTGFRAFRICA >60 09/16/2019    EGFRNONAA >60 09/16/2019    TSH <0.010 (L) 09/10/2019     Assessment and Plan     1. Hyperthyroidism  TSH    T4, free    Comprehensive metabolic panel    US Soft Tissue Head Neck Thyroid   2. Chronic systolic heart failure     3. Essential hypertension     4. Chronic atrial fibrillation       Hyperthyroidism  -- Repeat labs today.  -- If confirmed, will restart MMI 10mg daily.  Thionamide monitoring: I have reviewed the risk of agranulocytosis that can occur in 1 of 500 patients who are taking either PTU or methimazole. I have also reviewed the even rarer risk of hepatic dysfunction. A baseline CBC with differential and Liver function tests are available.    --Likely secondary to Graves disease given positive TRAb  --Given cardiac status and chronic uncontrolled Grave's would recommend definitive treatment- patient reports he cannot take BURGOS, will further discuss with team definitive treatment.  --Schedule US due to thyromegaly.  --Already on high dose beta blocker.    Chronic systolic heart failure  -- Continue following with cardiology.    HTN (hypertension)  -- Controlled on current meds.     Chronic atrial fibrillation  -- Continue  following with cardiology.    Follow up in about 6 months (around 4/30/2020).

## 2019-10-30 ENCOUNTER — OFFICE VISIT (OUTPATIENT)
Dept: ENDOCRINOLOGY | Facility: CLINIC | Age: 54
End: 2019-10-30
Payer: MEDICARE

## 2019-10-30 VITALS — WEIGHT: 315 LBS | BODY MASS INDEX: 36.8 KG/M2

## 2019-10-30 DIAGNOSIS — I50.22 CHRONIC SYSTOLIC HEART FAILURE: ICD-10-CM

## 2019-10-30 DIAGNOSIS — I10 ESSENTIAL HYPERTENSION: ICD-10-CM

## 2019-10-30 DIAGNOSIS — E05.90 HYPERTHYROIDISM: Primary | Chronic | ICD-10-CM

## 2019-10-30 DIAGNOSIS — I48.20 CHRONIC ATRIAL FIBRILLATION: ICD-10-CM

## 2019-10-30 PROCEDURE — 99999 PR PBB SHADOW E&M-EST. PATIENT-LVL III: CPT | Mod: PBBFAC,,, | Performed by: NURSE PRACTITIONER

## 2019-10-30 PROCEDURE — 99214 PR OFFICE/OUTPT VISIT, EST, LEVL IV, 30-39 MIN: ICD-10-PCS | Mod: S$GLB,,, | Performed by: NURSE PRACTITIONER

## 2019-10-30 PROCEDURE — 99214 OFFICE O/P EST MOD 30 MIN: CPT | Mod: S$GLB,,, | Performed by: NURSE PRACTITIONER

## 2019-10-30 PROCEDURE — 3008F BODY MASS INDEX DOCD: CPT | Mod: CPTII,S$GLB,, | Performed by: NURSE PRACTITIONER

## 2019-10-30 PROCEDURE — 3008F PR BODY MASS INDEX (BMI) DOCUMENTED: ICD-10-PCS | Mod: CPTII,S$GLB,, | Performed by: NURSE PRACTITIONER

## 2019-10-30 PROCEDURE — 99999 PR PBB SHADOW E&M-EST. PATIENT-LVL III: ICD-10-PCS | Mod: PBBFAC,,, | Performed by: NURSE PRACTITIONER

## 2019-10-30 NOTE — LETTER
October 30, 2019      Wilberto Whittaker MD  [address] [place] [address]           [facility] - Endocrinology 6th [place]  [address]  [facility] [address]  Phone: [phone]  Fax: [phone]          Patient: Drew Farrar   MR Number: [id number]   YOB: 1965   Date of Visit: 10/30/2019       Dear Dr. Wilberto Whittaker:    Thank you for referring Drew Farrar to me for evaluation. Attached you will find relevant portions of my assessment and plan of care.    If you have questions, please do not hesitate to call me. I look forward to following Drew Farrar along with you.    Sincerely,    Josey Brown NP    Enclosure  CC:  No Recipients    If you would like to receive this communication electronically, please contact [email or web address] or [phone] to request more information on [facility] access.    For providers and/or their staff who would like to refer a patient to [facility], please contact us through our one-stop-shop provider referral line, [facility] [place], at [phone].    If you feel you have received this communication in error or would no longer like to receive these types of communications, please e-mail [email or web address]

## 2019-10-30 NOTE — ASSESSMENT & PLAN NOTE
-- Repeat labs today.  -- If confirmed, will restart MMI 10mg daily.  Thionamide monitoring: I have reviewed the risk of agranulocytosis that can occur in 1 of 500 patients who are taking either PTU or methimazole. I have also reviewed the even rarer risk of hepatic dysfunction. A baseline CBC with differential and Liver function tests are available.    --Likely secondary to Graves disease given positive TRAb  --Given cardiac status and chronic uncontrolled Grave's would recommend definitive treatment- patient reports he cannot take BURGOS, will further discuss with team definitive treatment.  --Schedule US due to thyromegaly.  --Already on high dose beta blocker.

## 2019-10-31 ENCOUNTER — PATIENT MESSAGE (OUTPATIENT)
Dept: ENDOCRINOLOGY | Facility: CLINIC | Age: 54
End: 2019-10-31

## 2019-10-31 DIAGNOSIS — E05.90 HYPERTHYROIDISM: Primary | ICD-10-CM

## 2019-10-31 RX ORDER — METHIMAZOLE 10 MG/1
20 TABLET ORAL DAILY
Qty: 60 TABLET | Refills: 4 | Status: SHIPPED | OUTPATIENT
Start: 2019-10-31 | End: 2020-02-27 | Stop reason: SDUPTHER

## 2019-10-31 NOTE — TELEPHONE ENCOUNTER
Biochemically hyperthyroid.  Patient has been off of MMI for unknown duration.  Restart MMI 20mg daily with repeat labs in 4 weeks.

## 2019-11-01 NOTE — PROCEDURES
"Debridement  Date/Time: 10/21/2019 5:18 PM  Performed by: Pool Gutierrez MD  Authorized by: Pool Gutierrez MD     Time out: Immediately prior to procedure a "time out" was called to verify the correct patient, procedure, equipment, support staff and site/side marked as required.    Consent Done?:  Yes (Verbal)    Preparation: Patient was prepped and draped in usual sterile fashion    Local anesthesia used?: Yes    Local anesthetic:  Topical anesthetic    Wound Details:    Location:  Left leg    Type of Debridement:  Excisional       Length (cm):  1.2       Area (sq cm):  2.28       Width (cm):  1.9       Percent Debrided (%):  100       Depth (cm):  0.2       Total Area Debrided (sq cm):  2.28    Depth of debridement:  Subcutaneous tissue    Tissue debrided:  Subcutaneous and Dermis    Devitalized tissue debrided:  Biofilm, Fibrin and Slough    Instruments:  Curette    Bleeding:  Minimal  Patient tolerance:  Patient tolerated the procedure well with no immediate complications      "

## 2019-11-04 ENCOUNTER — HOSPITAL ENCOUNTER (OUTPATIENT)
Dept: WOUND CARE | Facility: HOSPITAL | Age: 54
Discharge: HOME | End: 2019-11-04
Attending: EMERGENCY MEDICINE
Payer: MEDICARE

## 2019-11-04 VITALS
BODY MASS INDEX: 36.45 KG/M2 | DIASTOLIC BLOOD PRESSURE: 65 MMHG | HEIGHT: 78 IN | SYSTOLIC BLOOD PRESSURE: 141 MMHG | WEIGHT: 315 LBS | HEART RATE: 126 BPM

## 2019-11-04 DIAGNOSIS — I83.009 VENOUS STASIS ULCER OF LOWER EXTREMITY, UNSPECIFIED LATERALITY: ICD-10-CM

## 2019-11-04 DIAGNOSIS — L97.909 VENOUS STASIS ULCER OF LOWER EXTREMITY, UNSPECIFIED LATERALITY: ICD-10-CM

## 2019-11-04 DIAGNOSIS — S91.309A WOUND OF FOOT: Primary | ICD-10-CM

## 2019-11-04 PROCEDURE — 87075 CULTR BACTERIA EXCEPT BLOOD: CPT

## 2019-11-04 PROCEDURE — 11042 DBRDMT SUBQ TIS 1ST 20SQCM/<: CPT

## 2019-11-04 PROCEDURE — 87070 CULTURE OTHR SPECIMN AEROBIC: CPT

## 2019-11-04 PROCEDURE — 27201912 HC WOUND CARE DEBRIDEMENT SUPPLIES

## 2019-11-04 PROCEDURE — 87186 SC STD MICRODIL/AGAR DIL: CPT

## 2019-11-04 PROCEDURE — 87076 CULTURE ANAEROBE IDENT EACH: CPT | Mod: 59

## 2019-11-04 PROCEDURE — 87077 CULTURE AEROBIC IDENTIFY: CPT | Mod: 59

## 2019-11-04 RX ORDER — CLINDAMYCIN HYDROCHLORIDE 300 MG/1
300 CAPSULE ORAL EVERY 6 HOURS
COMMUNITY
End: 2019-11-12

## 2019-11-04 NOTE — PROGRESS NOTES
"Subjective:       Patient ID: Drew Farrar is a 54 y.o. male.    Chief Complaint: Non-healing Wound    2/15/19: Readmitted for venous ulcer to left lateral foot which developed about 2 weeks ago. Pulses noted with doppler and capillary refill <3.Dr Gutierrez assessed patient and wound care plan ordered for compression therapy and hydrafera blue to wound bed. No apparent signs of infection noted. Patient to follw-up in clinic in 2 weeks.DB   2/21/19: Nurse visit for dressing change. Refused care to skin tear on left 2nd toe. See notes. RTC 1 week for DrRosy Visit.  3/1/19: Follow up with Dr. Waller today in clinic new wound to right posterior leg, culture of both wounds taken today in clinic new wound care orders to both legs for xeroform and unna with calamine toe to knee follow up in 1 week with Dr. Gutierrez  3/8/19: Follow up with Dr. Gutierrez today in clinic wounds improving, edema noted to BLE, profore toe to knee applied to both legs, RX given to patient for PO Doxycycline, follow up in 1 week.   3/15/19: Here for f/u with Dr. Gutierrez. U/S scheduled Thursday. Will need right leg rewrapped. Patient states eye DrRosy Gave him the same antibiotic dose and strength after eye surgery. Dr instructed patient to take one bottle of antibiotics for both wounds and eyes until finished. Gentamycin added to wound care orders.   3/29/19:  Wound slowly improving. No changes in wound care orders. Wound debrided per Dr Gutierrez.  4/4/19: Patient showed up to clinic today stated " I need my dressing changed. It fell off."  Doppler pluses checked and present.  Patient refusing care to right, no stocking present on leg at this visit. Patient stated " No they said this leg is discharged, Dr. Lantigua has to drain the fluid out this leg with a needle. No wound care to this leg anymore when I come."      9/9/19: Readmit to wound care clinic for venous ulcer to left lateral foot.  Patient states it reopened about 3 weeks after last " "discharge from wound care clinic 7/1/19. Per patient wound has been treated by PCP.  Patient reports just using a large band- aid and compression sock. Patient states he was recently treated at [facility] for HBOT for this wound " in the last two or three months after it reopened "  Requesting HBOT here at the wound care clinic.  Dr. Gutierrez examined patient today, doppler pulses present, wound debrided with curette by Dr. Gutierrez patient tolerated well. Wound care orders given to apply hydrorefa blue ready to wound, cover with mepore dressing and apply patient's compression sock. Dr. Manuel discussed with patient obtaining his medical records from [facility], to view to determine if patient will benefit from HBOT, patient verbalized understanding.  Authorization for release of health information sheet signed by patient and faxed to [facility] today in clinic. Follow up in 1 week with MD.  9/23/19: F/U with Dr. Gutierrez. Client recently discharged from hospital. Left foot site debrided. Client being followed by [facility].    10/07/19: F/u with dr. Gutierrez. Wound debrided in clinic today. Continue with current wound care treatment as ordered. Follow up in clinic in 2 weeks  10/21/19: Seen today in clinic by Dr. Gutierrez wound debrided in clinic today by Dr. Gutierrez tolerated well. Wound care continued as ordered. Fu 2 wks in clinic.  No c/o noted.    11/04/19: F/u with Dr. Gutierrez. Wound increased in size with malodorous drainage. Wound debrided and Culture taken. Orders for 4 layer compression wrap initiated today. Rx for po clindamycin given to patient. Patient to begin taking today. Follow up in 1 weeks at wound care clinic.  No c/o chills or fever elicited.  Review of Systems    Objective:    Wound as noted w/ recurrence of brownish fibrinous material in wound bed + mild tenderness & ? Hyperemia in vick-wound area.  Physical Exam    Assessment:       1. Wound of foot    2. Venous stasis ulcer of lower " extremity, unspecified laterality           Wound 09/09/19 1057 Venous Ulcer lateral Foot #1 (Active)   09/09/19 1057    Pre-existing: Yes   Primary Wound Type: Venous ulcer   Side: Left   Orientation: lateral   Location: Foot   Wound/PI Number (optional): #1   Ankle-Brachial Index:    Pulses:    Removal Indication and Assessment:    Wound Outcome:    (Retired) Wound Type:    (Retired) Wound Length (cm):    (Retired) Wound Width (cm):    (Retired) Depth (cm):    Wound Description (Comments):    Removal Indications:    Dressing Appearance Intact;Moist drainage 11/4/2019 11:00 AM   Drainage Amount Moderate 11/4/2019 11:00 AM   Drainage Characteristics/Odor Serosanguineous;Malodorous 11/4/2019 11:00 AM   Appearance Pink;Red;Yellow;Moist 11/4/2019 11:00 AM   Tissue loss description Full thickness 11/4/2019 11:00 AM   Red (%), Wound Tissue Color 90 % 11/4/2019 11:00 AM   Yellow (%), Wound Tissue Color 10 % 11/4/2019 11:00 AM   Periwound Area Intact;Pink;Hemosiderin Staining 11/4/2019 11:00 AM   Wound Edges Defined 11/4/2019 11:00 AM   Wound Length (cm) 1.2 cm 11/4/2019 11:00 AM   Wound Width (cm) 2.5 cm 11/4/2019 11:00 AM   Wound Depth (cm) 0.1 cm 11/4/2019 11:00 AM   Wound Volume (cm^3) 0.3 cm^3 11/4/2019 11:00 AM   Wound Surface Area (cm^2) 3 cm^2 11/4/2019 11:00 AM   Care Cleansed with:;Sterile normal saline 11/4/2019 11:00 AM       Left lateral Foot #1  Cleanse wound with:NS  Lidocaine: 4% topical solution  Periwound care: Sween to dry skin Prn, betamethasone to LLE  Primary dressing:  Santyl, Hydrofera blue ready to wound  Secondary dressing: aquacel foam  Offloading: Darco shoe left foot  Edema control: Profore 4 layer compression wrap toes to knee LLE  Tubigrip f x 2 right leg toes to knee  Frequency: Monday, Wednesday, Friday, and prn    Other orders: Culture taken today, rx for po clindamycin given to patient (300mg. Tid.    [facility]: [facility]: phone# [phone], fax# [phone]. Admit for wound care  Monday, Wednesday, Friday, and prn. Orders as above. Next visit Dr. Gutierrez Monday 11/11/19.    Wound debrided in clinic today per Dr. Gutierrez    Plan:                Follow up in about 1 week (around 11/11/2019) for Dr. Gutierrez.

## 2019-11-05 ENCOUNTER — LAB VISIT (OUTPATIENT)
Dept: LAB | Facility: HOSPITAL | Age: 54
End: 2019-11-05
Attending: INTERNAL MEDICINE
Payer: MEDICARE

## 2019-11-05 DIAGNOSIS — D53.9 NUTRITIONAL ANEMIA: ICD-10-CM

## 2019-11-05 DIAGNOSIS — D63.8 ANEMIA OF CHRONIC DISEASE: ICD-10-CM

## 2019-11-05 LAB
ALBUMIN SERPL BCP-MCNC: 3.7 G/DL (ref 3.5–5.2)
ALP SERPL-CCNC: 141 U/L (ref 55–135)
ALT SERPL W/O P-5'-P-CCNC: 23 U/L (ref 10–44)
ANION GAP SERPL CALC-SCNC: 11 MMOL/L (ref 8–16)
AST SERPL-CCNC: 34 U/L (ref 10–40)
BASOPHILS # BLD AUTO: 0.02 K/UL (ref 0–0.2)
BASOPHILS NFR BLD: 0.4 % (ref 0–1.9)
BILIRUB SERPL-MCNC: 0.5 MG/DL (ref 0.1–1)
BUN SERPL-MCNC: 27 MG/DL (ref 6–20)
CALCIUM SERPL-MCNC: 10 MG/DL (ref 8.7–10.5)
CHLORIDE SERPL-SCNC: 107 MMOL/L (ref 95–110)
CO2 SERPL-SCNC: 22 MMOL/L (ref 23–29)
CREAT SERPL-MCNC: 1 MG/DL (ref 0.5–1.4)
DIFFERENTIAL METHOD: ABNORMAL
EOSINOPHIL # BLD AUTO: 0.2 K/UL (ref 0–0.5)
EOSINOPHIL NFR BLD: 3.7 % (ref 0–8)
ERYTHROCYTE [DISTWIDTH] IN BLOOD BY AUTOMATED COUNT: 14.9 % (ref 11.5–14.5)
ERYTHROCYTE [SEDIMENTATION RATE] IN BLOOD BY WESTERGREN METHOD: 67 MM/HR (ref 0–10)
EST. GFR  (AFRICAN AMERICAN): >60 ML/MIN/1.73 M^2
EST. GFR  (NON AFRICAN AMERICAN): >60 ML/MIN/1.73 M^2
FERRITIN SERPL-MCNC: 145 NG/ML (ref 20–300)
FOLATE SERPL-MCNC: 14.3 NG/ML (ref 4–24)
GLUCOSE SERPL-MCNC: 89 MG/DL (ref 70–110)
HCT VFR BLD AUTO: 31.7 % (ref 40–54)
HGB BLD-MCNC: 9.7 G/DL (ref 14–18)
IRON SERPL-MCNC: 41 UG/DL (ref 45–160)
LYMPHOCYTES # BLD AUTO: 1.1 K/UL (ref 1–4.8)
LYMPHOCYTES NFR BLD: 20.4 % (ref 18–48)
MCH RBC QN AUTO: 24.7 PG (ref 27–31)
MCHC RBC AUTO-ENTMCNC: 30.6 G/DL (ref 32–36)
MCV RBC AUTO: 81 FL (ref 82–98)
MONOCYTES # BLD AUTO: 0.8 K/UL (ref 0.3–1)
MONOCYTES NFR BLD: 15.3 % (ref 4–15)
NEUTROPHILS # BLD AUTO: 3.1 K/UL (ref 1.8–7.7)
NEUTROPHILS NFR BLD: 60.2 % (ref 38–73)
PLATELET # BLD AUTO: 203 K/UL (ref 150–350)
PMV BLD AUTO: 9.6 FL (ref 9.2–12.9)
POTASSIUM SERPL-SCNC: 4.8 MMOL/L (ref 3.5–5.1)
PROT SERPL-MCNC: 8.4 G/DL (ref 6–8.4)
RBC # BLD AUTO: 3.93 M/UL (ref 4.6–6.2)
SATURATED IRON: 10 % (ref 20–50)
SODIUM SERPL-SCNC: 140 MMOL/L (ref 136–145)
TOTAL IRON BINDING CAPACITY: 416 UG/DL (ref 250–450)
TRANSFERRIN SERPL-MCNC: 281 MG/DL (ref 200–375)
VIT B12 SERPL-MCNC: 1015 PG/ML (ref 210–950)
WBC # BLD AUTO: 5.15 K/UL (ref 3.9–12.7)

## 2019-11-05 PROCEDURE — 85652 RBC SED RATE AUTOMATED: CPT

## 2019-11-05 PROCEDURE — 83520 IMMUNOASSAY QUANT NOS NONAB: CPT

## 2019-11-05 PROCEDURE — 82607 VITAMIN B-12: CPT

## 2019-11-05 PROCEDURE — 86334 PATHOLOGIST INTERPRETATION IFE: ICD-10-PCS | Mod: 26,,, | Performed by: PATHOLOGY

## 2019-11-05 PROCEDURE — 86334 IMMUNOFIX E-PHORESIS SERUM: CPT

## 2019-11-05 PROCEDURE — 83540 ASSAY OF IRON: CPT

## 2019-11-05 PROCEDURE — 80053 COMPREHEN METABOLIC PANEL: CPT

## 2019-11-05 PROCEDURE — 84165 PROTEIN E-PHORESIS SERUM: CPT | Mod: 26,,, | Performed by: PATHOLOGY

## 2019-11-05 PROCEDURE — 82746 ASSAY OF FOLIC ACID SERUM: CPT

## 2019-11-05 PROCEDURE — 85025 COMPLETE CBC W/AUTO DIFF WBC: CPT

## 2019-11-05 PROCEDURE — 36415 COLL VENOUS BLD VENIPUNCTURE: CPT

## 2019-11-05 PROCEDURE — 84165 PROTEIN E-PHORESIS SERUM: CPT

## 2019-11-05 PROCEDURE — 86334 IMMUNOFIX E-PHORESIS SERUM: CPT | Mod: 26,,, | Performed by: PATHOLOGY

## 2019-11-05 PROCEDURE — 82728 ASSAY OF FERRITIN: CPT

## 2019-11-05 PROCEDURE — 84165 PATHOLOGIST INTERPRETATION SPE: ICD-10-PCS | Mod: 26,,, | Performed by: PATHOLOGY

## 2019-11-06 LAB
ALBUMIN SERPL ELPH-MCNC: 3.76 G/DL (ref 3.35–5.55)
ALPHA1 GLOB SERPL ELPH-MCNC: 0.35 G/DL (ref 0.17–0.41)
ALPHA2 GLOB SERPL ELPH-MCNC: 0.73 G/DL (ref 0.43–0.99)
B-GLOBULIN SERPL ELPH-MCNC: 0.97 G/DL (ref 0.5–1.1)
GAMMA GLOB SERPL ELPH-MCNC: 2.29 G/DL (ref 0.67–1.58)
INTERPRETATION SERPL IFE-IMP: NORMAL
KAPPA LC SER QL IA: 8.2 MG/DL (ref 0.33–1.94)
KAPPA LC/LAMBDA SER IA: 1.49 (ref 0.26–1.65)
LAMBDA LC SER QL IA: 5.51 MG/DL (ref 0.57–2.63)
PROT SERPL-MCNC: 8.1 G/DL (ref 6–8.4)

## 2019-11-07 LAB
BACTERIA SPEC AEROBE CULT: ABNORMAL
PATHOLOGIST INTERPRETATION IFE: NORMAL
PATHOLOGIST INTERPRETATION SPE: NORMAL

## 2019-11-08 LAB
BACTERIA SPEC ANAEROBE CULT: ABNORMAL
BACTERIA SPEC ANAEROBE CULT: ABNORMAL

## 2019-11-11 ENCOUNTER — HOSPITAL ENCOUNTER (OUTPATIENT)
Dept: WOUND CARE | Facility: HOSPITAL | Age: 54
Discharge: HOME | End: 2019-11-11
Attending: EMERGENCY MEDICINE
Payer: MEDICARE

## 2019-11-11 VITALS
HEIGHT: 78 IN | HEART RATE: 124 BPM | WEIGHT: 315 LBS | SYSTOLIC BLOOD PRESSURE: 142 MMHG | DIASTOLIC BLOOD PRESSURE: 64 MMHG | BODY MASS INDEX: 36.45 KG/M2

## 2019-11-11 DIAGNOSIS — S91.309A WOUND OF FOOT: Primary | ICD-10-CM

## 2019-11-11 PROCEDURE — 27201912 HC WOUND CARE DEBRIDEMENT SUPPLIES

## 2019-11-11 PROCEDURE — 29581 APPL MULTLAYER CMPRN SYS LEG: CPT

## 2019-11-11 PROCEDURE — 11042 DBRDMT SUBQ TIS 1ST 20SQCM/<: CPT

## 2019-11-11 NOTE — PROGRESS NOTES
"Subjective:       Patient ID: Drew Farrar is a 54 y.o. male.    Chief Complaint: Non-healing Wound (left foot)    2/15/19: Readmitted for venous ulcer to left lateral foot which developed about 2 weeks ago. Pulses noted with doppler and capillary refill <3.Dr Gutierrez assessed patient and wound care plan ordered for compression therapy and hydrafera blue to wound bed. No apparent signs of infection noted. Patient to follw-up in clinic in 2 weeks.DB   2/21/19: Nurse visit for dressing change. Refused care to skin tear on left 2nd toe. See notes. RTC 1 week for DrRosy Visit.  3/1/19: Follow up with Dr. Waller today in clinic new wound to right posterior leg, culture of both wounds taken today in clinic new wound care orders to both legs for xeroform and unna with calamine toe to knee follow up in 1 week with Dr. Gutierrez  3/8/19: Follow up with Dr. Gutierrez today in clinic wounds improving, edema noted to BLE, profore toe to knee applied to both legs, RX given to patient for PO Doxycycline, follow up in 1 week.   3/15/19: Here for f/u with Dr. Gutierrez. U/S scheduled Thursday. Will need right leg rewrapped. Patient states eye DrRosy Gave him the same antibiotic dose and strength after eye surgery. Dr instructed patient to take one bottle of antibiotics for both wounds and eyes until finished. Gentamycin added to wound care orders.   3/29/19:  Wound slowly improving. No changes in wound care orders. Wound debrided per Dr Gutierrez.  4/4/19: Patient showed up to clinic today stated " I need my dressing changed. It fell off."  Doppler pluses checked and present.  Patient refusing care to right, no stocking present on leg at this visit. Patient stated " No they said this leg is discharged, Dr. Lantigua has to drain the fluid out this leg with a needle. No wound care to this leg anymore when I come."      9/9/19: Readmit to wound care clinic for venous ulcer to left lateral foot.  Patient states it reopened about 3 weeks " "after last discharge from wound care clinic 7/1/19. Per patient wound has been treated by PCP.  Patient reports just using a large band- aid and compression sock. Patient states he was recently treated at [facility] for HBOT for this wound " in the last two or three months after it reopened "  Requesting HBOT here at the wound care clinic.  Dr. Gutierrez examined patient today, doppler pulses present, wound debrided with curette by Dr. Gutierrez patient tolerated well. Wound care orders given to apply hydrorefa blue ready to wound, cover with mepore dressing and apply patient's compression sock. Dr. Manuel discussed with patient obtaining his medical records from [facility], to view to determine if patient will benefit from HBOT, patient verbalized understanding.  Authorization for release of health information sheet signed by patient and faxed to [facility] today in clinic. Follow up in 1 week with MD.  9/23/19: F/U with Dr. Gutierrez. Client recently discharged from hospital. Left foot site debrided. [facility]    10/07/19: F/u with dr. Gutierrez. Wound debrided in clinic today. Continue with current wound care treatment as ordered. Follow up in clinic in 2 weeks  10/21/19: Seen today in clinic by Dr. Gutierrez wound debrided in clinic today by Dr. Gutierrez tolerated well. Wound care continued as ordered. Fu 2 wks in clinic.  No c/o noted.    11/04/19: F/u with Dr. Gutierrez. Wound increased in size with malodorous drainage. Wound debrided and Culture taken. Orders for 4 layer compression wrap initiated today. Rx for po clindamycin given to patient. Patient to begin taking today. Follow up in 1 weeks at wound care clinic.  11/11/19: F/U with Dr. Gutierrez. Site debrided per Dr. Gutierrez. D/C clindamycin. Rx for doxycycline and Cipro provided. Wound care orders changed.  No c/o noted.  Review of Systems    Objective:    Wound as noted w/ mild diffuse vick-wound erythema & ? Hyperemia; no " Sophia's sign noted.  C&S as per report.  Physical Exam    Assessment:       1. Wound of foot           Wound 09/09/19 1057 Venous Ulcer lateral Foot #1 (Active)   09/09/19 1057    Pre-existing: Yes   Primary Wound Type: Venous ulcer   Side: Left   Orientation: lateral   Location: Foot   Wound/PI Number (optional): #1   Ankle-Brachial Index:    Pulses:    Removal Indication and Assessment:    Wound Outcome:    (Retired) Wound Type:    (Retired) Wound Length (cm):    (Retired) Wound Width (cm):    (Retired) Depth (cm):    Wound Description (Comments):    Removal Indications:    Dressing Appearance Intact;Moist drainage 11/11/2019 11:32 AM   Drainage Amount Small 11/11/2019 11:32 AM   Drainage Characteristics/Odor Serosanguineous 11/11/2019 11:32 AM   Appearance Red;Moist;Granulating 11/11/2019 11:32 AM   Tissue loss description Full thickness 11/11/2019 11:32 AM   Red (%), Wound Tissue Color 100 % 11/11/2019 11:32 AM   Periwound Area Dry;Pink;Hemosiderin Staining 11/11/2019 11:32 AM   Wound Edges Defined 11/11/2019 11:32 AM   Wound Length (cm) 1.5 cm 11/11/2019 11:32 AM   Wound Width (cm) 2.5 cm 11/11/2019 11:32 AM   Wound Depth (cm) 0.2 cm 11/11/2019 11:32 AM   Wound Volume (cm^3) 0.75 cm^3 11/11/2019 11:32 AM   Wound Surface Area (cm^2) 3.75 cm^2 11/11/2019 11:32 AM   Care Cleansed with:;Sterile normal saline;Debrided 11/11/2019 11:32 AM   Dressing Compression wrap;Hydrofiber;Foam;Other (see comments) 11/11/2019  1:11 PM   Periwound Care Moisture barrier applied;Topical treatment applied 11/11/2019  1:11 PM   Compression Four layer compression 11/11/2019  1:11 PM   Off Loading Off loading shoe 11/11/2019  1:11 PM   Dressing Change Due 11/13/19 11/11/2019  1:11 PM       Left lateral Foot #1  Cleanse wound with: Acetic acid 0.25%, rinse with NS.  Lidocaine: 4% topical solution  Periwound care: Sween to dry skin Prn, betamethasone to LLE. Calmoseptine to vick wound.  Primary dressing:  Santyl, Hydrofera blue ready to  wound  Secondary dressing: aquacel foam  Offloading: Darco shoe left foot  Edema control: Profore 4 layer compression wrap toes to knee LLE  Tubigrip f x 2 right leg toes to knee  Frequency: Monday, Wednesday, Friday, and prn    Other orders: D/C clindamycin. Begin Doxycycline and Cipro. Acetic acid provided to client.    Home Health: [facility]: phone# [phone], fax# [phone]. Admit for wound care Monday, Wednesday, Friday, and prn. Orders as above. Next visit Dr. Gutierrez Monday 11/18/19.    Wound debrided in clinic today per Dr. Gutierrez    Plan:       Dr. Gutierrez Monday 11/18/19         No follow-ups on file.

## 2019-11-11 NOTE — PROCEDURES
"Debridement  Date/Time: 11/4/2019 3:37 PM  Performed by: Pool Gutierrez MD  Authorized by: Pool Gutierrez MD     Time out: Immediately prior to procedure a "time out" was called to verify the correct patient, procedure, equipment, support staff and site/side marked as required.    Consent Done?:  Yes (Verbal)    Preparation: Patient was prepped and draped in usual sterile fashion    Local anesthesia used?: Yes    Local anesthetic:  Topical anesthetic    Wound Details:    Location:  Left leg    Type of Debridement:  Excisional       Length (cm):  1.2       Area (sq cm):  3       Width (cm):  2.5       Percent Debrided (%):  100       Depth (cm):  0.1       Total Area Debrided (sq cm):  3    Depth of debridement:  Subcutaneous tissue    Tissue debrided:  Subcutaneous and Dermis    Devitalized tissue debrided:  Biofilm, Exudate, Fibrin and Slough    Instruments:  Curette    Bleeding:  Minimal  Patient tolerance:  Patient tolerated the procedure well with no immediate complications      "

## 2019-11-12 ENCOUNTER — TELEPHONE (OUTPATIENT)
Dept: INFUSION THERAPY | Facility: HOSPITAL | Age: 54
End: 2019-11-12

## 2019-11-12 ENCOUNTER — OFFICE VISIT (OUTPATIENT)
Dept: HEMATOLOGY/ONCOLOGY | Facility: CLINIC | Age: 54
End: 2019-11-12
Payer: MEDICARE

## 2019-11-12 VITALS
OXYGEN SATURATION: 97 % | HEART RATE: 112 BPM | DIASTOLIC BLOOD PRESSURE: 65 MMHG | WEIGHT: 315 LBS | SYSTOLIC BLOOD PRESSURE: 154 MMHG | HEIGHT: 78 IN | RESPIRATION RATE: 20 BRPM | BODY MASS INDEX: 36.45 KG/M2 | TEMPERATURE: 97 F

## 2019-11-12 DIAGNOSIS — D63.8 ANEMIA OF CHRONIC DISEASE: Primary | ICD-10-CM

## 2019-11-12 DIAGNOSIS — I48.21 PERMANENT ATRIAL FIBRILLATION: ICD-10-CM

## 2019-11-12 DIAGNOSIS — I26.99 PULMONARY EMBOLISM, UNSPECIFIED CHRONICITY, UNSPECIFIED PULMONARY EMBOLISM TYPE, UNSPECIFIED WHETHER ACUTE COR PULMONALE PRESENT: ICD-10-CM

## 2019-11-12 DIAGNOSIS — I26.99 RECURRENT PULMONARY EMBOLISM: ICD-10-CM

## 2019-11-12 DIAGNOSIS — D50.9 IRON DEFICIENCY ANEMIA, UNSPECIFIED IRON DEFICIENCY ANEMIA TYPE: ICD-10-CM

## 2019-11-12 PROCEDURE — 99999 PR PBB SHADOW E&M-EST. PATIENT-LVL IV: ICD-10-PCS | Mod: PBBFAC,,, | Performed by: INTERNAL MEDICINE

## 2019-11-12 PROCEDURE — 99214 PR OFFICE/OUTPT VISIT, EST, LEVL IV, 30-39 MIN: ICD-10-PCS | Mod: S$GLB,,, | Performed by: INTERNAL MEDICINE

## 2019-11-12 PROCEDURE — 3078F DIAST BP <80 MM HG: CPT | Mod: CPTII,S$GLB,, | Performed by: INTERNAL MEDICINE

## 2019-11-12 PROCEDURE — 99999 PR PBB SHADOW E&M-EST. PATIENT-LVL IV: CPT | Mod: PBBFAC,,, | Performed by: INTERNAL MEDICINE

## 2019-11-12 PROCEDURE — 3077F PR MOST RECENT SYSTOLIC BLOOD PRESSURE >= 140 MM HG: ICD-10-PCS | Mod: CPTII,S$GLB,, | Performed by: INTERNAL MEDICINE

## 2019-11-12 PROCEDURE — 3008F PR BODY MASS INDEX (BMI) DOCUMENTED: ICD-10-PCS | Mod: CPTII,S$GLB,, | Performed by: INTERNAL MEDICINE

## 2019-11-12 PROCEDURE — 3077F SYST BP >= 140 MM HG: CPT | Mod: CPTII,S$GLB,, | Performed by: INTERNAL MEDICINE

## 2019-11-12 PROCEDURE — 3008F BODY MASS INDEX DOCD: CPT | Mod: CPTII,S$GLB,, | Performed by: INTERNAL MEDICINE

## 2019-11-12 PROCEDURE — 3078F PR MOST RECENT DIASTOLIC BLOOD PRESSURE < 80 MM HG: ICD-10-PCS | Mod: CPTII,S$GLB,, | Performed by: INTERNAL MEDICINE

## 2019-11-12 PROCEDURE — 99214 OFFICE O/P EST MOD 30 MIN: CPT | Mod: S$GLB,,, | Performed by: INTERNAL MEDICINE

## 2019-11-12 RX ORDER — HEPARIN 100 UNIT/ML
500 SYRINGE INTRAVENOUS
Status: CANCELLED | OUTPATIENT
Start: 2019-11-12

## 2019-11-12 RX ORDER — SODIUM CHLORIDE 0.9 % (FLUSH) 0.9 %
10 SYRINGE (ML) INJECTION
Status: CANCELLED | OUTPATIENT
Start: 2019-11-12

## 2019-11-12 NOTE — TELEPHONE ENCOUNTER
Pt returned phone call from infusion center to schedule injectafer infusion. Infusion scheduled for 11/21. Pt verbalized understanding.       ----- Message from Diamond Puckett RN sent at 11/12/2019  3:47 PM CST -----  Regarding: Injectafer   Pt needs one dose of Injectafer, please.     Thank you!

## 2019-11-12 NOTE — PROGRESS NOTES
PATIENT: Drew Farrar  MRN: [id number]  DATE: 11/12/2019    Diagnosis:   1. Anemia of chronic disease    2. Iron deficiency anemia, unspecified iron deficiency anemia type    3. Pulmonary embolism, unspecified chronicity, unspecified pulmonary embolism type, unspecified whether acute cor pulmonale present    4. Recurrent pulmonary embolism    5. Permanent atrial fibrillation      Chief Complaint: Anemia of chronic disease    This is a 53-year-old male referred for evaluation of anemia and history of multiple DVTs and PEs.    He has a complex medical history.  Past medical history includes persistent atrial fibrillation, PDA status post surgical closure 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 of age, history of multiple PEs and DVTs, history of placement of IVC filter, congestive heart failure with severe mitral regurgitation, bipolar disorder and chronic venous stasis ulcers of lower extremities.    He has been on anticoagulation with Coumadin for several years and is currently on Xarelto 20 mg daily.    He was in the hospital for a week from September 10th until September 16, 2019 for gram-negative sepsis and recently completed a course of IV antibiotics as well as oral vancomycin.    09/11/2019-VQ  scan-large mismatched perfusion defect in the lingula, intermediate probability for pulmonary embolism    09/10/2019-ultrasound bilateral lower extremities show nonocclusive DVT seen within the bilateral femoral veins., for venous stasis ulcers of lower extremities, he has and the wound care and is seen by Dr. Pool Gutierrez.    He was also noted to have worsening anemia.    His baseline hemoglobin is between 9 and 11 grams/deciliter over the last several years.  After more recent hospital admission it has drifted down to 7.8 grams/deciliter    Subjective:     History of Present Illness:     Past Medical History:   Past Medical History:   Diagnosis Date    *Atrial fibrillation     Anticoagulant long-term use     Arthritis     Atrial  fibrillation     Atrial fibrillation Feb 23, 2016    Bipolar disorder     CHF (congestive heart failure)     Congenital heart disease     s/p surgical intervention 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 of age    Deep vein thrombosis     DVT of leg (deep venous thrombosis)     left leg    History of prior ablation treatment     10/9/13    Hypertension     Obesity     Stroke     Thyroid disease     Venous stasis ulcer of lower extremity, unspecified laterality 12/14/2012    Venous ulcer        Past Surgical History:   Past Surgical History:   Procedure Laterality Date    ANGIOPLASTY      CARDIAC SURGERY      open heart surgery 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 old    EYE SURGERY      left eye cataract/right eye glaucoma    TIMO FILTER PLACEMENT      Dr Calix ([facility])    KNEE SURGERY      l and r     MULTIPLE TOOTH EXTRACTIONS      SKIN GRAFT      left leg       Family History:   Family History   Problem Relation Age of Onset    Diabetes Father     Heart disease Father     Heart disease Maternal Grandmother     Diabetes Maternal Grandfather     Heart disease Maternal Grandfather     Stroke Maternal Grandfather        Social History:  reports that he has quit smoking. His smoking use included cigarettes. He has a 6.00 pack-year smoking history. He has quit using smokeless tobacco. He reports that he drinks about 1.0 standard drinks of alcohol per week. He reports that he does not use drugs.    Allergies:  Review of patient's allergies indicates:   Allergen Reactions    Contrast media Other (See Comments)     Severe chest pain    Food allergy formula [glutamine-c-quercet-selen-brom]      Allergic to green peas; Heart failure.    Iodinated contrast media Other (See Comments)     Chest pain    Peas Hives    Ibuprofen Swelling    Latex, natural rubber Hives    Pcn [penicillins] Hives    Butisol [butabarbital] Rash     Peeling skin       Medications:  Current Outpatient Medications   Medication Sig Dispense Refill     ciclopirox (PENLAC) 8 % Soln Apply topically nightly as directed 6.6 mL 3    ciprofloxacin HCl (CIPRO) 500 MG tablet Take 1 tablet (500 mg total) by mouth 2 (two) times daily until entirely gone. 28 tablet 0    doxycycline (VIBRAMYCIN) 100 MG Cap Take 1 capsule (100 mg total) by mouth 2 (two) times daily until entirely gone. 28 capsule 0    HYDROcodone-acetaminophen (NORCO) 5-325 mg per tablet Take 1 tablet by mouth every 6 (six) hours as needed for Pain. 10 tablet 0    hydrOXYzine HCl (ATARAX) 25 MG tablet Take 1 tablet (25 mg total) by mouth 4 (four) times daily as needed for Itching. 20 tablet 0    lisinopril 10 MG tablet Take 1 tablet (10 mg total) by mouth once daily. (Patient taking differently: Take 7.5 mg by mouth once daily. ) 30 tablet 6    methIMAzole (TAPAZOLE) 10 MG Tab Take 2 tablets (20 mg total) by mouth once daily. 60 tablet 4    metoprolol succinate (TOPROL-XL) 100 MG 24 hr tablet Take 1 tablet (100 mg total) by mouth once daily. (Patient taking differently: Take 100 mg by mouth every evening. ) 30 tablet 5    rivaroxaban (XARELTO) 20 mg Tab Take 1 tablet (20 mg total) by mouth daily with dinner or evening meal. 90 tablet 1    torsemide (DEMADEX) 20 MG Tab Take 2 tablets (40 mg total) by mouth once daily. 180 tablet 3     No current facility-administered medications for this visit.        Review of Systems   Constitutional: Positive for fatigue. Negative for fever and unexpected weight change.   HENT: Negative for mouth sores and nosebleeds.    Eyes: Negative for photophobia and pain.   Respiratory: Positive for shortness of breath. Negative for wheezing.    Cardiovascular: Positive for leg swelling. Negative for chest pain and palpitations.   Gastrointestinal: Negative for abdominal pain and vomiting.   Genitourinary: Negative for flank pain and hematuria.   Musculoskeletal: Positive for arthralgias and gait problem. Negative for back pain, neck pain and neck stiffness.   Skin: Negative  "for rash and wound.   Neurological: Negative for seizures and syncope.   Hematological: Negative for adenopathy. Does not bruise/bleed easily.   Psychiatric/Behavioral: Negative for behavioral problems and confusion.   All other systems reviewed and are negative.      Objective:     Vitals:    11/12/19 1522   BP: (!) 154/65   Pulse: (!) 112   Resp: 20   Temp: 97.1 °F (36.2 °C)   SpO2: 97%   Weight: (!) 155.7 kg (343 lb 4.1 oz)   Height: 6' 8" (2.032 m)     BMI: Body mass index is 37.71 kg/m².    Physical Exam   Constitutional: He is oriented to person, place, and time.  Non-toxic appearance. He does not have a sickly appearance. No distress.   HENT:   Nose: No epistaxis.   Mouth/Throat: Oropharynx is clear and moist. Mucous membranes are not pale, not dry and not cyanotic. No lacerations. No oropharyngeal exudate.   Eyes: Conjunctivae are normal. No scleral icterus.   Neck: Neck supple. No thyroid mass and no thyromegaly present.   Cardiovascular: Normal rate and S1 normal. An irregularly irregular rhythm present.   Murmur heard.   Systolic murmur is present.  Pulmonary/Chest: Effort normal and breath sounds normal. No accessory muscle usage or stridor. No respiratory distress. He has no wheezes. He has no rales.   Abdominal: Soft. He exhibits no ascites and no mass. There is no hepatosplenomegaly. There is no tenderness.   Musculoskeletal: He exhibits no edema.   Lymphadenopathy:        Head (right side): No submental and no submandibular adenopathy present.        Head (left side): No submental and no submandibular adenopathy present.     He has no cervical adenopathy.     He has no axillary adenopathy.   Neurological: He is alert and oriented to person, place, and time. He has normal strength. No cranial nerve deficit or sensory deficit. Gait normal.   Skin: No bruising, no petechiae and no rash noted. He is not diaphoretic. No cyanosis.        Psychiatric: He has a normal mood and affect. Judgment and thought " content normal. Cognition and memory are normal.   Vitals reviewed.      Assessment:       1. Anemia of chronic disease    2. Iron deficiency anemia, unspecified iron deficiency anemia type    3. Pulmonary embolism, unspecified chronicity, unspecified pulmonary embolism type, unspecified whether acute cor pulmonale present    4. Recurrent pulmonary embolism    5. Permanent atrial fibrillation      Plan:   Anemia of chronic disease and functional iron deficiency  -clinical picture consistent with chronic disease anemia  -there is evidence of iron deficiency as well which is functional in nature  -ferritin 145, iron saturation 10%, hemoglobin 9.7  -he tried oral iron and could not tolerated secondary to disabling constipation  -discussed pros and cons of IV iron therapy with Injectafer.  He is agreeable.  Will plan to schedule him for 1 dose  -will plan to repeat CBC and iron studies in    History of multiple DVTs and PE status post IVC filter placement  -his risk factors for further DVTs and PEs are un modifiable  -risk factors include chronic venous stasis ulcers, May-Thurner syndrome obesity  -he already has a vena caval filter in place  -recommend indefinite anticoagulation  -he is tolerating Xarelto 20 mg daily well  -he tells me he had a hard time tolerating Lovenox and was having frequent nose bleeds and is now tolerating Xarelto better    Atrial fibrillation  -he is already on Xarelto  -follow-up with Cardiology, Dr. Lantigua    Venous stasis ulcers of lower extremities  -follow-up with wound care

## 2019-11-18 ENCOUNTER — OFFICE VISIT (OUTPATIENT)
Dept: FAMILY MEDICINE | Facility: HOSPITAL | Age: 54
End: 2019-11-18
Attending: FAMILY MEDICINE
Payer: MEDICARE

## 2019-11-18 VITALS
SYSTOLIC BLOOD PRESSURE: 177 MMHG | WEIGHT: 315 LBS | DIASTOLIC BLOOD PRESSURE: 78 MMHG | HEART RATE: 88 BPM | HEIGHT: 78 IN | BODY MASS INDEX: 36.45 KG/M2

## 2019-11-18 DIAGNOSIS — I87.2 CHRONIC VENOUS INSUFFICIENCY: ICD-10-CM

## 2019-11-18 DIAGNOSIS — J40 BRONCHITIS: Primary | ICD-10-CM

## 2019-11-18 DIAGNOSIS — I48.20 CHRONIC ATRIAL FIBRILLATION: ICD-10-CM

## 2019-11-18 DIAGNOSIS — I10 ESSENTIAL HYPERTENSION: ICD-10-CM

## 2019-11-18 DIAGNOSIS — I26.99 RECURRENT PULMONARY EMBOLISM: ICD-10-CM

## 2019-11-18 PROCEDURE — 99213 OFFICE O/P EST LOW 20 MIN: CPT | Performed by: FAMILY MEDICINE

## 2019-11-18 RX ORDER — WARFARIN 7.5 MG/1
TABLET ORAL
COMMUNITY
Start: 2019-11-15 | End: 2019-11-18

## 2019-11-18 RX ORDER — WARFARIN 10 MG/1
TABLET ORAL
COMMUNITY
Start: 2019-11-15 | End: 2019-11-18

## 2019-11-18 RX ORDER — ALBUTEROL SULFATE 90 UG/1
2 AEROSOL, METERED RESPIRATORY (INHALATION) EVERY 6 HOURS PRN
Qty: 18 G | Refills: 0 | Status: SHIPPED | OUTPATIENT
Start: 2019-11-18 | End: 2022-01-01 | Stop reason: SDUPTHER

## 2019-11-18 NOTE — PROGRESS NOTES
"CC:  Wheezing and SOB    S:  C/O 6 wk h/o intermittent wheezing and SOB worse with exertion.  No swelling other than his usual stasis edema.  Strong FamHx of chronic bronchitis and wheezing in mother and other family members.    O:  BP (!) 177/78   Pulse 88   Ht 6' 8" (2.032 m)   Wt (!) 157.7 kg (347 lb 10.7 oz)   BMI 38.19 kg/m²   Lungs - clear at this time.  O2 sat 96% on RA.  Heart - irreg irreg rhythm S1S2 normal.  Exts - stasis edema noted as per usual.    A:    1. Bronchitis  albuterol (VENTOLIN HFA) 90 mcg/actuation inhaler   2. Chronic atrial fibrillation     3. Essential hypertension     4. Chronic venous insufficiency     5. Recurrent pulmonary embolism           P:  Rx albuterol inhaler 2 puffs q6h prn for wheezing or SOB.  Follow up in about 2 weeks (around 12/2/2019).  Consider PFT's and repeat CXR.  "

## 2019-11-20 ENCOUNTER — HOSPITAL ENCOUNTER (OUTPATIENT)
Facility: HOSPITAL | Age: 54
Discharge: HOME | End: 2019-11-21
Attending: EMERGENCY MEDICINE | Admitting: FAMILY MEDICINE
Payer: MEDICARE

## 2019-11-20 DIAGNOSIS — R79.89 ELEVATED SERUM FREE T4 LEVEL: ICD-10-CM

## 2019-11-20 DIAGNOSIS — R06.00 DYSPNEA, UNSPECIFIED TYPE: ICD-10-CM

## 2019-11-20 DIAGNOSIS — I20.0 UNSTABLE ANGINA: ICD-10-CM

## 2019-11-20 DIAGNOSIS — R07.9 CHEST PAIN: ICD-10-CM

## 2019-11-20 DIAGNOSIS — I48.20 CHRONIC ATRIAL FIBRILLATION: ICD-10-CM

## 2019-11-20 DIAGNOSIS — E05.90 HYPERTHYROIDISM: Primary | ICD-10-CM

## 2019-11-20 DIAGNOSIS — R07.9 ACUTE CHEST PAIN: ICD-10-CM

## 2019-11-20 DIAGNOSIS — I26.99 RECURRENT PULMONARY EMBOLISM: ICD-10-CM

## 2019-11-20 DIAGNOSIS — I87.1 MAY-THURNER SYNDROME: ICD-10-CM

## 2019-11-20 LAB
ALBUMIN SERPL BCP-MCNC: 3.4 G/DL (ref 3.5–5.2)
ALP SERPL-CCNC: 142 U/L (ref 55–135)
ALT SERPL W/O P-5'-P-CCNC: 26 U/L (ref 10–44)
ANION GAP SERPL CALC-SCNC: 8 MMOL/L (ref 8–16)
AST SERPL-CCNC: 37 U/L (ref 10–40)
BASOPHILS # BLD AUTO: 0.01 K/UL (ref 0–0.2)
BASOPHILS NFR BLD: 0.2 % (ref 0–1.9)
BILIRUB SERPL-MCNC: 0.6 MG/DL (ref 0.1–1)
BNP SERPL-MCNC: 188 PG/ML (ref 0–99)
BUN SERPL-MCNC: 25 MG/DL (ref 6–20)
CALCIUM SERPL-MCNC: 9.5 MG/DL (ref 8.7–10.5)
CHLORIDE SERPL-SCNC: 110 MMOL/L (ref 95–110)
CO2 SERPL-SCNC: 22 MMOL/L (ref 23–29)
CREAT SERPL-MCNC: 0.9 MG/DL (ref 0.5–1.4)
D DIMER PPP IA.FEU-MCNC: 1.45 MG/L FEU
DIFFERENTIAL METHOD: ABNORMAL
EOSINOPHIL # BLD AUTO: 0.2 K/UL (ref 0–0.5)
EOSINOPHIL NFR BLD: 3.7 % (ref 0–8)
ERYTHROCYTE [DISTWIDTH] IN BLOOD BY AUTOMATED COUNT: 15.3 % (ref 11.5–14.5)
EST. GFR  (AFRICAN AMERICAN): >60 ML/MIN/1.73 M^2
EST. GFR  (NON AFRICAN AMERICAN): >60 ML/MIN/1.73 M^2
GLUCOSE SERPL-MCNC: 89 MG/DL (ref 70–110)
HCT VFR BLD AUTO: 29 % (ref 40–54)
HGB BLD-MCNC: 8.9 G/DL (ref 14–18)
LYMPHOCYTES # BLD AUTO: 1 K/UL (ref 1–4.8)
LYMPHOCYTES NFR BLD: 23.5 % (ref 18–48)
MCH RBC QN AUTO: 24.9 PG (ref 27–31)
MCHC RBC AUTO-ENTMCNC: 30.7 G/DL (ref 32–36)
MCV RBC AUTO: 81 FL (ref 82–98)
MONOCYTES # BLD AUTO: 0.4 K/UL (ref 0.3–1)
MONOCYTES NFR BLD: 8.8 % (ref 4–15)
NEUTROPHILS # BLD AUTO: 2.6 K/UL (ref 1.8–7.7)
NEUTROPHILS NFR BLD: 63.8 % (ref 38–73)
PLATELET # BLD AUTO: 154 K/UL (ref 150–350)
PMV BLD AUTO: 9.3 FL (ref 9.2–12.9)
POTASSIUM SERPL-SCNC: 4.6 MMOL/L (ref 3.5–5.1)
PROT SERPL-MCNC: 7.6 G/DL (ref 6–8.4)
RBC # BLD AUTO: 3.58 M/UL (ref 4.6–6.2)
SODIUM SERPL-SCNC: 140 MMOL/L (ref 136–145)
TROPONIN I SERPL DL<=0.01 NG/ML-MCNC: 0.02 NG/ML (ref 0–0.03)
WBC # BLD AUTO: 4.09 K/UL (ref 3.9–12.7)

## 2019-11-20 PROCEDURE — 80053 COMPREHEN METABOLIC PANEL: CPT

## 2019-11-20 PROCEDURE — 93010 EKG 12-LEAD: ICD-10-PCS | Mod: ,,, | Performed by: INTERNAL MEDICINE

## 2019-11-20 PROCEDURE — 84484 ASSAY OF TROPONIN QUANT: CPT

## 2019-11-20 PROCEDURE — 93010 ELECTROCARDIOGRAM REPORT: CPT | Mod: ,,, | Performed by: INTERNAL MEDICINE

## 2019-11-20 PROCEDURE — 99285 EMERGENCY DEPT VISIT HI MDM: CPT | Mod: 25

## 2019-11-20 PROCEDURE — 93005 ELECTROCARDIOGRAM TRACING: CPT

## 2019-11-20 PROCEDURE — 85025 COMPLETE CBC W/AUTO DIFF WBC: CPT

## 2019-11-20 PROCEDURE — 83880 ASSAY OF NATRIURETIC PEPTIDE: CPT

## 2019-11-20 PROCEDURE — 85379 FIBRIN DEGRADATION QUANT: CPT

## 2019-11-20 PROCEDURE — 84100 ASSAY OF PHOSPHORUS: CPT

## 2019-11-20 PROCEDURE — 83735 ASSAY OF MAGNESIUM: CPT

## 2019-11-21 ENCOUNTER — TELEPHONE (OUTPATIENT)
Dept: HEMATOLOGY/ONCOLOGY | Facility: CLINIC | Age: 54
End: 2019-11-21

## 2019-11-21 VITALS
TEMPERATURE: 98 F | BODY MASS INDEX: 38.12 KG/M2 | OXYGEN SATURATION: 97 % | RESPIRATION RATE: 18 BRPM | DIASTOLIC BLOOD PRESSURE: 75 MMHG | WEIGHT: 315 LBS | SYSTOLIC BLOOD PRESSURE: 124 MMHG | HEART RATE: 75 BPM

## 2019-11-21 PROBLEM — I20.0 UNSTABLE ANGINA: Status: RESOLVED | Noted: 2019-11-21 | Resolved: 2019-11-21

## 2019-11-21 PROBLEM — K52.9 ACUTE GASTROENTERITIS: Status: RESOLVED | Noted: 2019-04-24 | Resolved: 2019-11-21

## 2019-11-21 PROBLEM — I20.0 UNSTABLE ANGINA: Status: ACTIVE | Noted: 2019-11-21

## 2019-11-21 PROBLEM — R07.9 CHEST PAIN: Status: ACTIVE | Noted: 2019-11-21

## 2019-11-21 LAB
ESTIMATED AVG GLUCOSE: 105 MG/DL (ref 68–131)
HBA1C MFR BLD HPLC: 5.3 % (ref 4–5.6)
MAGNESIUM SERPL-MCNC: 1.7 MG/DL (ref 1.6–2.6)
PHOSPHATE SERPL-MCNC: 4.4 MG/DL (ref 2.7–4.5)
T4 FREE SERPL-MCNC: 2.32 NG/DL (ref 0.71–1.51)
TROPONIN I SERPL DL<=0.01 NG/ML-MCNC: 0.01 NG/ML (ref 0–0.03)
TROPONIN I SERPL DL<=0.01 NG/ML-MCNC: 0.01 NG/ML (ref 0–0.03)
TSH SERPL DL<=0.005 MIU/L-ACNC: <0.01 UIU/ML (ref 0.4–4)

## 2019-11-21 PROCEDURE — 25000003 PHARM REV CODE 250: Performed by: STUDENT IN AN ORGANIZED HEALTH CARE EDUCATION/TRAINING PROGRAM

## 2019-11-21 PROCEDURE — 25000003 PHARM REV CODE 250: Performed by: EMERGENCY MEDICINE

## 2019-11-21 PROCEDURE — 93005 ELECTROCARDIOGRAM TRACING: CPT

## 2019-11-21 PROCEDURE — 94761 N-INVAS EAR/PLS OXIMETRY MLT: CPT

## 2019-11-21 PROCEDURE — G0378 HOSPITAL OBSERVATION PER HR: HCPCS

## 2019-11-21 PROCEDURE — 84443 ASSAY THYROID STIM HORMONE: CPT

## 2019-11-21 PROCEDURE — 63600175 PHARM REV CODE 636 W HCPCS: Performed by: STUDENT IN AN ORGANIZED HEALTH CARE EDUCATION/TRAINING PROGRAM

## 2019-11-21 PROCEDURE — 84439 ASSAY OF FREE THYROXINE: CPT

## 2019-11-21 PROCEDURE — 93010 ELECTROCARDIOGRAM REPORT: CPT | Mod: 76,,, | Performed by: INTERNAL MEDICINE

## 2019-11-21 PROCEDURE — 83036 HEMOGLOBIN GLYCOSYLATED A1C: CPT

## 2019-11-21 PROCEDURE — 96374 THER/PROPH/DIAG INJ IV PUSH: CPT

## 2019-11-21 PROCEDURE — 93010 EKG 12-LEAD: ICD-10-PCS | Mod: 76,,, | Performed by: INTERNAL MEDICINE

## 2019-11-21 PROCEDURE — 84484 ASSAY OF TROPONIN QUANT: CPT

## 2019-11-21 RX ORDER — METOPROLOL SUCCINATE 50 MG/1
100 TABLET, EXTENDED RELEASE ORAL NIGHTLY
Status: DISCONTINUED | OUTPATIENT
Start: 2019-11-21 | End: 2019-11-21 | Stop reason: HOSPADM

## 2019-11-21 RX ORDER — ONDANSETRON 2 MG/ML
4 INJECTION INTRAMUSCULAR; INTRAVENOUS EVERY 8 HOURS PRN
Status: DISCONTINUED | OUTPATIENT
Start: 2019-11-21 | End: 2019-11-21 | Stop reason: HOSPADM

## 2019-11-21 RX ORDER — HYDROCODONE BITARTRATE AND ACETAMINOPHEN 5; 325 MG/1; MG/1
2 TABLET ORAL
Status: COMPLETED | OUTPATIENT
Start: 2019-11-21 | End: 2019-11-21

## 2019-11-21 RX ORDER — TORSEMIDE 20 MG/1
40 TABLET ORAL DAILY
Status: DISCONTINUED | OUTPATIENT
Start: 2019-11-21 | End: 2019-11-21 | Stop reason: HOSPADM

## 2019-11-21 RX ORDER — MORPHINE SULFATE 4 MG/ML
4 INJECTION, SOLUTION INTRAMUSCULAR; INTRAVENOUS EVERY 4 HOURS PRN
Status: DISCONTINUED | OUTPATIENT
Start: 2019-11-21 | End: 2019-11-21 | Stop reason: HOSPADM

## 2019-11-21 RX ORDER — ACETAMINOPHEN 325 MG/1
650 TABLET ORAL EVERY 4 HOURS PRN
Status: DISCONTINUED | OUTPATIENT
Start: 2019-11-21 | End: 2019-11-21 | Stop reason: HOSPADM

## 2019-11-21 RX ORDER — ENOXAPARIN SODIUM 150 MG/ML
150 INJECTION SUBCUTANEOUS
Status: DISCONTINUED | OUTPATIENT
Start: 2019-11-21 | End: 2019-11-21

## 2019-11-21 RX ORDER — AMOXICILLIN 250 MG
1 CAPSULE ORAL 2 TIMES DAILY
Status: DISCONTINUED | OUTPATIENT
Start: 2019-11-21 | End: 2019-11-21 | Stop reason: HOSPADM

## 2019-11-21 RX ORDER — METHIMAZOLE 5 MG/1
20 TABLET ORAL DAILY
Status: DISCONTINUED | OUTPATIENT
Start: 2019-11-21 | End: 2019-11-21 | Stop reason: HOSPADM

## 2019-11-21 RX ORDER — ACETAMINOPHEN 325 MG/1
650 TABLET ORAL EVERY 8 HOURS PRN
Status: DISCONTINUED | OUTPATIENT
Start: 2019-11-21 | End: 2019-11-21 | Stop reason: HOSPADM

## 2019-11-21 RX ORDER — SODIUM CHLORIDE 0.9 % (FLUSH) 0.9 %
5 SYRINGE (ML) INJECTION
Status: DISCONTINUED | OUTPATIENT
Start: 2019-11-21 | End: 2019-11-21 | Stop reason: HOSPADM

## 2019-11-21 RX ADMIN — METHIMAZOLE 20 MG: 5 TABLET ORAL at 10:11

## 2019-11-21 RX ADMIN — LIDOCAINE HYDROCHLORIDE: 20 SOLUTION ORAL; TOPICAL at 06:11

## 2019-11-21 RX ADMIN — TORSEMIDE 40 MG: 20 TABLET ORAL at 08:11

## 2019-11-21 RX ADMIN — METOPROLOL SUCCINATE 100 MG: 25 TABLET, FILM COATED, EXTENDED RELEASE ORAL at 04:11

## 2019-11-21 RX ADMIN — SENNOSIDES AND DOCUSATE SODIUM 1 TABLET: 8.6; 5 TABLET ORAL at 08:11

## 2019-11-21 RX ADMIN — HYDROCODONE BITARTRATE AND ACETAMINOPHEN 2 TABLET: 5; 325 TABLET ORAL at 04:11

## 2019-11-21 RX ADMIN — ONDANSETRON 4 MG: 2 INJECTION INTRAMUSCULAR; INTRAVENOUS at 06:11

## 2019-11-21 RX ADMIN — APIXABAN 10 MG: 5 TABLET, FILM COATED ORAL at 10:11

## 2019-11-21 NOTE — ASSESSMENT & PLAN NOTE
Reports 1 day history of chest pain at rest and on exertion  Trop and EKG: Negative  Chest X-ray: Negative for acute cardiopulmonary process  Trend Trop and EKG

## 2019-11-21 NOTE — TELEPHONE ENCOUNTER
appt scheduled----- Message from Hanane Gibson RN sent at 11/21/2019  3:24 PM CST -----  Hello,  Pt is discharging from [facility] today and will need hospital follow-up appt.   Thank you for your help,  Vlad Gibson, RN,   [phone]

## 2019-11-21 NOTE — ED TRIAGE NOTES
Pt. Care assumed, pt. Is awake, alert and oriented. Cardiac monitor show HR 90's with A fib. Pt. Has a history of atrial fibrillation. Respirations are even and non labored and clear bilaterally. Skin is PWD. Pt. Denies c/o chest pain or discomfort at this time. Pt. And spouse updated on the plan of care. Bed in the low position, side rails elevated and call light at the bedside.

## 2019-11-21 NOTE — ASSESSMENT & PLAN NOTE
Hx of recurrent DVTs and PE  Followed by Heme/Onc  On Xarelto at home  D-Dimer: 1.45  Will start Lovenox 1mg/kg daily  Order V/Q Scan

## 2019-11-21 NOTE — SUBJECTIVE & OBJECTIVE
Past Medical History:   Diagnosis Date    *Atrial fibrillation     Anticoagulant long-term use     Arthritis     Atrial fibrillation     Atrial fibrillation Feb 23, 2016    Bipolar disorder     CHF (congestive heart failure)     Congenital heart disease     s/p surgical intervention 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 of age    Deep vein thrombosis     DVT of leg (deep venous thrombosis)     left leg    History of prior ablation treatment     10/9/13    Hypertension     Obesity     Stroke     Thyroid disease     Venous stasis ulcer of lower extremity, unspecified laterality 12/14/2012    Venous ulcer        Past Surgical History:   Procedure Laterality Date    ANGIOPLASTY      CARDIAC SURGERY      open heart surgery 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 old    EYE SURGERY      left eye cataract/right eye glaucoma    TIMO FILTER PLACEMENT      Dr Calix ([facility])    KNEE SURGERY      l and r     MULTIPLE TOOTH EXTRACTIONS      SKIN GRAFT      left leg       Review of patient's allergies indicates:   Allergen Reactions    Contrast media Other (See Comments)     Severe chest pain    Food allergy formula [glutamine-c-quercet-selen-brom]      Allergic to green peas; Heart failure.    Iodinated contrast media Other (See Comments)     Chest pain    Peas Hives    Ibuprofen Swelling    Latex, natural rubber Hives    Pcn [penicillins] Hives    Butisol [butabarbital] Rash     Peeling skin       No current facility-administered medications on file prior to encounter.      Current Outpatient Medications on File Prior to Encounter   Medication Sig    albuterol (VENTOLIN HFA) 90 mcg/actuation inhaler Inhale 2 puffs into the lungs every 6 (six) hours as needed for Wheezing. Rescue    ciclopirox (PENLAC) 8 % Soln Apply topically nightly as directed    ciprofloxacin HCl (CIPRO) 500 MG tablet Take 1 tablet (500 mg total) by mouth 2 (two) times daily until entirely gone.    doxycycline (VIBRAMYCIN) 100 MG Cap Take 1 capsule (100 mg  total) by mouth 2 (two) times daily until entirely gone.    HYDROcodone-acetaminophen (NORCO) 5-325 mg per tablet Take 1 tablet by mouth every 6 (six) hours as needed for Pain.    hydrOXYzine HCl (ATARAX) 25 MG tablet Take 1 tablet (25 mg total) by mouth 4 (four) times daily as needed for Itching.    lisinopril 10 MG tablet Take 1 tablet (10 mg total) by mouth once daily. (Patient taking differently: Take 7.5 mg by mouth once daily. )    methIMAzole (TAPAZOLE) 10 MG Tab Take 2 tablets (20 mg total) by mouth once daily.    metoprolol succinate (TOPROL-XL) 100 MG 24 hr tablet Take 1 tablet (100 mg total) by mouth once daily. (Patient taking differently: Take 100 mg by mouth every evening. )    rivaroxaban (XARELTO) 20 mg Tab Take 1 tablet (20 mg total) by mouth daily with dinner or evening meal.    torsemide (DEMADEX) 20 MG Tab Take 2 tablets (40 mg total) by mouth once daily.     Family History     Problem Relation (Age of Onset)    Diabetes Father, Maternal Grandfather    Heart disease Father, Maternal Grandmother, Maternal Grandfather    Stroke Maternal Grandfather        Tobacco Use    Smoking status: Former Smoker     Packs/day: 1.00     Years: 6.00     Pack years: 6.00     Types: Cigarettes    Smokeless tobacco: Former User    Tobacco comment: quit by age 27yrs old   Substance and Sexual Activity    Alcohol use: Yes     Alcohol/week: 1.0 standard drinks     Types: 1 Glasses of wine per week     Frequency: Monthly or less     Comment: occasionally    Drug use: No    Sexual activity: Yes     Partners: Female     Birth control/protection: None     Review of Systems   Constitutional: Negative for activity change, chills and fatigue.   Respiratory: Positive for chest tightness. Negative for cough, shortness of breath and wheezing.    Cardiovascular: Positive for chest pain. Negative for palpitations and leg swelling.   Gastrointestinal: Negative for abdominal pain, constipation, diarrhea, nausea and  vomiting.   Musculoskeletal: Negative for arthralgias, back pain and gait problem.   Neurological: Negative for dizziness, weakness, light-headedness and numbness.     Objective:     Vital Signs (Most Recent):  Temp: 98.5 °F (36.9 °C) (11/20/19 2029)  Pulse: 109 (11/21/19 0106)  Resp: 18 (11/21/19 0106)  BP: (!) 116/59 (11/21/19 0106)  SpO2: 97 % (11/21/19 0106) Vital Signs (24h Range):  Temp:  [98.5 °F (36.9 °C)] 98.5 °F (36.9 °C)  Pulse:  [] 109  Resp:  [18-19] 18  SpO2:  [97 %-99 %] 97 %  BP: (116-139)/(59-65) 116/59     Weight: (!) 157.4 kg (347 lb)  Body mass index is 38.12 kg/m².    Physical Exam   Constitutional: He is oriented to person, place, and time. He appears well-developed and well-nourished. No distress.   HENT:   Head: Normocephalic and atraumatic.   Eyes: EOM are normal.   Neck: Normal range of motion. No thyromegaly present.   Cardiovascular:   No murmur heard.  Irregularly irregular rhythm   Pulmonary/Chest: Effort normal and breath sounds normal. No respiratory distress. He has no wheezes.   Abdominal: Soft. Bowel sounds are normal. He exhibits no distension. There is no tenderness. There is no guarding.   Musculoskeletal: Normal range of motion.   Neurological: He is alert and oriented to person, place, and time.   Skin: He is not diaphoretic.   Chronic venous changes in the bilateral lower extremity      Significant Labs:   CBC:   Recent Labs   Lab 11/20/19 2233   WBC 4.09   HGB 8.9*   HCT 29.0*        CMP:   Recent Labs   Lab 11/20/19 2233      K 4.6      CO2 22*   GLU 89   BUN 25*   CREATININE 0.9   CALCIUM 9.5   PROT 7.6   ALBUMIN 3.4*   BILITOT 0.6   ALKPHOS 142*   AST 37   ALT 26   ANIONGAP 8   EGFRNONAA >60       Significant Imaging:   Imaging Results          X-Ray Chest AP Portable (Final result)  Result time 11/20/19 21:53:08    Final result by Sohail Kline MD (11/20/19 21:53:08)                 Impression:      Stable cardiomegaly with otherwise no  acute cardiopulmonary process identified.      Electronically signed by: Sohail Kline MD  Date:    11/20/2019  Time:    21:53             Narrative:    EXAMINATION:  XR CHEST AP PORTABLE    CLINICAL HISTORY:  chest pain;    TECHNIQUE:  Single frontal view of the chest was performed.    COMPARISON:  09/11/2019.    FINDINGS:  Cardiac silhouette is enlarged but stable in size.  Lungs are symmetrically expanded.  No evidence of focal consolidative process, pneumothorax, or significant effusion.  No acute osseous abnormality identified.  Previously visualized right-sided PICC line has been discontinued.

## 2019-11-21 NOTE — DISCHARGE SUMMARY
[facility] Hospital Medicine  Discharge Summary      Patient Name: Drew Farrar  MRN: [id number]  Admission Date: 11/20/2019  Hospital Length of Stay: 0 days  Discharge Date and Time:  11/21/2019 2:59 PM  Attending Physician: Severyn Yaroshevsky, MD   Discharging Provider: Yadira Jeter MD  Primary Care Provider: Garrison Roach MD      HPI:   53 yo male with history of chronic Afib on Xarelto, HFpEF, hx of recurrent PE and DVT with Mullin filter, HTN, Venous stasis, hyperthryoidism presenting for 1 day history of chest pain that is located on the right side.  Patient reports that the pain started when he was at rest and worsen with walking. Patient tried to use his rescue inhaler but it did not improve the pain. Patient reports that the pain was consistent with his prior chest pain. Patient has history of hyperthyroidism and states that insurance did not cover his medications so he has not been taking his medications. Denies any fever, chills, nausea, vomiting, SOB, or diaphoresis.     In the ED, T: 98.5, HR: 98, RR: 19, BP: 139/65. Trop: Negative EKG: Atrial fibrillation. D-dimer: 1.45. Chest X-ray: Stable cardiomegaly.     * No surgery found *      Hospital Course:   11/21 - Patient received V/Q Scan,  and Thyroid U/S. His right sided chest pain resolved with GI Cocktail. Restarted his methimazole for hyperthyroidism. Patient stable for discharge on home Xarelto.  MUGA not performed due to VQ scan significant for PE- possibly chronic    Consults:   Consults (From admission, onward)        Status Ordering Provider     Case Management  Once     Provider:  (Not yet assigned)    Acknowledged HARRY STANLEY          * Unstable angina  Reports 1 day history of chest pain at rest and on exertion  Trop and EKG: Negative for acute process  Chest X-ray: Negative for acute cardiopulmonary process        (HFpEF) heart failure with preserved ejection fraction  ECHO (9/2019): EF:  50%  Stable  MUGA not performed as V/Q suggestive of PEs    Hyperthyroidism  TSH (10/30/19): < 0.010, T4: 2.86  Patient currently no on medications. Restarting methimazole  Repeat TSH was <0.010.  Encourage patient to be compliant       Recurrent pulmonary embolism  Hx of recurrent DVTs and PE  Followed by Heme/Onc  On Xarelto at home  D-Dimer: 1.45  Will start Lovenox 1mg/kg daily  Order V/Q Scan      May-Thurner syndrome  -Follow up with Dr. Alan Plascencia outpatient  -Resume home Xarelto       Chronic atrial fibrillation  EKG: Atrial fibrillation  On Xarelto  Continue anticoagulation  Continue beta blocker  Restart thyroid medication      Venous stasis dermatitis of both lower extremities  Stable        Final Active Diagnoses:    Diagnosis Date Noted POA    PRINCIPAL PROBLEM:  Unstable angina [I20.0] 11/21/2019 Yes    (HFpEF) heart failure with preserved ejection fraction [I50.30] 09/10/2019 Yes    Hyperthyroidism [E05.90] 09/05/2017 Yes     Chronic    Recurrent pulmonary embolism [I26.99] 10/26/2016 Yes    May-Thurner syndrome [I87.1] 02/26/2014 Yes    Venous stasis dermatitis of both lower extremities [I87.2] 12/14/2012 Yes    Chronic atrial fibrillation [I48.20] 12/14/2012 Yes      Problems Resolved During this Admission:       Discharged Condition: stable    Disposition:     Follow Up:  Follow-up Information     Garrison Roach MD. Schedule an appointment as soon as possible for a visit in 1 week.    Specialty:  Family Medicine  Why:  Follow up unstable angina  Contact information:  [address] [place] [address]  [phone]             Alan Plascencia MD. Schedule an appointment as soon as possible for a visit in 1 week.    Specialties:  Hematology and Oncology, Hematology  Why:  For follow-up of unstable angina, V/Q scan indicative of PEs  Contact information:  [address] [place] [id number] [phone]                 Patient Instructions:      Ambulatory Referral to  Outpatient Case Management   Referral Priority: Routine Referral Type: Consultation   Referral Reason: Specialty Services Required   Number of Visits Requested: 1     Diet Cardiac     Notify your health care provider if you experience any of the following:  temperature >100.4     Notify your health care provider if you experience any of the following:  persistent nausea and vomiting or diarrhea     Notify your health care provider if you experience any of the following:  severe uncontrolled pain     Notify your health care provider if you experience any of the following:  redness, tenderness, or signs of infection (pain, swelling, redness, odor or green/yellow discharge around incision site)     Notify your health care provider if you experience any of the following:  difficulty breathing or increased cough     Notify your health care provider if you experience any of the following:  severe persistent headache     Notify your health care provider if you experience any of the following:  worsening rash     Notify your health care provider if you experience any of the following:  persistent dizziness, light-headedness, or visual disturbances     Notify your health care provider if you experience any of the following:  increased confusion or weakness     Activity as tolerated       Significant Diagnostic Studies: Labs:   CMP   Recent Labs   Lab 11/20/19  2233      K 4.6      CO2 22*   GLU 89   BUN 25*   CREATININE 0.9   CALCIUM 9.5   PROT 7.6   ALBUMIN 3.4*   BILITOT 0.6   ALKPHOS 142*   AST 37   ALT 26   ANIONGAP 8   ESTGFRAFRICA >60   EGFRNONAA >60   , CBC   Recent Labs   Lab 11/20/19  2233   WBC 4.09   HGB 8.9*   HCT 29.0*      , INR   Lab Results   Component Value Date    INR 1.1 09/10/2019    INR 1.1 08/02/2019    INR 3.4 (H) 07/27/2019   , Troponin   Recent Labs   Lab 11/21/19  0732   TROPONINI 0.010    and All labs within the past 24 hours have been reviewed  Microbiology:   Blood Culture    Lab Results   Component Value Date    LABBLOO No growth after 5 days. 09/12/2019     Radiology:   Imaging Results          US Soft Tissue Head Neck Thyroid (Final result)  Result time 11/21/19 13:09:51    Final result by Kendrick Vasquez MD (11/21/19 13:09:51)                 Impression:      Thyroid gland measurements upper normal to mildly enlarged with heterogeneous echogenicity.  Findings are nonspecific.  Clinical correlation for any concern for thyroiditis or Graves disease.      Electronically signed by: Kendrick Vasquez  Date:    11/21/2019  Time:    13:09             Narrative:    EXAMINATION:  US SOFT TISSUE HEAD NECK THYROID    CLINICAL HISTORY:  hyperthyroidism; Other specified abnormal findings of blood chemistry    TECHNIQUE:  Ultrasound of the thyroid and cervical lymph nodes was performed.    COMPARISON:  None.    FINDINGS:  The right lobe of the thyroid measures 6.3 x 2.8 x 3.0 cm.  The left lobe of the thyroid measures 6.1 x 2.4 x 2.3 cm.  Thyroid isthmus measures 5 mm in thickness.  Mildly heterogeneous thyroid echogenicity with no discrete thyroid nodules.  No pathologic caliber or pathologic appearing right or left thyroid bed lymph nodes.  No significant thyroid gland hypervascularity noted.                               X-Ray Chest AP Portable (Final result)  Result time 11/20/19 21:53:08    Final result by Sohail Kline MD (11/20/19 21:53:08)                 Impression:      Stable cardiomegaly with otherwise no acute cardiopulmonary process identified.      Electronically signed by: Sohail Kline MD  Date:    11/20/2019  Time:    21:53             Narrative:    EXAMINATION:  XR CHEST AP PORTABLE    CLINICAL HISTORY:  chest pain;    TECHNIQUE:  Single frontal view of the chest was performed.    COMPARISON:  09/11/2019.    FINDINGS:  Cardiac silhouette is enlarged but stable in size.  Lungs are symmetrically expanded.  No evidence of focal consolidative process, pneumothorax, or significant  effusion.  No acute osseous abnormality identified.  Previously visualized right-sided PICC line has been discontinued.                                Cardiac Graphics: Echocardiogram:   Transthoracic echo (TTE) complete (Cupid Only):   Results for orders placed or performed during the hospital encounter of 09/10/19   Echo Color Flow Doppler? Yes; Bubble Contrast? No   Result Value Ref Range    BSA 3.01 m2    LA WIDTH 3.78 cm    LVIDD 5.95 3.5 - 6.0 cm    IVS 1.28 (A) 0.6 - 1.1 cm    PW 1.14 (A) 0.6 - 1.1 cm    Ao root annulus 3.52 cm    LVIDS 4.11 (A) 2.1 - 4.0 cm    FS 31 28 - 44 %    LA volume 67.95 cm3    LV mass 313.21 g    LA size 4.93 cm    RVDD 3.35 cm    Left Ventricle Relative Wall Thickness 0.38 cm    AV mean gradient 4 mmHg    AV valve area 3.46 cm2    AV Velocity Ratio 0.66     AV index (prosthetic) 0.74     LVOT diameter 2.44 cm    LVOT area 4.7 cm2    LVOT peak rufus 0.87 m/s    LVOT peak VTI 18.71 cm    Ao peak rufus 1.31 m/s    Ao VTI 25.29 cm    LVOT stroke volume 87.44 cm3    AV peak gradient 7 mmHg    TR Max Rufus 2.29 m/s    LV Systolic Volume 74.86 mL    LV Systolic Volume Index 25.5 mL/m2    LV Diastolic Volume 176.45 mL    LV Diastolic Volume Index 60.09 mL/m2    LA Volume Index 23.1 mL/m2    LV Mass Index 107 g/m2    RA Major Axis 5.57 cm    Left Atrium Minor Axis 4.30 cm    Left Atrium Major Axis 4.28 cm    Triscuspid Valve Regurgitation Peak Gradient 21 mmHg    Right Atrial Pressure (from IVC) 8 mmHg    TV rest pulmonary artery pressure 29 mmHg    Narrative    · Mild left atrial enlargement.  · Mild aortic regurgitation.  · Mild tricuspid regurgitation.  · Left ventricular systolic function. The estimated ejection fraction is   50%  · Normal right ventricular systolic function.  · Mild right atrial enlargement.  · Mild mitral regurgitation.  · Mild right ventricular enlargement.  · Intermediate central venous pressure (8 mm Hg).  · The estimated PA systolic pressure is 29 mm Hg          Pending  Diagnostic Studies:     None         Medications:  Reconciled Home Medications:      Medication List      CHANGE how you take these medications    lisinopril 10 MG tablet  Take 1 tablet (10 mg total) by mouth once daily.  What changed:  how much to take     metoprolol succinate 100 MG 24 hr tablet  Commonly known as:  TOPROL-XL  Take 1 tablet (100 mg total) by mouth once daily.  What changed:  when to take this        CONTINUE taking these medications    ciclopirox 8 % Soln  Commonly known as:  PENLAC  Apply topically nightly as directed     ciprofloxacin HCl 500 MG tablet  Commonly known as:  CIPRO  Take 1 tablet (500 mg total) by mouth 2 (two) times daily until entirely gone.     doxycycline 100 MG Cap  Commonly known as:  VIBRAMYCIN  Take 1 capsule (100 mg total) by mouth 2 (two) times daily until entirely gone.     HYDROcodone-acetaminophen 5-325 mg per tablet  Commonly known as:  NORCO  Take 1 tablet by mouth every 6 (six) hours as needed for Pain.     hydrOXYzine HCl 25 MG tablet  Commonly known as:  ATARAX  Take 1 tablet (25 mg total) by mouth 4 (four) times daily as needed for Itching.     methIMAzole 10 MG Tab  Commonly known as:  TAPAZOLE  Take 2 tablets (20 mg total) by mouth once daily.     rivaroxaban 20 mg Tab  Commonly known as:  Xarelto  Take 1 tablet (20 mg total) by mouth daily with dinner or evening meal.     torsemide 20 MG Tab  Commonly known as:  DEMADEX  Take 2 tablets (40 mg total) by mouth once daily.     Ventolin HFA 90 mcg/actuation inhaler  Generic drug:  albuterol  Inhale 2 puffs into the lungs every 6 (six) hours as needed for Wheezing. Rescue            Indwelling Lines/Drains at time of discharge:   Lines/Drains/Airways     Peripherally Inserted Central Catheter Line                 PICC Double Lumen 09/11/19 0116 right brachial 71 days                Time spent on the discharge of patient: 33 minutes  Patient was seen and examined on the date of discharge and determined to be  suitable for discharge.         Yadira Jeter MD  Department of Hospital Medicine  [facility]

## 2019-11-21 NOTE — PLAN OF CARE
Pt lives in [place] with his wife and her parents.  His in-laws drive he and his wife as needed.  He attends wound care clinic weekly and has [facility].  He states that [employer] was not covering his thyroid medication.  I asked him if he has talked with someone from [employer] and he has not.  I informed him that sometimes [employer] will only cover certain medications for certain conditions unless his doctor writes a letter stating medical necessity for pt to take specific medications.  I encouraged him to call [employer] to get information as to why they are not covering this medication and then discuss the information with his doctor.  He verbalized understanding.  He also asked that he get the ski devices for the bottom of his walker that he got a few months ago.  I messaged Kristine with [facility] and they are not covered by insurance.  [facility] does not carry them.  He can get them from [employer] for $30 or less.  White board updated with CM name and contact information.  Discharge brochure provided.  Pt encouraged to call with any questions or concerns.  Cm will continue to follow pt through transitions of care and assist with any discharge needs.     11/21/19 7354   Discharge Assessment   Assessment Type Discharge Planning Assessment   Confirmed/corrected address and phone number on facesheet? Yes   Assessment information obtained from? Patient   Communicated expected length of stay with patient/caregiver yes   Prior to hospitilization cognitive status: Alert/Oriented   Prior to hospitalization functional status: Assistive Equipment   Current cognitive status: Alert/Oriented   Current Functional Status: Assistive Equipment   Lives With spouse   Able to Return to Prior Arrangements yes   Is patient able to care for self after discharge? Yes   Who are your caregiver(s) and their phone number(s)? Mike   Patient's perception of discharge disposition home or selfcare;home health   Readmission Within  the Last 30 Days no previous admission in last 30 days   Patient currently being followed by outpatient case management? No   Equipment Currently Used at Home walker, rolling   Do you have any problems affording any of your prescribed medications? TBD   Is the patient taking medications as prescribed? yes   Does the patient have transportation home? Yes   Transportation Anticipated family or friend will provide   Discharge Plan A Home with family   Discharge Plan B Home Health   DME Needed Upon Discharge    (TBD)   Patient/Family in Agreement with Plan yes     Vlad Gibson RN,   [phone]

## 2019-11-21 NOTE — ASSESSMENT & PLAN NOTE
Reports 1 day history of chest pain at rest and on exertion  Trop and EKG: Negative for acute process  Chest X-ray: Negative for acute cardiopulmonary process

## 2019-11-21 NOTE — HOSPITAL COURSE
11/21 - Patient received V/Q Scan, MUGA Scan, and Thyroid U/S. His right sided chest pain resolved with GI Cocktail. Restarted his methimazole for hyperthyroidism.

## 2019-11-21 NOTE — ASSESSMENT & PLAN NOTE
TSH (10/30/19): < 0.010, T4: 2.86  Patient currently no on medications. Restarting methimazole  Repeat TSH was <0.010.  Encourage patient to be compliant

## 2019-11-21 NOTE — ASSESSMENT & PLAN NOTE
TSH (10/30/19): < 0.010, T4: 2.86  Patient currently no on medications  Repeat TSH  Encourage patient to be compliant

## 2019-11-21 NOTE — PROGRESS NOTES
Pharmacy New Medication Education    Patient and/or Caregiver ACCEPTED medication education.    Pharmacy has provided education on the name, indication, and possible side effects of the medication(s) prescribed, using teach-back method.     Learners of pharmacy medication education includes:  patient    The following medications have also been discussed, during this admission.     Current Facility-Administered Medications   Medication Frequency    acetaminophen tablet 650 mg Q4H PRN    acetaminophen tablet 650 mg Q8H PRN    apixaban tablet 10 mg BID    lisinopril tablet 7.5 mg Daily    methIMAzole tablet 20 mg Daily    metoprolol succinate (TOPROL-XL) 24 hr tablet 100 mg QHS    morphine injection 4 mg Q4H PRN    ondansetron injection 4 mg Q8H PRN    promethazine (PHENERGAN) 6.25 mg in dextrose 5 % 50 mL IVPB Q6H PRN    senna-docusate 8.6-50 mg per tablet 1 tablet BID    sodium chloride 0.9% flush 5 mL PRN    torsemide tablet 40 mg Daily          Thank you  Jeff Schroeder, IwonaD

## 2019-11-21 NOTE — ASSESSMENT & PLAN NOTE
Hx of recurrent DVTs and PE  Followed by Heme/Onc  On Xarelto at home  D-Dimer: 1.45  Will start Lovenox 1mg/kg daily

## 2019-11-21 NOTE — HPI
55 yo male with history of chronic Afib on Xarelto, HFpEF, hx of recurrent PE and DVT with Agenda filter, HTN, Venous stasis, hyperthryoidism presenting for 1 day history of chest pain that is located on the right side.  Patient reports that the pain started when he was at rest and worsen with walking. Patient tried to use his rescue inhaler but it did not improve the pain. Patient reports that the pain was consistent with his prior chest pain. Patient has history of hyperthyroidism and states that insurance did not cover his medications so he has not been taking his medications. Denies any fever, chills, nausea, vomiting, SOB, or diaphoresis.     In the ED, T: 98.5, HR: 98, RR: 19, BP: 139/65. Trop: Negative EKG: Atrial fibrillation. D-dimer: 1.45. Chest X-ray: Stable cardiomegaly.

## 2019-11-21 NOTE — ED TRIAGE NOTES
Pt presents to ED with complaints of chest pain. Pt states he was at a function and states he took 2 puff is his inhaler for wheezing. Pt states not long after that he went to get up and felt a pain in the right side of his chest. Pt states pain was a 9 out of 10. Pt states he received relief with GI cocktail given by EMS. Pt denies chest pain at this time. Pt denies recent illness, fever, SOB, N/V/D. Pt has wound to left foot wrapped and in walking boot. Pt states he has wound care nurse that takes care of that wound.     APPEARANCE: Alert, oriented and in no acute distress.  CARDIAC: Normal rate and rhythm, no murmur heard.   PERIPHERAL VASCULAR: peripheral pulses present. Normal cap refill.  Edema to right leg. Warm to touch.    RESPIRATORY:Normal rate and effort, breath sounds clear bilaterally throughout chest. Respirations are equal and unlabored no obvious signs of distress.  GASTRO: soft, bowel sounds normal, no tenderness, no abdominal distention.  MUSC: Full ROM. No bony tenderness or soft tissue tenderness. No obvious deformity. Pt walks with walker that is at bedside.   SKIN: Skin is warm and dry, normal skin turgor, mucous membranes moist. Right leg from knee down is swollen, discolored and haxs dry flaky skin; Normal cap refill. Left foto is wrapped from foot to knee is wound care dressing with walking boot.   NEURO: 5/5 strength major flexors/extensors bilaterally. Sensory intact to light touch bilaterally. [place] coma scale: eyes open spontaneously-4, oriented & converses-5, obeys commands-6. No neurological abnormalities.   MENTAL STATUS: awake, alert and aware of environment.  EYE: PERRL, both eyes: pupils brisk and reactive to light. Normal size.

## 2019-11-21 NOTE — NURSING
VN cued into that patient's room with permission. The patient was awake and alert in bed. He denies any pain. All admission questions were completed. The patient was oriented to the role of the VN. Time was allotted for questions. Will continue to monitor.

## 2019-11-21 NOTE — H&P
[facility] Medicine  History & Physical    Patient Name: Drew Farrar  MRN: [id number]  Admission Date: 11/20/2019  Attending Physician: Severyn Yaroshevsky, MD   Primary Care Provider: Garrison Roach MD         Patient information was obtained from patient and ER records.     Subjective:     Principal Problem:Unstable angina    Chief Complaint:   Chief Complaint   Patient presents with    Chest Pain     reports intermittent pain to left chest. worse with breathing in. Pt also reports relief with using inhaler. Pt on 2L NC per EMS.         HPI: 55 yo male with history of chronic Afib on Xarelto, HFpEF, hx of recurrent PE and DVT with [place] filter, HTN, Venous stasis, hyperthryoidism presenting for 1 day history of chest pain that is located on the right side. Patient reports that the pain started when he was at rest and worsen with walking. Patient tried to use his rescue inhaler but it did not improve the pain. Patient reports that the pain was consistent with his prior chest pain. Patient has history of hyperthyroidism and states that insurance did not cover his medications so he has not been taking his medications. Denies any fever, chills, nausea, vomiting, SOB, or diaphoresis.     In the ED, T: 98.5, HR: 98, RR: 19, BP: 139/65. Trop: Negative EKG: Atrial fibrillation. D-dimer: 1.45. Chest X-ray: Stable cardiomegaly.     Past Medical History:   Diagnosis Date    *Atrial fibrillation     Anticoagulant long-term use     Arthritis     Atrial fibrillation     Atrial fibrillation Feb 23, 2016    Bipolar disorder     CHF (congestive heart failure)     Congenital heart disease     s/p surgical intervention 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 of age    Deep vein thrombosis     DVT of leg (deep venous thrombosis)     left leg    History of prior ablation treatment     10/9/13    Hypertension     Obesity     Stroke     Thyroid disease     Venous stasis ulcer of lower extremity, unspecified  laterality 12/14/2012    Venous ulcer        Past Surgical History:   Procedure Laterality Date    ANGIOPLASTY      CARDIAC SURGERY      open heart surgery 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 old    EYE SURGERY      left eye cataract/right eye glaucoma    TIMO FILTER PLAC[place]    KNEE SURGERY      l and r     MULTIPLE TOOTH EXTRACTIONS      SKIN GRAFT      left leg       Review of patient's allergies indicates:   Allergen Reactions    Contrast media Other (See Comments)     Severe chest pain    Food allergy formula [glutamine-c-quercet-selen-brom]      Allergic to green peas; Heart failure.    Iodinated contrast media Other (See Comments)     Chest pain    Peas Hives    Ibuprofen Swelling    Latex, natural rubber Hives    Pcn [penicillins] Hives    Butisol [butabarbital] Rash     Peeling skin       No current facility-administered medications on file prior to encounter.      Current Outpatient Medications on File Prior to Encounter   Medication Sig    albuterol (VENTOLIN HFA) 90 mcg/actuation inhaler Inhale 2 puffs into the lungs every 6 (six) hours as needed for Wheezing. Rescue    ciclopirox (PENLAC) 8 % Soln Apply topically nightly as directed    ciprofloxacin HCl (CIPRO) 500 MG tablet Take 1 tablet (500 mg total) by mouth 2 (two) times daily until entirely gone.    doxycycline (VIBRAMYCIN) 100 MG Cap Take 1 capsule (100 mg total) by mouth 2 (two) times daily until entirely gone.    HYDROcodone-acetaminophen (NORCO) 5-325 mg per tablet Take 1 tablet by mouth every 6 (six) hours as needed for Pain.    hydrOXYzine HCl (ATARAX) 25 MG tablet Take 1 tablet (25 mg total) by mouth 4 (four) times daily as needed for Itching.    lisinopril 10 MG tablet Take 1 tablet (10 mg total) by mouth once daily. (Patient taking differently: Take 7.5 mg by mouth once daily. )    methIMAzole (TAPAZOLE) 10 MG Tab Take 2 tablets (20 mg total) by mouth once daily.    metoprolol succinate (TOPROL-XL)  100 MG 24 hr tablet Take 1 tablet (100 mg total) by mouth once daily. (Patient taking differently: Take 100 mg by mouth every evening. )    rivaroxaban (XARELTO) 20 mg Tab Take 1 tablet (20 mg total) by mouth daily with dinner or evening meal.    torsemide (DEMADEX) 20 MG Tab Take 2 tablets (40 mg total) by mouth once daily.     Family History     Problem Relation (Age of Onset)    Diabetes Father, Maternal Grandfather    Heart disease Father, Maternal Grandmother, Maternal Grandfather    Stroke Maternal Grandfather        Tobacco Use    Smoking status: Former Smoker     Packs/day: 1.00     Years: 6.00     Pack years: 6.00     Types: Cigarettes    Smokeless tobacco: Former User    Tobacco comment: quit by age 27yrs old   Substance and Sexual Activity    Alcohol use: Yes     Alcohol/week: 1.0 standard drinks     Types: 1 Glasses of wine per week     Frequency: Monthly or less     Comment: occasionally    Drug use: No    Sexual activity: Yes     Partners: Female     Birth control/protection: None     Review of Systems   Constitutional: Negative for activity change, chills and fatigue.   Respiratory: Positive for chest tightness. Negative for cough, shortness of breath and wheezing.    Cardiovascular: Positive for chest pain. Negative for palpitations and leg swelling.   Gastrointestinal: Negative for abdominal pain, constipation, diarrhea, nausea and vomiting.   Musculoskeletal: Negative for arthralgias, back pain and gait problem.   Neurological: Negative for dizziness, weakness, light-headedness and numbness.     Objective:     Vital Signs (Most Recent):  Temp: 98.5 °F (36.9 °C) (11/20/19 2029)  Pulse: 109 (11/21/19 0106)  Resp: 18 (11/21/19 0106)  BP: (!) 116/59 (11/21/19 0106)  SpO2: 97 % (11/21/19 0106) Vital Signs (24h Range):  Temp:  [98.5 °F (36.9 °C)] 98.5 °F (36.9 °C)  Pulse:  [] 109  Resp:  [18-19] 18  SpO2:  [97 %-99 %] 97 %  BP: (116-139)/(59-65) 116/59     Weight: (!) 157.4 kg (347  lb)  Body mass index is 38.12 kg/m².    Physical Exam   Constitutional: He is oriented to person, place, and time. He appears well-developed and well-nourished. No distress.   HENT:   Head: Normocephalic and atraumatic.   Eyes: EOM are normal.   Neck: Normal range of motion. No thyromegaly present.   Cardiovascular:   No murmur heard.  Irregularly irregular rhythm   Pulmonary/Chest: Effort normal and breath sounds normal. No respiratory distress. He has no wheezes.   Abdominal: Soft. Bowel sounds are normal. He exhibits no distension. There is no tenderness. There is no guarding.   Musculoskeletal: Normal range of motion.   Neurological: He is alert and oriented to person, place, and time.   Skin: He is not diaphoretic.   Chronic venous changes in the bilateral lower extremity      Significant Labs:   CBC:   Recent Labs   Lab 11/20/19  2233   WBC 4.09   HGB 8.9*   HCT 29.0*        CMP:   Recent Labs   Lab 11/20/19  2233      K 4.6      CO2 22*   GLU 89   BUN 25*   CREATININE 0.9   CALCIUM 9.5   PROT 7.6   ALBUMIN 3.4*   BILITOT 0.6   ALKPHOS 142*   AST 37   ALT 26   ANIONGAP 8   EGFRNONAA >60       Significant Imaging:   Imaging Results          X-Ray Chest AP Portable (Final result)  Result time 11/20/19 21:53:08    Final result by Sohail Kline MD (11/20/19 21:53:08)                 Impression:      Stable cardiomegaly with otherwise no acute cardiopulmonary process identified.      Electronically signed by: Sohail Kline MD  Date:    11/20/2019  Time:    21:53             Narrative:    EXAMINATION:  XR CHEST AP PORTABLE    CLINICAL HISTORY:  chest pain;    TECHNIQUE:  Single frontal view of the chest was performed.    COMPARISON:  09/11/2019.    FINDINGS:  Cardiac silhouette is enlarged but stable in size.  Lungs are symmetrically expanded.  No evidence of focal consolidative process, pneumothorax, or significant effusion.  No acute osseous abnormality identified.  Previously visualized  right-sided PICC line has been discontinued.                                  Assessment/Plan:     55 yo male with history of chronic Afib on Xarelto, HFpEF, hx of recurrent PE and DVT with [place] filter, HTN, Venous stasis, hyperthryoidism presenting for 1 day history of chest pain that is located on the right side. Started on Lovenox. Clinically stable.     * Unstable angina  Reports 1 day history of chest pain at rest and on exertion  Trop and EKG: Negative  Chest X-ray: Negative for acute cardiopulmonary process  Trend Trop and EKG      (HFpEF) heart failure with preserved ejection fraction  ECHO (9/2019): EF: 50%  Stable      Hyperthyroidism  TSH (10/30/19): < 0.010, T4: 2.86  Patient currently no on medications  Repeat TSH  Encourage patient to be compliant       Recurrent pulmonary embolism  Hx of recurrent DVTs and PE  Followed by Heme/Onc  On Xarelto at home  D-Dimer: 1.45  Will start Lovenox 1mg/kg daily      Chronic atrial fibrillation  EKG: Atrial fibrillation  On Xarelto      Venous stasis dermatitis of both lower extremities  Stable        Diet: NPO  Ppx: Lovenox  Dispo: Pending Trop and EKG trend

## 2019-11-21 NOTE — ASSESSMENT & PLAN NOTE
EKG: Atrial fibrillation  On Xarelto  Continue anticoagulation  Continue beta blocker  Restart thyroid medication

## 2019-11-21 NOTE — ED NOTES
Patient given zofran for nausea. Patient dry heaving. Patient instructed to call for nurse when ready for GI cocktail when nausea subsides.

## 2019-11-21 NOTE — ED PROVIDER NOTES
Encounter Date: 11/20/2019    SCRIBE #1 NOTE: I, Marce Gilman, am scribing for, and in the presence of,  Dr. Meek. I have scribed the entire note.       History     Chief Complaint   Patient presents with    Chest Pain     reports intermittent pain to left chest. worse with breathing in. Pt also reports relief with using inhaler. Pt on 2L NC per EMS.      Drew Farrar is a 53 yo M who  has a past medical history of *Atrial fibrillation, Anticoagulant long-term use, Arthritis, Atrial fibrillation, Atrial fibrillation (Feb 23, 2016), Bipolar disorder, CHF (congestive heart failure), Congenital heart disease, Deep vein thrombosis, DVT of leg (deep venous thrombosis), History of prior ablation treatment, Hypertension, Obesity, Stroke, Thyroid disease, Venous stasis ulcer of lower extremity, unspecified laterality (12/14/2012), and Venous ulcer.    The patient presents to the ED due to chest pain x 1 day. He reports that pain started at rest. He reports that pain is exacerbated with deep breathing and exertion. Associated symptoms include wheezing. Patient reports using rescue inhaler at home without significant relief. EMS administered NTG, GI cocktail, and Morphine in route. Patient only reports relief with GI cocktail. He denies any additional complaints at this time. No fever, nausea, vomiting, SOB, diaphoresis, jaw pain, numbness/tingling, new leg swelling, leg pain, hemoptysis, or any other complaints. Patient reports using rescue inhaler at home without significant relief.    The history is provided by the patient.     Review of patient's allergies indicates:   Allergen Reactions    Contrast media Other (See Comments)     Severe chest pain    Food allergy formula [glutamine-c-quercet-selen-brom]      Allergic to green peas; Heart failure.    Iodinated contrast media Other (See Comments)     Chest pain    Peas Hives    Ibuprofen Swelling    Latex, natural rubber Hives    Pcn [penicillins] Hives     Butisol [butabarbital] Rash     Peeling skin     Past Medical History:   Diagnosis Date    *Atrial fibrillation     Anticoagulant long-term use     Arthritis     Atrial fibrillation     Atrial fibrillation Feb 23, 2016    Bipolar disorder     CHF (congestive heart failure)     Congenital heart disease     s/p surgical intervention 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 of age    Deep vein thrombosis     DVT of leg (deep venous thrombosis)     left leg    History of prior ablation treatment     10/9/13    Hypertension     Obesity     Stroke     Thyroid disease     Venous stasis ulcer of lower extremity, unspecified laterality 12/14/2012    Venous ulcer      Past Surgical History:   Procedure Laterality Date    ANGIOPLASTY      CARDIAC SURGERY      open heart surgery 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 old    EYE SURGERY      left eye cataract/right eye glaucoma    TIMO FILTER PLACEMENT      Dr Calix ([facility])    KNEE SURGERY      l and r     MULTIPLE TOOTH EXTRACTIONS      SKIN GRAFT      left leg     Family History   Problem Relation Age of Onset    Diabetes Father     Heart disease Father     Heart disease Maternal Grandmother     Diabetes Maternal Grandfather     Heart disease Maternal Grandfather     Stroke Maternal Grandfather      Social History     Tobacco Use    Smoking status: Former Smoker     Packs/day: 1.00     Years: 6.00     Pack years: 6.00     Types: Cigarettes    Smokeless tobacco: Former User    Tobacco comment: quit by age 27yrs old   Substance Use Topics    Alcohol use: Yes     Alcohol/week: 1.0 standard drinks     Types: 1 Glasses of wine per week     Frequency: Monthly or less     Comment: occasionally    Drug use: No     Review of Systems   Constitutional: Negative for chills and fever.   HENT: Negative for congestion, ear pain, rhinorrhea and sore throat.    Respiratory: Positive for wheezing. Negative for cough and shortness of breath.    Cardiovascular: Positive for chest pain.    Gastrointestinal: Negative for abdominal pain, diarrhea, nausea and vomiting.   Genitourinary: Negative for dysuria and hematuria.   Musculoskeletal: Negative for myalgias and neck pain.   Skin: Negative for rash.   Neurological: Negative for dizziness, weakness, light-headedness and headaches.   Psychiatric/Behavioral: Negative for confusion.       Physical Exam     Initial Vitals [11/20/19 2029]   BP Pulse Resp Temp SpO2   139/65 98 19 98.5 °F (36.9 °C) 99 %      MAP       --         Physical Exam    Nursing note and vitals reviewed.  Constitutional: He appears well-developed and well-nourished. No distress.   HENT:   Head: Normocephalic and atraumatic.   Mouth/Throat: Oropharynx is clear and moist.   Eyes: Conjunctivae and EOM are normal.   Cardiovascular: Normal rate, normal heart sounds and intact distal pulses. An irregularly irregular rhythm present.    Pulmonary/Chest: Breath sounds normal. No respiratory distress.   Abdominal: Soft. He exhibits no distension. There is no tenderness.   Musculoskeletal: Normal range of motion. He exhibits edema. He exhibits no tenderness.   Venous stasis changes to right leg  The right leg appears chronically swollen per patient    diabetic walking boot wrapped, pt refused examination   Neurological: He is alert and oriented to person, place, and time. He has normal strength.   Skin: Skin is warm and dry.         ED Course   Procedures  Labs Reviewed   CBC W/ AUTO DIFFERENTIAL - Abnormal; Notable for the following components:       Result Value    RBC 3.58 (*)     Hemoglobin 8.9 (*)     Hematocrit 29.0 (*)     Mean Corpuscular Volume 81 (*)     Mean Corpuscular Hemoglobin 24.9 (*)     Mean Corpuscular Hemoglobin Conc 30.7 (*)     RDW 15.3 (*)     All other components within normal limits   COMPREHENSIVE METABOLIC PANEL - Abnormal; Notable for the following components:    CO2 22 (*)     BUN, Bld 25 (*)     Albumin 3.4 (*)     Alkaline Phosphatase 142 (*)     All other  components within normal limits   B-TYPE NATRIURETIC PEPTIDE - Abnormal; Notable for the following components:     (*)     All other components within normal limits   D DIMER, QUANTITATIVE - Abnormal; Notable for the following components:    D-Dimer 1.45 (*)     All other components within normal limits   TSH - Abnormal; Notable for the following components:    TSH <0.010 (*)     All other components within normal limits   T4, FREE - Abnormal; Notable for the following components:    Free T4 2.32 (*)     All other components within normal limits   TROPONIN I   HEMOGLOBIN A1C   MAGNESIUM   PHOSPHORUS   TSH   MAGNESIUM   PHOSPHORUS   HEMOGLOBIN A1C   TROPONIN I    Narrative:     STAT, if not done in ED, then at 2nd and 6th hour from  initial draw.          X-Rays:   Independently Interpreted Readings:   Other Readings:  Reviewed by myself, read by radiology.      Imaging Results          US Soft Tissue Head Neck Thyroid (Final result)  Result time 11/21/19 13:09:51    Final result by Kendrick Vasquez MD (11/21/19 13:09:51)                 Impression:      Thyroid gland measurements upper normal to mildly enlarged with heterogeneous echogenicity.  Findings are nonspecific.  Clinical correlation for any concern for thyroiditis or Graves disease.      Electronically signed by: Kendrick Vasquez  Date:    11/21/2019  Time:    13:09             Narrative:    EXAMINATION:  US SOFT TISSUE HEAD NECK THYROID    CLINICAL HISTORY:  hyperthyroidism; Other specified abnormal findings of blood chemistry    TECHNIQUE:  Ultrasound of the thyroid and cervical lymph nodes was performed.    COMPARISON:  None.    FINDINGS:  The right lobe of the thyroid measures 6.3 x 2.8 x 3.0 cm.  The left lobe of the thyroid measures 6.1 x 2.4 x 2.3 cm.  Thyroid isthmus measures 5 mm in thickness.  Mildly heterogeneous thyroid echogenicity with no discrete thyroid nodules.  No pathologic caliber or pathologic appearing right or left thyroid bed  lymph nodes.  No significant thyroid gland hypervascularity noted.                               X-Ray Chest AP Portable (Final result)  Result time 11/20/19 21:53:08    Final result by Sohail Kline MD (11/20/19 21:53:08)                 Impression:      Stable cardiomegaly with otherwise no acute cardiopulmonary process identified.      Electronically signed by: Sohail Kline MD  Date:    11/20/2019  Time:    21:53             Narrative:    EXAMINATION:  XR CHEST AP PORTABLE    CLINICAL HISTORY:  chest pain;    TECHNIQUE:  Single frontal view of the chest was performed.    COMPARISON:  09/11/2019.    FINDINGS:  Cardiac silhouette is enlarged but stable in size.  Lungs are symmetrically expanded.  No evidence of focal consolidative process, pneumothorax, or significant effusion.  No acute osseous abnormality identified.  Previously visualized right-sided PICC line has been discontinued.                               Medical Decision Making:   Differential Diagnosis:   Differential Diagnosis includes, but is not limited to:  ACS/MI, PE, aortic dissection, pneumothorax, cardiac tamponade, pericarditis/myocarditis, pneumonia, infection/abscess, lung mass, trauma/fracture, costochondritis/pleurisy, MSK pain/contusion, GERD, biliary disease, pancreatitis, anemia   Clinical Tests:   Medical Tests: Ordered and Reviewed  ED Management:  55 yo male with multiple comorbidities with chest pain today.     Labs without elevation of troponin. D-Dimer elevated, currently on treatment for PE. Allergic to contrast.     Given risk factors for ACS I feel he would benefit from observation for further evaluation an treatment of his chest pain, and possible V/Q scan.                       ED Course as of Nov 20 2134   Wed Nov 20, 2019 2133 This is a 54-year-old male presenting today with chest pain onset prior to arrival.  He has various risk factors for ACS.  Will obtain labs cardiac workup and reassess.    [RN]   2134 EKG:   Rate of 91 atrial fibrillation.  No ectopy.  No STEMI.    [RN]      ED Course User Index  [RN] Zane Meek Jr., MD                Clinical Impression:       ICD-10-CM ICD-9-CM   1. Chest pain R07.9 786.50     Disposition:   Disposition: Placed in Observation  Condition: Fair      Scribe Attestation I, Zane Meek,  personally performed the services described in this documentation. All medical record entries made by the scribe were at my direction and in my presence.  I have reviewed the chart and agree that the record reflects my personal performance and is accurate and complete. Zane Meek M.D. 3:32 PM11/21/2019       Portions of this note were dictated using voice recognition software and may contain dictation related errors in spelling/grammar/syntax not found on text review                  Zane Meek Jr., MD  11/21/19 1537

## 2019-11-22 NOTE — PROCEDURES
"Debridement  Date/Time: 11/11/2019 5:57 PM  Performed by: Pool Gutierrez MD  Authorized by: Pool Gutierrez MD     Time out: Immediately prior to procedure a "time out" was called to verify the correct patient, procedure, equipment, support staff and site/side marked as required.    Consent Done?:  Yes (Verbal)    Preparation: Patient was prepped and draped in usual sterile fashion    Local anesthesia used?: Yes    Local anesthetic:  Topical anesthetic    Wound Details:    Location:  Left leg    Type of Debridement:  Excisional       Length (cm):  1.5       Area (sq cm):  3.75       Width (cm):  2.5       Percent Debrided (%):  100       Depth (cm):  0.2       Total Area Debrided (sq cm):  3.75    Depth of debridement:  Subcutaneous tissue    Tissue debrided:  Subcutaneous and Dermis    Devitalized tissue debrided:  Biofilm, Fibrin and Slough    Instruments:  Curette    Bleeding:  Minimal  Patient tolerance:  Patient tolerated the procedure well with no immediate complications      "

## 2019-11-25 ENCOUNTER — OUTPATIENT CASE MANAGEMENT (OUTPATIENT)
Dept: ADMINISTRATIVE | Facility: OTHER | Age: 54
End: 2019-11-25

## 2019-12-02 ENCOUNTER — HOSPITAL ENCOUNTER (OUTPATIENT)
Facility: HOSPITAL | Age: 54
Discharge: HOME | End: 2019-12-03
Attending: EMERGENCY MEDICINE | Admitting: FAMILY MEDICINE
Payer: MEDICARE

## 2019-12-02 DIAGNOSIS — I48.20 CHRONIC ATRIAL FIBRILLATION: ICD-10-CM

## 2019-12-02 DIAGNOSIS — I20.0 UNSTABLE ANGINA: Primary | ICD-10-CM

## 2019-12-02 DIAGNOSIS — I87.2 VENOUS STASIS DERMATITIS OF BOTH LOWER EXTREMITIES: ICD-10-CM

## 2019-12-02 DIAGNOSIS — R07.9 CHEST PAIN: ICD-10-CM

## 2019-12-02 LAB
ANION GAP SERPL CALC-SCNC: 10 MMOL/L (ref 8–16)
BASOPHILS # BLD AUTO: 0.02 K/UL (ref 0–0.2)
BASOPHILS NFR BLD: 0.4 % (ref 0–1.9)
BILIRUB UR QL STRIP: NEGATIVE
BUN SERPL-MCNC: 29 MG/DL (ref 6–20)
CALCIUM SERPL-MCNC: 10.2 MG/DL (ref 8.7–10.5)
CHLORIDE SERPL-SCNC: 109 MMOL/L (ref 95–110)
CK SERPL-CCNC: 162 U/L (ref 20–200)
CLARITY UR: CLEAR
CO2 SERPL-SCNC: 22 MMOL/L (ref 23–29)
COLOR UR: YELLOW
CREAT SERPL-MCNC: 0.9 MG/DL (ref 0.5–1.4)
D DIMER PPP IA.FEU-MCNC: 0.9 MG/L FEU
DIFFERENTIAL METHOD: ABNORMAL
EOSINOPHIL # BLD AUTO: 0.1 K/UL (ref 0–0.5)
EOSINOPHIL NFR BLD: 2.6 % (ref 0–8)
ERYTHROCYTE [DISTWIDTH] IN BLOOD BY AUTOMATED COUNT: 15.4 % (ref 11.5–14.5)
EST. GFR  (AFRICAN AMERICAN): >60 ML/MIN/1.73 M^2
EST. GFR  (NON AFRICAN AMERICAN): >60 ML/MIN/1.73 M^2
GLUCOSE SERPL-MCNC: 97 MG/DL (ref 70–110)
GLUCOSE UR QL STRIP: NEGATIVE
HCT VFR BLD AUTO: 31.3 % (ref 40–54)
HGB BLD-MCNC: 9.4 G/DL (ref 14–18)
HGB UR QL STRIP: NEGATIVE
KETONES UR QL STRIP: NEGATIVE
LEUKOCYTE ESTERASE UR QL STRIP: NEGATIVE
LYMPHOCYTES # BLD AUTO: 0.9 K/UL (ref 1–4.8)
LYMPHOCYTES NFR BLD: 19.2 % (ref 18–48)
MAGNESIUM SERPL-MCNC: 1.8 MG/DL (ref 1.6–2.6)
MCH RBC QN AUTO: 24.2 PG (ref 27–31)
MCHC RBC AUTO-ENTMCNC: 30 G/DL (ref 32–36)
MCV RBC AUTO: 81 FL (ref 82–98)
MONOCYTES # BLD AUTO: 0.4 K/UL (ref 0.3–1)
MONOCYTES NFR BLD: 7.9 % (ref 4–15)
NEUTROPHILS # BLD AUTO: 3.2 K/UL (ref 1.8–7.7)
NEUTROPHILS NFR BLD: 69.9 % (ref 38–73)
NITRITE UR QL STRIP: NEGATIVE
PH UR STRIP: 5 [PH] (ref 5–8)
PHOSPHATE SERPL-MCNC: 5.1 MG/DL (ref 2.7–4.5)
PLATELET # BLD AUTO: 178 K/UL (ref 150–350)
PMV BLD AUTO: 9.9 FL (ref 9.2–12.9)
POCT GLUCOSE: 81 MG/DL (ref 70–110)
POTASSIUM SERPL-SCNC: 4.9 MMOL/L (ref 3.5–5.1)
PROT UR QL STRIP: NEGATIVE
RBC # BLD AUTO: 3.89 M/UL (ref 4.6–6.2)
SODIUM SERPL-SCNC: 141 MMOL/L (ref 136–145)
SP GR UR STRIP: 1.02 (ref 1–1.03)
T4 FREE SERPL-MCNC: 2.3 NG/DL (ref 0.71–1.51)
TROPONIN I SERPL DL<=0.01 NG/ML-MCNC: 0.01 NG/ML (ref 0–0.03)
TROPONIN I SERPL DL<=0.01 NG/ML-MCNC: 0.01 NG/ML (ref 0–0.03)
TSH SERPL DL<=0.005 MIU/L-ACNC: <0.01 UIU/ML (ref 0.4–4)
URN SPEC COLLECT METH UR: NORMAL
UROBILINOGEN UR STRIP-ACNC: NEGATIVE EU/DL
WBC # BLD AUTO: 4.53 K/UL (ref 3.9–12.7)

## 2019-12-02 PROCEDURE — 25000003 PHARM REV CODE 250: Performed by: EMERGENCY MEDICINE

## 2019-12-02 PROCEDURE — 25000003 PHARM REV CODE 250: Performed by: STUDENT IN AN ORGANIZED HEALTH CARE EDUCATION/TRAINING PROGRAM

## 2019-12-02 PROCEDURE — 94761 N-INVAS EAR/PLS OXIMETRY MLT: CPT

## 2019-12-02 PROCEDURE — 81003 URINALYSIS AUTO W/O SCOPE: CPT

## 2019-12-02 PROCEDURE — 99285 EMERGENCY DEPT VISIT HI MDM: CPT | Mod: 25

## 2019-12-02 PROCEDURE — 83735 ASSAY OF MAGNESIUM: CPT

## 2019-12-02 PROCEDURE — 85379 FIBRIN DEGRADATION QUANT: CPT

## 2019-12-02 PROCEDURE — 84484 ASSAY OF TROPONIN QUANT: CPT

## 2019-12-02 PROCEDURE — G0378 HOSPITAL OBSERVATION PER HR: HCPCS

## 2019-12-02 PROCEDURE — 84439 ASSAY OF FREE THYROXINE: CPT

## 2019-12-02 PROCEDURE — 82550 ASSAY OF CK (CPK): CPT

## 2019-12-02 PROCEDURE — 84100 ASSAY OF PHOSPHORUS: CPT

## 2019-12-02 PROCEDURE — 93005 ELECTROCARDIOGRAM TRACING: CPT

## 2019-12-02 PROCEDURE — 80048 BASIC METABOLIC PNL TOTAL CA: CPT

## 2019-12-02 PROCEDURE — 84484 ASSAY OF TROPONIN QUANT: CPT | Mod: 91

## 2019-12-02 PROCEDURE — 85025 COMPLETE CBC W/AUTO DIFF WBC: CPT

## 2019-12-02 PROCEDURE — 36415 COLL VENOUS BLD VENIPUNCTURE: CPT

## 2019-12-02 PROCEDURE — 84443 ASSAY THYROID STIM HORMONE: CPT

## 2019-12-02 RX ORDER — CIPROFLOXACIN 500 MG/1
500 TABLET ORAL EVERY 12 HOURS
Status: DISCONTINUED | OUTPATIENT
Start: 2019-12-02 | End: 2019-12-03 | Stop reason: HOSPADM

## 2019-12-02 RX ORDER — INSULIN ASPART 100 [IU]/ML
0-5 INJECTION, SOLUTION INTRAVENOUS; SUBCUTANEOUS
Status: DISCONTINUED | OUTPATIENT
Start: 2019-12-02 | End: 2019-12-02

## 2019-12-02 RX ORDER — ONDANSETRON 8 MG/1
8 TABLET, ORALLY DISINTEGRATING ORAL EVERY 6 HOURS PRN
Status: DISCONTINUED | OUTPATIENT
Start: 2019-12-02 | End: 2019-12-03 | Stop reason: HOSPADM

## 2019-12-02 RX ORDER — ACETAMINOPHEN 325 MG/1
650 TABLET ORAL EVERY 8 HOURS PRN
Status: DISCONTINUED | OUTPATIENT
Start: 2019-12-02 | End: 2019-12-02

## 2019-12-02 RX ORDER — DOXYCYCLINE HYCLATE 100 MG
100 TABLET ORAL EVERY 12 HOURS
Status: DISCONTINUED | OUTPATIENT
Start: 2019-12-02 | End: 2019-12-03 | Stop reason: HOSPADM

## 2019-12-02 RX ORDER — IBUPROFEN 200 MG
24 TABLET ORAL
Status: DISCONTINUED | OUTPATIENT
Start: 2019-12-02 | End: 2019-12-02

## 2019-12-02 RX ORDER — AMOXICILLIN 250 MG
1 CAPSULE ORAL 2 TIMES DAILY
Status: DISCONTINUED | OUTPATIENT
Start: 2019-12-02 | End: 2019-12-03 | Stop reason: HOSPADM

## 2019-12-02 RX ORDER — PANTOPRAZOLE SODIUM 40 MG/1
40 TABLET, DELAYED RELEASE ORAL DAILY
Status: DISCONTINUED | OUTPATIENT
Start: 2019-12-03 | End: 2019-12-03 | Stop reason: HOSPADM

## 2019-12-02 RX ORDER — SODIUM CHLORIDE 0.9 % (FLUSH) 0.9 %
5 SYRINGE (ML) INJECTION
Status: DISCONTINUED | OUTPATIENT
Start: 2019-12-02 | End: 2019-12-03 | Stop reason: HOSPADM

## 2019-12-02 RX ORDER — METHIMAZOLE 5 MG/1
20 TABLET ORAL DAILY
Status: DISCONTINUED | OUTPATIENT
Start: 2019-12-03 | End: 2019-12-03 | Stop reason: HOSPADM

## 2019-12-02 RX ORDER — TORSEMIDE 20 MG/1
40 TABLET ORAL DAILY
Status: DISCONTINUED | OUTPATIENT
Start: 2019-12-03 | End: 2019-12-03 | Stop reason: HOSPADM

## 2019-12-02 RX ORDER — ACETAMINOPHEN 325 MG/1
650 TABLET ORAL EVERY 4 HOURS PRN
Status: DISCONTINUED | OUTPATIENT
Start: 2019-12-02 | End: 2019-12-03 | Stop reason: HOSPADM

## 2019-12-02 RX ORDER — METOPROLOL SUCCINATE 50 MG/1
100 TABLET, EXTENDED RELEASE ORAL NIGHTLY
Status: DISCONTINUED | OUTPATIENT
Start: 2019-12-02 | End: 2019-12-03 | Stop reason: HOSPADM

## 2019-12-02 RX ORDER — IBUPROFEN 200 MG
16 TABLET ORAL
Status: DISCONTINUED | OUTPATIENT
Start: 2019-12-02 | End: 2019-12-02

## 2019-12-02 RX ORDER — SODIUM CHLORIDE, SODIUM LACTATE, POTASSIUM CHLORIDE, CALCIUM CHLORIDE 600; 310; 30; 20 MG/100ML; MG/100ML; MG/100ML; MG/100ML
INJECTION, SOLUTION INTRAVENOUS CONTINUOUS
Status: DISCONTINUED | OUTPATIENT
Start: 2019-12-02 | End: 2019-12-02

## 2019-12-02 RX ORDER — LIDOCAINE 50 MG/G
1 PATCH TOPICAL DAILY PRN
Status: DISCONTINUED | OUTPATIENT
Start: 2019-12-02 | End: 2019-12-03 | Stop reason: HOSPADM

## 2019-12-02 RX ORDER — GLUCAGON 1 MG
1 KIT INJECTION
Status: DISCONTINUED | OUTPATIENT
Start: 2019-12-02 | End: 2019-12-02

## 2019-12-02 RX ADMIN — LIDOCAINE HYDROCHLORIDE: 20 SOLUTION ORAL; TOPICAL at 10:12

## 2019-12-02 RX ADMIN — CIPROFLOXACIN HYDROCHLORIDE 500 MG: 500 TABLET, FILM COATED ORAL at 09:12

## 2019-12-02 RX ADMIN — DOXYCYCLINE HYCLATE 100 MG: 100 TABLET, COATED ORAL at 09:12

## 2019-12-02 RX ADMIN — LIDOCAINE 1 PATCH: 50 PATCH CUTANEOUS at 11:12

## 2019-12-02 RX ADMIN — LIDOCAINE HYDROCHLORIDE: 20 SOLUTION ORAL; TOPICAL at 04:12

## 2019-12-02 NOTE — ED NOTES
Patient stated the pain started today. Patient reported that the pain felt like his heart was racing out of his chest. Patient stated he felt a little dizzy with the episode. Patient denies any pain at present time. Patient is AAOx4, skin is warm and dry.

## 2019-12-02 NOTE — ED PROVIDER NOTES
Encounter Date: 12/2/2019    SCRIBE #1 NOTE: I, Suma Paul, am scribing for, and in the presence of,  Dr. Joshua. I have scribed the entire note.       History     Chief Complaint   Patient presents with    Chest Pain     pt began having midsternal chest pain when he got to his pcp's office pta. Pt reports he has afib.      This is a 54 y.o. with a past medical history of *Atrial fibrillation, Anticoagulant long-term use, Arthritis, Atrial fibrillation, Atrial fibrillation (Feb 23, 2016), Bipolar disorder, CHF (congestive heart failure), Congenital heart disease, Deep vein thrombosis, DVT of leg (deep venous thrombosis), History of prior ablation treatment, Hypertension, Obesity, Stroke, Thyroid disease, Venous stasis ulcer of lower extremity, unspecified laterality (12/14/2012), and Venous ulcer who presents to the ED due to palpitations. He reports onset of symptoms was about 1 hr ago lasting approximately 15 min. The patient was on his way to see his PCP when the symptoms began. The patient has associated chest pain but denies any shortness of breath, leg swelling, leg pain, nausea, vomiting, cough or congestion. The pain is described as sharp and located in the center of the chest. He is now currently pain free. However, when the pain occurred it lasted for about 15 minutes. The patient notes when walking his heart begins to race more. He experienced similar symptoms about 2 weeks ago. At which time the patient was admitted to the hospital.     The history is provided by the patient.     Review of patient's allergies indicates:   Allergen Reactions    Contrast media Other (See Comments)     Severe chest pain    Food allergy formula [glutamine-c-quercet-selen-brom]      Allergic to green peas; Heart failure.    Iodinated contrast media Other (See Comments)     Chest pain    Peas Hives    Ibuprofen Swelling    Latex, natural rubber Hives    Pcn [penicillins] Hives    Butisol [butabarbital] Rash      Peeling skin     Past Medical History:   Diagnosis Date    *Atrial fibrillation     Anticoagulant long-term use     Arthritis     Atrial fibrillation     Atrial fibrillation Feb 23, 2016    Bipolar disorder     CHF (congestive heart failure)     Congenital heart disease     s/p surgical intervention 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 of age    Deep vein thrombosis     DVT of leg (deep venous thrombosis)     left leg    History of prior ablation treatment     10/9/13    Hypertension     Obesity     Stroke     Thyroid disease     Venous stasis ulcer of lower extremity, unspecified laterality 12/14/2012    Venous ulcer      Past Surgical History:   Procedure Laterality Date    ANGIOPLASTY      CARDIAC SURGERY      open heart surgery 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 old    EYE SURGERY      left eye cataract/right eye glaucoma    TIMO FILTER PLACEMENT      Dr Calix ([facility])    KNEE SURGERY      l and r     MULTIPLE TOOTH EXTRACTIONS      SKIN GRAFT      left leg     Family History   Problem Relation Age of Onset    Diabetes Father     Heart disease Father     Heart disease Maternal Grandmother     Diabetes Maternal Grandfather     Heart disease Maternal Grandfather     Stroke Maternal Grandfather      Social History     Tobacco Use    Smoking status: Former Smoker     Packs/day: 1.00     Years: 6.00     Pack years: 6.00     Types: Cigarettes    Smokeless tobacco: Former User    Tobacco comment: quit by age 27yrs old   Substance Use Topics    Alcohol use: Yes     Alcohol/week: 1.0 standard drinks     Types: 1 Glasses of wine per week     Frequency: Monthly or less     Comment: occasionally    Drug use: No     Review of Systems   Constitutional: Negative for chills and fever.   HENT: Negative for congestion, ear pain, rhinorrhea and sore throat.    Respiratory: Negative for cough, shortness of breath and wheezing.    Cardiovascular: Positive for chest pain and palpitations.   Gastrointestinal: Negative for  abdominal pain, diarrhea, nausea and vomiting.   Genitourinary: Negative for dysuria and hematuria.   Musculoskeletal: Negative for back pain, myalgias and neck pain.   Skin: Negative for rash.   Neurological: Negative for dizziness, weakness, light-headedness and headaches.   Psychiatric/Behavioral: Negative for confusion.       Physical Exam     Initial Vitals   BP Pulse Resp Temp SpO2   12/02/19 1453 12/02/19 1453 12/02/19 1453 12/02/19 1442 12/02/19 1453   132/63 100 (!) 28 98.6 °F (37 °C) 96 %      MAP       --                Physical Exam    Nursing note and vitals reviewed.  Constitutional: He appears well-developed and well-nourished. He is not diaphoretic. No distress.   HENT:   Head: Normocephalic and atraumatic.   Mouth/Throat: Oropharynx is clear and moist.   Eyes: Conjunctivae and EOM are normal.   Neck: Normal range of motion. Neck supple.   Cardiovascular: Normal rate, regular rhythm and normal heart sounds. Exam reveals no gallop and no friction rub.    No murmur heard.  Pulmonary/Chest: Breath sounds normal. He has no wheezes. He has no rhonchi. He has no rales.   Abdominal: Soft. There is no tenderness. There is no rebound and no guarding.   Musculoskeletal: Normal range of motion. He exhibits no edema or tenderness.   LLE with findings suggestive of chronic venous stasis. Distal pulse is 2+.    Lymphadenopathy:     He has no cervical adenopathy.   Neurological: He is alert and oriented to person, place, and time. He has normal strength.   Skin: Skin is warm and dry. No rash noted.         ED Course   Procedures  Labs Reviewed   CBC W/ AUTO DIFFERENTIAL - Abnormal; Notable for the following components:       Result Value    RBC 3.89 (*)     Hemoglobin 9.4 (*)     Hematocrit 31.3 (*)     Mean Corpuscular Volume 81 (*)     Mean Corpuscular Hemoglobin 24.2 (*)     Mean Corpuscular Hemoglobin Conc 30.0 (*)     RDW 15.4 (*)     Lymph # 0.9 (*)     All other components within normal limits   BASIC  METABOLIC PANEL - Abnormal; Notable for the following components:    CO2 22 (*)     BUN, Bld 29 (*)     All other components within normal limits   D DIMER, QUANTITATIVE - Abnormal; Notable for the following components:    D-Dimer 0.90 (*)     All other components within normal limits   TROPONIN I   URINALYSIS, REFLEX TO URINE CULTURE    Narrative:     Preferred Collection Type->Urine, Clean Catch   CK   POCT GLUCOSE     EKG Readings: (Independently Interpreted)   A.fib with a rate of 92. No significant ST elevation or depression. No T wave inversion. No STEMI       Imaging Results          NM Lung Scan Ventilation Perfusion (Final result)  Result time 12/02/19 18:33:05    Final result by Ray Andino MD (12/02/19 18:33:05)                 Impression:      Unchanged appearance of a large ventilation-perfusion mismatch in the lingula, consistent with intermediate probability of PE.      Electronically signed by: Ray Andino MD  Date:    12/02/2019  Time:    18:33             Narrative:    EXAMINATION:  NM LUNG VENTILATION AND PERFUSION IMAGING    CLINICAL HISTORY:  Chest pain, acute, PE suspected, intermed prob, positive D-dimer;    TECHNIQUE:  16 mCi of xenon 133 were placed in the nebulizer. Following the inhalation xenon 133 in aerosol and the subsequent IV administration of 5 mCi of Tc-99m-MAA, multiple images of the thorax were obtained in various projections.    COMPARISON:  V/Q scan study dated 11/21/2019 and chest x-ray dated 05/02/2019.    FINDINGS:  The ventilation portion of the examination is unremarkable.  There is no evidence of retention of radiotracer activity on the washout images.    There is unchanged appearance of a large area of perfusion defect in the lingula.  No additional ventilation perfusion abnormality is identified.                               X-Ray Chest PA And Lateral (Final result)  Result time 12/02/19 16:50:38    Final result by Ray Andino MD (12/02/19 16:50:38)                  Impression:      Cardiomegaly without evidence of pulmonary edema.      Electronically signed by: Ray Andino MD  Date:    12/02/2019  Time:    16:50             Narrative:    EXAMINATION:  XR CHEST PA AND LATERAL    CLINICAL HISTORY:  Chest pain, unspecified    TECHNIQUE:  PA and lateral views of the chest were performed.    COMPARISON:  11/20/2019.    FINDINGS:  Monitoring EKG leads are present.  The trachea is unremarkable.  The cardiomediastinal silhouette is enlarged.  The hemidiaphragms are unremarkable.  There is no evidence of free air beneath the hemidiaphragms.  There are no pleural effusions.  There is no evidence of a pneumothorax.  There is no evidence of pneumomediastinum.  No airspace opacity is present.  There are degenerative changes in the osseous structures.                                 Medical Decision Making:   Independently Interpreted Test(s):   I have ordered and independently interpreted EKG Reading(s) - see prior notes  Clinical Tests:   Lab Tests: Ordered and Reviewed  Radiological Study: Ordered and Reviewed  Medical Tests: Ordered and Reviewed  ED Management:  - PA and Lateral CXR with cardiomegaly, but without overt acute cardiopulmonary process per my interpretation, final radiology read  - Pt administered GI cocktail with some improvement in chest pain   - UA within normal limits  - CPK within normal limits   - CBC within normal limits  - BMP notable for BUN of 29  - Troponin within normal limits  - D-dimer elevated at 0.90  - EKG- A.fib with a rate of 92. No significant ST elevation or depression. No T wave inversion. No STEMI  - V/Q scan demonstrates unchanged appearance of a large ventilation-perfusion mismatch in the lingula, consistent with intermediate probability of PE  - in light of patient's multiple comorbidities, presenting symptoms, we will advocate for admission for chest pain workup                                 Clinical Impression:     1. Chest pain          I,  Vikas Joshua,  personally performed the services described in this documentation. All medical record entries made by the scribe were at my direction and in my presence.  I have reviewed the chart and agree that the record reflects my personal performance and is accurate and complete. Vikas Joshua M.D. 9:18 PM12/02/2019                 Vikas Joshua MD  12/02/19 7320

## 2019-12-03 VITALS
HEART RATE: 121 BPM | HEIGHT: 78 IN | DIASTOLIC BLOOD PRESSURE: 66 MMHG | WEIGHT: 315 LBS | BODY MASS INDEX: 36.45 KG/M2 | SYSTOLIC BLOOD PRESSURE: 142 MMHG | OXYGEN SATURATION: 100 % | TEMPERATURE: 98 F | RESPIRATION RATE: 18 BRPM

## 2019-12-03 LAB
ALBUMIN SERPL BCP-MCNC: 3.2 G/DL (ref 3.5–5.2)
ALP SERPL-CCNC: 161 U/L (ref 55–135)
ALT SERPL W/O P-5'-P-CCNC: 26 U/L (ref 10–44)
AMPHET+METHAMPHET UR QL: NEGATIVE
ANION GAP SERPL CALC-SCNC: 9 MMOL/L (ref 8–16)
APTT BLDCRRT: 30 SEC (ref 21–32)
AST SERPL-CCNC: 33 U/L (ref 10–40)
BARBITURATES UR QL SCN>200 NG/ML: NEGATIVE
BASOPHILS # BLD AUTO: 0.01 K/UL (ref 0–0.2)
BASOPHILS NFR BLD: 0.3 % (ref 0–1.9)
BENZODIAZ UR QL SCN>200 NG/ML: NEGATIVE
BILIRUB SERPL-MCNC: 0.6 MG/DL (ref 0.1–1)
BSA FOR ECHO PROCEDURE: 2.98 M2
BUN SERPL-MCNC: 28 MG/DL (ref 6–20)
BZE UR QL SCN: NEGATIVE
CALCIUM SERPL-MCNC: 9.8 MG/DL (ref 8.7–10.5)
CANNABINOIDS UR QL SCN: NEGATIVE
CHLORIDE SERPL-SCNC: 109 MMOL/L (ref 95–110)
CO2 SERPL-SCNC: 23 MMOL/L (ref 23–29)
CREAT SERPL-MCNC: 0.9 MG/DL (ref 0.5–1.4)
CREAT UR-MCNC: 77 MG/DL (ref 23–375)
CV ECHO LV RWT: 0.38 CM
CV STRESS BASE HR: 106 BPM
DIASTOLIC BLOOD PRESSURE: 60 MMHG
DIFFERENTIAL METHOD: ABNORMAL
ECHO LV POSTERIOR WALL: 1.2 CM (ref 0.6–1.1)
EOSINOPHIL # BLD AUTO: 0.1 K/UL (ref 0–0.5)
EOSINOPHIL NFR BLD: 3.3 % (ref 0–8)
ERYTHROCYTE [DISTWIDTH] IN BLOOD BY AUTOMATED COUNT: 15.4 % (ref 11.5–14.5)
EST. GFR  (AFRICAN AMERICAN): >60 ML/MIN/1.73 M^2
EST. GFR  (NON AFRICAN AMERICAN): >60 ML/MIN/1.73 M^2
ESTIMATED AVG GLUCOSE: 103 MG/DL (ref 68–131)
ETHANOL UR-MCNC: <10 MG/DL
FRACTIONAL SHORTENING: 38 % (ref 28–44)
GLUCOSE SERPL-MCNC: 95 MG/DL (ref 70–110)
HBA1C MFR BLD HPLC: 5.2 % (ref 4–5.6)
HCT VFR BLD AUTO: 29 % (ref 40–54)
HGB BLD-MCNC: 8.7 G/DL (ref 14–18)
INR PPP: 1.1 (ref 0.8–1.2)
INTERVENTRICULAR SEPTUM: 1.2 CM (ref 0.6–1.1)
LEFT INTERNAL DIMENSION IN SYSTOLE: 3.9 CM (ref 2.1–4)
LEFT VENTRICLE MASS INDEX: 117 G/M2
LEFT VENTRICULAR INTERNAL DIMENSION IN DIASTOLE: 6.3 CM (ref 3.5–6)
LEFT VENTRICULAR MASS: 340.44 G
LYMPHOCYTES # BLD AUTO: 1.1 K/UL (ref 1–4.8)
LYMPHOCYTES NFR BLD: 30 % (ref 18–48)
MAGNESIUM SERPL-MCNC: 1.9 MG/DL (ref 1.6–2.6)
MCH RBC QN AUTO: 24.3 PG (ref 27–31)
MCHC RBC AUTO-ENTMCNC: 30 G/DL (ref 32–36)
MCV RBC AUTO: 81 FL (ref 82–98)
METHADONE UR QL SCN>300 NG/ML: NEGATIVE
MONOCYTES # BLD AUTO: 0.4 K/UL (ref 0.3–1)
MONOCYTES NFR BLD: 11.3 % (ref 4–15)
NEUTROPHILS # BLD AUTO: 2 K/UL (ref 1.8–7.7)
NEUTROPHILS NFR BLD: 55.1 % (ref 38–73)
OHS CV CPX 85 PERCENT MAX PREDICTED HEART RATE MALE: 143
OHS CV CPX MAX PREDICTED HEART RATE: 168
OHS CV CPX PATIENT IS FEMALE: 0
OHS CV CPX PATIENT IS MALE: 1
OHS CV CPX PEAK DIASTOLIC BLOOD PRESSURE: 62 MMHG
OHS CV CPX PEAK HEAR RATE: 203 BPM
OHS CV CPX PEAK RATE PRESSURE PRODUCT: NORMAL
OHS CV CPX PEAK SYSTOLIC BLOOD PRESSURE: 168 MMHG
OHS CV CPX PERCENT MAX PREDICTED HEART RATE ACHIEVED: 121
OHS CV CPX RATE PRESSURE PRODUCT PRESENTING: NORMAL
OPIATES UR QL SCN: NEGATIVE
PCP UR QL SCN>25 NG/ML: NEGATIVE
PHOSPHATE SERPL-MCNC: 5.1 MG/DL (ref 2.7–4.5)
PLATELET # BLD AUTO: 153 K/UL (ref 150–350)
PMV BLD AUTO: 9 FL (ref 9.2–12.9)
POTASSIUM SERPL-SCNC: 4.9 MMOL/L (ref 3.5–5.1)
PROT SERPL-MCNC: 7.5 G/DL (ref 6–8.4)
PROTHROMBIN TIME: 11 SEC (ref 9–12.5)
RBC # BLD AUTO: 3.58 M/UL (ref 4.6–6.2)
SODIUM SERPL-SCNC: 141 MMOL/L (ref 136–145)
SYSTOLIC BLOOD PRESSURE: 145 MMHG
T4 FREE SERPL-MCNC: 2.24 NG/DL (ref 0.71–1.51)
TOXICOLOGY INFORMATION: NORMAL
TROPONIN I SERPL DL<=0.01 NG/ML-MCNC: 0.01 NG/ML (ref 0–0.03)
TROPONIN I SERPL DL<=0.01 NG/ML-MCNC: 0.01 NG/ML (ref 0–0.03)
WBC # BLD AUTO: 3.63 K/UL (ref 3.9–12.7)

## 2019-12-03 PROCEDURE — 85610 PROTHROMBIN TIME: CPT

## 2019-12-03 PROCEDURE — 84439 ASSAY OF FREE THYROXINE: CPT

## 2019-12-03 PROCEDURE — 80053 COMPREHEN METABOLIC PANEL: CPT

## 2019-12-03 PROCEDURE — 83036 HEMOGLOBIN GLYCOSYLATED A1C: CPT

## 2019-12-03 PROCEDURE — 25000003 PHARM REV CODE 250: Performed by: STUDENT IN AN ORGANIZED HEALTH CARE EDUCATION/TRAINING PROGRAM

## 2019-12-03 PROCEDURE — 85025 COMPLETE CBC W/AUTO DIFF WBC: CPT

## 2019-12-03 PROCEDURE — 80307 DRUG TEST PRSMV CHEM ANLYZR: CPT

## 2019-12-03 PROCEDURE — 36415 COLL VENOUS BLD VENIPUNCTURE: CPT

## 2019-12-03 PROCEDURE — G0378 HOSPITAL OBSERVATION PER HR: HCPCS

## 2019-12-03 PROCEDURE — 63600175 PHARM REV CODE 636 W HCPCS: Performed by: INTERNAL MEDICINE

## 2019-12-03 PROCEDURE — 85730 THROMBOPLASTIN TIME PARTIAL: CPT

## 2019-12-03 PROCEDURE — 84100 ASSAY OF PHOSPHORUS: CPT

## 2019-12-03 PROCEDURE — 83735 ASSAY OF MAGNESIUM: CPT

## 2019-12-03 PROCEDURE — 93005 ELECTROCARDIOGRAM TRACING: CPT | Mod: 59

## 2019-12-03 PROCEDURE — 63600175 PHARM REV CODE 636 W HCPCS: Performed by: NURSE PRACTITIONER

## 2019-12-03 PROCEDURE — 84484 ASSAY OF TROPONIN QUANT: CPT

## 2019-12-03 PROCEDURE — 94761 N-INVAS EAR/PLS OXIMETRY MLT: CPT

## 2019-12-03 RX ORDER — PANTOPRAZOLE SODIUM 40 MG/1
40 TABLET, DELAYED RELEASE ORAL DAILY
Qty: 30 TABLET | Refills: 11 | Status: ON HOLD | OUTPATIENT
Start: 2019-12-04 | End: 2020-08-22 | Stop reason: ALTCHOICE

## 2019-12-03 RX ORDER — DOBUTAMINE HYDROCHLORIDE 400 MG/100ML
10 INJECTION INTRAVENOUS CONTINUOUS
Status: DISCONTINUED | OUTPATIENT
Start: 2019-12-03 | End: 2019-12-03 | Stop reason: HOSPADM

## 2019-12-03 RX ORDER — DOBUTAMINE HYDROCHLORIDE 400 MG/100ML
10 INJECTION INTRAVENOUS CONTINUOUS
Status: DISCONTINUED | OUTPATIENT
Start: 2019-12-03 | End: 2019-12-03

## 2019-12-03 RX ORDER — FERROUS SULFATE 325(65) MG
325 TABLET ORAL DAILY
Qty: 30 TABLET | Refills: 3 | Status: SHIPPED | OUTPATIENT
Start: 2019-12-03 | End: 2020-04-01

## 2019-12-03 RX ADMIN — HUMAN ALBUMIN MICROSPHERES AND PERFLUTREN 0.11 MG: 10; .22 INJECTION, SOLUTION INTRAVENOUS at 04:12

## 2019-12-03 RX ADMIN — CIPROFLOXACIN HYDROCHLORIDE 500 MG: 500 TABLET, FILM COATED ORAL at 08:12

## 2019-12-03 RX ADMIN — TORSEMIDE 40 MG: 20 TABLET ORAL at 08:12

## 2019-12-03 RX ADMIN — DOXYCYCLINE HYCLATE 100 MG: 100 TABLET, COATED ORAL at 08:12

## 2019-12-03 RX ADMIN — SENNOSIDES AND DOCUSATE SODIUM 1 TABLET: 8.6; 5 TABLET ORAL at 08:12

## 2019-12-03 RX ADMIN — DOBUTAMINE HYDROCHLORIDE 10 MCG/KG/MIN: 400 INJECTION INTRAVENOUS at 03:12

## 2019-12-03 RX ADMIN — RIVAROXABAN 20 MG: 20 TABLET, FILM COATED ORAL at 05:12

## 2019-12-03 RX ADMIN — METHIMAZOLE 20 MG: 5 TABLET ORAL at 08:12

## 2019-12-03 NOTE — PLAN OF CARE
Pt AAO x 4.  VSS.  Pt remained afebrile throughout this shift.   Pt remained free of falls this shift.   Pt had no c/o pain this shift.  Plan of care reviewed. Patient verbalizes understanding.   Pt moving/turing independently. Frequent weight shifting encouraged.  Patient  SR on monitor.   Bed low, side rails up x 2, wheels locked, call light in reach.   Bed alarm maintained for safety.   Patient instructed to call for assistance.   Hourly rounding completed.   Will continue to monitor.

## 2019-12-03 NOTE — SUBJECTIVE & OBJECTIVE
Interval History: Patient seen and examined this morning. No acute events overnight. Patient continues to endorse no chest pain or difficulty breathing. Patient has been without chest pain since admission. Patient denies any other complaints at this time.     Review of Systems   Constitutional: Negative for appetite change, chills, fatigue, fever and unexpected weight change.   HENT: Negative for rhinorrhea, sinus pressure, sinus pain, sneezing and sore throat.    Respiratory: Negative for cough, chest tightness, shortness of breath and wheezing.    Cardiovascular: Negative for chest pain and palpitations.   Gastrointestinal: Negative for abdominal distention, abdominal pain, blood in stool, constipation, diarrhea, nausea and vomiting.   Genitourinary: Negative for dysuria, flank pain, frequency, hematuria and urgency.   Musculoskeletal: Negative for arthralgias, back pain and myalgias.   Skin: Negative for color change and rash.   Neurological: Negative for dizziness, weakness, light-headedness, numbness and headaches.   Psychiatric/Behavioral: Negative for behavioral problems, confusion, decreased concentration and sleep disturbance. The patient is not nervous/anxious.      Objective:     Vital Signs (Most Recent):  Temp: 98.4 °F (36.9 °C) (12/03/19 0736)  Pulse: 100 (12/03/19 0736)  Resp: (!) 22 (12/03/19 0736)  BP: 126/65 (12/03/19 0736)  SpO2: (!) 94 % (12/03/19 0736) Vital Signs (24h Range):  Temp:  [97.6 °F (36.4 °C)-98.6 °F (37 °C)] 98.4 °F (36.9 °C)  Pulse:  [] 100  Resp:  [16-28] 22  SpO2:  [94 %-100 %] 94 %  BP: (119-137)/(58-65) 126/65     Weight: (!) 157 kg (346 lb 2 oz)  Body mass index is 38.02 kg/m².    Intake/Output Summary (Last 24 hours) at 12/3/2019 0914  Last data filed at 12/3/2019 0403  Gross per 24 hour   Intake 240 ml   Output 360 ml   Net -120 ml      Physical Exam   Constitutional: He is oriented to person, place, and time. He appears well-developed and well-nourished. No distress.    HENT:   Head: Normocephalic and atraumatic.   Right Ear: External ear normal.   Left Ear: External ear normal.   Nose: Nose normal.   Mouth/Throat: Oropharynx is clear and moist. No oropharyngeal exudate.   Eyes: Pupils are equal, round, and reactive to light. Conjunctivae and EOM are normal.   Neck: Normal range of motion. Neck supple. No JVD present.   Cardiovascular: Normal rate, normal heart sounds and intact distal pulses. An irregularly irregular rhythm present.   No murmur heard.  Pulmonary/Chest: Effort normal and breath sounds normal. No respiratory distress. He has no wheezes.   Abdominal: Soft. Bowel sounds are normal. He exhibits no distension and no mass. There is no tenderness.   Musculoskeletal: Normal range of motion. He exhibits no edema.   Neurological: He is alert and oriented to person, place, and time.   Skin: Skin is warm and dry. Capillary refill takes less than 2 seconds. He is not diaphoretic.   Psychiatric: He has a normal mood and affect. His behavior is normal.       Significant Labs:   CBC:   Recent Labs   Lab 12/02/19  1642 12/03/19 0213   WBC 4.53 3.63*   HGB 9.4* 8.7*   HCT 31.3* 29.0*    153     CMP:   Recent Labs   Lab 12/02/19  1642 12/03/19  0213    141   K 4.9 4.9    109   CO2 22* 23   GLU 97 95   BUN 29* 28*   CREATININE 0.9 0.9   CALCIUM 10.2 9.8   PROT  --  7.5   ALBUMIN  --  3.2*   BILITOT  --  0.6   ALKPHOS  --  161*   AST  --  33   ALT  --  26   ANIONGAP 10 9   EGFRNONAA >60 >60     Troponin:   Recent Labs   Lab 12/02/19  2156 12/03/19  0213 12/03/19  0419   TROPONINI 0.015 0.012 0.011     TSH:   Recent Labs   Lab 12/02/19  1905   TSH <0.010*       Significant Imaging: I have reviewed and interpreted all pertinent imaging results/findings within the past 24 hours.

## 2019-12-03 NOTE — ED NOTES
Attempted to call report for transfer to ICU. Spoke with JOHN Wagner who states that she is unable to take report at this time and will call back ASAP.

## 2019-12-03 NOTE — PLAN OF CARE
Spoke with Family Medicine team, patient unable to do exercise stress test, in department now having dobutamine stress, plan to discharge if stress negative.

## 2019-12-03 NOTE — NURSING
1445  Patient on table, ready for test.  Allergies/history confirmed    Connected to monitor EKG and NIBP-PIV established  1500  Tom NP at bedside           Here to explain procedure and obtain consent  1503  Test started per protocol.  See EKG for VS  1503  Dobutamine started at 10mcg  1507     Target HR obtained.  1509  Testing phase complete.  Dobutamine off.  NS up at rapid rate for    recovery phase  1520  Recovery phased complete.  Patient states feels normal, no distress    NC dc'd 200cc infused.  IV d/c.  Pt released to Cardiology.

## 2019-12-03 NOTE — ED NOTES
Attempted to call report for transfer to CDU. Spoke with JOHN Wagner. Nurse requesting clarification of treatment plan and patient condition. Dr. Rios and OC notified.

## 2019-12-03 NOTE — PLAN OF CARE
Pt lives at home with spouse and her parents.  Pt is independent with ADL's and has transportation home when ready for d/c.  Pt initially stated his methiazole is not covered by insurance at this time.  CM contacted [facility] and confirmed med is indeed covered with no copayment and ready for , pt verbalized understanding.  Pt aware he is awaiting stress test and will d/c if negative.      Discharge planning brochure provided. White board updated with CM name & contact info.  Pt encouraged to call with any questions or needs. CM will continue to follow patient throughout the transitions of care, and assist with any discharge needs.         12/03/19 1217   Discharge Assessment   Assessment Type Discharge Planning Assessment   Confirmed/corrected address and phone number on facesheet? Yes   Assessment information obtained from? Patient   Expected Length of Stay (days) 2   Communicated expected length of stay with patient/caregiver yes   Prior to hospitilization cognitive status: Alert/Oriented   Prior to hospitalization functional status: Independent   Current cognitive status: Alert/Oriented   Current Functional Status: Independent   Lives With spouse   Able to Return to Prior Arrangements yes   Is patient able to care for self after discharge? Yes   Who are your caregiver(s) and their phone number(s)?   (mother Josey [phone])   Readmission Within the Last 30 Days no previous admission in last 30 days   Patient currently being followed by outpatient case management? No   Patient currently receives any other outside agency services? No   Equipment Currently Used at Home wheelchair;walker, rolling;bedside commode   Do you have any problems affording any of your prescribed medications? No   Is the patient taking medications as prescribed? yes   Does the patient have transportation home? Yes   Transportation Anticipated family or friend will provide   Does the patient receive services at the [facility]? No   Discharge Plan A Home   Discharge Plan B Home with family   DME Needed Upon Discharge  none   Patient/Family in Agreement with Plan yes       Alice Real, KAILEY    [phone]

## 2019-12-03 NOTE — H&P
[facility] Medicine History & Physical    SUBJECTIVE:     Chief Complaint/Reason for Admission:  Chest pain with heart palpitations    History of Present Illness:  55 yo male with history of chronic Afib on Xarelto, HFpEF, hx of recurrent PE, h/o DVT with [place] filter, HTN, LLE Venous stasis ulcer, and hyperthyroidism who presents to [facility] for evaluation of chest pain/heart palpitations.  The patient endorses that he had a schedule PCP appointment with Dr. Garrison Roach.  Unfortunately, prior to his scheduled appointment he developed acute chest pain at rest with heart palpitations.  The patient stated my heart felt like it was going to beat out of my chest.  He noted that these symptoms lasted for approximately 15 min.  The patient denied any associated dyspnea, increased lower extremity edema from baseline, nausea, emesis, cough, decreased urine output, paresthesia, hemiparesis/hemiplegia, vision changes, speech changes, or presyncopal symptoms.  He noted the pain was located in the center of his chest in characterized as sharp.      In the ED, D-dimer:  0.90, troponin:  0.015, creatinine 0.9, BUN:  29, H/H:  9.4/31, chest x-ray:  Demonstrated cardiomegaly a without evidence of acute pulmonary edema, and V/Q scan demonstrated an unchanged appearance of the large ventilation perfusion mismatch in the lingula.    PTA Medications   Medication Sig    albuterol (VENTOLIN HFA) 90 mcg/actuation inhaler Inhale 2 puffs into the lungs every 6 (six) hours as needed for Wheezing. Rescue    ciclopirox (PENLAC) 8 % Soln Apply topically nightly as directed    ciprofloxacin HCl (CIPRO) 500 MG tablet Take 1 tablet (500 mg total) by mouth 2 (two) times daily until entirely gone.    doxycycline (VIBRAMYCIN) 100 MG Cap Take 1 capsule (100 mg total) by mouth 2 (two) times daily until entirely gone.    HYDROcodone-acetaminophen (NORCO) 5-325 mg per tablet Take 1  tablet by mouth every 6 (six) hours as needed for Pain.    hydrOXYzine HCl (ATARAX) 25 MG tablet Take 1 tablet (25 mg total) by mouth 4 (four) times daily as needed for Itching.    lisinopril 10 MG tablet Take 1 tablet (10 mg total) by mouth once daily. (Patient taking differently: Take 7.5 mg by mouth once daily. )    methIMAzole (TAPAZOLE) 10 MG Tab Take 2 tablets (20 mg total) by mouth once daily.    metoprolol succinate (TOPROL-XL) 100 MG 24 hr tablet Take 1 tablet (100 mg total) by mouth once daily. (Patient taking differently: Take 100 mg by mouth every evening. )    rivaroxaban (XARELTO) 20 mg Tab Take 1 tablet (20 mg total) by mouth daily with dinner or evening meal.    torsemide (DEMADEX) 20 MG Tab Take 2 tablets (40 mg total) by mouth once daily.       Review of patient's allergies indicates:   Allergen Reactions    Contrast media Other (See Comments)     Severe chest pain    Food allergy formula [glutamine-c-quercet-selen-brom]      Allergic to green peas; Heart failure.    Iodinated contrast media Other (See Comments)     Chest pain    Peas Hives    Ibuprofen Swelling    Latex, natural rubber Hives    Pcn [penicillins] Hives    Butisol [butabarbital] Rash     Peeling skin       Past Medical History:   Diagnosis Date    *Atrial fibrillation     Anticoagulant long-term use     Arthritis     Atrial fibrillation     Atrial fibrillation Feb 23, 2016    Bipolar disorder     CHF (congestive heart failure)     Congenital heart disease     s/p surgical intervention 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 of age    Deep vein thrombosis     DVT of leg (deep venous thrombosis)     left leg    History of prior ablation treatment     10/9/13    Hypertension     Obesity     Stroke     Thyroid disease     Venous stasis ulcer of lower extremity, unspecified laterality 12/14/2012    Venous ulcer      Past Surgical History:   Procedure Laterality Date    ANGIOPLASTY      CARDIAC SURGERY      open heart surgery at 18  months old    EYE SURGERY      left eye cataract/right eye glaucoma    TIMO FILTER PLACEMENT      Dr Calix [facility])    KNEE SURGERY      l and r     MULTIPLE TOOTH EXTRACTIONS      SKIN GRAFT      left leg     Family History   Problem Relation Age of Onset    Diabetes Father     Heart disease Father     Heart disease Maternal Grandmother     Diabetes Maternal Grandfather     Heart disease Maternal Grandfather     Stroke Maternal Grandfather      Social History     Tobacco Use    Smoking status: Former Smoker     Packs/day: 1.00     Years: 6.00     Pack years: 6.00     Types: Cigarettes    Smokeless tobacco: Former User    Tobacco comment: quit by age 27yrs old   Substance Use Topics    Alcohol use: Yes     Alcohol/week: 1.0 standard drinks     Types: 1 Glasses of wine per week     Frequency: Monthly or less     Comment: occasionally    Drug use: No        Review of Systems:  Constitutional: Negative for fever and chills.  Respiratory: Negative for cough and chest tightness.  Cardiovascular: Negative for chest pain and palpitations.  Positive chest pain and palpitations.  Gastrointestinal: Negative for constipation and diarrhea.  Genitourinary: Negative for dysuria and hematuria.   Musculoskeletal: Negative for arthralgias and myalgias.   Skin: Negative for rash.  Neurological: Negative for light-headedness and headaches.   Psychiatric/Behavioral: Negative for SI/HI.     OBJECTIVE:     Vital Signs (Most Recent):  Temp: 98.4 °F (36.9 °C) (12/02/19 2100)  Pulse: 100 (12/02/19 2100)  Resp: 18 (12/02/19 2100)  BP: 119/60 (12/02/19 2100)  SpO2: 96 % (12/02/19 2100)    Physical Exam:  Constitutional: He is AAOx3.  NAD. Appears stated age.   Head: Normocephalic and atraumatic.   Mouth/Throat:Oropharynx is clear and moist.   Eyes: EOMI grossly and PERRLA.  Neck:  Neck supple.   Cardiovascular: rate controlled and intact distal pulses.   Pulmonary/Chest: no respiratory distress.  CTAB.  Abdominal: Soft. Bowel sounds are present. He exhibits no rebound/guarding. No peritoneal signs.   Musculoskeletal:  LLE with wound dressing in place.  Patient has venous stasis ulcer.  Neurological: No focal neurological deficit appreciated appreciated from his baseline  Skin: Skin is warm and dry.  The patient is not diaphoretic.   Psychiatric:  Mood and affect are congruent.  Nursing note and vitals reviewed.    Laboratory:  CBC:   Recent Labs   Lab 12/02/19  1642   WBC 4.53   RBC 3.89*   HGB 9.4*   HCT 31.3*      MCV 81*   MCH 24.2*   MCHC 30.0*     BMP:   Recent Labs   Lab 12/02/19  1642   GLU 97      K 4.9      CO2 22*   BUN 29*   CREATININE 0.9   CALCIUM 10.2     CMP:   Recent Labs   Lab 12/02/19  1642   GLU 97   CALCIUM 10.2      K 4.9   CO2 22*      BUN 29*   CREATININE 0.9     LFTs: No results for input(s): ALT, AST, ALKPHOS, BILITOT, PROT, ALBUMIN in the last 168 hours.  Coagulation: No results for input(s): LABPROT, INR, APTT in the last 168 hours.  Cardiac markers:   Recent Labs   Lab 12/02/19  1642   TROPONINI 0.015     ABGs: No results for input(s): PH, PCO2, PO2, HCO3, POCSATURATED, BE in the last 168 hours.  Microbiology Results (last 7 days)     ** No results found for the last 168 hours. **        Specimen (12h ago, onward)    None        Recent Labs   Lab 12/02/19  1616   COLORU Yellow   SPECGRAV 1.025   PHUR 5.0   PROTEINUA Negative   NITRITE Negative   LEUKOCYTESUR Negative   UROBILINOGEN Negative       Diagnostic Results:  NM Lung Scan Ventilation Perfusion  Narrative: EXAMINATION:  NM LUNG VENTILATION AND PERFUSION IMAGING    CLINICAL HISTORY:  Chest pain, acute, PE suspected, intermed prob, positive D-dimer;    TECHNIQUE:  16 mCi of xenon 133 were placed in the nebulizer. Following the inhalation xenon 133 in aerosol and the subsequent IV administration of 5 mCi of Tc-99m-MAA, multiple images of the thorax were obtained in various  projections.    COMPARISON:  V/Q scan study dated 11/21/2019 and chest x-ray dated 05/02/2019.    FINDINGS:  The ventilation portion of the examination is unremarkable.  There is no evidence of retention of radiotracer activity on the washout images.    There is unchanged appearance of a large area of perfusion defect in the lingula.  No additional ventilation perfusion abnormality is identified.  Impression: Unchanged appearance of a large ventilation-perfusion mismatch in the lingula, consistent with intermediate probability of PE.    Electronically signed by: Ray Andino MD  Date:    12/02/2019  Time:    18:33  X-Ray Chest PA And Lateral  Narrative: EXAMINATION:  XR CHEST PA AND LATERAL    CLINICAL HISTORY:  Chest pain, unspecified    TECHNIQUE:  PA and lateral views of the chest were performed.    COMPARISON:  11/20/2019.    FINDINGS:  Monitoring EKG leads are present.  The trachea is unremarkable.  The cardiomediastinal silhouette is enlarged.  The hemidiaphragms are unremarkable.  There is no evidence of free air beneath the hemidiaphragms.  There are no pleural effusions.  There is no evidence of a pneumothorax.  There is no evidence of pneumomediastinum.  No airspace opacity is present.  There are degenerative changes in the osseous structures.  Impression: Cardiomegaly without evidence of pulmonary edema.    Electronically signed by: Ray Andino MD  Date:    12/02/2019  Time:    16:50      ASSESSMENT/PLAN:     55 yo male with history of chronic Afib on Xarelto, HFpEF, hx of recurrent PE, h/o DVT with [place] filter, HTN, LLE Venous stasis ulcer, and hyperthyroidism who presents with unstable angina.    Unstable angina  Reports chest pain/palpitations at rest  Initial EKG/troponin negative  Continue to trend both EKG/troponin  Continue to monitor    Heart failure with preserved ejection fraction  Currently compensated  Continue torsemide tablet 40 mg daily  Chest x-ray with cardiomegaly a without  evidence of pulmonary edema  ECHO (9/2019): EF: 50%  Continue to monitor     Hyperthyroidism  TSH (10/30/19): < 0.010, T4: 2.86  Repeat TSH/free T4 in a.m.  Continue medical management with methimazole 20 mg  Continue to monitor      H/O Recurrent pulmonary embolism  Continue medical management with rivaroxaban 20 mg  VQ scan:  Unchanged appearance of a large ventilation/perfusion mismatch in the lingula similar to previous exam.     Chronic atrial fibrillation  EKG:  Evidence of Atrial fibrillation  Continue medical management with rivaroxaban     Venous stasis dermatitis of both lower extremities/venous stasis ulcer of left lower extremity  Ordered wound care consult for dressing change to left lower extremity  Patient is followed by [facility] every 2 weeks.    Home health nurse changes LLE dressing on Monday/Wednesday/Friday  Unable to have dressing change today because he presented to the ED    Essential HTN  Continue medical management with Toprol-XL  Goal: Bp<140/90  Current BP: 119/60  Low-sodium cardiac diet  Continue to monitor    DVT ppx:  Rivaroxaban  Code: Full  Diet:  Cardiac    Case discussed with both senior level resident and attending physician, Dr. Garrison Roach.     Dimas Fair Jr., D.O.  [facility], HO-2 [facility]

## 2019-12-03 NOTE — PROGRESS NOTES
[facility] Hospital Medicine  Progress Note    Patient Name: Drew Farrar  MRN: [id number]  Patient Class: OP- Observation   Admission Date: 12/2/2019  Length of Stay: 0 days  Attending Physician: Garrison Roach MD  Primary Care Provider: Garrison Roach MD        Subjective:     Principal Problem:Unstable angina        HPI:  No notes on file    Overview/Hospital Course:  No notes on file    Interval History: Patient seen and examined this morning. No acute events overnight. Patient continues to endorse no chest pain or difficulty breathing. Patient has been without chest pain since admission. Patient denies any other complaints at this time.     Review of Systems   Constitutional: Negative for appetite change, chills, fatigue, fever and unexpected weight change.   HENT: Negative for rhinorrhea, sinus pressure, sinus pain, sneezing and sore throat.    Respiratory: Negative for cough, chest tightness, shortness of breath and wheezing.    Cardiovascular: Negative for chest pain and palpitations.   Gastrointestinal: Negative for abdominal distention, abdominal pain, blood in stool, constipation, diarrhea, nausea and vomiting.   Genitourinary: Negative for dysuria, flank pain, frequency, hematuria and urgency.   Musculoskeletal: Negative for arthralgias, back pain and myalgias.   Skin: Negative for color change and rash.   Neurological: Negative for dizziness, weakness, light-headedness, numbness and headaches.   Psychiatric/Behavioral: Negative for behavioral problems, confusion, decreased concentration and sleep disturbance. The patient is not nervous/anxious.      Objective:     Vital Signs (Most Recent):  Temp: 98.4 °F (36.9 °C) (12/03/19 0736)  Pulse: 100 (12/03/19 0736)  Resp: (!) 22 (12/03/19 0736)  BP: 126/65 (12/03/19 0736)  SpO2: (!) 94 % (12/03/19 0736) Vital Signs (24h Range):  Temp:  [97.6 °F (36.4 °C)-98.6 °F (37 °C)] 98.4 °F (36.9 °C)  Pulse:  [] 100  Resp:  [16-28]  22  SpO2:  [94 %-100 %] 94 %  BP: (119-137)/(58-65) 126/65     Weight: (!) 157 kg (346 lb 2 oz)  Body mass index is 38.02 kg/m².    Intake/Output Summary (Last 24 hours) at 12/3/2019 0914  Last data filed at 12/3/2019 0403  Gross per 24 hour   Intake 240 ml   Output 360 ml   Net -120 ml      Physical Exam   Constitutional: He is oriented to person, place, and time. He appears well-developed and well-nourished. No distress.   HENT:   Head: Normocephalic and atraumatic.   Right Ear: External ear normal.   Left Ear: External ear normal.   Nose: Nose normal.   Mouth/Throat: Oropharynx is clear and moist. No oropharyngeal exudate.   Eyes: Pupils are equal, round, and reactive to light. Conjunctivae and EOM are normal.   Neck: Normal range of motion. Neck supple. No JVD present.   Cardiovascular: Normal rate, normal heart sounds and intact distal pulses. An irregularly irregular rhythm present.   No murmur heard.  Pulmonary/Chest: Effort normal and breath sounds normal. No respiratory distress. He has no wheezes.   Abdominal: Soft. Bowel sounds are normal. He exhibits no distension and no mass. There is no tenderness.   Musculoskeletal: Normal range of motion. He exhibits no edema.   Neurological: He is alert and oriented to person, place, and time.   Skin: Skin is warm and dry. Capillary refill takes less than 2 seconds. He is not diaphoretic.   Psychiatric: He has a normal mood and affect. His behavior is normal.       Significant Labs:   CBC:   Recent Labs   Lab 12/02/19  1642 12/03/19 0213   WBC 4.53 3.63*   HGB 9.4* 8.7*   HCT 31.3* 29.0*    153     CMP:   Recent Labs   Lab 12/02/19  1642 12/03/19  0213    141   K 4.9 4.9    109   CO2 22* 23   GLU 97 95   BUN 29* 28*   CREATININE 0.9 0.9   CALCIUM 10.2 9.8   PROT  --  7.5   ALBUMIN  --  3.2*   BILITOT  --  0.6   ALKPHOS  --  161*   AST  --  33   ALT  --  26   ANIONGAP 10 9   EGFRNONAA >60 >60     Troponin:   Recent Labs   Lab 12/02/19  2583  12/03/19  0213 12/03/19  0419   TROPONINI 0.015 0.012 0.011     TSH:   Recent Labs   Lab 12/02/19  1905   TSH <0.010*       Significant Imaging: I have reviewed and interpreted all pertinent imaging results/findings within the past 24 hours.      Assessment/Plan:      53 yo male with history of chronic Afib on Xarelto, HFpEF, hx of recurrent PE, h/o DVT with [place] filter, HTN, LLE Venous stasis ulcer, and hyperthyroidism who presents with unstable angina.    Unstable angina  Troponin x3 all negative  All EKG's showed just A. Fib  Will have exercise stress test today  Continue to monitor     Heart failure with preserved ejection fraction  Currently compensated  Continue torsemide tablet 40 mg daily  Chest x-ray with cardiomegaly without evidence of pulmonary edema  ECHO (9/2019): EF: 50%  Continue to monitor     Hyperthyroidism  TSH (10/30/19): < 0.010, T4: 2.86  Repeat T4 2.24  Continue medical management with methimazole 20 mg  Patient reports that he has not been taking his methimazole at home as it has not been covered by his insurance  Continue to monitor      H/O Recurrent pulmonary embolism  Continue medical management with rivaroxaban 20 mg  VQ scan:  Unchanged appearance of a large ventilation/perfusion mismatch in the lingula similar to previous exam.     Chronic atrial fibrillation  EKG:  Evidence of Atrial fibrillation  Continue medical management with rivaroxaban     Venous stasis dermatitis of both lower extremities/venous stasis ulcer of left lower extremity  Ordered wound care consult for dressing change to left lower extremity  Patient is followed by [facility] Center every 2 weeks.    Home health nurse changes LLE dressing on Monday/Wednesday/Friday  Unable to have dressing change on Monday this week because he presented to the ED     Essential HTN  Continue medical management with Toprol-XL  Goal: Bp<140/90  Current BP:  126/65  Low-sodium cardiac diet  Continue to monitor    VTE Risk Mitigation  (From admission, onward)         Ordered     rivaroxaban tablet 20 mg  With dinner      12/02/19 2121     IP VTE HIGH RISK PATIENT  Once      12/02/19 2121     Place sequential compression device  Until discontinued      12/02/19 2121                  Candido Tanner MD PGY-I  Department of Hospital Medicine   [facility]

## 2019-12-03 NOTE — PROGRESS NOTES
Pharmacy New Medication Education    Patient and/or Caregiver ACCEPTED medication education.    Pharmacy has provided education on the name, indication, and possible side effects of the medication(s) prescribed, using teach-back method.     Learners of pharmacy medication education includes:  patient    The following medications have also been discussed, during this admission.     Current Facility-Administered Medications   Medication Frequency    acetaminophen tablet 650 mg Q4H PRN    ciprofloxacin HCl tablet 500 mg Q12H    doxycycline tablet 100 mg Q12H    lidocaine 5 % patch 1 patch Daily PRN    methIMAzole tablet 20 mg Daily    metoprolol succinate (TOPROL-XL) 24 hr tablet 100 mg QHS    ondansetron disintegrating tablet 8 mg Q6H PRN    pantoprazole EC tablet 40 mg Daily    promethazine (PHENERGAN) 6.25 mg in dextrose 5 % 50 mL IVPB Q6H PRN    rivaroxaban tablet 20 mg Daily with dinner    senna-docusate 8.6-50 mg per tablet 1 tablet BID    sodium chloride 0.9% flush 5 mL PRN    torsemide tablet 40 mg Daily          Thank you  Jeff Schroeder, IwonaD

## 2019-12-03 NOTE — CARE UPDATE
Pt wants to go home finished dinner w/o any difficulty. Spoke with cardiologist who did the stress echo, not acute event of arrhythmia or st changes. He is chronically afib rate controlled and on AC. Refer him to see Dr. Preston and referral sent. Advise the pt to follow up with anyone of us this week or early next week. His cp is on right lower side rib cage, tender almost jump out of bed when press on it on my exam. Advise to take motrin/tylenol as instructed. He has methimozole for his hyperthyroidism free of charge waiting for him to  at [facility]. Pt verbalized understanding and appreciation.

## 2019-12-05 NOTE — HPI
53 yo male with history of chronic Afib on Xarelto, HFpEF, hx of recurrent PE, h/o DVT with [place] filter, HTN, LLE Venous stasis ulcer, and hyperthyroidism who presents to [facility] for evaluation of chest pain/heart palpitations.  The patient endorses that he had a schedule PCP appointment with Dr. Garrison Roach.  Unfortunately, prior to his scheduled appointment he developed acute chest pain at rest with heart palpitations.  The patient stated my heart felt like it was going to beat out of my chest.  He noted that these symptoms lasted for approximately 15 min.  The patient denied any associated dyspnea, increased lower extremity edema from baseline, nausea, emesis, cough, decreased urine output, paresthesia, hemiparesis/hemiplegia, vision changes, speech changes, or presyncopal symptoms.  He noted the pain was located in the center of his chest in characterized as sharp.       In the ED, D-dimer:  0.90, troponin:  0.015, creatinine 0.9, BUN:  29, H/H:  9.4/31, chest x-ray:  Demonstrated cardiomegaly a without evidence of acute pulmonary edema, and V/Q scan demonstrated an unchanged appearance of the large ventilation perfusion mismatch in the lingula.

## 2019-12-05 NOTE — HOSPITAL COURSE
Patient presented to the emergency department with new onset acute chest pain on the left side associated with heart palpitations lasting for about 15 minutes. He was admitted for unstable angina. All workup at the time was negative. Troponin was negative, EKG was wnl, D-dimer was negative, CXR was with cardiomegaly but otherwise wnl, and V/Q scan was unchanged from previous. Patient received pain control. He was sent for dobutamine stress test/echo which were negative for any signs of ischemia. Patient was discharged with plans for close follow up with PCP and was further instructed about the importance of taking methimazole for his hyperthyroidism. For more information, please see below:    Unstable angina  Troponin x3 all negative  All EKG's showed just A. Fib  Dobutamine stress test was negative for ischemia     Heart failure with preserved ejection fraction  Currently compensated  Continued torsemide tablet 40 mg daily  Chest x-ray with cardiomegaly without evidence of pulmonary edema  ECHO (9/2019): EF: 50%  Continued to monitor     Hyperthyroidism  TSH (10/30/19): < 0.010, T4: 2.86  Repeat T4 2.24  Continued medical management with methimazole 20 mg  Patient reports that he has not been taking his methimazole at home as it has not been covered by his insurance; discussed with case management and pharmacy and medication will be covered. This information was told to patient and he expressed a plan to begin taking it.   Continued to monitor      H/O Recurrent pulmonary embolism  Continued medical management with rivaroxaban 20 mg  VQ scan:  Unchanged appearance of a large ventilation/perfusion mismatch in the lingula similar to previous exam.     Chronic atrial fibrillation  EKG:  Evidence of Atrial fibrillation  Continued medical management with rivaroxaban     Venous stasis dermatitis of both lower extremities/venous stasis ulcer of left lower extremity  Ordered wound care consult for dressing change to left  lower extremity  Patient is followed by [facility]ound Center every 2 weeks.    Home health nurse changes LLE dressing on Monday/Wednesday/Friday  Unable to have dressing change on Monday of this week because he presented to the ED  Dressings changed prior to discharge and will resume prior dressing change schedule     Essential HTN  Continue medical management with Toprol-XL  Goal: Bp<140/90  Current BP:  126/65  Low-sodium cardiac diet

## 2019-12-05 NOTE — DISCHARGE SUMMARY
[facility] Hospital Medicine  Discharge Summary      Patient Name: Drew Farrar  MRN: [id number]  Admission Date: 12/2/2019  Hospital Length of Stay: 0 days  Discharge Date and Time:  12/03/2019 10:24 AM  Attending Physician: No att. providers found   Discharging Provider: Candido Tanner MD  Primary Care Provider: Garrison Roach MD      HPI:   53 yo male with history of chronic Afib on Xarelto, HFpEF, hx of recurrent PE, h/o DVT with [place] filter, HTN, LLE Venous stasis ulcer, and hyperthyroidism who presents to [facility] for evaluation of chest pain/heart palpitations.  The patient endorses that he had a schedule PCP appointment with Dr. Garrison Roach.  Unfortunately, prior to his scheduled appointment he developed acute chest pain at rest with heart palpitations.  The patient stated my heart felt like it was going to beat out of my chest.  He noted that these symptoms lasted for approximately 15 min.  The patient denied any associated dyspnea, increased lower extremity edema from baseline, nausea, emesis, cough, decreased urine output, paresthesia, hemiparesis/hemiplegia, vision changes, speech changes, or presyncopal symptoms.  He noted the pain was located in the center of his chest in characterized as sharp.       In the ED, D-dimer:  0.90, troponin:  0.015, creatinine 0.9, BUN:  29, H/H:  9.4/31, chest x-ray:  Demonstrated cardiomegaly a without evidence of acute pulmonary edema, and V/Q scan demonstrated an unchanged appearance of the large ventilation perfusion mismatch in the lingula.    * No surgery found *      Hospital Course:   Patient presented to the emergency department with new onset acute chest pain on the left side associated with heart palpitations lasting for about 15 minutes. He was admitted for unstable angina. All workup at the time was negative. Troponin was negative, EKG was wnl, D-dimer was negative, CXR was with cardiomegaly but  otherwise wnl, and V/Q scan was unchanged from previous. Patient received pain control. He was sent for dobutamine stress test/echo which were negative for any signs of ischemia. Patient was discharged with plans for close follow up with PCP and was further instructed about the importance of taking methimazole for his hyperthyroidism. For more information, please see below:    Unstable angina  Troponin x3 all negative  All EKG's showed just A. Fib  Dobutamine stress test was negative for ischemia     Heart failure with preserved ejection fraction  Currently compensated  Continued torsemide tablet 40 mg daily  Chest x-ray with cardiomegaly without evidence of pulmonary edema  ECHO (9/2019): EF: 50%  Continued to monitor     Hyperthyroidism  TSH (10/30/19): < 0.010, T4: 2.86  Repeat T4 2.24  Continued medical management with methimazole 20 mg  Patient reports that he has not been taking his methimazole at home as it has not been covered by his insurance; discussed with case management and pharmacy and medication will be covered. This information was told to patient and he expressed a plan to begin taking it.   Continued to monitor      H/O Recurrent pulmonary embolism  Continued medical management with rivaroxaban 20 mg  VQ scan:  Unchanged appearance of a large ventilation/perfusion mismatch in the lingula similar to previous exam.     Chronic atrial fibrillation  EKG:  Evidence of Atrial fibrillation  Continued medical management with rivaroxaban     Venous stasis dermatitis of both lower extremities/venous stasis ulcer of left lower extremity  Ordered wound care consult for dressing change to left lower extremity  Patient is followed by Wound Center every 2 weeks.    Home health nurse changes LLE dressing on Monday/Wednesday/Friday  Unable to have dressing change on Monday of this week because he presented to the ED  Dressings changed prior to discharge and will resume prior dressing change schedule     Essential  HTN  Continue medical management with Toprol-XL  Goal: Bp<140/90  Current BP:  126/65  Low-sodium cardiac diet     Consults:     No new Assessment & Plan notes have been filed under this hospital service since the last note was generated.  Service: Hospital Medicine    Final Active Diagnoses:    Diagnosis Date Noted POA    PRINCIPAL PROBLEM:  Unstable angina [I20.0] 11/21/2019 Yes    Chest pain [R07.9] 12/02/2019 Yes      Problems Resolved During this Admission:       Discharged Condition: stable    Disposition: Home or Self Care    Follow Up:  Follow-up Information     Gino Ji Iii, MD On 12/16/2019.    Specialty:  Family Medicine  Why:  3:00 PM  Contact information:  [address]  [address]  [facility] [id number] [phone]             Stefan Arias MD. Schedule an appointment as soon as possible for a visit in 3 days.    Specialty:  Cardiology  Why:  Hospital followup  Contact information:  [address]  [address]  [facility] [address] [phone]             Sg Medina PA-C. Schedule an appointment as soon as possible for a visit in 3 days.    Specialty:  Family Medicine  Why:  Hospital followup Please call [facility] family medicine clinic to be seen in the next 3 days for hospital follow up.  Contact information:  [address]  [address]  [facility] [id number] [phone]                 Patient Instructions:      Ambulatory referral to Cardiology   Referral Priority: Routine Referral Type: Consultation   Referral Reason: Specialty Services Required   Referred to Provider: STEFAN ARIAS Requested Specialty: Cardiology   Number of Visits Requested: 1     Diet Cardiac     Diet renal     Notify your health care provider if you experience any of the following:  temperature >100.4     Notify your health care provider if you experience any of the following:  persistent nausea and vomiting or diarrhea     Notify your health care provider if you experience any of the following:  severe  uncontrolled pain     Notify your health care provider if you experience any of the following:  redness, tenderness, or signs of infection (pain, swelling, redness, odor or green/yellow discharge around incision site)     Notify your health care provider if you experience any of the following:  difficulty breathing or increased cough     Notify your health care provider if you experience any of the following:  severe persistent headache     Notify your health care provider if you experience any of the following:  worsening rash     Notify your health care provider if you experience any of the following:  persistent dizziness, light-headedness, or visual disturbances     Notify your health care provider if you experience any of the following:  increased confusion or weakness     Activity as tolerated       Significant Diagnostic Studies: Labs:   CMP No results for input(s): NA, K, CL, CO2, GLU, BUN, CREATININE, CALCIUM, PROT, ALBUMIN, BILITOT, ALKPHOS, AST, ALT, ANIONGAP, ESTGFRAFRICA, EGFRNONAA in the last 48 hours., CBC No results for input(s): WBC, HGB, HCT, PLT in the last 48 hours. and Troponin   Recent Labs   Lab 12/03/19  0419   TROPONINI 0.011     Cardiac Graphics: Echocardiogram:   2D echo with color flow doppler:   Results for orders placed or performed during the hospital encounter of 09/05/17   2D echo with color flow doppler   Result Value Ref Range    QEF 40 (A) 55 - 65    Mitral Valve Regurgitation SEVERE (A)     Aortic Valve Regurgitation MILD     Est. PA Systolic Pressure 46.94 (A)     Tricuspid Valve Regurgitation MODERATE (A)     Narrative    Date of Procedure: 09/06/2017        TEST DESCRIPTION   Technical Quality: This is a technically challenging study. There is poor endocardial definition. This study was performed in conjunction with a 3ml intravenous injection of Optison contrast agent.     General: The patient was in an irregularly irregular rhythm throughout the study.     Aorta: The aortic  root is normal in size, measuring 3.0 cm at sinotubular junction and 3.8 cm at Sinuses of Valsalva. The proximal ascending aorta is normal in size, measuring 3.4 cm across.     Left Atrium: The left atrial volume index is severely enlarged, measuring 54.73 cc/m2.     Left Ventricle: The left ventricle is severely enlarged, with an end-diastolic diameter of 7.1 cm, and an end-systolic diameter of 5.2 cm. LV wall thickness is normal, with the septum measuring 1.0 cm and the posterior wall measuring 0.9 cm across.   Relative wall thickness was normal at 0.25, and the LV mass index was increased at 131.1 g/m2 consistent with eccentric left ventricular hypertrophy. There is global hypokinesis. Left ventricular systolic function appears mildly to moderately depressed.   Visually estimated ejection fraction is 40-45%. The LV Doppler derived stroke volume equals 69.0 ccs.         Right Atrium: The right atrium is normal in size, measuring 6.4 cm in length and 5.8 cm in width in the apical view.     Right Ventricle: The right ventricle is enlarged measuring 5.2 cm at the base in the apical right ventricle-focused view. Global right ventricular systolic function appears moderately depressed. Tricuspid annular plane systolic excursion (TAPSE) is 2.5   cm. Tissue Doppler-derived tricuspid annular peak systolic velocity (S prime) is 12.1 cm/s. The estimated PA systolic pressure is 47 mmHg.     Aortic Valve:  Additionally, there is mild aortic regurgitation.     Mitral Valve:  There is severe mitral regurgitation.     Tricuspid Valve:  There is moderate tricuspid regurgitation.     Pulmonary Valve:  There is moderate pulmonic regurgitation.     IVC: IVC is enlarged but collapses > 50% with a sniff, suggesting intermediate right atrial pressure of 8 mmHg.     Intracavitary: There is no evidence of pericardial effusion, intracavity mass, thrombi, or vegetation.         CONCLUSIONS     1 - Eccentric hypertrophy.     2 - Mildly to  moderately depressed left ventricular systolic function (EF 40-45%).     3 - Right ventricular enlargement with moderately depressed systolic function.     4 - Severe left atrial enlargement.     5 - Pulmonary hypertension. The estimated PA systolic pressure is 47 mmHg.     6 - Mild aortic regurgitation.     7 - Severe mitral regurgitation.     8 - Moderate tricuspid regurgitation.     9 - Intermediate central venous pressure.             This document has been electronically    SIGNED BY: Liz Rojas MD On: 09/06/2017 16:09    and Stress Test: without signs of ischemia      Pending Diagnostic Studies:     None         Medications:  Reconciled Home Medications:      Medication List      START taking these medications    ferrous sulfate 325 mg (65 mg iron) Tab tablet  Commonly known as:  FEOSOL  Take 1 tablet (325 mg total) by mouth once daily.     pantoprazole 40 MG tablet  Commonly known as:  PROTONIX  Take 1 tablet (40 mg total) by mouth once daily.        CHANGE how you take these medications    lisinopril 10 MG tablet  Take 1 tablet (10 mg total) by mouth once daily.  What changed:  how much to take     metoprolol succinate 100 MG 24 hr tablet  Commonly known as:  TOPROL-XL  Take 1 tablet (100 mg total) by mouth once daily.  What changed:  when to take this        CONTINUE taking these medications    ciclopirox 8 % Soln  Commonly known as:  PENLAC  Apply topically nightly as directed     ciprofloxacin HCl 500 MG tablet  Commonly known as:  CIPRO  Take 1 tablet (500 mg total) by mouth 2 (two) times daily until entirely gone.     doxycycline 100 MG Cap  Commonly known as:  VIBRAMYCIN  Take 1 capsule (100 mg total) by mouth 2 (two) times daily until entirely gone.     HYDROcodone-acetaminophen 5-325 mg per tablet  Commonly known as:  NORCO  Take 1 tablet by mouth every 6 (six) hours as needed for Pain.     hydrOXYzine HCl 25 MG tablet  Commonly known as:  ATARAX  Take 1 tablet (25 mg total) by mouth 4 (four)  times daily as needed for Itching.     methIMAzole 10 MG Tab  Commonly known as:  TAPAZOLE  Take 2 tablets (20 mg total) by mouth once daily.     rivaroxaban 20 mg Tab  Commonly known as:  Xarelto  Take 1 tablet (20 mg total) by mouth daily with dinner or evening meal.     torsemide 20 MG Tab  Commonly known as:  DEMADEX  Take 2 tablets (40 mg total) by mouth once daily.     Ventolin HFA 90 mcg/actuation inhaler  Generic drug:  albuterol  Inhale 2 puffs into the lungs every 6 (six) hours as needed for Wheezing. Rescue            Indwelling Lines/Drains at time of discharge:   Lines/Drains/Airways     None                 Time spent on the discharge of patient: >30 minutes  Patient was seen and examined on the date of discharge and determined to be suitable for discharge.         Candido Tanner MD  Department of Hospital Medicine  [facility]

## 2019-12-09 ENCOUNTER — OFFICE VISIT (OUTPATIENT)
Dept: FAMILY MEDICINE | Facility: HOSPITAL | Age: 54
End: 2019-12-09
Attending: SPECIALIST
Payer: MEDICARE

## 2019-12-09 ENCOUNTER — HOSPITAL ENCOUNTER (OUTPATIENT)
Dept: WOUND CARE | Facility: HOSPITAL | Age: 54
Discharge: HOME | End: 2019-12-09
Attending: EMERGENCY MEDICINE
Payer: MEDICARE

## 2019-12-09 VITALS
HEIGHT: 78 IN | WEIGHT: 315 LBS | HEART RATE: 82 BPM | BODY MASS INDEX: 36.45 KG/M2 | DIASTOLIC BLOOD PRESSURE: 72 MMHG | SYSTOLIC BLOOD PRESSURE: 151 MMHG

## 2019-12-09 VITALS
BODY MASS INDEX: 36.45 KG/M2 | WEIGHT: 315 LBS | HEART RATE: 99 BPM | HEIGHT: 78 IN | SYSTOLIC BLOOD PRESSURE: 136 MMHG | DIASTOLIC BLOOD PRESSURE: 63 MMHG

## 2019-12-09 DIAGNOSIS — E05.90 HYPERTHYROIDISM: Chronic | ICD-10-CM

## 2019-12-09 DIAGNOSIS — I83.009 VENOUS STASIS ULCER OF LOWER EXTREMITY, UNSPECIFIED LATERALITY: Primary | ICD-10-CM

## 2019-12-09 DIAGNOSIS — I87.2 VENOUS STASIS DERMATITIS OF BOTH LOWER EXTREMITIES: ICD-10-CM

## 2019-12-09 DIAGNOSIS — R00.2 PALPITATIONS: Primary | ICD-10-CM

## 2019-12-09 DIAGNOSIS — Z86.718 HISTORY OF DVT (DEEP VEIN THROMBOSIS): ICD-10-CM

## 2019-12-09 DIAGNOSIS — I48.20 CHRONIC ATRIAL FIBRILLATION: ICD-10-CM

## 2019-12-09 DIAGNOSIS — I87.2 VENOUS (PERIPHERAL) INSUFFICIENCY: ICD-10-CM

## 2019-12-09 DIAGNOSIS — L97.909 VENOUS STASIS ULCER OF LOWER EXTREMITY, UNSPECIFIED LATERALITY: Primary | ICD-10-CM

## 2019-12-09 DIAGNOSIS — Z79.01 LONG TERM (CURRENT) USE OF ANTICOAGULANTS: ICD-10-CM

## 2019-12-09 DIAGNOSIS — S91.309A WOUND OF FOOT: ICD-10-CM

## 2019-12-09 PROCEDURE — 29581 APPL MULTLAYER CMPRN SYS LEG: CPT

## 2019-12-09 PROCEDURE — 11042 DBRDMT SUBQ TIS 1ST 20SQCM/<: CPT

## 2019-12-09 PROCEDURE — 27201912 HC WOUND CARE DEBRIDEMENT SUPPLIES

## 2019-12-09 PROCEDURE — 99213 OFFICE O/P EST LOW 20 MIN: CPT | Mod: 25 | Performed by: PHYSICIAN ASSISTANT

## 2019-12-09 NOTE — PROGRESS NOTES
Subjective:       Patient ID: Drew Farrar is a 54 y.o. male.    Chief Complaint: Hospital Follow Up    Patient was seen in [facility] today for hospital follow up. Recently hospitalized for ACS workup after presenting to the ED with chest pain.     Admission Date: 12/2/2019  Discharge Date and Time:  12/03/2019 10:24 AM    PMH of chronic Afib on Xarelto, HFpEF, hx of recurrent PE, h/o DVT with [place] filter, HTN, LLE Venous stasis ulcer, and hyperthyroidism presented to ED for evaluation of chest pain/heart palpitations. He was admitted for unstable angina. All workup at the time was negative. Troponin was negative, EKG was wnl, D-dimer was negative, CXR was with cardiomegaly but otherwise wnl, and V/Q scan was unchanged from previous. Patient received pain control. He was sent for dobutamine stress test/echo which were negative for any signs of ischemia.      Today the patient is still having the feeling of racing heart. Denies any actual chest pain. He has been taking his medications as prescribed. Does not have cardiology follow up scheduled at this point.       Hyperthyroidism:  Most recent TSH < 0.010. Prescribed methimazole 20 mg daily that he just started taking again 1 week ago, as he was not able to afford it.      AFib:  Reports compliance with is rivaroxaban and metroprolol.     HFpEF:  Most recent EF >50%. Continue torsemide, BB, statin, and ACEi.       Recurrent PE:  Continued medical management with rivaroxaban 20 mg    LE Wounds:  Chronic venous statis ulcers followed by Wound Center every 2 weeks, with home health MWF for dressing changes. Home health nurse changes LLE dressing on Monday/Wednesday/Friday.     Essential HTN:  Taking BB, ACEi. BP mildly elevated today at 151/72.     Review of Systems   Constitutional: Negative.    HENT: Negative.    Respiratory: Negative.    Cardiovascular: Positive for palpitations and leg swelling.   Gastrointestinal: Negative.    Endocrine:  Negative.    Genitourinary: Negative.    Musculoskeletal: Negative.    Skin: Positive for color change and rash.   Neurological: Negative.    Psychiatric/Behavioral: Negative.        Objective:      Vitals:    12/09/19 0908   BP: (!) 151/72   Pulse: 82     Physical Exam   Constitutional: He is oriented to person, place, and time. He appears well-developed and well-nourished. No distress.   HENT:   Head: Normocephalic and atraumatic.   Eyes: Conjunctivae and EOM are normal. Right eye exhibits no discharge. Left eye exhibits no discharge. No scleral icterus.   Neck: Normal range of motion. No tracheal deviation present.   Cardiovascular: Normal rate, regular rhythm, normal heart sounds and intact distal pulses. Exam reveals no gallop and no friction rub.   No murmur heard.  Pulmonary/Chest: Effort normal and breath sounds normal. No respiratory distress. He has no wheezes. He has no rales. He exhibits no tenderness.   Abdominal: Soft. Bowel sounds are normal. He exhibits no distension and no mass. There is no tenderness.   Musculoskeletal: Normal range of motion. He exhibits edema (BLE's but right more than left). He exhibits no tenderness.   Neurological: He is alert and oriented to person, place, and time.   Skin: Skin is warm. He is not diaphoretic.   Chronic venous stasis changes to BLE's with skin color changes R>L; dressings in place   Psychiatric: He has a normal mood and affect. His behavior is normal. Judgment and thought content normal.   Nursing note and vitals reviewed.      Assessment:       1. Palpitations    2. Venous (peripheral) insufficiency    3. Venous stasis dermatitis of both lower extremities    4. History of DVT (deep vein thrombosis)    5. Long term (current) use of anticoagulants    6. Hyperthyroidism    7. Chronic atrial fibrillation        Plan:       Palpitations   -Still c/o racing heart; denies pain   -Possibly due to hyperthyroid, will continue methimazole at current dosage and check  TSH in 4-6 weeks  Venous (peripheral) insufficiency  Venous stasis dermatitis of both lower extremities   -Followed by wound care clinic and has home health MWF to change dressings   -Also followed by Dr. Lantigua with possibility of EVLT to right let    History of DVT (deep vein thrombosis)  Long term (current) use of anticoagulants   -On Xarelto 20mg daily     Hyperthyroidism   -TSH <.001   -Methimazole 20mg daily    -Follow up with endocrine for more definitive treatment    Chronic atrial fibrillation   -Rate controlled with metoprolol and AC with Xarelto 20mg    Future Appointments   Date Time Provider Department Center   12/9/2019 11:15 AM Pool Gutierrez MD [facility] WOUND [facility]   12/12/2019  1:00 PM Brittany Mccormack MD [facility] CARDIO [facility]   12/16/2019  3:00 PM Garrison Roach MD [facility]SUFMRE [facility]   12/23/2019 10:30 AM Julian Frank MD [facility]   3/9/2020  9:30 AM APPOINTMENT LAB, [facility] LAB [facility]   3/10/2020  3:00 PM Alan Plascencia MD [facility] HEM ONC [facility]

## 2019-12-09 NOTE — PROGRESS NOTES
"Subjective:       Patient ID: Drew Farrar is a 54 y.o. male.    Chief Complaint: Venous Ulcer      2/15/19: Readmitted for venous ulcer to left lateral foot which developed about 2 weeks ago. Pulses noted with doppler and capillary refill <3.Dr Gutierrez assessed patient and wound care plan ordered for compression therapy and hydrafera blue to wound bed. No apparent signs of infection noted. Patient to follw-up in clinic in 2 weeks.DB   2/21/19: Nurse visit for dressing change. Refused care to skin tear on left 2nd toe. See notes. RTC 1 week for DrRosy Visit.  3/1/19: Follow up with Dr. Waller today in clinic new wound to right posterior leg, culture of both wounds taken today in clinic new wound care orders to both legs for xeroform and unna with calamine toe to knee follow up in 1 week with Dr. Gutierrez  3/8/19: Follow up with Dr. Gutierrez today in clinic wounds improving, edema noted to BLE, profore toe to knee applied to both legs, RX given to patient for PO Doxycycline, follow up in 1 week.   3/15/19: Here for f/u with Dr. Gutierrez. U/S scheduled Thursday. Will need right leg rewrapped. Patient states eye DrRosy Gave him the same antibiotic dose and strength after eye surgery. Dr instructed patient to take one bottle of antibiotics for both wounds and eyes until finished. Gentamycin added to wound care orders.   3/29/19:  Wound slowly improving. No changes in wound care orders. Wound debrided per Dr Gutierrez.  4/4/19: Patient showed up to clinic today stated " I need my dressing changed. It fell off."  Doppler pluses checked and present.  Patient refusing care to right, no stocking present on leg at this visit. Patient stated " No they said this leg is discharged, Dr. Lantigua has to drain the fluid out this leg with a needle. No wound care to this leg anymore when I come."      9/9/19: Readmit to wound care clinic for venous ulcer to left lateral foot.  Patient states it reopened about 3 weeks after last " "discharge from wound care clinic 7/1/19. Per patient wound has been treated by PCP.  Patient reports just using a large band- aid and compression sock. Patient states he was recently treated at [facility] for HBOT for this wound " in the last two or three months after it reopened "  Requesting HBOT here at the wound care clinic.  Dr. Gutierrez examined patient today, doppler pulses present, wound debrided with curette by Dr. Gutierrez patient tolerated well. Wound care orders given to apply hydrorefa blue ready to wound, cover with mepore dressing and apply patient's compression sock. Dr. Manuel discussed with patient obtaining his medical records from [facility], to view to determine if patient will benefit from HBOT, patient verbalized understanding.  Authorization for release of health information sheet signed by patient and faxed to [facility] today in clinic. Follow up in 1 week with MD.  9/23/19: F/U with Dr. Gutierrez. Client recently discharged from hospital. Left foot site debrided. Client being followed by [facility].  12/9/19: Patient seen today in clinic by Dr. Gutierrez, no change to wound. Pt reports recently discharged from hospital 12/3/19 for irregular heat rate and chest pains. Debridement per Dr. Gutierrez tolerated well, wound care continued as ordered 4 layer compression toe to knee LLE.     Pt. Has no c/o.  Review of Systems    Objective:    Wound as noted w/ recurrence of slough & biofilm; no Sophia's sign or s/s of infection noted.  Physical Exam    Assessment:       No diagnosis found.       Wound 09/09/19 1057 Venous Ulcer lateral Foot #1 (Active)   09/09/19 1057    Pre-existing: Yes   Primary Wound Type: Venous ulcer   Side: Left   Orientation: lateral   Location: Foot   Wound/PI Number (optional): #1   Ankle-Brachial Index:    Pulses:    Removal Indication and Assessment:    Wound Outcome:    (Retired) Wound Type:    (Retired) Wound Length (cm):    (Retired) Wound Width (cm):  "   (Retired) Depth (cm):    Wound Description (Comments):    Removal Indications:    Dressing Appearance Intact;Moist drainage 12/9/2019 10:33 AM   Drainage Amount Small 12/9/2019 10:33 AM   Drainage Characteristics/Odor Serous 12/9/2019 10:33 AM   Appearance Moist;Yellow;Fibrin 12/9/2019 10:33 AM   Tissue loss description Full thickness 12/9/2019 10:33 AM   Yellow (%), Wound Tissue Color 100 % 12/9/2019 10:33 AM   Periwound Area Intact;Pink;Hemosiderin Staining;Edematous 12/9/2019 10:33 AM   Wound Edges Irregular 12/9/2019 10:33 AM   Wound Length (cm) 1.2 cm 12/9/2019 10:33 AM   Wound Width (cm) 2.2 cm 12/9/2019 10:33 AM   Wound Depth (cm) 0.1 cm 12/9/2019 10:33 AM   Wound Volume (cm^3) 0.26 cm^3 12/9/2019 10:33 AM   Wound Surface Area (cm^2) 2.64 cm^2 12/9/2019 10:33 AM   Care Cleansed with:;Antimicrobial agent;Sterile normal saline;Other (see comments);Applied:;Moisturizing agent 12/9/2019 10:33 AM   Dressing Changed;Applied;Hydrofiber;Foam;Compression wrap 12/9/2019 10:33 AM   Periwound Care Absorptive dressing applied;Moisture barrier applied;Topical treatment applied 12/9/2019 10:33 AM   Compression Four layer compression 12/9/2019 10:33 AM   Off Loading Off loading shoe 12/9/2019 10:33 AM   Dressing Change Due 12/11/19 12/9/2019 10:33 AM       Left lateral Foot #1  Cleanse wound with: Acetic acid 0.25%, rinse with normal saline.  Lidocaine: Prn Debridement  Periwound care: Sween to dry skin Prn, betamethasone to LLE PRN. Calmoseptine to vick wound.  Primary dressing:  Santyl, Hydrofera blue ready to wound  Secondary dressing: Aquacel foam  Offloading: Darco shoe left foot  Edema control: Profore 4 layer compression wrap toes to knee LLE  Tubigrip f x 2 right leg toes to knee  Frequency: Monday, Wednesday, Friday, and prn    Follow-up: in 1 week with Dr. Gutierrez Monday 12/16/19    Other orders:Continue taking Cipro and doxycycline PO. Client has acetic acid.    Home Health: [facility]: phone# [phone],  fax# [phone]. Admit for wound care Monday, Wednesday, Friday, and prn. Orders as above. Next visit Dr. Gutierrez Monday 12/16/19        Plan:              As above; wound curetted.    1 week Dr. Gutierrez

## 2019-12-12 ENCOUNTER — TELEPHONE (OUTPATIENT)
Dept: ENDOCRINOLOGY | Facility: CLINIC | Age: 54
End: 2019-12-12

## 2019-12-12 ENCOUNTER — OFFICE VISIT (OUTPATIENT)
Dept: CARDIOLOGY | Facility: CLINIC | Age: 54
End: 2019-12-12
Payer: MEDICARE

## 2019-12-12 VITALS
HEIGHT: 78 IN | OXYGEN SATURATION: 99 % | HEART RATE: 106 BPM | BODY MASS INDEX: 36.45 KG/M2 | SYSTOLIC BLOOD PRESSURE: 150 MMHG | DIASTOLIC BLOOD PRESSURE: 68 MMHG | WEIGHT: 315 LBS

## 2019-12-12 DIAGNOSIS — I10 ESSENTIAL HYPERTENSION: ICD-10-CM

## 2019-12-12 DIAGNOSIS — I50.42 CHRONIC COMBINED SYSTOLIC AND DIASTOLIC CONGESTIVE HEART FAILURE: ICD-10-CM

## 2019-12-12 DIAGNOSIS — I87.2 CHRONIC VENOUS INSUFFICIENCY: ICD-10-CM

## 2019-12-12 DIAGNOSIS — I48.20 CHRONIC ATRIAL FIBRILLATION: Primary | ICD-10-CM

## 2019-12-12 DIAGNOSIS — I50.30 NYHA CLASS 1 HEART FAILURE WITH PRESERVED EJECTION FRACTION, WITH IMPROVEMENT OF EJECTION FRACTION FROM PRIOR MEASUREMENT: ICD-10-CM

## 2019-12-12 DIAGNOSIS — I48.21 PERMANENT ATRIAL FIBRILLATION: ICD-10-CM

## 2019-12-12 DIAGNOSIS — I20.0 UNSTABLE ANGINA: ICD-10-CM

## 2019-12-12 PROCEDURE — 3008F PR BODY MASS INDEX (BMI) DOCUMENTED: ICD-10-PCS | Mod: CPTII,S$GLB,, | Performed by: INTERNAL MEDICINE

## 2019-12-12 PROCEDURE — 3077F PR MOST RECENT SYSTOLIC BLOOD PRESSURE >= 140 MM HG: ICD-10-PCS | Mod: CPTII,S$GLB,, | Performed by: INTERNAL MEDICINE

## 2019-12-12 PROCEDURE — 3008F BODY MASS INDEX DOCD: CPT | Mod: CPTII,S$GLB,, | Performed by: INTERNAL MEDICINE

## 2019-12-12 PROCEDURE — 99212 PR OFFICE/OUTPT VISIT, EST, LEVL II, 10-19 MIN: ICD-10-PCS | Mod: S$GLB,,, | Performed by: INTERNAL MEDICINE

## 2019-12-12 PROCEDURE — 99999 PR PBB SHADOW E&M-EST. PATIENT-LVL III: CPT | Mod: PBBFAC,,, | Performed by: INTERNAL MEDICINE

## 2019-12-12 PROCEDURE — 3078F DIAST BP <80 MM HG: CPT | Mod: CPTII,S$GLB,, | Performed by: INTERNAL MEDICINE

## 2019-12-12 PROCEDURE — 99999 PR PBB SHADOW E&M-EST. PATIENT-LVL III: ICD-10-PCS | Mod: PBBFAC,,, | Performed by: INTERNAL MEDICINE

## 2019-12-12 PROCEDURE — 3077F SYST BP >= 140 MM HG: CPT | Mod: CPTII,S$GLB,, | Performed by: INTERNAL MEDICINE

## 2019-12-12 PROCEDURE — 3078F PR MOST RECENT DIASTOLIC BLOOD PRESSURE < 80 MM HG: ICD-10-PCS | Mod: CPTII,S$GLB,, | Performed by: INTERNAL MEDICINE

## 2019-12-12 PROCEDURE — 99212 OFFICE O/P EST SF 10 MIN: CPT | Mod: S$GLB,,, | Performed by: INTERNAL MEDICINE

## 2019-12-12 RX ORDER — METOPROLOL SUCCINATE 100 MG/1
200 TABLET, EXTENDED RELEASE ORAL DAILY
Qty: 30 TABLET | Refills: 5
Start: 2019-12-12 | End: 2020-02-27 | Stop reason: SDUPTHER

## 2019-12-12 RX ORDER — ACETAMINOPHEN 325 MG/1
325 TABLET ORAL EVERY 6 HOURS PRN
COMMUNITY
End: 2022-01-01

## 2019-12-12 RX ORDER — METOPROLOL SUCCINATE 100 MG/1
100 TABLET, EXTENDED RELEASE ORAL DAILY
Qty: 30 TABLET | Refills: 5 | Status: SHIPPED | OUTPATIENT
Start: 2019-12-12 | End: 2019-12-12

## 2019-12-12 NOTE — TELEPHONE ENCOUNTER
----- Message from Irineo Adams sent at 2019  9:25 AM CST -----  PT NEED REFILL ON metoprolol succinate (TOPROL-XL) 100 MG 24 hr tablet () PLEASE

## 2019-12-12 NOTE — TELEPHONE ENCOUNTER
I called the pt to inform him of apt time change. The pt was scheduled incorrectly. The pt is new and had to be schedule in a 1 hour slot. The pt was moved from 10:30 am to 11:00am. I could not reach the pt via phone but I did leave a detailed voicemail for the pt to reach me.

## 2019-12-12 NOTE — PROGRESS NOTES
Cardiology Clinic note    Subjective:   Patient ID:  Drew Farrar is a 54 y.o. male who presents for     HPI:   Drew Farrar  has a past medical history of *Atrial fibrillation, Anticoagulant long-term use, Arthritis, Atrial fibrillation, Atrial fibrillation (Feb 23, 2016), Bipolar disorder, CHF (congestive heart failure), Congenital heart disease, Deep vein thrombosis, DVT of leg (deep venous thrombosis), History of prior ablation treatment, Hypertension, Obesity, Stroke, Thyroid disease, Venous stasis ulcer of lower extremity, unspecified laterality (12/14/2012), and Venous ulcer.    Drew was last seen by Dr Gray 8/3/19, for DVT and PE. On rivaroxaban 20 mg daily. He had LVEF 30-35% echo 2016, improved normal LVEF. Chronic Afib, on rivaroxaban as above.    Today he reports FRAIRE, which has been unchanged he reports over the last year. He denies chest pain / discomfort, orthopnea, PND, syncope or near syncope. He reports nosebleeds for which he had to go to ED in April, but otherwise control with pinching or cotton buds.      Patient Active Problem List    Diagnosis Date Noted    Chest pain 12/02/2019    Unstable angina 11/21/2019    Iron deficiency anemia 11/12/2019    Anemia of chronic disease 11/12/2019    Venous stasis ulcer of lower extremity, unspecified laterality     Obesity, Class I, BMI 30.0-34.9 (see actual BMI) 09/30/2019    Advance care planning 09/30/2019    Erythema     Acute deep vein thrombosis (DVT) of lower extremity 09/14/2019    Onychomycosis 09/12/2019    Wound of foot 09/12/2019    Clostridium difficile infection     (HFpEF) heart failure with preserved ejection fraction 09/10/2019    Pulmonary embolism 08/02/2019    Chronic venous insufficiency 07/25/2019    Hypomagnesemia 07/08/2019    Varicose ulcer of lower extremity     Cellulitis of right lower leg 04/24/2019    Candida infection of genital region 04/24/2019    Non-pressure chronic ulcer of left ankle, with  unspecified severity 07/26/2018    Non-rheumatic mitral regurgitation 09/07/2017    Chronic systolic heart failure 09/06/2017     -  Echo 9/2017   EF 40%    Severe MR   Moderate TR   PASP 47   Severe LAE   LVEDD 7.1 cm    LVESD 5.2 cm          Hyperthyroidism 09/05/2017    Elevated troponin 09/05/2017    Preoperative clearance 07/10/2017    Acute on chronic diastolic congestive heart failure 02/20/2017    Long term (current) use of anticoagulants 10/31/2016    Other pulmonary embolism without acute cor pulmonale, unspecified chronicity 10/28/2016    Acute pulmonary embolism 10/26/2016    Recurrent pulmonary embolism 10/26/2016    History of DVT (deep vein thrombosis) 09/16/2016    HTN (hypertension) 09/16/2016    Knee pain, right 07/07/2016    Difficulty walking 07/07/2016    Muscle weakness 07/07/2016    Group A streptococcal infection 03/01/2016    Tinea pedis of both feet 03/01/2016    Bilateral edema of lower extremity 03/01/2016    Morbid obesity 03/01/2016    Permanent atrial fibrillation 03/01/2016    Right knee pain 03/01/2016    Acute chest pain 12/24/2014    Depression 12/24/2014    Ulcer of ankle, left, limited to breakdown of skin 03/31/2014    Elevated BP 03/24/2014    May-Thurner syndrome 02/26/2014     1. Successful IVUS guided intervention for May Thurner Syndrome 2. 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     S/p venoplasty with  20 x 80 mm Wallstent post dilated with 14 mm balloon in 2/2014      Skin ulcer of left foot, limited to breakdown of skin 02/15/2014    Cellulitis 02/13/2014    Chronic venous hypertension with ulcer 11/25/2013    Edema 11/23/2013     R leg      Venous (peripheral) insufficiency 03/14/2013     S/p bilateral iliac vein stents    S/p R GSV EVLT with laser 10/2013    S/p US guided accessory vein  sclerotherapy of R calf 2014      Venous stasis dermatitis of both lower extremities 12/14/2012    Ulcer - lesion 12/14/2012     Of left foot        Chronic atrial fibrillation 12/14/2012       Patient's Medications   New Prescriptions    No medications on file   Previous Medications    ACETAMINOPHEN (TYLENOL) 325 MG TABLET    Take 325 mg by mouth every 6 (six) hours as needed for Pain.    ALBUTEROL (VENTOLIN HFA) 90 MCG/ACTUATION INHALER    Inhale 2 puffs into the lungs every 6 (six) hours as needed for Wheezing. Rescue    CICLOPIROX (PENLAC) 8 % SOLN    Apply topically nightly as directed    CIPROFLOXACIN HCL (CIPRO) 500 MG TABLET    Take 1 tablet (500 mg total) by mouth 2 (two) times daily until entirely gone.    DOXYCYCLINE (VIBRAMYCIN) 100 MG CAP    Take 1 capsule (100 mg total) by mouth 2 (two) times daily until entirely gone.    FERROUS SULFATE (FEOSOL) 325 MG (65 MG IRON) TAB TABLET    Take 1 tablet (325 mg total) by mouth once daily.    HYDROXYZINE HCL (ATARAX) 25 MG TABLET    Take 1 tablet (25 mg total) by mouth 4 (four) times daily as needed for Itching.    LISINOPRIL 10 MG TABLET    Take 1 tablet (10 mg total) by mouth once daily.    METHIMAZOLE (TAPAZOLE) 10 MG TAB    Take 2 tablets (20 mg total) by mouth once daily.    METOPROLOL SUCCINATE (TOPROL-XL) 100 MG 24 HR TABLET    Take 1 tablet (100 mg total) by mouth once daily.    PANTOPRAZOLE (PROTONIX) 40 MG TABLET    Take 1 tablet (40 mg total) by mouth once daily.    RIVAROXABAN (XARELTO) 20 MG TAB    Take 1 tablet (20 mg total) by mouth daily with dinner or evening meal.    TORSEMIDE (DEMADEX) 20 MG TAB    Take 2 tablets (40 mg total) by mouth once daily.   Modified Medications    No medications on file   Discontinued Medications    No medications on file        Review of Systems   Constitution: Negative for chills and fever.   HENT: Positive for nosebleeds (Chronic). Negative for ear discharge.    Cardiovascular: Positive for irregular heartbeat  "and leg swelling (Chronic, varies with salt intake). Negative for chest pain, near-syncope, orthopnea, palpitations, paroxysmal nocturnal dyspnea and syncope. Dyspnea on exertion: Chronic; no acute change.   Respiratory: Negative for hemoptysis.    Hematologic/Lymphatic: Does not bruise/bleed easily.   Gastrointestinal: Negative for hematochezia and melena.   Genitourinary: Negative for hematuria.   Neurological: Negative for seizures.         Objective:   Vitals  Vitals:    19 1313   BP: (!) 150/68   Pulse: 106   SpO2: 99%   Weight: (!) 154.1 kg (339 lb 11.7 oz)   Height: 6' 8" (2.032 m)          Physical Exam   Constitutional: He appears well-developed. No distress.   Cardiovascular: Normal rate. An irregularly irregular rhythm present. Exam reveals no gallop and no friction rub.   No murmur heard.  Pulmonary/Chest: Effort normal and breath sounds normal. No respiratory distress.   Abdominal: Bowel sounds are normal.   Musculoskeletal: He exhibits edema.   Neurological: He is alert.   Skin: He is not diaphoretic.         Lab Results    Lab Results   Component Value Date    WBC 3.63 (L) 2019    HGB 8.7 (L) 2019    HCT 29.0 (L) 2019    MCV 81 (L) 2019       Lab Results   Component Value Date     2019    INR 1.1 2019    INR 8.3 03/15/2019       Lab Results   Component Value Date    K 4.9 2019    MG 1.9 2019    BUN 28 (H) 2019    CREATININE 0.9 2019       Lab Results   Component Value Date    GLU 95 2019    HGBA1C 5.2 2019       Lab Results   Component Value Date    AST 33 2019    ALT 26 2019    ALBUMIN 3.2 (L) 2019    PROT 7.5 2019       Lab Results   Component Value Date    CHOL 124 07/10/2017    HDL 23 (L) 07/10/2017    LDLCALC 70.4 07/10/2017    TRIG 153 (H) 07/10/2017       Lab Results   Component Value Date    CRP 24.5 (H) 09/10/2019     (H) 2019       Cardiac Studies  EC/3/19  Atrial " fibrillation  Dobutamine Stress Echo: 12/3/19  · Normal left ventricular systolic function. The estimated ejection fraction is 55%  · The EKG portion of this study is abnormal but not diagnostic.  · Eccentric left ventricular hypertrophy.  · The patient reached the end of the protocol.  · There were no arrhythmias during stress.  · Normal right ventricular systolic function.  The stress echo portion of this study is negative for myocardial ischemia.  Cath study: 3/11/14  1. Successful IVUS guided intervention for May Thurner Syndrome   2. Left common iliac vein treated with 22 x 70 mm Wallstent overlapping with 22 x 45 mm Wallstent  3. Ostial left common iliac vein post dilated with 18 x 40 mm balloon      Assessment:     1. Chronic atrial fibrillation    2. Essential hypertension    3. Unstable angina    4. Chronic venous insufficiency    5. NYHA class 1 heart failure with preserved ejection fraction, with improvement of ejection fraction from prior measurement        Plan:     DVT, PE, May-Thurner Syndrome  Sp stenting for May-Thurner Syndrome  Rivaroxaban 20 mg daily    Chronic Atrial Fibrillation  On rivaroxaban as above. Metoprolol succinate as below.    HFpEF, improved normal LVEF  HFrEF (LVEF 30-35% Echo Feb 2016), now improved. Torsemide 40 mg daily. Monitor daily weights. Metoprolol succinate as above. Lisinopril 7.5 mg daily. K and Cr WNL.    HTN  Lisinopril and metoprolol succinate as above. HR and BP elevated. Reports taking metoprolol succinate 150 mg once daily. Will change to 200 mg daily for now. Enrolled in digital HTN monitoring.      Continue with current medical plan and lifestyle changes.      Follow up in 6 months  Return sooner for concerns or questions. If symptoms persist go to the ED    He expressed verbal understanding and agreed with the plan    Thank you for the opportunity to care for this patient. Will be available for questions if needed.       Brittany Mccormack MD  Interventional  Cardiology  [facility]  Phone: [phone]

## 2019-12-16 ENCOUNTER — HOSPITAL ENCOUNTER (OUTPATIENT)
Dept: WOUND CARE | Facility: HOSPITAL | Age: 54
Discharge: HOME | End: 2019-12-16
Attending: EMERGENCY MEDICINE
Payer: MEDICARE

## 2019-12-16 VITALS
WEIGHT: 315 LBS | DIASTOLIC BLOOD PRESSURE: 62 MMHG | HEIGHT: 78 IN | SYSTOLIC BLOOD PRESSURE: 138 MMHG | HEART RATE: 116 BPM | BODY MASS INDEX: 36.45 KG/M2

## 2019-12-16 DIAGNOSIS — L97.909 VENOUS STASIS ULCER OF LOWER EXTREMITY, UNSPECIFIED LATERALITY: Primary | ICD-10-CM

## 2019-12-16 DIAGNOSIS — I83.009 VENOUS STASIS ULCER OF LOWER EXTREMITY, UNSPECIFIED LATERALITY: Primary | ICD-10-CM

## 2019-12-16 PROCEDURE — 11042 DBRDMT SUBQ TIS 1ST 20SQCM/<: CPT

## 2019-12-16 PROCEDURE — 29581 APPL MULTLAYER CMPRN SYS LEG: CPT

## 2019-12-16 PROCEDURE — 27201912 HC WOUND CARE DEBRIDEMENT SUPPLIES

## 2019-12-16 NOTE — PROGRESS NOTES
"Subjective:       Patient ID: Drew Farrar is a 54 y.o. male.    Chief Complaint: Venous Stasis      2/15/19: Readmitted for venous ulcer to left lateral foot which developed about 2 weeks ago. Pulses noted with doppler and capillary refill <3.Dr Gutierrez assessed patient and wound care plan ordered for compression therapy and hydrafera blue to wound bed. No apparent signs of infection noted. Patient to follw-up in clinic in 2 weeks.DB   2/21/19: Nurse visit for dressing change. Refused care to skin tear on left 2nd toe. See notes. RTC 1 week for DrRosy Visit.  3/1/19: Follow up with Dr. Waller today in clinic new wound to right posterior leg, culture of both wounds taken today in clinic new wound care orders to both legs for xeroform and unna with calamine toe to knee follow up in 1 week with Dr. Gutierrez  3/8/19: Follow up with Dr. Gutierrez today in clinic wounds improving, edema noted to BLE, profore toe to knee applied to both legs, RX given to patient for PO Doxycycline, follow up in 1 week.   3/15/19: Here for f/u with Dr. Gutierrez. U/S scheduled Thursday. Will need right leg rewrapped. Patient states eye DrRosy Gave him the same antibiotic dose and strength after eye surgery. Dr instructed patient to take one bottle of antibiotics for both wounds and eyes until finished. Gentamycin added to wound care orders.   3/29/19:  Wound slowly improving. No changes in wound care orders. Wound debrided per Dr Gutierrez.  4/4/19: Patient showed up to clinic today stated " I need my dressing changed. It fell off."  Doppler pluses checked and present.  Patient refusing care to right, no stocking present on leg at this visit. Patient stated " No they said this leg is discharged, Dr. Lantigua has to drain the fluid out this leg with a needle. No wound care to this leg anymore when I come."      9/9/19: Readmit to wound care clinic for venous ulcer to left lateral foot.  Patient states it reopened about 3 weeks after last " "discharge from wound care clinic 7/1/19. Per patient wound has been treated by PCP.  Patient reports just using a large band- aid and compression sock. Patient states he was recently treated at [facility] for HBOT for this wound " in the last two or three months after it reopened "  Requesting HBOT here at the wound care clinic.  Dr. Gutierrez examined patient today, doppler pulses present, wound debrided with curette by Dr. Gutierrez patient tolerated well. Wound care orders given to apply hydrorefa blue ready to wound, cover with mepore dressing and apply patient's compression sock. Dr. Manuel discussed with patient obtaining his medical records from [facility], to view to determine if patient will benefit from HBOT, patient verbalized understanding.  Authorization for release of health information sheet signed by patient and faxed to [facility] today in clinic. Follow up in 1 week with MD.  9/23/19: F/U with Dr. Gutierrez. Client recently discharged from hospital. Left foot site debrided. Client being followed by [facility] health.  12/9/19: Patient seen today in clinic by Dr. Gutierrez, no change to wound. Pt reports recently discharged from hospital 12/3/19 for irregular heat rate and chest pains. Debridement per Dr. Gutierrez tolerated well, wound care continued as ordered 4 layer compression toe to knee LLE.   F/u with Dr. Gutierrez , wound progressing well. Compression changed to coflex with calamine due to itching.  Hx as above; no c/o wound site pain noted.  Review of Systems    Objective:    Some recurrence of fibrin & slough noted w/o s/s of infection.  Physical Exam    Assessment:       1. Venous stasis ulcer of lower extremity, unspecified laterality           Wound 09/09/19 1057 Venous Ulcer lateral Foot #1 (Active)   09/09/19 1057    Pre-existing: Yes   Primary Wound Type: Venous ulcer   Side: Left   Orientation: lateral   Location: Foot   Wound/PI Number (optional): #1   Ankle-Brachial Index:    Pulses:  "   Removal Indication and Assessment:    Wound Outcome:    (Retired) Wound Type:    (Retired) Wound Length (cm):    (Retired) Wound Width (cm):    (Retired) Depth (cm):    Wound Description (Comments):    Removal Indications:    Dressing Appearance Intact;Moist drainage 12/16/2019 11:00 AM   Drainage Amount Small 12/16/2019 11:00 AM   Drainage Characteristics/Odor Green;Yellow 12/16/2019 11:00 AM   Tissue loss description Full thickness 12/16/2019 11:00 AM   Red (%), Wound Tissue Color 40 % 12/16/2019 11:00 AM   Yellow (%), Wound Tissue Color 60 % 12/16/2019 11:00 AM   Periwound Area Intact 12/16/2019 11:00 AM   Wound Edges Irregular 12/16/2019 11:00 AM   Wound Length (cm) 0.8 cm 12/16/2019 11:00 AM   Wound Width (cm) 2 cm 12/16/2019 11:00 AM   Wound Depth (cm) 0.1 cm 12/16/2019 11:00 AM   Wound Volume (cm^3) 0.16 cm^3 12/16/2019 11:00 AM   Wound Surface Area (cm^2) 1.6 cm^2 12/16/2019 11:00 AM   Care Antimicrobial agent;Sterile normal saline 12/16/2019 11:00 AM   Dressing Changed 12/16/2019 11:00 AM   Periwound Care Absorptive dressing applied 12/16/2019 11:00 AM   Off Loading Off loading shoe 12/16/2019 11:00 AM   Dressing Change Due 12/23/19 12/16/2019 11:00 AM   Left lateral Foot #1  Cleanse wound with: Acetic acid 0.25%, rinse with normal saline.  Lidocaine: Prn Debridement  Periwound care: Sween to dry skin Prn, betamethasone to LLE PRN. Calmoseptine to vick wound.  Primary dressing:  Santyl, Hydrofera blue ready to wound  Secondary dressing: Aquacel foam  Offloading: Darco shoe left foot  Edema control: Co Flex with calamine  toes to knee LLE  Tubigrip f x 2 right leg toes to knee  Frequency: Monday, Wednesday, Friday, and prn    Follow-up: in 1 week with Dr. Gutierrez Monday 12/23/19    Other orders:Continue taking Cipro and doxycycline PO. Client has acetic acid.    Home Health: [facility]: phone# [phone], fax# [phone].Wound care Monday, Wednesday, Friday, and prn. Orders as above. Next visit   Matt Monday 12/23/19        Plan:                Follow up in about 1 week (around 12/23/2019).

## 2019-12-16 NOTE — PROCEDURES
"Debridement  Date/Time: 12/16/2019 11:47 AM  Performed by: Pool Gutierrez MD  Authorized by: Pool Gutierrez MD     Time out: Immediately prior to procedure a "time out" was called to verify the correct patient, procedure, equipment, support staff and site/side marked as required.    Consent Done?:  Yes (Verbal)    Preparation: Patient was prepped and draped in usual sterile fashion    Local anesthesia used?: Yes    Local anesthetic:  Topical anesthetic    Wound Details:    Location:  Left foot    Location:  Left Midfoot    Type of Debridement:  Excisional       Length (cm):  1.2       Area (sq cm):  2.64       Width (cm):  2.2       Percent Debrided (%):  100       Depth (cm):  0.1       Total Area Debrided (sq cm):  2.64    Depth of debridement:  Subcutaneous tissue    Tissue debrided:  Subcutaneous and Dermis    Devitalized tissue debrided:  Biofilm, Fibrin, Exudate and Slough    Instruments:  Curette    Bleeding:  Minimal  Patient tolerance:  Patient tolerated the procedure well with no immediate complications      "

## 2019-12-23 NOTE — PROCEDURES
"Debridement  Date/Time: 12/16/2019 10:37 AM  Performed by: Pool Gutierrez MD  Authorized by: Pool Gutierrez MD     Time out: Immediately prior to procedure a "time out" was called to verify the correct patient, procedure, equipment, support staff and site/side marked as required.    Consent Done?:  Yes (Verbal)    Preparation: Patient was prepped and draped in usual sterile fashion    Local anesthesia used?: Yes    Local anesthetic:  Topical anesthetic    Wound Details:    Location:  Left foot    Location:  Left Midfoot    Type of Debridement:  Excisional       Length (cm):  0.8       Area (sq cm):  1.6       Width (cm):  2       Percent Debrided (%):  100       Depth (cm):  0.1       Total Area Debrided (sq cm):  1.6    Depth of debridement:  Subcutaneous tissue    Tissue debrided:  Subcutaneous and Dermis    Devitalized tissue debrided:  Biofilm, Fibrin and Slough    Instruments:  Curette    Bleeding:  Minimal  Patient tolerance:  Patient tolerated the procedure well with no immediate complications      "

## 2019-12-27 ENCOUNTER — HOSPITAL ENCOUNTER (OUTPATIENT)
Dept: WOUND CARE | Facility: HOSPITAL | Age: 54
Discharge: HOME | End: 2019-12-27
Attending: EMERGENCY MEDICINE
Payer: MEDICARE

## 2019-12-27 VITALS
SYSTOLIC BLOOD PRESSURE: 127 MMHG | HEART RATE: 106 BPM | WEIGHT: 315 LBS | DIASTOLIC BLOOD PRESSURE: 73 MMHG | BODY MASS INDEX: 36.45 KG/M2 | HEIGHT: 78 IN

## 2019-12-27 DIAGNOSIS — I83.009 VENOUS STASIS ULCER OF LOWER EXTREMITY, UNSPECIFIED LATERALITY: Primary | ICD-10-CM

## 2019-12-27 DIAGNOSIS — L97.909 VENOUS STASIS ULCER OF LOWER EXTREMITY, UNSPECIFIED LATERALITY: Primary | ICD-10-CM

## 2019-12-27 PROCEDURE — 29581 APPL MULTLAYER CMPRN SYS LEG: CPT

## 2019-12-27 NOTE — PROGRESS NOTES
"Subjective:       Patient ID: Drew Farrar is a 54 y.o. male.    Chief Complaint: Wound Care      2/15/19: Readmitted for venous ulcer to left lateral foot which developed about 2 weeks ago. Pulses noted with doppler and capillary refill <3.Dr Gutierrez assessed patient and wound care plan ordered for compression therapy and hydrafera blue to wound bed. No apparent signs of infection noted. Patient to follw-up in clinic in 2 weeks.DB   2/21/19: Nurse visit for dressing change. Refused care to skin tear on left 2nd toe. See notes. RTC 1 week for DrRosy Visit.  3/1/19: Follow up with Dr. Waller today in clinic new wound to right posterior leg, culture of both wounds taken today in clinic new wound care orders to both legs for xeroform and unna with calamine toe to knee follow up in 1 week with Dr. Gutierrez  3/8/19: Follow up with Dr. Gutierrez today in clinic wounds improving, edema noted to BLE, profore toe to knee applied to both legs, RX given to patient for PO Doxycycline, follow up in 1 week.   3/15/19: Here for f/u with Dr. Gutierrez. U/S scheduled Thursday. Will need right leg rewrapped. Patient states eye DrRosy Gave him the same antibiotic dose and strength after eye surgery. Dr instructed patient to take one bottle of antibiotics for both wounds and eyes until finished. Gentamycin added to wound care orders.   3/29/19:  Wound slowly improving. No changes in wound care orders. Wound debrided per Dr Gutierrez.  4/4/19: Patient showed up to clinic today stated " I need my dressing changed. It fell off."  Doppler pluses checked and present.  Patient refusing care to right, no stocking present on leg at this visit. Patient stated " No they said this leg is discharged, Dr. Lantigua has to drain the fluid out this leg with a needle. No wound care to this leg anymore when I come."      9/9/19: Readmit to wound care clinic for venous ulcer to left lateral foot.  Patient states it reopened about 3 weeks after last discharge " "from wound care clinic 7/1/19. Per patient wound has been treated by PCP.  Patient reports just using a large band- aid and compression sock. Patient states he was recently treated at [facility] for HBOT for this wound " in the last two or three months after it reopened "  Requesting HBOT here at the wound care clinic.  Dr. Gutierrez examined patient today, doppler pulses present, wound debrided with curette by Dr. Gutierrez patient tolerated well. Wound care orders given to apply hydrorefa blue ready to wound, cover with mepore dressing and apply patient's compression sock. Dr. Manuel discussed with patient obtaining his medical records from [facility], to view to determine if patient will benefit from HBOT, patient verbalized understanding.  Authorization for release of health information sheet signed by patient and faxed to [facility] today in clinic. Follow up in 1 week with MD.  9/23/19: F/U with Dr. Gutierrez. Client recently discharged from hospital. Left foot site debrided. Client being followed by [facility].  12/9/19: Patient seen today in clinic by Dr. Gutierrez, no change to wound. Pt reports recently discharged from hospital 12/3/19 for irregular heat rate and chest pains. Debridement per Dr. Gutierrez tolerated well, wound care continued as ordered 4 layer compression toe to knee LLE.   F/u with Dr. Gutierrez , wound progressing well. Compression changed to coflex with calamine due to itching.  12/27/19:Dr Gutierrez assessed patient. Wound improving. Change in dressing change frequency. Follow up in 2 weeks.      Review of Systems    Objective:    Pt. Denies pain @ wound site.  Wound appears smaloler w/o s/s of infection.  No Sophia's sign; wound is well-granulated.  Physical Exam    Assessment:       1. Venous stasis ulcer of lower extremity, unspecified laterality           Wound 09/09/19 1057 Venous Ulcer lateral Foot #1 (Active)   09/09/19 1057    Pre-existing: Yes   Primary Wound Type: Venous ulcer "   Side: Left   Orientation: lateral   Location: Foot   Wound/PI Number (optional): #1   Ankle-Brachial Index:    Pulses:    Removal Indication and Assessment:    Wound Outcome:    (Retired) Wound Type:    (Retired) Wound Length (cm):    (Retired) Wound Width (cm):    (Retired) Depth (cm):    Wound Description (Comments):    Removal Indications:    Dressing Appearance Intact;Moist drainage 12/27/2019  4:00 PM   Drainage Amount Small 12/27/2019  4:00 PM   Drainage Characteristics/Odor Yellow;Serosanguineous 12/27/2019  4:00 PM   Appearance Red;Yellow 12/27/2019  4:00 PM   Tissue loss description Full thickness 12/27/2019  4:00 PM   Red (%), Wound Tissue Color 80 % 12/27/2019  4:00 PM   Yellow (%), Wound Tissue Color 20 % 12/27/2019  4:00 PM   Periwound Area Intact;Dry 12/27/2019  4:00 PM   Wound Edges Defined 12/27/2019  4:00 PM   Wound Length (cm) 0.8 cm 12/27/2019  4:00 PM   Wound Width (cm) 1.9 cm 12/27/2019  4:00 PM   Wound Depth (cm) 0.1 cm 12/27/2019  4:00 PM   Wound Volume (cm^3) 0.15 cm^3 12/27/2019  4:00 PM   Wound Surface Area (cm^2) 1.52 cm^2 12/27/2019  4:00 PM   Care Cleansed with:;Sterile normal saline 12/27/2019  4:00 PM   Dressing Changed;Calcium alginate;Cast padding;Compression wrap;Other (see comments) 12/27/2019  4:00 PM   Periwound Care Absorptive dressing applied 12/27/2019  4:00 PM   Compression Two layer compression 12/27/2019  4:00 PM   Off Loading Off loading shoe 12/27/2019  4:00 PM   Dressing Change Due 01/03/20 12/27/2019  4:00 PM   Left lateral Foot #1  Cleanse wound with: Acetic acid 0.25%, rinse with normal saline.  Lidocaine: Prn Debridement  Periwound care: Sween to dry skin Prn, betamethasone to LLE PRN. Calmoseptine to vick wound PRN.  Primary dressing:  Santyl, Hydrofera blue ready to wound  Secondary dressing: Aquacel foam  Offloading: Darco shoe left foot  Edema control: Co Flex with Calamine compression wrap toes to knee LLE  Tubigrip f x 2 right leg toes to knee  Frequency:   Fridays and prn    Follow-up: in 2 weeks with Dr. Gutierrez Monday 1/10/20    Other orders:Continue taking Cipro and doxycycline PO.     [facility]: phone# [phone], fax# [phone]. Wound care Fridays and prn. Orders as above. Next visit Dr. Gutierrez Monday 1/10/20.    Dr Gutierrez assessed patient. Wound improving. Change in dressing change frequency. Follow up in 2 weeks.         Plan:                Follow up in about 2 weeks (around 1/10/2020).

## 2020-01-10 ENCOUNTER — HOSPITAL ENCOUNTER (OUTPATIENT)
Dept: WOUND CARE | Facility: HOSPITAL | Age: 55
Discharge: HOME | End: 2020-01-10
Attending: EMERGENCY MEDICINE
Payer: MEDICARE

## 2020-01-10 DIAGNOSIS — I83.009 VENOUS STASIS ULCER OF LOWER EXTREMITY, UNSPECIFIED LATERALITY: Primary | ICD-10-CM

## 2020-01-10 DIAGNOSIS — L97.909 VENOUS STASIS ULCER OF LOWER EXTREMITY, UNSPECIFIED LATERALITY: Primary | ICD-10-CM

## 2020-01-10 PROCEDURE — 29581 APPL MULTLAYER CMPRN SYS LEG: CPT

## 2020-01-10 NOTE — PROGRESS NOTES
"Subjective:       Patient ID: Drew Farrar is a 54 y.o. male.    Chief Complaint: No chief complaint on file.      2/15/19: Readmitted for venous ulcer to left lateral foot which developed about 2 weeks ago. Pulses noted with doppler and capillary refill <3.Dr Gutierrez assessed patient and wound care plan ordered for compression therapy and hydrafera blue to wound bed. No apparent signs of infection noted. Patient to follw-up in clinic in 2 weeks.DB   2/21/19: Nurse visit for dressing change. Refused care to skin tear on left 2nd toe. See notes. RTC 1 week for DrRosy Visit.  3/1/19: Follow up with Dr. Waller today in clinic new wound to right posterior leg, culture of both wounds taken today in clinic new wound care orders to both legs for xeroform and unna with calamine toe to knee follow up in 1 week with Dr. Gutierrez  3/8/19: Follow up with Dr. Gutierrez today in clinic wounds improving, edema noted to BLE, profore toe to knee applied to both legs, RX given to patient for PO Doxycycline, follow up in 1 week.   3/15/19: Here for f/u with Dr. Gutierrez. U/S scheduled Thursday. Will need right leg rewrapped. Patient states eye DrRosy Gave him the same antibiotic dose and strength after eye surgery. Dr instructed patient to take one bottle of antibiotics for both wounds and eyes until finished. Gentamycin added to wound care orders.   3/29/19:  Wound slowly improving. No changes in wound care orders. Wound debrided per Dr Gutierrez.  4/4/19: Patient showed up to clinic today stated " I need my dressing changed. It fell off."  Doppler pluses checked and present.  Patient refusing care to right, no stocking present on leg at this visit. Patient stated " No they said this leg is discharged, Dr. Lantigua has to drain the fluid out this leg with a needle. No wound care to this leg anymore when I come."      9/9/19: Readmit to wound care clinic for venous ulcer to left lateral foot.  Patient states it reopened about 3 weeks " "after last discharge from wound care clinic 7/1/19. Per patient wound has been treated by PCP.  Patient reports just using a large band- aid and compression sock. Patient states he was recently treated at [facility] for HBOT for this wound " in the last two or three months after it reopened "  Requesting HBOT here at the wound care clinic.  Dr. Gutierrez examined patient today, doppler pulses present, wound debrided with curette by Dr. Gutierrez patient tolerated well. Wound care orders given to apply hydrorefa blue ready to wound, cover with mepore dressing and apply patient's compression sock. Dr. Manuel discussed with patient obtaining his medical records from [facility], to view to determine if patient will benefit from HBOT, patient verbalized understanding.  Authorization for release of health information sheet signed by patient and faxed to [facility] today in clinic. Follow up in 1 week with MD.  9/23/19: F/U with Dr. Gutierrez. Client recently discharged from hospital. Left foot site debrided. Client being followed by [facility].  12/9/19: Patient seen today in clinic by Dr. Gutierrez, no change to wound. Pt reports recently discharged from hospital 12/3/19 for irregular heat rate and chest pains. Debridement per Dr. Gutierrez tolerated well, wound care continued as ordered 4 layer compression toe to knee LLE.   F/u with Dr. Gutierrez , wound progressing well. Compression changed to coflex with calamine due to itching.  12/27/19:Dr Gutierrez assessed patient. Wound improving. Change in dressing change frequency. Follow up in 2 weeks.    1/10/2020: Patient seen today in clinic by  wound slowly improving. Calamine coflex toe to knee applied today in clinic. Fu 2 weeks.  Pt. Denies pain @ wound site.  Review of Systems    Objective:    Wound sl. Smaller per staff w/o Sophia's sign or s/s of infection, but some yellowish-brown fibrinous material remains in wound bed.  Physical Exam    Assessment:     "   No diagnosis found.       Wound 09/09/19 1057 Venous Ulcer lateral Foot #1 (Active)   09/09/19 1057    Pre-existing: Yes   Primary Wound Type: Venous ulcer   Side: Left   Orientation: lateral   Location: Foot   Wound/PI Number (optional): #1   Ankle-Brachial Index:    Pulses:    Removal Indication and Assessment:    Wound Outcome:    (Retired) Wound Type:    (Retired) Wound Length (cm):    (Retired) Wound Width (cm):    (Retired) Depth (cm):    Wound Description (Comments):    Removal Indications:    Wound Image   1/10/2020  1:26 PM   Dressing Appearance Intact;Moist drainage 1/10/2020  1:26 PM   Drainage Amount Small 1/10/2020  1:26 PM   Drainage Characteristics/Odor Serosanguineous 1/10/2020  1:26 PM   Appearance Red;Moist 1/10/2020  1:26 PM   Tissue loss description Full thickness 1/10/2020  1:26 PM   Red (%), Wound Tissue Color 100 % 1/10/2020  1:26 PM   Periwound Area Redness;Intact;Edematous 1/10/2020  1:26 PM   Wound Edges Defined 1/10/2020  1:26 PM   Wound Length (cm) 0.6 cm 1/10/2020  1:26 PM   Wound Width (cm) 2.5 cm 1/10/2020  1:26 PM   Wound Depth (cm) 0.1 cm 1/10/2020  1:26 PM   Wound Volume (cm^3) 0.15 cm^3 1/10/2020  1:26 PM   Wound Surface Area (cm^2) 1.5 cm^2 1/10/2020  1:26 PM   Care Cleansed with:;Antimicrobial agent;Sterile normal saline 1/10/2020  1:26 PM   Dressing Changed;Applied;Hydrofiber;Foam;Compression wrap 1/10/2020  1:26 PM   Periwound Care Absorptive dressing applied;Moisture barrier applied 1/10/2020  1:26 PM   Compression Two layer compression 1/10/2020  1:26 PM   Dressing Change Due 01/13/20 1/10/2020  1:26 PM       Left lateral Foot #1  Cleanse wound with: Acetic acid 0.25%, rinse with normal saline.  Lidocaine: Prn Debridement  Periwound care: Sween to dry skin Prn, betamethasone to LLE PRN. Calmoseptine to vick wound PRN.  Primary dressing:  Santyl, Hydrofera blue ready to wound  Secondary dressing: Aquacel foam  Offloading: Darco shoe left foot  Edema control: Co Flex with  Calamine compression wrap toes to knee LLE  Tubigrip f x 2 right leg toes to knee  Frequency:  Fridays and prn    Follow-up: in 2 weeks with Dr. Gutierrez Monday 1/24/2020    [facility]: phone# [phone], fax# [phone]. Wound care Fridays and prn. Orders as above. Next visit Dr. Gutierrez Monday 1/24/2020.    Plan:            1/24/2020 Dr. Gutierrez

## 2020-01-24 ENCOUNTER — HOSPITAL ENCOUNTER (OUTPATIENT)
Dept: WOUND CARE | Facility: HOSPITAL | Age: 55
Discharge: HOME | End: 2020-01-24
Attending: EMERGENCY MEDICINE
Payer: MEDICARE

## 2020-01-24 DIAGNOSIS — L97.929 VARICOSE ULCER OF LEFT LOWER EXTREMITY: ICD-10-CM

## 2020-01-24 DIAGNOSIS — I83.029 VARICOSE ULCER OF LEFT LOWER EXTREMITY: ICD-10-CM

## 2020-01-24 PROCEDURE — 11042 DBRDMT SUBQ TIS 1ST 20SQCM/<: CPT

## 2020-01-24 PROCEDURE — 27201912 HC WOUND CARE DEBRIDEMENT SUPPLIES

## 2020-01-24 NOTE — PROGRESS NOTES
"[facility]                Progress Note    Subjective:       Patient ID: Drew Farrar is a 54 y.o. male.    Chief Complaint: Wound Care    HPI  2/15/19: Readmitted for venous ulcer to left lateral foot which developed about 2 weeks ago. Pulses noted with doppler and capillary refill <3.Dr Gutierrez assessed patient and wound care plan ordered for compression therapy and hydrafera blue to wound bed. No apparent signs of infection noted. Patient to follw-up in clinic in 2 weeks.DB   2/21/19: Nurse visit for dressing change. Refused care to skin tear on left 2nd toe. See notes. RTC 1 week for  Visit.  3/1/19: Follow up with Dr. Waller today in clinic new wound to right posterior leg, culture of both wounds taken today in clinic new wound care orders to both legs for xeroform and unna with calamine toe to knee follow up in 1 week with Dr. Gutierrez  3/8/19: Follow up with Dr. Gutierrez today in clinic wounds improving, edema noted to BLE, profore toe to knee applied to both legs, RX given to patient for PO Doxycycline, follow up in 1 week.   3/15/19: Here for f/u with Dr. Gutierrez. U/S scheduled Thursday. Will need right leg rewrapped. Patient states eye DrRosy Gave him the same antibiotic dose and strength after eye surgery. Dr instructed patient to take one bottle of antibiotics for both wounds and eyes until finished. Gentamycin added to wound care orders.   3/29/19:  Wound slowly improving. No changes in wound care orders. Wound debrided per Dr Gutierrez.  4/4/19: Patient showed up to clinic today stated " I need my dressing changed. It fell off."  Doppler pluses checked and present.  Patient refusing care to right, no stocking present on leg at this visit. Patient stated " No they said this leg is discharged, Dr. Lantigua has to drain the fluid out this leg with a needle. No wound care to this leg anymore when I come."       9/9/19: Readmit to wound care clinic " "for venous ulcer to left lateral foot.  Patient states it reopened about 3 weeks after last discharge from wound care clinic 7/1/19. Per patient wound has been treated by PCP.  Patient reports just using a large band- aid and compression sock. Patient states he was recently treated at [facility] for HBOT for this wound " in the last two or three months after it reopened "  Requesting HBOT here at the wound care clinic.  Dr. Gutierrez examined patient today, doppler pulses present, wound debrided with curette by Dr. Gutierrez patient tolerated well. Wound care orders given to apply hydrorefa blue ready to wound, cover with mepore dressing and apply patient's compression sock. Dr. Manuel discussed with patient obtaining his medical records from [facility], to view to determine if patient will benefit from HBOT, patient verbalized understanding.  Authorization for release of health information sheet signed by patient and faxed to [facility] today in clinic. Follow up in 1 week with MD.  9/23/19: F/U with Dr. Gutierrez. Client recently discharged from hospital. Left foot site debrided. Client being followed by [facility].  12/9/19: Patient seen today in clinic by Dr. Gutierrez, no change to wound. Pt reports recently discharged from hospital 12/3/19 for irregular heat rate and chest pains. Debridement per Dr. Gutierrez tolerated well, wound care continued as ordered 4 layer compression toe to knee LLE.   F/u with Dr. Gutierrez , wound progressing well. Compression changed to coflex with calamine due to itching.  12/27/19:Dr Gutierrez assessed patient. Wound improving. Change in dressing change frequency. Follow up in 2 weeks.    1/10/2020: Patient seen today in clinic by  wound slowly improving. Calamine coflex toe to knee applied today in clinic. Fu 2 weeks.  Pt. Denies pain @ wound site.  01/24/20  F/U with Dr. Gutierrez.  Wound progressing well. New islands of  epithelial growth and bio randal noted " throughout wound.  Wound debrided per Dr. Gutierrez.  Past debridement measurements:  4.0 X 1.2 X 0.2.  Patient to continue santyl and hydrofera blue with coflex 2 layer compression wrap.  Continue homehealth and follow up with Dr. Gutierrez in 2 weeks.  Review of Systems    Pt. Denies pain @ wound site.  Objective:   Wound as noted above w/o Sophia's sign or s/s of infection.     Physical Exam    There were no vitals filed for this visit.    Assessment:         No diagnosis found.         Wound 09/09/19 1057 Venous Ulcer lateral Foot #1 (Active)   09/09/19 1057    Pre-existing: Yes   Primary Wound Type: Venous ulcer   Side: Left   Orientation: lateral   Location: Foot   Wound/PI Number (optional): #1   Ankle-Brachial Index:    Pulses:    Removal Indication and Assessment:    Wound Outcome:    (Retired) Wound Type:    (Retired) Wound Length (cm):    (Retired) Wound Width (cm):    (Retired) Depth (cm):    Wound Description (Comments):    Removal Indications:    Dressing Appearance Moist drainage 1/24/2020  1:32 PM   Drainage Amount Moderate 1/24/2020  1:32 PM   Drainage Characteristics/Odor Serosanguineous 1/24/2020  1:32 PM   Appearance Yellow;Red 1/24/2020  1:32 PM   Tissue loss description Full thickness 1/24/2020  1:32 PM   Black (%), Wound Tissue Color 0 % 1/24/2020  1:32 PM   Red (%), Wound Tissue Color 40 % 1/24/2020  1:32 PM   Yellow (%), Wound Tissue Color 60 % 1/24/2020  1:32 PM   Periwound Area Dry 1/24/2020  1:32 PM   Wound Length (cm) 4 cm 1/24/2020  1:32 PM   Wound Width (cm) 1 cm 1/24/2020  1:32 PM   Wound Depth (cm) 0.1 cm 1/24/2020  1:32 PM   Wound Volume (cm^3) 0.4 cm^3 1/24/2020  1:32 PM   Wound Surface Area (cm^2) 4 cm^2 1/24/2020  1:32 PM   Care Cleansed with: 1/24/2020  1:32 PM   Dressing Foam 1/24/2020  1:32 PM   Periwound Care Absorptive dressing applied;Moisturizer applied 1/24/2020  1:32 PM   Compression Two layer compression;Tubular elasticized bandage 1/24/2020  1:32 PM   Dressing Change Due  01/31/20 1/24/2020  1:32 PM       Left lateral Foot #1  Cleanse wound with: Acetic acid 0.25%, rinse with normal saline.  Lidocaine: Prn Debridement  Periwound care: Sween to dry skin Prn, betamethasone to LLE PRN. Calmoseptine to vick wound PRN.  Primary dressing:  Santyl, Hydrofera blue ready to wound  Secondary dressing: Aquacel foam  Offloading: Darco shoe left foot  Edema control: Co Flex with Calamine compression wrap toes to knee LLE  Tubigrip f x 2 right leg toes to knee  Frequency:  Fridays and prn    Follow-up: in 2 weeks with Dr. Gutierrez Monday 02/07//2020    [facility]: phone# [phone], fax# [phone]. Wound care Fridays and prn. Orders as above. Next visit Dr. Gutierrez Monday 1/24/2020    Plan:          To return to clinic in 2 weeks to f/u with Dr. Gutierrez 02/07/20  Wound curetted.

## 2020-01-27 ENCOUNTER — HOSPITAL ENCOUNTER (EMERGENCY)
Facility: HOSPITAL | Age: 55
Discharge: HOME | End: 2020-01-27
Attending: EMERGENCY MEDICINE
Payer: MEDICARE

## 2020-01-27 VITALS
TEMPERATURE: 98 F | RESPIRATION RATE: 20 BRPM | SYSTOLIC BLOOD PRESSURE: 137 MMHG | HEART RATE: 94 BPM | BODY MASS INDEX: 36.45 KG/M2 | OXYGEN SATURATION: 94 % | HEIGHT: 78 IN | DIASTOLIC BLOOD PRESSURE: 59 MMHG | WEIGHT: 315 LBS

## 2020-01-27 DIAGNOSIS — I87.2 VENOUS STASIS DERMATITIS OF RIGHT LOWER EXTREMITY: Primary | ICD-10-CM

## 2020-01-27 LAB
ALBUMIN SERPL BCP-MCNC: 3.6 G/DL (ref 3.5–5.2)
ALP SERPL-CCNC: 236 U/L (ref 55–135)
ALT SERPL W/O P-5'-P-CCNC: 20 U/L (ref 10–44)
ANION GAP SERPL CALC-SCNC: 8 MMOL/L (ref 8–16)
AST SERPL-CCNC: 26 U/L (ref 10–40)
BASOPHILS # BLD AUTO: 0.02 K/UL (ref 0–0.2)
BASOPHILS NFR BLD: 0.4 % (ref 0–1.9)
BILIRUB SERPL-MCNC: 0.4 MG/DL (ref 0.1–1)
BUN SERPL-MCNC: 25 MG/DL (ref 6–20)
CALCIUM SERPL-MCNC: 9.6 MG/DL (ref 8.7–10.5)
CHLORIDE SERPL-SCNC: 108 MMOL/L (ref 95–110)
CO2 SERPL-SCNC: 23 MMOL/L (ref 23–29)
CREAT SERPL-MCNC: 1.1 MG/DL (ref 0.5–1.4)
DIFFERENTIAL METHOD: ABNORMAL
EOSINOPHIL # BLD AUTO: 0.2 K/UL (ref 0–0.5)
EOSINOPHIL NFR BLD: 3.8 % (ref 0–8)
ERYTHROCYTE [DISTWIDTH] IN BLOOD BY AUTOMATED COUNT: 16.5 % (ref 11.5–14.5)
EST. GFR  (AFRICAN AMERICAN): >60 ML/MIN/1.73 M^2
EST. GFR  (NON AFRICAN AMERICAN): >60 ML/MIN/1.73 M^2
GLUCOSE SERPL-MCNC: 95 MG/DL (ref 70–110)
HCT VFR BLD AUTO: 30.6 % (ref 40–54)
HGB BLD-MCNC: 9.3 G/DL (ref 14–18)
LYMPHOCYTES # BLD AUTO: 1 K/UL (ref 1–4.8)
LYMPHOCYTES NFR BLD: 20.9 % (ref 18–48)
MCH RBC QN AUTO: 24.2 PG (ref 27–31)
MCHC RBC AUTO-ENTMCNC: 30.4 G/DL (ref 32–36)
MCV RBC AUTO: 80 FL (ref 82–98)
MONOCYTES # BLD AUTO: 0.3 K/UL (ref 0.3–1)
MONOCYTES NFR BLD: 5.5 % (ref 4–15)
NEUTROPHILS # BLD AUTO: 3.3 K/UL (ref 1.8–7.7)
NEUTROPHILS NFR BLD: 69.4 % (ref 38–73)
PLATELET # BLD AUTO: 241 K/UL (ref 150–350)
PMV BLD AUTO: 8.1 FL (ref 9.2–12.9)
POTASSIUM SERPL-SCNC: 4.5 MMOL/L (ref 3.5–5.1)
PROT SERPL-MCNC: 8.8 G/DL (ref 6–8.4)
RBC # BLD AUTO: 3.84 M/UL (ref 4.6–6.2)
SODIUM SERPL-SCNC: 139 MMOL/L (ref 136–145)
WBC # BLD AUTO: 4.7 K/UL (ref 3.9–12.7)

## 2020-01-27 PROCEDURE — 63600175 PHARM REV CODE 636 W HCPCS: Performed by: EMERGENCY MEDICINE

## 2020-01-27 PROCEDURE — 99284 EMERGENCY DEPT VISIT MOD MDM: CPT | Mod: 25

## 2020-01-27 PROCEDURE — 85025 COMPLETE CBC W/AUTO DIFF WBC: CPT

## 2020-01-27 PROCEDURE — 80053 COMPREHEN METABOLIC PANEL: CPT

## 2020-01-27 PROCEDURE — 96365 THER/PROPH/DIAG IV INF INIT: CPT

## 2020-01-27 RX ORDER — CEPHALEXIN 500 MG/1
500 CAPSULE ORAL EVERY 8 HOURS
Qty: 21 CAPSULE | Refills: 0 | Status: SHIPPED | OUTPATIENT
Start: 2020-01-27 | End: 2020-02-04

## 2020-01-27 RX ADMIN — CEFTRIAXONE 2 G: 2 INJECTION, SOLUTION INTRAVENOUS at 09:01

## 2020-01-27 NOTE — PROCEDURES
"Debridement  Date/Time: 1/24/2020 8:43 AM  Performed by: Pool Gutierrez MD  Authorized by: Pool Gutierrez MD     Time out: Immediately prior to procedure a "time out" was called to verify the correct patient, procedure, equipment, support staff and site/side marked as required.    Consent Done?:  Yes (Verbal)    Preparation: Patient was prepped and draped in usual sterile fashion    Local anesthesia used?: Yes    Local anesthetic:  Topical anesthetic    Wound Details:    Location:  Left leg    Type of Debridement:  Excisional       Length (cm):  4       Area (sq cm):  4       Width (cm):  1       Percent Debrided (%):  100       Depth (cm):  0.1       Total Area Debrided (sq cm):  4    Depth of debridement:  Subcutaneous tissue    Tissue debrided:  Subcutaneous, Epidermis and Dermis    Devitalized tissue debrided:  Biofilm, Fibrin and Slough    Instruments:  Curette    Bleeding:  Minimal  Patient tolerance:  Patient tolerated the procedure well with no immediate complications      "

## 2020-01-28 ENCOUNTER — PES CALL (OUTPATIENT)
Dept: ADMINISTRATIVE | Facility: CLINIC | Age: 55
End: 2020-01-28

## 2020-01-28 NOTE — PROVIDER PROGRESS NOTES - EMERGENCY DEPT.
Encounter Date: 1/27/2020    ED Physician Progress Notes        Physician Note:   54-year-old male presents emergency department complaining of right lateral lower leg pain.  States it feels like a knot. Was told by his primary care physician to come to the emergency department for evaluation.  No other symptoms reported.  Denies any injury.    Patient seen by me in triage due to ED overcrowding.  I have placed preliminary orders.  Patient's care will be transitioned to another provider when a bed in the emergency department is available.

## 2020-01-28 NOTE — ED PROVIDER NOTES
Encounter Date: 1/27/2020       History     Chief Complaint   Patient presents with    Leg Pain     pt reports right lower leg pain that started today. pt able to walk with a walker. pt denies taking any medication for the pain. pt denies imjury to leg     54-year-old male presents emergency department complaining of right lateral distal leg pain. States it feels like a knot is there. Has a history of venous stasis with ulceration.  Noted these symptoms earlier today.  States they have been persistent and aching, worse with movement or palpation without alleviating factors.  He called his primary care physician who told him to come to the ER for evaluation.  Denies any fever, chest pain, shortness of breath. No other symptoms reported at this time.        Review of patient's allergies indicates:   Allergen Reactions    Contrast media Other (See Comments)     Severe chest pain    Food allergy formula [glutamine-c-quercet-selen-brom]      Allergic to green peas; Heart failure.    Iodinated contrast media Other (See Comments)     Chest pain    Peas Hives    Ibuprofen Swelling    Latex, natural rubber Hives    Pcn [penicillins] Hives    Butisol [butabarbital] Rash     Peeling skin     Past Medical History:   Diagnosis Date    *Atrial fibrillation     Anticoagulant long-term use     Arthritis     Atrial fibrillation     Atrial fibrillation Feb 23, 2016    Bipolar disorder     CHF (congestive heart failure)     Congenital heart disease     s/p surgical intervention 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 of age    Deep vein thrombosis     DVT of leg (deep venous thrombosis)     left leg    History of prior ablation treatment     10/9/13    Hypertension     Obesity     Stroke     Thyroid disease     Venous stasis ulcer of lower extremity, unspecified laterality 12/14/2012    Venous ulcer      Past Surgical History:   Procedure Laterality Date    ANGIOPLASTY      CARDIAC SURGERY      open heart surgery 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 old     EYE SURGERY      left eye cataract/right eye glaucoma    TIMO FILTER PLACEMENT      Dr Calix ([facility])    KNEE SURGERY      l and r     MULTIPLE TOOTH EXTRACTIONS      SKIN GRAFT      left leg     Family History   Problem Relation Age of Onset    Diabetes Father     Heart disease Father     Heart disease Maternal Grandmother     Diabetes Maternal Grandfather     Heart disease Maternal Grandfather     Stroke Maternal Grandfather      Social History     Tobacco Use    Smoking status: Former Smoker     Packs/day: 1.00     Years: 6.00     Pack years: 6.00     Types: Cigarettes    Smokeless tobacco: Former User    Tobacco comment: quit by age 27yrs old   Substance Use Topics    Alcohol use: Yes     Alcohol/week: 1.0 standard drinks     Types: 1 Glasses of wine per week     Frequency: Monthly or less     Comment: occasionally    Drug use: No     Review of Systems   Constitutional: Negative for chills, fatigue and fever.   HENT: Negative for congestion, sore throat and voice change.    Eyes: Negative for photophobia, pain and redness.   Respiratory: Negative for cough, choking and shortness of breath.    Cardiovascular: Positive for leg swelling (Chronic). Negative for chest pain and palpitations.   Gastrointestinal: Negative for abdominal pain, diarrhea and vomiting.   Genitourinary: Negative for dysuria, frequency and urgency.   Musculoskeletal: Negative for back pain, neck pain and neck stiffness.   Neurological: Negative for light-headedness, numbness and headaches.   All other systems reviewed and are negative.      Physical Exam     Initial Vitals [01/27/20 2005]   BP Pulse Resp Temp SpO2   (!) 143/69 98 18 98.5 °F (36.9 °C) 96 %      MAP       --         Physical Exam    Nursing note and vitals reviewed.  Constitutional: He appears well-developed and well-nourished. No distress.   HENT:   Head: Normocephalic and atraumatic.   Eyes: Conjunctivae and EOM are normal.   Neck: Normal  range of motion. Neck supple. No tracheal deviation present.   Cardiovascular: Normal rate, regular rhythm, normal heart sounds and intact distal pulses.   Pulmonary/Chest: Breath sounds normal. No respiratory distress. He has no wheezes. He has no rhonchi. He has no rales.   Abdominal: Soft. Bowel sounds are normal. He exhibits no distension.   Musculoskeletal: Normal range of motion. He exhibits edema and tenderness.   3+ pitting edema to bilateral lower extremities, which have chronic skin changes consistent with venous stasis; 2 small tender areas with spontaneous drainage that are tender; Unclear if these are blisters v small draining abscesses   Neurological: He is alert and oriented to person, place, and time. He has normal strength. No cranial nerve deficit.   Skin: Skin is warm and dry.         ED Course   Procedures  Labs Reviewed   CBC W/ AUTO DIFFERENTIAL - Abnormal; Notable for the following components:       Result Value    RBC 3.84 (*)     Hemoglobin 9.3 (*)     Hematocrit 30.6 (*)     Mean Corpuscular Volume 80 (*)     Mean Corpuscular Hemoglobin 24.2 (*)     Mean Corpuscular Hemoglobin Conc 30.4 (*)     RDW 16.5 (*)     MPV 8.1 (*)     All other components within normal limits   COMPREHENSIVE METABOLIC PANEL - Abnormal; Notable for the following components:    BUN, Bld 25 (*)     Total Protein 8.8 (*)     Alkaline Phosphatase 236 (*)     All other components within normal limits          Imaging Results    None          Medical Decision Making:   Initial Assessment:   54-year-old male presents emergency department complaining of right lateral distal leg pain  Differential Diagnosis:   Blister versus abscess, venous stasis ulcer, venous stasis dermatitis  Clinical Tests:   Lab Tests: Reviewed       <> Summary of Lab: Consistent with patient's baseline  ED Management:  Patient given 2 g of Rocephin.  Informed of plan to discharge with prescription for Keflex t.i.d., prompt follow-up with primary care  physician, reasons to return to the emergency room.  Vital signs stable. Patient expressed good understanding and is comfortable with discharge at this time.                                 Clinical Impression:       ICD-10-CM ICD-9-CM   1. Venous stasis dermatitis of right lower extremity I87.2 454.1         Disposition:   Disposition: Discharged  Condition: Stable                     Brayden Kauffman MD  01/27/20 2159

## 2020-02-03 ENCOUNTER — TELEPHONE (OUTPATIENT)
Dept: CARDIOLOGY | Facility: CLINIC | Age: 55
End: 2020-02-03

## 2020-02-03 ENCOUNTER — OFFICE VISIT (OUTPATIENT)
Dept: FAMILY MEDICINE | Facility: HOSPITAL | Age: 55
End: 2020-02-03
Attending: FAMILY MEDICINE
Payer: MEDICARE

## 2020-02-03 VITALS
HEIGHT: 78 IN | SYSTOLIC BLOOD PRESSURE: 155 MMHG | BODY MASS INDEX: 36.45 KG/M2 | HEART RATE: 76 BPM | TEMPERATURE: 98 F | DIASTOLIC BLOOD PRESSURE: 77 MMHG | WEIGHT: 315 LBS

## 2020-02-03 DIAGNOSIS — I87.2 VENOUS INSUFFICIENCY OF LOWER EXTREMITY, UNSPECIFIED LATERALITY: Primary | ICD-10-CM

## 2020-02-03 DIAGNOSIS — I95.1 ORTHOSTATIC HYPOTENSION: ICD-10-CM

## 2020-02-03 PROCEDURE — 99214 OFFICE O/P EST MOD 30 MIN: CPT | Performed by: FAMILY MEDICINE

## 2020-02-03 NOTE — TELEPHONE ENCOUNTER
----- Message from Kylie Maciel MA sent at 1/30/2020  4:14 PM CST -----  Contact: [phone] patient  You better put in order for  Ultra-S.      I minute he states he has open wound  But not draing,      Then  He states that  (L) leg size has small like a pimple that's draining.    Then he states  Its like  A small Bump .    Better ouder U/S.    Nirmala  ----- Message -----  From: Gomez Lantigua MD  Sent: 1/29/2020  12:53 PM CST  To: Kylie Maciel MA    Please find out if he is having issues with wounds      In that we can check an US      Otherwise he had an echo + stress test      HEIKE  ----- Message -----  From: Kylie Maciel MA  Sent: 1/28/2020   4:07 PM CST  To: Gomez Lantigua MD    Dear, HEIKE    Has an appointment with you 2-12 at [facility] ,     what test would you like patient to take prior to seeing you.      kylie OMNTERO      ----- Message -----  From: Kaycee Santacruz  Sent: 1/27/2020  11:12 AM CST  To: Rhina GRAY Staff    Type:  Sooner Appointment Request     Caller is requesting a sooner appointment.  Caller declined first available appointment listed below.  Caller will not accept being placed on the waitlist and is requesting a message be sent to doctor.  Name of Caller: pt  When is the first available appointment? 4/3  Symptoms or Reason: right leg, swollen vein  Would the patient rather a call back or a response via [facility]? Call back  Best Call Back Number: [phone]  Additional Information: n/a

## 2020-02-03 NOTE — TELEPHONE ENCOUNTER
I called and left V/Message.  Please call [phone], and scheduled your      Ultra sound lower extremity.  Orders is in just schedule at your convenience,    Prior to seeing   2-.        Thank you.      Michelle Maciel (rita).

## 2020-02-03 NOTE — MEDICAL/APP STUDENT
Subjective:       Patient ID: Drew Farrar is a 54 y.o. male.    Chief Complaint: ER; Follow-up; Medication Refill; Medication Management (discuss Ferrous -pt would like liquid); and Dizziness    Patient is a 55 yo male with a PMHx of PMH of chronic Afib on Xarelto, HFpEF, hx of recurrent PE, h/o DVT with Sly filter, HTN, LLE Venous stasis ulcer, and hyperthyroidism presenting to the clinic for ED follow up and complaint of dizziness. Patient presented to ED on 1/27/2020 for a blister of the right ankle, he was treated with IV abx in the ED and sent home on 8 days of antibiotics. 2 days ago he experienced 2 episodes of dizziness while standing from a sitting position. Patient endorses dizziness, lightheadedness, and drainage from right ankle wound. Denies fevers, chills, SOB, CP, palpations, changes in vision, or headache. Compliant with all medications, is requesting a refill of xarelto.    Review of Systems   Constitutional: Negative for activity change, chills and fever.   HENT: Negative for congestion, rhinorrhea and sore throat.    Respiratory: Negative for chest tightness, shortness of breath and wheezing.    Cardiovascular: Negative for chest pain and palpitations.   Gastrointestinal: Negative for abdominal distention, abdominal pain, diarrhea, nausea and vomiting.   Genitourinary: Negative for difficulty urinating, dysuria and frequency.   Musculoskeletal: Negative for arthralgias and joint swelling.   Skin: Positive for color change. Negative for rash.        Swelling and edema to bilateral lower extremities-chronic; wound to lateral ankle.   Neurological: Positive for dizziness and light-headedness. Negative for weakness and numbness.   Psychiatric/Behavioral: Negative for agitation and behavioral problems. The patient is not nervous/anxious.        Objective:     Physical Exam   Constitutional: He is oriented to person, place, and time. He appears well-developed and well-nourished. No distress.    HENT:   Head: Normocephalic and atraumatic.   Eyes: Pupils are equal, round, and reactive to light. Conjunctivae and EOM are normal. Right eye exhibits no discharge. Left eye exhibits no discharge.   Neck: Normal range of motion. Neck supple. No JVD present.   Cardiovascular: Normal heart sounds and intact distal pulses.   No murmur heard.  Abnormal rate and rhythm.   Pulmonary/Chest: Effort normal and breath sounds normal. No respiratory distress. He has no wheezes.   Abdominal: Soft. Bowel sounds are normal. He exhibits no distension. There is no tenderness.   Musculoskeletal: Normal range of motion.   Bilateral LLE.   Neurological: He is alert and oriented to person, place, and time. He displays normal reflexes.   Skin: Skin is warm.     3+ pitting edema to bilateral lower extremities, which have chronic skin changes consistent with venous stasis; small tender area on right lateral malleolus with spontaneous drainage.   Psychiatric: He has a normal mood and affect. His behavior is normal. Judgment normal.       Assessment:    Patient is a 53 yo male presenting to clinic for ED f/u and complaint of dizziness.  Plan:       Orthostatic Hypotension vs. Vertigo  -Pending Orthostatic BP    Venous (peripheral) insufficiency  Venous stasis dermatitis of both lower extremities  -Followed by wound care clinic and has home health MWF to change dressings  -Also followed by Dr. Lantigua with possibility of EVLT to right let     History of DVT (deep vein thrombosis)  Long term (current) use of anticoagulants  -On Xarelto 20mg daily      Hyperthyroidism   -TSH <.001  -Methimazole 20mg daily   -Follow up with endocrine for more definitive treatment     Chronic atrial fibrillation  -Rate controlled with metoprolol and AC with Xarelto 20mg

## 2020-02-10 ENCOUNTER — HOSPITAL ENCOUNTER (OUTPATIENT)
Dept: WOUND CARE | Facility: HOSPITAL | Age: 55
Discharge: HOME | End: 2020-02-10
Attending: EMERGENCY MEDICINE
Payer: MEDICARE

## 2020-02-10 VITALS
DIASTOLIC BLOOD PRESSURE: 61 MMHG | SYSTOLIC BLOOD PRESSURE: 144 MMHG | BODY MASS INDEX: 36.45 KG/M2 | HEIGHT: 78 IN | HEART RATE: 104 BPM | WEIGHT: 315 LBS

## 2020-02-10 DIAGNOSIS — L97.909 VENOUS STASIS ULCER OF LOWER EXTREMITY, UNSPECIFIED LATERALITY: Primary | ICD-10-CM

## 2020-02-10 DIAGNOSIS — I83.009 VENOUS STASIS ULCER OF LOWER EXTREMITY, UNSPECIFIED LATERALITY: Primary | ICD-10-CM

## 2020-02-10 PROCEDURE — 29581 APPL MULTLAYER CMPRN SYS LEG: CPT

## 2020-02-10 NOTE — PROGRESS NOTES
"Subjective:       Patient ID: Drew Farrar is a 54 y.o. male.    Chief Complaint: Venous Stasis      2/15/19: Readmitted for venous ulcer to left lateral foot which developed about 2 weeks ago. Pulses noted with doppler and capillary refill <3.Dr Gutierrez assessed patient and wound care plan ordered for compression therapy and hydrafera blue to wound bed. No apparent signs of infection noted. Patient to follw-up in clinic in 2 weeks.DB   2/21/19: Nurse visit for dressing change. Refused care to skin tear on left 2nd toe. See notes. RTC 1 week for DrRosy Visit.  3/1/19: Follow up with Dr. Waller today in clinic new wound to right posterior leg, culture of both wounds taken today in clinic new wound care orders to both legs for xeroform and unna with calamine toe to knee follow up in 1 week with Dr. Gutierrez  3/8/19: Follow up with Dr. Gutierrez today in clinic wounds improving, edema noted to BLE, profore toe to knee applied to both legs, RX given to patient for PO Doxycycline, follow up in 1 week.   3/15/19: Here for f/u with Dr. Gutierrez. U/S scheduled Thursday. Will need right leg rewrapped. Patient states eye DrRosy Gave him the same antibiotic dose and strength after eye surgery. Dr instructed patient to take one bottle of antibiotics for both wounds and eyes until finished. Gentamycin added to wound care orders.   3/29/19:  Wound slowly improving. No changes in wound care orders. Wound debrided per Dr Gutierrez.  4/4/19: Patient showed up to clinic today stated " I need my dressing changed. It fell off."  Doppler pluses checked and present.  Patient refusing care to right, no stocking present on leg at this visit. Patient stated " No they said this leg is discharged, Dr. Lantigua has to drain the fluid out this leg with a needle. No wound care to this leg anymore when I come."      9/9/19: Readmit to wound care clinic for venous ulcer to left lateral foot.  Patient states it reopened about 3 weeks after last " "discharge from wound care clinic 7/1/19. Per patient wound has been treated by PCP.  Patient reports just using a large band- aid and compression sock. Patient states he was recently [place] at [place] HBOT for this wound " in the last two or three months after it reopened "  Requesting HBOT here at the wound care clinic.  Dr. Gutierrez examined patient today, doppler pulses present, wound debrided with curette by Dr. Gutierrez patient tolerated well. Wound care orders given to apply hydrorefa blue ready to wound, cover with mepore dressing and apply patient's compression sock. Dr. Manuel discussed with patient obtaining his medical records from [place], to view to determine if patient will benefit from HBOT, patient verbalized understanding.  Authorization for release of health information sheet signed by patient and faxed to [place] today in clinic. Follow up in 1 week with MD.  9/23/19: F/U with Dr. Gutierrez. Client recently discharged from hospital. Left foot site debrided. Client being followed by [facility].  12/9/19: Patient seen today in clinic by Dr. Gutierrez, no change to wound. Pt reports recently discharged from hospital 12/3/19 for irregular heat rate and chest pains. Debridement per Dr. Gutierrez tolerated well, wound care continued as ordered 4 layer compression toe to knee LLE.   F/u with Dr. Gutierrez , wound progressing well. Compression changed to coflex with calamine due to itching.  12/27/19:Dr Gutierrez assessed patient. Wound improving. Change in dressing change frequency. Follow up in 2 weeks.    1/10/2020: Patient seen today in clinic by  wound slowly improving. Calamine coflex toe to knee applied today in clinic. Fu 2 weeks.  2/10/2020: F/U WITH  today , pt had not been here in 10 days with dressing still in place. New wounds noted to right lower leg. Pt to have  u/s tomorrow of bilateral lower extremeties and to see  on Wednesday 2/12/2020 for " possible procedure of right leg per patient. Pt to return on Thursday to clinic for dressing change and to see DR. Sury Vaca (podiatrist) for toe nail clipping and to evaluate toes on right foot.  As above; no c/o pain noted.  Review of Systems    Objective:    Wounds as noted w/o Sophia's sign or s/s of infection.  Physical Exam    Assessment:       1. Venous stasis ulcer of lower extremity, unspecified laterality           Wound 09/09/19 1057 Venous Ulcer lateral Foot #1 (Active)   09/09/19 1057    Pre-existing: Yes   Primary Wound Type: Venous ulcer   Side: Left   Orientation: lateral   Location: Foot   Wound/PI Number (optional): #1   Ankle-Brachial Index:    Pulses:    Removal Indication and Assessment:    Wound Outcome:    (Retired) Wound Type:    (Retired) Wound Length (cm):    (Retired) Wound Width (cm):    (Retired) Depth (cm):    Wound Description (Comments):    Removal Indications:    Wound Image   2/10/2020  9:54 AM   Dressing Appearance Moist drainage 2/10/2020 11:30 AM   Drainage Amount Moderate 2/10/2020 11:30 AM   Drainage Characteristics/Odor Serosanguineous;Yellow 2/10/2020 11:30 AM   Appearance Red;Slough 2/10/2020 11:30 AM   Tissue loss description Partial thickness 2/10/2020 11:30 AM   Red (%), Wound Tissue Color 90 % 2/10/2020 11:30 AM   Yellow (%), Wound Tissue Color 10 % 2/10/2020 11:30 AM   Periwound Area Macerated;Excoriated 2/10/2020 11:30 AM   Wound Edges Irregular 2/10/2020 11:30 AM   Wound Length (cm) 4.4 cm 2/10/2020 11:30 AM   Wound Width (cm) 1.2 cm 2/10/2020 11:30 AM   Wound Depth (cm) 0.2 cm 2/10/2020 11:30 AM   Wound Volume (cm^3) 1.06 cm^3 2/10/2020 11:30 AM   Wound Surface Area (cm^2) 5.28 cm^2 2/10/2020 11:30 AM   Care Cleansed with:;Antimicrobial agent;Sterile normal saline 2/10/2020 11:30 AM   Dressing Applied 2/10/2020 11:30 AM   Periwound Care Skin barrier film applied 2/10/2020 11:30 AM   Compression Two layer compression 2/10/2020 11:30 AM   Dressing Change Due  02/13/20 2/10/2020 11:30 AM            Wound 02/10/20 Venous Ulcer Leg #2 (Active)   02/10/20     Pre-existing: No   Primary Wound Type: Venous ulcer   Side: Right   Orientation:    Location: Leg   Wound/PI Number (optional): #2   Ankle-Brachial Index:    Pulses:    Removal Indication and Assessment:    Wound Outcome:    (Retired) Wound Type:    (Retired) Wound Length (cm):    (Retired) Wound Width (cm):    (Retired) Depth (cm):    Wound Description (Comments):    Removal Indications:    Wound Image   2/10/2020 11:30 AM   Dressing Appearance Open to air 2/10/2020 11:30 AM   Drainage Amount Scant 2/10/2020 11:30 AM   Drainage Characteristics/Odor Yellow 2/10/2020 11:30 AM   Tissue loss description Partial thickness 2/10/2020 11:30 AM   Red (%), Wound Tissue Color 10 % 2/10/2020 11:30 AM   Yellow (%), Wound Tissue Color 90 % 2/10/2020 11:30 AM   Periwound Area Dry 2/10/2020 11:30 AM   Wound Edges Irregular 2/10/2020 11:30 AM   Wound Length (cm) 1.3 cm 2/10/2020 11:30 AM   Wound Width (cm) 1 cm 2/10/2020 11:30 AM   Wound Depth (cm) 0.2 cm 2/10/2020 11:30 AM   Wound Volume (cm^3) 0.26 cm^3 2/10/2020 11:30 AM   Wound Surface Area (cm^2) 1.3 cm^2 2/10/2020 11:30 AM   Care Cleansed with:;Sterile normal saline 2/10/2020 11:30 AM   Dressing Applied 2/10/2020 11:30 AM   Compression Tubular elasticized bandage 2/10/2020 11:30 AM   Dressing Change Due 02/13/20 2/10/2020 11:30 AM            Wound 02/10/20 Venous Ulcer distal Leg #3 (Active)   02/10/20     Pre-existing: No   Primary Wound Type: Venous ulcer   Side: Right   Orientation: distal   Location: Leg   Wound/PI Number (optional): #3   Ankle-Brachial Index:    Pulses:    Removal Indication and Assessment:    Wound Outcome:    (Retired) Wound Type:    (Retired) Wound Length (cm):    (Retired) Wound Width (cm):    (Retired) Depth (cm):    Wound Description (Comments):    Removal Indications:    Wound Image   2/10/2020 11:30 AM   Dressing Appearance Dried drainage 2/10/2020  11:30 AM   Drainage Amount Scant 2/10/2020 11:30 AM   Drainage Characteristics/Odor Clear 2/10/2020 11:30 AM   Tissue loss description Partial thickness 2/10/2020 11:30 AM   Red (%), Wound Tissue Color 100 % 2/10/2020 11:30 AM   Periwound Area Dry 2/10/2020 11:30 AM   Wound Edges Defined 2/10/2020 11:30 AM   Wound Length (cm) 0.3 cm 2/10/2020 11:30 AM   Wound Width (cm) 0.3 cm 2/10/2020 11:30 AM   Wound Depth (cm) 0.1 cm 2/10/2020 11:30 AM   Wound Volume (cm^3) 0.01 cm^3 2/10/2020 11:30 AM   Wound Surface Area (cm^2) 0.09 cm^2 2/10/2020 11:30 AM   Care Cleansed with:;Sterile normal saline 2/10/2020 11:30 AM   Dressing Applied 2/10/2020 11:30 AM   Compression Tubular elasticized bandage 2/10/2020 11:30 AM   Dressing Change Due 02/13/20 2/10/2020 11:30 AM   Left lateral Foot #1  Cleanse wound with: Acetic acid 0.25%, rinse with normal saline.  Lidocaine: Prn Debridement  Periwound care: Sween to dry skin Prn, betamethasone to LLE PRN. Calmoseptine to vick wound PRN.  Primary dressing:  Santyl, Hydrofera blue ready to wound  Secondary dressing: Aquacel foam  Offloading: Darco shoe left foot  Edema control: Co Flex with Calamine compression wrap toes to knee LLE  Frequency:  Thursday 2/13/2020 AND Monday 2/17/2020 in clinic      Right Lower leg 2  Cleanse wound with: Acetic acid 0.25%, rinse with normal saline.  Lidocaine: Prn Debridement  Periwound care: Sween to dry skin Prn, betamethasone to LLE PRN. Calmoseptine to vick wound PRN.  Primary dressing:  Santyl, Hydrofera blue ready to wound  Secondary dressing: cover with Mepore dressing    Edema control: Tubigrip size G single layer from toes to knee    Frequency: Thursday 2/13/2020, and Monday 2/17/2020 in clinic        Right  Lower leg #3  Cleanse wound with: Acetic acid 0.25%, rinse with normal saline.  Lidocaine: Prn Debridement  Periwound care: Sween to dry skin Prn, betamethasone to LLE PRN. Calmoseptine to vcik wound PRN.  Primary dressing:  Santyl, Hydrofera  blue ready to wound  Secondary dressing: secure with mepore dressing  Edema control: Tubigrip size G single layer from toes to knee  Frequency:  Thursday 2/13/2020 Monday 2/17/2020 in clinic    Follow-up: in 1 week with Dr. Gutierrez Monday 02/17/2020 and to see Dr. Sury Vaca ( podiatrist)  On Thursday 2/13/2020 for toe nail clipping and to evaluate toes on Right foot .   [facility] to perform wound care on weeks pt does not attend woundcare clinic        Plan:

## 2020-02-11 ENCOUNTER — TELEPHONE (OUTPATIENT)
Dept: CARDIOLOGY | Facility: CLINIC | Age: 55
End: 2020-02-11

## 2020-02-11 ENCOUNTER — HOSPITAL ENCOUNTER (EMERGENCY)
Facility: HOSPITAL | Age: 55
Discharge: HOME | End: 2020-02-11
Attending: EMERGENCY MEDICINE
Payer: MEDICARE

## 2020-02-11 VITALS
TEMPERATURE: 98 F | BODY MASS INDEX: 36.45 KG/M2 | HEART RATE: 101 BPM | RESPIRATION RATE: 20 BRPM | OXYGEN SATURATION: 96 % | DIASTOLIC BLOOD PRESSURE: 59 MMHG | WEIGHT: 315 LBS | HEIGHT: 78 IN | SYSTOLIC BLOOD PRESSURE: 120 MMHG

## 2020-02-11 DIAGNOSIS — R04.0 ACUTE ANTERIOR EPISTAXIS: Primary | ICD-10-CM

## 2020-02-11 PROCEDURE — 99283 EMERGENCY DEPT VISIT LOW MDM: CPT

## 2020-02-11 PROCEDURE — 25000003 PHARM REV CODE 250: Performed by: EMERGENCY MEDICINE

## 2020-02-11 RX ORDER — TRANEXAMIC ACID 100 MG/ML
1000 INJECTION, SOLUTION INTRAVENOUS ONCE
Status: DISCONTINUED | OUTPATIENT
Start: 2020-02-11 | End: 2020-02-11

## 2020-02-11 RX ORDER — OXYMETAZOLINE HCL 0.05 %
1 SPRAY, NON-AEROSOL (ML) NASAL
Status: DISCONTINUED | OUTPATIENT
Start: 2020-02-11 | End: 2020-02-11

## 2020-02-11 RX ORDER — OXYMETAZOLINE HCL 0.05 %
1 SPRAY, NON-AEROSOL (ML) NASAL
Status: COMPLETED | OUTPATIENT
Start: 2020-02-11 | End: 2020-02-11

## 2020-02-11 RX ADMIN — OXYMETAZOLINE HYDROCHLORIDE 1 SPRAY: 0.05 SPRAY NASAL at 03:02

## 2020-02-11 NOTE — ED NOTES
Pt presents to the ED via ems secondary to epistaxis. Pt reports bleeding began tonight around 2am. Pt denies any trauma/injury to nose and denies pain. Pt currently holding pressure to nose using a paper towel. Bleeding controlled at this time. Pt also reports blood is leaking into throat causing him to cough.

## 2020-02-11 NOTE — ED PROVIDER NOTES
Encounter Date: 2/11/2020       History     Chief Complaint   Patient presents with    Epistaxis     Pt arrives via  EMS for uncontrolled nosebleed that started at 0200. Pt states he is on blood thinners for A.Fib. Pt reports history of nosebleeds that have to be cauterized.     Drew Farrar is a 54 y.o. male who  has a past medical history of *Atrial fibrillation, Anticoagulant long-term use, Arthritis, Atrial fibrillation, Atrial fibrillation (Feb 23, 2016), Bipolar disorder, CHF (congestive heart failure), Congenital heart disease, Deep vein thrombosis, DVT of leg (deep venous thrombosis), History of prior ablation treatment, Hypertension, Obesity, Stroke, Thyroid disease, Venous stasis ulcer of lower extremity, unspecified laterality (12/14/2012), and Venous ulcer.    The patient presents to the ED due to epistaxis.   Patient reports symptoms started 2 AM. Patient reports he has uncontrolled bleeding from both nostrils. He reports he has been bleeding non stop and has tried to stuff his nose with cotton balls, however the blood began to fill them and began to drain down his throat. He reports he usually has posterior nose bleeds, the last one being last year. Patient is on blood thinners. Patient denies fever, chills, nausea, vomiting, or any other symptoms.    The history is provided by the patient.     Review of patient's allergies indicates:   Allergen Reactions    Contrast media Other (See Comments)     Severe chest pain    Food allergy formula [glutamine-c-quercet-selen-brom]      Allergic to green peas; Heart failure.    Iodinated contrast media Other (See Comments)     Chest pain    Peas Hives    Ibuprofen Swelling    Latex, natural rubber Hives    Pcn [penicillins] Hives    Butisol [butabarbital] Rash     Peeling skin     Past Medical History:   Diagnosis Date    *Atrial fibrillation     Anticoagulant long-term use     Arthritis     Atrial fibrillation     Atrial fibrillation Feb 23,  2016    Bipolar disorder     CHF (congestive heart failure)     Congenital heart disease     s/p surgical intervention 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 of age    Deep vein thrombosis     DVT of leg (deep venous thrombosis)     left leg    History of prior ablation treatment     10/9/13    Hypertension     Obesity     Stroke     Thyroid disease     Venous stasis ulcer of lower extremity, unspecified laterality 12/14/2012    Venous ulcer      Past Surgical History:   Procedure Laterality Date    ANGIOPLASTY      CARDIAC SURGERY      open heart surgery 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 old    EYE SURGERY      left eye cataract/right eye glaucoma    TIMO FILTER PLACEMENT      Dr Calix ([facility])    KNEE SURGERY      l and r     MULTIPLE TOOTH EXTRACTIONS      SKIN GRAFT      left leg     Family History   Problem Relation Age of Onset    Diabetes Father     Heart disease Father     Heart disease Maternal Grandmother     Diabetes Maternal Grandfather     Heart disease Maternal Grandfather     Stroke Maternal Grandfather      Social History     Tobacco Use    Smoking status: Former Smoker     Packs/day: 1.00     Years: 6.00     Pack years: 6.00     Types: Cigarettes    Smokeless tobacco: Former User    Tobacco comment: quit by age 27yrs old   Substance Use Topics    Alcohol use: Yes     Alcohol/week: 1.0 standard drinks     Types: 1 Glasses of wine per week     Frequency: Monthly or less     Comment: occasionally    Drug use: No     Review of Systems   Constitutional: Negative for chills and fever.   HENT: Positive for nosebleeds.    Respiratory: Negative for shortness of breath.    Cardiovascular: Negative for chest pain.   Gastrointestinal: Negative for nausea and vomiting.   All other systems reviewed and are negative.      Physical Exam     Initial Vitals [02/11/20 0307]   BP Pulse Resp Temp SpO2   (!) 120/59 101 20 98 °F (36.7 °C) 96 %      MAP       --         Physical Exam    Nursing note and vitals  reviewed.  Constitutional: He appears well-developed and well-nourished. He is not diaphoretic. No distress.   HENT:   Head: Normocephalic and atraumatic.   Active epistasis from bilateral nares, no obvious deficit, blood in posterior oropharynx   Eyes: Conjunctivae and EOM are normal.   Neck: Normal range of motion. Neck supple.   Cardiovascular: Normal rate, regular rhythm and normal heart sounds.   Pulmonary/Chest: Breath sounds normal. No respiratory distress.   Abdominal: Soft. There is no tenderness.   Large obese distended abdomen.   Musculoskeletal: Normal range of motion. He exhibits no edema or tenderness.   Neurological: He is alert and oriented to person, place, and time. He has normal strength.   Skin: Skin is warm and dry.   Chronic venus statsis noted bilaterally to LE.   Psychiatric: He has a normal mood and affect. His behavior is normal. Judgment and thought content normal.         ED Course   Procedures  Labs Reviewed - No data to display       Imaging Results    None          Medical Decision Making:   Initial Assessment:   54-year-old male history of hypertension, obesity, venous states she is ulcer, AFib on Xarelto presents sense to the ER for evaluation nose bleed.  Reports has a history of this, and last episode was over a year ago.  He reports he normally requires cauterization due to his nosebleed.  Reports sudden onset of bleeding at 2:00 a.m., tried anterior pressure, but still bleeding posteriorly.  Arrived in the ER, apply direct pressure, but patient still noted was bleeding in the posterior oropharynx.  Differential includes anterior versus posterior nasal bleed, hypercoagulable state, infection acute blood loss anemia versus other cause.  Will attempt direct pressure, will try nebulize TXA, Afrin spray, anterior versus posterior nasal packing and if no improvement transfer for ENT.  Clinical Tests:   Lab Tests: Ordered and Reviewed            Scribe Attestation:   Scribe #1: I  performed the above scribed service and the documentation accurately describes the services I performed. I attest to the accuracy of the note.    Attending Attestation:           Physician Attestation for Scribe:  Physician Attestation Statement for Scribe #1: I, Dr. Ho, reviewed documentation, as scribed by Janette Gay in my presence, and it is both accurate and complete.                 ED Course as of Feb 11 0512 Tue Feb 11, 2020   0334 4 x 4 nasal packing placement which patient reported improvement in bleeding.  Will continue to observe.    [SE]   0347 Resting in bed, no acute distress, no further episode of bleeding.  Discussed patient plan to discharge home, return precautions, epistaxis treatment, primary care follow-up.  Patient understood agreed plan, patient will be discharged.    [SE]      ED Course User Index  [SE] Roverto Ho MD                Clinical Impression:       ICD-10-CM ICD-9-CM   1. Acute anterior epistaxis R04.0 784.7         Disposition:   Disposition: Discharged  Condition: Stable                     Roverto Ho MD  02/11/20 0512

## 2020-02-11 NOTE — TELEPHONE ENCOUNTER
I spoke to patient ,and he wanted to rescheduled to later date.    Next available appointment scheduled and  Mailed to patient.                                 ----- Message from Kaycee Santacruz sent at 2/11/2020 12:24 PM CST -----  Contact: [phone]/self  Patient requesting to speak with you regarding changing his appt to next week. Please advise.

## 2020-02-13 ENCOUNTER — OFFICE VISIT (OUTPATIENT)
Dept: FAMILY MEDICINE | Facility: HOSPITAL | Age: 55
End: 2020-02-13
Attending: FAMILY MEDICINE
Payer: MEDICARE

## 2020-02-13 VITALS
HEIGHT: 78 IN | SYSTOLIC BLOOD PRESSURE: 142 MMHG | BODY MASS INDEX: 36.45 KG/M2 | DIASTOLIC BLOOD PRESSURE: 75 MMHG | WEIGHT: 315 LBS | HEART RATE: 75 BPM

## 2020-02-13 DIAGNOSIS — E05.90 HYPERTHYROIDISM: Chronic | ICD-10-CM

## 2020-02-13 DIAGNOSIS — I87.2 VENOUS STASIS DERMATITIS OF BOTH LOWER EXTREMITIES: ICD-10-CM

## 2020-02-13 DIAGNOSIS — I48.20 CHRONIC ATRIAL FIBRILLATION: Primary | ICD-10-CM

## 2020-02-13 DIAGNOSIS — R60.0 BILATERAL EDEMA OF LOWER EXTREMITY: ICD-10-CM

## 2020-02-13 DIAGNOSIS — E66.01 MORBID OBESITY: ICD-10-CM

## 2020-02-13 PROBLEM — Z01.818 PREOPERATIVE CLEARANCE: Status: RESOLVED | Noted: 2017-07-10 | Resolved: 2020-02-13

## 2020-02-13 PROBLEM — R07.9 CHEST PAIN: Status: RESOLVED | Noted: 2019-12-02 | Resolved: 2020-02-13

## 2020-02-13 PROCEDURE — 99214 OFFICE O/P EST MOD 30 MIN: CPT | Mod: 25 | Performed by: STUDENT IN AN ORGANIZED HEALTH CARE EDUCATION/TRAINING PROGRAM

## 2020-02-13 NOTE — PROGRESS NOTES
"Progress Note  [facility]    Subjective:      Drew Farrar is a 54 y.o. male  with PMH of chronic Afib on Xarelto, HFpEF, hx of recurrent PE, h/o DVT with [place] filter, HTN, LLE Venous stasis ulcer, and hyperthyroidism presenting to the clinic for follow-up.  This is a patient of Dr. Roach is with following up with me for the 1st time regarding the recent change does made in patient's medication.  Patient last saw Dr. Roach on 02/03/2020 where he presented with some dizziness and symptoms of orthostatic hypotension. Dr. Roach removed lisinopril from patient's medication list and wanted him to follow up within a week to see how he was tolerating this change.  Patient presents today asymptomatic and is no longer feeling dizzy.  Patient states that he is content with the change and is feeling fine.  Patient has no other complaints and has another appointment that he wants to make it in time for the which is his wound care clinic appointment.    Review of Systems   Constitutional: Negative.  Negative for chills and fever.   HENT: Negative.    Eyes: Negative.    Respiratory: Negative for cough and shortness of breath.    Cardiovascular: Negative.  Negative for chest pain and palpitations.   Gastrointestinal: Negative.  Negative for abdominal pain.   Genitourinary: Negative.    Musculoskeletal: Negative.    Skin: Negative.    Neurological: Negative.    Endo/Heme/Allergies: Negative.    Psychiatric/Behavioral: Negative.          Objective:   BP (!) 142/75   Pulse 75   Ht 6' 9" (2.057 m)   Wt (!) 162.5 kg (358 lb 4 oz)   BMI 38.39 kg/m²      Physical Exam   Constitutional: He is oriented to person, place, and time. He appears well-developed and well-nourished. No distress.   HENT:   Head: Normocephalic and atraumatic.   Eyes: Pupils are equal, round, and reactive to light. Conjunctivae and EOM are normal. Right eye exhibits no discharge. Left eye exhibits no discharge.   Neck: Normal range of " motion. Neck supple. No JVD present.   Cardiovascular: Irregularly irregular   Pulmonary/Chest: Effort normal and breath sounds normal. No respiratory distress. He has no wheezes.   Abdominal: Soft. Bowel sounds are normal. He exhibits no distension. There is no tenderness.   Musculoskeletal: Normal range of motion.   Bilateral LLE.   Neurological: He is alert and oriented to person, place, and time. He displays normal reflexes.   Skin: Skin is warm.   3+ pitting edema to bilateral lower extremities, which have chronic skin changes consistent with venous stasis; small tender area on right lateral malleolus with spontaneous drainage.   Psychiatric: He has a normal mood and affect. His behavior is normal. Judgment normal.     Assessment:   54 y.o. with Afib on Xarelto, HFpEF, hx of recurrent PE, h/o DVT with [place] filter, HTN, LLE Venous stasis ulcer, and hyperthyroidism here for follow-up.        1. Chronic atrial fibrillation    2. Hyperthyroidism    3. Morbid obesity    4. Bilateral edema of lower extremity    5. Venous stasis dermatitis of both lower extremities         Plan:   The patient saw Dr. Roach last week for dizziness and orthostatic hypotension and was taken off lisinopril and was advised to follow up in 1 week in our clinic.  Patient is tolerating this change just fine is happy with the correction. Reports no dizziness or any other symptoms.  Continue current management for other medical problems.  Patient is headed to wound care visit following this appointment.    The patient's diagnosis and medications were discussed.    I will review labs and notify patient with results either by mail or contact by phone.    02/13/2020  Sea Hardwick M.D., M.Sc.  [facility] PGY-1  Pager: [phone]  Ph: [phone]    *This note was dictated using the M*Modal Fluency Direct word recognition program. There are word rescognition mistakes that are occasionally missed on review.

## 2020-02-18 ENCOUNTER — HOSPITAL ENCOUNTER (OUTPATIENT)
Dept: RADIOLOGY | Facility: HOSPITAL | Age: 55
Discharge: HOME | End: 2020-02-18
Attending: INTERNAL MEDICINE
Payer: MEDICARE

## 2020-02-18 DIAGNOSIS — I87.2 VENOUS INSUFFICIENCY OF LOWER EXTREMITY, UNSPECIFIED LATERALITY: ICD-10-CM

## 2020-02-18 PROBLEM — I95.1 ORTHOSTATIC HYPOTENSION: Status: ACTIVE | Noted: 2020-02-18

## 2020-02-18 PROCEDURE — 93970 EXTREMITY STUDY: CPT | Mod: TC

## 2020-02-18 PROCEDURE — 93970 US LOWER EXTREMITY VEINS BILATERAL INSUFFICIENCY: ICD-10-PCS | Mod: 26,,, | Performed by: RADIOLOGY

## 2020-02-18 PROCEDURE — 93970 EXTREMITY STUDY: CPT | Mod: 26,,, | Performed by: RADIOLOGY

## 2020-02-18 NOTE — PROGRESS NOTES
"Subjective:       Patient ID: Drew Farrar is a 54 y.o. male.     Chief Complaint: Dizziness     Patient is a 55 yo male with a PMHx of PMH of chronic Afib on Xarelto, HFpEF, hx of recurrent PE, h/o DVT with [place] filter, HTN, LLE Venous stasis ulcer, and hyperthyroidism presenting to the clinic for ED follow up and complaint of dizziness. Patient presented to ED on 1/27/2020 for a blister of the right ankle, he was treated with IV abx in the ED and sent home on 8 days of po antibiotics. 2 days ago he experienced 2 episodes of dizziness while standing from a sitting position. Patient endorses dizziness, lightheadedness, and drainage from right ankle wound. Denies fevers, chills, SOB, CP, palpitations, changes in vision, or headache. Compliant with all medications, is requesting a refill of xarelto.     Review of Systems   Constitutional: Negative for activity change, chills and fever.   HENT: Negative for congestion, rhinorrhea and sore throat.    Respiratory: Negative for chest tightness, shortness of breath and wheezing.    Cardiovascular: Negative for chest pain and palpitations.   Gastrointestinal: Negative for abdominal distention, abdominal pain, diarrhea, nausea and vomiting.   Genitourinary: Negative for difficulty urinating, dysuria and frequency.   Musculoskeletal: Negative for arthralgias and joint swelling.   Skin: Positive for color change. Negative for rash.        Swelling and edema to bilateral lower extremities-chronic; wound to lateral ankle.   Neurological: Positive for dizziness and light-headedness. Negative for weakness and numbness.   Psychiatric/Behavioral: Negative for agitation and behavioral problems. The patient is not nervous/anxious.        Objective:      Physical Exam   BP (!) 155/77   Pulse 76   Temp 98.1 °F (36.7 °C) (Oral)   Ht 6' 8" (2.032 m)   Wt (!) 154.1 kg (339 lb 11.7 oz)   BMI 37.32 kg/m²      Repeat BP = 137/79 recumbent, 125/69 sitting, 121/73 standing " (dizzy)    Constitutional: He is oriented to person, place, and time. He appears well-developed and well-nourished. No distress.   HENT:   Head: Normocephalic and atraumatic.   Eyes: Pupils are equal, round, and reactive to light. Conjunctivae and EOM are normal. Right eye exhibits no discharge. Left eye exhibits no discharge.   Neck: Normal range of motion. Neck supple. No JVD present.   Cardiovascular: Normal heart sounds and intact distal pulses.   No murmur heard.  Abnormal rate and rhythm.   Pulmonary/Chest: Effort normal and breath sounds normal. No respiratory distress. He has no wheezes.   Abdominal: Soft. Bowel sounds are normal. He exhibits no distension. There is no tenderness.   Musculoskeletal: Normal range of motion.   Bilateral LLE.   Neurological: He is alert and oriented to person, place, and time. He displays normal reflexes.   Skin: Skin is warm.     3+ pitting edema to bilateral lower extremities, which have chronic skin changes consistent with venous stasis; small tender area on right lateral malleolus with spontaneous drainage.   Psychiatric: He has a normal mood and affect. His behavior is normal. Judgment normal.       Assessment:    Patient is a 53 yo male presenting to clinic for ED f/u and complaint of dizziness.  Plan:       Orthostatic Hypotension  -D/C lisinopril and RV 2 wks.     Venous (peripheral) insufficiency  Venous stasis dermatitis of both lower extremities  -Followed by wound care clinic and has home health MWF to change dressings  -Also followed by Dr. Lantigua with possibility of EVLT to right let     History of DVT (deep vein thrombosis)  Long term (current) use of anticoagulants  -On Xarelto 20mg daily      Hyperthyroidism   -TSH <.001  -Methimazole 20mg daily   -Follow up with endocrine for more definitive treatment     Chronic atrial fibrillation  -Rate controlled with metoprolol and AC with Xarelto 20mg

## 2020-02-20 DIAGNOSIS — I48.21 PERMANENT ATRIAL FIBRILLATION: ICD-10-CM

## 2020-02-20 DIAGNOSIS — I50.42 CHRONIC COMBINED SYSTOLIC AND DIASTOLIC CONGESTIVE HEART FAILURE: ICD-10-CM

## 2020-02-25 RX ORDER — METOPROLOL SUCCINATE 100 MG/1
100 TABLET, EXTENDED RELEASE ORAL DAILY
Qty: 30 TABLET | Refills: 5 | Status: ON HOLD | OUTPATIENT
Start: 2020-02-25 | End: 2020-08-23 | Stop reason: SDUPTHER

## 2020-02-27 ENCOUNTER — OFFICE VISIT (OUTPATIENT)
Dept: FAMILY MEDICINE | Facility: HOSPITAL | Age: 55
End: 2020-02-27
Attending: FAMILY MEDICINE
Payer: MEDICARE

## 2020-02-27 VITALS
DIASTOLIC BLOOD PRESSURE: 76 MMHG | WEIGHT: 315 LBS | HEART RATE: 96 BPM | HEIGHT: 78 IN | BODY MASS INDEX: 36.45 KG/M2 | SYSTOLIC BLOOD PRESSURE: 142 MMHG

## 2020-02-27 DIAGNOSIS — E05.90 HYPERTHYROIDISM: ICD-10-CM

## 2020-02-27 DIAGNOSIS — B35.4 TINEA CORPORIS: Primary | ICD-10-CM

## 2020-02-27 PROCEDURE — 99214 OFFICE O/P EST MOD 30 MIN: CPT | Performed by: STUDENT IN AN ORGANIZED HEALTH CARE EDUCATION/TRAINING PROGRAM

## 2020-02-27 RX ORDER — METHIMAZOLE 10 MG/1
20 TABLET ORAL DAILY
Qty: 60 TABLET | Refills: 12 | Status: ON HOLD | OUTPATIENT
Start: 2020-02-27 | End: 2020-08-22

## 2020-02-27 RX ORDER — FLUCONAZOLE 200 MG/1
200 TABLET ORAL WEEKLY
Qty: 4 TABLET | Refills: 0 | Status: SHIPPED | OUTPATIENT
Start: 2020-02-27 | End: 2020-03-28

## 2020-02-27 NOTE — PROGRESS NOTES
Subjective:       Patient ID: Drew Farrar is a 54 y.o. male.    Chief Complaint: Rash (buttocks)    HPI   54-year-old male with the past medical history of hypothyroidism, who presents to clinic today for rash on buttocks worsening over the last several weeks.  Patient states that the rash is pruritic and localized to the buttocks bilaterally and not relieved with any over-the-counter meds.  Patient has had a rash like this in the past.  Patient is otherwise compliant with his medications at home and has no other complaints today.     Review of Systems   Constitutional: Negative for activity change and appetite change.   HENT: Negative for trouble swallowing.    Eyes: Negative for visual disturbance.   Respiratory: Negative for shortness of breath.    Cardiovascular: Negative for chest pain and palpitations.   Gastrointestinal: Negative for abdominal distention, diarrhea, nausea and vomiting.   Endocrine: Negative for cold intolerance and heat intolerance.   Genitourinary: Negative for flank pain.   Musculoskeletal: Negative for arthralgias.   Skin: Positive for rash. Negative for color change.   Allergic/Immunologic: Negative for immunocompromised state.   Neurological: Negative for headaches.   Hematological: Negative for adenopathy.   Psychiatric/Behavioral: Negative for agitation.         Objective:      Vitals:    02/27/20 1459   BP: (!) 142/76   Pulse: 96     Physical Exam   Constitutional: He is oriented to person, place, and time. He appears well-developed and well-nourished.   HENT:   Head: Normocephalic and atraumatic.   Eyes: Pupils are equal, round, and reactive to light. EOM are normal.   Neck: Normal range of motion. Neck supple.   Cardiovascular: Normal rate, regular rhythm, normal heart sounds and intact distal pulses. Exam reveals no gallop and no friction rub.   No murmur heard.  Pulmonary/Chest: Effort normal and breath sounds normal. He has no wheezes. He has no rales. He exhibits no  tenderness.   Abdominal: Soft. Bowel sounds are normal. He exhibits no distension. There is no tenderness. There is no guarding.   Musculoskeletal: Normal range of motion.   Neurological: He is alert and oriented to person, place, and time.   Skin: Skin is warm and dry. Capillary refill takes less than 2 seconds.   Scaling erythematous rash with defined borders on the buttox bilaterally.   Psychiatric: He has a normal mood and affect.       Assessment:       1. Tinea corporis    2. Hyperthyroidism        Plan:       Tinea corporis  -     fluconazole (DIFLUCAN) 200 MG Tab; Take 1 tablet (200 mg total) by mouth once a week.  Dispense: 4 tablet; Refill: 0    Hyperthyroidism  -     methIMAzole (TAPAZOLE) 10 MG Tab; Take 2 tablets (20 mg total) by mouth once daily.  Dispense: 60 tablet; Refill: 12      Follow up if symptoms worsen or fail to improve.     Vladimir Su MD   [employer] [facility], PGY-2

## 2020-02-28 ENCOUNTER — HOSPITAL ENCOUNTER (OUTPATIENT)
Dept: WOUND CARE | Facility: HOSPITAL | Age: 55
Discharge: HOME | End: 2020-02-28
Attending: EMERGENCY MEDICINE
Payer: MEDICARE

## 2020-02-28 VITALS — DIASTOLIC BLOOD PRESSURE: 67 MMHG | SYSTOLIC BLOOD PRESSURE: 147 MMHG | HEART RATE: 92 BPM

## 2020-02-28 DIAGNOSIS — L97.929 VARICOSE ULCER OF LEFT LOWER EXTREMITY: ICD-10-CM

## 2020-02-28 DIAGNOSIS — I83.029 VARICOSE ULCER OF LEFT LOWER EXTREMITY: ICD-10-CM

## 2020-02-28 DIAGNOSIS — I83.009 VENOUS STASIS ULCER OF LOWER EXTREMITY, UNSPECIFIED LATERALITY: Primary | ICD-10-CM

## 2020-02-28 DIAGNOSIS — S91.309A WOUND OF FOOT: ICD-10-CM

## 2020-02-28 DIAGNOSIS — L97.909 VENOUS STASIS ULCER OF LOWER EXTREMITY, UNSPECIFIED LATERALITY: Primary | ICD-10-CM

## 2020-02-28 PROCEDURE — 27201912 HC WOUND CARE DEBRIDEMENT SUPPLIES

## 2020-02-28 PROCEDURE — 11042 DBRDMT SUBQ TIS 1ST 20SQCM/<: CPT

## 2020-02-28 PROCEDURE — 29581 APPL MULTLAYER CMPRN SYS LEG: CPT

## 2020-02-28 NOTE — PROGRESS NOTES
"Subjective:       Patient ID: Drew Farrar is a 54 y.o. male.    Chief Complaint: Venous Ulcer    2/15/19: Readmitted for venous ulcer to left lateral foot which developed about 2 weeks ago. Pulses noted with doppler and capillary refill <3.Dr Gutierrez assessed patient and wound care plan ordered for compression therapy and hydrafera blue to wound bed. No apparent signs of infection noted. Patient to follw-up in clinic in 2 weeks.DB   2/21/19: Nurse visit for dressing change. Refused care to skin tear on left 2nd toe. See notes. RTC 1 week for DrRosy Visit.  3/1/19: Follow up with Dr. Waller today in clinic new wound to right posterior leg, culture of both wounds taken today in clinic new wound care orders to both legs for xeroform and unna with calamine toe to knee follow up in 1 week with Dr. Gutierrez  3/8/19: Follow up with Dr. Gutierrez today in clinic wounds improving, edema noted to BLE, profore toe to knee applied to both legs, RX given to patient for PO Doxycycline, follow up in 1 week.   3/15/19: Here for f/u with Dr. Gutierrez. U/S scheduled Thursday. Will need right leg rewrapped. Patient states eye DrRosy Gave him the same antibiotic dose and strength after eye surgery. Dr instructed patient to take one bottle of antibiotics for both wounds and eyes until finished. Gentamycin added to wound care orders.   3/29/19:  Wound slowly improving. No changes in wound care orders. Wound debrided per Dr Gutierrez.  4/4/19: Patient showed up to clinic today stated " I need my dressing changed. It fell off."  Doppler pluses checked and present.  Patient refusing care to right, no stocking present on leg at this visit. Patient stated " No they said this leg is discharged, Dr. Lantigua has to drain the fluid out this leg with a needle. No wound care to this leg anymore when I come."      9/9/19: Readmit to wound care clinic for venous ulcer to left lateral foot.  Patient states it reopened about 3 weeks after last discharge " "from wound care clinic 7/1/19. Per patient wound has been treated by PCP.  Patient reports just using a large band- aid and compression sock. Patient states he was recently treated at [facility] for HBOT for this wound " in the last two or three months after it reopened "  Requesting HBOT here at the wound care clinic.  Dr. Gutierrez examined patient today, doppler pulses present, wound debrided with curette by Dr. Gutierrez patient tolerated well. Wound care orders given to apply hydrorefa blue ready to wound, cover with mepore dressing and apply patient's compression sock. Dr. Manuel discussed with patient obtaining his medical records from [facility], to view to determine if patient will benefit from HBOT, patient verbalized understanding.  Authorization for release of health information sheet signed by patient and faxed to [facility] today in clinic. Follow up in 1 week with MD.  9/23/19: F/U with Dr. Gutierrez. Client recently discharged from hospital. Left foot site debrided. Client being followed by [facility].  12/9/19: Patient seen today in clinic by Dr. Gutierrez, no change to wound. Pt reports recently discharged from hospital 12/3/19 for irregular heat rate and chest pains. Debridement per Dr. Gutierrez tolerated well, wound care continued as ordered 4 layer compression toe to knee LLE.   F/u with Dr. Gutierrez , wound progressing well. Compression changed to coflex with calamine due to itching.  12/27/19:Dr Gutierrez assessed patient. Wound improving. Change in dressing change frequency. Follow up in 2 weeks.    1/10/2020: Patient seen today in clinic by  wound slowly improving. Calamine coflex toe to knee applied today in clinic. Fu 2 weeks.  2/10/2020: F/U WITH  today , pt had not been here in 10 days with dressing still in place. New wounds noted to right lower leg. Pt to have  u/s tomorrow of bilateral lower extremeties and to see  on Wednesday 2/12/2020 for possible " procedure of right leg per patient. Pt to return on Thursday to clinic for dressing change and to see DR. Sury Vaca (podiatrist) for toe nail clipping and to evaluate toes on right foot.  As above; no c/o pain noted.  02/28/2020: F/u with Dr. Gutierrez. Wounds to RLE open to air. Patient states bandage came off while he was sleeping. Wound to left foot decreasing in size. Left foot wound debrided today in clinic. Continue with current wound care treatment and follow up in  Clinic in 1 week  Pt. Denies pain @ wound sites.  Review of Systems    Objective:    Wounds as above w/o Sophia's sign or s/s of infection, but some slough & biofilm have recurred in LLE wound bed.  Physical Exam    Assessment:       1. Venous stasis ulcer of lower extremity, unspecified laterality    2. Varicose ulcer of left lower extremity    3. Wound of foot           Wound 09/09/19 1057 Venous Ulcer lateral Foot #1 (Active)   09/09/19 1057    Pre-existing: Yes   Primary Wound Type: Venous ulcer   Side: Left   Orientation: lateral   Location: Foot   Wound/PI Number (optional): #1   Ankle-Brachial Index:    Pulses:    Removal Indication and Assessment:    Wound Outcome:    (Retired) Wound Type:    (Retired) Wound Length (cm):    (Retired) Wound Width (cm):    (Retired) Depth (cm):    Wound Description (Comments):    Removal Indications:    Dressing Appearance Intact 2/28/2020  2:00 PM   Drainage Amount Small 2/28/2020  2:00 PM   Drainage Characteristics/Odor Serosanguineous 2/28/2020  2:00 PM   Appearance Red;Moist;Yellow 2/28/2020  2:00 PM   Tissue loss description Full thickness 2/28/2020  2:00 PM   Red (%), Wound Tissue Color 75 % 2/28/2020  2:00 PM   Yellow (%), Wound Tissue Color 25 % 2/28/2020  2:00 PM   Periwound Area Pink;Dry 2/28/2020  2:00 PM   Wound Edges Irregular 2/28/2020  2:00 PM   Wound Length (cm) 1 cm 2/28/2020  2:00 PM   Wound Width (cm) 4.2 cm 2/28/2020  2:00 PM   Wound Depth (cm) 0.1 cm 2/28/2020  2:00 PM   Wound Volume  (cm^3) 0.42 cm^3 2/28/2020  2:00 PM   Wound Surface Area (cm^2) 4.2 cm^2 2/28/2020  2:00 PM   Care Cleansed with:;Sterile normal saline;Antimicrobial agent 2/28/2020  2:00 PM   Dressing Applied;Other (see comments);Compression wrap 2/28/2020  2:00 PM   Periwound Care Moisture barrier applied 2/28/2020  2:00 PM   Compression Two layer compression 2/28/2020  2:00 PM   Off Loading Off loading shoe 2/28/2020  2:00 PM   Dressing Change Due 03/02/20 2/28/2020  2:00 PM            Wound 02/10/20 Venous Ulcer Leg #2 (Active)   02/10/20     Pre-existing: No   Primary Wound Type: Venous ulcer   Side: Right   Orientation:    Location: Leg   Wound/PI Number (optional): #2   Ankle-Brachial Index:    Pulses:    Removal Indication and Assessment:    Wound Outcome:    (Retired) Wound Type:    (Retired) Wound Length (cm):    (Retired) Wound Width (cm):    (Retired) Depth (cm):    Wound Description (Comments):    Removal Indications:    Dressing Appearance Open to air 2/28/2020  4:00 PM   Drainage Amount Scant 2/28/2020  4:00 PM   Drainage Characteristics/Odor Serosanguineous 2/28/2020  4:00 PM   Appearance Red;Yellow;Dry 2/28/2020  4:00 PM   Tissue loss description Full thickness 2/28/2020  4:00 PM   Red (%), Wound Tissue Color 90 % 2/28/2020  4:00 PM   Yellow (%), Wound Tissue Color 10 % 2/28/2020  4:00 PM   Periwound Area Dry;Edematous 2/28/2020  4:00 PM   Wound Edges Irregular 2/28/2020  4:00 PM   Wound Length (cm) 0.9 cm 2/28/2020  4:00 PM   Wound Width (cm) 1.2 cm 2/28/2020  4:00 PM   Wound Depth (cm) 0.1 cm 2/28/2020  4:00 PM   Wound Volume (cm^3) 0.11 cm^3 2/28/2020  4:00 PM   Wound Surface Area (cm^2) 1.08 cm^2 2/28/2020  4:00 PM   Care Cleansed with:;Sterile normal saline;Antimicrobial agent 2/28/2020  4:00 PM   Dressing Applied;Other (see comments);Island/border;Tubular bandage 2/28/2020  4:00 PM   Periwound Care Skin barrier film applied 2/28/2020  4:00 PM   Compression Tubular elasticized bandage 2/28/2020  4:00 PM   Off  Loading Off loading shoe 2/28/2020  4:00 PM   Dressing Change Due 03/02/20 2/28/2020  4:00 PM            Wound 02/10/20 Venous Ulcer distal Leg #3 (Active)   02/10/20     Pre-existing: No   Primary Wound Type: Venous ulcer   Side: Right   Orientation: distal   Location: Leg   Wound/PI Number (optional): #3   Ankle-Brachial Index:    Pulses:    Removal Indication and Assessment:    Wound Outcome:    (Retired) Wound Type:    (Retired) Wound Length (cm):    (Retired) Wound Width (cm):    (Retired) Depth (cm):    Wound Description (Comments):    Removal Indications:    Dressing Appearance Open to air 2/28/2020  4:00 PM   Drainage Amount Scant 2/28/2020  4:00 PM   Drainage Characteristics/Odor Serous 2/28/2020  4:00 PM   Appearance Yellow;Dry 2/28/2020  4:00 PM   Tissue loss description Full thickness 2/28/2020  4:00 PM   Yellow (%), Wound Tissue Color 100 % 2/28/2020  4:00 PM   Periwound Area Dry;Edematous 2/28/2020  4:00 PM   Wound Edges Irregular 2/28/2020  4:00 PM   Wound Length (cm) 0.3 cm 2/28/2020  4:00 PM   Wound Width (cm) 0.5 cm 2/28/2020  4:00 PM   Wound Depth (cm) 0.2 cm 2/28/2020  4:00 PM   Wound Volume (cm^3) 0.03 cm^3 2/28/2020  4:00 PM   Wound Surface Area (cm^2) 0.15 cm^2 2/28/2020  4:00 PM   Care Cleansed with:;Antimicrobial agent;Sterile normal saline 2/28/2020  4:00 PM   Dressing Applied;Other (see comments);Island/border;Tubular bandage 2/28/2020  4:00 PM   Periwound Care Skin barrier film applied 2/28/2020  4:00 PM   Compression Tubular elasticized bandage 2/28/2020  4:00 PM   Off Loading Off loading shoe 2/28/2020  4:00 PM   Dressing Change Due 03/02/20 2/28/2020  4:00 PM       Left lateral Foot #1  Cleanse wound with: Acetic acid 0.25%, rinse with normal saline.  Lidocaine: Prn Debridement  Periwound care: Sween to dry skin Prn, betamethasone to LLE PRN. Calmoseptine to vick wound PRN.  Primary dressing:  Santyl, Hydrofera blue ready to wound  Secondary dressing: Aquacel foam  Offloading: Darco  shoe left foot  Edema control: Co Flex with Calamine compression wrap toes to knee LLE  Frequency:  Monday and Friday    Right Lower leg #2 & Right Lower leg #3  Cleanse wound with: Acetic acid 0.25%, rinse with normal saline.  Lidocaine: Prn Debridement  Periwound care: Sween to dry skin Prn, betamethasone to LLE PRN. Calmoseptine to vick wound PRN.  Primary dressing:  Santyl, Hydrofera blue ready to wound  Secondary dressing: cover with Mepore dressing  Offloading: Darco shoe left foot  Edema control: Tubigrip size G x2 from toes to knee  Frequency: Monday and Friday    Home health: [facility] to perform wound care on Mondays and prn. Orders routed to home health    Left lateral foot wound debrided in clinic today per Dr. Gutierrez      Plan:                Follow-up: in 1 week with Dr. Gutierrez on Friday 03/06/2020

## 2020-02-28 NOTE — PROCEDURES
"Debridement  Date/Time: 2/28/2020 5:38 PM  Performed by: Pool Gutierrez MD  Authorized by: Pool Gutierrez MD     Time out: Immediately prior to procedure a "time out" was called to verify the correct patient, procedure, equipment, support staff and site/side marked as required.    Consent Done?:  Yes (Verbal)    Preparation: Patient was prepped and draped in usual sterile fashion    Local anesthesia used?: Yes    Local anesthetic:  Topical anesthetic    Wound Details:    Location:  Left foot    Location:  Left Ankle    Type of Debridement:  Excisional       Length (cm):  1       Area (sq cm):  4.2       Width (cm):  4.2       Percent Debrided (%):  100       Depth (cm):  0.1       Total Area Debrided (sq cm):  4.2    Depth of debridement:  Subcutaneous tissue    Tissue debrided:  Subcutaneous and Dermis    Devitalized tissue debrided:  Biofilm, Fibrin and Slough    Instruments:  Curette    Bleeding:  Minimal  Patient tolerance:  Patient tolerated the procedure well with no immediate complications      "

## 2020-03-09 ENCOUNTER — LAB VISIT (OUTPATIENT)
Dept: LAB | Facility: HOSPITAL | Age: 55
End: 2020-03-09
Attending: INTERNAL MEDICINE
Payer: MEDICARE

## 2020-03-09 DIAGNOSIS — D63.8 ANEMIA OF CHRONIC DISEASE: ICD-10-CM

## 2020-03-09 DIAGNOSIS — D50.9 IRON DEFICIENCY ANEMIA, UNSPECIFIED IRON DEFICIENCY ANEMIA TYPE: ICD-10-CM

## 2020-03-09 LAB
ALBUMIN SERPL BCP-MCNC: 3.5 G/DL (ref 3.5–5.2)
ALP SERPL-CCNC: 182 U/L (ref 55–135)
ALT SERPL W/O P-5'-P-CCNC: 15 U/L (ref 10–44)
ANION GAP SERPL CALC-SCNC: 9 MMOL/L (ref 8–16)
AST SERPL-CCNC: 25 U/L (ref 10–40)
BASOPHILS # BLD AUTO: 0.04 K/UL (ref 0–0.2)
BASOPHILS NFR BLD: 1 % (ref 0–1.9)
BILIRUB SERPL-MCNC: 0.5 MG/DL (ref 0.1–1)
BUN SERPL-MCNC: 26 MG/DL (ref 6–20)
CALCIUM SERPL-MCNC: 9.8 MG/DL (ref 8.7–10.5)
CHLORIDE SERPL-SCNC: 110 MMOL/L (ref 95–110)
CO2 SERPL-SCNC: 25 MMOL/L (ref 23–29)
CREAT SERPL-MCNC: 1 MG/DL (ref 0.5–1.4)
DIFFERENTIAL METHOD: ABNORMAL
EOSINOPHIL # BLD AUTO: 0.2 K/UL (ref 0–0.5)
EOSINOPHIL NFR BLD: 4.7 % (ref 0–8)
ERYTHROCYTE [DISTWIDTH] IN BLOOD BY AUTOMATED COUNT: 15.6 % (ref 11.5–14.5)
EST. GFR  (AFRICAN AMERICAN): >60 ML/MIN/1.73 M^2
EST. GFR  (NON AFRICAN AMERICAN): >60 ML/MIN/1.73 M^2
FERRITIN SERPL-MCNC: 190 NG/ML (ref 20–300)
GLUCOSE SERPL-MCNC: 110 MG/DL (ref 70–110)
HCT VFR BLD AUTO: 35.1 % (ref 40–54)
HGB BLD-MCNC: 10.3 G/DL (ref 14–18)
IMM GRANULOCYTES # BLD AUTO: 0 K/UL (ref 0–0.04)
IMM GRANULOCYTES NFR BLD AUTO: 0 % (ref 0–0.5)
IRON SERPL-MCNC: 63 UG/DL (ref 45–160)
LYMPHOCYTES # BLD AUTO: 0.9 K/UL (ref 1–4.8)
LYMPHOCYTES NFR BLD: 22.2 % (ref 18–48)
MCH RBC QN AUTO: 24 PG (ref 27–31)
MCHC RBC AUTO-ENTMCNC: 29.3 G/DL (ref 32–36)
MCV RBC AUTO: 82 FL (ref 82–98)
MONOCYTES # BLD AUTO: 0.4 K/UL (ref 0.3–1)
MONOCYTES NFR BLD: 10.3 % (ref 4–15)
NEUTROPHILS # BLD AUTO: 2.5 K/UL (ref 1.8–7.7)
NEUTROPHILS NFR BLD: 61.8 % (ref 38–73)
NRBC BLD-RTO: 0 /100 WBC
PLATELET # BLD AUTO: 184 K/UL (ref 150–350)
PMV BLD AUTO: 9.9 FL (ref 9.2–12.9)
POTASSIUM SERPL-SCNC: 4.5 MMOL/L (ref 3.5–5.1)
PROT SERPL-MCNC: 8.2 G/DL (ref 6–8.4)
RBC # BLD AUTO: 4.29 M/UL (ref 4.6–6.2)
SATURATED IRON: 13 % (ref 20–50)
SODIUM SERPL-SCNC: 144 MMOL/L (ref 136–145)
TOTAL IRON BINDING CAPACITY: 478 UG/DL (ref 250–450)
TRANSFERRIN SERPL-MCNC: 323 MG/DL (ref 200–375)
WBC # BLD AUTO: 4.06 K/UL (ref 3.9–12.7)

## 2020-03-09 PROCEDURE — 82728 ASSAY OF FERRITIN: CPT

## 2020-03-09 PROCEDURE — 85025 COMPLETE CBC W/AUTO DIFF WBC: CPT

## 2020-03-09 PROCEDURE — 80053 COMPREHEN METABOLIC PANEL: CPT

## 2020-03-09 PROCEDURE — 36415 COLL VENOUS BLD VENIPUNCTURE: CPT

## 2020-03-09 PROCEDURE — 83540 ASSAY OF IRON: CPT

## 2020-03-10 ENCOUNTER — OFFICE VISIT (OUTPATIENT)
Dept: HEMATOLOGY/ONCOLOGY | Facility: CLINIC | Age: 55
End: 2020-03-10
Payer: MEDICARE

## 2020-03-10 VITALS
SYSTOLIC BLOOD PRESSURE: 139 MMHG | RESPIRATION RATE: 18 BRPM | WEIGHT: 315 LBS | OXYGEN SATURATION: 99 % | BODY MASS INDEX: 38.39 KG/M2 | TEMPERATURE: 98 F | HEART RATE: 114 BPM | DIASTOLIC BLOOD PRESSURE: 75 MMHG

## 2020-03-10 DIAGNOSIS — I83.029 VARICOSE ULCER OF LEFT LOWER EXTREMITY: ICD-10-CM

## 2020-03-10 DIAGNOSIS — I48.21 PERMANENT ATRIAL FIBRILLATION: ICD-10-CM

## 2020-03-10 DIAGNOSIS — I26.99 PULMONARY EMBOLISM, UNSPECIFIED CHRONICITY, UNSPECIFIED PULMONARY EMBOLISM TYPE, UNSPECIFIED WHETHER ACUTE COR PULMONALE PRESENT: ICD-10-CM

## 2020-03-10 DIAGNOSIS — D63.8 ANEMIA OF CHRONIC DISEASE: Primary | ICD-10-CM

## 2020-03-10 DIAGNOSIS — L97.929 VARICOSE ULCER OF LEFT LOWER EXTREMITY: ICD-10-CM

## 2020-03-10 PROCEDURE — 3075F SYST BP GE 130 - 139MM HG: CPT | Mod: CPTII,S$GLB,, | Performed by: INTERNAL MEDICINE

## 2020-03-10 PROCEDURE — 3078F DIAST BP <80 MM HG: CPT | Mod: CPTII,S$GLB,, | Performed by: INTERNAL MEDICINE

## 2020-03-10 PROCEDURE — 99213 OFFICE O/P EST LOW 20 MIN: CPT | Mod: S$GLB,,, | Performed by: INTERNAL MEDICINE

## 2020-03-10 PROCEDURE — 99213 PR OFFICE/OUTPT VISIT, EST, LEVL III, 20-29 MIN: ICD-10-PCS | Mod: S$GLB,,, | Performed by: INTERNAL MEDICINE

## 2020-03-10 PROCEDURE — 99999 PR PBB SHADOW E&M-EST. PATIENT-LVL III: ICD-10-PCS | Mod: PBBFAC,,, | Performed by: INTERNAL MEDICINE

## 2020-03-10 PROCEDURE — 99999 PR PBB SHADOW E&M-EST. PATIENT-LVL III: CPT | Mod: PBBFAC,,, | Performed by: INTERNAL MEDICINE

## 2020-03-10 PROCEDURE — 3008F PR BODY MASS INDEX (BMI) DOCUMENTED: ICD-10-PCS | Mod: CPTII,S$GLB,, | Performed by: INTERNAL MEDICINE

## 2020-03-10 PROCEDURE — 3008F BODY MASS INDEX DOCD: CPT | Mod: CPTII,S$GLB,, | Performed by: INTERNAL MEDICINE

## 2020-03-10 PROCEDURE — 3075F PR MOST RECENT SYSTOLIC BLOOD PRESS GE 130-139MM HG: ICD-10-PCS | Mod: CPTII,S$GLB,, | Performed by: INTERNAL MEDICINE

## 2020-03-10 PROCEDURE — 3078F PR MOST RECENT DIASTOLIC BLOOD PRESSURE < 80 MM HG: ICD-10-PCS | Mod: CPTII,S$GLB,, | Performed by: INTERNAL MEDICINE

## 2020-03-10 NOTE — PROGRESS NOTES
PATIENT: Drew Farrar  MRN: [id number]  DATE: 3/10/2020    Diagnosis:   1. Anemia of chronic disease    2. Pulmonary embolism, unspecified chronicity, unspecified pulmonary embolism type, unspecified whether acute cor pulmonale present    3. Permanent atrial fibrillation    4. Varicose ulcer of left lower extremity      Chief Complaint: Anemia    This is a 53-year-old male referred for evaluation of anemia and history of multiple DVTs and PEs.    He has a complex medical history.  Past medical history includes persistent atrial fibrillation, PDA status post surgical closure 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 of age, history of multiple PEs and DVTs, history of placement of IVC filter, congestive heart failure with severe mitral regurgitation, bipolar disorder and chronic venous stasis ulcers of lower extremities.    He has been on anticoagulation with Coumadin for several years and is currently on Xarelto 20 mg daily.    He was in the hospital for a week from September 10th until September 16, 2019 for gram-negative sepsis and recently completed a course of IV antibiotics as well as oral vancomycin.    09/11/2019-VQ  scan-large mismatched perfusion defect in the lingula, intermediate probability for pulmonary embolism    09/10/2019-ultrasound bilateral lower extremities show nonocclusive DVT seen within the bilateral femoral veins., for venous stasis ulcers of lower extremities, he has and the wound care and is seen by Dr. Pool Gutierrez.    He was also noted to have worsening anemia.    His baseline hemoglobin is between 9 and 11 grams/deciliter over the last several years.  After more recent hospital admission it has drifted down to 7.8 grams/deciliter    Currently taking oral irom QD - doesn't want IV iron    Subjective:     History of Present Illness:     Past Medical History:   Past Medical History:   Diagnosis Date    *Atrial fibrillation     Anticoagulant long-term use     Arthritis     Atrial fibrillation     Atrial  fibrillation Feb 23, 2016    Bipolar disorder     CHF (congestive heart failure)     Congenital heart disease     s/p surgical intervention 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 of age    Deep vein thrombosis     DVT of leg (deep venous thrombosis)     left leg    History of prior ablation treatment     10/9/13    Hypertension     Obesity     Stroke     Thyroid disease     Venous stasis ulcer of lower extremity, unspecified laterality 12/14/2012    Venous ulcer        Past Surgical History:   Past Surgical History:   Procedure Laterality Date    ANGIOPLASTY      CARDIAC SURGERY      open heart surgery 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 old    EYE SURGERY      left eye cataract/right eye glaucoma    TIMO FILTER PLACEMENT      Dr Calix [facility])    KNEE SURGERY      l and r     MULTIPLE TOOTH EXTRACTIONS      SKIN GRAFT      left leg       Family History:   Family History   Problem Relation Age of Onset    Diabetes Father     Heart disease Father     Heart disease Maternal Grandmother     Diabetes Maternal Grandfather     Heart disease Maternal Grandfather     Stroke Maternal Grandfather        Social History:  reports that he has quit smoking. His smoking use included cigarettes. He has a 6.00 pack-year smoking history. He has quit using smokeless tobacco. He reports that he drinks about 1.0 standard drinks of alcohol per week. He reports that he does not use drugs.    Allergies:  Review of patient's allergies indicates:   Allergen Reactions    Contrast media Other (See Comments)     Severe chest pain    Food allergy formula [glutamine-c-quercet-selen-brom]      Allergic to green peas; Heart failure.    Iodinated contrast media Other (See Comments)     Chest pain    Peas Hives    Ibuprofen Swelling    Latex, natural rubber Hives    Pcn [penicillins] Hives    Butisol [butabarbital] Rash     Peeling skin       Medications:  Current Outpatient Medications   Medication Sig Dispense Refill    acetaminophen (TYLENOL) 325  MG tablet Take 325 mg by mouth every 6 (six) hours as needed for Pain.      albuterol (VENTOLIN HFA) 90 mcg/actuation inhaler Inhale 2 puffs into the lungs every 6 (six) hours as needed for Wheezing. Rescue 18 g 0    ciclopirox (PENLAC) 8 % Soln Apply topically nightly as directed 6.6 mL 3    collagenase (SANTYL) ointment Apply to wound as directed topically two times a week 30 g 0    ferrous sulfate (FEOSOL) 325 mg (65 mg iron) Tab tablet Take 1 tablet (325 mg total) by mouth once daily. 30 tablet 3    fluconazole (DIFLUCAN) 200 MG Tab Take 1 tablet (200 mg total) by mouth once a week. 4 tablet 0    hydrOXYzine HCl (ATARAX) 25 MG tablet Take 1 tablet (25 mg total) by mouth 4 (four) times daily as needed for Itching. 20 tablet 0    methIMAzole (TAPAZOLE) 10 MG Tab Take 2 tablets (20 mg total) by mouth once daily. 60 tablet 12    metoprolol succinate (TOPROL-XL) 100 MG 24 hr tablet Take 1 tablet (100 mg total) by mouth once daily. 30 tablet 5    pantoprazole (PROTONIX) 40 MG tablet Take 1 tablet (40 mg total) by mouth once daily. 30 tablet 11    rivaroxaban (XARELTO) 20 mg Tab Take 1 tablet (20 mg total) by mouth daily with dinner or evening meal. 90 tablet 1    torsemide (DEMADEX) 20 MG Tab Take 2 tablets (40 mg total) by mouth once daily. 180 tablet 3     No current facility-administered medications for this visit.        Review of Systems   Constitutional: Positive for fatigue. Negative for fever and unexpected weight change.   HENT: Negative for mouth sores and nosebleeds.    Eyes: Negative for photophobia and pain.   Respiratory: Positive for shortness of breath. Negative for wheezing.    Cardiovascular: Positive for leg swelling. Negative for chest pain and palpitations.   Gastrointestinal: Negative for abdominal pain and vomiting.   Genitourinary: Negative for flank pain and hematuria.   Musculoskeletal: Positive for arthralgias and gait problem. Negative for back pain, neck pain and neck stiffness.    Skin: Negative for rash and wound.   Neurological: Negative for seizures and syncope.   Hematological: Negative for adenopathy. Does not bruise/bleed easily.   Psychiatric/Behavioral: Negative for behavioral problems and confusion.   All other systems reviewed and are negative.      Objective:     Vitals:    03/10/20 1524   BP: 139/75   Pulse: (!) 114   Resp: 18   Temp: 98 °F (36.7 °C)   SpO2: 99%   Weight: (!) 158.5 kg (349 lb 6.9 oz)     BMI: Body mass index is 38.39 kg/m².    Physical Exam   Constitutional: He is oriented to person, place, and time.  Non-toxic appearance. He does not have a sickly appearance. No distress.   HENT:   Nose: No epistaxis.   Mouth/Throat: Oropharynx is clear and moist. Mucous membranes are not pale, not dry and not cyanotic. No lacerations. No oropharyngeal exudate.   Eyes: Conjunctivae are normal. No scleral icterus.   Neck: Neck supple. No thyroid mass and no thyromegaly present.   Cardiovascular: Normal rate and S1 normal. An irregularly irregular rhythm present.   Murmur heard.   Systolic murmur is present.  Pulmonary/Chest: Effort normal and breath sounds normal. No accessory muscle usage or stridor. No respiratory distress. He has no wheezes. He has no rales.   Abdominal: Soft. He exhibits no ascites and no mass. There is no hepatosplenomegaly. There is no tenderness.   Musculoskeletal: He exhibits no edema.   Lymphadenopathy:        Head (right side): No submental and no submandibular adenopathy present.        Head (left side): No submental and no submandibular adenopathy present.     He has no cervical adenopathy.     He has no axillary adenopathy.   Neurological: He is alert and oriented to person, place, and time. He has normal strength. No cranial nerve deficit or sensory deficit. Gait normal.   Skin: No bruising, no petechiae and no rash noted. He is not diaphoretic. No cyanosis.        Psychiatric: He has a normal mood and affect. Judgment and thought content normal.  Cognition and memory are normal.   Vitals reviewed.      Assessment:       1. Anemia of chronic disease    2. Pulmonary embolism, unspecified chronicity, unspecified pulmonary embolism type, unspecified whether acute cor pulmonale present    3. Permanent atrial fibrillation    4. Varicose ulcer of left lower extremity      Plan:   Anemia of chronic disease and functional iron deficiency  -clinical picture consistent with chronic disease anemia  -iron studies better with oral Fe, now tolerating oral iron well, after he started taking it with food  -no need for IV iron  -cont oral iron QD  -cont f/u with Dr. Roach    History of multiple DVTs and PE status post IVC filter placement  -his risk factors for further DVTs and PEs are un modifiable  -risk factors include chronic venous stasis ulcers, May-Thurner syndrome obesity  -he already has a vena caval filter in place  -recommend indefinite anticoagulation  -he is tolerating Xarelto 20 mg daily well  -he tells me he had a hard time tolerating Lovenox and was having frequent nose bleeds and is now tolerating Xarelto better    Atrial fibrillation  -he is already on Xarelto  -follow-up with Cardiology, Dr. Lantigua    Venous stasis ulcers of lower extremities  -follow-up with wound care

## 2020-03-13 ENCOUNTER — HOSPITAL ENCOUNTER (OUTPATIENT)
Dept: WOUND CARE | Facility: HOSPITAL | Age: 55
Discharge: HOME | End: 2020-03-13
Attending: EMERGENCY MEDICINE
Payer: MEDICARE

## 2020-03-13 ENCOUNTER — EXTERNAL HOME HEALTH (OUTPATIENT)
Dept: HOME HEALTH SERVICES | Facility: HOSPITAL | Age: 55
End: 2020-03-13

## 2020-03-13 VITALS
BODY MASS INDEX: 36.45 KG/M2 | WEIGHT: 315 LBS | SYSTOLIC BLOOD PRESSURE: 144 MMHG | DIASTOLIC BLOOD PRESSURE: 76 MMHG | HEIGHT: 78 IN | HEART RATE: 86 BPM

## 2020-03-13 DIAGNOSIS — I83.009 VENOUS STASIS ULCER OF LOWER EXTREMITY, UNSPECIFIED LATERALITY: Primary | ICD-10-CM

## 2020-03-13 DIAGNOSIS — L97.909 VENOUS STASIS ULCER OF LOWER EXTREMITY, UNSPECIFIED LATERALITY: Primary | ICD-10-CM

## 2020-03-13 PROCEDURE — 29581 APPL MULTLAYER CMPRN SYS LEG: CPT | Performed by: NURSE PRACTITIONER

## 2020-03-13 NOTE — PROGRESS NOTES
60 day recert order # [id number]  New Cert episode: 03/15 - 05/13/2020 with [facility] - Dr. Pool Gutierrez

## 2020-03-13 NOTE — PROGRESS NOTES
"Subjective:       Patient ID: Drew Farrar is a 54 y.o. male.    Chief Complaint: Venous Stasis    2/15/19: Readmitted for venous ulcer to left lateral foot which developed about 2 weeks ago. Pulses noted with doppler and capillary refill <3.Dr Gutierrez assessed patient and wound care plan ordered for compression therapy and hydrafera blue to wound bed. No apparent signs of infection noted. Patient to follw-up in clinic in 2 weeks.DB   2/21/19: Nurse visit for dressing change. Refused care to skin tear on left 2nd toe. See notes. RTC 1 week for DrRsoy Visit.  3/1/19: Follow up with Dr. Waller today in clinic new wound to right posterior leg, culture of both wounds taken today in clinic new wound care orders to both legs for xeroform and unna with calamine toe to knee follow up in 1 week with Dr. Gutierrez  3/8/19: Follow up with Dr. Gutierrez today in clinic wounds improving, edema noted to BLE, profore toe to knee applied to both legs, RX given to patient for PO Doxycycline, follow up in 1 week.   3/15/19: Here for f/u with Dr. Gutierrez. U/S scheduled Thursday. Will need right leg rewrapped. Patient states eye DrRosy Gave him the same antibiotic dose and strength after eye surgery. Dr instructed patient to take one bottle of antibiotics for both wounds and eyes until finished. Gentamycin added to wound care orders.   3/29/19:  Wound slowly improving. No changes in wound care orders. Wound debrided per Dr Gutierrez.  4/4/19: Patient showed up to clinic today stated " I need my dressing changed. It fell off."  Doppler pluses checked and present.  Patient refusing care to right, no stocking present on leg at this visit. Patient stated " No they said this leg is discharged, Dr. Lantigua has to drain the fluid out this leg with a needle. No wound care to this leg anymore when I come."      9/9/19: Readmit to wound care clinic for venous ulcer to left lateral foot.  Patient states it reopened about 3 weeks after last discharge " "from wound care clinic 7/1/19. Per patient wound has been treated by PCP.  Patient reports just using a large band- aid and compression sock. Patient states he was recently treated at [facility] for HBOT for this wound " in the last two or three months after it reopened "  Requesting HBOT here at the wound care clinic.  Dr. Gutierrez examined patient today, doppler pulses present, wound debrided with curette by Dr. Gutierrez patient tolerated well. Wound care orders given to apply hydrorefa blue ready to wound, cover with mepore dressing and apply patient's compression sock. Dr. Manuel discussed with patient obtaining his medical records from [facility], to view to determine if patient will benefit from HBOT, patient verbalized understanding.  Authorization for release of health information sheet signed by patient and faxed to [facility] today in clinic. Follow up in 1 week with MD.  9/23/19: F/U with Dr. Gutierrez. Client recently discharged from hospital. Left foot site debrided. Client being followed by [facility].  12/9/19: Patient seen today in clinic by Dr. Gutierrez, no change to wound. Pt reports recently discharged from hospital 12/3/19 for irregular heat rate and chest pains. Debridement per Dr. Gutierrez tolerated well, wound care continued as ordered 4 layer compression toe to knee LLE.   F/u with Dr. Gutierrez , wound progressing well. Compression changed to coflex with calamine due to itching.  12/27/19:Dr Gutierrez assessed patient. Wound improving. Change in dressing change frequency. Follow up in 2 weeks.    1/10/2020: Patient seen today in clinic by  wound slowly improving. Calamine coflex toe to knee applied today in clinic. Fu 2 weeks.  2/10/2020: F/U WITH  today , pt had not been here in 10 days with dressing still in place. New wounds noted to right lower leg. Pt to have  u/s tomorrow of bilateral lower extremeties and to see  on Wednesday 2/12/2020 for possible " procedure of right leg per patient. Pt to return on Thursday to clinic for dressing change and to see DR. Sury Vaca (podiatrist) for toe nail clipping and to evaluate toes on right foot.  As above; no c/o pain noted.  02/28/2020: F/u with Dr. Gutierrez. Wounds to RLE open to air. Patient states bandage came off while he was sleeping. Wound to left foot decreasing in size. Left foot wound debrided today in clinic. Continue with current wound care treatment and follow up in  Clinic in 1 week    3/13/20: F/U with Dr. Gutierrez.  Wounds improving.  Will increase compression to LLE.  Wound appears smaller w/o Sophia's sign or s/s of infection.  Review of Systems  No c/o pain noted.  Objective:      Physical Exam    Assessment:       1. Venous stasis ulcer of lower extremity, unspecified laterality           Wound 09/09/19 1057 Venous Ulcer lateral Foot #1 (Active)   09/09/19 1057    Pre-existing: Yes   Primary Wound Type: Venous ulcer   Side: Left   Orientation: lateral   Location: Foot   Wound/PI Number (optional): #1   Ankle-Brachial Index:    Pulses:    Removal Indication and Assessment:    Wound Outcome:    (Retired) Wound Type:    (Retired) Wound Length (cm):    (Retired) Wound Width (cm):    (Retired) Depth (cm):    Wound Description (Comments):    Removal Indications:    Wound Image   3/13/2020  1:35 PM   Dressing Appearance Intact;Moist drainage 3/13/2020  1:35 PM   Drainage Amount Moderate 3/13/2020  1:35 PM   Drainage Characteristics/Odor Serosanguineous 3/13/2020  1:35 PM   Appearance Pink;Red;Yellow;Moist 3/13/2020  1:35 PM   Red (%), Wound Tissue Color 60 % 3/13/2020  1:35 PM   Yellow (%), Wound Tissue Color 40 % 3/13/2020  1:35 PM   Periwound Area Edematous;Redness;Excoriated;Hemosiderin Staining 3/13/2020  1:35 PM   Wound Edges Irregular 3/13/2020  1:35 PM   Wound Length (cm) 1 cm 3/13/2020  1:35 PM   Wound Width (cm) 4.3 cm 3/13/2020  1:35 PM   Wound Depth (cm) 0.1 cm 3/13/2020  1:35 PM   Wound Volume  (cm^3) 0.43 cm^3 3/13/2020  1:35 PM   Wound Surface Area (cm^2) 4.3 cm^2 3/13/2020  1:35 PM   Care Cleansed with:;Antimicrobial agent;Sterile normal saline 3/13/2020  1:35 PM   Dressing Changed;Collagen;Foam;Compression wrap 3/13/2020  1:35 PM   Periwound Care Skin barrier film applied 3/13/2020  1:35 PM   Compression Three layer compression 3/13/2020  1:35 PM   Dressing Change Due 03/16/20 3/13/2020  1:35 PM            Wound 02/10/20 Venous Ulcer Leg #2 (Active)   02/10/20     Pre-existing: No   Primary Wound Type: Venous ulcer   Side: Right   Orientation:    Location: Leg   Wound/PI Number (optional): #2   Ankle-Brachial Index:    Pulses:    Removal Indication and Assessment:    Wound Outcome:    (Retired) Wound Type:    (Retired) Wound Length (cm):    (Retired) Wound Width (cm):    (Retired) Depth (cm):    Wound Description (Comments):    Removal Indications:    Wound Image   3/13/2020  1:35 PM   Dressing Appearance Intact;Moist drainage 3/13/2020  1:35 PM   Drainage Amount Small 3/13/2020  1:35 PM   Drainage Characteristics/Odor Serosanguineous 3/13/2020  1:35 PM   Appearance Pink;Yellow;Moist 3/13/2020  1:35 PM   Red (%), Wound Tissue Color 50 % 3/13/2020  1:35 PM   Yellow (%), Wound Tissue Color 50 % 3/13/2020  1:35 PM   Periwound Area Edematous;Hemosiderin Staining 3/13/2020  1:35 PM   Wound Edges Defined 3/13/2020  1:35 PM   Wound Length (cm) 0.4 cm 3/13/2020  1:35 PM   Wound Width (cm) 1.1 cm 3/13/2020  1:35 PM   Wound Depth (cm) 0.1 cm 3/13/2020  1:35 PM   Wound Volume (cm^3) 0.04 cm^3 3/13/2020  1:35 PM   Wound Surface Area (cm^2) 0.44 cm^2 3/13/2020  1:35 PM   Care Cleansed with:;Sterile normal saline;Antimicrobial agent 3/13/2020  1:35 PM   Dressing Changed;Collagen;Island/border;Tubular bandage 3/13/2020  1:35 PM   Periwound Care Skin barrier film applied 3/13/2020  1:35 PM   Compression Tubular elasticized bandage 3/13/2020  1:35 PM   Dressing Change Due 03/16/20 3/13/2020  1:35 PM            Wound  02/10/20 Venous Ulcer distal Leg #3 (Active)   02/10/20     Pre-existing: No   Primary Wound Type: Venous ulcer   Side: Right   Orientation: distal   Location: Leg   Wound/PI Number (optional): #3   Ankle-Brachial Index:    Pulses:    Removal Indication and Assessment:    Wound Outcome:    (Retired) Wound Type:    (Retired) Wound Length (cm):    (Retired) Wound Width (cm):    (Retired) Depth (cm):    Wound Description (Comments):    Removal Indications:    Dressing Appearance Dry;Intact 3/13/2020  1:35 PM   Appearance Pink;Epithelialization 3/13/2020  1:35 PM   Periwound Area Intact 3/13/2020  1:35 PM   Wound Length (cm) 0.1 cm 3/13/2020  1:35 PM   Wound Width (cm) 0.1 cm 3/13/2020  1:35 PM   Wound Depth (cm) 0.1 cm 3/13/2020  1:35 PM   Wound Volume (cm^3) 0 cm^3 3/13/2020  1:35 PM   Wound Surface Area (cm^2) 0.01 cm^2 3/13/2020  1:35 PM   Care Cleansed with:;Antimicrobial agent;Sterile normal saline 3/13/2020  1:35 PM   Compression Tubular elasticized bandage 3/13/2020  1:35 PM   Dressing Change Due 03/16/20 3/13/2020  1:35 PM       Wounds cleaned with acetic acid and rinsed with normal saline.  Lidocaine applied to left lateral foot.  No debridement performed today.  Too painful.  To left lateral foot wound:  Applied Santyl to wound bed.  Covered with hydrofera ready and Aquacel plain foam.  3-layer Profore Lite compression wrap toes to knee.  To RLE: applied Santyl to wound bed.  Covered with hydrofera ready and mepore dressing.  Tubi  G x2 toes to knee.    Plan:                Follow up in about 2 weeks (around 3/27/2020) for Dr. Gutierrez.

## 2020-03-20 ENCOUNTER — TELEPHONE (OUTPATIENT)
Dept: CARDIOLOGY | Facility: CLINIC | Age: 55
End: 2020-03-20

## 2020-03-20 NOTE — TELEPHONE ENCOUNTER
I called the pt to reschedule his appt that is in [place] with Dr Roa on April 1, 2020 due to Covid 19 concern.    The pt did not answer, but I left a detailed voice message and call back number. I also state since he is active on the pt portal he can schedule a virtual visit with the doctor. I informed him he needs to have a smart device with a web cam and can reach out to the technical support number [phone] if he needs help with his username and password.    I left a call back number as well if he has any questions.    ND

## 2020-03-20 NOTE — PROGRESS NOTES
I assume primary medical responsibility for this patient. I have reviewed the history, physical, and assessement & treatment plan with the resident and agree that the care is reasonable and necessary. This service has been performed by a resident without the presence of a teaching physician under the primary care exception. If necessary, an addendum of additional findings or evaluation beyond the resident documentation will be noted below.    Dayami Saunders MD

## 2020-03-23 ENCOUNTER — HOSPITAL ENCOUNTER (EMERGENCY)
Facility: HOSPITAL | Age: 55
Discharge: HOME | End: 2020-03-23
Attending: EMERGENCY MEDICINE
Payer: MEDICARE

## 2020-03-23 VITALS
HEART RATE: 95 BPM | TEMPERATURE: 99 F | OXYGEN SATURATION: 97 % | DIASTOLIC BLOOD PRESSURE: 74 MMHG | RESPIRATION RATE: 20 BRPM | SYSTOLIC BLOOD PRESSURE: 123 MMHG

## 2020-03-23 DIAGNOSIS — R60.0 BILATERAL EDEMA OF LOWER EXTREMITY: Primary | ICD-10-CM

## 2020-03-23 PROCEDURE — 96372 THER/PROPH/DIAG INJ SC/IM: CPT

## 2020-03-23 PROCEDURE — 63600175 PHARM REV CODE 636 W HCPCS: Performed by: EMERGENCY MEDICINE

## 2020-03-23 PROCEDURE — 99284 EMERGENCY DEPT VISIT MOD MDM: CPT | Mod: 25

## 2020-03-23 RX ORDER — MORPHINE SULFATE 4 MG/ML
4 INJECTION, SOLUTION INTRAMUSCULAR; INTRAVENOUS
Status: COMPLETED | OUTPATIENT
Start: 2020-03-23 | End: 2020-03-23

## 2020-03-23 RX ORDER — ONDANSETRON 2 MG/ML
4 INJECTION INTRAMUSCULAR; INTRAVENOUS
Status: COMPLETED | OUTPATIENT
Start: 2020-03-23 | End: 2020-03-23

## 2020-03-23 RX ADMIN — MORPHINE SULFATE 4 MG: 4 INJECTION INTRAVENOUS at 09:03

## 2020-03-23 RX ADMIN — ONDANSETRON 4 MG: 2 INJECTION INTRAMUSCULAR; INTRAVENOUS at 09:03

## 2020-03-24 NOTE — ED PROVIDER NOTES
Encounter Date: 3/23/2020    SCRIBE #1 NOTE: I, Janette Gay, am scribing for, and in the presence of,  Dr. Lefort. I have scribed the entire note.       History     Chief Complaint   Patient presents with    Cellulitis     pt reports increasing pain/swelling to R ankle and discoloration. Pt receiving wound care to L foot for wound. Pt unable to get in touch with PCP for further instruction.        Time seen by provider: 8:57 PM on 03/23/2020    The patient is a 54 y.o. male who presents to the ED with complaint of cellulitis to the right leg which onset gradually. Symptoms are worsening in severity. Mitigating or exacerbating factors reported with standing and applied pressure. Associated sxs include sensitivity, leg swelling, heat to his leg, skin color change, and a wound with active drainage to the right ankle. Patient denies any fever, and all other sxs at this time. Prior Tx includes antibiotics prescribed in February.      has a past medical history of *Atrial fibrillation, Anticoagulant long-term use, Arthritis, Atrial fibrillation, Atrial fibrillation (Feb 23, 2016), Bipolar disorder, CHF (congestive heart failure), Congenital heart disease, Deep vein thrombosis, DVT of leg (deep venous thrombosis), History of prior ablation treatment, Hypertension, Obesity, Stroke, Thyroid disease, Venous stasis ulcer of lower extremity, unspecified laterality (12/14/2012), and Venous ulcer.  has a past surgical history that includes Knee surgery; Skin graft; Eye surgery; Multiple tooth extractions; Cardiac surgery; irwin filter placement; and Angioplasty.    The history is provided by the patient.     Review of patient's allergies indicates:   Allergen Reactions    Contrast media Other (See Comments)     Severe chest pain    Food allergy formula [glutamine-[id number]-selen-brom]      Allergic to green peas; Heart failure.    Iodinated contrast media Other (See Comments)     Chest pain    Peas Hives     Ibuprofen Swelling    Latex, natural rubber Hives    Pcn [penicillins] Hives    Butisol [butabarbital] Rash     Peeling skin     Past Medical History:   Diagnosis Date    *Atrial fibrillation     Anticoagulant long-term use     Arthritis     Atrial fibrillation     Atrial fibrillation Feb 23, 2016    Bipolar disorder     CHF (congestive heart failure)     Congenital heart disease     s/p surgical intervention 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 of age    Deep vein thrombosis     DVT of leg (deep venous thrombosis)     left leg    History of prior ablation treatment     10/9/13    Hypertension     Obesity     Stroke     Thyroid disease     Venous stasis ulcer of lower extremity, unspecified laterality 12/14/2012    Venous ulcer      Past Surgical History:   Procedure Laterality Date    ANGIOPLASTY      CARDIAC SURGERY      open heart surgery 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 old    EYE SURGERY      left eye cataract/right eye glaucoma    TIMO FILTER PLACEMENT      Dr [facility]ix ([facility]    KNEE SURGERY      l and r     MULTIPLE TOOTH EXTRACTIONS      SKIN GRAFT      left leg     Family History   Problem Relation Age of Onset    Diabetes Father     Heart disease Father     Heart disease Maternal Grandmother     Diabetes Maternal Grandfather     Heart disease Maternal Grandfather     Stroke Maternal Grandfather      Social History     Tobacco Use    Smoking status: Former Smoker     Packs/day: 1.00     Years: 6.00     Pack years: 6.00     Types: Cigarettes    Smokeless tobacco: Former User    Tobacco comment: quit by age 27yrs old   Substance Use Topics    Alcohol use: Yes     Alcohol/week: 1.0 standard drinks     Types: 1 Glasses of wine per week     Frequency: Monthly or less     Comment: occasionally    Drug use: No     Review of Systems   Constitutional: Negative for chills and fever.   HENT: Negative for sore throat.    Eyes: Negative for redness.   Respiratory: Negative for shortness of breath.     Cardiovascular: Positive for leg swelling. Negative for chest pain.   Gastrointestinal: Negative for abdominal pain, diarrhea, nausea and vomiting.   Genitourinary: Negative for dysuria and hematuria.   Musculoskeletal: Negative for back pain.   Skin: Positive for color change and wound. Negative for rash.   Neurological: Negative for headaches.       Physical Exam     Initial Vitals [03/23/20 2040]   BP Pulse Resp Temp SpO2   (!) 140/75 98 20 98.5 °F (36.9 °C) 95 %      MAP       --         Physical Exam    Nursing note and vitals reviewed.  Constitutional: He appears well-developed and well-nourished. He is not diaphoretic. No distress.   HENT:   Head: Normocephalic and atraumatic.   Mouth/Throat: Oropharynx is clear and moist.   Eyes: Conjunctivae and EOM are normal.   Neck: Normal range of motion. Neck supple.   Cardiovascular: Normal rate, regular rhythm and normal heart sounds.   Pulmonary/Chest: Breath sounds normal. No respiratory distress.   Abdominal: Soft. There is no tenderness.   Musculoskeletal: Normal range of motion. He exhibits edema and tenderness.   3+ pitting edema to bilateral lower extremities, which have chronic skin changes consistent with venous stasis; 1 small tender area with spontaneous drainage that is tender   Neurological: He is alert and oriented to person, place, and time. He has normal strength.   Skin: Skin is warm and dry.         ED Course   Procedures  Labs Reviewed - No data to display       Imaging Results    None          Medical Decision Making:   Initial Assessment:   Past medical records queried and reviewed  54 year old male presents to the ED complaining of cellulitis to the right LE. The patient was seen and examined. The history and physical exam was obtained. The nursing notes and vital signs were reviewed.   Differential Diagnosis:   DDx include, but are not limited to, blister versus abscess, venous stasis ulcer, venous stasis dermatitis.    Patient will be  administered morphine injection and ondansetron injection.  Patient will be monitored here.  ED Management:  Exam not cw infection.  Anticoagulated.  No distress.  Serous drainage from chronic wound.  Discussed need for continued specialty follow up, pharmacological and nonpharmacological management indications for ED return                                  Clinical Impression:       ICD-10-CM ICD-9-CM   1. Bilateral edema of lower extremity R60.0 782.3         Disposition:   Disposition: Discharged  Condition: Stable  The patient was discharged in stable condition. Explicit return instructions were provided and the patient verbalized understanding of and agreement with the disposition.  You may return for worsening symptoms.       scribe attestation I, Dr. Guy Lefort, personally performed the services described in this documentation. All medical record entries made by the scribe were at my direction and in my presence. I have reviewed the chart and agree that the record reflects my personal performance and is accurate and complete. Guy Lefort, MD.  9:43 AM 03/25/2020                   Guy J. Lefort, MD  03/25/20 0944

## 2020-03-24 NOTE — ED NOTES
Pt reports that he's having increasing swelling and pain to RLE. Pt reports inability to walk to day. Pt already receiving wound care from E. Pt reports having had a fever 1 week ago but denies any recent fevers. Afebrile upon arrival. Pt able to transfer self from EMS stretcher to ED stretcher.

## 2020-04-03 ENCOUNTER — HOSPITAL ENCOUNTER (OUTPATIENT)
Dept: RADIOLOGY | Facility: HOSPITAL | Age: 55
Discharge: HOME | End: 2020-04-03
Attending: EMERGENCY MEDICINE
Payer: MEDICARE

## 2020-04-03 ENCOUNTER — HOSPITAL ENCOUNTER (OUTPATIENT)
Dept: WOUND CARE | Facility: HOSPITAL | Age: 55
Discharge: HOME | End: 2020-04-03
Attending: EMERGENCY MEDICINE
Payer: MEDICARE

## 2020-04-03 VITALS
RESPIRATION RATE: 20 BRPM | TEMPERATURE: 99 F | HEART RATE: 95 BPM | SYSTOLIC BLOOD PRESSURE: 140 MMHG | DIASTOLIC BLOOD PRESSURE: 66 MMHG

## 2020-04-03 DIAGNOSIS — I87.2 VENOUS STASIS DERMATITIS OF BOTH LOWER EXTREMITIES: Primary | ICD-10-CM

## 2020-04-03 DIAGNOSIS — I83.009 VENOUS STASIS ULCER OF LOWER EXTREMITY, UNSPECIFIED LATERALITY: ICD-10-CM

## 2020-04-03 DIAGNOSIS — L97.909 VENOUS STASIS ULCER OF LOWER EXTREMITY, UNSPECIFIED LATERALITY: ICD-10-CM

## 2020-04-03 DIAGNOSIS — I87.2 VENOUS STASIS DERMATITIS OF BOTH LOWER EXTREMITIES: ICD-10-CM

## 2020-04-03 PROCEDURE — 93971 US LOWER EXTREMITY VEINS RIGHT: ICD-10-PCS | Mod: 26,RT,, | Performed by: RADIOLOGY

## 2020-04-03 PROCEDURE — 11042 DBRDMT SUBQ TIS 1ST 20SQCM/<: CPT

## 2020-04-03 PROCEDURE — 27201912 HC WOUND CARE DEBRIDEMENT SUPPLIES

## 2020-04-03 PROCEDURE — 29581 APPL MULTLAYER CMPRN SYS LEG: CPT

## 2020-04-03 PROCEDURE — 93971 EXTREMITY STUDY: CPT | Mod: 26,RT,, | Performed by: RADIOLOGY

## 2020-04-03 PROCEDURE — 93971 EXTREMITY STUDY: CPT | Mod: TC,RT

## 2020-04-03 RX ORDER — DOXYCYCLINE HYCLATE 100 MG
100 TABLET ORAL 2 TIMES DAILY
Status: ON HOLD | COMMUNITY
End: 2020-08-22

## 2020-04-03 NOTE — PROCEDURES
"Debridement  Date/Time: 4/3/2020 3:58 PM  Performed by: Pool Gutierrez MD  Authorized by: Pool Gutierrez MD     Time out: Immediately prior to procedure a "time out" was called to verify the correct patient, procedure, equipment, support staff and site/side marked as required.    Consent Done?:  Yes (Verbal)    Preparation: Patient was prepped and draped in usual sterile fashion    Local anesthesia used?: Yes    Local anesthetic:  Topical anesthetic    Wound Details:    Location:  Left foot    Location:  Left Ankle    Type of Debridement:  Excisional       Length (cm):  1.7       Area (sq cm):  8.16       Width (cm):  4.8       Percent Debrided (%):  100       Depth (cm):  0.1       Total Area Debrided (sq cm):  8.16    Depth of debridement:  Subcutaneous tissue    Tissue debrided:  Subcutaneous, Epidermis and Dermis    Devitalized tissue debrided:  Biofilm, Fibrin and Slough    Instruments:  Curette    Bleeding:  Minimal  Patient tolerance:  Patient tolerated the procedure well with no immediate complications      "

## 2020-04-03 NOTE — PROGRESS NOTES
"Subjective:       Patient ID: Drew Farrar is a 54 y.o. male.    Chief Complaint: Venous Ulcer    2/15/19: Readmitted for venous ulcer to left lateral foot which developed about 2 weeks ago. Pulses noted with doppler and capillary refill <3.Dr Gutierrez assessed patient and wound care plan ordered for compression therapy and hydrafera blue to wound bed. No apparent signs of infection noted. Patient to follw-up in clinic in 2 weeks.DB   2/21/19: Nurse visit for dressing change. Refused care to skin tear on left 2nd toe. See notes. RTC 1 week for DrRosy Visit.  3/1/19: Follow up with Dr. Waller today in clinic new wound to right posterior leg, culture of both wounds taken today in clinic new wound care orders to both legs for xeroform and unna with calamine toe to knee follow up in 1 week with Dr. Gutierrez  3/8/19: Follow up with Dr. Gutierrez today in clinic wounds improving, edema noted to BLE, profore toe to knee applied to both legs, RX given to patient for PO Doxycycline, follow up in 1 week.   3/15/19: Here for f/u with Dr. Gutierrez. U/S scheduled Thursday. Will need right leg rewrapped. Patient states eye DrRosy Gave him the same antibiotic dose and strength after eye surgery. Dr instructed patient to take one bottle of antibiotics for both wounds and eyes until finished. Gentamycin added to wound care orders.   3/29/19:  Wound slowly improving. No changes in wound care orders. Wound debrided per Dr Gutierrez.  4/4/19: Patient showed up to clinic today stated " I need my dressing changed. It fell off."  Doppler pluses checked and present.  Patient refusing care to right, no stocking present on leg at this visit. Patient stated " No they said this leg is discharged, Dr. Lantigua has to drain the fluid out this leg with a needle. No wound care to this leg anymore when I come."      9/9/19: Readmit to wound care clinic for venous ulcer to left lateral foot.  Patient states it reopened about 3 weeks after last discharge " "from wound care clinic 7/1/19. Per patient wound has been treated by PCP.  Patient reports just using a large band- aid and compression sock. Patient states he was recently treated at [facility] for HBOT for this wound " in the last two or three months after it reopened "  Requesting HBOT here at the wound care clinic.  Dr. Gutierrez examined patient today, doppler pulses present, wound debrided with curette by Dr. Gutierrez patient tolerated well. Wound care orders given to apply hydrorefa blue ready to wound, cover with mepore dressing and apply patient's compression sock. Dr. Manuel discussed with patient obtaining his medical records from [facility], to view to determine if patient will benefit from HBOT, patient verbalized understanding.  Authorization for release of health information sheet signed by patient and faxed to [facility] today in clinic. Follow up in 1 week with MD.  9/23/19: F/U with Dr. Gutierrez. Client recently discharged from hospital. Left foot site debrided. [facility]  12/9/19: Patient seen today in clinic by Dr. Gutierrez, no change to wound. Pt reports recently discharged from hospital 12/3/19 for irregular heat rate and chest pains. Debridement per Dr. Gutierrez tolerated well, wound care continued as ordered 4 layer compression toe to knee LLE.   F/u with Dr. Gutierrez , wound progressing well. Compression changed to coflex with calamine due to itching.  12/27/19:Dr Gutierrez assessed patient. Wound improving. Change in dressing change frequency. Follow up in 2 weeks.    1/10/2020: Patient seen today in clinic by  wound slowly improving. Calamine coflex toe to knee applied today in clinic. Fu 2 weeks.  2/10/2020: F/U WITH  today , pt had not been here in 10 days with dressing still in place. New wounds noted to right lower leg. Pt to have  u/s tomorrow of bilateral lower extremeties and to see  on Wednesday 2/12/2020 for possible " "procedure of right leg per patient. Pt to return on Thursday to clinic for dressing change and to see DR. Sury Vaca (podiatrist) for toe nail clipping and to evaluate toes on right foot.  As above; no c/o pain noted.  02/28/2020: F/u with Dr. Gutierrez. Wounds to RLE open to air. Patient states bandage came off while he was sleeping. Wound to left foot decreasing in size. Left foot wound debrided today in clinic. Continue with current wound care treatment and follow up in  Clinic in 1 week    3/13/20: F/U with Dr. Gutierrez.  Wounds improving.  Will increase compression to LLE.  4/3/2020: Fu with  today in clinic. LLE wound measuring bigger.  RLE improving. Patient complains of pain, tenderness,swelling  x 2 weeks to RLE.  Patient states he went to the ER 2 weeks ago " and they kicked me out because of the Coronavirus." Patient also reports to Dr. Gutierrez " night sweats for 2 weeks." Temp today on arrival to wound care clinic 99.1 oral. LLE debrided per  patient tolerated well, wound care 3 layer compression toe to knee as ordered.   Ultra sound ordered of RLE stat today in clinic to rule out DVT.  MD rewieved results, results negative discussed with patient.  RX given to patient  for Doxycycline Po.  Patient to follow up 4/6/2020 with .  Hx as above; pt. C/o >> RLE pain x c. 2 wks. W/o known trauma.  Review of Systems    Objective:    Wounds as noted w/ exquisite R ant. Ankle tenderness & ? hyperemia  Physical Exam    Assessment:       1. Venous stasis dermatitis of both lower extremities           Wound 09/09/19 1057 Venous Ulcer lateral Foot #1 (Active)   09/09/19 1057    Pre-existing: Yes   Primary Wound Type: Venous ulcer   Side: Left   Orientation: lateral   Location: Foot   Wound/PI Number (optional): #1   Ankle-Brachial Index:    Pulses:    Removal Indication and Assessment:    Wound Outcome:    (Retired) Wound Type:    (Retired) Wound Length (cm):    (Retired) Wound Width " (cm):    (Retired) Depth (cm):    Wound Description (Comments):    Removal Indications:    Wound Image   4/3/2020 11:00 AM   Dressing Appearance Moist drainage;Intact 4/3/2020 11:00 AM   Drainage Amount Moderate 4/3/2020 11:00 AM   Drainage Characteristics/Odor Serosanguineous 4/3/2020 11:00 AM   Appearance Yellow;Pink;Moist 4/3/2020 11:00 AM   Tissue loss description Full thickness 4/3/2020 11:00 AM   Red (%), Wound Tissue Color 30 % 4/3/2020 11:00 AM   Yellow (%), Wound Tissue Color 70 % 4/3/2020 11:00 AM   Periwound Area Dry;Edematous 4/3/2020 11:00 AM   Wound Edges Irregular 4/3/2020 11:00 AM   Wound Length (cm) 1.7 cm 4/3/2020 11:00 AM   Wound Width (cm) 4.8 cm 4/3/2020 11:00 AM   Wound Depth (cm) 0.1 cm 4/3/2020 11:00 AM   Wound Volume (cm^3) 0.82 cm^3 4/3/2020 11:00 AM   Wound Surface Area (cm^2) 8.16 cm^2 4/3/2020 11:00 AM   Care Cleansed with:;Sterile normal saline;Debrided 4/3/2020 11:00 AM   Dressing Applied;Island/border;Compression wrap 4/3/2020 11:00 AM   Periwound Care Absorptive dressing applied 4/3/2020 11:00 AM   Compression Three layer compression 4/3/2020 11:00 AM   Dressing Change Due 04/06/20 4/3/2020 11:00 AM            Wound 02/10/20 Venous Ulcer Leg #2 (Active)   02/10/20     Pre-existing: No   Primary Wound Type: Venous ulcer   Side: Right   Orientation:    Location: Leg   Wound/PI Number (optional): #2   Ankle-Brachial Index:    Pulses:    Removal Indication and Assessment:    Wound Outcome:    (Retired) Wound Type:    (Retired) Wound Length (cm):    (Retired) Wound Width (cm):    (Retired) Depth (cm):    Wound Description (Comments):    Removal Indications:    Wound Image   4/3/2020 11:00 AM   Dressing Appearance Intact;Moist drainage 4/3/2020 11:00 AM   Drainage Amount Scant 4/3/2020 11:00 AM   Drainage Characteristics/Odor Serosanguineous 4/3/2020 11:00 AM   Appearance Red;Moist;Granulating 4/3/2020 11:00 AM   Tissue loss description Partial thickness 4/3/2020 11:00 AM   Red (%), Wound  Tissue Color 100 % 4/3/2020 11:00 AM   Periwound Area Edematous 4/3/2020 11:00 AM   Wound Edges Defined 4/3/2020 11:00 AM   Wound Length (cm) 0.3 cm 4/3/2020 11:00 AM   Wound Width (cm) 0.6 cm 4/3/2020 11:00 AM   Wound Depth (cm) 0.1 cm 4/3/2020 11:00 AM   Wound Volume (cm^3) 0.02 cm^3 4/3/2020 11:00 AM   Wound Surface Area (cm^2) 0.18 cm^2 4/3/2020 11:00 AM   Care Cleansed with:;Sterile normal saline 4/3/2020 11:00 AM   Dressing Applied;Other (see comments);Tubular bandage 4/3/2020 11:00 AM   Compression Tubular elasticized bandage 4/3/2020 11:00 AM   Off Loading Off loading shoe 4/3/2020 11:00 AM   Dressing Change Due 04/06/20 4/3/2020 11:00 AM       [REMOVED]      Wound 02/10/20 Venous Ulcer distal Leg #3 (Removed)   02/10/20     Pre-existing: No   Primary Wound Type: Venous ulcer   Side: Right   Orientation: distal   Location: Leg   Wound/PI Number (optional): #3   Ankle-Brachial Index:    Pulses:    Removal Indication and Assessment:    Wound Outcome: Healed   (Retired) Wound Type:    (Retired) Wound Length (cm):    (Retired) Wound Width (cm):    (Retired) Depth (cm):    Wound Description (Comments):    Removal Indications:    Removed 04/03/20 1105   Wound Image   4/3/2020 11:00 AM   Wound Length (cm) 0 cm 4/3/2020 11:00 AM   Wound Width (cm) 0 cm 4/3/2020 11:00 AM   Wound Depth (cm) 0 cm 4/3/2020 11:00 AM   Wound Volume (cm^3) 0 cm^3 4/3/2020 11:00 AM   Wound Surface Area (cm^2) 0 cm^2 4/3/2020 11:00 AM       Venous stasis ulcer of lower extremity, unspecified laterality  - Primary     Left lateral Foot #1  Cleanse wound with: Acetic acid 0.25%, rinse with normal saline.  Lidocaine: Prn Debridement  Periwound care: Sween to dry skin Prn, betamethasone to LLE PRN. Calmoseptine to vick wound PRN.  Primary dressing:  Santyl, Hydrofera blue ready to wound  Secondary dressing: Aquacel foam  Offloading: Darco shoe left foot  Edema control: 3-layer Profore Lite compression wrap toes to knee   Frequency:  Monday and  Friday    Right Lower leg 2  Cleanse wound with: Acetic acid 0.25%, rinse with normal saline.  Lidocaine: Prn Debridement  Periwound care: Sween to dry skin Prn, betamethasone to LLE PRN. Calmoseptine to vick wound PRN.  Primary dressing:  Santyl, Hydrofera blue ready to wound  Secondary dressing: cover with Mepore dressing  Offloading: Darco shoe left foot  Edema control: Tubigrip size G x2 from toes to knee  Frequency: Monday and Friday    Follow-up: in 2 weeks with Dr. Gutierrez on Friday 03/27/2020    Home health: [facility] to perform wound care on Mondays Fridays (when not seen in clinic) and prn  RX given to patient for Doxycycline Po: 100mg. Bid x 28.  Plan:                4/6/2020

## 2020-04-13 ENCOUNTER — HOSPITAL ENCOUNTER (OUTPATIENT)
Dept: WOUND CARE | Facility: HOSPITAL | Age: 55
Discharge: HOME | End: 2020-04-13
Attending: EMERGENCY MEDICINE
Payer: MEDICARE

## 2020-04-13 VITALS
HEART RATE: 77 BPM | TEMPERATURE: 99 F | BODY MASS INDEX: 42.66 KG/M2 | SYSTOLIC BLOOD PRESSURE: 120 MMHG | WEIGHT: 315 LBS | HEIGHT: 72 IN | DIASTOLIC BLOOD PRESSURE: 56 MMHG

## 2020-04-13 DIAGNOSIS — L97.321 ULCER OF ANKLE, LEFT, LIMITED TO BREAKDOWN OF SKIN: Primary | ICD-10-CM

## 2020-04-13 DIAGNOSIS — I83.009 VENOUS STASIS ULCER OF LOWER EXTREMITY, UNSPECIFIED LATERALITY: ICD-10-CM

## 2020-04-13 DIAGNOSIS — L97.909 VENOUS STASIS ULCER OF LOWER EXTREMITY, UNSPECIFIED LATERALITY: ICD-10-CM

## 2020-04-13 PROCEDURE — 27201912 HC WOUND CARE DEBRIDEMENT SUPPLIES

## 2020-04-13 PROCEDURE — 11042 DBRDMT SUBQ TIS 1ST 20SQCM/<: CPT

## 2020-04-13 NOTE — PROGRESS NOTES
"Subjective:       Patient ID: Drew Farrar is a 54 y.o. male.    Chief Complaint: Non-healing Wound Follow Up    2/15/19: Readmitted for venous ulcer to left lateral foot which developed about 2 weeks ago. Pulses noted with doppler and capillary refill <3.Dr Gutierrez assessed patient and wound care plan ordered for compression therapy and hydrafera blue to wound bed. No apparent signs of infection noted. Patient to follw-up in clinic in 2 weeks.DB   2/21/19: Nurse visit for dressing change. Refused care to skin tear on left 2nd toe. See notes. RTC 1 week for DrRosy Visit.  3/1/19: Follow up with Dr. Waller today in clinic new wound to right posterior leg, culture of both wounds taken today in clinic new wound care orders to both legs for xeroform and unna with calamine toe to knee follow up in 1 week with Dr. Gutierrez  3/8/19: Follow up with Dr. Gutierrez today in clinic wounds improving, edema noted to BLE, profore toe to knee applied to both legs, RX given to patient for PO Doxycycline, follow up in 1 week.   3/15/19: Here for f/u with Dr. Gutierrez. U/S scheduled Thursday. Will need right leg rewrapped. Patient states eye DrRosy Gave him the same antibiotic dose and strength after eye surgery. Dr instructed patient to take one bottle of antibiotics for both wounds and eyes until finished. Gentamycin added to wound care orders.   3/29/19:  Wound slowly improving. No changes in wound care orders. Wound debrided per Dr Gutierrez.  4/4/19: Patient showed up to clinic today stated " I need my dressing changed. It fell off."  Doppler pluses checked and present.  Patient refusing care to right, no stocking present on leg at this visit. Patient stated " No they said this leg is discharged, Dr. Lantigua has to drain the fluid out this leg with a needle. No wound care to this leg anymore when I come."      9/9/19: Readmit to wound care clinic for venous ulcer to left lateral foot.  Patient states it reopened about 3 weeks after " "last discharge from wound care clinic 7/1/19. Per patient wound has been treated by PCP.  Patient reports just using a large band- aid and compression sock. Patient states he was recently treated at [facility] for HBOT for this wound " in the last two or three months after it reopened "  Requesting HBOT here at the wound care clinic.  Dr. Gutierrez examined patient today, doppler pulses present, wound debrided with curette by Dr. Gutierrez patient tolerated well. Wound care orders given to apply hydrorefa blue ready to wound, cover with mepore dressing and apply patient's compression sock. Dr. Manuel discussed with patient obtaining his medical records from [facility], to view to determine if patient will benefit from HBOT, patient verbalized understanding.  Authorization for release of health information sheet signed by patient and faxed to [facility] today in clinic. Follow up in 1 week with MD.  9/23/19: F/U with Dr. Gutierrez. Client recently discharged from hospital. Left foot site debrided. Client being followed by [facility].  12/9/19: Patient seen today in clinic by Dr. Gutierrez, no change to wound. Pt reports recently discharged from hospital 12/3/19 for irregular heat rate and chest pains. Debridement per Dr. Gutierrez tolerated well, wound care continued as ordered 4 layer compression toe to knee LLE.   F/u with Dr. Gutierrez , wound progressing well. Compression changed to coflex with calamine due to itching.  12/27/19:Dr Gutierrez assessed patient. Wound improving. Change in dressing change frequency. Follow up in 2 weeks.    1/10/2020: Patient seen today in clinic by  wound slowly improving. Calamine coflex toe to knee applied today in clinic. Fu 2 weeks.  2/10/2020: F/U WITH  today , pt had not been here in 10 days with dressing still in place. New wounds noted to right lower leg. Pt to have  u/s tomorrow of bilateral lower extremeties and to see  on Wednesday 2/12/2020 " "for possible procedure of right leg per patient. Pt to return on Thursday to clinic for dressing change and to see DR. Sury Vaca (podiatrist) for toe nail clipping and to evaluate toes on right foot.  As above; no c/o pain noted.  02/28/2020: F/u with Dr. Gutierrez. Wounds to RLE open to air. Patient states bandage came off while he was sleeping. Wound to left foot decreasing in size. Left foot wound debrided today in clinic. Continue with current wound care treatment and follow up in  Clinic in 1 week    3/13/20: F/U with Dr. Gutierrez.  Wounds improving.  Will increase compression to LLE.  4/3/2020: Fu with  today in clinic. LLE wound measuring bigger.  RLE improving. Patient complains of pain, tenderness,swelling  x 2 weeks to RLE.  Patient states he went to the ER 2 weeks ago " and they kicked me out because of the Coronavirus." Patient also reports to Dr. Gutierrez " night sweats for 2 weeks." Temp today on arrival to wound care clinic 99.1 oral. LLE debrided per  patient tolerated well, wound care 3 layer compression toe to knee as ordered.   Ultra sound ordered of RLE stat today in clinic to rule out DVT.  MD rewieved results, results negative discussed with patient.  RX given to patient  for Doxycycline Po.  Patient to follow up 4/6/2020 with .  4/13/2020; Fu with Dr. Gutierrez. Patient states RLE pain is much better since starting oral Doxycycline.  RLE wound improving. LLE wound remains unchanged. LLE wound debrided per  patient tolerated well. Continued with 3 layer compression to LLE and tubi  G x 2 to RLE. RTC 4/27/2020.    Review of Systems    Objective:      Physical Exam    Assessment:       No diagnosis found.       Wound 09/09/19 1057 Venous Ulcer lateral Foot #1 (Active)   09/09/19 1057    Pre-existing: Yes   Primary Wound Type: Venous ulcer   Side: Left   Orientation: lateral   Location: Foot   Wound/PI Number (optional): #1   Ankle-Brachial Index:  "   Pulses:    Removal Indication and Assessment:    Wound Outcome:    (Retired) Wound Type:    (Retired) Wound Length (cm):    (Retired) Wound Width (cm):    (Retired) Depth (cm):    Wound Description (Comments):    Removal Indications:    Wound Image   4/13/2020 10:00 AM   Dressing Appearance Moist drainage 4/13/2020 10:00 AM   Drainage Amount Moderate 4/13/2020 10:00 AM   Drainage Characteristics/Odor Green 4/13/2020 10:00 AM   Appearance Yellow;Moist;Slough;Fibrin;Pink 4/13/2020 10:00 AM   Tissue loss description Full thickness 4/13/2020 10:00 AM   Red (%), Wound Tissue Color 10 % 4/13/2020 10:00 AM   Yellow (%), Wound Tissue Color 90 % 4/13/2020 10:00 AM   Periwound Area Dry;Edematous 4/13/2020 10:00 AM   Wound Edges Irregular 4/13/2020 10:00 AM   Wound Length (cm) 1.3 cm 4/13/2020 10:00 AM   Wound Width (cm) 4.9 cm 4/13/2020 10:00 AM   Wound Depth (cm) 0.1 cm 4/13/2020 10:00 AM   Wound Volume (cm^3) 0.64 cm^3 4/13/2020 10:00 AM   Wound Surface Area (cm^2) 6.37 cm^2 4/13/2020 10:00 AM   Care Cleansed with:;Antimicrobial agent;Sterile normal saline;Debrided 4/13/2020 10:00 AM   Dressing Applied;Non-adherent;Foam;Compression wrap 4/13/2020 10:00 AM   Periwound Care Absorptive dressing applied;Moisture barrier applied 4/13/2020 10:00 AM   Compression Three layer compression 4/13/2020 10:00 AM   Dressing Change Due 04/17/20 4/13/2020 10:00 AM            Wound 02/10/20 Venous Ulcer Leg #2 (Active)   02/10/20     Pre-existing: No   Primary Wound Type: Venous ulcer   Side: Right   Orientation:    Location: Leg   Wound/PI Number (optional): #2   Ankle-Brachial Index:    Pulses:    Removal Indication and Assessment:    Wound Outcome:    (Retired) Wound Type:    (Retired) Wound Length (cm):    (Retired) Wound Width (cm):    (Retired) Depth (cm):    Wound Description (Comments):    Removal Indications:    Wound Image   4/13/2020 10:00 AM   Dressing Appearance Dry 4/13/2020 10:00 AM   Drainage Amount None 4/13/2020 10:00 AM    Appearance Red;Moist 4/13/2020 10:00 AM   Tissue loss description Partial thickness 4/13/2020 10:00 AM   Red (%), Wound Tissue Color 100 % 4/13/2020 10:00 AM   Periwound Area Edematous 4/13/2020 10:00 AM   Wound Edges Defined 4/13/2020 10:00 AM   Wound Length (cm) 0.1 cm 4/13/2020 10:00 AM   Wound Width (cm) 0.1 cm 4/13/2020 10:00 AM   Wound Depth (cm) 0 cm 4/13/2020 10:00 AM   Wound Volume (cm^3) 0 cm^3 4/13/2020 10:00 AM   Wound Surface Area (cm^2) 0.01 cm^2 4/13/2020 10:00 AM   Care Cleansed with:;Antimicrobial agent;Sterile normal saline 4/13/2020 10:00 AM   Dressing Applied;Island/border;Tubular bandage 4/13/2020 10:00 AM   Compression Tubular elasticized bandage 4/13/2020 10:00 AM   Off Loading Off loading shoe 4/13/2020 10:00 AM   Dressing Change Due 04/17/20 4/13/2020 10:00 AM       Venous stasis ulcer of lower extremity, unspecified laterality  - Primary     Left lateral Foot #1  Cleanse wound with: Acetic acid 0.25%, rinse with normal saline.  Lidocaine: Prn Debridement  Periwound care: Sween to dry skin Prn, betamethasone to LLE PRN. Calmoseptine to vick wound PRN.  Primary dressing:  Santyl, Hydrofera blue ready to wound  Secondary dressing: Aquacel foam  Offloading: Darco shoe left foot  Edema control: 3-layer Profore Lite compression wrap toes to knee   Frequency:  Monday and Friday    Right Lower leg 2  Cleanse wound with: Acetic acid 0.25%, rinse with normal saline.  Lidocaine: Prn Debridement  Periwound care: Sween to dry skin Prn, betamethasone to LLE PRN. Calmoseptine to vick wound PRN.  Primary dressing:   Hydrofera blue ready to wound  Secondary dressing: cover with Mepore dressing  Offloading: Darco shoe left foot  Edema control: Tubigrip size G x2 from toes to knee  Frequency: Monday and Friday    Follow-up: in 2 weeks with Dr. Gutierrez 4/27/2020    Home health: [facility] to perform wound care on Mondays and  Fridays (when not seen in clinic) and prn  Continue Doxycycline  Po.    Plan:              4/20/2020

## 2020-04-20 NOTE — PROCEDURES
"Debridement  Date/Time: 4/13/2020 10:43 AM  Performed by: Pool Gutierrez MD  Authorized by: Pool Gutierrez MD     Time out: Immediately prior to procedure a "time out" was called to verify the correct patient, procedure, equipment, support staff and site/side marked as required.    Consent Done?:  Yes (Verbal)    Preparation: Patient was prepped and draped in usual sterile fashion    Local anesthesia used?: Yes    Local anesthetic:  Topical anesthetic    Wound Details:    Location:  Left foot    Type of Debridement:  Excisional       Length (cm):  1.3       Area (sq cm):  6.37       Width (cm):  4.9       Percent Debrided (%):  100       Depth (cm):  0.1       Total Area Debrided (sq cm):  6.37    Depth of debridement:  Subcutaneous tissue    Tissue debrided:  Subcutaneous and Epidermis    Devitalized tissue debrided:  Biofilm, Fibrin and Slough    Instruments:  Curette    Bleeding:  Minimal  Patient tolerance:  Patient tolerated the procedure well with no immediate complications      "

## 2020-04-27 ENCOUNTER — DOCUMENT SCAN (OUTPATIENT)
Dept: HOME HEALTH SERVICES | Facility: HOSPITAL | Age: 55
End: 2020-04-27

## 2020-04-27 ENCOUNTER — HOSPITAL ENCOUNTER (OUTPATIENT)
Dept: WOUND CARE | Facility: HOSPITAL | Age: 55
Discharge: HOME | End: 2020-04-27
Attending: EMERGENCY MEDICINE
Payer: MEDICARE

## 2020-04-27 VITALS
SYSTOLIC BLOOD PRESSURE: 126 MMHG | WEIGHT: 315 LBS | HEIGHT: 72 IN | TEMPERATURE: 98 F | BODY MASS INDEX: 42.66 KG/M2 | HEART RATE: 69 BPM | DIASTOLIC BLOOD PRESSURE: 58 MMHG

## 2020-04-27 DIAGNOSIS — L97.321 ULCER OF ANKLE, LEFT, LIMITED TO BREAKDOWN OF SKIN: Primary | ICD-10-CM

## 2020-04-27 PROCEDURE — 27201912 HC WOUND CARE DEBRIDEMENT SUPPLIES

## 2020-04-27 PROCEDURE — 11042 DBRDMT SUBQ TIS 1ST 20SQCM/<: CPT

## 2020-04-27 NOTE — PROGRESS NOTES
"Subjective:       Patient ID: Drew Farrar is a 54 y.o. male.    Chief Complaint: Venous Ulcer (left foot)    2/15/19: Readmitted for venous ulcer to left lateral foot which developed about 2 weeks ago. Pulses noted with doppler and capillary refill <3.Dr Gutierrez assessed patient and wound care plan ordered for compression therapy and hydrafera blue to wound bed. No apparent signs of infection noted. Patient to follw-up in clinic in 2 weeks.DB   2/21/19: Nurse visit for dressing change. Refused care to skin tear on left 2nd toe. See notes. RTC 1 week for DrRosy Visit.  3/1/19: Follow up with Dr. Waller today in clinic new wound to right posterior leg, culture of both wounds taken today in clinic new wound care orders to both legs for xeroform and unna with calamine toe to knee follow up in 1 week with Dr. Gutierrez  3/8/19: Follow up with Dr. Gutierrez today in clinic wounds improving, edema noted to BLE, profore toe to knee applied to both legs, RX given to patient for PO Doxycycline, follow up in 1 week.   3/15/19: Here for f/u with Dr. Gutierrez. U/S scheduled Thursday. Will need right leg rewrapped. Patient states eye DrRosy Gave him the same antibiotic dose and strength after eye surgery. Dr instructed patient to take one bottle of antibiotics for both wounds and eyes until finished. Gentamycin added to wound care orders.   3/29/19:  Wound slowly improving. No changes in wound care orders. Wound debrided per Dr Gutierrez.  4/4/19: Patient showed up to clinic today stated " I need my dressing changed. It fell off."  Doppler pluses checked and present.  Patient refusing care to right, no stocking present on leg at this visit. Patient stated " No they said this leg is discharged, Dr. Lantigua has to drain the fluid out this leg with a needle. No wound care to this leg anymore when I come."      9/9/19: Readmit to wound care clinic for venous ulcer to left lateral foot.  Patient states it reopened about 3 weeks after " "last discharge from wound care clinic 7/1/19. Per patient wound has been treated by PCP.  Patient reports just using a large band- aid and compression sock. Patient states he was recently treated at [facility] for HBOT for this wound " in the last two or three months after it reopened "  Requesting HBOT here at the wound care clinic.  Dr. Gutierrez examined patient today, doppler pulses present, wound debrided with curette by Dr. Gutierrez patient tolerated well. Wound care orders given to apply hydrorefa blue ready to wound, cover with mepore dressing and apply patient's compression sock. Dr. Manuel discussed with patient obtaining his medical records from [facility], to view to determine if patient will benefit from HBOT, patient verbalized understanding.  Authorization for release of health information sheet signed by patient and faxed to [facility] today in clinic. Follow up in 1 week with MD.  9/23/19: F/U with Dr. Gutierrez. Client recently discharged from hospital. Left foot site debrided. Client being followed by [facility] health.  12/9/19: Patient seen today in clinic by Dr. Gutierrez, no change to wound. Pt reports recently discharged from hospital 12/3/19 for irregular heat rate and chest pains. Debridement per Dr. Gutierrez tolerated well, wound care continued as ordered 4 layer compression toe to knee LLE.   F/u with Dr. Gutierrez , wound progressing well. Compression changed to coflex with calamine due to itching.  12/27/19:Dr Gutierrez assessed patient. Wound improving. Change in dressing change frequency. Follow up in 2 weeks.    1/10/2020: Patient seen today in clinic by  wound slowly improving. Calamine coflex toe to knee applied today in clinic. Fu 2 weeks.  2/10/2020: F/U WITH  today , pt had not been here in 10 days with dressing still in place. New wounds noted to right lower leg. Pt to have  u/s tomorrow of bilateral lower extremeties and to see  on Wednesday 2/12/2020 " "for possible procedure of right leg per patient. Pt to return on Thursday to clinic for dressing change and to see DR. Sury Vaca (podiatrist) for toe nail clipping and to evaluate toes on right foot.  As above; no c/o pain noted.  02/28/2020: F/u with Dr. Gutierrez. Wounds to RLE open to air. Patient states bandage came off while he was sleeping. Wound to left foot decreasing in size. Left foot wound debrided today in clinic. Continue with current wound care treatment and follow up in  Clinic in 1 week    3/13/20: F/U with Dr. Gutierrez.  Wounds improving.  Will increase compression to LLE.  4/3/2020: Fu with  today in clinic. LLE wound measuring bigger.  RLE improving. Patient complains of pain, tenderness,swelling  x 2 weeks to RLE.  Patient states he went to the ER 2 weeks ago " and they kicked me out because of the Coronavirus." Patient also reports to Dr. Gutierrez " night sweats for 2 weeks." Temp today on arrival to wound care clinic 99.1 oral. LLE debrided per  patient tolerated well, wound care 3 layer compression toe to knee as ordered.   Ultra sound ordered of RLE stat today in clinic to rule out DVT.  MD rewieved results, results negative discussed with patient.  RX given to patient  for Doxycycline Po.  Patient to follow up 4/6/2020 with .  4/13/2020; Fu with Dr. Gutierrez. Patient states RLE pain is much better since starting oral Doxycycline.  RLE wound improving. LLE wound remains unchanged. LLE wound debrided per  patient tolerated well. Continued with 3 layer compression to LLE and tubi  G x 2 to RLE. RTC 4/27/2020.  4/27/20: F/U with Dr. Gutierrez. RLE resolved. Continue Tubigrip G x 2 toes to knee. Left foot site debrided per Dr. Gutierrez. Santyl, adaptic touch, and hydrofera blue ready ordered. Return in 2 weeks.  No c/o noted.  Review of Systems    Objective:    Wounds as above w/o Sophia's sign or s/s of infection.  Slough has recurred on LLE " wound.  Physical Exam    Assessment:       1. Ulcer of ankle, left, limited to breakdown of skin           Wound 09/09/19 1057 Venous Ulcer lateral Foot #1 (Active)   09/09/19 1057    Pre-existing: Yes   Primary Wound Type: Venous ulcer   Side: Left   Orientation: lateral   Location: Foot   Wound/PI Number (optional): #1   Ankle-Brachial Index:    Pulses:    Removal Indication and Assessment:    Wound Outcome:    (Retired) Wound Type:    (Retired) Wound Length (cm):    (Retired) Wound Width (cm):    (Retired) Depth (cm):    Wound Description (Comments):    Removal Indications:    Wound Image   4/27/2020 11:00 AM   Dressing Appearance Moist drainage 4/27/2020 11:00 AM   Drainage Amount Moderate 4/27/2020 11:00 AM   Drainage Characteristics/Odor Serosanguineous 4/27/2020 11:00 AM   Appearance Pink;Red;Yellow;Moist 4/27/2020 11:00 AM   Tissue loss description Full thickness 4/27/2020 11:00 AM   Red (%), Wound Tissue Color 90 % 4/27/2020 11:00 AM   Yellow (%), Wound Tissue Color 10 % 4/27/2020 11:00 AM   Periwound Area Dry;Hemosiderin Staining;Edematous 4/27/2020 11:00 AM   Wound Edges Irregular 4/27/2020 11:00 AM   Wound Length (cm) 1.3 cm 4/27/2020 11:00 AM   Wound Width (cm) 4.9 cm 4/27/2020 11:00 AM   Wound Depth (cm) 0.2 cm 4/27/2020 11:00 AM   Wound Volume (cm^3) 1.27 cm^3 4/27/2020 11:00 AM   Wound Surface Area (cm^2) 6.37 cm^2 4/27/2020 11:00 AM   Care Cleansed with:;Antimicrobial agent;Sterile normal saline 4/27/2020 11:00 AM   Dressing Changed;Hydrofiber;Foam;Compression wrap;Other (see comments) 4/27/2020 11:00 AM   Periwound Care Absorptive dressing applied;Topical treatment applied 4/27/2020 11:00 AM   Compression Three layer compression 4/27/2020 11:00 AM   Dressing Change Due 05/11/20 4/27/2020 11:00 AM            Wound 02/10/20 Venous Ulcer Leg #2 (Active)   02/10/20     Pre-existing: No   Primary Wound Type: Venous ulcer   Side: Right   Orientation:    Location: Leg   Wound/PI Number (optional): #2    Ankle-Brachial Index:    Pulses:    Removal Indication and Assessment:    Wound Outcome:    (Retired) Wound Type:    (Retired) Wound Length (cm):    (Retired) Wound Width (cm):    (Retired) Depth (cm):    Wound Description (Comments):    Removal Indications:    Wound Image   4/27/2020 11:00 AM   Dressing Appearance Intact;Dry 4/27/2020 11:00 AM   Drainage Amount None 4/27/2020 11:00 AM   Appearance Pink;Dry 4/27/2020 11:00 AM   Tissue loss description Not applicable 4/27/2020 11:00 AM   Red (%), Wound Tissue Color 100 % 4/27/2020 11:00 AM   Periwound Area Edematous 4/27/2020 11:00 AM   Wound Edges Rolled/closed 4/27/2020 11:00 AM   Wound Length (cm) 0 cm 4/27/2020 11:00 AM   Wound Width (cm) 0 cm 4/27/2020 11:00 AM   Wound Depth (cm) 0 cm 4/27/2020 11:00 AM   Wound Volume (cm^3) 0 cm^3 4/27/2020 11:00 AM   Wound Surface Area (cm^2) 0 cm^2 4/27/2020 11:00 AM   Care Cleansed with:;Antimicrobial agent;Sterile normal saline 4/27/2020 11:00 AM   Dressing Changed;Tubular bandage 4/27/2020 11:00 AM   Periwound Care Moisturizer applied 4/27/2020 11:00 AM   Compression Tubular elasticized bandage 4/27/2020 11:00 AM   Off Loading Off loading shoe 4/27/2020 11:00 AM   Dressing Change Due 05/11/20 4/27/2020 11:00 AM       Venous stasis ulcer of lower extremity, unspecified laterality  - Primary     Left lateral Foot #1  Cleanse wound with: Acetic acid 0.25%, rinse with normal saline.  Lidocaine: Prn Debridement  Periwound care: Sween to dry skin Prn, betamethasone to LLE PRN. Calmoseptine to vick wound PRN.  Primary dressing:  Santyl, adaptic touch, Hydrofera blue ready to wound  Secondary dressing: Aquacel foam, Profore lite toes to knee.  Offloading: Darco shoe left foot  Edema control: 3-layer Profore Lite compression wrap toes to knee   Frequency:  Monday and Friday    Right Lower leg 2    Edema control: Tubigrip size G x2 from toes to knee  Frequency: Monday and Friday    Follow-up: in 2 weeks with Dr. Gutierrez  5/11/20    Home health: [facility] to perform wound care on Mondays and  Fridays (when not seen in clinic) and prn  Continue Doxycycline Po.    Plan:              5/11/20

## 2020-05-01 NOTE — PROCEDURES
"Debridement  Date/Time: 4/27/2020 9:36 AM  Performed by: Pool Gutierrez MD  Authorized by: Pool Gutierrez MD     Time out: Immediately prior to procedure a "time out" was called to verify the correct patient, procedure, equipment, support staff and site/side marked as required.    Consent Done?:  Yes (Verbal)    Preparation: Patient was prepped and draped in usual sterile fashion    Local anesthesia used?: Yes    Local anesthetic:  Topical anesthetic    Wound Details:    Location:  Left foot    Location:  Left Ankle    Type of Debridement:  Excisional       Length (cm):  4.5       Area (sq cm):  18       Width (cm):  4       Percent Debrided (%):  100       Depth (cm):  0.1       Total Area Debrided (sq cm):  18    Depth of debridement:  Subcutaneous tissue    Tissue debrided:  Subcutaneous and Dermis    Devitalized tissue debrided:  Biofilm, Fibrin and Slough    Instruments:  Curette    Bleeding:  Minimal  Patient tolerance:  Patient tolerated the procedure well with no immediate complications      "

## 2020-05-18 ENCOUNTER — HOSPITAL ENCOUNTER (OUTPATIENT)
Dept: WOUND CARE | Facility: HOSPITAL | Age: 55
Discharge: HOME | End: 2020-05-18
Attending: EMERGENCY MEDICINE
Payer: MEDICARE

## 2020-05-18 VITALS
HEART RATE: 94 BPM | DIASTOLIC BLOOD PRESSURE: 68 MMHG | HEIGHT: 72 IN | WEIGHT: 315 LBS | TEMPERATURE: 98 F | BODY MASS INDEX: 42.66 KG/M2 | SYSTOLIC BLOOD PRESSURE: 126 MMHG

## 2020-05-18 DIAGNOSIS — I83.009 VENOUS STASIS ULCER OF LOWER EXTREMITY, UNSPECIFIED LATERALITY: ICD-10-CM

## 2020-05-18 DIAGNOSIS — L97.909 VENOUS STASIS ULCER OF LOWER EXTREMITY, UNSPECIFIED LATERALITY: ICD-10-CM

## 2020-05-18 DIAGNOSIS — S91.309A WOUND OF FOOT: Primary | ICD-10-CM

## 2020-05-18 PROCEDURE — 27201912 HC WOUND CARE DEBRIDEMENT SUPPLIES

## 2020-05-18 PROCEDURE — 11042 DBRDMT SUBQ TIS 1ST 20SQCM/<: CPT

## 2020-05-18 NOTE — PROGRESS NOTES
"Subjective:       Patient ID: Drew Farrar is a 54 y.o. male.    Chief Complaint: No chief complaint on file.    2/15/19: Readmitted for venous ulcer to left lateral foot which developed about 2 weeks ago. Pulses noted with doppler and capillary refill <3.Dr Gutierrez assessed patient and wound care plan ordered for compression therapy and hydrafera blue to wound bed. No apparent signs of infection noted. Patient to follw-up in clinic in 2 weeks.DB   2/21/19: Nurse visit for dressing change. Refused care to skin tear on left 2nd toe. See notes. RTC 1 week for DrRosy Visit.  3/1/19: Follow up with Dr. Waller today in clinic new wound to right posterior leg, culture of both wounds taken today in clinic new wound care orders to both legs for xeroform and unna with calamine toe to knee follow up in 1 week with Dr. Gutierrez  3/8/19: Follow up with Dr. Gutierrez today in clinic wounds improving, edema noted to BLE, profore toe to knee applied to both legs, RX given to patient for PO Doxycycline, follow up in 1 week.   3/15/19: Here for f/u with Dr. Gutierrez. U/S scheduled Thursday. Will need right leg rewrapped. Patient states eye DrRosy Gave him the same antibiotic dose and strength after eye surgery. Dr instructed patient to take one bottle of antibiotics for both wounds and eyes until finished. Gentamycin added to wound care orders.   3/29/19:  Wound slowly improving. No changes in wound care orders. Wound debrided per Dr Gutierrez.  4/4/19: Patient showed up to clinic today stated " I need my dressing changed. It fell off."  Doppler pluses checked and present.  Patient refusing care to right, no stocking present on leg at this visit. Patient stated " No they said this leg is discharged, Dr. Lantigua has to drain the fluid out this leg with a needle. No wound care to this leg anymore when I come."      9/9/19: Readmit to wound care clinic for venous ulcer to left lateral foot.  Patient states it reopened about 3 weeks after " "last discharge from wound care clinic 7/1/19. Per patient wound has been treated by PCP.  Patient reports just using a large band- aid and compression sock. Patient states he was recently treated at [facility] for HBOT for this wound " in the last two or three months after it reopened "  Requesting HBOT here at the wound care clinic.  Dr. Gutierrez examined patient today, doppler pulses present, wound debrided with curette by Dr. Gutierrez patient tolerated well. Wound care orders given to apply hydrorefa blue ready to wound, cover with mepore dressing and apply patient's compression sock. Dr. Manuel discussed with patient obtaining his medical records from [facility], to view to determine if patient will benefit from HBOT, patient verbalized understanding.  Authorization for release of health information sheet signed by patient and faxed to [facility] today in clinic. Follow up in 1 week with MD.  9/23/19: F/U with Dr. Gutierrez. Client recently discharged from hospital. Left foot site debrided. Client being followed by [facility].  12/9/19: Patient seen today in clinic by Dr. Gutierrez, no change to wound. Pt reports recently discharged from hospital 12/3/19 for irregular heat rate and chest pains. Debridement per Dr. Gutierrez tolerated well, wound care continued as ordered 4 layer compression toe to knee LLE.   F/u with Dr. Gutierrez , wound progressing well. Compression changed to coflex with calamine due to itching.  12/27/19:Dr Gutierrez assessed patient. Wound improving. Change in dressing change frequency. Follow up in 2 weeks.    1/10/2020: Patient seen today in clinic by  wound slowly improving. Calamine coflex toe to knee applied today in clinic. Fu 2 weeks.  2/10/2020: F/U WITH  today , pt had not been here in 10 days with dressing still in place. New wounds noted to right lower leg. Pt to have  u/s tomorrow of bilateral lower extremeties and to see  on Wednesday 2/12/2020 " "for possible procedure of right leg per patient. Pt to return on Thursday to clinic for dressing change and to see DR. Sury Vaca (podiatrist) for toe nail clipping and to evaluate toes on right foot.  As above; no c/o pain noted.  02/28/2020: F/u with Dr. Gutierrez. Wounds to RLE open to air. Patient states bandage came off while he was sleeping. Wound to left foot decreasing in size. Left foot wound debrided today in clinic. Continue with current wound care treatment and follow up in  Clinic in 1 week    3/13/20: F/U with Dr. Gutierrez.  Wounds improving.  Will increase compression to LLE.  4/3/2020: Fu with  today in clinic. LLE wound measuring bigger.  RLE improving. Patient complains of pain, tenderness,swelling  x 2 weeks to RLE.  Patient states he went to the ER 2 weeks ago " and they kicked me out because of the Coronavirus." Patient also reports to Dr. Gutierrez " night sweats for 2 weeks." Temp today on arrival to wound care clinic 99.1 oral. LLE debrided per  patient tolerated well, wound care 3 layer compression toe to knee as ordered.   Ultra sound ordered of RLE stat today in clinic to rule out DVT.  MD rewieved results, results negative discussed with patient.  RX given to patient  for Doxycycline Po.  Patient to follow up 4/6/2020 with .  4/13/2020; Fu with Dr. Gutierrez. Patient states RLE pain is much better since starting oral Doxycycline.  RLE wound improving. LLE wound remains unchanged. LLE wound debrided per  patient tolerated well. Continued with 3 layer compression to LLE and tubi  G x 2 to RLE. RTC 4/27/2020.  4/27/20: F/U with Dr. Gutierrez. RLE resolved. Continue Tubigrip G x 2 toes to knee. Left foot site debrided per Dr. Gutierrez. Santyl, adaptic touch, and hydrofera blue ready ordered. Return in 2 weeks.  5/18/20: Wound slowly improving. Dr Gutierrez debrided wound which was toleraled well. Continuing with same plan of care.  No c/o " noted.  Review of Systems    Objective:    Wound as noted w/ some recurrence of slough & biofilm; no Sophia's sign or s/s of infection.  Physical Exam    Assessment:       1. Wound of foot    2. Venous stasis ulcer of lower extremity, unspecified laterality           Wound 09/09/19 1057 Venous Ulcer lateral Foot #1 (Active)   09/09/19 1057    Pre-existing: Yes   Primary Wound Type: Venous ulcer   Side: Left   Orientation: lateral   Location: Foot   Wound/PI Number (optional): #1   Ankle-Brachial Index:    Pulses:    Removal Indication and Assessment:    Wound Outcome:    (Retired) Wound Type:    (Retired) Wound Length (cm):    (Retired) Wound Width (cm):    (Retired) Depth (cm):    Wound Description (Comments):    Removal Indications:    Dressing Appearance Moist drainage 5/18/2020 11:00 AM   Drainage Amount Moderate 5/18/2020 11:00 AM   Drainage Characteristics/Odor Serosanguineous 5/18/2020 11:00 AM   Appearance Red;Pink;Yellow 5/18/2020 11:00 AM   Tissue loss description Full thickness 5/18/2020 11:00 AM   Red (%), Wound Tissue Color 40 % 5/18/2020 11:00 AM   Yellow (%), Wound Tissue Color 60 % 5/18/2020 11:00 AM   Periwound Area Hemosiderin Staining;Dry 5/18/2020 11:00 AM   Wound Edges Irregular 5/18/2020 11:00 AM   Wound Length (cm) 1.6 cm 5/18/2020 11:00 AM   Wound Width (cm) 5.4 cm 5/18/2020 11:00 AM   Wound Depth (cm) 0.2 cm 5/18/2020 11:00 AM   Wound Volume (cm^3) 1.73 cm^3 5/18/2020 11:00 AM   Wound Surface Area (cm^2) 8.64 cm^2 5/18/2020 11:00 AM   Care Cleansed with:;Sterile normal saline 5/18/2020 11:00 AM       Venous stasis ulcer of lower extremity, unspecified laterality  - Primary     Left lateral Foot #1  Cleanse wound with: Acetic acid 0.25%, rinse with normal saline.  Lidocaine: Prn Debridement  Periwound care: Sween to dry skin Prn, betamethasone to LLE PRN. Calmoseptine to vick wound PRN.  Primary dressing:  Santyl, Adaptic touch, Hydrofera blue ready to wound  Secondary dressing: Aquacel foam,  Profore lite  Offloading: Darco shoe left foot  Edema control: 3-layer Profore Lite compression wrap toes to knee   Frequency:  Monday and Friday    Other: Tubigrip G x 2 to RLE toes to knee    Home health: [facility] to do wound care on Monday, Friday, and prn except when in clinic. Next visit 6/1/20.  Wound slowly improving. Dr Gutierrez debrided wound which was toleraled well. Continuing with same plan of care.    Plan:                Follow up in about 2 weeks (around 6/1/2020).

## 2020-05-22 RX ORDER — TORSEMIDE 20 MG/1
TABLET ORAL
Qty: 180 TABLET | Refills: 1 | Status: SHIPPED | OUTPATIENT
Start: 2020-05-22 | End: 2020-11-13

## 2020-05-22 NOTE — PROCEDURES
"Debridement  Date/Time: 5/18/2020 10:27 AM  Performed by: Pool Gutierrez MD  Authorized by: Pool Gutierrez MD     Time out: Immediately prior to procedure a "time out" was called to verify the correct patient, procedure, equipment, support staff and site/side marked as required.    Consent Done?:  Yes (Verbal)    Preparation: Patient was prepped and draped in usual sterile fashion    Local anesthesia used?: Yes    Local anesthetic:  Topical anesthetic    Wound Details:    Location:  Right foot    Location:  Right Ankle    Type of Debridement:  Excisional       Length (cm):  1.6       Area (sq cm):  8.64       Width (cm):  5.4       Percent Debrided (%):  100       Depth (cm):  0.2       Total Area Debrided (sq cm):  8.64    Depth of debridement:  Subcutaneous tissue    Tissue debrided:  Subcutaneous and Dermis    Devitalized tissue debrided:  Biofilm, Fibrin and Slough    Instruments:  Curette    Bleeding:  Minimal  Patient tolerance:  Patient tolerated the procedure well with no immediate complications      "

## 2020-05-25 ENCOUNTER — EXTERNAL HOME HEALTH (OUTPATIENT)
Dept: HOME HEALTH SERVICES | Facility: HOSPITAL | Age: 55
End: 2020-05-25

## 2020-05-26 ENCOUNTER — TELEPHONE (OUTPATIENT)
Dept: ENDOCRINOLOGY | Facility: CLINIC | Age: 55
End: 2020-05-26

## 2020-05-26 NOTE — TELEPHONE ENCOUNTER
Due to COVID-19 Dr Frank will not be seeing any patients in the office.Dr Frank has asked that all patient be switched to virtual or telephone visit until mid June 2020.     I called the patient but I was not able to reach him/her. I did leave a detailed voicemail.   Awaiting a callback.

## 2020-05-27 DIAGNOSIS — Z86.718 HISTORY OF DVT (DEEP VEIN THROMBOSIS): ICD-10-CM

## 2020-05-27 DIAGNOSIS — Z79.01 LONG TERM (CURRENT) USE OF ANTICOAGULANTS: ICD-10-CM

## 2020-05-27 RX ORDER — RIVAROXABAN 20 MG/1
TABLET, FILM COATED ORAL
Qty: 90 TABLET | Refills: 1 | Status: SHIPPED | OUTPATIENT
Start: 2020-05-27 | End: 2021-04-05 | Stop reason: SDUPTHER

## 2020-06-01 ENCOUNTER — HOSPITAL ENCOUNTER (OUTPATIENT)
Dept: WOUND CARE | Facility: HOSPITAL | Age: 55
Discharge: HOME | End: 2020-06-01
Attending: EMERGENCY MEDICINE
Payer: MEDICARE

## 2020-06-01 VITALS
BODY MASS INDEX: 42.66 KG/M2 | HEART RATE: 81 BPM | WEIGHT: 315 LBS | DIASTOLIC BLOOD PRESSURE: 70 MMHG | SYSTOLIC BLOOD PRESSURE: 131 MMHG | HEIGHT: 72 IN

## 2020-06-01 DIAGNOSIS — L97.909 VENOUS STASIS ULCER OF LOWER EXTREMITY, UNSPECIFIED LATERALITY: Primary | ICD-10-CM

## 2020-06-01 DIAGNOSIS — I83.009 VENOUS STASIS ULCER OF LOWER EXTREMITY, UNSPECIFIED LATERALITY: Primary | ICD-10-CM

## 2020-06-01 PROCEDURE — 27201912 HC WOUND CARE DEBRIDEMENT SUPPLIES

## 2020-06-01 PROCEDURE — 11042 DBRDMT SUBQ TIS 1ST 20SQCM/<: CPT

## 2020-06-01 PROCEDURE — 29581 APPL MULTLAYER CMPRN SYS LEG: CPT

## 2020-06-01 NOTE — PROGRESS NOTES
"Subjective:       Patient ID: Drew Farrar is a 54 y.o. male.    Chief Complaint: Wound Care    2/15/19: Readmitted for venous ulcer to left lateral foot which developed about 2 weeks ago. Pulses noted with doppler and capillary refill <3.Dr Gutierrez assessed patient and wound care plan ordered for compression therapy and hydrafera blue to wound bed. No apparent signs of infection noted. Patient to follw-up in clinic in 2 weeks.DB   2/21/19: Nurse visit for dressing change. Refused care to skin tear on left 2nd toe. See notes. RTC 1 week for DrRosy Visit.  3/1/19: Follow up with Dr. Waller today in clinic new wound to right posterior leg, culture of both wounds taken today in clinic new wound care orders to both legs for xeroform and unna with calamine toe to knee follow up in 1 week with Dr. Gutierrez  3/8/19: Follow up with Dr. Gutierrez today in clinic wounds improving, edema noted to BLE, profore toe to knee applied to both legs, RX given to patient for PO Doxycycline, follow up in 1 week.   3/15/19: Here for f/u with Dr. Gutierrez. U/S scheduled Thursday. Will need right leg rewrapped. Patient states eye DrRosy Gave him the same antibiotic dose and strength after eye surgery. Dr instructed patient to take one bottle of antibiotics for both wounds and eyes until finished. Gentamycin added to wound care orders.   3/29/19:  Wound slowly improving. No changes in wound care orders. Wound debrided per Dr Gutierrez.  4/4/19: Patient showed up to clinic today stated " I need my dressing changed. It fell off."  Doppler pluses checked and present.  Patient refusing care to right, no stocking present on leg at this visit. Patient stated " No they said this leg is discharged, Dr. Lantigua has to drain the fluid out this leg with a needle. No wound care to this leg anymore when I come."      9/9/19: Readmit to wound care clinic for venous ulcer to left lateral foot.  Patient states it reopened about 3 weeks after last discharge " "from wound care clinic 7/1/19. Per patient wound has been treated by PCP.  Patient reports just using a large band- aid and compression sock. Patient states he was recently treated at [facility] for HBOT for this wound " in the last two or three months after it reopened "  Requesting HBOT here at the wound care clinic.  Dr. Gutierrez examined patient today, doppler pulses present, wound debrided with curette by Dr. Gutierrez patient tolerated well. Wound care orders given to apply hydrorefa blue ready to wound, cover with mepore dressing and apply patient's compression sock. Dr. Manuel discussed with patient obtaining his medical records from [facility], to view to determine if patient will benefit from HBOT, patient verbalized understanding.  Authorization for release of health information sheet signed by patient and faxed to [facility] today in clinic. Follow up in 1 week with MD.  9/23/19: F/U with Dr. Gutierrez. Client recently discharged from hospital. Left foot site debrided. Client being followed by [facility].  12/9/19: Patient seen today in clinic by Dr. Gutierrez, no change to wound. Pt reports recently discharged from hospital 12/3/19 for irregular heat rate and chest pains. Debridement per Dr. Gutierrez tolerated well, wound care continued as ordered 4 layer compression toe to knee LLE.   F/u with Dr. Gutierrez , wound progressing well. Compression changed to coflex with calamine due to itching.  12/27/19:Dr Gutierrez assessed patient. Wound improving. Change in dressing change frequency. Follow up in 2 weeks.    1/10/2020: Patient seen today in clinic by  wound slowly improving. Calamine coflex toe to knee applied today in clinic. Fu 2 weeks.  2/10/2020: F/U WITH  today , pt had not been here in 10 days with dressing still in place. New wounds noted to right lower leg. Pt to have  u/s tomorrow of bilateral lower extremeties and to see  on Wednesday 2/12/2020 for possible " "procedure of right leg per patient. Pt to return on Thursday to clinic for dressing change and to see DR. Sury Vaca (podiatrist) for toe nail clipping and to evaluate toes on right foot.  As above; no c/o pain noted.  02/28/2020: F/u with Dr. Gutierrez. Wounds to RLE open to air. Patient states bandage came off while he was sleeping. Wound to left foot decreasing in size. Left foot wound debrided today in clinic. Continue with current wound care treatment and follow up in  Clinic in 1 week    3/13/20: F/U with Dr. Gutierrez.  Wounds improving.  Will increase compression to LLE.  4/3/2020: Fu with  today in clinic. LLE wound measuring bigger.  RLE improving. Patient complains of pain, tenderness,swelling  x 2 weeks to RLE.  Patient states he went to the ER 2 weeks ago " and they kicked me out because of the Coronavirus." Patient also reports to Dr. Gutierrez " night sweats for 2 weeks." Temp today on arrival to wound care clinic 99.1 oral. LLE debrided per  patient tolerated well, wound care 3 layer compression toe to knee as ordered.   Ultra sound ordered of RLE stat today in clinic to rule out DVT.  MD rewieved results, results negative discussed with patient.  RX given to patient  for Doxycycline Po.  Patient to follow up 4/6/2020 with .  4/13/2020; Fu with Dr. Gutierrez. Patient states RLE pain is much better since starting oral Doxycycline.  RLE wound improving. LLE wound remains unchanged. LLE wound debrided per  patient tolerated well. Continued with 3 layer compression to LLE and tubi  G x 2 to RLE. RTC 4/27/2020.  4/27/20: F/U with Dr. Gutierrez. RLE resolved. Continue Tubigrip G x 2 toes to knee. Left foot site debrided per Dr. Gutierrez. Santyl, adaptic touch, and hydrofera blue ready ordered. Return in 2 weeks.  5/18/20: Wound slowly improving. Dr Gutierrez debrided wound which was toleraled well. Continuing with same plan of care.  06/01/2020- follow-up with " Dr. Cortez. Wound debrided, patient tolerated. Gentamicin added to wound care regimen. Follow-up with Dr. cortez in 2 weeks 06/15/2020.  No c/o noted.  Review of Systems    Objective:    Wound as noted w/o Sophia's sign or s/s of infection.  Physical Exam    Assessment:       1. Venous stasis ulcer of lower extremity, unspecified laterality           Wound 09/09/19 1057 Venous Ulcer lateral Foot #1 (Active)   09/09/19 1057    Pre-existing: Yes   Primary Wound Type: Venous ulcer   Side: Left   Orientation: lateral   Location: Foot   Wound/PI Number (optional): #1   Ankle-Brachial Index:    Pulses:    Removal Indication and Assessment:    Wound Outcome:    (Retired) Wound Type:    (Retired) Wound Length (cm):    (Retired) Wound Width (cm):    (Retired) Depth (cm):    Wound Description (Comments):    Removal Indications:    Wound Image   6/1/2020 10:00 AM   Dressing Appearance Moist drainage 6/1/2020 10:00 AM   Drainage Amount Moderate 6/1/2020 10:00 AM   Drainage Characteristics/Odor Serosanguineous 6/1/2020 10:00 AM   Appearance Pink;Red;Yellow 6/1/2020 10:00 AM   Tissue loss description Full thickness 6/1/2020 10:00 AM   Red (%), Wound Tissue Color 50 % 6/1/2020 10:00 AM   Yellow (%), Wound Tissue Color 50 % 6/1/2020 10:00 AM   Periwound Area Dry;Hemosiderin Staining 6/1/2020 10:00 AM   Wound Edges Irregular 6/1/2020 10:00 AM   Wound Length (cm) 1.8 cm 6/1/2020 10:00 AM   Wound Width (cm) 5.5 cm 6/1/2020 10:00 AM   Wound Depth (cm) 0.2 cm 6/1/2020 10:00 AM   Wound Volume (cm^3) 1.98 cm^3 6/1/2020 10:00 AM   Wound Surface Area (cm^2) 9.9 cm^2 6/1/2020 10:00 AM   Care Cleansed with:;Antimicrobial agent;Sterile normal saline 6/1/2020 10:00 AM   Dressing Changed;Foam;Cast padding;Compression wrap;Non-adherent;Hydrofiber;Other (see comments) 6/1/2020 10:00 AM   Periwound Care Absorptive dressing applied;Moisturizer applied;Topical treatment applied 6/1/2020 10:00 AM   Compression Three layer compression 6/1/2020  10:00 AM   Dressing Change Due 06/05/20 6/1/2020 10:00 AM       Venous stasis ulcer of lower extremity, unspecified laterality  - Primary     Left lateral Foot #1  Cleanse wound with: Acetic acid 0.25%, rinse with normal saline.  Lidocaine: Prn Debridement  Periwound care: Sween to dry skin Prn, betamethasone to LLE PRN. Calmoseptine to vick wound PRN.  Primary dressing:  Santyl/ Gentamicin, Adaptic touch, Hydrofera blue ready to wound  Secondary dressing: Aquacel foam, Profore lite  Offloading: Darco shoe left foot  Edema control: 3-layer Profore Lite compression wrap toes to knee   Frequency:  Monday and Friday  Follow-up: 06/15/2020 with Dr. Gutierrez    Home health: [facility] to do wound care on Monday, Friday, and prn except when in clinic. Next visit 6/15/20.    Wound debrided by Dr. Gutierrez in clinic 06/01/2020.     Plan:                Follow up in about 2 weeks (around 6/15/2020).

## 2020-06-03 ENCOUNTER — TELEPHONE (OUTPATIENT)
Dept: ENDOCRINOLOGY | Facility: CLINIC | Age: 55
End: 2020-06-03

## 2020-06-03 NOTE — TELEPHONE ENCOUNTER
Due to COVID-19 Dr Frank will not be seeing any patients in the office.Dr Frank has asked that all patient be switched to virtual or telephone visit until mid  June 2020.         Pt confirmed apt for June 23,2020. Pt voices understanding.  TerriB 06/03/2020.

## 2020-06-05 NOTE — PROCEDURES
"Debridement  Date/Time: 6/1/2020 8:53 AM  Performed by: Pool Gutierrez MD  Authorized by: Pool Gutierrez MD     Time out: Immediately prior to procedure a "time out" was called to verify the correct patient, procedure, equipment, support staff and site/side marked as required.    Consent Done?:  Yes (Verbal)    Preparation: Patient was prepped and draped in usual sterile fashion    Local anesthesia used?: Yes    Local anesthetic:  Topical anesthetic    Wound Details:    Location:  Left foot    Location:  Left Dorsal Foot    Type of Debridement:  Excisional       Length (cm):  1.8       Area (sq cm):  9.9       Width (cm):  5.5       Percent Debrided (%):  100       Depth (cm):  0.2       Total Area Debrided (sq cm):  9.9    Depth of debridement:  Subcutaneous tissue    Tissue debrided:  Subcutaneous, Dermis and Epidermis    Devitalized tissue debrided:  Biofilm    Instruments:  Curette    Bleeding:  Minimal  Patient tolerance:  Patient tolerated the procedure well with no immediate complications      "

## 2020-06-15 ENCOUNTER — HOSPITAL ENCOUNTER (OUTPATIENT)
Dept: WOUND CARE | Facility: HOSPITAL | Age: 55
Discharge: HOME | End: 2020-06-15
Attending: EMERGENCY MEDICINE
Payer: MEDICARE

## 2020-06-15 VITALS
SYSTOLIC BLOOD PRESSURE: 129 MMHG | BODY MASS INDEX: 42.66 KG/M2 | TEMPERATURE: 98 F | WEIGHT: 315 LBS | DIASTOLIC BLOOD PRESSURE: 70 MMHG | HEART RATE: 93 BPM | HEIGHT: 72 IN

## 2020-06-15 DIAGNOSIS — I83.009 VENOUS STASIS ULCER OF LOWER EXTREMITY, UNSPECIFIED LATERALITY: Primary | ICD-10-CM

## 2020-06-15 DIAGNOSIS — L97.909 VENOUS STASIS ULCER OF LOWER EXTREMITY, UNSPECIFIED LATERALITY: Primary | ICD-10-CM

## 2020-06-15 PROCEDURE — 27201912 HC WOUND CARE DEBRIDEMENT SUPPLIES: Performed by: NURSE PRACTITIONER

## 2020-06-15 PROCEDURE — 11042 DBRDMT SUBQ TIS 1ST 20SQCM/<: CPT | Performed by: NURSE PRACTITIONER

## 2020-06-15 NOTE — PROGRESS NOTES
"Subjective:       Patient ID: Drew Farrar is a 54 y.o. male.    Chief Complaint: Venous Stasis    2/15/19: Readmitted for venous ulcer to left lateral foot which developed about 2 weeks ago. Pulses noted with doppler and capillary refill <3.Dr Gutierrez assessed patient and wound care plan ordered for compression therapy and hydrafera blue to wound bed. No apparent signs of infection noted. Patient to follw-up in clinic in 2 weeks.DB   2/21/19: Nurse visit for dressing change. Refused care to skin tear on left 2nd toe. See notes. RTC 1 week for DrRosy Visit.  3/1/19: Follow up with Dr. Waller today in clinic new wound to right posterior leg, culture of both wounds taken today in clinic new wound care orders to both legs for xeroform and unna with calamine toe to knee follow up in 1 week with Dr. Gutierrez  3/8/19: Follow up with Dr. Gutierrez today in clinic wounds improving, edema noted to BLE, profore toe to knee applied to both legs, RX given to patient for PO Doxycycline, follow up in 1 week.   3/15/19: Here for f/u with Dr. Gutierrez. U/S scheduled Thursday. Will need right leg rewrapped. Patient states eye DrRosy Gave him the same antibiotic dose and strength after eye surgery. Dr instructed patient to take one bottle of antibiotics for both wounds and eyes until finished. Gentamycin added to wound care orders.   3/29/19:  Wound slowly improving. No changes in wound care orders. Wound debrided per Dr Gutierrez.  4/4/19: Patient showed up to clinic today stated " I need my dressing changed. It fell off."  Doppler pluses checked and present.  Patient refusing care to right, no stocking present on leg at this visit. Patient stated " No they said this leg is discharged, Dr. Lantigua has to drain the fluid out this leg with a needle. No wound care to this leg anymore when I come."      9/9/19: Readmit to wound care clinic for venous ulcer to left lateral foot.  Patient states it reopened about 3 weeks after last discharge " "from wound care clinic 7/1/19. Per patient wound has been treated by PCP.  Patient reports just using a large band- aid and compression sock. Patient states he was recently treated at [facility] for HBOT for this wound " in the last two or three months after it reopened "  Requesting HBOT here at the wound care clinic.  Dr. Gutierrez examined patient today, doppler pulses present, wound debrided with curette by Dr. Gutierrez patient tolerated well. Wound care orders given to apply hydrorefa blue ready to wound, cover with mepore dressing and apply patient's compression sock. Dr. Manuel discussed with patient obtaining his medical records from [facility], to view to determine if patient will benefit from HBOT, patient verbalized understanding.  Authorization for release of health information sheet signed by patient and faxed to [facility] today in clinic. Follow up in 1 week with MD.  9/23/19: F/U with Dr. Gutierrez. Client recently discharged from hospital. Left foot site debrided. Client being followed by [facility].  12/9/19: Patient seen today in clinic by Dr. Gutierrez, no change to wound. Pt reports recently discharged from hospital 12/3/19 for irregular heat rate and chest pains. Debridement per Dr. Gutierrez tolerated well, wound care continued as ordered 4 layer compression toe to knee LLE.   F/u with Dr. Gutierrez , wound progressing well. Compression changed to coflex with calamine due to itching.  12/27/19:Dr Gutierrez assessed patient. Wound improving. Change in dressing change frequency. Follow up in 2 weeks.    1/10/2020: Patient seen today in clinic by  wound slowly improving. Calamine coflex toe to knee applied today in clinic. Fu 2 weeks.  2/10/2020: F/U WITH  today , pt had not been here in 10 days with dressing still in place. New wounds noted to right lower leg. Pt to have  u/s tomorrow of bilateral lower extremeties and to see  on Wednesday 2/12/2020 for possible " "procedure of right leg per patient. Pt to return on Thursday to clinic for dressing change and to see DR. Sury Vaca (podiatrist) for toe nail clipping and to evaluate toes on right foot.  As above; no c/o pain noted.  02/28/2020: F/u with Dr. Gutierrez. Wounds to RLE open to air. Patient states bandage came off while he was sleeping. Wound to left foot decreasing in size. Left foot wound debrided today in clinic. Continue with current wound care treatment and follow up in  Clinic in 1 week    3/13/20: F/U with Dr. Gutierrez.  Wounds improving.  Will increase compression to LLE.  4/3/2020: Fu with  today in clinic. LLE wound measuring bigger.  RLE improving. Patient complains of pain, tenderness,swelling  x 2 weeks to RLE.  Patient states he went to the ER 2 weeks ago " and they kicked me out because of the Coronavirus." Patient also reports to Dr. Gutierrez " night sweats for 2 weeks." Temp today on arrival to wound care clinic 99.1 oral. LLE debrided per  patient tolerated well, wound care 3 layer compression toe to knee as ordered.   Ultra sound ordered of RLE stat today in clinic to rule out DVT.  MD rewieved results, results negative discussed with patient.  RX given to patient  for Doxycycline Po.  Patient to follow up 4/6/2020 with .  4/13/2020; Fu with Dr. Gutierrez. Patient states RLE pain is much better since starting oral Doxycycline.  RLE wound improving. LLE wound remains unchanged. LLE wound debrided per  patient tolerated well. Continued with 3 layer compression to LLE and tubi  G x 2 to RLE. RTC 4/27/2020.  4/27/20: F/U with Dr. Gutierrez. RLE resolved. Continue Tubigrip G x 2 toes to knee. Left foot site debrided per Dr. Gutierrez. Santyl, adaptic touch, and hydrofera blue ready ordered. Return in 2 weeks.  5/18/20: Wound slowly improving. Dr Gutierrez debrided wound which was toleraled well. Continuing with same plan of care.  06/01/2020- follow-up with " Dr. Cortez. Wound debrided, patient tolerated. Gentamicin added to wound care regimen. Follow-up with Dr. cortez in 2 weeks 06/15/2020.  6/15/2020: F/U with Dr. Cortez.  Wound improving.  No new complaints.    Pt. Denies pain @ wound site.  Review of Systems    Objective:    Wound smaller per staff w/ recurrence of yellowish-brown fibrin & biofilm. No Sophia's sign or s/s of infection present.  Physical Exam    Assessment:       1. Venous stasis ulcer of lower extremity, unspecified laterality           Wound 09/09/19 1057 Venous Ulcer lateral Foot #1 (Active)   09/09/19 1057    Pre-existing: Yes   Primary Wound Type: Venous ulcer   Side: Left   Orientation: lateral   Location: Foot   Wound/PI Number (optional): #1   Ankle-Brachial Index:    Pulses:    Removal Indication and Assessment:    Wound Outcome:    (Retired) Wound Type:    (Retired) Wound Length (cm):    (Retired) Wound Width (cm):    (Retired) Depth (cm):    Wound Description (Comments):    Removal Indications:    Dressing Appearance Intact;Moist drainage 06/15/20 1100   Drainage Amount Moderate 06/15/20 1100   Drainage Characteristics/Odor Serosanguineous 06/15/20 1100   Appearance Red;Yellow 06/15/20 1100   Tissue loss description Full thickness 06/15/20 1100   Red (%), Wound Tissue Color 5 % 06/15/20 1100   Yellow (%), Wound Tissue Color 95 % 06/15/20 1100   Periwound Area Intact;Edematous;Excoriated 06/15/20 1100   Wound Edges Irregular 06/15/20 1100   Wound Length (cm) 1.7 cm 06/15/20 1100   Wound Width (cm) 4.5 cm 06/15/20 1100   Wound Depth (cm) 0.1 cm 06/15/20 1100   Wound Volume (cm^3) 0.76 cm^3 06/15/20 1100   Wound Surface Area (cm^2) 7.65 cm^2 06/15/20 1100   Care Cleansed with:;Sterile normal saline;Antimicrobial agent 06/15/20 1100   Dressing Changed;Collagen;Hydrofiber;Foam;Non-adherent;Compression wrap 06/15/20 1100   Periwound Care Absorptive dressing applied;Moisture barrier applied;Topical treatment applied 06/15/20 1100    Compression Three layer compression 06/15/20 1100   Dressing Change Due 06/19/20 06/15/20 1100       Wound cleaned with acetic acid and rinsed with normal saline.  Lidocaine to wound bed.  Debridement using curette by Dr. Gutierrez.  Calmoseptine + betamethasone to periwound and toes. Santyl and Gentamicin mixture to wound bed.  Covered with adaptic touch + hydrofera ready + Aquacel foam. 3-layer Profore lite toes to knee and Darco shoe.    Plan:                Follow up in about 2 weeks (around 6/29/2020) for Dr. Gutierrez.

## 2020-06-29 ENCOUNTER — HOSPITAL ENCOUNTER (OUTPATIENT)
Dept: WOUND CARE | Facility: HOSPITAL | Age: 55
Discharge: HOME | End: 2020-06-29
Attending: EMERGENCY MEDICINE
Payer: MEDICARE

## 2020-06-29 VITALS
DIASTOLIC BLOOD PRESSURE: 61 MMHG | WEIGHT: 315 LBS | HEIGHT: 72 IN | SYSTOLIC BLOOD PRESSURE: 120 MMHG | TEMPERATURE: 98 F | BODY MASS INDEX: 42.66 KG/M2 | HEART RATE: 88 BPM

## 2020-06-29 DIAGNOSIS — I83.009 VENOUS STASIS ULCER OF LOWER EXTREMITY, UNSPECIFIED LATERALITY: Primary | ICD-10-CM

## 2020-06-29 DIAGNOSIS — L97.909 VENOUS STASIS ULCER OF LOWER EXTREMITY, UNSPECIFIED LATERALITY: Primary | ICD-10-CM

## 2020-06-29 PROCEDURE — 11042 DBRDMT SUBQ TIS 1ST 20SQCM/<: CPT | Performed by: NURSE PRACTITIONER

## 2020-06-29 PROCEDURE — 27201912 HC WOUND CARE DEBRIDEMENT SUPPLIES: Performed by: NURSE PRACTITIONER

## 2020-06-29 NOTE — PROCEDURES
"Debridement    Date/Time: 6/15/2020 10:28 AM  Performed by: Pool Gutierrez MD  Authorized by: Pool Gutierrez MD     Time out: Immediately prior to procedure a "time out" was called to verify the correct patient, procedure, equipment, support staff and site/side marked as required.    Consent Done?:  Yes (Verbal)    Preparation: Patient was prepped and draped in usual sterile fashion    Local anesthesia used?: Yes    Local anesthetic:  Topical anesthetic    Wound Details:    Location:  Left foot    Type of Debridement:  Excisional       Length (cm):  1.7       Area (sq cm):  7.65       Width (cm):  4.5       Percent Debrided (%):  100       Depth (cm):  0.1       Total Area Debrided (sq cm):  7.65    Depth of debridement:  Subcutaneous tissue    Tissue debrided:  Subcutaneous and Epidermis    Devitalized tissue debrided:  Biofilm, Fibrin and Slough    Instruments:  Curette    Bleeding:  Minimal  Patient tolerance:  Patient tolerated the procedure well with no immediate complications      "

## 2020-06-29 NOTE — PROGRESS NOTES
"Subjective:       Patient ID: Drew Farrar is a 54 y.o. male.    Chief Complaint: Venous Ulcer    2/15/19: Readmitted for venous ulcer to left lateral foot which developed about 2 weeks ago. Pulses noted with doppler and capillary refill <3.Dr Gutierrez assessed patient and wound care plan ordered for compression therapy and hydrafera blue to wound bed. No apparent signs of infection noted. Patient to follw-up in clinic in 2 weeks.DB   2/21/19: Nurse visit for dressing change. Refused care to skin tear on left 2nd toe. See notes. RTC 1 week for DrRosy Visit.  3/1/19: Follow up with Dr. Waller today in clinic new wound to right posterior leg, culture of both wounds taken today in clinic new wound care orders to both legs for xeroform and unna with calamine toe to knee follow up in 1 week with Dr. Gutierrez  3/8/19: Follow up with Dr. Gutierrez today in clinic wounds improving, edema noted to BLE, profore toe to knee applied to both legs, RX given to patient for PO Doxycycline, follow up in 1 week.   3/15/19: Here for f/u with Dr. Gutierrez. U/S scheduled Thursday. Will need right leg rewrapped. Patient states eye DrRosy Gave him the same antibiotic dose and strength after eye surgery. Dr instructed patient to take one bottle of antibiotics for both wounds and eyes until finished. Gentamycin added to wound care orders.   3/29/19:  Wound slowly improving. No changes in wound care orders. Wound debrided per Dr Gutierrez.  4/4/19: Patient showed up to clinic today stated " I need my dressing changed. It fell off."  Doppler pluses checked and present.  Patient refusing care to right, no stocking present on leg at this visit. Patient stated " No they said this leg is discharged, Dr. Lantigua has to drain the fluid out this leg with a needle. No wound care to this leg anymore when I come."      9/9/19: Readmit to wound care clinic for venous ulcer to left lateral foot.  Patient states it reopened about 3 weeks after last discharge " "from wound care clinic 7/1/19. Per patient wound has been treated by PCP.  Patient reports just using a large band- aid and compression sock. Patient states he was recently treated at [facility] for HBOT for this wound " in the last two or three months after it reopened "  Requesting HBOT here at the wound care clinic.  Dr. Gutierrez examined patient today, doppler pulses present, wound debrided with curette by Dr. Gutierrez patient tolerated well. Wound care orders given to apply hydrorefa blue ready to wound, cover with mepore dressing and apply patient's compression sock. Dr. Manuel discussed with patient obtaining his medical records from [facility], to view to determine if patient will benefit from HBOT, patient verbalized understanding.  Authorization for release of health information sheet signed by patient and faxed to [facility] today in clinic. Follow up in 1 week with MD.  9/23/19: F/U with Dr. Gutierrez. Client recently discharged from hospital. Left foot site debrided. Client being followed by [facility].  12/9/19: Patient seen today in clinic by Dr. Gutierrez, no change to wound. Pt reports recently discharged from hospital 12/3/19 for irregular heat rate and chest pains. Debridement per Dr. Gutierrez tolerated well, wound care continued as ordered 4 layer compression toe to knee LLE.   F/u with Dr. Gutierrez , wound progressing well. Compression changed to coflex with calamine due to itching.  12/27/19:Dr Gutierrez assessed patient. Wound improving. Change in dressing change frequency. Follow up in 2 weeks.    1/10/2020: Patient seen today in clinic by  wound slowly improving. Calamine coflex toe to knee applied today in clinic. Fu 2 weeks.  2/10/2020: F/U WITH  today , pt had not been here in 10 days with dressing still in place. New wounds noted to right lower leg. Pt to have  u/s tomorrow of bilateral lower extremeties and to see  on Wednesday 2/12/2020 for possible " "procedure of right leg per patient. Pt to return on Thursday to clinic for dressing change and to see DR. Sury Vaca (podiatrist) for toe nail clipping and to evaluate toes on right foot.  As above; no c/o pain noted.  02/28/2020: F/u with Dr. Gutierrez. Wounds to RLE open to air. Patient states bandage came off while he was sleeping. Wound to left foot decreasing in size. Left foot wound debrided today in clinic. Continue with current wound care treatment and follow up in  Clinic in 1 week    3/13/20: F/U with Dr. Gutierrez.  Wounds improving.  Will increase compression to LLE.  4/3/2020: Fu with  today in clinic. LLE wound measuring bigger.  RLE improving. Patient complains of pain, tenderness,swelling  x 2 weeks to RLE.  Patient states he went to the ER 2 weeks ago " and they kicked me out because of the Coronavirus." Patient also reports to Dr. Gutierrez " night sweats for 2 weeks." Temp today on arrival to wound care clinic 99.1 oral. LLE debrided per  patient tolerated well, wound care 3 layer compression toe to knee as ordered.   Ultra sound ordered of RLE stat today in clinic to rule out DVT.  MD rewieved results, results negative discussed with patient.  RX given to patient  for Doxycycline Po.  Patient to follow up 4/6/2020 with .  4/13/2020; Fu with Dr. Gutierrez. Patient states RLE pain is much better since starting oral Doxycycline.  RLE wound improving. LLE wound remains unchanged. LLE wound debrided per  patient tolerated well. Continued with 3 layer compression to LLE and tubi  G x 2 to RLE. RTC 4/27/2020.  4/27/20: F/U with Dr. Gutierrez. RLE resolved. Continue Tubigrip G x 2 toes to knee. Left foot site debrided per Dr. Gutierrez. Santyl, adaptic touch, and hydrofera blue ready ordered. Return in 2 weeks.  5/18/20: Wound slowly improving. Dr Gutierrez debrided wound which was toleraled well. Continuing with same plan of care.  06/01/2020- follow-up with " Dr. Cortez. Wound debrided, patient tolerated. Gentamicin added to wound care regimen. Follow-up with Dr. cortez in 2 weeks 06/15/2020.  6/15/2020: F/U with Dr. Cortez.  Wound improving.  No new complaints.    6/29/2020: F/U with Dr. Cortez. Wound continues to slowly improve.  No c/o noted.  Review of Systems    Objective:    Wound as noted w/o s/s of infection or Sophia's sign  Physical Exam    Assessment:       1. Venous stasis ulcer of lower extremity, unspecified laterality           Wound 09/09/19 1057 Venous Ulcer lateral Foot #1 (Active)   09/09/19 1057    Pre-existing: Yes   Primary Wound Type: Venous ulcer   Side: Left   Orientation: lateral   Location: Foot   Wound Number (optional): #1   Ankle-Brachial Index:    Pulses:    Removal Indication and Assessment:    Wound Outcome:    (Retired) Wound Type:    (Retired) Wound Length (cm):    (Retired) Wound Width (cm):    (Retired) Depth (cm):    Wound Description (Comments):    Removal Indications:    Dressing Appearance Intact;Moist drainage 06/29/20 1000   Drainage Amount Moderate 06/29/20 1000   Drainage Characteristics/Odor Serosanguineous 06/29/20 1000   Appearance Red;Yellow;Fibrin;Epithelialization;Moist 06/29/20 1000   Tissue loss description Full thickness 06/29/20 1000   Red (%), Wound Tissue Color 10 % 06/29/20 1000   Yellow (%), Wound Tissue Color 90 % 06/29/20 1000   Periwound Area Intact;Edematous;Excoriated 06/29/20 1000   Wound Edges Irregular 06/29/20 1000   Wound Length (cm) 1.5 cm 06/29/20 1000   Wound Width (cm) 4.7 cm 06/29/20 1000   Wound Depth (cm) 0.1 cm 06/29/20 1000   Wound Volume (cm^3) 0.7 cm^3 06/29/20 1000   Wound Surface Area (cm^2) 7.05 cm^2 06/29/20 1000   Care Cleansed with:;Antimicrobial agent;Sterile normal saline 06/29/20 1000   Dressing Changed;Collagen;Foam;Non-adherent;Compression wrap 06/29/20 1000   Periwound Care Absorptive dressing applied;Moisture barrier applied;Topical treatment applied 06/29/20 1000    Compression Three layer compression 06/29/20 1000   Dressing Change Due 07/03/20 06/29/20 1000        Wound cleaned with acetic acid and rinsed with normal saline.  Lidocaine to wound bed.  Debridement using curette by Dr. Gutierrez.  Calmoseptine + betamethasone to periwound and toes. Santyl and Gentamicin mixture to wound bed.  Covered with adaptic touch + hydrofera ready + Aquacel foam. 3-layer Profore lite toes to knee and Darco shoe.    Plan:                Follow up in about 2 weeks (around 7/13/2020) for Dr. Gutierrez.

## 2020-07-06 NOTE — PROCEDURES
"Debridement    Date/Time: 6/29/2020 3:16 PM  Performed by: Pool Gutierrez MD  Authorized by: Pool Gutierrez MD     Time out: Immediately prior to procedure a "time out" was called to verify the correct patient, procedure, equipment, support staff and site/side marked as required.    Consent Done?:  Yes (Verbal)    Preparation: Patient was prepped and draped in usual sterile fashion    Local anesthesia used?: Yes    Local anesthetic:  Topical anesthetic    Wound Details:    Location:  Left foot    Location:  Left Ankle    Type of Debridement:  Excisional       Length (cm):  1.5       Area (sq cm):  7.05       Width (cm):  4.7       Percent Debrided (%):  100       Depth (cm):  0.1       Total Area Debrided (sq cm):  7.05    Depth of debridement:  Subcutaneous tissue    Tissue debrided:  Subcutaneous and Dermis    Devitalized tissue debrided:  Biofilm, Fibrin and Slough    Instruments:  Curette    Bleeding:  Minimal  Patient tolerance:  Patient tolerated the procedure well with no immediate complications      "

## 2020-07-09 ENCOUNTER — OFFICE VISIT (OUTPATIENT)
Dept: ORTHOPEDICS | Facility: CLINIC | Age: 55
End: 2020-07-09
Payer: MEDICARE

## 2020-07-09 ENCOUNTER — DOCUMENT SCAN (OUTPATIENT)
Dept: HOME HEALTH SERVICES | Facility: HOSPITAL | Age: 55
End: 2020-07-09

## 2020-07-09 VITALS — BODY MASS INDEX: 42.66 KG/M2 | WEIGHT: 315 LBS | HEIGHT: 72 IN

## 2020-07-09 DIAGNOSIS — M17.11 PRIMARY OSTEOARTHRITIS OF RIGHT KNEE: Primary | ICD-10-CM

## 2020-07-09 PROCEDURE — 99213 OFFICE O/P EST LOW 20 MIN: CPT | Mod: S$GLB,,, | Performed by: ORTHOPAEDIC SURGERY

## 2020-07-09 PROCEDURE — 99213 PR OFFICE/OUTPT VISIT, EST, LEVL III, 20-29 MIN: ICD-10-PCS | Mod: S$GLB,,, | Performed by: ORTHOPAEDIC SURGERY

## 2020-07-09 PROCEDURE — 3008F BODY MASS INDEX DOCD: CPT | Mod: CPTII,S$GLB,, | Performed by: ORTHOPAEDIC SURGERY

## 2020-07-09 PROCEDURE — 99999 PR PBB SHADOW E&M-EST. PATIENT-LVL III: ICD-10-PCS | Mod: PBBFAC,,, | Performed by: ORTHOPAEDIC SURGERY

## 2020-07-09 PROCEDURE — 3008F PR BODY MASS INDEX (BMI) DOCUMENTED: ICD-10-PCS | Mod: CPTII,S$GLB,, | Performed by: ORTHOPAEDIC SURGERY

## 2020-07-09 PROCEDURE — 99999 PR PBB SHADOW E&M-EST. PATIENT-LVL III: CPT | Mod: PBBFAC,,, | Performed by: ORTHOPAEDIC SURGERY

## 2020-07-09 NOTE — PROGRESS NOTES
Subjective:      Patient ID: Drew Farrar is a 54 y.o. male.    Chief Complaint: Swelling and Pain of the Right Knee    HPI    This is a follow-up for right knee pain.  Patient was treated previously for osteoarthritis.  The patient reports that after see me had arthroscopic surgery by another physician.  He reports he had a postoperative infection which required extensive antibiotic treatment as well as repeat arthroscopy.  He complains of 2 years of pain and stiffness in the right knee.  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  He complains of swelling in the right leg which he attributes to his knee.          Review of Systems   Constitution: Negative for fever and weight loss.   HENT: Negative for congestion.    Eyes: Negative for visual disturbance.   Cardiovascular: Negative for chest pain.   Respiratory: Negative for shortness of breath.    Hematologic/Lymphatic: Negative for bleeding problem. Does not bruise/bleed easily.   Skin: Negative for poor wound healing.   Musculoskeletal: Positive for joint pain and joint swelling.   Gastrointestinal: Negative for abdominal pain.   Genitourinary: Negative for dysuria.   Neurological: Negative for seizures.   Psychiatric/Behavioral: Negative for altered mental status.   Allergic/Immunologic: Negative for persistent infections.         Objective:      Ortho/SPM Exam      Right knee    [unfilled]    The patient is not in acute distress.   Sclerae normal  Body habitus is obese.  Respiratory distress:  none   Hip irritability  negative.   The skin over the knee is intact.  Knee effusion trace  Tendernes is located none  Range of motion- Flexion 100 deg, Extension 30 deg,   Ligament laxity exam:   MCL 1+   Lachman 0   Post sag  0    LCL 0  There is grade 3 to for pretibial edema.  Pulses difficult to palpate due to swelling-arterial perfusion seems intact  Motor normal 5/5 strength in all tested muscle groups.   Sensory normal.      I reviewed  the relevant imaging for the patient's condition:  previous radiographs demonstrate medial narrowing      Assessment:       Encounter Diagnosis   Name Primary?    Primary osteoarthritis of right knee Yes        Most of the patient's swelling is due to his thromboembolic difficulties.    The patient has osteoarthritis of his right knee which did not respond arthroscopic surgery.  Despite the significant nature this problem, I doubt that much of the distal edema is attributable to the knee.          Plan:       Drew was seen today for swelling and pain.    Diagnoses and all orders for this visit:    Primary osteoarthritis of right knee          I explained my diagnostic impression and the reasoning behind it in detail, using layman's terms.  Models and/or pictures were used to help in the explanation.    I cannot safely recommend consideration of surgery for this patient in the foreseeable future.  Palliative treatment with RFA is reasonable.  I advised the patient to discuss holiday from [facility] with his treating physician.  If this is possible, I would refer him for RFA.  I explained the process any agreed.

## 2020-07-10 ENCOUNTER — DOCUMENT SCAN (OUTPATIENT)
Dept: HOME HEALTH SERVICES | Facility: HOSPITAL | Age: 55
End: 2020-07-10

## 2020-07-13 ENCOUNTER — HOSPITAL ENCOUNTER (OUTPATIENT)
Dept: WOUND CARE | Facility: HOSPITAL | Age: 55
Discharge: HOME | End: 2020-07-13
Attending: EMERGENCY MEDICINE
Payer: MEDICARE

## 2020-07-13 VITALS
HEIGHT: 72 IN | HEART RATE: 91 BPM | BODY MASS INDEX: 42.66 KG/M2 | TEMPERATURE: 98 F | WEIGHT: 315 LBS | DIASTOLIC BLOOD PRESSURE: 67 MMHG | SYSTOLIC BLOOD PRESSURE: 126 MMHG

## 2020-07-13 DIAGNOSIS — L97.421 VENOUS STASIS ULCER OF LEFT MIDFOOT LIMITED TO BREAKDOWN OF SKIN, UNSPECIFIED WHETHER VARICOSE VEINS PRESENT: Primary | ICD-10-CM

## 2020-07-13 DIAGNOSIS — I83.024 VENOUS STASIS ULCER OF LEFT MIDFOOT LIMITED TO BREAKDOWN OF SKIN, UNSPECIFIED WHETHER VARICOSE VEINS PRESENT: Primary | ICD-10-CM

## 2020-07-13 PROCEDURE — 27201912 HC WOUND CARE DEBRIDEMENT SUPPLIES: Performed by: NURSE PRACTITIONER

## 2020-07-13 PROCEDURE — 11042 DBRDMT SUBQ TIS 1ST 20SQCM/<: CPT | Performed by: NURSE PRACTITIONER

## 2020-07-13 NOTE — PROGRESS NOTES
"Subjective:       Patient ID: Drew Farrar is a 54 y.o. male.    Chief Complaint: Venous Stasis    2/15/19: Readmitted for venous ulcer to left lateral foot which developed about 2 weeks ago. Pulses noted with doppler and capillary refill <3.Dr Gutierrez assessed patient and wound care plan ordered for compression therapy and hydrafera blue to wound bed. No apparent signs of infection noted. Patient to follw-up in clinic in 2 weeks.DB   2/21/19: Nurse visit for dressing change. Refused care to skin tear on left 2nd toe. See notes. RTC 1 week for DrRosy Visit.  3/1/19: Follow up with Dr. Waller today in clinic new wound to right posterior leg, culture of both wounds taken today in clinic new wound care orders to both legs for xeroform and unna with calamine toe to knee follow up in 1 week with Dr. Gutierrez  3/8/19: Follow up with Dr. Gutierrez today in clinic wounds improving, edema noted to BLE, profore toe to knee applied to both legs, RX given to patient for PO Doxycycline, follow up in 1 week.   3/15/19: Here for f/u with Dr. Gutierrez. U/S scheduled Thursday. Will need right leg rewrapped. Patient states eye DrRosy Gave him the same antibiotic dose and strength after eye surgery. Dr instructed patient to take one bottle of antibiotics for both wounds and eyes until finished. Gentamycin added to wound care orders.   3/29/19:  Wound slowly improving. No changes in wound care orders. Wound debrided per Dr Gutierrez.  4/4/19: Patient showed up to clinic today stated " I need my dressing changed. It fell off."  Doppler pluses checked and present.  Patient refusing care to right, no stocking present on leg at this visit. Patient stated " No they said this leg is discharged, Dr. Lantigua has to drain the fluid out this leg with a needle. No wound care to this leg anymore when I come."      9/9/19: Readmit to wound care clinic for venous ulcer to left lateral foot.  Patient states it reopened about 3 weeks after last discharge " "from wound care clinic 7/1/19. Per patient wound has been treated by PCP.  Patient reports just using a large band- aid and compression sock. Patient states he was recently treated at [facility] for HBOT for this wound " in the last two or three months after it reopened "  Requesting HBOT here at the wound care clinic.  Dr. Gutierrez examined patient today, doppler pulses present, wound debrided with curette by Dr. Gutierrez patient tolerated well. Wound care orders given to apply hydrorefa blue ready to wound, cover with mepore dressing and apply patient's compression sock. Dr. Manuel discussed with patient obtaining his medical records from [facility], to view to determine if patient will benefit from HBOT, patient verbalized understanding.  Authorization for release of health information sheet signed by patient and faxed to [facility] today in clinic. Follow up in 1 week with MD.  9/23/19: F/U with Dr. Gutierrez. Client recently discharged from hospital. Left foot site debrided. Client being followed by [facility].  12/9/19: Patient seen today in clinic by Dr. Gutierrez, no change to wound. Pt reports recently discharged from hospital 12/3/19 for irregular heat rate and chest pains. Debridement per Dr. Gutierrez tolerated well, wound care continued as ordered 4 layer compression toe to knee LLE.   F/u with Dr. Gutierrez , wound progressing well. Compression changed to coflex with calamine due to itching.  12/27/19:Dr Gutierrez assessed patient. Wound improving. Change in dressing change frequency. Follow up in 2 weeks.    1/10/2020: Patient seen today in clinic by  wound slowly improving. Calamine coflex toe to knee applied today in clinic. Fu 2 weeks.  2/10/2020: F/U WITH  today , pt had not been here in 10 days with dressing still in place. New wounds noted to right lower leg. Pt to have  u/s tomorrow of bilateral lower extremeties and to see  on Wednesday 2/12/2020 for possible " "procedure of right leg per patient. Pt to return on Thursday to clinic for dressing change and to see DR. Sury Vaca (podiatrist) for toe nail clipping and to evaluate toes on right foot.  As above; no c/o pain noted.  02/28/2020: F/u with Dr. Gutierrez. Wounds to RLE open to air. Patient states bandage came off while he was sleeping. Wound to left foot decreasing in size. Left foot wound debrided today in clinic. Continue with current wound care treatment and follow up in  Clinic in 1 week    3/13/20: F/U with Dr. Gutierrez.  Wounds improving.  Will increase compression to LLE.  4/3/2020: Fu with  today in clinic. LLE wound measuring bigger.  RLE improving. Patient complains of pain, tenderness,swelling  x 2 weeks to RLE.  Patient states he went to the ER 2 weeks ago " and they kicked me out because of the Coronavirus." Patient also reports to Dr. Gutierrez " night sweats for 2 weeks." Temp today on arrival to wound care clinic 99.1 oral. LLE debrided per  patient tolerated well, wound care 3 layer compression toe to knee as ordered.   Ultra sound ordered of RLE stat today in clinic to rule out DVT.  MD rewieved results, results negative discussed with patient.  RX given to patient  for Doxycycline Po.  Patient to follow up 4/6/2020 with .  4/13/2020; Fu with Dr. Gutierrez. Patient states RLE pain is much better since starting oral Doxycycline.  RLE wound improving. LLE wound remains unchanged. LLE wound debrided per  patient tolerated well. Continued with 3 layer compression to LLE and tubi  G x 2 to RLE. RTC 4/27/2020.  4/27/20: F/U with Dr. Gutierrez. RLE resolved. Continue Tubigrip G x 2 toes to knee. Left foot site debrided per Dr. Gutierrez. Santyl, adaptic touch, and hydrofera blue ready ordered. Return in 2 weeks.  5/18/20: Wound slowly improving. Dr Gutierrez debrided wound which was toleraled well. Continuing with same plan of care.  06/01/2020- follow-up with " Dr. Cortez. Wound debrided, patient tolerated. Gentamicin added to wound care regimen. Follow-up with Dr. cortez in 2 weeks 06/15/2020.  6/15/2020: F/U with Dr. Cortez.  Wound improving.  No new complaints.    6/29/2020: F/U with Dr. Cortez. Wound continues to slowly improve.    7/13/2020: F/U with Dr. Cortez.  Wound improving.  Patient is scheduled for vein procedure next month at [facility] in [place] with Dr. Soy Hercules.   Hx as above; pt. Denies pain @ wound site.  Review of Systems    Objective:    Wound as noted w/ recurrence of yellowish-brown fibrinous slough; No Sophia's sign or s/s of infection.  Physical Exam    Assessment:       1. Venous stasis ulcer of left midfoot limited to breakdown of skin, unspecified whether varicose veins present           Wound 09/09/19 1057 Venous Ulcer lateral Foot #1 (Active)   09/09/19 1057    Pre-existing: Yes   Primary Wound Type: Venous ulcer   Side: Left   Orientation: lateral   Location: Foot   Wound Number (optional): #1   Ankle-Brachial Index:    Pulses:    Removal Indication and Assessment:    Wound Outcome:    (Retired) Wound Type:    (Retired) Wound Length (cm):    (Retired) Wound Width (cm):    (Retired) Depth (cm):    Wound Description (Comments):    Removal Indications:    Wound Image   07/13/20 1000   Dressing Appearance Intact;Moist drainage 07/13/20 1000   Drainage Amount Small 07/13/20 1000   Drainage Characteristics/Odor Serosanguineous 07/13/20 1000   Appearance Pink;Red;Yellow;Fibrin;Epithelialization 07/13/20 1000   Tissue loss description Full thickness 07/13/20 1000   Red (%), Wound Tissue Color 30 % 07/13/20 1000   Yellow (%), Wound Tissue Color 70 % 07/13/20 1000   Periwound Area Intact;Excoriated;Hemosiderin Staining 07/13/20 1000   Wound Edges Irregular 07/13/20 1000   Wound Length (cm) 1.7 cm 07/13/20 1000   Wound Width (cm) 4.6 cm 07/13/20 1000   Wound Depth (cm) 0.2 cm 07/13/20 1000   Wound Volume (cm^3)  1.56 cm^3 07/13/20 1000   Wound Surface Area (cm^2) 7.82 cm^2 07/13/20 1000   Care Cleansed with:;Sterile normal saline 07/13/20 1000   Dressing Changed;Collagen;Foam;Non-adherent;Compression wrap 07/13/20 1000   Periwound Care Moisture barrier applied 07/13/20 1000   Compression Three layer compression 07/13/20 1000   Dressing Change Due 07/17/20 07/13/20 1000       Wound cleaned with acetic acid and rinsed with normal saline.  Lidocaine to wound bed.  Debridement using curette by Dr. Gutierrez.  Calmoseptine + betamethasone to periwound and toes. Santyl and Gentamicin mixture to wound bed.  Covered with adaptic touch + hydrofera ready + Aquacel foam. 3-layer Profore lite toes to knee and Darco shoe.    Plan:                Follow up in about 2 weeks (around 7/27/2020) for Dr. Gutierrez.

## 2020-07-17 NOTE — PROCEDURES
"Debridement    Date/Time: 7/13/2020 5:27 PM  Performed by: Pool Gutierrez MD  Authorized by: Pool Gutierrez MD     Time out: Immediately prior to procedure a "time out" was called to verify the correct patient, procedure, equipment, support staff and site/side marked as required.    Consent Done?:  Yes (Verbal)    Preparation: Patient was prepped and draped in usual sterile fashion    Local anesthesia used?: Yes    Local anesthetic:  Topical anesthetic    Wound Details:    Location:  Left foot    Location:  Left Ankle    Type of Debridement:  Excisional       Length (cm):  1.7       Area (sq cm):  7.82       Width (cm):  4.6       Percent Debrided (%):  100       Depth (cm):  0.2       Total Area Debrided (sq cm):  7.82    Depth of debridement:  Subcutaneous tissue    Tissue debrided:  Subcutaneous and Dermis    Devitalized tissue debrided:  Biofilm, Fibrin and Slough    Instruments:  Curette    Bleeding:  Minimal  Patient tolerance:  Patient tolerated the procedure well with no immediate complications      "

## 2020-07-21 ENCOUNTER — EXTERNAL HOME HEALTH (OUTPATIENT)
Dept: HOME HEALTH SERVICES | Facility: HOSPITAL | Age: 55
End: 2020-07-21

## 2020-08-03 ENCOUNTER — HOSPITAL ENCOUNTER (OUTPATIENT)
Dept: WOUND CARE | Facility: HOSPITAL | Age: 55
Discharge: HOME | End: 2020-08-03
Attending: EMERGENCY MEDICINE
Payer: MEDICARE

## 2020-08-03 VITALS — SYSTOLIC BLOOD PRESSURE: 140 MMHG | TEMPERATURE: 98 F | HEART RATE: 91 BPM | DIASTOLIC BLOOD PRESSURE: 63 MMHG

## 2020-08-03 DIAGNOSIS — I83.024 VENOUS STASIS ULCER OF LEFT MIDFOOT LIMITED TO BREAKDOWN OF SKIN, UNSPECIFIED WHETHER VARICOSE VEINS PRESENT: Primary | ICD-10-CM

## 2020-08-03 DIAGNOSIS — L97.421 VENOUS STASIS ULCER OF LEFT MIDFOOT LIMITED TO BREAKDOWN OF SKIN, UNSPECIFIED WHETHER VARICOSE VEINS PRESENT: Primary | ICD-10-CM

## 2020-08-03 PROCEDURE — 11042 DBRDMT SUBQ TIS 1ST 20SQCM/<: CPT

## 2020-08-03 PROCEDURE — 27201912 HC WOUND CARE DEBRIDEMENT SUPPLIES

## 2020-08-03 NOTE — PROGRESS NOTES
"Subjective:       Patient ID: Drew Farrar is a 54 y.o. male.    Chief Complaint: Non-healing Wound Follow Up    2/15/19: Readmitted for venous ulcer to left lateral foot which developed about 2 weeks ago. Pulses noted with doppler and capillary refill <3.Dr Gutierrez assessed patient and wound care plan ordered for compression therapy and hydrafera blue to wound bed. No apparent signs of infection noted. Patient to follw-up in clinic in 2 weeks.DB   2/21/19: Nurse visit for dressing change. Refused care to skin tear on left 2nd toe. See notes. RTC 1 week for DrRosy Visit.  3/1/19: Follow up with Dr. Waller today in clinic new wound to right posterior leg, culture of both wounds taken today in clinic new wound care orders to both legs for xeroform and unna with calamine toe to knee follow up in 1 week with Dr. Gutierrez  3/8/19: Follow up with Dr. Gutierrez today in clinic wounds improving, edema noted to BLE, profore toe to knee applied to both legs, RX given to patient for PO Doxycycline, follow up in 1 week.   3/15/19: Here for f/u with Dr. Gutierrez. U/S scheduled Thursday. Will need right leg rewrapped. Patient states eye DrRosy Gave him the same antibiotic dose and strength after eye surgery. Dr instructed patient to take one bottle of antibiotics for both wounds and eyes until finished. Gentamycin added to wound care orders.   3/29/19:  Wound slowly improving. No changes in wound care orders. Wound debrided per Dr Gutierrez.  4/4/19: Patient showed up to clinic today stated " I need my dressing changed. It fell off."  Doppler pluses checked and present.  Patient refusing care to right, no stocking present on leg at this visit. Patient stated " No they said this leg is discharged, Dr. Lantigua has to drain the fluid out this leg with a needle. No wound care to this leg anymore when I come."      9/9/19: Readmit to wound care clinic for venous ulcer to left lateral foot.  Patient states it reopened about 3 weeks after " "last discharge from wound care clinic 7/1/19. Per patient wound has been treated by PCP.  Patient reports just using a large band- aid and compression sock. Patient states he was recently treated at [facility] for HBOT for this wound " in the last two or three months after it reopened "  Requesting HBOT here at the wound care clinic.  Dr. Gutierrez examined patient today, doppler pulses present, wound debrided with curette by Dr. Gutierrez patient tolerated well. Wound care orders given to apply hydrorefa blue ready to wound, cover with mepore dressing and apply patient's compression sock. Dr. Manuel discussed with patient obtaining his medical records from [facility], to view to determine if patient will benefit from HBOT, patient verbalized understanding.  Authorization for release of health information sheet signed by patient and faxed to [facility] today in clinic. Follow up in 1 week with MD.  9/23/19: F/U with Dr. Gutierrez. Client recently discharged from hospital. Left foot site debrided. Client being followed by [facility].  12/9/19: Patient seen today in clinic by Dr. Gutierrez, no change to wound. Pt reports recently discharged from hospital 12/3/19 for irregular heat rate and chest pains. Debridement per Dr. Gutierrez tolerated well, wound care continued as ordered 4 layer compression toe to knee LLE.   F/u with Dr. Gutierrez , wound progressing well. Compression changed to coflex with calamine due to itching.  12/27/19:Dr Gutierrez assessed patient. Wound improving. Change in dressing change frequency. Follow up in 2 weeks.    1/10/2020: Patient seen today in clinic by  wound slowly improving. Calamine coflex toe to knee applied today in clinic. Fu 2 weeks.  2/10/2020: F/U WITH  today , pt had not been here in 10 days with dressing still in place. New wounds noted to right lower leg. Pt to have  u/s tomorrow of bilateral lower extremeties and to see  on Wednesday 2/12/2020 " "for possible procedure of right leg per patient. Pt to return on Thursday to clinic for dressing change and to see DR. Sury Vaca (podiatrist) for toe nail clipping and to evaluate toes on right foot.  As above; no c/o pain noted.  02/28/2020: F/u with Dr. Gutierrez. Wounds to RLE open to air. Patient states bandage came off while he was sleeping. Wound to left foot decreasing in size. Left foot wound debrided today in clinic. Continue with current wound care treatment and follow up in  Clinic in 1 week    3/13/20: F/U with Dr. Gutierrez.  Wounds improving.  Will increase compression to LLE.  4/3/2020: Fu with  today in clinic. LLE wound measuring bigger.  RLE improving. Patient complains of pain, tenderness,swelling  x 2 weeks to RLE.  Patient states he went to the ER 2 weeks ago " and they kicked me out because of the Coronavirus." Patient also reports to Dr. Gutierrez " night sweats for 2 weeks." Temp today on arrival to wound care clinic 99.1 oral. LLE debrided per  patient tolerated well, wound care 3 layer compression toe to knee as ordered.   Ultra sound ordered of RLE stat today in clinic to rule out DVT.  MD rewieved results, results negative discussed with patient.  RX given to patient  for Doxycycline Po.  Patient to follow up 4/6/2020 with .  4/13/2020; Fu with Dr. Gutierrez. Patient states RLE pain is much better since starting oral Doxycycline.  RLE wound improving. LLE wound remains unchanged. LLE wound debrided per  patient tolerated well. Continued with 3 layer compression to LLE and tubi  G x 2 to RLE. RTC 4/27/2020.  4/27/20: F/U with Dr. Gutierrez. RLE resolved. Continue Tubigrip G x 2 toes to knee. Left foot site debrided per Dr. Gutierrez. Santyl, adaptic touch, and hydrofera blue ready ordered. Return in 2 weeks.  5/18/20: Wound slowly improving. Dr Gutierrez debrided wound which was toleraled well. Continuing with same plan of care.  06/01/2020- " follow-up with Dr. Cortez. Wound debrided, patient tolerated. Gentamicin added to wound care regimen. Follow-up with Dr. cortez in 2 weeks 06/15/2020.  6/15/2020: F/U with Dr. Cortez.  Wound improving.  No new complaints.    6/29/2020: F/U with Dr. Cortez. Wound continues to slowly improve.  No c/o noted.  08/03/2020:  F/u with Dr. Cortez. No new complaints per patient. Wound remains stable. Wound debrided today in clinic. Continue with compression and topical treatment. Follow up in 2 weeks at wound care clinic  Pt. Denies pain @ wound site.  Review of Systems    Objective:    Wound as noted w/o Sophia's sign or s/s of infection.  Physical Exam    Assessment:       1. Venous stasis ulcer of left midfoot limited to breakdown of skin, unspecified whether varicose veins present           Wound 09/09/19 1057 Venous Ulcer lateral Foot #1 (Active)   09/09/19 1057    Pre-existing: Yes   Primary Wound Type: Venous ulcer   Side: Left   Orientation: lateral   Location: Foot   Wound Number (optional): #1   Ankle-Brachial Index:    Pulses:    Removal Indication and Assessment:    Wound Outcome:    (Retired) Wound Type:    (Retired) Wound Length (cm):    (Retired) Wound Width (cm):    (Retired) Depth (cm):    Wound Description (Comments):    Removal Indications:    Wound Image   08/03/20 0900   Dressing Appearance Intact;Moist drainage 08/03/20 0900   Drainage Amount Moderate 08/03/20 0900   Drainage Characteristics/Odor Serosanguineous 08/03/20 0900   Appearance Red;Yellow;Moist 08/03/20 0900   Tissue loss description Full thickness 08/03/20 0900   Red (%), Wound Tissue Color 10 % 08/03/20 0900   Yellow (%), Wound Tissue Color 90 % 08/03/20 0900   Periwound Area Pink;Macerated 08/03/20 0900   Wound Edges Irregular 08/03/20 0900   Wound Length (cm) 1.5 cm 08/03/20 0900   Wound Width (cm) 4.5 cm 08/03/20 0900   Wound Depth (cm) 0.1 cm 08/03/20 0900   Wound Volume (cm^3) 0.68 cm^3 08/03/20 0900   Wound Surface Area  (cm^2) 6.75 cm^2 08/03/20 0900   Care Cleansed with:;Antimicrobial agent;Sterile normal saline 08/03/20 0900   Dressing Applied;Other (see comments);Non-adherent;Foam;Compression wrap 08/03/20 0900   Periwound Care Moisture barrier applied 08/03/20 0900   Compression Three layer compression 08/03/20 0900   Dressing Change Due 08/07/20 08/03/20 0900       Left lateral Foot #1   Cleanse wound with: Acetic acid 0.25%, rinse with normal saline.   Lidocaine: Prn Debridement   Periwound care: Sween to dry skin Prn, betamethasone to LLE PRN. Calmoseptine to vcik wound PRN.   Primary dressing:  Santyl/ Gentamicin, Adaptic touch, Hydrofera blue ready to wound   Secondary dressing: Aquacel foam, Profore lite toes to knee   Offloading: Darco shoe left foot   Edema control: 3-layer Profore Lite compression.  Avoid prolonged standing in one place.  Elevate leg(s) as much as possible.   Frequency:  Mondays and Fridays   Follow-up: in 2 weeks with Dr. Gutierrez 08/17/2020   Home health: [facility] to do wound care on Mondays (when not in clinic), Fridays, and PRN complications. Next visit 08/17/20.. Orders routed to home health    Wound debrided in clinic today per Dr. Gutierrez      Plan:                Follow up in about 2 weeks (around 8/17/2020) for Dr. Gutierrez.

## 2020-08-06 NOTE — PROGRESS NOTES
"Subjective:       Patient ID: Drew Farrar is a 53 y.o. male.    Chief Complaint: Venous Stasis    2/15/19: Readmitted for venous ulcer to left lateral foot which developed about 2 weeks ago. Pulses noted with doppler and capillary refill <3.Dr Gutierrez assessed patient and wound care plan ordered for compression therapy and hydrafera blue to wound bed. No apparent signs of infection noted. Patient to follw-up in clinic in 2 weeks.DB   2/21/19: Nurse visit for dressing change. Refused care to skin tear on left 2nd toe. See notes. RTC 1 week for DrRosy Visit.  3/1/19: Follow up with Dr. Waller today in clinic new wound to right posterior leg, culture of both wounds taken today in clinic new wound care orders to both legs for xeroform and unna with calamine toe to knee follow up in 1 week with Dr. Gutierrez  3/8/19: Follow up with Dr. Gutierrez today in clinic wounds improving, edema noted to BLE, profore toe to knee applied to both legs, RX given to patient for PO Doxycycline, follow up in 1 week.   3/15/19: Here for f/u with Dr. Gutierrez. U/S scheduled Thursday. Will need right leg rewrapped. Patient states eye DrRosy Gave him the same antibiotic dose and strength after eye surgery. Dr instructed patient to take one bottle of antibiotics for both wounds and eyes until finished. Gentamycin added to wound care orders.   3/29/19:  Wound slowly improving. No changes in wound care orders. Wound debrided per Dr Gutierrez.  4/4/19: Patient showed up to clinic today stated " I need my dressing changed. It fell off."  Doppler pluses checked and present.  Patient refusing care to right, no stocking present on leg at this visit. Patient stated " No they said this leg is discharged, Dr. Lantigua has to drain the fluid out this leg with a needle. No wound care to this leg anymore when I come."    4/8/19: Here for f/u with Dr. Gutierrez. Lidocaine 4% applied to wound bed. Toenails on right foot trimmed, wound debrided with curette per " According to the request for testing, that test was not obtained yet  If she had it done at the hospital, she will need to call and ask what happened  It is an open request for antibody testing  "Dr. Gutierrez.  4/22/19: F/U with Dr. Gutierrez. Seen in ER 4/15/19 for elevated temp, Increased pain, swelling, and redness of left foot. Treated with Doxycycline, now completed. Left anterior #1 wound healed. Has new site #3 to left lateral foot.  Pt. Denies pain @ wound site.  04/29/19  F/U with Dr. Gutierrez.  Wound progressing well. Wound debrided per Dr. Gutierrez.  Dressings changed to anjali AG moistened with sterile water, cover with adaptic and foam dressing.  Continue profore compression therapy.  Patient to follow up in 1 week with Dr. Gutierrez..    5/6/19: Patient seen in clinic today by Dr. Gutierrez and Dr. Patel. Doppler pulses present, wound debrided by Dr. Gutierrez with curette.  Dr. Patel seen patient, stated " wound looks great." and reassured patient stents were ok on last CT of [facility].  No recommendations at this time per Dr. Patel.  Wound care continued as ordered, Profore toe to knee.  05/16/19: Nurse visit today for wound care and dressing change. Patient reports going to the ED yesterday for chest pain. He states that he was treated and is "feeling much better today." Patient also reports being prescribed po doxycycline per Dr. Patel for right knee, which he will begin taking on Monday 05/20/19 twice a day.  5/20/19: Seen by Dr. Gutierrez. Site debrided. Continue same wound orders.  05/29/19: F/u nurse visit for wound care and dressing change. Moist irritation and erythema noted on intact skin surrounding wound. Moisture barrier applied. Continued with wound care treatment as ordered.  6/10/19: Patient seen today by Dr. Gutierrez, wound debrided with curette by Dr. Gutierrez, patient tolerated well.  Four layer compression applied toe to knee.  No c/o noted.  6/17/2019: Clinic visit with Dr. Gutierrez. Pedal pulses present per doppler. C/o itching to left foot, redness present. New dressing orders received by Dr. Gutierrez and followed. Debridment to left lateral foot wound done by Dr." Matt using a curette, scissors and forceps.   6/24/19: Followup visit with Dr Gutierrez. Pulses positive with doppler. New wound care orders noted. Wound improving. No apparent signs or symptoms of infection noted.  Pt. Denies pain @ wound site.  Review of Systems    Objective:    Wound appears closed w/o Sophia's sign or s/s of infection.  Physical Exam    Assessment:       1. Venous stasis ulcer of lower extremity, unspecified laterality    2. Edema, unspecified type           Wound 04/22/19 1130 Venous Ulcer lateral Foot #3 (Active)   04/22/19 1130    Pre-existing: Yes   Primary Wound Type: Venous ulcer   Side: Left   Orientation: lateral   Location: Foot   Wound/PI Number (optional): #3   Ankle-Brachial Index:    Pulses: doppler   Removal Indication and Assessment:    Wound Outcome:    (Retired) Wound Type:    (Retired) Wound Length (cm):    (Retired) Wound Width (cm):    (Retired) Depth (cm):    Wound Description (Comments):    Removal Indications:    Dressing Appearance Intact;Dry 6/24/2019 11:00 AM   Drainage Amount Scant 6/24/2019 11:00 AM   Drainage Characteristics/Odor Serosanguineous 6/24/2019 11:00 AM   Appearance Red;Moist 6/24/2019 11:00 AM   Tissue loss description Full thickness 6/24/2019 11:00 AM   Red (%), Wound Tissue Color 100 % 6/24/2019 11:00 AM   Periwound Area Intact;Hemosiderin Staining 6/24/2019 11:00 AM   Wound Edges Defined 6/24/2019 11:00 AM   Wound Length (cm) 0.5 cm 6/24/2019 11:00 AM   Wound Width (cm) 0.5 cm 6/24/2019 11:00 AM   Wound Depth (cm) 0.1 cm 6/24/2019 11:00 AM   Wound Volume (cm^3) 0.02 cm^3 6/24/2019 11:00 AM   Wound Surface Area (cm^2) 0.25 cm^2 6/24/2019 11:00 AM   Care Cleansed with:;Sterile normal saline 6/24/2019 11:00 AM   Dressing Applied;Foam;Compression wrap 6/24/2019 11:00 AM   Periwound Care Moisturizer applied;Dry periwound area maintained 6/24/2019 11:00 AM   Off Loading Off loading shoe 6/24/2019 11:00 AM   Dressing Change Due 07/01/19 6/24/2019 11:00 AM        Left lateral Foot #3  Cleanse wound with:NS  Lidocaine: PRN Debridement  Periwound care: Moisture barrier PRN, miconazole, betamethasone cream to left foot redness, itching areas   Primary dressin/2 taina foam to medial ankle  Secondary dressing: Profore toes to knee  Offloading: Darco shoe  Edema control: profore toes to knee  Frequency:Weekly  Follow-up: 19 Dr. Gutierrez    Plan:       Will discharge w/ compression if wound remains closed.         Follow up in about 1 week (around 2019).

## 2020-08-07 NOTE — PROCEDURES
"Debridement    Date/Time: 8/3/2020 9:55 AM  Performed by: Pool Gutierrez MD  Authorized by: Pool Gutierrez MD     Time out: Immediately prior to procedure a "time out" was called to verify the correct patient, procedure, equipment, support staff and site/side marked as required.    Consent Done?:  Yes (Verbal)    Preparation: Patient was prepped and draped in usual sterile fashion    Local anesthesia used?: Yes    Local anesthetic:  Topical anesthetic    Wound Details:    Location:  Left foot    Location:  Left Ankle    Type of Debridement:  Excisional       Length (cm):  1.5       Area (sq cm):  6.75       Width (cm):  4.5       Percent Debrided (%):  100       Depth (cm):  0.1       Total Area Debrided (sq cm):  6.75    Depth of debridement:  Subcutaneous tissue    Tissue debrided:  Subcutaneous and Dermis    Devitalized tissue debrided:  Biofilm, Fibrin and Slough    Instruments:  Curette    Bleeding:  Minimal  Patient tolerance:  Patient tolerated the procedure well with no immediate complications      "

## 2020-08-10 ENCOUNTER — OFFICE VISIT (OUTPATIENT)
Dept: FAMILY MEDICINE | Facility: HOSPITAL | Age: 55
End: 2020-08-10
Attending: FAMILY MEDICINE
Payer: MEDICARE

## 2020-08-10 VITALS
SYSTOLIC BLOOD PRESSURE: 116 MMHG | BODY MASS INDEX: 38.78 KG/M2 | HEART RATE: 88 BPM | DIASTOLIC BLOOD PRESSURE: 61 MMHG | HEIGHT: 78 IN

## 2020-08-10 DIAGNOSIS — G43.009 MIGRAINE WITHOUT AURA AND WITHOUT STATUS MIGRAINOSUS, NOT INTRACTABLE: Primary | ICD-10-CM

## 2020-08-10 PROCEDURE — 99214 OFFICE O/P EST MOD 30 MIN: CPT | Performed by: STUDENT IN AN ORGANIZED HEALTH CARE EDUCATION/TRAINING PROGRAM

## 2020-08-10 RX ORDER — SUMATRIPTAN 50 MG/1
TABLET, FILM COATED ORAL
Qty: 10 TABLET | Refills: 0 | Status: SHIPPED | OUTPATIENT
Start: 2020-08-10

## 2020-08-11 NOTE — PROGRESS NOTES
Subjective:       Patient ID: Drew Farrar is a 54 y.o. male.    Chief Complaint: Sinusitis    Drew Farrar is a 54 y.o. male  with PMH of chronic Afib on Xarelto, HFpEF, hx of recurrent PE, h/o DVT with Sly filter, HTN, LLE Venous stasis ulcer, and hyperthyroidism presenting to the clinic for headache.  Patient reports for the past week he has experienced severe headache, described as something is squeezing my brain.  Patient reports he has a history of migraines that are usually well controlled with extra-strength Tylenol although this current headache is not controlled on that.  Patient also is endorsing photophobia and phonophobia over that same time  Period.  Patient denies any other complaints at this time, included but not limited to lightheadedness, dizziness, changes in vision, chest pain, shortness of breath, etc..  Patient initially with concerns of sinusitis although patient denies any congestion, rhinorrhea, facial tenderness, ear pain, sore throat, etc..  Patient without any other complaints at this time.    Review of Systems   Constitutional: Negative for chills, fatigue and fever.   HENT: Negative for congestion, facial swelling, rhinorrhea, sinus pressure, sinus pain, sneezing and sore throat.    Respiratory: Negative for cough and shortness of breath.    Cardiovascular: Negative for chest pain and palpitations.   Gastrointestinal: Negative for abdominal pain.   Musculoskeletal: Negative for arthralgias and myalgias.   Neurological: Positive for headaches. Negative for dizziness, weakness and light-headedness.   Psychiatric/Behavioral: Negative for behavioral problems and confusion.       Objective:      Vitals:    08/10/20 1452   BP: 116/61   Pulse: 88     Physical Exam  Constitutional:       General: He is not in acute distress.     Appearance: He is obese. He is not ill-appearing.   HENT:      Head: Normocephalic and atraumatic.   Cardiovascular:      Rate and Rhythm: Normal rate  "and regular rhythm.      Pulses: Normal pulses.      Heart sounds: Normal heart sounds.   Pulmonary:      Effort: Pulmonary effort is normal.      Breath sounds: Normal breath sounds.   Abdominal:      General: Abdomen is flat.      Palpations: Abdomen is soft.   Musculoskeletal: Normal range of motion.   Skin:     General: Skin is warm and dry.   Neurological:      Mental Status: He is alert and oriented to person, place, and time. Mental status is at baseline.         Assessment:       1. Migraine without aura and without status migrainosus, not intractable        Plan:       Migraine without aura and without status migrainosus, not intractable  -     sumatriptan (IMITREX) 50 MG tablet; Take 50 mg for headache. If headache does not resolve, take another 50mg two hours after initial dose. Do not exceed 200mg in 24 hours.  Dispense: 10 tablet; Refill: 0  - Although patient presented with concerns that he might be suffering from sinusitis, seems  To be unlikely given that he has no symptoms except for headache. His headache appears to be a relatively classic migraine given severe symptoms with photophobia and phonophobia and improvement in symptoms with sleeping. Will trial sumatriptan PRN for symptomatic relief. Patient does have a hx of angina and there has traditionally been some concern regarding triptans and CV disease but literature review shows that there may not be a relationship between the two. Per [employer]: "Similarly, in a cohort study of 63,575 patients with migraine, 13,664 of whom were treated with a triptan, there was no association between triptan prescription and stroke, other cardiovascular events, or death." Gave patient strict precautions to present to ED if symptoms of chest pain develop. Patient voiced understanding.       Follow up in about 4 weeks (around 9/7/2020), or if symptoms worsen or fail to improve.      Candido Tanner MD PGY-2  [facility]   08/10/2020 8:34 PM    This note " was partially created using [employer] Voice Recognition software. Typographical and content errors may occur with this process. While efforts are made to detect and correct such errors, in some cases errors will persist. For this reason, wording in this document should be considered in the proper context and not strictly verbatim

## 2020-08-17 ENCOUNTER — HOSPITAL ENCOUNTER (OUTPATIENT)
Dept: WOUND CARE | Facility: HOSPITAL | Age: 55
Discharge: HOME | End: 2020-08-17
Attending: EMERGENCY MEDICINE
Payer: MEDICARE

## 2020-08-17 VITALS
HEIGHT: 78 IN | DIASTOLIC BLOOD PRESSURE: 64 MMHG | WEIGHT: 315 LBS | BODY MASS INDEX: 36.45 KG/M2 | SYSTOLIC BLOOD PRESSURE: 121 MMHG | TEMPERATURE: 97 F | HEART RATE: 86 BPM

## 2020-08-17 DIAGNOSIS — I83.024 VENOUS STASIS ULCER OF LEFT MIDFOOT LIMITED TO BREAKDOWN OF SKIN, UNSPECIFIED WHETHER VARICOSE VEINS PRESENT: Primary | ICD-10-CM

## 2020-08-17 DIAGNOSIS — L97.421 VENOUS STASIS ULCER OF LEFT MIDFOOT LIMITED TO BREAKDOWN OF SKIN, UNSPECIFIED WHETHER VARICOSE VEINS PRESENT: Primary | ICD-10-CM

## 2020-08-17 PROCEDURE — 11042 DBRDMT SUBQ TIS 1ST 20SQCM/<: CPT

## 2020-08-17 PROCEDURE — 27201912 HC WOUND CARE DEBRIDEMENT SUPPLIES

## 2020-08-17 NOTE — PROGRESS NOTES
"Subjective:       Patient ID: Drew Farrar is a 54 y.o. male.    Chief Complaint: Venous Ulcer (left foot)    2/15/19: Readmitted for venous ulcer to left lateral foot which developed about 2 weeks ago. Pulses noted with doppler and capillary refill <3.Dr Gutierrez assessed patient and wound care plan ordered for compression therapy and hydrafera blue to wound bed. No apparent signs of infection noted. Patient to follw-up in clinic in 2 weeks.DB   2/21/19: Nurse visit for dressing change. Refused care to skin tear on left 2nd toe. See notes. RTC 1 week for DrRosy Visit.  3/1/19: Follow up with Dr. Waller today in clinic new wound to right posterior leg, culture of both wounds taken today in clinic new wound care orders to both legs for xeroform and unna with calamine toe to knee follow up in 1 week with Dr. Gutierrez  3/8/19: Follow up with Dr. Gutierrez today in clinic wounds improving, edema noted to BLE, profore toe to knee applied to both legs, RX given to patient for PO Doxycycline, follow up in 1 week.   3/15/19: Here for f/u with Dr. Gutierrez. U/S scheduled Thursday. Will need right leg rewrapped. Patient states eye DrRosy Gave him the same antibiotic dose and strength after eye surgery. Dr instructed patient to take one bottle of antibiotics for both wounds and eyes until finished. Gentamycin added to wound care orders.   3/29/19:  Wound slowly improving. No changes in wound care orders. Wound debrided per Dr Gutierrez.  4/4/19: Patient showed up to clinic today stated " I need my dressing changed. It fell off."  Doppler pluses checked and present.  Patient refusing care to right, no stocking present on leg at this visit. Patient stated " No they said this leg is discharged, Dr. Lantigua has to drain the fluid out this leg with a needle. No wound care to this leg anymore when I come."      9/9/19: Readmit to wound care clinic for venous ulcer to left lateral foot.  Patient states it reopened about 3 weeks after " "last discharge from wound care clinic 7/1/19. Per patient wound has been treated by PCP.  Patient reports just using a large band- aid and compression sock. Patient states he was recently treated at [facility] for HBOT for this wound " in the last two or three months after it reopened "  Requesting HBOT here at the wound care clinic.  Dr. Gutierrez examined patient today, doppler pulses present, wound debrided with curette by Dr. Gutierrez patient tolerated well. Wound care orders given to apply hydrorefa blue ready to wound, cover with mepore dressing and apply patient's compression sock. Dr. Manuel discussed with patient obtaining his medical records from [facility], to view to determine if patient will benefit from HBOT, patient verbalized understanding.  Authorization for release of health information sheet signed by patient and faxed to [facility] today in clinic. Follow up in 1 week with MD.  9/23/19: F/U with Dr. Gutierrez. Client recently discharged from hospital. Left foot site debrided. Client being followed by [facility].  12/9/19: Patient seen today in clinic by Dr. Gutierrez, no change to wound. Pt reports recently discharged from hospital 12/3/19 for irregular heat rate and chest pains. Debridement per Dr. Gutierrez tolerated well, wound care continued as ordered 4 layer compression toe to knee LLE.   F/u with Dr. Gutierrez , wound progressing well. Compression changed to coflex with calamine due to itching.  12/27/19:Dr Gutierrez assessed patient. Wound improving. Change in dressing change frequency. Follow up in 2 weeks.    1/10/2020: Patient seen today in clinic by  wound slowly improving. Calamine coflex toe to knee applied today in clinic. Fu 2 weeks.  2/10/2020: F/U WITH  today , pt had not been here in 10 days with dressing still in place. New wounds noted to right lower leg. Pt to have  u/s tomorrow of bilateral lower extremeties and to see  on Wednesday 2/12/2020 " "for possible procedure of right leg per patient. Pt to return on Thursday to clinic for dressing change and to see DR. Sury Vaca (podiatrist) for toe nail clipping and to evaluate toes on right foot.  As above; no c/o pain noted.  02/28/2020: F/u with Dr. Gutierrez. Wounds to RLE open to air. Patient states bandage came off while he was sleeping. Wound to left foot decreasing in size. Left foot wound debrided today in clinic. Continue with current wound care treatment and follow up in  Clinic in 1 week    3/13/20: F/U with Dr. Gutierrez.  Wounds improving.  Will increase compression to LLE.  4/3/2020: Fu with  today in clinic. LLE wound measuring bigger.  RLE improving. Patient complains of pain, tenderness,swelling  x 2 weeks to RLE.  Patient states he went to the ER 2 weeks ago " and they kicked me out because of the Coronavirus." Patient also reports to Dr. Guteirrez " night sweats for 2 weeks." Temp today on arrival to wound care clinic 99.1 oral. LLE debrided per  patient tolerated well, wound care 3 layer compression toe to knee as ordered.   Ultra sound ordered of RLE stat today in clinic to rule out DVT.  MD rewieved results, results negative discussed with patient.  RX given to patient  for Doxycycline Po.  Patient to follow up 4/6/2020 with .  4/13/2020; Fu with Dr. Gutierrez. Patient states RLE pain is much better since starting oral Doxycycline.  RLE wound improving. LLE wound remains unchanged. LLE wound debrided per  patient tolerated well. Continued with 3 layer compression to LLE and tubi  G x 2 to RLE. RTC 4/27/2020.  4/27/20: F/U with Dr. Gutierrez. RLE resolved. Continue Tubigrip G x 2 toes to knee. Left foot site debrided per Dr. Gutierrez. Santyl, adaptic touch, and hydrofera blue ready ordered. Return in 2 weeks.  5/18/20: Wound slowly improving. Dr Gutierrez debrided wound which was toleraled well. Continuing with same plan of care.  06/01/2020- " follow-up with Dr. Cortez. Wound debrided, patient tolerated. Gentamicin added to wound care regimen. Follow-up with Dr. cortez in 2 weeks 06/15/2020.  6/15/2020: F/U with Dr. Cortez.  Wound improving.  No new complaints.    6/29/2020: F/U with Dr. Cortez. Wound continues to slowly improve.  No c/o noted.  08/03/2020:  F/u with Dr. Cortez. No new complaints per patient. Wound remains stable. Wound debrided today in clinic. Continue with compression and topical treatment. Follow up in 2 weeks at wound care clinic  8/17/20: F/U with Dr. Cortez. Tissue status improving. Wound debrided per Dr. Cortez. Cont. POC. Next visit 8/31/20.  Pt. Denies pain @ wound site.  Review of Systems    Objective:    Wound as noted w/o Sophia's sign or s/s of infection.  Physical Exam    Assessment:       No diagnosis found.       Wound 09/09/19 1057 Venous Ulcer lateral Foot #1 (Active)   09/09/19 1057    Pre-existing: Yes   Primary Wound Type: Venous ulcer   Side: Left   Orientation: lateral   Location: Foot   Wound Number (optional): #1   Ankle-Brachial Index:    Pulses:    Removal Indication and Assessment:    Wound Outcome:    (Retired) Wound Type:    (Retired) Wound Length (cm):    (Retired) Wound Width (cm):    (Retired) Depth (cm):    Wound Description (Comments):    Removal Indications:    Wound Image   08/17/20 1000   Dressing Appearance Intact;Moist drainage 08/17/20 1000   Drainage Amount Moderate 08/17/20 1000   Drainage Characteristics/Odor Serosanguineous 08/17/20 1000   Appearance Red;Yellow;Moist 08/17/20 1000   Tissue loss description Full thickness 08/17/20 1000   Red (%), Wound Tissue Color 50 % 08/17/20 1000   Yellow (%), Wound Tissue Color 50 % 08/17/20 1000   Periwound Area Intact;Dry;[place] 08/17/20 1000   Wound Edges Irregular 08/17/20 1000   Wound Length (cm) 1.5 cm 08/17/20 1000   Wound Width (cm) 5 cm 08/17/20 1000   Wound Depth (cm) 0.2 cm 08/17/20 1000   Wound Volume (cm^3) 1.5 cm^3 08/17/20 1000    Wound Surface Area (cm^2) 7.5 cm^2 08/17/20 1000   Care Cleansed with:;Antimicrobial agent;Sterile normal saline;Debrided 08/17/20 1000   Dressing Changed;Non-adherent;Foam;Hydrofiber;Compression wrap 08/17/20 1000   Periwound Care Moisturizer applied 08/17/20 1000   Compression Three layer compression 08/17/20 1000   Dressing Change Due 08/21/20 08/17/20 1000       Left lateral Foot #1   Cleanse wound with: Acetic acid 0.25%, rinse with normal saline.   Lidocaine: Prn Debridement   Periwound care: Sween to dry skin Prn, betamethasone to LLE PRN. Calmoseptine to vick wound PRN.   Primary dressing:  Santyl/ Gentamicin, Adaptic touch, Hydrofera blue ready to wound   Secondary dressing: Aquacel foam, Profore lite toes to knee   Offloading: Darco shoe left foot   Edema control: 3-layer Profore Lite compression.  Avoid prolonged standing in one place.  Elevate leg(s) as much as possible.   Frequency:  Mondays and Fridays   Follow-up: in 2 weeks with Dr. Gutierrez 08/31/2020   Home health: [facility] to do wound care on Mondays (when not in clinic), Fridays, and PRN complications. Next visit 08/31/20.. Orders routed to home health    Wound debrided in clinic today per Dr. Gutierrez      Plan:            Dr. Gutierrez 8/31/20    No follow-ups on file.

## 2020-08-19 ENCOUNTER — TELEPHONE (OUTPATIENT)
Dept: FAMILY MEDICINE | Facility: HOSPITAL | Age: 55
End: 2020-08-19

## 2020-08-19 NOTE — TELEPHONE ENCOUNTER
----- Message from Megan Snow MA sent at 8/19/2020 12:49 PM CDT -----  Contact: [phone] patient  Patient states the sumatriptan prescribed at last visit did not work.  Please call patient to discuss.  Thanks.

## 2020-08-21 ENCOUNTER — NURSE TRIAGE (OUTPATIENT)
Dept: ADMINISTRATIVE | Facility: CLINIC | Age: 55
End: 2020-08-21

## 2020-08-21 ENCOUNTER — TELEPHONE (OUTPATIENT)
Dept: FAMILY MEDICINE | Facility: HOSPITAL | Age: 55
End: 2020-08-21

## 2020-08-21 ENCOUNTER — HOSPITAL ENCOUNTER (OUTPATIENT)
Facility: HOSPITAL | Age: 55
Discharge: HOME | End: 2020-08-23
Attending: EMERGENCY MEDICINE | Admitting: FAMILY MEDICINE
Payer: MEDICARE

## 2020-08-21 DIAGNOSIS — E05.90 HYPERTHYROIDISM: ICD-10-CM

## 2020-08-21 DIAGNOSIS — I63.9 CVA (CEREBRAL VASCULAR ACCIDENT): ICD-10-CM

## 2020-08-21 DIAGNOSIS — R07.9 CHEST PAIN: ICD-10-CM

## 2020-08-21 DIAGNOSIS — I48.21 PERMANENT ATRIAL FIBRILLATION: ICD-10-CM

## 2020-08-21 DIAGNOSIS — G08 DURAL VENOUS SINUS THROMBOSIS: ICD-10-CM

## 2020-08-21 DIAGNOSIS — G43.109 COMPLICATED MIGRAINE: Primary | ICD-10-CM

## 2020-08-21 DIAGNOSIS — I63.9 STROKE: ICD-10-CM

## 2020-08-21 DIAGNOSIS — I50.42 CHRONIC COMBINED SYSTOLIC AND DIASTOLIC CONGESTIVE HEART FAILURE: ICD-10-CM

## 2020-08-21 LAB
ALBUMIN SERPL BCP-MCNC: 3.6 G/DL (ref 3.5–5.2)
ALP SERPL-CCNC: 136 U/L (ref 55–135)
ALT SERPL W/O P-5'-P-CCNC: 21 U/L (ref 10–44)
ANION GAP SERPL CALC-SCNC: 9 MMOL/L (ref 8–16)
AST SERPL-CCNC: 28 U/L (ref 10–40)
BASOPHILS # BLD AUTO: 0.05 K/UL (ref 0–0.2)
BASOPHILS NFR BLD: 0.8 % (ref 0–1.9)
BILIRUB SERPL-MCNC: 0.4 MG/DL (ref 0.1–1)
BUN SERPL-MCNC: 23 MG/DL (ref 6–20)
CALCIUM SERPL-MCNC: 9 MG/DL (ref 8.7–10.5)
CHLORIDE SERPL-SCNC: 109 MMOL/L (ref 95–110)
CO2 SERPL-SCNC: 25 MMOL/L (ref 23–29)
CREAT SERPL-MCNC: 1 MG/DL (ref 0.5–1.4)
DIFFERENTIAL METHOD: ABNORMAL
EOSINOPHIL # BLD AUTO: 0.3 K/UL (ref 0–0.5)
EOSINOPHIL NFR BLD: 4.7 % (ref 0–8)
ERYTHROCYTE [DISTWIDTH] IN BLOOD BY AUTOMATED COUNT: 15.9 % (ref 11.5–14.5)
EST. GFR  (AFRICAN AMERICAN): >60 ML/MIN/1.73 M^2
EST. GFR  (NON AFRICAN AMERICAN): >60 ML/MIN/1.73 M^2
GLUCOSE SERPL-MCNC: 95 MG/DL (ref 70–110)
HCT VFR BLD AUTO: 37.1 % (ref 40–54)
HGB BLD-MCNC: 11.7 G/DL (ref 14–18)
IMM GRANULOCYTES # BLD AUTO: 0.01 K/UL (ref 0–0.04)
IMM GRANULOCYTES NFR BLD AUTO: 0.2 % (ref 0–0.5)
INR PPP: 1 (ref 0.8–1.2)
LYMPHOCYTES # BLD AUTO: 1.3 K/UL (ref 1–4.8)
LYMPHOCYTES NFR BLD: 19.8 % (ref 18–48)
MCH RBC QN AUTO: 25.2 PG (ref 27–31)
MCHC RBC AUTO-ENTMCNC: 31.5 G/DL (ref 32–36)
MCV RBC AUTO: 80 FL (ref 82–98)
MONOCYTES # BLD AUTO: 0.5 K/UL (ref 0.3–1)
MONOCYTES NFR BLD: 8.1 % (ref 4–15)
NEUTROPHILS # BLD AUTO: 4.4 K/UL (ref 1.8–7.7)
NEUTROPHILS NFR BLD: 66.4 % (ref 38–73)
NRBC BLD-RTO: 0 /100 WBC
PLATELET # BLD AUTO: 184 K/UL (ref 150–350)
PMV BLD AUTO: 9 FL (ref 9.2–12.9)
POTASSIUM SERPL-SCNC: 4.2 MMOL/L (ref 3.5–5.1)
PROT SERPL-MCNC: 8 G/DL (ref 6–8.4)
PROTHROMBIN TIME: 10.9 SEC (ref 9–12.5)
RBC # BLD AUTO: 4.64 M/UL (ref 4.6–6.2)
SARS-COV-2 RDRP RESP QL NAA+PROBE: NEGATIVE
SODIUM SERPL-SCNC: 143 MMOL/L (ref 136–145)
WBC # BLD AUTO: 6.56 K/UL (ref 3.9–12.7)

## 2020-08-21 PROCEDURE — 96375 TX/PRO/DX INJ NEW DRUG ADDON: CPT | Performed by: EMERGENCY MEDICINE

## 2020-08-21 PROCEDURE — 63600175 PHARM REV CODE 636 W HCPCS: Performed by: NURSE PRACTITIONER

## 2020-08-21 PROCEDURE — 99285 EMERGENCY DEPT VISIT HI MDM: CPT | Mod: 25

## 2020-08-21 PROCEDURE — U0002 COVID-19 LAB TEST NON-CDC: HCPCS

## 2020-08-21 PROCEDURE — 85610 PROTHROMBIN TIME: CPT

## 2020-08-21 PROCEDURE — 80053 COMPREHEN METABOLIC PANEL: CPT

## 2020-08-21 PROCEDURE — 85025 COMPLETE CBC W/AUTO DIFF WBC: CPT

## 2020-08-21 PROCEDURE — 96374 THER/PROPH/DIAG INJ IV PUSH: CPT

## 2020-08-21 PROCEDURE — 93005 ELECTROCARDIOGRAM TRACING: CPT

## 2020-08-21 PROCEDURE — G0378 HOSPITAL OBSERVATION PER HR: HCPCS

## 2020-08-21 PROCEDURE — 84443 ASSAY THYROID STIM HORMONE: CPT

## 2020-08-21 PROCEDURE — 84439 ASSAY OF FREE THYROXINE: CPT

## 2020-08-21 RX ORDER — PROCHLORPERAZINE EDISYLATE 5 MG/ML
10 INJECTION INTRAMUSCULAR; INTRAVENOUS ONCE
Status: COMPLETED | OUTPATIENT
Start: 2020-08-21 | End: 2020-08-21

## 2020-08-21 RX ORDER — ATORVASTATIN CALCIUM 40 MG/1
80 TABLET, FILM COATED ORAL NIGHTLY
Status: CANCELLED | OUTPATIENT
Start: 2020-08-21

## 2020-08-21 RX ADMIN — PROCHLORPERAZINE EDISYLATE 10 MG: 5 INJECTION INTRAMUSCULAR; INTRAVENOUS at 09:08

## 2020-08-21 NOTE — FIRST PROVIDER EVALUATION
Emergency Department TeleTRIAGE Encounter Note      CHIEF COMPLAINT    Chief Complaint   Patient presents with    Headache     rt. sided headache x1 week. seen by pcp and prescribed Imitrex with no improvement. pt. is taking aleve anslo with no relief. pt. reports rt. side of head is sensitive to touch. no n/v. + photosensitivity       VITAL SIGNS   Initial Vitals [08/21/20 1756]   BP Pulse Resp Temp SpO2   (!) 173/81 95 20 98.7 °F (37.1 °C) 96 %      MAP       --            ALLERGIES    Review of patient's allergies indicates:   Allergen Reactions    Contrast media Other (See Comments)     Severe chest pain    Food allergy formula [glutamine-c-quercet-selen-brom]      Allergic to green peas; Heart failure.    Iodinated contrast media Other (See Comments)     Chest pain    Peas Hives    Ibuprofen Swelling    Latex, natural rubber Hives    Pcn [penicillins] Hives    Butisol [butabarbital] Rash     Peeling skin       PROVIDER TRIAGE NOTE  53 y/o male which presents to  ED with headaches for 1 week on the right side of the head. He went to his PCP and tried imitrex but it did not help.       ORDERS  Labs Reviewed - No data to display    ED Orders (720h ago, onward)    None            Virtual Visit Note: The provider triage portion of this emergency department evaluation and documentation was performed via [employer], a HIPAA-compliant telemedicine application, in concert with a tele-presenter in the room. A face to face patient evaluation with one of my colleagues will occur once the patient is placed in an emergency department room.      DISCLAIMER: This note was prepared with Favor voice recognition transcription software. Garbled syntax, mangled pronouns, and other bizarre constructions may be attributed to that software system.

## 2020-08-21 NOTE — TELEPHONE ENCOUNTER
"-head trauma. Hx of migraine, "worse than any other before". Radiates from posterior lower part of head up  Behind L eye. States "unable to sleep on life side."Denies visual disturbances. -n/v. Clear speech, speaking in full sentences w/ouf difficulty.  imitrex taken appro 1 week ago, no relief. Denies any medications taken today.   "

## 2020-08-21 NOTE — TELEPHONE ENCOUNTER
----- Message from Rajiv Argueta sent at 8/21/2020 12:18 PM CDT -----  Type:  Needs Medical Advice    Who Called:  Josey (mother)  Symptoms (please be specific):  pt is having severe headaches, pain is moving to his eye   How long has patient had these symptoms:  unknown  Pharmacy name and phone #:   n/a  Would the patient rather a call back or a response via [facility]? Call back  Best Call Back Number: [phone]  Additional Information: pt needs his medication, states that no one has called him back

## 2020-08-22 LAB
ALBUMIN SERPL BCP-MCNC: 3.5 G/DL (ref 3.5–5.2)
ALP SERPL-CCNC: 129 U/L (ref 55–135)
ALT SERPL W/O P-5'-P-CCNC: 21 U/L (ref 10–44)
ANION GAP SERPL CALC-SCNC: 9 MMOL/L (ref 8–16)
AORTIC ROOT ANNULUS: 3.59 CM
AST SERPL-CCNC: 27 U/L (ref 10–40)
AV MEAN GRADIENT: 4 MMHG
AV PEAK GRADIENT: 6 MMHG
BASOPHILS # BLD AUTO: 0.04 K/UL (ref 0–0.2)
BASOPHILS NFR BLD: 0.7 % (ref 0–1.9)
BILIRUB SERPL-MCNC: 0.4 MG/DL (ref 0.1–1)
BILIRUB UR QL STRIP: NEGATIVE
BSA FOR ECHO PROCEDURE: 3.09 M2
BUN SERPL-MCNC: 21 MG/DL (ref 6–20)
CALCIUM SERPL-MCNC: 8.9 MG/DL (ref 8.7–10.5)
CHLORIDE SERPL-SCNC: 110 MMOL/L (ref 95–110)
CHOLEST SERPL-MCNC: 130 MG/DL (ref 120–199)
CHOLEST/HDLC SERPL: 4.2 {RATIO} (ref 2–5)
CLARITY UR: CLEAR
CO2 SERPL-SCNC: 23 MMOL/L (ref 23–29)
COLOR UR: YELLOW
CREAT SERPL-MCNC: 1 MG/DL (ref 0.5–1.4)
CV ECHO LV RWT: 0.39 CM
DIFFERENTIAL METHOD: ABNORMAL
DOP CALC AO PEAK VEL: 1.25 M/S
DOP CALC AO VTI: 26.45 CM
DOP CALC LVOT AREA: 5.7 CM2
DOP CALC LVOT DIAMETER: 2.69 CM
ECHO LV POSTERIOR WALL: 1.33 CM (ref 0.6–1.1)
EOSINOPHIL # BLD AUTO: 0.2 K/UL (ref 0–0.5)
EOSINOPHIL NFR BLD: 3.5 % (ref 0–8)
ERYTHROCYTE [DISTWIDTH] IN BLOOD BY AUTOMATED COUNT: 16 % (ref 11.5–14.5)
EST. GFR  (AFRICAN AMERICAN): >60 ML/MIN/1.73 M^2
EST. GFR  (NON AFRICAN AMERICAN): >60 ML/MIN/1.73 M^2
ESTIMATED AVG GLUCOSE: 108 MG/DL (ref 68–131)
FOLATE SERPL-MCNC: 17.5 NG/ML (ref 4–24)
FRACTIONAL SHORTENING: 27 % (ref 28–44)
GLUCOSE SERPL-MCNC: 100 MG/DL (ref 70–110)
GLUCOSE UR QL STRIP: NEGATIVE
HBA1C MFR BLD HPLC: 5.4 % (ref 4–5.6)
HCT VFR BLD AUTO: 37.3 % (ref 40–54)
HDLC SERPL-MCNC: 31 MG/DL (ref 40–75)
HDLC SERPL: 23.8 % (ref 20–50)
HGB BLD-MCNC: 11.5 G/DL (ref 14–18)
HGB UR QL STRIP: NEGATIVE
IMM GRANULOCYTES # BLD AUTO: 0.02 K/UL (ref 0–0.04)
IMM GRANULOCYTES NFR BLD AUTO: 0.4 % (ref 0–0.5)
INTERVENTRICULAR SEPTUM: 0.97 CM (ref 0.6–1.1)
KETONES UR QL STRIP: NEGATIVE
LA MAJOR: 6.19 CM
LA WIDTH: 4.18 CM
LDLC SERPL CALC-MCNC: 78.8 MG/DL (ref 63–159)
LEFT ATRIUM SIZE: 5.56 CM
LEFT INTERNAL DIMENSION IN SYSTOLE: 4.95 CM (ref 2.1–4)
LEFT VENTRICLE DIASTOLIC VOLUME INDEX: 79.28 ML/M2
LEFT VENTRICLE DIASTOLIC VOLUME: 236.48 ML
LEFT VENTRICLE MASS INDEX: 122 G/M2
LEFT VENTRICLE SYSTOLIC VOLUME INDEX: 38.8 ML/M2
LEFT VENTRICLE SYSTOLIC VOLUME: 115.64 ML
LEFT VENTRICULAR INTERNAL DIMENSION IN DIASTOLE: 6.77 CM (ref 3.5–6)
LEFT VENTRICULAR MASS: 363.23 G
LEUKOCYTE ESTERASE UR QL STRIP: NEGATIVE
LYMPHOCYTES # BLD AUTO: 1.2 K/UL (ref 1–4.8)
LYMPHOCYTES NFR BLD: 20.3 % (ref 18–48)
MAGNESIUM SERPL-MCNC: 1.7 MG/DL (ref 1.6–2.6)
MAGNESIUM SERPL-MCNC: 1.7 MG/DL (ref 1.6–2.6)
MCH RBC QN AUTO: 24.8 PG (ref 27–31)
MCHC RBC AUTO-ENTMCNC: 30.8 G/DL (ref 32–36)
MCV RBC AUTO: 81 FL (ref 82–98)
MONOCYTES # BLD AUTO: 0.5 K/UL (ref 0.3–1)
MONOCYTES NFR BLD: 8.6 % (ref 4–15)
NEUTROPHILS # BLD AUTO: 3.8 K/UL (ref 1.8–7.7)
NEUTROPHILS NFR BLD: 66.5 % (ref 38–73)
NITRITE UR QL STRIP: NEGATIVE
NONHDLC SERPL-MCNC: 99 MG/DL
NRBC BLD-RTO: 0 /100 WBC
PH UR STRIP: 6 [PH] (ref 5–8)
PHOSPHATE SERPL-MCNC: 4 MG/DL (ref 2.7–4.5)
PHOSPHATE SERPL-MCNC: 4 MG/DL (ref 2.7–4.5)
PISA TR MAX VEL: 2.31 M/S
PLATELET # BLD AUTO: 173 K/UL (ref 150–350)
PMV BLD AUTO: 9.1 FL (ref 9.2–12.9)
POCT GLUCOSE: 86 MG/DL (ref 70–110)
POCT GLUCOSE: 91 MG/DL (ref 70–110)
POTASSIUM SERPL-SCNC: 4.7 MMOL/L (ref 3.5–5.1)
PROT SERPL-MCNC: 7.6 G/DL (ref 6–8.4)
PROT UR QL STRIP: NEGATIVE
RA MAJOR: 6.74 CM
RA PRESSURE: 15 MMHG
RA WIDTH: 4.83 CM
RBC # BLD AUTO: 4.63 M/UL (ref 4.6–6.2)
SODIUM SERPL-SCNC: 142 MMOL/L (ref 136–145)
SP GR UR STRIP: >=1.03 (ref 1–1.03)
STJ: 3.34 CM
T4 FREE SERPL-MCNC: 0.85 NG/DL (ref 0.71–1.51)
TR MAX PG: 21 MMHG
TRICUSPID ANNULAR PLANE SYSTOLIC EXCURSION: 2.67 CM
TRIGL SERPL-MCNC: 101 MG/DL (ref 30–150)
TROPONIN I SERPL DL<=0.01 NG/ML-MCNC: 0.01 NG/ML (ref 0–0.03)
TROPONIN I SERPL DL<=0.01 NG/ML-MCNC: 0.01 NG/ML (ref 0–0.03)
TROPONIN I SERPL DL<=0.01 NG/ML-MCNC: <0.006 NG/ML (ref 0–0.03)
TSH SERPL DL<=0.005 MIU/L-ACNC: 0.02 UIU/ML (ref 0.4–4)
TV REST PULMONARY ARTERY PRESSURE: 36 MMHG
URN SPEC COLLECT METH UR: ABNORMAL
UROBILINOGEN UR STRIP-ACNC: NEGATIVE EU/DL
VIT B12 SERPL-MCNC: >2000 PG/ML (ref 210–950)
WBC # BLD AUTO: 5.71 K/UL (ref 3.9–12.7)

## 2020-08-22 PROCEDURE — 81003 URINALYSIS AUTO W/O SCOPE: CPT

## 2020-08-22 PROCEDURE — 80061 LIPID PANEL: CPT

## 2020-08-22 PROCEDURE — 85025 COMPLETE CBC W/AUTO DIFF WBC: CPT

## 2020-08-22 PROCEDURE — 82607 VITAMIN B-12: CPT

## 2020-08-22 PROCEDURE — 25000003 PHARM REV CODE 250: Performed by: STUDENT IN AN ORGANIZED HEALTH CARE EDUCATION/TRAINING PROGRAM

## 2020-08-22 PROCEDURE — 84100 ASSAY OF PHOSPHORUS: CPT

## 2020-08-22 PROCEDURE — 80053 COMPREHEN METABOLIC PANEL: CPT

## 2020-08-22 PROCEDURE — 94761 N-INVAS EAR/PLS OXIMETRY MLT: CPT

## 2020-08-22 PROCEDURE — 99219 PR INITIAL OBSERVATION CARE,LEVL II: ICD-10-PCS | Mod: 25,,, | Performed by: INTERNAL MEDICINE

## 2020-08-22 PROCEDURE — 93005 ELECTROCARDIOGRAM TRACING: CPT

## 2020-08-22 PROCEDURE — G0378 HOSPITAL OBSERVATION PER HR: HCPCS

## 2020-08-22 PROCEDURE — 99900035 HC TECH TIME PER 15 MIN (STAT)

## 2020-08-22 PROCEDURE — 97535 SELF CARE MNGMENT TRAINING: CPT

## 2020-08-22 PROCEDURE — 99219 PR INITIAL OBSERVATION CARE,LEVL II: CPT | Mod: 25,,, | Performed by: INTERNAL MEDICINE

## 2020-08-22 PROCEDURE — 63600175 PHARM REV CODE 636 W HCPCS

## 2020-08-22 PROCEDURE — 63600175 PHARM REV CODE 636 W HCPCS: Performed by: STUDENT IN AN ORGANIZED HEALTH CARE EDUCATION/TRAINING PROGRAM

## 2020-08-22 PROCEDURE — 97165 OT EVAL LOW COMPLEX 30 MIN: CPT

## 2020-08-22 PROCEDURE — 84484 ASSAY OF TROPONIN QUANT: CPT

## 2020-08-22 PROCEDURE — 83735 ASSAY OF MAGNESIUM: CPT | Mod: 91

## 2020-08-22 PROCEDURE — 97161 PT EVAL LOW COMPLEX 20 MIN: CPT

## 2020-08-22 PROCEDURE — 83036 HEMOGLOBIN GLYCOSYLATED A1C: CPT

## 2020-08-22 PROCEDURE — 82746 ASSAY OF FOLIC ACID SERUM: CPT

## 2020-08-22 PROCEDURE — 36415 COLL VENOUS BLD VENIPUNCTURE: CPT

## 2020-08-22 PROCEDURE — 25500020 PHARM REV CODE 255: Performed by: FAMILY MEDICINE

## 2020-08-22 RX ORDER — METHIMAZOLE 5 MG/1
20 TABLET ORAL DAILY
Status: DISCONTINUED | OUTPATIENT
Start: 2020-08-22 | End: 2020-08-23 | Stop reason: HOSPADM

## 2020-08-22 RX ORDER — TORSEMIDE 20 MG/1
40 TABLET ORAL DAILY
Status: DISCONTINUED | OUTPATIENT
Start: 2020-08-22 | End: 2020-08-23 | Stop reason: HOSPADM

## 2020-08-22 RX ORDER — ATORVASTATIN CALCIUM 40 MG/1
40 TABLET, FILM COATED ORAL NIGHTLY
Status: DISCONTINUED | OUTPATIENT
Start: 2020-08-22 | End: 2020-08-23 | Stop reason: HOSPADM

## 2020-08-22 RX ORDER — ACETAMINOPHEN 325 MG/1
650 TABLET ORAL EVERY 4 HOURS PRN
Status: DISCONTINUED | OUTPATIENT
Start: 2020-08-22 | End: 2020-08-23 | Stop reason: HOSPADM

## 2020-08-22 RX ORDER — DIPHENHYDRAMINE HYDROCHLORIDE 50 MG/ML
25 INJECTION INTRAMUSCULAR; INTRAVENOUS ONCE
Status: COMPLETED | OUTPATIENT
Start: 2020-08-22 | End: 2020-08-22

## 2020-08-22 RX ORDER — ALBUTEROL SULFATE 90 UG/1
2 AEROSOL, METERED RESPIRATORY (INHALATION) EVERY 6 HOURS PRN
Status: DISCONTINUED | OUTPATIENT
Start: 2020-08-22 | End: 2020-08-22

## 2020-08-22 RX ORDER — ASPIRIN 81 MG/1
81 TABLET ORAL DAILY
Status: DISCONTINUED | OUTPATIENT
Start: 2020-08-22 | End: 2020-08-23 | Stop reason: HOSPADM

## 2020-08-22 RX ORDER — METOPROLOL SUCCINATE 50 MG/1
50 TABLET, EXTENDED RELEASE ORAL NIGHTLY
Status: DISCONTINUED | OUTPATIENT
Start: 2020-08-22 | End: 2020-08-23 | Stop reason: HOSPADM

## 2020-08-22 RX ORDER — AMOXICILLIN 250 MG
1 CAPSULE ORAL 2 TIMES DAILY
Status: DISCONTINUED | OUTPATIENT
Start: 2020-08-22 | End: 2020-08-23 | Stop reason: HOSPADM

## 2020-08-22 RX ORDER — DIPHENHYDRAMINE HCL 25 MG
25 CAPSULE ORAL ONCE
Status: DISCONTINUED | OUTPATIENT
Start: 2020-08-22 | End: 2020-08-22

## 2020-08-22 RX ORDER — GLUCAGON 1 MG
1 KIT INJECTION
Status: DISCONTINUED | OUTPATIENT
Start: 2020-08-22 | End: 2020-08-23 | Stop reason: HOSPADM

## 2020-08-22 RX ORDER — METOPROLOL SUCCINATE 50 MG/1
100 TABLET, EXTENDED RELEASE ORAL DAILY
Status: DISCONTINUED | OUTPATIENT
Start: 2020-08-22 | End: 2020-08-23 | Stop reason: HOSPADM

## 2020-08-22 RX ORDER — METOPROLOL SUCCINATE 50 MG/1
50 TABLET, EXTENDED RELEASE ORAL NIGHTLY
Status: DISCONTINUED | OUTPATIENT
Start: 2020-08-22 | End: 2020-08-22

## 2020-08-22 RX ORDER — DIPHENHYDRAMINE HYDROCHLORIDE 50 MG/ML
INJECTION INTRAMUSCULAR; INTRAVENOUS
Status: COMPLETED
Start: 2020-08-22 | End: 2020-08-22

## 2020-08-22 RX ORDER — IBUPROFEN 200 MG
16 TABLET ORAL
Status: DISCONTINUED | OUTPATIENT
Start: 2020-08-22 | End: 2020-08-23 | Stop reason: HOSPADM

## 2020-08-22 RX ORDER — ACETAMINOPHEN 325 MG/1
650 TABLET ORAL EVERY 8 HOURS PRN
Status: DISCONTINUED | OUTPATIENT
Start: 2020-08-22 | End: 2020-08-23 | Stop reason: HOSPADM

## 2020-08-22 RX ORDER — IBUPROFEN 200 MG
24 TABLET ORAL
Status: DISCONTINUED | OUTPATIENT
Start: 2020-08-22 | End: 2020-08-23 | Stop reason: HOSPADM

## 2020-08-22 RX ORDER — HYDRALAZINE HYDROCHLORIDE 20 MG/ML
10 INJECTION INTRAMUSCULAR; INTRAVENOUS EVERY 8 HOURS PRN
Status: DISCONTINUED | OUTPATIENT
Start: 2020-08-22 | End: 2020-08-23 | Stop reason: HOSPADM

## 2020-08-22 RX ORDER — LIDOCAINE 50 MG/G
1 PATCH TOPICAL DAILY PRN
Status: DISCONTINUED | OUTPATIENT
Start: 2020-08-22 | End: 2020-08-23 | Stop reason: HOSPADM

## 2020-08-22 RX ORDER — SODIUM CHLORIDE 0.9 % (FLUSH) 0.9 %
5 SYRINGE (ML) INJECTION
Status: DISCONTINUED | OUTPATIENT
Start: 2020-08-22 | End: 2020-08-23 | Stop reason: HOSPADM

## 2020-08-22 RX ORDER — PANTOPRAZOLE SODIUM 40 MG/1
40 TABLET, DELAYED RELEASE ORAL DAILY
Status: DISCONTINUED | OUTPATIENT
Start: 2020-08-22 | End: 2020-08-23 | Stop reason: HOSPADM

## 2020-08-22 RX ORDER — ONDANSETRON 8 MG/1
8 TABLET, ORALLY DISINTEGRATING ORAL EVERY 6 HOURS PRN
Status: DISCONTINUED | OUTPATIENT
Start: 2020-08-22 | End: 2020-08-23 | Stop reason: HOSPADM

## 2020-08-22 RX ORDER — INSULIN ASPART 100 [IU]/ML
1-10 INJECTION, SOLUTION INTRAVENOUS; SUBCUTANEOUS
Status: DISCONTINUED | OUTPATIENT
Start: 2020-08-22 | End: 2020-08-23 | Stop reason: HOSPADM

## 2020-08-22 RX ORDER — ALBUTEROL SULFATE 2.5 MG/.5ML
2.5 SOLUTION RESPIRATORY (INHALATION) EVERY 6 HOURS PRN
Status: DISCONTINUED | OUTPATIENT
Start: 2020-08-22 | End: 2020-08-23 | Stop reason: HOSPADM

## 2020-08-22 RX ADMIN — IOHEXOL 100 ML: 350 INJECTION, SOLUTION INTRAVENOUS at 10:08

## 2020-08-22 RX ADMIN — DOCUSATE SODIUM 50 MG AND SENNOSIDES 8.6 MG 1 TABLET: 8.6; 5 TABLET, FILM COATED ORAL at 09:08

## 2020-08-22 RX ADMIN — RIVAROXABAN 20 MG: 20 TABLET, FILM COATED ORAL at 04:08

## 2020-08-22 RX ADMIN — DIPHENHYDRAMINE HYDROCHLORIDE 25 MG: 50 INJECTION INTRAMUSCULAR; INTRAVENOUS at 10:08

## 2020-08-22 RX ADMIN — ASPIRIN 81 MG: 81 TABLET, COATED ORAL at 09:08

## 2020-08-22 RX ADMIN — DIPHENHYDRAMINE HYDROCHLORIDE 25 MG: 50 INJECTION, SOLUTION INTRAMUSCULAR; INTRAVENOUS at 09:08

## 2020-08-22 RX ADMIN — TORSEMIDE 40 MG: 20 TABLET ORAL at 09:08

## 2020-08-22 RX ADMIN — METOPROLOL SUCCINATE 50 MG: 50 TABLET, EXTENDED RELEASE ORAL at 09:08

## 2020-08-22 RX ADMIN — METHIMAZOLE 20 MG: 5 TABLET ORAL at 09:08

## 2020-08-22 RX ADMIN — PANTOPRAZOLE SODIUM 40 MG: 40 TABLET, DELAYED RELEASE ORAL at 09:08

## 2020-08-22 RX ADMIN — ACETAMINOPHEN 650 MG: 325 TABLET ORAL at 05:08

## 2020-08-22 RX ADMIN — METOPROLOL SUCCINATE 100 MG: 50 TABLET, EXTENDED RELEASE ORAL at 09:08

## 2020-08-22 RX ADMIN — ATORVASTATIN CALCIUM 40 MG: 40 TABLET, FILM COATED ORAL at 09:08

## 2020-08-22 NOTE — ASSESSMENT & PLAN NOTE
TSH (2019): < 0.010, T4: 2.86  Repeat TSH pending   Continue medical management with methimazole 20 mg

## 2020-08-22 NOTE — PLAN OF CARE
VN note: VN cued into patient's room for introduction. Patient denies any needs at this time. Informed VN his R hand IV is red and nurse was going to change it. Allowed time for questions. VN will continue to be available to patient and intervene prn.

## 2020-08-22 NOTE — ED PROVIDER NOTES
"Encounter Date: 8/21/2020       History     Chief Complaint   Patient presents with    Headache     rt. sided headache x1 week. seen by pcp and prescribed Imitrex with no improvement. pt. is taking aleve anslo with no relief. pt. reports rt. side of head is sensitive to touch. no n/v. + photosensitivity     54-year-old male presents emergency room with reports of a headache specific to the right side of his head for approximately 1 week.  Patient reports he was seen by primary care physician in which he was prescribed Imitrex with no relief of his symptoms.  Patient reports photosensitivity.  He denies nausea, vomiting or diarrhea.  He denies rash or neck stiffness.  Patient reports that he has had headaches similar to this however this is "worse than his prior headaches but similar". PMH of AFib, anticoagulant long-term use-Xarelto, bipolar, CHF, glaucoma, morbid obesity, thyroid disease, CVA, venous stasis ulcers, congenital heart disease.    The history is provided by the patient. No  was used.     Review of patient's allergies indicates:   Allergen Reactions    Contrast media Other (See Comments)     Severe chest pain    Food allergy formula [glutamine-c-quercet-selen-brom]      Allergic to green peas; Heart failure.    Iodinated contrast media Other (See Comments)     Chest pain    Peas Hives    Ibuprofen Swelling    Latex, natural rubber Hives    Pcn [penicillins] Hives    Butisol [butabarbital] Rash     Peeling skin     Past Medical History:   Diagnosis Date    *Atrial fibrillation     Anticoagulant long-term use     Arthritis     Atrial fibrillation     Atrial fibrillation Feb 23, 2016    Bipolar disorder     CHF (congestive heart failure)     Congenital heart disease     s/p surgical intervention 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 of age    Deep vein thrombosis     DVT of leg (deep venous thrombosis)     left leg    History of prior ablation treatment     10/9/13    Hypertension     " Obesity     Stroke     Thyroid disease     Venous stasis ulcer of lower extremity, unspecified laterality 12/14/2012    Venous ulcer      Past Surgical History:   Procedure Laterality Date    ANGIOPLASTY      CARDIAC SURGERY      open heart surgery 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 old    EYE SURGERY      left eye cataract/right eye glaucoma    TIMO FILTER PLACEMENT      Dr Calix ([facility])    KNEE SURGERY      l and r     MULTIPLE TOOTH EXTRACTIONS      SKIN GRAFT      left leg     Family History   Problem Relation Age of Onset    Diabetes Father     Heart disease Father     Heart disease Maternal Grandmother     Diabetes Maternal Grandfather     Heart disease Maternal Grandfather     Stroke Maternal Grandfather      Social History     Tobacco Use    Smoking status: Former Smoker     Packs/day: 1.00     Years: 6.00     Pack years: 6.00     Types: Cigarettes    Smokeless tobacco: Former User    Tobacco comment: quit by age 27yrs old   Substance Use Topics    Alcohol use: Yes     Alcohol/week: 1.0 standard drinks     Types: 1 Glasses of wine per week     Frequency: Monthly or less     Comment: occasionally    Drug use: No     Review of Systems   Constitutional: Negative for fever.   Respiratory: Negative for shortness of breath.    Cardiovascular: Negative for chest pain.   Neurological: Positive for headaches. Negative for dizziness, tremors, syncope and weakness.       Physical Exam     Initial Vitals [08/21/20 1756]   BP Pulse Resp Temp SpO2   (!) 173/81 95 20 98.7 °F (37.1 °C) 96 %      MAP       --         Physical Exam    Nursing note and vitals reviewed.  Constitutional: He appears well-developed and well-nourished. He is not diaphoretic. No distress.   HENT:   Head: Normocephalic and atraumatic.   Right Ear: Hearing and tympanic membrane normal.   Left Ear: Hearing and tympanic membrane normal.   Nose: Nose normal.   Mouth/Throat: Uvula is midline, oropharynx is clear and moist and mucous  membranes are normal.   Glaucoma reported to the left eye and cataracts to the right. Denies new vision changes outside of photosensitivity.    Eyes: Lids are normal. Pupils are equal, round, and reactive to light.   Neck: No neck rigidity.   Cardiovascular: An irregularly irregular rhythm present.    Afib noted-chronic.    Pulmonary/Chest: Effort normal and breath sounds normal. No respiratory distress. He has no wheezes. He has no rhonchi.   Abdominal: Soft. There is no abdominal tenderness.   Musculoskeletal: Normal range of motion.   Neurological: He is oriented to person, place, and time. He has normal strength. He displays no tremor. No sensory deficit. He displays no seizure activity. GCS eye subscore is 4. GCS verbal subscore is 5. GCS motor subscore is 6.   Skin: Skin is warm and dry. No rash noted.   Pts has leg wraps noted to bilateral lower exts from diabetic wounds in which he is followed by wound care.    Psychiatric: He has a normal mood and affect. His behavior is normal. Judgment and thought content normal.         ED Course   Procedures  Labs Reviewed   CBC W/ AUTO DIFFERENTIAL   COMPREHENSIVE METABOLIC PANEL   PROTIME-INR   TSH          Imaging Results          CT Head Without Contrast (Final result)  Result time 08/21/20 21:50:12    Final result by Drew Harris MD (08/21/20 21:50:12)                 Impression:      Small areas of low density involving the occipital poles bilaterally suspicious for previous occipital pole infarcts.    No CT evidence of acute infarct, hemorrhage or mass.      Electronically signed by: Drew Harris  Date:    08/21/2020  Time:    21:50             Narrative:    EXAMINATION:  CT HEAD WITHOUT CONTRAST    CLINICAL HISTORY:  Headache, acute, normal neuro exam;    TECHNIQUE:  Low dose axial images were obtained through the head.  Coronal and sagittal reformations were also performed. Contrast was not administered.    COMPARISON:  None.    FINDINGS:  There is  "low-density in the occipital pole bilaterally involving gray and white matter.  Otherwise the brain parenchyma demonstrates normal attenuation with normal gray-white matter junction differentiation.  Mild involutional changes with prominence of the cortical sulcal markings is noted throughout.    There is no focal mass effect or pathologic fluid collection.  Calvarium is intact.  The paranasal sinuses mastoids and middle ears are clear.                                 Medical Decision Making:   Initial Assessment:   54-year-old male presents emergency room with reports of a headache specific to the right side of his head for approximately 1 week.  Patient reports he was seen by primary care physician in which he was prescribed Imitrex with no relief of his symptoms.  Patient reports photosensitivity.  He denies nausea, vomiting or diarrhea.  He denies rash or neck stiffness.  Patient reports that he has had headaches similar to this however this is "worse than his prior headaches but similar".     The history is provided by the patient. No  was used.     Differential Diagnosis:   CVA, hemorrhage, headache  Clinical Tests:   Radiological Study: Ordered  ED Management:  ED Course as of Aug 21 2228  Fri Aug 21, 2020  2214 Neuro paged at this time.     (DT)  2221 Spoke with Qasim valdez who states he will contact his attending them contact for anything further that needs to be obtained. Will admit inpatient status for neuro to [facility] in the am    (DT)  2223 [facility] med contact for admit    (DT)  2224 CT Head Without Contrast (DT)  2225 Spoke with Sea Hardwick for [facility] who will be down to see patient.     (DT)  2225 Pt reports improvement in headache after the Compazine was given. Notified of the CT findings and reasoning for admit.     (DT)    ED Course User Index  (DT) Debra Pyle NP                     ED Course as of Aug 21 2233   Fri Aug 21, 2020   2214 Neuro paged at this time.     [DT]   2221 " Spoke with Qasim valdez who states he will contact his attending them contact for anything further that needs to be obtained. Will admit inpatient status for neuro to eval in the am    [DT]   2223 [facility] med contact for admit    [DT]   2224 CT Head Without Contrast [DT]   2225 Spoke with Sea Hardwick for [facility] who will be down to see patient.     [DT]   2225 Pt reports improvement in headache after the Compazine was given. Notified of the CT findings and reasoning for admit.     [DT]      ED Course User Index  [DT] Debra Pyle NP                Clinical Impression:       ICD-10-CM ICD-9-CM   1. Stroke  I63.9 434.91             ED Disposition Condition    Admit                           Debra Pyle NP  08/21/20 2237

## 2020-08-22 NOTE — ED TRIAGE NOTES
Patient presents to the ED with reports of having a right sided headache with sensitivity to the light. States pain started x 2 weeks ago. Headache has been examined and treated by PCP. Patient states medication is not working. Denies any nausea, vomiting, fever, chills, chest pain, or shortness of breath.     Review of patient's allergies indicates:   Allergen Reactions    Contrast media Other (See Comments)     Severe chest pain    Food allergy formula [glutamine-c-quercet-selen-brom]      Allergic to green peas; Heart failure.    Iodinated contrast media Other (See Comments)     Chest pain    Peas Hives    Ibuprofen Swelling    Latex, natural rubber Hives    Pcn [penicillins] Hives    Butisol [butabarbital] Rash     Peeling skin        Patient has verified the spelling of their name and  on armband.   APPEARANCE: Patient is alert, calm, oriented x 4, and does not appear distressed.  SKIN: Skin is normal for race, warm, and dry. Normal skin turgor and mucous membranes moist.  CARDIAC: Normal rate and no murmur heard.   RESPIRATORY:Normal rate and effort. Breath sounds clear bilaterally throughout chest. Respirations are equal and unlabored.    GASTRO: Bowel sounds normal, abdomen is soft, no tenderness, and no abdominal distention.  MUSCLE: Full ROM. No bony tenderness or soft tissue tenderness. No obvious deformity.  PERIPHERAL VASCULAR: peripheral pulses present to upper extremities. Legs are wrapped in compression stockings and dressings. Normal cap refill. Warm to touch.  NEURO: 5/5 strength major flexors/extensors bilaterally. Sensory intact to light touch bilaterally. GCS 15. +Headache.   MENTAL STATUS: awake, alert and aware of environment.  EYE: No overt deficits noted. No drainage.   ENT: EARS: no obvious drainage. NOSE: no active bleeding. THROAT: no redness or swelling.

## 2020-08-22 NOTE — PLAN OF CARE
OT eval performed, report to follow  Pt has DME, no anticipated post acute OT needs at this time.    Problem: Occupational Therapy Goal  Goal: Occupational Therapy Goal  Description: Goals to be met by: 9/22     Patient will increase functional independence with ADLs by performing:    UE Dressing with Modified [place].  LE Dressing with Modified [place].  Grooming while standing at sink with Modified [place].  Toileting from toilet with Modified [place] for hygiene and clothing management.   Toilet transfer to toilet with Modified [place].    Outcome: Ongoing, Progressing

## 2020-08-22 NOTE — ASSESSMENT & PLAN NOTE
Currently compensated per chart  Last echo 12/2019: Normal left ventricular systolic function. The estimated ejection fraction is 55%  Continue torsemide tablet 40 mg daily  Will get new echo this admission as part of CVA w/u

## 2020-08-22 NOTE — PLAN OF CARE
PLAN OF CARE      ASSESSMENT/PLAN:   54 y.o. year old male with PMH of A fib on anticoagulants, thyroid disease, chronic systolic and diastolic heart failure, unstable angina and recurrent pulmonary embolism, Chronic venous stasis, who presented to the ED with complaints of unilateral headaches; R sided, from past 1 week associated with photosensitivity with no focal deficits and NIHSS of 0 per ER findings. On further examination his CT head showed small areas of low density involving occipital poles bilaterally concerning or old infarcts. No evidence of hemorrhage or mass. Which could be seemingly can be seen with patients with migraines.     Recommendations    -MRI w/o contrast - If  findings are not consistent with a stroke then it should be treated as migraine and the patient can be discharged whenever clinically indicated. Migraines can cause white matter lesions  -Consider getting Lipid panel and A1c. ASCVD risk is to be estimated in order to initiate statins.     D/w Dr. Goncalves      Thank you for the consult. We will evaluate the patient in person.     Qasim Lux   [employer]-Neurology- PGY-II

## 2020-08-22 NOTE — ASSESSMENT & PLAN NOTE
Patient is followed by [facility] every 2 weeks.    Home health nurse changes LLE dressing on Monday/Wednesday/Friday  Went today (Friday)

## 2020-08-22 NOTE — SUBJECTIVE & OBJECTIVE
Past Medical History:   Diagnosis Date    *Atrial fibrillation     Anticoagulant long-term use     Arthritis     Atrial fibrillation     Atrial fibrillation Feb 23, 2016    Bipolar disorder     CHF (congestive heart failure)     Congenital heart disease     s/p surgical intervention 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 of age    Deep vein thrombosis     DVT of leg (deep venous thrombosis)     left leg    History of prior ablation treatment     10/9/13    Hypertension     Obesity     Stroke     Thyroid disease     Venous stasis ulcer of lower extremity, unspecified laterality 12/14/2012    Venous ulcer        Past Surgical History:   Procedure Laterality Date    ANGIOPLASTY      CARDIAC SURGERY      open heart surgery 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 old    EYE SURGERY      left eye cataract/right eye glaucoma    TIMO FILTER PLACEMENT      Dr Calix ([facility])    KNEE SURGERY      l and r     MULTIPLE TOOTH EXTRACTIONS      SKIN GRAFT      left leg       Review of patient's allergies indicates:   Allergen Reactions    Contrast media Other (See Comments)     Severe chest pain    Food allergy formula [glutamine-c-quercet-selen-brom]      Allergic to green peas; Heart failure.    Iodinated contrast media Other (See Comments)     Chest pain    Peas Hives    Ibuprofen Swelling    Latex, natural rubber Hives    Pcn [penicillins] Hives    Butisol [butabarbital] Rash     Peeling skin       No current facility-administered medications on file prior to encounter.      Current Outpatient Medications on File Prior to Encounter   Medication Sig    acetaminophen (TYLENOL) 325 MG tablet Take 325 mg by mouth every 6 (six) hours as needed for Pain.    albuterol (VENTOLIN HFA) 90 mcg/actuation inhaler Inhale 2 puffs into the lungs every 6 (six) hours as needed for Wheezing. Rescue    ciclopirox (PENLAC) 8 % Soln Apply topically nightly as directed    collagenase (SANTYL) ointment Apply to wound as directed topically two times a  week    doxycycline (VIBRA-TABS) 100 MG tablet Take 100 mg by mouth 2 (two) times daily.    doxycycline (VIBRA-TABS) 100 MG tablet Take 1 tablet (100 mg total) by mouth 2 (two) times daily until entirely gone.    gentamicin (GARAMYCIN) 0.1 % ointment Mix with santyl and Apply to wound as directed.    hydrOXYzine HCl (ATARAX) 25 MG tablet Take 1 tablet (25 mg total) by mouth 4 (four) times daily as needed for Itching.    methIMAzole (TAPAZOLE) 10 MG Tab Take 2 tablets (20 mg total) by mouth once daily.    metoprolol succinate (TOPROL-XL) 100 MG 24 hr tablet Take 1 tablet (100 mg total) by mouth once daily.    pantoprazole (PROTONIX) 40 MG tablet Take 1 tablet (40 mg total) by mouth once daily.    sumatriptan (IMITREX) 50 MG tablet Take 50 mg for headache. If headache does not resolve, take another 50mg two hours after initial dose. Do not exceed 200mg in 24 hours.    torsemide (DEMADEX) 20 MG Tab Take 2 tablets by mouth daily.    XARELTO 20 mg Tab TAKE 1 TABLET (20 MG TOTAL) BY MOUTH DAILY WITH DINNER OR EVENING MEAL.     Family History     Problem Relation (Age of Onset)    Diabetes Father, Maternal Grandfather    Heart disease Father, Maternal Grandmother, Maternal Grandfather    Stroke Maternal Grandfather        Tobacco Use    Smoking status: Former Smoker     Packs/day: 1.00     Years: 6.00     Pack years: 6.00     Types: Cigarettes    Smokeless tobacco: Former User    Tobacco comment: quit by age 27yrs old   Substance and Sexual Activity    Alcohol use: Yes     Alcohol/week: 1.0 standard drinks     Types: 1 Glasses of wine per week     Frequency: Monthly or less     Comment: occasionally    Drug use: No    Sexual activity: Yes     Partners: Female     Birth control/protection: None     Review of Systems   Constitutional: Negative.  Negative for chills and fever.   HENT: Negative.  Negative for congestion.    Eyes: Negative.    Respiratory: Negative.  Negative for cough, chest tightness,  shortness of breath and wheezing.    Cardiovascular: Negative.  Negative for chest pain.   Gastrointestinal: Negative.  Negative for nausea and vomiting.   Endocrine: Negative.    Genitourinary: Negative.    Musculoskeletal: Negative.    Skin: Negative.    Allergic/Immunologic: Negative.    Neurological: Positive for headaches.   Hematological: Negative.    Psychiatric/Behavioral: Negative.      Objective:     Vital Signs (Most Recent):  Temp: 98.7 °F (37.1 °C) (08/21/20 1756)  Pulse: 82 (08/21/20 2302)  Resp: (!) 21 (08/21/20 2302)  BP: (!) 162/97 (08/21/20 2302)  SpO2: 98 % (08/21/20 2302) Vital Signs (24h Range):  Temp:  [98.7 °F (37.1 °C)] 98.7 °F (37.1 °C)  Pulse:  [82-95] 82  Resp:  [18-21] 21  SpO2:  [96 %-98 %] 98 %  BP: (143-173)/(80-97) 162/97     Weight: (!) 160.1 kg (353 lb)  Body mass index is 38.78 kg/m².    Physical Exam  Vitals signs reviewed.   Constitutional:       Appearance: He is well-developed.   HENT:      Head: Normocephalic and atraumatic.   Eyes:      Conjunctiva/sclera: Conjunctivae normal.      Pupils: Pupils are equal, round, and reactive to light.   Neck:      Musculoskeletal: Normal range of motion and neck supple.   Cardiovascular:      Rate and Rhythm: Normal rate. Rhythm irregular.      Heart sounds: Normal heart sounds. No murmur. No friction rub. No gallop.    Pulmonary:      Effort: Pulmonary effort is normal. No respiratory distress.      Breath sounds: Normal breath sounds. No wheezing or rales.   Abdominal:      General: Bowel sounds are normal.      Palpations: Abdomen is soft.   Musculoskeletal: Normal range of motion.   Skin:     General: Skin is warm and dry.      Capillary Refill: Capillary refill takes less than 2 seconds.      Comments: Bilateral chronic venous stasis ulcers wrapped at wound clinic today   Neurological:      General: No focal deficit present.      Mental Status: He is alert and oriented to person, place, and time. Mental status is at baseline.       Cranial Nerves: No cranial nerve deficit.      Sensory: No sensory deficit.      Motor: No weakness.   Psychiatric:         Behavior: Behavior normal.         Thought Content: Thought content normal.         Judgment: Judgment normal.           CRANIAL NERVES     CN III, IV, VI   Pupils are equal, round, and reactive to light.       Significant Labs:   CBC:   Recent Labs   Lab 08/21/20  2245   WBC 6.56   HGB 11.7*   HCT 37.1*        CMP:   Recent Labs   Lab 08/21/20  2245      K 4.2      CO2 25   GLU 95   BUN 23*   CREATININE 1.0   CALCIUM 9.0   PROT 8.0   ALBUMIN 3.6   BILITOT 0.4   ALKPHOS 136*   AST 28   ALT 21   ANIONGAP 9   EGFRNONAA >60       Significant Imaging: I have reviewed all pertinent imaging results/findings within the past 24 hours.

## 2020-08-22 NOTE — ASSESSMENT & PLAN NOTE
Unilateral, right sided headache frontal, temporal w/ symptoms for over a week not better with OTC meds  Saw PCP 08/10 for similar sx, given sumatriptan, no relief   CTH showed small areas of low density involving occipital poles bilaterally concerning or old infarcts  ED consulted Neuro who asked up to admit for CVA work up vs. Complex migraine     Plan: Will get MRI w/o contrast in am, echo w bubble, folate/b12/lipids/A1c, start statin/ASA

## 2020-08-22 NOTE — PT/OT/SLP EVAL
Physical Therapy Evaluation    Patient Name:  Drew Farrar   MRN:  [id number]    Recommendations:     Discharge Recommendations:  home   Discharge Equipment Recommendations: none   Barriers to discharge: None    Assessment:     Drew Farrar is a 54 y.o. male admitted with a medical diagnosis of Complicated migraine.  He presents with the following impairments/functional limitations:  impaired endurance, gait instability, impaired balance, visual deficits, decreased lower extremity function, pain, decreased ROM, impaired skin, impaired joint extensibility, impaired muscle length; pt had episode of substernal chest pain 15/10 toward end of short distance amb which decreased to 3/10 with seated rest; MD present to tend to pt; pt already has equipment needed; recommend return home with family..    Rehab Prognosis: Good; patient would benefit from acute skilled PT services to address these deficits and reach maximum level of function.    Recent Surgery: * No surgery found *      Plan:     During this hospitalization, patient to be seen 5 x/week to address the identified rehab impairments via gait training, therapeutic activities, therapeutic exercises and progress toward the following goals:    · Plan of Care Expires:  09/22/20    Subjective     Chief Complaint: no initial complaints  Patient/Family Comments/goals: to return home  Pain/Comfort:  · Pain Rating 1: 0/10  · Location 1: chest  · Pain Addressed 1: Cessation of Activity, Nurse notified(Dr. Roach notified and present)   · Pain increased to 15/10   · 3/10 post session with seated rest x 2-3 minutes    Patients cultural, spiritual, Roman Catholic conflicts given the current situation: no    Living Environment:  Pt lives with wife in 1 story house with 1 SEBLE.  Mother lives there part time .  Tub shower combo with TTB + grab bars. Low toilet with BSC over it.   Prior to admission, patients level of function was mod I with RW .  Pt doesn't drive.  Has PCA 5 days a  "week from 10-3 that does driving and takes him to grocery or MD appt.  Shares cooking with wife. Has home health nurse for wound care. Equipment used at home: walker, rolling, bath bench, bedside commode.  DME owned (not currently used): single point cane and wheelchair.  Upon discharge, patient will have assistance from wife who is 5'4" and on disability.    Objective:     Communicated with nurse prior to session.  Patient found with peripheral IV  upon PT entry to room.    General Precautions: Standard, fall(unstable angina)   Orthopedic Precautions:N/A   Braces: (one darco and one diabetic shoe)     Exams:  · Cognitive Exam:  Patient is oriented to Person, Place, Time, Situation and follows one and two step commands  · Gross Motor Coordination:  slow movement  · Postural Exam:  Patient presented with the following abnormalities:    · -       Rounded shoulders  · -       Forward head  · -       stands with increased DARA and foward trunk flexion (highest setting on RW not high enough as pt is 6'8")  · Skin Integrity/Edema:      · -       Skin integrity: L leg with compression dressing for venous stasis ulcer  · RLE ROM: lacking~30* knee extension; remaining WFL-pt reports that he needs a TKA  · RLE Strength: WFL- pt also reports low level of pain in R knee but did not rate; did not test 2/2 pain but noted WFL through observation  · LLE ROM: WFL   · LLE Strength: WFL; L ankle NT    Functional Mobility:  · Bed Mobility:     · Scooting: modified independence  · Supine to Sit: modified independence  · Sit to Supine: modified independence  · Transfers:     · Sit to Stand:  stand by assistance with rolling walker  · Gait: Patient amb 30ft using RW and SBA/CGA, forward trunk fl (highest setting on RW not high enough), increased DARA, slow vannessa, fair step length; standing rest half way.  · Balance: sit~good; stand~fair+; amb ~fair to fair+    Therapeutic Activities and Exercises:  Pt educated in role of PT/POC; pt " performed skills as described above; transitioned to sitting EOB and donned shoes with OT while PT out of room getting bariatric RW; pt sit to stand and amb as described above; pt had episode of substernal chest pain which he rated a 15/10 toward end of a 30ft walk and calmed to a 3/10 with seated rest; initial O2 sats 98% HR 81 at rest; pt took a standing break half way with slow pursed lip breathing with O2 sats 98% ; just post amb HR on pulse oximiter 162 but reduced quickly within 3 sec to 125 and continued to decrease with relaxation/deep pursed lip breathing to slow respirations and HR~99 O2 sats 99% over the next 2-3 minutes; Dr Roach then present to tend to the pt    AM-PAC 6 CLICK MOBILITY  Total Score:20     Patient left HOB elevated with all lines intact, call button in reach, bed alarm on and nurse notified.    GOALS:   Multidisciplinary Problems     Physical Therapy Goals        Problem: Physical Therapy Goal    Goal Priority Disciplines Outcome Goal Variances Interventions   Physical Therapy Goal     PT, PT/OT Ongoing, Progressing     Description: Goals to be met by: 2020     Patient will increase functional independence with mobility by performin. Sit to stand transfer with Modified [place] and Supervision  2. Bed to chair transfer with Modified [place] and Supervision using Bariatric Rolling Walker  3. Gait  x 50 feet with Modified [place] and Supervision using Rolling Walker.   4. Ascend/Descend 5 inch curb step with Stand-by Assistance and Contact Guard Assistance using Rolling Walker.                     History:     Past Medical History:   Diagnosis Date    *Atrial fibrillation     Anticoagulant long-term use     Arthritis     Atrial fibrillation     Atrial fibrillation 2016    Bipolar disorder     CHF (congestive heart failure)     Congenital heart disease     s/p surgical intervention 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 of age    Deep vein thrombosis     DVT of  leg (deep venous thrombosis)     left leg    History of prior ablation treatment     10/9/13    Hypertension     Obesity     Stroke     Thyroid disease     Venous stasis ulcer of lower extremity, unspecified laterality 12/14/2012    Venous ulcer        Past Surgical History:   Procedure Laterality Date    ANGIOPLASTY      CARDIAC SURGERY      open heart surgery 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 old    EYE SURGERY      left eye cataract/right eye glaucoma    TIMO FILTER PLACEMENT      Dr Calix ([facility])    KNEE SURGERY      l and r     MULTIPLE TOOTH EXTRACTIONS      SKIN GRAFT      left leg       Time Tracking:     PT Received On: 08/22/20  PT Start Time: 0918     PT Stop Time: 0945  PT Total Time (min): 27 min with OT    Billable Minutes: Evaluation 27 minutes      Colette Rojas, PT  08/22/2020

## 2020-08-22 NOTE — PLAN OF CARE
"0130 arrived via stretcher, appeared to be in no distress or pain.  Ambulated to bed from stretcher. Pt is 6'8", requested bed extension. However, [facility] was unable to find extender.  Will look in the morning.  Pt passed TERESA w/a 20.  Very pleasant.      No accu ck.     Tele: Afib,  HR 90,  No alarms.     Bed in lowest position, wheels locked, non skid socks, ID band worn, personal items and call bell with in reach, bed alarm set.     "

## 2020-08-22 NOTE — CONSULTS
[facility]  Cardiology  Consult Note    Patient Name: Drew Farrar  MRN: [id number]  Admission Date: 8/21/2020  Hospital Length of Stay: 0 days  Code Status: Full Code   Attending Provider: Garrison Roach MD   Consulting Provider: Moreno Whitten MD  Primary Care Physician: Garrison Roach MD  Principal Problem:Complicated migraine    Patient information was obtained from patient, past medical records and ER records.     Inpatient consult to [facility]  Consult performed by: Moreno Whitten MD  Consult ordered by: Venice Storey MD        Subjective:     Chief Complaint:  Headaches     HPI:   Cardiology consulted for atrial fibrillation    He is a 54 y.o. year old gentleman with medical history significant for HTN, Hyperthyroidism, Chronic venous statis dematitis,hx and DVT, may thurner syndrome Chronic A fib on xarelto and HFpEF  He presented with headaches. CT head showed old CVA.  While ambulating had increase in HR jumped to 130s. Hence cardiology consulted. Echo EF 45-55%, technically difficult study.   He is compliant with OAC at home.     Cardiac hx  Hx  patent PDA s/p surgical closure 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 of age, May Thurner syndrome with h/o multiple PEs and DVTs s/p IVC filter, chronic systolic congestive heart failure (EF 30%), hyperthyroid, atrial fibrillation   Past Medical History:   Diagnosis Date    *Atrial fibrillation     Anticoagulant long-term use     Arthritis     Atrial fibrillation     Atrial fibrillation Feb 23, 2016    Bipolar disorder     CHF (congestive heart failure)     Congenital heart disease     s/p surgical intervention 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 of age    Deep vein thrombosis     DVT of leg (deep venous thrombosis)     left leg    History of prior ablation treatment     10/9/13    Hypertension     Obesity     Stroke     Thyroid disease     Venous stasis ulcer of lower extremity, unspecified laterality 12/14/2012    Venous ulcer        Past Surgical  History:   Procedure Laterality Date    ANGIOPLASTY      CARDIAC SURGERY      open heart surgery 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 old    EYE SURGERY      left eye cataract/right eye glaucoma    TIMO FILTER PLACEMENT      Dr Calix ([facility])    KNEE SURGERY      l and r     MULTIPLE TOOTH EXTRACTIONS      SKIN GRAFT      left leg       Review of patient's allergies indicates:   Allergen Reactions    Contrast media Other (See Comments)     Severe chest pain    Food allergy formula [glutamine-c-quercet-selen-brom]      Allergic to green peas; Heart failure.    Iodinated contrast media Other (See Comments)     Chest pain    Peas Hives    Ibuprofen Swelling    Latex, natural rubber Hives    Pcn [penicillins] Hives    Butisol [butabarbital] Rash     Peeling skin       No current facility-administered medications on file prior to encounter.      Current Outpatient Medications on File Prior to Encounter   Medication Sig    gentamicin (GARAMYCIN) 0.1 % ointment Mix with santyl and Apply to wound as directed.    hydrOXYzine HCl (ATARAX) 25 MG tablet Take 1 tablet (25 mg total) by mouth 4 (four) times daily as needed for Itching.    metoprolol succinate (TOPROL-XL) 100 MG 24 hr tablet Take 1 tablet (100 mg total) by mouth once daily.    sumatriptan (IMITREX) 50 MG tablet Take 50 mg for headache. If headache does not resolve, take another 50mg two hours after initial dose. Do not exceed 200mg in 24 hours.    torsemide (DEMADEX) 20 MG Tab Take 2 tablets by mouth daily.    XARELTO 20 mg Tab TAKE 1 TABLET (20 MG TOTAL) BY MOUTH DAILY WITH DINNER OR EVENING MEAL.    acetaminophen (TYLENOL) 325 MG tablet Take 325 mg by mouth every 6 (six) hours as needed for Pain.    albuterol (VENTOLIN HFA) 90 mcg/actuation inhaler Inhale 2 puffs into the lungs every 6 (six) hours as needed for Wheezing. Rescue    [DISCONTINUED] ciclopirox (PENLAC) 8 % Soln Apply topically nightly as directed    [DISCONTINUED] collagenase (SANTYL)  ointment Apply to wound as directed topically two times a week    [DISCONTINUED] doxycycline (VIBRA-TABS) 100 MG tablet Take 100 mg by mouth 2 (two) times daily.    [DISCONTINUED] doxycycline (VIBRA-TABS) 100 MG tablet Take 1 tablet (100 mg total) by mouth 2 (two) times daily until entirely gone.    [DISCONTINUED] methIMAzole (TAPAZOLE) 10 MG Tab Take 2 tablets (20 mg total) by mouth once daily.    [DISCONTINUED] pantoprazole (PROTONIX) 40 MG tablet Take 1 tablet (40 mg total) by mouth once daily.     Family History     Problem Relation (Age of Onset)    Diabetes Father, Maternal Grandfather    Heart disease Father, Maternal Grandmother, Maternal Grandfather    Stroke Maternal Grandfather        Tobacco Use    Smoking status: Former Smoker     Packs/day: 1.00     Years: 6.00     Pack years: 6.00     Types: Cigarettes    Smokeless tobacco: Former User    Tobacco comment: quit by age 27yrs old   Substance and Sexual Activity    Alcohol use: Yes     Alcohol/week: 1.0 standard drinks     Types: 1 Glasses of wine per week     Frequency: Monthly or less     Comment: occasionally    Drug use: No    Sexual activity: Yes     Partners: Female     Birth control/protection: None     Review of Systems   Constitution: Negative for chills and fever.   HENT: Negative for hearing loss and nosebleeds.    Eyes: Negative for blurred vision.   Cardiovascular: Positive for dyspnea on exertion and leg swelling (chronic venous stasis with ulceration ). Negative for chest pain and palpitations.   Respiratory: Negative for hemoptysis and shortness of breath.    Hematologic/Lymphatic: Negative for bleeding problem.   Skin: Negative for itching.   Musculoskeletal: Negative for falls.   Gastrointestinal: Negative for abdominal pain and hematochezia.   Genitourinary: Negative for hematuria.   Neurological: Positive for headaches. Negative for dizziness and loss of balance.   Psychiatric/Behavioral: Negative for altered mental status  and depression.     Objective:     Vital Signs (Most Recent):  Temp: 97.9 °F (36.6 °C) (08/22/20 1607)  Pulse: 79 (08/22/20 1607)  Resp: 16 (08/22/20 1607)  BP: 134/76 (08/22/20 1607)  SpO2: 97 % (08/22/20 1607) Vital Signs (24h Range):  Temp:  [96.8 °F (36 °C)-98.7 °F (37.1 °C)] 97.9 °F (36.6 °C)  Pulse:  [] 79  Resp:  [16-21] 16  SpO2:  [96 %-100 %] 97 %  BP: (123-174)/(76-97) 134/76     Weight: (!) 171.9 kg (379 lb)  Body mass index is 42.98 kg/m².    SpO2: 97 %  O2 Device (Oxygen Therapy): room air      Intake/Output Summary (Last 24 hours) at 8/22/2020 1610  Last data filed at 8/22/2020 1215  Gross per 24 hour   Intake 300 ml   Output 1600 ml   Net -1300 ml       Lines/Drains/Airways     Peripheral Intravenous Line                 Peripheral IV - Single Lumen 08/22/20 1116 20 G Anterior;Left Hand less than 1 day                Physical Exam   Constitutional: He is oriented to person, place, and time. He appears well-developed and well-nourished.   Obese   HENT:   Head: Normocephalic and atraumatic.   Eyes: Conjunctivae are normal.   Neck: Neck supple. No JVD present. Carotid bruit is not present.   Cardiovascular: Normal rate and normal heart sounds. An irregularly irregular rhythm present. Exam reveals no gallop and no friction rub.   No murmur heard.  Pulses:       Carotid pulses are 2+ on the right side and 2+ on the left side.       Radial pulses are 2+ on the right side and 2+ on the left side.   Pulmonary/Chest: Effort normal and breath sounds normal. No stridor. No respiratory distress. He has no wheezes.   Musculoskeletal:         General: Edema present. Tenderness: non pitting edema, b/l 3+ RT 2+ lt extensive stasis changes.    Neurological: He is alert and oriented to person, place, and time.   Skin: Skin is warm and dry.   Psychiatric: He has a normal mood and affect. His behavior is normal.       Significant Labs:   CMP   Recent Labs   Lab 08/21/20  2245 08/22/20  0351    142   K 4.2 4.7     110   CO2 25 23   GLU 95 100   BUN 23* 21*   CREATININE 1.0 1.0   CALCIUM 9.0 8.9   PROT 8.0 7.6   ALBUMIN 3.6 3.5   BILITOT 0.4 0.4   ALKPHOS 136* 129   AST 28 27   ALT 21 21   ANIONGAP 9 9   ESTGFRAFRICA >60 >60   EGFRNONAA >60 >60   , CBC   Recent Labs   Lab 08/21/20  2245 08/22/20  0351   WBC 6.56 5.71   HGB 11.7* 11.5*   HCT 37.1* 37.3*    173   , Troponin   Recent Labs   Lab 08/22/20  0953   TROPONINI <0.006    and All pertinent lab results from the last 24 hours have been reviewed.    Significant Imaging: Echocardiogram:   2D echo with color flow doppler:   Results for orders placed or performed during the hospital encounter of 09/05/17   2D echo with color flow doppler   Result Value Ref Range    QEF 40 (A) 55 - 65    Mitral Valve Regurgitation SEVERE (A)     Aortic Valve Regurgitation MILD     Est. PA Systolic Pressure 46.94 (A)     Tricuspid Valve Regurgitation MODERATE (A)     Narrative    Date of Procedure: 09/06/2017        TEST DESCRIPTION   Technical Quality: This is a technically challenging study. There is poor endocardial definition. This study was performed in conjunction with a 3ml intravenous injection of Optison contrast agent.     General: The patient was in an irregularly irregular rhythm throughout the study.     Aorta: The aortic root is normal in size, measuring 3.0 cm at sinotubular junction and 3.8 cm at Sinuses of Valsalva. The proximal ascending aorta is normal in size, measuring 3.4 cm across.     Left Atrium: The left atrial volume index is severely enlarged, measuring 54.73 cc/m2.     Left Ventricle: The left ventricle is severely enlarged, with an end-diastolic diameter of 7.1 cm, and an end-systolic diameter of 5.2 cm. LV wall thickness is normal, with the septum measuring 1.0 cm and the posterior wall measuring 0.9 cm across.   Relative wall thickness was normal at 0.25, and the LV mass index was increased at 131.1 g/m2 consistent with eccentric left ventricular  hypertrophy. There is global hypokinesis. Left ventricular systolic function appears mildly to moderately depressed.   Visually estimated ejection fraction is 40-45%. The LV Doppler derived stroke volume equals 69.0 ccs.         Right Atrium: The right atrium is normal in size, measuring 6.4 cm in length and 5.8 cm in width in the apical view.     Right Ventricle: The right ventricle is enlarged measuring 5.2 cm at the base in the apical right ventricle-focused view. Global right ventricular systolic function appears moderately depressed. Tricuspid annular plane systolic excursion (TAPSE) is 2.5   cm. Tissue Doppler-derived tricuspid annular peak systolic velocity (S prime) is 12.1 cm/s. The estimated PA systolic pressure is 47 mmHg.     Aortic Valve:  Additionally, there is mild aortic regurgitation.     Mitral Valve:  There is severe mitral regurgitation.     Tricuspid Valve:  There is moderate tricuspid regurgitation.     Pulmonary Valve:  There is moderate pulmonic regurgitation.     IVC: IVC is enlarged but collapses > 50% with a sniff, suggesting intermediate right atrial pressure of 8 mmHg.     Intracavitary: There is no evidence of pericardial effusion, intracavity mass, thrombi, or vegetation.         CONCLUSIONS     1 - Eccentric hypertrophy.     2 - Mildly to moderately depressed left ventricular systolic function (EF 40-45%).     3 - Right ventricular enlargement with moderately depressed systolic function.     4 - Severe left atrial enlargement.     5 - Pulmonary hypertension. The estimated PA systolic pressure is 47 mmHg.     6 - Mild aortic regurgitation.     7 - Severe mitral regurgitation.     8 - Moderate tricuspid regurgitation.     9 - Intermediate central venous pressure.             This document has been electronically    SIGNED BY: Liz Rojas MD On: 09/06/2017 16:09    and Transthoracic echo (TTE) complete (Cupid Only):   Results for orders placed or performed during the hospital  encounter of 08/21/20   Echo Color Flow Doppler? Yes; Bubble Contrast? Yes   Result Value Ref Range    STJ 3.34 cm    AV mean gradient 4 mmHg    Ao peak rufus 1.25 m/s    Ao VTI 26.45 cm    IVS 0.97 0.6 - 1.1 cm    LA size 5.56 cm    Left Atrium Major Axis 6.19 cm    LVIDD 6.77 (A) 3.5 - 6.0 cm    LVIDS 4.95 (A) 2.1 - 4.0 cm    LVOT diameter 2.69 cm    PW 1.33 (A) 0.6 - 1.1 cm    RA Major Axis 6.74 cm    RA Width 4.83 cm    TAPSE 2.67 cm    TR Max Rufus 2.31 m/s    LA WIDTH 4.18 cm    Ao root annulus 3.59 cm    LV Diastolic Volume 236.48 mL    LV Systolic Volume 115.64 mL    FS 27 %    LV mass 363.23 g    Left Ventricle Relative Wall Thickness 0.39 cm    LVOT area 5.7 cm2    AV peak gradient 6 mmHg    LV Systolic Volume Index 38.8 mL/m2    LV Diastolic Volume Index 79.28 mL/m2    LV Mass Index 122 g/m2    Triscuspid Valve Regurgitation Peak Gradient 21 mmHg    BSA 3.09 m2    Right Atrial Pressure (from IVC) 15 mmHg    TV rest pulmonary artery pressure 36 mmHg    Narrative    · Mildly decreased left ventricular systolic function. The estimated   ejection fraction is 45-50%.  · Eccentric left ventricular hypertrophy.  · Moderate left ventricular enlargement.  · Atrial fibrillation observed.  · Mild right ventricular enlargement.  · Normal right ventricular systolic function.  · Mild left atrial enlargement.  · Mild aortic regurgitation.  · Mild mitral regurgitation.  · Moderate pulmonic regurgitation.  · Mild right atrial enlargement.  · Elevated central venous pressure (15 mmHg).  · The estimated PA systolic pressure is 36 mmHg.  · Unable to comment on the bubble study due to technical difficdulty of   the study  · Overall the study quality was technically difficult.        Assessment and Plan:   This is a54 year old with     Chronic persistent a.fib   Hx of DVT/PE may thurner syndrome  Acute headaches  Hx of CVA  Chronic venous stasis    Plan:  Currently rate is controlled at res in 70s.   With exertion it jumps to  120s.   On toprol xl 100 mg , instructed to take half in the evening additionally  Continue other cardiac medication. If BP tolerates would recommend starting lisinopril 5 mg daily as outpatient.   Continue Torsemide     VTE Risk Mitigation (From admission, onward)         Ordered     rivaroxaban tablet 20 mg  With dinner      08/22/20 0149     IP VTE HIGH RISK PATIENT  Once      08/22/20 0149     Place sequential compression device  Until discontinued      08/22/20 0149                Thank you for your consult. I will follow-up with patient. Please contact us if you have any additional questions.    Moreno Whitten MD  Cardiology   [facility]

## 2020-08-22 NOTE — PT/OT/SLP EVAL
Occupational Therapy   Evaluation    Name: Drew Farrar  MRN: [id number]  Admitting Diagnosis:  Complicated migraine      Recommendations:     Discharge Recommendations: home  Discharge Equipment Recommendations:  none  Barriers to discharge:  None    Assessment:     Drew Farrar is a 54 y.o. male with a medical diagnosis of Complicated migraine.  He presents with performance deficits affecting function: weakness, impaired endurance, impaired sensation, impaired self care skills, impaired functional mobilty, gait instability, impaired balance, decreased lower extremity function, pain, decreased ROM, impaired skin, edema, impaired joint extensibility, visual deficits.      Rehab Prognosis: Good; patient would benefit from acute skilled OT services to address these deficits and reach maximum level of function.       Plan:     Patient to be seen 5 x/week to address the above listed problems via self-care/home management, therapeutic activities, therapeutic exercises  · Plan of Care Expires: 09/22/20  · Plan of Care Reviewed with: patient    Subjective     Chief Complaint: I was having a bad headache  Patient/Family Comments/goals: return to [facility]    Occupational Profile:  Living Environment: Lives w/spouse and mom in [facility], 1 [address]BLE, T/S combo  Previous level of function: mod I mobility; min A ADLs, has CPA 5 days/week  Roles and Routines: he and wife cook  Equipment Used at Home:  wheelchair, walker, rolling, cane, straight, bath bench, grab bar, bedside commode, rollator  Assistance upon Discharge: family and caregiver    Pain/Comfort:  · Pain Rating 1: other (see comments)(15/10 during functional mobility)  · Location - Orientation 1: midline  · Location 1: chest  · Pain Addressed 1: Cessation of Activity, Reposition, Nurse notified, Distraction, Other (see comments)(MD team present)    Patients cultural, spiritual, Sikhism conflicts given the current situation: no    Objective:     Communicated with: nurse prior to  session.  Patient found HOB elevated with peripheral IV upon OT entry to room.    General Precautions: Standard, fall   Orthopedic Precautions:    Braces:       Occupational Performance:    Bed Mobility:    · Patient completed Rolling/Turning to Left with  modified independence  · Patient completed Scooting/Bridging with modified independence  · Patient completed Supine to Sit with modified independence  · Patient completed Sit to Supine with modified independence    Functional Mobility/Transfers:  · Patient completed Sit <> Stand Transfer with contact guard assistance  with  rolling walker   · Functional Mobility: CGA-min A w/RW--3 standing rest breaks and then with 15/10 c/o chest pain    Activities of Daily Living:  · Lower Body Dressing: stand by assistance to tyrone shoes while seated EOB    Cognitive/Visual Perceptual:  AO4  Pt with hx of glaucoma and cataracts as well as lazy eye-L    Physical Exam:  BUE AROM/strength/coordination WFL  Good sit balance, fair/fair+ stand balance    AMPAC 6 Click ADL:  AMPAC Total Score: 19    Treatment & Education:  Pt educated on role of OT/POC.  Pt sat EOB donned shoes, then performed sit to stand w/RW.  Performed functional mobility w/slow rate and multiple standing rest breaks.  Pt then noted to have episode of tachycardia into the 160's, reported 15/10 chest pain.  Returned to sit EOB; MD team at bedside.  HR returned to baseline and chest pain subsided.  Education:    Patient left HOB elevated with all lines intact, call button in reach, bed alarm on and nurse notified    GOALS:   Multidisciplinary Problems     Occupational Therapy Goals        Problem: Occupational Therapy Goal    Goal Priority Disciplines Outcome Interventions   Occupational Therapy Goal     OT, PT/OT Ongoing, Progressing    Description: Goals to be met by: 9/22     Patient will increase functional independence with ADLs by performing:    UE Dressing with Modified [place].  LE Dressing with Modified  [place].  Grooming while standing at sink with Modified [place].  Toileting from toilet with Modified [place] for hygiene and clothing management.   Toilet transfer to toilet with Modified [place].                     History:     Past Medical History:   Diagnosis Date    *Atrial fibrillation     Anticoagulant long-term use     Arthritis     Atrial fibrillation     Atrial fibrillation Feb 23, 2016    Bipolar disorder     CHF (congestive heart failure)     Congenital heart disease     s/p surgical intervention 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 of age    Deep vein thrombosis     DVT of leg (deep venous thrombosis)     left leg    History of prior ablation treatment     10/9/13    Hypertension     Obesity     Stroke     Thyroid disease     Venous stasis ulcer of lower extremity, unspecified laterality 12/14/2012    Venous ulcer        Past Surgical History:   Procedure Laterality Date    ANGIOPLASTY      CARDIAC SURGERY      open heart surgery 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 old    EYE SURGERY      left eye cataract/right eye glaucoma    TIMO FILTER PLACEMENT      Dr Calix ([facility])    KNEE SURGERY      l and r     MULTIPLE TOOTH EXTRACTIONS      SKIN GRAFT      left leg       Time Tracking:     OT Date of Treatment: 08/22/20  OT Start Time: 0918  OT Stop Time: 0945  OT Total Time (min): 27 min w/PT    Billable Minutes:Evaluation 15  Self Care/Home Management 8    Miguel Treadwell, OT  8/22/2020

## 2020-08-22 NOTE — H&P
[facility] Hospital Medicine  History & Physical    Patient Name: Drew Farrar  MRN: [id number]  Admission Date: 8/21/2020  Attending Physician: Garrison Roach MD   Primary Care Provider: Garrison Roach MD         Patient information was obtained from patient and ER records.     Subjective:     Principal Problem:Complicated migraine    Chief Complaint:   Chief Complaint   Patient presents with    Headache     rt. sided headache x1 week. seen by pcp and prescribed Imitrex with no improvement. pt. is taking aleve anslo with no relief. pt. reports rt. side of head is sensitive to touch. no n/v. + photosensitivity        HPI: 54-year-old male with past medical history of hypertension, hyperthyroidism, heart failure with preserved ejection fraction, chronic venous stasis dermatitis, chronic AFib on anticoagulation who presents to the emergency department with headache symptoms for over a week.  Patient was recently seen by primary care doctor on 08/10 and was given sumatriptan but has not had any relief.  Patient continues to have daily headaches that he states last throughout the day and do not let up.  Describes these headaches as unilateral starting right frontal radiating to right temporal region of his head.  Describes this as pounding.  Admits to photosensitivity.  Denies any weakness, paralysis, facial droop, blurry vision, change in smell or taste, nausea vomiting, chest pain.     In the ED CT head showing small areas of low density involving occipital poles bilaterally concerning for old infarcts.  No evidence of hemorrhage or mass.  The ED consulted Neurology who states would like us to admit patient and they will see him in morning.  Vital signs stable.  Labs unremarkable.    Past Medical History:   Diagnosis Date    *Atrial fibrillation     Anticoagulant long-term use     Arthritis     Atrial fibrillation     Atrial fibrillation Feb 23, 2016    Bipolar disorder     CHF  (congestive heart failure)     Congenital heart disease     s/p surgical intervention 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 of age    Deep vein thrombosis     DVT of leg (deep venous thrombosis)     left leg    History of prior ablation treatment     10/9/13    Hypertension     Obesity     Stroke     Thyroid disease     Venous stasis ulcer of lower extremity, unspecified laterality 12/14/2012    Venous ulcer        Past Surgical History:   Procedure Laterality Date    ANGIOPLASTY      CARDIAC SURGERY      open heart surgery 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 old    EYE SURGERY      left eye cataract/right eye glaucoma    TIMO FILTER PLACEMENT      Dr Calix ([facility])    KNEE SURGERY      l and r     MULTIPLE TOOTH EXTRACTIONS      SKIN GRAFT      left leg       Review of patient's allergies indicates:   Allergen Reactions    Contrast media Other (See Comments)     Severe chest pain    Food allergy formula [glutamine-c-quercet-selen-brom]      Allergic to green peas; Heart failure.    Iodinated contrast media Other (See Comments)     Chest pain    Peas Hives    Ibuprofen Swelling    Latex, natural rubber Hives    Pcn [penicillins] Hives    Butisol [butabarbital] Rash     Peeling skin       No current facility-administered medications on file prior to encounter.      Current Outpatient Medications on File Prior to Encounter   Medication Sig    acetaminophen (TYLENOL) 325 MG tablet Take 325 mg by mouth every 6 (six) hours as needed for Pain.    albuterol (VENTOLIN HFA) 90 mcg/actuation inhaler Inhale 2 puffs into the lungs every 6 (six) hours as needed for Wheezing. Rescue    ciclopirox (PENLAC) 8 % Soln Apply topically nightly as directed    collagenase (SANTYL) ointment Apply to wound as directed topically two times a week    doxycycline (VIBRA-TABS) 100 MG tablet Take 100 mg by mouth 2 (two) times daily.    doxycycline (VIBRA-TABS) 100 MG tablet Take 1 tablet (100 mg total) by mouth 2 (two) times daily until entirely  gone.    gentamicin (GARAMYCIN) 0.1 % ointment Mix with santyl and Apply to wound as directed.    hydrOXYzine HCl (ATARAX) 25 MG tablet Take 1 tablet (25 mg total) by mouth 4 (four) times daily as needed for Itching.    methIMAzole (TAPAZOLE) 10 MG Tab Take 2 tablets (20 mg total) by mouth once daily.    metoprolol succinate (TOPROL-XL) 100 MG 24 hr tablet Take 1 tablet (100 mg total) by mouth once daily.    pantoprazole (PROTONIX) 40 MG tablet Take 1 tablet (40 mg total) by mouth once daily.    sumatriptan (IMITREX) 50 MG tablet Take 50 mg for headache. If headache does not resolve, take another 50mg two hours after initial dose. Do not exceed 200mg in 24 hours.    torsemide (DEMADEX) 20 MG Tab Take 2 tablets by mouth daily.    XARELTO 20 mg Tab TAKE 1 TABLET (20 MG TOTAL) BY MOUTH DAILY WITH DINNER OR EVENING MEAL.     Family History     Problem Relation (Age of Onset)    Diabetes Father, Maternal Grandfather    Heart disease Father, Maternal Grandmother, Maternal Grandfather    Stroke Maternal Grandfather        Tobacco Use    Smoking status: Former Smoker     Packs/day: 1.00     Years: 6.00     Pack years: 6.00     Types: Cigarettes    Smokeless tobacco: Former User    Tobacco comment: quit by age 27yrs old   Substance and Sexual Activity    Alcohol use: Yes     Alcohol/week: 1.0 standard drinks     Types: 1 Glasses of wine per week     Frequency: Monthly or less     Comment: occasionally    Drug use: No    Sexual activity: Yes     Partners: Female     Birth control/protection: None     Review of Systems   Constitutional: Negative.  Negative for chills and fever.   HENT: Negative.  Negative for congestion.    Eyes: Negative.    Respiratory: Negative.  Negative for cough, chest tightness, shortness of breath and wheezing.    Cardiovascular: Negative.  Negative for chest pain.   Gastrointestinal: Negative.  Negative for nausea and vomiting.   Endocrine: Negative.    Genitourinary: Negative.     Musculoskeletal: Negative.    Skin: Negative.    Allergic/Immunologic: Negative.    Neurological: Positive for headaches.   Hematological: Negative.    Psychiatric/Behavioral: Negative.      Objective:     Vital Signs (Most Recent):  Temp: 98.7 °F (37.1 °C) (08/21/20 1756)  Pulse: 82 (08/21/20 2302)  Resp: (!) 21 (08/21/20 2302)  BP: (!) 162/97 (08/21/20 2302)  SpO2: 98 % (08/21/20 2302) Vital Signs (24h Range):  Temp:  [98.7 °F (37.1 °C)] 98.7 °F (37.1 °C)  Pulse:  [82-95] 82  Resp:  [18-21] 21  SpO2:  [96 %-98 %] 98 %  BP: (143-173)/(80-97) 162/97     Weight: (!) 160.1 kg (353 lb)  Body mass index is 38.78 kg/m².    Physical Exam  Vitals signs reviewed.   Constitutional:       Appearance: He is well-developed.   HENT:      Head: Normocephalic and atraumatic.   Eyes:      Conjunctiva/sclera: Conjunctivae normal.      Pupils: Pupils are equal, round, and reactive to light.   Neck:      Musculoskeletal: Normal range of motion and neck supple.   Cardiovascular:      Rate and Rhythm: Normal rate. Rhythm irregular.      Heart sounds: Normal heart sounds. No murmur. No friction rub. No gallop.    Pulmonary:      Effort: Pulmonary effort is normal. No respiratory distress.      Breath sounds: Normal breath sounds. No wheezing or rales.   Abdominal:      General: Bowel sounds are normal.      Palpations: Abdomen is soft.   Musculoskeletal: Normal range of motion.   Skin:     General: Skin is warm and dry.      Capillary Refill: Capillary refill takes less than 2 seconds.      Comments: Bilateral chronic venous stasis ulcers wrapped at wound clinic today   Neurological:      General: No focal deficit present.      Mental Status: He is alert and oriented to person, place, and time. Mental status is at baseline.      Cranial Nerves: No cranial nerve deficit.      Sensory: No sensory deficit.      Motor: No weakness.   Psychiatric:         Behavior: Behavior normal.         Thought Content: Thought content normal.          Judgment: Judgment normal.           CRANIAL NERVES     CN III, IV, VI   Pupils are equal, round, and reactive to light.       Significant Labs:   CBC:   Recent Labs   Lab 08/21/20  2245   WBC 6.56   HGB 11.7*   HCT 37.1*        CMP:   Recent Labs   Lab 08/21/20  2245      K 4.2      CO2 25   GLU 95   BUN 23*   CREATININE 1.0   CALCIUM 9.0   PROT 8.0   ALBUMIN 3.6   BILITOT 0.4   ALKPHOS 136*   AST 28   ALT 21   ANIONGAP 9   EGFRNONAA >60       Significant Imaging: I have reviewed all pertinent imaging results/findings within the past 24 hours.    Assessment/Plan:     * Complicated migraine  Unilateral, right sided headache frontal, temporal w/ symptoms for over a week not better with OTC meds  Saw PCP 08/10 for similar sx, given sumatriptan, no relief   CTH showed small areas of low density involving occipital poles bilaterally concerning or old infarcts  ED consulted Neuro who asked up to admit for CVA work up vs. Complex migraine     Plan: Will get MRI w/o contrast in am, echo w bubble, folate/b12/lipids/A1c, start statin/ASA      (HFpEF) heart failure with preserved ejection fraction  Currently compensated per chart  Last echo 12/2019: Normal left ventricular systolic function. The estimated ejection fraction is 55%  Continue torsemide tablet 40 mg daily  Will get new echo this admission as part of CVA w/u     Hyperthyroidism  TSH (2019): < 0.010, T4: 2.86  Repeat TSH pending   Continue medical management with methimazole 20 mg        HTN (hypertension)  No need for permissive HTN as Sx for over a week and supporting more migraine type picture  Continue medical management with Toprol-XL      Chronic atrial fibrillation  EKG:  Evidence of Atrial fibrillation  Continue medical management with rivaroxaban    Venous stasis dermatitis of both lower extremities  Patient is followed by [facility] every 2 weeks.    Home health nurse changes LLE dressing on Monday/Wednesday/Friday  Went today  (Friday)    VTE Risk Mitigation (From admission, onward)    SCDs and cont home sterling Hardwick MD  Department of Hospital Medicine   [facility]

## 2020-08-22 NOTE — ASSESSMENT & PLAN NOTE
No need for permissive HTN as Sx for over a week and supporting more migraine type picture  Continue medical management with Toprol-XL

## 2020-08-22 NOTE — SUBJECTIVE & OBJECTIVE
Past Medical History:   Diagnosis Date    *Atrial fibrillation     Anticoagulant long-term use     Arthritis     Atrial fibrillation     Atrial fibrillation Feb 23, 2016    Bipolar disorder     CHF (congestive heart failure)     Congenital heart disease     s/p surgical intervention 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 of age    Deep vein thrombosis     DVT of leg (deep venous thrombosis)     left leg    History of prior ablation treatment     10/9/13    Hypertension     Obesity     Stroke     Thyroid disease     Venous stasis ulcer of lower extremity, unspecified laterality 12/14/2012    Venous ulcer        Past Surgical History:   Procedure Laterality Date    ANGIOPLASTY      CARDIAC SURGERY      open heart surgery 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 old    EYE SURGERY      left eye cataract/right eye glaucoma    TIMO FILTER PLACEMENT      Dr Calix ([facility])    KNEE SURGERY      l and r     MULTIPLE TOOTH EXTRACTIONS      SKIN GRAFT      left leg       Review of patient's allergies indicates:   Allergen Reactions    Contrast media Other (See Comments)     Severe chest pain    Food allergy formula [glutamine-c-quercet-selen-brom]      Allergic to green peas; Heart failure.    Iodinated contrast media Other (See Comments)     Chest pain    Peas Hives    Ibuprofen Swelling    Latex, natural rubber Hives    Pcn [penicillins] Hives    Butisol [butabarbital] Rash     Peeling skin       No current facility-administered medications on file prior to encounter.      Current Outpatient Medications on File Prior to Encounter   Medication Sig    gentamicin (GARAMYCIN) 0.1 % ointment Mix with santyl and Apply to wound as directed.    hydrOXYzine HCl (ATARAX) 25 MG tablet Take 1 tablet (25 mg total) by mouth 4 (four) times daily as needed for Itching.    metoprolol succinate (TOPROL-XL) 100 MG 24 hr tablet Take 1 tablet (100 mg total) by mouth once daily.    sumatriptan (IMITREX) 50 MG tablet Take 50 mg for headache. If  headache does not resolve, take another 50mg two hours after initial dose. Do not exceed 200mg in 24 hours.    torsemide (DEMADEX) 20 MG Tab Take 2 tablets by mouth daily.    XARELTO 20 mg Tab TAKE 1 TABLET (20 MG TOTAL) BY MOUTH DAILY WITH DINNER OR EVENING MEAL.    acetaminophen (TYLENOL) 325 MG tablet Take 325 mg by mouth every 6 (six) hours as needed for Pain.    albuterol (VENTOLIN HFA) 90 mcg/actuation inhaler Inhale 2 puffs into the lungs every 6 (six) hours as needed for Wheezing. Rescue    [DISCONTINUED] ciclopirox (PENLAC) 8 % Soln Apply topically nightly as directed    [DISCONTINUED] collagenase (SANTYL) ointment Apply to wound as directed topically two times a week    [DISCONTINUED] doxycycline (VIBRA-TABS) 100 MG tablet Take 100 mg by mouth 2 (two) times daily.    [DISCONTINUED] doxycycline (VIBRA-TABS) 100 MG tablet Take 1 tablet (100 mg total) by mouth 2 (two) times daily until entirely gone.    [DISCONTINUED] methIMAzole (TAPAZOLE) 10 MG Tab Take 2 tablets (20 mg total) by mouth once daily.    [DISCONTINUED] pantoprazole (PROTONIX) 40 MG tablet Take 1 tablet (40 mg total) by mouth once daily.     Family History     Problem Relation (Age of Onset)    Diabetes Father, Maternal Grandfather    Heart disease Father, Maternal Grandmother, Maternal Grandfather    Stroke Maternal Grandfather        Tobacco Use    Smoking status: Former Smoker     Packs/day: 1.00     Years: 6.00     Pack years: 6.00     Types: Cigarettes    Smokeless tobacco: Former User    Tobacco comment: quit by age 27yrs old   Substance and Sexual Activity    Alcohol use: Yes     Alcohol/week: 1.0 standard drinks     Types: 1 Glasses of wine per week     Frequency: Monthly or less     Comment: occasionally    Drug use: No    Sexual activity: Yes     Partners: Female     Birth control/protection: None     Review of Systems   Constitution: Negative for chills and fever.   HENT: Negative for hearing loss and nosebleeds.     Eyes: Negative for blurred vision.   Cardiovascular: Positive for dyspnea on exertion and leg swelling (chronic venous stasis with ulceration ). Negative for chest pain and palpitations.   Respiratory: Negative for hemoptysis and shortness of breath.    Hematologic/Lymphatic: Negative for bleeding problem.   Skin: Negative for itching.   Musculoskeletal: Negative for falls.   Gastrointestinal: Negative for abdominal pain and hematochezia.   Genitourinary: Negative for hematuria.   Neurological: Positive for headaches. Negative for dizziness and loss of balance.   Psychiatric/Behavioral: Negative for altered mental status and depression.     Objective:     Vital Signs (Most Recent):  Temp: 97.9 °F (36.6 °C) (08/22/20 1607)  Pulse: 79 (08/22/20 1607)  Resp: 16 (08/22/20 1607)  BP: 134/76 (08/22/20 1607)  SpO2: 97 % (08/22/20 1607) Vital Signs (24h Range):  Temp:  [96.8 °F (36 °C)-98.7 °F (37.1 °C)] 97.9 °F (36.6 °C)  Pulse:  [] 79  Resp:  [16-21] 16  SpO2:  [96 %-100 %] 97 %  BP: (123-174)/(76-97) 134/76     Weight: (!) 171.9 kg (379 lb)  Body mass index is 42.98 kg/m².    SpO2: 97 %  O2 Device (Oxygen Therapy): room air      Intake/Output Summary (Last 24 hours) at 8/22/2020 1610  Last data filed at 8/22/2020 1215  Gross per 24 hour   Intake 300 ml   Output 1600 ml   Net -1300 ml       Lines/Drains/Airways     Peripheral Intravenous Line                 Peripheral IV - Single Lumen 08/22/20 1116 20 G Anterior;Left Hand less than 1 day                Physical Exam   Constitutional: He is oriented to person, place, and time. He appears well-developed and well-nourished.   Obese   HENT:   Head: Normocephalic and atraumatic.   Eyes: Conjunctivae are normal.   Neck: Neck supple. No JVD present. Carotid bruit is not present.   Cardiovascular: Normal rate and normal heart sounds. An irregularly irregular rhythm present. Exam reveals no gallop and no friction rub.   No murmur heard.  Pulses:       Carotid pulses are  2+ on the right side and 2+ on the left side.       Radial pulses are 2+ on the right side and 2+ on the left side.   Pulmonary/Chest: Effort normal and breath sounds normal. No stridor. No respiratory distress. He has no wheezes.   Musculoskeletal:         General: Edema present. Tenderness: non pitting edema, b/l 3+ RT 2+ lt extensive stasis changes.    Neurological: He is alert and oriented to person, place, and time.   Skin: Skin is warm and dry.   Psychiatric: He has a normal mood and affect. His behavior is normal.       Significant Labs:   CMP   Recent Labs   Lab 08/21/20 2245 08/22/20  0351    142   K 4.2 4.7    110   CO2 25 23   GLU 95 100   BUN 23* 21*   CREATININE 1.0 1.0   CALCIUM 9.0 8.9   PROT 8.0 7.6   ALBUMIN 3.6 3.5   BILITOT 0.4 0.4   ALKPHOS 136* 129   AST 28 27   ALT 21 21   ANIONGAP 9 9   ESTGFRAFRICA >60 >60   EGFRNONAA >60 >60   , CBC   Recent Labs   Lab 08/21/20 2245 08/22/20  0351   WBC 6.56 5.71   HGB 11.7* 11.5*   HCT 37.1* 37.3*    173   , Troponin   Recent Labs   Lab 08/22/20  0953   TROPONINI <0.006    and All pertinent lab results from the last 24 hours have been reviewed.    Significant Imaging: Echocardiogram:   2D echo with color flow doppler:   Results for orders placed or performed during the hospital encounter of 09/05/17   2D echo with color flow doppler   Result Value Ref Range    QEF 40 (A) 55 - 65    Mitral Valve Regurgitation SEVERE (A)     Aortic Valve Regurgitation MILD     Est. PA Systolic Pressure 46.94 (A)     Tricuspid Valve Regurgitation MODERATE (A)     Narrative    Date of Procedure: 09/06/2017        TEST DESCRIPTION   Technical Quality: This is a technically challenging study. There is poor endocardial definition. This study was performed in conjunction with a 3ml intravenous injection of Optison contrast agent.     General: The patient was in an irregularly irregular rhythm throughout the study.     Aorta: The aortic root is normal in size,  measuring 3.0 cm at sinotubular junction and 3.8 cm at Sinuses of Valsalva. The proximal ascending aorta is normal in size, measuring 3.4 cm across.     Left Atrium: The left atrial volume index is severely enlarged, measuring 54.73 cc/m2.     Left Ventricle: The left ventricle is severely enlarged, with an end-diastolic diameter of 7.1 cm, and an end-systolic diameter of 5.2 cm. LV wall thickness is normal, with the septum measuring 1.0 cm and the posterior wall measuring 0.9 cm across.   Relative wall thickness was normal at 0.25, and the LV mass index was increased at 131.1 g/m2 consistent with eccentric left ventricular hypertrophy. There is global hypokinesis. Left ventricular systolic function appears mildly to moderately depressed.   Visually estimated ejection fraction is 40-45%. The LV Doppler derived stroke volume equals 69.0 ccs.         Right Atrium: The right atrium is normal in size, measuring 6.4 cm in length and 5.8 cm in width in the apical view.     Right Ventricle: The right ventricle is enlarged measuring 5.2 cm at the base in the apical right ventricle-focused view. Global right ventricular systolic function appears moderately depressed. Tricuspid annular plane systolic excursion (TAPSE) is 2.5   cm. Tissue Doppler-derived tricuspid annular peak systolic velocity (S prime) is 12.1 cm/s. The estimated PA systolic pressure is 47 mmHg.     Aortic Valve:  Additionally, there is mild aortic regurgitation.     Mitral Valve:  There is severe mitral regurgitation.     Tricuspid Valve:  There is moderate tricuspid regurgitation.     Pulmonary Valve:  There is moderate pulmonic regurgitation.     IVC: IVC is enlarged but collapses > 50% with a sniff, suggesting intermediate right atrial pressure of 8 mmHg.     Intracavitary: There is no evidence of pericardial effusion, intracavity mass, thrombi, or vegetation.         CONCLUSIONS     1 - Eccentric hypertrophy.     2 - Mildly to moderately depressed left  ventricular systolic function (EF 40-45%).     3 - Right ventricular enlargement with moderately depressed systolic function.     4 - Severe left atrial enlargement.     5 - Pulmonary hypertension. The estimated PA systolic pressure is 47 mmHg.     6 - Mild aortic regurgitation.     7 - Severe mitral regurgitation.     8 - Moderate tricuspid regurgitation.     9 - Intermediate central venous pressure.             This document has been electronically    SIGNED BY: Liz Rojas MD On: 09/06/2017 16:09    and Transthoracic echo (TTE) complete (Cupid Only):   Results for orders placed or performed during the hospital encounter of 08/21/20   Echo Color Flow Doppler? Yes; Bubble Contrast? Yes   Result Value Ref Range    STJ 3.34 cm    AV mean gradient 4 mmHg    Ao peak rufus 1.25 m/s    Ao VTI 26.45 cm    IVS 0.97 0.6 - 1.1 cm    LA size 5.56 cm    Left Atrium Major Axis 6.19 cm    LVIDD 6.77 (A) 3.5 - 6.0 cm    LVIDS 4.95 (A) 2.1 - 4.0 cm    LVOT diameter 2.69 cm    PW 1.33 (A) 0.6 - 1.1 cm    RA Major Axis 6.74 cm    RA Width 4.83 cm    TAPSE 2.67 cm    TR Max Rufus 2.31 m/s    LA WIDTH 4.18 cm    Ao root annulus 3.59 cm    LV Diastolic Volume 236.48 mL    LV Systolic Volume 115.64 mL    FS 27 %    LV mass 363.23 g    Left Ventricle Relative Wall Thickness 0.39 cm    LVOT area 5.7 cm2    AV peak gradient 6 mmHg    LV Systolic Volume Index 38.8 mL/m2    LV Diastolic Volume Index 79.28 mL/m2    LV Mass Index 122 g/m2    Triscuspid Valve Regurgitation Peak Gradient 21 mmHg    BSA 3.09 m2    Right Atrial Pressure (from IVC) 15 mmHg    TV rest pulmonary artery pressure 36 mmHg    Narrative    · Mildly decreased left ventricular systolic function. The estimated   ejection fraction is 45-50%.  · Eccentric left ventricular hypertrophy.  · Moderate left ventricular enlargement.  · Atrial fibrillation observed.  · Mild right ventricular enlargement.  · Normal right ventricular systolic function.  · Mild left atrial  enlargement.  · Mild aortic regurgitation.  · Mild mitral regurgitation.  · Moderate pulmonic regurgitation.  · Mild right atrial enlargement.  · Elevated central venous pressure (15 mmHg).  · The estimated PA systolic pressure is 36 mmHg.  · Unable to comment on the bubble study due to technical difficdulty of   the study  · Overall the study quality was technically difficult.

## 2020-08-22 NOTE — PLAN OF CARE
[facility] Plan of Care Note    To r/o SVT please add MRV as well. Order placed.    Drew Hansen MD  [facility] PGY4

## 2020-08-22 NOTE — HPI
54-year-old male with past medical history of hypertension, hyperthyroidism, heart failure with preserved ejection fraction, chronic venous stasis dermatitis, chronic AFib on anticoagulation who presents to the emergency department with headache symptoms for over a week.  Patient was recently seen by primary care doctor on 08/10 and was given sumatriptan but has not had any relief.  Patient continues to have daily headaches that he states last throughout the day and do not let up.  Describes these headaches as unilateral starting right frontal radiating to right temporal region of his head.  Describes this as pounding.  Admits to photosensitivity.  Denies any weakness, paralysis, facial droop, blurry vision, change in smell or taste, nausea vomiting, chest pain.     In the ED CT head showing small areas of low density involving occipital poles bilaterally concerning for old infarcts.  No evidence of hemorrhage or mass.  The ED consulted Neurology who states would like us to admit patient and they will see him in morning.  Vital signs stable.  Labs unremarkable.

## 2020-08-22 NOTE — CONSULTS
"NEUROLOGY FLOOR CONSULT    Reason for consult:  Headaches and CVA     Informant:  ER physician        Other sources of information : past medical records    CC:  "R sided Headaches x 1 week"    HPI:   Drew Farrar is a 54 y.o. year old gentleman with medical history significant for HTN, Hyperthyroidism, Chronic venous statis dematitis, recurrent hospitalizations for chest pain and DVT, Chronic A fib on xarelto and HFpEF, who was presented to ER  on August 21st 2020 due to headaches has not had similar symptoms in the past. Headaches were unilateral, R sided with no aggravating or alleviating factor. His headaches were, pounding,  9/10 on intensity with associated photosensitivity, but no nausea or vomiting. No associated weakness,  Numbness, blurry vision,  or speech deficits.   He was seen by the PCP(Dr. Melissa) on 8/10and was prescribed Sumatriptan, but the pain didn't subside.      Patient admits to compliance most of the time    ROS: Denies any TIA/amaurosis symptoms., facial/extremity paraesthesia, numbness, tingling, weakness, dysarthria, amaurosis, Seizures    Histories:     Allergies:  Contrast media; Food allergy formula [glutamine-c-quercet-selen-brom]; Iodinated contrast media; Peas; Ibuprofen; Latex, natural rubber; Pcn [penicillins]; and Butisol [butabarbital]    Current Medications:    Current Facility-Administered Medications   Medication Dose Route Frequency Provider Last Rate Last Dose    acetaminophen tablet 650 mg  650 mg Oral Q4H PRN Sea Hardwick MD        acetaminophen tablet 650 mg  650 mg Oral Q8H PRN Sea Hardwick MD        albuterol sulfate nebulizer solution 2.5 mg  2.5 mg Nebulization Q6H PRN Sea Hardwick MD        aspirin EC tablet 81 mg  81 mg Oral Daily Sea Hardwick MD        atorvastatin tablet 40 mg  40 mg Oral QHS Sea Hardwick MD        dextrose 50% injection 12.5 g  12.5 g Intravenous PRN Sea Hardwick MD        dextrose 50% injection 25 g  25 g Intravenous PRN Sea Hardwick MD "        glucagon (human recombinant) injection 1 mg  1 mg Intramuscular PRN Sea Hardwick MD        glucose chewable tablet 16 g  16 g Oral PRN Sea Hardwick MD        glucose chewable tablet 24 g  24 g Oral PRN Sea Hardwick MD        insulin aspart U-100 pen 1-10 Units  1-10 Units Subcutaneous QID (AC + HS) PRN Sea Hardwick MD        lidocaine 5 % patch 1 patch  1 patch Transdermal Daily PRN Sea Hardwick MD        methIMAzole tablet 20 mg  20 mg Oral Daily Sea Hardwick MD        metoprolol succinate (TOPROL-XL) 24 hr tablet 100 mg  100 mg Oral Daily Sea Hardwick MD        ondansetron disintegrating tablet 8 mg  8 mg Oral Q6H PRN Sea Hardwick MD        pantoprazole EC tablet 40 mg  40 mg Oral Daily Sea Hardwick MD        rivaroxaban tablet 20 mg  20 mg Oral Daily with dinner Sea Hardwick MD        senna-docusate 8.6-50 mg per tablet 1 tablet  1 tablet Oral BID Sea Hardwick MD        sodium chloride 0.9% flush 5 mL  5 mL Intravenous PRN Sea Hardwick MD        torsemide tablet 40 mg  40 mg Oral Daily Sea Hardwick MD           Past Medical/Surgical/Family History:  Medical:   Past Medical History:   Diagnosis Date    *Atrial fibrillation     Anticoagulant long-term use     Arthritis     Atrial fibrillation     Atrial fibrillation Feb 23, 2016    Bipolar disorder     CHF (congestive heart failure)     Congenital heart disease     s/p surgical intervention 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 of age    Deep vein thrombosis     DVT of leg (deep venous thrombosis)     left leg    History of prior ablation treatment     10/9/13    Hypertension     Obesity     Stroke     Thyroid disease     Venous stasis ulcer of lower extremity, unspecified laterality 12/14/2012    Venous ulcer       Surgeries:   Past Surgical History:   Procedure Laterality Date    ANGIOPLASTY      CARDIAC SURGERY      open heart surgery 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 old    EYE SURGERY      left eye cataract/right eye glaucoma    TIMO FILTER PLACEMENT        [facility]    KNEE SURGERY      l and r     MULTIPLE TOOTH EXTRACTIONS      SKIN GRAFT      left leg      Family:   Family History   Problem Relation Age of Onset    Diabetes Father     Heart disease Father     Heart disease Maternal Grandmother     Diabetes Maternal Grandfather     Heart disease Maternal Grandfather     Stroke Maternal Grandfather    , no family history of nerve or muscle disease    Social History:    Substance Abuse/Dependence History:  Tobacco: Smoked when he was 19 years old   EtOH: social drinker  Ilicits: none     Occupational/Employment History:  Occupation: unknown occupation      Current Evaluation:     Vital Signs:   Vitals:    08/22/20 0714   BP:    Pulse: 91   Resp:    Temp:           ORIENTATION: Within Normal Limits    MEMORY: recent and remote memory intact    LANGUAGE: 0=No aphasia, normal    CRANIAL NERVES:  normal    MOTOR:  Pronator drift: absent  Strength intact in all 4 extremities     Tone: normal    DTR'S:  2+ bilaterally    SENSORY:  Decreased sensation in the lower extremity due to venous statis dermatitis     CEREBELLAR/GAIT:  Finger to nose:normal  Heel to shin:normal  Gait: Didn't assess because patient was sleepy     LABORATORY STUDIES:  Recent Results (from the past 24 hour(s))   COVID-19 Rapid Screening    Collection Time: 08/21/20 10:38 PM   Result Value Ref Range    SARS-CoV-2 RNA, Amplification, Qual Negative Negative   CBC W/ AUTO DIFFERENTIAL    Collection Time: 08/21/20 10:45 PM   Result Value Ref Range    WBC 6.56 3.90 - 12.70 K/uL    RBC 4.64 4.60 - 6.20 M/uL    Hemoglobin 11.7 (L) 14.0 - 18.0 g/dL    Hematocrit 37.1 (L) 40.0 - 54.0 %    Mean Corpuscular Volume 80 (L) 82 - 98 fL    Mean Corpuscular Hemoglobin 25.2 (L) 27.0 - 31.0 pg    Mean Corpuscular Hemoglobin Conc 31.5 (L) 32.0 - 36.0 g/dL    RDW 15.9 (H) 11.5 - 14.5 %    Platelets 184 150 - 350 K/uL    MPV 9.0 (L) 9.2 - 12.9 fL    Immature Granulocytes 0.2 0.0 - 0.5 %    Gran # (ANC)  4.4 1.8 - 7.7 K/uL    Immature Grans (Abs) 0.01 0.00 - 0.04 K/uL    Lymph # 1.3 1.0 - 4.8 K/uL    Mono # 0.5 0.3 - 1.0 K/uL    Eos # 0.3 0.0 - 0.5 K/uL    Baso # 0.05 0.00 - 0.20 K/uL    nRBC 0 0 /100 WBC    Gran% 66.4 38.0 - 73.0 %    Lymph% 19.8 18.0 - 48.0 %    Mono% 8.1 4.0 - 15.0 %    Eosinophil% 4.7 0.0 - 8.0 %    Basophil% 0.8 0.0 - 1.9 %    Differential Method Automated    Comprehensive metabolic panel    Collection Time: 08/21/20 10:45 PM   Result Value Ref Range    Sodium 143 136 - 145 mmol/L    Potassium 4.2 3.5 - 5.1 mmol/L    Chloride 109 95 - 110 mmol/L    CO2 25 23 - 29 mmol/L    Glucose 95 70 - 110 mg/dL    BUN, Bld 23 (H) 6 - 20 mg/dL    Creatinine 1.0 0.5 - 1.4 mg/dL    Calcium 9.0 8.7 - 10.5 mg/dL    Total Protein 8.0 6.0 - 8.4 g/dL    Albumin 3.6 3.5 - 5.2 g/dL    Total Bilirubin 0.4 0.1 - 1.0 mg/dL    Alkaline Phosphatase 136 (H) 55 - 135 U/L    AST 28 10 - 40 U/L    ALT 21 10 - 44 U/L    Anion Gap 9 8 - 16 mmol/L    eGFR if African American >60 >60 mL/min/1.73 m^2    eGFR if non African American >60 >60 mL/min/1.73 m^2   Protime-INR    Collection Time: 08/21/20 10:45 PM   Result Value Ref Range    Prothrombin Time 10.9 9.0 - 12.5 sec    INR 1.0 0.8 - 1.2   TSH    Collection Time: 08/21/20 10:45 PM   Result Value Ref Range    TSH 0.020 (L) 0.400 - 4.000 uIU/mL   T4, free    Collection Time: 08/21/20 10:45 PM   Result Value Ref Range    Free T4 0.85 0.71 - 1.51 ng/dL   Urinalysis, Reflex to Urine Culture Urine, Clean Catch    Collection Time: 08/22/20  2:07 AM    Specimen: Urine   Result Value Ref Range    Specimen UA Urine, Clean Catch     Color, UA Yellow Yellow, Straw, Sandra    Appearance, UA Clear Clear    pH, UA 6.0 5.0 - 8.0    Specific Gravity, UA >=1.030 (A) 1.005 - 1.030    Protein, UA Negative Negative    Glucose, UA Negative Negative    Ketones, UA Negative Negative    Bilirubin (UA) Negative Negative    Occult Blood UA Negative Negative    Nitrite, UA Negative Negative     Urobilinogen, UA Negative <2.0 EU/dL    Leukocytes, UA Negative Negative   Comprehensive Metabolic Panel (CMP)    Collection Time: 08/22/20  3:51 AM   Result Value Ref Range    Sodium 142 136 - 145 mmol/L    Potassium 4.7 3.5 - 5.1 mmol/L    Chloride 110 95 - 110 mmol/L    CO2 23 23 - 29 mmol/L    Glucose 100 70 - 110 mg/dL    BUN, Bld 21 (H) 6 - 20 mg/dL    Creatinine 1.0 0.5 - 1.4 mg/dL    Calcium 8.9 8.7 - 10.5 mg/dL    Total Protein 7.6 6.0 - 8.4 g/dL    Albumin 3.5 3.5 - 5.2 g/dL    Total Bilirubin 0.4 0.1 - 1.0 mg/dL    Alkaline Phosphatase 129 55 - 135 U/L    AST 27 10 - 40 U/L    ALT 21 10 - 44 U/L    Anion Gap 9 8 - 16 mmol/L    eGFR if African American >60 >60 mL/min/1.73 m^2    eGFR if non African American >60 >60 mL/min/1.73 m^2   Magnesium    Collection Time: 08/22/20  3:51 AM   Result Value Ref Range    Magnesium 1.7 1.6 - 2.6 mg/dL   Magnesium    Collection Time: 08/22/20  3:51 AM   Result Value Ref Range    Magnesium 1.7 1.6 - 2.6 mg/dL   Phosphorus    Collection Time: 08/22/20  3:51 AM   Result Value Ref Range    Phosphorus 4.0 2.7 - 4.5 mg/dL   Phosphorus    Collection Time: 08/22/20  3:51 AM   Result Value Ref Range    Phosphorus 4.0 2.7 - 4.5 mg/dL   Hemoglobin A1c    Collection Time: 08/22/20  3:51 AM   Result Value Ref Range    Hemoglobin A1C 5.4 4.0 - 5.6 %    Estimated Avg Glucose 108 68 - 131 mg/dL   CBC with Automated Differential    Collection Time: 08/22/20  3:51 AM   Result Value Ref Range    WBC 5.71 3.90 - 12.70 K/uL    RBC 4.63 4.60 - 6.20 M/uL    Hemoglobin 11.5 (L) 14.0 - 18.0 g/dL    Hematocrit 37.3 (L) 40.0 - 54.0 %    Mean Corpuscular Volume 81 (L) 82 - 98 fL    Mean Corpuscular Hemoglobin 24.8 (L) 27.0 - 31.0 pg    Mean Corpuscular Hemoglobin Conc 30.8 (L) 32.0 - 36.0 g/dL    RDW 16.0 (H) 11.5 - 14.5 %    Platelets 173 150 - 350 K/uL    MPV 9.1 (L) 9.2 - 12.9 fL    Immature Granulocytes 0.4 0.0 - 0.5 %    Gran # (ANC) 3.8 1.8 - 7.7 K/uL    Immature Grans (Abs) 0.02 0.00 - 0.04  K/uL    Lymph # 1.2 1.0 - 4.8 K/uL    Mono # 0.5 0.3 - 1.0 K/uL    Eos # 0.2 0.0 - 0.5 K/uL    Baso # 0.04 0.00 - 0.20 K/uL    nRBC 0 0 /100 WBC    Gran% 66.5 38.0 - 73.0 %    Lymph% 20.3 18.0 - 48.0 %    Mono% 8.6 4.0 - 15.0 %    Eosinophil% 3.5 0.0 - 8.0 %    Basophil% 0.7 0.0 - 1.9 %    Differential Method Automated    Lipid panel    Collection Time: 08/22/20  3:51 AM   Result Value Ref Range    Cholesterol 130 120 - 199 mg/dL    Triglycerides 101 30 - 150 mg/dL    HDL 31 (L) 40 - 75 mg/dL    LDL Cholesterol 78.8 63.0 - 159.0 mg/dL    Hdl/Cholesterol Ratio 23.8 20.0 - 50.0 %    Total Cholesterol/HDL Ratio 4.2 2.0 - 5.0    Non-HDL Cholesterol 99 mg/dL       Thyroid TSH 0.020, T4- 0.85  HgA1C%:  5.4   Vit B12: Pending   Folate:  Pending   Chol 130, Try 101, HDL 31    RADIOLOGY STUDIES:  I have personally reviewed the images performed.     HEAD CT: Remote small area of low density in the occipital poles bilaterally     BRAIN MRI Pending       Assessment:  P     54 y.o. year old male with PMH of A fib on anticoagulants, thyroid disease, chronic systolic and diastolic heart failure, unstable angina and recurrent pulmonary embolism, Chronic venous stasis, who presented to the ED with complaints of unilateral headaches; R sided, from past 1 week associated with photosensitivity with no focal deficits and NIHSS of 0 per ER findings. CT head showed small areas of low density involving occipital poles bilaterally concerning or old infarcts. No evidence of hemorrhage or mass. Which could be seemingly can be seen with patients with migraines    1. Unilateral Headaches vs CVA   - Headaches are more likely 2/2 to migraines vs cluster headaches. MRI is ordered to rule out acute stroke. NIHSS- 0  - H/o headaches, but never had headaches which is this severe. Associated with photosensitivity. Home medications shows Sumatriptan, but patient denies having ever been diagnosed with Migraines  - CT head showed Remote infarct in the  bilateral occipital poles. No previous CT head to compare.  - MRI- Pending     Treatment Plan    Medication: Patient is already on Xarelto. Atorvastatin 40 mg can be started       Laboratory or Radiological studies needed: MRI w/o contrast, B12 and Folate    Differential diagnosis was explained to the patient. All questions were answered. Patient understood and agreed to adhere to plan.       Case discussed with Dr. Carter Arcos      Will follow for additional input regarding management of current neurologic condition and monitor for any new signs/symptoms to suggest neurologic detrimental changes.     Appreciate the consult.     Qasim jackson   [facility]- Neurology- PGY-II

## 2020-08-22 NOTE — PLAN OF CARE
Problem: Physical Therapy Goal  Goal: Physical Therapy Goal  Description: Goals to be met by: 2020     Patient will increase functional independence with mobility by performin. Sit to stand transfer with Modified [place] and Supervision  2. Bed to chair transfer with Modified [place] and Supervision using Bariatric Rolling Walker  3. Gait  x 50 feet with Modified [place] and Supervision using Rolling Walker.   4. Ascend/Descend 5 inch curb step with Stand-by Assistance and Contact Guard Assistance using Rolling Walker.    Outcome: Ongoing, Progressing   Patient evaluated; full report to follow; pt had episode of substernal chest pain which he rated a 15/10 toward end of a 30ft walk and calmed to a 3/10 with seated rest; initial O2 sats 98% HR 81 at rest; pt took a standing break half way with slow pursed lip breathing with O2 sats 98% ; just post amb HR on pulse oximiter 162 but reduced quickly within 3 sec to 125 and continued to decrease with relaxation/deep pursed lip breathing to slow respirations and HR~99 O2 sats 99% over the next 2-3 minutes; Dr Roach then present to tend to the pt; pt already has equipment needed; recommend return home with family.

## 2020-08-23 VITALS
HEART RATE: 62 BPM | HEIGHT: 78 IN | WEIGHT: 315 LBS | BODY MASS INDEX: 36.45 KG/M2 | OXYGEN SATURATION: 98 % | DIASTOLIC BLOOD PRESSURE: 80 MMHG | TEMPERATURE: 98 F | RESPIRATION RATE: 16 BRPM | SYSTOLIC BLOOD PRESSURE: 142 MMHG

## 2020-08-23 LAB
ALBUMIN SERPL BCP-MCNC: 3.7 G/DL (ref 3.5–5.2)
ALP SERPL-CCNC: 129 U/L (ref 55–135)
ALT SERPL W/O P-5'-P-CCNC: 20 U/L (ref 10–44)
ANION GAP SERPL CALC-SCNC: 10 MMOL/L (ref 8–16)
AST SERPL-CCNC: 28 U/L (ref 10–40)
BASOPHILS # BLD AUTO: 0.05 K/UL (ref 0–0.2)
BASOPHILS NFR BLD: 0.7 % (ref 0–1.9)
BILIRUB SERPL-MCNC: 0.6 MG/DL (ref 0.1–1)
BUN SERPL-MCNC: 28 MG/DL (ref 6–20)
CALCIUM SERPL-MCNC: 8.9 MG/DL (ref 8.7–10.5)
CHLORIDE SERPL-SCNC: 104 MMOL/L (ref 95–110)
CO2 SERPL-SCNC: 24 MMOL/L (ref 23–29)
CREAT SERPL-MCNC: 1.4 MG/DL (ref 0.5–1.4)
DIFFERENTIAL METHOD: ABNORMAL
EOSINOPHIL # BLD AUTO: 0.3 K/UL (ref 0–0.5)
EOSINOPHIL NFR BLD: 3.7 % (ref 0–8)
ERYTHROCYTE [DISTWIDTH] IN BLOOD BY AUTOMATED COUNT: 15.9 % (ref 11.5–14.5)
EST. GFR  (AFRICAN AMERICAN): >60 ML/MIN/1.73 M^2
EST. GFR  (NON AFRICAN AMERICAN): 57 ML/MIN/1.73 M^2
GLUCOSE SERPL-MCNC: 96 MG/DL (ref 70–110)
HCT VFR BLD AUTO: 37.9 % (ref 40–54)
HGB BLD-MCNC: 12 G/DL (ref 14–18)
IMM GRANULOCYTES # BLD AUTO: 0.01 K/UL (ref 0–0.04)
IMM GRANULOCYTES NFR BLD AUTO: 0.1 % (ref 0–0.5)
LYMPHOCYTES # BLD AUTO: 1.4 K/UL (ref 1–4.8)
LYMPHOCYTES NFR BLD: 21.4 % (ref 18–48)
MAGNESIUM SERPL-MCNC: 1.6 MG/DL (ref 1.6–2.6)
MCH RBC QN AUTO: 25.4 PG (ref 27–31)
MCHC RBC AUTO-ENTMCNC: 31.7 G/DL (ref 32–36)
MCV RBC AUTO: 80 FL (ref 82–98)
MONOCYTES # BLD AUTO: 0.7 K/UL (ref 0.3–1)
MONOCYTES NFR BLD: 10.6 % (ref 4–15)
NEUTROPHILS # BLD AUTO: 4.2 K/UL (ref 1.8–7.7)
NEUTROPHILS NFR BLD: 63.5 % (ref 38–73)
NRBC BLD-RTO: 0 /100 WBC
PHOSPHATE SERPL-MCNC: 4.5 MG/DL (ref 2.7–4.5)
PLATELET # BLD AUTO: 192 K/UL (ref 150–350)
PMV BLD AUTO: 8.9 FL (ref 9.2–12.9)
POTASSIUM SERPL-SCNC: 4.1 MMOL/L (ref 3.5–5.1)
PROT SERPL-MCNC: 8.1 G/DL (ref 6–8.4)
RBC # BLD AUTO: 4.73 M/UL (ref 4.6–6.2)
SODIUM SERPL-SCNC: 138 MMOL/L (ref 136–145)
WBC # BLD AUTO: 6.69 K/UL (ref 3.9–12.7)

## 2020-08-23 PROCEDURE — 36415 COLL VENOUS BLD VENIPUNCTURE: CPT

## 2020-08-23 PROCEDURE — G0378 HOSPITAL OBSERVATION PER HR: HCPCS

## 2020-08-23 PROCEDURE — 83735 ASSAY OF MAGNESIUM: CPT

## 2020-08-23 PROCEDURE — 80053 COMPREHEN METABOLIC PANEL: CPT

## 2020-08-23 PROCEDURE — 85025 COMPLETE CBC W/AUTO DIFF WBC: CPT

## 2020-08-23 PROCEDURE — 25000003 PHARM REV CODE 250: Performed by: STUDENT IN AN ORGANIZED HEALTH CARE EDUCATION/TRAINING PROGRAM

## 2020-08-23 PROCEDURE — 84100 ASSAY OF PHOSPHORUS: CPT

## 2020-08-23 RX ORDER — METOPROLOL SUCCINATE 50 MG/1
50 TABLET, EXTENDED RELEASE ORAL NIGHTLY
Qty: 30 TABLET | Refills: 5 | Status: SHIPPED | OUTPATIENT
Start: 2020-08-23 | End: 2021-01-25 | Stop reason: SDUPTHER

## 2020-08-23 RX ORDER — PANTOPRAZOLE SODIUM 40 MG/1
40 TABLET, DELAYED RELEASE ORAL DAILY
Qty: 30 TABLET | Refills: 11 | Status: SHIPPED | OUTPATIENT
Start: 2020-08-23 | End: 2022-01-01

## 2020-08-23 RX ORDER — METHIMAZOLE 10 MG/1
20 TABLET ORAL DAILY
Qty: 60 TABLET | Refills: 12 | Status: SHIPPED | OUTPATIENT
Start: 2020-08-23 | End: 2020-10-16 | Stop reason: SDUPTHER

## 2020-08-23 RX ORDER — METOPROLOL SUCCINATE 100 MG/1
100 TABLET, EXTENDED RELEASE ORAL DAILY
Qty: 30 TABLET | Refills: 5 | Status: SHIPPED | OUTPATIENT
Start: 2020-08-23 | End: 2021-02-08 | Stop reason: SDUPTHER

## 2020-08-23 RX ADMIN — ACETAMINOPHEN 650 MG: 325 TABLET ORAL at 01:08

## 2020-08-23 RX ADMIN — ACETAMINOPHEN 650 MG: 325 TABLET ORAL at 07:08

## 2020-08-23 NOTE — ASSESSMENT & PLAN NOTE
Patient is followed by [facility] every 2 weeks.    Home health nurse changes LLE dressing on Monday/Wednesday/Friday  At discharge will continue regular wound care schedule

## 2020-08-23 NOTE — PLAN OF CARE
Safety maintained, bed alarm on and call bell in reach. Telemetry monitoring in progress A-Fib noted.Plan of care reviewed.Scheduled medication given and tolerated well.will continue to monitor.

## 2020-08-23 NOTE — PLAN OF CARE
2150H- PT went down to CT scan.    2215H- back from CT scan.    0112H- Co H/A tylenol 650 mg po given.tolerated well

## 2020-08-23 NOTE — ASSESSMENT & PLAN NOTE
EKG:  Evidence of Atrial fibrillation , also evident on telemetry.  HR   Continue medical management with rivaroxaban  Stable

## 2020-08-23 NOTE — HOSPITAL COURSE
On 8/21 patient presented to the emergency department with headache symptoms for over a week. In the ED CT head showing small areas of low density involving occipital poles bilaterally concerning for old infarcts.  No evidence of hemorrhage or mass.  The ED consulted Neurology who states would like family medicine to admit patient and Neurology will evaluate in AM.  Vital signs stable.  Labs unremarkable. On 8/22 an MRI was ordered to rule out acute stroke and showed o MR evidence of acute infarction. 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  Absence of flow void within the right ICA, consistent with right ICA occlusion.  A CTA was ordered to follow up per Dr. Haywood and found occlusion of the right ICA at the bulb, cross-filling of the right anterior cerebral and middle cerebral artery territories from patent anterior communicating artery from the left carotid.  Discussed case with [facility], who recommend patient be transferred to [facility] [place] neurology consult based on MRI results. Patient is amenable to plan and transfer.No aneurysm seen on CTA and Right ICA occlusion is old. He does not need transfer or follow-up. Will continue to treat his headaches as needed. On 8/23 @ 7am: Updated [facility] concerning recommendations from Vascular Neurology. Patient will be referred to Neurology as outpatient for further evaluation and management. Patient is stable and ready for discharge and will follow up with Neurology outpatient.

## 2020-08-23 NOTE — PLAN OF CARE
Discharge orders noted, no HH or HME ordered.    Future Appointments   Date Time Provider Department Center   8/31/2020 10:30 AM Pool Gutier[facility]     Pt's nurse will go over medications/signs and symptoms prior to discharge       08/23/20 0753   Final Note   Assessment Type Final Discharge Note   Anticipated Discharge Disposition Home   What phone number can be called within the next 1-3 days to see how you are doing after discharge? [phone]   Hospital Follow Up  Appt(s) scheduled? No  (Offices closed for weekend. Patient to schedule own follow up appointment.)   Right Care Referral Info   Post Acute Recommendation No Care     Thania Clemente, RN Transitional Navigator  [phone]

## 2020-08-23 NOTE — DISCHARGE SUMMARY
[facility] Hospital Medicine  Discharge Summary      Patient Name: Drew Farrar  MRN: [id number]  Admission Date: 8/21/2020  Hospital Length of Stay: 0 days  Discharge Date and Time: 8/23/2020  9:13 AM  Attending Physician: No att. providers found   Discharging Provider: Yadira Jeter MD  Primary Care Provider: Garrison Roach MD      HPI:   54-year-old male with past medical history of hypertension, hyperthyroidism, heart failure with preserved ejection fraction, chronic venous stasis dermatitis, chronic AFib on anticoagulation who presents to the emergency department with headache symptoms for over a week.  Patient was recently seen by primary care doctor on 08/10 and was given sumatriptan but has not had any relief.  Patient continues to have daily headaches that he states last throughout the day and do not let up.  Describes these headaches as unilateral starting right frontal radiating to right temporal region of his head.  Describes this as pounding.  Admits to photosensitivity.  Denies any weakness, paralysis, facial droop, blurry vision, change in smell or taste, nausea vomiting, chest pain.     In the ED CT head showing small areas of low density involving occipital poles bilaterally concerning for old infarcts.  No evidence of hemorrhage or mass.  The ED consulted Neurology who states would like us to admit patient and they will see him in morning.  Vital signs stable.  Labs unremarkable.    * No surgery found *      Hospital Course:   On 8/21 patient presented to the emergency department with headache symptoms for over a week. In the ED CT head showing small areas of low density involving occipital poles bilaterally concerning for old infarcts.  No evidence of hemorrhage or mass.  The ED consulted Neurology who states would like family medicine to admit patient and Neurology will evaluate in AM.  Vital signs stable.  Labs unremarkable. On 8/22 an MRI was ordered to rule out  acute stroke and showed o MR evidence of acute infarction. 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  Absence of flow void within the right ICA, consistent with right ICA occlusion.  A CTA was ordered to follow up per Dr. Haywood and found occlusion of the right ICA at the bulb, cross-filling of the right anterior cerebral and middle cerebral artery territories from patent anterior communicating artery from the left carotid.  Discussed case with [facility], who recommend patient be transferred to [facility] for vascular neurology consult based on MRI results. Patient is amenable to plan and transfer.No aneurysm seen on CTA and Right ICA occlusion is old. He does not need transfer or follow-up. Will continue to treat his headaches as needed. On 8/23 @ 7am: Updated [facility] concerning recommendations from Vascular Neurology. Patient will be referred to Neurology as outpatient for further evaluation and management. Patient is stable and ready for discharge and will follow up with Neurology outpatient.               Consults:   Consults (From admission, onward)        Status Ordering Provider     Inpatient consult to [facility]  Once     Provider:  Moreno Whitten MD    Completed XIN PERALES     Inpatient consult to Neurology  Once     Provider:  (Not yet assigned)    SHEMAR Call          (HFpEF) heart failure with preserved ejection fraction  Currently compensated per chart  Last echo 12/2019: Normal left ventricular systolic function. The estimated ejection fraction is 55%  Continue torsemide tablet 40 mg daily       Hyperthyroidism  TSH (2019): < 0.010, T4: 2.86  Repeat TSH 0.02  Continue medical management with methimazole 20 mg        HTN (hypertension)  No need for permissive HTN as Sx for over a week and supporting more migraine type picture  Continue medical management with Toprol-XL  133/76 this AM, follow up with PCP outpatient    Chronic atrial fibrillation  EKG:  Evidence of Atrial  fibrillation , also evident on telemetry.  HR   Continue medical management with rivaroxaban  Stable    Venous stasis dermatitis of both lower extremities  Patient is followed by [facility] every 2 weeks.    Home health nurse changes LLE dressing on Monday/Wednesday/Friday  At discharge will continue regular wound care schedule      Final Active Diagnoses:    Diagnosis Date Noted POA    PRINCIPAL PROBLEM:  Complicated migraine [G43.109] 08/21/2020 Yes    (HFpEF) heart failure with preserved ejection fraction [I50.30] 09/10/2019 Yes    Hyperthyroidism [E05.90] 09/05/2017 Yes     Chronic    HTN (hypertension) [I10] 09/16/2016 Yes    Venous stasis dermatitis of both lower extremities [I87.2] 12/14/2012 Yes    Chronic atrial fibrillation [I48.20] 12/14/2012 Yes      Problems Resolved During this Admission:       Discharged Condition: stable    Disposition: Home or Self Care    Follow Up:  Follow-up Information     Schedule an appointment as soon as possible for a visit with [facility].    Specialty: Family Medicine  Why: Follow-Up / Offices closed for weekend. Patient to schedule own follow up appointment.  Contact information:  [address] [place] [address]  [phone]             Schedule an appointment as soon as possible for a visit with Moreno Whitten MD.    Specialties: Cardiology, INTERVENTIONAL CARDIOLOGY  Why: Cardiology Offices closed for weekend. Patient to schedule own follow up appointment.  Contact information:  [address]  [address]  [place] [id number] [phone]             Please follow up.    Why: An ambulatory referral was placed with Neurology. Their offices will contact patient to arrange an appointment.               Patient Instructions:      Ambulatory referral/consult to Neurology   Standing Status: Future   Referral Priority: Routine Referral Type: Consultation   Referral Reason: Specialty Services Required   Requested Specialty:  Neurology   Number of Visits Requested: 1     Diet diabetic     Diet Cardiac     Notify your health care provider if you experience any of the following:  temperature >100.4     Notify your health care provider if you experience any of the following:  difficulty breathing or increased cough     Notify your health care provider if you experience any of the following:  severe persistent headache     Notify your health care provider if you experience any of the following:  increased confusion or weakness     Notify your health care provider if you experience any of the following:  persistent nausea and vomiting or diarrhea     Activity as tolerated       Significant Diagnostic Studies: Labs:   CMP   Recent Labs   Lab 08/21/20 2245 08/22/20  0351 08/23/20  0411    142 138   K 4.2 4.7 4.1    110 104   CO2 25 23 24   GLU 95 100 96   BUN 23* 21* 28*   CREATININE 1.0 1.0 1.4   CALCIUM 9.0 8.9 8.9   PROT 8.0 7.6 8.1   ALBUMIN 3.6 3.5 3.7   BILITOT 0.4 0.4 0.6   ALKPHOS 136* 129 129   AST 28 27 28   ALT 21 21 20   ANIONGAP 9 9 10   ESTGFRAFRICA >60 >60 >60   EGFRNONAA >60 >60 57*   , CBC   Recent Labs   Lab 08/21/20 2245 08/22/20  0351 08/23/20  0411   WBC 6.56 5.71 6.69   HGB 11.7* 11.5* 12.0*   HCT 37.1* 37.3* 37.9*    173 192   , Troponin   Recent Labs   Lab 08/22/20 2035   TROPONINI 0.007    and All labs within the past 24 hours have been reviewed  Radiology:  CTA Head and Neck (xpd)   Final Result      Occlusion of the right ICA at the bulb.      Cross-filling of the right anterior cerebral and middle cerebral artery territories from patent anterior communicating artery from the left carotid.      No CTA evidence of ophthalmic artery origin aneurysm.      Dominant Right vertebral with otherwise normal posterior fossa circulation.      Normal variant dominant right jugular venous drainage through the sinuses and jugular.         Electronically signed by: Drew Harris   Date:    08/22/2020    Time:    23:23      MRV Brain Without Contrast   Final Result      MRI brain:      No MR evidence of acute infarction.      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      Absence of flow void within the right ICA, consistent with right ICA occlusion.  Dedicated CT of the head and neck is suggested for further evaluation.  Vascular neurology consultation is recommended.      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  Follow-up with dedicated CTA.      MRV examination:      Hypoplastic appearance of the left transverse sinus, left sigmoid sinus, and left internal jugular vein.  No MRV evidence of dural venous sinus thrombosis.  Follow-up with dedicated postcontrast images or CT venogram may be obtained, as clinically warranted.      Case discussed with KAILEY Bañuelos on 08/22/2020 at 16:25.         Electronically signed by: Ray Andino MD   Date:    08/22/2020   Time:    16:25      MRI Brain Without Contrast   Final Result      MRI brain:      No MR evidence of acute infarction.      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      Absence of flow void within the right ICA, consistent with right ICA occlusion.  Dedicated CT of the head and neck is suggested for further evaluation.  Vascular neurology consultation is recommended.      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  Follow-up with dedicated CTA.      MRV examination:      Hypoplastic appearance of the left transverse sinus, left sigmoid sinus, and left internal jugular vein.  No MRV evidence of dural venous sinus thrombosis.  Follow-up with dedicated postcontrast images or CT venogram may be obtained, as clinically warranted.      Case discussed with KAILEY Bañuelos on 08/22/2020 at 16:25.         Electronically signed by: Ray Andino MD   Date:    08/22/2020   Time:    16:25      X-Ray Chest 1 View   Final Result      No interval change since February 13, 2020         Electronically signed by: Estefany Laird MD   Date:    08/22/2020   Time:    10:42      CT Head Without  Contrast   Final Result      Small areas of low density involving the occipital poles bilaterally suspicious for previous occipital pole infarcts.      No CT evidence of acute infarct, hemorrhage or mass.         Electronically signed by: Drew Harris   Date:    08/21/2020   Time:    21:50            Pending Diagnostic Studies:     None         Medications:  Reconciled Home Medications:      Medication List      CHANGE how you take these medications    * metoprolol succinate 100 MG 24 hr tablet  Commonly known as: TOPROL-XL  Take 1 tablet (100 mg total) by mouth once daily.  What changed: Another medication with the same name was added. Make sure you understand how and when to take each.     * metoprolol succinate 50 MG 24 hr tablet  Commonly known as: TOPROL-XL  Take 1 tablet (50 mg total) by mouth every evening.  What changed: You were already taking a medication with the same name, and this prescription was added. Make sure you understand how and when to take each.         * This list has 2 medication(s) that are the same as other medications prescribed for you. Read the directions carefully, and ask your doctor or other care provider to review them with you.            CONTINUE taking these medications    acetaminophen 325 MG tablet  Commonly known as: TYLENOL  Take 325 mg by mouth every 6 (six) hours as needed for Pain.     gentamicin 0.1 % ointment  Commonly known as: GARAMYCIN  Mix with santyl and Apply to wound as directed.     hydrOXYzine HCL 25 MG tablet  Commonly known as: ATARAX  Take 1 tablet (25 mg total) by mouth 4 (four) times daily as needed for Itching.     methIMAzole 10 MG Tab  Commonly known as: TAPAZOLE  Take 2 tablets (20 mg total) by mouth once daily.     pantoprazole 40 MG tablet  Commonly known as: PROTONIX  Take 1 tablet (40 mg total) by mouth once daily.     sumatriptan 50 MG tablet  Commonly known as: IMITREX  Take 50 mg for headache. If headache does not resolve, take another 50mg  two hours after initial dose. Do not exceed 200mg in 24 hours.     torsemide 20 MG Tab  Commonly known as: DEMADEX  Take 2 tablets by mouth daily.     VENTOLIN HFA 90 mcg/actuation inhaler  Generic drug: albuterol  Inhale 2 puffs into the lungs every 6 (six) hours as needed for Wheezing. Rescue     XARELTO 20 mg Tab  Generic drug: rivaroxaban  TAKE 1 TABLET (20 MG TOTAL) BY MOUTH DAILY WITH DINNER OR EVENING MEAL.            Indwelling Lines/Drains at time of discharge:   Lines/Drains/Airways     None                 Time spent on the discharge of patient: 55 minutes  Patient was seen and examined on the date of discharge and determined to be suitable for discharge.         Yadira Jeter MD   [employer] [facility] PGY2  Department of Hospital Medicine  [facility]

## 2020-08-23 NOTE — CARE UPDATE
Discussed case with [facility], who [place] [facility] [place] based on MRI results. Discussed plan with patient, who is amenable. Called transfer center and discussed case with Dr. Haywood, who recommended CTA head and neck for further evaluation. Will obtain this imaging and discuss management further with Dr. Haywood.      12:23AM: CTA Head and Neck completed. No aneurysm seen on CTA and Right ICA occlusion is old. He does not need transfer or follow-up. Will continue to treat his headaches as needed.     7am: Updated [facility] concerning recommendations from Vascular Neurology. Patient will be referred to Neurology as outpatient for further evaluation and management.     Venice Storey MD  PGY-3  [facility]  08/22/2020

## 2020-08-23 NOTE — ASSESSMENT & PLAN NOTE
TSH (2019): < 0.010, T4: 2.86  Repeat TSH 0.02  Continue medical management with methimazole 20 mg

## 2020-08-23 NOTE — SUBJECTIVE & OBJECTIVE
Interval History:   CTA Head and Neck completed due to MRI concern for aneurysm of left ICA. No aneurysm seen on CTA and Right ICA occlusion is old. He does not need transfer or follow-up. Will continue to treat his headaches as needed. Patient stable for discharge with follow-up.     Review of Systems   Constitutional: Negative for activity change and appetite change.   HENT: Negative for congestion, dental problem and drooling.    Eyes: Negative for pain, discharge and itching.   Respiratory: Negative for apnea, cough, choking, chest tightness and shortness of breath.    Cardiovascular: Negative for chest pain and leg swelling.   Gastrointestinal: Negative for abdominal distention, abdominal pain and anal bleeding.   Genitourinary: Negative.    Musculoskeletal: Negative for arthralgias, back pain and gait problem.   Skin: Negative for color change, pallor, rash and wound.   Neurological: Negative for dizziness, facial asymmetry and headaches.   Psychiatric/Behavioral: Negative for agitation, behavioral problems and confusion.     Objective:     Vital Signs (Most Recent):  Temp: (!) 59 °F (15 °C) (08/23/20 0701)  Pulse: 75 (08/23/20 0535)  Resp: 16 (08/23/20 0535)  BP: 133/76 (08/23/20 0535)  SpO2: 96 % (08/23/20 0535) Vital Signs (24h Range):  Temp:  [59 °F (15 °C)-98.2 °F (36.8 °C)] 59 °F (15 °C)  Pulse:  [] 75  Resp:  [16-18] 16  SpO2:  [96 %-100 %] 96 %  BP: (123-185)/(75-97) 133/76     Weight: (!) 171.9 kg (379 lb)  Body mass index is 42.98 kg/m².    Intake/Output Summary (Last 24 hours) at 8/23/2020 0735  Last data filed at 8/22/2020 1800  Gross per 24 hour   Intake --   Output 1900 ml   Net -1900 ml      Physical Exam  Vitals signs and nursing note reviewed.   Constitutional:       Appearance: Normal appearance.   HENT:      Head: Normocephalic and atraumatic.      Right Ear: Tympanic membrane normal.      Left Ear: Tympanic membrane normal.      Nose: Nose normal. No congestion or rhinorrhea.       Mouth/Throat:      Mouth: Mucous membranes are moist.   Eyes:      Pupils: Pupils are equal, round, and reactive to light.   Neck:      Musculoskeletal: Normal range of motion and neck supple.   Cardiovascular:      Rate and Rhythm: Normal rate and regular rhythm.      Pulses: Normal pulses.      Heart sounds: Normal heart sounds.   Pulmonary:      Effort: Pulmonary effort is normal.      Breath sounds: Normal breath sounds.   Abdominal:      General: Abdomen is flat. There is no distension.      Palpations: There is no mass.   Musculoskeletal: Normal range of motion.         General: No swelling or tenderness.   Skin:     General: Skin is warm and dry.   Neurological:      Mental Status: He is alert.   Psychiatric:         Mood and Affect: Mood normal.         Behavior: Behavior normal.         Thought Content: Thought content normal.         Judgment: Judgment normal.         Significant Labs:   CBC:   Recent Labs   Lab 08/21/20 2245 08/22/20  0351 08/23/20  0411   WBC 6.56 5.71 6.69   HGB 11.7* 11.5* 12.0*   HCT 37.1* 37.3* 37.9*    173 192     CMP:   Recent Labs   Lab 08/21/20 2245 08/22/20  0351 08/23/20  0411    142 138   K 4.2 4.7 4.1    110 104   CO2 25 23 24   GLU 95 100 96   BUN 23* 21* 28*   CREATININE 1.0 1.0 1.4   CALCIUM 9.0 8.9 8.9   PROT 8.0 7.6 8.1   ALBUMIN 3.6 3.5 3.7   BILITOT 0.4 0.4 0.6   ALKPHOS 136* 129 129   AST 28 27 28   ALT 21 21 20   ANIONGAP 9 9 10   EGFRNONAA >60 >60 57*       Significant Imaging: I have reviewed and interpreted all pertinent imaging results/findings within the past 24 hours.   Imaging Results          CT Head Without Contrast (Final result)  Result time 08/21/20 21:50:12    Final result by Drew Harris MD (08/21/20 21:50:12)                 Impression:      Small areas of low density involving the occipital poles bilaterally suspicious for previous occipital pole infarcts.    No CT evidence of acute infarct, hemorrhage or  mass.      Electronically signed by: Drew Harris  Date:    08/21/2020  Time:    21:50             Narrative:    EXAMINATION:  CT HEAD WITHOUT CONTRAST    CLINICAL HISTORY:  Headache, acute, normal neuro exam;    TECHNIQUE:  Low dose axial images were obtained through the head.  Coronal and sagittal reformations were also performed. Contrast was not administered.    COMPARISON:  None.    FINDINGS:  There is low-density in the occipital pole bilaterally involving gray and white matter.  Otherwise the brain parenchyma demonstrates normal attenuation with normal gray-white matter junction differentiation.  Mild involutional changes with prominence of the cortical sulcal markings is noted throughout.    There is no focal mass effect or pathologic fluid collection.  Calvarium is intact.  The paranasal sinuses mastoids and middle ears are clear.

## 2020-08-23 NOTE — ASSESSMENT & PLAN NOTE
Currently compensated per chart  Last echo 12/2019: Normal left ventricular systolic function. The estimated ejection fraction is 55%  Continue torsemide tablet 40 mg daily

## 2020-08-23 NOTE — ASSESSMENT & PLAN NOTE
No need for permissive HTN as Sx for over a week and supporting more migraine type picture  Continue medical management with Toprol-XL  133/76 this AM, follow up with PCP outpatient

## 2020-08-23 NOTE — CARE UPDATE
Called and discussed MRI results with neurology. State they will write plan of care note with new recommendations. Patient staying overnight for further evaluation of chest pain. Started 50mg toprol nightly per cardiology recommendations. Repeat troponin and EKG at 10pm. Plan of care discussed with patient and patient agreeable to plan.     Venice Storey MD  PGY-3  [facility] Family Medicine  08/22/2020

## 2020-08-23 NOTE — PLAN OF CARE
PLAN OF CARE     MRI AND MRV images reviewed with attending. Patient needs to be transferred to [facility] main hospital as soon as possible, because he might benefit from vascular intervention     MRI Image findings suggestive of   No MR evidence of acute infarction.  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   Absence of flow void within the right ICA, consistent with right ICA occlusion.  Dedicated CT of the head and neck is suggested for further evaluation.  Vascular neurology consultation is recommended.   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  Follow-up with dedicated CTA.     MRV examination:     Hypoplastic appearance of the left transverse sinus, left sigmoid sinus, and left internal jugular vein.  No MRV evidence of dural venous sinus thrombosis.  Follow-up with dedicated postcontrast images or CT venogram may be obtained, as clinically warranted.    Trying to reach a resident from past 2 hours, unable to do so. Called, but nurse said residents are not in there room. Please reach us if possible.    Trying to reach again for Dr. Millard.     Qasim Lux  [facility]- Neurology     Please call

## 2020-08-24 NOTE — PROCEDURES
"Debridement    Date/Time: 8/17/2020 12:36 PM  Performed by: Pool Gutierrez MD  Authorized by: Pool Gutierrez MD     Time out: Immediately prior to procedure a "time out" was called to verify the correct patient, procedure, equipment, support staff and site/side marked as required.    Consent Done?:  Yes (Verbal)    Preparation: Patient was prepped and draped in usual sterile fashion    Local anesthesia used?: Yes    Local anesthetic:  Topical anesthetic    Wound Details:    Location:  Left foot    Location:  Left Dorsal Foot    Type of Debridement:  Excisional       Length (cm):  1.5       Area (sq cm):  7.5       Width (cm):  5       Percent Debrided (%):  100       Depth (cm):  0.2       Total Area Debrided (sq cm):  7.5    Depth of debridement:  Subcutaneous tissue    Tissue debrided:  Subcutaneous and Dermis    Devitalized tissue debrided:  Biofilm, Fibrin and Slough    Instruments:  Curette    Bleeding:  Minimal  Patient tolerance:  Patient tolerated the procedure well with no immediate complications      "

## 2020-08-31 ENCOUNTER — HOSPITAL ENCOUNTER (OUTPATIENT)
Dept: WOUND CARE | Facility: HOSPITAL | Age: 55
Discharge: HOME | End: 2020-08-31
Attending: EMERGENCY MEDICINE
Payer: MEDICARE

## 2020-08-31 VITALS
RESPIRATION RATE: 20 BRPM | DIASTOLIC BLOOD PRESSURE: 82 MMHG | HEART RATE: 71 BPM | TEMPERATURE: 98 F | SYSTOLIC BLOOD PRESSURE: 139 MMHG

## 2020-08-31 DIAGNOSIS — I83.024 VENOUS STASIS ULCER OF LEFT MIDFOOT LIMITED TO BREAKDOWN OF SKIN, UNSPECIFIED WHETHER VARICOSE VEINS PRESENT: Primary | ICD-10-CM

## 2020-08-31 DIAGNOSIS — L97.421 VENOUS STASIS ULCER OF LEFT MIDFOOT LIMITED TO BREAKDOWN OF SKIN, UNSPECIFIED WHETHER VARICOSE VEINS PRESENT: Primary | ICD-10-CM

## 2020-08-31 PROCEDURE — 27201912 HC WOUND CARE DEBRIDEMENT SUPPLIES

## 2020-08-31 PROCEDURE — 11042 DBRDMT SUBQ TIS 1ST 20SQCM/<: CPT

## 2020-08-31 NOTE — PROGRESS NOTES
"Subjective:       Patient ID: Drew Farrar is a 54 y.o. male.    Chief Complaint: Venous Ulcer    2/15/19: Readmitted for venous ulcer to left lateral foot which developed about 2 weeks ago. Pulses noted with doppler and capillary refill <3.Dr Gutierrez assessed patient and wound care plan ordered for compression therapy and hydrafera blue to wound bed. No apparent signs of infection noted. Patient to follw-up in clinic in 2 weeks.DB   2/21/19: Nurse visit for dressing change. Refused care to skin tear on left 2nd toe. See notes. RTC 1 week for DrRosy Visit.  3/1/19: Follow up with Dr. Waller today in clinic new wound to right posterior leg, culture of both wounds taken today in clinic new wound care orders to both legs for xeroform and unna with calamine toe to knee follow up in 1 week with Dr. Gutierrez  3/8/19: Follow up with Dr. Gutierrez today in clinic wounds improving, edema noted to BLE, profore toe to knee applied to both legs, RX given to patient for PO Doxycycline, follow up in 1 week.   3/15/19: Here for f/u with Dr. Gutierrez. U/S scheduled Thursday. Will need right leg rewrapped. Patient states eye DrRosy Gave him the same antibiotic dose and strength after eye surgery. Dr instructed patient to take one bottle of antibiotics for both wounds and eyes until finished. Gentamycin added to wound care orders.   3/29/19:  Wound slowly improving. No changes in wound care orders. Wound debrided per Dr Gutierrez.  4/4/19: Patient showed up to clinic today stated " I need my dressing changed. It fell off."  Doppler pluses checked and present.  Patient refusing care to right, no stocking present on leg at this visit. Patient stated " No they said this leg is discharged, Dr. Lantigua has to drain the fluid out this leg with a needle. No wound care to this leg anymore when I come."      9/9/19: Readmit to wound care clinic for venous ulcer to left lateral foot.  Patient states it reopened about 3 weeks after last discharge " "from wound care clinic 7/1/19. Per patient wound has been treated by PCP.  Patient reports just using a large band- aid and compression sock. Patient states he was recently treated at [facility] for HBOT for this wound " in the last two or three months after it reopened "  Requesting HBOT here at the wound care clinic.  Dr. Gutierrez examined patient today, doppler pulses present, wound debrided with curette by Dr. Gutierrez patient tolerated well. Wound care orders given to apply hydrorefa blue ready to wound, cover with mepore dressing and apply patient's compression sock. Dr. Mnauel discussed with patient obtaining his medical records from [facility], to view to determine if patient will benefit from HBOT, patient verbalized understanding.  Authorization for release of health information sheet signed by patient and faxed to [facility] today in clinic. Follow up in 1 week with MD.  9/23/19: F/U with Dr. Gutierrez. Client recently discharged from hospital. Left foot site debrided. [facility]  12/9/19: Patient seen today in clinic by Dr. Gutierrez, no change to wound. Pt reports recently discharged from hospital 12/3/19 for irregular heat rate and chest pains. Debridement per Dr. Gutierrez tolerated well, wound care continued as ordered 4 layer compression toe to knee LLE.   F/u with Dr. Gutierrez , wound progressing well. Compression changed to coflex with calamine due to itching.  12/27/19:Dr Gutierrez assessed patient. Wound improving. Change in dressing change frequency. Follow up in 2 weeks.    1/10/2020: Patient seen today in clinic by  wound slowly improving. Calamine coflex toe to knee applied today in clinic. Fu 2 weeks.  2/10/2020: F/U WITH  today , pt had not been here in 10 days with dressing still in place. New wounds noted to right lower leg. Pt to have  u/s tomorrow of bilateral lower extremeties and to see  on Wednesday 2/12/2020 for possible " "procedure of right leg per patient. Pt to return on Thursday to clinic for dressing change and to see DR. Sury Vaca (podiatrist) for toe nail clipping and to evaluate toes on right foot.  As above; no c/o pain noted.  02/28/2020: F/u with Dr. Gutierrez. Wounds to RLE open to air. Patient states bandage came off while he was sleeping. Wound to left foot decreasing in size. Left foot wound debrided today in clinic. Continue with current wound care treatment and follow up in  Clinic in 1 week    3/13/20: F/U with Dr. Gutierrez.  Wounds improving.  Will increase compression to LLE.  4/3/2020: Fu with  today in clinic. LLE wound measuring bigger.  RLE improving. Patient complains of pain, tenderness,swelling  x 2 weeks to RLE.  Patient states he went to the ER 2 weeks ago " and they kicked me out because of the Coronavirus." Patient also reports to Dr. Gutierrez " night sweats for 2 weeks." Temp today on arrival to wound care clinic 99.1 oral. LLE debrided per  patient tolerated well, wound care 3 layer compression toe to knee as ordered.   Ultra sound ordered of RLE stat today in clinic to rule out DVT.  MD rewieved results, results negative discussed with patient.  RX given to patient  for Doxycycline Po.  Patient to follow up 4/6/2020 with .  4/13/2020; Fu with Dr. Gutierrez. Patient states RLE pain is much better since starting oral Doxycycline.  RLE wound improving. LLE wound remains unchanged. LLE wound debrided per  patient tolerated well. Continued with 3 layer compression to LLE and tubi  G x 2 to RLE. RTC 4/27/2020.  4/27/20: F/U with Dr. Gutierrez. RLE resolved. Continue Tubigrip G x 2 toes to knee. Left foot site debrided per Dr. Gutierrez. Santyl, adaptic touch, and hydrofera blue ready ordered. Return in 2 weeks.  5/18/20: Wound slowly improving. Dr Gutierrez debrided wound which was toleraled well. Continuing with same plan of care.  06/01/2020- follow-up with " "Dr. Cortez. Wound debrided, patient tolerated. Gentamicin added to wound care regimen. Follow-up with Dr. cortez in 2 weeks 06/15/2020.  6/15/2020: F/U with Dr. Cortez.  Wound improving.  No new complaints.    6/29/2020: F/U with Dr. Cortez. Wound continues to slowly improve.  No c/o noted.  08/03/2020:  F/u with Dr. Cortez. No new complaints per patient. Wound remains stable. Wound debrided today in clinic. Continue with compression and topical treatment. Follow up in 2 weeks at wound care clinic  8/17/20: F/U with Dr. Cortez. Tissue status improving. Wound debrided per Dr. Cortez. Cont. POC. Next visit 8/31/20.  8/31/2020: fu with Dr. Cortez. Wound debrided per  patient tolerated well. Patient states he is scheduled to have laser vein surgery on 9/2/2020 at the [facility] in [place] and 9/3/2020 in [place]. Patient will follow up with Dr. Cortez 9/4/2020. Patient refused 3 layer compression toes to knee today. Stated " I only want the dressing with a large bandage and the white net so I can have my surgery on Wednesday. Applied santyl, gentamicin to wound, adaptic, hydrofera ready and island border secured with flexi net.   Hx as above; no c/o npted.  Review of Systems    Objective:    Wound as noted w/o Sophia's sign or s/s of infection.  Physical Exam    Assessment:       No diagnosis found.       Wound 09/09/19 1057 Venous Ulcer lateral Foot #1 (Active)   09/09/19 1057    Pre-existing: Yes   Primary Wound Type: Venous ulcer   Side: Left   Orientation: lateral   Location: Foot   Wound Number (optional): #1   Ankle-Brachial Index:    Pulses:    Removal Indication and Assessment:    Wound Outcome:    (Retired) Wound Type:    (Retired) Wound Length (cm):    (Retired) Wound Width (cm):    (Retired) Depth (cm):    Wound Description (Comments):    Removal Indications:    Wound Image   08/31/20 1100   Dressing Appearance Intact;Moist drainage 08/31/20 1100 "   Drainage Amount Moderate 08/31/20 1100   Drainage Characteristics/Odor Serosanguineous 08/31/20 1100   Appearance Red;Pink;Moist;Yellow;Fibrin;Slough 08/31/20 1100   Tissue loss description Full thickness 08/31/20 1100   Red (%), Wound Tissue Color 30 % 08/31/20 1100   Yellow (%), Wound Tissue Color 70 % 08/31/20 1100   Periwound Area Intact;Dry;[place] 08/31/20 1100   Wound Edges Irregular 08/31/20 1100   Wound Length (cm) 1.6 cm 08/31/20 1100   Wound Width (cm) 6.7 cm 08/31/20 1100   Wound Depth (cm) 0.2 cm 08/31/20 1100   Wound Volume (cm^3) 2.14 cm^3 08/31/20 1100   Wound Surface Area (cm^2) 10.72 cm^2 08/31/20 1100   Care Cleansed with:;Antimicrobial agent;Sterile normal saline;Debrided;Other (see comments) 08/31/20 1100   Dressing Changed;Applied;Hydrofiber;Non-adherent;Island/border 08/31/20 1100   Periwound Care Moisture barrier applied 08/31/20 1100   Compression Other (see comments) 08/31/20 1100   Dressing Change Due 09/02/20 08/31/20 1100         Left lateral Foot #1   Cleanse wound with: Acetic acid 0.25%, rinse with normal saline.   Lidocaine: Prn Debridement   Periwound care: Sween to dry skin Prn, betamethasone to LLE PRN. Calmoseptine to vick wound PRN.   Primary dressing:  Santyl/ Gentamicin, Adaptic touch, Hydrofera blue ready to wound   Secondary dressing: Aquacel foam, Profore lite toes to knee   Offloading: Darco shoe left foot   Edema control: 3-layer Profore Lite compression.  Avoid prolonged standing in one place.  Elevate leg(s) as much as possible.   Frequency:  Mondays and Fridays   Follow-up: in   with Dr. Gutierrez 9/4/2020     Home health: [facility] to do wound care on Mondays (when not in clinic), Fridays, and PRN complications.  Patient scheduled to have Laser vein surgery on 9/2/20 and 9/3/2020.     Plan:                9/4/2020

## 2020-09-04 NOTE — PROCEDURES
"Debridement    Date/Time: 8/31/2020 12:17 PM  Performed by: Pool Gutierrez MD  Authorized by: Pool Gutierrez MD     Time out: Immediately prior to procedure a "time out" was called to verify the correct patient, procedure, equipment, support staff and site/side marked as required.    Consent Done?:  Yes (Verbal)    Preparation: Patient was prepped and draped in usual sterile fashion    Local anesthesia used?: Yes    Local anesthetic:  Topical anesthetic    Wound Details:    Location:  Left ankle    Type of Debridement:  Excisional       Length (cm):  1.6       Area (sq cm):  10.72       Width (cm):  6.7       Percent Debrided (%):  100       Depth (cm):  0.2       Total Area Debrided (sq cm):  10.72    Depth of debridement:  Subcutaneous tissue    Tissue debrided:  Subcutaneous and Dermis    Devitalized tissue debrided:  Biofilm, Fibrin and Slough    Instruments:  Curette    Bleeding:  Minimal  Patient tolerance:  Patient tolerated the procedure well with no immediate complications      "

## 2020-09-28 ENCOUNTER — HOSPITAL ENCOUNTER (OUTPATIENT)
Dept: WOUND CARE | Facility: HOSPITAL | Age: 55
Discharge: HOME | End: 2020-09-28
Attending: SURGERY
Payer: MEDICARE

## 2020-09-28 VITALS
TEMPERATURE: 97 F | DIASTOLIC BLOOD PRESSURE: 85 MMHG | HEART RATE: 91 BPM | WEIGHT: 315 LBS | BODY MASS INDEX: 42.66 KG/M2 | SYSTOLIC BLOOD PRESSURE: 141 MMHG | HEIGHT: 72 IN

## 2020-09-28 DIAGNOSIS — S91.302A OPEN WOUND OF LEFT FOOT, INITIAL ENCOUNTER: ICD-10-CM

## 2020-09-28 DIAGNOSIS — I87.2 VENOUS STASIS DERMATITIS OF BOTH LOWER EXTREMITIES: Primary | ICD-10-CM

## 2020-09-28 PROCEDURE — 11042 DBRDMT SUBQ TIS 1ST 20SQCM/<: CPT

## 2020-09-28 PROCEDURE — 87077 CULTURE AEROBIC IDENTIFY: CPT | Mod: 59

## 2020-09-28 PROCEDURE — 87186 SC STD MICRODIL/AGAR DIL: CPT

## 2020-09-28 PROCEDURE — 87070 CULTURE OTHR SPECIMN AEROBIC: CPT

## 2020-09-28 PROCEDURE — 27201912 HC WOUND CARE DEBRIDEMENT SUPPLIES

## 2020-09-28 PROCEDURE — 87075 CULTR BACTERIA EXCEPT BLOOD: CPT

## 2020-09-28 NOTE — PROGRESS NOTES
"Subjective:       Patient ID: Drew Farrar is a 54 y.o. male.    Chief Complaint: Venous Stasis    2/15/19: Readmitted for venous ulcer to left lateral foot which developed about 2 weeks ago. Pulses noted with doppler and capillary refill <3.Dr Gutierrez assessed patient and wound care plan ordered for compression therapy and hydrafera blue to wound bed. No apparent signs of infection noted. Patient to follw-up in clinic in 2 weeks.DB   2/21/19: Nurse visit for dressing change. Refused care to skin tear on left 2nd toe. See notes. RTC 1 week for DrRosy Visit.  3/1/19: Follow up with Dr. Waller today in clinic new wound to right posterior leg, culture of both wounds taken today in clinic new wound care orders to both legs for xeroform and unna with calamine toe to knee follow up in 1 week with Dr. Gutierrez  3/8/19: Follow up with Dr. Gutierrez today in clinic wounds improving, edema noted to BLE, profore toe to knee applied to both legs, RX given to patient for PO Doxycycline, follow up in 1 week.   3/15/19: Here for f/u with Dr. Gutierrez. U/S scheduled Thursday. Will need right leg rewrapped. Patient states eye DrRosy Gave him the same antibiotic dose and strength after eye surgery. Dr instructed patient to take one bottle of antibiotics for both wounds and eyes until finished. Gentamycin added to wound care orders.   3/29/19:  Wound slowly improving. No changes in wound care orders. Wound debrided per Dr Gutierrez.  4/4/19: Patient showed up to clinic today stated " I need my dressing changed. It fell off."  Doppler pluses checked and present.  Patient refusing care to right, no stocking present on leg at this visit. Patient stated " No they said this leg is discharged, Dr. Lantigua has to drain the fluid out this leg with a needle. No wound care to this leg anymore when I come."      9/9/19: Readmit to wound care clinic for venous ulcer to left lateral foot.  Patient states it reopened about 3 weeks after last discharge " "from wound care clinic 7/1/19. Per patient wound has been treated by PCP.  Patient reports just using a large band- aid and compression sock. Patient states he was recently treated at [facility] for HBOT for this wound " in the last two or three months after it reopened "  Requesting HBOT here at the wound care clinic.  Dr. Gutierrez examined patient today, doppler pulses present, wound debrided with curette by Dr. Gutierrez patient tolerated well. Wound care orders given to apply hydrorefa blue ready to wound, cover with mepore dressing and apply patient's compression sock. Dr. Manuel discussed with patient obtaining his medical records from [facility], to view to determine if patient will benefit from HBOT, patient verbalized understanding.  Authorization for release of health information sheet signed by patient and faxed to [facility] today in clinic. Follow up in 1 week with MD.  9/23/19: F/U with Dr. Gutierrez. Client recently discharged from hospital. Left foot site debrided. Client being followed by [facility].  12/9/19: Patient seen today in clinic by Dr. Gutierrez, no change to wound. Pt reports recently discharged from hospital 12/3/19 for irregular heat rate and chest pains. Debridement per Dr. Gutierrez tolerated well, wound care continued as ordered 4 layer compression toe to knee LLE.   F/u with Dr. Gutierrez , wound progressing well. Compression changed to coflex with calamine due to itching.  12/27/19:Dr Gutierrez assessed patient. Wound improving. Change in dressing change frequency. Follow up in 2 weeks.    1/10/2020: Patient seen today in clinic by  wound slowly improving. Calamine coflex toe to knee applied today in clinic. Fu 2 weeks.  2/10/2020: F/U WITH  today , pt had not been here in 10 days with dressing still in place. New wounds noted to right lower leg. Pt to have  u/s tomorrow of bilateral lower extremeties and to see  on Wednesday 2/12/2020 for possible " "procedure of right leg per patient. Pt to return on Thursday to clinic for dressing change and to see DR. Sury Vaca (podiatrist) for toe nail clipping and to evaluate toes on right foot.  As above; no c/o pain noted.  02/28/2020: F/u with Dr. Gutierrez. Wounds to RLE open to air. Patient states bandage came off while he was sleeping. Wound to left foot decreasing in size. Left foot wound debrided today in clinic. Continue with current wound care treatment and follow up in  Clinic in 1 week    3/13/20: F/U with Dr. Gutierrez.  Wounds improving.  Will increase compression to LLE.  4/3/2020: Fu with  today in clinic. LLE wound measuring bigger.  RLE improving. Patient complains of pain, tenderness,swelling  x 2 weeks to RLE.  Patient states he went to the ER 2 weeks ago " and they kicked me out because of the Coronavirus." Patient also reports to Dr. Gutierrez " night sweats for 2 weeks." Temp today on arrival to wound care clinic 99.1 oral. LLE debrided per  patient tolerated well, wound care 3 layer compression toe to knee as ordered.   Ultra sound ordered of RLE stat today in clinic to rule out DVT.  MD rewieved results, results negative discussed with patient.  RX given to patient  for Doxycycline Po.  Patient to follow up 4/6/2020 with .  4/13/2020; Fu with Dr. Gutierrez. Patient states RLE pain is much better since starting oral Doxycycline.  RLE wound improving. LLE wound remains unchanged. LLE wound debrided per  patient tolerated well. Continued with 3 layer compression to LLE and tubi  G x 2 to RLE. RTC 4/27/2020.  4/27/20: F/U with Dr. Gutierrez. RLE resolved. Continue Tubigrip G x 2 toes to knee. Left foot site debrided per Dr. Gutierrez. Santyl, adaptic touch, and hydrofera blue ready ordered. Return in 2 weeks.  5/18/20: Wound slowly improving. Dr Gutierrez debrided wound which was toleraled well. Continuing with same plan of care.  06/01/2020- follow-up with " "Dr. Cortez. Wound debrided, patient tolerated. Gentamicin added to wound care regimen. Follow-up with Dr. cortez in 2 weeks 06/15/2020.  6/15/2020: F/U with Dr. Cortez.  Wound improving.  No new complaints.    6/29/2020: F/U with Dr. Cortez. Wound continues to slowly improve.  No c/o noted.  08/03/2020:  F/u with Dr. Cortez. No new complaints per patient. Wound remains stable. Wound debrided today in clinic. Continue with compression and topical treatment. Follow up in 2 weeks at wound care clinic  8/17/20: F/U with Dr. Cortez. Tissue status improving. Wound debrided per Dr. Cortez. Cont. POC. Next visit 8/31/20.  8/31/2020: fu with Dr. Cortez. Wound debrided per  patient tolerated well. Patient states he is scheduled to have laser vein surgery on 9/2/2020 at the [facility] in [place] and 9/3/2020 in [place]. Patient will follow up with Dr. Cortez 9/4/2020. Patient refused 3 layer compression toes to knee today. Stated " I only want the dressing with a large bandage and the white net so I can have my surgery on Wednesday. Applied santyl, gentamicin to wound, adaptic, hydrofera ready and island border secured with flexi net.   9/28/2020: Patient seen today in clinic by . Left foot wound digressing. Blister to left leg, patient states " the wrap caused it." Patient tolerated debridement and culture well per . Continued with current treatment plan 3 layer compression toes to knee LLE. Fu 1 week 10/5/2020.      Review of Systems    All systems were reviewed and are negative, except that mentioned in the HPI.    Objective:      Physical Exam  Constitutional:       General: He is not in acute distress.     Appearance: He is well-developed. He is not diaphoretic.   HENT:      Head: Normocephalic and atraumatic.   Eyes:      General: No scleral icterus.     Conjunctiva/sclera: Conjunctivae normal.   Neck:      Musculoskeletal: Normal range of motion " and neck supple.      Vascular: No JVD.   Cardiovascular:      Rate and Rhythm: Normal rate and regular rhythm.   Pulmonary:      Effort: Pulmonary effort is normal. No respiratory distress.      Breath sounds: Normal breath sounds.   Abdominal:      General: Bowel sounds are normal. There is no distension.      Palpations: Abdomen is soft.   Musculoskeletal: Normal range of motion.         General: No tenderness.   Skin:     General: Skin is warm and dry.   Neurological:      Mental Status: He is alert and oriented to person, place, and time.      Cranial Nerves: No cranial nerve deficit.         Assessment:       1. Venous stasis dermatitis of both lower extremities           Wound 09/09/19 1057 Venous Ulcer lateral Foot #1 (Active)   09/09/19 1057    Pre-existing: Yes   Primary Wound Type: Venous ulcer   Side: Left   Orientation: lateral   Location: Foot   Wound Number (optional): #1   Ankle-Brachial Index:    Pulses:    Removal Indication and Assessment:    Wound Outcome:    (Retired) Wound Type:    (Retired) Wound Length (cm):    (Retired) Wound Width (cm):    (Retired) Depth (cm):    Wound Description (Comments):    Removal Indications:    Wound Image   09/28/20 1000   Dressing Appearance Intact;Moist drainage 09/28/20 1000   Drainage Amount Moderate 09/28/20 1000   Drainage Characteristics/Odor Serosanguineous 09/28/20 1000   Appearance Yellow;Pink;Moist;Fibrin 09/28/20 1000   Tissue loss description Full thickness 09/28/20 1000   Red (%), Wound Tissue Color 5 % 09/28/20 1000   Yellow (%), Wound Tissue Color 95 % 09/28/20 1000   Periwound Area Intact;Dry;Edematous;Redness 09/28/20 1000   Wound Edges Irregular 09/28/20 1000   Wound Length (cm) 2 cm 09/28/20 1000   Wound Width (cm) 7.1 cm 09/28/20 1000   Wound Depth (cm) 0.2 cm 09/28/20 1000   Wound Volume (cm^3) 2.84 cm^3 09/28/20 1000   Wound Surface Area (cm^2) 14.2 cm^2 09/28/20 1000   Care Cleansed with:;Sterile normal saline;Debrided 09/28/20 1000    Dressing Applied;Compression wrap;Foam;Hydrofiber 09/28/20 1000   Periwound Care Absorptive dressing applied;Skin barrier film applied 09/28/20 1000   Compression Three layer compression 09/28/20 1000   Off Loading Off loading shoe 09/28/20 1000   Dressing Change Due 10/02/20 09/28/20 1000       Left lateral Foot #1 and leg leg blister  Cleanse wound with: Acetic acid 0.25%, rinse with normal saline.   Lidocaine: Prn Debridement   Periwound care: Sween to dry skin Prn, betamethasone to LLE PRN. Calmoseptine to vick wound PRN.   Primary dressing:  Santyl/ Gentamicin, Adaptic touch, Hydrofera blue ready to wound   Secondary dressing: Aquacel foam, Profore lite toes to knee   Offloading: Darco shoe left foot   Edema control: 3-layer Profore Lite compression.  Avoid prolonged standing in one place.  Elevate leg(s) as much as possible.   Frequency:  Mondays and Fridays   Follow-up: in   with Dr. Quiros 10/5/2020    Home [facility] to do wound care on Mondays (when not in clinic), Fridays, and PRN complications.   Wound culture taken today in clinic 9/28/2020.     Plan:   As above. All questions were answered.  F/u 10/5/2020 -

## 2020-09-28 NOTE — PROCEDURES
"Debridement    Date/Time: 9/28/2020 4:27 PM  Performed by: Kristine Quiros DO  Authorized by: Kristine Quiros DO     Time out: Immediately prior to procedure a "time out" was called to verify the correct patient, procedure, equipment, support staff and site/side marked as required.    Consent Done?:  Yes (Verbal)  Local anesthesia used?: Yes    Local anesthetic:  Topical anesthetic    Debridement - 1st Wound - General Location: Left lateral foot.    Type of Debridement:  Excisional       Length (cm):  0.2       Area (sq cm):  1.42       Width (cm):  7.1       Percent Debrided (%):  100       Depth (cm):  0.2       Total Area Debrided (sq cm):  1.42    Depth of debridement:  Subcutaneous tissue    Tissue debrided:  Subcutaneous    Devitalized tissue debrided:  Slough, Fibrin, Exudate and Biofilm    Instruments:  Curette    Bleeding:  Minimal  Hemostasis Achieved: Yes    Method Used:  Pressure  Patient tolerance:  Patient tolerated the procedure well with no immediate complications      "

## 2020-09-30 ENCOUNTER — HOSPITAL ENCOUNTER (EMERGENCY)
Facility: HOSPITAL | Age: 55
Discharge: HOME | End: 2020-09-30
Attending: EMERGENCY MEDICINE
Payer: MEDICARE

## 2020-09-30 ENCOUNTER — OFFICE VISIT (OUTPATIENT)
Dept: CARDIOLOGY | Facility: CLINIC | Age: 55
End: 2020-09-30
Payer: MEDICARE

## 2020-09-30 VITALS
BODY MASS INDEX: 36.45 KG/M2 | HEIGHT: 78 IN | DIASTOLIC BLOOD PRESSURE: 62 MMHG | SYSTOLIC BLOOD PRESSURE: 130 MMHG | HEART RATE: 81 BPM | TEMPERATURE: 99 F | WEIGHT: 315 LBS | RESPIRATION RATE: 17 BRPM | OXYGEN SATURATION: 96 %

## 2020-09-30 VITALS
DIASTOLIC BLOOD PRESSURE: 68 MMHG | HEIGHT: 78 IN | BODY MASS INDEX: 36.45 KG/M2 | SYSTOLIC BLOOD PRESSURE: 118 MMHG | WEIGHT: 315 LBS | OXYGEN SATURATION: 96 % | HEART RATE: 92 BPM

## 2020-09-30 DIAGNOSIS — I87.2 CHRONIC VENOUS INSUFFICIENCY: ICD-10-CM

## 2020-09-30 DIAGNOSIS — I87.1 MAY-THURNER SYNDROME: ICD-10-CM

## 2020-09-30 DIAGNOSIS — I87.319 CHRONIC VENOUS HYPERTENSION WITH ULCER: ICD-10-CM

## 2020-09-30 DIAGNOSIS — L97.929 VARICOSE ULCER OF LEFT LOWER EXTREMITY: ICD-10-CM

## 2020-09-30 DIAGNOSIS — L97.909 CHRONIC VENOUS HYPERTENSION WITH ULCER: ICD-10-CM

## 2020-09-30 DIAGNOSIS — I87.2 VENOUS (PERIPHERAL) INSUFFICIENCY: ICD-10-CM

## 2020-09-30 DIAGNOSIS — I83.029 VARICOSE ULCER OF LEFT LOWER EXTREMITY: ICD-10-CM

## 2020-09-30 DIAGNOSIS — I48.20 CHRONIC ATRIAL FIBRILLATION: ICD-10-CM

## 2020-09-30 DIAGNOSIS — I50.22 CHRONIC SYSTOLIC HEART FAILURE: Primary | ICD-10-CM

## 2020-09-30 DIAGNOSIS — M25.512 LEFT SHOULDER PAIN, UNSPECIFIED CHRONICITY: Primary | ICD-10-CM

## 2020-09-30 DIAGNOSIS — I26.99 RECURRENT PULMONARY EMBOLISM: ICD-10-CM

## 2020-09-30 DIAGNOSIS — I34.0 NON-RHEUMATIC MITRAL REGURGITATION: ICD-10-CM

## 2020-09-30 DIAGNOSIS — I10 ESSENTIAL HYPERTENSION: ICD-10-CM

## 2020-09-30 PROCEDURE — 3074F PR MOST RECENT SYSTOLIC BLOOD PRESSURE < 130 MM HG: ICD-10-PCS | Mod: CPTII,S$GLB,, | Performed by: INTERNAL MEDICINE

## 2020-09-30 PROCEDURE — 3078F PR MOST RECENT DIASTOLIC BLOOD PRESSURE < 80 MM HG: ICD-10-PCS | Mod: CPTII,S$GLB,, | Performed by: INTERNAL MEDICINE

## 2020-09-30 PROCEDURE — 99999 PR PBB SHADOW E&M-EST. PATIENT-LVL IV: ICD-10-PCS | Mod: PBBFAC,,, | Performed by: INTERNAL MEDICINE

## 2020-09-30 PROCEDURE — 3008F BODY MASS INDEX DOCD: CPT | Mod: CPTII,S$GLB,, | Performed by: INTERNAL MEDICINE

## 2020-09-30 PROCEDURE — 99214 PR OFFICE/OUTPT VISIT, EST, LEVL IV, 30-39 MIN: ICD-10-PCS | Mod: S$GLB,,, | Performed by: INTERNAL MEDICINE

## 2020-09-30 PROCEDURE — 3074F SYST BP LT 130 MM HG: CPT | Mod: CPTII,S$GLB,, | Performed by: INTERNAL MEDICINE

## 2020-09-30 PROCEDURE — 3008F PR BODY MASS INDEX (BMI) DOCUMENTED: ICD-10-PCS | Mod: CPTII,S$GLB,, | Performed by: INTERNAL MEDICINE

## 2020-09-30 PROCEDURE — 25000003 PHARM REV CODE 250: Performed by: PHYSICIAN ASSISTANT

## 2020-09-30 PROCEDURE — 99283 EMERGENCY DEPT VISIT LOW MDM: CPT

## 2020-09-30 PROCEDURE — 99999 PR PBB SHADOW E&M-EST. PATIENT-LVL IV: CPT | Mod: PBBFAC,,, | Performed by: INTERNAL MEDICINE

## 2020-09-30 PROCEDURE — 3078F DIAST BP <80 MM HG: CPT | Mod: CPTII,S$GLB,, | Performed by: INTERNAL MEDICINE

## 2020-09-30 PROCEDURE — 99214 OFFICE O/P EST MOD 30 MIN: CPT | Mod: S$GLB,,, | Performed by: INTERNAL MEDICINE

## 2020-09-30 RX ORDER — LISINOPRIL 2.5 MG/1
5 TABLET ORAL DAILY
Qty: 180 TABLET | Refills: 3 | Status: SHIPPED | OUTPATIENT
Start: 2020-09-30 | End: 2021-02-08

## 2020-09-30 RX ORDER — METHYLPREDNISOLONE 4 MG/1
TABLET ORAL
Qty: 21 TABLET | Refills: 0 | Status: SHIPPED | OUTPATIENT
Start: 2020-09-30 | End: 2020-10-21

## 2020-09-30 RX ORDER — LIDOCAINE 50 MG/G
1 PATCH TOPICAL
Status: DISCONTINUED | OUTPATIENT
Start: 2020-09-30 | End: 2020-09-30

## 2020-09-30 RX ORDER — LIDOCAINE 50 MG/G
1 PATCH TOPICAL ONCE
Status: DISCONTINUED | OUTPATIENT
Start: 2020-09-30 | End: 2020-09-30 | Stop reason: HOSPADM

## 2020-09-30 RX ADMIN — LIDOCAINE 1 PATCH: 50 PATCH TOPICAL at 04:09

## 2020-09-30 NOTE — PROGRESS NOTES
Subjective:   @Patient ID:  Drew Farrar is a 54 y.o. male who presents for follow-up of Post hospital discharge/ A.fib     HPI:   He is a patient of Dr. Mccormack and Dr. Roa  He is here for post hosp discharge follow up  He stated that he has been doping ok   HR has been stable. He is taking Toprol 100 mg in am and 50 mg pm now  Tolerating OAC well   He had laser ablation of the left LE in outside facility  He follows with wound care for chronic venous stasis        Historically:  He is a 54 y.o. year old gentleman with medical history significant for HTN, Hyperthyroidism, Chronic venous statis dematitis,hx and DVT, may thurner syndrome Chronic A fib on xarelto and HFpEF he presented  8/2020 with headaches. CT head showed old CVA.  While ambulating had increase in HR jumped to 130s. Hence cardiology consulted.  Toprol dose was increased. Echo EF 45-55%, technically difficult study.        Cardiac hx  Hx  patent PDA s/p surgical closure 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 of age, May Thurner syndrome with h/o multiple PEs and DVTs s/p IVC filter, chronic systolic congestive heart failure (EF 30%), hyperthyroid, atrial fibrillation     Prior cardiovascular  Hx  --------------------------------            Patient Active Problem List    Diagnosis Date Noted    Open wound of left foot     Complicated migraine 08/21/2020    Orthostatic hypotension 02/18/2020    Unstable angina 11/21/2019    Iron deficiency anemia 11/12/2019    Anemia of chronic disease 11/12/2019    Venous stasis ulcer of lower extremity, unspecified laterality     Obesity, Class I, BMI 30.0-34.9 (see actual BMI) 09/30/2019    Advance care planning 09/30/2019    Erythema     Acute deep vein thrombosis (DVT) of lower extremity 09/14/2019    Onychomycosis 09/12/2019    Wound of foot 09/12/2019    Clostridium difficile infection     (HFpEF) heart failure with preserved ejection fraction 09/10/2019    Pulmonary embolism 08/02/2019    Chronic venous  insufficiency 07/25/2019    Hypomagnesemia 07/08/2019    Varicose ulcer of lower extremity     Cellulitis of right lower leg 04/24/2019    Candida infection of genital region 04/24/2019    Non-pressure chronic ulcer of left ankle, with unspecified severity 07/26/2018    Non-rheumatic mitral regurgitation 09/07/2017    Chronic systolic heart failure 09/06/2017     -  Echo 9/2017   EF 40%    Severe MR   Moderate TR   PASP 47   Severe LAE   LVEDD 7.1 cm    LVESD 5.2 cm          Hyperthyroidism 09/05/2017    Elevated troponin 09/05/2017    Acute on chronic diastolic congestive heart failure 02/20/2017    Long term (current) use of anticoagulants 10/31/2016    Other pulmonary embolism without acute cor pulmonale, unspecified chronicity 10/28/2016    Acute pulmonary embolism 10/26/2016    Recurrent pulmonary embolism 10/26/2016    History of DVT (deep vein thrombosis) 09/16/2016    HTN (hypertension) 09/16/2016    Knee pain, right 07/07/2016    Difficulty walking 07/07/2016    Group A streptococcal infection 03/01/2016    Tinea pedis of both feet 03/01/2016    Bilateral edema of lower extremity 03/01/2016    Morbid obesity 03/01/2016    Permanent atrial fibrillation 03/01/2016    Right knee pain 03/01/2016    Depression 12/24/2014    Ulcer of ankle, left, limited to breakdown of skin 03/31/2014    Elevated BP 03/24/2014    May-Thurner syndrome 02/26/2014     1. Successful IVUS guided intervention for May Thurner Syndrome 2. 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     S/p venoplasty with  20 x 80 mm Wallstent post dilated with 14 mm balloon in 2/2014      Skin ulcer of left foot, limited to breakdown of skin 02/15/2014    Cellulitis 02/13/2014    Chronic venous hypertension with ulcer 11/25/2013    Edema 11/23/2013     R leg      Venous (peripheral)  insufficiency 03/14/2013     S/p bilateral iliac vein stents    S/p R GSV EVLT with laser 10/2013    S/p US guided accessory vein sclerotherapy of R calf 2014      Venous stasis dermatitis of both lower extremities 12/14/2012    Ulcer - lesion 12/14/2012     Of left foot        Chronic atrial fibrillation 12/14/2012                    LAST HbA1c  Lab Results   Component Value Date    HGBA1C 5.4 08/22/2020       Lipid panel  Lab Results   Component Value Date    CHOL 130 08/22/2020    CHOL 124 07/10/2017    CHOL 131 11/21/2016     Lab Results   Component Value Date    HDL 31 (L) 08/22/2020    HDL 23 (L) 07/10/2017    HDL 29 (L) 11/21/2016     Lab Results   Component Value Date    LDLCALC 78.8 08/22/2020    LDLCALC 70.4 07/10/2017    LDLCALC 83.0 11/21/2016     Lab Results   Component Value Date    TRIG 101 08/22/2020    TRIG 153 (H) 07/10/2017    TRIG 95 11/21/2016     Lab Results   Component Value Date    CHOLHDL 23.8 08/22/2020    CHOLHDL 18.5 (L) 07/10/2017    CHOLHDL 22.1 11/21/2016            Review of Systems   Constitution: Negative for chills and fever.   HENT: Negative for hearing loss and nosebleeds.    Eyes: Negative for blurred vision.   Cardiovascular: Negative for chest pain, leg swelling and palpitations.   Respiratory: Positive for shortness of breath. Negative for hemoptysis.    Hematologic/Lymphatic: Negative for bleeding problem.   Skin: Positive for color change. Negative for itching.        As in HPI    Musculoskeletal: Negative for falls.        As in HPI    Gastrointestinal: Negative for abdominal pain and hematochezia.   Genitourinary: Negative for hematuria.   Neurological: Negative for dizziness and loss of balance.   Psychiatric/Behavioral: Negative for altered mental status and depression.       Objective:   Physical Exam   Constitutional: He is oriented to person, place, and time. He appears well-developed and well-nourished.   Obese    HENT:   Head: Normocephalic and atraumatic.    Eyes: Conjunctivae are normal.   Neck: Neck supple. No JVD present. Carotid bruit is not present.   Cardiovascular: Normal rate and normal heart sounds. An irregularly irregular rhythm present. Exam reveals no gallop and no friction rub.   No murmur heard.  Pulses:       Carotid pulses are 2+ on the right side and 2+ on the left side.       Radial pulses are 2+ on the right side and 2+ on the left side.   Pulmonary/Chest: Effort normal and breath sounds normal. No stridor. No respiratory distress. He has no wheezes.   Neurological: He is alert and oriented to person, place, and time.   Skin: Skin is warm and dry.   Chronic venous stasis changes,  Both LE with dressings    Psychiatric: He has a normal mood and affect. His behavior is normal.       Assessment:     1. Chronic systolic heart failure    2. Chronic atrial fibrillation    3. Venous (peripheral) insufficiency    4. Chronic venous hypertension with ulcer    5. May-Thurner syndrome    6. Essential hypertension    7. Recurrent pulmonary embolism    8. Non-rheumatic mitral regurgitation    9. Varicose ulcer of left lower extremity    10. Chronic venous insufficiency        Plan:   - Continue rate control strategy   - Continue OAC   - Add lisinopril for borderline reduced EF   - F/U with wound care and outside vascular physician regarding his venous insufficieny . Will try to obtain records from recent laser ablation      I spent 5-10 minutes asking, assessing, assisting, arranging and advising heart healthy diet improvements. This included low-salt meals, portion control and health food alternatives. I also encourage 30 minutes of moderate exercise 3-4x a week.       Pertinent cardiac images and EKG reviewed independently.    Continue with current medical plan and lifestyle changes.  Return sooner for concerns or questions. If symptoms persist go to the ED  I have reviewed all pertinent data including patient's medical history in detail and updated the  computerized patient record.     No orders of the defined types were placed in this encounter.      Follow up as scheduled.     He expressed verbal understanding and agreed with the plan    Patient's Medications   New Prescriptions    LISINOPRIL (PRINIVIL,ZESTRIL) 2.5 MG TABLET    Take 2 tablets (5 mg total) by mouth once daily.   Previous Medications    ACETAMINOPHEN (TYLENOL) 325 MG TABLET    Take 325 mg by mouth every 6 (six) hours as needed for Pain.    ALBUTEROL (VENTOLIN HFA) 90 MCG/ACTUATION INHALER    Inhale 2 puffs into the lungs every 6 (six) hours as needed for Wheezing. Rescue    GENTAMICIN (GARAMYCIN) 0.1 % OINTMENT    Mix with santyl and Apply to wound as directed.    HYDROXYZINE HCL (ATARAX) 25 MG TABLET    Take 1 tablet (25 mg total) by mouth 4 (four) times daily as needed for Itching.    METHIMAZOLE (TAPAZOLE) 10 MG TAB    Take 2 tablets (20 mg total) by mouth once daily.    METOPROLOL SUCCINATE (TOPROL-XL) 100 MG 24 HR TABLET    Take 1 tablet (100 mg total) by mouth once daily.    METOPROLOL SUCCINATE (TOPROL-XL) 50 MG 24 HR TABLET    Take 1 tablet (50 mg total) by mouth every evening.    PANTOPRAZOLE (PROTONIX) 40 MG TABLET    Take 1 tablet (40 mg total) by mouth once daily.    SUMATRIPTAN (IMITREX) 50 MG TABLET    Take 50 mg for headache. If headache does not resolve, take another 50mg two hours after initial dose. Do not exceed 200mg in 24 hours.    TORSEMIDE (DEMADEX) 20 MG TAB    Take 2 tablets by mouth daily.    XARELTO 20 MG TAB    TAKE 1 TABLET (20 MG TOTAL) BY MOUTH DAILY WITH DINNER OR EVENING MEAL.   Modified Medications    Modified Medication Previous Medication    METHYLPREDNISOLONE (MEDROL DOSEPACK) 4 MG TABLET methylPREDNISolone (MEDROL DOSEPACK) 4 mg tablet       Take medication as instructed    Take as directed   Discontinued Medications    No medications on file

## 2020-09-30 NOTE — PATIENT INSTRUCTIONS

## 2020-09-30 NOTE — ED PROVIDER NOTES
"Encounter Date: 9/30/2020       History     Chief Complaint   Patient presents with    Arm Pain     Pt presents to ED toda c/o left arm pain onset August while hospitalized for stroke, report pain unrelieved by tylenol      53 y/o M pmhx Afib on xarelto, venous stasis ulcers in lower extremities managed by wound care, presents to ED with complaint of left shoulder pain. Patient states he "felt a tear" within his left shoulder after stretching while in hospital one month ago for newly diagnosed stroke. He states the shoulder pain in the beginning was manageable with tylenol, but has continued to worsen over past two weeks. No recent injury or trauma. Pain described as sharp, radiating towards left elbow, worsened with movement and activities, improved with rest. Of note, patient is in wheelchair and have to used shoulder throughout the day for movement. He reports he has taken two tylenol (1g) a few times, stating that it makes his pain manageable but also makes him feel too sleepy. He denies numbness, focal weakness, fever, chills, nausea, vomiting, chest or abd pain. No acute complications from lower extremity wounds. No other acute complaints at this time.       The history is provided by the patient.     Review of patient's allergies indicates:   Allergen Reactions    Contrast media Other (See Comments)     Severe chest pain    Food allergy formula [glutamine-[id number]-selen-brom]      Allergic to green peas; Heart failure.    Iodinated contrast media Other (See Comments)     Chest pain    Peas Hives    Ibuprofen Swelling    Latex, natural rubber Hives    Pcn [penicillins] Hives    Butisol [butabarbital] Rash     Peeling skin     Past Medical History:   Diagnosis Date    *Atrial fibrillation     Anticoagulant long-term use     Arthritis     Atrial fibrillation     Atrial fibrillation Feb 23, 2016    Bipolar disorder     CHF (congestive heart failure)     Congenital heart disease     s/p surgical " intervention 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 of age    Deep vein thrombosis     DVT of leg (deep venous thrombosis)     left leg    History of prior ablation treatment     10/9/13    Hypertension     Obesity     Stroke     Thyroid disease     Venous stasis ulcer of lower extremity, unspecified laterality 12/14/2012    Venous ulcer      Past Surgical History:   Procedure Laterality Date    ANGIOPLASTY      CARDIAC SURGERY      open heart surgery 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 old    EYE SURGERY      left eye cataract/right eye glaucoma    TIMO FILTER PLACEMENT      Dr Calix ([facility])    KNEE SURGERY      l and r     MULTIPLE TOOTH EXTRACTIONS      SKIN GRAFT      left leg     Family History   Problem Relation Age of Onset    Diabetes Father     Heart disease Father     Heart disease Maternal Grandmother     Diabetes Maternal Grandfather     Heart disease Maternal Grandfather     Stroke Maternal Grandfather      Social History     Tobacco Use    Smoking status: Former Smoker     Packs/day: 1.00     Years: 6.00     Pack years: 6.00     Types: Cigarettes    Smokeless tobacco: Former User    Tobacco comment: quit by age 27yrs old   Substance Use Topics    Alcohol use: Yes     Alcohol/week: 1.0 standard drinks     Types: 1 Glasses of wine per week     Frequency: Monthly or less     Comment: occasionally    Drug use: No     Review of Systems   Constitutional: Negative for activity change, chills and fever.   HENT: Negative for congestion and sore throat.    Respiratory: Negative for cough and shortness of breath.    Cardiovascular: Negative for chest pain.   Gastrointestinal: Negative for abdominal pain, nausea and vomiting.   Musculoskeletal: Positive for arthralgias, gait problem (wheelchair, baseline) and myalgias. Negative for back pain, joint swelling, neck pain and neck stiffness.   Skin: Positive for wound (Lower extremities).   Neurological: Negative for dizziness, syncope, weakness, light-headedness, numbness  "and headaches.   Hematological: Does not bruise/bleed easily.       Physical Exam     Initial Vitals [09/30/20 1526]   BP Pulse Resp Temp SpO2   130/62 81 17 98.5 °F (36.9 °C) 96 %      MAP       --         Physical Exam    Nursing note and vitals reviewed.  Constitutional: Vital signs are normal. He appears well-developed and well-nourished. He is cooperative. He does not have a sickly appearance. He does not appear ill. No distress.   HENT:   Head: Normocephalic and atraumatic.   Eyes: Conjunctivae and EOM are normal.   Neck: Normal range of motion. Neck supple.   Cardiovascular: Normal rate, regular rhythm and normal heart sounds.   Pulses:       Radial pulses are 1+ on the right side and 1+ on the left side.   Pulmonary/Chest: Effort normal and breath sounds normal.   Abdominal: Soft. Normal appearance. There is no abdominal tenderness.   Musculoskeletal: Tenderness present.      Left shoulder: He exhibits tenderness.      Comments: Left shoulder tenderness, primarily over anterior and lateral aspect. ROM remain intact but limited 2/2 discomfort. Discomfort worsened with RTC testing. No swelling. No crepitus. No tenderness throughout remaining LUE with full ROM. Sensations intact.   Neurological: He is alert and oriented to person, place, and time. GCS score is 15. GCS eye subscore is 4. GCS verbal subscore is 5. GCS motor subscore is 6.   Skin: Skin is warm and dry. No abrasion, no bruising, no ecchymosis and no rash noted. No erythema.   Psychiatric: He has a normal mood and affect. His speech is normal and behavior is normal. Judgment and thought content normal. Cognition and memory are normal.         ED Course   Procedures  Labs Reviewed - No data to display       Imaging Results    None          Medical Decision Making:   Differential Diagnosis:   Shoulder strain, sprain, tendonitis, muscular injury  ED Management:  Patient presents with worsening left shoulder pain, first began after he "felt a tear" " after stretching while hospitalized one month ago. Pain mildly improved with home tylenol. On xarelto for afib; unable to take NSAIDS. On examination, tenderness to left shoulder with papation, worsened with movements and RTC testing. I suspect sx are musculoskeletal. Lido patch applied in ED. I have contacted his wound care clinic who stated it is okay for patient to receive medrol dose back in attempt to improve symptoms. No hx of diabetes. Patient educated to follow up with PCP to discuss progress and possible referral to ortho. Encourage to RICE and continue tylenol as needed. Patient understands instructions and agrees with plan.                              Clinical Impression:     ICD-10-CM ICD-9-CM   1. Left shoulder pain, unspecified chronicity  M25.512 719.41                          ED Disposition Condition    Discharge Stable        ED Prescriptions     Medication Sig Dispense Start Date End Date Auth. Provider    methylPREDNISolone (MEDROL DOSEPACK) 4 mg tablet Take medication as instructed 1 Package 9/30/2020 10/21/2020 Miguel A Campos PA-C        Follow-up Information     Follow up With Specialties Details Why Contact Info    Garrison Roach MD Family Medicine Call   [address]  [place] [address]  [place] [address]  [phone]                                         Miguel A Campos PA-C  09/30/20 0199

## 2020-09-30 NOTE — DISCHARGE INSTRUCTIONS
Follow up with your doctor. Take medications as instructed.   Return to closest emergency department if symptoms do not improve, change, worsen, or for any new concerns.

## 2020-10-01 ENCOUNTER — PES CALL (OUTPATIENT)
Dept: ADMINISTRATIVE | Facility: CLINIC | Age: 55
End: 2020-10-01

## 2020-10-02 LAB
BACTERIA SPEC AEROBE CULT: ABNORMAL
BACTERIA SPEC AEROBE CULT: ABNORMAL

## 2020-10-05 ENCOUNTER — HOSPITAL ENCOUNTER (OUTPATIENT)
Dept: WOUND CARE | Facility: HOSPITAL | Age: 55
Discharge: HOME | End: 2020-10-05
Attending: SURGERY
Payer: MEDICARE

## 2020-10-05 ENCOUNTER — TELEPHONE (OUTPATIENT)
Dept: WOUND CARE | Facility: HOSPITAL | Age: 55
End: 2020-10-05

## 2020-10-05 VITALS
TEMPERATURE: 98 F | BODY MASS INDEX: 36.45 KG/M2 | SYSTOLIC BLOOD PRESSURE: 144 MMHG | HEIGHT: 78 IN | HEART RATE: 91 BPM | WEIGHT: 315 LBS | DIASTOLIC BLOOD PRESSURE: 68 MMHG

## 2020-10-05 DIAGNOSIS — I87.2 VENOUS STASIS DERMATITIS OF BOTH LOWER EXTREMITIES: Primary | ICD-10-CM

## 2020-10-05 DIAGNOSIS — L03.90 CELLULITIS, UNSPECIFIED CELLULITIS SITE: ICD-10-CM

## 2020-10-05 LAB — BACTERIA SPEC ANAEROBE CULT: ABNORMAL

## 2020-10-05 PROCEDURE — 87070 CULTURE OTHR SPECIMN AEROBIC: CPT

## 2020-10-05 PROCEDURE — 87075 CULTR BACTERIA EXCEPT BLOOD: CPT

## 2020-10-05 PROCEDURE — 29581 APPL MULTLAYER CMPRN SYS LEG: CPT

## 2020-10-05 PROCEDURE — 11042 DBRDMT SUBQ TIS 1ST 20SQCM/<: CPT

## 2020-10-05 PROCEDURE — 27201912 HC WOUND CARE DEBRIDEMENT SUPPLIES

## 2020-10-05 PROCEDURE — 87076 CULTURE ANAEROBE IDENT EACH: CPT | Mod: 59

## 2020-10-05 PROCEDURE — 87077 CULTURE AEROBIC IDENTIFY: CPT

## 2020-10-05 PROCEDURE — 87186 SC STD MICRODIL/AGAR DIL: CPT

## 2020-10-05 RX ORDER — CEFPODOXIME PROXETIL 200 MG/1
200 TABLET, FILM COATED ORAL EVERY 12 HOURS
Qty: 28 TABLET | Refills: 0 | Status: SHIPPED | OUTPATIENT
Start: 2020-10-05 | End: 2020-10-05 | Stop reason: ALTCHOICE

## 2020-10-05 RX ORDER — CIPROFLOXACIN 500 MG/1
500 TABLET ORAL EVERY 12 HOURS
Qty: 14 TABLET | Refills: 0 | Status: SHIPPED | OUTPATIENT
Start: 2020-10-05 | End: 2020-10-19

## 2020-10-05 RX ORDER — MUPIROCIN 20 MG/G
OINTMENT TOPICAL
Qty: 22 G | Refills: 3 | Status: SHIPPED | OUTPATIENT
Start: 2020-10-05 | End: 2022-01-01

## 2020-10-05 NOTE — PROGRESS NOTES
"Subjective:       Patient ID: Drew Farrar is a 54 y.o. male.    Chief Complaint: Venous Stasis    2/15/19: Readmitted for venous ulcer to left lateral foot which developed about 2 weeks ago. Pulses noted with doppler and capillary refill <3.Dr Gutierrez assessed patient and wound care plan ordered for compression therapy and hydrafera blue to wound bed. No apparent signs of infection noted. Patient to follw-up in clinic in 2 weeks.DB   2/21/19: Nurse visit for dressing change. Refused care to skin tear on left 2nd toe. See notes. RTC 1 week for DrRosy Visit.  3/1/19: Follow up with Dr. Waller today in clinic new wound to right posterior leg, culture of both wounds taken today in clinic new wound care orders to both legs for xeroform and unna with calamine toe to knee follow up in 1 week with Dr. Gutierrez  3/8/19: Follow up with Dr. Gutierrez today in clinic wounds improving, edema noted to BLE, profore toe to knee applied to both legs, RX given to patient for PO Doxycycline, follow up in 1 week.   3/15/19: Here for f/u with Dr. Gutierrez. U/S scheduled Thursday. Will need right leg rewrapped. Patient states eye DrRosy Gave him the same antibiotic dose and strength after eye surgery. Dr instructed patient to take one bottle of antibiotics for both wounds and eyes until finished. Gentamycin added to wound care orders.   3/29/19:  Wound slowly improving. No changes in wound care orders. Wound debrided per Dr Gutierrez.  4/4/19: Patient showed up to clinic today stated " I need my dressing changed. It fell off."  Doppler pluses checked and present.  Patient refusing care to right, no stocking present on leg at this visit. Patient stated " No they said this leg is discharged, Dr. Lantigua has to drain the fluid out this leg with a needle. No wound care to this leg anymore when I come."      9/9/19: Readmit to wound care clinic for venous ulcer to left lateral foot.  Patient states it reopened about 3 weeks after last discharge " "from wound care clinic 7/1/19. Per patient wound has been treated by PCP.  Patient reports just using a large band- aid and compression sock. Patient states he was recently treated at [facility] for HBOT for this wound " in the last two or three months after it reopened "  Requesting HBOT here at the wound care clinic.  Dr. Gutierrez examined patient today, doppler pulses present, wound debrided with curette by Dr. Gutierrez patient tolerated well. Wound care orders given to apply hydrorefa blue ready to wound, cover with mepore dressing and apply patient's compression sock. Dr. Manuel discussed with patient obtaining his medical records from [facility], to view to determine if patient will benefit from HBOT, patient verbalized understanding.  Authorization for release of health information sheet signed by patient and faxed to [facility] today in clinic. Follow up in 1 week with MD.  9/23/19: F/U with Dr. Gutierrez. Client recently discharged from hospital. Left foot site debrided. Client being followed by [facility].  12/9/19: Patient seen today in clinic by Dr. Gutierrez, no change to wound. Pt reports recently discharged from hospital 12/3/19 for irregular heat rate and chest pains. Debridement per Dr. Gutierrez tolerated well, wound care continued as ordered 4 layer compression toe to knee LLE.   F/u with Dr. Gutierrez , wound progressing well. Compression changed to coflex with calamine due to itching.  12/27/19:Dr Gutierrez assessed patient. Wound improving. Change in dressing change frequency. Follow up in 2 weeks.    1/10/2020: Patient seen today in clinic by  wound slowly improving. Calamine coflex toe to knee applied today in clinic. Fu 2 weeks.  2/10/2020: F/U WITH  today , pt had not been here in 10 days with dressing still in place. New wounds noted to right lower leg. Pt to have  u/s tomorrow of bilateral lower extremeties and to see  on Wednesday 2/12/2020 for possible " "procedure of right leg per patient. Pt to return on Thursday to clinic for dressing change and to see DR. Sury Vaca (podiatrist) for toe nail clipping and to evaluate toes on right foot.  As above; no c/o pain noted.  02/28/2020: F/u with Dr. Gutierrez. Wounds to RLE open to air. Patient states bandage came off while he was sleeping. Wound to left foot decreasing in size. Left foot wound debrided today in clinic. Continue with current wound care treatment and follow up in  Clinic in 1 week    3/13/20: F/U with Dr. Gutierrez.  Wounds improving.  Will increase compression to LLE.  4/3/2020: Fu with  today in clinic. LLE wound measuring bigger.  RLE improving. Patient complains of pain, tenderness,swelling  x 2 weeks to RLE.  Patient states he went to the ER 2 weeks ago " and they kicked me out because of the Coronavirus." Patient also reports to Dr. Gutierrez " night sweats for 2 weeks." Temp today on arrival to wound care clinic 99.1 oral. LLE debrided per  patient tolerated well, wound care 3 layer compression toe to knee as ordered.   Ultra sound ordered of RLE stat today in clinic to rule out DVT.  MD rewieved results, results negative discussed with patient.  RX given to patient  for Doxycycline Po.  Patient to follow up 4/6/2020 with .  4/13/2020; Fu with Dr. Gutierrez. Patient states RLE pain is much better since starting oral Doxycycline.  RLE wound improving. LLE wound remains unchanged. LLE wound debrided per  patient tolerated well. Continued with 3 layer compression to LLE and tubi  G x 2 to RLE. RTC 4/27/2020.  4/27/20: F/U with Dr. Gutierrez. RLE resolved. Continue Tubigrip G x 2 toes to knee. Left foot site debrided per Dr. Gutierrez. Santyl, adaptic touch, and hydrofera blue ready ordered. Return in 2 weeks.  5/18/20: Wound slowly improving. Dr Gutierrez debrided wound which was toleraled well. Continuing with same plan of care.  06/01/2020- follow-up with " "Dr. Cortez. Wound debrided, patient tolerated. Gentamicin added to wound care regimen. Follow-up with Dr. cortez in 2 weeks 06/15/2020.  6/15/2020: F/U with Dr. Cortez.  Wound improving.  No new complaints.    6/29/2020: F/U with Dr. Cortez. Wound continues to slowly improve.  No c/o noted.  08/03/2020:  F/u with Dr. Cortez. No new complaints per patient. Wound remains stable. Wound debrided today in clinic. Continue with compression and topical treatment. Follow up in 2 weeks at wound care clinic  8/17/20: F/U with Dr. Cortez. Tissue status improving. Wound debrided per Dr. Cortez. Cont. POC. Next visit 8/31/20.  8/31/2020: fu with Dr. Cortez. Wound debrided per  patient tolerated well. Patient states he is scheduled to have laser vein surgery on 9/2/2020 at the [facility] in [place] and 9/3/2020 in [place]. Patient will follow up with Dr. Cortez 9/4/2020. Patient refused 3 layer compression toes to knee today. Stated " I only want the dressing with a large bandage and the white net so I can have my surgery on Wednesday. Applied santyl, gentamicin to wound, adaptic, hydrofera ready and island border secured with flexi net.   9/28/2020: Patient seen today in clinic by . Left foot wound digressing. Blister to left leg, patient states " the wrap caused it." Patient tolerated debridement and culture well per . Continued with current treatment plan 3 layer compression toes to knee LLE. Fu 1 week 10/5/2020.    10/5/2020: Fu with Dr. Quiros. No changes to wound measurements left foot. Left leg blisters healed.  discussed culture results from last visit 9/28/2020 with patient. Wound culture and debrided patient tolerated well. Rx sent to pharmacy for Vantin Po, changed topical treatment to mupirocin po. Patient to fu with ID and  on 10/12/2020.  Continued with 3-layer compression toes to knee LLE, fu 10/12/2020.       Review " of Systems    All systems were reviewed and are negative, except that mentioned in the HPI.    Objective:      Physical Exam  Constitutional:       Appearance: He is well-developed.   HENT:      Head: Normocephalic and atraumatic.   Eyes:      Pupils: Pupils are equal, round, and reactive to light.   Neck:      Musculoskeletal: Normal range of motion.   Cardiovascular:      Rate and Rhythm: Normal rate.   Pulmonary:      Effort: Pulmonary effort is normal. No respiratory distress.      Breath sounds: No stridor.   Abdominal:      General: There is no distension.   Skin:     Comments: See Synopsis for wound details. Surekha-wound is erythematous and tender and extends 2cm circumferentially around wound.   Neurological:      Mental Status: He is alert and oriented to person, place, and time.         Assessment:       1. Venous stasis dermatitis of both lower extremities           Wound 09/09/19 1057 Venous Ulcer lateral Foot #1 (Active)   09/09/19 1057    Pre-existing: Yes   Primary Wound Type: Venous ulcer   Side: Left   Orientation: lateral   Location: Foot   Wound Number (optional): #1   Ankle-Brachial Index:    Pulses:    Removal Indication and Assessment:    Wound Outcome:    (Retired) Wound Type:    (Retired) Wound Length (cm):    (Retired) Wound Width (cm):    (Retired) Depth (cm):    Wound Description (Comments):    Removal Indications:    Wound Image   10/05/20 1100   Dressing Appearance Intact;Moist drainage 10/05/20 1100   Drainage Amount Moderate 10/05/20 1100   Drainage Characteristics/Odor Yellow 10/05/20 1100   Appearance Pink;Yellow;Moist;Fibrin;Slough 10/05/20 1100   Tissue loss description Full thickness 10/05/20 1100   Red (%), Wound Tissue Color 30 % 10/05/20 1100   Yellow (%), Wound Tissue Color 70 % 10/05/20 1100   Periwound Area Pink;Edematous;Intact 10/05/20 1100   Wound Edges Irregular 10/05/20 1100   Wound Length (cm) 2 cm 10/05/20 1100   Wound Width (cm) 7 cm 10/05/20 1100   Wound Depth (cm)  0.2 cm 10/05/20 1100   Wound Volume (cm^3) 2.8 cm^3 10/05/20 1100   Wound Surface Area (cm^2) 14 cm^2 10/05/20 1100   Care Cleansed with:;Sterile normal saline;Debrided 10/05/20 1100   Dressing Applied;Hydrofiber;Non-adherent;Foam;Compression wrap 10/05/20 1100   Periwound Care Moisture barrier applied;Absorptive dressing applied 10/05/20 1100   Compression Three layer compression 10/05/20 1100   Off Loading Off loading shoe 10/05/20 1100   Dressing Change Due 10/09/20 10/05/20 1100       Left lateral Foot #1 and leg leg blister   Cleanse wound with: Normal saline.   Lidocaine: Prn Debridement   Periwound care: Sween to dry skin Prn, betamethasone to LLE PRN. Calmoseptine to vick wound PRN.   Primary dressing: Mupirocin,  Hydrofera blue ready to wound   Secondary dressing: Aquacel foam, Profore lite toes to knee   Offloading: Darco shoe left foot   Edema control: 3-layer Profore Lite compression.  Avoid prolonged standing in one place.  Elevate leg(s) as much as possible.   Frequency:  Mondays and Fridays   Follow-up: in   with Dr. Quiros and ID 10/12/2020   RX sent to pharmacy for Vantin Po and Mupirocin ointment.    Home health: [facility] to do wound care on Mondays (when not in clinic), Fridays, and PRN complications.       Plan:   Cellulitis around wound appears unchanged despite gent/santyl application to the wound bed. Cultures were reviewed and will rx cefpodoxime PO and mupiricin topically.  Patient will f/u 10/12/2020 with .    Case later discussed with Dr. Law (ID) who recommends d/c Vantin and rx Cipro.    Patient called later and stated that he was having CP and SOB 4 min after taking Vantin. He was instructed to go to the ER immediately.  I called to f/u with the patient and he was still at home, waiting for his father-in-law to give him a ride and stated that father-in-law will be there in 20 min. Patient was SOB and c/o CP and I instructed him to called 911 immediately. He  agreed.

## 2020-10-05 NOTE — TELEPHONE ENCOUNTER
"1450 Patient called wound care clinic with complaints of chest pain. States " the antibiotic y'all  gave me today is making me have chest pains." Instructed patient to stop taking antibiotic and to call an ambulance to bring patient to ER for evaluation. Patient verbalized understanding.   "

## 2020-10-05 NOTE — PROCEDURES
"Debridement    Date/Time: 10/5/2020 4:39 PM  Performed by: Kristine Quiros DO  Authorized by: Kristine Quiros DO     Time out: Immediately prior to procedure a "time out" was called to verify the correct patient, procedure, equipment, support staff and site/side marked as required.    Consent Done?:  Yes (Written)  Local anesthesia used?: Yes    Local anesthetic:  Topical anesthetic    Debridement - 1st Wound - General Location: Left foot.    Type of Debridement:  Excisional       Length (cm):  2       Area (sq cm):  14       Width (cm):  7       Percent Debrided (%):  100       Depth (cm):  0.2       Total Area Debrided (sq cm):  14    Depth of debridement:  Subcutaneous tissue    Tissue debrided:  Subcutaneous    Devitalized tissue debrided:  Biofilm, Exudate and Slough    Instruments:  Curette    Bleeding:  Minimal  Hemostasis Achieved: Yes    Method Used:  Pressure  Patient tolerance:  Patient tolerated the procedure well with no immediate complications      "

## 2020-10-08 LAB — BACTERIA SPEC AEROBE CULT: ABNORMAL

## 2020-10-12 ENCOUNTER — HOSPITAL ENCOUNTER (OUTPATIENT)
Dept: WOUND CARE | Facility: HOSPITAL | Age: 55
Discharge: HOME | End: 2020-10-12
Attending: SURGERY
Payer: MEDICARE

## 2020-10-12 ENCOUNTER — CLINICAL SUPPORT (OUTPATIENT)
Dept: INFECTIOUS DISEASES | Facility: CLINIC | Age: 55
End: 2020-10-12
Payer: MEDICARE

## 2020-10-12 VITALS
SYSTOLIC BLOOD PRESSURE: 114 MMHG | RESPIRATION RATE: 20 BRPM | HEART RATE: 106 BPM | DIASTOLIC BLOOD PRESSURE: 60 MMHG | TEMPERATURE: 99 F

## 2020-10-12 DIAGNOSIS — L97.321 ULCER OF ANKLE, LEFT, LIMITED TO BREAKDOWN OF SKIN: Primary | ICD-10-CM

## 2020-10-12 DIAGNOSIS — R60.0 BILATERAL EDEMA OF LOWER EXTREMITY: ICD-10-CM

## 2020-10-12 DIAGNOSIS — S91.302A OPEN WOUND OF LEFT FOOT, INITIAL ENCOUNTER: ICD-10-CM

## 2020-10-12 DIAGNOSIS — E66.01 MORBID OBESITY: ICD-10-CM

## 2020-10-12 DIAGNOSIS — L03.90 CELLULITIS, UNSPECIFIED CELLULITIS SITE: ICD-10-CM

## 2020-10-12 DIAGNOSIS — L03.115 CELLULITIS OF RIGHT LOWER LEG: ICD-10-CM

## 2020-10-12 DIAGNOSIS — I87.2 CHRONIC VENOUS INSUFFICIENCY: ICD-10-CM

## 2020-10-12 DIAGNOSIS — I87.2 VENOUS STASIS DERMATITIS OF BOTH LOWER EXTREMITIES: Primary | ICD-10-CM

## 2020-10-12 DIAGNOSIS — L03.116 MRSA CELLULITIS OF LEFT FOOT: ICD-10-CM

## 2020-10-12 DIAGNOSIS — A49.8 PSEUDOMONAS INFECTION: ICD-10-CM

## 2020-10-12 DIAGNOSIS — I83.009 VENOUS STASIS ULCER OF LOWER EXTREMITY, UNSPECIFIED LATERALITY: ICD-10-CM

## 2020-10-12 DIAGNOSIS — L97.329 NON-PRESSURE CHRONIC ULCER OF LEFT ANKLE, WITH UNSPECIFIED SEVERITY: ICD-10-CM

## 2020-10-12 DIAGNOSIS — B95.62 MRSA CELLULITIS OF LEFT FOOT: ICD-10-CM

## 2020-10-12 DIAGNOSIS — L97.909 VENOUS STASIS ULCER OF LOWER EXTREMITY, UNSPECIFIED LATERALITY: ICD-10-CM

## 2020-10-12 LAB — BACTERIA SPEC ANAEROBE CULT: ABNORMAL

## 2020-10-12 PROCEDURE — 99214 OFFICE O/P EST MOD 30 MIN: CPT | Mod: S$GLB,,, | Performed by: INTERNAL MEDICINE

## 2020-10-12 PROCEDURE — 99999 PR PBB SHADOW E&M-EST. PATIENT-LVL I: CPT | Mod: PBBFAC,,,

## 2020-10-12 PROCEDURE — 29581 APPL MULTLAYER CMPRN SYS LEG: CPT

## 2020-10-12 PROCEDURE — 99214 PR OFFICE/OUTPT VISIT, EST, LEVL IV, 30-39 MIN: ICD-10-PCS | Mod: S$GLB,,, | Performed by: INTERNAL MEDICINE

## 2020-10-12 PROCEDURE — 27201912 HC WOUND CARE DEBRIDEMENT SUPPLIES

## 2020-10-12 PROCEDURE — 99999 PR PBB SHADOW E&M-EST. PATIENT-LVL I: ICD-10-PCS | Mod: PBBFAC,,,

## 2020-10-12 PROCEDURE — 11042 DBRDMT SUBQ TIS 1ST 20SQCM/<: CPT

## 2020-10-12 RX ORDER — LEVOFLOXACIN 750 MG/1
750 TABLET ORAL DAILY
Qty: 14 TABLET | Refills: 5 | Status: SHIPPED | OUTPATIENT
Start: 2020-10-12 | End: 2020-12-21 | Stop reason: SDUPTHER

## 2020-10-12 NOTE — PROGRESS NOTES
"Subjective:       Patient ID: Drew Farrar is a 54 y.o. male.    Chief Complaint: Venous Stasis    2/15/19: Readmitted for venous ulcer to left lateral foot which developed about 2 weeks ago. Pulses noted with doppler and capillary refill <3.Dr Gutierrez assessed patient and wound care plan ordered for compression therapy and hydrafera blue to wound bed. No apparent signs of infection noted. Patient to follw-up in clinic in 2 weeks.DB   2/21/19: Nurse visit for dressing change. Refused care to skin tear on left 2nd toe. See notes. RTC 1 week for DrRosy Visit.  3/1/19: Follow up with Dr. Waller today in clinic new wound to right posterior leg, culture of both wounds taken today in clinic new wound care orders to both legs for xeroform and unna with calamine toe to knee follow up in 1 week with Dr. Gutierrez  3/8/19: Follow up with Dr. Gutierrez today in clinic wounds improving, edema noted to BLE, profore toe to knee applied to both legs, RX given to patient for PO Doxycycline, follow up in 1 week.   3/15/19: Here for f/u with Dr. Gutierrez. U/S scheduled Thursday. Will need right leg rewrapped. Patient states eye DrRosy Gave him the same antibiotic dose and strength after eye surgery. Dr instructed patient to take one bottle of antibiotics for both wounds and eyes until finished. Gentamycin added to wound care orders.   3/29/19:  Wound slowly improving. No changes in wound care orders. Wound debrided per Dr Gutierrez.  4/4/19: Patient showed up to clinic today stated " I need my dressing changed. It fell off."  Doppler pluses checked and present.  Patient refusing care to right, no stocking present on leg at this visit. Patient stated " No they said this leg is discharged, Dr. Lantigua has to drain the fluid out this leg with a needle. No wound care to this leg anymore when I come."      9/9/19: Readmit to wound care clinic for venous ulcer to left lateral foot.  Patient states it reopened about 3 weeks after last discharge " "from wound care clinic 7/1/19. Per patient wound has been treated by PCP.  Patient reports just using a large band- aid and compression sock. Patient states he was recently treated at [facility] for HBOT for this wound " in the last two or three months after it reopened "  Requesting HBOT here at the wound care clinic.  Dr. Gutierrez examined patient today, doppler pulses present, wound debrided with curette by Dr. Gutierrez patient tolerated well. Wound care orders given to apply hydrorefa blue ready to wound, cover with mepore dressing and apply patient's compression sock. Dr. Manuel discussed with patient obtaining his medical records from [facility], to view to determine if patient will benefit from HBOT, patient verbalized understanding.  Authorization for release of health information sheet signed by patient and faxed to [facility] today in clinic. Follow up in 1 week with MD.  9/23/19: F/U with Dr. Gutierrez. Client recently discharged from hospital. Left foot site debrided. Client being followed by [facility].  12/9/19: Patient seen today in clinic by Dr. Gutierrez, no change to wound. Pt reports recently discharged from hospital 12/3/19 for irregular heat rate and chest pains. Debridement per Dr. Gutierrez tolerated well, wound care continued as ordered 4 layer compression toe to knee LLE.   F/u with Dr. Gutierrez , wound progressing well. Compression changed to coflex with calamine due to itching.  12/27/19:Dr Gutierrez assessed patient. Wound improving. Change in dressing change frequency. Follow up in 2 weeks.    1/10/2020: Patient seen today in clinic by  wound slowly improving. Calamine coflex toe to knee applied today in clinic. Fu 2 weeks.  2/10/2020: F/U WITH  today , pt had not been here in 10 days with dressing still in place. New wounds noted to right lower leg. Pt to have  u/s tomorrow of bilateral lower extremeties and to see  on Wednesday 2/12/2020 for possible " "procedure of right leg per patient. Pt to return on Thursday to clinic for dressing change and to see DR. Sury Vaca (podiatrist) for toe nail clipping and to evaluate toes on right foot.  As above; no c/o pain noted.  02/28/2020: F/u with Dr. Gutierrez. Wounds to RLE open to air. Patient states bandage came off while he was sleeping. Wound to left foot decreasing in size. Left foot wound debrided today in clinic. Continue with current wound care treatment and follow up in  Clinic in 1 week    3/13/20: F/U with Dr. Gutierrez.  Wounds improving.  Will increase compression to LLE.  4/3/2020: Fu with  today in clinic. LLE wound measuring bigger.  RLE improving. Patient complains of pain, tenderness,swelling  x 2 weeks to RLE.  Patient states he went to the ER 2 weeks ago " and they kicked me out because of the Coronavirus." Patient also reports to Dr. Gutierrez " night sweats for 2 weeks." Temp today on arrival to wound care clinic 99.1 oral. LLE debrided per  patient tolerated well, wound care 3 layer compression toe to knee as ordered.   Ultra sound ordered of RLE stat today in clinic to rule out DVT.  MD rewieved results, results negative discussed with patient.  RX given to patient  for Doxycycline Po.  Patient to follow up 4/6/2020 with .  4/13/2020; Fu with Dr. Gutierrez. Patient states RLE pain is much better since starting oral Doxycycline.  RLE wound improving. LLE wound remains unchanged. LLE wound debrided per  patient tolerated well. Continued with 3 layer compression to LLE and tubi  G x 2 to RLE. RTC 4/27/2020.  4/27/20: F/U with Dr. Gutierrez. RLE resolved. Continue Tubigrip G x 2 toes to knee. Left foot site debrided per Dr. Gutierrez. Santyl, adaptic touch, and hydrofera blue ready ordered. Return in 2 weeks.  5/18/20: Wound slowly improving. Dr Gutierrez debrided wound which was toleraled well. Continuing with same plan of care.  06/01/2020- follow-up with " "Dr. Cortez. Wound debrided, patient tolerated. Gentamicin added to wound care regimen. Follow-up with Dr. cortez in 2 weeks 06/15/2020.  6/15/2020: F/U with Dr. Cortez.  Wound improving.  No new complaints.    6/29/2020: F/U with Dr. Cortez. Wound continues to slowly improve.  No c/o noted.  08/03/2020:  F/u with Dr. Cortez. No new complaints per patient. Wound remains stable. Wound debrided today in clinic. Continue with compression and topical treatment. Follow up in 2 weeks at wound care clinic  8/17/20: F/U with Dr. Cortez. Tissue status improving. Wound debrided per Dr. Cortez. Cont. POC. Next visit 8/31/20.  8/31/2020: fu with Dr. Cortez. Wound debrided per  patient tolerated well. Patient states he is scheduled to have laser vein surgery on 9/2/2020 at the [facility] in [place] and 9/3/2020 in [place]. Patient will follow up with Dr. Cortez 9/4/2020. Patient refused 3 layer compression toes to knee today. Stated " I only want the dressing with a large bandage and the white net so I can have my surgery on Wednesday. Applied santyl, gentamicin to wound, adaptic, hydrofera ready and island border secured with flexi net.   9/28/2020: Patient seen today in clinic by . Left foot wound digressing. Blister to left leg, patient states " the wrap caused it." Patient tolerated debridement and culture well per . Continued with current treatment plan 3 layer compression toes to knee LLE. Fu 1 week 10/5/2020.    10/5/2020: Fu with Dr. Quiros. No changes to wound measurements left foot. Left leg blisters healed.  discussed culture results from last visit 9/28/2020 with patient. Wound culture and debrided patient tolerated well. Rx sent to pharmacy for Vantin Po, changed topical treatment to mupirocin po. Patient to fu with ID and  on 10/12/2020.  Continued with 3-layer compression toes to knee LLE, fu 10/12/2020. "   10/12/2020: Patient seen today in clinic by  and  with ID. Patient taking po Cipro Po. Oral Vantin was stopped last week due to causing patient chest pains immediately after taking it.  ER visit revealed that pt did not take all of his cardiac meds that day.  added Levaquin po for patient to start taking once finished Cipro PO, pt verbalized understanding. Dr. Quiros assessed patient, debrided wound, minimal pain per patient with debridement. Continued with current treatment 3 layer compression toes to knee. Per ID ok to continue using Mupirocin to wound bed. Fu 2 weeks 10/26/2020 with .  Of note, patient was very upset to be assigned a nurse new to him and requested a different nurse.      Review of Systems    All systems were reviewed and are negative, except that mentioned in the HPI.    Objective:      Physical Exam  Constitutional:       Appearance: He is well-developed.   HENT:      Head: Normocephalic and atraumatic.   Eyes:      Pupils: Pupils are equal, round, and reactive to light.   Neck:      Musculoskeletal: Normal range of motion.   Cardiovascular:      Rate and Rhythm: Normal rate.   Pulmonary:      Effort: Pulmonary effort is normal. No respiratory distress.      Breath sounds: No stridor.   Abdominal:      General: There is no distension.   Skin:     Comments: See Synopsis for wound details   Neurological:      Mental Status: He is alert and oriented to person, place, and time.         Assessment:       No diagnosis found.       Wound 09/09/19 1057 Venous Ulcer lateral Foot #1 (Active)   09/09/19 1057    Pre-existing: Yes   Primary Wound Type: Venous ulcer   Side: Left   Orientation: lateral   Location: Foot   Wound Number (optional): #1   Ankle-Brachial Index:    Pulses:    Removal Indication and Assessment:    Wound Outcome:    (Retired) Wound Type:    (Retired) Wound Length (cm):    (Retired) Wound Width (cm):    (Retired) Depth (cm):    Wound  Description (Comments):    Removal Indications:    Wound Image     10/12/20 1400   Dressing Appearance Intact;Moist drainage 10/12/20 1400   Drainage Amount Moderate 10/12/20 1400   Drainage Characteristics/Odor Yellow 10/12/20 1400   Appearance Pink;Yellow;Moist 10/12/20 1400   Tissue loss description Full thickness 10/12/20 1400   Red (%), Wound Tissue Color 40 % 10/12/20 1400   Yellow (%), Wound Tissue Color 60 % 10/12/20 1400   Periwound Area Pink;Edematous;Redness;Other (see comments) 10/12/20 1400   Wound Edges Irregular 10/12/20 1400   Wound Length (cm) 2 cm 10/12/20 1400   Wound Width (cm) 6.9 cm 10/12/20 1400   Wound Depth (cm) 0.2 cm 10/12/20 1400   Wound Volume (cm^3) 2.76 cm^3 10/12/20 1400   Wound Surface Area (cm^2) 13.8 cm^2 10/12/20 1400   Care Cleansed with:;Sterile normal saline;Debrided 10/12/20 1400   Dressing Applied;Hydrofiber;Foam;Cast padding;Compression wrap 10/12/20 1400   Periwound Care Topical treatment applied 10/12/20 1400   Compression Three layer compression 10/12/20 1400   Off Loading Off loading shoe 10/12/20 1400   Dressing Change Due 10/16/20 10/12/20 1400       Left lateral Foot #1  Cleanse wound with: Normal saline.   Lidocaine: Prn Debridement   Periwound care: Sween to dry skin Prn, betamethasone and Calmoseptine to vick wound and dorsal foot rash.   Primary dressing: Mupirocin,  Hydrofera blue ready to wound   Secondary dressing: Aquacel foam, Profore lite toes to knee   Offloading: Darco shoe left foot   Edema control: 3-layer Profore Lite compression.  Avoid prolonged standing in one place.  Elevate leg(s) as much as possible.   Frequency:  Mondays and Fridays   Follow-up: in   with Dr. Quiros and ID 10/26/2020   RX sent to pharmacy for Levaquin Po. Continue taking Cipro Po and Levaquin PO.    Home health: [facility] to do wound care on Mondays (when not in clinic), Fridays, and PRN complications.    Plan:   Patient will be having a venous procedure on the RLE in  Nov and the 4th and final venous procedure on the LLE in Dec.  Pt prefers f/u 10/26/2020 .

## 2020-10-12 NOTE — PROGRESS NOTES
Doing well. Consulted for wound culture and cellulitis management.  Discussed with Dr. Quiros.   Wound culture growing P.aeruginosa and Porphyromonas species. Currently on Cipro, but taking medication with milk.    Wound on lateral left foot has edema, induration, tenderness btu good granulation tissue. No drainage.  Physical Exam  Vitals signs and nursing note reviewed.   Constitutional:       Appearance: Normal appearance.   HENT:      Head: Normocephalic and atraumatic.   Eyes:      Extraocular Movements: Extraocular movements intact.      Conjunctiva/sclera: Conjunctivae normal.      Pupils: Pupils are equal, round, and reactive to light.   Cardiovascular:      Rate and Rhythm: Normal rate and regular rhythm.   Pulmonary:      Effort: Pulmonary effort is normal.   Musculoskeletal:      Right lower leg: Edema present.      Left lower leg: Edema present.   Skin:     General: Skin is warm.      Findings: Erythema present.   Neurological:      Mental Status: He is alert.         Discussed  Cipro from calcium containing beverages and foods by at least an hour. He expressed understanding and agreement.   Will finish current Cipro Rx and then change to Levaquin for expanded coverage.   Continue elevation, edema control, and wound care.     Follow up in wound care clinic in 2 weeks.     1. Ulcer of ankle, left, limited to breakdown of skin     2. Bilateral edema of lower extremity     3. Cellulitis of right lower leg     4. Venous stasis ulcer of lower extremity, unspecified laterality     5. Non-pressure chronic ulcer of left ankle, with unspecified severity     6. Pseudomonas infection     7. MRSA cellulitis of left foot

## 2020-10-13 NOTE — PROCEDURES
"Debridement    Date/Time: 10/12/2020 12:40 PM  Performed by: Kristine Quiros DO  Authorized by: Kristine Quiros DO     Time out: Immediately prior to procedure a "time out" was called to verify the correct patient, procedure, equipment, support staff and site/side marked as required.    Consent Done?:  Yes (Written)  Local anesthesia used?: Yes    Local anesthetic:  Topical anesthetic    Debridement - 1st Wound - General Location: Left foot.    Type of Debridement:  Excisional       Length (cm):  2       Area (sq cm):  13.8       Width (cm):  6.9       Percent Debrided (%):  100       Depth (cm):  0.2       Total Area Debrided (sq cm):  13.8    Depth of debridement:  Subcutaneous tissue    Tissue debrided:  Subcutaneous    Devitalized tissue debrided:  Slough, Fibrin and Biofilm    Instruments:  Curette    Bleeding:  Minimal  Hemostasis Achieved: Yes    Method Used:  Pressure  Patient tolerance:  Patient tolerated the procedure well with no immediate complications      "

## 2020-10-15 ENCOUNTER — LAB VISIT (OUTPATIENT)
Dept: LAB | Facility: HOSPITAL | Age: 55
End: 2020-10-15
Attending: SPECIALIST
Payer: MEDICARE

## 2020-10-15 ENCOUNTER — OFFICE VISIT (OUTPATIENT)
Dept: FAMILY MEDICINE | Facility: HOSPITAL | Age: 55
End: 2020-10-15
Attending: SPECIALIST
Payer: MEDICARE

## 2020-10-15 VITALS — HEART RATE: 86 BPM | DIASTOLIC BLOOD PRESSURE: 66 MMHG | SYSTOLIC BLOOD PRESSURE: 117 MMHG

## 2020-10-15 DIAGNOSIS — Z86.39 HISTORY OF GRAVES' DISEASE: Primary | ICD-10-CM

## 2020-10-15 DIAGNOSIS — Z86.39 HISTORY OF GRAVES' DISEASE: ICD-10-CM

## 2020-10-15 DIAGNOSIS — E05.90 HYPERTHYROIDISM: ICD-10-CM

## 2020-10-15 LAB
T4 FREE SERPL-MCNC: 0.96 NG/DL (ref 0.71–1.51)
TSH SERPL DL<=0.005 MIU/L-ACNC: 0.01 UIU/ML (ref 0.4–4)

## 2020-10-15 PROCEDURE — 36415 COLL VENOUS BLD VENIPUNCTURE: CPT

## 2020-10-15 PROCEDURE — 84439 ASSAY OF FREE THYROXINE: CPT

## 2020-10-15 PROCEDURE — 99214 OFFICE O/P EST MOD 30 MIN: CPT | Performed by: STUDENT IN AN ORGANIZED HEALTH CARE EDUCATION/TRAINING PROGRAM

## 2020-10-15 PROCEDURE — 84443 ASSAY THYROID STIM HORMONE: CPT

## 2020-10-16 RX ORDER — METHIMAZOLE 10 MG/1
20 TABLET ORAL DAILY
Qty: 60 TABLET | Refills: 12 | Status: SHIPPED | OUTPATIENT
Start: 2020-10-16 | End: 2020-10-16 | Stop reason: SDUPTHER

## 2020-10-16 RX ORDER — METHIMAZOLE 10 MG/1
20 TABLET ORAL DAILY
Qty: 60 TABLET | Refills: 12 | Status: SHIPPED | OUTPATIENT
Start: 2020-10-16 | End: 2022-01-01

## 2020-10-16 NOTE — PROGRESS NOTES
Subjective:       Patient ID: Drew Farrar is a 54 y.o. male.    Chief Complaint: Graves dx Mgmt     HPI     54-year-old male last medical history of chronic venous insufficiency and  graves disease, who presents to clinic following recent emergency room visit.  Patient reports that he had a reaction to the 3rd generation cephalosporin following menstruation at wound care and presented to the ER thereafter.  Patient found to be in atrial fibrillation rate controlled and then told to follow-up in clinic.  Patient recently restarted his methimazole and thyroid biochemical tests have been stable since then.  Patient has no new complaints in clinic today.    Review of Systems   Constitutional: Negative for activity change and appetite change.   HENT: Negative for trouble swallowing.    Eyes: Negative for visual disturbance.   Respiratory: Negative for shortness of breath.    Cardiovascular: Negative for chest pain and palpitations.   Gastrointestinal: Negative for abdominal distention, diarrhea, nausea and vomiting.   Endocrine: Negative for cold intolerance and heat intolerance.   Genitourinary: Negative for flank pain.   Musculoskeletal: Negative for arthralgias.   Skin: Negative for color change.   Allergic/Immunologic: Negative for immunocompromised state.   Neurological: Negative for headaches.   Hematological: Negative for adenopathy.   Psychiatric/Behavioral: Negative for agitation.         Objective:      Vitals:    10/15/20 1055   BP: 117/66   Pulse: 86     Physical Exam  Constitutional:       Appearance: He is well-developed.   HENT:      Head: Normocephalic and atraumatic.   Eyes:      Pupils: Pupils are equal, round, and reactive to light.   Neck:      Musculoskeletal: Normal range of motion and neck supple.   Cardiovascular:      Rate and Rhythm: Normal rate and regular rhythm.      Heart sounds: Normal heart sounds. No murmur. No friction rub. No gallop.    Pulmonary:      Effort: Pulmonary effort is  normal.      Breath sounds: Normal breath sounds. No wheezing or rales.   Chest:      Chest wall: No tenderness.   Abdominal:      General: Bowel sounds are normal. There is no distension.      Palpations: Abdomen is soft.      Tenderness: There is no abdominal tenderness. There is no guarding.   Musculoskeletal: Normal range of motion.      Right lower leg: Edema present.      Left lower leg: Edema present.      Comments: Lymphedema in LE b/l. Chronic LE wounds more so on the right.    Skin:     General: Skin is warm and dry.      Capillary Refill: Capillary refill takes less than 2 seconds.   Neurological:      Mental Status: He is alert and oriented to person, place, and time.         Assessment:       1. History of Graves' disease    2. Hyperthyroidism        Plan:       History of Graves' disease  -     TSH; Future; Expected date: 10/15/2020  -     T4, Free; Future; Expected date: 10/15/2020    Hyperthyroidism  -     methIMAzole (TAPAZOLE) 10 MG Tab; Take 2 tablets (20 mg total) by mouth once daily.  Dispense: 60 tablet; Refill: 12      Return to clinic in 3 months     Vladimir Su MD   [employer] [facility], PGY-3

## 2020-10-20 ENCOUNTER — PATIENT MESSAGE (OUTPATIENT)
Dept: FAMILY MEDICINE | Facility: HOSPITAL | Age: 55
End: 2020-10-20

## 2020-10-26 ENCOUNTER — HOSPITAL ENCOUNTER (OUTPATIENT)
Dept: WOUND CARE | Facility: HOSPITAL | Age: 55
Discharge: HOME | End: 2020-10-26
Attending: SURGERY
Payer: MEDICARE

## 2020-10-26 VITALS
SYSTOLIC BLOOD PRESSURE: 116 MMHG | BODY MASS INDEX: 36.45 KG/M2 | TEMPERATURE: 97 F | HEART RATE: 101 BPM | DIASTOLIC BLOOD PRESSURE: 76 MMHG | HEIGHT: 78 IN | WEIGHT: 315 LBS

## 2020-10-26 DIAGNOSIS — L97.421 VENOUS STASIS ULCER OF LEFT MIDFOOT LIMITED TO BREAKDOWN OF SKIN, UNSPECIFIED WHETHER VARICOSE VEINS PRESENT: ICD-10-CM

## 2020-10-26 DIAGNOSIS — L03.90 CELLULITIS, UNSPECIFIED CELLULITIS SITE: ICD-10-CM

## 2020-10-26 DIAGNOSIS — I87.2 VENOUS STASIS DERMATITIS OF BOTH LOWER EXTREMITIES: Primary | ICD-10-CM

## 2020-10-26 DIAGNOSIS — S91.302A OPEN WOUND OF LEFT FOOT, INITIAL ENCOUNTER: ICD-10-CM

## 2020-10-26 DIAGNOSIS — E66.01 MORBID OBESITY: ICD-10-CM

## 2020-10-26 DIAGNOSIS — I87.2 CHRONIC VENOUS INSUFFICIENCY: ICD-10-CM

## 2020-10-26 DIAGNOSIS — I83.024 VENOUS STASIS ULCER OF LEFT MIDFOOT LIMITED TO BREAKDOWN OF SKIN, UNSPECIFIED WHETHER VARICOSE VEINS PRESENT: ICD-10-CM

## 2020-10-26 PROCEDURE — 11042 DBRDMT SUBQ TIS 1ST 20SQCM/<: CPT

## 2020-10-26 PROCEDURE — 29581 APPL MULTLAYER CMPRN SYS LEG: CPT

## 2020-10-26 PROCEDURE — 27201912 HC WOUND CARE DEBRIDEMENT SUPPLIES

## 2020-10-26 NOTE — PROCEDURES
"Debridement    Date/Time: 10/26/2020 4:31 PM  Performed by: Kristine Quiros DO  Authorized by: Kristine Quiros DO     Time out: Immediately prior to procedure a "time out" was called to verify the correct patient, procedure, equipment, support staff and site/side marked as required.    Consent Done?:  Yes (Written)  Local anesthesia used?: Yes    Local anesthetic:  Topical anesthetic    Debridement - 1st Wound - General Location: left lateral foot.    Type of Debridement:  Excisional       Length (cm):  1.9       Area (sq cm):  13.3       Width (cm):  7       Percent Debrided (%):  100       Depth (cm):  0.2       Total Area Debrided (sq cm):  13.3    Depth of debridement:  Subcutaneous tissue    Tissue debrided:  Subcutaneous    Devitalized tissue debrided:  Slough, Fibrin and Exudate    Instruments:  Curette    Bleeding:  Minimal  Method Used:  Pressure  Patient tolerance:  Patient tolerated the procedure well with no immediate complications      "

## 2020-10-26 NOTE — PROGRESS NOTES
"Subjective:       Patient ID: Drew Farrar is a 55 y.o. male.    Chief Complaint: Venous Stasis    2/15/19: Readmitted for venous ulcer to left lateral foot which developed about 2 weeks ago. Pulses noted with doppler and capillary refill <3.Dr Gutierrez assessed patient and wound care plan ordered for compression therapy and hydrafera blue to wound bed. No apparent signs of infection noted. Patient to follw-up in clinic in 2 weeks.DB   2/21/19: Nurse visit for dressing change. Refused care to skin tear on left 2nd toe. See notes. RTC 1 week for DrRosy Visit.  3/1/19: Follow up with Dr. Waller today in clinic new wound to right posterior leg, culture of both wounds taken today in clinic new wound care orders to both legs for xeroform and unna with calamine toe to knee follow up in 1 week with Dr. Gutierrez  3/8/19: Follow up with Dr. Gutierrez today in clinic wounds improving, edema noted to BLE, profore toe to knee applied to both legs, RX given to patient for PO Doxycycline, follow up in 1 week.   3/15/19: Here for f/u with Dr. Gutierrez. U/S scheduled Thursday. Will need right leg rewrapped. Patient states eye DrRosy Gave him the same antibiotic dose and strength after eye surgery. Dr instructed patient to take one bottle of antibiotics for both wounds and eyes until finished. Gentamycin added to wound care orders.   3/29/19:  Wound slowly improving. No changes in wound care orders. Wound debrided per Dr Gutierrez.  4/4/19: Patient showed up to clinic today stated " I need my dressing changed. It fell off."  Doppler pluses checked and present.  Patient refusing care to right, no stocking present on leg at this visit. Patient stated " No they said this leg is discharged, Dr. Lantigua has to drain the fluid out this leg with a needle. No wound care to this leg anymore when I come."      9/9/19: Readmit to wound care clinic for venous ulcer to left lateral foot.  Patient states it reopened about 3 weeks after last discharge " "from wound care clinic 7/1/19. Per patient wound has been treated by PCP.  Patient reports just using a large band- aid and compression sock. Patient states he was recently treated at [facility] for HBOT for this wound " in the last two or three months after it reopened "  Requesting HBOT here at the wound care clinic.  Dr. Gutierrez examined patient today, doppler pulses present, wound debrided with curette by Dr. Gutierrez patient tolerated well. Wound care orders given to apply hydrorefa blue ready to wound, cover with mepore dressing and apply patient's compression sock. Dr. Manuel discussed with patient obtaining his medical records from [facility], to view to determine if patient will benefit from HBOT, patient verbalized understanding.  Authorization for release of health information sheet signed by patient and faxed to [facility] today in clinic. Follow up in 1 week with MD.  9/23/19: F/U with Dr. Gutierrez. Client recently discharged from hospital. Left foot site debrided. Client being followed by [facility].  12/9/19: Patient seen today in clinic by Dr. Gutierrez, no change to wound. Pt reports recently discharged from hospital 12/3/19 for irregular heat rate and chest pains. Debridement per Dr. Gutierrez tolerated well, wound care continued as ordered 4 layer compression toe to knee LLE.   F/u with Dr. Gutierrez , wound progressing well. Compression changed to coflex with calamine due to itching.  12/27/19:Dr Gutierrez assessed patient. Wound improving. Change in dressing change frequency. Follow up in 2 weeks.    1/10/2020: Patient seen today in clinic by  wound slowly improving. Calamine coflex toe to knee applied today in clinic. Fu 2 weeks.  2/10/2020: F/U WITH  today , pt had not been here in 10 days with dressing still in place. New wounds noted to right lower leg. Pt to have  u/s tomorrow of bilateral lower extremeties and to see  on Wednesday 2/12/2020 for possible " "procedure of right leg per patient. Pt to return on Thursday to clinic for dressing change and to see DR. Sury Vaca (podiatrist) for toe nail clipping and to evaluate toes on right foot.  As above; no c/o pain noted.  02/28/2020: F/u with Dr. Gutierrez. Wounds to RLE open to air. Patient states bandage came off while he was sleeping. Wound to left foot decreasing in size. Left foot wound debrided today in clinic. Continue with current wound care treatment and follow up in  Clinic in 1 week    3/13/20: F/U with Dr. Gutierrez.  Wounds improving.  Will increase compression to LLE.  4/3/2020: Fu with  today in clinic. LLE wound measuring bigger.  RLE improving. Patient complains of pain, tenderness,swelling  x 2 weeks to RLE.  Patient states he went to the ER 2 weeks ago " and they kicked me out because of the Coronavirus." Patient also reports to Dr. Gutierrez " night sweats for 2 weeks." Temp today on arrival to wound care clinic 99.1 oral. LLE debrided per  patient tolerated well, wound care 3 layer compression toe to knee as ordered.   Ultra sound ordered of RLE stat today in clinic to rule out DVT.  MD rewieved results, results negative discussed with patient.  RX given to patient  for Doxycycline Po.  Patient to follow up 4/6/2020 with .  4/13/2020; Fu with Dr. Gutierrez. Patient states RLE pain is much better since starting oral Doxycycline.  RLE wound improving. LLE wound remains unchanged. LLE wound debrided per  patient tolerated well. Continued with 3 layer compression to LLE and tubi  G x 2 to RLE. RTC 4/27/2020.  4/27/20: F/U with Dr. Gutierrez. RLE resolved. Continue Tubigrip G x 2 toes to knee. Left foot site debrided per Dr. Gutierrez. Santyl, adaptic touch, and hydrofera blue ready ordered. Return in 2 weeks.  5/18/20: Wound slowly improving. Dr Gutierrez debrided wound which was toleraled well. Continuing with same plan of care.  06/01/2020- follow-up with " "Dr. Cortez. Wound debrided, patient tolerated. Gentamicin added to wound care regimen. Follow-up with Dr. cortez in 2 weeks 06/15/2020.  6/15/2020: F/U with Dr. Cortez.  Wound improving.  No new complaints.    6/29/2020: F/U with Dr. Cortez. Wound continues to slowly improve.  No c/o noted.  08/03/2020:  F/u with Dr. Cortez. No new complaints per patient. Wound remains stable. Wound debrided today in clinic. Continue with compression and topical treatment. Follow up in 2 weeks at wound care clinic  8/17/20: F/U with Dr. Cortez. Tissue status improving. Wound debrided per Dr. Cortez. Cont. POC. Next visit 8/31/20.  8/31/2020: fu with Dr. Cortez. Wound debrided per  patient tolerated well. Patient states he is scheduled to have laser vein surgery on 9/2/2020 at the [facility] in [place] and 9/3/2020 in [place]. Patient will follow up with Dr. Cortez 9/4/2020. Patient refused 3 layer compression toes to knee today. Stated " I only want the dressing with a large bandage and the white net so I can have my surgery on Wednesday. Applied santyl, gentamicin to wound, adaptic, hydrofera ready and island border secured with flexi net.   9/28/2020: Patient seen today in clinic by . Left foot wound digressing. Blister to left leg, patient states " the wrap caused it." Patient tolerated debridement and culture well per . Continued with current treatment plan 3 layer compression toes to knee LLE. Fu 1 week 10/5/2020.    10/5/2020: Fu with Dr. Quiros. No changes to wound measurements left foot. Left leg blisters healed.  discussed culture results from last visit 9/28/2020 with patient. Wound culture and debrided patient tolerated well. Rx sent to pharmacy for Vantin Po, changed topical treatment to mupirocin po. Patient to fu with ID and  on 10/12/2020.  Continued with 3-layer compression toes to knee LLE, fu 10/12/2020. " "  10/12/2020: Patient seen today in clinic by  and  with ID. Patient taking po Cipro Po. Oral Vantin was stopped last week due to causing patient chest pains immediately after taking it.  ER visit revealed that pt did not take all of his cardiac meds that day.  added Levaquin po for patient to start taking once finished Cipro PO, pt verbalized understanding. Dr. Quiros assessed patient, debrided wound, minimal pain per patient with debridement. Continued with current treatment 3 layer compression toes to knee. Per ID ok to continue using Mupirocin to wound bed. Fu 2 weeks 10/26/2020 with .  Of note, patient was very upset to be assigned a nurse new to him and requested a different nurse.  10/26/2020: Fu with . Patient states "wound is feeling better and not tender any more."  Tolerated debridement per  well. Discontinued Mupirocin ointment, restarted Santyl to wound bed with 3- Layer compression toes to knee. RX for Santyl sent to patient's  pharcamcy. Patient has 5 refills of Levaquin remaining. Patient   to call in refill of Levaquin po. Fu 1 week 11/2/2020 with Dr. Quiros.      Review of Systems    All systems were reviewed and are negative, except that mentioned in the HPI.    Objective:      Physical Exam  Constitutional:       Appearance: He is well-developed.   HENT:      Head: Normocephalic and atraumatic.   Eyes:      Pupils: Pupils are equal, round, and reactive to light.   Neck:      Musculoskeletal: Normal range of motion.   Cardiovascular:      Rate and Rhythm: Normal rate.   Pulmonary:      Effort: Pulmonary effort is normal. No respiratory distress.      Breath sounds: No stridor.   Abdominal:      General: There is no distension.   Skin:     Comments: See Synopsis for wound details   Neurological:      Mental Status: He is alert and oriented to person, place, and time.         Assessment:       1. Venous stasis dermatitis of both " lower extremities    2. Cellulitis, unspecified cellulitis site    3. Open wound of left foot, initial encounter    4. Chronic venous insufficiency    5. Venous stasis ulcer of left midfoot limited to breakdown of skin, unspecified whether varicose veins present    6. Morbid obesity           Wound 09/09/19 1057 Venous Ulcer lateral Foot #1 (Active)   09/09/19 1057    Pre-existing: Yes   Primary Wound Type: Venous ulcer   Side: Left   Orientation: lateral   Location: Foot   Wound Number (optional): #1   Ankle-Brachial Index:    Pulses:    Removal Indication and Assessment:    Wound Outcome:    (Retired) Wound Type:    (Retired) Wound Length (cm):    (Retired) Wound Width (cm):    (Retired) Depth (cm):    Wound Description (Comments):    Removal Indications:    Wound Image    10/26/20 1400   Dressing Appearance Intact;Moist drainage 10/26/20 1400   Drainage Amount Moderate 10/26/20 1400   Drainage Characteristics/Odor Serosanguineous 10/26/20 1400   Appearance Pink;Yellow;Fibrin 10/26/20 1400   Tissue loss description Full thickness 10/26/20 1400   Red (%), Wound Tissue Color 5 % 10/26/20 1400   Yellow (%), Wound Tissue Color 95 % 10/26/20 1400   Periwound Area Edematous;Sumpter;Redness 10/26/20 1400   Wound Edges Irregular 10/26/20 1400   Wound Length (cm) 1.9 cm 10/26/20 1400   Wound Width (cm) 7 cm 10/26/20 1400   Wound Depth (cm) 0.2 cm 10/26/20 1400   Wound Volume (cm^3) 2.66 cm^3 10/26/20 1400   Wound Surface Area (cm^2) 13.3 cm^2 10/26/20 1400   Care Cleansed with:;Sterile normal saline;Debrided 10/26/20 1400   Dressing Applied;Hydrofiber;Foam;Compression wrap 10/26/20 1400   Periwound Care Topical treatment applied;Moisture barrier applied;Absorptive dressing applied 10/26/20 1400   Compression Three layer compression 10/26/20 1400   Off Loading Off loading shoe 10/26/20 1400   Dressing Change Due 10/30/20 10/26/20 1400       Left lateral Foot #1   Cleanse wound with: Normal saline.   Lidocaine: Prn Debridement    Periwound care: Sween to dry skin Prn, betamethasone and Calmoseptine to vick wound and dorsal foot rash.   Primary dressing: Santyl,  Hydrofera blue ready to wound   Secondary dressing: Aquacel foam, Profore lite toes to knee   Offloading: Darco shoe left foot   Edema control: 3-layer Profore Lite compression.  Avoid prolonged standing in one place.  Elevate leg(s) as much as possible.   Frequency:  Mondays and Fridays   Follow-up: With Dr. Quiros 11/2/2020  Continue Levaquin Po. RX sent to patient's pharmacy for Santyl      Home health: [facility] to do wound care on Mondays (when not in clinic), Fridays, and PRN complications.    Plan:   Pt reports pain improved on Levaquin. Santyl restarted due to thick fibrin overlying entire wound.  Wound care as above. F/F 11/2/2020 w/

## 2020-11-09 ENCOUNTER — HOSPITAL ENCOUNTER (OUTPATIENT)
Dept: WOUND CARE | Facility: HOSPITAL | Age: 55
Discharge: HOME | End: 2020-11-09
Attending: SURGERY
Payer: MEDICARE

## 2020-11-09 VITALS
SYSTOLIC BLOOD PRESSURE: 121 MMHG | BODY MASS INDEX: 36.45 KG/M2 | DIASTOLIC BLOOD PRESSURE: 61 MMHG | TEMPERATURE: 98 F | HEIGHT: 78 IN | WEIGHT: 315 LBS | HEART RATE: 94 BPM

## 2020-11-09 DIAGNOSIS — L03.90 CELLULITIS, UNSPECIFIED CELLULITIS SITE: ICD-10-CM

## 2020-11-09 DIAGNOSIS — I87.2 VENOUS STASIS DERMATITIS OF BOTH LOWER EXTREMITIES: Primary | ICD-10-CM

## 2020-11-09 DIAGNOSIS — S91.302A OPEN WOUND OF LEFT FOOT, INITIAL ENCOUNTER: ICD-10-CM

## 2020-11-09 DIAGNOSIS — I87.2 CHRONIC VENOUS INSUFFICIENCY: ICD-10-CM

## 2020-11-09 DIAGNOSIS — E66.01 MORBID OBESITY: ICD-10-CM

## 2020-11-09 PROCEDURE — 11042 DBRDMT SUBQ TIS 1ST 20SQCM/<: CPT

## 2020-11-09 PROCEDURE — 27201912 HC WOUND CARE DEBRIDEMENT SUPPLIES

## 2020-11-09 NOTE — PROGRESS NOTES
"Subjective:       Patient ID: Drew Farrar is a 55 y.o. male.    Chief Complaint: Venous Ulcer    2/15/19: Readmitted for venous ulcer to left lateral foot which developed about 2 weeks ago. Pulses noted with doppler and capillary refill <3.Dr Gutierrez assessed patient and wound care plan ordered for compression therapy and hydrafera blue to wound bed. No apparent signs of infection noted. Patient to follw-up in clinic in 2 weeks.DB   2/21/19: Nurse visit for dressing change. Refused care to skin tear on left 2nd toe. See notes. RTC 1 week for DrRosy Visit.  3/1/19: Follow up with Dr. Waller today in clinic new wound to right posterior leg, culture of both wounds taken today in clinic new wound care orders to both legs for xeroform and unna with calamine toe to knee follow up in 1 week with Dr. Gutierrez  3/8/19: Follow up with Dr. Gutierrez today in clinic wounds improving, edema noted to BLE, profore toe to knee applied to both legs, RX given to patient for PO Doxycycline, follow up in 1 week.   3/15/19: Here for f/u with Dr. Gutierrez. U/S scheduled Thursday. Will need right leg rewrapped. Patient states eye DrRosy Gave him the same antibiotic dose and strength after eye surgery. Dr instructed patient to take one bottle of antibiotics for both wounds and eyes until finished. Gentamycin added to wound care orders.   3/29/19:  Wound slowly improving. No changes in wound care orders. Wound debrided per Dr Gutierrez.  4/4/19: Patient showed up to clinic today stated " I need my dressing changed. It fell off."  Doppler pluses checked and present.  Patient refusing care to right, no stocking present on leg at this visit. Patient stated " No they said this leg is discharged, Dr. Lantigua has to drain the fluid out this leg with a needle. No wound care to this leg anymore when I come."      9/9/19: Readmit to wound care clinic for venous ulcer to left lateral foot.  Patient states it reopened about 3 weeks after last discharge " "from wound care clinic 7/1/19. Per patient wound has been treated by PCP.  Patient reports just using a large band- aid and compression sock. Patient states he was recently treated at [facility] for HBOT for this wound " in the last two or three months after it reopened "  Requesting HBOT here at the wound care clinic.  Dr. Gutierrez examined patient today, doppler pulses present, wound debrided with curette by Dr. Gutierrez patient tolerated well. Wound care orders given to apply hydrorefa blue ready to wound, cover with mepore dressing and apply patient's compression sock. Dr. Manuel discussed with patient obtaining his medical records from [facility], to view to determine if patient will benefit from HBOT, patient verbalized understanding.  Authorization for release of health information sheet signed by patient and faxed to [facility] today in clinic. Follow up in 1 week with MD.  9/23/19: F/U with Dr. Gutierrez. Client recently discharged from hospital. Left foot site debrided. Client being followed by [facility].  12/9/19: Patient seen today in clinic by Dr. Gutierrez, no change to wound. Pt reports recently discharged from hospital 12/3/19 for irregular heat rate and chest pains. Debridement per Dr. Gutierrez tolerated well, wound care continued as ordered 4 layer compression toe to knee LLE.   F/u with Dr. Gutierrez , wound progressing well. Compression changed to coflex with calamine due to itching.  12/27/19:Dr Gutierrez assessed patient. Wound improving. Change in dressing change frequency. Follow up in 2 weeks.    1/10/2020: Patient seen today in clinic by  wound slowly improving. Calamine coflex toe to knee applied today in clinic. Fu 2 weeks.  2/10/2020: F/U WITH  today , pt had not been here in 10 days with dressing still in place. New wounds noted to right lower leg. Pt to have  u/s tomorrow of bilateral lower extremeties and to see  on Wednesday 2/12/2020 for possible " "procedure of right leg per patient. Pt to return on Thursday to clinic for dressing change and to see DR. Sury Vaca (podiatrist) for toe nail clipping and to evaluate toes on right foot.  As above; no c/o pain noted.  02/28/2020: F/u with Dr. Gutierrez. Wounds to RLE open to air. Patient states bandage came off while he was sleeping. Wound to left foot decreasing in size. Left foot wound debrided today in clinic. Continue with current wound care treatment and follow up in  Clinic in 1 week    3/13/20: F/U with Dr. Gutierrez.  Wounds improving.  Will increase compression to LLE.  4/3/2020: Fu with  today in clinic. LLE wound measuring bigger.  RLE improving. Patient complains of pain, tenderness,swelling  x 2 weeks to RLE.  Patient states he went to the ER 2 weeks ago " and they kicked me out because of the Coronavirus." Patient also reports to Dr. Gutierrez " night sweats for 2 weeks." Temp today on arrival to wound care clinic 99.1 oral. LLE debrided per  patient tolerated well, wound care 3 layer compression toe to knee as ordered.   Ultra sound ordered of RLE stat today in clinic to rule out DVT.  MD rewieved results, results negative discussed with patient.  RX given to patient  for Doxycycline Po.  Patient to follow up 4/6/2020 with .  4/13/2020; Fu with Dr. Gutierrez. Patient states RLE pain is much better since starting oral Doxycycline.  RLE wound improving. LLE wound remains unchanged. LLE wound debrided per  patient tolerated well. Continued with 3 layer compression to LLE and tubi  G x 2 to RLE. RTC 4/27/2020.  4/27/20: F/U with Dr. Gutierrez. RLE resolved. Continue Tubigrip G x 2 toes to knee. Left foot site debrided per Dr. Gutierrez. Santyl, adaptic touch, and hydrofera blue ready ordered. Return in 2 weeks.  5/18/20: Wound slowly improving. Dr Gutierrez debrided wound which was toleraled well. Continuing with same plan of care.  06/01/2020- follow-up with " "Dr. Cortez. Wound debrided, patient tolerated. Gentamicin added to wound care regimen. Follow-up with Dr. cortez in 2 weeks 06/15/2020.  6/15/2020: F/U with Dr. Cortez.  Wound improving.  No new complaints.    6/29/2020: F/U with Dr. Cortez. Wound continues to slowly improve.  No c/o noted.  08/03/2020:  F/u with Dr. Cortez. No new complaints per patient. Wound remains stable. Wound debrided today in clinic. Continue with compression and topical treatment. Follow up in 2 weeks at wound care clinic  8/17/20: F/U with Dr. Cortez. Tissue status improving. Wound debrided per Dr. Cortez. Cont. POC. Next visit 8/31/20.  8/31/2020: fu with Dr. Cortez. Wound debrided per  patient tolerated well. Patient states he is scheduled to have laser vein surgery on 9/2/2020 at the [facility] in [place] and 9/3/2020 in [place]. Patient will follow up with Dr. Cortez 9/4/2020. Patient refused 3 layer compression toes to knee today. Stated " I only want the dressing with a large bandage and the white net so I can have my surgery on Wednesday. Applied santyl, gentamicin to wound, adaptic, hydrofera ready and island border secured with flexi net.   9/28/2020: Patient seen today in clinic by . Left foot wound digressing. Blister to left leg, patient states " the wrap caused it." Patient tolerated debridement and culture well per . Continued with current treatment plan 3 layer compression toes to knee LLE. Fu 1 week 10/5/2020.    10/5/2020: Fu with Dr. Quiros. No changes to wound measurements left foot. Left leg blisters healed.  discussed culture results from last visit 9/28/2020 with patient. Wound culture and debrided patient tolerated well. Rx sent to pharmacy for Vantin Po, changed topical treatment to mupirocin po. Patient to fu with ID and  on 10/12/2020.  Continued with 3-layer compression toes to knee LLE, fu 10/12/2020. " "  10/12/2020: Patient seen today in clinic by  and  with ID. Patient taking po Cipro Po. Oral Vantin was stopped last week due to causing patient chest pains immediately after taking it.  ER visit revealed that pt did not take all of his cardiac meds that day.  added Levaquin po for patient to start taking once finished Cipro PO, pt verbalized understanding. Dr. Quiros assessed patient, debrided wound, minimal pain per patient with debridement. Continued with current treatment 3 layer compression toes to knee. Per ID ok to continue using Mupirocin to wound bed. Fu 2 weeks 10/26/2020 with .  Of note, patient was very upset to be assigned a nurse new to him and requested a different nurse.  10/26/2020: Fu with . Patient states "wound is feeling better and not tender any more."  Tolerated debridement per  well. Discontinued Mupirocin ointment, restarted Santyl to wound bed with 3- Layer compression toes to knee. RX for Santyl sent to patient's  pharcamcy. Patient has 5 refills of Levaquin remaining. Patient   to call in refill of Levaquin po. Fu 1 week 11/2/2020 with Dr. Quiros.  11/9/2020: Fu with . Wound measuring slightly bigger. Tolerated debridement well per . New wound care orders initiated  per MD, changed to 4 layer compression toes to knee LLE. Dr. Quiros encouraged patient again to follow up in clinic weekly for debridements to prevent heavy build up of slough to wound bed, verbalized understanding. Fu 1 week 11/16/2020 with .       Review of Systems    All systems were reviewed and are negative, except that mentioned in the HPI.    Objective:      Physical Exam  Constitutional:       Appearance: He is well-developed.   HENT:      Head: Normocephalic and atraumatic.   Eyes:      Pupils: Pupils are equal, round, and reactive to light.   Neck:      Musculoskeletal: Normal range of motion.   Cardiovascular: "      Rate and Rhythm: Normal rate.   Pulmonary:      Effort: Pulmonary effort is normal. No respiratory distress.      Breath sounds: No stridor.   Abdominal:      General: There is no distension.   Skin:     Comments: See Synopsis for wound details   Neurological:      Mental Status: He is alert and oriented to person, place, and time.         Assessment:       No diagnosis found.       Wound 09/09/19 1057 Venous Ulcer lateral Foot #1 (Active)   09/09/19 1057    Pre-existing: Yes   Primary Wound Type: Venous ulcer   Side: Left   Orientation: lateral   Location: Foot   Wound Number (optional): #1   Ankle-Brachial Index:    Pulses:    Removal Indication and Assessment:    Wound Outcome:    (Retired) Wound Type:    (Retired) Wound Length (cm):    (Retired) Wound Width (cm):    (Retired) Depth (cm):    Wound Description (Comments):    Removal Indications:    Wound Image   11/09/20 1100   Dressing Appearance Intact;Moist drainage 11/09/20 1100   Drainage Amount Moderate 11/09/20 1100   Drainage Characteristics/Odor Serosanguineous 11/09/20 1100   Appearance Pink;Yellow;Moist;Slough 11/09/20 1100   Tissue loss description Full thickness 11/09/20 1100   Red (%), Wound Tissue Color 5 % 11/09/20 1100   Yellow (%), Wound Tissue Color 95 % 11/09/20 1100   Periwound Area Intact;Redness;Edematous 11/09/20 1100   Wound Edges Irregular 11/09/20 1100   Wound Length (cm) 2.2 cm 11/09/20 1100   Wound Width (cm) 7.3 cm 11/09/20 1100   Wound Depth (cm) 0.2 cm 11/09/20 1100   Wound Volume (cm^3) 3.21 cm^3 11/09/20 1100   Wound Surface Area (cm^2) 16.06 cm^2 11/09/20 1100   Care Cleansed with:;Antimicrobial agent;Sterile normal saline;Other (see comments);Debrided 11/09/20 1100   Dressing Applied;Foam;Cast padding;Other (see comments);Calcium alginate 11/09/20 1100   Periwound Care Topical treatment applied;Moisture barrier applied;Absorptive dressing applied 11/09/20 1100   Compression Three layer compression 11/09/20 1100   Off  Loading Off loading shoe 11/09/20 1100   Dressing Change Due 11/13/20 11/09/20 1100       Left lateral Foot #1   Cleanse wound with: Normal saline.   Lidocaine: Prn Debridement   Periwound care: Sween to dry skin Prn, betamethasone and Calmoseptine to vick wound and dorsal foot rash.   Primary dressing: Santyl,  Calcium Alginate  Secondary dressing: Aquacel foam, Profore  toes to knee   Offloading: Darco shoe left foot   Edema control: LLE  Profore compression toes to knee.  Avoid prolonged standing in one place.  Elevate leg(s) as much as possible.   Frequency:  Mondays and Fridays   Follow-up: With Dr. Qiuros 11/16/2020   Continue Levaquin Po.     Home health: Continue Home Health as ordered per .  Family Home health to do wound care on Mondays (when not in clinic), Fridays, and PRN complications.    Plan:   As above.  Encouraged pt again to come weekly until his wound starts to improve.  Pt due for final LLE endo-venous procedure in December.  F/u appt made on 11/16/2020 w/

## 2020-11-14 NOTE — PROCEDURES
"Debridement    Date/Time: 11/9/2020 6:22 PM  Performed by: Kristine Quiros DO  Authorized by: Kristine Quiros DO     Time out: Immediately prior to procedure a "time out" was called to verify the correct patient, procedure, equipment, support staff and site/side marked as required.    Consent Done?:  Yes (Verbal)  Local anesthesia used?: Yes    Local anesthetic:  Topical anesthetic    Debridement - 1st Wound - General Location: left lateral foot.    Type of Debridement:  Excisional       Length (cm):  2.2       Area (sq cm):  16.06       Width (cm):  7.3       Percent Debrided (%):  100       Depth (cm):  0.2       Total Area Debrided (sq cm):  16.06    Depth of debridement:  Subcutaneous tissue    Tissue debrided:  Subcutaneous    Devitalized tissue debrided:  Callus, Biofilm, Slough and Fibrin    Instruments:  Curette    Bleeding:  Minimal  Hemostasis Achieved: Yes    Method Used:  Pressure  Patient tolerance:  Patient tolerated the procedure well with no immediate complications      "

## 2020-11-16 ENCOUNTER — HOSPITAL ENCOUNTER (OUTPATIENT)
Dept: WOUND CARE | Facility: HOSPITAL | Age: 55
Discharge: HOME | End: 2020-11-16
Attending: SURGERY
Payer: MEDICARE

## 2020-11-16 VITALS
DIASTOLIC BLOOD PRESSURE: 59 MMHG | TEMPERATURE: 97 F | HEIGHT: 78 IN | SYSTOLIC BLOOD PRESSURE: 116 MMHG | BODY MASS INDEX: 36.45 KG/M2 | HEART RATE: 96 BPM | WEIGHT: 315 LBS

## 2020-11-16 DIAGNOSIS — I87.2 VENOUS STASIS DERMATITIS OF BOTH LOWER EXTREMITIES: ICD-10-CM

## 2020-11-16 DIAGNOSIS — L03.90 CELLULITIS, UNSPECIFIED CELLULITIS SITE: ICD-10-CM

## 2020-11-16 DIAGNOSIS — S91.302A OPEN WOUND OF LEFT FOOT, INITIAL ENCOUNTER: Primary | ICD-10-CM

## 2020-11-16 DIAGNOSIS — E66.01 MORBID OBESITY: ICD-10-CM

## 2020-11-16 DIAGNOSIS — I87.2 CHRONIC VENOUS INSUFFICIENCY: ICD-10-CM

## 2020-11-16 PROCEDURE — 11042 DBRDMT SUBQ TIS 1ST 20SQCM/<: CPT

## 2020-11-16 PROCEDURE — 87186 SC STD MICRODIL/AGAR DIL: CPT | Mod: 59

## 2020-11-16 PROCEDURE — 87070 CULTURE OTHR SPECIMN AEROBIC: CPT

## 2020-11-16 PROCEDURE — 87205 SMEAR GRAM STAIN: CPT

## 2020-11-16 PROCEDURE — 87077 CULTURE AEROBIC IDENTIFY: CPT

## 2020-11-16 PROCEDURE — 27201912 HC WOUND CARE DEBRIDEMENT SUPPLIES

## 2020-11-16 PROCEDURE — 29581 APPL MULTLAYER CMPRN SYS LEG: CPT

## 2020-11-16 NOTE — PROGRESS NOTES
"Subjective:       Patient ID: Drew Farrar is a 55 y.o. male.    Chief Complaint: Venous Ulcer    2/15/19: Readmitted for venous ulcer to left lateral foot which developed about 2 weeks ago. Pulses noted with doppler and capillary refill <3.Dr Gutierrez assessed patient and wound care plan ordered for compression therapy and hydrafera blue to wound bed. No apparent signs of infection noted. Patient to follw-up in clinic in 2 weeks.DB   2/21/19: Nurse visit for dressing change. Refused care to skin tear on left 2nd toe. See notes. RTC 1 week for DrRosy Visit.  3/1/19: Follow up with Dr. Waller today in clinic new wound to right posterior leg, culture of both wounds taken today in clinic new wound care orders to both legs for xeroform and unna with calamine toe to knee follow up in 1 week with Dr. Gutierrez  3/8/19: Follow up with Dr. Gutierrez today in clinic wounds improving, edema noted to BLE, profore toe to knee applied to both legs, RX given to patient for PO Doxycycline, follow up in 1 week.   3/15/19: Here for f/u with Dr. Gutierrez. U/S scheduled Thursday. Will need right leg rewrapped. Patient states eye DrRosy Gave him the same antibiotic dose and strength after eye surgery. Dr instructed patient to take one bottle of antibiotics for both wounds and eyes until finished. Gentamycin added to wound care orders.   3/29/19:  Wound slowly improving. No changes in wound care orders. Wound debrided per Dr Gutierrez.  4/4/19: Patient showed up to clinic today stated " I need my dressing changed. It fell off."  Doppler pluses checked and present.  Patient refusing care to right, no stocking present on leg at this visit. Patient stated " No they said this leg is discharged, Dr. Lantigua has to drain the fluid out this leg with a needle. No wound care to this leg anymore when I come."      9/9/19: Readmit to wound care clinic for venous ulcer to left lateral foot.  Patient states it reopened about 3 weeks after last discharge " "from wound care clinic 7/1/19. Per patient wound has been treated by PCP.  Patient reports just using a large band- aid and compression sock. Patient states he was recently treated at [facility] for HBOT for this wound " in the last two or three months after it reopened "  Requesting HBOT here at the wound care clinic.  Dr. Gutierrez examined patient today, doppler pulses present, wound debrided with curette by Dr. Gutierrez patient tolerated well. Wound care orders given to apply hydrorefa blue ready to wound, cover with mepore dressing and apply patient's compression sock. Dr. Manuel discussed with patient obtaining his medical records from [facility], to view to determine if patient will benefit from HBOT, patient verbalized understanding.  Authorization for release of health information sheet signed by patient and faxed to [facility] today in clinic. Follow up in 1 week with MD.  9/23/19: F/U with Dr. Gutierrez. Client recently discharged from hospital. Left foot site debrided. Client being followed by [facility].  12/9/19: Patient seen today in clinic by Dr. Gutierrez, no change to wound. Pt reports recently discharged from hospital 12/3/19 for irregular heat rate and chest pains. Debridement per Dr. Gutierrez tolerated well, wound care continued as ordered 4 layer compression toe to knee LLE.   F/u with Dr. Gutierrez , wound progressing well. Compression changed to coflex with calamine due to itching.  12/27/19:Dr Gutierrez assessed patient. Wound improving. Change in dressing change frequency. Follow up in 2 weeks.    1/10/2020: Patient seen today in clinic by  wound slowly improving. Calamine coflex toe to knee applied today in clinic. Fu 2 weeks.  2/10/2020: F/U WITH  today , pt had not been here in 10 days with dressing still in place. New wounds noted to right lower leg. Pt to have  u/s tomorrow of bilateral lower extremeties and to see  on Wednesday 2/12/2020 for possible " "procedure of right leg per patient. Pt to return on Thursday to clinic for dressing change and to see DR. Sury Vaca (podiatrist) for toe nail clipping and to evaluate toes on right foot.  As above; no c/o pain noted.  02/28/2020: F/u with Dr. Gutierrez. Wounds to RLE open to air. Patient states bandage came off while he was sleeping. Wound to left foot decreasing in size. Left foot wound debrided today in clinic. Continue with current wound care treatment and follow up in  Clinic in 1 week    3/13/20: F/U with Dr. Gutierrez.  Wounds improving.  Will increase compression to LLE.  4/3/2020: Fu with  today in clinic. LLE wound measuring bigger.  RLE improving. Patient complains of pain, tenderness,swelling  x 2 weeks to RLE.  Patient states he went to the ER 2 weeks ago " and they kicked me out because of the Coronavirus." Patient also reports to Dr. Gutierrez " night sweats for 2 weeks." Temp today on arrival to wound care clinic 99.1 oral. LLE debrided per  patient tolerated well, wound care 3 layer compression toe to knee as ordered.   Ultra sound ordered of RLE stat today in clinic to rule out DVT.  MD rewieved results, results negative discussed with patient.  RX given to patient  for Doxycycline Po.  Patient to follow up 4/6/2020 with .  4/13/2020; Fu with Dr. Gutierrez. Patient states RLE pain is much better since starting oral Doxycycline.  RLE wound improving. LLE wound remains unchanged. LLE wound debrided per  patient tolerated well. Continued with 3 layer compression to LLE and tubi  G x 2 to RLE. RTC 4/27/2020.  4/27/20: F/U with Dr. Gutierrez. RLE resolved. Continue Tubigrip G x 2 toes to knee. Left foot site debrided per Dr. Gutierrez. Santyl, adaptic touch, and hydrofera blue ready ordered. Return in 2 weeks.  5/18/20: Wound slowly improving. Dr Gutierrez debrided wound which was toleraled well. Continuing with same plan of care.  06/01/2020- follow-up with " "Dr. Cortez. Wound debrided, patient tolerated. Gentamicin added to wound care regimen. Follow-up with Dr. cortez in 2 weeks 06/15/2020.  6/15/2020: F/U with Dr. Cortez.  Wound improving.  No new complaints.    6/29/2020: F/U with Dr. Cortez. Wound continues to slowly improve.  No c/o noted.  08/03/2020:  F/u with Dr. Cortez. No new complaints per patient. Wound remains stable. Wound debrided today in clinic. Continue with compression and topical treatment. Follow up in 2 weeks at wound care clinic  8/17/20: F/U with Dr. Cortez. Tissue status improving. Wound debrided per Dr. Cotrez. Cont. POC. Next visit 8/31/20.  8/31/2020: fu with Dr. Cortez. Wound debrided per  patient tolerated well. Patient states he is scheduled to have laser vein surgery on 9/2/2020 at the [facility] in [place] and 9/3/2020 in [place]. Patient will follow up with Dr. Cortez 9/4/2020. Patient refused 3 layer compression toes to knee today. Stated " I only want the dressing with a large bandage and the white net so I can have my surgery on Wednesday. Applied santyl, gentamicin to wound, adaptic, hydrofera ready and island border secured with flexi net.   9/28/2020: Patient seen today in clinic by . Left foot wound digressing. Blister to left leg, patient states " the wrap caused it." Patient tolerated debridement and culture well per . Continued with current treatment plan 3 layer compression toes to knee LLE. Fu 1 week 10/5/2020.    10/5/2020: Fu with Dr. Quiros. No changes to wound measurements left foot. Left leg blisters healed.  discussed culture results from last visit 9/28/2020 with patient. Wound culture and debrided patient tolerated well. Rx sent to pharmacy for Vantin Po, changed topical treatment to mupirocin po. Patient to fu with ID and  on 10/12/2020.  Continued with 3-layer compression toes to knee LLE, fu 10/12/2020. " "  10/12/2020: Patient seen today in clinic by  and  with ID. Patient taking po Cipro Po. Oral Vantin was stopped last week due to causing patient chest pains immediately after taking it.  ER visit revealed that pt did not take all of his cardiac meds that day.  added Levaquin po for patient to start taking once finished Cipro PO, pt verbalized understanding. Dr. Quiros assessed patient, debrided wound, minimal pain per patient with debridement. Continued with current treatment 3 layer compression toes to knee. Per ID ok to continue using Mupirocin to wound bed. Fu 2 weeks 10/26/2020 with .  Of note, patient was very upset to be assigned a nurse new to him and requested a different nurse.  10/26/2020: Fu with . Patient states "wound is feeling better and not tender any more."  Tolerated debridement per  well. Discontinued Mupirocin ointment, restarted Santyl to wound bed with 3- Layer compression toes to knee. RX for Santyl sent to patient's  pharcamcy. Patient has 5 refills of Levaquin remaining. Patient   to call in refill of Levaquin po. Fu 1 week 11/2/2020 with Dr. Quiros.  11/9/2020: Fu with . Wound measuring slightly bigger. Tolerated debridement well per . New wound care orders initiated  per MD, changed to 4 layer compression toes to knee LLE. Dr. Quiros encouraged patient again to follow up in clinic weekly for debridements to prevent heavy build up of slough to wound bed, verbalized understanding. Fu 1 week 11/16/2020 with .   11/16/2020: Fu with . Granulation tissue noted to wound bed. Patient still complains of tenderness to bottom of wound, taking levaquin Po. Tolerated debridement and left foot tissue wound culture well. Changed to santyl, hydrofera classic and Profore- lite toes to knee. Fu 3 weeks with Dr. Quiros 12/7/2020, Patient will see  11/30/2020.       Review of " Systems    All systems were reviewed and are negative, except that mentioned in the HPI.    Objective:      Physical Exam  Constitutional:       Appearance: He is well-developed.   HENT:      Head: Normocephalic and atraumatic.   Eyes:      Pupils: Pupils are equal, round, and reactive to light.   Neck:      Musculoskeletal: Normal range of motion.   Cardiovascular:      Rate and Rhythm: Normal rate.   Pulmonary:      Effort: Pulmonary effort is normal. No respiratory distress.      Breath sounds: No stridor.   Abdominal:      General: There is no distension.   Skin:     Comments: See Synopsis for wound details   Neurological:      Mental Status: He is alert and oriented to person, place, and time.         Assessment:       1. Venous stasis dermatitis of both lower extremities    2. Cellulitis, unspecified cellulitis site    3. Chronic venous insufficiency           Wound 09/09/19 1057 Venous Ulcer lateral Foot #1 (Active)   09/09/19 1057    Pre-existing: Yes   Primary Wound Type: Venous ulcer   Side: Left   Orientation: lateral   Location: Foot   Wound Number (optional): #1   Ankle-Brachial Index:    Pulses:    Removal Indication and Assessment:    Wound Outcome:    (Retired) Wound Type:    (Retired) Wound Length (cm):    (Retired) Wound Width (cm):    (Retired) Depth (cm):    Wound Description (Comments):    Removal Indications:    Wound Image   11/16/20 1300   Dressing Appearance Intact;Moist drainage 11/16/20 1300   Drainage Amount Moderate 11/16/20 1300   Drainage Characteristics/Odor Serosanguineous;Malodorous 11/16/20 1300   Appearance Pink;Red;Moist;Granulating 11/16/20 1300   Tissue loss description Full thickness 11/16/20 1300   Red (%), Wound Tissue Color 50 % 11/16/20 1300   Yellow (%), Wound Tissue Color 50 % 11/16/20 1300   Periwound Area Intact;Redness;Edematous 11/16/20 1300   Wound Edges Irregular 11/16/20 1300   Wound Length (cm) 2.3 cm 11/16/20 1300   Wound Width (cm) 7.3 cm 11/16/20 1300   Wound  Depth (cm) 0.2 cm 11/16/20 1300   Wound Volume (cm^3) 3.36 cm^3 11/16/20 1300   Wound Surface Area (cm^2) 16.79 cm^2 11/16/20 1300   Care Cleansed with:;Sterile normal saline;Debrided 11/16/20 1300   Dressing Applied;Calcium alginate;Foam;Compression wrap;Hydrofiber 11/16/20 1300   Periwound Care Moisture barrier applied;Topical treatment applied;Absorptive dressing applied 11/16/20 1300   Compression Three layer compression 11/16/20 1300   Off Loading Off loading shoe 11/16/20 1300   Dressing Change Due 11/20/20 11/16/20 1300       Left lateral Foot #1   Cleanse wound with: Acetic Acid, rinse with  Normal Saline.   Lidocaine: Prn Debridement   Periwound care: Sween to dry skin Prn, Betamethasone and Calmoseptine to vick wound and dorsal foot rash.   Primary dressing: Santyl, Saline Moist Hydrofera Classic,  Calcium Alginate   Secondary dressing: Aquacel foam, Profore  toes to knee   Offloading: Darco shoe left foot   Edema control: LLE  Profore- Lite  compression toes to knee.  Avoid prolonged standing in one place.  Elevate leg(s) as much as possible.   Frequency:  Mondays and Fridays   Follow-up: With Dr. Webb 11/30/2020,  Dr. Quiros 12/7/2020   Continue Levaquin Po.     Left Foot Tissue Culture taken today in clinic 11/16/2020.     Home health: Continue Home Health as ordered per .  [facility] to do wound care on Mondays (when not in clinic), Fridays, and PRN complications.    Plan:   Debridement tolerated well.   Wound care orders as above.  Cont levaquin.  Will need to plan ID f/u.  Endovascular procedure planned in December.  11/30/2020 , 12/7/2020

## 2020-11-16 NOTE — PROCEDURES
"Debridement    Date/Time: 11/16/2020 4:25 PM  Performed by: Kristine Quiros DO  Authorized by: Kristine Quiros DO     Time out: Immediately prior to procedure a "time out" was called to verify the correct patient, procedure, equipment, support staff and site/side marked as required.    Consent Done?:  Yes (Written)  Local anesthesia used?: Yes    Local anesthetic:  Topical anesthetic    Debridement - 1st Wound - General Location: left lateral foot.    Type of Debridement:  Excisional       Length (cm):  2.3       Area (sq cm):  16.79       Width (cm):  7.3       Percent Debrided (%):  100       Depth (cm):  0.2       Total Area Debrided (sq cm):  16.79    Depth of debridement:  Subcutaneous tissue    Tissue debrided:  Subcutaneous    Devitalized tissue debrided:  Biofilm, Fibrin and Slough    Instruments:  Curette    Bleeding:  Minimal  Hemostasis Achieved: Yes    Method Used:  Pressure  Patient tolerance:  Patient tolerated the procedure well with no immediate complications      "

## 2020-11-21 LAB
BACTERIA TISS AEROBE CULT: ABNORMAL
BACTERIA TISS AEROBE CULT: ABNORMAL
GRAM STN SPEC: ABNORMAL

## 2020-12-07 ENCOUNTER — HOSPITAL ENCOUNTER (EMERGENCY)
Facility: HOSPITAL | Age: 55
Discharge: HOME | End: 2020-12-07
Attending: EMERGENCY MEDICINE
Payer: MEDICARE

## 2020-12-07 ENCOUNTER — NURSE TRIAGE (OUTPATIENT)
Dept: ADMINISTRATIVE | Facility: CLINIC | Age: 55
End: 2020-12-07

## 2020-12-07 VITALS
BODY MASS INDEX: 36.45 KG/M2 | DIASTOLIC BLOOD PRESSURE: 64 MMHG | WEIGHT: 315 LBS | HEIGHT: 78 IN | HEART RATE: 86 BPM | TEMPERATURE: 98 F | RESPIRATION RATE: 16 BRPM | SYSTOLIC BLOOD PRESSURE: 113 MMHG | OXYGEN SATURATION: 98 %

## 2020-12-07 DIAGNOSIS — R07.89 ATYPICAL CHEST PAIN: ICD-10-CM

## 2020-12-07 DIAGNOSIS — T50.901A ACCIDENTAL DRUG OVERDOSE, INITIAL ENCOUNTER: Primary | ICD-10-CM

## 2020-12-07 PROCEDURE — 99283 EMERGENCY DEPT VISIT LOW MDM: CPT | Mod: 25

## 2020-12-07 PROCEDURE — 25000003 PHARM REV CODE 250: Performed by: EMERGENCY MEDICINE

## 2020-12-07 RX ORDER — LIDOCAINE 50 MG/G
1 PATCH TOPICAL
Status: DISCONTINUED | OUTPATIENT
Start: 2020-12-07 | End: 2020-12-07 | Stop reason: HOSPADM

## 2020-12-07 RX ADMIN — LIDOCAINE 1 PATCH: 50 PATCH TOPICAL at 04:12

## 2020-12-07 NOTE — DISCHARGE INSTRUCTIONS
Please do not take your morning blood pressure medication.  You may restart your Xarelto tomorrow.  Return to the ER for any concerning reason.  Follow-up with primary care doctor.

## 2020-12-07 NOTE — ED NOTES
Review of patient's allergies indicates:   Allergen Reactions    Contrast media Other (See Comments)     Severe chest pain    Food allergy formula [glutamine-c-quercet-selen-brom]      Allergic to green peas; Heart failure.    Iodinated contrast media Other (See Comments)     Chest pain    Peas Hives    Ibuprofen Swelling    Latex, natural rubber Hives    Pcn [penicillins] Hives    Butisol [butabarbital] Rash     Peeling skin        Patient has verified the spelling of the name and  on armband.   APPEARANCE: Patient is alert, calm, oriented x 4, and does not appear distressed.  SKIN: Skin is normal for race, warm, and dry. Normal skin turgor and mucous membranes moist.  CARDIAC: Normal rate and rhythm, no murmur heard. Mild chest pain that is worse when his chest is pressed on, mid sternal.   RESPIRATORY:Normal rate and effort. Breath sounds clear bilaterally throughout chest. Respirations are equal and unlabored.    GASTRO: Bowel sounds normal, abdomen is soft, no tenderness, and no abdominal distention.  MUSCLE: Full ROM. No bony tenderness or soft tissue tenderness. No obvious deformity.  PERIPHERAL VASCULAR: peripheral pulses present. Normal cap refill. No edema. Warm to touch.  NEURO: 5/5 strength major flexors/extensors bilaterally. Sensory intact to light touch bilaterally. Ehsan coma scale: eyes open spontaneously-4, oriented & converses-5, obeys commands-6. No neurological abnormalities.   MENTAL STATUS: awake, alert and aware of environment.  EYE: No overt deficits noted. No drainage. Sclera WNL  ENT: EARS: no obvious drainage. NOSE: no active bleeding. THROAT: no redness or swelling.  BREAST: symmetrical. No masses. No tenderness.  GENITALIA: Normal external genitalia.  : Voids without complication

## 2020-12-07 NOTE — TELEPHONE ENCOUNTER
Reason for Disposition   Difficult to awaken or acting confused (e.g., disoriented, slurred speech)    Additional Information   Negative: Severe difficulty breathing (e.g., struggling for each breath, speaks in single words)   Negative: Seizure    Protocols used: POISONING-A-    Patient states he took double dose of his Sunday meds in error and he is having chest pain. The meds included xarelto. Advised him to call 911

## 2020-12-21 ENCOUNTER — HOSPITAL ENCOUNTER (OUTPATIENT)
Dept: WOUND CARE | Facility: HOSPITAL | Age: 55
Discharge: HOME | End: 2020-12-21
Attending: SURGERY
Payer: MEDICARE

## 2020-12-21 ENCOUNTER — CLINICAL SUPPORT (OUTPATIENT)
Dept: INFECTIOUS DISEASES | Facility: CLINIC | Age: 55
End: 2020-12-21
Payer: MEDICARE

## 2020-12-21 VITALS
HEART RATE: 102 BPM | TEMPERATURE: 97 F | HEIGHT: 78 IN | BODY MASS INDEX: 36.45 KG/M2 | WEIGHT: 315 LBS | SYSTOLIC BLOOD PRESSURE: 130 MMHG | DIASTOLIC BLOOD PRESSURE: 80 MMHG

## 2020-12-21 DIAGNOSIS — L97.521 SKIN ULCER OF LEFT FOOT, LIMITED TO BREAKDOWN OF SKIN: ICD-10-CM

## 2020-12-21 DIAGNOSIS — S91.302A OPEN WOUND OF LEFT FOOT, INITIAL ENCOUNTER: ICD-10-CM

## 2020-12-21 DIAGNOSIS — E66.9 OBESITY, CLASS I, BMI 30.0-34.9 (SEE ACTUAL BMI): ICD-10-CM

## 2020-12-21 DIAGNOSIS — L97.421 VENOUS STASIS ULCER OF LEFT MIDFOOT LIMITED TO BREAKDOWN OF SKIN, UNSPECIFIED WHETHER VARICOSE VEINS PRESENT: Primary | ICD-10-CM

## 2020-12-21 DIAGNOSIS — R60.0 BILATERAL EDEMA OF LOWER EXTREMITY: ICD-10-CM

## 2020-12-21 DIAGNOSIS — E66.01 MORBID OBESITY: ICD-10-CM

## 2020-12-21 DIAGNOSIS — L97.321 ULCER OF ANKLE, LEFT, LIMITED TO BREAKDOWN OF SKIN: ICD-10-CM

## 2020-12-21 DIAGNOSIS — I87.2 CHRONIC VENOUS INSUFFICIENCY: ICD-10-CM

## 2020-12-21 DIAGNOSIS — I87.2 VENOUS STASIS DERMATITIS OF BOTH LOWER EXTREMITIES: ICD-10-CM

## 2020-12-21 DIAGNOSIS — L03.90 CELLULITIS, UNSPECIFIED CELLULITIS SITE: ICD-10-CM

## 2020-12-21 DIAGNOSIS — S91.302D OPEN WOUND OF LEFT FOOT, SUBSEQUENT ENCOUNTER: Primary | ICD-10-CM

## 2020-12-21 DIAGNOSIS — I83.024 VENOUS STASIS ULCER OF LEFT MIDFOOT LIMITED TO BREAKDOWN OF SKIN, UNSPECIFIED WHETHER VARICOSE VEINS PRESENT: Primary | ICD-10-CM

## 2020-12-21 PROCEDURE — 99213 PR OFFICE/OUTPT VISIT, EST, LEVL III, 20-29 MIN: ICD-10-PCS | Mod: S$GLB,,, | Performed by: INTERNAL MEDICINE

## 2020-12-21 PROCEDURE — 27201912 HC WOUND CARE DEBRIDEMENT SUPPLIES

## 2020-12-21 PROCEDURE — 99213 OFFICE O/P EST LOW 20 MIN: CPT | Mod: S$GLB,,, | Performed by: INTERNAL MEDICINE

## 2020-12-21 PROCEDURE — 11042 DBRDMT SUBQ TIS 1ST 20SQCM/<: CPT

## 2020-12-21 RX ORDER — LEVOFLOXACIN 750 MG/1
750 TABLET ORAL DAILY
Qty: 14 TABLET | Refills: 5 | Status: SHIPPED | OUTPATIENT
Start: 2020-12-21 | End: 2021-04-05

## 2020-12-21 NOTE — PROGRESS NOTES
Doing well. Tolerating Levaquin. Has not been to wound care in a month - having transportation issues.    Still has leg edema on exam. Some induration. Good granulation tissue. Wound slightly smaller by measurements.     No new cultures taken. No complaints.        1. Venous stasis ulcer of left midfoot limited to breakdown of skin, unspecified whether varicose veins present    2. Skin ulcer of left foot, limited to breakdown of skin    3. Ulcer of ankle, left, limited to breakdown of skin    4. Venous stasis dermatitis of both lower extremities    5. Cellulitis, unspecified cellulitis site    6. Obesity, Class I, BMI 30.0-34.9 (see actual BMI)    7. Bilateral edema of lower extremity

## 2020-12-21 NOTE — PROGRESS NOTES
"Subjective:       Patient ID: Drew Farrar is a 55 y.o. male.    Chief Complaint: Venous Stasis (left lower leg)    2/15/19: Readmitted for venous ulcer to left lateral foot which developed about 2 weeks ago. Pulses noted with doppler and capillary refill <3.Dr Gutierrez assessed patient and wound care plan ordered for compression therapy and hydrafera blue to wound bed. No apparent signs of infection noted. Patient to follw-up in clinic in 2 weeks.DB   2/21/19: Nurse visit for dressing change. Refused care to skin tear on left 2nd toe. See notes. RTC 1 week for DrRosy Visit.  3/1/19: Follow up with Dr. Waller today in clinic new wound to right posterior leg, culture of both wounds taken today in clinic new wound care orders to both legs for xeroform and unna with calamine toe to knee follow up in 1 week with Dr. Gutierrez  3/8/19: Follow up with Dr. Gutierrez today in clinic wounds improving, edema noted to BLE, profore toe to knee applied to both legs, RX given to patient for PO Doxycycline, follow up in 1 week.   3/15/19: Here for f/u with Dr. Gutierrez. U/S scheduled Thursday. Will need right leg rewrapped. Patient states eye DrRosy Gave him the same antibiotic dose and strength after eye surgery. Dr instructed patient to take one bottle of antibiotics for both wounds and eyes until finished. Gentamycin added to wound care orders.   3/29/19:  Wound slowly improving. No changes in wound care orders. Wound debrided per Dr Gutierrez.  4/4/19: Patient showed up to clinic today stated " I need my dressing changed. It fell off."  Doppler pluses checked and present.  Patient refusing care to right, no stocking present on leg at this visit. Patient stated " No they said this leg is discharged, Dr. Lantigua has to drain the fluid out this leg with a needle. No wound care to this leg anymore when I come."      9/9/19: Readmit to wound care clinic for venous ulcer to left lateral foot.  Patient states it reopened about 3 weeks " "after last discharge from wound care clinic 7/1/19. Per patient wound has been treated by PCP.  Patient reports just using a large band- aid and compression sock. Patient states he was recently treated at [facility] for HBOT for this wound " in the last two or three months after it reopened "  Requesting HBOT here at the wound care clinic.  Dr. Gutierrez examined patient today, doppler pulses present, wound debrided with curette by Dr. Gutierrez patient tolerated well. Wound care orders given to apply hydrorefa blue ready to wound, cover with mepore dressing and apply patient's compression sock. Dr. Manuel discussed with patient obtaining his medical records from [facility], to view to determine if patient will benefit from HBOT, patient verbalized understanding.  Authorization for release of health information sheet signed by patient and faxed to [facility] today in clinic. Follow up in 1 week with MD.  9/23/19: F/U with Dr. Gutierrez. Client recently discharged from hospital. Left foot site debrided. Client being followed by [facility].  12/9/19: Patient seen today in clinic by Dr. Gutierrez, no change to wound. Pt reports recently discharged from hospital 12/3/19 for irregular heat rate and chest pains. Debridement per Dr. Gutierrez tolerated well, wound care continued as ordered 4 layer compression toe to knee LLE.   F/u with Dr. Gutierrez , wound progressing well. Compression changed to coflex with calamine due to itching.  12/27/19:Dr Gutierrez assessed patient. Wound improving. Change in dressing change frequency. Follow up in 2 weeks.    1/10/2020: Patient seen today in clinic by  wound slowly improving. Calamine coflex toe to knee applied today in clinic. Fu 2 weeks.  2/10/2020: F/U WITH  today , pt had not been here in 10 days with dressing still in place. New wounds noted to right lower leg. Pt to have  u/s tomorrow of bilateral lower extremeties and to see  on Wednesday " "2/12/2020 for possible procedure of right leg per patient. Pt to return on Thursday to clinic for dressing change and to see DR. Sury Vaca (podiatrist) for toe nail clipping and to evaluate toes on right foot.  As above; no c/o pain noted.  02/28/2020: F/u with Dr. Gutierrez. Wounds to RLE open to air. Patient states bandage came off while he was sleeping. Wound to left foot decreasing in size. Left foot wound debrided today in clinic. Continue with current wound care treatment and follow up in  Clinic in 1 week    3/13/20: F/U with Dr. Gutierrez.  Wounds improving.  Will increase compression to LLE.  4/3/2020: Fu with  today in clinic. LLE wound measuring bigger.  RLE improving. Patient complains of pain, tenderness,swelling  x 2 weeks to RLE.  Patient states he went to the ER 2 weeks ago " and they kicked me out because of the Coronavirus." Patient also reports to Dr. Gutierrez " night sweats for 2 weeks." Temp today on arrival to wound care clinic 99.1 oral. LLE debrided per  patient tolerated well, wound care 3 layer compression toe to knee as ordered.   Ultra sound ordered of RLE stat today in clinic to rule out DVT.  MD rewieved results, results negative discussed with patient.  RX given to patient  for Doxycycline Po.  Patient to follow up 4/6/2020 with .  4/13/2020; Fu with Dr. Gutierrez. Patient states RLE pain is much better since starting oral Doxycycline.  RLE wound improving. LLE wound remains unchanged. LLE wound debrided per  patient tolerated well. Continued with 3 layer compression to LLE and tubi  G x 2 to RLE. RTC 4/27/2020.  4/27/20: F/U with Dr. Gutierrez. RLE resolved. Continue Tubigrip G x 2 toes to knee. Left foot site debrided per Dr. Gutierrez. Santyl, adaptic touch, and hydrofera blue ready ordered. Return in 2 weeks.  5/18/20: Wound slowly improving. Dr Mehaffey debrided wound which was toleraled well. Continuing with same plan of " "care.  06/01/2020- follow-up with Dr. Cortez. Wound debrided, patient tolerated. Gentamicin added to wound care regimen. Follow-up with Dr. cortez in 2 weeks 06/15/2020.  6/15/2020: F/U with Dr. Cortez.  Wound improving.  No new complaints.    6/29/2020: F/U with Dr. Cortez. Wound continues to slowly improve.  No c/o noted.  08/03/2020:  F/u with Dr. Cortez. No new complaints per patient. Wound remains stable. Wound debrided today in clinic. Continue with compression and topical treatment. Follow up in 2 weeks at wound care clinic  8/17/20: F/U with Dr. Cortez. Tissue status improving. Wound debrided per Dr. Cortez. Cont. POC. Next visit 8/31/20.  8/31/2020: fu with Dr. Cortez. Wound debrided per  patient tolerated well. Patient states he is scheduled to have laser vein surgery on 9/2/2020 at the [facility] in [place] and 9/3/2020 in [place]. Patient will follow up with Dr. Cortez 9/4/2020. Patient refused 3 layer compression toes to knee today. Stated " I only want the dressing with a large bandage and the white net so I can have my surgery on Wednesday. Applied santyl, gentamicin to wound, adaptic, hydrofera ready and island border secured with flexi net.   9/28/2020: Patient seen today in clinic by . Left foot wound digressing. Blister to left leg, patient states " the wrap caused it." Patient tolerated debridement and culture well per . Continued with current treatment plan 3 layer compression toes to knee LLE. Fu 1 week 10/5/2020.    10/5/2020: Fu with Dr. Quiros. No changes to wound measurements left foot. Left leg blisters healed.  discussed culture results from last visit 9/28/2020 with patient. Wound culture and debrided patient tolerated well. Rx sent to pharmacy for Vantin Po, changed topical treatment to mupirocin po. Patient to fu with ID and  on 10/12/2020.  Continued with 3-layer compression toes to " "knee LLE, fu 10/12/2020.   10/12/2020: Patient seen today in clinic by  and  with ID. Patient taking po Cipro Po. Oral Vantin was stopped last week due to causing patient chest pains immediately after taking it.  ER visit revealed that pt did not take all of his cardiac meds that day.  added Levaquin po for patient to start taking once finished Cipro PO, pt verbalized understanding. Dr. Quiros assessed patient, debrided wound, minimal pain per patient with debridement. Continued with current treatment 3 layer compression toes to knee. Per ID ok to continue using Mupirocin to wound bed. Fu 2 weeks 10/26/2020 with .  Of note, patient was very upset to be assigned a nurse new to him and requested a different nurse.  10/26/2020: Fu with . Patient states "wound is feeling better and not tender any more."  Tolerated debridement per  well. Discontinued Mupirocin ointment, restarted Santyl to wound bed with 3- Layer compression toes to knee. RX for Santyl sent to patient's  pharcamcy. Patient has 5 refills of Levaquin remaining. Patient   to call in refill of Levaquin po. Fu 1 week 11/2/2020 with Dr. Quiros.  11/9/2020: Fu with . Wound measuring slightly bigger. Tolerated debridement well per . New wound care orders initiated  per MD, changed to 4 layer compression toes to knee LLE. Dr. Quiros encouraged patient again to follow up in clinic weekly for debridements to prevent heavy build up of slough to wound bed, verbalized understanding. Fu 1 week 11/16/2020 with .   11/16/2020: Fu with . Granulation tissue noted to wound bed. Patient still complains of tenderness to bottom of wound, taking levaquin Po. Tolerated debridement and left foot tissue wound culture well. Changed to santyl, hydrofera classic and Profore- lite toes to knee. Fu 3 weeks with Dr. Quiros 12/7/2020, Patient will see  " 11/30/2020.   12/21/20: F/U with Dr. Quiros. Measurements improved slightly. Site debrided per Dr. Quiros. Also seen by Dr. London. Continue Levaquin, and refill sent to pharmacy. Next visit with Dr. Quiros 12/28/20. Of note, pt not seen for 1 month due to transportation twice and his decision not to be seen by a covering provider once.     Review of Systems    All systems were reviewed and are negative, except that mentioned in the HPI.    Objective:      Physical Exam  Constitutional:       Appearance: He is well-developed.   HENT:      Head: Normocephalic and atraumatic.   Eyes:      Pupils: Pupils are equal, round, and reactive to light.   Neck:      Musculoskeletal: Normal range of motion.   Cardiovascular:      Rate and Rhythm: Normal rate.   Pulmonary:      Effort: Pulmonary effort is normal. No respiratory distress.      Breath sounds: No stridor.   Abdominal:      General: There is no distension.   Skin:     Comments: See Synopsis for wound details   Neurological:      Mental Status: He is alert and oriented to person, place, and time.         Assessment:       1. Open wound of left foot, subsequent encounter    2. Venous stasis dermatitis of both lower extremities    3. Cellulitis, unspecified cellulitis site    4. Chronic venous insufficiency    5. Open wound of left foot, initial encounter    6. Morbid obesity           Wound 09/09/19 1057 Venous Ulcer lateral Foot #1 (Active)   09/09/19 1057    Pre-existing: Yes   Primary Wound Type: Venous ulcer   Side: Left   Orientation: lateral   Location: Foot   Wound Number (optional): #1   Ankle-Brachial Index:    Pulses:    Removal Indication and Assessment:    Wound Outcome:    (Retired) Wound Type:    (Retired) Wound Length (cm):    (Retired) Wound Width (cm):    (Retired) Depth (cm):    Wound Description (Comments):    Removal Indications:    Wound Image   12/21/20 1400   Dressing Appearance Intact;Moist drainage 12/21/20 1400   Drainage Amount  Moderate 12/21/20 1400   Drainage Characteristics/Odor Serosanguineous 12/21/20 1400   Appearance Red;Pink;Yellow;Moist 12/21/20 1400   Tissue loss description Full thickness 12/21/20 1400   Red (%), Wound Tissue Color 50 % 12/21/20 1400   Yellow (%), Wound Tissue Color 50 % 12/21/20 1400   Periwound Area Petechiae;Redness;Edematous;Excoriated 12/21/20 1400   Wound Edges Irregular 12/21/20 1400   Wound Length (cm) 7 cm 12/21/20 1400   Wound Width (cm) 2.2 cm 12/21/20 1400   Wound Depth (cm) 0.2 cm 12/21/20 1400   Wound Volume (cm^3) 3.08 cm^3 12/21/20 1400   Wound Surface Area (cm^2) 15.4 cm^2 12/21/20 1400   Care Cleansed with:;Antimicrobial agent;Sterile normal saline;Debrided 12/21/20 1400   Dressing Changed;Hydrofiber;Calcium alginate;Foam;Compression wrap 12/21/20 1400   Periwound Care Topical treatment applied 12/21/20 1400   Compression Three layer compression 12/21/20 1400   Off Loading Off loading shoe 12/21/20 1400   Dressing Change Due 12/25/20 12/21/20 1400       Left lateral Foot #1   Cleanse wound with: Acetic Acid, rinse with  Normal Saline.   Lidocaine: Prn Debridement   Periwound care: Sween to dry skin Prn, Betamethasone and Calmoseptine to vick wound and dorsal foot rash.   Primary dressing: Santyl, Saline Moist Hydrofera Classic,  Calcium Alginate   Secondary dressing: Aquacel foam, Profore  toes to knee   Offloading: Darco shoe left foot   Edema control: LLE  Profore- Lite  compression toes to knee.  Avoid prolonged standing in one place.  Elevate leg(s) as much as possible.   Frequency:  Mondays and Fridays   Follow-up:  Dr. Quiros 12/28/2020   Continue Levaquin Po.         Home health: Continue Home Health as ordered per .  [facility] to do wound care on Mondays (when not in clinic), Fridays, and PRN complications.    Plan:   As above.  Weekly f/u recommended.   12/28/2020

## 2020-12-22 NOTE — PROCEDURES
"Debridement    Date/Time: 12/21/2020 12:45 PM  Performed by: Kristine Quiros DO  Authorized by: Kristine Quiros DO     Time out: Immediately prior to procedure a "time out" was called to verify the correct patient, procedure, equipment, support staff and site/side marked as required.    Consent Done?:  Yes (Written)  Local anesthesia used?: Yes    Local anesthetic:  Topical anesthetic    Debridement - 1st Wound - General Location: left lateral foot.    Type of Debridement:  Excisional       Length (cm):  7       Area (sq cm):  15.4       Width (cm):  2.2       Percent Debrided (%):  100       Depth (cm):  0.2       Total Area Debrided (sq cm):  15.4    Depth of debridement:  Subcutaneous tissue    Tissue debrided:  Subcutaneous    Devitalized tissue debrided:  Biofilm, Fibrin and Slough    Instruments:  Curette    Bleeding:  Minimal  Hemostasis Achieved: Yes    Method Used:  Pressure  Patient tolerance:  Patient tolerated the procedure well with no immediate complications      "

## 2021-01-01 ENCOUNTER — EXTERNAL HOME HEALTH (OUTPATIENT)
Dept: HOME HEALTH SERVICES | Facility: HOSPITAL | Age: 56
End: 2021-01-01
Payer: MEDICARE

## 2021-01-01 ENCOUNTER — HOSPITAL ENCOUNTER (OUTPATIENT)
Dept: WOUND CARE | Facility: HOSPITAL | Age: 56
Discharge: HOME | End: 2021-10-26
Attending: SURGERY
Payer: MEDICARE

## 2021-01-01 ENCOUNTER — HOSPITAL ENCOUNTER (EMERGENCY)
Facility: HOSPITAL | Age: 56
Discharge: HOME | End: 2021-06-07
Attending: EMERGENCY MEDICINE
Payer: MEDICARE

## 2021-01-01 ENCOUNTER — OFFICE VISIT (OUTPATIENT)
Dept: FAMILY MEDICINE | Facility: HOSPITAL | Age: 56
End: 2021-01-01
Payer: MEDICARE

## 2021-01-01 ENCOUNTER — OFFICE VISIT (OUTPATIENT)
Dept: CARDIOLOGY | Facility: CLINIC | Age: 56
End: 2021-01-01
Payer: MEDICARE

## 2021-01-01 ENCOUNTER — OFFICE VISIT (OUTPATIENT)
Dept: WOUND CARE | Facility: HOSPITAL | Age: 56
End: 2021-01-01
Attending: FAMILY MEDICINE
Payer: MEDICARE

## 2021-01-01 ENCOUNTER — HOSPITAL ENCOUNTER (OUTPATIENT)
Dept: WOUND CARE | Facility: HOSPITAL | Age: 56
Discharge: HOME | End: 2021-08-09
Attending: SURGERY
Payer: MEDICARE

## 2021-01-01 ENCOUNTER — TELEPHONE (OUTPATIENT)
Dept: CARDIOLOGY | Facility: CLINIC | Age: 56
End: 2021-01-01
Payer: MEDICARE

## 2021-01-01 ENCOUNTER — TELEPHONE (OUTPATIENT)
Dept: FAMILY MEDICINE | Facility: CLINIC | Age: 56
End: 2021-01-01

## 2021-01-01 ENCOUNTER — HOSPITAL ENCOUNTER (OUTPATIENT)
Dept: WOUND CARE | Facility: HOSPITAL | Age: 56
Discharge: HOME | End: 2021-12-20
Attending: SURGERY
Payer: MEDICARE

## 2021-01-01 ENCOUNTER — CLINICAL SUPPORT (OUTPATIENT)
Dept: INFECTIOUS DISEASES | Facility: CLINIC | Age: 56
End: 2021-01-01
Payer: MEDICARE

## 2021-01-01 ENCOUNTER — TELEPHONE (OUTPATIENT)
Dept: WOUND CARE | Facility: HOSPITAL | Age: 56
End: 2021-01-01
Payer: MEDICARE

## 2021-01-01 ENCOUNTER — HOSPITAL ENCOUNTER (OUTPATIENT)
Dept: RADIOLOGY | Facility: HOSPITAL | Age: 56
Discharge: HOME | End: 2021-09-13
Attending: INTERNAL MEDICINE
Payer: MEDICARE

## 2021-01-01 ENCOUNTER — HOSPITAL ENCOUNTER (OUTPATIENT)
Dept: WOUND CARE | Facility: HOSPITAL | Age: 56
Discharge: HOME | End: 2021-11-11
Attending: SURGERY
Payer: MEDICARE

## 2021-01-01 ENCOUNTER — HOSPITAL ENCOUNTER (OUTPATIENT)
Dept: WOUND CARE | Facility: HOSPITAL | Age: 56
Discharge: HOME | End: 2021-07-19
Attending: SURGERY
Payer: MEDICARE

## 2021-01-01 ENCOUNTER — DOCUMENT SCAN (OUTPATIENT)
Dept: HOME HEALTH SERVICES | Facility: HOSPITAL | Age: 56
End: 2021-01-01

## 2021-01-01 ENCOUNTER — HOSPITAL ENCOUNTER (OUTPATIENT)
Dept: WOUND CARE | Facility: HOSPITAL | Age: 56
Discharge: HOME | End: 2021-08-23
Attending: SURGERY
Payer: MEDICARE

## 2021-01-01 ENCOUNTER — TELEPHONE (OUTPATIENT)
Dept: CARDIOLOGY | Facility: CLINIC | Age: 56
End: 2021-01-01

## 2021-01-01 ENCOUNTER — HOSPITAL ENCOUNTER (OUTPATIENT)
Dept: WOUND CARE | Facility: HOSPITAL | Age: 56
Discharge: HOME | End: 2021-12-09
Attending: SURGERY
Payer: MEDICARE

## 2021-01-01 ENCOUNTER — TELEPHONE (OUTPATIENT)
Dept: PHARMACY | Facility: CLINIC | Age: 56
End: 2021-01-01

## 2021-01-01 ENCOUNTER — HOSPITAL ENCOUNTER (OUTPATIENT)
Dept: WOUND CARE | Facility: HOSPITAL | Age: 56
Discharge: HOME | End: 2021-05-24
Attending: SURGERY
Payer: MEDICARE

## 2021-01-01 ENCOUNTER — HOSPITAL ENCOUNTER (EMERGENCY)
Facility: HOSPITAL | Age: 56
Discharge: HOME | End: 2021-11-18
Attending: EMERGENCY MEDICINE
Payer: MEDICARE

## 2021-01-01 ENCOUNTER — HOSPITAL ENCOUNTER (OUTPATIENT)
Dept: WOUND CARE | Facility: HOSPITAL | Age: 56
Discharge: HOME | End: 2021-06-28
Attending: SURGERY
Payer: MEDICARE

## 2021-01-01 ENCOUNTER — HOSPITAL ENCOUNTER (OUTPATIENT)
Dept: WOUND CARE | Facility: HOSPITAL | Age: 56
Discharge: HOME | End: 2021-10-21
Attending: SURGERY
Payer: MEDICARE

## 2021-01-01 ENCOUNTER — PATIENT MESSAGE (OUTPATIENT)
Dept: TRANSPLANT | Facility: CLINIC | Age: 56
End: 2021-01-01
Payer: MEDICARE

## 2021-01-01 ENCOUNTER — HOSPITAL ENCOUNTER (OUTPATIENT)
Dept: WOUND CARE | Facility: HOSPITAL | Age: 56
Discharge: HOME | End: 2021-11-04
Attending: SURGERY
Payer: MEDICARE

## 2021-01-01 ENCOUNTER — PATIENT MESSAGE (OUTPATIENT)
Dept: FAMILY MEDICINE | Facility: HOSPITAL | Age: 56
End: 2021-01-01

## 2021-01-01 ENCOUNTER — HOSPITAL ENCOUNTER (OUTPATIENT)
Dept: WOUND CARE | Facility: HOSPITAL | Age: 56
Discharge: HOME | End: 2021-10-07
Attending: SURGERY
Payer: MEDICARE

## 2021-01-01 VITALS
HEIGHT: 78 IN | TEMPERATURE: 98 F | WEIGHT: 315 LBS | DIASTOLIC BLOOD PRESSURE: 78 MMHG | BODY MASS INDEX: 36.45 KG/M2 | HEART RATE: 126 BPM | SYSTOLIC BLOOD PRESSURE: 139 MMHG

## 2021-01-01 VITALS
TEMPERATURE: 98 F | SYSTOLIC BLOOD PRESSURE: 136 MMHG | BODY MASS INDEX: 36.45 KG/M2 | DIASTOLIC BLOOD PRESSURE: 64 MMHG | HEART RATE: 84 BPM | WEIGHT: 315 LBS | HEIGHT: 78 IN

## 2021-01-01 VITALS
WEIGHT: 315 LBS | BODY MASS INDEX: 36.45 KG/M2 | HEART RATE: 85 BPM | WEIGHT: 315 LBS | HEIGHT: 78 IN | HEART RATE: 88 BPM | RESPIRATION RATE: 20 BRPM | HEIGHT: 78 IN | OXYGEN SATURATION: 95 % | DIASTOLIC BLOOD PRESSURE: 62 MMHG | BODY MASS INDEX: 36.45 KG/M2 | TEMPERATURE: 97 F | SYSTOLIC BLOOD PRESSURE: 117 MMHG | SYSTOLIC BLOOD PRESSURE: 128 MMHG | DIASTOLIC BLOOD PRESSURE: 58 MMHG | TEMPERATURE: 98 F

## 2021-01-01 VITALS
SYSTOLIC BLOOD PRESSURE: 123 MMHG | BODY MASS INDEX: 36.45 KG/M2 | HEIGHT: 78 IN | TEMPERATURE: 98 F | WEIGHT: 315 LBS | DIASTOLIC BLOOD PRESSURE: 70 MMHG | HEART RATE: 86 BPM

## 2021-01-01 VITALS
RESPIRATION RATE: 18 BRPM | HEART RATE: 97 BPM | SYSTOLIC BLOOD PRESSURE: 145 MMHG | DIASTOLIC BLOOD PRESSURE: 78 MMHG | TEMPERATURE: 98 F

## 2021-01-01 VITALS
DIASTOLIC BLOOD PRESSURE: 65 MMHG | SYSTOLIC BLOOD PRESSURE: 121 MMHG | HEIGHT: 78 IN | TEMPERATURE: 99 F | WEIGHT: 315 LBS | HEART RATE: 76 BPM | BODY MASS INDEX: 36.45 KG/M2

## 2021-01-01 VITALS
HEART RATE: 89 BPM | WEIGHT: 315 LBS | HEIGHT: 78 IN | SYSTOLIC BLOOD PRESSURE: 144 MMHG | BODY MASS INDEX: 36.45 KG/M2 | TEMPERATURE: 99 F | DIASTOLIC BLOOD PRESSURE: 65 MMHG

## 2021-01-01 VITALS
HEART RATE: 80 BPM | SYSTOLIC BLOOD PRESSURE: 149 MMHG | RESPIRATION RATE: 19 BRPM | OXYGEN SATURATION: 97 % | BODY MASS INDEX: 36.45 KG/M2 | DIASTOLIC BLOOD PRESSURE: 74 MMHG | WEIGHT: 315 LBS | DIASTOLIC BLOOD PRESSURE: 90 MMHG | SYSTOLIC BLOOD PRESSURE: 125 MMHG | HEART RATE: 91 BPM | TEMPERATURE: 99 F | HEIGHT: 78 IN | BODY MASS INDEX: 36.45 KG/M2 | TEMPERATURE: 98 F | HEIGHT: 78 IN | WEIGHT: 315 LBS

## 2021-01-01 VITALS
WEIGHT: 315 LBS | SYSTOLIC BLOOD PRESSURE: 135 MMHG | HEIGHT: 78 IN | HEART RATE: 81 BPM | TEMPERATURE: 98 F | DIASTOLIC BLOOD PRESSURE: 61 MMHG | BODY MASS INDEX: 36.45 KG/M2

## 2021-01-01 VITALS
HEART RATE: 76 BPM | BODY MASS INDEX: 36.45 KG/M2 | HEIGHT: 78 IN | DIASTOLIC BLOOD PRESSURE: 65 MMHG | SYSTOLIC BLOOD PRESSURE: 121 MMHG | WEIGHT: 315 LBS

## 2021-01-01 VITALS
BODY MASS INDEX: 36.45 KG/M2 | WEIGHT: 315 LBS | SYSTOLIC BLOOD PRESSURE: 141 MMHG | HEART RATE: 115 BPM | HEIGHT: 78 IN | DIASTOLIC BLOOD PRESSURE: 63 MMHG

## 2021-01-01 VITALS
DIASTOLIC BLOOD PRESSURE: 80 MMHG | TEMPERATURE: 98 F | RESPIRATION RATE: 18 BRPM | HEART RATE: 80 BPM | SYSTOLIC BLOOD PRESSURE: 120 MMHG

## 2021-01-01 VITALS
SYSTOLIC BLOOD PRESSURE: 137 MMHG | WEIGHT: 315 LBS | HEIGHT: 78 IN | HEART RATE: 121 BPM | BODY MASS INDEX: 36.45 KG/M2 | DIASTOLIC BLOOD PRESSURE: 66 MMHG

## 2021-01-01 VITALS
SYSTOLIC BLOOD PRESSURE: 141 MMHG | BODY MASS INDEX: 36.45 KG/M2 | WEIGHT: 315 LBS | DIASTOLIC BLOOD PRESSURE: 66 MMHG | HEIGHT: 78 IN | TEMPERATURE: 99 F | HEART RATE: 107 BPM

## 2021-01-01 VITALS — TEMPERATURE: 98 F | HEART RATE: 90 BPM | SYSTOLIC BLOOD PRESSURE: 164 MMHG | DIASTOLIC BLOOD PRESSURE: 92 MMHG

## 2021-01-01 VITALS — DIASTOLIC BLOOD PRESSURE: 60 MMHG | HEART RATE: 65 BPM | SYSTOLIC BLOOD PRESSURE: 128 MMHG | OXYGEN SATURATION: 96 %

## 2021-01-01 VITALS
HEART RATE: 97 BPM | BODY MASS INDEX: 36.45 KG/M2 | WEIGHT: 315 LBS | DIASTOLIC BLOOD PRESSURE: 81 MMHG | SYSTOLIC BLOOD PRESSURE: 149 MMHG | HEIGHT: 78 IN

## 2021-01-01 VITALS
DIASTOLIC BLOOD PRESSURE: 59 MMHG | HEART RATE: 70 BPM | SYSTOLIC BLOOD PRESSURE: 149 MMHG | HEIGHT: 78 IN | BODY MASS INDEX: 36.45 KG/M2 | WEIGHT: 315 LBS

## 2021-01-01 DIAGNOSIS — I87.2 VENOUS STASIS DERMATITIS, UNSPECIFIED LATERALITY: ICD-10-CM

## 2021-01-01 DIAGNOSIS — I26.09 OTHER ACUTE PULMONARY EMBOLISM WITH ACUTE COR PULMONALE: ICD-10-CM

## 2021-01-01 DIAGNOSIS — I87.2 VENOUS STASIS DERMATITIS OF BOTH LOWER EXTREMITIES: ICD-10-CM

## 2021-01-01 DIAGNOSIS — I83.024 VENOUS STASIS ULCER OF LEFT MIDFOOT LIMITED TO BREAKDOWN OF SKIN, UNSPECIFIED WHETHER VARICOSE VEINS PRESENT: ICD-10-CM

## 2021-01-01 DIAGNOSIS — L97.422 VENOUS STASIS ULCER OF LEFT MIDFOOT WITH FAT LAYER EXPOSED WITH VARICOSE VEINS: ICD-10-CM

## 2021-01-01 DIAGNOSIS — B35.1 ONYCHOMYCOSIS: ICD-10-CM

## 2021-01-01 DIAGNOSIS — I83.018 VENOUS STASIS ULCER OF OTHER PART OF RIGHT LOWER LEG WITH FAT LAYER EXPOSED WITH VARICOSE VEINS: ICD-10-CM

## 2021-01-01 DIAGNOSIS — I82.403 ACUTE DEEP VEIN THROMBOSIS (DVT) OF BOTH LOWER EXTREMITIES, UNSPECIFIED VEIN: ICD-10-CM

## 2021-01-01 DIAGNOSIS — A49.8 PROTEUS INFECTION: ICD-10-CM

## 2021-01-01 DIAGNOSIS — L97.421 VENOUS STASIS ULCER OF LEFT MIDFOOT LIMITED TO BREAKDOWN OF SKIN WITH VARICOSE VEINS: Primary | ICD-10-CM

## 2021-01-01 DIAGNOSIS — L03.116 MRSA CELLULITIS OF LEFT FOOT: ICD-10-CM

## 2021-01-01 DIAGNOSIS — L97.329 NON-PRESSURE CHRONIC ULCER OF LEFT ANKLE, WITH UNSPECIFIED SEVERITY: Primary | ICD-10-CM

## 2021-01-01 DIAGNOSIS — L97.522 VENOUS STASIS ULCER OF OTHER PART OF LEFT FOOT WITH FAT LAYER EXPOSED WITH VARICOSE VEINS: ICD-10-CM

## 2021-01-01 DIAGNOSIS — L97.421 VENOUS STASIS ULCER OF LEFT MIDFOOT LIMITED TO BREAKDOWN OF SKIN, UNSPECIFIED WHETHER VARICOSE VEINS PRESENT: ICD-10-CM

## 2021-01-01 DIAGNOSIS — I83.025 VENOUS STASIS ULCER OF OTHER PART OF LEFT FOOT WITH FAT LAYER EXPOSED WITH VARICOSE VEINS: ICD-10-CM

## 2021-01-01 DIAGNOSIS — I83.024 VENOUS STASIS ULCER OF LEFT MIDFOOT LIMITED TO BREAKDOWN OF SKIN WITH VARICOSE VEINS: Primary | ICD-10-CM

## 2021-01-01 DIAGNOSIS — S91.302S OPEN WOUND OF LEFT FOOT, SEQUELA: ICD-10-CM

## 2021-01-01 DIAGNOSIS — A49.8 PSEUDOMONAS INFECTION: ICD-10-CM

## 2021-01-01 DIAGNOSIS — L03.115 CELLULITIS OF RIGHT LOWER LEG: ICD-10-CM

## 2021-01-01 DIAGNOSIS — I83.024 VENOUS STASIS ULCER OF LEFT MIDFOOT WITH FAT LAYER EXPOSED WITH VARICOSE VEINS: ICD-10-CM

## 2021-01-01 DIAGNOSIS — E66.01 OBESITY, MORBID (MORE THAN 100 LBS OVER IDEAL WEIGHT OR BMI > 40): ICD-10-CM

## 2021-01-01 DIAGNOSIS — L08.9 LEFT FOOT INFECTION: ICD-10-CM

## 2021-01-01 DIAGNOSIS — L97.909 CHRONIC ULCER OF LOWER EXTREMITY, UNSPECIFIED LATERALITY, UNSPECIFIED ULCER STAGE: ICD-10-CM

## 2021-01-01 DIAGNOSIS — S91.302D OPEN WOUND OF LEFT FOOT, SUBSEQUENT ENCOUNTER: ICD-10-CM

## 2021-01-01 DIAGNOSIS — I87.2 VENOUS STASIS DERMATITIS OF BOTH LOWER EXTREMITIES: Primary | ICD-10-CM

## 2021-01-01 DIAGNOSIS — B95.62 MRSA CELLULITIS OF LEFT FOOT: ICD-10-CM

## 2021-01-01 DIAGNOSIS — E66.01 CLASS 3 SEVERE OBESITY DUE TO EXCESS CALORIES WITH SERIOUS COMORBIDITY AND BODY MASS INDEX (BMI) OF 45.0 TO 49.9 IN ADULT: ICD-10-CM

## 2021-01-01 DIAGNOSIS — I83.024 VENOUS STASIS ULCER OF LEFT MIDFOOT LIMITED TO BREAKDOWN OF SKIN WITH VARICOSE VEINS: ICD-10-CM

## 2021-01-01 DIAGNOSIS — L97.321 ULCER OF ANKLE, LEFT, LIMITED TO BREAKDOWN OF SKIN: ICD-10-CM

## 2021-01-01 DIAGNOSIS — S91.309A WOUND OF FOOT: ICD-10-CM

## 2021-01-01 DIAGNOSIS — I83.024 VENOUS STASIS ULCER OF LEFT MIDFOOT WITH FAT LAYER EXPOSED WITH VARICOSE VEINS: Primary | ICD-10-CM

## 2021-01-01 DIAGNOSIS — L97.812 VENOUS STASIS ULCER OF OTHER PART OF RIGHT LOWER LEG WITH FAT LAYER EXPOSED WITH VARICOSE VEINS: ICD-10-CM

## 2021-01-01 DIAGNOSIS — S81.801A OPEN WOUND OF RIGHT LOWER LEG, INITIAL ENCOUNTER: ICD-10-CM

## 2021-01-01 DIAGNOSIS — L97.929 VARICOSE ULCER OF LEFT LOWER EXTREMITY: ICD-10-CM

## 2021-01-01 DIAGNOSIS — I87.2 ULCER OF EXTREMITY DUE TO CHRONIC VENOUS INSUFFICIENCY: Primary | ICD-10-CM

## 2021-01-01 DIAGNOSIS — L97.421 VENOUS STASIS ULCER OF LEFT MIDFOOT LIMITED TO BREAKDOWN OF SKIN, UNSPECIFIED WHETHER VARICOSE VEINS PRESENT: Primary | ICD-10-CM

## 2021-01-01 DIAGNOSIS — L97.909 VENOUS STASIS ULCER OF LOWER EXTREMITY, UNSPECIFIED LATERALITY: Primary | ICD-10-CM

## 2021-01-01 DIAGNOSIS — I83.009 VENOUS STASIS ULCER OF LOWER EXTREMITY, UNSPECIFIED LATERALITY: ICD-10-CM

## 2021-01-01 DIAGNOSIS — L97.909 VENOUS STASIS ULCER OF LOWER EXTREMITY, UNSPECIFIED LATERALITY: ICD-10-CM

## 2021-01-01 DIAGNOSIS — L03.115 CELLULITIS OF BOTH LOWER EXTREMITIES: ICD-10-CM

## 2021-01-01 DIAGNOSIS — L08.9 WOUND INFECTION: Primary | ICD-10-CM

## 2021-01-01 DIAGNOSIS — R10.9 LEFT SIDED ABDOMINAL PAIN: ICD-10-CM

## 2021-01-01 DIAGNOSIS — I87.319 CHRONIC VENOUS HYPERTENSION WITH ULCER: ICD-10-CM

## 2021-01-01 DIAGNOSIS — Z86.718 HISTORY OF DVT (DEEP VEIN THROMBOSIS): ICD-10-CM

## 2021-01-01 DIAGNOSIS — I87.1 MAY-THURNER SYNDROME: ICD-10-CM

## 2021-01-01 DIAGNOSIS — L97.522: ICD-10-CM

## 2021-01-01 DIAGNOSIS — M54.50 ACUTE RIGHT-SIDED LOW BACK PAIN WITHOUT SCIATICA: Primary | ICD-10-CM

## 2021-01-01 DIAGNOSIS — L97.421 VENOUS STASIS ULCER OF LEFT MIDFOOT LIMITED TO BREAKDOWN OF SKIN WITH VARICOSE VEINS: ICD-10-CM

## 2021-01-01 DIAGNOSIS — A49.8 PSEUDOMONAS INFECTION: Primary | ICD-10-CM

## 2021-01-01 DIAGNOSIS — S91.302D OPEN WOUND OF LEFT FOOT, SUBSEQUENT ENCOUNTER: Primary | ICD-10-CM

## 2021-01-01 DIAGNOSIS — L97.322 VENOUS STASIS ULCER OF LEFT ANKLE WITH FAT LAYER EXPOSED WITH VARICOSE VEINS: Primary | ICD-10-CM

## 2021-01-01 DIAGNOSIS — T14.8XXA WOUND INFECTION: Primary | ICD-10-CM

## 2021-01-01 DIAGNOSIS — L03.116 MRSA CELLULITIS OF LEFT FOOT: Primary | ICD-10-CM

## 2021-01-01 DIAGNOSIS — Z79.01 LONG TERM (CURRENT) USE OF ANTICOAGULANTS: ICD-10-CM

## 2021-01-01 DIAGNOSIS — L98.499 ULCER OF EXTREMITY DUE TO CHRONIC VENOUS INSUFFICIENCY: Primary | ICD-10-CM

## 2021-01-01 DIAGNOSIS — B35.3 TINEA PEDIS OF BOTH FEET: ICD-10-CM

## 2021-01-01 DIAGNOSIS — L97.909 CHRONIC VENOUS HYPERTENSION WITH ULCER: ICD-10-CM

## 2021-01-01 DIAGNOSIS — L03.116 CELLULITIS OF BOTH LOWER EXTREMITIES: ICD-10-CM

## 2021-01-01 DIAGNOSIS — L97.422 VENOUS STASIS ULCER OF LEFT MIDFOOT WITH FAT LAYER EXPOSED WITH VARICOSE VEINS: Primary | ICD-10-CM

## 2021-01-01 DIAGNOSIS — I48.20 CHRONIC ATRIAL FIBRILLATION: ICD-10-CM

## 2021-01-01 DIAGNOSIS — I83.009 VENOUS STASIS ULCER OF LOWER EXTREMITY, UNSPECIFIED LATERALITY: Primary | ICD-10-CM

## 2021-01-01 DIAGNOSIS — I83.029 VARICOSE ULCER OF LEFT LOWER EXTREMITY: ICD-10-CM

## 2021-01-01 DIAGNOSIS — I48.21 PERMANENT ATRIAL FIBRILLATION: Primary | ICD-10-CM

## 2021-01-01 DIAGNOSIS — L97.521 SKIN ULCER OF LEFT FOOT, LIMITED TO BREAKDOWN OF SKIN: ICD-10-CM

## 2021-01-01 DIAGNOSIS — R23.9 ALTERATION IN SKIN INTEGRITY: ICD-10-CM

## 2021-01-01 DIAGNOSIS — B95.62 MRSA CELLULITIS OF LEFT FOOT: Primary | ICD-10-CM

## 2021-01-01 DIAGNOSIS — S81.801A OPEN WOUND OF RIGHT LOWER LEG, INITIAL ENCOUNTER: Primary | ICD-10-CM

## 2021-01-01 DIAGNOSIS — I83.024 VENOUS STASIS ULCER OF LEFT MIDFOOT LIMITED TO BREAKDOWN OF SKIN, UNSPECIFIED WHETHER VARICOSE VEINS PRESENT: Primary | ICD-10-CM

## 2021-01-01 DIAGNOSIS — D63.8 ANEMIA OF CHRONIC DISEASE: ICD-10-CM

## 2021-01-01 DIAGNOSIS — I10 PRIMARY HYPERTENSION: ICD-10-CM

## 2021-01-01 DIAGNOSIS — L97.421: ICD-10-CM

## 2021-01-01 DIAGNOSIS — I83.023 VENOUS STASIS ULCER OF LEFT ANKLE WITH FAT LAYER EXPOSED WITH VARICOSE VEINS: Primary | ICD-10-CM

## 2021-01-01 DIAGNOSIS — E66.01 MORBID OBESITY: ICD-10-CM

## 2021-01-01 DIAGNOSIS — L97.329 NON-PRESSURE CHRONIC ULCER OF LEFT ANKLE, WITH UNSPECIFIED SEVERITY: ICD-10-CM

## 2021-01-01 DIAGNOSIS — I26.99 PULMONARY EMBOLISM, UNSPECIFIED CHRONICITY, UNSPECIFIED PULMONARY EMBOLISM TYPE, UNSPECIFIED WHETHER ACUTE COR PULMONALE PRESENT: ICD-10-CM

## 2021-01-01 DIAGNOSIS — R07.9 CHEST PAIN: ICD-10-CM

## 2021-01-01 DIAGNOSIS — E05.90 HYPERTHYROIDISM: ICD-10-CM

## 2021-01-01 LAB
ALBUMIN SERPL BCP-MCNC: 3.5 G/DL (ref 3.5–5.2)
ALBUMIN SERPL BCP-MCNC: 3.6 G/DL (ref 3.5–5.2)
ALP SERPL-CCNC: 126 U/L (ref 55–135)
ALP SERPL-CCNC: 135 U/L (ref 55–135)
ALT SERPL W/O P-5'-P-CCNC: 16 U/L (ref 10–44)
ALT SERPL W/O P-5'-P-CCNC: 16 U/L (ref 10–44)
ANION GAP SERPL CALC-SCNC: 6 MMOL/L (ref 8–16)
ANION GAP SERPL CALC-SCNC: 7 MMOL/L (ref 8–16)
ANION GAP SERPL CALC-SCNC: 8 MMOL/L (ref 8–16)
ANISOCYTOSIS BLD QL SMEAR: SLIGHT
AST SERPL-CCNC: 19 U/L (ref 10–40)
AST SERPL-CCNC: 26 U/L (ref 10–40)
BACTERIA SPEC AEROBE CULT: ABNORMAL
BACTERIA SPEC ANAEROBE CULT: ABNORMAL
BACTERIA SPEC ANAEROBE CULT: NORMAL
BACTERIA SPEC ANAEROBE CULT: NORMAL
BASOPHILS # BLD AUTO: 0.05 K/UL (ref 0–0.2)
BASOPHILS # BLD AUTO: 0.05 K/UL (ref 0–0.2)
BASOPHILS NFR BLD: 0.8 % (ref 0–1.9)
BASOPHILS NFR BLD: 0.9 % (ref 0–1.9)
BILIRUB SERPL-MCNC: 0.4 MG/DL (ref 0.1–1)
BILIRUB SERPL-MCNC: 0.7 MG/DL (ref 0.1–1)
BILIRUB UR QL STRIP: NEGATIVE
BNP SERPL-MCNC: 82 PG/ML (ref 0–99)
BUN SERPL-MCNC: 18 MG/DL (ref 6–20)
BUN SERPL-MCNC: 26 MG/DL (ref 6–20)
BUN SERPL-MCNC: 28 MG/DL (ref 6–20)
BURR CELLS BLD QL SMEAR: ABNORMAL
CALCIUM SERPL-MCNC: 8.6 MG/DL (ref 8.7–10.5)
CALCIUM SERPL-MCNC: 8.6 MG/DL (ref 8.7–10.5)
CALCIUM SERPL-MCNC: 8.7 MG/DL (ref 8.7–10.5)
CHLORIDE SERPL-SCNC: 108 MMOL/L (ref 95–110)
CHLORIDE SERPL-SCNC: 111 MMOL/L (ref 95–110)
CHLORIDE SERPL-SCNC: 95 MMOL/L (ref 95–110)
CLARITY UR: CLEAR
CO2 SERPL-SCNC: 22 MMOL/L (ref 23–29)
CO2 SERPL-SCNC: 22 MMOL/L (ref 23–29)
CO2 SERPL-SCNC: 26 MMOL/L (ref 23–29)
COLOR UR: YELLOW
CREAT SERPL-MCNC: 0.8 MG/DL (ref 0.5–1.4)
CREAT SERPL-MCNC: 0.9 MG/DL (ref 0.5–1.4)
CREAT SERPL-MCNC: 1 MG/DL (ref 0.5–1.4)
DACRYOCYTES BLD QL SMEAR: ABNORMAL
DIFFERENTIAL METHOD: ABNORMAL
DIFFERENTIAL METHOD: ABNORMAL
EOSINOPHIL # BLD AUTO: 0.2 K/UL (ref 0–0.5)
EOSINOPHIL # BLD AUTO: 0.2 K/UL (ref 0–0.5)
EOSINOPHIL NFR BLD: 2.5 % (ref 0–8)
EOSINOPHIL NFR BLD: 3.4 % (ref 0–8)
ERYTHROCYTE [DISTWIDTH] IN BLOOD BY AUTOMATED COUNT: 15.1 % (ref 11.5–14.5)
ERYTHROCYTE [DISTWIDTH] IN BLOOD BY AUTOMATED COUNT: 16.3 % (ref 11.5–14.5)
EST. GFR  (AFRICAN AMERICAN): >60 ML/MIN/1.73 M^2
EST. GFR  (NON AFRICAN AMERICAN): >60 ML/MIN/1.73 M^2
GLUCOSE SERPL-MCNC: 104 MG/DL (ref 70–110)
GLUCOSE SERPL-MCNC: 114 MG/DL (ref 70–110)
GLUCOSE SERPL-MCNC: 95 MG/DL (ref 70–110)
GLUCOSE UR QL STRIP: NEGATIVE
HCT VFR BLD AUTO: 34.4 % (ref 40–54)
HCT VFR BLD AUTO: 34.9 % (ref 40–54)
HGB BLD-MCNC: 10.6 G/DL (ref 14–18)
HGB BLD-MCNC: 10.6 G/DL (ref 14–18)
HGB UR QL STRIP: NEGATIVE
HYPOCHROMIA BLD QL SMEAR: ABNORMAL
IMM GRANULOCYTES # BLD AUTO: 0.02 K/UL (ref 0–0.04)
IMM GRANULOCYTES # BLD AUTO: 0.02 K/UL (ref 0–0.04)
IMM GRANULOCYTES NFR BLD AUTO: 0.3 % (ref 0–0.5)
IMM GRANULOCYTES NFR BLD AUTO: 0.3 % (ref 0–0.5)
KETONES UR QL STRIP: NEGATIVE
LEUKOCYTE ESTERASE UR QL STRIP: NEGATIVE
LIPASE SERPL-CCNC: 21 U/L (ref 4–60)
LYMPHOCYTES # BLD AUTO: 0.7 K/UL (ref 1–4.8)
LYMPHOCYTES # BLD AUTO: 0.9 K/UL (ref 1–4.8)
LYMPHOCYTES NFR BLD: 11.6 % (ref 18–48)
LYMPHOCYTES NFR BLD: 15.9 % (ref 18–48)
MAGNESIUM SERPL-MCNC: 1.7 MG/DL (ref 1.6–2.6)
MCH RBC QN AUTO: 25 PG (ref 27–31)
MCH RBC QN AUTO: 25.2 PG (ref 27–31)
MCHC RBC AUTO-ENTMCNC: 30.4 G/DL (ref 32–36)
MCHC RBC AUTO-ENTMCNC: 30.8 G/DL (ref 32–36)
MCV RBC AUTO: 82 FL (ref 82–98)
MCV RBC AUTO: 82 FL (ref 82–98)
MONOCYTES # BLD AUTO: 0.4 K/UL (ref 0.3–1)
MONOCYTES # BLD AUTO: 0.6 K/UL (ref 0.3–1)
MONOCYTES NFR BLD: 7.3 % (ref 4–15)
MONOCYTES NFR BLD: 9.9 % (ref 4–15)
NEUTROPHILS # BLD AUTO: 4.1 K/UL (ref 1.8–7.7)
NEUTROPHILS # BLD AUTO: 4.7 K/UL (ref 1.8–7.7)
NEUTROPHILS NFR BLD: 69.6 % (ref 38–73)
NEUTROPHILS NFR BLD: 77.5 % (ref 38–73)
NITRITE UR QL STRIP: NEGATIVE
NRBC BLD-RTO: 0 /100 WBC
NRBC BLD-RTO: 0 /100 WBC
OVALOCYTES BLD QL SMEAR: ABNORMAL
PH UR STRIP: 6 [PH] (ref 5–8)
PLATELET # BLD AUTO: 132 K/UL (ref 150–450)
PLATELET # BLD AUTO: 247 K/UL (ref 150–450)
PLATELET BLD QL SMEAR: ABNORMAL
PMV BLD AUTO: 10.9 FL (ref 9.2–12.9)
PMV BLD AUTO: 9 FL (ref 9.2–12.9)
POCT GLUCOSE: 89 MG/DL (ref 70–110)
POIKILOCYTOSIS BLD QL SMEAR: SLIGHT
POTASSIUM SERPL-SCNC: 3.8 MMOL/L (ref 3.5–5.1)
POTASSIUM SERPL-SCNC: 4.2 MMOL/L (ref 3.5–5.1)
POTASSIUM SERPL-SCNC: 4.3 MMOL/L (ref 3.5–5.1)
PROT SERPL-MCNC: 7.5 G/DL (ref 6–8.4)
PROT SERPL-MCNC: 8.3 G/DL (ref 6–8.4)
PROT UR QL STRIP: ABNORMAL
RBC # BLD AUTO: 4.21 M/UL (ref 4.6–6.2)
RBC # BLD AUTO: 4.24 M/UL (ref 4.6–6.2)
SODIUM SERPL-SCNC: 123 MMOL/L (ref 136–145)
SODIUM SERPL-SCNC: 140 MMOL/L (ref 136–145)
SODIUM SERPL-SCNC: 142 MMOL/L (ref 136–145)
SP GR UR STRIP: 1.03 (ref 1–1.03)
TROPONIN I SERPL DL<=0.01 NG/ML-MCNC: 0.01 NG/ML (ref 0–0.03)
TROPONIN I SERPL DL<=0.01 NG/ML-MCNC: 0.02 NG/ML (ref 0–0.03)
URN SPEC COLLECT METH UR: ABNORMAL
UROBILINOGEN UR STRIP-ACNC: NEGATIVE EU/DL
WBC # BLD AUTO: 5.85 K/UL (ref 3.9–12.7)
WBC # BLD AUTO: 6.02 K/UL (ref 3.9–12.7)

## 2021-01-01 PROCEDURE — 1160F RVW MEDS BY RX/DR IN RCRD: CPT | Mod: CPTII,,, | Performed by: FAMILY MEDICINE

## 2021-01-01 PROCEDURE — 1160F PR REVIEW ALL MEDS BY PRESCRIBER/CLIN PHARMACIST DOCUMENTED: ICD-10-PCS | Mod: CPTII,,, | Performed by: FAMILY MEDICINE

## 2021-01-01 PROCEDURE — 87077 CULTURE AEROBIC IDENTIFY: CPT | Mod: 59 | Performed by: SURGERY

## 2021-01-01 PROCEDURE — 99213 PR OFFICE/OUTPT VISIT, EST, LEVL III, 20-29 MIN: ICD-10-PCS | Mod: S$GLB,,, | Performed by: INTERNAL MEDICINE

## 2021-01-01 PROCEDURE — 1159F MED LIST DOCD IN RCRD: CPT | Mod: CPTII,,, | Performed by: FAMILY MEDICINE

## 2021-01-01 PROCEDURE — 4010F PR ACE/ARB THEARPY RXD/TAKEN: ICD-10-PCS | Mod: CPTII,,, | Performed by: FAMILY MEDICINE

## 2021-01-01 PROCEDURE — 3078F PR MOST RECENT DIASTOLIC BLOOD PRESSURE < 80 MM HG: ICD-10-PCS | Mod: CPTII,S$GLB,, | Performed by: INTERNAL MEDICINE

## 2021-01-01 PROCEDURE — 80053 COMPREHEN METABOLIC PANEL: CPT | Performed by: PHYSICIAN ASSISTANT

## 2021-01-01 PROCEDURE — G0179 MD RECERTIFICATION HHA PT: HCPCS | Mod: ,,, | Performed by: FAMILY MEDICINE

## 2021-01-01 PROCEDURE — 87077 CULTURE AEROBIC IDENTIFY: CPT | Performed by: SURGERY

## 2021-01-01 PROCEDURE — 11045 DBRDMT SUBQ TISS EACH ADDL: CPT

## 2021-01-01 PROCEDURE — 3074F PR MOST RECENT SYSTOLIC BLOOD PRESSURE < 130 MM HG: ICD-10-PCS | Mod: CPTII,,, | Performed by: FAMILY MEDICINE

## 2021-01-01 PROCEDURE — 99999 PR PBB SHADOW E&M-EST. PATIENT-LVL II: ICD-10-PCS | Mod: PBBFAC,,,

## 2021-01-01 PROCEDURE — 80048 BASIC METABOLIC PNL TOTAL CA: CPT | Performed by: EMERGENCY MEDICINE

## 2021-01-01 PROCEDURE — 11042 DBRDMT SUBQ TIS 1ST 20SQCM/<: CPT

## 2021-01-01 PROCEDURE — 29581 APPL MULTLAYER CMPRN SYS LEG: CPT

## 2021-01-01 PROCEDURE — 3078F PR MOST RECENT DIASTOLIC BLOOD PRESSURE < 80 MM HG: ICD-10-PCS | Mod: CPTII,,, | Performed by: FAMILY MEDICINE

## 2021-01-01 PROCEDURE — 11045 DBRDMT SUBQ TISS EACH ADDL: CPT | Mod: ,,, | Performed by: FAMILY MEDICINE

## 2021-01-01 PROCEDURE — 3078F DIAST BP <80 MM HG: CPT | Mod: CPTII,,, | Performed by: FAMILY MEDICINE

## 2021-01-01 PROCEDURE — 25000003 PHARM REV CODE 250: Performed by: EMERGENCY MEDICINE

## 2021-01-01 PROCEDURE — 99999 PR PBB SHADOW E&M-EST. PATIENT-LVL I: CPT | Mod: PBBFAC,,,

## 2021-01-01 PROCEDURE — 3077F PR MOST RECENT SYSTOLIC BLOOD PRESSURE >= 140 MM HG: ICD-10-PCS | Mod: CPTII,,, | Performed by: FAMILY MEDICINE

## 2021-01-01 PROCEDURE — 3008F BODY MASS INDEX DOCD: CPT | Mod: CPTII,,, | Performed by: FAMILY MEDICINE

## 2021-01-01 PROCEDURE — 3008F PR BODY MASS INDEX (BMI) DOCUMENTED: ICD-10-PCS | Mod: CPTII,S$GLB,, | Performed by: INTERNAL MEDICINE

## 2021-01-01 PROCEDURE — 4010F ACE/ARB THERAPY RXD/TAKEN: CPT | Mod: CPTII,S$GLB,, | Performed by: INTERNAL MEDICINE

## 2021-01-01 PROCEDURE — 82962 GLUCOSE BLOOD TEST: CPT

## 2021-01-01 PROCEDURE — 4010F PR ACE/ARB THEARPY RXD/TAKEN: ICD-10-PCS | Mod: CPTII,S$GLB,, | Performed by: INTERNAL MEDICINE

## 2021-01-01 PROCEDURE — 27201912 HC WOUND CARE DEBRIDEMENT SUPPLIES

## 2021-01-01 PROCEDURE — 11045 PR DEB SUBQ TISSUE ADD-ON: ICD-10-PCS | Mod: ,,, | Performed by: FAMILY MEDICINE

## 2021-01-01 PROCEDURE — 99215 PR OFFICE/OUTPT VISIT, EST, LEVL V, 40-54 MIN: ICD-10-PCS | Mod: S$GLB,,, | Performed by: INTERNAL MEDICINE

## 2021-01-01 PROCEDURE — 3074F PR MOST RECENT SYSTOLIC BLOOD PRESSURE < 130 MM HG: ICD-10-PCS | Mod: CPTII,S$GLB,, | Performed by: INTERNAL MEDICINE

## 2021-01-01 PROCEDURE — 99214 OFFICE O/P EST MOD 30 MIN: CPT | Performed by: STUDENT IN AN ORGANIZED HEALTH CARE EDUCATION/TRAINING PROGRAM

## 2021-01-01 PROCEDURE — 99202 PR OFFICE/OUTPT VISIT, NEW, LEVL II, 15-29 MIN: ICD-10-PCS | Mod: 25,,, | Performed by: FAMILY MEDICINE

## 2021-01-01 PROCEDURE — 87076 CULTURE ANAEROBE IDENT EACH: CPT | Performed by: SURGERY

## 2021-01-01 PROCEDURE — 11042 DBRDMT SUBQ TIS 1ST 20SQCM/<: CPT | Mod: ,,, | Performed by: FAMILY MEDICINE

## 2021-01-01 PROCEDURE — 87075 CULTR BACTERIA EXCEPT BLOOD: CPT | Performed by: SURGERY

## 2021-01-01 PROCEDURE — 99999 PR PBB SHADOW E&M-EST. PATIENT-LVL IV: CPT | Mod: PBBFAC,,, | Performed by: INTERNAL MEDICINE

## 2021-01-01 PROCEDURE — 99999 PR PBB SHADOW E&M-EST. PATIENT-LVL I: ICD-10-PCS | Mod: PBBFAC,,,

## 2021-01-01 PROCEDURE — G0179 PR HOME HEALTH MD RECERTIFICATION: ICD-10-PCS | Mod: ,,, | Performed by: FAMILY MEDICINE

## 2021-01-01 PROCEDURE — 87076 CULTURE ANAEROBE IDENT EACH: CPT | Mod: 59 | Performed by: SURGERY

## 2021-01-01 PROCEDURE — 99214 OFFICE O/P EST MOD 30 MIN: CPT | Mod: 25,PN | Performed by: FAMILY MEDICINE

## 2021-01-01 PROCEDURE — 87184 SC STD DISK METHOD PER PLATE: CPT | Performed by: SURGERY

## 2021-01-01 PROCEDURE — 99213 OFFICE O/P EST LOW 20 MIN: CPT | Mod: S$GLB,,, | Performed by: INTERNAL MEDICINE

## 2021-01-01 PROCEDURE — 99285 EMERGENCY DEPT VISIT HI MDM: CPT | Mod: 25

## 2021-01-01 PROCEDURE — 81003 URINALYSIS AUTO W/O SCOPE: CPT | Performed by: PHYSICIAN ASSISTANT

## 2021-01-01 PROCEDURE — 87186 SC STD MICRODIL/AGAR DIL: CPT | Mod: 59 | Performed by: SURGERY

## 2021-01-01 PROCEDURE — 87070 CULTURE OTHR SPECIMN AEROBIC: CPT | Mod: 59 | Performed by: SURGERY

## 2021-01-01 PROCEDURE — 3079F PR MOST RECENT DIASTOLIC BLOOD PRESSURE 80-89 MM HG: ICD-10-PCS | Mod: CPTII,,, | Performed by: FAMILY MEDICINE

## 2021-01-01 PROCEDURE — 93970 EXTREMITY STUDY: CPT | Mod: 26,,, | Performed by: RADIOLOGY

## 2021-01-01 PROCEDURE — 80053 COMPREHEN METABOLIC PANEL: CPT | Performed by: EMERGENCY MEDICINE

## 2021-01-01 PROCEDURE — 87176 TISSUE HOMOGENIZATION CULTR: CPT | Performed by: SURGERY

## 2021-01-01 PROCEDURE — 3074F SYST BP LT 130 MM HG: CPT | Mod: CPTII,S$GLB,, | Performed by: INTERNAL MEDICINE

## 2021-01-01 PROCEDURE — 99202 OFFICE O/P NEW SF 15 MIN: CPT | Mod: 25,,, | Performed by: FAMILY MEDICINE

## 2021-01-01 PROCEDURE — 99499 UNLISTED E&M SERVICE: CPT | Mod: ,,, | Performed by: FAMILY MEDICINE

## 2021-01-01 PROCEDURE — 83735 ASSAY OF MAGNESIUM: CPT | Performed by: EMERGENCY MEDICINE

## 2021-01-01 PROCEDURE — 29580 STRAPPING UNNA BOOT: CPT | Mod: 59,RT

## 2021-01-01 PROCEDURE — 83880 ASSAY OF NATRIURETIC PEPTIDE: CPT | Performed by: EMERGENCY MEDICINE

## 2021-01-01 PROCEDURE — 99212 PR OFFICE/OUTPT VISIT, EST, LEVL II, 10-19 MIN: ICD-10-PCS | Mod: S$GLB,,, | Performed by: INTERNAL MEDICINE

## 2021-01-01 PROCEDURE — 3078F DIAST BP <80 MM HG: CPT | Mod: CPTII,S$GLB,, | Performed by: INTERNAL MEDICINE

## 2021-01-01 PROCEDURE — 87186 SC STD MICRODIL/AGAR DIL: CPT | Performed by: SURGERY

## 2021-01-01 PROCEDURE — 3008F BODY MASS INDEX DOCD: CPT | Mod: CPTII,S$GLB,, | Performed by: INTERNAL MEDICINE

## 2021-01-01 PROCEDURE — 1159F PR MEDICATION LIST DOCUMENTED IN MEDICAL RECORD: ICD-10-PCS | Mod: CPTII,,, | Performed by: FAMILY MEDICINE

## 2021-01-01 PROCEDURE — 11045 DBRDMT SUBQ TISS EACH ADDL: CPT | Mod: PN | Performed by: FAMILY MEDICINE

## 2021-01-01 PROCEDURE — 85025 COMPLETE CBC W/AUTO DIFF WBC: CPT | Performed by: EMERGENCY MEDICINE

## 2021-01-01 PROCEDURE — 87070 CULTURE OTHR SPECIMN AEROBIC: CPT | Performed by: SURGERY

## 2021-01-01 PROCEDURE — 99999 PR PBB SHADOW E&M-EST. PATIENT-LVL II: CPT | Mod: PBBFAC,,,

## 2021-01-01 PROCEDURE — 11042 PR DEBRIDEMENT, SKIN, SUB-Q TISSUE,=<20 SQ CM: ICD-10-PCS | Mod: ,,, | Performed by: FAMILY MEDICINE

## 2021-01-01 PROCEDURE — 96375 TX/PRO/DX INJ NEW DRUG ADDON: CPT

## 2021-01-01 PROCEDURE — 99214 PR OFFICE/OUTPT VISIT, EST, LEVL IV, 30-39 MIN: ICD-10-PCS | Mod: S$GLB,,, | Performed by: INTERNAL MEDICINE

## 2021-01-01 PROCEDURE — 83690 ASSAY OF LIPASE: CPT | Performed by: PHYSICIAN ASSISTANT

## 2021-01-01 PROCEDURE — 3079F DIAST BP 80-89 MM HG: CPT | Mod: CPTII,,, | Performed by: FAMILY MEDICINE

## 2021-01-01 PROCEDURE — 93010 ELECTROCARDIOGRAM REPORT: CPT | Mod: ,,, | Performed by: INTERNAL MEDICINE

## 2021-01-01 PROCEDURE — 85025 COMPLETE CBC W/AUTO DIFF WBC: CPT | Performed by: PHYSICIAN ASSISTANT

## 2021-01-01 PROCEDURE — 99214 OFFICE O/P EST MOD 30 MIN: CPT | Mod: 25

## 2021-01-01 PROCEDURE — 3077F SYST BP >= 140 MM HG: CPT | Mod: CPTII,,, | Performed by: FAMILY MEDICINE

## 2021-01-01 PROCEDURE — 3008F PR BODY MASS INDEX (BMI) DOCUMENTED: ICD-10-PCS | Mod: CPTII,,, | Performed by: FAMILY MEDICINE

## 2021-01-01 PROCEDURE — 4010F ACE/ARB THERAPY RXD/TAKEN: CPT | Mod: CPTII,,, | Performed by: FAMILY MEDICINE

## 2021-01-01 PROCEDURE — 84484 ASSAY OF TROPONIN QUANT: CPT | Mod: 91 | Performed by: EMERGENCY MEDICINE

## 2021-01-01 PROCEDURE — 99215 OFFICE O/P EST HI 40 MIN: CPT | Mod: S$GLB,,, | Performed by: INTERNAL MEDICINE

## 2021-01-01 PROCEDURE — 96374 THER/PROPH/DIAG INJ IV PUSH: CPT

## 2021-01-01 PROCEDURE — 63600175 PHARM REV CODE 636 W HCPCS: Performed by: EMERGENCY MEDICINE

## 2021-01-01 PROCEDURE — 97597 DBRDMT OPN WND 1ST 20 CM/<: CPT

## 2021-01-01 PROCEDURE — 93005 ELECTROCARDIOGRAM TRACING: CPT

## 2021-01-01 PROCEDURE — 11042 DBRDMT SUBQ TIS 1ST 20SQCM/<: CPT | Mod: PN | Performed by: FAMILY MEDICINE

## 2021-01-01 PROCEDURE — 97598 DBRDMT OPN WND ADDL 20CM/<: CPT

## 2021-01-01 PROCEDURE — 29580 STRAPPING UNNA BOOT: CPT

## 2021-01-01 PROCEDURE — 99499 NO LOS: ICD-10-PCS | Mod: ,,, | Performed by: FAMILY MEDICINE

## 2021-01-01 PROCEDURE — 99212 OFFICE O/P EST SF 10 MIN: CPT | Mod: S$GLB,,, | Performed by: INTERNAL MEDICINE

## 2021-01-01 PROCEDURE — 99214 OFFICE O/P EST MOD 30 MIN: CPT | Mod: S$GLB,,, | Performed by: INTERNAL MEDICINE

## 2021-01-01 PROCEDURE — 99999 PR PBB SHADOW E&M-EST. PATIENT-LVL II: CPT | Mod: PBBFAC,,, | Performed by: INTERNAL MEDICINE

## 2021-01-01 PROCEDURE — 99284 EMERGENCY DEPT VISIT MOD MDM: CPT | Mod: 25

## 2021-01-01 PROCEDURE — 3074F SYST BP LT 130 MM HG: CPT | Mod: CPTII,,, | Performed by: FAMILY MEDICINE

## 2021-01-01 PROCEDURE — 63600175 PHARM REV CODE 636 W HCPCS: Performed by: PHYSICIAN ASSISTANT

## 2021-01-01 PROCEDURE — 29581 APPL MULTLAYER CMPRN SYS LEG: CPT | Mod: PN

## 2021-01-01 PROCEDURE — 99999 PR PBB SHADOW E&M-EST. PATIENT-LVL II: ICD-10-PCS | Mod: PBBFAC,,, | Performed by: INTERNAL MEDICINE

## 2021-01-01 PROCEDURE — 99999 PR PBB SHADOW E&M-EST. PATIENT-LVL IV: ICD-10-PCS | Mod: PBBFAC,,, | Performed by: INTERNAL MEDICINE

## 2021-01-01 PROCEDURE — 87075 CULTR BACTERIA EXCEPT BLOOD: CPT | Mod: 59 | Performed by: SURGERY

## 2021-01-01 PROCEDURE — 93970 EXTREMITY STUDY: CPT | Mod: TC

## 2021-01-01 PROCEDURE — 93970 US LOWER EXTREMITY VEINS BILATERAL INSUFFICIENCY: ICD-10-PCS | Mod: 26,,, | Performed by: RADIOLOGY

## 2021-01-01 PROCEDURE — 93010 EKG 12-LEAD: ICD-10-PCS | Mod: ,,, | Performed by: INTERNAL MEDICINE

## 2021-01-01 PROCEDURE — 96372 THER/PROPH/DIAG INJ SC/IM: CPT | Mod: 59

## 2021-01-01 RX ORDER — METRONIDAZOLE 500 MG/1
500 TABLET ORAL EVERY 8 HOURS
Qty: 9 TABLET | Refills: 0 | Status: SHIPPED | OUTPATIENT
Start: 2021-01-01 | End: 2021-01-01

## 2021-01-01 RX ORDER — ORPHENADRINE CITRATE 30 MG/ML
30 INJECTION INTRAMUSCULAR; INTRAVENOUS
Status: COMPLETED | OUTPATIENT
Start: 2021-01-01 | End: 2021-01-01

## 2021-01-01 RX ORDER — SULFAMETHOXAZOLE AND TRIMETHOPRIM 800; 160 MG/1; MG/1
1 TABLET ORAL EVERY 12 HOURS
Qty: 14 TABLET | Refills: 0 | Status: SHIPPED | OUTPATIENT
Start: 2021-01-01 | End: 2021-01-01

## 2021-01-01 RX ORDER — GENTAMICIN SULFATE 1 MG/G
OINTMENT TOPICAL 3 TIMES DAILY
Qty: 30 G | Refills: 3 | Status: SHIPPED | OUTPATIENT
Start: 2021-01-01 | End: 2022-01-01

## 2021-01-01 RX ORDER — LEVOFLOXACIN 750 MG/1
750 TABLET ORAL DAILY
Qty: 14 TABLET | Refills: 0 | Status: SHIPPED | OUTPATIENT
Start: 2021-01-01 | End: 2021-01-01

## 2021-01-01 RX ORDER — LOSARTAN POTASSIUM 25 MG/1
25 TABLET ORAL DAILY
Qty: 90 TABLET | Refills: 3 | Status: SHIPPED | OUTPATIENT
Start: 2021-01-01 | End: 2022-01-01 | Stop reason: SDUPTHER

## 2021-01-01 RX ORDER — DIPHENHYDRAMINE HYDROCHLORIDE 50 MG/ML
25 INJECTION INTRAMUSCULAR; INTRAVENOUS
Status: COMPLETED | OUTPATIENT
Start: 2021-01-01 | End: 2021-01-01

## 2021-01-01 RX ORDER — METHOCARBAMOL 750 MG/1
1500 TABLET, FILM COATED ORAL 3 TIMES DAILY
Qty: 30 TABLET | Refills: 0 | Status: SHIPPED | OUTPATIENT
Start: 2021-01-01 | End: 2021-01-01

## 2021-01-01 RX ORDER — DIAZEPAM 10 MG/2ML
5 INJECTION INTRAMUSCULAR
Status: COMPLETED | OUTPATIENT
Start: 2021-01-01 | End: 2021-01-01

## 2021-01-01 RX ORDER — MORPHINE SULFATE 2 MG/ML
6 INJECTION, SOLUTION INTRAMUSCULAR; INTRAVENOUS
Status: COMPLETED | OUTPATIENT
Start: 2021-01-01 | End: 2021-01-01

## 2021-01-01 RX ORDER — DOXYCYCLINE HYCLATE 100 MG
100 TABLET ORAL 2 TIMES DAILY
Qty: 60 TABLET | Refills: 1 | Status: SHIPPED | OUTPATIENT
Start: 2021-01-01 | End: 2021-01-01

## 2021-01-01 RX ORDER — LIDOCAINE 50 MG/G
1 PATCH TOPICAL
Status: DISCONTINUED | OUTPATIENT
Start: 2021-01-01 | End: 2021-01-01 | Stop reason: HOSPADM

## 2021-01-01 RX ORDER — DEXAMETHASONE SODIUM PHOSPHATE 4 MG/ML
8 INJECTION, SOLUTION INTRA-ARTICULAR; INTRALESIONAL; INTRAMUSCULAR; INTRAVENOUS; SOFT TISSUE
Status: COMPLETED | OUTPATIENT
Start: 2021-01-01 | End: 2021-01-01

## 2021-01-01 RX ORDER — MOXIFLOXACIN HYDROCHLORIDE 400 MG/1
400 TABLET ORAL DAILY
Qty: 30 TABLET | Refills: 1 | Status: SHIPPED | OUTPATIENT
Start: 2021-01-01 | End: 2021-01-01

## 2021-01-01 RX ORDER — DIAZEPAM 10 MG/2ML
5 INJECTION INTRAMUSCULAR
Status: DISCONTINUED | OUTPATIENT
Start: 2021-01-01 | End: 2021-01-01

## 2021-01-01 RX ORDER — COLLAGENASE SANTYL 250 [ARB'U]/G
OINTMENT TOPICAL DAILY
Qty: 30 G | Refills: 3 | Status: SHIPPED | OUTPATIENT
Start: 2021-01-01 | End: 2022-01-01

## 2021-01-01 RX ORDER — SULFAMETHOXAZOLE AND TRIMETHOPRIM 800; 160 MG/1; MG/1
1 TABLET ORAL 2 TIMES DAILY
Qty: 28 TABLET | Refills: 0 | Status: SHIPPED | OUTPATIENT
Start: 2021-01-01 | End: 2022-01-01

## 2021-01-01 RX ORDER — METOPROLOL TARTRATE 25 MG/1
25 TABLET, FILM COATED ORAL
Status: COMPLETED | OUTPATIENT
Start: 2021-01-01 | End: 2021-01-01

## 2021-01-01 RX ORDER — PROCHLORPERAZINE EDISYLATE 5 MG/ML
10 INJECTION INTRAMUSCULAR; INTRAVENOUS
Status: COMPLETED | OUTPATIENT
Start: 2021-01-01 | End: 2021-01-01

## 2021-01-01 RX ORDER — CIPROFLOXACIN 500 MG/1
500 TABLET ORAL EVERY 12 HOURS
Qty: 28 TABLET | Refills: 0 | Status: SHIPPED | OUTPATIENT
Start: 2021-01-01 | End: 2021-01-01

## 2021-01-01 RX ORDER — SULFAMETHOXAZOLE AND TRIMETHOPRIM 800; 160 MG/1; MG/1
1 TABLET ORAL 2 TIMES DAILY
COMMUNITY
End: 2021-01-01 | Stop reason: SDUPTHER

## 2021-01-01 RX ORDER — LIDOCAINE 50 MG/G
1 PATCH TOPICAL DAILY
Qty: 15 PATCH | Refills: 0 | Status: SHIPPED | OUTPATIENT
Start: 2021-01-01 | End: 2022-01-01

## 2021-01-01 RX ORDER — METHOCARBAMOL 750 MG/1
500 TABLET, FILM COATED ORAL 3 TIMES DAILY
COMMUNITY
End: 2022-01-01

## 2021-01-01 RX ORDER — LEVOFLOXACIN 750 MG/1
750 TABLET ORAL DAILY
Qty: 14 TABLET | Refills: 1 | Status: SHIPPED | OUTPATIENT
Start: 2021-01-01 | End: 2021-01-01

## 2021-01-01 RX ORDER — METRONIDAZOLE 500 MG/1
500 TABLET ORAL
COMMUNITY
End: 2022-01-01

## 2021-01-01 RX ADMIN — LIDOCAINE 1 PATCH: 50 PATCH TOPICAL at 10:06

## 2021-01-01 RX ADMIN — DIAZEPAM 5 MG: 10 INJECTION, SOLUTION INTRAMUSCULAR; INTRAVENOUS at 10:06

## 2021-01-01 RX ADMIN — DIPHENHYDRAMINE HYDROCHLORIDE 25 MG: 50 INJECTION INTRAMUSCULAR; INTRAVENOUS at 07:06

## 2021-01-01 RX ADMIN — METOPROLOL TARTRATE 25 MG: 25 TABLET, FILM COATED ORAL at 10:11

## 2021-01-01 RX ADMIN — DEXAMETHASONE SODIUM PHOSPHATE 8 MG: 4 INJECTION, SOLUTION INTRA-ARTICULAR; INTRALESIONAL; INTRAMUSCULAR; INTRAVENOUS; SOFT TISSUE at 09:06

## 2021-01-01 RX ADMIN — PROCHLORPERAZINE EDISYLATE 10 MG: 5 INJECTION INTRAMUSCULAR; INTRAVENOUS at 07:06

## 2021-01-01 RX ADMIN — MORPHINE SULFATE 6 MG: 2 INJECTION, SOLUTION INTRAMUSCULAR; INTRAVENOUS at 06:06

## 2021-01-01 RX ADMIN — ORPHENADRINE CITRATE 30 MG: 60 INJECTION INTRAMUSCULAR; INTRAVENOUS at 08:06

## 2021-01-08 ENCOUNTER — EXTERNAL HOME HEALTH (OUTPATIENT)
Dept: HOME HEALTH SERVICES | Facility: HOSPITAL | Age: 56
End: 2021-01-08

## 2021-01-12 ENCOUNTER — TELEPHONE (OUTPATIENT)
Dept: FAMILY MEDICINE | Facility: HOSPITAL | Age: 56
End: 2021-01-12

## 2021-01-25 ENCOUNTER — HOSPITAL ENCOUNTER (OUTPATIENT)
Dept: WOUND CARE | Facility: HOSPITAL | Age: 56
Discharge: HOME | End: 2021-01-25
Attending: SURGERY
Payer: MEDICARE

## 2021-01-25 VITALS
WEIGHT: 315 LBS | HEART RATE: 85 BPM | TEMPERATURE: 98 F | BODY MASS INDEX: 36.45 KG/M2 | DIASTOLIC BLOOD PRESSURE: 60 MMHG | SYSTOLIC BLOOD PRESSURE: 108 MMHG | HEIGHT: 78 IN

## 2021-01-25 DIAGNOSIS — I83.009 VENOUS STASIS ULCER OF LOWER EXTREMITY, UNSPECIFIED LATERALITY: ICD-10-CM

## 2021-01-25 DIAGNOSIS — L97.909 VENOUS STASIS ULCER OF LOWER EXTREMITY, UNSPECIFIED LATERALITY: ICD-10-CM

## 2021-01-25 DIAGNOSIS — S91.302A OPEN WOUND OF LEFT FOOT, INITIAL ENCOUNTER: Primary | ICD-10-CM

## 2021-01-25 DIAGNOSIS — E66.9 OBESITY, CLASS I, BMI 30.0-34.9 (SEE ACTUAL BMI): ICD-10-CM

## 2021-01-25 DIAGNOSIS — I87.2 VENOUS STASIS DERMATITIS OF BOTH LOWER EXTREMITIES: ICD-10-CM

## 2021-01-25 PROCEDURE — 27201912 HC WOUND CARE DEBRIDEMENT SUPPLIES

## 2021-01-25 PROCEDURE — 11042 DBRDMT SUBQ TIS 1ST 20SQCM/<: CPT

## 2021-01-25 RX ORDER — METOPROLOL SUCCINATE 50 MG/1
50 TABLET, EXTENDED RELEASE ORAL NIGHTLY
Qty: 30 TABLET | Refills: 5 | Status: SHIPPED | OUTPATIENT
Start: 2021-01-25 | End: 2021-02-08 | Stop reason: SDUPTHER

## 2021-02-01 ENCOUNTER — HOSPITAL ENCOUNTER (OUTPATIENT)
Dept: WOUND CARE | Facility: HOSPITAL | Age: 56
Discharge: HOME | End: 2021-02-01
Attending: SURGERY
Payer: MEDICARE

## 2021-02-01 VITALS
WEIGHT: 315 LBS | HEIGHT: 78 IN | DIASTOLIC BLOOD PRESSURE: 66 MMHG | HEART RATE: 112 BPM | TEMPERATURE: 97 F | SYSTOLIC BLOOD PRESSURE: 132 MMHG | BODY MASS INDEX: 36.45 KG/M2

## 2021-02-01 DIAGNOSIS — I87.2 VENOUS STASIS DERMATITIS OF BOTH LOWER EXTREMITIES: Primary | ICD-10-CM

## 2021-02-01 DIAGNOSIS — I83.009 VENOUS STASIS ULCER OF LOWER EXTREMITY, UNSPECIFIED LATERALITY: ICD-10-CM

## 2021-02-01 DIAGNOSIS — S91.302S OPEN WOUND OF LEFT FOOT, SEQUELA: ICD-10-CM

## 2021-02-01 DIAGNOSIS — L03.90 CELLULITIS, UNSPECIFIED CELLULITIS SITE: ICD-10-CM

## 2021-02-01 DIAGNOSIS — L97.909 VENOUS STASIS ULCER OF LOWER EXTREMITY, UNSPECIFIED LATERALITY: ICD-10-CM

## 2021-02-01 PROCEDURE — 29581 APPL MULTLAYER CMPRN SYS LEG: CPT

## 2021-02-01 PROCEDURE — 11042 DBRDMT SUBQ TIS 1ST 20SQCM/<: CPT

## 2021-02-01 PROCEDURE — 99215 OFFICE O/P EST HI 40 MIN: CPT | Mod: 25

## 2021-02-01 PROCEDURE — 27201912 HC WOUND CARE DEBRIDEMENT SUPPLIES

## 2021-02-01 PROCEDURE — 87070 CULTURE OTHR SPECIMN AEROBIC: CPT

## 2021-02-01 PROCEDURE — 87186 SC STD MICRODIL/AGAR DIL: CPT

## 2021-02-01 PROCEDURE — 87077 CULTURE AEROBIC IDENTIFY: CPT

## 2021-02-01 PROCEDURE — 87076 CULTURE ANAEROBE IDENT EACH: CPT | Mod: 59

## 2021-02-01 PROCEDURE — 87075 CULTR BACTERIA EXCEPT BLOOD: CPT

## 2021-02-08 ENCOUNTER — HOSPITAL ENCOUNTER (OUTPATIENT)
Dept: RADIOLOGY | Facility: HOSPITAL | Age: 56
Discharge: HOME | End: 2021-02-08
Attending: STUDENT IN AN ORGANIZED HEALTH CARE EDUCATION/TRAINING PROGRAM
Payer: MEDICARE

## 2021-02-08 ENCOUNTER — OFFICE VISIT (OUTPATIENT)
Dept: FAMILY MEDICINE | Facility: HOSPITAL | Age: 56
End: 2021-02-08
Attending: FAMILY MEDICINE
Payer: MEDICARE

## 2021-02-08 ENCOUNTER — HOSPITAL ENCOUNTER (OUTPATIENT)
Dept: WOUND CARE | Facility: HOSPITAL | Age: 56
Discharge: HOME | End: 2021-02-08
Attending: SURGERY
Payer: MEDICARE

## 2021-02-08 VITALS
HEART RATE: 88 BPM | SYSTOLIC BLOOD PRESSURE: 119 MMHG | WEIGHT: 315 LBS | DIASTOLIC BLOOD PRESSURE: 64 MMHG | HEIGHT: 78 IN | BODY MASS INDEX: 36.45 KG/M2

## 2021-02-08 VITALS
HEIGHT: 78 IN | TEMPERATURE: 98 F | SYSTOLIC BLOOD PRESSURE: 122 MMHG | HEART RATE: 112 BPM | DIASTOLIC BLOOD PRESSURE: 66 MMHG | BODY MASS INDEX: 36.45 KG/M2 | WEIGHT: 315 LBS

## 2021-02-08 DIAGNOSIS — I87.2 VENOUS STASIS DERMATITIS OF BOTH LOWER EXTREMITIES: ICD-10-CM

## 2021-02-08 DIAGNOSIS — S91.302S OPEN WOUND OF LEFT FOOT, SEQUELA: ICD-10-CM

## 2021-02-08 DIAGNOSIS — B95.62 MRSA CELLULITIS OF LEFT FOOT: Primary | ICD-10-CM

## 2021-02-08 DIAGNOSIS — L08.9 LEFT FOOT INFECTION: ICD-10-CM

## 2021-02-08 DIAGNOSIS — L03.90 CELLULITIS, UNSPECIFIED CELLULITIS SITE: ICD-10-CM

## 2021-02-08 DIAGNOSIS — R79.89 ELEVATED TROPONIN: ICD-10-CM

## 2021-02-08 DIAGNOSIS — E05.00 GRAVES DISEASE: ICD-10-CM

## 2021-02-08 DIAGNOSIS — L08.9 LEFT FOOT INFECTION: Primary | ICD-10-CM

## 2021-02-08 DIAGNOSIS — I48.21 PERMANENT ATRIAL FIBRILLATION: ICD-10-CM

## 2021-02-08 DIAGNOSIS — I50.42 CHRONIC COMBINED SYSTOLIC AND DIASTOLIC CONGESTIVE HEART FAILURE: ICD-10-CM

## 2021-02-08 DIAGNOSIS — S91.309A WOUND OF FOOT: ICD-10-CM

## 2021-02-08 DIAGNOSIS — L03.116 MRSA CELLULITIS OF LEFT FOOT: Primary | ICD-10-CM

## 2021-02-08 LAB — BACTERIA SPEC ANAEROBE CULT: ABNORMAL

## 2021-02-08 PROCEDURE — 27201912 HC WOUND CARE DEBRIDEMENT SUPPLIES

## 2021-02-08 PROCEDURE — 73630 X-RAY EXAM OF FOOT: CPT | Mod: 26,LT,, | Performed by: RADIOLOGY

## 2021-02-08 PROCEDURE — 73630 XR FOOT COMPLETE 3 VIEW LEFT: ICD-10-PCS | Mod: 26,LT,, | Performed by: RADIOLOGY

## 2021-02-08 PROCEDURE — 11045 DBRDMT SUBQ TISS EACH ADDL: CPT

## 2021-02-08 PROCEDURE — 11042 DBRDMT SUBQ TIS 1ST 20SQCM/<: CPT

## 2021-02-08 PROCEDURE — 73630 X-RAY EXAM OF FOOT: CPT | Mod: TC,FY,LT

## 2021-02-08 PROCEDURE — 99214 OFFICE O/P EST MOD 30 MIN: CPT | Mod: 25 | Performed by: STUDENT IN AN ORGANIZED HEALTH CARE EDUCATION/TRAINING PROGRAM

## 2021-02-08 RX ORDER — DOXYCYCLINE 100 MG/1
100 CAPSULE ORAL 2 TIMES DAILY
Qty: 28 CAPSULE | Refills: 0 | Status: SHIPPED | OUTPATIENT
Start: 2021-02-08 | End: 2021-02-22

## 2021-02-08 RX ORDER — METOPROLOL SUCCINATE 50 MG/1
50 TABLET, EXTENDED RELEASE ORAL NIGHTLY
Qty: 30 TABLET | Refills: 5 | Status: SHIPPED | OUTPATIENT
Start: 2021-02-08 | End: 2022-01-01 | Stop reason: SDUPTHER

## 2021-02-08 RX ORDER — METOPROLOL SUCCINATE 100 MG/1
100 TABLET, EXTENDED RELEASE ORAL DAILY
Qty: 30 TABLET | Refills: 5 | Status: ON HOLD | OUTPATIENT
Start: 2021-02-08 | End: 2021-03-15 | Stop reason: HOSPADM

## 2021-03-11 ENCOUNTER — HOSPITAL ENCOUNTER (OUTPATIENT)
Dept: WOUND CARE | Facility: HOSPITAL | Age: 56
Discharge: HOME | End: 2021-03-11
Attending: SURGERY
Payer: MEDICARE

## 2021-03-11 ENCOUNTER — HOSPITAL ENCOUNTER (INPATIENT)
Facility: HOSPITAL | Age: 56
LOS: 4 days | Discharge: HOME HEALTH | DRG: 300 | End: 2021-03-15
Attending: EMERGENCY MEDICINE | Admitting: FAMILY MEDICINE
Payer: MEDICARE

## 2021-03-11 VITALS
SYSTOLIC BLOOD PRESSURE: 148 MMHG | HEIGHT: 78 IN | WEIGHT: 315 LBS | DIASTOLIC BLOOD PRESSURE: 81 MMHG | HEART RATE: 105 BPM | BODY MASS INDEX: 36.45 KG/M2 | TEMPERATURE: 98 F

## 2021-03-11 DIAGNOSIS — I83.024: ICD-10-CM

## 2021-03-11 DIAGNOSIS — I87.319 CHRONIC VENOUS HYPERTENSION WITH ULCER: ICD-10-CM

## 2021-03-11 DIAGNOSIS — E66.9 OBESITY, CLASS I, BMI 30.0-34.9 (SEE ACTUAL BMI): ICD-10-CM

## 2021-03-11 DIAGNOSIS — L03.90 CELLULITIS, UNSPECIFIED CELLULITIS SITE: ICD-10-CM

## 2021-03-11 DIAGNOSIS — L97.909 CHRONIC VENOUS HYPERTENSION WITH ULCER: ICD-10-CM

## 2021-03-11 DIAGNOSIS — B35.3 TINEA PEDIS OF BOTH FEET: ICD-10-CM

## 2021-03-11 DIAGNOSIS — L08.9 WOUND INFECTION: ICD-10-CM

## 2021-03-11 DIAGNOSIS — R23.9 ALTERATION IN SKIN INTEGRITY: ICD-10-CM

## 2021-03-11 DIAGNOSIS — E66.01 MORBID OBESITY: ICD-10-CM

## 2021-03-11 DIAGNOSIS — I83.024 VENOUS STASIS ULCER OF LEFT MIDFOOT LIMITED TO BREAKDOWN OF SKIN, UNSPECIFIED WHETHER VARICOSE VEINS PRESENT: ICD-10-CM

## 2021-03-11 DIAGNOSIS — I87.2 VENOUS STASIS DERMATITIS OF BOTH LOWER EXTREMITIES: ICD-10-CM

## 2021-03-11 DIAGNOSIS — L97.909 VENOUS STASIS ULCER OF LOWER EXTREMITY, UNSPECIFIED LATERALITY: ICD-10-CM

## 2021-03-11 DIAGNOSIS — T14.8XXA WOUND INFECTION: ICD-10-CM

## 2021-03-11 DIAGNOSIS — L03.115 CELLULITIS OF RIGHT LOWER LEG: Primary | ICD-10-CM

## 2021-03-11 DIAGNOSIS — L97.421 VENOUS STASIS ULCER OF LEFT MIDFOOT LIMITED TO BREAKDOWN OF SKIN, UNSPECIFIED WHETHER VARICOSE VEINS PRESENT: ICD-10-CM

## 2021-03-11 DIAGNOSIS — I83.024 VENOUS STASIS ULCER OF LEFT MIDFOOT LIMITED TO BREAKDOWN OF SKIN WITH VARICOSE VEINS: ICD-10-CM

## 2021-03-11 DIAGNOSIS — L97.421: ICD-10-CM

## 2021-03-11 DIAGNOSIS — L97.421 VENOUS STASIS ULCER OF LEFT MIDFOOT LIMITED TO BREAKDOWN OF SKIN WITH VARICOSE VEINS: ICD-10-CM

## 2021-03-11 DIAGNOSIS — R07.9 CHEST PAIN: ICD-10-CM

## 2021-03-11 DIAGNOSIS — I83.009 VENOUS STASIS ULCER OF LOWER EXTREMITY, UNSPECIFIED LATERALITY: ICD-10-CM

## 2021-03-11 DIAGNOSIS — S91.302A OPEN WOUND OF LEFT FOOT, INITIAL ENCOUNTER: Primary | ICD-10-CM

## 2021-03-11 LAB
ALBUMIN SERPL BCP-MCNC: 3.7 G/DL (ref 3.5–5.2)
ALP SERPL-CCNC: 122 U/L (ref 55–135)
ALT SERPL W/O P-5'-P-CCNC: 11 U/L (ref 10–44)
ANION GAP SERPL CALC-SCNC: 9 MMOL/L (ref 8–16)
AST SERPL-CCNC: 15 U/L (ref 10–40)
BASOPHILS # BLD AUTO: 0.05 K/UL (ref 0–0.2)
BASOPHILS NFR BLD: 0.8 % (ref 0–1.9)
BILIRUB SERPL-MCNC: 0.7 MG/DL (ref 0.1–1)
BUN SERPL-MCNC: 15 MG/DL (ref 6–20)
CALCIUM SERPL-MCNC: 8.8 MG/DL (ref 8.7–10.5)
CHLORIDE SERPL-SCNC: 108 MMOL/L (ref 95–110)
CO2 SERPL-SCNC: 26 MMOL/L (ref 23–29)
CREAT SERPL-MCNC: 1.2 MG/DL (ref 0.5–1.4)
CTP QC/QA: YES
DIFFERENTIAL METHOD: ABNORMAL
EOSINOPHIL # BLD AUTO: 0.3 K/UL (ref 0–0.5)
EOSINOPHIL NFR BLD: 5.3 % (ref 0–8)
ERYTHROCYTE [DISTWIDTH] IN BLOOD BY AUTOMATED COUNT: 15.2 % (ref 11.5–14.5)
EST. GFR  (AFRICAN AMERICAN): >60 ML/MIN/1.73 M^2
EST. GFR  (NON AFRICAN AMERICAN): >60 ML/MIN/1.73 M^2
GLUCOSE SERPL-MCNC: 78 MG/DL (ref 70–110)
HCT VFR BLD AUTO: 34.7 % (ref 40–54)
HGB BLD-MCNC: 10.9 G/DL (ref 14–18)
IMM GRANULOCYTES # BLD AUTO: 0.01 K/UL (ref 0–0.04)
IMM GRANULOCYTES NFR BLD AUTO: 0.2 % (ref 0–0.5)
LACTATE SERPL-SCNC: 1.2 MMOL/L (ref 0.5–2.2)
LYMPHOCYTES # BLD AUTO: 1.2 K/UL (ref 1–4.8)
LYMPHOCYTES NFR BLD: 19.6 % (ref 18–48)
MCH RBC QN AUTO: 26.9 PG (ref 27–31)
MCHC RBC AUTO-ENTMCNC: 31.4 G/DL (ref 32–36)
MCV RBC AUTO: 86 FL (ref 82–98)
MONOCYTES # BLD AUTO: 0.4 K/UL (ref 0.3–1)
MONOCYTES NFR BLD: 6.5 % (ref 4–15)
NEUTROPHILS # BLD AUTO: 4.1 K/UL (ref 1.8–7.7)
NEUTROPHILS NFR BLD: 67.6 % (ref 38–73)
NRBC BLD-RTO: 0 /100 WBC
PLATELET # BLD AUTO: 276 K/UL (ref 150–350)
PMV BLD AUTO: 9.7 FL (ref 9.2–12.9)
POTASSIUM SERPL-SCNC: 3.8 MMOL/L (ref 3.5–5.1)
PROT SERPL-MCNC: 8.2 G/DL (ref 6–8.4)
RBC # BLD AUTO: 4.05 M/UL (ref 4.6–6.2)
SARS-COV-2 RDRP RESP QL NAA+PROBE: NEGATIVE
SODIUM SERPL-SCNC: 143 MMOL/L (ref 136–145)
WBC # BLD AUTO: 6.02 K/UL (ref 3.9–12.7)

## 2021-03-11 PROCEDURE — 11000001 HC ACUTE MED/SURG PRIVATE ROOM

## 2021-03-11 PROCEDURE — 83605 ASSAY OF LACTIC ACID: CPT | Performed by: NURSE PRACTITIONER

## 2021-03-11 PROCEDURE — 11042 DBRDMT SUBQ TIS 1ST 20SQCM/<: CPT

## 2021-03-11 PROCEDURE — 27201912 HC WOUND CARE DEBRIDEMENT SUPPLIES

## 2021-03-11 PROCEDURE — 25000003 PHARM REV CODE 250: Performed by: NURSE PRACTITIONER

## 2021-03-11 PROCEDURE — 85025 COMPLETE CBC W/AUTO DIFF WBC: CPT | Performed by: NURSE PRACTITIONER

## 2021-03-11 PROCEDURE — 99285 EMERGENCY DEPT VISIT HI MDM: CPT | Mod: 25

## 2021-03-11 PROCEDURE — 11045 DBRDMT SUBQ TISS EACH ADDL: CPT

## 2021-03-11 PROCEDURE — 87070 CULTURE OTHR SPECIMN AEROBIC: CPT | Performed by: SURGERY

## 2021-03-11 PROCEDURE — 80053 COMPREHEN METABOLIC PANEL: CPT | Performed by: NURSE PRACTITIONER

## 2021-03-11 PROCEDURE — 87040 BLOOD CULTURE FOR BACTERIA: CPT | Mod: 59 | Performed by: NURSE PRACTITIONER

## 2021-03-11 PROCEDURE — 63600175 PHARM REV CODE 636 W HCPCS: Performed by: NURSE PRACTITIONER

## 2021-03-11 PROCEDURE — 87186 SC STD MICRODIL/AGAR DIL: CPT | Performed by: SURGERY

## 2021-03-11 PROCEDURE — 87205 SMEAR GRAM STAIN: CPT | Performed by: SURGERY

## 2021-03-11 PROCEDURE — U0002 COVID-19 LAB TEST NON-CDC: HCPCS | Performed by: NURSE PRACTITIONER

## 2021-03-11 PROCEDURE — 96374 THER/PROPH/DIAG INJ IV PUSH: CPT

## 2021-03-11 PROCEDURE — 87077 CULTURE AEROBIC IDENTIFY: CPT | Mod: 59 | Performed by: SURGERY

## 2021-03-11 RX ORDER — GLUCAGON 1 MG
1 KIT INJECTION
Status: DISCONTINUED | OUTPATIENT
Start: 2021-03-11 | End: 2021-03-15 | Stop reason: HOSPADM

## 2021-03-11 RX ORDER — TALC
6 POWDER (GRAM) TOPICAL NIGHTLY PRN
Status: DISCONTINUED | OUTPATIENT
Start: 2021-03-11 | End: 2021-03-15 | Stop reason: HOSPADM

## 2021-03-11 RX ORDER — IBUPROFEN 200 MG
24 TABLET ORAL
Status: DISCONTINUED | OUTPATIENT
Start: 2021-03-11 | End: 2021-03-15 | Stop reason: HOSPADM

## 2021-03-11 RX ORDER — ONDANSETRON 2 MG/ML
4 INJECTION INTRAMUSCULAR; INTRAVENOUS EVERY 8 HOURS PRN
Status: DISCONTINUED | OUTPATIENT
Start: 2021-03-11 | End: 2021-03-15 | Stop reason: HOSPADM

## 2021-03-11 RX ORDER — ACETAMINOPHEN 325 MG/1
650 TABLET ORAL EVERY 4 HOURS PRN
Status: DISCONTINUED | OUTPATIENT
Start: 2021-03-11 | End: 2021-03-15 | Stop reason: HOSPADM

## 2021-03-11 RX ORDER — IBUPROFEN 200 MG
16 TABLET ORAL
Status: DISCONTINUED | OUTPATIENT
Start: 2021-03-11 | End: 2021-03-15 | Stop reason: HOSPADM

## 2021-03-11 RX ORDER — SODIUM CHLORIDE 0.9 % (FLUSH) 0.9 %
10 SYRINGE (ML) INJECTION
Status: DISCONTINUED | OUTPATIENT
Start: 2021-03-11 | End: 2021-03-15 | Stop reason: HOSPADM

## 2021-03-11 RX ORDER — VANCOMYCIN HCL IN 5 % DEXTROSE 1G/250ML
1000 PLASTIC BAG, INJECTION (ML) INTRAVENOUS
Status: DISCONTINUED | OUTPATIENT
Start: 2021-03-11 | End: 2021-03-11 | Stop reason: DRUGHIGH

## 2021-03-11 RX ADMIN — VANCOMYCIN HYDROCHLORIDE 2500 MG: 100 INJECTION, POWDER, LYOPHILIZED, FOR SOLUTION INTRAVENOUS at 04:03

## 2021-03-12 PROBLEM — I73.9 PAD (PERIPHERAL ARTERY DISEASE): Status: ACTIVE | Noted: 2021-03-12

## 2021-03-12 PROBLEM — R23.9 ALTERATION IN SKIN INTEGRITY: Status: ACTIVE | Noted: 2021-03-12

## 2021-03-12 LAB
ALBUMIN SERPL BCP-MCNC: 3.1 G/DL (ref 3.5–5.2)
ALP SERPL-CCNC: 104 U/L (ref 55–135)
ALT SERPL W/O P-5'-P-CCNC: 9 U/L (ref 10–44)
ANION GAP SERPL CALC-SCNC: 7 MMOL/L (ref 8–16)
APTT BLDCRRT: 28.6 SEC (ref 21–32)
AST SERPL-CCNC: 15 U/L (ref 10–40)
BASOPHILS # BLD AUTO: 0.02 K/UL (ref 0–0.2)
BASOPHILS NFR BLD: 0.4 % (ref 0–1.9)
BILIRUB SERPL-MCNC: 0.6 MG/DL (ref 0.1–1)
BUN SERPL-MCNC: 14 MG/DL (ref 6–20)
CALCIUM SERPL-MCNC: 8.2 MG/DL (ref 8.7–10.5)
CHLORIDE SERPL-SCNC: 109 MMOL/L (ref 95–110)
CO2 SERPL-SCNC: 24 MMOL/L (ref 23–29)
CREAT SERPL-MCNC: 1 MG/DL (ref 0.5–1.4)
DIFFERENTIAL METHOD: ABNORMAL
EOSINOPHIL # BLD AUTO: 0.2 K/UL (ref 0–0.5)
EOSINOPHIL NFR BLD: 4 % (ref 0–8)
ERYTHROCYTE [DISTWIDTH] IN BLOOD BY AUTOMATED COUNT: 14.9 % (ref 11.5–14.5)
EST. GFR  (AFRICAN AMERICAN): >60 ML/MIN/1.73 M^2
EST. GFR  (NON AFRICAN AMERICAN): >60 ML/MIN/1.73 M^2
GLUCOSE SERPL-MCNC: 110 MG/DL (ref 70–110)
HCT VFR BLD AUTO: 30.6 % (ref 40–54)
HGB BLD-MCNC: 9.7 G/DL (ref 14–18)
IMM GRANULOCYTES # BLD AUTO: 0.01 K/UL (ref 0–0.04)
IMM GRANULOCYTES NFR BLD AUTO: 0.2 % (ref 0–0.5)
INR PPP: 1.1 (ref 0.8–1.2)
LYMPHOCYTES # BLD AUTO: 0.6 K/UL (ref 1–4.8)
LYMPHOCYTES NFR BLD: 11.9 % (ref 18–48)
MAGNESIUM SERPL-MCNC: 1.7 MG/DL (ref 1.6–2.6)
MCH RBC QN AUTO: 26.7 PG (ref 27–31)
MCHC RBC AUTO-ENTMCNC: 31.7 G/DL (ref 32–36)
MCV RBC AUTO: 84 FL (ref 82–98)
MONOCYTES # BLD AUTO: 0.3 K/UL (ref 0.3–1)
MONOCYTES NFR BLD: 5.6 % (ref 4–15)
NEUTROPHILS # BLD AUTO: 3.9 K/UL (ref 1.8–7.7)
NEUTROPHILS NFR BLD: 77.9 % (ref 38–73)
NRBC BLD-RTO: 0 /100 WBC
PLATELET # BLD AUTO: 215 K/UL (ref 150–350)
PMV BLD AUTO: 9.3 FL (ref 9.2–12.9)
POTASSIUM SERPL-SCNC: 3.7 MMOL/L (ref 3.5–5.1)
PROT SERPL-MCNC: 7.1 G/DL (ref 6–8.4)
PROTHROMBIN TIME: 11.4 SEC (ref 9–12.5)
RBC # BLD AUTO: 3.63 M/UL (ref 4.6–6.2)
SODIUM SERPL-SCNC: 140 MMOL/L (ref 136–145)
WBC # BLD AUTO: 4.96 K/UL (ref 3.9–12.7)

## 2021-03-12 PROCEDURE — 25000003 PHARM REV CODE 250: Performed by: FAMILY MEDICINE

## 2021-03-12 PROCEDURE — 63600175 PHARM REV CODE 636 W HCPCS: Performed by: NURSE PRACTITIONER

## 2021-03-12 PROCEDURE — 11000001 HC ACUTE MED/SURG PRIVATE ROOM

## 2021-03-12 PROCEDURE — 25000003 PHARM REV CODE 250: Performed by: NURSE PRACTITIONER

## 2021-03-12 PROCEDURE — S0030 INJECTION, METRONIDAZOLE: HCPCS | Performed by: NURSE PRACTITIONER

## 2021-03-12 PROCEDURE — 85610 PROTHROMBIN TIME: CPT | Performed by: NURSE PRACTITIONER

## 2021-03-12 PROCEDURE — 83735 ASSAY OF MAGNESIUM: CPT | Performed by: NURSE PRACTITIONER

## 2021-03-12 PROCEDURE — 99223 1ST HOSP IP/OBS HIGH 75: CPT | Mod: ,,, | Performed by: NURSE PRACTITIONER

## 2021-03-12 PROCEDURE — 99223 PR INITIAL HOSPITAL CARE,LEVL III: ICD-10-PCS | Mod: ,,, | Performed by: NURSE PRACTITIONER

## 2021-03-12 PROCEDURE — 85730 THROMBOPLASTIN TIME PARTIAL: CPT | Performed by: NURSE PRACTITIONER

## 2021-03-12 PROCEDURE — 36415 COLL VENOUS BLD VENIPUNCTURE: CPT | Performed by: NURSE PRACTITIONER

## 2021-03-12 PROCEDURE — 99223 PR INITIAL HOSPITAL CARE,LEVL III: ICD-10-PCS | Mod: ,,, | Performed by: STUDENT IN AN ORGANIZED HEALTH CARE EDUCATION/TRAINING PROGRAM

## 2021-03-12 PROCEDURE — 63600175 PHARM REV CODE 636 W HCPCS: Performed by: FAMILY MEDICINE

## 2021-03-12 PROCEDURE — 85025 COMPLETE CBC W/AUTO DIFF WBC: CPT | Performed by: NURSE PRACTITIONER

## 2021-03-12 PROCEDURE — 99223 1ST HOSP IP/OBS HIGH 75: CPT | Mod: ,,, | Performed by: STUDENT IN AN ORGANIZED HEALTH CARE EDUCATION/TRAINING PROGRAM

## 2021-03-12 PROCEDURE — 80053 COMPREHEN METABOLIC PANEL: CPT | Performed by: NURSE PRACTITIONER

## 2021-03-12 RX ORDER — METRONIDAZOLE 500 MG/100ML
500 INJECTION, SOLUTION INTRAVENOUS
Status: DISCONTINUED | OUTPATIENT
Start: 2021-03-12 | End: 2021-03-12

## 2021-03-12 RX ORDER — MUPIROCIN 20 MG/G
OINTMENT TOPICAL 2 TIMES DAILY
Status: DISCONTINUED | OUTPATIENT
Start: 2021-03-12 | End: 2021-03-15 | Stop reason: HOSPADM

## 2021-03-12 RX ORDER — METRONIDAZOLE 500 MG/1
500 TABLET ORAL EVERY 8 HOURS
Status: DISCONTINUED | OUTPATIENT
Start: 2021-03-12 | End: 2021-03-13

## 2021-03-12 RX ORDER — METHIMAZOLE 5 MG/1
20 TABLET ORAL DAILY
Status: DISCONTINUED | OUTPATIENT
Start: 2021-03-12 | End: 2021-03-15 | Stop reason: HOSPADM

## 2021-03-12 RX ORDER — METOPROLOL SUCCINATE 50 MG/1
100 TABLET, EXTENDED RELEASE ORAL DAILY
Status: DISCONTINUED | OUTPATIENT
Start: 2021-03-12 | End: 2021-03-15 | Stop reason: HOSPADM

## 2021-03-12 RX ORDER — CEFEPIME HYDROCHLORIDE 1 G/50ML
1 INJECTION, SOLUTION INTRAVENOUS
Status: DISCONTINUED | OUTPATIENT
Start: 2021-03-12 | End: 2021-03-13

## 2021-03-12 RX ORDER — METOPROLOL SUCCINATE 50 MG/1
50 TABLET, EXTENDED RELEASE ORAL NIGHTLY
Status: DISCONTINUED | OUTPATIENT
Start: 2021-03-12 | End: 2021-03-15 | Stop reason: HOSPADM

## 2021-03-12 RX ORDER — CEFEPIME HYDROCHLORIDE 1 G/50ML
1 INJECTION, SOLUTION INTRAVENOUS
Status: DISCONTINUED | OUTPATIENT
Start: 2021-03-12 | End: 2021-03-12

## 2021-03-12 RX ADMIN — METRONIDAZOLE 500 MG: 500 TABLET ORAL at 09:03

## 2021-03-12 RX ADMIN — MUPIROCIN: 20 OINTMENT TOPICAL at 09:03

## 2021-03-12 RX ADMIN — VANCOMYCIN HYDROCHLORIDE 1500 MG: 1.5 INJECTION, POWDER, LYOPHILIZED, FOR SOLUTION INTRAVENOUS at 05:03

## 2021-03-12 RX ADMIN — VANCOMYCIN HYDROCHLORIDE 2000 MG: 100 INJECTION, POWDER, LYOPHILIZED, FOR SOLUTION INTRAVENOUS at 05:03

## 2021-03-12 RX ADMIN — METOPROLOL SUCCINATE 50 MG: 50 TABLET, EXTENDED RELEASE ORAL at 09:03

## 2021-03-12 RX ADMIN — CEFEPIME HYDROCHLORIDE 1 G: 1 INJECTION, SOLUTION INTRAVENOUS at 08:03

## 2021-03-12 RX ADMIN — METRONIDAZOLE 500 MG: 500 INJECTION, SOLUTION INTRAVENOUS at 02:03

## 2021-03-12 RX ADMIN — MUPIROCIN: 20 OINTMENT TOPICAL at 02:03

## 2021-03-12 RX ADMIN — CEFEPIME HYDROCHLORIDE 1 G: 1 INJECTION, SOLUTION INTRAVENOUS at 04:03

## 2021-03-12 RX ADMIN — METOPROLOL SUCCINATE 100 MG: 50 TABLET, EXTENDED RELEASE ORAL at 08:03

## 2021-03-12 RX ADMIN — METRONIDAZOLE 500 MG: 500 INJECTION, SOLUTION INTRAVENOUS at 05:03

## 2021-03-12 RX ADMIN — CEFEPIME HYDROCHLORIDE 1 G: 1 INJECTION, SOLUTION INTRAVENOUS at 01:03

## 2021-03-12 RX ADMIN — METHIMAZOLE 20 MG: 5 TABLET ORAL at 08:03

## 2021-03-12 RX ADMIN — RIVAROXABAN 20 MG: 20 TABLET, FILM COATED ORAL at 05:03

## 2021-03-13 LAB
ALBUMIN SERPL BCP-MCNC: 3.2 G/DL (ref 3.5–5.2)
ALP SERPL-CCNC: 99 U/L (ref 55–135)
ALT SERPL W/O P-5'-P-CCNC: 9 U/L (ref 10–44)
ANION GAP SERPL CALC-SCNC: 9 MMOL/L (ref 8–16)
AST SERPL-CCNC: 15 U/L (ref 10–40)
BACTERIA SPEC AEROBE CULT: ABNORMAL
BACTERIA SPEC AEROBE CULT: ABNORMAL
BASOPHILS # BLD AUTO: 0.03 K/UL (ref 0–0.2)
BASOPHILS NFR BLD: 0.8 % (ref 0–1.9)
BILIRUB SERPL-MCNC: 0.5 MG/DL (ref 0.1–1)
BUN SERPL-MCNC: 13 MG/DL (ref 6–20)
CALCIUM SERPL-MCNC: 8.1 MG/DL (ref 8.7–10.5)
CHLORIDE SERPL-SCNC: 107 MMOL/L (ref 95–110)
CO2 SERPL-SCNC: 23 MMOL/L (ref 23–29)
CREAT SERPL-MCNC: 0.9 MG/DL (ref 0.5–1.4)
DIFFERENTIAL METHOD: ABNORMAL
EOSINOPHIL # BLD AUTO: 0.2 K/UL (ref 0–0.5)
EOSINOPHIL NFR BLD: 5.7 % (ref 0–8)
ERYTHROCYTE [DISTWIDTH] IN BLOOD BY AUTOMATED COUNT: 15.1 % (ref 11.5–14.5)
EST. GFR  (AFRICAN AMERICAN): >60 ML/MIN/1.73 M^2
EST. GFR  (NON AFRICAN AMERICAN): >60 ML/MIN/1.73 M^2
GLUCOSE SERPL-MCNC: 93 MG/DL (ref 70–110)
HCT VFR BLD AUTO: 32.4 % (ref 40–54)
HGB BLD-MCNC: 10.3 G/DL (ref 14–18)
IMM GRANULOCYTES # BLD AUTO: 0.01 K/UL (ref 0–0.04)
IMM GRANULOCYTES NFR BLD AUTO: 0.3 % (ref 0–0.5)
LYMPHOCYTES # BLD AUTO: 0.6 K/UL (ref 1–4.8)
LYMPHOCYTES NFR BLD: 15 % (ref 18–48)
MAGNESIUM SERPL-MCNC: 1.8 MG/DL (ref 1.6–2.6)
MCH RBC QN AUTO: 26.8 PG (ref 27–31)
MCHC RBC AUTO-ENTMCNC: 31.8 G/DL (ref 32–36)
MCV RBC AUTO: 84 FL (ref 82–98)
MONOCYTES # BLD AUTO: 0.3 K/UL (ref 0.3–1)
MONOCYTES NFR BLD: 8 % (ref 4–15)
NEUTROPHILS # BLD AUTO: 2.7 K/UL (ref 1.8–7.7)
NEUTROPHILS NFR BLD: 70.2 % (ref 38–73)
NRBC BLD-RTO: 0 /100 WBC
PLATELET # BLD AUTO: 214 K/UL (ref 150–350)
PMV BLD AUTO: 9.1 FL (ref 9.2–12.9)
POTASSIUM SERPL-SCNC: 3.8 MMOL/L (ref 3.5–5.1)
PROT SERPL-MCNC: 7.4 G/DL (ref 6–8.4)
RBC # BLD AUTO: 3.84 M/UL (ref 4.6–6.2)
SODIUM SERPL-SCNC: 139 MMOL/L (ref 136–145)
T4 FREE SERPL-MCNC: 1.1 NG/DL (ref 0.71–1.51)
TSH SERPL DL<=0.005 MIU/L-ACNC: 0.02 UIU/ML (ref 0.4–4)
VANCOMYCIN SERPL-MCNC: 21.1 UG/ML
VANCOMYCIN TROUGH SERPL-MCNC: 28.6 UG/ML (ref 10–22)
WBC # BLD AUTO: 3.86 K/UL (ref 3.9–12.7)

## 2021-03-13 PROCEDURE — 83735 ASSAY OF MAGNESIUM: CPT | Performed by: NURSE PRACTITIONER

## 2021-03-13 PROCEDURE — 25000003 PHARM REV CODE 250: Performed by: INTERNAL MEDICINE

## 2021-03-13 PROCEDURE — 63600175 PHARM REV CODE 636 W HCPCS: Performed by: FAMILY MEDICINE

## 2021-03-13 PROCEDURE — 80202 ASSAY OF VANCOMYCIN: CPT | Performed by: FAMILY MEDICINE

## 2021-03-13 PROCEDURE — 85025 COMPLETE CBC W/AUTO DIFF WBC: CPT | Performed by: NURSE PRACTITIONER

## 2021-03-13 PROCEDURE — 80202 ASSAY OF VANCOMYCIN: CPT | Mod: 91 | Performed by: FAMILY MEDICINE

## 2021-03-13 PROCEDURE — 25000003 PHARM REV CODE 250: Performed by: FAMILY MEDICINE

## 2021-03-13 PROCEDURE — 11000001 HC ACUTE MED/SURG PRIVATE ROOM

## 2021-03-13 PROCEDURE — 84443 ASSAY THYROID STIM HORMONE: CPT | Performed by: NURSE PRACTITIONER

## 2021-03-13 PROCEDURE — 63600175 PHARM REV CODE 636 W HCPCS: Performed by: INTERNAL MEDICINE

## 2021-03-13 PROCEDURE — 80053 COMPREHEN METABOLIC PANEL: CPT | Performed by: NURSE PRACTITIONER

## 2021-03-13 PROCEDURE — 25000003 PHARM REV CODE 250: Performed by: NURSE PRACTITIONER

## 2021-03-13 PROCEDURE — 84439 ASSAY OF FREE THYROXINE: CPT | Performed by: NURSE PRACTITIONER

## 2021-03-13 PROCEDURE — 36415 COLL VENOUS BLD VENIPUNCTURE: CPT | Performed by: FAMILY MEDICINE

## 2021-03-13 RX ORDER — DOXYLAMINE SUCCINATE 25 MG
TABLET ORAL 2 TIMES DAILY
Status: DISCONTINUED | OUTPATIENT
Start: 2021-03-13 | End: 2021-03-15 | Stop reason: HOSPADM

## 2021-03-13 RX ADMIN — METRONIDAZOLE 500 MG: 500 TABLET ORAL at 02:03

## 2021-03-13 RX ADMIN — MUPIROCIN: 20 OINTMENT TOPICAL at 09:03

## 2021-03-13 RX ADMIN — RIVAROXABAN 20 MG: 20 TABLET, FILM COATED ORAL at 05:03

## 2021-03-13 RX ADMIN — METHIMAZOLE 20 MG: 5 TABLET ORAL at 09:03

## 2021-03-13 RX ADMIN — METOPROLOL SUCCINATE 50 MG: 50 TABLET, EXTENDED RELEASE ORAL at 10:03

## 2021-03-13 RX ADMIN — CEFEPIME HYDROCHLORIDE 1 G: 1 INJECTION, SOLUTION INTRAVENOUS at 11:03

## 2021-03-13 RX ADMIN — METOPROLOL SUCCINATE 100 MG: 50 TABLET, EXTENDED RELEASE ORAL at 09:03

## 2021-03-13 RX ADMIN — MUPIROCIN: 20 OINTMENT TOPICAL at 10:03

## 2021-03-13 RX ADMIN — MICONAZOLE NITRATE: 20 CREAM TOPICAL at 10:03

## 2021-03-13 RX ADMIN — ACETAMINOPHEN 650 MG: 325 TABLET ORAL at 07:03

## 2021-03-13 RX ADMIN — CEFEPIME HYDROCHLORIDE 1 G: 1 INJECTION, SOLUTION INTRAVENOUS at 04:03

## 2021-03-13 RX ADMIN — METRONIDAZOLE 500 MG: 500 TABLET ORAL at 05:03

## 2021-03-13 RX ADMIN — MEROPENEM 1 G: 1 INJECTION, POWDER, FOR SOLUTION INTRAVENOUS at 10:03

## 2021-03-13 RX ADMIN — VANCOMYCIN HYDROCHLORIDE 1500 MG: 1.5 INJECTION, POWDER, LYOPHILIZED, FOR SOLUTION INTRAVENOUS at 04:03

## 2021-03-14 PROBLEM — R21 RASH OF BACK: Status: ACTIVE | Noted: 2021-03-14

## 2021-03-14 LAB
ALBUMIN SERPL BCP-MCNC: 3.2 G/DL (ref 3.5–5.2)
ALP SERPL-CCNC: 92 U/L (ref 55–135)
ALT SERPL W/O P-5'-P-CCNC: 10 U/L (ref 10–44)
ANION GAP SERPL CALC-SCNC: 8 MMOL/L (ref 8–16)
AST SERPL-CCNC: 17 U/L (ref 10–40)
BASOPHILS # BLD AUTO: 0.04 K/UL (ref 0–0.2)
BASOPHILS # BLD AUTO: 0.05 K/UL (ref 0–0.2)
BASOPHILS NFR BLD: 0.9 % (ref 0–1.9)
BASOPHILS NFR BLD: 1 % (ref 0–1.9)
BILIRUB SERPL-MCNC: 0.5 MG/DL (ref 0.1–1)
BUN SERPL-MCNC: 13 MG/DL (ref 6–20)
CALCIUM SERPL-MCNC: 8.1 MG/DL (ref 8.7–10.5)
CHLORIDE SERPL-SCNC: 108 MMOL/L (ref 95–110)
CO2 SERPL-SCNC: 22 MMOL/L (ref 23–29)
CREAT SERPL-MCNC: 0.9 MG/DL (ref 0.5–1.4)
DIFFERENTIAL METHOD: ABNORMAL
DIFFERENTIAL METHOD: ABNORMAL
EOSINOPHIL # BLD AUTO: 0.3 K/UL (ref 0–0.5)
EOSINOPHIL # BLD AUTO: 0.3 K/UL (ref 0–0.5)
EOSINOPHIL NFR BLD: 5.7 % (ref 0–8)
EOSINOPHIL NFR BLD: 7.3 % (ref 0–8)
ERYTHROCYTE [DISTWIDTH] IN BLOOD BY AUTOMATED COUNT: 15.2 % (ref 11.5–14.5)
ERYTHROCYTE [DISTWIDTH] IN BLOOD BY AUTOMATED COUNT: 15.3 % (ref 11.5–14.5)
EST. GFR  (AFRICAN AMERICAN): >60 ML/MIN/1.73 M^2
EST. GFR  (NON AFRICAN AMERICAN): >60 ML/MIN/1.73 M^2
GLUCOSE SERPL-MCNC: 83 MG/DL (ref 70–110)
HCT VFR BLD AUTO: 31.8 % (ref 40–54)
HCT VFR BLD AUTO: 32.3 % (ref 40–54)
HGB BLD-MCNC: 10.1 G/DL (ref 14–18)
HGB BLD-MCNC: 10.1 G/DL (ref 14–18)
IMM GRANULOCYTES # BLD AUTO: 0.01 K/UL (ref 0–0.04)
IMM GRANULOCYTES # BLD AUTO: 0.02 K/UL (ref 0–0.04)
IMM GRANULOCYTES NFR BLD AUTO: 0.2 % (ref 0–0.5)
IMM GRANULOCYTES NFR BLD AUTO: 0.5 % (ref 0–0.5)
LYMPHOCYTES # BLD AUTO: 0.7 K/UL (ref 1–4.8)
LYMPHOCYTES # BLD AUTO: 0.8 K/UL (ref 1–4.8)
LYMPHOCYTES NFR BLD: 14.4 % (ref 18–48)
LYMPHOCYTES NFR BLD: 17.3 % (ref 18–48)
MAGNESIUM SERPL-MCNC: 1.7 MG/DL (ref 1.6–2.6)
MCH RBC QN AUTO: 26.4 PG (ref 27–31)
MCH RBC QN AUTO: 26.7 PG (ref 27–31)
MCHC RBC AUTO-ENTMCNC: 31.3 G/DL (ref 32–36)
MCHC RBC AUTO-ENTMCNC: 31.8 G/DL (ref 32–36)
MCV RBC AUTO: 84 FL (ref 82–98)
MCV RBC AUTO: 84 FL (ref 82–98)
MONOCYTES # BLD AUTO: 0.4 K/UL (ref 0.3–1)
MONOCYTES # BLD AUTO: 0.7 K/UL (ref 0.3–1)
MONOCYTES NFR BLD: 13 % (ref 4–15)
MONOCYTES NFR BLD: 9.3 % (ref 4–15)
NEUTROPHILS # BLD AUTO: 2.6 K/UL (ref 1.8–7.7)
NEUTROPHILS # BLD AUTO: 3.7 K/UL (ref 1.8–7.7)
NEUTROPHILS NFR BLD: 64.6 % (ref 38–73)
NEUTROPHILS NFR BLD: 65.8 % (ref 38–73)
NRBC BLD-RTO: 0 /100 WBC
NRBC BLD-RTO: 0 /100 WBC
PLATELET # BLD AUTO: 224 K/UL (ref 150–350)
PLATELET # BLD AUTO: 239 K/UL (ref 150–350)
PMV BLD AUTO: 9.4 FL (ref 9.2–12.9)
PMV BLD AUTO: 9.8 FL (ref 9.2–12.9)
POTASSIUM SERPL-SCNC: 3.8 MMOL/L (ref 3.5–5.1)
PROT SERPL-MCNC: 7.2 G/DL (ref 6–8.4)
RBC # BLD AUTO: 3.78 M/UL (ref 4.6–6.2)
RBC # BLD AUTO: 3.83 M/UL (ref 4.6–6.2)
SODIUM SERPL-SCNC: 138 MMOL/L (ref 136–145)
VANCOMYCIN SERPL-MCNC: 14.6 UG/ML
WBC # BLD AUTO: 3.98 K/UL (ref 3.9–12.7)
WBC # BLD AUTO: 5.62 K/UL (ref 3.9–12.7)

## 2021-03-14 PROCEDURE — 93010 EKG 12-LEAD: ICD-10-PCS | Mod: ,,, | Performed by: INTERNAL MEDICINE

## 2021-03-14 PROCEDURE — 36569 INSJ PICC 5 YR+ W/O IMAGING: CPT

## 2021-03-14 PROCEDURE — 93010 ELECTROCARDIOGRAM REPORT: CPT | Mod: ,,, | Performed by: INTERNAL MEDICINE

## 2021-03-14 PROCEDURE — 63600175 PHARM REV CODE 636 W HCPCS: Performed by: INTERNAL MEDICINE

## 2021-03-14 PROCEDURE — 63600175 PHARM REV CODE 636 W HCPCS: Performed by: FAMILY MEDICINE

## 2021-03-14 PROCEDURE — 25000003 PHARM REV CODE 250: Performed by: FAMILY MEDICINE

## 2021-03-14 PROCEDURE — 93005 ELECTROCARDIOGRAM TRACING: CPT

## 2021-03-14 PROCEDURE — 25000003 PHARM REV CODE 250: Performed by: NURSE PRACTITIONER

## 2021-03-14 PROCEDURE — 85025 COMPLETE CBC W/AUTO DIFF WBC: CPT | Performed by: NURSE PRACTITIONER

## 2021-03-14 PROCEDURE — C1751 CATH, INF, PER/CENT/MIDLINE: HCPCS

## 2021-03-14 PROCEDURE — 80053 COMPREHEN METABOLIC PANEL: CPT | Performed by: NURSE PRACTITIONER

## 2021-03-14 PROCEDURE — 83735 ASSAY OF MAGNESIUM: CPT | Performed by: NURSE PRACTITIONER

## 2021-03-14 PROCEDURE — 85025 COMPLETE CBC W/AUTO DIFF WBC: CPT | Mod: 91 | Performed by: FAMILY MEDICINE

## 2021-03-14 PROCEDURE — A4216 STERILE WATER/SALINE, 10 ML: HCPCS | Performed by: FAMILY MEDICINE

## 2021-03-14 PROCEDURE — 25000003 PHARM REV CODE 250: Performed by: INTERNAL MEDICINE

## 2021-03-14 PROCEDURE — 11000001 HC ACUTE MED/SURG PRIVATE ROOM

## 2021-03-14 PROCEDURE — 80202 ASSAY OF VANCOMYCIN: CPT | Performed by: FAMILY MEDICINE

## 2021-03-14 PROCEDURE — 36415 COLL VENOUS BLD VENIPUNCTURE: CPT | Performed by: FAMILY MEDICINE

## 2021-03-14 RX ORDER — SODIUM CHLORIDE 0.9 % (FLUSH) 0.9 %
10 SYRINGE (ML) INJECTION EVERY 6 HOURS
Status: DISCONTINUED | OUTPATIENT
Start: 2021-03-14 | End: 2021-03-15 | Stop reason: HOSPADM

## 2021-03-14 RX ORDER — HYDROXYZINE HYDROCHLORIDE 25 MG/1
25 TABLET, FILM COATED ORAL 3 TIMES DAILY PRN
Status: DISCONTINUED | OUTPATIENT
Start: 2021-03-14 | End: 2021-03-15 | Stop reason: HOSPADM

## 2021-03-14 RX ORDER — SODIUM CHLORIDE 0.9 % (FLUSH) 0.9 %
10 SYRINGE (ML) INJECTION
Status: DISCONTINUED | OUTPATIENT
Start: 2021-03-14 | End: 2021-03-15 | Stop reason: HOSPADM

## 2021-03-14 RX ADMIN — MEROPENEM 1 G: 1 INJECTION, POWDER, FOR SOLUTION INTRAVENOUS at 06:03

## 2021-03-14 RX ADMIN — MICONAZOLE NITRATE: 20 CREAM TOPICAL at 09:03

## 2021-03-14 RX ADMIN — ACETAMINOPHEN 650 MG: 325 TABLET ORAL at 03:03

## 2021-03-14 RX ADMIN — VANCOMYCIN HYDROCHLORIDE 1250 MG: 1.25 INJECTION, POWDER, LYOPHILIZED, FOR SOLUTION INTRAVENOUS at 09:03

## 2021-03-14 RX ADMIN — MUPIROCIN: 20 OINTMENT TOPICAL at 09:03

## 2021-03-14 RX ADMIN — METHIMAZOLE 20 MG: 5 TABLET ORAL at 09:03

## 2021-03-14 RX ADMIN — Medication 10 ML: at 09:03

## 2021-03-14 RX ADMIN — METOPROLOL SUCCINATE 50 MG: 50 TABLET, EXTENDED RELEASE ORAL at 09:03

## 2021-03-14 RX ADMIN — MUPIROCIN 1 TUBE: 20 OINTMENT TOPICAL at 09:03

## 2021-03-14 RX ADMIN — RIVAROXABAN 20 MG: 20 TABLET, FILM COATED ORAL at 03:03

## 2021-03-14 RX ADMIN — MEROPENEM 1 G: 1 INJECTION, POWDER, FOR SOLUTION INTRAVENOUS at 10:03

## 2021-03-14 RX ADMIN — MEROPENEM 1 G: 1 INJECTION, POWDER, FOR SOLUTION INTRAVENOUS at 03:03

## 2021-03-14 RX ADMIN — METOPROLOL SUCCINATE 100 MG: 50 TABLET, EXTENDED RELEASE ORAL at 09:03

## 2021-03-14 RX ADMIN — MICONAZOLE NITRATE 1 EACH: 20 CREAM TOPICAL at 09:03

## 2021-03-15 VITALS
TEMPERATURE: 97 F | SYSTOLIC BLOOD PRESSURE: 129 MMHG | RESPIRATION RATE: 20 BRPM | DIASTOLIC BLOOD PRESSURE: 76 MMHG | HEART RATE: 78 BPM | WEIGHT: 315 LBS | OXYGEN SATURATION: 94 % | BODY MASS INDEX: 36.45 KG/M2 | HEIGHT: 78 IN

## 2021-03-15 PROBLEM — B35.9 RINGWORM: Status: ACTIVE | Noted: 2021-03-15

## 2021-03-15 LAB
ALBUMIN SERPL BCP-MCNC: 3.2 G/DL (ref 3.5–5.2)
ALP SERPL-CCNC: 87 U/L (ref 55–135)
ALT SERPL W/O P-5'-P-CCNC: 12 U/L (ref 10–44)
ANION GAP SERPL CALC-SCNC: 7 MMOL/L (ref 8–16)
AST SERPL-CCNC: 19 U/L (ref 10–40)
BACTERIA TISS AEROBE CULT: ABNORMAL
BACTERIA TISS AEROBE CULT: ABNORMAL
BASOPHILS # BLD AUTO: 0.04 K/UL (ref 0–0.2)
BASOPHILS NFR BLD: 0.9 % (ref 0–1.9)
BILIRUB SERPL-MCNC: 0.5 MG/DL (ref 0.1–1)
BILIRUB UR QL STRIP: NEGATIVE
BUN SERPL-MCNC: 13 MG/DL (ref 6–20)
CALCIUM SERPL-MCNC: 7.9 MG/DL (ref 8.7–10.5)
CHLORIDE SERPL-SCNC: 107 MMOL/L (ref 95–110)
CLARITY UR: CLEAR
CO2 SERPL-SCNC: 23 MMOL/L (ref 23–29)
COLOR UR: YELLOW
CREAT SERPL-MCNC: 1.1 MG/DL (ref 0.5–1.4)
DIFFERENTIAL METHOD: ABNORMAL
EOSINOPHIL # BLD AUTO: 0.3 K/UL (ref 0–0.5)
EOSINOPHIL NFR BLD: 6.5 % (ref 0–8)
ERYTHROCYTE [DISTWIDTH] IN BLOOD BY AUTOMATED COUNT: 15.1 % (ref 11.5–14.5)
EST. GFR  (AFRICAN AMERICAN): >60 ML/MIN/1.73 M^2
EST. GFR  (NON AFRICAN AMERICAN): >60 ML/MIN/1.73 M^2
GLUCOSE SERPL-MCNC: 93 MG/DL (ref 70–110)
GLUCOSE UR QL STRIP: NEGATIVE
GRAM STN SPEC: ABNORMAL
HCT VFR BLD AUTO: 31.3 % (ref 40–54)
HGB BLD-MCNC: 9.9 G/DL (ref 14–18)
HGB UR QL STRIP: NEGATIVE
IMM GRANULOCYTES # BLD AUTO: 0.01 K/UL (ref 0–0.04)
IMM GRANULOCYTES NFR BLD AUTO: 0.2 % (ref 0–0.5)
KETONES UR QL STRIP: NEGATIVE
LEUKOCYTE ESTERASE UR QL STRIP: NEGATIVE
LYMPHOCYTES # BLD AUTO: 0.7 K/UL (ref 1–4.8)
LYMPHOCYTES NFR BLD: 15.3 % (ref 18–48)
MAGNESIUM SERPL-MCNC: 1.7 MG/DL (ref 1.6–2.6)
MCH RBC QN AUTO: 26.5 PG (ref 27–31)
MCHC RBC AUTO-ENTMCNC: 31.6 G/DL (ref 32–36)
MCV RBC AUTO: 84 FL (ref 82–98)
MONOCYTES # BLD AUTO: 0.4 K/UL (ref 0.3–1)
MONOCYTES NFR BLD: 9 % (ref 4–15)
NEUTROPHILS # BLD AUTO: 3 K/UL (ref 1.8–7.7)
NEUTROPHILS NFR BLD: 68.1 % (ref 38–73)
NITRITE UR QL STRIP: NEGATIVE
NRBC BLD-RTO: 0 /100 WBC
PH UR STRIP: 5 [PH] (ref 5–8)
PLATELET # BLD AUTO: 222 K/UL (ref 150–350)
PMV BLD AUTO: 9.2 FL (ref 9.2–12.9)
POTASSIUM SERPL-SCNC: 3.9 MMOL/L (ref 3.5–5.1)
PROT SERPL-MCNC: 7 G/DL (ref 6–8.4)
PROT UR QL STRIP: NEGATIVE
RBC # BLD AUTO: 3.74 M/UL (ref 4.6–6.2)
SODIUM SERPL-SCNC: 137 MMOL/L (ref 136–145)
SP GR UR STRIP: 1.03 (ref 1–1.03)
URN SPEC COLLECT METH UR: NORMAL
UROBILINOGEN UR STRIP-ACNC: NEGATIVE EU/DL
WBC # BLD AUTO: 4.43 K/UL (ref 3.9–12.7)

## 2021-03-15 PROCEDURE — 85025 COMPLETE CBC W/AUTO DIFF WBC: CPT | Performed by: NURSE PRACTITIONER

## 2021-03-15 PROCEDURE — 25000003 PHARM REV CODE 250: Performed by: FAMILY MEDICINE

## 2021-03-15 PROCEDURE — 81003 URINALYSIS AUTO W/O SCOPE: CPT | Performed by: NURSE PRACTITIONER

## 2021-03-15 PROCEDURE — 63600175 PHARM REV CODE 636 W HCPCS: Performed by: FAMILY MEDICINE

## 2021-03-15 PROCEDURE — 80053 COMPREHEN METABOLIC PANEL: CPT | Performed by: NURSE PRACTITIONER

## 2021-03-15 PROCEDURE — 99233 SBSQ HOSP IP/OBS HIGH 50: CPT | Mod: ,,, | Performed by: NURSE PRACTITIONER

## 2021-03-15 PROCEDURE — 93923 UPR/LXTR ART STDY 3+ LVLS: CPT | Mod: CCAT

## 2021-03-15 PROCEDURE — 25000003 PHARM REV CODE 250: Performed by: INTERNAL MEDICINE

## 2021-03-15 PROCEDURE — 25000003 PHARM REV CODE 250: Performed by: NURSE PRACTITIONER

## 2021-03-15 PROCEDURE — 63600175 PHARM REV CODE 636 W HCPCS: Performed by: STUDENT IN AN ORGANIZED HEALTH CARE EDUCATION/TRAINING PROGRAM

## 2021-03-15 PROCEDURE — 83735 ASSAY OF MAGNESIUM: CPT | Performed by: NURSE PRACTITIONER

## 2021-03-15 PROCEDURE — A4216 STERILE WATER/SALINE, 10 ML: HCPCS | Performed by: FAMILY MEDICINE

## 2021-03-15 PROCEDURE — 99233 PR SUBSEQUENT HOSPITAL CARE,LEVL III: ICD-10-PCS | Mod: ,,, | Performed by: NURSE PRACTITIONER

## 2021-03-15 RX ORDER — ATORVASTATIN CALCIUM 40 MG/1
40 TABLET, FILM COATED ORAL NIGHTLY
Qty: 90 TABLET | Refills: 3 | Status: SHIPPED | OUTPATIENT
Start: 2021-03-15 | End: 2022-01-01 | Stop reason: SDUPTHER

## 2021-03-15 RX ORDER — ATORVASTATIN CALCIUM 40 MG/1
40 TABLET, FILM COATED ORAL NIGHTLY
Status: DISCONTINUED | OUTPATIENT
Start: 2021-03-15 | End: 2021-03-15 | Stop reason: HOSPADM

## 2021-03-15 RX ORDER — DOXYLAMINE SUCCINATE 25 MG
TABLET ORAL 2 TIMES DAILY
Qty: 127.5 G | Refills: 1 | Status: SHIPPED | OUTPATIENT
Start: 2021-03-15 | End: 2022-01-01

## 2021-03-15 RX ORDER — CEFEPIME HYDROCHLORIDE 1 G/50ML
2 INJECTION, SOLUTION INTRAVENOUS
Status: DISCONTINUED | OUTPATIENT
Start: 2021-03-15 | End: 2021-03-15 | Stop reason: HOSPADM

## 2021-03-15 RX ORDER — ASPIRIN 81 MG/1
81 TABLET ORAL DAILY
Status: DISCONTINUED | OUTPATIENT
Start: 2021-03-15 | End: 2021-03-15 | Stop reason: HOSPADM

## 2021-03-15 RX ORDER — TERBINAFINE HYDROCHLORIDE 250 MG/1
250 TABLET ORAL DAILY
Qty: 30 TABLET | Refills: 2 | Status: SHIPPED | OUTPATIENT
Start: 2021-03-15 | End: 2021-01-01

## 2021-03-15 RX ORDER — ASPIRIN 81 MG/1
81 TABLET ORAL DAILY
Qty: 30 TABLET | Refills: 2 | Status: SHIPPED | OUTPATIENT
Start: 2021-03-16 | End: 2021-01-01

## 2021-03-15 RX ORDER — CEFEPIME HYDROCHLORIDE 1 G/50ML
2 INJECTION, SOLUTION INTRAVENOUS EVERY 8 HOURS
Qty: 1950 ML | Refills: 0
Start: 2021-03-15 | End: 2021-03-28

## 2021-03-15 RX ADMIN — Medication 10 ML: at 12:03

## 2021-03-15 RX ADMIN — VANCOMYCIN HYDROCHLORIDE 1250 MG: 1.25 INJECTION, POWDER, LYOPHILIZED, FOR SOLUTION INTRAVENOUS at 12:03

## 2021-03-15 RX ADMIN — MICONAZOLE NITRATE 1 EACH: 20 CREAM TOPICAL at 09:03

## 2021-03-15 RX ADMIN — RIVAROXABAN 20 MG: 20 TABLET, FILM COATED ORAL at 06:03

## 2021-03-15 RX ADMIN — MEROPENEM 1 G: 1 INJECTION, POWDER, FOR SOLUTION INTRAVENOUS at 05:03

## 2021-03-15 RX ADMIN — CEFEPIME 2 G: 2 INJECTION, POWDER, FOR SOLUTION INTRAVENOUS at 03:03

## 2021-03-15 RX ADMIN — Medication 10 ML: at 06:03

## 2021-03-15 RX ADMIN — MUPIROCIN 1 TUBE: 20 OINTMENT TOPICAL at 08:03

## 2021-03-15 RX ADMIN — METOPROLOL SUCCINATE 100 MG: 50 TABLET, EXTENDED RELEASE ORAL at 08:03

## 2021-03-15 RX ADMIN — METHIMAZOLE 20 MG: 5 TABLET ORAL at 08:03

## 2021-03-15 RX ADMIN — Medication 10 ML: at 05:03

## 2021-03-15 RX ADMIN — Medication 81 MG: at 12:03

## 2021-03-16 ENCOUNTER — PATIENT OUTREACH (OUTPATIENT)
Dept: ADMINISTRATIVE | Facility: CLINIC | Age: 56
End: 2021-03-16

## 2021-03-16 DIAGNOSIS — L03.119 CELLULITIS OF LOWER EXTREMITY, UNSPECIFIED LATERALITY: Primary | ICD-10-CM

## 2021-03-16 LAB
BACTERIA BLD CULT: NORMAL
BACTERIA BLD CULT: NORMAL

## 2021-03-18 ENCOUNTER — LAB VISIT (OUTPATIENT)
Dept: LAB | Facility: HOSPITAL | Age: 56
End: 2021-03-18
Attending: FAMILY MEDICINE
Payer: MEDICARE

## 2021-03-18 DIAGNOSIS — Z73.9: Primary | ICD-10-CM

## 2021-03-18 PROCEDURE — G0180 PR HOME HEALTH MD CERTIFICATION: ICD-10-PCS | Mod: ,,, | Performed by: FAMILY MEDICINE

## 2021-03-18 PROCEDURE — 80202 ASSAY OF VANCOMYCIN: CPT | Performed by: FAMILY MEDICINE

## 2021-03-18 PROCEDURE — G0180 MD CERTIFICATION HHA PATIENT: HCPCS | Mod: ,,, | Performed by: FAMILY MEDICINE

## 2021-03-18 PROCEDURE — 80048 BASIC METABOLIC PNL TOTAL CA: CPT | Performed by: FAMILY MEDICINE

## 2021-03-19 LAB
ANION GAP SERPL CALC-SCNC: 9 MMOL/L (ref 8–16)
BUN SERPL-MCNC: 18 MG/DL (ref 6–20)
CALCIUM SERPL-MCNC: 8.8 MG/DL (ref 8.7–10.5)
CHLORIDE SERPL-SCNC: 108 MMOL/L (ref 95–110)
CO2 SERPL-SCNC: 25 MMOL/L (ref 23–29)
CREAT SERPL-MCNC: 1.2 MG/DL (ref 0.5–1.4)
EST. GFR  (AFRICAN AMERICAN): >60 ML/MIN/1.73 M^2
EST. GFR  (NON AFRICAN AMERICAN): >60 ML/MIN/1.73 M^2
GLUCOSE SERPL-MCNC: 97 MG/DL (ref 70–110)
POTASSIUM SERPL-SCNC: 4.2 MMOL/L (ref 3.5–5.1)
SODIUM SERPL-SCNC: 142 MMOL/L (ref 136–145)
VANCOMYCIN TROUGH SERPL-MCNC: 22 UG/ML (ref 10–22)

## 2021-03-25 ENCOUNTER — DOCUMENTATION ONLY (OUTPATIENT)
Dept: FAMILY MEDICINE | Facility: HOSPITAL | Age: 56
End: 2021-03-25

## 2021-03-25 ENCOUNTER — TELEPHONE (OUTPATIENT)
Dept: INFECTIOUS DISEASES | Facility: CLINIC | Age: 56
End: 2021-03-25

## 2021-03-29 ENCOUNTER — HOSPITAL ENCOUNTER (OUTPATIENT)
Dept: WOUND CARE | Facility: HOSPITAL | Age: 56
Discharge: HOME | End: 2021-03-29
Attending: SURGERY
Payer: MEDICARE

## 2021-03-29 ENCOUNTER — CLINICAL SUPPORT (OUTPATIENT)
Dept: INFECTIOUS DISEASES | Facility: CLINIC | Age: 56
End: 2021-03-29
Payer: MEDICARE

## 2021-03-29 DIAGNOSIS — A49.8 PSEUDOMONAS INFECTION: ICD-10-CM

## 2021-03-29 DIAGNOSIS — S91.309A WOUND OF FOOT: ICD-10-CM

## 2021-03-29 DIAGNOSIS — B95.62 MRSA CELLULITIS OF LEFT FOOT: Primary | ICD-10-CM

## 2021-03-29 DIAGNOSIS — I83.024 VENOUS STASIS ULCER OF LEFT MIDFOOT LIMITED TO BREAKDOWN OF SKIN WITH VARICOSE VEINS: ICD-10-CM

## 2021-03-29 DIAGNOSIS — L97.909 VENOUS STASIS ULCER OF LOWER EXTREMITY, UNSPECIFIED LATERALITY: ICD-10-CM

## 2021-03-29 DIAGNOSIS — L03.116 MRSA CELLULITIS OF LEFT FOOT: Primary | ICD-10-CM

## 2021-03-29 DIAGNOSIS — I83.009 VENOUS STASIS ULCER OF LOWER EXTREMITY, UNSPECIFIED LATERALITY: ICD-10-CM

## 2021-03-29 DIAGNOSIS — S91.302S OPEN WOUND OF LEFT FOOT, SEQUELA: Primary | ICD-10-CM

## 2021-03-29 DIAGNOSIS — L97.909 CHRONIC VENOUS HYPERTENSION WITH ULCER: ICD-10-CM

## 2021-03-29 DIAGNOSIS — I87.319 CHRONIC VENOUS HYPERTENSION WITH ULCER: ICD-10-CM

## 2021-03-29 DIAGNOSIS — E66.01 CLASS 2 SEVERE OBESITY DUE TO EXCESS CALORIES WITH SERIOUS COMORBIDITY AND BODY MASS INDEX (BMI) OF 39.0 TO 39.9 IN ADULT: ICD-10-CM

## 2021-03-29 DIAGNOSIS — L03.119 CELLULITIS OF LOWER EXTREMITY, UNSPECIFIED LATERALITY: ICD-10-CM

## 2021-03-29 DIAGNOSIS — I87.2 VENOUS STASIS DERMATITIS OF BOTH LOWER EXTREMITIES: ICD-10-CM

## 2021-03-29 DIAGNOSIS — L03.90 CELLULITIS, UNSPECIFIED CELLULITIS SITE: ICD-10-CM

## 2021-03-29 DIAGNOSIS — Z45.2 PICC (PERIPHERALLY INSERTED CENTRAL CATHETER) REMOVAL: ICD-10-CM

## 2021-03-29 DIAGNOSIS — Z79.2 RECEIVING INTRAVENOUS ANTIBIOTIC TREATMENT AS OUTPATIENT: ICD-10-CM

## 2021-03-29 DIAGNOSIS — R60.0 BILATERAL EDEMA OF LOWER EXTREMITY: ICD-10-CM

## 2021-03-29 DIAGNOSIS — L97.421 VENOUS STASIS ULCER OF LEFT MIDFOOT LIMITED TO BREAKDOWN OF SKIN WITH VARICOSE VEINS: ICD-10-CM

## 2021-03-29 PROCEDURE — 99999 PR PBB SHADOW E&M-EST. PATIENT-LVL II: CPT | Mod: PBBFAC,,,

## 2021-03-29 PROCEDURE — 99214 OFFICE O/P EST MOD 30 MIN: CPT | Mod: S$GLB,,, | Performed by: INTERNAL MEDICINE

## 2021-03-29 PROCEDURE — 11042 DBRDMT SUBQ TIS 1ST 20SQCM/<: CPT

## 2021-03-29 PROCEDURE — 27201912 HC WOUND CARE DEBRIDEMENT SUPPLIES

## 2021-03-29 PROCEDURE — 99999 PR PBB SHADOW E&M-EST. PATIENT-LVL II: ICD-10-PCS | Mod: PBBFAC,,,

## 2021-03-29 PROCEDURE — 99214 PR OFFICE/OUTPT VISIT, EST, LEVL IV, 30-39 MIN: ICD-10-PCS | Mod: S$GLB,,, | Performed by: INTERNAL MEDICINE

## 2021-03-30 ENCOUNTER — DOCUMENT SCAN (OUTPATIENT)
Dept: HOME HEALTH SERVICES | Facility: HOSPITAL | Age: 56
End: 2021-03-30

## 2021-03-31 ENCOUNTER — DOCUMENT SCAN (OUTPATIENT)
Dept: HOME HEALTH SERVICES | Facility: HOSPITAL | Age: 56
End: 2021-03-31

## 2021-04-05 DIAGNOSIS — Z86.718 HISTORY OF DVT (DEEP VEIN THROMBOSIS): ICD-10-CM

## 2021-04-05 DIAGNOSIS — Z79.01 LONG TERM (CURRENT) USE OF ANTICOAGULANTS: ICD-10-CM

## 2021-04-06 ENCOUNTER — EXTERNAL HOME HEALTH (OUTPATIENT)
Dept: HOME HEALTH SERVICES | Facility: HOSPITAL | Age: 56
End: 2021-04-06
Payer: MEDICARE

## 2021-04-07 RX ORDER — RIVAROXABAN 20 MG/1
20 TABLET, FILM COATED ORAL
Qty: 90 TABLET | Refills: 1 | Status: SHIPPED | OUTPATIENT
Start: 2021-04-07 | End: 2021-01-01 | Stop reason: SDUPTHER

## 2021-04-08 ENCOUNTER — HOSPITAL ENCOUNTER (OUTPATIENT)
Dept: WOUND CARE | Facility: HOSPITAL | Age: 56
Discharge: HOME | End: 2021-04-08
Attending: SURGERY
Payer: MEDICARE

## 2021-04-08 VITALS
DIASTOLIC BLOOD PRESSURE: 77 MMHG | SYSTOLIC BLOOD PRESSURE: 134 MMHG | WEIGHT: 315 LBS | HEART RATE: 106 BPM | TEMPERATURE: 99 F | BODY MASS INDEX: 36.45 KG/M2 | HEIGHT: 78 IN

## 2021-04-08 DIAGNOSIS — E66.01 CLASS 2 SEVERE OBESITY DUE TO EXCESS CALORIES WITH SERIOUS COMORBIDITY AND BODY MASS INDEX (BMI) OF 39.0 TO 39.9 IN ADULT: ICD-10-CM

## 2021-04-08 DIAGNOSIS — I87.319 CHRONIC VENOUS HYPERTENSION WITH ULCER: ICD-10-CM

## 2021-04-08 DIAGNOSIS — I83.009 VENOUS STASIS ULCER OF LOWER EXTREMITY, UNSPECIFIED LATERALITY: ICD-10-CM

## 2021-04-08 DIAGNOSIS — I87.2 VENOUS STASIS DERMATITIS OF BOTH LOWER EXTREMITIES: ICD-10-CM

## 2021-04-08 DIAGNOSIS — L97.909 VENOUS STASIS ULCER OF LOWER EXTREMITY, UNSPECIFIED LATERALITY: ICD-10-CM

## 2021-04-08 DIAGNOSIS — S91.302S OPEN WOUND OF LEFT FOOT, SEQUELA: Primary | ICD-10-CM

## 2021-04-08 DIAGNOSIS — L97.909 CHRONIC VENOUS HYPERTENSION WITH ULCER: ICD-10-CM

## 2021-04-08 PROCEDURE — 27201912 HC WOUND CARE DEBRIDEMENT SUPPLIES

## 2021-04-08 PROCEDURE — 11042 DBRDMT SUBQ TIS 1ST 20SQCM/<: CPT

## 2021-04-08 PROCEDURE — 11045 DBRDMT SUBQ TISS EACH ADDL: CPT

## 2021-04-08 PROCEDURE — 29581 APPL MULTLAYER CMPRN SYS LEG: CPT

## 2021-04-16 ENCOUNTER — DOCUMENT SCAN (OUTPATIENT)
Dept: HOME HEALTH SERVICES | Facility: HOSPITAL | Age: 56
End: 2021-04-16
Payer: MEDICARE

## 2021-04-29 ENCOUNTER — OFFICE VISIT (OUTPATIENT)
Dept: PODIATRY | Facility: CLINIC | Age: 56
End: 2021-04-29
Payer: MEDICARE

## 2021-04-29 VITALS — HEIGHT: 78 IN | WEIGHT: 315 LBS | BODY MASS INDEX: 36.45 KG/M2

## 2021-04-29 DIAGNOSIS — B35.1 ONYCHOMYCOSIS: ICD-10-CM

## 2021-04-29 DIAGNOSIS — I73.9 PAD (PERIPHERAL ARTERY DISEASE): Primary | ICD-10-CM

## 2021-04-29 DIAGNOSIS — I87.2 VENOUS INSUFFICIENCY OF BOTH LOWER EXTREMITIES: ICD-10-CM

## 2021-04-29 PROCEDURE — 11721 ROUTINE FOOT CARE: ICD-10-PCS | Mod: Q8,S$GLB,, | Performed by: PODIATRIST

## 2021-04-29 PROCEDURE — 99999 PR PBB SHADOW E&M-EST. PATIENT-LVL IV: ICD-10-PCS | Mod: PBBFAC,,, | Performed by: PODIATRIST

## 2021-04-29 PROCEDURE — 1126F PR PAIN SEVERITY QUANTIFIED, NO PAIN PRESENT: ICD-10-PCS | Mod: S$GLB,,, | Performed by: PODIATRIST

## 2021-04-29 PROCEDURE — 1126F AMNT PAIN NOTED NONE PRSNT: CPT | Mod: S$GLB,,, | Performed by: PODIATRIST

## 2021-04-29 PROCEDURE — 99213 PR OFFICE/OUTPT VISIT, EST, LEVL III, 20-29 MIN: ICD-10-PCS | Mod: 25,S$GLB,, | Performed by: PODIATRIST

## 2021-04-29 PROCEDURE — 11721 DEBRIDE NAIL 6 OR MORE: CPT | Mod: Q8,S$GLB,, | Performed by: PODIATRIST

## 2021-04-29 PROCEDURE — 3008F PR BODY MASS INDEX (BMI) DOCUMENTED: ICD-10-PCS | Mod: CPTII,S$GLB,, | Performed by: PODIATRIST

## 2021-04-29 PROCEDURE — 99213 OFFICE O/P EST LOW 20 MIN: CPT | Mod: 25,S$GLB,, | Performed by: PODIATRIST

## 2021-04-29 PROCEDURE — 3008F BODY MASS INDEX DOCD: CPT | Mod: CPTII,S$GLB,, | Performed by: PODIATRIST

## 2021-04-29 PROCEDURE — 99999 PR PBB SHADOW E&M-EST. PATIENT-LVL IV: CPT | Mod: PBBFAC,,, | Performed by: PODIATRIST

## 2021-05-03 ENCOUNTER — CLINICAL SUPPORT (OUTPATIENT)
Dept: INFECTIOUS DISEASES | Facility: CLINIC | Age: 56
End: 2021-05-03
Payer: MEDICARE

## 2021-05-03 ENCOUNTER — HOSPITAL ENCOUNTER (OUTPATIENT)
Dept: WOUND CARE | Facility: HOSPITAL | Age: 56
Discharge: HOME | End: 2021-05-03
Attending: SURGERY
Payer: MEDICARE

## 2021-05-03 VITALS
WEIGHT: 315 LBS | BODY MASS INDEX: 36.45 KG/M2 | SYSTOLIC BLOOD PRESSURE: 139 MMHG | HEART RATE: 109 BPM | DIASTOLIC BLOOD PRESSURE: 79 MMHG | HEIGHT: 78 IN | TEMPERATURE: 97 F

## 2021-05-03 DIAGNOSIS — S91.302S OPEN WOUND OF LEFT FOOT, SEQUELA: ICD-10-CM

## 2021-05-03 DIAGNOSIS — I83.009 VENOUS STASIS ULCER OF LOWER EXTREMITY, UNSPECIFIED LATERALITY: ICD-10-CM

## 2021-05-03 DIAGNOSIS — L97.909 VENOUS STASIS ULCER OF LOWER EXTREMITY, UNSPECIFIED LATERALITY: ICD-10-CM

## 2021-05-03 DIAGNOSIS — E66.01 CLASS 2 SEVERE OBESITY DUE TO EXCESS CALORIES WITH SERIOUS COMORBIDITY AND BODY MASS INDEX (BMI) OF 39.0 TO 39.9 IN ADULT: ICD-10-CM

## 2021-05-03 DIAGNOSIS — I87.2 VENOUS STASIS DERMATITIS OF BOTH LOWER EXTREMITIES: Primary | ICD-10-CM

## 2021-05-03 DIAGNOSIS — I87.2 VENOUS STASIS DERMATITIS, UNSPECIFIED LATERALITY: Primary | ICD-10-CM

## 2021-05-03 PROCEDURE — 27201912 HC WOUND CARE DEBRIDEMENT SUPPLIES

## 2021-05-03 PROCEDURE — 99213 PR OFFICE/OUTPT VISIT, EST, LEVL III, 20-29 MIN: ICD-10-PCS | Mod: S$GLB,,, | Performed by: INTERNAL MEDICINE

## 2021-05-03 PROCEDURE — 11042 DBRDMT SUBQ TIS 1ST 20SQCM/<: CPT

## 2021-05-03 PROCEDURE — 11045 DBRDMT SUBQ TISS EACH ADDL: CPT

## 2021-05-03 PROCEDURE — 99213 OFFICE O/P EST LOW 20 MIN: CPT | Mod: S$GLB,,, | Performed by: INTERNAL MEDICINE

## 2021-05-06 RX ORDER — COLLAGENASE SANTYL 250 [ARB'U]/G
OINTMENT TOPICAL DAILY
Qty: 30 G | Refills: 3 | Status: SHIPPED | OUTPATIENT
Start: 2021-05-06 | End: 2021-01-01 | Stop reason: SDUPTHER

## 2021-08-23 PROBLEM — Z79.2 RECEIVING INTRAVENOUS ANTIBIOTIC TREATMENT AS OUTPATIENT: Status: RESOLVED | Noted: 2021-03-29 | Resolved: 2021-01-01

## 2021-11-03 PROBLEM — I50.30 (HFPEF) HEART FAILURE WITH PRESERVED EJECTION FRACTION: Status: RESOLVED | Noted: 2019-09-10 | Resolved: 2021-01-01

## 2021-11-03 PROBLEM — I50.22 CHRONIC SYSTOLIC HEART FAILURE: Status: RESOLVED | Noted: 2017-09-06 | Resolved: 2021-01-01

## 2021-11-30 NOTE — PROCEDURES
"Debridement    Date/Time: 1/7/2020 12:51 PM  Performed by: Pool Gutierrez MD  Authorized by: Pool Gutierrez MD     Time out: Immediately prior to procedure a "time out" was called to verify the correct patient, procedure, equipment, support staff and site/side marked as required.    Consent Done?:  Yes (Verbal)    Preparation: Patient was prepped and draped in usual sterile fashion    Local anesthesia used?: Yes    Local anesthetic:  Topical anesthetic    Wound Details:    Location:  Left foot    Location:  Left Dorsal Foot    Type of Debridement:  Excisional       Length (cm):  1.7       Area (sq cm):  7.65       Width (cm):  4.5       Percent Debrided (%):  100       Depth (cm):  0.1       Total Area Debrided (sq cm):  7.65    Depth of debridement:  Subcutaneous tissue    Tissue debrided:  Subcutaneous and Dermis    Devitalized tissue debrided:  Biofilm, Fibrin and Slough    Instruments:  Curette    Bleeding:  Minimal  Patient tolerance:  Patient tolerated the procedure well with no immediate complications      " chest pain

## 2022-01-01 ENCOUNTER — HOSPITAL ENCOUNTER (INPATIENT)
Facility: HOSPITAL | Age: 57
LOS: 1 days | Discharge: HOME HEALTH | DRG: 308 | End: 2022-03-17
Attending: EMERGENCY MEDICINE | Admitting: STUDENT IN AN ORGANIZED HEALTH CARE EDUCATION/TRAINING PROGRAM
Payer: MEDICARE

## 2022-01-01 ENCOUNTER — PATIENT OUTREACH (OUTPATIENT)
Dept: ADMINISTRATIVE | Facility: OTHER | Age: 57
End: 2022-01-01
Payer: MEDICARE

## 2022-01-01 ENCOUNTER — TELEPHONE (OUTPATIENT)
Dept: ORTHOPEDICS | Facility: CLINIC | Age: 57
End: 2022-01-01
Payer: MEDICARE

## 2022-01-01 ENCOUNTER — HOSPITAL ENCOUNTER (OUTPATIENT)
Dept: WOUND CARE | Facility: HOSPITAL | Age: 57
Discharge: HOME | End: 2022-01-03
Attending: SURGERY
Payer: MEDICARE

## 2022-01-01 ENCOUNTER — DOCUMENT SCAN (OUTPATIENT)
Dept: HOME HEALTH SERVICES | Facility: HOSPITAL | Age: 57
End: 2022-01-01
Payer: MEDICARE

## 2022-01-01 ENCOUNTER — CLINICAL SUPPORT (OUTPATIENT)
Dept: INFECTIOUS DISEASES | Facility: CLINIC | Age: 57
End: 2022-01-01
Payer: MEDICARE

## 2022-01-01 ENCOUNTER — ANESTHESIA EVENT (OUTPATIENT)
Dept: INTENSIVE CARE | Facility: HOSPITAL | Age: 57
DRG: 270 | End: 2022-01-01
Payer: MEDICARE

## 2022-01-01 ENCOUNTER — HOSPITAL ENCOUNTER (OUTPATIENT)
Dept: WOUND CARE | Facility: HOSPITAL | Age: 57
Discharge: HOME | End: 2022-04-28
Attending: SURGERY
Payer: MEDICARE

## 2022-01-01 ENCOUNTER — EXTERNAL HOME HEALTH (OUTPATIENT)
Dept: HOME HEALTH SERVICES | Facility: HOSPITAL | Age: 57
End: 2022-01-01
Payer: MEDICARE

## 2022-01-01 ENCOUNTER — OFFICE VISIT (OUTPATIENT)
Dept: ORTHOPEDICS | Facility: CLINIC | Age: 57
End: 2022-01-01
Payer: MEDICARE

## 2022-01-01 ENCOUNTER — HOSPITAL ENCOUNTER (INPATIENT)
Facility: HOSPITAL | Age: 57
LOS: 6 days | DRG: 270 | End: 2022-05-09
Attending: EMERGENCY MEDICINE | Admitting: FAMILY MEDICINE
Payer: MEDICARE

## 2022-01-01 ENCOUNTER — HOSPITAL ENCOUNTER (OUTPATIENT)
Dept: RADIOLOGY | Facility: HOSPITAL | Age: 57
Discharge: HOME | End: 2022-02-10
Attending: ORTHOPAEDIC SURGERY
Payer: MEDICARE

## 2022-01-01 ENCOUNTER — HOSPITAL ENCOUNTER (OUTPATIENT)
Dept: WOUND CARE | Facility: HOSPITAL | Age: 57
Discharge: HOME | End: 2022-02-14
Attending: SURGERY
Payer: MEDICARE

## 2022-01-01 ENCOUNTER — OFFICE VISIT (OUTPATIENT)
Dept: CARDIOLOGY | Facility: CLINIC | Age: 57
End: 2022-01-01
Payer: MEDICARE

## 2022-01-01 ENCOUNTER — HOSPITAL ENCOUNTER (OUTPATIENT)
Dept: WOUND CARE | Facility: HOSPITAL | Age: 57
Discharge: HOME | End: 2022-01-31
Attending: SURGERY
Payer: MEDICARE

## 2022-01-01 ENCOUNTER — HOSPITAL ENCOUNTER (OUTPATIENT)
Dept: RADIOLOGY | Facility: HOSPITAL | Age: 57
Discharge: HOME | End: 2022-02-10
Attending: INTERNAL MEDICINE
Payer: MEDICARE

## 2022-01-01 ENCOUNTER — TELEPHONE (OUTPATIENT)
Dept: CARDIOLOGY | Facility: HOSPITAL | Age: 57
End: 2022-01-01
Payer: MEDICARE

## 2022-01-01 ENCOUNTER — HOSPITAL ENCOUNTER (OUTPATIENT)
Facility: HOSPITAL | Age: 57
Discharge: HOME | End: 2022-01-20
Attending: INTERNAL MEDICINE | Admitting: INTERNAL MEDICINE
Payer: MEDICARE

## 2022-01-01 ENCOUNTER — PATIENT MESSAGE (OUTPATIENT)
Dept: ADMINISTRATIVE | Facility: CLINIC | Age: 57
End: 2022-01-01
Payer: MEDICARE

## 2022-01-01 ENCOUNTER — HOSPITAL ENCOUNTER (OUTPATIENT)
Dept: RADIOLOGY | Facility: HOSPITAL | Age: 57
Discharge: HOME | End: 2022-01-14
Attending: ORTHOPAEDIC SURGERY
Payer: MEDICARE

## 2022-01-01 ENCOUNTER — ANESTHESIA (OUTPATIENT)
Dept: INTENSIVE CARE | Facility: HOSPITAL | Age: 57
DRG: 270 | End: 2022-01-01
Payer: MEDICARE

## 2022-01-01 ENCOUNTER — HOSPITAL ENCOUNTER (OUTPATIENT)
Dept: WOUND CARE | Facility: HOSPITAL | Age: 57
Discharge: HOME | End: 2022-04-04
Attending: SURGERY
Payer: MEDICARE

## 2022-01-01 ENCOUNTER — PATIENT OUTREACH (OUTPATIENT)
Dept: ADMINISTRATIVE | Facility: CLINIC | Age: 57
End: 2022-01-01
Payer: MEDICARE

## 2022-01-01 VITALS
SYSTOLIC BLOOD PRESSURE: 92 MMHG | HEIGHT: 78 IN | BODY MASS INDEX: 36.45 KG/M2 | WEIGHT: 315 LBS | HEART RATE: 72 BPM | DIASTOLIC BLOOD PRESSURE: 50 MMHG

## 2022-01-01 VITALS
HEIGHT: 78 IN | HEART RATE: 113 BPM | DIASTOLIC BLOOD PRESSURE: 70 MMHG | BODY MASS INDEX: 36.45 KG/M2 | HEIGHT: 78 IN | HEART RATE: 105 BPM | WEIGHT: 315 LBS | BODY MASS INDEX: 36.45 KG/M2 | WEIGHT: 315 LBS | SYSTOLIC BLOOD PRESSURE: 132 MMHG | DIASTOLIC BLOOD PRESSURE: 78 MMHG | SYSTOLIC BLOOD PRESSURE: 127 MMHG

## 2022-01-01 VITALS
SYSTOLIC BLOOD PRESSURE: 109 MMHG | TEMPERATURE: 98 F | HEART RATE: 125 BPM | DIASTOLIC BLOOD PRESSURE: 51 MMHG | BODY MASS INDEX: 36.45 KG/M2 | WEIGHT: 315 LBS | HEIGHT: 78 IN

## 2022-01-01 VITALS
DIASTOLIC BLOOD PRESSURE: 75 MMHG | HEIGHT: 78 IN | BODY MASS INDEX: 36.45 KG/M2 | WEIGHT: 315 LBS | SYSTOLIC BLOOD PRESSURE: 136 MMHG | HEART RATE: 77 BPM

## 2022-01-01 VITALS
HEART RATE: 103 BPM | SYSTOLIC BLOOD PRESSURE: 120 MMHG | RESPIRATION RATE: 20 BRPM | DIASTOLIC BLOOD PRESSURE: 57 MMHG | BODY MASS INDEX: 36.45 KG/M2 | HEIGHT: 78 IN | OXYGEN SATURATION: 99 % | TEMPERATURE: 98 F | WEIGHT: 315 LBS

## 2022-01-01 VITALS
WEIGHT: 315 LBS | HEIGHT: 78 IN | SYSTOLIC BLOOD PRESSURE: 135 MMHG | HEART RATE: 124 BPM | BODY MASS INDEX: 36.45 KG/M2 | DIASTOLIC BLOOD PRESSURE: 81 MMHG | OXYGEN SATURATION: 95 %

## 2022-01-01 VITALS
OXYGEN SATURATION: 91 % | HEART RATE: 78 BPM | SYSTOLIC BLOOD PRESSURE: 120 MMHG | HEIGHT: 78 IN | RESPIRATION RATE: 19 BRPM | WEIGHT: 315 LBS | TEMPERATURE: 99 F | DIASTOLIC BLOOD PRESSURE: 65 MMHG | BODY MASS INDEX: 36.45 KG/M2

## 2022-01-01 VITALS
HEART RATE: 61 BPM | BODY MASS INDEX: 36.45 KG/M2 | HEIGHT: 78 IN | DIASTOLIC BLOOD PRESSURE: 91 MMHG | WEIGHT: 315 LBS | SYSTOLIC BLOOD PRESSURE: 153 MMHG

## 2022-01-01 VITALS
RESPIRATION RATE: 25 BRPM | BODY MASS INDEX: 36.45 KG/M2 | SYSTOLIC BLOOD PRESSURE: 74 MMHG | TEMPERATURE: 103 F | DIASTOLIC BLOOD PRESSURE: 37 MMHG | OXYGEN SATURATION: 81 % | WEIGHT: 315 LBS | HEART RATE: 93 BPM | HEIGHT: 78 IN

## 2022-01-01 VITALS — WEIGHT: 315 LBS | HEIGHT: 78 IN | BODY MASS INDEX: 36.45 KG/M2

## 2022-01-01 DIAGNOSIS — E87.20 METABOLIC ACIDOSIS: ICD-10-CM

## 2022-01-01 DIAGNOSIS — G56.01 CARPAL TUNNEL SYNDROME ON RIGHT: ICD-10-CM

## 2022-01-01 DIAGNOSIS — I83.025 VENOUS STASIS ULCER OF OTHER PART OF LEFT FOOT WITH FAT LAYER EXPOSED WITH VARICOSE VEINS: ICD-10-CM

## 2022-01-01 DIAGNOSIS — S81.809A MULTIPLE OPENS WOUND OF LOWER EXTREMITY, UNSPECIFIED LATERALITY, INITIAL ENCOUNTER: Primary | ICD-10-CM

## 2022-01-01 DIAGNOSIS — L97.421 VENOUS STASIS ULCER OF LEFT MIDFOOT LIMITED TO BREAKDOWN OF SKIN, UNSPECIFIED WHETHER VARICOSE VEINS PRESENT: ICD-10-CM

## 2022-01-01 DIAGNOSIS — R06.02 SHORTNESS OF BREATH: ICD-10-CM

## 2022-01-01 DIAGNOSIS — I87.2 VENOUS STASIS DERMATITIS OF BOTH LOWER EXTREMITIES: ICD-10-CM

## 2022-01-01 DIAGNOSIS — I47.20 VENTRICULAR TACHYCARDIA: ICD-10-CM

## 2022-01-01 DIAGNOSIS — S81.801D OPEN WOUND OF RIGHT LOWER LEG, SUBSEQUENT ENCOUNTER: Primary | ICD-10-CM

## 2022-01-01 DIAGNOSIS — A49.8 PSEUDOMONAS INFECTION: ICD-10-CM

## 2022-01-01 DIAGNOSIS — T14.8XXA WOUND INFECTION: ICD-10-CM

## 2022-01-01 DIAGNOSIS — Z86.718 HISTORY OF DVT (DEEP VEIN THROMBOSIS): ICD-10-CM

## 2022-01-01 DIAGNOSIS — E66.01 OBESITY, MORBID (MORE THAN 100 LBS OVER IDEAL WEIGHT OR BMI > 40): ICD-10-CM

## 2022-01-01 DIAGNOSIS — I50.42 CHRONIC COMBINED SYSTOLIC AND DIASTOLIC CONGESTIVE HEART FAILURE: Primary | ICD-10-CM

## 2022-01-01 DIAGNOSIS — I48.91 ATRIAL FIBRILLATION WITH RVR: ICD-10-CM

## 2022-01-01 DIAGNOSIS — I83.023 VENOUS STASIS ULCER OF LEFT ANKLE WITH FAT LAYER EXPOSED WITH VARICOSE VEINS: Primary | ICD-10-CM

## 2022-01-01 DIAGNOSIS — I20.0 UNSTABLE ANGINA PECTORIS: ICD-10-CM

## 2022-01-01 DIAGNOSIS — L98.499 ULCER OF EXTREMITY DUE TO CHRONIC VENOUS INSUFFICIENCY: ICD-10-CM

## 2022-01-01 DIAGNOSIS — I48.20 CHRONIC ATRIAL FIBRILLATION: ICD-10-CM

## 2022-01-01 DIAGNOSIS — E66.01 CLASS 3 SEVERE OBESITY DUE TO EXCESS CALORIES WITH SERIOUS COMORBIDITY AND BODY MASS INDEX (BMI) OF 45.0 TO 49.9 IN ADULT: ICD-10-CM

## 2022-01-01 DIAGNOSIS — R07.9 CHEST PAIN: ICD-10-CM

## 2022-01-01 DIAGNOSIS — I87.2 CHRONIC VENOUS INSUFFICIENCY: ICD-10-CM

## 2022-01-01 DIAGNOSIS — M17.11 PRIMARY OSTEOARTHRITIS OF RIGHT KNEE: Primary | ICD-10-CM

## 2022-01-01 DIAGNOSIS — L08.9 WOUND INFECTION: ICD-10-CM

## 2022-01-01 DIAGNOSIS — E87.5 HYPERKALEMIA: ICD-10-CM

## 2022-01-01 DIAGNOSIS — L03.115 CELLULITIS OF BOTH LOWER EXTREMITIES: ICD-10-CM

## 2022-01-01 DIAGNOSIS — I87.2 VENOUS INSUFFICIENCY OF LEFT LOWER EXTREMITY: Primary | ICD-10-CM

## 2022-01-01 DIAGNOSIS — I87.2 VENOUS (PERIPHERAL) INSUFFICIENCY: ICD-10-CM

## 2022-01-01 DIAGNOSIS — L97.522 VENOUS STASIS ULCER OF OTHER PART OF LEFT FOOT WITH FAT LAYER EXPOSED WITH VARICOSE VEINS: ICD-10-CM

## 2022-01-01 DIAGNOSIS — I87.2 VENOUS INSUFFICIENCY OF LEFT LOWER EXTREMITY: ICD-10-CM

## 2022-01-01 DIAGNOSIS — R57.0 CARDIOGENIC SHOCK: ICD-10-CM

## 2022-01-01 DIAGNOSIS — I50.21 ACUTE SYSTOLIC CONGESTIVE HEART FAILURE: ICD-10-CM

## 2022-01-01 DIAGNOSIS — I10 PRIMARY HYPERTENSION: ICD-10-CM

## 2022-01-01 DIAGNOSIS — L03.116 CELLULITIS OF BOTH LOWER EXTREMITIES: ICD-10-CM

## 2022-01-01 DIAGNOSIS — M25.511 RIGHT SHOULDER PAIN, UNSPECIFIED CHRONICITY: ICD-10-CM

## 2022-01-01 DIAGNOSIS — I48.91 ATRIAL FIBRILLATION: ICD-10-CM

## 2022-01-01 DIAGNOSIS — I50.20 SYSTOLIC CONGESTIVE HEART FAILURE, UNSPECIFIED HF CHRONICITY: ICD-10-CM

## 2022-01-01 DIAGNOSIS — M25.569 KNEE PAIN, UNSPECIFIED CHRONICITY, UNSPECIFIED LATERALITY: ICD-10-CM

## 2022-01-01 DIAGNOSIS — B95.0 GROUP A STREPTOCOCCAL INFECTION: ICD-10-CM

## 2022-01-01 DIAGNOSIS — L97.322 VENOUS STASIS ULCER OF LEFT ANKLE WITH FAT LAYER EXPOSED WITH VARICOSE VEINS: Primary | ICD-10-CM

## 2022-01-01 DIAGNOSIS — R07.89 OTHER CHEST PAIN: ICD-10-CM

## 2022-01-01 DIAGNOSIS — R42 DIZZINESS: ICD-10-CM

## 2022-01-01 DIAGNOSIS — I50.42 CHRONIC COMBINED SYSTOLIC AND DIASTOLIC CONGESTIVE HEART FAILURE: ICD-10-CM

## 2022-01-01 DIAGNOSIS — I21.4 NSTEMI (NON-ST ELEVATED MYOCARDIAL INFARCTION): ICD-10-CM

## 2022-01-01 DIAGNOSIS — S91.302D OPEN WOUND OF LEFT FOOT, SUBSEQUENT ENCOUNTER: ICD-10-CM

## 2022-01-01 DIAGNOSIS — I87.1 MAY-THURNER SYNDROME: ICD-10-CM

## 2022-01-01 DIAGNOSIS — I49.9 ARRHYTHMIA: ICD-10-CM

## 2022-01-01 DIAGNOSIS — L97.909 VENOUS STASIS ULCER OF LOWER EXTREMITY, UNSPECIFIED LATERALITY: ICD-10-CM

## 2022-01-01 DIAGNOSIS — E66.01 CLASS 2 SEVERE OBESITY DUE TO EXCESS CALORIES WITH SERIOUS COMORBIDITY AND BODY MASS INDEX (BMI) OF 39.0 TO 39.9 IN ADULT: ICD-10-CM

## 2022-01-01 DIAGNOSIS — I50.9 CHF (CONGESTIVE HEART FAILURE): ICD-10-CM

## 2022-01-01 DIAGNOSIS — I48.21 PERMANENT ATRIAL FIBRILLATION: Primary | ICD-10-CM

## 2022-01-01 DIAGNOSIS — R00.0 TACHYCARDIA: ICD-10-CM

## 2022-01-01 DIAGNOSIS — M54.12 RIGHT CERVICAL RADICULOPATHY: ICD-10-CM

## 2022-01-01 DIAGNOSIS — M25.511 RIGHT SHOULDER PAIN, UNSPECIFIED CHRONICITY: Primary | ICD-10-CM

## 2022-01-01 DIAGNOSIS — I48.91 A-FIB: ICD-10-CM

## 2022-01-01 DIAGNOSIS — J96.01 ACUTE HYPOXEMIC RESPIRATORY FAILURE: ICD-10-CM

## 2022-01-01 DIAGNOSIS — I83.009 VENOUS STASIS ULCER OF LOWER EXTREMITY, UNSPECIFIED LATERALITY: ICD-10-CM

## 2022-01-01 DIAGNOSIS — M75.41 ROTATOR CUFF IMPINGEMENT SYNDROME OF RIGHT SHOULDER: Primary | ICD-10-CM

## 2022-01-01 DIAGNOSIS — E87.70 HYPERVOLEMIA, UNSPECIFIED HYPERVOLEMIA TYPE: ICD-10-CM

## 2022-01-01 DIAGNOSIS — L97.321 ULCER OF ANKLE, LEFT, LIMITED TO BREAKDOWN OF SKIN: ICD-10-CM

## 2022-01-01 DIAGNOSIS — F32.A DEPRESSION, UNSPECIFIED DEPRESSION TYPE: Chronic | ICD-10-CM

## 2022-01-01 DIAGNOSIS — N17.9 AKI (ACUTE KIDNEY INJURY): Primary | ICD-10-CM

## 2022-01-01 DIAGNOSIS — I83.024 VENOUS STASIS ULCER OF LEFT MIDFOOT LIMITED TO BREAKDOWN OF SKIN, UNSPECIFIED WHETHER VARICOSE VEINS PRESENT: ICD-10-CM

## 2022-01-01 DIAGNOSIS — R79.89 ELEVATED TROPONIN: ICD-10-CM

## 2022-01-01 DIAGNOSIS — I87.2 ULCER OF EXTREMITY DUE TO CHRONIC VENOUS INSUFFICIENCY: ICD-10-CM

## 2022-01-01 DIAGNOSIS — I47.20 VT (VENTRICULAR TACHYCARDIA): ICD-10-CM

## 2022-01-01 DIAGNOSIS — M25.569 KNEE PAIN, UNSPECIFIED CHRONICITY, UNSPECIFIED LATERALITY: Primary | ICD-10-CM

## 2022-01-01 DIAGNOSIS — Z97.8 ENDOTRACHEALLY INTUBATED: ICD-10-CM

## 2022-01-01 LAB
ALBUMIN SERPL BCP-MCNC: 2 G/DL (ref 3.5–5.2)
ALBUMIN SERPL BCP-MCNC: 2 G/DL (ref 3.5–5.2)
ALBUMIN SERPL BCP-MCNC: 2.1 G/DL (ref 3.5–5.2)
ALBUMIN SERPL BCP-MCNC: 2.2 G/DL (ref 3.5–5.2)
ALBUMIN SERPL BCP-MCNC: 3.1 G/DL (ref 3.5–5.2)
ALBUMIN SERPL BCP-MCNC: 3.2 G/DL (ref 3.5–5.2)
ALBUMIN SERPL BCP-MCNC: 3.3 G/DL (ref 3.5–5.2)
ALBUMIN SERPL BCP-MCNC: 3.5 G/DL (ref 3.5–5.2)
ALLENS TEST: ABNORMAL
ALP SERPL-CCNC: 155 U/L (ref 55–135)
ALP SERPL-CCNC: 157 U/L (ref 55–135)
ALP SERPL-CCNC: 158 U/L (ref 55–135)
ALP SERPL-CCNC: 170 U/L (ref 55–135)
ALP SERPL-CCNC: 252 U/L (ref 55–135)
ALP SERPL-CCNC: 264 U/L (ref 55–135)
ALP SERPL-CCNC: 280 U/L (ref 55–135)
ALP SERPL-CCNC: 336 U/L (ref 55–135)
ALT SERPL W/O P-5'-P-CCNC: 15 U/L (ref 10–44)
ALT SERPL W/O P-5'-P-CCNC: 18 U/L (ref 10–44)
ALT SERPL W/O P-5'-P-CCNC: 18 U/L (ref 10–44)
ALT SERPL W/O P-5'-P-CCNC: 19 U/L (ref 10–44)
ALT SERPL W/O P-5'-P-CCNC: 50 U/L (ref 10–44)
ALT SERPL W/O P-5'-P-CCNC: 53 U/L (ref 10–44)
ALT SERPL W/O P-5'-P-CCNC: 61 U/L (ref 10–44)
ALT SERPL W/O P-5'-P-CCNC: 72 U/L (ref 10–44)
ANION GAP SERPL CALC-SCNC: 10 MMOL/L (ref 8–16)
ANION GAP SERPL CALC-SCNC: 10 MMOL/L (ref 8–16)
ANION GAP SERPL CALC-SCNC: 11 MMOL/L (ref 8–16)
ANION GAP SERPL CALC-SCNC: 11 MMOL/L (ref 8–16)
ANION GAP SERPL CALC-SCNC: 12 MMOL/L (ref 8–16)
ANION GAP SERPL CALC-SCNC: 13 MMOL/L (ref 8–16)
ANION GAP SERPL CALC-SCNC: 15 MMOL/L (ref 8–16)
ANION GAP SERPL CALC-SCNC: 17 MMOL/L (ref 8–16)
ANION GAP SERPL CALC-SCNC: 18 MMOL/L (ref 8–16)
ANION GAP SERPL CALC-SCNC: 19 MMOL/L (ref 8–16)
ANION GAP SERPL CALC-SCNC: 20 MMOL/L (ref 8–16)
ANION GAP SERPL CALC-SCNC: 21 MMOL/L (ref 8–16)
ANION GAP SERPL CALC-SCNC: 22 MMOL/L (ref 8–16)
ANISOCYTOSIS BLD QL SMEAR: SLIGHT
ANISOCYTOSIS BLD QL SMEAR: SLIGHT
APTT BLDCRRT: 110.7 SEC (ref 21–32)
APTT BLDCRRT: 29.4 SEC (ref 21–32)
APTT BLDCRRT: 29.9 SEC (ref 21–32)
APTT BLDCRRT: 31.3 SEC (ref 21–32)
APTT BLDCRRT: 51 SEC (ref 21–32)
APTT BLDCRRT: 53.1 SEC (ref 21–32)
APTT BLDCRRT: 53.8 SEC (ref 21–32)
APTT BLDCRRT: 59.5 SEC (ref 21–32)
APTT BLDCRRT: 60.3 SEC (ref 21–32)
APTT BLDCRRT: 73.7 SEC (ref 21–32)
APTT BLDCRRT: 84 SEC (ref 21–32)
APTT BLDCRRT: >150 SEC (ref 21–32)
AST SERPL-CCNC: 23 U/L (ref 10–40)
AST SERPL-CCNC: 25 U/L (ref 10–40)
AST SERPL-CCNC: 27 U/L (ref 10–40)
AST SERPL-CCNC: 30 U/L (ref 10–40)
AST SERPL-CCNC: 52 U/L (ref 10–40)
AST SERPL-CCNC: 57 U/L (ref 10–40)
AST SERPL-CCNC: 62 U/L (ref 10–40)
AST SERPL-CCNC: 65 U/L (ref 10–40)
AV INDEX (PROSTH): 0.68
AV INDEX (PROSTH): 0.96
AV MEAN GRADIENT: 3 MMHG
AV MEAN GRADIENT: 5 MMHG
AV PEAK GRADIENT: 6 MMHG
AV PEAK GRADIENT: 8 MMHG
AV VALVE AREA: 4.58 CM2
AV VELOCITY RATIO: 0.61
AV VELOCITY RATIO: 0.82
BACTERIA #/AREA URNS HPF: ABNORMAL /HPF
BASOPHILS # BLD AUTO: 0.01 K/UL (ref 0–0.2)
BASOPHILS # BLD AUTO: 0.01 K/UL (ref 0–0.2)
BASOPHILS # BLD AUTO: 0.02 K/UL (ref 0–0.2)
BASOPHILS # BLD AUTO: 0.03 K/UL (ref 0–0.2)
BASOPHILS # BLD AUTO: 0.05 K/UL (ref 0–0.2)
BASOPHILS # BLD AUTO: 0.08 K/UL (ref 0–0.2)
BASOPHILS NFR BLD: 0.1 % (ref 0–1.9)
BASOPHILS NFR BLD: 0.1 % (ref 0–1.9)
BASOPHILS NFR BLD: 0.2 % (ref 0–1.9)
BASOPHILS NFR BLD: 0.3 % (ref 0–1.9)
BASOPHILS NFR BLD: 0.4 % (ref 0–1.9)
BASOPHILS NFR BLD: 0.4 % (ref 0–1.9)
BASOPHILS NFR BLD: 0.6 % (ref 0–1.9)
BASOPHILS NFR BLD: 0.6 % (ref 0–1.9)
BILIRUB DIRECT SERPL-MCNC: 0.5 MG/DL (ref 0.1–0.3)
BILIRUB SERPL-MCNC: 0.8 MG/DL (ref 0.1–1)
BILIRUB SERPL-MCNC: 0.9 MG/DL (ref 0.1–1)
BILIRUB SERPL-MCNC: 1 MG/DL (ref 0.1–1)
BILIRUB SERPL-MCNC: 1.1 MG/DL (ref 0.1–1)
BILIRUB SERPL-MCNC: 1.1 MG/DL (ref 0.1–1)
BILIRUB SERPL-MCNC: 1.2 MG/DL (ref 0.1–1)
BILIRUB UR QL STRIP: NEGATIVE
BILIRUB UR QL STRIP: NEGATIVE
BNP SERPL-MCNC: 242 PG/ML (ref 0–99)
BNP SERPL-MCNC: 65 PG/ML (ref 0–99)
BSA FOR ECHO PROCEDURE: 3 M2
BSA FOR ECHO PROCEDURE: 3.09 M2
BUN SERPL-MCNC: 102 MG/DL (ref 6–20)
BUN SERPL-MCNC: 102 MG/DL (ref 6–20)
BUN SERPL-MCNC: 103 MG/DL (ref 6–20)
BUN SERPL-MCNC: 105 MG/DL (ref 6–20)
BUN SERPL-MCNC: 107 MG/DL (ref 6–20)
BUN SERPL-MCNC: 109 MG/DL (ref 6–20)
BUN SERPL-MCNC: 110 MG/DL (ref 6–20)
BUN SERPL-MCNC: 110 MG/DL (ref 6–20)
BUN SERPL-MCNC: 112 MG/DL (ref 6–20)
BUN SERPL-MCNC: 113 MG/DL (ref 6–20)
BUN SERPL-MCNC: 17 MG/DL (ref 6–20)
BUN SERPL-MCNC: 19 MG/DL (ref 6–20)
BUN SERPL-MCNC: 21 MG/DL (ref 6–20)
BUN SERPL-MCNC: 23 MG/DL (ref 6–20)
BUN SERPL-MCNC: 65 MG/DL (ref 6–20)
BUN SERPL-MCNC: 67 MG/DL (ref 6–20)
BUN SERPL-MCNC: 68 MG/DL (ref 6–20)
BUN SERPL-MCNC: 69 MG/DL (ref 6–20)
BUN SERPL-MCNC: 78 MG/DL (ref 6–20)
BUN SERPL-MCNC: 80 MG/DL (ref 6–20)
BUN SERPL-MCNC: 80 MG/DL (ref 6–20)
BUN SERPL-MCNC: 85 MG/DL (ref 6–20)
BUN SERPL-MCNC: 87 MG/DL (ref 6–20)
BUN SERPL-MCNC: 87 MG/DL (ref 6–20)
BUN SERPL-MCNC: 88 MG/DL (ref 6–20)
BUN SERPL-MCNC: 92 MG/DL (ref 6–20)
BUN SERPL-MCNC: 95 MG/DL (ref 6–20)
BUN SERPL-MCNC: 97 MG/DL (ref 6–20)
BUN SERPL-MCNC: 98 MG/DL (ref 6–20)
BUN SERPL-MCNC: ABNORMAL MG/DL (ref 6–20)
BURR CELLS BLD QL SMEAR: ABNORMAL
BURR CELLS BLD QL SMEAR: ABNORMAL
CALCIUM SERPL-MCNC: 10.2 MG/DL (ref 8.7–10.5)
CALCIUM SERPL-MCNC: 10.2 MG/DL (ref 8.7–10.5)
CALCIUM SERPL-MCNC: 10.3 MG/DL (ref 8.7–10.5)
CALCIUM SERPL-MCNC: 10.6 MG/DL (ref 8.7–10.5)
CALCIUM SERPL-MCNC: 10.8 MG/DL (ref 8.7–10.5)
CALCIUM SERPL-MCNC: 8 MG/DL (ref 8.7–10.5)
CALCIUM SERPL-MCNC: 8 MG/DL (ref 8.7–10.5)
CALCIUM SERPL-MCNC: 8.1 MG/DL (ref 8.7–10.5)
CALCIUM SERPL-MCNC: 8.1 MG/DL (ref 8.7–10.5)
CALCIUM SERPL-MCNC: 8.2 MG/DL (ref 8.7–10.5)
CALCIUM SERPL-MCNC: 8.5 MG/DL (ref 8.7–10.5)
CALCIUM SERPL-MCNC: 8.6 MG/DL (ref 8.7–10.5)
CALCIUM SERPL-MCNC: 8.7 MG/DL (ref 8.7–10.5)
CALCIUM SERPL-MCNC: 8.8 MG/DL (ref 8.7–10.5)
CALCIUM SERPL-MCNC: 8.9 MG/DL (ref 8.7–10.5)
CALCIUM SERPL-MCNC: 8.9 MG/DL (ref 8.7–10.5)
CALCIUM SERPL-MCNC: 9.1 MG/DL (ref 8.7–10.5)
CALCIUM SERPL-MCNC: 9.1 MG/DL (ref 8.7–10.5)
CALCIUM SERPL-MCNC: 9.2 MG/DL (ref 8.7–10.5)
CALCIUM SERPL-MCNC: 9.2 MG/DL (ref 8.7–10.5)
CALCIUM SERPL-MCNC: 9.5 MG/DL (ref 8.7–10.5)
CALCIUM SERPL-MCNC: 9.6 MG/DL (ref 8.7–10.5)
CALCIUM SERPL-MCNC: 9.7 MG/DL (ref 8.7–10.5)
CALCIUM SERPL-MCNC: 9.7 MG/DL (ref 8.7–10.5)
CALCIUM SERPL-MCNC: 9.9 MG/DL (ref 8.7–10.5)
CHLORIDE SERPL-SCNC: 100 MMOL/L (ref 95–110)
CHLORIDE SERPL-SCNC: 101 MMOL/L (ref 95–110)
CHLORIDE SERPL-SCNC: 102 MMOL/L (ref 95–110)
CHLORIDE SERPL-SCNC: 102 MMOL/L (ref 95–110)
CHLORIDE SERPL-SCNC: 103 MMOL/L (ref 95–110)
CHLORIDE SERPL-SCNC: 104 MMOL/L (ref 95–110)
CHLORIDE SERPL-SCNC: 105 MMOL/L (ref 95–110)
CHLORIDE SERPL-SCNC: 107 MMOL/L (ref 95–110)
CHLORIDE SERPL-SCNC: 108 MMOL/L (ref 95–110)
CHLORIDE SERPL-SCNC: 108 MMOL/L (ref 95–110)
CHLORIDE SERPL-SCNC: 109 MMOL/L (ref 95–110)
CHLORIDE SERPL-SCNC: 110 MMOL/L (ref 95–110)
CHLORIDE SERPL-SCNC: 95 MMOL/L (ref 95–110)
CHLORIDE SERPL-SCNC: 97 MMOL/L (ref 95–110)
CHLORIDE SERPL-SCNC: 98 MMOL/L (ref 95–110)
CHLORIDE SERPL-SCNC: 99 MMOL/L (ref 95–110)
CLARITY UR: ABNORMAL
CLARITY UR: CLEAR
CO2 SERPL-SCNC: 12 MMOL/L (ref 23–29)
CO2 SERPL-SCNC: 13 MMOL/L (ref 23–29)
CO2 SERPL-SCNC: 15 MMOL/L (ref 23–29)
CO2 SERPL-SCNC: 16 MMOL/L (ref 23–29)
CO2 SERPL-SCNC: 16 MMOL/L (ref 23–29)
CO2 SERPL-SCNC: 17 MMOL/L (ref 23–29)
CO2 SERPL-SCNC: 18 MMOL/L (ref 23–29)
CO2 SERPL-SCNC: 19 MMOL/L (ref 23–29)
CO2 SERPL-SCNC: 20 MMOL/L (ref 23–29)
CO2 SERPL-SCNC: 21 MMOL/L (ref 23–29)
CO2 SERPL-SCNC: 21 MMOL/L (ref 23–29)
CO2 SERPL-SCNC: 24 MMOL/L (ref 23–29)
CO2 SERPL-SCNC: 24 MMOL/L (ref 23–29)
CO2 SERPL-SCNC: 25 MMOL/L (ref 23–29)
CO2 SERPL-SCNC: 26 MMOL/L (ref 23–29)
CO2 SERPL-SCNC: 27 MMOL/L (ref 23–29)
COLOR UR: YELLOW
COLOR UR: YELLOW
CREAT SERPL-MCNC: 0.8 MG/DL (ref 0.5–1.4)
CREAT SERPL-MCNC: 0.8 MG/DL (ref 0.5–1.4)
CREAT SERPL-MCNC: 0.9 MG/DL (ref 0.5–1.4)
CREAT SERPL-MCNC: 1 MG/DL (ref 0.5–1.4)
CREAT SERPL-MCNC: 1.5 MG/DL (ref 0.5–1.4)
CREAT SERPL-MCNC: 1.6 MG/DL (ref 0.5–1.4)
CREAT SERPL-MCNC: 1.9 MG/DL (ref 0.5–1.4)
CREAT SERPL-MCNC: 2.8 MG/DL (ref 0.5–1.4)
CREAT SERPL-MCNC: 2.9 MG/DL (ref 0.5–1.4)
CREAT SERPL-MCNC: 3.4 MG/DL (ref 0.5–1.4)
CREAT SERPL-MCNC: 3.5 MG/DL (ref 0.5–1.4)
CREAT SERPL-MCNC: 3.7 MG/DL (ref 0.5–1.4)
CREAT SERPL-MCNC: 3.8 MG/DL (ref 0.5–1.4)
CREAT SERPL-MCNC: 3.9 MG/DL (ref 0.5–1.4)
CREAT SERPL-MCNC: 3.9 MG/DL (ref 0.5–1.4)
CREAT SERPL-MCNC: 4 MG/DL (ref 0.5–1.4)
CREAT SERPL-MCNC: 4.1 MG/DL (ref 0.5–1.4)
CREAT SERPL-MCNC: 4.3 MG/DL (ref 0.5–1.4)
CREAT SERPL-MCNC: 4.4 MG/DL (ref 0.5–1.4)
CREAT SERPL-MCNC: 4.5 MG/DL (ref 0.5–1.4)
CREAT SERPL-MCNC: 4.7 MG/DL (ref 0.5–1.4)
CREAT SERPL-MCNC: 4.7 MG/DL (ref 0.5–1.4)
CREAT SERPL-MCNC: 4.8 MG/DL (ref 0.5–1.4)
CREAT SERPL-MCNC: 4.9 MG/DL (ref 0.5–1.4)
CREAT SERPL-MCNC: 5.1 MG/DL (ref 0.5–1.4)
CREAT SERPL-MCNC: 5.2 MG/DL (ref 0.5–1.4)
CREAT UR-MCNC: 62.4 MG/DL (ref 23–375)
CTP QC/QA: YES
CV ECHO LV RWT: 0.23 CM
CV ECHO LV RWT: 0.31 CM
DELSYS: ABNORMAL
DIFFERENTIAL METHOD: ABNORMAL
DOP CALC AO PEAK VEL: 1.23 M/S
DOP CALC AO PEAK VEL: 1.37 M/S
DOP CALC AO VTI: 15.59 CM
DOP CALC AO VTI: 23.5 CM
DOP CALC LVOT AREA: 4.8 CM2
DOP CALC LVOT DIAMETER: 2.47 CM
DOP CALC LVOT PEAK VEL: 0.75 M/S
DOP CALC LVOT PEAK VEL: 1.13 M/S
DOP CALC LVOT STROKE VOLUME: 107.57 CM3
DOP CALC MV VTI: 21.91 CM
DOP CALC MV VTI: 21.93 CM
DOP CALCLVOT PEAK VEL VTI: 10.57 CM
DOP CALCLVOT PEAK VEL VTI: 22.46 CM
ECHO LV POSTERIOR WALL: 0.81 CM (ref 0.6–1.1)
ECHO LV POSTERIOR WALL: 0.91 CM (ref 0.6–1.1)
EJECTION FRACTION: 20 %
EJECTION FRACTION: 40 %
EOSINOPHIL # BLD AUTO: 0 K/UL (ref 0–0.5)
EOSINOPHIL # BLD AUTO: 0 K/UL (ref 0–0.5)
EOSINOPHIL # BLD AUTO: 0.1 K/UL (ref 0–0.5)
EOSINOPHIL # BLD AUTO: 0.2 K/UL (ref 0–0.5)
EOSINOPHIL NFR BLD: 0 % (ref 0–8)
EOSINOPHIL NFR BLD: 0 % (ref 0–8)
EOSINOPHIL NFR BLD: 0.2 % (ref 0–8)
EOSINOPHIL NFR BLD: 0.4 % (ref 0–8)
EOSINOPHIL NFR BLD: 1.4 % (ref 0–8)
EOSINOPHIL NFR BLD: 1.6 % (ref 0–8)
EOSINOPHIL NFR BLD: 2 % (ref 0–8)
EOSINOPHIL NFR BLD: 3 % (ref 0–8)
EOSINOPHIL NFR BLD: 3.6 % (ref 0–8)
EOSINOPHIL NFR BLD: 4.1 % (ref 0–8)
ERYTHROCYTE [DISTWIDTH] IN BLOOD BY AUTOMATED COUNT: 16 % (ref 11.5–14.5)
ERYTHROCYTE [DISTWIDTH] IN BLOOD BY AUTOMATED COUNT: 16.2 % (ref 11.5–14.5)
ERYTHROCYTE [DISTWIDTH] IN BLOOD BY AUTOMATED COUNT: 16.3 % (ref 11.5–14.5)
ERYTHROCYTE [DISTWIDTH] IN BLOOD BY AUTOMATED COUNT: 16.5 % (ref 11.5–14.5)
ERYTHROCYTE [DISTWIDTH] IN BLOOD BY AUTOMATED COUNT: 16.5 % (ref 11.5–14.5)
ERYTHROCYTE [DISTWIDTH] IN BLOOD BY AUTOMATED COUNT: 16.6 % (ref 11.5–14.5)
ERYTHROCYTE [DISTWIDTH] IN BLOOD BY AUTOMATED COUNT: 16.7 % (ref 11.5–14.5)
ERYTHROCYTE [DISTWIDTH] IN BLOOD BY AUTOMATED COUNT: 16.8 % (ref 11.5–14.5)
ERYTHROCYTE [DISTWIDTH] IN BLOOD BY AUTOMATED COUNT: 17.4 % (ref 11.5–14.5)
ERYTHROCYTE [DISTWIDTH] IN BLOOD BY AUTOMATED COUNT: 17.5 % (ref 11.5–14.5)
ERYTHROCYTE [SEDIMENTATION RATE] IN BLOOD BY WESTERGREN METHOD: 18 MM/H
ERYTHROCYTE [SEDIMENTATION RATE] IN BLOOD BY WESTERGREN METHOD: 20 MM/H
EST. GFR  (AFRICAN AMERICAN): 13 ML/MIN/1.73 M^2
EST. GFR  (AFRICAN AMERICAN): 14 ML/MIN/1.73 M^2
EST. GFR  (AFRICAN AMERICAN): 14 ML/MIN/1.73 M^2
EST. GFR  (AFRICAN AMERICAN): 15 ML/MIN/1.73 M^2
EST. GFR  (AFRICAN AMERICAN): 16 ML/MIN/1.73 M^2
EST. GFR  (AFRICAN AMERICAN): 17 ML/MIN/1.73 M^2
EST. GFR  (AFRICAN AMERICAN): 18 ML/MIN/1.73 M^2
EST. GFR  (AFRICAN AMERICAN): 19 ML/MIN/1.73 M^2
EST. GFR  (AFRICAN AMERICAN): 19 ML/MIN/1.73 M^2
EST. GFR  (AFRICAN AMERICAN): 20 ML/MIN/1.73 M^2
EST. GFR  (AFRICAN AMERICAN): 22 ML/MIN/1.73 M^2
EST. GFR  (AFRICAN AMERICAN): 22 ML/MIN/1.73 M^2
EST. GFR  (AFRICAN AMERICAN): 27 ML/MIN/1.73 M^2
EST. GFR  (AFRICAN AMERICAN): 28 ML/MIN/1.73 M^2
EST. GFR  (AFRICAN AMERICAN): 45 ML/MIN/1.73 M^2
EST. GFR  (AFRICAN AMERICAN): 56 ML/MIN/1.73 M^2
EST. GFR  (AFRICAN AMERICAN): >60 ML/MIN/1.73 M^2
EST. GFR  (NON AFRICAN AMERICAN): 12 ML/MIN/1.73 M^2
EST. GFR  (NON AFRICAN AMERICAN): 13 ML/MIN/1.73 M^2
EST. GFR  (NON AFRICAN AMERICAN): 14 ML/MIN/1.73 M^2
EST. GFR  (NON AFRICAN AMERICAN): 15 ML/MIN/1.73 M^2
EST. GFR  (NON AFRICAN AMERICAN): 15 ML/MIN/1.73 M^2
EST. GFR  (NON AFRICAN AMERICAN): 16 ML/MIN/1.73 M^2
EST. GFR  (NON AFRICAN AMERICAN): 17 ML/MIN/1.73 M^2
EST. GFR  (NON AFRICAN AMERICAN): 19 ML/MIN/1.73 M^2
EST. GFR  (NON AFRICAN AMERICAN): 19 ML/MIN/1.73 M^2
EST. GFR  (NON AFRICAN AMERICAN): 23 ML/MIN/1.73 M^2
EST. GFR  (NON AFRICAN AMERICAN): 24 ML/MIN/1.73 M^2
EST. GFR  (NON AFRICAN AMERICAN): 39 ML/MIN/1.73 M^2
EST. GFR  (NON AFRICAN AMERICAN): 48 ML/MIN/1.73 M^2
EST. GFR  (NON AFRICAN AMERICAN): 52 ML/MIN/1.73 M^2
EST. GFR  (NON AFRICAN AMERICAN): >60 ML/MIN/1.73 M^2
FERRITIN SERPL-MCNC: 263 NG/ML (ref 20–300)
FIO2: 100
FIO2: 21
FIO2: 30
FIO2: 30
FIO2: 50
FOLATE SERPL-MCNC: 14.2 NG/ML (ref 4–24)
FRACTIONAL SHORTENING: 15 % (ref 28–44)
FRACTIONAL SHORTENING: 18 % (ref 28–44)
GLUCOSE SERPL-MCNC: 109 MG/DL (ref 70–110)
GLUCOSE SERPL-MCNC: 111 MG/DL (ref 70–110)
GLUCOSE SERPL-MCNC: 118 MG/DL (ref 70–110)
GLUCOSE SERPL-MCNC: 118 MG/DL (ref 70–110)
GLUCOSE SERPL-MCNC: 127 MG/DL (ref 70–110)
GLUCOSE SERPL-MCNC: 128 MG/DL (ref 70–110)
GLUCOSE SERPL-MCNC: 132 MG/DL (ref 70–110)
GLUCOSE SERPL-MCNC: 133 MG/DL (ref 70–110)
GLUCOSE SERPL-MCNC: 136 MG/DL (ref 70–110)
GLUCOSE SERPL-MCNC: 139 MG/DL (ref 70–110)
GLUCOSE SERPL-MCNC: 146 MG/DL (ref 70–110)
GLUCOSE SERPL-MCNC: 146 MG/DL (ref 70–110)
GLUCOSE SERPL-MCNC: 161 MG/DL (ref 70–110)
GLUCOSE SERPL-MCNC: 175 MG/DL (ref 70–110)
GLUCOSE SERPL-MCNC: 186 MG/DL (ref 70–110)
GLUCOSE SERPL-MCNC: 198 MG/DL (ref 70–110)
GLUCOSE SERPL-MCNC: 199 MG/DL (ref 70–110)
GLUCOSE SERPL-MCNC: 68 MG/DL (ref 70–110)
GLUCOSE SERPL-MCNC: 86 MG/DL (ref 70–110)
GLUCOSE SERPL-MCNC: 88 MG/DL (ref 70–110)
GLUCOSE SERPL-MCNC: 88 MG/DL (ref 70–110)
GLUCOSE SERPL-MCNC: 90 MG/DL (ref 70–110)
GLUCOSE SERPL-MCNC: 92 MG/DL (ref 70–110)
GLUCOSE SERPL-MCNC: 95 MG/DL (ref 70–110)
GLUCOSE SERPL-MCNC: 96 MG/DL (ref 70–110)
GLUCOSE SERPL-MCNC: 96 MG/DL (ref 70–110)
GLUCOSE SERPL-MCNC: 97 MG/DL (ref 70–110)
GLUCOSE SERPL-MCNC: 97 MG/DL (ref 70–110)
GLUCOSE SERPL-MCNC: 98 MG/DL (ref 70–110)
GLUCOSE SERPL-MCNC: 98 MG/DL (ref 70–110)
GLUCOSE SERPL-MCNC: 99 MG/DL (ref 70–110)
GLUCOSE SERPL-MCNC: 99 MG/DL (ref 70–110)
GLUCOSE SERPL-MCNC: ABNORMAL MG/DL (ref 70–110)
GLUCOSE UR QL STRIP: NEGATIVE
GLUCOSE UR QL STRIP: NEGATIVE
GRAN CASTS #/AREA URNS LPF: 4 /LPF
HCO3 UR-SCNC: 18.8 MMOL/L (ref 24–28)
HCO3 UR-SCNC: 20 MMOL/L (ref 24–28)
HCO3 UR-SCNC: 20.2 MMOL/L (ref 24–28)
HCO3 UR-SCNC: 20.9 MMOL/L (ref 24–28)
HCO3 UR-SCNC: 21.2 MMOL/L (ref 24–28)
HCO3 UR-SCNC: 21.3 MMOL/L (ref 24–28)
HCO3 UR-SCNC: 21.8 MMOL/L (ref 24–28)
HCT VFR BLD AUTO: 27.4 % (ref 40–54)
HCT VFR BLD AUTO: 30.4 % (ref 40–54)
HCT VFR BLD AUTO: 30.5 % (ref 40–54)
HCT VFR BLD AUTO: 30.6 % (ref 40–54)
HCT VFR BLD AUTO: 31.4 % (ref 40–54)
HCT VFR BLD AUTO: 31.8 % (ref 40–54)
HCT VFR BLD AUTO: 31.9 % (ref 40–54)
HCT VFR BLD AUTO: 32.1 % (ref 40–54)
HCT VFR BLD AUTO: 32.5 % (ref 40–54)
HCT VFR BLD AUTO: 35 % (ref 40–54)
HGB BLD-MCNC: 10.1 G/DL (ref 14–18)
HGB BLD-MCNC: 10.8 G/DL (ref 14–18)
HGB BLD-MCNC: 8.8 G/DL (ref 14–18)
HGB BLD-MCNC: 9.3 G/DL (ref 14–18)
HGB BLD-MCNC: 9.4 G/DL (ref 14–18)
HGB BLD-MCNC: 9.5 G/DL (ref 14–18)
HGB BLD-MCNC: 9.5 G/DL (ref 14–18)
HGB BLD-MCNC: 9.7 G/DL (ref 14–18)
HGB BLD-MCNC: 9.9 G/DL (ref 14–18)
HGB BLD-MCNC: 9.9 G/DL (ref 14–18)
HGB UR QL STRIP: ABNORMAL
HGB UR QL STRIP: NEGATIVE
HYALINE CASTS #/AREA URNS LPF: 10 /LPF
HYPOCHROMIA BLD QL SMEAR: ABNORMAL
IMM GRANULOCYTES # BLD AUTO: 0.01 K/UL (ref 0–0.04)
IMM GRANULOCYTES # BLD AUTO: 0.01 K/UL (ref 0–0.04)
IMM GRANULOCYTES # BLD AUTO: 0.02 K/UL (ref 0–0.04)
IMM GRANULOCYTES # BLD AUTO: 0.03 K/UL (ref 0–0.04)
IMM GRANULOCYTES # BLD AUTO: 0.08 K/UL (ref 0–0.04)
IMM GRANULOCYTES # BLD AUTO: 0.17 K/UL (ref 0–0.04)
IMM GRANULOCYTES # BLD AUTO: 0.2 K/UL (ref 0–0.04)
IMM GRANULOCYTES # BLD AUTO: 0.53 K/UL (ref 0–0.04)
IMM GRANULOCYTES NFR BLD AUTO: 0.2 % (ref 0–0.5)
IMM GRANULOCYTES NFR BLD AUTO: 0.2 % (ref 0–0.5)
IMM GRANULOCYTES NFR BLD AUTO: 0.3 % (ref 0–0.5)
IMM GRANULOCYTES NFR BLD AUTO: 0.3 % (ref 0–0.5)
IMM GRANULOCYTES NFR BLD AUTO: 0.4 % (ref 0–0.5)
IMM GRANULOCYTES NFR BLD AUTO: 0.5 % (ref 0–0.5)
IMM GRANULOCYTES NFR BLD AUTO: 0.5 % (ref 0–0.5)
IMM GRANULOCYTES NFR BLD AUTO: 0.8 % (ref 0–0.5)
IMM GRANULOCYTES NFR BLD AUTO: 1.1 % (ref 0–0.5)
IMM GRANULOCYTES NFR BLD AUTO: 1.9 % (ref 0–0.5)
INR PPP: 1.1 (ref 0.8–1.2)
INTERVENTRICULAR SEPTUM: 0.96 CM (ref 0.6–1.1)
INTERVENTRICULAR SEPTUM: 0.99 CM (ref 0.6–1.1)
IRON SERPL-MCNC: 30 UG/DL (ref 45–160)
IVRT: 29.5 MSEC
IVRT: 57.09 MSEC
KETONES UR QL STRIP: NEGATIVE
KETONES UR QL STRIP: NEGATIVE
LA MAJOR: 6.47 CM
LA MAJOR: 6.56 CM
LA MINOR: 2.87 CM
LA MINOR: 7.59 CM
LA WIDTH: 5.33 CM
LA WIDTH: 6.45 CM
LACTATE SERPL-SCNC: 0.9 MMOL/L (ref 0.5–2.2)
LEFT ATRIUM SIZE: 4.98 CM
LEFT ATRIUM SIZE: 6.15 CM
LEFT ATRIUM VOLUME INDEX MOD: 29.2 ML/M2
LEFT ATRIUM VOLUME INDEX MOD: 42.5 ML/M2
LEFT ATRIUM VOLUME INDEX: 38.1 ML/M2
LEFT ATRIUM VOLUME INDEX: 63.6 ML/M2
LEFT ATRIUM VOLUME MOD: 124.19 CM3
LEFT ATRIUM VOLUME MOD: 87.74 CM3
LEFT ATRIUM VOLUME: 111.26 CM3
LEFT ATRIUM VOLUME: 190.72 CM3
LEFT INTERNAL DIMENSION IN SYSTOLE: 5 CM (ref 2.1–4)
LEFT INTERNAL DIMENSION IN SYSTOLE: 5.92 CM (ref 2.1–4)
LEFT VENTRICLE DIASTOLIC VOLUME INDEX: 92.57 ML/M2
LEFT VENTRICLE DIASTOLIC VOLUME INDEX: 92.71 ML/M2
LEFT VENTRICLE DIASTOLIC VOLUME: 270.71 ML
LEFT VENTRICLE DIASTOLIC VOLUME: 277.71 ML
LEFT VENTRICLE MASS INDEX: 101 G/M2
LEFT VENTRICLE MASS INDEX: 73 G/M2
LEFT VENTRICLE SYSTOLIC VOLUME INDEX: 59.3 ML/M2
LEFT VENTRICLE SYSTOLIC VOLUME INDEX: 59.8 ML/M2
LEFT VENTRICLE SYSTOLIC VOLUME: 174.47 ML
LEFT VENTRICLE SYSTOLIC VOLUME: 177.92 ML
LEFT VENTRICULAR INTERNAL DIMENSION IN DIASTOLE: 5.9 CM (ref 3.5–6)
LEFT VENTRICULAR INTERNAL DIMENSION IN DIASTOLE: 7.18 CM (ref 3.5–6)
LEFT VENTRICULAR MASS: 220.01 G
LEFT VENTRICULAR MASS: 295.13 G
LEUKOCYTE ESTERASE UR QL STRIP: NEGATIVE
LEUKOCYTE ESTERASE UR QL STRIP: NEGATIVE
LIPASE SERPL-CCNC: 56 U/L (ref 4–60)
LYMPHOCYTES # BLD AUTO: 0.5 K/UL (ref 1–4.8)
LYMPHOCYTES # BLD AUTO: 0.6 K/UL (ref 1–4.8)
LYMPHOCYTES # BLD AUTO: 1 K/UL (ref 1–4.8)
LYMPHOCYTES # BLD AUTO: 1.1 K/UL (ref 1–4.8)
LYMPHOCYTES # BLD AUTO: 1.2 K/UL (ref 1–4.8)
LYMPHOCYTES # BLD AUTO: 1.2 K/UL (ref 1–4.8)
LYMPHOCYTES # BLD AUTO: 1.3 K/UL (ref 1–4.8)
LYMPHOCYTES # BLD AUTO: 1.3 K/UL (ref 1–4.8)
LYMPHOCYTES # BLD AUTO: 1.8 K/UL (ref 1–4.8)
LYMPHOCYTES # BLD AUTO: 2.6 K/UL (ref 1–4.8)
LYMPHOCYTES NFR BLD: 10 % (ref 18–48)
LYMPHOCYTES NFR BLD: 17.5 % (ref 18–48)
LYMPHOCYTES NFR BLD: 18.4 % (ref 18–48)
LYMPHOCYTES NFR BLD: 19.4 % (ref 18–48)
LYMPHOCYTES NFR BLD: 19.9 % (ref 18–48)
LYMPHOCYTES NFR BLD: 23.5 % (ref 18–48)
LYMPHOCYTES NFR BLD: 3.6 % (ref 18–48)
LYMPHOCYTES NFR BLD: 7 % (ref 18–48)
LYMPHOCYTES NFR BLD: 7.4 % (ref 18–48)
LYMPHOCYTES NFR BLD: 8.9 % (ref 18–48)
MAGNESIUM SERPL-MCNC: 1.4 MG/DL (ref 1.6–2.6)
MAGNESIUM SERPL-MCNC: 1.4 MG/DL (ref 1.6–2.6)
MAGNESIUM SERPL-MCNC: 1.5 MG/DL (ref 1.6–2.6)
MAGNESIUM SERPL-MCNC: 1.7 MG/DL (ref 1.6–2.6)
MAGNESIUM SERPL-MCNC: 1.8 MG/DL (ref 1.6–2.6)
MAGNESIUM SERPL-MCNC: 1.8 MG/DL (ref 1.6–2.6)
MAGNESIUM SERPL-MCNC: 1.9 MG/DL (ref 1.6–2.6)
MAGNESIUM SERPL-MCNC: 1.9 MG/DL (ref 1.6–2.6)
MAGNESIUM SERPL-MCNC: 2 MG/DL (ref 1.6–2.6)
MCH RBC QN AUTO: 24.5 PG (ref 27–31)
MCH RBC QN AUTO: 24.5 PG (ref 27–31)
MCH RBC QN AUTO: 24.8 PG (ref 27–31)
MCH RBC QN AUTO: 24.9 PG (ref 27–31)
MCH RBC QN AUTO: 25.3 PG (ref 27–31)
MCH RBC QN AUTO: 25.3 PG (ref 27–31)
MCH RBC QN AUTO: 25.5 PG (ref 27–31)
MCH RBC QN AUTO: 25.6 PG (ref 27–31)
MCH RBC QN AUTO: 25.6 PG (ref 27–31)
MCH RBC QN AUTO: 25.7 PG (ref 27–31)
MCHC RBC AUTO-ENTMCNC: 29 G/DL (ref 32–36)
MCHC RBC AUTO-ENTMCNC: 29.5 G/DL (ref 32–36)
MCHC RBC AUTO-ENTMCNC: 30.5 G/DL (ref 32–36)
MCHC RBC AUTO-ENTMCNC: 30.9 G/DL (ref 32–36)
MCHC RBC AUTO-ENTMCNC: 31 G/DL (ref 32–36)
MCHC RBC AUTO-ENTMCNC: 31.1 G/DL (ref 32–36)
MCHC RBC AUTO-ENTMCNC: 31.5 G/DL (ref 32–36)
MCHC RBC AUTO-ENTMCNC: 31.8 G/DL (ref 32–36)
MCHC RBC AUTO-ENTMCNC: 31.9 G/DL (ref 32–36)
MCHC RBC AUTO-ENTMCNC: 32.1 G/DL (ref 32–36)
MCV RBC AUTO: 80 FL (ref 82–98)
MCV RBC AUTO: 81 FL (ref 82–98)
MCV RBC AUTO: 81 FL (ref 82–98)
MCV RBC AUTO: 82 FL (ref 82–98)
MCV RBC AUTO: 82 FL (ref 82–98)
MCV RBC AUTO: 83 FL (ref 82–98)
MCV RBC AUTO: 86 FL (ref 82–98)
MICROSCOPIC COMMENT: ABNORMAL
MIN VOL: 11
MODE: ABNORMAL
MONOCYTES # BLD AUTO: 0.5 K/UL (ref 0.3–1)
MONOCYTES # BLD AUTO: 0.6 K/UL (ref 0.3–1)
MONOCYTES # BLD AUTO: 0.7 K/UL (ref 0.3–1)
MONOCYTES # BLD AUTO: 1.3 K/UL (ref 0.3–1)
MONOCYTES # BLD AUTO: 2.1 K/UL (ref 0.3–1)
MONOCYTES # BLD AUTO: 2.5 K/UL (ref 0.3–1)
MONOCYTES NFR BLD: 10 % (ref 4–15)
MONOCYTES NFR BLD: 10 % (ref 4–15)
MONOCYTES NFR BLD: 10.8 % (ref 4–15)
MONOCYTES NFR BLD: 11.7 % (ref 4–15)
MONOCYTES NFR BLD: 3.6 % (ref 4–15)
MONOCYTES NFR BLD: 7.1 % (ref 4–15)
MONOCYTES NFR BLD: 8 % (ref 4–15)
MONOCYTES NFR BLD: 8.2 % (ref 4–15)
MONOCYTES NFR BLD: 8.8 % (ref 4–15)
MONOCYTES NFR BLD: 9.9 % (ref 4–15)
MV MEAN GRADIENT: 1 MMHG
MV MEAN GRADIENT: 1 MMHG
MV PEAK GRADIENT: 7 MMHG
MV PEAK GRADIENT: 8 MMHG
MV VALVE AREA BY CONTINUITY EQUATION: 4.91 CM2
NEUTROPHILS # BLD AUTO: 13 K/UL (ref 1.8–7.7)
NEUTROPHILS # BLD AUTO: 13.8 K/UL (ref 1.8–7.7)
NEUTROPHILS # BLD AUTO: 21.1 K/UL (ref 1.8–7.7)
NEUTROPHILS # BLD AUTO: 22.9 K/UL (ref 1.8–7.7)
NEUTROPHILS # BLD AUTO: 3.3 K/UL (ref 1.8–7.7)
NEUTROPHILS # BLD AUTO: 3.5 K/UL (ref 1.8–7.7)
NEUTROPHILS # BLD AUTO: 3.5 K/UL (ref 1.8–7.7)
NEUTROPHILS # BLD AUTO: 4.3 K/UL (ref 1.8–7.7)
NEUTROPHILS # BLD AUTO: 5.1 K/UL (ref 1.8–7.7)
NEUTROPHILS # BLD AUTO: 5.2 K/UL (ref 1.8–7.7)
NEUTROPHILS NFR BLD: 61.7 % (ref 38–73)
NEUTROPHILS NFR BLD: 64.6 % (ref 38–73)
NEUTROPHILS NFR BLD: 66.4 % (ref 38–73)
NEUTROPHILS NFR BLD: 68.4 % (ref 38–73)
NEUTROPHILS NFR BLD: 69.8 % (ref 38–73)
NEUTROPHILS NFR BLD: 79.9 % (ref 38–73)
NEUTROPHILS NFR BLD: 80.8 % (ref 38–73)
NEUTROPHILS NFR BLD: 82.8 % (ref 38–73)
NEUTROPHILS NFR BLD: 83.9 % (ref 38–73)
NEUTROPHILS NFR BLD: 92.2 % (ref 38–73)
NITRITE UR QL STRIP: NEGATIVE
NITRITE UR QL STRIP: NEGATIVE
NRBC BLD-RTO: 0 /100 WBC
NRBC BLD-RTO: 1 /100 WBC
NRBC BLD-RTO: 2 /100 WBC
OVALOCYTES BLD QL SMEAR: ABNORMAL
OVALOCYTES BLD QL SMEAR: ABNORMAL
PCO2 BLDA: 37.6 MMHG (ref 35–45)
PCO2 BLDA: 38.4 MMHG (ref 35–45)
PCO2 BLDA: 41.3 MMHG (ref 35–45)
PCO2 BLDA: 51.6 MMHG (ref 35–45)
PCO2 BLDA: 59.1 MMHG (ref 35–45)
PCO2 BLDA: 60.5 MMHG (ref 35–45)
PCO2 BLDA: 62.3 MMHG (ref 35–45)
PEEP: 5
PH SMN: 7.14 [PH] (ref 7.35–7.45)
PH SMN: 7.14 [PH] (ref 7.35–7.45)
PH SMN: 7.15 [PH] (ref 7.35–7.45)
PH SMN: 7.2 [PH] (ref 7.35–7.45)
PH SMN: 7.3 [PH] (ref 7.35–7.45)
PH SMN: 7.32 [PH] (ref 7.35–7.45)
PH SMN: 7.37 [PH] (ref 7.35–7.45)
PH UR STRIP: 5 [PH] (ref 5–8)
PH UR STRIP: 6 [PH] (ref 5–8)
PHOSPHATE SERPL-MCNC: 4.2 MG/DL (ref 2.7–4.5)
PHOSPHATE SERPL-MCNC: 4.3 MG/DL (ref 2.7–4.5)
PHOSPHATE SERPL-MCNC: 4.9 MG/DL (ref 2.7–4.5)
PHOSPHATE SERPL-MCNC: 5 MG/DL (ref 2.7–4.5)
PHOSPHATE SERPL-MCNC: 5.1 MG/DL (ref 2.7–4.5)
PHOSPHATE SERPL-MCNC: 5.8 MG/DL (ref 2.7–4.5)
PHOSPHATE SERPL-MCNC: 7.2 MG/DL (ref 2.7–4.5)
PHOSPHATE SERPL-MCNC: 9.8 MG/DL (ref 2.7–4.5)
PIP: 28
PISA MRMAX VEL: 0.04 M/S
PISA MRMAX VEL: 0.05 M/S
PISA TR MAX VEL: 2.9 M/S
PISA TR MAX VEL: 3.05 M/S
PLATELET # BLD AUTO: 124 K/UL (ref 150–450)
PLATELET # BLD AUTO: 182 K/UL (ref 150–450)
PLATELET # BLD AUTO: 182 K/UL (ref 150–450)
PLATELET # BLD AUTO: 188 K/UL (ref 150–450)
PLATELET # BLD AUTO: 196 K/UL (ref 150–450)
PLATELET # BLD AUTO: 201 K/UL (ref 150–450)
PLATELET # BLD AUTO: 201 K/UL (ref 150–450)
PLATELET # BLD AUTO: 215 K/UL (ref 150–450)
PLATELET # BLD AUTO: 233 K/UL (ref 150–450)
PLATELET # BLD AUTO: 248 K/UL (ref 150–450)
PLATELET BLD QL SMEAR: ABNORMAL
PLATELET BLD QL SMEAR: ABNORMAL
PMV BLD AUTO: 10 FL (ref 9.2–12.9)
PMV BLD AUTO: 10 FL (ref 9.2–12.9)
PMV BLD AUTO: 10.3 FL (ref 9.2–12.9)
PMV BLD AUTO: 10.4 FL (ref 9.2–12.9)
PMV BLD AUTO: 9.7 FL (ref 9.2–12.9)
PMV BLD AUTO: 9.7 FL (ref 9.2–12.9)
PMV BLD AUTO: 9.8 FL (ref 9.2–12.9)
PMV BLD AUTO: 9.8 FL (ref 9.2–12.9)
PMV BLD AUTO: 9.9 FL (ref 9.2–12.9)
PMV BLD AUTO: 9.9 FL (ref 9.2–12.9)
PO2 BLDA: 39 MMHG (ref 80–100)
PO2 BLDA: 43 MMHG (ref 40–60)
PO2 BLDA: 46 MMHG (ref 80–100)
PO2 BLDA: 57 MMHG (ref 40–60)
PO2 BLDA: 62 MMHG (ref 80–100)
PO2 BLDA: 81 MMHG (ref 80–100)
PO2 BLDA: 90 MMHG (ref 80–100)
POC BE: -3 MMOL/L
POC BE: -5 MMOL/L
POC BE: -8 MMOL/L
POC BE: -9 MMOL/L
POC SATURATED O2: 57 % (ref 95–100)
POC SATURATED O2: 63 % (ref 95–100)
POC SATURATED O2: 67 % (ref 95–100)
POC SATURATED O2: 82 % (ref 95–100)
POC SATURATED O2: 90 % (ref 95–100)
POC SATURATED O2: 95 % (ref 95–100)
POC SATURATED O2: 97 % (ref 95–100)
POC TCO2: 20 MMOL/L (ref 23–27)
POC TCO2: 22 MMOL/L (ref 23–27)
POC TCO2: 22 MMOL/L (ref 23–27)
POC TCO2: 22 MMOL/L (ref 24–29)
POC TCO2: 23 MMOL/L (ref 23–27)
POC TCO2: 23 MMOL/L (ref 23–27)
POC TCO2: 23 MMOL/L (ref 24–29)
POCT GLUCOSE: 103 MG/DL (ref 70–110)
POCT GLUCOSE: 105 MG/DL (ref 70–110)
POCT GLUCOSE: 109 MG/DL (ref 70–110)
POCT GLUCOSE: 112 MG/DL (ref 70–110)
POCT GLUCOSE: 120 MG/DL (ref 70–110)
POCT GLUCOSE: 123 MG/DL (ref 70–110)
POCT GLUCOSE: 127 MG/DL (ref 70–110)
POCT GLUCOSE: 140 MG/DL (ref 70–110)
POCT GLUCOSE: 141 MG/DL (ref 70–110)
POCT GLUCOSE: 143 MG/DL (ref 70–110)
POCT GLUCOSE: 144 MG/DL (ref 70–110)
POCT GLUCOSE: 144 MG/DL (ref 70–110)
POCT GLUCOSE: 149 MG/DL (ref 70–110)
POCT GLUCOSE: 149 MG/DL (ref 70–110)
POCT GLUCOSE: 157 MG/DL (ref 70–110)
POCT GLUCOSE: 163 MG/DL (ref 70–110)
POCT GLUCOSE: 175 MG/DL (ref 70–110)
POCT GLUCOSE: 179 MG/DL (ref 70–110)
POCT GLUCOSE: 179 MG/DL (ref 70–110)
POCT GLUCOSE: 191 MG/DL (ref 70–110)
POCT GLUCOSE: 202 MG/DL (ref 70–110)
POCT GLUCOSE: 34 MG/DL (ref 70–110)
POCT GLUCOSE: 51 MG/DL (ref 70–110)
POCT GLUCOSE: 60 MG/DL (ref 70–110)
POCT GLUCOSE: 61 MG/DL (ref 70–110)
POCT GLUCOSE: 73 MG/DL (ref 70–110)
POCT GLUCOSE: 73 MG/DL (ref 70–110)
POCT GLUCOSE: 84 MG/DL (ref 70–110)
POCT GLUCOSE: 90 MG/DL (ref 70–110)
POCT GLUCOSE: 91 MG/DL (ref 70–110)
POCT GLUCOSE: 93 MG/DL (ref 70–110)
POCT GLUCOSE: 95 MG/DL (ref 70–110)
POIKILOCYTOSIS BLD QL SMEAR: SLIGHT
POIKILOCYTOSIS BLD QL SMEAR: SLIGHT
POLYCHROMASIA BLD QL SMEAR: ABNORMAL
POTASSIUM SERPL-SCNC: 3.5 MMOL/L (ref 3.5–5.1)
POTASSIUM SERPL-SCNC: 3.6 MMOL/L (ref 3.5–5.1)
POTASSIUM SERPL-SCNC: 3.7 MMOL/L (ref 3.5–5.1)
POTASSIUM SERPL-SCNC: 4.2 MMOL/L (ref 3.5–5.1)
POTASSIUM SERPL-SCNC: 4.3 MMOL/L (ref 3.5–5.1)
POTASSIUM SERPL-SCNC: 4.8 MMOL/L (ref 3.5–5.1)
POTASSIUM SERPL-SCNC: 5.3 MMOL/L (ref 3.5–5.1)
POTASSIUM SERPL-SCNC: 5.4 MMOL/L (ref 3.5–5.1)
POTASSIUM SERPL-SCNC: 5.5 MMOL/L (ref 3.5–5.1)
POTASSIUM SERPL-SCNC: 5.6 MMOL/L (ref 3.5–5.1)
POTASSIUM SERPL-SCNC: 5.7 MMOL/L (ref 3.5–5.1)
POTASSIUM SERPL-SCNC: 5.8 MMOL/L (ref 3.5–5.1)
POTASSIUM SERPL-SCNC: 5.9 MMOL/L (ref 3.5–5.1)
POTASSIUM SERPL-SCNC: 5.9 MMOL/L (ref 3.5–5.1)
POTASSIUM SERPL-SCNC: 6.1 MMOL/L (ref 3.5–5.1)
POTASSIUM SERPL-SCNC: 6.2 MMOL/L (ref 3.5–5.1)
POTASSIUM SERPL-SCNC: 6.3 MMOL/L (ref 3.5–5.1)
POTASSIUM SERPL-SCNC: 6.8 MMOL/L (ref 3.5–5.1)
POTASSIUM SERPL-SCNC: 6.9 MMOL/L (ref 3.5–5.1)
PROT SERPL-MCNC: 6.8 G/DL (ref 6–8.4)
PROT SERPL-MCNC: 6.9 G/DL (ref 6–8.4)
PROT SERPL-MCNC: 7.1 G/DL (ref 6–8.4)
PROT SERPL-MCNC: 7.5 G/DL (ref 6–8.4)
PROT SERPL-MCNC: 7.7 G/DL (ref 6–8.4)
PROT SERPL-MCNC: 7.9 G/DL (ref 6–8.4)
PROT SERPL-MCNC: 8 G/DL (ref 6–8.4)
PROT SERPL-MCNC: 8.4 G/DL (ref 6–8.4)
PROT UR QL STRIP: ABNORMAL
PROT UR QL STRIP: NEGATIVE
PROTHROMBIN TIME: 11.6 SEC (ref 9–12.5)
PV PEAK VELOCITY: 0.94 CM/S
PV PEAK VELOCITY: 1.09 CM/S
RA MAJOR: 6.68 CM
RA MAJOR: 7.42 CM
RA PRESSURE: 15 MMHG
RA WIDTH: 4.64 CM
RA WIDTH: 5.62 CM
RBC # BLD AUTO: 3.44 M/UL (ref 4.6–6.2)
RBC # BLD AUTO: 3.73 M/UL (ref 4.6–6.2)
RBC # BLD AUTO: 3.73 M/UL (ref 4.6–6.2)
RBC # BLD AUTO: 3.77 M/UL (ref 4.6–6.2)
RBC # BLD AUTO: 3.83 M/UL (ref 4.6–6.2)
RBC # BLD AUTO: 3.84 M/UL (ref 4.6–6.2)
RBC # BLD AUTO: 3.92 M/UL (ref 4.6–6.2)
RBC # BLD AUTO: 3.95 M/UL (ref 4.6–6.2)
RBC # BLD AUTO: 4.04 M/UL (ref 4.6–6.2)
RBC # BLD AUTO: 4.27 M/UL (ref 4.6–6.2)
RBC #/AREA URNS HPF: 6 /HPF (ref 0–4)
RIGHT VENTRICULAR END-DIASTOLIC DIMENSION: 2.28 CM
RV TISSUE DOPPLER FREE WALL SYSTOLIC VELOCITY 1 (APICAL 4 CHAMBER VIEW): 9.75 CM/S
SAMPLE: ABNORMAL
SARS-COV-2 AG RESP QL IA.RAPID: NEGATIVE
SARS-COV-2 RDRP RESP QL NAA+PROBE: NEGATIVE
SARS-COV-2 RDRP RESP QL NAA+PROBE: NEGATIVE
SATURATED IRON: 8 % (ref 20–50)
SITE: ABNORMAL
SODIUM SERPL-SCNC: 130 MMOL/L (ref 136–145)
SODIUM SERPL-SCNC: 133 MMOL/L (ref 136–145)
SODIUM SERPL-SCNC: 133 MMOL/L (ref 136–145)
SODIUM SERPL-SCNC: 134 MMOL/L (ref 136–145)
SODIUM SERPL-SCNC: 135 MMOL/L (ref 136–145)
SODIUM SERPL-SCNC: 136 MMOL/L (ref 136–145)
SODIUM SERPL-SCNC: 137 MMOL/L (ref 136–145)
SODIUM SERPL-SCNC: 138 MMOL/L (ref 136–145)
SODIUM SERPL-SCNC: 139 MMOL/L (ref 136–145)
SODIUM SERPL-SCNC: 140 MMOL/L (ref 136–145)
SODIUM SERPL-SCNC: 140 MMOL/L (ref 136–145)
SODIUM SERPL-SCNC: 141 MMOL/L (ref 136–145)
SODIUM SERPL-SCNC: 142 MMOL/L (ref 136–145)
SODIUM SERPL-SCNC: 143 MMOL/L (ref 136–145)
SODIUM UR-SCNC: 90 MMOL/L (ref 20–250)
SP GR UR STRIP: 1.02 (ref 1–1.03)
SP GR UR STRIP: 1.02 (ref 1–1.03)
SP02: 90
SQUAMOUS #/AREA URNS HPF: 1 /HPF
T4 FREE SERPL-MCNC: 2.63 NG/DL (ref 0.71–1.51)
TDI LATERAL: 0.12 M/S
TDI SEPTAL: 0.07 M/S
TDI: 0.1 M/S
TOTAL IRON BINDING CAPACITY: 360 UG/DL (ref 250–450)
TR MAX PG: 34 MMHG
TR MAX PG: 37 MMHG
TRANSFERRIN SERPL-MCNC: 243 MG/DL (ref 200–375)
TRICUSPID ANNULAR PLANE SYSTOLIC EXCURSION: 2.38 CM
TRICUSPID ANNULAR PLANE SYSTOLIC EXCURSION: 2.42 CM
TROPONIN I SERPL DL<=0.01 NG/ML-MCNC: 0.02 NG/ML (ref 0–0.03)
TROPONIN I SERPL DL<=0.01 NG/ML-MCNC: 0.03 NG/ML (ref 0–0.03)
TROPONIN I SERPL DL<=0.01 NG/ML-MCNC: 0.04 NG/ML (ref 0–0.03)
TROPONIN I SERPL DL<=0.01 NG/ML-MCNC: 0.5 NG/ML (ref 0–0.03)
TROPONIN I SERPL DL<=0.01 NG/ML-MCNC: 0.77 NG/ML (ref 0–0.03)
TROPONIN I SERPL DL<=0.01 NG/ML-MCNC: 1.42 NG/ML (ref 0–0.03)
TSH SERPL DL<=0.005 MIU/L-ACNC: <0.01 UIU/ML (ref 0.4–4)
TV REST PULMONARY ARTERY PRESSURE: 52 MMHG
URN SPEC COLLECT METH UR: ABNORMAL
URN SPEC COLLECT METH UR: NORMAL
UROBILINOGEN UR STRIP-ACNC: NEGATIVE EU/DL
UROBILINOGEN UR STRIP-ACNC: NEGATIVE EU/DL
UUN UR-MCNC: 269 MG/DL (ref 140–1050)
VIT B12 SERPL-MCNC: >2000 PG/ML (ref 210–950)
VT: 420
VT: 450
VT: 550
WBC # BLD AUTO: 14.96 K/UL (ref 3.9–12.7)
WBC # BLD AUTO: 15.73 K/UL (ref 3.9–12.7)
WBC # BLD AUTO: 25.13 K/UL (ref 3.9–12.7)
WBC # BLD AUTO: 28.59 K/UL (ref 3.9–12.7)
WBC # BLD AUTO: 5.26 K/UL (ref 3.9–12.7)
WBC # BLD AUTO: 5.37 K/UL (ref 3.9–12.7)
WBC # BLD AUTO: 5.39 K/UL (ref 3.9–12.7)
WBC # BLD AUTO: 6.29 K/UL (ref 3.9–12.7)
WBC # BLD AUTO: 6.32 K/UL (ref 3.9–12.7)
WBC # BLD AUTO: 7.42 K/UL (ref 3.9–12.7)
WBC #/AREA URNS HPF: 6 /HPF (ref 0–5)

## 2022-01-01 PROCEDURE — 93005 ELECTROCARDIOGRAM TRACING: CPT

## 2022-01-01 PROCEDURE — 93000 EKG 12-LEAD: ICD-10-PCS | Mod: S$GLB,,, | Performed by: INTERNAL MEDICINE

## 2022-01-01 PROCEDURE — 63600175 PHARM REV CODE 636 W HCPCS: Performed by: STUDENT IN AN ORGANIZED HEALTH CARE EDUCATION/TRAINING PROGRAM

## 2022-01-01 PROCEDURE — 93010 ELECTROCARDIOGRAM REPORT: CPT | Mod: ,,, | Performed by: INTERNAL MEDICINE

## 2022-01-01 PROCEDURE — C1751 CATH, INF, PER/CENT/MIDLINE: HCPCS

## 2022-01-01 PROCEDURE — 99900035 HC TECH TIME PER 15 MIN (STAT)

## 2022-01-01 PROCEDURE — 99233 SBSQ HOSP IP/OBS HIGH 50: CPT | Mod: ,,, | Performed by: INTERNAL MEDICINE

## 2022-01-01 PROCEDURE — 87186 SC STD MICRODIL/AGAR DIL: CPT | Performed by: STUDENT IN AN ORGANIZED HEALTH CARE EDUCATION/TRAINING PROGRAM

## 2022-01-01 PROCEDURE — 96375 TX/PRO/DX INJ NEW DRUG ADDON: CPT

## 2022-01-01 PROCEDURE — 25000003 PHARM REV CODE 250: Performed by: STUDENT IN AN ORGANIZED HEALTH CARE EDUCATION/TRAINING PROGRAM

## 2022-01-01 PROCEDURE — 4010F ACE/ARB THERAPY RXD/TAKEN: CPT | Mod: CPTII,S$GLB,, | Performed by: INTERNAL MEDICINE

## 2022-01-01 PROCEDURE — 63600175 PHARM REV CODE 636 W HCPCS: Performed by: EMERGENCY MEDICINE

## 2022-01-01 PROCEDURE — 3008F BODY MASS INDEX DOCD: CPT | Mod: CPTII,S$GLB,, | Performed by: INTERNAL MEDICINE

## 2022-01-01 PROCEDURE — 36555 INSERT NON-TUNNEL CV CATH: CPT

## 2022-01-01 PROCEDURE — 99215 PR OFFICE/OUTPT VISIT, EST, LEVL V, 40-54 MIN: ICD-10-PCS | Mod: S$GLB,,, | Performed by: INTERNAL MEDICINE

## 2022-01-01 PROCEDURE — 99291 CRITICAL CARE FIRST HOUR: CPT | Mod: GC,,, | Performed by: INTERNAL MEDICINE

## 2022-01-01 PROCEDURE — G0378 HOSPITAL OBSERVATION PER HR: HCPCS

## 2022-01-01 PROCEDURE — 63600175 PHARM REV CODE 636 W HCPCS: Performed by: FAMILY MEDICINE

## 2022-01-01 PROCEDURE — 27000221 HC OXYGEN, UP TO 24 HOURS

## 2022-01-01 PROCEDURE — 99213 PR OFFICE/OUTPT VISIT, EST, LEVL III, 20-29 MIN: ICD-10-PCS | Mod: S$GLB,,, | Performed by: ORTHOPAEDIC SURGERY

## 2022-01-01 PROCEDURE — 3008F PR BODY MASS INDEX (BMI) DOCUMENTED: ICD-10-PCS | Mod: CPTII,S$GLB,, | Performed by: INTERNAL MEDICINE

## 2022-01-01 PROCEDURE — 1159F MED LIST DOCD IN RCRD: CPT | Mod: CPTII,S$GLB,, | Performed by: ORTHOPAEDIC SURGERY

## 2022-01-01 PROCEDURE — 20000000 HC ICU ROOM

## 2022-01-01 PROCEDURE — 25000003 PHARM REV CODE 250: Performed by: NURSE PRACTITIONER

## 2022-01-01 PROCEDURE — 80048 BASIC METABOLIC PNL TOTAL CA: CPT | Mod: XB | Performed by: STUDENT IN AN ORGANIZED HEALTH CARE EDUCATION/TRAINING PROGRAM

## 2022-01-01 PROCEDURE — 1159F MED LIST DOCD IN RCRD: CPT | Mod: CPTII,S$GLB,, | Performed by: INTERNAL MEDICINE

## 2022-01-01 PROCEDURE — 94761 N-INVAS EAR/PLS OXIMETRY MLT: CPT

## 2022-01-01 PROCEDURE — 94003 VENT MGMT INPAT SUBQ DAY: CPT

## 2022-01-01 PROCEDURE — 80048 BASIC METABOLIC PNL TOTAL CA: CPT | Mod: 91 | Performed by: STUDENT IN AN ORGANIZED HEALTH CARE EDUCATION/TRAINING PROGRAM

## 2022-01-01 PROCEDURE — 3075F PR MOST RECENT SYSTOLIC BLOOD PRESS GE 130-139MM HG: ICD-10-PCS | Mod: CPTII,S$GLB,, | Performed by: INTERNAL MEDICINE

## 2022-01-01 PROCEDURE — 3078F PR MOST RECENT DIASTOLIC BLOOD PRESSURE < 80 MM HG: ICD-10-PCS | Mod: CPTII,S$GLB,, | Performed by: INTERNAL MEDICINE

## 2022-01-01 PROCEDURE — 99999 PR PBB SHADOW E&M-EST. PATIENT-LVL IV: CPT | Mod: PBBFAC,,, | Performed by: ORTHOPAEDIC SURGERY

## 2022-01-01 PROCEDURE — 36600 WITHDRAWAL OF ARTERIAL BLOOD: CPT

## 2022-01-01 PROCEDURE — 99233 PR SUBSEQUENT HOSPITAL CARE,LEVL III: ICD-10-PCS | Mod: ,,, | Performed by: NURSE PRACTITIONER

## 2022-01-01 PROCEDURE — 84484 ASSAY OF TROPONIN QUANT: CPT | Performed by: EMERGENCY MEDICINE

## 2022-01-01 PROCEDURE — 11000001 HC ACUTE MED/SURG PRIVATE ROOM

## 2022-01-01 PROCEDURE — 73564 X-RAY EXAM KNEE 4 OR MORE: CPT | Mod: 26,LT,, | Performed by: RADIOLOGY

## 2022-01-01 PROCEDURE — 83735 ASSAY OF MAGNESIUM: CPT | Performed by: STUDENT IN AN ORGANIZED HEALTH CARE EDUCATION/TRAINING PROGRAM

## 2022-01-01 PROCEDURE — 93010 ELECTROCARDIOGRAM REPORT: CPT | Mod: 76,,, | Performed by: INTERNAL MEDICINE

## 2022-01-01 PROCEDURE — 84439 ASSAY OF FREE THYROXINE: CPT | Performed by: NURSE PRACTITIONER

## 2022-01-01 PROCEDURE — 82248 BILIRUBIN DIRECT: CPT | Performed by: STUDENT IN AN ORGANIZED HEALTH CARE EDUCATION/TRAINING PROGRAM

## 2022-01-01 PROCEDURE — 25000003 PHARM REV CODE 250: Performed by: INTERNAL MEDICINE

## 2022-01-01 PROCEDURE — 3074F PR MOST RECENT SYSTOLIC BLOOD PRESSURE < 130 MM HG: ICD-10-PCS | Mod: CPTII,S$GLB,, | Performed by: INTERNAL MEDICINE

## 2022-01-01 PROCEDURE — 1160F RVW MEDS BY RX/DR IN RCRD: CPT | Mod: CPTII,S$GLB,, | Performed by: ORTHOPAEDIC SURGERY

## 2022-01-01 PROCEDURE — 94660 CPAP INITIATION&MGMT: CPT

## 2022-01-01 PROCEDURE — 90945 DIALYSIS ONE EVALUATION: CPT

## 2022-01-01 PROCEDURE — 25000003 PHARM REV CODE 250: Performed by: FAMILY MEDICINE

## 2022-01-01 PROCEDURE — 99223 1ST HOSP IP/OBS HIGH 75: CPT | Mod: ,,, | Performed by: STUDENT IN AN ORGANIZED HEALTH CARE EDUCATION/TRAINING PROGRAM

## 2022-01-01 PROCEDURE — 1160F PR REVIEW ALL MEDS BY PRESCRIBER/CLIN PHARMACIST DOCUMENTED: ICD-10-PCS | Mod: CPTII,S$GLB,, | Performed by: ORTHOPAEDIC SURGERY

## 2022-01-01 PROCEDURE — 3075F SYST BP GE 130 - 139MM HG: CPT | Mod: CPTII,S$GLB,, | Performed by: INTERNAL MEDICINE

## 2022-01-01 PROCEDURE — 85025 COMPLETE CBC W/AUTO DIFF WBC: CPT | Performed by: STUDENT IN AN ORGANIZED HEALTH CARE EDUCATION/TRAINING PROGRAM

## 2022-01-01 PROCEDURE — 20610 LARGE JOINT ASPIRATION/INJECTION: R KNEE: ICD-10-PCS | Mod: RT,S$GLB,, | Performed by: ORTHOPAEDIC SURGERY

## 2022-01-01 PROCEDURE — 36470 NJX SCLRSNT 1 INCMPTNT VEIN: CPT | Mod: LT | Performed by: INTERNAL MEDICINE

## 2022-01-01 PROCEDURE — 84443 ASSAY THYROID STIM HORMONE: CPT | Performed by: NURSE PRACTITIONER

## 2022-01-01 PROCEDURE — 51702 INSERT TEMP BLADDER CATH: CPT

## 2022-01-01 PROCEDURE — 63600175 PHARM REV CODE 636 W HCPCS: Performed by: INTERNAL MEDICINE

## 2022-01-01 PROCEDURE — 99223 1ST HOSP IP/OBS HIGH 75: CPT | Mod: ,,, | Performed by: INTERNAL MEDICINE

## 2022-01-01 PROCEDURE — 84300 ASSAY OF URINE SODIUM: CPT | Performed by: STUDENT IN AN ORGANIZED HEALTH CARE EDUCATION/TRAINING PROGRAM

## 2022-01-01 PROCEDURE — 93010 EKG 12-LEAD: ICD-10-PCS | Mod: ,,, | Performed by: INTERNAL MEDICINE

## 2022-01-01 PROCEDURE — 85025 COMPLETE CBC W/AUTO DIFF WBC: CPT | Performed by: FAMILY MEDICINE

## 2022-01-01 PROCEDURE — 4010F ACE/ARB THERAPY RXD/TAKEN: CPT | Mod: CPTII,S$GLB,, | Performed by: ORTHOPAEDIC SURGERY

## 2022-01-01 PROCEDURE — 99999 PR PBB SHADOW E&M-EST. PATIENT-LVL IV: ICD-10-PCS | Mod: PBBFAC,,, | Performed by: ORTHOPAEDIC SURGERY

## 2022-01-01 PROCEDURE — 4010F PR ACE/ARB THEARPY RXD/TAKEN: ICD-10-PCS | Mod: CPTII,S$GLB,, | Performed by: INTERNAL MEDICINE

## 2022-01-01 PROCEDURE — 73564 X-RAY EXAM KNEE 4 OR MORE: CPT | Mod: TC,50,FY

## 2022-01-01 PROCEDURE — 84484 ASSAY OF TROPONIN QUANT: CPT | Mod: 91 | Performed by: NURSE PRACTITIONER

## 2022-01-01 PROCEDURE — 80053 COMPREHEN METABOLIC PANEL: CPT | Performed by: EMERGENCY MEDICINE

## 2022-01-01 PROCEDURE — C1769 GUIDE WIRE: HCPCS | Performed by: INTERNAL MEDICINE

## 2022-01-01 PROCEDURE — 99152 MOD SED SAME PHYS/QHP 5/>YRS: CPT | Performed by: INTERNAL MEDICINE

## 2022-01-01 PROCEDURE — 25000003 PHARM REV CODE 250: Performed by: EMERGENCY MEDICINE

## 2022-01-01 PROCEDURE — 25000003 PHARM REV CODE 250

## 2022-01-01 PROCEDURE — 99223 1ST HOSP IP/OBS HIGH 75: CPT | Mod: ,,, | Performed by: NURSE PRACTITIONER

## 2022-01-01 PROCEDURE — 36470 NJX SCLRSNT 1 INCMPTNT VEIN: CPT | Mod: 51,LT,, | Performed by: INTERNAL MEDICINE

## 2022-01-01 PROCEDURE — 1111F PR DISCHARGE MEDS RECONCILED W/ CURRENT OUTPATIENT MED LIST: ICD-10-PCS | Mod: CPTII,S$GLB,, | Performed by: INTERNAL MEDICINE

## 2022-01-01 PROCEDURE — 84466 ASSAY OF TRANSFERRIN: CPT | Performed by: STUDENT IN AN ORGANIZED HEALTH CARE EDUCATION/TRAINING PROGRAM

## 2022-01-01 PROCEDURE — 99223 PR INITIAL HOSPITAL CARE,LEVL III: ICD-10-PCS | Mod: ,,, | Performed by: INTERNAL MEDICINE

## 2022-01-01 PROCEDURE — 27202415 HC CARTRIDGE, CRRT

## 2022-01-01 PROCEDURE — 4010F PR ACE/ARB THEARPY RXD/TAKEN: ICD-10-PCS | Mod: CPTII,S$GLB,, | Performed by: ORTHOPAEDIC SURGERY

## 2022-01-01 PROCEDURE — 73030 X-RAY EXAM OF SHOULDER: CPT | Mod: TC,FY,RT

## 2022-01-01 PROCEDURE — 33967 PR IABP INSERTION: ICD-10-PCS | Mod: 51,,, | Performed by: INTERNAL MEDICINE

## 2022-01-01 PROCEDURE — 85730 THROMBOPLASTIN TIME PARTIAL: CPT | Performed by: FAMILY MEDICINE

## 2022-01-01 PROCEDURE — 29580 STRAPPING UNNA BOOT: CPT

## 2022-01-01 PROCEDURE — 1152F PR DOC ADVANCED DISEASE DX, GOAL OF CARE PRIORITIZE COMFORT: ICD-10-PCS | Mod: ,,, | Performed by: STUDENT IN AN ORGANIZED HEALTH CARE EDUCATION/TRAINING PROGRAM

## 2022-01-01 PROCEDURE — 81003 URINALYSIS AUTO W/O SCOPE: CPT | Performed by: STUDENT IN AN ORGANIZED HEALTH CARE EDUCATION/TRAINING PROGRAM

## 2022-01-01 PROCEDURE — 83735 ASSAY OF MAGNESIUM: CPT | Mod: 91 | Performed by: FAMILY MEDICINE

## 2022-01-01 PROCEDURE — 80069 RENAL FUNCTION PANEL: CPT | Performed by: FAMILY MEDICINE

## 2022-01-01 PROCEDURE — 84484 ASSAY OF TROPONIN QUANT: CPT | Performed by: STUDENT IN AN ORGANIZED HEALTH CARE EDUCATION/TRAINING PROGRAM

## 2022-01-01 PROCEDURE — U0002 COVID-19 LAB TEST NON-CDC: HCPCS | Performed by: NURSE PRACTITIONER

## 2022-01-01 PROCEDURE — 99497 ADVNCD CARE PLAN 30 MIN: CPT | Mod: 25,,, | Performed by: STUDENT IN AN ORGANIZED HEALTH CARE EDUCATION/TRAINING PROGRAM

## 2022-01-01 PROCEDURE — 83880 ASSAY OF NATRIURETIC PEPTIDE: CPT | Performed by: EMERGENCY MEDICINE

## 2022-01-01 PROCEDURE — 80053 COMPREHEN METABOLIC PANEL: CPT | Performed by: NURSE PRACTITIONER

## 2022-01-01 PROCEDURE — 83735 ASSAY OF MAGNESIUM: CPT | Performed by: NURSE PRACTITIONER

## 2022-01-01 PROCEDURE — 27201912 HC WOUND CARE DEBRIDEMENT SUPPLIES

## 2022-01-01 PROCEDURE — 93971 EXTREMITY STUDY: CPT | Mod: TC,LT

## 2022-01-01 PROCEDURE — 36470 PR INJECTION THERAPY VEIN,ONE VEIN: ICD-10-PCS | Mod: 51,LT,, | Performed by: INTERNAL MEDICINE

## 2022-01-01 PROCEDURE — 3074F SYST BP LT 130 MM HG: CPT | Mod: CPTII,S$GLB,, | Performed by: INTERNAL MEDICINE

## 2022-01-01 PROCEDURE — 3080F PR MOST RECENT DIASTOLIC BLOOD PRESSURE >= 90 MM HG: ICD-10-PCS | Mod: CPTII,S$GLB,, | Performed by: ORTHOPAEDIC SURGERY

## 2022-01-01 PROCEDURE — 80048 BASIC METABOLIC PNL TOTAL CA: CPT | Performed by: STUDENT IN AN ORGANIZED HEALTH CARE EDUCATION/TRAINING PROGRAM

## 2022-01-01 PROCEDURE — 84100 ASSAY OF PHOSPHORUS: CPT | Performed by: STUDENT IN AN ORGANIZED HEALTH CARE EDUCATION/TRAINING PROGRAM

## 2022-01-01 PROCEDURE — 99999 PR PBB SHADOW E&M-EST. PATIENT-LVL III: CPT | Mod: PBBFAC,,, | Performed by: INTERNAL MEDICINE

## 2022-01-01 PROCEDURE — 94760 N-INVAS EAR/PLS OXIMETRY 1: CPT

## 2022-01-01 PROCEDURE — 80100008 HC CRRT DAILY MAINTENANCE

## 2022-01-01 PROCEDURE — 80048 BASIC METABOLIC PNL TOTAL CA: CPT | Mod: 91 | Performed by: FAMILY MEDICINE

## 2022-01-01 PROCEDURE — 1159F PR MEDICATION LIST DOCUMENTED IN MEDICAL RECORD: ICD-10-PCS | Mod: CPTII,S$GLB,, | Performed by: INTERNAL MEDICINE

## 2022-01-01 PROCEDURE — 99999 PR PBB SHADOW E&M-EST. PATIENT-LVL IV: CPT | Mod: PBBFAC,,, | Performed by: INTERNAL MEDICINE

## 2022-01-01 PROCEDURE — 27100171 HC OXYGEN HIGH FLOW UP TO 24 HOURS

## 2022-01-01 PROCEDURE — 96374 THER/PROPH/DIAG INJ IV PUSH: CPT

## 2022-01-01 PROCEDURE — 99233 SBSQ HOSP IP/OBS HIGH 50: CPT | Mod: ,,, | Performed by: NURSE PRACTITIONER

## 2022-01-01 PROCEDURE — 25500020 PHARM REV CODE 255: Performed by: INTERNAL MEDICINE

## 2022-01-01 PROCEDURE — 85025 COMPLETE CBC W/AUTO DIFF WBC: CPT | Performed by: NURSE PRACTITIONER

## 2022-01-01 PROCEDURE — 3008F BODY MASS INDEX DOCD: CPT | Mod: CPTII,S$GLB,, | Performed by: ORTHOPAEDIC SURGERY

## 2022-01-01 PROCEDURE — 84540 ASSAY OF URINE/UREA-N: CPT | Performed by: STUDENT IN AN ORGANIZED HEALTH CARE EDUCATION/TRAINING PROGRAM

## 2022-01-01 PROCEDURE — 93010 EKG 12-LEAD: ICD-10-PCS | Mod: 76,,, | Performed by: INTERNAL MEDICINE

## 2022-01-01 PROCEDURE — 36415 COLL VENOUS BLD VENIPUNCTURE: CPT | Performed by: STUDENT IN AN ORGANIZED HEALTH CARE EDUCATION/TRAINING PROGRAM

## 2022-01-01 PROCEDURE — 80053 COMPREHEN METABOLIC PANEL: CPT | Performed by: STUDENT IN AN ORGANIZED HEALTH CARE EDUCATION/TRAINING PROGRAM

## 2022-01-01 PROCEDURE — 99233 PR SUBSEQUENT HOSPITAL CARE,LEVL III: ICD-10-PCS | Mod: ,,, | Performed by: INTERNAL MEDICINE

## 2022-01-01 PROCEDURE — 99291 CRITICAL CARE FIRST HOUR: CPT | Mod: 25,,, | Performed by: INTERNAL MEDICINE

## 2022-01-01 PROCEDURE — 83605 ASSAY OF LACTIC ACID: CPT | Performed by: STUDENT IN AN ORGANIZED HEALTH CARE EDUCATION/TRAINING PROGRAM

## 2022-01-01 PROCEDURE — 87040 BLOOD CULTURE FOR BACTERIA: CPT | Performed by: STUDENT IN AN ORGANIZED HEALTH CARE EDUCATION/TRAINING PROGRAM

## 2022-01-01 PROCEDURE — 3080F DIAST BP >= 90 MM HG: CPT | Mod: CPTII,S$GLB,, | Performed by: ORTHOPAEDIC SURGERY

## 2022-01-01 PROCEDURE — C1752 CATH,HEMODIALYSIS,SHORT-TERM: HCPCS

## 2022-01-01 PROCEDURE — 99223 PR INITIAL HOSPITAL CARE,LEVL III: ICD-10-PCS | Mod: ,,, | Performed by: NURSE PRACTITIONER

## 2022-01-01 PROCEDURE — 82746 ASSAY OF FOLIC ACID SERUM: CPT | Performed by: STUDENT IN AN ORGANIZED HEALTH CARE EDUCATION/TRAINING PROGRAM

## 2022-01-01 PROCEDURE — 99214 OFFICE O/P EST MOD 30 MIN: CPT | Mod: 25,S$GLB,, | Performed by: ORTHOPAEDIC SURGERY

## 2022-01-01 PROCEDURE — 85025 COMPLETE CBC W/AUTO DIFF WBC: CPT | Performed by: EMERGENCY MEDICINE

## 2022-01-01 PROCEDURE — 82803 BLOOD GASES ANY COMBINATION: CPT

## 2022-01-01 PROCEDURE — 99285 EMERGENCY DEPT VISIT HI MDM: CPT | Mod: 25

## 2022-01-01 PROCEDURE — 1152F DOC ADVNCD DIS COMFORT 1ST: CPT | Mod: ,,, | Performed by: STUDENT IN AN ORGANIZED HEALTH CARE EDUCATION/TRAINING PROGRAM

## 2022-01-01 PROCEDURE — 1111F DSCHRG MED/CURRENT MED MERGE: CPT | Mod: CPTII,S$GLB,, | Performed by: INTERNAL MEDICINE

## 2022-01-01 PROCEDURE — C1887 CATHETER, GUIDING: HCPCS | Performed by: INTERNAL MEDICINE

## 2022-01-01 PROCEDURE — 97161 PT EVAL LOW COMPLEX 20 MIN: CPT

## 2022-01-01 PROCEDURE — 99215 OFFICE O/P EST HI 40 MIN: CPT | Mod: S$GLB,,, | Performed by: INTERNAL MEDICINE

## 2022-01-01 PROCEDURE — 3008F PR BODY MASS INDEX (BMI) DOCUMENTED: ICD-10-PCS | Mod: CPTII,S$GLB,, | Performed by: ORTHOPAEDIC SURGERY

## 2022-01-01 PROCEDURE — 99223 PR INITIAL HOSPITAL CARE,LEVL III: ICD-10-PCS | Mod: ,,, | Performed by: STUDENT IN AN ORGANIZED HEALTH CARE EDUCATION/TRAINING PROGRAM

## 2022-01-01 PROCEDURE — 27000190 HC CPAP FULL FACE MASK W/VALVE

## 2022-01-01 PROCEDURE — 94640 AIRWAY INHALATION TREATMENT: CPT

## 2022-01-01 PROCEDURE — 81000 URINALYSIS NONAUTO W/SCOPE: CPT | Performed by: NURSE PRACTITIONER

## 2022-01-01 PROCEDURE — 82607 VITAMIN B-12: CPT | Performed by: STUDENT IN AN ORGANIZED HEALTH CARE EDUCATION/TRAINING PROGRAM

## 2022-01-01 PROCEDURE — U0002 COVID-19 LAB TEST NON-CDC: HCPCS | Performed by: STUDENT IN AN ORGANIZED HEALTH CARE EDUCATION/TRAINING PROGRAM

## 2022-01-01 PROCEDURE — 27000923 HC TRIALYSIS CATHETER, ANY SIZE

## 2022-01-01 PROCEDURE — 33967 INSERT I-AORT PERCUT DEVICE: CPT | Performed by: INTERNAL MEDICINE

## 2022-01-01 PROCEDURE — 93460 R&L HRT ART/VENTRICLE ANGIO: CPT | Mod: 26,,, | Performed by: INTERNAL MEDICINE

## 2022-01-01 PROCEDURE — 63600175 PHARM REV CODE 636 W HCPCS: Performed by: NURSE PRACTITIONER

## 2022-01-01 PROCEDURE — 27200966 HC CLOSED SUCTION SYSTEM

## 2022-01-01 PROCEDURE — 33967 INSERT I-AORT PERCUT DEVICE: CPT | Mod: 51,,, | Performed by: INTERNAL MEDICINE

## 2022-01-01 PROCEDURE — 73030 XR SHOULDER COMPLETE 2 OR MORE VIEWS RIGHT: ICD-10-PCS | Mod: 26,RT,, | Performed by: RADIOLOGY

## 2022-01-01 PROCEDURE — 85730 THROMBOPLASTIN TIME PARTIAL: CPT | Performed by: STUDENT IN AN ORGANIZED HEALTH CARE EDUCATION/TRAINING PROGRAM

## 2022-01-01 PROCEDURE — 73030 X-RAY EXAM OF SHOULDER: CPT | Mod: 26,RT,, | Performed by: RADIOLOGY

## 2022-01-01 PROCEDURE — 11042 DBRDMT SUBQ TIS 1ST 20SQCM/<: CPT

## 2022-01-01 PROCEDURE — 20610 DRAIN/INJ JOINT/BURSA W/O US: CPT | Mod: RT,S$GLB,, | Performed by: ORTHOPAEDIC SURGERY

## 2022-01-01 PROCEDURE — 99291 PR CRITICAL CARE, E/M 30-74 MINUTES: ICD-10-PCS | Mod: GC,,, | Performed by: INTERNAL MEDICINE

## 2022-01-01 PROCEDURE — 1159F PR MEDICATION LIST DOCUMENTED IN MEDICAL RECORD: ICD-10-PCS | Mod: CPTII,S$GLB,, | Performed by: ORTHOPAEDIC SURGERY

## 2022-01-01 PROCEDURE — 27201423 OPTIME MED/SURG SUP & DEVICES STERILE SUPPLY: Performed by: INTERNAL MEDICINE

## 2022-01-01 PROCEDURE — 87070 CULTURE OTHR SPECIMN AEROBIC: CPT | Performed by: STUDENT IN AN ORGANIZED HEALTH CARE EDUCATION/TRAINING PROGRAM

## 2022-01-01 PROCEDURE — 96376 TX/PRO/DX INJ SAME DRUG ADON: CPT

## 2022-01-01 PROCEDURE — 93971 US LOWER EXTREMITY VEINS LEFT: ICD-10-PCS | Mod: 26,LT,, | Performed by: RADIOLOGY

## 2022-01-01 PROCEDURE — 80053 COMPREHEN METABOLIC PANEL: CPT | Mod: 91 | Performed by: NURSE PRACTITIONER

## 2022-01-01 PROCEDURE — 83690 ASSAY OF LIPASE: CPT | Performed by: EMERGENCY MEDICINE

## 2022-01-01 PROCEDURE — 63600175 PHARM REV CODE 636 W HCPCS

## 2022-01-01 PROCEDURE — 82570 ASSAY OF URINE CREATININE: CPT | Performed by: STUDENT IN AN ORGANIZED HEALTH CARE EDUCATION/TRAINING PROGRAM

## 2022-01-01 PROCEDURE — 85610 PROTHROMBIN TIME: CPT | Performed by: FAMILY MEDICINE

## 2022-01-01 PROCEDURE — 96365 THER/PROPH/DIAG IV INF INIT: CPT

## 2022-01-01 PROCEDURE — 3077F SYST BP >= 140 MM HG: CPT | Mod: CPTII,S$GLB,, | Performed by: ORTHOPAEDIC SURGERY

## 2022-01-01 PROCEDURE — 82728 ASSAY OF FERRITIN: CPT | Performed by: STUDENT IN AN ORGANIZED HEALTH CARE EDUCATION/TRAINING PROGRAM

## 2022-01-01 PROCEDURE — 84484 ASSAY OF TROPONIN QUANT: CPT | Mod: 91 | Performed by: STUDENT IN AN ORGANIZED HEALTH CARE EDUCATION/TRAINING PROGRAM

## 2022-01-01 PROCEDURE — 73564 XR KNEE ORTHO BILAT WITH FLEXION: ICD-10-PCS | Mod: 26,RT,, | Performed by: RADIOLOGY

## 2022-01-01 PROCEDURE — 93460 R&L HRT ART/VENTRICLE ANGIO: CPT | Performed by: INTERNAL MEDICINE

## 2022-01-01 PROCEDURE — 80069 RENAL FUNCTION PANEL: CPT | Mod: 91 | Performed by: FAMILY MEDICINE

## 2022-01-01 PROCEDURE — 85730 THROMBOPLASTIN TIME PARTIAL: CPT | Mod: 91 | Performed by: FAMILY MEDICINE

## 2022-01-01 PROCEDURE — 93000 ELECTROCARDIOGRAM COMPLETE: CPT | Mod: S$GLB,,, | Performed by: INTERNAL MEDICINE

## 2022-01-01 PROCEDURE — 96361 HYDRATE IV INFUSION ADD-ON: CPT

## 2022-01-01 PROCEDURE — 36482 ENDOVEN THER CHEM ADHES 1ST: CPT | Mod: LT | Performed by: INTERNAL MEDICINE

## 2022-01-01 PROCEDURE — 99212 OFFICE O/P EST SF 10 MIN: CPT | Mod: S$GLB,,, | Performed by: INTERNAL MEDICINE

## 2022-01-01 PROCEDURE — 87077 CULTURE AEROBIC IDENTIFY: CPT | Performed by: STUDENT IN AN ORGANIZED HEALTH CARE EDUCATION/TRAINING PROGRAM

## 2022-01-01 PROCEDURE — 36482 ENDOVEN THER CHEM ADHES 1ST: CPT | Mod: LT,,, | Performed by: INTERNAL MEDICINE

## 2022-01-01 PROCEDURE — 51798 US URINE CAPACITY MEASURE: CPT

## 2022-01-01 PROCEDURE — 99152 PR MOD CONSCIOUS SEDATION, SAME PHYS, 5+ YRS, FIRST 15 MIN: ICD-10-PCS | Mod: ,,, | Performed by: INTERNAL MEDICINE

## 2022-01-01 PROCEDURE — C1894 INTRO/SHEATH, NON-LASER: HCPCS | Performed by: INTERNAL MEDICINE

## 2022-01-01 PROCEDURE — 97116 GAIT TRAINING THERAPY: CPT

## 2022-01-01 PROCEDURE — 99152 MOD SED SAME PHYS/QHP 5/>YRS: CPT | Mod: ,,, | Performed by: INTERNAL MEDICINE

## 2022-01-01 PROCEDURE — 96366 THER/PROPH/DIAG IV INF ADDON: CPT

## 2022-01-01 PROCEDURE — 82962 GLUCOSE BLOOD TEST: CPT

## 2022-01-01 PROCEDURE — 94002 VENT MGMT INPAT INIT DAY: CPT

## 2022-01-01 PROCEDURE — 93460 PR CATH PLACE/CORON ANGIO, IMG SUPER/INTERP,R&L HRT CATH, L HRT VENTRIC: ICD-10-PCS | Mod: 26,,, | Performed by: INTERNAL MEDICINE

## 2022-01-01 PROCEDURE — 99213 OFFICE O/P EST LOW 20 MIN: CPT | Mod: S$GLB,,, | Performed by: ORTHOPAEDIC SURGERY

## 2022-01-01 PROCEDURE — 84484 ASSAY OF TROPONIN QUANT: CPT | Performed by: NURSE PRACTITIONER

## 2022-01-01 PROCEDURE — 99214 PR OFFICE/OUTPT VISIT, EST, LEVL IV, 30-39 MIN: ICD-10-PCS | Mod: 25,S$GLB,, | Performed by: ORTHOPAEDIC SURGERY

## 2022-01-01 PROCEDURE — 87205 SMEAR GRAM STAIN: CPT | Performed by: STUDENT IN AN ORGANIZED HEALTH CARE EDUCATION/TRAINING PROGRAM

## 2022-01-01 PROCEDURE — 31500 INSERT EMERGENCY AIRWAY: CPT

## 2022-01-01 PROCEDURE — 93971 EXTREMITY STUDY: CPT | Mod: 26,LT,, | Performed by: RADIOLOGY

## 2022-01-01 PROCEDURE — 99999 PR PBB SHADOW E&M-EST. PATIENT-LVL III: ICD-10-PCS | Mod: PBBFAC,,, | Performed by: INTERNAL MEDICINE

## 2022-01-01 PROCEDURE — 3079F PR MOST RECENT DIASTOLIC BLOOD PRESSURE 80-89 MM HG: ICD-10-PCS | Mod: CPTII,S$GLB,, | Performed by: INTERNAL MEDICINE

## 2022-01-01 PROCEDURE — C1751 CATH, INF, PER/CENT/MIDLINE: HCPCS | Performed by: INTERNAL MEDICINE

## 2022-01-01 PROCEDURE — 99291 PR CRITICAL CARE, E/M 30-74 MINUTES: ICD-10-PCS | Mod: 25,,, | Performed by: INTERNAL MEDICINE

## 2022-01-01 PROCEDURE — 36482 PR ENDOVENOUS ABLATION THER CHEM ADHESIVE, 1ST VEIN: ICD-10-PCS | Mod: LT,,, | Performed by: INTERNAL MEDICINE

## 2022-01-01 PROCEDURE — 83880 ASSAY OF NATRIURETIC PEPTIDE: CPT | Performed by: NURSE PRACTITIONER

## 2022-01-01 PROCEDURE — 99497 PR ADVNCD CARE PLAN 30 MIN: ICD-10-PCS | Mod: 25,,, | Performed by: STUDENT IN AN ORGANIZED HEALTH CARE EDUCATION/TRAINING PROGRAM

## 2022-01-01 PROCEDURE — 3077F PR MOST RECENT SYSTOLIC BLOOD PRESSURE >= 140 MM HG: ICD-10-PCS | Mod: CPTII,S$GLB,, | Performed by: ORTHOPAEDIC SURGERY

## 2022-01-01 PROCEDURE — 1158F PR ADVANCE CARE PLANNING DISCUSS DOCUMENTED IN MEDICAL RECORD: ICD-10-PCS | Mod: CPTII,,, | Performed by: STUDENT IN AN ORGANIZED HEALTH CARE EDUCATION/TRAINING PROGRAM

## 2022-01-01 PROCEDURE — 1158F ADVNC CARE PLAN TLK DOCD: CPT | Mod: CPTII,,, | Performed by: STUDENT IN AN ORGANIZED HEALTH CARE EDUCATION/TRAINING PROGRAM

## 2022-01-01 PROCEDURE — 73564 X-RAY EXAM KNEE 4 OR MORE: CPT | Mod: 26,RT,, | Performed by: RADIOLOGY

## 2022-01-01 PROCEDURE — 99999 PR PBB SHADOW E&M-EST. PATIENT-LVL IV: ICD-10-PCS | Mod: PBBFAC,,, | Performed by: INTERNAL MEDICINE

## 2022-01-01 PROCEDURE — 80069 RENAL FUNCTION PANEL: CPT | Performed by: STUDENT IN AN ORGANIZED HEALTH CARE EDUCATION/TRAINING PROGRAM

## 2022-01-01 PROCEDURE — 3079F DIAST BP 80-89 MM HG: CPT | Mod: CPTII,S$GLB,, | Performed by: INTERNAL MEDICINE

## 2022-01-01 PROCEDURE — 25000242 PHARM REV CODE 250 ALT 637 W/ HCPCS: Performed by: STUDENT IN AN ORGANIZED HEALTH CARE EDUCATION/TRAINING PROGRAM

## 2022-01-01 PROCEDURE — 3078F DIAST BP <80 MM HG: CPT | Mod: CPTII,S$GLB,, | Performed by: INTERNAL MEDICINE

## 2022-01-01 PROCEDURE — 25000242 PHARM REV CODE 250 ALT 637 W/ HCPCS: Performed by: EMERGENCY MEDICINE

## 2022-01-01 PROCEDURE — 99212 PR OFFICE/OUTPT VISIT, EST, LEVL II, 10-19 MIN: ICD-10-PCS | Mod: S$GLB,,, | Performed by: INTERNAL MEDICINE

## 2022-01-01 RX ORDER — SUCCINYLCHOLINE CHLORIDE 20 MG/ML
INJECTION INTRAMUSCULAR; INTRAVENOUS
Status: DISPENSED
Start: 2022-01-01 | End: 2022-01-01

## 2022-01-01 RX ORDER — ATORVASTATIN CALCIUM 40 MG/1
40 TABLET, FILM COATED ORAL NIGHTLY
Status: DISCONTINUED | OUTPATIENT
Start: 2022-01-01 | End: 2022-01-01 | Stop reason: HOSPADM

## 2022-01-01 RX ORDER — FUROSEMIDE 10 MG/ML
40 INJECTION INTRAMUSCULAR; INTRAVENOUS
Status: COMPLETED | OUTPATIENT
Start: 2022-01-01 | End: 2022-01-01

## 2022-01-01 RX ORDER — LOSARTAN POTASSIUM 50 MG/1
50 TABLET ORAL DAILY
Qty: 90 TABLET | Refills: 3 | Status: CANCELLED | OUTPATIENT
Start: 2022-01-01 | End: 2023-03-27

## 2022-01-01 RX ORDER — HEPARIN SODIUM 10000 [USP'U]/100ML
500 INJECTION, SOLUTION INTRAVENOUS CONTINUOUS
Status: DISCONTINUED | OUTPATIENT
Start: 2022-01-01 | End: 2022-01-01

## 2022-01-01 RX ORDER — CALCIUM GLUCONATE 98 MG/ML
INJECTION, SOLUTION INTRAVENOUS
Status: DISCONTINUED | OUTPATIENT
Start: 2022-01-01 | End: 2022-01-01 | Stop reason: HOSPADM

## 2022-01-01 RX ORDER — SODIUM BICARBONATE 1 MEQ/ML
SYRINGE (ML) INTRAVENOUS
Status: DISCONTINUED | OUTPATIENT
Start: 2022-01-01 | End: 2022-01-01 | Stop reason: HOSPADM

## 2022-01-01 RX ORDER — NOREPINEPHRINE BITARTRATE 1 MG/ML
INJECTION, SOLUTION INTRAVENOUS
Status: DISCONTINUED | OUTPATIENT
Start: 2022-01-01 | End: 2022-01-01

## 2022-01-01 RX ORDER — FUROSEMIDE 10 MG/ML
INJECTION INTRAMUSCULAR; INTRAVENOUS
Status: DISCONTINUED | OUTPATIENT
Start: 2022-01-01 | End: 2022-01-01 | Stop reason: HOSPADM

## 2022-01-01 RX ORDER — TORSEMIDE 20 MG/1
40 TABLET ORAL DAILY
Qty: 180 TABLET | Refills: 3
Start: 2022-01-01 | End: 2023-03-28

## 2022-01-01 RX ORDER — ETOMIDATE 2 MG/ML
INJECTION INTRAVENOUS
Status: COMPLETED
Start: 2022-01-01 | End: 2022-01-01

## 2022-01-01 RX ORDER — LIDOCAINE HYDROCHLORIDE 20 MG/ML
100 INJECTION INTRAVENOUS ONCE
Status: DISCONTINUED | OUTPATIENT
Start: 2022-01-01 | End: 2022-01-01

## 2022-01-01 RX ORDER — DIAZEPAM 5 MG/1
10 TABLET ORAL ONCE
Status: COMPLETED | OUTPATIENT
Start: 2022-01-01 | End: 2022-01-01

## 2022-01-01 RX ORDER — HYDROCODONE BITARTRATE AND ACETAMINOPHEN 10; 325 MG/1; MG/1
1 TABLET ORAL ONCE
Status: COMPLETED | OUTPATIENT
Start: 2022-01-01 | End: 2022-01-01

## 2022-01-01 RX ORDER — SODIUM CHLORIDE 9 MG/ML
INJECTION, SOLUTION INTRAVENOUS
Status: DISCONTINUED | OUTPATIENT
Start: 2022-01-01 | End: 2022-01-01

## 2022-01-01 RX ORDER — ACETAMINOPHEN 325 MG/1
650 TABLET ORAL EVERY 4 HOURS PRN
Status: DISCONTINUED | OUTPATIENT
Start: 2022-01-01 | End: 2022-01-01 | Stop reason: HOSPADM

## 2022-01-01 RX ORDER — MORPHINE SULFATE 4 MG/ML
4 INJECTION, SOLUTION INTRAMUSCULAR; INTRAVENOUS
Status: COMPLETED | OUTPATIENT
Start: 2022-01-01 | End: 2022-01-01

## 2022-01-01 RX ORDER — INSULIN ASPART 100 [IU]/ML
1-10 INJECTION, SOLUTION INTRAVENOUS; SUBCUTANEOUS EVERY 6 HOURS PRN
Status: DISCONTINUED | OUTPATIENT
Start: 2022-01-01 | End: 2022-05-10 | Stop reason: HOSPADM

## 2022-01-01 RX ORDER — PROPOFOL 10 MG/ML
0-50 INJECTION, EMULSION INTRAVENOUS CONTINUOUS
Status: DISCONTINUED | OUTPATIENT
Start: 2022-01-01 | End: 2022-05-10 | Stop reason: HOSPADM

## 2022-01-01 RX ORDER — DIPHENHYDRAMINE HYDROCHLORIDE 50 MG/ML
50 INJECTION INTRAMUSCULAR; INTRAVENOUS ONCE
Status: COMPLETED | OUTPATIENT
Start: 2022-01-01 | End: 2022-01-01

## 2022-01-01 RX ORDER — METOPROLOL SUCCINATE 50 MG/1
50 TABLET, EXTENDED RELEASE ORAL DAILY
Qty: 90 TABLET | Refills: 3 | Status: ON HOLD | OUTPATIENT
Start: 2022-01-01 | End: 2022-01-01 | Stop reason: HOSPADM

## 2022-01-01 RX ORDER — METOPROLOL SUCCINATE 50 MG/1
200 TABLET, EXTENDED RELEASE ORAL DAILY
Status: DISCONTINUED | OUTPATIENT
Start: 2022-01-01 | End: 2022-01-01 | Stop reason: HOSPADM

## 2022-01-01 RX ORDER — AMOXICILLIN 250 MG
1 CAPSULE ORAL 2 TIMES DAILY PRN
Status: DISCONTINUED | OUTPATIENT
Start: 2022-01-01 | End: 2022-01-01 | Stop reason: HOSPADM

## 2022-01-01 RX ORDER — METOPROLOL TARTRATE 1 MG/ML
5 INJECTION, SOLUTION INTRAVENOUS
Status: COMPLETED | OUTPATIENT
Start: 2022-01-01 | End: 2022-01-01

## 2022-01-01 RX ORDER — ETOMIDATE 2 MG/ML
20 INJECTION INTRAVENOUS ONCE
Status: COMPLETED | OUTPATIENT
Start: 2022-01-01 | End: 2022-01-01

## 2022-01-01 RX ORDER — MIDAZOLAM HYDROCHLORIDE 1 MG/ML
INJECTION INTRAMUSCULAR; INTRAVENOUS
Status: COMPLETED
Start: 2022-01-01 | End: 2022-01-01

## 2022-01-01 RX ORDER — DEXTROSE 50 % IN WATER (D50W) INTRAVENOUS SYRINGE
25
Status: DISCONTINUED | OUTPATIENT
Start: 2022-01-01 | End: 2022-01-01

## 2022-01-01 RX ORDER — CALCIUM GLUCONATE 20 MG/ML
1 INJECTION, SOLUTION INTRAVENOUS ONCE
Status: COMPLETED | OUTPATIENT
Start: 2022-01-01 | End: 2022-01-01

## 2022-01-01 RX ORDER — ROCURONIUM BROMIDE 10 MG/ML
INJECTION, SOLUTION INTRAVENOUS
Status: COMPLETED
Start: 2022-01-01 | End: 2022-01-01

## 2022-01-01 RX ORDER — NOREPINEPHRINE BITARTRATE 1 MG/ML
INJECTION, SOLUTION INTRAVENOUS
Status: COMPLETED
Start: 2022-01-01 | End: 2022-01-01

## 2022-01-01 RX ORDER — ALBUTEROL SULFATE 2.5 MG/.5ML
10 SOLUTION RESPIRATORY (INHALATION)
Status: COMPLETED | OUTPATIENT
Start: 2022-01-01 | End: 2022-01-01

## 2022-01-01 RX ORDER — ATORVASTATIN CALCIUM 40 MG/1
40 TABLET, FILM COATED ORAL NIGHTLY
Qty: 90 TABLET | Refills: 3 | Status: SHIPPED | OUTPATIENT
Start: 2022-01-01 | End: 2023-03-14

## 2022-01-01 RX ORDER — NALOXONE HCL 0.4 MG/ML
0.02 VIAL (ML) INJECTION
Status: DISCONTINUED | OUTPATIENT
Start: 2022-01-01 | End: 2022-05-10 | Stop reason: HOSPADM

## 2022-01-01 RX ORDER — METHOCARBAMOL 500 MG/1
500 TABLET, FILM COATED ORAL 2 TIMES DAILY PRN
Status: DISCONTINUED | OUTPATIENT
Start: 2022-01-01 | End: 2022-05-10 | Stop reason: HOSPADM

## 2022-01-01 RX ORDER — SODIUM CHLORIDE 9 MG/ML
INJECTION, SOLUTION INTRAVENOUS
Status: DISCONTINUED | OUTPATIENT
Start: 2022-01-01 | End: 2022-05-10 | Stop reason: HOSPADM

## 2022-01-01 RX ORDER — ONDANSETRON 2 MG/ML
4 INJECTION INTRAMUSCULAR; INTRAVENOUS 3 TIMES DAILY PRN
Status: DISCONTINUED | OUTPATIENT
Start: 2022-01-01 | End: 2022-05-10 | Stop reason: HOSPADM

## 2022-01-01 RX ORDER — SODIUM CHLORIDE 0.9 % (FLUSH) 0.9 %
5 SYRINGE (ML) INJECTION
Status: DISCONTINUED | OUTPATIENT
Start: 2022-01-01 | End: 2022-01-01 | Stop reason: HOSPADM

## 2022-01-01 RX ORDER — METOPROLOL TARTRATE 25 MG/1
25 TABLET, FILM COATED ORAL ONCE
Status: COMPLETED | OUTPATIENT
Start: 2022-01-01 | End: 2022-01-01

## 2022-01-01 RX ORDER — INDOMETHACIN 25 MG/1
50 CAPSULE ORAL
Status: COMPLETED | OUTPATIENT
Start: 2022-01-01 | End: 2022-01-01

## 2022-01-01 RX ORDER — SODIUM BICARBONATE 650 MG/1
650 TABLET ORAL 3 TIMES DAILY
Status: DISCONTINUED | OUTPATIENT
Start: 2022-01-01 | End: 2022-01-01

## 2022-01-01 RX ORDER — ONDANSETRON 8 MG/1
8 TABLET, ORALLY DISINTEGRATING ORAL ONCE
Status: COMPLETED | OUTPATIENT
Start: 2022-01-01 | End: 2022-01-01

## 2022-01-01 RX ORDER — HEPARIN SODIUM 1000 [USP'U]/ML
2400 INJECTION, SOLUTION INTRAVENOUS; SUBCUTANEOUS
Status: DISCONTINUED | OUTPATIENT
Start: 2022-01-01 | End: 2022-05-10 | Stop reason: HOSPADM

## 2022-01-01 RX ORDER — ONDANSETRON 2 MG/ML
INJECTION INTRAMUSCULAR; INTRAVENOUS
Status: COMPLETED
Start: 2022-01-01 | End: 2022-01-01

## 2022-01-01 RX ORDER — SUMATRIPTAN SUCCINATE 25 MG/1
50 TABLET ORAL 3 TIMES DAILY
Status: DISCONTINUED | OUTPATIENT
Start: 2022-01-01 | End: 2022-01-01

## 2022-01-01 RX ORDER — TALC
9 POWDER (GRAM) TOPICAL NIGHTLY PRN
Status: DISCONTINUED | OUTPATIENT
Start: 2022-01-01 | End: 2022-01-01 | Stop reason: HOSPADM

## 2022-01-01 RX ORDER — SODIUM CHLORIDE 9 MG/ML
INJECTION, SOLUTION INTRAVENOUS ONCE
Status: DISCONTINUED | OUTPATIENT
Start: 2022-01-01 | End: 2022-05-10 | Stop reason: HOSPADM

## 2022-01-01 RX ORDER — LIDOCAINE HYDROCHLORIDE 10 MG/ML
INJECTION, SOLUTION EPIDURAL; INFILTRATION; INTRACAUDAL; PERINEURAL
Status: DISCONTINUED | OUTPATIENT
Start: 2022-01-01 | End: 2022-01-01 | Stop reason: HOSPADM

## 2022-01-01 RX ORDER — CALCIUM GLUCONATE 20 MG/ML
1 INJECTION, SOLUTION INTRAVENOUS
Status: DISPENSED | OUTPATIENT
Start: 2022-01-01 | End: 2022-01-01

## 2022-01-01 RX ORDER — PANTOPRAZOLE SODIUM 40 MG/1
40 TABLET, DELAYED RELEASE ORAL DAILY
Status: DISCONTINUED | OUTPATIENT
Start: 2022-01-01 | End: 2022-01-01 | Stop reason: HOSPADM

## 2022-01-01 RX ORDER — ONDANSETRON 2 MG/ML
4 INJECTION INTRAMUSCULAR; INTRAVENOUS ONCE
Status: COMPLETED | OUTPATIENT
Start: 2022-01-01 | End: 2022-01-01

## 2022-01-01 RX ORDER — MUPIROCIN 20 MG/G
OINTMENT TOPICAL 2 TIMES DAILY
Status: COMPLETED | OUTPATIENT
Start: 2022-01-01 | End: 2022-01-01

## 2022-01-01 RX ORDER — FUROSEMIDE 10 MG/ML
80 INJECTION INTRAMUSCULAR; INTRAVENOUS
Status: DISCONTINUED | OUTPATIENT
Start: 2022-01-01 | End: 2022-01-01

## 2022-01-01 RX ORDER — MORPHINE SULFATE 4 MG/ML
4 INJECTION, SOLUTION INTRAMUSCULAR; INTRAVENOUS EVERY 4 HOURS PRN
Status: DISCONTINUED | OUTPATIENT
Start: 2022-01-01 | End: 2022-01-01

## 2022-01-01 RX ORDER — LIDOCAINE HYDROCHLORIDE 20 MG/ML
10 SOLUTION OROPHARYNGEAL ONCE
Status: DISCONTINUED | OUTPATIENT
Start: 2022-01-01 | End: 2022-01-01

## 2022-01-01 RX ORDER — LOSARTAN POTASSIUM 25 MG/1
25 TABLET ORAL DAILY
Status: DISCONTINUED | OUTPATIENT
Start: 2022-01-01 | End: 2022-01-01 | Stop reason: HOSPADM

## 2022-01-01 RX ORDER — LOSARTAN POTASSIUM 25 MG/1
25 TABLET ORAL DAILY
Qty: 90 TABLET | Refills: 3 | Status: SHIPPED | OUTPATIENT
Start: 2022-01-01 | End: 2022-01-01

## 2022-01-01 RX ORDER — HEPARIN SODIUM,PORCINE/D5W 25000/250
0-40 INTRAVENOUS SOLUTION INTRAVENOUS CONTINUOUS
Status: CANCELLED | OUTPATIENT
Start: 2022-01-01

## 2022-01-01 RX ORDER — MAGNESIUM SULFATE HEPTAHYDRATE 40 MG/ML
2 INJECTION, SOLUTION INTRAVENOUS
Status: DISPENSED | OUTPATIENT
Start: 2022-01-01 | End: 2022-01-01

## 2022-01-01 RX ORDER — ROCURONIUM BROMIDE 10 MG/ML
170 INJECTION, SOLUTION INTRAVENOUS ONCE
Status: COMPLETED | OUTPATIENT
Start: 2022-01-01 | End: 2022-01-01

## 2022-01-01 RX ORDER — PROPOFOL 10 MG/ML
INJECTION, EMULSION INTRAVENOUS
Status: COMPLETED
Start: 2022-01-01 | End: 2022-01-01

## 2022-01-01 RX ORDER — TORSEMIDE 20 MG/1
40 TABLET ORAL DAILY
Status: DISCONTINUED | OUTPATIENT
Start: 2022-01-01 | End: 2022-01-01

## 2022-01-01 RX ORDER — ALBUTEROL SULFATE 2.5 MG/.5ML
10 SOLUTION RESPIRATORY (INHALATION) ONCE
Status: COMPLETED | OUTPATIENT
Start: 2022-01-01 | End: 2022-01-01

## 2022-01-01 RX ORDER — SODIUM CHLORIDE 0.9 % (FLUSH) 0.9 %
5 SYRINGE (ML) INJECTION
Status: DISCONTINUED | OUTPATIENT
Start: 2022-01-01 | End: 2022-05-10 | Stop reason: HOSPADM

## 2022-01-01 RX ORDER — AMOXICILLIN 250 MG
1 CAPSULE ORAL 2 TIMES DAILY
Status: DISCONTINUED | OUTPATIENT
Start: 2022-01-01 | End: 2022-01-01

## 2022-01-01 RX ORDER — PHENYLEPHRINE HCL IN 0.9% NACL 1 MG/10 ML
SYRINGE (ML) INTRAVENOUS
Status: COMPLETED
Start: 2022-01-01 | End: 2022-01-01

## 2022-01-01 RX ORDER — PROPOFOL 10 MG/ML
INJECTION, EMULSION INTRAVENOUS
Status: DISPENSED
Start: 2022-01-01 | End: 2022-01-01

## 2022-01-01 RX ORDER — NOREPINEPHRINE BITARTRATE/D5W 4MG/250ML
0-3 PLASTIC BAG, INJECTION (ML) INTRAVENOUS CONTINUOUS
Status: DISCONTINUED | OUTPATIENT
Start: 2022-01-01 | End: 2022-01-01

## 2022-01-01 RX ORDER — NOREPINEPHRINE BITARTRATE/D5W 4MG/250ML
PLASTIC BAG, INJECTION (ML) INTRAVENOUS
Status: COMPLETED
Start: 2022-01-01 | End: 2022-01-01

## 2022-01-01 RX ORDER — FUROSEMIDE 10 MG/ML
120 INJECTION INTRAMUSCULAR; INTRAVENOUS 2 TIMES DAILY
Status: DISCONTINUED | OUTPATIENT
Start: 2022-01-01 | End: 2022-01-01

## 2022-01-01 RX ORDER — LIDOCAINE 50 MG/G
1 PATCH TOPICAL DAILY PRN
Status: DISCONTINUED | OUTPATIENT
Start: 2022-01-01 | End: 2022-05-10 | Stop reason: HOSPADM

## 2022-01-01 RX ORDER — FAMOTIDINE 10 MG/ML
20 INJECTION INTRAVENOUS DAILY
Status: DISCONTINUED | OUTPATIENT
Start: 2022-01-01 | End: 2022-05-10 | Stop reason: HOSPADM

## 2022-01-01 RX ORDER — ALBUTEROL SULFATE 90 UG/1
2 AEROSOL, METERED RESPIRATORY (INHALATION) EVERY 6 HOURS PRN
Status: DISCONTINUED | OUTPATIENT
Start: 2022-01-01 | End: 2022-01-01

## 2022-01-01 RX ORDER — MIDAZOLAM HYDROCHLORIDE 1 MG/ML
INJECTION INTRAMUSCULAR; INTRAVENOUS
Status: DISCONTINUED | OUTPATIENT
Start: 2022-01-01 | End: 2022-01-01 | Stop reason: HOSPADM

## 2022-01-01 RX ORDER — DIPHENHYDRAMINE HYDROCHLORIDE 50 MG/ML
INJECTION INTRAMUSCULAR; INTRAVENOUS
Status: COMPLETED
Start: 2022-01-01 | End: 2022-01-01

## 2022-01-01 RX ORDER — SUMATRIPTAN SUCCINATE 25 MG/1
50 TABLET ORAL 2 TIMES DAILY PRN
Status: DISCONTINUED | OUTPATIENT
Start: 2022-01-01 | End: 2022-01-01

## 2022-01-01 RX ORDER — MAGNESIUM SULFATE HEPTAHYDRATE 40 MG/ML
2 INJECTION, SOLUTION INTRAVENOUS ONCE
Status: COMPLETED | OUTPATIENT
Start: 2022-01-01 | End: 2022-01-01

## 2022-01-01 RX ORDER — VANCOMYCIN HYDROCHLORIDE 1 G/20ML
INJECTION, POWDER, LYOPHILIZED, FOR SOLUTION INTRAVENOUS
Status: DISCONTINUED | OUTPATIENT
Start: 2022-01-01 | End: 2022-01-01 | Stop reason: HOSPADM

## 2022-01-01 RX ORDER — ATORVASTATIN CALCIUM 40 MG/1
40 TABLET, FILM COATED ORAL NIGHTLY
Status: DISCONTINUED | OUTPATIENT
Start: 2022-01-01 | End: 2022-01-01

## 2022-01-01 RX ORDER — LIDOCAINE HYDROCHLORIDE 20 MG/ML
5 INJECTION, SOLUTION EPIDURAL; INFILTRATION; INTRACAUDAL; PERINEURAL ONCE
Status: DISCONTINUED | OUTPATIENT
Start: 2022-01-01 | End: 2022-01-01

## 2022-01-01 RX ORDER — MAG HYDROX/ALUMINUM HYD/SIMETH 200-200-20
30 SUSPENSION, ORAL (FINAL DOSE FORM) ORAL ONCE
Status: DISCONTINUED | OUTPATIENT
Start: 2022-01-01 | End: 2022-01-01 | Stop reason: HOSPADM

## 2022-01-01 RX ORDER — FENTANYL CITRATE-0.9 % NACL/PF 10 MCG/ML
0-250 PLASTIC BAG, INJECTION (ML) INTRAVENOUS CONTINUOUS
Status: DISCONTINUED | OUTPATIENT
Start: 2022-01-01 | End: 2022-05-10 | Stop reason: HOSPADM

## 2022-01-01 RX ORDER — SODIUM CHLORIDE FOR INHALATION 3 %
4 VIAL, NEBULIZER (ML) INHALATION
Status: DISCONTINUED | OUTPATIENT
Start: 2022-01-01 | End: 2022-01-01

## 2022-01-01 RX ORDER — ONDANSETRON 2 MG/ML
4 INJECTION INTRAMUSCULAR; INTRAVENOUS EVERY 6 HOURS PRN
Status: DISCONTINUED | OUTPATIENT
Start: 2022-01-01 | End: 2022-01-01 | Stop reason: HOSPADM

## 2022-01-01 RX ORDER — METOPROLOL TARTRATE 50 MG/1
50 TABLET ORAL EVERY 6 HOURS
Status: DISCONTINUED | OUTPATIENT
Start: 2022-01-01 | End: 2022-01-01

## 2022-01-01 RX ORDER — GLUCAGON 1 MG
1 KIT INJECTION
Status: DISCONTINUED | OUTPATIENT
Start: 2022-01-01 | End: 2022-05-10 | Stop reason: HOSPADM

## 2022-01-01 RX ORDER — IBUPROFEN 200 MG
24 TABLET ORAL
Status: DISCONTINUED | OUTPATIENT
Start: 2022-01-01 | End: 2022-05-10 | Stop reason: HOSPADM

## 2022-01-01 RX ORDER — MORPHINE SULFATE 2 MG/ML
1 INJECTION, SOLUTION INTRAMUSCULAR; INTRAVENOUS EVERY 4 HOURS PRN
Status: DISCONTINUED | OUTPATIENT
Start: 2022-01-01 | End: 2022-05-10 | Stop reason: HOSPADM

## 2022-01-01 RX ORDER — LISINOPRIL 20 MG/1
20 TABLET ORAL DAILY
Qty: 90 TABLET | Refills: 3 | Status: SHIPPED | OUTPATIENT
Start: 2022-01-01

## 2022-01-01 RX ORDER — CHLORHEXIDINE GLUCONATE ORAL RINSE 1.2 MG/ML
15 SOLUTION DENTAL 2 TIMES DAILY
Status: DISCONTINUED | OUTPATIENT
Start: 2022-01-01 | End: 2022-05-10 | Stop reason: HOSPADM

## 2022-01-01 RX ORDER — LISINOPRIL 20 MG/1
20 TABLET ORAL DAILY
Status: DISCONTINUED | OUTPATIENT
Start: 2022-01-01 | End: 2022-01-01

## 2022-01-01 RX ORDER — HEPARIN SODIUM 200 [USP'U]/100ML
INJECTION, SOLUTION INTRAVENOUS
Status: DISCONTINUED | OUTPATIENT
Start: 2022-01-01 | End: 2022-01-01

## 2022-01-01 RX ORDER — METHYLPREDNISOLONE SOD SUCC 125 MG
125 VIAL (EA) INJECTION ONCE
Status: COMPLETED | OUTPATIENT
Start: 2022-01-01 | End: 2022-01-01

## 2022-01-01 RX ORDER — LIDOCAINE HYDROCHLORIDE 20 MG/ML
100 INJECTION INTRAVENOUS ONCE
Status: COMPLETED | OUTPATIENT
Start: 2022-01-01 | End: 2022-01-01

## 2022-01-01 RX ORDER — ALBUTEROL SULFATE 90 UG/1
2 AEROSOL, METERED RESPIRATORY (INHALATION) EVERY 6 HOURS PRN
COMMUNITY

## 2022-01-01 RX ORDER — MECLIZINE HYDROCHLORIDE 25 MG/1
25 TABLET ORAL
Status: COMPLETED | OUTPATIENT
Start: 2022-01-01 | End: 2022-01-01

## 2022-01-01 RX ORDER — HEPARIN SODIUM,PORCINE/D5W 25000/250
0-40 INTRAVENOUS SOLUTION INTRAVENOUS CONTINUOUS
Status: DISCONTINUED | OUTPATIENT
Start: 2022-01-01 | End: 2022-01-01

## 2022-01-01 RX ORDER — METOPROLOL SUCCINATE 50 MG/1
50 TABLET, EXTENDED RELEASE ORAL DAILY
Status: DISCONTINUED | OUTPATIENT
Start: 2022-01-01 | End: 2022-01-01

## 2022-01-01 RX ORDER — TRIAMCINOLONE ACETONIDE 40 MG/ML
40 INJECTION, SUSPENSION INTRA-ARTICULAR; INTRAMUSCULAR
Status: DISCONTINUED | OUTPATIENT
Start: 2022-01-01 | End: 2022-01-01 | Stop reason: HOSPADM

## 2022-01-01 RX ORDER — ASPIRIN 325 MG
325 TABLET ORAL ONCE
Status: COMPLETED | OUTPATIENT
Start: 2022-01-01 | End: 2022-01-01

## 2022-01-01 RX ORDER — METOPROLOL TARTRATE 25 MG/1
25 TABLET, FILM COATED ORAL EVERY 6 HOURS
Status: DISCONTINUED | OUTPATIENT
Start: 2022-01-01 | End: 2022-01-01

## 2022-01-01 RX ORDER — DIPHENHYDRAMINE HYDROCHLORIDE 50 MG/ML
INJECTION INTRAMUSCULAR; INTRAVENOUS
Status: DISCONTINUED | OUTPATIENT
Start: 2022-01-01 | End: 2022-01-01 | Stop reason: HOSPADM

## 2022-01-01 RX ORDER — ALBUTEROL SULFATE 2.5 MG/.5ML
2.5 SOLUTION RESPIRATORY (INHALATION)
Status: DISCONTINUED | OUTPATIENT
Start: 2022-01-01 | End: 2022-01-01

## 2022-01-01 RX ORDER — MUPIROCIN 20 MG/G
OINTMENT TOPICAL 2 TIMES DAILY
Status: CANCELLED | OUTPATIENT
Start: 2022-01-01 | End: 2022-01-01

## 2022-01-01 RX ORDER — IODIXANOL 320 MG/ML
INJECTION, SOLUTION INTRAVASCULAR
Status: DISCONTINUED | OUTPATIENT
Start: 2022-01-01 | End: 2022-01-01 | Stop reason: HOSPADM

## 2022-01-01 RX ORDER — NAPROXEN SODIUM 220 MG
220 TABLET ORAL
COMMUNITY
End: 2022-01-01

## 2022-01-01 RX ORDER — HEPARIN SODIUM,PORCINE/D5W 25000/250
0-40 INTRAVENOUS SOLUTION INTRAVENOUS CONTINUOUS
Status: DISCONTINUED | OUTPATIENT
Start: 2022-01-01 | End: 2022-05-10 | Stop reason: HOSPADM

## 2022-01-01 RX ORDER — METOLAZONE 2.5 MG/1
5 TABLET ORAL DAILY
Status: DISCONTINUED | OUTPATIENT
Start: 2022-01-01 | End: 2022-01-01

## 2022-01-01 RX ORDER — NALOXONE HCL 0.4 MG/ML
0.02 VIAL (ML) INJECTION
Status: DISCONTINUED | OUTPATIENT
Start: 2022-01-01 | End: 2022-01-01 | Stop reason: HOSPADM

## 2022-01-01 RX ORDER — DEXTROMETHORPHAN HYDROBROMIDE, GUAIFENESIN 5; 100 MG/5ML; MG/5ML
650 LIQUID ORAL
COMMUNITY

## 2022-01-01 RX ORDER — IBUPROFEN 200 MG
16 TABLET ORAL
Status: DISCONTINUED | OUTPATIENT
Start: 2022-01-01 | End: 2022-05-10 | Stop reason: HOSPADM

## 2022-01-01 RX ORDER — PROPOFOL 10 MG/ML
VIAL (ML) INTRAVENOUS
Status: DISCONTINUED | OUTPATIENT
Start: 2022-01-01 | End: 2022-01-01

## 2022-01-01 RX ORDER — MAG HYDROX/ALUMINUM HYD/SIMETH 200-200-20
30 SUSPENSION, ORAL (FINAL DOSE FORM) ORAL ONCE
Status: DISCONTINUED | OUTPATIENT
Start: 2022-01-01 | End: 2022-01-01

## 2022-01-01 RX ORDER — FUROSEMIDE 10 MG/ML
40 INJECTION INTRAMUSCULAR; INTRAVENOUS
Status: DISCONTINUED | OUTPATIENT
Start: 2022-01-01 | End: 2022-01-01

## 2022-01-01 RX ORDER — CLOPIDOGREL BISULFATE 75 MG/1
300 TABLET ORAL ONCE
Status: COMPLETED | OUTPATIENT
Start: 2022-01-01 | End: 2022-01-01

## 2022-01-01 RX ORDER — SUMATRIPTAN SUCCINATE 25 MG/1
50 TABLET ORAL 3 TIMES DAILY PRN
Status: DISCONTINUED | OUTPATIENT
Start: 2022-01-01 | End: 2022-01-01 | Stop reason: HOSPADM

## 2022-01-01 RX ORDER — LIDOCAINE HYDROCHLORIDE 20 MG/ML
10 SOLUTION OROPHARYNGEAL ONCE
Status: DISCONTINUED | OUTPATIENT
Start: 2022-01-01 | End: 2022-01-01 | Stop reason: HOSPADM

## 2022-01-01 RX ORDER — AMIODARONE HYDROCHLORIDE 150 MG/3ML
150 INJECTION, SOLUTION INTRAVENOUS
Status: COMPLETED | OUTPATIENT
Start: 2022-01-01 | End: 2022-01-01

## 2022-01-01 RX ORDER — METOPROLOL TARTRATE 25 MG/1
25 TABLET, FILM COATED ORAL 2 TIMES DAILY
Status: DISCONTINUED | OUTPATIENT
Start: 2022-01-01 | End: 2022-01-01

## 2022-01-01 RX ORDER — ASPIRIN 325 MG
325 TABLET ORAL
Status: COMPLETED | OUTPATIENT
Start: 2022-01-01 | End: 2022-01-01

## 2022-01-01 RX ORDER — MORPHINE SULFATE 2 MG/ML
INJECTION, SOLUTION INTRAMUSCULAR; INTRAVENOUS
Status: DISCONTINUED | OUTPATIENT
Start: 2022-01-01 | End: 2022-01-01 | Stop reason: HOSPADM

## 2022-01-01 RX ORDER — NOREPINEPHRINE BITARTRATE/D5W 4MG/250ML
PLASTIC BAG, INJECTION (ML) INTRAVENOUS
Status: DISPENSED
Start: 2022-01-01 | End: 2022-01-01

## 2022-01-01 RX ORDER — MUPIROCIN 20 MG/G
OINTMENT TOPICAL 2 TIMES DAILY
Status: DISCONTINUED | OUTPATIENT
Start: 2022-01-01 | End: 2022-01-01 | Stop reason: HOSPADM

## 2022-01-01 RX ORDER — SPIRONOLACTONE 25 MG/1
25 TABLET ORAL DAILY
Qty: 30 TABLET | Refills: 11 | Status: CANCELLED | OUTPATIENT
Start: 2022-01-01 | End: 2023-03-27

## 2022-01-01 RX ORDER — METOPROLOL SUCCINATE 200 MG/1
200 TABLET, EXTENDED RELEASE ORAL DAILY
Qty: 30 TABLET | Refills: 11 | Status: SHIPPED | OUTPATIENT
Start: 2022-01-01 | End: 2023-03-18

## 2022-01-01 RX ORDER — ACETAMINOPHEN 325 MG/1
650 TABLET ORAL EVERY 4 HOURS PRN
Status: DISCONTINUED | OUTPATIENT
Start: 2022-01-01 | End: 2022-05-10 | Stop reason: HOSPADM

## 2022-01-01 RX ORDER — HYDROCODONE BITARTRATE AND ACETAMINOPHEN 500; 5 MG/1; MG/1
TABLET ORAL CONTINUOUS
Status: ACTIVE | OUTPATIENT
Start: 2022-01-01 | End: 2022-01-01

## 2022-01-01 RX ORDER — TALC
9 POWDER (GRAM) TOPICAL NIGHTLY PRN
Status: DISCONTINUED | OUTPATIENT
Start: 2022-01-01 | End: 2022-05-10 | Stop reason: HOSPADM

## 2022-01-01 RX ORDER — ACETAMINOPHEN 325 MG/1
650 TABLET ORAL EVERY 8 HOURS PRN
Status: DISCONTINUED | OUTPATIENT
Start: 2022-01-01 | End: 2022-01-01

## 2022-01-01 RX ORDER — INSULIN ASPART 100 [IU]/ML
1-10 INJECTION, SOLUTION INTRAVENOUS; SUBCUTANEOUS
Status: DISCONTINUED | OUTPATIENT
Start: 2022-01-01 | End: 2022-01-01

## 2022-01-01 RX ADMIN — ONDANSETRON 4 MG: 2 INJECTION INTRAMUSCULAR; INTRAVENOUS at 09:05

## 2022-01-01 RX ADMIN — PROPOFOL 35 MCG/KG/MIN: 10 INJECTION, EMULSION INTRAVENOUS at 06:05

## 2022-01-01 RX ADMIN — AMIODARONE HYDROCHLORIDE 150 MG: 1.5 INJECTION, SOLUTION INTRAVENOUS at 12:05

## 2022-01-01 RX ADMIN — METOPROLOL TARTRATE 25 MG: 25 TABLET, FILM COATED ORAL at 07:03

## 2022-01-01 RX ADMIN — ATORVASTATIN CALCIUM 40 MG: 40 TABLET, FILM COATED ORAL at 08:03

## 2022-01-01 RX ADMIN — MUPIROCIN: 20 OINTMENT TOPICAL at 09:03

## 2022-01-01 RX ADMIN — MUPIROCIN: 20 OINTMENT TOPICAL at 09:05

## 2022-01-01 RX ADMIN — PROPOFOL 40 MCG/KG/MIN: 10 INJECTION, EMULSION INTRAVENOUS at 09:05

## 2022-01-01 RX ADMIN — LOSARTAN POTASSIUM 25 MG: 25 TABLET, FILM COATED ORAL at 09:03

## 2022-01-01 RX ADMIN — CHLORHEXIDINE GLUCONATE 0.12% ORAL RINSE 15 ML: 1.2 LIQUID ORAL at 08:05

## 2022-01-01 RX ADMIN — RIVAROXABAN 20 MG: 20 TABLET, FILM COATED ORAL at 04:05

## 2022-01-01 RX ADMIN — FAMOTIDINE 20 MG: 10 INJECTION, SOLUTION INTRAVENOUS at 08:05

## 2022-01-01 RX ADMIN — DEXTROSE 250 ML: 10 SOLUTION INTRAVENOUS at 06:05

## 2022-01-01 RX ADMIN — METOPROLOL SUCCINATE 50 MG: 50 TABLET, EXTENDED RELEASE ORAL at 03:05

## 2022-01-01 RX ADMIN — TRIAMCINOLONE ACETONIDE 40 MG: 40 INJECTION, SUSPENSION INTRA-ARTICULAR; INTRAMUSCULAR at 03:01

## 2022-01-01 RX ADMIN — DIGOXIN 125 MCG: 0.25 INJECTION INTRAMUSCULAR; INTRAVENOUS at 05:05

## 2022-01-01 RX ADMIN — PROPOFOL 30 MCG/KG/MIN: 10 INJECTION, EMULSION INTRAVENOUS at 06:05

## 2022-01-01 RX ADMIN — METOPROLOL SUCCINATE 50 MG: 50 TABLET, EXTENDED RELEASE ORAL at 08:05

## 2022-01-01 RX ADMIN — NOREPINEPHRINE BITARTRATE 32 MG: 1 INJECTION, SOLUTION, CONCENTRATE INTRAVENOUS at 04:05

## 2022-01-01 RX ADMIN — METOLAZONE 5 MG: 2.5 TABLET ORAL at 08:05

## 2022-01-01 RX ADMIN — INSULIN ASPART 2 UNITS: 100 INJECTION, SOLUTION INTRAVENOUS; SUBCUTANEOUS at 11:05

## 2022-01-01 RX ADMIN — PROPOFOL 35 MCG/KG/MIN: 10 INJECTION, EMULSION INTRAVENOUS at 11:05

## 2022-01-01 RX ADMIN — ALBUTEROL SULFATE 10 MG: 2.5 SOLUTION RESPIRATORY (INHALATION) at 06:05

## 2022-01-01 RX ADMIN — RIVAROXABAN 20 MG: 20 TABLET, FILM COATED ORAL at 04:03

## 2022-01-01 RX ADMIN — METHYLPREDNISOLONE SODIUM SUCCINATE 125 MG: 125 INJECTION, POWDER, FOR SOLUTION INTRAMUSCULAR; INTRAVENOUS at 01:05

## 2022-01-01 RX ADMIN — METOPROLOL TARTRATE 50 MG: 50 TABLET, FILM COATED ORAL at 11:03

## 2022-01-01 RX ADMIN — ATORVASTATIN CALCIUM 40 MG: 40 TABLET, FILM COATED ORAL at 08:05

## 2022-01-01 RX ADMIN — PHENYLEPHRINE HYDROCHLORIDE 1.5 MCG/KG/MIN: 10 INJECTION INTRAVENOUS at 08:05

## 2022-01-01 RX ADMIN — ACETAMINOPHEN 650 MG: 325 TABLET ORAL at 06:03

## 2022-01-01 RX ADMIN — INSULIN HUMAN 10 UNITS: 100 INJECTION, SOLUTION PARENTERAL at 12:05

## 2022-01-01 RX ADMIN — MUPIROCIN: 20 OINTMENT TOPICAL at 08:05

## 2022-01-01 RX ADMIN — LOSARTAN POTASSIUM 25 MG: 25 TABLET, FILM COATED ORAL at 08:03

## 2022-01-01 RX ADMIN — LIDOCAINE 1 PATCH: 50 PATCH CUTANEOUS at 05:05

## 2022-01-01 RX ADMIN — SODIUM BICARBONATE 650 MG: 650 TABLET ORAL at 03:05

## 2022-01-01 RX ADMIN — FUROSEMIDE 80 MG: 10 INJECTION, SOLUTION INTRAVENOUS at 07:05

## 2022-01-01 RX ADMIN — AMIODARONE HYDROCHLORIDE 150 MG: 50 INJECTION, SOLUTION INTRAVENOUS at 01:03

## 2022-01-01 RX ADMIN — TORSEMIDE 40 MG: 20 TABLET ORAL at 09:05

## 2022-01-01 RX ADMIN — PROPOFOL 30 MCG/KG/MIN: 10 INJECTION, EMULSION INTRAVENOUS at 03:05

## 2022-01-01 RX ADMIN — MORPHINE SULFATE 4 MG: 4 INJECTION INTRAVENOUS at 11:05

## 2022-01-01 RX ADMIN — PHENYLEPHRINE HYDROCHLORIDE 5 MCG/KG/MIN: 10 INJECTION INTRAVENOUS at 01:05

## 2022-01-01 RX ADMIN — ACETAMINOPHEN 650 MG: 325 TABLET ORAL at 10:03

## 2022-01-01 RX ADMIN — PHENYLEPHRINE HYDROCHLORIDE 5 MCG/KG/MIN: 10 INJECTION INTRAVENOUS at 06:05

## 2022-01-01 RX ADMIN — SODIUM BICARBONATE 650 MG: 650 TABLET ORAL at 08:05

## 2022-01-01 RX ADMIN — SUMATRIPTAN SUCCINATE 50 MG: 25 TABLET ORAL at 11:03

## 2022-01-01 RX ADMIN — DOCUSATE SODIUM AND SENNOSIDES 1 TABLET: 8.6; 5 TABLET, FILM COATED ORAL at 08:05

## 2022-01-01 RX ADMIN — HEPARIN SODIUM 18 UNITS/KG/HR: 1000 INJECTION, SOLUTION INTRAVENOUS; SUBCUTANEOUS at 09:05

## 2022-01-01 RX ADMIN — PHENYLEPHRINE HYDROCHLORIDE 3 MCG/KG/MIN: 10 INJECTION INTRAVENOUS at 03:05

## 2022-01-01 RX ADMIN — PHENYLEPHRINE HYDROCHLORIDE 5 MCG/KG/MIN: 10 INJECTION INTRAVENOUS at 10:05

## 2022-01-01 RX ADMIN — FUROSEMIDE 40 MG: 40 INJECTION, SOLUTION INTRAMUSCULAR; INTRAVENOUS at 05:03

## 2022-01-01 RX ADMIN — METOPROLOL TARTRATE 50 MG: 50 TABLET, FILM COATED ORAL at 05:03

## 2022-01-01 RX ADMIN — HYDROCODONE BITARTRATE AND ACETAMINOPHEN 1 TABLET: 10; 325 TABLET ORAL at 08:01

## 2022-01-01 RX ADMIN — PHENYLEPHRINE HYDROCHLORIDE 5 MCG/KG/MIN: 10 INJECTION INTRAVENOUS at 03:05

## 2022-01-01 RX ADMIN — PHENYLEPHRINE HYDROCHLORIDE 3 MCG/KG/MIN: 10 INJECTION INTRAVENOUS at 07:05

## 2022-01-01 RX ADMIN — PROPOFOL 5 MCG/KG/MIN: 10 INJECTION, EMULSION INTRAVENOUS at 01:05

## 2022-01-01 RX ADMIN — METOPROLOL SUCCINATE 50 MG: 50 TABLET, EXTENDED RELEASE ORAL at 09:05

## 2022-01-01 RX ADMIN — PHENYLEPHRINE HYDROCHLORIDE 5 MCG/KG/MIN: 10 INJECTION INTRAVENOUS at 09:05

## 2022-01-01 RX ADMIN — PROPOFOL 35 MCG/KG/MIN: 10 INJECTION, EMULSION INTRAVENOUS at 05:05

## 2022-01-01 RX ADMIN — AMIODARONE HYDROCHLORIDE 0.5 MG/MIN: 1.8 INJECTION, SOLUTION INTRAVENOUS at 06:05

## 2022-01-01 RX ADMIN — LIDOCAINE HYDROCHLORIDE 100 MG: 20 INJECTION, SOLUTION INTRAVENOUS at 12:05

## 2022-01-01 RX ADMIN — INSULIN HUMAN 5 UNITS: 100 INJECTION, SOLUTION PARENTERAL at 05:05

## 2022-01-01 RX ADMIN — DIPHENHYDRAMINE HYDROCHLORIDE: 50 INJECTION INTRAMUSCULAR; INTRAVENOUS at 12:05

## 2022-01-01 RX ADMIN — CHLORHEXIDINE GLUCONATE 0.12% ORAL RINSE 15 ML: 1.2 LIQUID ORAL at 09:05

## 2022-01-01 RX ADMIN — MAGNESIUM SULFATE 2 G: 2 INJECTION INTRAVENOUS at 07:05

## 2022-01-01 RX ADMIN — NOREPINEPHRINE BITARTRATE 0.02 MCG/KG/MIN: 1 INJECTION, SOLUTION, CONCENTRATE INTRAVENOUS at 11:05

## 2022-01-01 RX ADMIN — ACETAMINOPHEN 650 MG: 325 TABLET ORAL at 05:05

## 2022-01-01 RX ADMIN — PROPOFOL 40 MCG/KG/MIN: 10 INJECTION, EMULSION INTRAVENOUS at 04:05

## 2022-01-01 RX ADMIN — RIVAROXABAN 20 MG: 20 TABLET, FILM COATED ORAL at 05:03

## 2022-01-01 RX ADMIN — HUMAN ALBUMIN MICROSPHERES AND PERFLUTREN 0.33 MG: 10; .22 INJECTION, SOLUTION INTRAVENOUS at 12:05

## 2022-01-01 RX ADMIN — AMIODARONE HYDROCHLORIDE 1 MG/MIN: 1.8 INJECTION, SOLUTION INTRAVENOUS at 01:05

## 2022-01-01 RX ADMIN — ONDANSETRON 4 MG: 2 INJECTION INTRAMUSCULAR; INTRAVENOUS at 03:05

## 2022-01-01 RX ADMIN — METOPROLOL TARTRATE 25 MG: 25 TABLET, FILM COATED ORAL at 08:03

## 2022-01-01 RX ADMIN — MORPHINE SULFATE 1 MG: 2 INJECTION, SOLUTION INTRAMUSCULAR; INTRAVENOUS at 02:05

## 2022-01-01 RX ADMIN — METOPROLOL TARTRATE 50 MG: 50 TABLET, FILM COATED ORAL at 12:03

## 2022-01-01 RX ADMIN — DIAZEPAM 10 MG: 5 TABLET ORAL at 08:01

## 2022-01-01 RX ADMIN — AMIODARONE HYDROCHLORIDE 1 MG/MIN: 1.8 INJECTION, SOLUTION INTRAVENOUS at 06:05

## 2022-01-01 RX ADMIN — NOREPINEPHRINE BITARTRATE 0.02 MCG/KG/MIN: 1 INJECTION, SOLUTION, CONCENTRATE INTRAVENOUS at 04:05

## 2022-01-01 RX ADMIN — INSULIN ASPART 1 UNITS: 100 INJECTION, SOLUTION INTRAVENOUS; SUBCUTANEOUS at 09:05

## 2022-01-01 RX ADMIN — PROPOFOL 40 MCG/KG/MIN: 10 INJECTION, EMULSION INTRAVENOUS at 05:05

## 2022-01-01 RX ADMIN — MORPHINE SULFATE 4 MG: 4 INJECTION INTRAVENOUS at 12:05

## 2022-01-01 RX ADMIN — PROPOFOL 35 MCG/KG/MIN: 10 INJECTION, EMULSION INTRAVENOUS at 10:05

## 2022-01-01 RX ADMIN — PHENYLEPHRINE HYDROCHLORIDE 4.5 MCG/KG/MIN: 10 INJECTION INTRAVENOUS at 07:05

## 2022-01-01 RX ADMIN — FUROSEMIDE 40 MG: 40 INJECTION, SOLUTION INTRAMUSCULAR; INTRAVENOUS at 06:03

## 2022-01-01 RX ADMIN — DOCUSATE SODIUM AND SENNOSIDES 1 TABLET: 8.6; 5 TABLET, FILM COATED ORAL at 06:03

## 2022-01-01 RX ADMIN — ASPIRIN 325 MG ORAL TABLET 325 MG: 325 PILL ORAL at 03:05

## 2022-01-01 RX ADMIN — CALCIUM GLUCONATE 1 G: 20 INJECTION, SOLUTION INTRAVENOUS at 06:05

## 2022-01-01 RX ADMIN — MORPHINE SULFATE 4 MG: 4 INJECTION INTRAVENOUS at 08:05

## 2022-01-01 RX ADMIN — INSULIN HUMAN 10 UNITS: 100 INJECTION, SOLUTION PARENTERAL at 06:05

## 2022-01-01 RX ADMIN — LISINOPRIL 20 MG: 20 TABLET ORAL at 09:05

## 2022-01-01 RX ADMIN — PHENYLEPHRINE HYDROCHLORIDE 5 MCG/KG/MIN: 10 INJECTION INTRAVENOUS at 07:05

## 2022-01-01 RX ADMIN — PROPOFOL 40 MCG/KG/MIN: 10 INJECTION, EMULSION INTRAVENOUS at 06:05

## 2022-01-01 RX ADMIN — Medication 40 MCG: at 01:05

## 2022-01-01 RX ADMIN — MAGNESIUM SULFATE IN WATER 2 G: 40 INJECTION, SOLUTION INTRAVENOUS at 05:03

## 2022-01-01 RX ADMIN — Medication 0.12 MCG/KG/MIN: at 05:05

## 2022-01-01 RX ADMIN — PHENYLEPHRINE HYDROCHLORIDE 5 MCG/KG/MIN: 10 INJECTION INTRAVENOUS at 04:05

## 2022-01-01 RX ADMIN — SODIUM CHLORIDE 1000 ML: 0.9 INJECTION, SOLUTION INTRAVENOUS at 05:05

## 2022-01-01 RX ADMIN — PROPOFOL 35 MCG/KG/MIN: 10 INJECTION, EMULSION INTRAVENOUS at 12:05

## 2022-01-01 RX ADMIN — PROPOFOL 35 MCG/KG/MIN: 10 INJECTION, EMULSION INTRAVENOUS at 09:05

## 2022-01-01 RX ADMIN — ATORVASTATIN CALCIUM 40 MG: 40 TABLET, FILM COATED ORAL at 09:05

## 2022-01-01 RX ADMIN — TORSEMIDE 30 MG: 20 TABLET ORAL at 09:03

## 2022-01-01 RX ADMIN — ACETAMINOPHEN 650 MG: 325 TABLET ORAL at 10:05

## 2022-01-01 RX ADMIN — ASPIRIN 325 MG ORAL TABLET 325 MG: 325 PILL ORAL at 09:05

## 2022-01-01 RX ADMIN — PANTOPRAZOLE SODIUM 40 MG: 40 TABLET, DELAYED RELEASE ORAL at 12:03

## 2022-01-01 RX ADMIN — PHENYLEPHRINE HYDROCHLORIDE 5 MCG/KG/MIN: 10 INJECTION INTRAVENOUS at 02:05

## 2022-01-01 RX ADMIN — AMIODARONE HYDROCHLORIDE 0.5 MG/MIN: 1.8 INJECTION, SOLUTION INTRAVENOUS at 12:05

## 2022-01-01 RX ADMIN — ATORVASTATIN CALCIUM 40 MG: 40 TABLET, FILM COATED ORAL at 09:03

## 2022-01-01 RX ADMIN — COLLAGENASE SANTYL: 250 OINTMENT TOPICAL at 11:05

## 2022-01-01 RX ADMIN — SODIUM ZIRCONIUM CYCLOSILICATE 5 G: 5 POWDER, FOR SUSPENSION ORAL at 08:05

## 2022-01-01 RX ADMIN — FUROSEMIDE 120 MG: 10 INJECTION, SOLUTION INTRAVENOUS at 10:05

## 2022-01-01 RX ADMIN — DOCUSATE SODIUM AND SENNOSIDES 1 TABLET: 8.6; 5 TABLET, FILM COATED ORAL at 09:05

## 2022-01-01 RX ADMIN — PROPOFOL 40 MCG/KG/MIN: 10 INJECTION, EMULSION INTRAVENOUS at 08:05

## 2022-01-01 RX ADMIN — PHENYLEPHRINE HYDROCHLORIDE 2 MCG/KG/MIN: 10 INJECTION INTRAVENOUS at 01:05

## 2022-01-01 RX ADMIN — MORPHINE SULFATE 4 MG: 4 INJECTION INTRAVENOUS at 05:05

## 2022-01-01 RX ADMIN — AMIODARONE HYDROCHLORIDE 1 MG/MIN: 1.8 INJECTION, SOLUTION INTRAVENOUS at 11:05

## 2022-01-01 RX ADMIN — HEPARIN SODIUM 18 UNITS/KG/HR: 1000 INJECTION, SOLUTION INTRAVENOUS; SUBCUTANEOUS at 01:05

## 2022-01-01 RX ADMIN — PHENYLEPHRINE HYDROCHLORIDE 0.5 MCG/KG/MIN: 10 INJECTION INTRAVENOUS at 05:05

## 2022-01-01 RX ADMIN — ALBUTEROL SULFATE 10 MG: 2.5 SOLUTION RESPIRATORY (INHALATION) at 05:05

## 2022-01-01 RX ADMIN — HEPARIN SODIUM 11 UNITS/KG/HR: 1000 INJECTION, SOLUTION INTRAVENOUS; SUBCUTANEOUS at 08:05

## 2022-01-01 RX ADMIN — PHENYLEPHRINE HYDROCHLORIDE 2.5 MCG/KG/MIN: 10 INJECTION INTRAVENOUS at 12:05

## 2022-01-01 RX ADMIN — AMIODARONE HYDROCHLORIDE 1 MG/MIN: 1.8 INJECTION, SOLUTION INTRAVENOUS at 05:05

## 2022-01-01 RX ADMIN — Medication 9 MG: at 08:03

## 2022-01-01 RX ADMIN — AMIODARONE HYDROCHLORIDE 1 MG/MIN: 1.8 INJECTION, SOLUTION INTRAVENOUS at 12:05

## 2022-01-01 RX ADMIN — FAMOTIDINE 20 MG: 10 INJECTION, SOLUTION INTRAVENOUS at 09:05

## 2022-01-01 RX ADMIN — MORPHINE SULFATE 4 MG: 4 INJECTION INTRAVENOUS at 04:05

## 2022-01-01 RX ADMIN — FUROSEMIDE 40 MG: 10 INJECTION, SOLUTION INTRAVENOUS at 08:05

## 2022-01-01 RX ADMIN — PHENYLEPHRINE HYDROCHLORIDE 3.5 MCG/KG/MIN: 10 INJECTION INTRAVENOUS at 05:05

## 2022-01-01 RX ADMIN — METOPROLOL SUCCINATE 200 MG: 50 TABLET, EXTENDED RELEASE ORAL at 09:03

## 2022-01-01 RX ADMIN — PHENYLEPHRINE HYDROCHLORIDE 2 MCG/KG/MIN: 10 INJECTION INTRAVENOUS at 06:05

## 2022-01-01 RX ADMIN — ONDANSETRON 8 MG: 8 TABLET, ORALLY DISINTEGRATING ORAL at 05:05

## 2022-01-01 RX ADMIN — SODIUM CHLORIDE, SODIUM LACTATE, POTASSIUM CHLORIDE, AND CALCIUM CHLORIDE 250 ML: .6; .31; .03; .02 INJECTION, SOLUTION INTRAVENOUS at 12:05

## 2022-01-01 RX ADMIN — PHENYLEPHRINE HYDROCHLORIDE 5 MCG/KG/MIN: 10 INJECTION INTRAVENOUS at 08:05

## 2022-01-01 RX ADMIN — HEPARIN SODIUM 2400 UNITS: 1000 INJECTION, SOLUTION INTRAVENOUS; SUBCUTANEOUS at 02:05

## 2022-01-01 RX ADMIN — VASOPRESSIN 0.04 UNITS/MIN: 20 INJECTION INTRAVENOUS at 11:05

## 2022-01-01 RX ADMIN — ETOMIDATE: 40 INJECTION, SOLUTION INTRAVENOUS at 01:05

## 2022-01-01 RX ADMIN — ROCURONIUM BROMIDE: 10 INJECTION, SOLUTION INTRAVENOUS at 01:05

## 2022-01-01 RX ADMIN — PHENYLEPHRINE HYDROCHLORIDE 4.5 MCG/KG/MIN: 10 INJECTION INTRAVENOUS at 05:05

## 2022-01-01 RX ADMIN — PROPOFOL 35 MCG/KG/MIN: 10 INJECTION, EMULSION INTRAVENOUS at 03:05

## 2022-01-01 RX ADMIN — ETOMIDATE: 2 INJECTION INTRAVENOUS at 01:05

## 2022-01-01 RX ADMIN — METOPROLOL TARTRATE 25 MG: 25 TABLET, FILM COATED ORAL at 12:03

## 2022-01-01 RX ADMIN — AMIODARONE HYDROCHLORIDE 1 MG/MIN: 1.8 INJECTION, SOLUTION INTRAVENOUS at 07:05

## 2022-01-01 RX ADMIN — PROPOFOL 35 MCG/KG/MIN: 10 INJECTION, EMULSION INTRAVENOUS at 01:05

## 2022-01-01 RX ADMIN — PHENYLEPHRINE HYDROCHLORIDE 5 MCG/KG/MIN: 10 INJECTION INTRAVENOUS at 05:05

## 2022-01-01 RX ADMIN — PROPOFOL 35 MCG/KG/MIN: 10 INJECTION, EMULSION INTRAVENOUS at 07:05

## 2022-01-01 RX ADMIN — PHENYLEPHRINE HYDROCHLORIDE 3.5 MCG/KG/MIN: 10 INJECTION INTRAVENOUS at 01:05

## 2022-01-01 RX ADMIN — METOPROLOL TARTRATE 50 MG: 50 TABLET, FILM COATED ORAL at 06:03

## 2022-01-01 RX ADMIN — PROPOFOL 35 MCG/KG/MIN: 10 INJECTION, EMULSION INTRAVENOUS at 08:05

## 2022-01-01 RX ADMIN — PROPOFOL 30 MCG/KG/MIN: 10 INJECTION, EMULSION INTRAVENOUS at 01:05

## 2022-01-01 RX ADMIN — PHENYLEPHRINE HYDROCHLORIDE 3.5 MCG/KG/MIN: 10 INJECTION INTRAVENOUS at 11:05

## 2022-01-01 RX ADMIN — MECLIZINE HYDROCHLORIDE 25 MG: 25 TABLET ORAL at 01:03

## 2022-01-01 RX ADMIN — TORSEMIDE 40 MG: 20 TABLET ORAL at 10:05

## 2022-01-01 RX ADMIN — LISINOPRIL 20 MG: 20 TABLET ORAL at 10:05

## 2022-01-01 RX ADMIN — METOROPROLOL TARTRATE 5 MG: 5 INJECTION, SOLUTION INTRAVENOUS at 05:05

## 2022-01-01 RX ADMIN — VASOPRESSIN 0.04 UNITS/MIN: 20 INJECTION INTRAVENOUS at 07:05

## 2022-01-01 RX ADMIN — Medication 9 MG: at 09:03

## 2022-01-01 RX ADMIN — LIDOCAINE HYDROCHLORIDE 40 MG: 20 INJECTION, SOLUTION INTRAVENOUS at 01:05

## 2022-01-01 RX ADMIN — PROPOFOL 19.65 MCG/KG/MIN: 10 INJECTION, EMULSION INTRAVENOUS at 07:05

## 2022-01-01 RX ADMIN — METOPROLOL TARTRATE 25 MG: 25 TABLET, FILM COATED ORAL at 05:03

## 2022-01-01 RX ADMIN — PHENYLEPHRINE HYDROCHLORIDE 3.5 MCG/KG/MIN: 10 INJECTION INTRAVENOUS at 02:05

## 2022-01-01 RX ADMIN — DEXTROSE 250 ML: 10 SOLUTION INTRAVENOUS at 12:05

## 2022-01-01 RX ADMIN — MORPHINE SULFATE 4 MG: 4 INJECTION INTRAVENOUS at 06:05

## 2022-01-01 RX ADMIN — PHENYLEPHRINE HYDROCHLORIDE 3.5 MCG/KG/MIN: 10 INJECTION INTRAVENOUS at 07:05

## 2022-01-01 RX ADMIN — FUROSEMIDE 40 MG: 40 INJECTION, SOLUTION INTRAMUSCULAR; INTRAVENOUS at 02:03

## 2022-01-01 RX ADMIN — DEXTROSE 125 ML: 10 SOLUTION INTRAVENOUS at 12:05

## 2022-01-01 RX ADMIN — INSULIN ASPART 2 UNITS: 100 INJECTION, SOLUTION INTRAVENOUS; SUBCUTANEOUS at 06:05

## 2022-01-01 RX ADMIN — HEPARIN SODIUM 9 UNITS/KG/HR: 1000 INJECTION, SOLUTION INTRAVENOUS; SUBCUTANEOUS at 06:05

## 2022-01-01 RX ADMIN — PROPOFOL INJECTABLE EMULSION 5 MCG/KG/MIN: 10 INJECTION, EMULSION INTRAVENOUS at 01:05

## 2022-01-01 RX ADMIN — ACETAMINOPHEN 650 MG: 325 TABLET ORAL at 08:03

## 2022-01-01 RX ADMIN — PROPOFOL 35 MCG/KG/MIN: 10 INJECTION, EMULSION INTRAVENOUS at 04:05

## 2022-01-01 RX ADMIN — SODIUM BICARBONATE 50 MEQ: 84 INJECTION, SOLUTION INTRAVENOUS at 05:05

## 2022-01-01 RX ADMIN — DEXTROSE 250 ML: 10 SOLUTION INTRAVENOUS at 05:05

## 2022-01-01 RX ADMIN — NOREPINEPHRINE BITARTRATE 0.16 MCG/KG/MIN: 1 INJECTION, SOLUTION, CONCENTRATE INTRAVENOUS at 01:05

## 2022-01-01 RX ADMIN — PROPOFOL 30 MCG/KG/MIN: 10 INJECTION, EMULSION INTRAVENOUS at 09:05

## 2022-01-01 RX ADMIN — FUROSEMIDE 200 MG: 10 INJECTION, SOLUTION INTRAMUSCULAR; INTRAVENOUS at 08:05

## 2022-01-01 RX ADMIN — MUPIROCIN: 20 OINTMENT TOPICAL at 11:05

## 2022-01-01 RX ADMIN — Medication 0.06 MCG/KG/MIN: at 09:05

## 2022-01-01 RX ADMIN — CLOPIDOGREL 300 MG: 75 TABLET, FILM COATED ORAL at 09:05

## 2022-01-01 RX ADMIN — PHENYLEPHRINE HYDROCHLORIDE 4 MCG/KG/MIN: 10 INJECTION INTRAVENOUS at 11:05

## 2022-01-01 RX ADMIN — HEPARIN SODIUM 500 UNITS/HR: 1000 INJECTION, SOLUTION INTRAVENOUS; SUBCUTANEOUS at 04:05

## 2022-01-01 RX ADMIN — SODIUM ZIRCONIUM CYCLOSILICATE 10 G: 5 POWDER, FOR SUSPENSION ORAL at 12:05

## 2022-01-01 RX ADMIN — PROPOFOL 40 MCG/KG/MIN: 10 INJECTION, EMULSION INTRAVENOUS at 11:05

## 2022-01-01 RX ADMIN — SODIUM CHLORIDE, SODIUM LACTATE, POTASSIUM CHLORIDE, AND CALCIUM CHLORIDE 250 ML: .6; .31; .03; .02 INJECTION, SOLUTION INTRAVENOUS at 10:05

## 2022-01-01 RX ADMIN — HEPARIN SODIUM 2400 UNITS: 1000 INJECTION, SOLUTION INTRAVENOUS; SUBCUTANEOUS at 06:05

## 2022-01-01 RX ADMIN — MORPHINE SULFATE 1 MG: 2 INJECTION, SOLUTION INTRAMUSCULAR; INTRAVENOUS at 07:05

## 2022-01-01 RX ADMIN — PROPOFOL 40 MCG/KG/MIN: 10 INJECTION, EMULSION INTRAVENOUS at 02:05

## 2022-01-01 RX ADMIN — Medication 9 MG: at 08:05

## 2022-01-01 RX ADMIN — HEPARIN SODIUM 9 UNITS/KG/HR: 1000 INJECTION, SOLUTION INTRAVENOUS; SUBCUTANEOUS at 04:05

## 2022-01-03 NOTE — PROGRESS NOTES
Subjective:       Patient ID: Drew Farrar is a 56 y.o. male.    Chief Complaint: Venous Stasis and Venous Ulcer  12/21/20: F/U with Dr. Quiros. Measurements improved slightly. Site debrided per Dr. Quiros. Also seen by Dr. London. Continue Levaquin, and refill sent to pharmacy. Next visit with Dr. Quiros 12/28/20. Of note, pt not seen for 1 month due to transportation twice and his decision not to be seen by a covering provider once.   1/25/21: F/U with Dr. Quiros. Patient has not been to clinic due to symptoms and exposure to Covid19. Wound debrided per Dr. Quiros. Vascular procedure of left lower leg scheduled 2/12/21. Cont. POC. Next visit 2/1/21.  2/1/21: Fu with . C/o tenderness and edema to left foot under wound.  Still taking Levaquin 750 mg po. Wound debrided and culture taken per . Patient states he's scheduled for laser vein surgery at [facility] vein [place] on 2/10/21.  discussed need to follow up with ID after patient's vascular procedure, verbalized understanding. Cont. With current treatment plan. Fu 2/8/21 with . Of note, pt c/o increased pain distal to wound and thinks it is bc his HH nurse wrapped his leg to loose and his LLE became more edematous..  2/8/21: F/U with Dr. Quiros wound is deteriorating, complains of increase pain and swelling to vick wound and bottom of foot.  Pt states vein procedure postponed until June 2021 because of pain and swelling.Dr. Quiros discussed last culture results with patient and will discuss with ID today and will call in antibiotic to pts pharmacy and will notify patient. Wound debrided per Dr. Quiros tolerated fairly. Dr. Quiros recommended pt f/u 1 week with ID in clinic. Patient refused states he will only f/u with Dr. Quiros in 2 weeks.Continue with 3 layer compression as ordered.  3/11/21: F/U with Dr. Quiros. Wound deteriorating. Site malodorous. Vick area red, warm, and c/o of  increased pain. Tissue culture done. Patient transferred to [facility] via w/c. Report called to KAILEY wyatt in ER.  3/29/21: F/U with Dr. Quiros and Dr. London. Measurements improved. Completed IV antibiotics. Right arm PICC site d/c'd per Dr. London. Site debrided per Dr. Quiros. Gentian violet to vick wound, hydrofera ready to wound bed. Patient receiving dressing changes per [facility]. Next visit Dr. Quiros 4/5/21. Patient has podiatry appt. 4/29/21 for toenail trimming.  4/8/21: Fu with . No changes to wound. Sharps debridement per . Continued with current treatment plan as ordered with 3 layer compression toes to knee LLE. Fu 4/19/21 with ID and .  Home health frequency changed to twice weekly and PRN. Patient educated to try to use his lymphedema pumps daily, verbalized understanding.  5/3/21: Follow up with  for left foot venous ulcer. Complaints of cotton layer in 3 layer wrap itching inititated new wound care orders today in clinic. Pt seen by ID today.  5/24/21: Follow  Up with  for LLE venous ulcer. Pt reports wound and pain has improved with only using saline moist hydrofera classic without santyl.  Santyl discontinued. Continued with 3 layer compressing toes to knee LLE, tolerated well. RTC 3  Weeks.   6/28/21: Follow up with Dr. Quiros for venous stasis dermatitis BLE. Ulcers are now b/l. Accompanied by mother and spouse. Last visit he attended at  was >1mo ago. Complaints of odor and increased drainage to RLE x 1 week. States he missed last few appointments due to being in hospital for pinched nerve in back and was told by ER MD not to walk or come to any wound care appointments until today. These recs were not found in the ER summary. Pt states home health has been changing dressings twice weekly. Also asking if safe to receive Covid-19 vaccination and requesitng zinc unna boot to RLE. Tolerated debridement and  wound culture BLE.  Tolerated debridement, wound care  and culture of both legs. New wound care ordered initiated today and home health updated with orders. RTC 2 weeks.   7/19/21: F/U with Dr. Quiros. Pt missed last appt due to transportation and weather. Left foot site debrided. RLE still edematous with mild cellulitis but open draining wounds have improved. Orders changed today. Santyl to LLE and RLE sites. New orders faxed to .  8/9/21: F/U with Dr. Quiros and Dr. London. Left foot site debrided per Dr. Quiros. Profore to BLE. Continue Levaquin and doxycycline sent to pharmacy. New orders faxed to [facility].  8/23/21: Follow up with  and . Complaints of tenderness to left plantar foot. Tolerated debridement of all wounds per .  Levaquin PO discontinued per ID Dr. London and started on Avelox PO. Follow up with ID on 9/20/21. Referral placed to Interventional Cardiology. Patient to follow up 8/9/21 with , and follow up with Interventional Cardiology after next appointment.   10/7/21: Readmit to clinic for left foot, left leg, and right leg venous ulcers. Seen by Dr. Quiros today.  He evacuated to the [facility] and was treated there for the past 4-5 weeks. Pt reports taking PO antibiotics during that time.  Wounds appear much improved. Left lateral foot site culture done today. Continue Santyl and hydrofera classic to all sites. Orders faxed to . Return in 2 weeks to see Dr. Quiros and Dr. Lantigua.  10/21/2021:  Follow up for non-healing wounds to RLE and left foot. Wound to RLE almost resolved. Wound to left medial ankle increased in size, culture taken. Left lateral foot wound has now  in to 3 wounds, with overall decrease in size. Patient continues to be followed by [facility] 3 times a week. Plan of care updated and orders sent to [facility]. Educated patient on plan of care. Patient verbalized understanding.  11/4/21:  "Follow up with  for BLE venous wounds. Refused care to right leg states " it's healed and discharged." Still taking Bactrim Po and reports waiting for  to schedule a "vein procedure".  Tolerated debridement per  and culture left medial ankle wound.  Gentamicin ointment added to wound care dressing changes.  Continued with current plan of care.  RTC 1  weeks.   11/11/2021:  Follow up for non-healing wounds to left foot. Patient has no acute complaints at today's appointment. Discontinue topical gentamycin to wounds and continue with santyl, hydrofera ready, and 4 layer compression. Decrease frequency of dressing chnages to 2 times per week. Updated orders sent to home health. Patient educated on change in plan of care and verbalized understanding.  12/9/21: Follow up for left ankle and foot venous wounds. States he stopped taking oral Bactrim and Cipro 500mg BID a week ago " due to it not working."  Wounds debrided and cultured left medial ankle and lateral foot. Procedure with  pending. Continued with current plan of care. Follow up 2 weeks with  and ID. RX sent to pharmacy for Levaquin PO as that has worked well for this patient in the past.   12/20/21: Follow up for LLE venous statis ulcers. Lateral foot wounds improving. Reports trouble taking Flagyl PO, but is still taking with Bactrim po as ordered. Per ID patient to stop Flagyl PO once RX is finished and continue taking Bactrim PO. Refill sent to pharmacy for Bactrim PO. Still waiting for topical abx powder for dressing changes. Home health updated with new orders.  1/3/22: Follow up for LLE venous ulcers. Wounds slowly improving. Home Health using topical abx powder Tobramycin/Colistimethate 15/10% with dressing changes. Patient states he completed 2 weeks of Bactrim and Flagyl and currenlty not taking any antibiotics. Continued with current plan of care. Home Health updated with orders.     Review of " Systems    All systems were reviewed and are negative, except that mentioned in the HPI.    Objective:         Physical Exam  Constitutional:       Appearance: He is well-developed and well-nourished.   HENT:      Head: Normocephalic and atraumatic.   Eyes:      Extraocular Movements: EOM normal.      Pupils: Pupils are equal, round, and reactive to light.   Cardiovascular:      Rate and Rhythm: Normal rate.   Pulmonary:      Effort: Pulmonary effort is normal. No respiratory distress.      Breath sounds: No stridor.   Abdominal:      General: There is no distension.   Musculoskeletal:      Cervical back: Normal range of motion.   Skin:     Comments: See Synopsis for wound details   Neurological:      Mental Status: He is alert and oriented to person, place, and time.   Psychiatric:         Mood and Affect: Mood and affect normal.            Wound 06/28/21 1311 Venous Ulcer Left medial Malleolus/Ankle (Active)   06/28/21 1311    Pre-existing: Yes   Primary Wound Type: Venous ulcer   Side: Left   Orientation: medial   Location: Malleolus/Ankle   Wound Number:    Ankle-Brachial Index:    Pulses:    Removal Indication and Assessment:    Wound Outcome:    (Retired) Wound Type:    (Retired) Wound Length (cm):    (Retired) Wound Width (cm):    (Retired) Depth (cm):    Wound Description (Comments):    Removal Indications:    Wound Image   01/03/22 1400   Dressing Appearance Intact;Moist drainage 01/03/22 1400   Drainage Amount Moderate 01/03/22 1400   Drainage Characteristics/Odor Serosanguineous 01/03/22 1400   Appearance Yellow;Fibrin;Pink 01/03/22 1400   Tissue loss description Full thickness 01/03/22 1400   Red (%), Wound Tissue Color 10 % 01/03/22 1400   Yellow (%), Wound Tissue Color 90 % 01/03/22 1400   Periwound Area Pink;Moist;Edematous 01/03/22 1400   Wound Edges Irregular 01/03/22 1400   Wound Length (cm) 2.9 cm 01/03/22 1400   Wound Width (cm) 1.8 cm 01/03/22 1400   Wound Depth (cm) 0.3 cm 01/03/22 1400   Wound  Volume (cm^3) 1.566 cm^3 01/03/22 1400   Wound Surface Area (cm^2) 5.22 cm^2 01/03/22 1400   Care Cleansed with:;Sterile normal saline;Debrided 01/03/22 1400   Dressing Applied;Hydrofiber;Absorptive Pad;Compression wrap 01/03/22 1400   Periwound Care Absorptive dressing applied;Topical treatment applied 01/03/22 1400   Compression Four layer compression 01/03/22 1400   Off Loading Off loading shoe 01/03/22 1400   Dressing Change Due 01/06/22 01/03/22 1400            Wound 10/21/21 1508 Venous Ulcer Left lateral;proximal Foot (Active)   10/21/21 1508    Pre-existing: Yes   Primary Wound Type: Venous ulcer   Side: Left   Orientation: lateral;proximal   Location: Foot   Wound Number:    Ankle-Brachial Index:    Pulses:    Removal Indication and Assessment:    Wound Outcome:    (Retired) Wound Type:    (Retired) Wound Length (cm):    (Retired) Wound Width (cm):    (Retired) Depth (cm):    Wound Description (Comments):    Removal Indications:    Wound Image   01/03/22 1400   Dressing Appearance Intact;Moist drainage 01/03/22 1400   Drainage Amount Small 01/03/22 1400   Drainage Characteristics/Odor Serosanguineous 01/03/22 1400   Appearance Yellow;Slough;Moist 01/03/22 1400   Tissue loss description Partial thickness 01/03/22 1400   Yellow (%), Wound Tissue Color 100 % 01/03/22 1400   Periwound Area Pink;Edematous 01/03/22 1400   Wound Edges Irregular 01/03/22 1400   Wound Length (cm) 0.4 cm 01/03/22 1400   Wound Width (cm) 2 cm 01/03/22 1400   Wound Depth (cm) 0.1 cm 01/03/22 1400   Wound Volume (cm^3) 0.08 cm^3 01/03/22 1400   Wound Surface Area (cm^2) 0.8 cm^2 01/03/22 1400   Care Cleansed with:;Sterile normal saline;Debrided 01/03/22 1400   Dressing Applied;Hydrofiber;Absorptive Pad;Compression wrap 01/03/22 1400   Periwound Care Absorptive dressing applied;Topical treatment applied 01/03/22 1400   Compression Four layer compression 01/03/22 1400   Off Loading Off loading shoe 01/03/22 1400   Dressing Change Due  01/06/22 01/03/22 1400            Wound 10/21/21 1509 Venous Ulcer Left lateral;medial Foot (Active)   10/21/21 1509    Pre-existing: Yes   Primary Wound Type: Venous ulcer   Side: Left   Orientation: lateral;medial   Location: Foot   Wound Number:    Ankle-Brachial Index:    Pulses:    Removal Indication and Assessment:    Wound Outcome:    (Retired) Wound Type:    (Retired) Wound Length (cm):    (Retired) Wound Width (cm):    (Retired) Depth (cm):    Wound Description (Comments):    Removal Indications:    Wound Image   01/03/22 1400   Dressing Appearance Intact;Moist drainage 01/03/22 1400   Drainage Amount Small 01/03/22 1400   Drainage Characteristics/Odor Serosanguineous 01/03/22 1400   Appearance Black;Yellow;Slough;Moist 01/03/22 1400   Tissue loss description Full thickness 01/03/22 1400   Black (%), Wound Tissue Color 80 % 01/03/22 1400   Yellow (%), Wound Tissue Color 20 % 01/03/22 1400   Periwound Area Edematous;Pink 01/03/22 1400   Wound Edges Irregular 01/03/22 1400   Wound Length (cm) 1.1 cm 01/03/22 1400   Wound Width (cm) 1 cm 01/03/22 1400   Wound Depth (cm) 0.2 cm 01/03/22 1400   Wound Volume (cm^3) 0.22 cm^3 01/03/22 1400   Wound Surface Area (cm^2) 1.1 cm^2 01/03/22 1400   Care Cleansed with:;Sterile normal saline;Debrided 01/03/22 1400   Dressing Applied;Hydrofiber;Absorptive Pad;Compression wrap 01/03/22 1400   Periwound Care Topical treatment applied;Absorptive dressing applied 01/03/22 1400   Compression Four layer compression 01/03/22 1400   Off Loading Off loading shoe 01/03/22 1400   Dressing Change Due 01/06/22 01/03/22 1400            Wound 10/21/21 1510 Venous Ulcer Left lateral;distal Foot (Active)   10/21/21 1510    Pre-existing: Yes   Primary Wound Type: Venous ulcer   Side: Left   Orientation: lateral;distal   Location: Foot   Wound Number:    Ankle-Brachial Index:    Pulses:    Removal Indication and Assessment:    Wound Outcome:    (Retired) Wound Type:    (Retired) Wound Length  (cm):    (Retired) Wound Width (cm):    (Retired) Depth (cm):    Wound Description (Comments):    Removal Indications:    Wound Image   01/03/22 1400   Dressing Appearance Intact;Moist drainage 01/03/22 1400   Drainage Amount Small 01/03/22 1400   Drainage Characteristics/Odor Serosanguineous 01/03/22 1400   Appearance Black;Yellow;Moist;Slough 01/03/22 1400   Tissue loss description Full thickness 01/03/22 1400   Black (%), Wound Tissue Color 80 % 01/03/22 1400   Yellow (%), Wound Tissue Color 20 % 01/03/22 1400   Periwound Area Edematous;Pink 01/03/22 1400   Wound Edges Irregular 01/03/22 1400   Wound Length (cm) 0.8 cm 01/03/22 1400   Wound Width (cm) 1 cm 01/03/22 1400   Wound Depth (cm) 0.2 cm 01/03/22 1400   Wound Volume (cm^3) 0.16 cm^3 01/03/22 1400   Wound Surface Area (cm^2) 0.8 cm^2 01/03/22 1400   Care Cleansed with:;Sterile normal saline;Debrided 01/03/22 1400   Dressing Applied;Hydrofiber;Absorptive Pad;Compression wrap 01/03/22 1400   Periwound Care Absorptive dressing applied;Topical treatment applied 01/03/22 1400   Compression Four layer compression 01/03/22 1400   Off Loading Off loading shoe 01/03/22 1400   Dressing Change Due 01/06/22 01/03/22 1400         Assessment:         ICD-10-CM ICD-9-CM   1. Venous stasis ulcer of left ankle with fat layer exposed with varicose veins  I83.023 454.0    L97.322    2. Venous stasis ulcer of other part of left foot with fat layer exposed with varicose veins  I83.025 454.0    L97.522    3. Venous stasis dermatitis of both lower extremities  I87.2 454.1         Plan:   Tissue pathology and/or culture taken:  [] Yes [x] No   Sharp debridement performed:   [x] Yes [] No   Labs ordered this visit:   [] Yes [x] No   Imaging ordered this visit:   [] Yes [x] No           Orders Placed This Encounter   Procedures    Change dressing     Left Medial Ankle &Left Lateral Foot wounds   Cleanse wound with: Acetic Acid, rinse with  Normal Saline.   Lidocaine: Prn  Debridement   Periwound care: Sween to dry skin Prn, Betamethasone and Calmoseptine to vick wound and dorsal foot rash prn. Gentian violet in clinic only if needed.   Primary dressing:  Hydrofera Ready and Tobramycin/Colistimethate 15/10% antibiotic powder.  Secondary dressing: Small ABD pad to both wounds prn increased drainage   Offloading: Darco shoe left foot   Edema control: Profore 4 layer compression toes to knee --Home Health to use Kerlix instead of cast padding and Ace wrap instead of COBAN. Avoid prolonged standing in one place. Elevate leg(s) as much as possible.   Frequency:  Mondays and Thursdays   Follow-up Dr. Quiros and ID in 2 weeks 1/3/22     Home health: Continue Home Health as ordered per . [facility] to do wound care on Monday and Thursday except when scheduled in clinic. Next visit 1/24/22.     Other: Patient to use Lymphedema pumps daily   Procedure with  scheduled 1/20/22        Follow up in about 3 weeks (around 1/24/2022) for  and ID.

## 2022-01-09 NOTE — PROCEDURES
"Debridement    Date/Time: 1/3/2022 12:42 PM  Performed by: Kristine Quiros DO  Authorized by: Kristine Quiros DO     Time out: Immediately prior to procedure a "time out" was called to verify the correct patient, procedure, equipment, support staff and site/side marked as required.    Consent Done?:  Yes (Written)  Local anesthesia used?: Yes    Local anesthetic:  Topical anesthetic    Debridement - 1st Wound - General Location: left medial ankle.    Type of Debridement:  Excisional       Length (cm):  2.9       Area (sq cm):  5.22       Width (cm):  1.8       Percent Debrided (%):  100       Depth (cm):  0.3       Total Area Debrided (sq cm):  5.22    Depth of debridement:  Subcutaneous tissue    Tissue debrided:  Subcutaneous    Devitalized tissue debrided:  Slough, Fibrin and Biofilm    Instruments:  Curette    2nd Wound Details:     Debridement - 2nd Wound - General Location: left lateral foot proximal.    Type of Debridement:  Excisional       Length (cm):  0.4       Area (sq cm):  0.8       Width (cm):  2       Percent Debrided (%):  100       Depth (cm):  0.1       Total Area Debrided (sq cm):  0.8    Depth of debridement:  Subcutaneous tissue    Tissue debrided:  Subcutaneous    Devitalized tissue debrided:  Slough, Biofilm, Fibrin and Exudate    Instruments:  Curette    3rd Wound Details:     Debridement - 3rd Wound - General Location: left lateral foot central.    Type of Debridement:  Excisional       Length (cm):  1.1       Area (sq cm):  1.1       Width (cm):  1       Percent Debrided (%):  100       Depth (cm):  0.2       Total Area Debrided (sq cm):  1.1    Depth of debridement:  Subcutaneous tissue    Tissue debrided:  Subcutaneous    Devitalized tissue debrided:  Slough, Fibrin, Exudate and Biofilm    Instruments:  Curette    4th Wound Details:     Debridement - 4th Wound - General Location: left lateral foot distal.    Type of Debridement:  Excisional       Length (cm):  0.8       Area " (sq cm):  0.8       Width (cm):  1       Percent Debrided (%):  100       Depth (cm):  0.2       Total Area Debrided (sq cm):  0.8    Depth of debridement:  Subcutaneous tissue    Tissue debrided:  Subcutaneous    Devitalized tissue debrided:  Slough, Fibrin, Exudate and Biofilm    Instruments:  Curette    Bleeding:  Minimal  Hemostasis Achieved: Yes    Method Used:  Pressure  Patient tolerance:  Patient tolerated the procedure well with no immediate complications

## 2022-01-14 NOTE — PROGRESS NOTES
Subjective:      Patient ID: Drew Farrar is a 56 y.o. male.    Chief Complaint: Consult and Joint Swelling (right knee )    HPI    The patient complains of increase pain and swelling in his right knee recently.  No specific injury.  He has a long history of osteoarthritis that has been treated nonsurgically.  He has chronic circulation problems in his lower extremities that limit his therapeutic options.  He is still under management for open wounds on his left leg as well as post phlebitic syndrome.          Review of Systems   Constitutional: Negative for fever and weight loss.   HENT: Negative for congestion.    Eyes: Negative for visual disturbance.   Cardiovascular: Negative for chest pain.   Respiratory: Negative for shortness of breath.    Hematologic/Lymphatic: Negative for bleeding problem. Does not bruise/bleed easily.   Skin: Negative for poor wound healing.   Musculoskeletal: Positive for joint swelling.   Gastrointestinal: Negative for abdominal pain.   Genitourinary: Negative for dysuria.   Neurological: Negative for seizures.   Psychiatric/Behavioral: Negative for altered mental status.   Allergic/Immunologic: Negative for persistent infections.         Objective:      Ortho/SPM Exam      Right knee    [unfilled]    The patient is not in acute distress.   Sclerae normal  Body habitus is obese.  Respiratory distress:  none   The patient is using wheelchair  Hip irritability  negative.   The skin over the knee is intact.  There are severe trophic changes in the skin from the midcalf distally  Knee effusion 2+  Palpation- nontender  Range of motion- Flexion 1 deg, Extension 20 deg,   Ligament laxity exam:  1+ valgus laxity   Patellar apprehension negative.  Popliteal cyst negative  Patellar crepitation present.  Meniscal irritability not applicable  There is marked distal edema  Pulses DP present, PT present.  Motor normal 5/5 strength in all tested muscle groups.   Sensory normal.      I reviewed the  relevant imaging for the patient's condition:  Right knee films show complete loss of medial patellofemoral joint space        Assessment:       Encounter Diagnosis   Name Primary?    Primary osteoarthritis of right knee Yes        The degenerative process is very advanced.  The patient's skin condition is such that surgery cannot safely be recommended.          Plan:       Drew was seen today for consult and joint swelling.    Diagnoses and all orders for this visit:    Primary osteoarthritis of right knee          I explained my diagnostic impression and the reasoning behind it in detail, using layman's terms.  Models and/or pictures were used to help in the explanation.    Injection recommended and consent given    If the circulation problems can be addressed resulting in significant skin improvement, arthroplasty could be considered.    The patient will be my palliative protocol for now

## 2022-01-14 NOTE — PROCEDURES
Large Joint Aspiration/Injection: R knee    Date/Time: 1/14/2022 3:00 PM  Performed by: Edgard Timmons MD  Authorized by: Edgard Timmons MD     Location:  Knee  Site:  R knee  Medications:  40 mg triamcinolone acetonide 40 mg/mL     After obtaining verbal informed consent the patient's right knee was prepped aseptically and injected through an inferior lateral approach using 40 mg of triamcinolone and 1 cc of 1% plain Xylocaine.  The patient was warned about postinjection flare and how to manage it with ice, rest and over-the-counter analgesics.  They're advised to contact me for any severe, uncontrolled pain.

## 2022-01-20 NOTE — Clinical Note
The catheter was inserted into the left proximal GSV. Hemodynamics were performed.  Venaseal system inserted into the left proximal GSV.

## 2022-01-20 NOTE — PLAN OF CARE
Pt arrived with family from home, in wheelchair. Patient sitting up on side of bed with no complaints of pain or distress noted. Monitors applied. VSS, AAOx4. L leg for EVLt today. Covid negative per this AM. Fall risk reviewed with patient.     MD consent pending. Procedure and recovery process explained to patient and all questions answered. Patient verbalized understanding, denies questions. Will continue to monitor for safety and needs.

## 2022-01-20 NOTE — Clinical Note
Patient is c/o midsternal chest heaviness. Says he frequently has chest pain at home, and this pain feels similar. Dr. Lantigua at bedside to reassess. EKG performed.

## 2022-01-20 NOTE — Clinical Note
The catheter was inserted into the Left GSV. Venaseal catheter inserted into the left GSV under ultrasound guidance

## 2022-01-20 NOTE — Clinical Note
The catheter was removed from the left proximal GSV. Venaseal catheter removed. All catheter wires removed. Sheath removed.

## 2022-01-20 NOTE — Clinical Note
Venaseal chemical adhesive deployed into the left proximal GSV. Treatment length = 19cm. Venaseal used = 2cc.    Sclerotherapy administered into left distal GSV.

## 2022-01-20 NOTE — Clinical Note
A percutaneous stick to the left distal GSV and greater saphenous vein vein was performed. Ultrasound guidance was used to obtain access.

## 2022-01-20 NOTE — BRIEF OP NOTE
S/p L GSV ablation 19 cm       L accessory vein sclerotherapy      Case was done with RN sedation       Full report to follow \

## 2022-01-20 NOTE — Clinical Note
A percutaneous stick to the left proximal GSV and greater saphenous vein vein was performed. Ultrasound guidance was used to obtain access.

## 2022-01-20 NOTE — Clinical Note
The catheter was inserted into the left proximal GSV. Hemodynamics were performed.  Glidewire inserted into left proximal GSV under ultrasound guidance.

## 2022-01-20 NOTE — Clinical Note
The site was marked. The site was prepped with ChloraPrep. The patient was draped. The patient was positioned supine. Left leg prepped

## 2022-01-20 NOTE — H&P
Progress Notes  Gomez Lantigua MD (Physician)   Cardiology     Subjective:   Patient ID:  Drew Farrar is a 56 y.o. male who presents for follow up of Chronic Venous Insufficiency w/ Ulceration, Congestive Heart Failure, Irregular Heart Beat, Atrial Fibrillation, and Results        HPI:         He returns after 2 years.  Complaining of recurrent ulceration of the right lower extremity.  After his visit August 27, 2021 and ultrasound for venous reflux was ordered. In the interim he continued with wound care. His right leg wound healed with aggressive compressive therapy and wound care. The left lower extremity wound is improving. He denies any pain. He is tolerating his medications well.            He has history of VTE + PTS + lymphedema. He has CHF with an EF 40-45%, Severe MR in the past but only mild MR (8/2020), mild AI, mod IN, mild RV enlargement with normal function, PASP 36, and mild LAE. He has persistent afib with a controlled rate and anticoagulated with xarelto. He has an extensive history of venous insufficiency with recurrent ulcers. He is s/p deep venous intervention, multiple EVLTs and sclerotherapy sessions. He has h/o DVTs and PEs as well. He is compliant with his medications but not with his diet.           S/p L accessory veins (left ankle and foot) slcerotherapy 2/21/2019           US 3/2019                 R GSV 4.7 mm -no reflux              R LSV 7.4 mm + > 500 msec; 736 msec                  L GSV occluded              L accessory GSV reflux + > 500 msec; 1060 msec               L LSV 4.8 mm                                  No DVT         US 4/2019                            No DVT                      Venous US 9/2021                            R GSV 5.3 mm + reflux 636 msec              R LSV 2.9 mm + reflux 244 msec                 L GSV 5.8 mm + reflux 756 msec               L LSV 4.9 mm + reflux 568 msec                 No DVT                  Patient Active Problem List      Diagnosis Date Noted    Non-pressure chronic ulcer of left ankle, with unspecified severity 07/26/2018       Priority: Low    Skin ulcer of left foot including toes with fat layer exposed      Class 3 severe obesity due to excess calories with serious comorbidity and body mass index (BMI) of 45.0 to 49.9 in adult      Open wound of right lower leg      Cellulitis of both lower extremities      Stasis ulcer      Obesity, morbid (more than 100 lbs over ideal weight or BMI > 40)      Class 2 severe obesity due to excess calories with serious comorbidity and body mass index (BMI) of 39.0 to 39.9 in adult      PICC (peripherally inserted central catheter) removal 03/29/2021    Ringworm 03/15/2021    Rash of back 03/14/2021    PAD (peripheral artery disease) 03/12/2021       Left lower extremity arterial US done 3/11/21:   Loss of normal triphasic waveforms of the majority of the interrogated left lower extremity arteries, suggestive of peripheral arterial disease.     Cardiology consulted for non-healing left foot wound  TCOMs 3/15/2021              Chest wall room air 78 post O2 377              Left medial foot room air 69 post O2 343              Left dorsal foot room air 72 post O2 197        Alteration in skin integrity 03/12/2021    Wound infection 03/11/2021    Pseudomonas infection 10/12/2020    MRSA cellulitis of left foot 10/12/2020    Open wound of left foot      Complicated migraine 08/21/2020    Orthostatic hypotension 02/18/2020    Unstable angina 11/21/2019    Iron deficiency anemia 11/12/2019    Anemia of chronic disease 11/12/2019    Venous stasis ulcer of lower extremity, unspecified laterality      Obesity, Class I, BMI 30.0-34.9 (see actual BMI) 09/30/2019    Advance care planning 09/30/2019    Erythema      Acute deep vein thrombosis (DVT) of lower extremity 09/14/2019    Onychomycosis 09/12/2019    Wound of foot 09/12/2019    Clostridium difficile infection       (HFpEF) heart failure with preserved ejection fraction 09/10/2019    Pulmonary embolism 08/02/2019    Chronic venous insufficiency 07/25/2019    Hypomagnesemia 07/08/2019    Varicose ulcer of lower extremity      Cellulitis of right lower leg 04/24/2019    Candida infection of genital region 04/24/2019    Non-rheumatic mitral regurgitation 09/07/2017    Chronic systolic heart failure 09/06/2017       -  Echo 9/2017              EF 40%               Severe MR              Moderate TR              PASP 47              Severe LAE              LVEDD 7.1 cm               LVESD 5.2 cm             Hyperthyroidism 09/05/2017    Elevated troponin 09/05/2017    Chronic diastolic congestive heart failure 02/20/2017    Long term (current) use of anticoagulants 10/31/2016    Other pulmonary embolism without acute cor pulmonale, unspecified chronicity 10/28/2016    Acute pulmonary embolism 10/26/2016    Recurrent pulmonary embolism 10/26/2016    History of DVT (deep vein thrombosis) 09/16/2016    HTN (hypertension) 09/16/2016    Knee pain, right 07/07/2016    Difficulty walking 07/07/2016    Group A streptococcal infection 03/01/2016    Tinea pedis of both feet 03/01/2016    Bilateral edema of lower extremity 03/01/2016    Morbid obesity 03/01/2016    Permanent atrial fibrillation 03/01/2016    Right knee pain 03/01/2016    Depression 12/24/2014    Ulcer of ankle, left, limited to breakdown of skin 03/31/2014    Elevated BP 03/24/2014    May-Thurner syndrome 02/26/2014       1. Successful IVUS guided intervention for May Thurner Syndrome 2. 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       S/p venoplasty with  20 x 80 mm Wallstent post dilated with 14 mm balloon in 2/2014       Skin ulcer of left foot, limited to breakdown of skin 02/15/2014    Cellulitis  2014    Chronic venous hypertension with ulcer 2013    Edema 2013       R leg       Venous stasis ulcer of left midfoot limited to breakdown of skin 2013    Venous (peripheral) insufficiency 2013       S/p bilateral iliac vein stents     S/p R GSV EVLT with laser 10/2013     S/p US guided accessory vein sclerotherapy of R calf 2014       Venous stasis dermatitis of both lower extremities 2012    Ulcer - lesion 2012       Of left foot          Chronic atrial fibrillation 2012               Right Arm BP - Sittin/65  Left Arm BP - Sittin/65           LABS     LAST HbA1c        Lab Results   Component Value Date     HGBA1C 5.4 2020         Lipid panel        Lab Results   Component Value Date     CHOL 130 2020     CHOL 124 07/10/2017     CHOL 131 2016            Lab Results   Component Value Date     HDL 31 (L) 2020     HDL 23 (L) 07/10/2017     HDL 29 (L) 2016            Lab Results   Component Value Date     LDLCALC 78.8 2020     LDLCALC 70.4 07/10/2017     LDLCALC 83.0 2016            Lab Results   Component Value Date     TRIG 101 2020     TRIG 153 (H) 07/10/2017     TRIG 95 2016            Lab Results   Component Value Date     CHOLHDL 23.8 2020     CHOLHDL 18.5 (L) 07/10/2017     CHOLHDL 22.1 2016               Review of Systems   Constitutional: Positive for weight loss. Negative for diaphoresis, night sweats and weight gain.   HENT: Negative for congestion.    Eyes: Negative for blurred vision, discharge and double vision.   Cardiovascular: Positive for dyspnea on exertion. Negative for chest pain, claudication, cyanosis, irregular heartbeat, leg swelling, near-syncope, orthopnea, palpitations, paroxysmal nocturnal dyspnea and syncope.   Respiratory: Positive for wheezing (with activities). Negative for cough and shortness of breath.    Endocrine: Negative for cold intolerance,  heat intolerance and polyphagia.   Hematologic/Lymphatic: Negative for adenopathy and bleeding problem. Does not bruise/bleed easily.   Skin: Positive for poor wound healing (healed). Negative for dry skin and nail changes.   Musculoskeletal: Positive for arthritis. Negative for back pain, falls, joint pain, myalgias and neck pain.   Gastrointestinal: Negative for bloating, abdominal pain, change in bowel habit and constipation.   Genitourinary: Negative for bladder incontinence, dysuria, flank pain, genital sores and missed menses.   Neurological: Negative for aphonia, brief paralysis, difficulty with concentration, dizziness and weakness.   Psychiatric/Behavioral: Negative for altered mental status and memory loss. The patient does not have insomnia.    Allergic/Immunologic: Negative for environmental allergies.         Objective:   Physical Exam  Constitutional:       Appearance: He is well-developed.      Interventions: He is not intubated.  HENT:      Head: Normocephalic and atraumatic.      Right Ear: External ear normal.      Left Ear: External ear normal.   Eyes:      General: No scleral icterus.        Right eye: No discharge.         Left eye: No discharge.      Conjunctiva/sclera: Conjunctivae normal.      Pupils: Pupils are equal, round, and reactive to light.   Neck:      Thyroid: No thyromegaly.      Vascular: Normal carotid pulses. No carotid bruit, hepatojugular reflux or JVD.      Trachea: No tracheal deviation.   Cardiovascular:      Rate and Rhythm: Normal rate. Rhythm irregularly irregular.  No extrasystoles are present.     Chest Wall: PMI is not displaced.      Pulses: No midsystolic click.           Carotid pulses are 2+ on the right side and 2+ on the left side.       Radial pulses are 2+ on the right side and 2+ on the left side.        Femoral pulses are 2+ on the right side and 2+ on the left side.       Popliteal pulses are 2+ on the right side and 2+ on the left side.        Dorsalis  pedis pulses are 2+ on the right side and 2+ on the left side.        Posterior tibial pulses are 2+ on the right side and 2+ on the left side.      Heart sounds: S1 normal and S2 normal. Heart sounds not distant. No murmur heard.  No friction rub. No gallop. No S3 sounds.    Pulmonary:      Effort: Pulmonary effort is normal. No tachypnea, bradypnea, accessory muscle usage or respiratory distress. He is not intubated.      Breath sounds: Normal breath sounds. No stridor. No decreased breath sounds, wheezing or rales.   Chest:      Chest wall: No tenderness.   Abdominal:      General: There is no distension or abdominal bruit.      Palpations: There is no mass or pulsatile mass.      Tenderness: There is no abdominal tenderness. There is no guarding or rebound.   Musculoskeletal:         General: No tenderness. Normal range of motion.      Cervical back: Normal range of motion and neck supple.   Lymphadenopathy:      Cervical: No cervical adenopathy.      Comments:     Chronic lymphedema of R leg/calf area                   Skin:     General: Skin is warm.      Coloration: Skin is not pale.      Findings: No erythema or rash.      Comments:     Healed right foot wound        Marked skin derangement of R leg         Left foot wound         See images            Neurological:      Mental Status: He is alert and oriented to person, place, and time.      Cranial Nerves: No cranial nerve deficit.      Coordination: Coordination normal.      Deep Tendon Reflexes: Reflexes are normal and symmetric.   Psychiatric:         Behavior: Behavior normal.         Thought Content: Thought content normal.         Judgment: Judgment normal.            1/17/2019                           5/6/2019                  10/21/2021                          Assessment:      1. Non-pressure chronic ulcer of left ankle, with unspecified severity    2. Venous stasis ulcer of lower extremity, unspecified laterality    3. Alteration in skin  integrity    4. Chronic atrial fibrillation    5. May-Thurner syndrome    6. History of DVT (deep vein thrombosis)    7. Pulmonary embolism, unspecified chronicity, unspecified pulmonary embolism type, unspecified whether acute cor pulmonale present    8. Class 3 severe obesity due to excess calories with serious comorbidity and body mass index (BMI) of 45.0 to 49.9 in adult          Plan:         Continue with wound care. If there is no improvement with the wound, we can consider chemical EVLT  +/- sclerotherapy +/- phlebectomy of L GSV + knee accessory for wound healing. Regarding the R leg we will continue with close monitoring without any intervention at this time since the wound healed. He is comfortable with the plan.            Continue with compression stockings  Exercise + weight loss  Low salt diet  Daily weight and take an extra dose of demadex for 3 lbs weight gain  Xarelto for CVA prevention   Toprol xl + Losartan for CHF therapy            Continue with CHF clinic recommendations  Maintain relationship with [facility]  Follow up with endocrine for treatment of thyroid disorder              Return sooner for concerns or questions. If symptoms persist go to the ED  I have reviewed all pertinent data on this patient   Follow up as scheduled. Return sooner for concerns or questions              He expressed verbal understanding and agreed with the plan           Follow up in 3 months with weekly wound care updates  Sooner if there is any deterioration of wound   He asked for testosterone replacement. I declined and advised him to ask pcp.                       Patient's Medications   New Prescriptions     GENTAMICIN (GARAMYCIN) 0.1 % OINTMENT    Apply topically affected area 3 (three) times daily.     LOSARTAN (COZAAR) 25 MG TABLET    Take 1 tablet (25 mg total) by mouth once daily.     SULFAMETHOXAZOLE-TRIMETHOPRIM 800-160MG (BACTRIM DS) 800-160 MG TAB    Take 1 tablet by mouth every 12 (twelve)  hours. for 7 days   Previous Medications     ACETAMINOPHEN (TYLENOL) 325 MG TABLET    Take 325 mg by mouth every 6 (six) hours as needed for Pain.     ALBUTEROL (VENTOLIN HFA) 90 MCG/ACTUATION INHALER    Inhale 2 puffs into the lungs every 6 (six) hours as needed for Wheezing. Rescue     ATORVASTATIN (LIPITOR) 40 MG TABLET    Take 1 tablet (40 mg total) by mouth every evening.     COLLAGENASE (SANTYL) OINTMENT    Apply topically once daily.     HYDROXYZINE HCL (ATARAX) 25 MG TABLET    Take 1 tablet (25 mg total) by mouth 4 (four) times daily as needed for Itching.     LIDOCAINE (LIDODERM) 5 %    Place 1 patch onto the skin once daily. Remove & Discard patch within 12 hours or as directed by MD     METHIMAZOLE (TAPAZOLE) 10 MG TAB    Take 2 tablets (20 mg total) by mouth once daily.     METHOCARBAMOL (ROBAXIN) 750 MG TAB    Take 500 mg by mouth 3 (three) times daily.     METOPROLOL SUCCINATE (TOPROL-XL) 50 MG 24 HR TABLET    Take 1 tablet (50 mg total) by mouth every evening.     MICONAZOLE (MICOTIN) 2 % CREAM    Apply topically 2 (two) times daily.     MUPIROCIN (BACTROBAN) 2 % OINTMENT    Apply with each dressing change     PANTOPRAZOLE (PROTONIX) 40 MG TABLET    Take 1 tablet (40 mg total) by mouth once daily.     RIVAROXABAN (XARELTO) 20 MG TAB    Take 1 tablet (20 mg total) by mouth daily with dinner or evening meal.     SUMATRIPTAN (IMITREX) 50 MG TABLET    Take 50 mg for headache. If headache does not resolve, take another 50mg two hours after initial dose. Do not exceed 200mg in 24 hours.     TORSEMIDE (DEMADEX) 20 MG TAB    Take 2 tablets by mouth daily.   Modified Medications     No medications on file   Discontinued Medications     No medications on file

## 2022-01-20 NOTE — DISCHARGE INSTRUCTIONS
Recovering at home  Once at home, follow all the instructions youve been given. Be sure to:  · Take all medicines as directed  · Care for the catheter insertion site as directed  · Check for signs of infection at the catheter insertion site: check for warmth, redness, yellow/green discharge from access site  · Wear elastic stockings or bandages as directed: first 2 days  · Keep your legs raised (elevated) as directed  · Walk a few times a day. Walk for at least 5 minutes every hours, while awake.  · Avoid heavy exercise, lifting, and standing for long periods as advised  · Avoid air travel, hot baths, saunas, or whirlpools for the next 3 week  · Avoid natural bodies of water for 3 weeks

## 2022-01-20 NOTE — DISCHARGE SUMMARY
01-Jan-2021 16:46 [facility] - Lab (Hospital)  Discharge Note  Short Stay    Procedure(s) (LRB):  Ablation (Left)    OUTCOME: Patient tolerated treatment/procedure well without complication and is now ready for discharge.    DISPOSITION: Home or Self Care    FINAL DIAGNOSIS:  Wound infection    FOLLOWUP: In clinic    DISCHARGE INSTRUCTIONS:  No discharge procedures on file.         S/p L GSV ablation 19 cm       L accessory vein sclerotherapy      Case was done with RN sedation       Full report to follow \            Current Discharge Medication List      CONTINUE these medications which have NOT CHANGED    Details   acetaminophen (TYLENOL) 325 MG tablet Take 325 mg by mouth every 6 (six) hours as needed for Pain.      collagenase (SANTYL) ointment Apply topically once daily.  Qty: 30 g, Refills: 3      gentamicin (GARAMYCIN) 0.1 % ointment Apply topically affected area 3 (three) times daily.  Qty: 30 g, Refills: 3    Associated Diagnoses: Pseudomonas infection; Proteus infection      hydrOXYzine HCl (ATARAX) 25 MG tablet Take 1 tablet (25 mg total) by mouth 4 (four) times daily as needed for Itching.  Qty: 20 tablet, Refills: 0      losartan (COZAAR) 25 MG tablet Take 1 tablet (25 mg total) by mouth once daily.  Qty: 90 tablet, Refills: 3    Comments: .      methocarbamoL (ROBAXIN) 750 MG Tab Take 500 mg by mouth 3 (three) times daily.      metroNIDAZOLE (FLAGYL) 500 MG tablet Take 500 mg by mouth every 8 (eight) hours.      miconazole (MICOTIN) 2 % cream Apply topically 2 (two) times daily.  Qty: 127.5 g, Refills: 1      mupirocin (BACTROBAN) 2 % ointment Apply with each dressing change  Qty: 22 g, Refills: 3      torsemide (DEMADEX) 20 MG Tab Take 2 tablets by mouth daily.  Qty: 180 tablet, Refills: 0      albuterol (VENTOLIN HFA) 90 mcg/actuation inhaler Inhale 2 puffs into the lungs every 6 (six) hours as needed for Wheezing. Rescue  Qty: 18 g, Refills: 0    Associated Diagnoses: Bronchitis      atorvastatin (LIPITOR) 40 MG tablet  Take 1 tablet (40 mg total) by mouth every evening.  Qty: 90 tablet, Refills: 3      LIDOcaine (LIDODERM) 5 % Place 1 patch onto the skin once daily. Remove & Discard patch within 12 hours or as directed by MD  Qty: 15 patch, Refills: 0      methIMAzole (TAPAZOLE) 10 MG Tab Take 2 tablets (20 mg total) by mouth once daily.  Qty: 60 tablet, Refills: 12    Associated Diagnoses: Hyperthyroidism      metoprolol succinate (TOPROL-XL) 50 MG 24 hr tablet Take 1 tablet (50 mg total) by mouth every evening.  Qty: 30 tablet, Refills: 5    Comments: .      pantoprazole (PROTONIX) 40 MG tablet Take 1 tablet (40 mg total) by mouth once daily.  Qty: 30 tablet, Refills: 11      rivaroxaban (XARELTO) 20 mg Tab Take 1 tablet (20 mg total) by mouth daily with dinner or evening meal.  Qty: 90 tablet, Refills: 1    Associated Diagnoses: History of DVT (deep vein thrombosis); Long term (current) use of anticoagulants      sumatriptan (IMITREX) 50 MG tablet Take 50 mg for headache. If headache does not resolve, take another 50mg two hours after initial dose. Do not exceed 200mg in 24 hours.  Qty: 10 tablet, Refills: 0    Associated Diagnoses: Migraine without aura and without status migrainosus, not intractable                TIME SPENT ON DISCHARGE: 35 minutes

## 2022-01-31 PROBLEM — Z45.2 PICC (PERIPHERALLY INSERTED CENTRAL CATHETER) REMOVAL: Status: RESOLVED | Noted: 2021-03-29 | Resolved: 2022-01-01

## 2022-01-31 NOTE — PROGRESS NOTES
Subjective:       Patient ID: Drew Farrar is a 56 y.o. male.    Chief Complaint: Venous Ulcer  12/21/20: F/U with Dr. Quiros. Measurements improved slightly. Site debrided per Dr. Quiros. Also seen by Dr. London. Continue Levaquin, and refill sent to pharmacy. Next visit with Dr. Quiros 12/28/20. Of note, pt not seen for 1 month due to transportation twice and his decision not to be seen by a covering provider once.   1/25/21: F/U with Dr. Quiros. Patient has not been to clinic due to symptoms and exposure to Covid19. Wound debrided per Dr. Quiros. Vascular procedure of left lower leg scheduled 2/12/21. Cont. POC. Next visit 2/1/21.  2/1/21: Fu with . C/o tenderness and edema to left foot under wound.  Still taking Levaquin 750 mg po. Wound debrided and culture taken per . Patient states he's scheduled for laser vein surgery at [facility] [place] on 2/10/21.  discussed need to follow up with ID after patient's vascular procedure, verbalized understanding. Cont. With current treatment plan. Fu 2/8/21 with . Of note, pt c/o increased pain distal to wound and thinks it is bc his HH nurse wrapped his leg to loose and his LLE became more edematous..  2/8/21: F/U with Dr. Quiros wound is deteriorating, complains of increase pain and swelling to vick wound and bottom of foot.  Pt states vein procedure postponed until June 2021 because of pain and swelling.Dr. Quiros discussed last culture results with patient and will discuss with ID today and will call in antibiotic to pts pharmacy and will notify patient. Wound debrided per Dr. Quiros tolerated fairly. Dr. Quiros recommended pt f/u 1 week with ID in clinic. Patient refused states he will only f/u with Dr. Quiros in 2 weeks.Continue with 3 layer compression as ordered.  3/11/21: F/U with Dr. Quiros. Wound deteriorating. Site malodorous. Vick area red, warm, and c/o of increased pain.  Tissue culture done. Patient transferred to [facility] via w/c. Report called to KAILEY wyatt in ER.  3/29/21: F/U with Dr. Quiros and Dr. London. Measurements improved. Completed IV antibiotics. Right arm PICC site d/c'd per Dr. London. Site debrided per Dr. Quiros. Gentian violet to vick wound, hydrofera ready to wound bed. Patient receiving dressing changes per [facility]. Next visit Dr. Quiros 4/5/21. Patient has podiatry appt. 4/29/21 for toenail trimming.  4/8/21: Fu with . No changes to wound. Sharps debridement per . Continued with current treatment plan as ordered with 3 layer compression toes to knee LLE. Fu 4/19/21 with ID and .  Home health frequency changed to twice weekly and PRN. Patient educated to try to use his lymphedema pumps daily, verbalized understanding.  5/3/21: Follow up with  for left foot venous ulcer. Complaints of cotton layer in 3 layer wrap itching inititated new wound care orders today in clinic. Pt seen by ID today.  5/24/21: Follow  Up with  for LLE venous ulcer. Pt reports wound and pain has improved with only using saline moist hydrofera classic without santyl.  Santyl discontinued. Continued with 3 layer compressing toes to knee LLE, tolerated well. RTC 3  Weeks.   6/28/21: Follow up with Dr. Quiros for venous stasis dermatitis BLE. Ulcers are now b/l. Accompanied by mother and spouse. Last visit he attended at  was >1mo ago. Complaints of odor and increased drainage to RLE x 1 week. States he missed last few appointments due to being in hospital for pinched nerve in back and was told by ER MD not to walk or come to any wound care appointments until today. These recs were not found in the ER summary. Pt states home health has been changing dressings twice weekly. Also asking if safe to receive Covid-19 vaccination and requesitng zinc unna boot to RLE. Tolerated debridement and wound culture BLE.   Tolerated debridement, wound care  and culture of both legs. New wound care ordered initiated today and home health updated with orders. RTC 2 weeks.   7/19/21: F/U with Dr. Quiros. Pt missed last appt due to transportation and weather. Left foot site debrided. RLE still edematous with mild cellulitis but open draining wounds have improved. Orders changed today. Santyl to LLE and RLE sites. New orders faxed to .  8/9/21: F/U with Dr. Quiros and Dr. London. Left foot site debrided per Dr. Quiros. Profore to BLE. Continue Levaquin and doxycycline sent to pharmacy. New orders faxed to [facility].  8/23/21: Follow up with  and . Complaints of tenderness to left plantar foot. Tolerated debridement of all wounds per .  Levaquin PO discontinued per ID Dr. London and started on Avelox PO. Follow up with ID on 9/20/21. Referral placed to Interventional Cardiology. Patient to follow up 8/9/21 with , and follow up with Interventional Cardiology after next appointment.   10/7/21: Readmit to clinic for left foot, left leg, and right leg venous ulcers. Seen by Dr. Quiros today.  He evacuated to the [facility] and was treated there for the past 4-5 weeks. Pt reports taking PO antibiotics during that time.  Wounds appear much improved. Left lateral foot site culture done today. Continue Santyl and hydrofera classic to all sites. Orders faxed to . Return in 2 weeks to see Dr. Quiros and Dr. Lantigua.  10/21/2021:  Follow up for non-healing wounds to RLE and left foot. Wound to RLE almost resolved. Wound to left medial ankle increased in size, culture taken. Left lateral foot wound has now  in to 3 wounds, with overall decrease in size. Patient continues to be followed by [facility] 3 times a week. Plan of care updated and orders sent to [facility]. Educated patient on plan of care. Patient verbalized understanding.  11/4/21: Follow up with  " for BLE venous wounds. Refused care to right leg states " it's healed and discharged." Still taking Bactrim Po and reports waiting for  to schedule a "vein procedure".  Tolerated debridement per  and culture left medial ankle wound.  Gentamicin ointment added to wound care dressing changes.  Continued with current plan of care.  RTC 1  weeks.   11/11/2021:  Follow up for non-healing wounds to left foot. Patient has no acute complaints at today's appointment. Discontinue topical gentamycin to wounds and continue with santyl, hydrofera ready, and 4 layer compression. Decrease frequency of dressing chnages to 2 times per week. Updated orders sent to home health. Patient educated on change in plan of care and verbalized understanding.  12/9/21: Follow up for left ankle and foot venous wounds. States he stopped taking oral Bactrim and Cipro 500mg BID a week ago " due to it not working."  Wounds debrided and cultured left medial ankle and lateral foot. Procedure with  pending. Continued with current plan of care. Follow up 2 weeks with  and ID. RX sent to pharmacy for Levaquin PO as that has worked well for this patient in the past.   12/20/21: Follow up for LLE venous statis ulcers. Lateral foot wounds improving. Reports trouble taking Flagyl PO, but is still taking with Bactrim po as ordered. Per ID patient to stop Flagyl PO once RX is finished and continue taking Bactrim PO. Refill sent to pharmacy for Bactrim PO. Still waiting for topical abx powder for dressing changes. Home health updated with new orders.  1/3/22: Follow up for LLE venous ulcers. Wounds slowly improving. Home Health using topical abx powder Tobramycin/Colistimethate 15/10% with dressing changes. Patient states he completed 2 weeks of Bactrim and Flagyl and currenlty not taking any antibiotics. Continued with current plan of care. Home Health updated with orders.   1/31/22: Follow up with "  and ID. S/p ablation with  1/20/22.  No complaints of pain tolerating dressings with no issues. States next appt with  is scheduled for 3/14/22. Sharps debridement tolerated well. Santyl and hydrofera ready applied to wounds today in clinic, patient forgot to bring anticiotic powder to visit today.  Home health updated with orders.    Review of Systems    All systems were reviewed and are negative, except that mentioned in the HPI.    Objective:      Pulse:  [105]   BP: (127)/(78)   Physical Exam  Constitutional:       Appearance: He is well-developed and well-nourished.   HENT:      Head: Normocephalic and atraumatic.   Eyes:      Extraocular Movements: EOM normal.      Pupils: Pupils are equal, round, and reactive to light.   Cardiovascular:      Rate and Rhythm: Normal rate.   Pulmonary:      Effort: Pulmonary effort is normal. No respiratory distress.      Breath sounds: No stridor.   Abdominal:      General: There is no distension.   Musculoskeletal:      Cervical back: Normal range of motion.   Skin:     Comments: See Synopsis for wound details   Neurological:      Mental Status: He is alert and oriented to person, place, and time.   Psychiatric:         Mood and Affect: Mood and affect normal.            Wound 06/28/21 1311 Venous Ulcer Left medial Malleolus/Ankle (Active)   06/28/21 1311    Pre-existing: Yes   Primary Wound Type: Venous ulcer   Side: Left   Orientation: medial   Location: Malleolus/Ankle   Wound Number:    Ankle-Brachial Index:    Pulses:    Removal Indication and Assessment:    Wound Outcome:    (Retired) Wound Type:    (Retired) Wound Length (cm):    (Retired) Wound Width (cm):    (Retired) Depth (cm):    Wound Description (Comments):    Removal Indications:    Wound Image   01/31/22 1300   Dressing Appearance Intact;Moist drainage 01/31/22 1300   Drainage Amount Small 01/31/22 1300   Drainage Characteristics/Odor Serosanguineous 01/31/22 1300   Appearance  Yellow;Fibrin;Moist 01/31/22 1300   Tissue loss description Full thickness 01/31/22 1300   Yellow (%), Wound Tissue Color 100 % 01/31/22 1300   Periwound Area Edematous;Intact;Dry 01/31/22 1300   Wound Edges Defined 01/31/22 1300   Wound Length (cm) 3 cm 01/31/22 1300   Wound Width (cm) 2.1 cm 01/31/22 1300   Wound Depth (cm) 0.3 cm 01/31/22 1300   Wound Volume (cm^3) 1.89 cm^3 01/31/22 1300   Wound Surface Area (cm^2) 6.3 cm^2 01/31/22 1300   Care Cleansed with:;Antimicrobial agent;Sterile normal saline;Debrided 01/31/22 1300   Dressing Applied;Hydrofiber;Absorptive Pad;Compression wrap 01/31/22 1300   Periwound Care Absorptive dressing applied;Topical treatment applied 01/31/22 1300   Compression Four layer compression 01/31/22 1300   Off Loading Off loading shoe 01/31/22 1300   Dressing Change Due 02/02/22 01/31/22 1300            Wound 10/21/21 1508 Venous Ulcer Left lateral;proximal Foot (Active)   10/21/21 1508    Pre-existing: Yes   Primary Wound Type: Venous ulcer   Side: Left   Orientation: lateral;proximal   Location: Foot   Wound Number:    Ankle-Brachial Index:    Pulses:    Removal Indication and Assessment:    Wound Outcome:    (Retired) Wound Type:    (Retired) Wound Length (cm):    (Retired) Wound Width (cm):    (Retired) Depth (cm):    Wound Description (Comments):    Removal Indications:    Wound Image   01/31/22 1300   Dressing Appearance Intact;Moist drainage 01/31/22 1300   Drainage Amount Small 01/31/22 1300   Drainage Characteristics/Odor Serosanguineous 01/31/22 1300   Appearance Red;Moist 01/31/22 1300   Tissue loss description Full thickness 01/31/22 1300   Red (%), Wound Tissue Color 100 % 01/31/22 1300   Periwound Area Intact;Dry;Edematous 01/31/22 1300   Wound Edges Irregular 01/31/22 1300   Wound Length (cm) 0.9 cm 01/31/22 1300   Wound Width (cm) 1 cm 01/31/22 1300   Wound Depth (cm) 0.2 cm 01/31/22 1300   Wound Volume (cm^3) 0.18 cm^3 01/31/22 1300   Wound Surface Area (cm^2) 0.9 cm^2  01/31/22 1300   Care Cleansed with:;Sterile normal saline;Antimicrobial agent;Debrided 01/31/22 1300   Dressing Applied;Hydrofiber;Absorptive Pad;Compression wrap 01/31/22 1300   Periwound Care Absorptive dressing applied;Topical treatment applied 01/31/22 1300   Compression Four layer compression 01/31/22 1300   Off Loading Off loading shoe 01/31/22 1300   Dressing Change Due 02/02/22 01/31/22 1300            Wound 10/21/21 1509 Venous Ulcer Left lateral;medial Foot (Active)   10/21/21 1509    Pre-existing: Yes   Primary Wound Type: Venous ulcer   Side: Left   Orientation: lateral;medial   Location: Foot   Wound Number:    Ankle-Brachial Index:    Pulses:    Removal Indication and Assessment:    Wound Outcome:    (Retired) Wound Type:    (Retired) Wound Length (cm):    (Retired) Wound Width (cm):    (Retired) Depth (cm):    Wound Description (Comments):    Removal Indications:    Wound Image   01/31/22 1300   Dressing Appearance Intact;Moist drainage 01/31/22 1300   Drainage Amount Small 01/31/22 1300   Drainage Characteristics/Odor Serosanguineous 01/31/22 1300   Appearance Yellow;Red;Moist 01/31/22 1300   Tissue loss description Full thickness 01/31/22 1300   Red (%), Wound Tissue Color 90 % 01/31/22 1300   Yellow (%), Wound Tissue Color 10 % 01/31/22 1300   Periwound Area Edematous;Intact 01/31/22 1300   Wound Edges Defined 01/31/22 1300   Wound Length (cm) 1.1 cm 01/31/22 1300   Wound Width (cm) 1.5 cm 01/31/22 1300   Wound Depth (cm) 0.2 cm 01/31/22 1300   Wound Volume (cm^3) 0.33 cm^3 01/31/22 1300   Wound Surface Area (cm^2) 1.65 cm^2 01/31/22 1300   Care Cleansed with:;Antimicrobial agent;Sterile normal saline;Debrided 01/31/22 1300   Dressing Applied;Hydrofiber;Absorptive Pad;Compression wrap 01/31/22 1300   Periwound Care Topical treatment applied;Absorptive dressing applied 01/31/22 1300   Compression Four layer compression 01/31/22 1300   Off Loading Off loading shoe 01/31/22 1300   Dressing Change Due  02/02/22 01/31/22 1300            Wound 10/21/21 1510 Venous Ulcer Left lateral;distal Foot (Active)   10/21/21 1510    Pre-existing: Yes   Primary Wound Type: Venous ulcer   Side: Left   Orientation: lateral;distal   Location: Foot   Wound Number:    Ankle-Brachial Index:    Pulses:    Removal Indication and Assessment:    Wound Outcome:    (Retired) Wound Type:    (Retired) Wound Length (cm):    (Retired) Wound Width (cm):    (Retired) Depth (cm):    Wound Description (Comments):    Removal Indications:    Wound Image   01/31/22 1300   Dressing Appearance Intact;Moist drainage 01/31/22 1300   Drainage Amount Small 01/31/22 1300   Drainage Characteristics/Odor Serosanguineous 01/31/22 1300   Appearance Yellow;Moist 01/31/22 1300   Tissue loss description Full thickness 01/31/22 1300   Yellow (%), Wound Tissue Color 100 % 01/31/22 1300   Periwound Area Edematous;[place] 01/31/22 1300   Wound Edges Irregular 01/31/22 1300   Wound Length (cm) 0.8 cm 01/31/22 1300   Wound Width (cm) 0.4 cm 01/31/22 1300   Wound Depth (cm) 0.2 cm 01/31/22 1300   Wound Volume (cm^3) 0.064 cm^3 01/31/22 1300   Wound Surface Area (cm^2) 0.32 cm^2 01/31/22 1300   Care Cleansed with:;Antimicrobial agent;Sterile normal saline;Debrided 01/31/22 1300   Dressing Applied;Hydrofiber;Absorptive Pad;Compression wrap 01/31/22 1300   Periwound Care Absorptive dressing applied;Topical treatment applied 01/31/22 1300   Compression Four layer compression 01/31/22 1300   Off Loading Off loading shoe 01/31/22 1300   Dressing Change Due 02/02/22 01/31/22 1300         Assessment:         ICD-10-CM ICD-9-CM   1. Venous stasis ulcer of left ankle with fat layer exposed with varicose veins  I83.023 454.0    L97.322    2. Venous stasis ulcer of other part of left foot with fat layer exposed with varicose veins  I83.025 454.0    L97.522    3. Venous stasis dermatitis of both lower extremities  I87.2 454.1         Plan:   Tissue pathology and/or culture taken:  []  Yes [x] No   Sharp debridement performed:   [x] Yes [] No   Labs ordered this visit:   [] Yes [x] No   Imaging ordered this visit:   [] Yes [x] No     Wound care orders:  Left Medial Ankle &Left Lateral Foot wounds Cleanse wound with: Acetic Acid, rinse with  Normal Saline. Lidocaine: Prn Debridement Periwound care: Sween to dry skin Prn, Betamethasone and Calmoseptine to vick wound and dorsal foot rash prn. Gentian violet in clinic only if needed. Primary dressing:  Hydrofera Ready and Tobramycin/Colistimethate 15/10% antibiotic powder. In clinic apply Santyl and Hydrofera Ready if patient forgets antibiotic powder. Secondary dressing: Small ABD pad to both wounds prn increased drainage Offloading: Darco shoe left foot Edema control: Profore 3 layer compression toes to knee --Home Health to use Kerlix instead of cast padding and Ace wrap instead of COBAN. Avoid prolonged standing in one place. Elevate leg(s) as much as possible. Frequency:  Mondays and Thursdays Follow-up Dr. Quiros and ID in 2 weeks 2/14/22 Home health: Continue Home Health as ordered per . [facility] to do wound care on Monday and Thursday except when scheduled in clinic. Next visit 2/14/22 Other: Patient to use Lymphedema pumps daily.     Follow up in about 2 weeks (around 2/14/2022) for  .

## 2022-01-31 NOTE — PROGRESS NOTES
Patient presents after venous sclerotherapy. Left leg is improved - less edema in leg. Medial left leg wound is . Left lateral wound is not. Right leg is still edematous - plans for sclerotherapy soon. No periwound erythema, no exudate or fluctuance.     1. Open wound of right lower leg, subsequent encounter    2. Open wound of left foot, subsequent encounter    3. Wound infection    4. Group A streptococcal infection    5. Pseudomonas infection    6. Venous stasis dermatitis of both lower extremities    7. Venous stasis ulcer of lower extremity, unspecified laterality        Continue current antibiotics (topical Tobramycin and flagyl). Continue wound care. I spent over 10 minutes on this encounter today.

## 2022-02-01 NOTE — PROCEDURES
"Debridement    Date/Time: 1/31/2022 12:52 PM  Performed by: Kristine Quiros DO  Authorized by: Kristine Quiros DO     Time out: Immediately prior to procedure a "time out" was called to verify the correct patient, procedure, equipment, support staff and site/side marked as required.    Consent Done?:  Yes (Written)  Local anesthesia used?: Yes    Local anesthetic:  Topical anesthetic    Debridement - 1st Wound - General Location: left medial ankle.    Type of Debridement:  Excisional       Length (cm):  3       Area (sq cm):  6.3       Width (cm):  2.1       Percent Debrided (%):  100       Depth (cm):  0.3       Total Area Debrided (sq cm):  6.3    Depth of debridement:  Subcutaneous tissue    Tissue debrided:  Subcutaneous    Devitalized tissue debrided:  Slough, Fibrin, Exudate and Biofilm    Instruments:  Curette    2nd Wound Details:     Debridement - 2nd Wound - General Location: left lateral proximal foot.    Type of Debridement:  Excisional       Length (cm):  0.9       Area (sq cm):  0.9       Width (cm):  1       Percent Debrided (%):  100       Depth (cm):  0.2       Total Area Debrided (sq cm):  0.9    Depth of debridement:  Subcutaneous tissue    Tissue debrided:  Subcutaneous    Devitalized tissue debrided:  Slough, Fibrin, Exudate and Biofilm    Instruments:  Curette    3rd Wound Details:     Debridement - 3rd Wound - General Location: left lateral central foot.    Type of Debridement:  Excisional       Length (cm):  1.1       Area (sq cm):  1.65       Width (cm):  1.5       Percent Debrided (%):  100       Depth (cm):  0.2       Total Area Debrided (sq cm):  1.65    Depth of debridement:  Subcutaneous tissue    Tissue debrided:  Subcutaneous    Devitalized tissue debrided:  Slough, Fibrin, Exudate and Biofilm    Instruments:  Curette    4th Wound Details:     Debridement - 4th Wound - General Location: left lateral distal foot.    Type of Debridement:  Excisional       Length (cm):  0.8     "   Area (sq cm):  0.32       Width (cm):  0.4       Percent Debrided (%):  100       Depth (cm):  0.2       Total Area Debrided (sq cm):  0.32    Depth of debridement:  Subcutaneous tissue    Tissue debrided:  Subcutaneous    Devitalized tissue debrided:  Slough, Fibrin and Biofilm    Instruments:  Curette    Bleeding:  Minimal  Hemostasis Achieved: Yes    Method Used:  Pressure  Patient tolerance:  Patient tolerated the procedure well with no immediate complications

## 2022-02-10 PROBLEM — G56.01 CARPAL TUNNEL SYNDROME ON RIGHT: Status: ACTIVE | Noted: 2022-01-01

## 2022-02-10 PROBLEM — M75.41 ROTATOR CUFF IMPINGEMENT SYNDROME OF RIGHT SHOULDER: Status: ACTIVE | Noted: 2022-01-01

## 2022-02-10 NOTE — PROGRESS NOTES
[facility], Orthopedics and Sports Medicine  [facility]    New Patient Shoulder Office Visit  02/10/2022     Subjective:      Drew Farrar is a 56 y.o. right handed male referred by Lori Doshi for evaluation and treatment of a right shoulder problem. This is evaluated as a personal injury.  The patient reports progressive shoulder pain at the lateral aspect of his neck and at the trapezium on the right side. He also complains of tingling in the first three fingers of his right hand especially at night.  He has not had any treatment for these issues yet.     He sees another provider for his knee problems;  He has another provider for his extremely severe vascular insufficiency problem with ulceration in his legs.  He uses a wheelchair when outside of the house due to knee and leg pain, but uses a walker while at home.      Outside reports reviewed: historical medical records and office notes.    Past Medical History:   Diagnosis Date    *Atrial fibrillation     Anticoagulant long-term use     Arthritis     Atrial fibrillation     Atrial fibrillation Feb 23, 2016    Bipolar disorder     Carpal tunnel syndrome on right 2/10/2022    CHF (congestive heart failure)     Congenital heart disease     s/p surgical intervention 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 of age    Deep vein thrombosis     DVT of leg (deep venous thrombosis)     left leg    History of prior ablation treatment     10/9/13    Hypertension     Obesity     Stroke     Thyroid disease     Venous stasis ulcer of lower extremity, unspecified laterality 12/14/2012    Venous ulcer        Patient Active Problem List   Diagnosis    Venous stasis dermatitis of both lower extremities    Ulcer - lesion    Chronic atrial fibrillation    Venous stasis ulcer of left midfoot limited to breakdown of skin    Venous (peripheral) insufficiency    Edema    Chronic venous hypertension with ulcer    Cellulitis    Skin ulcer of left foot,  limited to breakdown of skin    May-Thurner syndrome    Stenosis of right iliac vein    Elevated BP    Ulcer of ankle, left, limited to breakdown of skin    Depression    Group A streptococcal infection    Tinea pedis of both feet    Bilateral edema of lower extremity    Morbid obesity    Permanent atrial fibrillation    Right knee pain    Knee pain, right    Difficulty walking    History of DVT (deep vein thrombosis)    HTN (hypertension)    Acute pulmonary embolism    Recurrent pulmonary embolism    Other pulmonary embolism without acute cor pulmonale, unspecified chronicity    Long term (current) use of anticoagulants    Chronic combined systolic and diastolic congestive heart failure    Hyperthyroidism    Elevated troponin    Non-rheumatic mitral regurgitation    Non-pressure chronic ulcer of left ankle, with unspecified severity    Cellulitis of right lower leg    Candida infection of genital region    Varicose ulcer of lower extremity    Hypomagnesemia    Chronic venous insufficiency    Pulmonary embolism    Onychomycosis    Wound of foot    Clostridium difficile infection    Acute deep vein thrombosis (DVT) of lower extremity    Erythema    Obesity, Class I, BMI 30.0-34.9 (see actual BMI)    Advance care planning    Venous stasis ulcer of lower extremity, unspecified laterality    Iron deficiency anemia    Anemia of chronic disease    Unstable angina    Orthostatic hypotension    Complicated migraine    Open wound of left foot    Proteus infection    MRSA cellulitis of left foot    Wound infection    PAD (peripheral artery disease)    Alteration in skin integrity    Rash of back    Ringworm    Class 2 severe obesity due to excess calories with serious comorbidity and body mass index (BMI) of 39.0 to 39.9 in adult    Stasis ulcer    Obesity, morbid (more than 100 lbs over ideal weight or BMI > 40)    Open wound of right lower leg    Cellulitis of both  lower extremities    Skin ulcer of left foot including toes with fat layer exposed    Class 3 severe obesity due to excess calories with serious comorbidity and body mass index (BMI) of 45.0 to 49.9 in adult    Pseudomonas infection    Carpal tunnel syndrome on right    Rotator cuff impingement syndrome of right shoulder       Past Surgical History:   Procedure Laterality Date    ABLATION Left 1/20/2022    Procedure: Ablation;  Surgeon: Gomez Lantigua MD;  [facility] CATH LAB[facility]EP;  Service: Cardiology;  Laterality: Left;    ANGIOPLASTY      CARDIAC SURGERY      open heart surgery 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 old    EYE SURGERY      left eye cataract/right eye glaucoma    TIMO FILTER PLACEMENT      Dr Calix ([facility])    KNEE SURGERY      l and r     MULTIPLE TOOTH EXTRACTIONS      SKIN GRAFT      left leg        Current Outpatient Medications   Medication Instructions    acetaminophen (TYLENOL) 325 mg, Oral, Every 6 hours PRN    albuterol (VENTOLIN HFA) 90 mcg/actuation inhaler 2 puffs, Inhalation, Every 6 hours PRN, Rescue    atorvastatin (LIPITOR) 40 mg, Oral, Nightly    collagenase (SANTYL) ointment Topical (Top), Daily    hydrOXYzine HCL (ATARAX) 25 mg, Oral, 4 times daily PRN    losartan (COZAAR) 25 mg, Oral, Daily    methIMAzole (TAPAZOLE) 20 mg, Oral, Daily    methocarbamoL (ROBAXIN) 500 mg, Oral, 3 times daily    metoprolol succinate (TOPROL-XL) 50 mg, Oral, Nightly    metroNIDAZOLE (FLAGYL) 500 mg, Oral, Every 8 hours (non-standard times)    miconazole (MICOTIN) 2 % cream Topical (Top), 2 times daily    pantoprazole (PROTONIX) 40 mg, Oral, Daily    sumatriptan (IMITREX) 50 MG tablet Take 50 mg for headache. If headache does not resolve, take another 50mg two hours after initial dose. Do not exceed 200mg in 24 hours.    torsemide (DEMADEX) 20 MG Tab Take 2 tablets by mouth daily.    XARELTO 20 mg, Oral, With dinner        Review of patient's allergies indicates:   Allergen  Reactions    Contrast media Other (See Comments)     Severe chest pain    Food allergy formula [glutamine-[id number]-selen-brom]      Allergic to green peas; Heart failure.    Iodinated contrast media Other (See Comments)     Chest pain    Peas Hives    Ibuprofen Swelling    Latex, natural rubber Hives    Pcn [penicillins] Hives    Quercetin     Butisol [butabarbital] Rash     Peeling skin       Social History     Socioeconomic History    Marital status: Single    Number of children: 2   Tobacco Use    Smoking status: Former Smoker     Packs/day: 1.00     Years: 6.00     Pack years: 6.00     Types: Cigarettes    Smokeless tobacco: Former User    Tobacco comment: quit by age 27yrs old   Substance and Sexual Activity    Alcohol use: Yes     Alcohol/week: 1.0 standard drink     Types: 1 Glasses of wine per week     Comment: occasionally    Drug use: No    Sexual activity: Yes     Partners: Female     Birth control/protection: None       Family History   Problem Relation Age of Onset    Diabetes Father     Heart disease Father     Heart disease Maternal Grandmother     Diabetes Maternal Grandfather     Heart disease Maternal Grandfather     Stroke Maternal Grandfather          Review of Systems   Constitutional: Negative for chills and fever.   HENT: Negative for hearing loss.    Eyes: Negative for blurred vision.   Cardiovascular: Negative for chest pain.   Respiratory: Negative for shortness of breath.    Gastrointestinal: Negative for abdominal pain.   Neurological: Negative for light-headedness.        Objective:      General    Constitutional: He is oriented to person, place, and time. He appears well-developed and well-nourished.   HENT:   Head: Normocephalic and atraumatic.   Eyes: Pupils are equal, round, and reactive to light.   Neck: Neck supple.   Cardiovascular: Normal rate.    Pulmonary/Chest: Effort normal.   Neurological: He is alert and oriented to person, place, and time.    Psychiatric: He has a normal mood and affect. His behavior is normal.         Back (L-Spine & T-Spine) / Neck (C-Spine) Exam     Tenderness   The patient is tender to palpation of the right trapezial.     Neck (C-Spine) Tests   Spurling's Test   Left:  Negative  Right: positive      Right Hand/Wrist Exam     Tests   Phalens sign: positive  Tinel's sign (median nerve): negative  Carpal Tunnel Compression Test: positive        Right Shoulder Exam     Inspection/Observation   Deformity: absent  Scapular Winging: absent    Range of Motion   Active abduction: 140   Forward Flexion: 140   External Rotation 0 degrees: 40     Tests & Signs   Velez test: positive    Other   Sensation: normal    Left Shoulder Exam     Inspection/Observation   Deformity: absent  Scapular Winging: absent    Tenderness   The patient is experiencing no tenderness.     Range of Motion   Active abduction: 170   Forward Flexion: 170   External Rotation 0 degrees: 50     Tests & Signs   Velez test: negative  Impingement: negative  Active Compression test ([place]'s Sign): negative    Other   Sensation: normal       Muscle Strength   Right Upper Extremity   Shoulder Abduction: 5/5   Shoulder Internal Rotation: 5/5   Shoulder External Rotation: 5/5   Biceps: 5/5   Left Upper Extremity  Shoulder Abduction: 5/5   Shoulder Internal Rotation: 5/5   Shoulder External Rotation: 5/5   Biceps: 5/5     Reflexes     Left Side  Left Aguilar's Sign:  Absent    Right Side   Right Aguilar's Sign:  absent    Vascular Exam       Left Pulses      Radial:                    2+      Capillary Refill  Right Hand: normal capillary refill      Imaging:  Radiographs of the right shoulder taken 02/10/2022 were personally reviewed from the [facility] EMR.  Multiple views of the shoulder are available today for review, including an AP, scapular Y, axillary view.  The glenohumeral joint demonstrates mild degenerative changes .  The acromioclavicular joint demonstrates  mild degenerative changes .  The glenohumeral joint is concentrically reduced.  There is no acute fracture or dislocation.      Procedures        Assessment:       Drew Farrar is a 56 y.o. male seen in the office today. The primary encounter diagnosis was Rotator cuff impingement syndrome of right shoulder. Diagnoses of Carpal tunnel syndrome on right and Right cervical radiculopathy were also pertinent to this visit.  Non-operative treatment is recommended at this time. We will try physical therapy for the neck/shoulder problem. We did not give a shoulder injection today because his symptoms appear more related to his neck at this time.  We will use a wrist brace at night for his carpal tunnel and refer him to hand surgery if not better with this treatment modality. The natural history and expected course discussed with patient. Various treatment options were discussed, including their risks and benefits. All of the patient's questions were answered.     Plan:      1. Physical therapy and rehabilitation treatment.  2. Tylenol 650mg TID, PRN pain.  3. Follow up in 6 weeks.          Shin Hightower IV, MD   of Clinical Orthopedics  Department of Orthopedic Surgery  [facility]  Office: [phone]  Website: [email or web address]      Orders Placed This Encounter    ORTHOTIC DEVICE (DME)    Ambulatory referral/consult to Physical/Occupational Therapy

## 2022-02-14 NOTE — PROGRESS NOTES
Subjective:       Patient ID: Drew Farrar is a 56 y.o. male.    Chief Complaint: Venous Stasis  12/21/20: F/U with Dr. Quiros. Measurements improved slightly. Site debrided per Dr. Quiros. Also seen by Dr. London. Continue Levaquin, and refill sent to pharmacy. Next visit with Dr. Quiros 12/28/20. Of note, pt not seen for 1 month due to transportation twice and his decision not to be seen by a covering provider once.   1/25/21: F/U with Dr. Quiros. Patient has not been to clinic due to symptoms and exposure to Covid19. Wound debrided per Dr. Quiros. Vascular procedure of left lower leg scheduled 2/12/21. Cont. POC. Next visit 2/1/21.  2/1/21: Fu with . C/o tenderness and edema to left foot under wound.  Still taking Levaquin 750 mg po. Wound debrided and culture taken per . Patient states he's scheduled for laser vein surgery at [facility] vein [place] on 2/10/21.  discussed need to follow up with ID after patient's vascular procedure, verbalized understanding. Cont. With current treatment plan. Fu 2/8/21 with . Of note, pt c/o increased pain distal to wound and thinks it is bc his HH nurse wrapped his leg to loose and his LLE became more edematous..  2/8/21: F/U with Dr. Quiros wound is deteriorating, complains of increase pain and swelling to vick wound and bottom of foot.  Pt states vein procedure postponed until June 2021 because of pain and swelling.Dr. Quiros discussed last culture results with patient and will discuss with ID today and will call in antibiotic to pts pharmacy and will notify patient. Wound debrided per Dr. Quiros tolerated fairly. Dr. Quiros recommended pt f/u 1 week with ID in clinic. Patient refused states he will only f/u with Dr. Quiros in 2 weeks.Continue with 3 layer compression as ordered.  3/11/21: F/U with Dr. Quiros. Wound deteriorating. Site malodorous. Vick area red, warm, and c/o of increased pain.  Tissue culture done. Patient transferred to [facility] via w/c. Report called to KAILEY wyatt in ER.  3/29/21: F/U with Dr. Quiros and Dr. London. Measurements improved. Completed IV antibiotics. Right arm PICC site d/c'd per Dr. London. Site debrided per Dr. Quiros. Gentian violet to vick wound, hydrofera ready to wound bed. Patient receiving dressing changes per [facility]. Next visit Dr. Quiros 4/5/21. Patient has podiatry appt. 4/29/21 for toenail trimming.  4/8/21: Fu with . No changes to wound. Sharps debridement per . Continued with current treatment plan as ordered with 3 layer compression toes to knee LLE. Fu 4/19/21 with ID and .  Home health frequency changed to twice weekly and PRN. Patient educated to try to use his lymphedema pumps daily, verbalized understanding.  5/3/21: Follow up with  for left foot venous ulcer. Complaints of cotton layer in 3 layer wrap itching inititated new wound care orders today in clinic. Pt seen by ID today.  5/24/21: Follow  Up with  for LLE venous ulcer. Pt reports wound and pain has improved with only using saline moist hydrofera classic without santyl.  Santyl discontinued. Continued with 3 layer compressing toes to knee LLE, tolerated well. RTC 3  Weeks.   6/28/21: Follow up with Dr. Quiros for venous stasis dermatitis BLE. Ulcers are now b/l. Accompanied by mother and spouse. Last visit he attended at  was >1mo ago. Complaints of odor and increased drainage to RLE x 1 week. States he missed last few appointments due to being in hospital for pinched nerve in back and was told by ER MD not to walk or come to any wound care appointments until today. These recs were not found in the ER summary. Pt states home health has been changing dressings twice weekly. Also asking if safe to receive Covid-19 vaccination and requesitng zinc unna boot to RLE. Tolerated debridement and wound culture BLE.   Tolerated debridement, wound care  and culture of both legs. New wound care ordered initiated today and home health updated with orders. RTC 2 weeks.   7/19/21: F/U with Dr. Quiros. Pt missed last appt due to transportation and weather. Left foot site debrided. RLE still edematous with mild cellulitis but open draining wounds have improved. Orders changed today. Santyl to LLE and RLE sites. New orders faxed to .  8/9/21: F/U with Dr. Quiros and Dr. London. Left foot site debrided per Dr. Quiros. Profore to BLE. Continue Levaquin and doxycycline sent to pharmacy. New orders faxed to [facility].  8/23/21: Follow up with  and . Complaints of tenderness to left plantar foot. Tolerated debridement of all wounds per .  Levaquin PO discontinued per ID Dr. London and started on Avelox PO. Follow up with ID on 9/20/21. Referral placed to Interventional Cardiology. Patient to follow up 8/9/21 with , and follow up with Interventional Cardiology after next appointment.   10/7/21: Readmit to clinic for left foot, left leg, and right leg venous ulcers. Seen by Dr. Quiros today.  He evacuated to the [facility] and was treated there for the past 4-5 weeks. Pt reports taking PO antibiotics during that time.  Wounds appear much improved. Left lateral foot site culture done today. Continue Santyl and hydrofera classic to all sites. Orders faxed to . Return in 2 weeks to see Dr. Quiros and Dr. Lantigua.  10/21/2021:  Follow up for non-healing wounds to RLE and left foot. Wound to RLE almost resolved. Wound to left medial ankle increased in size, culture taken. Left lateral foot wound has now  in to 3 wounds, with overall decrease in size. Patient continues to be followed by [facility] 3 times a week. Plan of care updated and orders sent to [facility]. Educated patient on plan of care. Patient verbalized understanding.  11/4/21: Follow up with  " for BLE venous wounds. Refused care to right leg states " it's healed and discharged." Still taking Bactrim Po and reports waiting for  to schedule a "vein procedure".  Tolerated debridement per  and culture left medial ankle wound.  Gentamicin ointment added to wound care dressing changes.  Continued with current plan of care.  RTC 1  weeks.   11/11/2021:  Follow up for non-healing wounds to left foot. Patient has no acute complaints at today's appointment. Discontinue topical gentamycin to wounds and continue with santyl, hydrofera ready, and 4 layer compression. Decrease frequency of dressing chnages to 2 times per week. Updated orders sent to home health. Patient educated on change in plan of care and verbalized understanding.  12/9/21: Follow up for left ankle and foot venous wounds. States he stopped taking oral Bactrim and Cipro 500mg BID a week ago " due to it not working."  Wounds debrided and cultured left medial ankle and lateral foot. Procedure with  pending. Continued with current plan of care. Follow up 2 weeks with  and ID. RX sent to pharmacy for Levaquin PO as that has worked well for this patient in the past.   12/20/21: Follow up for LLE venous statis ulcers. Lateral foot wounds improving. Reports trouble taking Flagyl PO, but is still taking with Bactrim po as ordered. Per ID patient to stop Flagyl PO once RX is finished and continue taking Bactrim PO. Refill sent to pharmacy for Bactrim PO. Still waiting for topical abx powder for dressing changes. Home health updated with new orders.  1/3/22: Follow up for LLE venous ulcers. Wounds slowly improving. Home Health using topical abx powder Tobramycin/Colistimethate 15/10% with dressing changes. Patient states he completed 2 weeks of Bactrim and Flagyl and currenlty not taking any antibiotics. Continued with current plan of care. Home Health updated with orders.   1/31/22: Follow up with "  and ID. S/p ablation with  1/20/22.  No complaints of pain tolerating dressings with no issues. States next appt with  is scheduled for 3/14/22. Sharps debridement tolerated well. Santyl and hydrofera ready applied to wounds today in clinic, patient forgot to bring anticiotic powder to visit today.  Home health updated with orders.     2/14/22: Follow up for LLE venous ulcers. No complaints per patient. Wounds slowly improving.Tolerated debridement and dressings. RTC 3 weeks and follow up with ID.     Review of Systems    All systems were reviewed and are negative, except that mentioned in the HPI.    Objective:         Physical Exam  Constitutional:       Appearance: He is well-developed and well-nourished.   HENT:      Head: Normocephalic and atraumatic.   Eyes:      Extraocular Movements: EOM normal.      Pupils: Pupils are equal, round, and reactive to light.   Cardiovascular:      Rate and Rhythm: Normal rate.   Pulmonary:      Effort: Pulmonary effort is normal. No respiratory distress.      Breath sounds: No stridor.   Abdominal:      General: There is no distension.   Musculoskeletal:      Cervical back: Normal range of motion.   Skin:     Comments: See Synopsis for wound details   Neurological:      Mental Status: He is alert and oriented to person, place, and time.   Psychiatric:         Mood and Affect: Mood and affect normal.            Wound 06/28/21 1311 Venous Ulcer Left medial Malleolus/Ankle (Active)   06/28/21 1311    Pre-existing: Yes   Primary Wound Type: Venous ulcer   Side: Left   Orientation: medial   Location: Malleolus/Ankle   Wound Number:    Ankle-Brachial Index:    Pulses:    Removal Indication and Assessment:    Wound Outcome:    (Retired) Wound Type:    (Retired) Wound Length (cm):    (Retired) Wound Width (cm):    (Retired) Depth (cm):    Wound Description (Comments):    Removal Indications:    Wound Image   02/14/22 1400   Dressing Appearance  Intact;Moist drainage 02/14/22 1400   Drainage Amount Moderate 02/14/22 1400   Drainage Characteristics/Odor Serosanguineous 02/14/22 1400   Appearance Yellow;Fibrin;Moist 02/14/22 1400   Tissue loss description Full thickness 02/14/22 1400   Yellow (%), Wound Tissue Color 100 % 02/14/22 1400   Periwound Area Milligan;Moist 02/14/22 1400   Wound Edges Irregular 02/14/22 1400   Wound Length (cm) 2.9 cm 02/14/22 1400   Wound Width (cm) 1.6 cm 02/14/22 1400   Wound Depth (cm) 0.2 cm 02/14/22 1400   Wound Volume (cm^3) 0.928 cm^3 02/14/22 1400   Wound Surface Area (cm^2) 4.64 cm^2 02/14/22 1400   Care Cleansed with:;Sterile normal saline;Debrided 02/14/22 1400   Dressing Applied;Hydrofiber;Absorptive Pad;Compression wrap 02/14/22 1400   Periwound Care Absorptive dressing applied;Topical treatment applied 02/14/22 1400   Compression Three layer compression 02/14/22 1400   Off Loading Off loading shoe 02/14/22 1400   Dressing Change Due 02/17/22 02/14/22 1400            Wound 10/21/21 1508 Venous Ulcer Left lateral;proximal Foot (Active)   10/21/21 1508    Pre-existing: Yes   Primary Wound Type: Venous ulcer   Side: Left   Orientation: lateral;proximal   Location: Foot   Wound Number:    Ankle-Brachial Index:    Pulses:    Removal Indication and Assessment:    Wound Outcome:    (Retired) Wound Type:    (Retired) Wound Length (cm):    (Retired) Wound Width (cm):    (Retired) Depth (cm):    Wound Description (Comments):    Removal Indications:    Wound Image   02/14/22 1400   Dressing Appearance Intact;Moist drainage 02/14/22 1400   Drainage Amount Scant 02/14/22 1400   Drainage Characteristics/Odor Serosanguineous 02/14/22 1400   Appearance Yellow;Moist 02/14/22 1400   Tissue loss description Partial thickness 02/14/22 1400   Yellow (%), Wound Tissue Color 100 % 02/14/22 1400   Wound Edges Irregular 02/14/22 1400   Wound Length (cm) 0.4 cm 02/14/22 1400   Wound Width (cm) 0.5 cm 02/14/22 1400   Wound Depth (cm) 0.1 cm 02/14/22  1400   Wound Volume (cm^3) 0.02 cm^3 02/14/22 1400   Wound Surface Area (cm^2) 0.2 cm^2 02/14/22 1400   Care Cleansed with:;Sterile normal saline;Debrided 02/14/22 1400   Dressing Applied;Hydrofiber;Absorptive Pad;Compression wrap 02/14/22 1400   Periwound Care Absorptive dressing applied;Topical treatment applied 02/14/22 1400   Compression Three layer compression 02/14/22 1400   Off Loading Off loading shoe 02/14/22 1400   Dressing Change Due 02/17/22 02/14/22 1400            Wound 10/21/21 1509 Venous Ulcer Left lateral;medial Foot (Active)   10/21/21 1509    Pre-existing: Yes   Primary Wound Type: Venous ulcer   Side: Left   Orientation: lateral;medial   Location: Foot   Wound Number:    Ankle-Brachial Index:    Pulses:    Removal Indication and Assessment:    Wound Outcome:    (Retired) Wound Type:    (Retired) Wound Length (cm):    (Retired) Wound Width (cm):    (Retired) Depth (cm):    Wound Description (Comments):    Removal Indications:    Wound Image   02/14/22 1400   Dressing Appearance Intact;Moist drainage 02/14/22 1400   Drainage Amount Scant 02/14/22 1400   Drainage Characteristics/Odor Serosanguineous 02/14/22 1400   Appearance Pink;Moist;Epithelialization 02/14/22 1400   Tissue loss description Full thickness 02/14/22 1400   Red (%), Wound Tissue Color 100 % 02/14/22 1400   Periwound Area Macerated 02/14/22 1400   Wound Edges Irregular 02/14/22 1400   Wound Length (cm) 1 cm 02/14/22 1400   Wound Width (cm) 1.1 cm 02/14/22 1400   Wound Depth (cm) 0.2 cm 02/14/22 1400   Wound Volume (cm^3) 0.22 cm^3 02/14/22 1400   Wound Surface Area (cm^2) 1.1 cm^2 02/14/22 1400   Care Cleansed with:;Sterile normal saline;Debrided 02/14/22 1400   Dressing Applied;Hydrofiber;Absorptive Pad;Compression wrap 02/14/22 1400   Periwound Care Absorptive dressing applied;Topical treatment applied 02/14/22 1400   Compression Three layer compression 02/14/22 1400   Off Loading Off loading shoe 02/14/22 1400   Dressing Change  Due 02/17/22 02/14/22 1400            Wound 10/21/21 1510 Venous Ulcer Left lateral;distal Foot (Active)   10/21/21 1510    Pre-existing: Yes   Primary Wound Type: Venous ulcer   Side: Left   Orientation: lateral;distal   Location: Foot   Wound Number:    Ankle-Brachial Index:    Pulses:    Removal Indication and Assessment:    Wound Outcome:    (Retired) Wound Type:    (Retired) Wound Length (cm):    (Retired) Wound Width (cm):    (Retired) Depth (cm):    Wound Description (Comments):    Removal Indications:    Wound Image   02/14/22 1400   Dressing Appearance Intact;Moist drainage 02/14/22 1400   Drainage Amount Scant 02/14/22 1400   Drainage Characteristics/Odor Serosanguineous 02/14/22 1400   Appearance Pink;Moist;Epithelialization 02/14/22 1400   Tissue loss description Partial thickness 02/14/22 1400   Red (%), Wound Tissue Color 100 % 02/14/22 1400   Periwound Area Intact;Macerated 02/14/22 1400   Wound Edges Irregular 02/14/22 1400   Wound Length (cm) 0.6 cm 02/14/22 1400   Wound Width (cm) 0.2 cm 02/14/22 1400   Wound Depth (cm) 0.1 cm 02/14/22 1400   Wound Volume (cm^3) 0.012 cm^3 02/14/22 1400   Wound Surface Area (cm^2) 0.12 cm^2 02/14/22 1400   Care Cleansed with:;Sterile normal saline;Debrided 02/14/22 1400   Dressing Applied;Hydrofiber;Absorptive Pad;Compression wrap 02/14/22 1400   Periwound Care Absorptive dressing applied;Topical treatment applied 02/14/22 1400   Compression Three layer compression 02/14/22 1400   Off Loading Off loading shoe 02/14/22 1400   Dressing Change Due 02/17/22 02/14/22 1400         Assessment:         ICD-10-CM ICD-9-CM   1. Venous stasis ulcer of left ankle with fat layer exposed with varicose veins  I83.023 454.0    L97.322    2. Venous stasis ulcer of other part of left foot with fat layer exposed with varicose veins  I83.025 454.0    L97.522    3. Venous stasis dermatitis of both lower extremities  I87.2 454.1         Plan:   Tissue pathology and/or culture taken:  []  Yes [x] No   Sharp debridement performed:   [x] Yes [] No   Labs ordered this visit:   [] Yes [x] No   Imaging ordered this visit:   [] Yes [x] No           Orders Placed This Encounter   Procedures    Change dressing     Left Medial Ankle &Left Lateral Foot wounds   Cleanse wound with: Acetic Acid, rinse with  Normal Saline.   Lidocaine: Prn Debridement   Periwound care: Sween to dry skin Prn, Betamethasone and Calmoseptine to vick wound and dorsal foot rash prn. Gentian violet in clinic only if needed.   Primary dressing:  Hydrofera Ready and Tobramycin/Colistimethate 15/10% antibiotic powder.--- In clinic apply Santyl and Hydrofera Ready if patient forgets antibiotic powder.   Secondary dressing: Small ABD pad to both wounds prn increased drainage   Offloading: Darco shoe left foot   Edema control: Profore 3 layer compression toes to knee --Home Health to use Kerlix instead of cast padding and Ace wrap instead of COBAN. Avoid prolonged standing in one place. Elevate leg(s) as much as possible.   Frequency:  Mondays and Thursdays   Follow-up Dr. Quiros and ID in 3 weeks 3/7/22     Home health: Continue Home Health as ordered per . [facility] to do wound care on Monday and Thursday except when scheduled in clinic.     Other: Patient to use Lymphedema pumps daily.        Follow up in about 3 weeks (around 3/7/2022) for .

## 2022-02-17 NOTE — PROCEDURES
"Debridement    Date/Time: 2/14/2022 1:32 PM  Performed by: Kristine Quiros DO  Authorized by: Kristine Quiros DO     Time out: Immediately prior to procedure a "time out" was called to verify the correct patient, procedure, equipment, support staff and site/side marked as required.    Consent Done?:  Yes (Written)  Local anesthesia used?: Yes    Local anesthetic:  Topical anesthetic    Debridement - 1st Wound - General Location: left medial ankle.    Type of Debridement:  Excisional       Length (cm):  2.9       Area (sq cm):  4.64       Width (cm):  1.6       Percent Debrided (%):  100       Depth (cm):  0.2       Total Area Debrided (sq cm):  4.64    Depth of debridement:  Subcutaneous tissue    Tissue debrided:  Subcutaneous    Devitalized tissue debrided:  Slough, Fibrin, Exudate and Biofilm    Instruments:  Curette    2nd Wound Details:     Debridement - 2nd Wound - General Location: left lateral foot prox.    Type of Debridement:  Excisional       Length (cm):  0.4       Area (sq cm):  0.2       Width (cm):  0.5       Percent Debrided (%):  100       Depth (cm):  0.1       Total Area Debrided (sq cm):  0.2    Depth of debridement:  Subcutaneous tissue    Tissue debrided:  Subcutaneous    Devitalized tissue debrided:  Slough, Fibrin, Exudate and Biofilm    Instruments:  Curette    3rd Wound Details:     Debridement - 3rd Wound - General Location: left lateral foot central.    Type of Debridement:  Excisional       Length (cm):  1       Area (sq cm):  1.1       Width (cm):  1.1       Percent Debrided (%):  100       Depth (cm):  0.2       Total Area Debrided (sq cm):  1.1    Depth of debridement:  Subcutaneous tissue    Tissue debrided:  Subcutaneous    Devitalized tissue debrided:  Slough, Fibrin, Exudate and Biofilm    Instruments:  Curette    4th Wound Details:     Debridement - 4th Wound - General Location: left lateral foot distal.    Type of Debridement:  Excisional       Length (cm):  0.6       " Area (sq cm):  0.12       Width (cm):  0.2       Percent Debrided (%):  100       Depth (cm):  0.1       Total Area Debrided (sq cm):  0.12    Depth of debridement:  Subcutaneous tissue    Tissue debrided:  Subcutaneous    Devitalized tissue debrided:  Slough, Fibrin, Biofilm and Exudate    Instruments:  Curette    Bleeding:  Minimal  Hemostasis Achieved: Yes    Method Used:  Pressure  Patient tolerance:  Patient tolerated the procedure well with no immediate complications

## 2022-03-14 PROBLEM — R42 DIZZINESS: Status: ACTIVE | Noted: 2022-01-01

## 2022-03-14 NOTE — HPI
56 yr old male with PMH of afib, arthritis, bipolar, chf, congenital heart disease, carpal tunnel, cva, obesity, thyroid disease presents to the ED with reports of sob worsening, dizziness and states overall he feels fatigue. States he has had intermittent chest pain for 3 days. Pt states he was seen per Dr Roa and podiatry in the clinics this morning however states he was told is his condition worsened to come to the ER.     On arrival to ED patient was determined to be in Afib with RVR, and was given Amiodarone 150mg IV. Patient's cardiac function dropped quickly to sinus Trav and patient became unresponsive. Patient returned to baseline within 15 minutes. He remains in Afib and dizzy. Per Cardiology will be placed in Observation and restarted on metoprolol and more aggressively diuresed with double usual dose of furosemide.

## 2022-03-14 NOTE — ASSESSMENT & PLAN NOTE
Patient reported new onset Dizziness today  Plan   Correct underlying cardiac arrhythmia  Reassess after return to baseline cardiac status  If symptoms continue perform full vertigo work-up

## 2022-03-14 NOTE — PHARMACY MED REC
"  [facility]           Pharmacy  Admission Medication History     The home medication history was taken by Gregoria Dickey.      Medication history obtained from Medications listed below were obtained from: Patient/family    Based on information gathered for medication list, you may go to "Admission" then "Reconcile Home Medications" tabs to review and/or act upon those items.      The home medication list has been updated by the Pharmacy department.    Please read ALL comments highlighted in yellow.    Please address this information as you see fit.     Feel free to contact us if you have any questions or require assistance.        No current facility-administered medications on file prior to encounter.     Current Outpatient Medications on File Prior to Encounter   Medication Sig Dispense Refill    albuterol (VENTOLIN HFA) 90 mcg/actuation inhaler Inhale 2 puffs into the lungs every 6 (six) hours as needed for Wheezing. Rescue      atorvastatin (LIPITOR) 40 MG tablet Take 1 tablet (40 mg total) by mouth every evening. 90 tablet 3    losartan (COZAAR) 25 MG tablet Take 1 tablet (25 mg total) by mouth once daily. 90 tablet 3    metoprolol succinate (TOPROL-XL) 50 MG 24 hr tablet Take 1 tablet (50 mg total) by mouth once daily. 90 tablet 3    naproxen sodium (ALEVE) 220 MG tablet Take 220 mg by mouth as needed.      rivaroxaban (XARELTO) 20 mg Tab Take 1 tablet (20 mg total) by mouth daily with dinner or evening meal. 90 tablet 1    sumatriptan (IMITREX) 50 MG tablet Take 50 mg for headache. If headache does not resolve, take another 50mg two hours after initial dose. Do not exceed 200mg in 24 hours. 10 tablet 0    torsemide (DEMADEX) 20 MG Tab Take 2 tablets by mouth daily. (Patient taking differently: Take 30 mg by mouth once daily. 1 and 1/2 tablets) 30 tablet 0    [DISCONTINUED] acetaminophen (TYLENOL) 325 MG tablet Take 325 mg by mouth every 6 (six) hours as needed for Pain.   "    [DISCONTINUED] albuterol (VENTOLIN HFA) 90 mcg/actuation inhaler Inhale 2 puffs into the lungs every 6 (six) hours as needed for Wheezing. Rescue 18 g 0    [DISCONTINUED] atorvastatin (LIPITOR) 40 MG tablet Take 1 tablet (40 mg total) by mouth every evening. 90 tablet 3    [DISCONTINUED] collagenase (SANTYL) ointment Apply topically once daily. 30 g 3    [DISCONTINUED] hydrOXYzine HCl (ATARAX) 25 MG tablet Take 1 tablet (25 mg total) by mouth 4 (four) times daily as needed for Itching. 20 tablet 0    [DISCONTINUED] losartan (COZAAR) 25 MG tablet Take 1 tablet (25 mg total) by mouth once daily. 90 tablet 3    [DISCONTINUED] methIMAzole (TAPAZOLE) 10 MG Tab Take 2 tablets (20 mg total) by mouth once daily. 60 tablet 12    [DISCONTINUED] methocarbamoL (ROBAXIN) 750 MG Tab Take 500 mg by mouth 3 (three) times daily.      [DISCONTINUED] metoprolol succinate (TOPROL-XL) 50 MG 24 hr tablet Take 1 tablet (50 mg total) by mouth every evening. 30 tablet 5    [DISCONTINUED] metroNIDAZOLE (FLAGYL) 500 MG tablet Take 500 mg by mouth every 8 (eight) hours.      [DISCONTINUED] miconazole (MICOTIN) 2 % cream Apply topically 2 (two) times daily. 127.5 g 1    [DISCONTINUED] pantoprazole (PROTONIX) 40 MG tablet Take 1 tablet (40 mg total) by mouth once daily. 30 tablet 11       Please address this information as you see fit.  Feel free to contact us if you have any questions or require assistance.    Gregoria Dickey  [phone]                .

## 2022-03-14 NOTE — FIRST PROVIDER EVALUATION
" Emergency Department TeleTriage Encounter Note      CHIEF COMPLAINT    Chief Complaint   Patient presents with    Dizziness     Pt presents to the ER with CC of dizziness. Pt reports he went to his wound care appointment and they told him to come here. Pt reports the dizziness started after he went to the bathroom at lunch time and has felt like he's going to pass out since.       VITAL SIGNS   Initial Vitals [03/14/22 1205]   BP Pulse Resp Temp SpO2   135/63 (!) 130 20 97.9 °F (36.6 °C) (!) 94 %      MAP       --            ALLERGIES    Review of patient's allergies indicates:   Allergen Reactions    Contrast media Other (See Comments)     Severe chest pain    Food allergy formula [glutamine-c-quercet-selen-brom]      Allergic to green peas; Heart failure.    Iodinated contrast media Other (See Comments)     Chest pain    Peas Hives    Ibuprofen Swelling    Latex, natural rubber Hives    Pcn [penicillins] Hives    Quercetin     Butisol [butabarbital] Rash     Peeling skin       PROVIDER TRIAGE NOTE  This is a teletriage evaluation of a 56 y.o. male presenting to the ED complaining of "head spinning" dizziness starting around 0930 today with walking.  No trauma.  States that he feels like he may pass out.     Pt is tachycardic.      Initial orders will be placed and care will be transferred to an alternate provider when patient is roomed for a full evaluation. Any additional orders and the final disposition will be determined by that provider.           ORDERS  Labs Reviewed   CBC W/ AUTO DIFFERENTIAL   COMPREHENSIVE METABOLIC PANEL   TROPONIN I   URINALYSIS, REFLEX TO URINE CULTURE   TSH   POCT GLUCOSE MONITORING CONTINUOUS       ED Orders (720h ago, onward)    Start Ordered     Status Ordering Provider    03/14/22 1224 03/14/22 1223  Orthostatic blood pressure  Once         Ordered BENI OROURKE N.    03/14/22 1223 03/14/22 1223  CBC auto differential  STAT         Ordered HAVEN, " BENI N.    03/14/22 1223 03/14/22 1223  Comprehensive metabolic panel  STAT         Ordered BENI OROURKE N.    03/14/22 1223 03/14/22 1223  Insert Saline lock IV  Once         Ordered BENI OROURKE N.    03/14/22 1223 03/14/22 1223  Cardiac Monitoring - Adult  Continuous        Comments: Notify Physician If:    Ordered BENI OROURKE N.    03/14/22 1223 03/14/22 1223  EKG 12-lead  Once         Ordered BENI OROURKE N.    03/14/22 1223 03/14/22 1223  Pulse Oximetry Continuous  Continuous         Ordered BENI OROURKE N.    03/14/22 1223 03/14/22 1223  POCT glucose  Once         Ordered BENI OROURKE N.    03/14/22 1223 03/14/22 1223  Troponin I  STAT         Ordered BENI OROURKE N.    03/14/22 1223 03/14/22 1223  Urinalysis, Reflex to Urine Culture Urine, Clean Catch  STAT         Ordered BENI OROURKE N.    03/14/22 1223 03/14/22 1223  TSH  STAT         Ordered BENI OROURKE            Virtual Visit Note: The provider triage portion of this emergency department evaluation and documentation was performed via [employer], a HIPAA-compliant telemedicine application, in concert with a tele-presenter in the room. A face to face patient evaluation with one of my colleagues will occur once the patient is placed in an emergency department room.      DISCLAIMER: This note was prepared with [employer] voice recognition transcription software. Garbled syntax, mangled pronouns, and other bizarre constructions may be attributed to that software system.

## 2022-03-14 NOTE — ED NOTES
Patient complaints of dizziness and became unresponsive with pulse noted. called for help, ED MD and Charge RN at bedside. Crash cart kept at bedside. At 1346HRS Patient is awake and alert. Patient continuously monitored.

## 2022-03-14 NOTE — SUBJECTIVE & OBJECTIVE
Past Medical History:   Diagnosis Date    *Atrial fibrillation     Anticoagulant long-term use     Arthritis     Atrial fibrillation     Atrial fibrillation Feb 23, 2016    Bipolar disorder     Carpal tunnel syndrome on right 2/10/2022    CHF (congestive heart failure)     Congenital heart disease     s/p surgical intervention 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 of age    Deep vein thrombosis     DVT of leg (deep venous thrombosis)     left leg    History of prior ablation treatment     10/9/13    Hypertension     Obesity     Stroke     Thyroid disease     Venous stasis ulcer of lower extremity, unspecified laterality 12/14/2012    Venous ulcer        Past Surgical History:   Procedure Laterality Date    ABLATION Left 1/20/2022    Procedure: Ablation;  Surgeon: Gomez Lantigua MD;  Location: [facility] CATH LAB/EP;  Service: Cardiology;  Laterality: Left;    ANGIOPLASTY      CARDIAC SURGERY      open heart surgery 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 old    EYE SURGERY      left eye cataract/right eye glaucoma    TIMO FILTER PLACEMENT      Dr Calix ([facility])    KNEE SURGERY      l and r     MULTIPLE TOOTH EXTRACTIONS      SKIN GRAFT      left leg       Review of patient's allergies indicates:   Allergen Reactions    Contrast media Other (See Comments)     Severe chest pain    Food allergy formula [glutamine-c-quercet-selen-brom]      Allergic to green peas; Heart failure.    Iodinated contrast media Other (See Comments)     Chest pain    Peas Hives    Ibuprofen Swelling    Latex, natural rubber Hives    Pcn [penicillins] Hives    Quercetin     Butisol [butabarbital] Rash     Peeling skin       No current facility-administered medications on file prior to encounter.     Current Outpatient Medications on File Prior to Encounter   Medication Sig    albuterol (VENTOLIN HFA) 90 mcg/actuation inhaler Inhale 2 puffs into the lungs every 6 (six) hours as needed for Wheezing. Rescue    atorvastatin (LIPITOR) 40 MG tablet Take 1 tablet (40 mg total) by mouth every  evening.    losartan (COZAAR) 25 MG tablet Take 1 tablet (25 mg total) by mouth once daily.    metoprolol succinate (TOPROL-XL) 50 MG 24 hr tablet Take 1 tablet (50 mg total) by mouth once daily.    naproxen sodium (ALEVE) 220 MG tablet Take 220 mg by mouth as needed.    rivaroxaban (XARELTO) 20 mg Tab Take 1 tablet (20 mg total) by mouth daily with dinner or evening meal.    sumatriptan (IMITREX) 50 MG tablet Take 50 mg for headache. If headache does not resolve, take another 50mg two hours after initial dose. Do not exceed 200mg in 24 hours.    torsemide (DEMADEX) 20 MG Tab Take 2 tablets by mouth daily. (Patient taking differently: Take 30 mg by mouth once daily. 1 and 1/2 tablets)    [DISCONTINUED] acetaminophen (TYLENOL) 325 MG tablet Take 325 mg by mouth every 6 (six) hours as needed for Pain.    [DISCONTINUED] albuterol (VENTOLIN HFA) 90 mcg/actuation inhaler Inhale 2 puffs into the lungs every 6 (six) hours as needed for Wheezing. Rescue    [DISCONTINUED] atorvastatin (LIPITOR) 40 MG tablet Take 1 tablet (40 mg total) by mouth every evening.    [DISCONTINUED] collagenase (SANTYL) ointment Apply topically once daily.    [DISCONTINUED] hydrOXYzine HCl (ATARAX) 25 MG tablet Take 1 tablet (25 mg total) by mouth 4 (four) times daily as needed for Itching.    [DISCONTINUED] losartan (COZAAR) 25 MG tablet Take 1 tablet (25 mg total) by mouth once daily.    [DISCONTINUED] methIMAzole (TAPAZOLE) 10 MG Tab Take 2 tablets (20 mg total) by mouth once daily.    [DISCONTINUED] methocarbamoL (ROBAXIN) 750 MG Tab Take 500 mg by mouth 3 (three) times daily.    [DISCONTINUED] metoprolol succinate (TOPROL-XL) 50 MG 24 hr tablet Take 1 tablet (50 mg total) by mouth every evening.    [DISCONTINUED] metroNIDAZOLE (FLAGYL) 500 MG tablet Take 500 mg by mouth every 8 (eight) hours.    [DISCONTINUED] miconazole (MICOTIN) 2 % cream Apply topically 2 (two) times daily.    [DISCONTINUED] pantoprazole (PROTONIX) 40 MG tablet Take 1  tablet (40 mg total) by mouth once daily.     Family History       Problem Relation (Age of Onset)    Diabetes Father, Maternal Grandfather    Heart disease Father, Maternal Grandmother, Maternal Grandfather    Stroke Maternal Grandfather          Tobacco Use    Smoking status: Former Smoker     Packs/day: 1.00     Years: 6.00     Pack years: 6.00     Types: Cigarettes    Smokeless tobacco: Former User    Tobacco comment: quit by age 27yrs old   Substance and Sexual Activity    Alcohol use: Yes     Alcohol/week: 1.0 standard drink     Types: 1 Glasses of wine per week     Comment: occasionally    Drug use: No    Sexual activity: Yes     Partners: Female     Birth control/protection: None     Review of Systems   Constitutional:  Positive for activity change.   HENT:  Negative for sore throat.    Eyes:  Negative for visual disturbance.   Respiratory:  Positive for shortness of breath.    Cardiovascular:  Positive for chest pain, palpitations and leg swelling.   Gastrointestinal:  Positive for nausea. Negative for abdominal pain, diarrhea and vomiting.   Genitourinary:  Negative for flank pain.        Dark colored urine   Skin:  Positive for color change and wound.   Neurological:  Positive for dizziness and headaches. Negative for tremors, syncope and weakness.   Psychiatric/Behavioral: Negative.     Objective:     Vital Signs (Most Recent):  Temp: 98 °F (36.7 °C) (03/14/22 1458)  Pulse: (!) 114 (03/14/22 1551)  Resp: (!) 23 (03/14/22 1551)  BP: 117/60 (03/14/22 1551)  SpO2: 96 % (03/14/22 1551)   Vital Signs (24h Range):  Temp:  [97.9 °F (36.6 °C)-98 °F (36.7 °C)] 98 °F (36.7 °C)  Pulse:  [104-133] 114  Resp:  [20-23] 23  SpO2:  [94 %-99 %] 96 %  BP: (108-151)/(60-95) 117/60     Weight: (!) 170.1 kg (375 lb)  Body mass index is 41.2 kg/m².    Physical Exam  Constitutional:       Appearance: He is obese.   HENT:      Head: Normocephalic.      Right Ear: External ear normal.      Left Ear: External ear normal.       Nose: Nose normal.      Mouth/Throat:      Mouth: Mucous membranes are moist.   Eyes:      Extraocular Movements: Extraocular movements intact.      Conjunctiva/sclera: Conjunctivae normal.      Pupils: Pupils are equal, round, and reactive to light.   Cardiovascular:      Rate and Rhythm: Tachycardia present. Rhythm irregular.      Pulses: Normal pulses.   Pulmonary:      Effort: Pulmonary effort is normal.   Abdominal:      Palpations: Abdomen is soft.      Tenderness: There is no abdominal tenderness.   Musculoskeletal:         General: Swelling present.      Cervical back: Normal range of motion.      Right lower leg: Edema present.      Left lower leg: Edema present.      Comments: Bilateral Legs show signs of chronic venous stasis with ulcers   Skin:     General: Skin is warm.      Capillary Refill: Capillary refill takes less than 2 seconds.   Neurological:      General: No focal deficit present.      Mental Status: He is alert and oriented to person, place, and time.         CRANIAL NERVES     CN III, IV, VI   Pupils are equal, round, and reactive to light.     Significant Labs: All pertinent labs within the past 24 hours have been reviewed.  BMP:   Recent Labs   Lab 03/14/22  1336   *      K 4.2      CO2 24   BUN 17   CREATININE 0.8   CALCIUM 9.1     CBC:   Recent Labs   Lab 03/14/22  1336   WBC 6.32   HGB 9.5*   HCT 30.6*        CMP:   Recent Labs   Lab 03/14/22  1336      K 4.2      CO2 24   *   BUN 17   CREATININE 0.8   CALCIUM 9.1   PROT 6.9   ALBUMIN 3.2*   BILITOT 0.9   ALKPHOS 170*   AST 25   ALT 18   ANIONGAP 10   EGFRNONAA >60     Cardiac Markers:   Recent Labs   Lab 03/14/22  1550   *     Troponin:   Recent Labs   Lab 03/14/22  1336   TROPONINI 0.019       Significant Imaging: I have reviewed all pertinent imaging results/findings within the past 24 hours.  CXR: I have reviewed all pertinent results/findings within the past 24 hours and my  personal findings are:  Cardiomegaly and bilateral interstitial opacities compatible suspicious for mild edema or interstitial pneumonia

## 2022-03-14 NOTE — ED PROVIDER NOTES
Encounter Date: 3/14/2022       History     Chief Complaint   Patient presents with    Dizziness     Pt presents to the ER with CC of dizziness. Pt reports he went to his wound care appointment and they told him to come here. Pt reports the dizziness started after he went to the bathroom at lunch time and has felt like he's going to pass out since.     56 yr old male presents to the ER with reports of sob worsening, dizziness and states overall he feels fatigue. Denies chest pain. Pt states he was seen per Dr Roa and podiatry in the clinics this morning however states he was told is his condition worsened to come to the ER. PMH of afib, arthritis, bipolar, chf, congenital heart disease, carpal tunnel, cva, obesity, thyroid disease      The history is provided by the patient. No  was used.     Review of patient's allergies indicates:   Allergen Reactions    Contrast media Other (See Comments)     Severe chest pain    Food allergy formula [glutamine-c-quercet-selen-brom]      Allergic to green peas; Heart failure.    Iodinated contrast media Other (See Comments)     Chest pain    Peas Hives    Ibuprofen Swelling    Latex, natural rubber Hives    Pcn [penicillins] Hives    Quercetin     Butisol [butabarbital] Rash     Peeling skin     Past Medical History:   Diagnosis Date    *Atrial fibrillation     Anticoagulant long-term use     Arthritis     Atrial fibrillation     Atrial fibrillation Feb 23, 2016    Bipolar disorder     Carpal tunnel syndrome on right 2/10/2022    CHF (congestive heart failure)     Congenital heart disease     s/p surgical intervention 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 of age    Deep vein thrombosis     DVT of leg (deep venous thrombosis)     left leg    History of prior ablation treatment     10/9/13    Hypertension     Obesity     Stroke     Thyroid disease     Venous stasis ulcer of lower extremity, unspecified laterality 12/14/2012    Venous ulcer      Past  Surgical History:   Procedure Laterality Date    ABLATION Left 1/20/2022    Procedure: Ablation;  Surgeon: Gomez Lantigua MD;  Location: [facility] CATH LAB/EP;  Service: Cardiology;  Laterality: Left;    ANGIOPLASTY      CARDIAC SURGERY      open heart surgery 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 old    EYE SURGERY      left eye cataract/right eye glaucoma    TIMO FILTER PLACEMENT      Dr Calix ([facility])    KNEE SURGERY      l and r     MULTIPLE TOOTH EXTRACTIONS      SKIN GRAFT      left leg     Family History   Problem Relation Age of Onset    Diabetes Father     Heart disease Father     Heart disease Maternal Grandmother     Diabetes Maternal Grandfather     Heart disease Maternal Grandfather     Stroke Maternal Grandfather      Social History     Tobacco Use    Smoking status: Former Smoker     Packs/day: 1.00     Years: 6.00     Pack years: 6.00     Types: Cigarettes    Smokeless tobacco: Former User    Tobacco comment: quit by age 27yrs old   Substance Use Topics    Alcohol use: Yes     Alcohol/week: 1.0 standard drink     Types: 1 Glasses of wine per week     Comment: occasionally    Drug use: No     Review of Systems   Constitutional: Positive for fatigue.   Respiratory: Positive for shortness of breath.    Cardiovascular: Negative for chest pain and palpitations.   Gastrointestinal: Negative for diarrhea, nausea and vomiting.   Musculoskeletal: Negative for neck pain and neck stiffness.   Skin: Positive for wound. Negative for rash.   Neurological: Positive for dizziness.       Physical Exam     Initial Vitals [03/14/22 1205]   BP Pulse Resp Temp SpO2   135/63 (!) 130 20 97.9 °F (36.6 °C) (!) 94 %      MAP       --         Physical Exam    Constitutional: He appears well-developed and well-nourished. He is not diaphoretic. No distress.   HENT:   Head: Normocephalic and atraumatic.   Right Ear: Hearing and tympanic membrane normal.   Left Ear: Hearing and tympanic membrane normal.   Nose: Nose normal.    Mouth/Throat: Uvula is midline, oropharynx is clear and moist and mucous membranes are normal.   Eyes: Lids are normal. Pupils are equal, round, and reactive to light.   Cardiovascular: Normal rate. An irregularly irregular rhythm present.    Pulmonary/Chest: Tachypnea noted. No respiratory distress. He has decreased breath sounds in the right lower field and the left lower field. He has no wheezes. He has no rhonchi.   Abdominal: Abdomen is soft. There is no abdominal tenderness.   Musculoskeletal:         General: Normal range of motion.      Cervical back: No rigidity.     Neurological: He is oriented to person, place, and time.   Skin: Skin is warm and dry. No rash noted.   See image of wounds on feet.    Psychiatric: He has a normal mood and affect. His behavior is normal. Judgment and thought content normal.         ED Course   Procedures  Labs Reviewed   CBC W/ AUTO DIFFERENTIAL - Abnormal; Notable for the following components:       Result Value    RBC 3.83 (*)     Hemoglobin 9.5 (*)     Hematocrit 30.6 (*)     MCV 80 (*)     MCH 24.8 (*)     MCHC 31.0 (*)     RDW 16.5 (*)     Lymph # 0.6 (*)     Gran % 80.8 (*)     Lymph % 10.0 (*)     All other components within normal limits   COMPREHENSIVE METABOLIC PANEL - Abnormal; Notable for the following components:    Glucose 111 (*)     Albumin 3.2 (*)     Alkaline Phosphatase 170 (*)     All other components within normal limits   URINALYSIS, REFLEX TO URINE CULTURE - Abnormal; Notable for the following components:    Appearance, UA Hazy (*)     Protein, UA 2+ (*)     Occult Blood UA 1+ (*)     All other components within normal limits    Narrative:     Specimen Source->Urine   URINALYSIS MICROSCOPIC - Abnormal; Notable for the following components:    RBC, UA 6 (*)     WBC, UA 6 (*)     Hyaline Casts, UA 10 (*)     Granular Casts, UA 4 (*)     All other components within normal limits    Narrative:     Specimen Source->Urine   B-TYPE NATRIURETIC PEPTIDE -  Abnormal; Notable for the following components:     (*)     All other components within normal limits   TROPONIN I   B-TYPE NATRIURETIC PEPTIDE   TSH   SARS-COV-2 RDRP GENE    Narrative:     This test utilizes isothermal nucleic acid amplification   technology to detect the SARS-CoV-2 RdRp nucleic acid segment.   The analytical sensitivity (limit of detection) is 125 genome   equivalents/mL.   A POSITIVE result implies infection with the SARS-CoV-2 virus;   the patient is presumed to be contagious.     A NEGATIVE result means that SARS-CoV-2 nucleic acids are not   present above the limit of detection. A NEGATIVE result should be   treated as presumptive. It does not rule out the possibility of   COVID-19 and should not be the sole basis for treatment decisions.   If COVID-19 is strongly suspected based on clinical and exposure   history, re-testing using an alternate molecular assay should be   considered.   This test is only for use under the Food and Drug   Administration s Emergency Use Authorization (EUA).   Commercial kits are provided by [employer].   Performance characteristics of the EUA have been independently   verified by [facility] Department of   Pathology and Laboratory Medicine.   _________________________________________________________________   The authorized Fact Sheet for Healthcare Providers and the authorized Fact   Sheet for Patients of the ID NOW COVID-19 are available on the FDA   website:     https://www.fda.gov/media/675365/download  https://www.fda.gov/media/951757/download          POCT GLUCOSE   POCT GLUCOSE MONITORING CONTINUOUS                          Imaging Results          X-Ray Chest 1 View (Final result)  Result time 03/14/22 15:01:26    Final result by Raphael Whelan DO (03/14/22 15:01:26)                 Impression:      Cardiomegaly and bilateral interstitial opacities compatible suspicious for mild edema or interstitial  pneumonia.      Electronically signed by: Raphael Whelan  Date:    03/14/2022  Time:    15:01             Narrative:    EXAMINATION:  XR CHEST 1 VIEW    CLINICAL HISTORY:  Shortness of breath    TECHNIQUE:  Single frontal view of the chest was performed.    COMPARISON:  11/17/2021.    FINDINGS:  The lungs are well expanded.  There are bilateral interstitial opacities.  The pleural spaces are clear.    The cardiac silhouette is enlarged.    Visualized osseous structures are intact.                                 Medications   meclizine tablet 25 mg (25 mg Oral Given 3/14/22 1324)   furosemide injection 40 mg (40 mg Intravenous Given 3/14/22 1437)   amiodarone injection 150 mg (150 mg Intravenous Given 3/14/22 1341)     Medical Decision Making:   Clinical Tests:   Lab Tests: Ordered and Reviewed  Radiological Study: Ordered and Reviewed  Medical Tests: Ordered and Reviewed             ED Course as of 03/14/22 1628   Mon Mar 14, 2022   1130 EKG with rate of 125, Afib RVR with no stemi noted. Signed per Dr Mirza.  [DT]   1338 Spoke with Dr. Barreto concerning admit. Recommends Lasix, amiodarone and admission.  [DT]   1339 [facility]    [DT]   1402 Pt had an episode where he became cyanotic and apneic after receiving amiodarone. Pt was placed on a non rebreather and came to. Spoke with Dr barreto concerning the case and changes to the pts status after Amiodarone was given. Will hold off and admit.   [DT]   1522 Pt states he is feeling much better at this time. No dizziness and hear rate of 105 noted at present.  [DT]   1626 Spoke with [facility] concerning admit. Also waiting to hear back from Dr Barreto concerning rate meds.  [DT]      ED Course User Index  [DT] Debra Pyle NP             Clinical Impression:   Final diagnoses:  [R42] Dizziness  [R06.02] Shortness of breath  [I48.91] A-fib  [S81.459A] Multiple opens wound of lower extremity, unspecified laterality, initial encounter (Primary)          ED Disposition  Condition    Observation               Debra Pyle, EMILE  03/14/22 1626       Debra Pyle, EMILE  03/14/22 [id number]

## 2022-03-14 NOTE — ASSESSMENT & PLAN NOTE
Patient was on Toprol and losartan in the past but they where stopped during a recent hospitalization  Patient was experiencing diszziness and chest pain. ekg showed Afib with Rvr  Amiodarone was given, patient remains in Afib  Plan   Restart Metoprolo as lopressor BID will convert to Toprol-Xl tomorrow  Will diurese with Lasix as patient may be in CHF exacerbation  Cardiology consulted appreciate and further recommendations  Cardiac monitoring in place

## 2022-03-14 NOTE — ASSESSMENT & PLAN NOTE
Patient seen by wound care today 3/14/22  Plan  Wound care consulted to manage the wound while admitted  Continue home dose of rivaroxaban 20mg daily

## 2022-03-14 NOTE — PROGRESS NOTES
Subjective:   Patient ID:  Drew Farrar is a 56 y.o. male who presents for follow up of Chronic Venous Insufficiency w/ Ulceration, Atrial Fibrillation, Hypertension, and Congestive Heart Failure      HPI:       He returns for follow up.  He is status post left greater saphenous ablation and sclerotherapy of left medial ankle veins January 20, 2022. The wounds have improved but not completely healed.  Of note today he is in atrial fibrillation with a rapid ventricular rate 124 beats per minute.  After his last hospitalization Toprol-XL was discontinued.  He denies any shortness of breath.  No worsening edema from his baseline.          He has history of VTE + PTS + lymphedema. He has CHF with an EF 40-45%, Severe MR in the past but only mild MR (8/2020), mild AI, mod ME, mild RV enlargement with normal function, PASP 36, and mild LAE. He has persistent afib with a controlled rate and anticoagulated with xarelto. He has an extensive history of venous insufficiency with recurrent ulcers. He is s/p deep venous intervention, multiple EVLTs and sclerotherapy sessions. He has h/o DVTs and PEs as well. He is compliant with his medications but not with his diet.        S/p L accessory veins (left ankle and foot) slcerotherapy 2/21/2019  S/p Ablation of Left GSV and sclerotherapy of left medial ankle veins 1/20/2022          US 3/2019     R GSV 4.7 mm -no reflux   R LSV 7.4 mm + > 500 msec; 736 msec      L GSV occluded   L accessory GSV reflux + > 500 msec; 1060 msec    L LSV 4.8 mm          No DVT       US 4/2019      No DVT         Venous US 9/2021      R GSV 5.3 mm + reflux 636 msec   R LSV 2.9 mm + reflux 244 msec     L GSV 5.8 mm + reflux 756 msec    L LSV 4.9 mm + reflux 568 msec     No DVT     US 2/2022     No DVT   Occluded L GSV s/p ablation         Patient Active Problem List    Diagnosis Date Noted    Non-pressure chronic ulcer of left ankle, with unspecified severity 07/26/2018     Priority: Low    Carpal  tunnel syndrome on right 02/10/2022    Rotator cuff impingement syndrome of right shoulder 02/10/2022    Pseudomonas infection     Skin ulcer of left foot including toes with fat layer exposed     Class 3 severe obesity due to excess calories with serious comorbidity and body mass index (BMI) of 45.0 to 49.9 in adult     Open wound of right lower leg     Cellulitis of both lower extremities     Stasis ulcer     Obesity, morbid (more than 100 lbs over ideal weight or BMI > 40)     Class 2 severe obesity due to excess calories with serious comorbidity and body mass index (BMI) of 39.0 to 39.9 in adult     Ringworm 03/15/2021    Rash of back 03/14/2021    PAD (peripheral artery disease) 03/12/2021     Left lower extremity arterial US done 3/11/21:   Loss of normal triphasic waveforms of the majority of the interrogated left lower extremity arteries, suggestive of peripheral arterial disease.    Cardiology consulted for non-healing left foot wound  TCOMs 3/15/2021   Chest wall room air 78 post O2 377   Left medial foot room air 69 post O2 343   Left dorsal foot room air 72 post O2 197       Alteration in skin integrity 03/12/2021    Wound infection 03/11/2021    Proteus infection 10/12/2020    MRSA cellulitis of left foot 10/12/2020    Open wound of left foot     Complicated migraine 08/21/2020    Orthostatic hypotension 02/18/2020    Unstable angina 11/21/2019    Iron deficiency anemia 11/12/2019    Anemia of chronic disease 11/12/2019    Venous stasis ulcer of lower extremity, unspecified laterality     Obesity, Class I, BMI 30.0-34.9 (see actual BMI) 09/30/2019    Advance care planning 09/30/2019    Erythema     Acute deep vein thrombosis (DVT) of lower extremity 09/14/2019    Onychomycosis 09/12/2019    Wound of foot 09/12/2019    Clostridium difficile infection     Pulmonary embolism 08/02/2019    Chronic venous insufficiency 07/25/2019    Hypomagnesemia 07/08/2019    Varicose  ulcer of lower extremity     Cellulitis of right lower leg 04/24/2019    Candida infection of genital region 04/24/2019    Non-rheumatic mitral regurgitation 09/07/2017    Hyperthyroidism 09/05/2017    Elevated troponin 09/05/2017    Chronic combined systolic and diastolic congestive heart failure 02/20/2017    Long term (current) use of anticoagulants 10/31/2016    Other pulmonary embolism without acute cor pulmonale, unspecified chronicity 10/28/2016    Acute pulmonary embolism 10/26/2016    Recurrent pulmonary embolism 10/26/2016    History of DVT (deep vein thrombosis) 09/16/2016    HTN (hypertension) 09/16/2016    Knee pain, right 07/07/2016    Difficulty walking 07/07/2016    Group A streptococcal infection 03/01/2016    Tinea pedis of both feet 03/01/2016    Bilateral edema of lower extremity 03/01/2016    Morbid obesity 03/01/2016    Permanent atrial fibrillation 03/01/2016    Right knee pain 03/01/2016    Depression 12/24/2014    Ulcer of ankle, left, limited to breakdown of skin 03/31/2014    Elevated BP 03/24/2014    May-Thurner syndrome 02/26/2014     1. Successful IVUS guided intervention for May Thurner Syndrome 2. 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     S/p venoplasty with  20 x 80 mm Wallstent post dilated with 14 mm balloon in 2/2014      Skin ulcer of left foot, limited to breakdown of skin 02/15/2014    Cellulitis 02/13/2014    Chronic venous hypertension with ulcer 11/25/2013    Edema 11/23/2013     R leg      Venous stasis ulcer of left midfoot limited to breakdown of skin 03/14/2013    Venous (peripheral) insufficiency 03/14/2013     S/p bilateral iliac vein stents    S/p R GSV EVLT with laser 10/2013    S/p US guided accessory vein sclerotherapy of R calf 2014      Venous stasis dermatitis of both lower extremities  2012    Ulcer - lesion 2012     Of left foot        Chronic atrial fibrillation 2012           Right Arm BP - Sittin/80  Left Arm BP - Sittin/81        LABS    LAST HbA1c  Lab Results   Component Value Date    HGBA1C 5.4 2020       Lipid panel  Lab Results   Component Value Date    CHOL 130 2020    CHOL 124 07/10/2017    CHOL 131 2016     Lab Results   Component Value Date    HDL 31 (L) 2020    HDL 23 (L) 07/10/2017    HDL 29 (L) 2016     Lab Results   Component Value Date    LDLCALC 78.8 2020    LDLCALC 70.4 07/10/2017    LDLCALC 83.0 2016     Lab Results   Component Value Date    TRIG 101 2020    TRIG 153 (H) 07/10/2017    TRIG 95 2016     Lab Results   Component Value Date    CHOLHDL 23.8 2020    CHOLHDL 18.5 (L) 07/10/2017    CHOLHDL 22.1 2016            Review of Systems   Constitutional: Positive for weight loss. Negative for diaphoresis, night sweats and weight gain.   HENT: Negative for congestion.    Eyes: Negative for blurred vision, discharge and double vision.   Cardiovascular: Positive for dyspnea on exertion. Negative for chest pain, claudication, cyanosis, irregular heartbeat, leg swelling, near-syncope, orthopnea, palpitations, paroxysmal nocturnal dyspnea and syncope.   Respiratory: Positive for wheezing (with activities). Negative for cough and shortness of breath.    Endocrine: Negative for cold intolerance, heat intolerance and polyphagia.   Hematologic/Lymphatic: Negative for adenopathy and bleeding problem. Does not bruise/bleed easily.   Skin: Positive for poor wound healing (healed). Negative for dry skin and nail changes.   Musculoskeletal: Positive for arthritis. Negative for back pain, falls, joint pain, myalgias and neck pain.   Gastrointestinal: Negative for bloating, abdominal pain, change in bowel habit and constipation.   Genitourinary: Negative for bladder incontinence, dysuria, flank  pain, genital sores and missed menses.   Neurological: Negative for aphonia, brief paralysis, difficulty with concentration, dizziness and weakness.   Psychiatric/Behavioral: Negative for altered mental status and memory loss. The patient does not have insomnia.    Allergic/Immunologic: Negative for environmental allergies.       Objective:   Physical Exam  Constitutional:       Appearance: He is well-developed.      Interventions: He is not intubated.  HENT:      Head: Normocephalic and atraumatic.      Right Ear: External ear normal.      Left Ear: External ear normal.   Eyes:      General: No scleral icterus.        Right eye: No discharge.         Left eye: No discharge.      Conjunctiva/sclera: Conjunctivae normal.      Pupils: Pupils are equal, round, and reactive to light.   Neck:      Thyroid: No thyromegaly.      Vascular: Normal carotid pulses. No carotid bruit, hepatojugular reflux or JVD.      Trachea: No tracheal deviation.   Cardiovascular:      Rate and Rhythm: Tachycardia present. Rhythm irregularly irregular.  No extrasystoles are present.     Chest Wall: PMI is not displaced.      Pulses: No midsystolic click.           Carotid pulses are 2+ on the right side and 2+ on the left side.       Radial pulses are 2+ on the right side and 2+ on the left side.        Femoral pulses are 2+ on the right side and 2+ on the left side.       Popliteal pulses are 2+ on the right side and 2+ on the left side.        Dorsalis pedis pulses are 2+ on the right side and 2+ on the left side.        Posterior tibial pulses are 2+ on the right side and 2+ on the left side.      Heart sounds: S1 normal and S2 normal. Heart sounds not distant. No murmur heard.    No friction rub. No gallop. No S3 sounds.   Pulmonary:      Effort: Pulmonary effort is normal. No tachypnea, bradypnea, accessory muscle usage or respiratory distress. He is not intubated.      Breath sounds: Normal breath sounds. No stridor. No decreased breath  sounds, wheezing or rales.   Chest:      Chest wall: No tenderness.   Abdominal:      General: There is no distension or abdominal bruit.      Palpations: There is no mass or pulsatile mass.      Tenderness: There is no abdominal tenderness. There is no guarding or rebound.   Musculoskeletal:         General: No tenderness. Normal range of motion.      Cervical back: Normal range of motion and neck supple.   Lymphadenopathy:      Cervical: No cervical adenopathy.      Comments:     Chronic lymphedema of R leg/calf area   Skin:     General: Skin is warm.      Coloration: Skin is not pale.      Findings: No erythema or rash.      Comments:     Healed right foot wound        Marked skin derangement of R leg         Left foot wound         See images            Neurological:      Mental Status: He is alert and oriented to person, place, and time.      Cranial Nerves: No cranial nerve deficit.      Coordination: Coordination normal.      Deep Tendon Reflexes: Reflexes are normal and symmetric.   Psychiatric:         Behavior: Behavior normal.         Thought Content: Thought content normal.         Judgment: Judgment normal.         1/17/2019                    5/6/2019              10/21/2021                3/2022                        Assessment:     1. Permanent atrial fibrillation    2. Primary hypertension    3. Chronic combined systolic and diastolic congestive heart failure    4. Chronic venous insufficiency        Plan:         Continue with wound care.  If the wounds do not completely healed he will return for sclerotherapy of residual varicose veins reflux disease. Regarding the R leg we will continue with close monitoring without any intervention at this time since the wound healed. He is comfortable with the plan.         Continue with compression stockings  Exercise + weight loss  Low salt diet  Daily weight and take an extra dose of demadex for 3 lbs weight gain  Xarelto for CVA prevention   Resume Toprol  xl for afib rate/CHF + Losartan for CHF therapy   Call in 1 week if heart rate remains elevated will double his Toprol XL from  mg daily.        Continue with CHF clinic recommendations  Maintain relationship with I[facility], Shin Begum  Follow up with endocrine for treatment of thyroid disorder          Return sooner for concerns or questions. If symptoms persist go to the ED  I have reviewed all pertinent data on this patient   Follow up as scheduled. Return sooner for concerns or questions          He expressed verbal understanding and agreed with the plan        Follow up in 3 months with weekly wound care updates  Sooner if there is any deterioration of wound   He asked for testosterone replacement. I declined and advised him to ask pcp.             Patient's Medications   New Prescriptions    No medications on file   Previous Medications    ALBUTEROL (VENTOLIN HFA) 90 MCG/ACTUATION INHALER    Inhale 2 puffs into the lungs every 6 (six) hours as needed for Wheezing. Rescue    RIVAROXABAN (XARELTO) 20 MG TAB    Take 1 tablet (20 mg total) by mouth daily with dinner or evening meal.    SUMATRIPTAN (IMITREX) 50 MG TABLET    Take 50 mg for headache. If headache does not resolve, take another 50mg two hours after initial dose. Do not exceed 200mg in 24 hours.    TORSEMIDE (DEMADEX) 20 MG TAB    Take 2 tablets by mouth daily.   Modified Medications    Modified Medication Previous Medication    ATORVASTATIN (LIPITOR) 40 MG TABLET atorvastatin (LIPITOR) 40 MG tablet       Take 1 tablet (40 mg total) by mouth every evening.    Take 1 tablet (40 mg total) by mouth every evening.    LOSARTAN (COZAAR) 25 MG TABLET losartan (COZAAR) 25 MG tablet       Take 1 tablet (25 mg total) by mouth once daily.    Take 1 tablet (25 mg total) by mouth once daily.    METOPROLOL SUCCINATE (TOPROL-XL) 50 MG 24 HR TABLET metoprolol succinate (TOPROL-XL) 50 MG 24 hr tablet       Take 1 tablet (50 mg total) by mouth once daily.    Take 1  tablet (50 mg total) by mouth every evening.   Discontinued Medications

## 2022-03-14 NOTE — ED NOTES
Called into room by primary nurse for help with patient - patient found to be unresponsive and cyanotic - ambu-bag connected to oxygen at 15 liters and ambu started via mask - respiratory notified and called charge nurse for code cart

## 2022-03-14 NOTE — H&P
[facility] Medicine  History & Physical    Patient Name: Drew Farrar  MRN: [id number]  Patient Class: OP- Observation  Admission Date: 3/14/2022  Attending Physician: Gino Ji III, MD   Primary Care Provider: Garrison Roach MD         Patient information was obtained from patient, spouse/SO, past medical records and ER records.     Subjective:     Principal Problem:Dizziness    Chief Complaint:   Chief Complaint   Patient presents with    Dizziness     Pt presents to the ER with CC of dizziness. Pt reports he went to his wound care appointment and they told him to come here. Pt reports the dizziness started after he went to the bathroom at lunch time and has felt like he's going to pass out since.        HPI:  56 yr old male with PMH of afib, arthritis, bipolar, chf, congenital heart disease, carpal tunnel, cva, obesity, thyroid disease presents to the ED with reports of sob worsening, dizziness and states overall he feels fatigue. States he has had intermittent chest pain for 3 days. Pt states he was seen per Dr Roa and podiatry in the clinics this morning however states he was told is his condition worsened to come to the ER.     On arrival to ED patient was determined to be in Afib with RVR, and was given Amiodarone 150mg IV. Patient's cardiac function dropped quickly to sinus Trav and patient became unresponsive. Patient returned to baseline within 15 minutes. He remains in Afib and dizzy. Per Cardiology will be placed in Observation and restarted on metoprolol and more aggressively diuresed with double usual dose of furosemide.      Past Medical History:   Diagnosis Date    *Atrial fibrillation     Anticoagulant long-term use     Arthritis     Atrial fibrillation     Atrial fibrillation Feb 23, 2016    Bipolar disorder     Carpal tunnel syndrome on right 2/10/2022    CHF (congestive heart failure)     Congenital heart disease     s/p surgical intervention 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  of age    Deep vein thrombosis     DVT of leg (deep venous thrombosis)     left leg    History of prior ablation treatment     10/9/13    Hypertension     Obesity     Stroke     Thyroid disease     Venous stasis ulcer of lower extremity, unspecified laterality 12/14/2012    Venous ulcer        Past Surgical History:   Procedure Laterality Date    ABLATION Left 1/20/2022    Procedure: Ablation;  Surgeon: Gomez Lantigua MD;  Location: [facility] CATH LAB/EP;  Service: Cardiology;  Laterality: Left;    ANGIOPLASTY      CARDIAC SURGERY      open heart surgery 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 old    EYE SURGERY      left eye cataract/right eye glaucoma    TIMO FILTER PLACEMENT      Dr Calix ([facility])    KNEE SURGERY      l and r     MULTIPLE TOOTH EXTRACTIONS      SKIN GRAFT      left leg       Review of patient's allergies indicates:   Allergen Reactions    Contrast media Other (See Comments)     Severe chest pain    Food allergy formula [glutamine-c-quercet-selen-brom]      Allergic to green peas; Heart failure.    Iodinated contrast media Other (See Comments)     Chest pain    Peas Hives    Ibuprofen Swelling    Latex, natural rubber Hives    Pcn [penicillins] Hives    Quercetin     Butisol [butabarbital] Rash     Peeling skin       No current facility-administered medications on file prior to encounter.     Current Outpatient Medications on File Prior to Encounter   Medication Sig    albuterol (VENTOLIN HFA) 90 mcg/actuation inhaler Inhale 2 puffs into the lungs every 6 (six) hours as needed for Wheezing. Rescue    atorvastatin (LIPITOR) 40 MG tablet Take 1 tablet (40 mg total) by mouth every evening.    losartan (COZAAR) 25 MG tablet Take 1 tablet (25 mg total) by mouth once daily.    metoprolol succinate (TOPROL-XL) 50 MG 24 hr tablet Take 1 tablet (50 mg total) by mouth once daily.    naproxen sodium (ALEVE) 220 MG tablet Take 220 mg by mouth as needed.    rivaroxaban (XARELTO) 20 mg Tab Take  1 tablet (20 mg total) by mouth daily with dinner or evening meal.    sumatriptan (IMITREX) 50 MG tablet Take 50 mg for headache. If headache does not resolve, take another 50mg two hours after initial dose. Do not exceed 200mg in 24 hours.    torsemide (DEMADEX) 20 MG Tab Take 2 tablets by mouth daily. (Patient taking differently: Take 30 mg by mouth once daily. 1 and 1/2 tablets)    [DISCONTINUED] acetaminophen (TYLENOL) 325 MG tablet Take 325 mg by mouth every 6 (six) hours as needed for Pain.    [DISCONTINUED] albuterol (VENTOLIN HFA) 90 mcg/actuation inhaler Inhale 2 puffs into the lungs every 6 (six) hours as needed for Wheezing. Rescue    [DISCONTINUED] atorvastatin (LIPITOR) 40 MG tablet Take 1 tablet (40 mg total) by mouth every evening.    [DISCONTINUED] collagenase (SANTYL) ointment Apply topically once daily.    [DISCONTINUED] hydrOXYzine HCl (ATARAX) 25 MG tablet Take 1 tablet (25 mg total) by mouth 4 (four) times daily as needed for Itching.    [DISCONTINUED] losartan (COZAAR) 25 MG tablet Take 1 tablet (25 mg total) by mouth once daily.    [DISCONTINUED] methIMAzole (TAPAZOLE) 10 MG Tab Take 2 tablets (20 mg total) by mouth once daily.    [DISCONTINUED] methocarbamoL (ROBAXIN) 750 MG Tab Take 500 mg by mouth 3 (three) times daily.    [DISCONTINUED] metoprolol succinate (TOPROL-XL) 50 MG 24 hr tablet Take 1 tablet (50 mg total) by mouth every evening.    [DISCONTINUED] metroNIDAZOLE (FLAGYL) 500 MG tablet Take 500 mg by mouth every 8 (eight) hours.    [DISCONTINUED] miconazole (MICOTIN) 2 % cream Apply topically 2 (two) times daily.    [DISCONTINUED] pantoprazole (PROTONIX) 40 MG tablet Take 1 tablet (40 mg total) by mouth once daily.     Family History       Problem Relation (Age of Onset)    Diabetes Father, Maternal Grandfather    Heart disease Father, Maternal Grandmother, Maternal Grandfather    Stroke Maternal Grandfather          Tobacco Use    Smoking status: Former Smoker      Packs/day: 1.00     Years: 6.00     Pack years: 6.00     Types: Cigarettes    Smokeless tobacco: Former User    Tobacco comment: quit by age 27yrs old   Substance and Sexual Activity    Alcohol use: Yes     Alcohol/week: 1.0 standard drink     Types: 1 Glasses of wine per week     Comment: occasionally    Drug use: No    Sexual activity: Yes     Partners: Female     Birth control/protection: None     Review of Systems   Constitutional:  Positive for activity change.   HENT:  Negative for sore throat.    Eyes:  Negative for visual disturbance.   Respiratory:  Positive for shortness of breath.    Cardiovascular:  Positive for chest pain, palpitations and leg swelling.   Gastrointestinal:  Positive for nausea. Negative for abdominal pain, diarrhea and vomiting.   Genitourinary:  Negative for flank pain.        Dark colored urine   Skin:  Positive for color change and wound.   Neurological:  Positive for dizziness and headaches. Negative for tremors, syncope and weakness.   Psychiatric/Behavioral: Negative.     Objective:     Vital Signs (Most Recent):  Temp: 98 °F (36.7 °C) (03/14/22 1458)  Pulse: (!) 114 (03/14/22 1551)  Resp: (!) 23 (03/14/22 1551)  BP: 117/60 (03/14/22 1551)  SpO2: 96 % (03/14/22 1551)   Vital Signs (24h Range):  Temp:  [97.9 °F (36.6 °C)-98 °F (36.7 °C)] 98 °F (36.7 °C)  Pulse:  [104-133] 114  Resp:  [20-23] 23  SpO2:  [94 %-99 %] 96 %  BP: (108-151)/(60-95) 117/60     Weight: (!) 170.1 kg (375 lb)  Body mass index is 41.2 kg/m².    Physical Exam  Constitutional:       Appearance: He is obese.   HENT:      Head: Normocephalic.      Right Ear: External ear normal.      Left Ear: External ear normal.      Nose: Nose normal.      Mouth/Throat:      Mouth: Mucous membranes are moist.   Eyes:      Extraocular Movements: Extraocular movements intact.      Conjunctiva/sclera: Conjunctivae normal.      Pupils: Pupils are equal, round, and reactive to light.   Cardiovascular:      Rate and Rhythm:  Tachycardia present. Rhythm irregular.      Pulses: Normal pulses.   Pulmonary:      Effort: Pulmonary effort is normal.   Abdominal:      Palpations: Abdomen is soft.      Tenderness: There is no abdominal tenderness.   Musculoskeletal:         General: Swelling present.      Cervical back: Normal range of motion.      Right lower leg: Edema present.      Left lower leg: Edema present.      Comments: Bilateral Legs show signs of chronic venous stasis with ulcers   Skin:     General: Skin is warm.      Capillary Refill: Capillary refill takes less than 2 seconds.   Neurological:      General: No focal deficit present.      Mental Status: He is alert and oriented to person, place, and time.         CRANIAL NERVES     CN III, IV, VI   Pupils are equal, round, and reactive to light.     Significant Labs: All pertinent labs within the past 24 hours have been reviewed.  BMP:   Recent Labs   Lab 03/14/22  1336   *      K 4.2      CO2 24   BUN 17   CREATININE 0.8   CALCIUM 9.1     CBC:   Recent Labs   Lab 03/14/22  1336   WBC 6.32   HGB 9.5*   HCT 30.6*        CMP:   Recent Labs   Lab 03/14/22  1336      K 4.2      CO2 24   *   BUN 17   CREATININE 0.8   CALCIUM 9.1   PROT 6.9   ALBUMIN 3.2*   BILITOT 0.9   ALKPHOS 170*   AST 25   ALT 18   ANIONGAP 10   EGFRNONAA >60     Cardiac Markers:   Recent Labs   Lab 03/14/22  1550   *     Troponin:   Recent Labs   Lab 03/14/22  1336   TROPONINI 0.019       Significant Imaging: I have reviewed all pertinent imaging results/findings within the past 24 hours.  CXR: I have reviewed all pertinent results/findings within the past 24 hours and my personal findings are:  Cardiomegaly and bilateral interstitial opacities compatible suspicious for mild edema or interstitial pneumonia    Assessment/Plan:     * Dizziness  Patient reported new onset Dizziness today  Plan   Correct underlying cardiac arrhythmia  Reassess after return to baseline  cardiac status  If symptoms continue perform full vertigo work-up      HTN (hypertension)  Continue home regimen of losartan 25mg daily      Chronic atrial fibrillation  Patient was on Toprol and losartan in the past but they where stopped during a recent hospitalization  Patient was experiencing diszziness and chest pain. ekg showed Afib with Rvr  Amiodarone was given, patient remains in Afib  Plan   Restart Metoprolo as lopressor BID will convert to Toprol-Xl tomorrow  Will diurese with Lasix as patient may be in CHF exacerbation  Cardiology consulted appreciate and further recommendations  Cardiac monitoring in place    Venous stasis dermatitis of both lower extremities  Patient seen by wound care today 3/14/22  Plan  Wound care consulted to manage the wound while admitted  Continue home dose of rivaroxaban 20mg daily      VTE Risk Mitigation (From admission, onward)         Ordered     rivaroxaban tablet 20 mg  With dinner         03/14/22 1644     Reason for No Pharmacological VTE Prophylaxis  Once        Question:  Reasons:  Answer:  Already adequately anticoagulated on oral Anticoagulants    03/14/22 1644     IP VTE HIGH RISK PATIENT  Once         03/14/22 1644     Place sequential compression device  Until discontinued         03/14/22 1644                 Andrae Bustamante MD  [facility] PGY-1  03/14/2022

## 2022-03-15 NOTE — PROGRESS NOTES
03/14/22 1909   Admission   Initial VN Admission Questions Complete   Communication Issues? None   Shift   Virtual Nurse - Patient Verbalized Approval Of Camera Use;VN Rounding   Safety/Activity   Patient Rounds bed in low position;visualized patient;call light in patient/parent reach;clutter free environment maintained   VIRTUAL NURSE: Admission questions completed with patient at this time. Care plan and safety precautions reviewed. Pt verbalized no complaints, no needs expressed. Instructed to use call light for assistance, he verbalized understanding. Will continue to monitor.

## 2022-03-15 NOTE — PLAN OF CARE
SW met with pt and pt's wife at bedside, pt signed pt choice form. Pt will resume HH with Elise Bryson once d/c. SW will follow.     Jeovanny Wang, MSWINSTON  [phone]    Future Appointments   Date Time Provider Department Center   3/24/2022  9:00 AM Shin Hightower IV, MD [facility]   3/24/2022 11:00 AM Sg Medina PA-C [facility]   3/28/2022 11:40 AM Gomez Lantigua MD [facility] CARDIO [facility]        03/15/22 1454   Post-Acute Status   Post-Acute Authorization Home Health   Home Health Status Set-up Complete/Auth obtained   Coverage [employer]   Hospital Resources/Appts/Education Provided Appointments scheduled by Navigator/Coordinator   Discharge Delays (!) Post-Acute Set-up   Discharge Plan   Discharge Plan A Home Health

## 2022-03-15 NOTE — HOSPITAL COURSE
Patient admitted for management of fluid overload as well as Afib w/RVR. Patient diursed overnight with 40MG BID Lasix which is double patients home diuretic does. Patient is at a net loss of 3L of fluid since admit and is feeling less swollen. Patient reported chest pain, troponin nedgative no change on EKG. Echocardiogram showed some worsening of existing heart failure. Patient is asymptomatic this AM, pulse controlled with Toprol-XL 200mg. Will be sent home on toprol and losartan as recommended by cardiology. To follow-up with cardiology and PCP.

## 2022-03-15 NOTE — SUBJECTIVE & OBJECTIVE
Interval History: NAOEN    Review of Systems   Constitutional:  Negative for activity change, appetite change, diaphoresis, fatigue and fever.   HENT:  Negative for sore throat.    Respiratory:  Negative for apnea, chest tightness, shortness of breath and wheezing.    Cardiovascular:  Positive for leg swelling. Negative for chest pain.        Still present but improving per patient   Gastrointestinal:  Negative for abdominal pain, constipation, diarrhea, nausea and vomiting.   Genitourinary:  Negative for difficulty urinating.   Musculoskeletal:  Negative for arthralgias.   Skin:  Positive for wound.        Bilateral foot wounds from chronic venous stasis   Neurological:  Negative for dizziness, weakness and headaches.   Psychiatric/Behavioral: Negative.     Objective:     Vital Signs (Most Recent):  Temp: 98.4 °F (36.9 °C) (03/15/22 0748)  Pulse: 108 (03/15/22 0748)  Resp: 18 (03/15/22 0748)  BP: 120/69 (03/15/22 0748)  SpO2: (!) 94 % (03/15/22 0748)   Vital Signs (24h Range):  Temp:  [97 °F (36.1 °C)-98.4 °F (36.9 °C)] 98.4 °F (36.9 °C)  Pulse:  [104-138] 108  Resp:  [18-23] 18  SpO2:  [94 %-99 %] 94 %  BP: (108-151)/(58-95) 120/69     Weight: (!) 159.4 kg (351 lb 6.6 oz)  Body mass index is 38.6 kg/m².    Intake/Output Summary (Last 24 hours) at 3/15/2022 0926  Last data filed at 3/15/2022 0700  Gross per 24 hour   Intake 1085 ml   Output 2000 ml   Net -915 ml      Physical Exam  Constitutional:       Appearance: He is obese.   HENT:      Head: Normocephalic.      Nose: Nose normal.      Mouth/Throat:      Mouth: Mucous membranes are moist.   Eyes:      Pupils: Pupils are equal, round, and reactive to light.   Cardiovascular:      Rate and Rhythm: Normal rate. Rhythm irregular.      Pulses: Normal pulses.   Pulmonary:      Effort: Pulmonary effort is normal.      Breath sounds: Normal breath sounds.   Abdominal:      Palpations: Abdomen is soft.   Musculoskeletal:         General: Normal range of motion.       Cervical back: Normal range of motion.   Neurological:      Mental Status: He is alert.       Significant Labs: All pertinent labs within the past 24 hours have been reviewed.  CBC:   Recent Labs   Lab 03/14/22  1336 03/15/22  0537   WBC 6.32 5.39   HGB 9.5* 9.4*   HCT 30.6* 31.9*    182     CMP:   Recent Labs   Lab 03/14/22  1336 03/15/22  0537    143   K 4.2 3.7    107   CO2 24 25   * 88   BUN 17 19   CREATININE 0.8 1.0   CALCIUM 9.1 8.7   PROT 6.9 6.8   ALBUMIN 3.2* 3.2*   BILITOT 0.9 1.2*   ALKPHOS 170* 158*   AST 25 27   ALT 18 19   ANIONGAP 10 11   EGFRNONAA >60 >60       Significant Imaging: I have reviewed all pertinent imaging results/findings within the past 24 hours.

## 2022-03-15 NOTE — CONSULTS
[facility] - Telemetry  Cardiology  Consult Note    Patient Name: Drew Farrar  MRN: [id number]  Admission Date: 3/14/2022  Hospital Length of Stay: 0 days  Code Status: Full Code   Attending Provider: Gino Ji III, MD   Consulting Provider: BONIFACIO Cooper ANP  Primary Care Physician: Garrison Roach MD  Principal Problem:Dizziness    Patient information was obtained from patient, past medical records and ER records.     Inpatient consult to Cardiology  Consult performed by: BONIFACIO Ryan ANP  Consult ordered by: Andrae Bustamante MD  Reason for consult: afib with RVR         Subjective:     Chief Complaint:  Dizziness      HPI:   57yo male with depression, hyperthyroidism, venous insufficiency s/p EVLT s/p venous stenting, chronic afib on Xarelto, edema, PE/DVT and chronic systolic and diastolic heart failure who presented to the ER with complaints of dizziness. He was seen yesterday in Cardiology clinic and noted to be in afib with HR 120s- asymptomatic at that time. His Toprol XL was stopped during one of his previous hospitalization for staph infection. He proceeded to his wound care appt with complaints of dizzness and was sent to the ER for evaluation. He denies any chest pain or palpitations. He complains of SOB but reports it is chronic and does not feel it has worsened. Upon arrival to ER he was noted to be in Afib with RVR, and was given Amiodarone 150mg IV and his HR dropped to SB and became unresponsive with return to baseline about 15 minutes later.  He was admitted to [facility] and was started on oral Metoprolol. Cardiology consulted for afib management     Hospital Course:    3/15/2022 Presented to the ER with dizziness with notation of afib with RVR.   Past Medical History:   Diagnosis Date    *Atrial fibrillation     Anticoagulant long-term use     Arthritis     Atrial fibrillation     Atrial fibrillation Feb 23, 2016    Bipolar disorder     Carpal tunnel syndrome  on right 2/10/2022    CHF (congestive heart failure)     Congenital heart disease     s/p surgical intervention 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 of age    Deep vein thrombosis     DVT of leg (deep venous thrombosis)     left leg    History of prior ablation treatment     10/9/13    Hypertension     Obesity     Stroke     Thyroid disease     Venous stasis ulcer of lower extremity, unspecified laterality 12/14/2012    Venous ulcer        Past Surgical History:   Procedure Laterality Date    ABLATION Left 1/20/2022    Procedure: Ablation;  Surgeon: Gomez Lantigua MD;  [facility] CATH LAB/EP;  Service: Cardiology;  Laterality: Left;    ANGIOPLASTY      CARDIAC SURGERY      open heart surgery 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 old    EYE SURGERY      left eye cataract/right eye glaucoma    TIMO FILTER PLACEMENT      Dr Calix ([facility])    KNEE SURGERY      l and r     MULTIPLE TOOTH EXTRACTIONS      SKIN GRAFT      left leg       Review of patient's allergies indicates:   Allergen Reactions    Contrast media Other (See Comments)     Severe chest pain    Food allergy formula [glutamine-c-quercet-selen-brom]      Allergic to green peas; Heart failure.    Iodinated contrast media Other (See Comments)     Chest pain    Peas Hives    Ibuprofen Swelling    Latex, natural rubber Hives    Pcn [penicillins] Hives    Quercetin     Butisol [butabarbital] Rash     Peeling skin       No current facility-administered medications on file prior to encounter.     Current Outpatient Medications on File Prior to Encounter   Medication Sig    albuterol (VENTOLIN HFA) 90 mcg/actuation inhaler Inhale 2 puffs into the lungs every 6 (six) hours as needed for Wheezing. Rescue    atorvastatin (LIPITOR) 40 MG tablet Take 1 tablet (40 mg total) by mouth every evening.    losartan (COZAAR) 25 MG tablet Take 1 tablet (25 mg total) by mouth once daily.    metoprolol succinate (TOPROL-XL) 50 MG 24 hr tablet Take 1 tablet (50 mg total) by mouth  once daily.    naproxen sodium (ALEVE) 220 MG tablet Take 220 mg by mouth as needed.    rivaroxaban (XARELTO) 20 mg Tab Take 1 tablet (20 mg total) by mouth daily with dinner or evening meal.    sumatriptan (IMITREX) 50 MG tablet Take 50 mg for headache. If headache does not resolve, take another 50mg two hours after initial dose. Do not exceed 200mg in 24 hours.    torsemide (DEMADEX) 20 MG Tab Take 2 tablets by mouth daily. (Patient taking differently: Take 30 mg by mouth once daily. 1 and 1/2 tablets)     Family History       Problem Relation (Age of Onset)    Diabetes Father, Maternal Grandfather    Heart disease Father, Maternal Grandmother, Maternal Grandfather    Stroke Maternal Grandfather          Tobacco Use    Smoking status: Former Smoker     Packs/day: 1.00     Years: 6.00     Pack years: 6.00     Types: Cigarettes    Smokeless tobacco: Former User    Tobacco comment: quit by age 27yrs old   Substance and Sexual Activity    Alcohol use: Yes     Alcohol/week: 1.0 standard drink     Types: 1 Glasses of wine per week     Comment: occasionally    Drug use: No    Sexual activity: Yes     Partners: Female     Birth control/protection: None     Review of Systems   Constitutional: Negative for chills, decreased appetite, diaphoresis and fever.   Cardiovascular:  Positive for dyspnea on exertion (chronic). Negative for chest pain, claudication, cyanosis, irregular heartbeat, leg swelling, near-syncope, orthopnea, palpitations, paroxysmal nocturnal dyspnea and syncope.   Respiratory:  Negative for cough, hemoptysis, shortness of breath and wheezing.    Gastrointestinal:  Negative for bloating, abdominal pain, constipation, diarrhea, melena, nausea and vomiting.   Neurological:  Positive for dizziness. Negative for weakness.   Objective:     Vital Signs (Most Recent):  Temp: 98.4 °F (36.9 °C) (03/15/22 0748)  Pulse: 108 (03/15/22 0748)  Resp: 18 (03/15/22 0748)  BP: 120/69 (03/15/22 0748)  SpO2: (!) 94 %  (03/15/22 0748)   Vital Signs (24h Range):  Temp:  [97 °F (36.1 °C)-98.4 °F (36.9 °C)] 98.4 °F (36.9 °C)  Pulse:  [104-138] 108  Resp:  [18-23] 18  SpO2:  [94 %-99 %] 94 %  BP: (108-151)/(58-95) 120/69     Weight: (!) 159.4 kg (351 lb 6.6 oz)  Body mass index is 38.6 kg/m².    SpO2: (!) 94 %  O2 Device (Oxygen Therapy): room air      Intake/Output Summary (Last 24 hours) at 3/15/2022 1135  Last data filed at 3/15/2022 0700  Gross per 24 hour   Intake 1085 ml   Output 2000 ml   Net -915 ml       Lines/Drains/Airways       Peripherally Inserted Central Catheter Line  Duration             PICC Double Lumen 03/14/21 1740 365 days              Peripheral Intravenous Line  Duration                  Peripheral IV - Single Lumen 03/14/22 1310 22 G Left Hand <1 day                    Physical Exam  Constitutional:       General: He is not in acute distress.     Appearance: He is well-developed.   Cardiovascular:      Rate and Rhythm: Tachycardia present. Rhythm irregularly irregular.      Heart sounds: No murmur heard.    No gallop.   Pulmonary:      Effort: Pulmonary effort is normal. No respiratory distress.      Breath sounds: Normal breath sounds. No wheezing.   Abdominal:      General: Bowel sounds are normal. There is no distension.      Palpations: Abdomen is soft.      Tenderness: There is no abdominal tenderness.   Skin:     General: Skin is warm and dry.   Neurological:      Mental Status: He is alert and oriented to person, place, and time.       Significant Labs: BMP:   Recent Labs   Lab 03/14/22  1336 03/15/22  0537   * 88    143   K 4.2 3.7    107   CO2 24 25   BUN 17 19   CREATININE 0.8 1.0   CALCIUM 9.1 8.7   MG  --  1.5*    and CBC   Recent Labs   Lab 03/14/22  1336 03/15/22  0537   WBC 6.32 5.39   HGB 9.5* 9.4*   HCT 30.6* 31.9*    182       Significant Imaging: Echocardiogram: Transthoracic echo (TTE) complete (Cupid Only):   Results for orders placed or performed during the  hospital encounter of 08/21/20   Echo Color Flow Doppler? Yes; Bubble Contrast? Yes   Result Value Ref Range    STJ 3.34 cm    AV mean gradient 4 mmHg    Ao peak rufus 1.25 m/s    Ao VTI 26.45 cm    IVS 0.97 0.6 - 1.1 cm    LA size 5.56 cm    Left Atrium Major Axis 6.19 cm    LVIDd 6.77 (A) 3.5 - 6.0 cm    LVIDs 4.95 (A) 2.1 - 4.0 cm    LVOT diameter 2.69 cm    Posterior Wall 1.33 (A) 0.6 - 1.1 cm    RA Major Axis 6.74 cm    RA Width 4.83 cm    TAPSE 2.67 cm    TR Max Rufus 2.31 m/s    LA WIDTH 4.18 cm    Ao root annulus 3.59 cm    LV Diastolic Volume 236.48 mL    LV Systolic Volume 115.64 mL    FS 27 %    LV mass 363.23 g    Left Ventricle Relative Wall Thickness 0.39 cm    LVOT area 5.7 cm2    AV peak gradient 6 mmHg    LV Systolic Volume Index 38.8 mL/m2    LV Diastolic Volume Index 79.28 mL/m2    LV Mass Index 122 g/m2    Triscuspid Valve Regurgitation Peak Gradient 21 mmHg    BSA 3.09 m2    Right Atrial Pressure (from IVC) 15 mmHg    TV rest pulmonary artery pressure 36 mmHg    Narrative    · Mildly decreased left ventricular systolic function. The estimated   ejection fraction is 45-50%.  · Eccentric left ventricular hypertrophy.  · Moderate left ventricular enlargement.  · Atrial fibrillation observed.  · Mild right ventricular enlargement.  · Normal right ventricular systolic function.  · Mild left atrial enlargement.  · Mild aortic regurgitation.  · Mild mitral regurgitation.  · Moderate pulmonic regurgitation.  · Mild right atrial enlargement.  · Elevated central venous pressure (15 mmHg).  · The estimated PA systolic pressure is 36 mmHg.  · Unable to comment on the bubble study due to technical difficdulty of   the study  · Overall the study quality was technically difficult.        Assessment and Plan:     * Dizziness  - presented with dizziness; BP stable; orthostatics negative  - dizziness resolved per patient ?afib related     Chronic combined systolic and diastolic congestive heart failure  - echo with  EF 40-45% in August 2020  - BNP mildly elevated at 274; CXR with mild interstitial edema; on IV Lasix BID with 1.4L out overnight negative 795cc since admission  - patient reports SOB present but chronic in nature; no acute decompensation noted; anticipate transition back to oral diuretics soon      HTN (hypertension)  - SBP 110s-130s  - on Losartan at home dose; BB resumed  - BB adjusted for afib management  - monitor BP and goal BP less than 130/80    Chronic atrial fibrillation  - long standing history of chronic afib treated with Xarelto and Metoprolol previously; reports BB stopped for unknown reasons  - presented with RVR due to BB discontinuation  - Metoprolol restarted at 25mg po BID with up titration to 25mg po Q6hrs this AM; HR remains in afib with HR 100s-110s; plan for rate control strategy rather than rhythm control given chronic afib  - recommend ambulation;  if HR improved and below 120 with no symptoms then recommend transition to Toprol XL and continue Xarelto; suitable for discharge from cardiac standpoint once HR adequately controlled        VTE Risk Mitigation (From admission, onward)         Ordered     rivaroxaban tablet 20 mg  With dinner         03/14/22 1644     Reason for No Pharmacological VTE Prophylaxis  Once        Question:  Reasons:  Answer:  Already adequately anticoagulated on oral Anticoagulants    03/14/22 1644     IP VTE HIGH RISK PATIENT  Once         03/14/22 1644     Place sequential compression device  Until discontinued         03/14/22 1644                Thank you for your consult. I will follow-up with patient. Please contact us if you have any additional questions.    BONIFACIO Cooper, ANP  Cardiology   [place] - Telemetry

## 2022-03-15 NOTE — ASSESSMENT & PLAN NOTE
- presented with dizziness; BP stable; orthostatics negative  - dizziness resolved per patient ?afib related

## 2022-03-15 NOTE — CONSULTS
Attempted to assess BLE wounds however pt reports he was seen by Dr. Epps who changed foot dressing.  Pt requesting to return tomorrow when his wife brings his antibiotic powder to perform dressing change.  Notified nurse.

## 2022-03-15 NOTE — NURSING
recieved patient from ER, stood up at bedside to transfer to bed but c/o weakness during standing. wife at bedside assisting with meal setup at this time. patient states he feels a little better but still has some sob with exertion. educated on lasix and patient states he took his xarelto today - held his ordered dose. denies chest pain or other needs at this time.

## 2022-03-15 NOTE — PROGRESS NOTES
[facility] Medicine  Progress Note    Patient Name: Drew Farrar  MRN: [id number]  Patient Class: OP- Observation   Admission Date: 3/14/2022  Length of Stay: 0 days  Attending Physician: Gino Ji III, MD  Primary Care Provider: Garrison Roach MD        Subjective:     Principal Problem:Dizziness        HPI:   56 yr old male with PMH of afib, arthritis, bipolar, chf, congenital heart disease, carpal tunnel, cva, obesity, thyroid disease presents to the ED with reports of sob worsening, dizziness and states overall he feels fatigue. States he has had intermittent chest pain for 3 days. Pt states he was seen per Dr Roa and podiatry in the clinics this morning however states he was told is his condition worsened to come to the ER.     On arrival to ED patient was determined to be in Afib with RVR, and was given Amiodarone 150mg IV. Patient's cardiac function dropped quickly to sinus Trav and patient became unresponsive. Patient returned to baseline within 15 minutes. He remains in Afib and dizzy. Per Cardiology will be placed in Observation and restarted on metoprolol and more aggressively diuresed with double usual dose of furosemide.      Overview/Hospital Course:  Patient admitted for management of fluid overload as well as Afib w/RVR. Patient diursed overnight with 40MG BID Lasix which is double patients home diuretic does. Patient put out 2L of urine since admit and is feeling less swollen. Patient is asymptomatic this AM, continuned on Lopresor. Will be sent home on toprol and losartan as recommended by cardiology. To follow-up with cardiology and PCP.      Interval History: NAOEN    Review of Systems   Constitutional:  Negative for activity change, appetite change, diaphoresis, fatigue and fever.   HENT:  Negative for sore throat.    Respiratory:  Negative for apnea, chest tightness, shortness of breath and wheezing.    Cardiovascular:  Positive for leg swelling. Negative for chest  pain.        Still present but improving per patient   Gastrointestinal:  Negative for abdominal pain, constipation, diarrhea, nausea and vomiting.   Genitourinary:  Negative for difficulty urinating.   Musculoskeletal:  Negative for arthralgias.   Skin:  Positive for wound.        Bilateral foot wounds from chronic venous stasis   Neurological:  Negative for dizziness, weakness and headaches.   Psychiatric/Behavioral: Negative.     Objective:     Vital Signs (Most Recent):  Temp: 98.4 °F (36.9 °C) (03/15/22 0748)  Pulse: 108 (03/15/22 0748)  Resp: 18 (03/15/22 0748)  BP: 120/69 (03/15/22 0748)  SpO2: (!) 94 % (03/15/22 0748)   Vital Signs (24h Range):  Temp:  [97 °F (36.1 °C)-98.4 °F (36.9 °C)] 98.4 °F (36.9 °C)  Pulse:  [104-138] 108  Resp:  [18-23] 18  SpO2:  [94 %-99 %] 94 %  BP: (108-151)/(58-95) 120/69     Weight: (!) 159.4 kg (351 lb 6.6 oz)  Body mass index is 38.6 kg/m².    Intake/Output Summary (Last 24 hours) at 3/15/2022 0926  Last data filed at 3/15/2022 0700  Gross per 24 hour   Intake 1085 ml   Output 2000 ml   Net -915 ml      Physical Exam  Constitutional:       Appearance: He is obese.   HENT:      Head: Normocephalic.      Nose: Nose normal.      Mouth/Throat:      Mouth: Mucous membranes are moist.   Eyes:      Pupils: Pupils are equal, round, and reactive to light.   Cardiovascular:      Rate and Rhythm: Normal rate. Rhythm irregular.      Pulses: Normal pulses.   Pulmonary:      Effort: Pulmonary effort is normal.      Breath sounds: Normal breath sounds.   Abdominal:      Palpations: Abdomen is soft.   Musculoskeletal:         General: Normal range of motion.      Cervical back: Normal range of motion.   Neurological:      Mental Status: He is alert.       Significant Labs: All pertinent labs within the past 24 hours have been reviewed.  CBC:   Recent Labs   Lab 03/14/22  1336 03/15/22  0537   WBC 6.32 5.39   HGB 9.5* 9.4*   HCT 30.6* 31.9*    182     CMP:   Recent Labs   Lab  03/14/22  1336 03/15/22  0537    143   K 4.2 3.7    107   CO2 24 25   * 88   BUN 17 19   CREATININE 0.8 1.0   CALCIUM 9.1 8.7   PROT 6.9 6.8   ALBUMIN 3.2* 3.2*   BILITOT 0.9 1.2*   ALKPHOS 170* 158*   AST 25 27   ALT 18 19   ANIONGAP 10 11   EGFRNONAA >60 >60       Significant Imaging: I have reviewed all pertinent imaging results/findings within the past 24 hours.      Assessment/Plan:      * Dizziness  Patient reported new onset Dizziness today  Plan   Correct underlying cardiac arrhythmia  Reassess after return to baseline cardiac status  If symptoms continue perform full vertigo work-up      HTN (hypertension)  Continue home regimen of losartan 25mg daily      Chronic atrial fibrillation  Patient was on Toprol and losartan in the past but they where stopped during a recent hospitalization  Patient was experiencing diszziness and chest pain. ekg showed Afib with Rvr  Amiodarone was given, patient remains in Afib  Plan   Patient HR increases to 150 with light movement  Titrate Metoprolol as lopressor to control rate and rhythm   Will continue to diurese with Lasix 40mg IV BID  Cardiology recs to titrate Metoprolol and diurese   Cardiac monitoring in place    Venous stasis dermatitis of both lower extremities  Patient seen by wound care today 3/14/22  Plan  Wound care consulted to manage the wound while admitted  Continue home dose of rivaroxaban 20mg daily      VTE Risk Mitigation (From admission, onward)         Ordered     rivaroxaban tablet 20 mg  With dinner         03/14/22 1644     Reason for No Pharmacological VTE Prophylaxis  Once        Question:  Reasons:  Answer:  Already adequately anticoagulated on oral Anticoagulants    03/14/22 1644     IP VTE HIGH RISK PATIENT  Once         03/14/22 1644     Place sequential compression device  Until discontinued         03/14/22 1644                Discharge Planning   NUHA:      Code Status: Full Code   Is the patient medically ready for  discharge?:     Reason for patient still in hospital (select all that apply): Patient trending condition, Treatment, Consult recommendations and PT / OT recommendations  Discharge Plan A: Home with family              Andrae Bustamante MD  [employer] [facility] PGY-1  03/15/2022

## 2022-03-15 NOTE — NURSING
c/o of increased chest pain that radiates to right shoulder, i ordered EKG, currently Afib rate 100-120 on tele, he used bedside commode and was transferring back to bed when pain started, states pain is relieved slightly with deep breathing and rest. /68 pulse ox 97% on room air RR 18 afebrile. notified provider, new orders noted.

## 2022-03-15 NOTE — HOSPITAL COURSE
3/15/2022 Presented to the ER with dizziness with notation of afib with RVR.   3/16/2022 HR remains 100s-110s afib. SBP 100s-110s BMP WNL. H&H 9.9/31.4 stable at his baseline. Remains on Lasix 40mg IV BID with 2.2L negative 2.7L since admission   3/17/2022 Transitioned to oral Demadex from IV Lasix yesterday. CBC WNL. BMP with creatinine .9. Remains in afib with HR 80s-110s transitioned to Toprol XL

## 2022-03-15 NOTE — NURSING
changing from lying in bed to sitting up in chair, heart rate as high as 150, unable to tolerate standing for periods of time - c/o chest pain and increased dyspnea. sitting up in chair afib 105-128 on telemetry.

## 2022-03-15 NOTE — PLAN OF CARE
SW met with pt at bedside to complete assessment. PT will have his wife help at d/c. Pt does not remember wife's Marcelo's phone number. Pt stated she would be at hospital later today and bring pts phone with her. Pt reported having WC,SW,BSC, AND Rw at home. Sw will request f/u apts. White board updated with CM name and contact information.  Discharge brochure provided.  Pt encouraged to call with any questions or concerns.  Cm will continue to follow pt through transitions of care and assist with any discharge needs.    Jeovanny Wang, MSWINSTON  [phone]    Future Appointments   Date Time Provider Department Center   3/24/2022  9:00 AM Shin Hightower IV, MD [facility]  [facility] [facility]   3/28/2022 11:40 AM Gomez Lantigua MD [facility] CARDIO [place] [facility]        03/15/22 1006   Discharge Assessment   Assessment Type Discharge Planning Assessment   Confirmed/corrected address, phone number and insurance Yes   Confirmed Demographics Correct on Facesheet   Source of Information patient   When was your last doctors appointment?   (April 2021)   Does patient/caregiver understand observation status Yes   Reason For Admission dizziness   Lives With spouse   Facility Arrived From: home   Do you expect to return to your current living situation? Yes   Do you have help at home or someone to help you manage your care at home? Yes   Who are your caregiver(s) and their phone number(s)? wife Marcelo patient did not have her number   Prior to hospitilization cognitive status: Alert/Oriented   Current cognitive status: Alert/Oriented   Walking or Climbing Stairs Difficulty ambulation difficulty, requires equipment   Dressing/Bathing Difficulty bathing difficulty, requires equipment   Home Accessibility wheelchair accessible   Home Layout Able to live on 1st floor   Equipment Currently Used at Home wheelchair;shower chair;walker, rolling;bedside commode   Readmission within 30 days? No   Patient currently being followed by outpatient case  management? No   Do you currently have service(s) that help you manage your care at home? Yes   Name and Contact number of agency [facility]   Is the pt/caregiver preference to resume services with current agency Yes   Do you take prescription medications? Yes   Do you have prescription coverage? Yes   Coverage [employer] managed medicare   Do you have any problems affording any of your prescribed medications? No   Is the patient taking medications as prescribed? yes   Who is going to help you get home at discharge? wife fátima   How do you get to doctors appointments? family or friend will provide;health plan transportation   Are you on dialysis? No   Do you take coumadin? Yes   Discharge Plan A Home with family   Discharge Plan B Home Health   DME Needed Upon Discharge  other (see comments)  (tbd)   Discharge Plan discussed with: Patient   Discharge Barriers Identified None   Relationship/Environment   Name(s) of Who Lives With Patient Wife fátima

## 2022-03-15 NOTE — SUBJECTIVE & OBJECTIVE
Past Medical History:   Diagnosis Date    *Atrial fibrillation     Anticoagulant long-term use     Arthritis     Atrial fibrillation     Atrial fibrillation Feb 23, 2016    Bipolar disorder     Carpal tunnel syndrome on right 2/10/2022    CHF (congestive heart failure)     Congenital heart disease     s/p surgical intervention 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 of age    Deep vein thrombosis     DVT of leg (deep venous thrombosis)     left leg    History of prior ablation treatment     10/9/13    Hypertension     Obesity     Stroke     Thyroid disease     Venous stasis ulcer of lower extremity, unspecified laterality 12/14/2012    Venous ulcer        Past Surgical History:   Procedure Laterality Date    ABLATION Left 1/20/2022    Procedure: Ablation;  Surgeon: Gomez Lantigua MD;  Location: [facility] CATH LAB/EP;  Service: Cardiology;  Laterality: Left;    ANGIOPLASTY      CARDIAC SURGERY      open heart surgery 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 old    EYE SURGERY      left eye cataract/right eye glaucoma    TIMO FILTER PLACEMENT      Dr Calix ([facility])    KNEE SURGERY      l and r     MULTIPLE TOOTH EXTRACTIONS      SKIN GRAFT      left leg       Review of patient's allergies indicates:   Allergen Reactions    Contrast media Other (See Comments)     Severe chest pain    Food allergy formula [glutamine-c-quercet-selen-brom]      Allergic to green peas; Heart failure.    Iodinated contrast media Other (See Comments)     Chest pain    Peas Hives    Ibuprofen Swelling    Latex, natural rubber Hives    Pcn [penicillins] Hives    Quercetin     Butisol [butabarbital] Rash     Peeling skin       No current facility-administered medications on file prior to encounter.     Current Outpatient Medications on File Prior to Encounter   Medication Sig    albuterol (VENTOLIN HFA) 90 mcg/actuation inhaler Inhale 2 puffs into the lungs every 6 (six) hours as needed for Wheezing. Rescue    atorvastatin (LIPITOR) 40 MG tablet Take 1 tablet (40 mg total) by mouth every  evening.    losartan (COZAAR) 25 MG tablet Take 1 tablet (25 mg total) by mouth once daily.    metoprolol succinate (TOPROL-XL) 50 MG 24 hr tablet Take 1 tablet (50 mg total) by mouth once daily.    naproxen sodium (ALEVE) 220 MG tablet Take 220 mg by mouth as needed.    rivaroxaban (XARELTO) 20 mg Tab Take 1 tablet (20 mg total) by mouth daily with dinner or evening meal.    sumatriptan (IMITREX) 50 MG tablet Take 50 mg for headache. If headache does not resolve, take another 50mg two hours after initial dose. Do not exceed 200mg in 24 hours.    torsemide (DEMADEX) 20 MG Tab Take 2 tablets by mouth daily. (Patient taking differently: Take 30 mg by mouth once daily. 1 and 1/2 tablets)     Family History       Problem Relation (Age of Onset)    Diabetes Father, Maternal Grandfather    Heart disease Father, Maternal Grandmother, Maternal Grandfather    Stroke Maternal Grandfather          Tobacco Use    Smoking status: Former Smoker     Packs/day: 1.00     Years: 6.00     Pack years: 6.00     Types: Cigarettes    Smokeless tobacco: Former User    Tobacco comment: quit by age 27yrs old   Substance and Sexual Activity    Alcohol use: Yes     Alcohol/week: 1.0 standard drink     Types: 1 Glasses of wine per week     Comment: occasionally    Drug use: No    Sexual activity: Yes     Partners: Female     Birth control/protection: None     Review of Systems   Constitutional: Negative for chills, decreased appetite, diaphoresis and fever.   Cardiovascular:  Positive for dyspnea on exertion (chronic). Negative for chest pain, claudication, cyanosis, irregular heartbeat, leg swelling, near-syncope, orthopnea, palpitations, paroxysmal nocturnal dyspnea and syncope.   Respiratory:  Negative for cough, hemoptysis, shortness of breath and wheezing.    Gastrointestinal:  Negative for bloating, abdominal pain, constipation, diarrhea, melena, nausea and vomiting.   Neurological:  Positive for dizziness. Negative for weakness.    Objective:     Vital Signs (Most Recent):  Temp: 98.4 °F (36.9 °C) (03/15/22 0748)  Pulse: 108 (03/15/22 0748)  Resp: 18 (03/15/22 0748)  BP: 120/69 (03/15/22 0748)  SpO2: (!) 94 % (03/15/22 0748)   Vital Signs (24h Range):  Temp:  [97 °F (36.1 °C)-98.4 °F (36.9 °C)] 98.4 °F (36.9 °C)  Pulse:  [104-138] 108  Resp:  [18-23] 18  SpO2:  [94 %-99 %] 94 %  BP: (108-151)/(58-95) 120/69     Weight: (!) 159.4 kg (351 lb 6.6 oz)  Body mass index is 38.6 kg/m².    SpO2: (!) 94 %  O2 Device (Oxygen Therapy): room air      Intake/Output Summary (Last 24 hours) at 3/15/2022 1135  Last data filed at 3/15/2022 0700  Gross per 24 hour   Intake 1085 ml   Output 2000 ml   Net -915 ml       Lines/Drains/Airways       Peripherally Inserted Central Catheter Line  Duration             PICC Double Lumen 03/14/21 1740 365 days              Peripheral Intravenous Line  Duration                  Peripheral IV - Single Lumen 03/14/22 1310 22 G Left Hand <1 day                    Physical Exam  Constitutional:       General: He is not in acute distress.     Appearance: He is well-developed.   Cardiovascular:      Rate and Rhythm: Tachycardia present. Rhythm irregularly irregular.      Heart sounds: No murmur heard.    No gallop.   Pulmonary:      Effort: Pulmonary effort is normal. No respiratory distress.      Breath sounds: Normal breath sounds. No wheezing.   Abdominal:      General: Bowel sounds are normal. There is no distension.      Palpations: Abdomen is soft.      Tenderness: There is no abdominal tenderness.   Skin:     General: Skin is warm and dry.   Neurological:      Mental Status: He is alert and oriented to person, place, and time.       Significant Labs: BMP:   Recent Labs   Lab 03/14/22  1336 03/15/22  0537   * 88    143   K 4.2 3.7    107   CO2 24 25   BUN 17 19   CREATININE 0.8 1.0   CALCIUM 9.1 8.7   MG  --  1.5*    and CBC   Recent Labs   Lab 03/14/22  1336 03/15/22  0537   WBC 6.32 5.39   HGB 9.5*  9.4*   HCT 30.6* 31.9*    182       Significant Imaging: Echocardiogram: Transthoracic echo (TTE) complete (Cupid Only):   Results for orders placed or performed during the hospital encounter of 08/21/20   Echo Color Flow Doppler? Yes; Bubble Contrast? Yes   Result Value Ref Range    STJ 3.34 cm    AV mean gradient 4 mmHg    Ao peak rufus 1.25 m/s    Ao VTI 26.45 cm    IVS 0.97 0.6 - 1.1 cm    LA size 5.56 cm    Left Atrium Major Axis 6.19 cm    LVIDd 6.77 (A) 3.5 - 6.0 cm    LVIDs 4.95 (A) 2.1 - 4.0 cm    LVOT diameter 2.69 cm    Posterior Wall 1.33 (A) 0.6 - 1.1 cm    RA Major Axis 6.74 cm    RA Width 4.83 cm    TAPSE 2.67 cm    TR Max Rufus 2.31 m/s    LA WIDTH 4.18 cm    Ao root annulus 3.59 cm    LV Diastolic Volume 236.48 mL    LV Systolic Volume 115.64 mL    FS 27 %    LV mass 363.23 g    Left Ventricle Relative Wall Thickness 0.39 cm    LVOT area 5.7 cm2    AV peak gradient 6 mmHg    LV Systolic Volume Index 38.8 mL/m2    LV Diastolic Volume Index 79.28 mL/m2    LV Mass Index 122 g/m2    Triscuspid Valve Regurgitation Peak Gradient 21 mmHg    BSA 3.09 m2    Right Atrial Pressure (from IVC) 15 mmHg    TV rest pulmonary artery pressure 36 mmHg    Narrative    · Mildly decreased left ventricular systolic function. The estimated   ejection fraction is 45-50%.  · Eccentric left ventricular hypertrophy.  · Moderate left ventricular enlargement.  · Atrial fibrillation observed.  · Mild right ventricular enlargement.  · Normal right ventricular systolic function.  · Mild left atrial enlargement.  · Mild aortic regurgitation.  · Mild mitral regurgitation.  · Moderate pulmonic regurgitation.  · Mild right atrial enlargement.  · Elevated central venous pressure (15 mmHg).  · The estimated PA systolic pressure is 36 mmHg.  · Unable to comment on the bubble study due to technical difficdulty of   the study  · Overall the study quality was technically difficult.

## 2022-03-15 NOTE — ASSESSMENT & PLAN NOTE
- SBP 110s-130s  - on Losartan at home dose; BB resumed  - BB adjusted for afib management  - monitor BP and goal BP less than 130/80

## 2022-03-15 NOTE — ASSESSMENT & PLAN NOTE
- echo with EF 40-45% in August 2020  - BNP mildly elevated at 274; CXR with mild interstitial edema; on IV Lasix BID with 1.4L out overnight negative 795cc since admission  - patient reports SOB present but chronic in nature; no acute decompensation noted; anticipate transition back to oral diuretics soon

## 2022-03-15 NOTE — ASSESSMENT & PLAN NOTE
- long standing history of chronic afib treated with Xarelto and Metoprolol previously; reports BB stopped for unknown reasons  - presented with RVR due to BB discontinuation  - Metoprolol restarted at 25mg po BID with up titration to 25mg po Q6hrs this AM; HR remains in afib with HR 100s-110s; plan for rate control strategy rather than rhythm control given chronic afib  - recommend ambulation;  if HR improved and below 120 with no symptoms then recommend transition to Toprol XL and continue Xarelto; suitable for discharge from cardiac standpoint once HR adequately controlled

## 2022-03-15 NOTE — ASSESSMENT & PLAN NOTE
Patient was on Toprol and losartan in the past but they where stopped during a recent hospitalization  Patient was experiencing diszziness and chest pain. ekg showed Afib with Rvr  Amiodarone was given, patient remains in Afib  Plan   Patient HR increases to 150 with light movement  Titrate Metoprolol as lopressor to control rate and rhythm   Will continue to diurese with Lasix 40mg IV BID  Cardiology recs to titrate Metoprolol and diurese   Cardiac monitoring in place

## 2022-03-16 NOTE — ASSESSMENT & PLAN NOTE
- SBP 100s-110s  - on Losartan at home dose; BB resumed with up titration this AM   - BB adjusted for afib management

## 2022-03-16 NOTE — PROGRESS NOTES
[facility] - Telemetry  Cardiology  Progress Note    Patient Name: Drew Farrar  MRN: [id number]  Admission Date: 3/14/2022  Hospital Length of Stay: 0 days  Code Status: Full Code   Attending Physician: Gino Ji III, MD   Primary Care Physician: Garrison Roach MD  Expected Discharge Date:   Principal Problem:Dizziness    Subjective:     Hospital Course:   3/15/2022 Presented to the ER with dizziness with notation of afib with RVR.   3/16/2022 HR remains 100s-110s afib. SBP 100s-110s BMP WNL. H&H 9.9/31.4 stable at his baseline. Remains on Lasix 40mg IV BID with 2.2L negative 2.7L since admission           Review of Systems   Constitutional: Negative for chills, decreased appetite, diaphoresis and fever.   Cardiovascular:  Positive for dyspnea on exertion (chronic). Negative for chest pain, claudication, cyanosis, irregular heartbeat, leg swelling, near-syncope, orthopnea, palpitations, paroxysmal nocturnal dyspnea and syncope.   Respiratory:  Negative for cough, hemoptysis, shortness of breath and wheezing.    Gastrointestinal:  Negative for bloating, abdominal pain, constipation, diarrhea, melena, nausea and vomiting.   Neurological:  Negative for dizziness and weakness.   Objective:     Vital Signs (Most Recent):  Temp: 98.2 °F (36.8 °C) (03/16/22 0802)  Pulse: 78 (03/16/22 0835)  Resp: 16 (03/16/22 0802)  BP: (!) 112/57 (03/16/22 0802)  SpO2: (!) 93 % (03/16/22 0802)   Vital Signs (24h Range):  Temp:  [97.7 °F (36.5 °C)-98.3 °F (36.8 °C)] 98.2 °F (36.8 °C)  Pulse:  [] 78  Resp:  [16-22] 16  SpO2:  [93 %-98 %] 93 %  BP: (107-123)/(57-84) 112/57     Weight: (!) 159.4 kg (351 lb 6.6 oz)  Body mass index is 38.6 kg/m².     SpO2: (!) 93 %  O2 Device (Oxygen Therapy): room air      Intake/Output Summary (Last 24 hours) at 3/16/2022 0955  Last data filed at 3/16/2022 0900  Gross per 24 hour   Intake 400 ml   Output 2800 ml   Net -2400 ml       Lines/Drains/Airways       Peripheral Intravenous Line  Duration                   Peripheral IV - Single Lumen 03/14/22 1310 22 G Left Hand 1 day                    Physical Exam  Constitutional:       General: He is not in acute distress.     Appearance: He is well-developed.   Cardiovascular:      Rate and Rhythm: Tachycardia present. Rhythm irregularly irregular.      Heart sounds: No murmur heard.    No gallop.   Pulmonary:      Effort: Pulmonary effort is normal. No respiratory distress.      Breath sounds: Normal breath sounds. No wheezing.   Abdominal:      General: Bowel sounds are normal. There is no distension.      Palpations: Abdomen is soft.      Tenderness: There is no abdominal tenderness.   Musculoskeletal:      Right lower leg: Edema present.      Left lower leg: Edema present.   Skin:     General: Skin is warm and dry.      Comments: Skin changes to BLE consistent with venous disease; dressings intact bilaterally    Neurological:      Mental Status: He is alert and oriented to person, place, and time.       Significant Labs: BMP:   Recent Labs   Lab 03/14/22  1336 03/15/22  0537 03/16/22  0541   * 88 90    143 141   K 4.2 3.7 3.5    107 103   CO2 24 25 27   BUN 17 19 21*   CREATININE 0.8 1.0 0.9   CALCIUM 9.1 8.7 8.8   MG  --  1.5* 1.4*    and CBC   Recent Labs   Lab 03/14/22  1336 03/15/22  0537 03/16/22  0541   WBC 6.32 5.39 5.26   HGB 9.5* 9.4* 9.9*   HCT 30.6* 31.9* 31.4*    182 196       Significant Imaging: Echocardiogram: Transthoracic echo (TTE) complete (Cupid Only):   Results for orders placed or performed during the hospital encounter of 08/21/20   Echo Color Flow Doppler? Yes; Bubble Contrast? Yes   Result Value Ref Range    STJ 3.34 cm    AV mean gradient 4 mmHg    Ao peak lucio 1.25 m/s    Ao VTI 26.45 cm    IVS 0.97 0.6 - 1.1 cm    LA size 5.56 cm    Left Atrium Major Axis 6.19 cm    LVIDd 6.77 (A) 3.5 - 6.0 cm    LVIDs 4.95 (A) 2.1 - 4.0 cm    LVOT diameter 2.69 cm    Posterior Wall 1.33 (A) 0.6 - 1.1 cm    RA Major Axis  6.74 cm    RA Width 4.83 cm    TAPSE 2.67 cm    TR Max Rufus 2.31 m/s    LA WIDTH 4.18 cm    Ao root annulus 3.59 cm    LV Diastolic Volume 236.48 mL    LV Systolic Volume 115.64 mL    FS 27 %    LV mass 363.23 g    Left Ventricle Relative Wall Thickness 0.39 cm    LVOT area 5.7 cm2    AV peak gradient 6 mmHg    LV Systolic Volume Index 38.8 mL/m2    LV Diastolic Volume Index 79.28 mL/m2    LV Mass Index 122 g/m2    Triscuspid Valve Regurgitation Peak Gradient 21 mmHg    BSA 3.09 m2    Right Atrial Pressure (from IVC) 15 mmHg    TV rest pulmonary artery pressure 36 mmHg    Narrative    · Mildly decreased left ventricular systolic function. The estimated   ejection fraction is 45-50%.  · Eccentric left ventricular hypertrophy.  · Moderate left ventricular enlargement.  · Atrial fibrillation observed.  · Mild right ventricular enlargement.  · Normal right ventricular systolic function.  · Mild left atrial enlargement.  · Mild aortic regurgitation.  · Mild mitral regurgitation.  · Moderate pulmonic regurgitation.  · Mild right atrial enlargement.  · Elevated central venous pressure (15 mmHg).  · The estimated PA systolic pressure is 36 mmHg.  · Unable to comment on the bubble study due to technical difficdulty of   the study  · Overall the study quality was technically difficult.        Assessment and Plan:     Brief HPI: Seen this morning on AM NP rounds while sitting on edge of bed. Denies any complaints and reports dizziness resolved. Discussed cardiac POC as detailed below-verbalized understanding and agrees with POC     * Dizziness  - resolved     Chronic combined systolic and diastolic congestive heart failure  - echo with EF 40-45% in August 2020; repeat echo today   - BNP mildly elevated at 274; CXR with mild interstitial edema; remains on IV Lasix BID with 2.2L out overnight negative 2.7L since admission  - patient reports SOB present but chronic in nature with no worsenig; ambulate in hallway and if not  worsening SOB or sustained HR >120 then transition back to Demadex and discharge home     HTN (hypertension)  - SBP 100s-110s  - on Losartan at home dose; BB resumed with up titration this AM   - BB adjusted for afib management      Chronic atrial fibrillation  - long standing history of chronic afib treated with Xarelto and Metoprolol previously; reports BB stopped for unknown reasons  - presented with RVR due to BB discontinuation  - remains in afib with HR 100s-110s up to 120s-130s with activity; dizziness resolved  - on Metoprolol 50mg po Q6hrs increased this AM from 25mg po Q6hrs yesterday; ambulate in hallway and HR remains less than 120bpm and no worsnening of SOB from baseline would recommend transition to Toprol XL and transition back to Demadex and discharge home with follow up in cards clinic  - continue Xarelto; goal is rate control rather than rhythm control         VTE Risk Mitigation (From admission, onward)         Ordered     rivaroxaban tablet 20 mg  With dinner         03/14/22 1644     Reason for No Pharmacological VTE Prophylaxis  Once        Question:  Reasons:  Answer:  Already adequately anticoagulated on oral Anticoagulants    03/14/22 1644     IP VTE HIGH RISK PATIENT  Once         03/14/22 1644     Place sequential compression device  Until discontinued         03/14/22 1644                BONIFACIO Cooper, ANP  Cardiology  [facility] - Telemetry

## 2022-03-16 NOTE — ASSESSMENT & PLAN NOTE
Patient was on Toprol and losartan in the past but they where stopped during a recent hospitalization  Patient was experiencing diszziness and chest pain. ekg showed Afib with Rvr  Amiodarone was given, patient remains in Afib  Plan   Patient HR increases to 150 with light movement; repeat walk test   Titrate Metoprolol as lopressor to control rate and rhythm   Will continue to diurese with Lasix 40mg IV BID  Cardiology recs to titrate Metoprolol and diurese   Cardiac monitoring in place

## 2022-03-16 NOTE — PLAN OF CARE
Plan of care discussed with pt. PT AAOx4, no issues overnight. Per pt request, Slept in recliner w/ feet elevated. AFIB on telemetry HR  at rest, afebrile. Cardiology following- Metoprolol increase to 50mg-dose given at midnight per orders. Pt denies any pain or discomfort overnight. Left foot remin wrapped CDI, BLE elevated in recliner. Pt voiding adequately w/ urinal within reach. Pt educated on fall precautions, verbalized understanding. Call light in reach. Pt free of injuries this shift.  All questions addressed. Pt voices no concerns at this time. Safety and comfort measures maintained during hourly rounding.

## 2022-03-16 NOTE — PROGRESS NOTES
Wound dressing to LLE changed as per wound care orders from outpatient wound care- compound antibiotic powder (obtained from pt), hydrofera blue ready foam dressing, Kerlix, compression layer and ace wrap.  Will follow pt and change dressing 2x/week.     L medial ankle- chronic venous stasis ulcer- 3x1.5x0.1cm- slough with minimal granulation- small amount of serosanguinous drainage- no odor- no erythema.        L lateral foot- chronic ulcer- 2x9x0.1cm- slough with minimal granulation- small amount of serosanguinous drainage- no odor- no erythema

## 2022-03-16 NOTE — ASSESSMENT & PLAN NOTE
- echo with EF 40-45% in August 2020; repeat echo today   - BNP mildly elevated at 274; CXR with mild interstitial edema; remains on IV Lasix BID with 2.2L out overnight negative 2.7L since admission  - patient reports SOB present but chronic in nature with no worsenig; ambulate in hallway and if not worsening SOB or sustained HR >120 then transition back to Demadex and discharge home

## 2022-03-16 NOTE — ASSESSMENT & PLAN NOTE
Patient reported new onset Dizziness today  Plan   Correct underlying cardiac arrhythmia  Reassess after return to baseline cardiac status  If symptoms continue perform full vertigo work-up  Currently resolved

## 2022-03-16 NOTE — ASSESSMENT & PLAN NOTE
- long standing history of chronic afib treated with Xarelto and Metoprolol previously; reports BB stopped for unknown reasons  - presented with RVR due to BB discontinuation  - remains in afib with HR 100s-110s up to 120s-130s with activity; dizziness resolved  - on Metoprolol 50mg po Q6hrs increased this AM from 25mg po Q6hrs yesterday; ambulate in hallway and HR remains less than 120bpm and no worsnening of SOB from baseline would recommend transition to Toprol XL and transition back to Demadex and discharge home with follow up in cards clinic  - continue Xarelto; goal is rate control rather than rhythm control

## 2022-03-16 NOTE — SUBJECTIVE & OBJECTIVE
Review of Systems   Constitutional: Negative for chills, decreased appetite, diaphoresis and fever.   Cardiovascular:  Positive for dyspnea on exertion (chronic). Negative for chest pain, claudication, cyanosis, irregular heartbeat, leg swelling, near-syncope, orthopnea, palpitations, paroxysmal nocturnal dyspnea and syncope.   Respiratory:  Negative for cough, hemoptysis, shortness of breath and wheezing.    Gastrointestinal:  Negative for bloating, abdominal pain, constipation, diarrhea, melena, nausea and vomiting.   Neurological:  Negative for dizziness and weakness.   Objective:     Vital Signs (Most Recent):  Temp: 98.2 °F (36.8 °C) (03/16/22 0802)  Pulse: 78 (03/16/22 0835)  Resp: 16 (03/16/22 0802)  BP: (!) 112/57 (03/16/22 0802)  SpO2: (!) 93 % (03/16/22 0802)   Vital Signs (24h Range):  Temp:  [97.7 °F (36.5 °C)-98.3 °F (36.8 °C)] 98.2 °F (36.8 °C)  Pulse:  [] 78  Resp:  [16-22] 16  SpO2:  [93 %-98 %] 93 %  BP: (107-123)/(57-84) 112/57     Weight: (!) 159.4 kg (351 lb 6.6 oz)  Body mass index is 38.6 kg/m².     SpO2: (!) 93 %  O2 Device (Oxygen Therapy): room air      Intake/Output Summary (Last 24 hours) at 3/16/2022 0955  Last data filed at 3/16/2022 0900  Gross per 24 hour   Intake 400 ml   Output 2800 ml   Net -2400 ml       Lines/Drains/Airways       Peripheral Intravenous Line  Duration                  Peripheral IV - Single Lumen 03/14/22 1310 22 G Left Hand 1 day                    Physical Exam  Constitutional:       General: He is not in acute distress.     Appearance: He is well-developed.   Cardiovascular:      Rate and Rhythm: Tachycardia present. Rhythm irregularly irregular.      Heart sounds: No murmur heard.    No gallop.   Pulmonary:      Effort: Pulmonary effort is normal. No respiratory distress.      Breath sounds: Normal breath sounds. No wheezing.   Abdominal:      General: Bowel sounds are normal. There is no distension.      Palpations: Abdomen is soft.      Tenderness:  There is no abdominal tenderness.   Musculoskeletal:      Right lower leg: Edema present.      Left lower leg: Edema present.   Skin:     General: Skin is warm and dry.      Comments: Skin changes to BLE consistent with venous disease; dressings intact bilaterally    Neurological:      Mental Status: He is alert and oriented to person, place, and time.       Significant Labs: BMP:   Recent Labs   Lab 03/14/22  1336 03/15/22  0537 03/16/22  0541   * 88 90    143 141   K 4.2 3.7 3.5    107 103   CO2 24 25 27   BUN 17 19 21*   CREATININE 0.8 1.0 0.9   CALCIUM 9.1 8.7 8.8   MG  --  1.5* 1.4*    and CBC   Recent Labs   Lab 03/14/22  1336 03/15/22  0537 03/16/22  0541   WBC 6.32 5.39 5.26   HGB 9.5* 9.4* 9.9*   HCT 30.6* 31.9* 31.4*    182 196       Significant Imaging: Echocardiogram: Transthoracic echo (TTE) complete (Cupid Only):   Results for orders placed or performed during the hospital encounter of 08/21/20   Echo Color Flow Doppler? Yes; Bubble Contrast? Yes   Result Value Ref Range    STJ 3.34 cm    AV mean gradient 4 mmHg    Ao peak rufus 1.25 m/s    Ao VTI 26.45 cm    IVS 0.97 0.6 - 1.1 cm    LA size 5.56 cm    Left Atrium Major Axis 6.19 cm    LVIDd 6.77 (A) 3.5 - 6.0 cm    LVIDs 4.95 (A) 2.1 - 4.0 cm    LVOT diameter 2.69 cm    Posterior Wall 1.33 (A) 0.6 - 1.1 cm    RA Major Axis 6.74 cm    RA Width 4.83 cm    TAPSE 2.67 cm    TR Max Rufus 2.31 m/s    LA WIDTH 4.18 cm    Ao root annulus 3.59 cm    LV Diastolic Volume 236.48 mL    LV Systolic Volume 115.64 mL    FS 27 %    LV mass 363.23 g    Left Ventricle Relative Wall Thickness 0.39 cm    LVOT area 5.7 cm2    AV peak gradient 6 mmHg    LV Systolic Volume Index 38.8 mL/m2    LV Diastolic Volume Index 79.28 mL/m2    LV Mass Index 122 g/m2    Triscuspid Valve Regurgitation Peak Gradient 21 mmHg    BSA 3.09 m2    Right Atrial Pressure (from IVC) 15 mmHg    TV rest pulmonary artery pressure 36 mmHg    Narrative    · Mildly decreased left  ventricular systolic function. The estimated   ejection fraction is 45-50%.  · Eccentric left ventricular hypertrophy.  · Moderate left ventricular enlargement.  · Atrial fibrillation observed.  · Mild right ventricular enlargement.  · Normal right ventricular systolic function.  · Mild left atrial enlargement.  · Mild aortic regurgitation.  · Mild mitral regurgitation.  · Moderate pulmonic regurgitation.  · Mild right atrial enlargement.  · Elevated central venous pressure (15 mmHg).  · The estimated PA systolic pressure is 36 mmHg.  · Unable to comment on the bubble study due to technical difficdulty of   the study  · Overall the study quality was technically difficult.

## 2022-03-16 NOTE — NURSING
notified both [facility] and [facility]' team about patient c/o non radiating stabbing chest pain after vomiting, denies any SOB, HR in 1teens to 130s. Awaiting new orders.

## 2022-03-16 NOTE — PROGRESS NOTES
[facility] Medicine  Progress Note    Patient Name: Drew Farrar  MRN: [id number]  Patient Class: OP- Observation   Admission Date: 3/14/2022  Length of Stay: 0 days  Attending Physician: Gino Ji III, MD  Primary Care Provider: Garrison Roach MD        Subjective:     Principal Problem:Dizziness        HPI:   56 yr old male with PMH of afib, arthritis, bipolar, chf, congenital heart disease, carpal tunnel, cva, obesity, thyroid disease presents to the ED with reports of sob worsening, dizziness and states overall he feels fatigue. States he has had intermittent chest pain for 3 days. Pt states he was seen per Dr Roa and podiatry in the clinics this morning however states he was told is his condition worsened to come to the ER.     On arrival to ED patient was determined to be in Afib with RVR, and was given Amiodarone 150mg IV. Patient's cardiac function dropped quickly to sinus Trav and patient became unresponsive. Patient returned to baseline within 15 minutes. He remains in Afib and dizzy. Per Cardiology will be placed in Observation and restarted on metoprolol and more aggressively diuresed with double usual dose of furosemide.      Interval History: NAEON    Review of Systems   Constitutional:  Negative for activity change, appetite change, diaphoresis, fatigue and fever.   HENT:  Negative for sore throat.    Respiratory:  Negative for apnea, chest tightness, shortness of breath and wheezing.    Cardiovascular:  Positive for leg swelling. Negative for chest pain.        Mostly resolved per patient   Gastrointestinal:  Negative for abdominal pain, constipation, diarrhea, nausea and vomiting.   Genitourinary:  Negative for difficulty urinating.   Musculoskeletal:  Negative for arthralgias.   Skin:  Positive for wound.        Bilateral foot wounds from chronic venous stasis   Neurological:  Negative for dizziness, weakness and headaches.   Psychiatric/Behavioral: Negative.      Objective:     Vital Signs (Most Recent):  Temp: 98.2 °F (36.8 °C) (03/16/22 0802)  Pulse: 78 (03/16/22 0835)  Resp: 16 (03/16/22 0802)  BP: (!) 112/57 (03/16/22 0802)  SpO2: (!) 93 % (03/16/22 0802)   Vital Signs (24h Range):  Temp:  [97.7 °F (36.5 °C)-98.3 °F (36.8 °C)] 98.2 °F (36.8 °C)  Pulse:  [] 78  Resp:  [16-22] 16  SpO2:  [93 %-98 %] 93 %  BP: (107-123)/(57-84) 112/57     Weight: (!) 159.4 kg (351 lb 6.6 oz)  Body mass index is 38.6 kg/m².    Intake/Output Summary (Last 24 hours) at 3/16/2022 0858  Last data filed at 3/16/2022 0700  Gross per 24 hour   Intake 400 ml   Output 2200 ml   Net -1800 ml      Physical Exam  Constitutional:       Appearance: He is obese.   HENT:      Head: Normocephalic.      Nose: Nose normal.      Mouth/Throat:      Mouth: Mucous membranes are moist.   Eyes:      Pupils: Pupils are equal, round, and reactive to light.   Cardiovascular:      Rate and Rhythm: Normal rate. Rhythm irregular.      Pulses: Normal pulses.   Pulmonary:      Effort: Pulmonary effort is normal.      Breath sounds: Normal breath sounds.   Abdominal:      Palpations: Abdomen is soft.   Musculoskeletal:         General: Normal range of motion.      Cervical back: Normal range of motion.   Neurological:      Mental Status: He is alert.       Significant Labs: All pertinent labs within the past 24 hours have been reviewed.  BMP:   Recent Labs   Lab 03/16/22  0541   GLU 90      K 3.5      CO2 27   BUN 21*   CREATININE 0.9   CALCIUM 8.8   MG 1.4*     CBC:   Recent Labs   Lab 03/14/22  1336 03/15/22  0537 03/16/22  0541   WBC 6.32 5.39 5.26   HGB 9.5* 9.4* 9.9*   HCT 30.6* 31.9* 31.4*    182 196     CMP:   Recent Labs   Lab 03/14/22  1336 03/15/22  0537 03/16/22  0541    143 141   K 4.2 3.7 3.5    107 103   CO2 24 25 27   * 88 90   BUN 17 19 21*   CREATININE 0.8 1.0 0.9   CALCIUM 9.1 8.7 8.8   PROT 6.9 6.8 7.1   ALBUMIN 3.2* 3.2* 3.1*   BILITOT 0.9 1.2* 1.1*   ALKPHOS  170* 158* 157*   AST 25 27 23   ALT 18 19 15   ANIONGAP 10 11 11   EGFRNONAA >60 >60 >60       Significant Imaging: I have reviewed all pertinent imaging results/findings within the past 24 hours.      Assessment/Plan:      * Dizziness  Patient reported new onset Dizziness today  Plan   Correct underlying cardiac arrhythmia  Reassess after return to baseline cardiac status  If symptoms continue perform full vertigo work-up  Currently resolved    Chronic combined systolic and diastolic congestive heart failure        HTN (hypertension)  Continue home regimen of losartan 25mg daily      Chronic atrial fibrillation  Patient was on Toprol and losartan in the past but they where stopped during a recent hospitalization  Patient was experiencing diszziness and chest pain. ekg showed Afib with Rvr  Amiodarone was given, patient remains in Afib  Plan   Patient HR increases to 150 with light movement; repeat walk test   Titrate Metoprolol as lopressor to control rate and rhythm   Will continue to diurese with Lasix 40mg IV BID  Cardiology recs to titrate Metoprolol and diurese   Cardiac monitoring in place    Venous stasis dermatitis of both lower extremities  Patient seen by wound care today 3/14/22  Plan  Wound care consulted to manage the wound while admitted  Continue home dose of rivaroxaban 20mg daily      VTE Risk Mitigation (From admission, onward)         Ordered     rivaroxaban tablet 20 mg  With dinner         03/14/22 1644     Reason for No Pharmacological VTE Prophylaxis  Once        Question:  Reasons:  Answer:  Already adequately anticoagulated on oral Anticoagulants    03/14/22 1644     IP VTE HIGH RISK PATIENT  Once         03/14/22 1644     Place sequential compression device  Until discontinued         03/14/22 1644                Discharge Planning   NUHA:      Code Status: Full Code   Is the patient medically ready for discharge?:     Reason for patient still in hospital (select all that apply): Patient  trending condition, Consult recommendations and PT / OT recommendations  Discharge Plan A: Home Health   Discharge Delays: (!) Post-Acute Set-up          Andrae Bustamante MD  [facility] PGY-1  03/16/2022

## 2022-03-16 NOTE — SUBJECTIVE & OBJECTIVE
Interval History: NAEON    Review of Systems   Constitutional:  Negative for activity change, appetite change, diaphoresis, fatigue and fever.   HENT:  Negative for sore throat.    Respiratory:  Negative for apnea, chest tightness, shortness of breath and wheezing.    Cardiovascular:  Positive for leg swelling. Negative for chest pain.        Mostly resolved per patient   Gastrointestinal:  Negative for abdominal pain, constipation, diarrhea, nausea and vomiting.   Genitourinary:  Negative for difficulty urinating.   Musculoskeletal:  Negative for arthralgias.   Skin:  Positive for wound.        Bilateral foot wounds from chronic venous stasis   Neurological:  Negative for dizziness, weakness and headaches.   Psychiatric/Behavioral: Negative.     Objective:     Vital Signs (Most Recent):  Temp: 98.2 °F (36.8 °C) (03/16/22 0802)  Pulse: 78 (03/16/22 0835)  Resp: 16 (03/16/22 0802)  BP: (!) 112/57 (03/16/22 0802)  SpO2: (!) 93 % (03/16/22 0802)   Vital Signs (24h Range):  Temp:  [97.7 °F (36.5 °C)-98.3 °F (36.8 °C)] 98.2 °F (36.8 °C)  Pulse:  [] 78  Resp:  [16-22] 16  SpO2:  [93 %-98 %] 93 %  BP: (107-123)/(57-84) 112/57     Weight: (!) 159.4 kg (351 lb 6.6 oz)  Body mass index is 38.6 kg/m².    Intake/Output Summary (Last 24 hours) at 3/16/2022 0858  Last data filed at 3/16/2022 0700  Gross per 24 hour   Intake 400 ml   Output 2200 ml   Net -1800 ml      Physical Exam  Constitutional:       Appearance: He is obese.   HENT:      Head: Normocephalic.      Nose: Nose normal.      Mouth/Throat:      Mouth: Mucous membranes are moist.   Eyes:      Pupils: Pupils are equal, round, and reactive to light.   Cardiovascular:      Rate and Rhythm: Normal rate. Rhythm irregular.      Pulses: Normal pulses.   Pulmonary:      Effort: Pulmonary effort is normal.      Breath sounds: Normal breath sounds.   Abdominal:      Palpations: Abdomen is soft.   Musculoskeletal:         General: Normal range of motion.      Cervical  back: Normal range of motion.   Neurological:      Mental Status: He is alert.       Significant Labs: All pertinent labs within the past 24 hours have been reviewed.  BMP:   Recent Labs   Lab 03/16/22  0541   GLU 90      K 3.5      CO2 27   BUN 21*   CREATININE 0.9   CALCIUM 8.8   MG 1.4*     CBC:   Recent Labs   Lab 03/14/22  1336 03/15/22  0537 03/16/22  0541   WBC 6.32 5.39 5.26   HGB 9.5* 9.4* 9.9*   HCT 30.6* 31.9* 31.4*    182 196     CMP:   Recent Labs   Lab 03/14/22  1336 03/15/22  0537 03/16/22  0541    143 141   K 4.2 3.7 3.5    107 103   CO2 24 25 27   * 88 90   BUN 17 19 21*   CREATININE 0.8 1.0 0.9   CALCIUM 9.1 8.7 8.8   PROT 6.9 6.8 7.1   ALBUMIN 3.2* 3.2* 3.1*   BILITOT 0.9 1.2* 1.1*   ALKPHOS 170* 158* 157*   AST 25 27 23   ALT 18 19 15   ANIONGAP 10 11 11   EGFRNONAA >60 >60 >60       Significant Imaging: I have reviewed all pertinent imaging results/findings within the past 24 hours.

## 2022-03-17 NOTE — DISCHARGE SUMMARY
[facility] Medicine  Discharge Summary      Patient Name: Drew Farrar  MRN: [id number]  Patient Class: IP- Inpatient  Admission Date: 3/14/2022  Hospital Length of Stay: 1 days  Discharge Date and Time:  03/17/2022 4:16 PM  Attending Physician: Gino Ji III, MD   Discharging Provider: Andrae Bustamante MD  Primary Care Provider: Garrison Roach MD      HPI:    56 yr old male with PMH of afib, arthritis, bipolar, chf, congenital heart disease, carpal tunnel, cva, obesity, thyroid disease presents to the ED with reports of sob worsening, dizziness and states overall he feels fatigue. States he has had intermittent chest pain for 3 days. Pt states he was seen per Dr Roa and podiatry in the clinics this morning however states he was told is his condition worsened to come to the ER.     On arrival to ED patient was determined to be in Afib with RVR, and was given Amiodarone 150mg IV. Patient's cardiac function dropped quickly to sinus Trav and patient became unresponsive. Patient returned to baseline within 15 minutes. He remains in Afib and dizzy. Per Cardiology will be placed in Observation and restarted on metoprolol and more aggressively diuresed with double usual dose of furosemide.      * No surgery found *      Hospital Course:   Patient admitted for management of fluid overload as well as Afib w/RVR. Patient diursed overnight with 40MG BID Lasix which is double patients home diuretic does. Patient is at a net loss of 3L of fluid since admit and is feeling less swollen. Patient reported chest pain, troponin nedgative no change on EKG. Echocardiogram showed some worsening of existing heart failure. Patient is asymptomatic this AM, pulse controlled with Toprol-XL 200mg. Will be sent home on toprol and losartan as recommended by cardiology. To follow-up with cardiology and PCP.       Goals of Care Treatment Preferences:  Code Status: Full Code    Physical Exam  Constitutional:        Appearance: Normal appearance.   HENT:      Head: Normocephalic.      Mouth/Throat:      Mouth: Mucous membranes are moist.   Eyes:      Pupils: Pupils are equal, round, and reactive to light.   Cardiovascular:      Rate and Rhythm: Normal rate.   Pulmonary:      Effort: Pulmonary effort is normal.   Abdominal:      Palpations: Abdomen is soft.   Musculoskeletal:         General: Swelling and signs of injury (Stasis ulcers present on admission) present.      Cervical back: Normal range of motion.      Comments: Patient's legs are swollen at baseline due to venous stasis  Currently improved   Patient states back to baseline   Skin:     General: Skin is warm.   Neurological:      General: No focal deficit present.      Mental Status: He is alert and oriented to person, place, and time.   Psychiatric:         Mood and Affect: Mood normal.       Consults:   Consults (From admission, onward)        Status Ordering Provider     IP consult to case management  Once        Provider:  (Not yet assigned)    FADUMO Mendoza III     Inpatient consult to Cardiology  Once        Provider:  Gomez Lantigua MD    Completed RANJIT LEY          No new Assessment & Plan notes have been filed under this hospital service since the last note was generated.  Service: Hospital Medicine    Final Active Diagnoses:    Diagnosis Date Noted POA    PRINCIPAL PROBLEM:  Dizziness [R42] 03/14/2022 Yes    Chronic combined systolic and diastolic congestive heart failure [I50.42] 02/20/2017 Yes    HTN (hypertension) [I10] 09/16/2016 Yes    Chronic atrial fibrillation [I48.20] 12/14/2012 Yes    Venous stasis dermatitis of both lower extremities [I87.2] 12/14/2012 Yes      Problems Resolved During this Admission:       Discharged Condition: stable    Disposition: Home or Self Care    Follow Up:    Patient Instructions:      Ambulatory referral/consult to Cardiology   Standing Status: Future   Referral Priority: Routine Referral Type:  Consultation   Referral Reason: Specialty Services Required   Referred to Provider: SHWETA LANTIGUA Requested Specialty: Cardiology   Number of Visits Requested: 1     Ambulatory referral/consult to [facility]   Standing Status: Future   Referral Priority: Routine Referral Type: Consultation   Referral Reason: Specialty Services Required   Referred to Provider: ALEK LIRIANO Requested Specialty: Family Medicine   Number of Visits Requested: 1     SUBSEQUENT HOME HEALTH ORDERS   Order Comments: Subsequent Home Health Orders    Current Medications:  Current Facility-Administered Medications:  acetaminophen tablet 650 mg, 650 mg, Oral, Q4H PRN, Andrae Bustamante MD, 650 mg at 03/16/22 1825  aluminum-magnesium hydroxide-simethicone 200-200-20 mg/5 mL suspension 30 mL, 30 mL, Oral, Once, Andrae Bustamante MD   And  LIDOcaine HCl 2% oral solution 10 mL, 10 mL, Oral, Once, Andrae Bustamante MD  atorvastatin tablet 40 mg, 40 mg, Oral, QHS, Andrae Bustamante MD, 40 mg at 03/16/22 2057  losartan tablet 25 mg, 25 mg, Oral, Daily, Andrae Bustamante MD, 25 mg at 03/17/22 0950  melatonin tablet 9 mg, 9 mg, Oral, Nightly PRN, Andrae Bustamante MD, 9 mg at 03/15/22 2115  metoprolol succinate (TOPROL-XL) 24 hr tablet 200 mg, 200 mg, Oral, Daily, Pamela Allen MD, 200 mg at 03/17/22 0950  mupirocin 2 % ointment, , Nasal, BID, Gino Ji III, MD, Given at 03/17/22 0952  naloxone 0.4 mg/mL injection 0.02 mg, 0.02 mg, Intravenous, PRN, Andrae Bustamante MD  ondansetron injection 4 mg, 4 mg, Intravenous, Q6H PRN, Pamela Allen MD  pantoprazole EC tablet 40 mg, 40 mg, Oral, Daily, Pamela Allen MD, 40 mg at 03/17/22 1228  perflutren protein-A microsphr 0.22 mg/mL IV susp, 0.5 mL, Intravenous, Once, Shweta Lantigua MD  rivaroxaban tablet 20 mg, 20 mg, Oral, Daily with dinner, Andrae Bustamante MD, 20 mg at 03/16/22 1713  sars-cov-2 (covid-19) (MODERNA COVID-19) 100 mcg/0.5 ml injection 0.25 mL, 0.25 mL, Intramuscular, vaccine x 1 dose,  Gino Ji III, MD  senna-docusate 8.6-50 mg per tablet 1 tablet, 1 tablet, Oral, BID PRN, Pamela Allen MD, 1 tablet at 03/15/22 1832  sodium chloride 0.9% flush 5 mL, 5 mL, Intravenous, PRN, Andrae Bustamante MD  sumatriptan tablet 50 mg, 50 mg, Oral, TID PRN, Andrae Bustamante MD, 50 mg at 03/16/22 1103  torsemide tablet 30 mg, 30 mg, Oral, Daily, Pamela Allen MD, 30 mg at 03/17/22 0950        Nursing:   Heart Failure:      SN to instruct on the following:    Instruct on the definition of CHF.   Instruct on the signs/sympoms of CHF to be reported.   Instruct on and monitor daily weights.   Instruct on factors that cause exacerbation.   Instruct on action, dose, schedule, and side effects of medications.   Instruct on diet as prescribed.   Instruct on activity allowed.   Instruct on life-style modifications for life long management of CHF   SN to assess compliance with daily weights, diet, medications, fluid retention,     safety precautions, activities permitted and life-style modifications.   Additional 1-2 SN visits per week as needed for signs and symptoms     of CHF exacerbation.      For Weight Gain > 2-3 lbs in 1 day or 4-6 lbs over 1 week notify PCP:  Increase Torsemide (oral diuretic) dose to 40 mg for 5 days temporarily  If weight does not decrease by 4 lbs after 5 days of increased diuretic usage: Notify PCP.    Diet:   cardiac diet    Activities:   activity as tolerated and ambulate in house     Labs:  N/A    Referrals/ Consults  Physical Therapy to evaluate and treat. Evaluate for home safety and equipment needs; Establish/upgrade home exercise program. Perform / instruct on therapeutic exercises, gait training, transfer training, and Range of Motion.  Occupational Therapy to evaluate and treat. Evaluate home environment for safety and equipment needs. Perform/Instruct on transfers, ADL training, ROM, and therapeutic exercises.       Home Health Aide:   Nursing Daily, Physical Therapy Three  times weekly, and Occupational Therapy Three times weekly     Order Specific Question Answer Comments   What Home Health Agency is the patient currently using? [facility]        Significant Diagnostic Studies: Labs:   CMP   Recent Labs   Lab 03/16/22  0541 03/17/22  0556    140   K 3.5 3.6    104   CO2 27 24   GLU 90 86   BUN 21* 23*   CREATININE 0.9 0.8   CALCIUM 8.8 8.9   PROT 7.1 7.7   ALBUMIN 3.1* 3.3*   BILITOT 1.1* 1.2*   ALKPHOS 157* 155*   AST 23 30   ALT 15 18   ANIONGAP 11 12   ESTGFRAFRICA >60 >60   EGFRNONAA >60 >60   , CBC   Recent Labs   Lab 03/16/22  0541 03/17/22  0556   WBC 5.26 5.37   HGB 9.9* 9.9*   HCT 31.4* 32.5*    215    and Troponin   Recent Labs   Lab 03/14/22  1336 03/15/22  1624 03/16/22  1154   TROPONINI 0.019 0.018 0.017       Pending Diagnostic Studies:     None         Medications:  Reconciled Home Medications:      Medication List      CHANGE how you take these medications    metoprolol succinate 200 MG 24 hr tablet  Commonly known as: TOPROL-XL  Take 1 tablet (200 mg total) by mouth once daily.  Start taking on: March 18, 2022  What changed:   · medication strength  · how much to take     torsemide 20 MG Tab  Commonly known as: DEMADEX  Take 2 tablets by mouth daily.  What changed:   · how much to take  · additional instructions        CONTINUE taking these medications    ALEVE 220 MG tablet  Generic drug: naproxen sodium  Take 220 mg by mouth as needed.     atorvastatin 40 MG tablet  Commonly known as: LIPITOR  Take 1 tablet (40 mg total) by mouth every evening.     losartan 25 MG tablet  Commonly known as: COZAAR  Take 1 tablet (25 mg total) by mouth once daily.     sumatriptan 50 MG tablet  Commonly known as: IMITREX  Take 50 mg for headache. If headache does not resolve, take another 50mg two hours after initial dose. Do not exceed 200mg in 24 hours.     VENTOLIN HFA 90 mcg/actuation inhaler  Generic drug: albuterol  Inhale 2 puffs into the lungs every  6 (six) hours as needed for Wheezing. Rescue     XARELTO 20 mg Tab  Generic drug: rivaroxaban  Take 1 tablet (20 mg total) by mouth daily with dinner or evening meal.            Indwelling Lines/Drains at time of discharge:   Lines/Drains/Airways     None                 Time spent on the discharge of patient: 30 minutes       Andrae Bustamante MD  [facility] PGY-1  03/17/2022

## 2022-03-17 NOTE — PT/OT/SLP EVAL
Physical Therapy Evaluation and Discharge Note    Patient Name:  Drew Farrar   MRN:  [id number]    Recommendations:     Discharge Recommendations:  home with home health   Discharge Equipment Recommendations: none   Barriers to Discharge: None    Assessment:     Drew Farrar is a 56 y.o. male admitted with a medical diagnosis of Dizziness. .  At this time, patient is functioning at their prior level of function and does not require further acute PT services.     Recent Surgery: * No surgery found *      Plan:     During this hospitalization, patient does not require further acute PT services.  Please re-consult if situation changes.      Subjective     Chief Complaint: none  Patient/Family Comments/goals: pt reporting he is ready to d/c home  Pain/Comfort:  Pain Rating 1: 0/10  Pain Rating Post-Intervention 1: 0/10    Patients cultural, spiritual, [employer]aritan conflicts given the current situation: no    Living Environment:  Pt lives with his wife in a [facility] with ramp entrance and tub/shower combo with shower chair and grab bars.  Prior to admission, patients level of function was supervision/mod I with use of his RW for standing and ambulation, requires his wife's assist to manage LB dressing 2/2 R shoulder pain. Pt is receiving  services for wound care management.  Equipment used at home: bedside commode, walker, standard, walker, rolling, wheelchair, shower chair.  DME owned (not currently used): standard walker.  Upon discharge, patient will have assistance from his wife is home with him.    Objective:     Communicated with nurse Carmona prior to session. Patient found HOB elevated with   upon PT entry to room.    General Precautions: Standard, fall   Orthopedic Precautions:N/A   Braces:  (Darco shoe on L foot)   Respiratory Status: Room air    Exams:  · Gross Motor Coordination:  WFL  · Postural Exam:  Patient presented with the following abnormalities:    · -       Rounded shoulders  · Skin Integrity/Edema:       · -       Skin integrity: R lower leg thick skin, L foot wrapped and in Darco shoe  · -       Edema: BLEs  · RLE ROM: WFL except lacking 20-30 deg from terminal knee ext  · RLE Strength: anle DF WFLs, knee ext 4/5, hip flexion 3+/5  · LLE ROM: WFL  · LLE Strength: WFL    Functional Mobility:  · Bed Mobility:     · Scooting: modified independence  · Supine to Sit: modified independence  · Transfers:     · Sit to Stand:  modified independence with rolling walker  · Gait: ~40 ft with RW at SBA/supervision level - pt ambulates with flexed posture over RW as RW is too low for pt despite being set at highest setting. No instability or LOB noted, pt reporting he is at his baseline.    AM-PAC 6 CLICK MOBILITY  Total Score:22       Therapeutic Activities and Exercises:  Educated pt on role of PT and pt agreeable to participate in therapy session, reporting he is ready to go home.  Transitioned to sit EOB, bed elevated moderately prior to stand 2/2 pt's height.  Pt completed stand and ambulation as reported above with no complaints of dizziness, discomfort, pain, and reporting feeling at his baseline.  Returned to sit EOB.    AM-PAC 6 CLICK MOBILITY  Total Score:22     Patient left sitting edge of bed with all lines intact, call button in reach, bed alarm on, nurse and MD team notified and pt's wife present.    GOALS:   Multidisciplinary Problems     Physical Therapy Goals     Not on file          Multidisciplinary Problems (Resolved)        Problem: Physical Therapy Goal    Goal Priority Disciplines Outcome Goal Variances Interventions   Physical Therapy Goal   (Resolved)     PT, PT/OT Met                     History:     Past Medical History:   Diagnosis Date    *Atrial fibrillation     Anticoagulant long-term use     Arthritis     Atrial fibrillation     Atrial fibrillation Feb 23, 2016    Bipolar disorder     Carpal tunnel syndrome on right 2/10/2022    CHF (congestive heart failure)     Congenital heart  disease     s/p surgical intervention 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 of age    Deep vein thrombosis     DVT of leg (deep venous thrombosis)     left leg    History of prior ablation treatment     10/9/13    Hypertension     Obesity     Stroke     Thyroid disease     Venous stasis ulcer of lower extremity, unspecified laterality 12/14/2012    Venous ulcer        Past Surgical History:   Procedure Laterality Date    ABLATION Left 1/20/2022    Procedure: Ablation;  Surgeon: Gomez Lantigua MD;  Location: [facility] CATH LAB/EP;  Service: Cardiology;  Laterality: Left;    ANGIOPLASTY      CARDIAC SURGERY      open heart surgery 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 old    EYE SURGERY      left eye cataract/right eye glaucoma    TIMO FILTER PLACEMENT      Dr Calix ([facility])    KNEE SURGERY      l and r     MULTIPLE TOOTH EXTRACTIONS      SKIN GRAFT      left leg       Time Tracking:     PT Received On: 03/17/22  PT Start Time: 1435     PT Stop Time: 1453  PT Total Time (min): 18 min     Billable Minutes: Evaluation 10 and Gait Training 8      03/17/2022

## 2022-03-17 NOTE — ASSESSMENT & PLAN NOTE
- echo with EF 40-45% in August 2020; repeat echo yesterday with EF 40%  - transitioned back to Demadex; no ADHF on PE; continue Demadex, BB and ARB

## 2022-03-17 NOTE — ASSESSMENT & PLAN NOTE
- long standing history of chronic afib treated with Xarelto and Metoprolol previously; reports BB stopped for unknown reasons  - remains in afib with HR 80s-110s- slowly improving; dizziness resolved  - transitioned to Toprol XL 200mg po daily today; continue BB and Xarelto   - goal HR less 120bpm if RVR with activity occurs but not sustained and patient asymptomatic then suitable for discharge from cardiac standpoint; expected physiological response   - follow up in cards clinic (Dr. Lantigua)

## 2022-03-17 NOTE — PROGRESS NOTES
[place] - Telemetry  Cardiology  Progress Note    Patient Name: Drew Farrar  MRN: [id number]  Admission Date: 3/14/2022  Hospital Length of Stay: 1 days  Code Status: Full Code   Attending Physician: Gino Ji III, MD   Primary Care Physician: Garrison Roach MD  Expected Discharge Date:   Principal Problem:Dizziness    Subjective:     Hospital Course:   3/15/2022 Presented to the ER with dizziness with notation of afib with RVR.   3/16/2022 HR remains 100s-110s afib. SBP 100s-110s BMP WNL. H&H 9.9/31.4 stable at his baseline. Remains on Lasix 40mg IV BID with 2.2L negative 2.7L since admission   3/17/2022 Transitioned to oral Demadex from IV Lasix yesterday. CBC WNL. BMP with creatinine .9. Remains in afib with HR 80s-110s transitioned to Toprol XL           ROS  Objective:     Vital Signs (Most Recent):  Temp: 96.1 °F (35.6 °C) (03/17/22 0900)  Pulse: 93 (03/17/22 0900)  Resp: 20 (03/17/22 0358)  BP: 124/74 (03/17/22 0900)  SpO2: 97 % (03/17/22 0900) Vital Signs (24h Range):  Temp:  [96.1 °F (35.6 °C)-98.4 °F (36.9 °C)] 96.1 °F (35.6 °C)  Pulse:  [] 93  Resp:  [18-20] 20  SpO2:  [94 %-100 %] 97 %  BP: (112-165)/(58-74) 124/74     Weight: (!) 159.2 kg (351 lb)  Body mass index is 38.56 kg/m².     SpO2: 97 %  O2 Device (Oxygen Therapy): room air      Intake/Output Summary (Last 24 hours) at 3/17/2022 1048  Last data filed at 3/17/2022 0600  Gross per 24 hour   Intake 320 ml   Output 300 ml   Net 20 ml       Lines/Drains/Airways       Peripheral Intravenous Line  Duration                  Peripheral IV - Single Lumen 03/14/22 1310 22 G Left Hand 2 days                    Physical Exam    Significant Labs: BMP:   Recent Labs   Lab 03/16/22  0541 03/17/22  0556   GLU 90 86    140   K 3.5 3.6    104   CO2 27 24   BUN 21* 23*   CREATININE 0.9 0.8   CALCIUM 8.8 8.9   MG 1.4* 1.4*    and CBC   Recent Labs   Lab 03/16/22  0541 03/17/22  0556   WBC 5.26 5.37   HGB 9.9* 9.9*   HCT 31.4* 32.5*     215       Significant Imaging: Echocardiogram: Transthoracic echo (TTE) complete (Cupid Only):   Results for orders placed or performed during the hospital encounter of 03/14/22   Echo   Result Value Ref Range    AV mean gradient 5 mmHg    Ao peak rufus 1.37 m/s    Ao VTI 23.50 cm    IVRT 57.09 msec    IVS 0.99 0.6 - 1.1 cm    LA size 6.15 cm    Left Atrium Major Axis 6.56 cm    Left Atrium Minor Axis 2.87 cm    LVIDd 7.18 (A) 3.5 - 6.0 cm    LVIDs 5.92 (A) 2.1 - 4.0 cm    LVOT diameter 2.47 cm    LVOT peak VTI 22.46 cm    Posterior Wall 0.81 0.6 - 1.1 cm    RA Major Axis 7.42 cm    RA Width 5.62 cm    TAPSE 2.38 cm    TR Max Rufus 3.05 m/s    LA WIDTH 5.33 cm    PV PEAK VELOCITY 1.09 cm/s    LV Diastolic Volume 270.71 mL    LV Systolic Volume 174.47 mL    LVOT peak rufus 1.13 m/s    Mr max rufus 0.05 m/s    LA volume (mod) 124.19 cm3    MV mean gradient 1 mmHg    MV peak gradient 7 mmHg    RV S' 9.75 cm/s    MV VTI 21.91 cm    FS 18 %    LA volume 111.26 cm3    LV mass 295.13 g    Left Ventricle Relative Wall Thickness 0.23 cm    AV valve area 4.58 cm2    AV Velocity Ratio 0.82     AV index (prosthetic) 0.96     MV valve area by continuity eq 4.91 cm2    LVOT area 4.8 cm2    LVOT stroke volume 107.57 cm3    AV peak gradient 8 mmHg    LV Systolic Volume Index 59.8 mL/m2    LV Diastolic Volume Index 92.71 mL/m2    LA Volume Index 38.1 mL/m2    LV Mass Index 101 g/m2    Triscuspid Valve Regurgitation Peak Gradient 37 mmHg    LA Volume Index (Mod) 42.5 mL/m2    BSA 3 m2    Right Atrial Pressure (from IVC) 15 mmHg    EF 40 %    TV rest pulmonary artery pressure 52 mmHg    Narrative    · The left ventricle is severely enlarged with moderately decreased   systolic function.  · The estimated ejection fraction is 40%.  · There is moderate left ventricular global hypokinesis.  · Mild-to-moderate mitral regurgitation.  · A diastolic pattern consistent with atrial fibrillation observed.  · Mild aortic regurgitation.  · Mild to  moderate tricuspid regurgitation.  · Mild to moderate pulmonic regurgitation.  · Mild right ventricular enlargement with mildly reduced right ventricular   systolic function.  · Elevated central venous pressure (15 mmHg).  · The estimated PA systolic pressure is 52 mmHg.  · There is moderate pulmonary hypertension.        Assessment and Plan:     Brief HPI: Seen this morning on AM rounds with Dr. Lantigua while resting in bed. Discussed cardiac POC as detailed below-verbalized understanding and agrees with POC     * Dizziness  - resolved     Chronic combined systolic and diastolic congestive heart failure  - echo with EF 40-45% in August 2020; repeat echo yesterday with EF 40%  - transitioned back to Demadex; no ADHF on PE; continue Demadex, BB and ARB    HTN (hypertension)  - SBP 110s-120s  - on Losartan at home dose; BB resumed    Chronic atrial fibrillation  - long standing history of chronic afib treated with Xarelto and Metoprolol previously; reports BB stopped for unknown reasons  - remains in afib with HR 80s-110s- slowly improving; dizziness resolved  - transitioned to Toprol XL 200mg po daily today; continue BB and Xarelto   - goal HR less 120bpm if RVR with activity occurs but not sustained and patient asymptomatic then suitable for discharge from cardiac standpoint; expected physiological response   - follow up in cards clinic (Dr. Lantigua)          VTE Risk Mitigation (From admission, onward)         Ordered     rivaroxaban tablet 20 mg  With dinner         03/14/22 1644     Reason for No Pharmacological VTE Prophylaxis  Once        Question:  Reasons:  Answer:  Already adequately anticoagulated on oral Anticoagulants    03/14/22 1644     IP VTE HIGH RISK PATIENT  Once         03/14/22 1644     Place sequential compression device  Until discontinued         03/14/22 1644                BONIFACIO Cooper, ANP  Cardiology  [place] - Telemetry

## 2022-03-17 NOTE — PLAN OF CARE
Problem: Physical Therapy Goal  Goal: Physical Therapy Goal  Outcome: Met    PT evaluation completed, note to follow. Pt is currently functioning at his baseline with no further IP PT needs, d/c PT.

## 2022-03-17 NOTE — NURSING
"Patient complained of chest pain on right side of chest 7/10  . Vital signs obtained and reported situation to  to MD Lemus .   Vital Signs   /74 mmofhg   Map 95  P 106b/m  R 22   T 97.2  Sat 98%  MD stated "no interventions ordered for this time . I will come to evaluate patient and will place orders in as per necessity ". Patient reported relief of chest pain with relaxation and deep breathing . Monitoring for Changes .  "

## 2022-03-17 NOTE — PLAN OF CARE
SW met with pt and wife at bedside for final assessment. Pt will have father-in-law transport him home later today. Pt stated he wanted to get dinner at hospital before leaving. SW told pt's wife that she could have her father be here at hospital by 6pm. Pt has f/u apts on avs. Pt will resume care with HH agency. Rounds completed on pt.  All questions addressed.  Bedside nurse to discuss d/c medications.  Discussed importance to attend all f/u appts and take medications as prescribed.  Verbalized understanding.    ANGEL Almaguer  [phone]    Future Appointments   Date Time Provider Department Center   3/24/2022  9:00 AM Shin Hightower IV, MD [facility]   3/24/2022 11:00 AM Stanislaw Nuñez MD [facility]   3/28/2022 11:40 AM Gomez Lantigua MD [facility]        03/17/22 1631   Final Note   Assessment Type Final Discharge Note   Anticipated Discharge Disposition Home-Health   What phone number can be called within the next 1-3 days to see how you are doing after discharge? [phone]   Hospital Resources/Appts/Education Provided Appointments scheduled by Navigator/Coordinator   Post-Acute Status   Post-Acute Authorization Home Health   Home Health Status Set-up Complete/Auth obtained   Coverage [employer] MANAGED MEDICARE   Discharge Delays None known at this time

## 2022-03-17 NOTE — SUBJECTIVE & OBJECTIVE
ROS  Objective:     Vital Signs (Most Recent):  Temp: 96.1 °F (35.6 °C) (03/17/22 0900)  Pulse: 93 (03/17/22 0900)  Resp: 20 (03/17/22 0358)  BP: 124/74 (03/17/22 0900)  SpO2: 97 % (03/17/22 0900) Vital Signs (24h Range):  Temp:  [96.1 °F (35.6 °C)-98.4 °F (36.9 °C)] 96.1 °F (35.6 °C)  Pulse:  [] 93  Resp:  [18-20] 20  SpO2:  [94 %-100 %] 97 %  BP: (112-165)/(58-74) 124/74     Weight: (!) 159.2 kg (351 lb)  Body mass index is 38.56 kg/m².     SpO2: 97 %  O2 Device (Oxygen Therapy): room air      Intake/Output Summary (Last 24 hours) at 3/17/2022 1048  Last data filed at 3/17/2022 0600  Gross per 24 hour   Intake 320 ml   Output 300 ml   Net 20 ml       Lines/Drains/Airways       Peripheral Intravenous Line  Duration                  Peripheral IV - Single Lumen 03/14/22 1310 22 G Left Hand 2 days                    Physical Exam    Significant Labs: BMP:   Recent Labs   Lab 03/16/22  0541 03/17/22  0556   GLU 90 86    140   K 3.5 3.6    104   CO2 27 24   BUN 21* 23*   CREATININE 0.9 0.8   CALCIUM 8.8 8.9   MG 1.4* 1.4*    and CBC   Recent Labs   Lab 03/16/22  0541 03/17/22  0556   WBC 5.26 5.37   HGB 9.9* 9.9*   HCT 31.4* 32.5*    215       Significant Imaging: Echocardiogram: Transthoracic echo (TTE) complete (Cupid Only):   Results for orders placed or performed during the hospital encounter of 03/14/22   Echo   Result Value Ref Range    AV mean gradient 5 mmHg    Ao peak rufus 1.37 m/s    Ao VTI 23.50 cm    IVRT 57.09 msec    IVS 0.99 0.6 - 1.1 cm    LA size 6.15 cm    Left Atrium Major Axis 6.56 cm    Left Atrium Minor Axis 2.87 cm    LVIDd 7.18 (A) 3.5 - 6.0 cm    LVIDs 5.92 (A) 2.1 - 4.0 cm    LVOT diameter 2.47 cm    LVOT peak VTI 22.46 cm    Posterior Wall 0.81 0.6 - 1.1 cm    RA Major Axis 7.42 cm    RA Width 5.62 cm    TAPSE 2.38 cm    TR Max Rufus 3.05 m/s    LA WIDTH 5.33 cm    PV PEAK VELOCITY 1.09 cm/s    LV Diastolic Volume 270.71 mL    LV Systolic Volume 174.47 mL    LVOT  peak lucio 1.13 m/s    Mr max lucio 0.05 m/s    LA volume (mod) 124.19 cm3    MV mean gradient 1 mmHg    MV peak gradient 7 mmHg    RV S' 9.75 cm/s    MV VTI 21.91 cm    FS 18 %    LA volume 111.26 cm3    LV mass 295.13 g    Left Ventricle Relative Wall Thickness 0.23 cm    AV valve area 4.58 cm2    AV Velocity Ratio 0.82     AV index (prosthetic) 0.96     MV valve area by continuity eq 4.91 cm2    LVOT area 4.8 cm2    LVOT stroke volume 107.57 cm3    AV peak gradient 8 mmHg    LV Systolic Volume Index 59.8 mL/m2    LV Diastolic Volume Index 92.71 mL/m2    LA Volume Index 38.1 mL/m2    LV Mass Index 101 g/m2    Triscuspid Valve Regurgitation Peak Gradient 37 mmHg    LA Volume Index (Mod) 42.5 mL/m2    BSA 3 m2    Right Atrial Pressure (from IVC) 15 mmHg    EF 40 %    TV rest pulmonary artery pressure 52 mmHg    Narrative    · The left ventricle is severely enlarged with moderately decreased   systolic function.  · The estimated ejection fraction is 40%.  · There is moderate left ventricular global hypokinesis.  · Mild-to-moderate mitral regurgitation.  · A diastolic pattern consistent with atrial fibrillation observed.  · Mild aortic regurgitation.  · Mild to moderate tricuspid regurgitation.  · Mild to moderate pulmonic regurgitation.  · Mild right ventricular enlargement with mildly reduced right ventricular   systolic function.  · Elevated central venous pressure (15 mmHg).  · The estimated PA systolic pressure is 52 mmHg.  · There is moderate pulmonary hypertension.

## 2022-03-17 NOTE — PROGRESS NOTES
03/17/2022 @ 11:02 AM- [facility] attempted to contact pt via room phone to complete SDOH questionnaire and liaison assessment. RSW received no answer at this time. RSW will follow up with patient at a later time to complete initial visit assessment.      IP Liaison - Initial Visit Note    Patient: Drew Farrar  MRN:  [id number]  Date of Service:  3/17/2022  Completed by:  DENNIS Simeon    Reason for Visit   Patient presents with    IP Liaison Initial Visit       RSW contacted pt via room phone in order to complete SDOH questionnaire and liaison assessment.  Pt has identified no immediate social barriers to care. Per pt, pt is not in need of resources at this time.    The following were addressed during this visit:  - Review SDOH Questions   - Complete patient assessment   - Complete initial visit with patient        Patient Summary     IP Liaison Patient Assessment    General  Level of Caregiver support: Member independent and does not need caregiver assistance  Have you had to make a decision between paying for any of the following in the last 2 months?: None  Transportation means: Family  Assessments  Was the PHQ Depression Screening completed this visit?: No  Was the KAMLESH-7 Screening completed this visit?: No         DENNIS Simeon

## 2022-03-17 NOTE — PLAN OF CARE
Problem: Adult Inpatient Plan of Care  Goal: Plan of Care Review  3/17/2022 1712 by Halina Amaro RN  Outcome: Met  3/17/2022 1431 by Halina Amaro RN  Outcome: Ongoing, Progressing  Goal: Patient-Specific Goal (Individualized)  Outcome: Met  Goal: Absence of Hospital-Acquired Illness or Injury  Outcome: Met  Goal: Optimal Comfort and Wellbeing  Outcome: Met  Goal: Readiness for Transition of Care  Outcome: Met     Problem: Bariatric Environmental Safety  Goal: Safety Maintained with Care  Outcome: Met     Problem: Infection  Goal: Absence of Infection Signs and Symptoms  Outcome: Met     Problem: Impaired Wound Healing  Goal: Optimal Wound Healing  Outcome: Met     Problem: Fall Injury Risk  Goal: Absence of Fall and Fall-Related Injury  Outcome: Met     Problem: Dysrhythmia  Goal: Normalized Cardiac Rhythm  Outcome: Met     Problem: Heart Failure Comorbidity  Goal: Maintenance of Heart Failure Symptom Control  Outcome: Met     Problem: Hypertension Comorbidity  Goal: Blood Pressure in Desired Range  Outcome: Met

## 2022-03-17 NOTE — PROGRESS NOTES
Patient has received discharge instructions.  Instructions reviewed with pt using teachback method. All questions answered to pt satisfaction. Any non-implanted  IV access and telemetry present,  have been  removed per bedside nurse. Transport requested for discharge.          03/17/22 1710    Notification    Notified Of Discharge Status   Shift   Virtual Nurse - Rounding Complete  (Discharge)   Virtual Nurse - Patient Verbalized Approval Of Camera Use   Safety/Activity   Patient Rounds bed in low position;visualized patient   Activity Management Up in chair - L3

## 2022-03-18 NOTE — PROGRESS NOTES
IP Liaison - Final Visit Note    Patient: Drew Farrar  MRN:  [id number]  Date of Service:  3/18/2022  Completed by:  DENNIS Simeon    Reason for Visit   Patient presents with    IP Liaison Chart Review     Patient discharged from hospital before DENNIS was able to complete follow-up visit.       Patient Summary     Discharge Date: 3/17/2022  Discharge telephone number/address: [phone] / [phone] [place] [address]  Follow up provider: Stanislaw Nuñez MD  Follow up appointments: 3/24/2022 @ 11:00  Home Health agency & telephone number: [facility]   DME ordered &  name: n/a  Assigned OPCM RN/SW: n/a  Report sent to follow up team (PCP/OPCM) via in basket message: n/a  Community Resources arranged including agency name & contact info: n/a      DENNIS Simeon

## 2022-03-18 NOTE — PROGRESS NOTES
C3 nurse attempted to contact Drew Farrar for a TCC post hospital discharge follow up call. No answer. Left voicemail with callback information. The patient has a scheduled HOSFU appointment with ELVIN Dover on 03/24/2022 @ 1100.

## 2022-03-18 NOTE — PROGRESS NOTES
C3 nurse attempted to contact Drew Farrar  for a TCC post hospital discharge follow up call. No answer. Left voicemail with callback information. The patient has a scheduled HOSFU appointment with CODY Nuñez  on 03/24/2022 @ 1100.

## 2022-03-21 NOTE — PROGRESS NOTES
C3 nurse spoke with Drew Farrar for a TCC post hospital discharge follow up call. The patient has a scheduled HOSFU appointment with CODY Nuñez on 03/24/2022 @ 1100. Patient discontinued call half way through.

## 2022-03-23 NOTE — PHYSICIAN QUERY
PT Name: Drew Farrar  MR #: [id number]    DOCUMENTATION CLARIFICATION     CDS/: Karena Navarrete RN CDIS             Contact information: [email or web address]      This form is a permanent document in the medical record.     Query Date: March 23, 2022    By submitting this query, we are merely seeking further clarification of documentation. Please utilize your independent clinical judgment when addressing the question(s) below.    The Medical Record contains the following:   Indicator   Supporting Clinical Findings Location in Medical Record    Ulcer/Injury     x Wound Care Consult medial ankle- chronic venous stasis ulcer- 3x1.5x0.1cm- slough with minimal granulation- small amount of serosanguinous drainage- no odor- no erythema.     Wound care note 3/16     Radiology Findings      Acute/Chronic Illness      Medication/Treatment      Other Venous stasis dermatitis of both lower extremities  Patient seen by wound care today 3/14/22  Plan  Wound care consulted to manage the wound while admitted  Continue home dose of rivaroxaban 20mg daily HM note 3/16      Provider, please provide the integumentary diagnosis related to the documentation of Left medial ankle :     [   ] Non-pressure ulcer, skin breakdown only   [   ] Non-pressure ulcer, exposed fat layer   [  x ] Other Integumentary Diagnosis (please specify): _____Chronic venous stasis ulcer______   [  ] Clinically Undetermined       Please document in your progress notes daily for the duration of treatment, until resolved, and include in your discharge summary.    Form No. [id number]

## 2022-03-23 NOTE — PHYSICIAN QUERY
PT Name: Drew Farrar  MR #: [id number]    DOCUMENTATION CLARIFICATION     CDS/: Karena Navarrete RN CDIS             Contact information:  [email or web address]      This form is a permanent document in the medical record.     Query Date: March 23, 2022    By submitting this query, we are merely seeking further clarification of documentation. Please utilize your independent clinical judgment when addressing the question(s) below.    The Medical Record contains the following:   Indicator   Supporting Clinical Findings Location in Medical Record    Ulcer/Injury     x Wound Care Consult L lateral foot- chronic ulcer- 2x9x0.1cm- slough with minimal granulation- small amount of serosanguinous drainage- no odor- no erythema   Wound care note 3/16     Radiology Findings      Acute/Chronic Illness      Medication/Treatment     x Other Venous stasis dermatitis of both lower extremities  Patient seen by wound care today 3/14/22  Plan  Wound care consulted to manage the wound while admitted  Continue home dose of rivaroxaban 20mg daily HM note 3/16      Provider, please provide the integumentary diagnosis related to the documentation of Left lateral foot :     [   ] Non-pressure ulcer, skin breakdown only   [   ] Non-pressure ulcer, exposed fat layer   [  x ] Other Integumentary Diagnosis (please specify): __ __Chronic venous stasis ulcer_______   [  ] Clinically Undetermined       Please document in your progress notes daily for the duration of treatment, until resolved, and include in your discharge summary.    Form No. [id number]

## 2022-03-23 NOTE — PHYSICIAN QUERY
PT Name: Drew Farrar  MR #: [id number]     DOCUMENTATION CLARIFICATION     CDS/: Karena Navarrete RN CDIS              Contact information: [email or web address]    This form is a permanent document in the medical record.     Query Date: March 23, 2022    By submitting this query, we are merely seeking further clarification of documentation.  Please utilize your independent clinical judgment when addressing the question(s) below.    The Medical Record contains the following   Indicators Supporting Clinical Findings Location in Medical Record   x Heart Failure documented   Chronic combined systolic and diastolic congestive heart failure  - echo with EF 40-45% in August 2020; repeat echo yesterday with EF 40%  - transitioned back to Demadex; no ADHF on PE; continue Demadex, BB and ARB   Cards note 3/17   x   Labs 3/14   x EF/Echo The left ventricle is severely enlarged with moderately decreased systolic function.  The estimated ejection fraction is 40%.  There is moderate left ventricular global hypokinesis.  Mild-to-moderate mitral regurgitation.  A diastolic pattern consistent with atrial fibrillation observed.  Mild aortic regurgitation.  Mild to moderate tricuspid regurgitation.  Mild to moderate pulmonic regurgitation.  Mild right ventricular enlargement with mildly reduced right ventricular systolic function.  Elevated central venous pressure (15 mmHg).  The estimated PA systolic pressure is 52 mmHg.  There is moderate pulmonary hypertension.     TTE 3/16     Radiology findings      Subjective/Objective Respiratory Conditions      Recent/Current MI      Heart Transplant, LVAD     x Edema, JVD      Right lower leg: Edema present.      Left lower leg: Edema present.      Comments: Bilateral Legs show signs of chronic venous stasis with ulcers  H&P     Ascites     x Diuretics/Meds furosemide injection 40 mg  Dose: 40 mg  Freq: ED 1 Time Route: IV  Start: 03/14/22 1345 End: 03/14/22 1437    furosemide  injection 40 mg  Dose: 40 mg  Freq: Every 12 hours (non-standard times) Route: IV  Start: 03/14/22 1745 End: 03/16/22 1011    torsemide tablet 30 mg  Dose: 30 mg  Freq: Daily Route: Oral  Start: 03/17/22 0900 End: 03/17/22 1951 MAR           MAR           MAR    x Other Treatment Patient admitted for management of fluid overload as well as Afib w/RVR. Patient diursed overnight with 40MG BID Lasix which is double patients home diuretic does. Patient is at a net loss of 3L of fluid since admit and is feeling less swollen. Patient reported chest pain, troponin nedgative no change on EKG. Echocardiogram showed some worsening of existing heart failure. Patient is asymptomatic this AM, pulse controlled with Toprol-XL 200mg. Will be sent home on toprol and losartan as recommended by cardiology. To follow-up with cardiology and PCP. Discharge summary     Other       Heart failure is a clinical diagnosis which includes symptomatic fluid retention, elevated intracardiac pressures, and/or the inability of the heart to deliver adequate blood flow.    Heart Failure with reduced Ejection Fraction (HFrEF) or Systolic Heart Failure (loses ability to contract normally, EF is <40%)    Heart Failure with preserved Ejection Fraction (HFpEF) or Diastolic Heart Failure (stiff ventricles, does not relax properly, EF is >50%)     Heart Failure with Combined Systolic and Diastolic Failure (stiff ventricles, does not relax properly and EF is <50%)    Mid-range or mildly reduced ejection fraction (HFmrEF) is classified as systolic heart failure.   Common clues to acute exacerbation:  Rapidly progressive symptoms (w/in 2 weeks of presentation), using IV diuretics, using supplemental O2, pulmonary edema on Xray, new or worsening pleural effusion, +JVD or other signs of volume overload, MI w/in 4 weeks, and/or BNP >500  The clinical guidelines noted are only system guidelines, and do not replace the providers clinical judgment.    Please  clarify the ACUITY of the documented heart failure    [  x ]  Acute on Chronic Combined Systolic and Diastolic Heart Failure - worsening of CHF signs/symptoms in preexisting CHF   [   ]  Chronic Combined Systolic and Diastolic Heart Failure - pre-existing and stable   [   ]  Other (please specify): ___________________________________   [  ]  Clinically Undetermined         Please document in your progress notes daily for the duration of treatment until resolved and include in your discharge summary.    References:  American Heart Association editorial staff. (2017, May). Ejection Fraction Heart Failure Measurement. American Heart Association. https://www.heart.org/en/health-topics/heart-failure/diagnosing-heart-failure/ejection-fraction-heart-failure-measurement#:~:text=Ejection%20fraction%20(EF)%20is%20a,pushed%20out%20with%20each%20heartbeat  ASYA Shin (2020, December 15). Heart failure with preserved ejection fraction: Clinical manifestations and diagnosis. [employer]. https://www.[employer]Incluyeme.com/contents/heart-failure-with-preserved-ejection-fraction-clinical-manifestations-and-diagnosis.  ICD-10-CM/PCS Coding Clinic Third Quarter ICD-10, Effective with discharges: September 8, 2020 [facility] [employer] § Heart failure with mid-range or mildly reduced ejection fraction (2020).  Form No. [id number]

## 2022-03-27 NOTE — PROGRESS NOTES
Subjective:   Patient ID:  Drew Farrar is a 56 y.o. male who presents for follow up of Congestive Heart Failure, Atrial Fibrillation, and Chronic Venous Insufficiency w/ Ulceration      HPI:       He returns for follow up. He was admitted for CHF decompensation. His toprol xl, losartan, and diuretics were restarted. He responded well to diuretics.           He has history of VTE + PTS + lymphedema. He has CHF with an EF 40-45%, Severe MR in the past but only mild MR (8/2020), mild AI, mod SC, mild RV enlargement with normal function, PASP 36, and mild LAE. He has persistent afib with a controlled rate and anticoagulated with xarelto. He has an extensive history of venous insufficiency with recurrent ulcers. He is s/p deep venous intervention, multiple EVLTs and sclerotherapy sessions. He has h/o DVTs and PEs as well. He is compliant with his medications but not with his diet.        S/p L accessory veins (left ankle and foot) slcerotherapy 2/21/2019  S/p Ablation of Left GSV and sclerotherapy of left medial ankle veins 1/20/2022          US 3/2019     R GSV 4.7 mm -no reflux   R LSV 7.4 mm + > 500 msec; 736 msec      L GSV occluded   L accessory GSV reflux + > 500 msec; 1060 msec    L LSV 4.8 mm          No DVT       US 4/2019      No DVT         Venous US 9/2021      R GSV 5.3 mm + reflux 636 msec   R LSV 2.9 mm + reflux 244 msec     L GSV 5.8 mm + reflux 756 msec    L LSV 4.9 mm + reflux 568 msec     No DVT     US 2/2022     No DVT   Occluded L GSV s/p ablation         Patient Active Problem List    Diagnosis Date Noted    Non-pressure chronic ulcer of left ankle, with unspecified severity 07/26/2018     Priority: Low    Multiple and open wound of lower limb     Dizziness 03/14/2022    Carpal tunnel syndrome on right 02/10/2022    Rotator cuff impingement syndrome of right shoulder 02/10/2022    Pseudomonas infection     Skin ulcer of left foot including toes with fat layer exposed     Class 3 severe  obesity due to excess calories with serious comorbidity and body mass index (BMI) of 45.0 to 49.9 in adult     Open wound of right lower leg     Cellulitis of both lower extremities     Stasis ulcer     Obesity, morbid (more than 100 lbs over ideal weight or BMI > 40)     Class 2 severe obesity due to excess calories with serious comorbidity and body mass index (BMI) of 39.0 to 39.9 in adult     Ringworm 03/15/2021    Rash of back 03/14/2021    PAD (peripheral artery disease) 03/12/2021     Left lower extremity arterial US done 3/11/21:   Loss of normal triphasic waveforms of the majority of the interrogated left lower extremity arteries, suggestive of peripheral arterial disease.    Cardiology consulted for non-healing left foot wound  TCOMs 3/15/2021   Chest wall room air 78 post O2 377   Left medial foot room air 69 post O2 343   Left dorsal foot room air 72 post O2 197       Alteration in skin integrity 03/12/2021    Wound infection 03/11/2021    Proteus infection 10/12/2020    MRSA cellulitis of left foot 10/12/2020    Open wound of left foot     Complicated migraine 08/21/2020    Orthostatic hypotension 02/18/2020    Unstable angina 11/21/2019    Iron deficiency anemia 11/12/2019    Anemia of chronic disease 11/12/2019    Venous stasis ulcer of lower extremity, unspecified laterality     Obesity, Class I, BMI 30.0-34.9 (see actual BMI) 09/30/2019    Advance care planning 09/30/2019    Erythema     Acute deep vein thrombosis (DVT) of lower extremity 09/14/2019    Onychomycosis 09/12/2019    Wound of foot 09/12/2019    Clostridium difficile infection     Pulmonary embolism 08/02/2019    Chronic venous insufficiency 07/25/2019    Hypomagnesemia 07/08/2019    Varicose ulcer of lower extremity     Cellulitis of right lower leg 04/24/2019    Candida infection of genital region 04/24/2019    Non-rheumatic mitral regurgitation 09/07/2017    Hyperthyroidism 09/05/2017    Elevated  troponin 2017    Chronic combined systolic and diastolic congestive heart failure 2017    Long term (current) use of anticoagulants 10/31/2016    Other pulmonary embolism without acute cor pulmonale, unspecified chronicity 10/28/2016    Acute pulmonary embolism 10/26/2016    Recurrent pulmonary embolism 10/26/2016    History of DVT (deep vein thrombosis) 2016    HTN (hypertension) 2016    Knee pain, right 2016    Difficulty walking 2016    Group A streptococcal infection 2016    Tinea pedis of both feet 2016    Bilateral edema of lower extremity 2016    Morbid obesity 2016    Permanent atrial fibrillation 2016    Right knee pain 2016    Depression 2014    Ulcer of ankle, left, limited to breakdown of skin 2014    Elevated BP 2014    May-Thurner syndrome 2014     1. Successful IVUS guided intervention for May Thurner Syndrome 2. Left common iliac vein treated with 22 x 70 mm Wallstent overlapping with 22 x 45 mm Wallstent post dilation 18 x 40 mm balloon              Stenosis of right iliac vein 2014     S/p venoplasty with  20 x 80 mm Wallstent post dilated with 14 mm balloon in 2014      Skin ulcer of left foot, limited to breakdown of skin 02/15/2014    Cellulitis 2014    Chronic venous hypertension with ulcer 2013    Edema 2013     R leg      Venous stasis ulcer of left midfoot limited to breakdown of skin 2013    Venous (peripheral) insufficiency 2013     S/p bilateral iliac vein stents    S/p R GSV EVLT with laser 10/2013    S/p US guided accessory vein sclerotherapy of R calf       Venous stasis dermatitis of both lower extremities 2012    Ulcer - lesion 2012     Of left foot        Chronic atrial fibrillation 2012           Right Arm BP - Sittin/50  Left Arm BP - Sittin/50        LABS    LAST HbA1c  Lab Results    Component Value Date    HGBA1C 5.4 08/22/2020       Lipid panel  Lab Results   Component Value Date    CHOL 130 08/22/2020    CHOL 124 07/10/2017    CHOL 131 11/21/2016     Lab Results   Component Value Date    HDL 31 (L) 08/22/2020    HDL 23 (L) 07/10/2017    HDL 29 (L) 11/21/2016     Lab Results   Component Value Date    LDLCALC 78.8 08/22/2020    LDLCALC 70.4 07/10/2017    LDLCALC 83.0 11/21/2016     Lab Results   Component Value Date    TRIG 101 08/22/2020    TRIG 153 (H) 07/10/2017    TRIG 95 11/21/2016     Lab Results   Component Value Date    CHOLHDL 23.8 08/22/2020    CHOLHDL 18.5 (L) 07/10/2017    CHOLHDL 22.1 11/21/2016            Review of Systems   Constitutional: Positive for weight loss. Negative for diaphoresis, night sweats and weight gain.   HENT: Negative for congestion.    Eyes: Negative for blurred vision, discharge and double vision.   Cardiovascular: Positive for dyspnea on exertion. Negative for chest pain, claudication, cyanosis, irregular heartbeat, leg swelling, near-syncope, orthopnea, palpitations, paroxysmal nocturnal dyspnea and syncope.   Respiratory: Positive for wheezing (with activities). Negative for cough and shortness of breath.    Endocrine: Negative for cold intolerance, heat intolerance and polyphagia.   Hematologic/Lymphatic: Negative for adenopathy and bleeding problem. Does not bruise/bleed easily.   Skin: Positive for poor wound healing (healed). Negative for dry skin and nail changes.   Musculoskeletal: Positive for arthritis. Negative for back pain, falls, joint pain, myalgias and neck pain.   Gastrointestinal: Negative for bloating, abdominal pain, change in bowel habit and constipation.   Genitourinary: Negative for bladder incontinence, dysuria, flank pain, genital sores and missed menses.   Neurological: Negative for aphonia, brief paralysis, difficulty with concentration, dizziness and weakness.   Psychiatric/Behavioral: Negative for altered mental status and  memory loss. The patient does not have insomnia.    Allergic/Immunologic: Negative for environmental allergies.       Objective:   Physical Exam  Constitutional:       Appearance: He is well-developed.      Interventions: He is not intubated.  HENT:      Head: Normocephalic and atraumatic.      Right Ear: External ear normal.      Left Ear: External ear normal.   Eyes:      General: No scleral icterus.        Right eye: No discharge.         Left eye: No discharge.      Conjunctiva/sclera: Conjunctivae normal.      Pupils: Pupils are equal, round, and reactive to light.   Neck:      Thyroid: No thyromegaly.      Vascular: Normal carotid pulses. No carotid bruit, hepatojugular reflux or JVD.      Trachea: No tracheal deviation.   Cardiovascular:      Rate and Rhythm: Tachycardia present. Rhythm irregularly irregular.  No extrasystoles are present.     Chest Wall: PMI is not displaced.      Pulses: No midsystolic click.           Carotid pulses are 2+ on the right side and 2+ on the left side.       Radial pulses are 2+ on the right side and 2+ on the left side.        Femoral pulses are 2+ on the right side and 2+ on the left side.       Popliteal pulses are 2+ on the right side and 2+ on the left side.        Dorsalis pedis pulses are 2+ on the right side and 2+ on the left side.        Posterior tibial pulses are 2+ on the right side and 2+ on the left side.      Heart sounds: S1 normal and S2 normal. Heart sounds not distant. No murmur heard.    No friction rub. No gallop. No S3 sounds.   Pulmonary:      Effort: Pulmonary effort is normal. No tachypnea, bradypnea, accessory muscle usage or respiratory distress. He is not intubated.      Breath sounds: Normal breath sounds. No stridor. No decreased breath sounds, wheezing or rales.   Chest:      Chest wall: No tenderness.   Abdominal:      General: There is no distension or abdominal bruit.      Palpations: There is no mass or pulsatile mass.      Tenderness: There  is no abdominal tenderness. There is no guarding or rebound.   Musculoskeletal:         General: No tenderness. Normal range of motion.      Cervical back: Normal range of motion and neck supple.   Lymphadenopathy:      Cervical: No cervical adenopathy.      Comments:     Chronic lymphedema of R leg/calf area   Skin:     General: Skin is warm.      Coloration: Skin is not pale.      Findings: No erythema or rash.      Comments:     Healed right foot wound        Marked skin derangement of R leg         Left foot wound         See images            Neurological:      Mental Status: He is alert and oriented to person, place, and time.      Cranial Nerves: No cranial nerve deficit.      Coordination: Coordination normal.      Deep Tendon Reflexes: Reflexes are normal and symmetric.   Psychiatric:         Behavior: Behavior normal.         Thought Content: Thought content normal.         Judgment: Judgment normal.         1/17/2019                    5/6/2019              10/21/2021                3/2022                        Assessment:     1. Chronic combined systolic and diastolic congestive heart failure    2. Depression, unspecified depression type    3. Venous stasis ulcer of left midfoot limited to breakdown of skin, unspecified whether varicose veins present    4. Ulcer of ankle, left, limited to breakdown of skin    5. Venous stasis dermatitis of both lower extremities    6. Chronic atrial fibrillation    7. Venous (peripheral) insufficiency    8. May-Thurner syndrome    9. History of DVT (deep vein thrombosis)    10. Obesity, morbid (more than 100 lbs over ideal weight or BMI > 40)    11. Class 2 severe obesity due to excess calories with serious comorbidity and body mass index (BMI) of 39.0 to 39.9 in adult    12. A-fib        Plan:         Continue with wound care.  If the wounds do not completely healed he will return for sclerotherapy of residual varicose veins reflux disease. Regarding the R leg we will  continue with close monitoring without any intervention at this time since the wound healed. He is comfortable with the plan.         Continue with compression stockings  Exercise + weight loss  Low salt diet  Daily weight and take an extra dose of demadex for 3 lbs weight gain  Xarelto for CVA prevention   Continue with Toprol xl for afib rate/CHF + Losartan for CHF therapy   No NSAIDs      Continue with CHF clinic recommendations  Maintain relationship with [facility]  Follow up with endocrine for treatment of thyroid disorder          Return sooner for concerns or questions. If symptoms persist go to the ED  I have reviewed all pertinent data on this patient   Follow up as scheduled. Return sooner for concerns or questions          He expressed verbal understanding and agreed with the plan        Follow up in 3 months with weekly wound care updates  Sooner if there is any deterioration of wound   He asked for testosterone replacement. I declined and advised him to ask pcp.           Patient's Medications   New Prescriptions    LISINOPRIL (PRINIVIL,ZESTRIL) 20 MG TABLET    Take 1 tablet (20 mg total) by mouth once daily.    TORSEMIDE (DEMADEX) 20 MG TAB    Take 2 tablets (40 mg total) by mouth once daily.   Previous Medications    ALBUTEROL (PROVENTIL/VENTOLIN HFA) 90 MCG/ACTUATION INHALER    Inhale 2 puffs into the lungs every 6 (six) hours as needed for Wheezing. Rescue    ATORVASTATIN (LIPITOR) 40 MG TABLET    Take 1 tablet (40 mg total) by mouth every evening.    METOPROLOL SUCCINATE (TOPROL-XL) 200 MG 24 HR TABLET    Take 1 tablet (200 mg total) by mouth once daily.    RIVAROXABAN (XARELTO) 20 MG TAB    Take 1 tablet (20 mg total) by mouth daily with dinner or evening meal.    SUMATRIPTAN (IMITREX) 50 MG TABLET    Take 50 mg for headache. If headache does not resolve, take another 50mg two hours after initial dose. Do not exceed 200mg in 24 hours.   Modified Medications    No medications on file    Discontinued Medications

## 2022-04-04 NOTE — PROGRESS NOTES
Subjective:       Patient ID: Drew Farrar is a 56 y.o. male.    Chief Complaint: Venous Ulcer  12/21/20: F/U with Dr. Quiros. Measurements improved slightly. Site debrided per Dr. Quiros. Also seen by Dr. London. Continue Levaquin, and refill sent to pharmacy. Next visit with Dr. Quiros 12/28/20. Of note, pt not seen for 1 month due to transportation twice and his decision not to be seen by a covering provider once.   1/25/21: F/U with Dr. Quiros. Patient has not been to clinic due to symptoms and exposure to Covid19. Wound debrided per Dr. Quiros. Vascular procedure of left lower leg scheduled 2/12/21. Cont. POC. Next visit 2/1/21.  2/1/21: Fu with . C/o tenderness and edema to left foot under wound.  Still taking Levaquin 750 mg po. Wound debrided and culture taken per . Patient states he's scheduled for laser vein surgery at [facility] on 2/10/21.  discussed need to follow up with ID after patient's vascular procedure, verbalized understanding. Cont. With current treatment plan. Fu 2/8/21 with . Of note, pt c/o increased pain distal to wound and thinks it is bc his HH nurse wrapped his leg to loose and his LLE became more edematous..  2/8/21: F/U with Dr. Quiros wound is deteriorating, complains of increase pain and swelling to vick wound and bottom of foot.  Pt states vein procedure postponed until June 2021 because of pain and swelling.Dr. Quiros discussed last culture results with patient and will discuss with ID today and will call in antibiotic to pts pharmacy and will notify patient. Wound debrided per Dr. Quiros tolerated fairly. Dr. Quiros recommended pt f/u 1 week with ID in clinic. Patient refused states he will only f/u with Dr. Quiros in 2 weeks.Continue with 3 layer compression as ordered.  3/11/21: F/U with Dr. Quiros. Wound deteriorating. Site malodorous. Vick area red, warm, and c/o of increased pain.  Tissue culture done. Patient transferred to [facility] via w/c. Report called to KAILEY wyatt in ER.  3/29/21: F/U with Dr. Quiros and Dr. London. Measurements improved. Completed IV antibiotics. Right arm PICC site d/c'd per Dr. London. Site debrided per Dr. Quiros. Gentian violet to vick wound, hydrofera ready to wound bed. Patient receiving dressing changes per [facility]. Next visit Dr. Quiros 4/5/21. Patient has podiatry appt. 4/29/21 for toenail trimming.  4/8/21: Fu with . No changes to wound. Sharps debridement per . Continued with current treatment plan as ordered with 3 layer compression toes to knee LLE. Fu 4/19/21 with ID and .  Home health frequency changed to twice weekly and PRN. Patient educated to try to use his lymphedema pumps daily, verbalized understanding.  5/3/21: Follow up with  for left foot venous ulcer. Complaints of cotton layer in 3 layer wrap itching inititated new wound care orders today in clinic. Pt seen by ID today.  5/24/21: Follow  Up with  for LLE venous ulcer. Pt reports wound and pain has improved with only using saline moist hydrofera classic without santyl.  Santyl discontinued. Continued with 3 layer compressing toes to knee LLE, tolerated well. RTC 3  Weeks.   6/28/21: Follow up with Dr. Quiros for venous stasis dermatitis BLE. Ulcers are now b/l. Accompanied by mother and spouse. Last visit he attended at  was >1mo ago. Complaints of odor and increased drainage to RLE x 1 week. States he missed last few appointments due to being in hospital for pinched nerve in back and was told by ER MD not to walk or come to any wound care appointments until today. These recs were not found in the ER summary. Pt states home health has been changing dressings twice weekly. Also asking if safe to receive Covid-19 vaccination and requesitng zinc unna boot to RLE. Tolerated debridement and wound culture BLE.   Tolerated debridement, wound care  and culture of both legs. New wound care ordered initiated today and home health updated with orders. RTC 2 weeks.   7/19/21: F/U with Dr. Quiros. Pt missed last appt due to transportation and weather. Left foot site debrided. RLE still edematous with mild cellulitis but open draining wounds have improved. Orders changed today. Santyl to LLE and RLE sites. New orders faxed to .  8/9/21: F/U with Dr. Quiros and Dr. London. Left foot site debrided per Dr. Quiros. Profore to BLE. Continue Levaquin and doxycycline sent to pharmacy. New orders faxed to [facility].  8/23/21: Follow up with  and . Complaints of tenderness to left plantar foot. Tolerated debridement of all wounds per .  Levaquin PO discontinued per ID Dr. London and started on Avelox PO. Follow up with ID on 9/20/21. Referral placed to Interventional Cardiology. Patient to follow up 8/9/21 with , and follow up with Interventional Cardiology after next appointment.   10/7/21: Readmit to clinic for left foot, left leg, and right leg venous ulcers. Seen by Dr. Quiros today.  He evacuated to the [facility] and was treated there for the past 4-5 weeks. Pt reports taking PO antibiotics during that time.  Wounds appear much improved. Left lateral foot site culture done today. Continue Santyl and hydrofera classic to all sites. Orders faxed to . Return in 2 weeks to see Dr. Quiros and Dr. Lantigua.  10/21/2021:  Follow up for non-healing wounds to RLE and left foot. Wound to RLE almost resolved. Wound to left medial ankle increased in size, culture taken. Left lateral foot wound has now  in to 3 wounds, with overall decrease in size. Patient continues to be followed by [facility] 3 times a week. Plan of care updated and orders sent to [facility]. Educated patient on plan of care. Patient verbalized understanding.  11/4/21: Follow up with  " for BLE venous wounds. Refused care to right leg states " it's healed and discharged." Still taking Bactrim Po and reports waiting for  to schedule a "vein procedure".  Tolerated debridement per  and culture left medial ankle wound.  Gentamicin ointment added to wound care dressing changes.  Continued with current plan of care.  RTC 1  weeks.   11/11/2021:  Follow up for non-healing wounds to left foot. Patient has no acute complaints at today's appointment. Discontinue topical gentamycin to wounds and continue with santyl, hydrofera ready, and 4 layer compression. Decrease frequency of dressing chnages to 2 times per week. Updated orders sent to home health. Patient educated on change in plan of care and verbalized understanding.  12/9/21: Follow up for left ankle and foot venous wounds. States he stopped taking oral Bactrim and Cipro 500mg BID a week ago " due to it not working."  Wounds debrided and cultured left medial ankle and lateral foot. Procedure with  pending. Continued with current plan of care. Follow up 2 weeks with  and ID. RX sent to pharmacy for Levaquin PO as that has worked well for this patient in the past.   12/20/21: Follow up for LLE venous statis ulcers. Lateral foot wounds improving. Reports trouble taking Flagyl PO, but is still taking with Bactrim po as ordered. Per ID patient to stop Flagyl PO once RX is finished and continue taking Bactrim PO. Refill sent to pharmacy for Bactrim PO. Still waiting for topical abx powder for dressing changes. Home health updated with new orders.  1/3/22: Follow up for LLE venous ulcers. Wounds slowly improving. Home Health using topical abx powder Tobramycin/Colistimethate 15/10% with dressing changes. Patient states he completed 2 weeks of Bactrim and Flagyl and currenlty not taking any antibiotics. Continued with current plan of care. Home Health updated with orders.   1/31/22: Follow up with "  and ID. S/p ablation with  1/20/22.  No complaints of pain tolerating dressings with no issues. States next appt with  is scheduled for 3/14/22. Sharps debridement tolerated well. Santyl and hydrofera ready applied to wounds today in clinic, patient forgot to bring anticiotic powder to visit today.  Home health updated with orders.     2/14/22: Follow up for LLE venous ulcers. No complaints per patient. Wounds slowly improving.Tolerated debridement and dressings. RTC 3 weeks and follow up with ID.   4/4/22: Readmit to wound care with BLE venous ulcers. He canceled his visit 3/7/2022 then had chest pains/SOB 3/14/2022 so his WC viosit was canceled and he was sent to the ER. S/p hospitalization for heart attack march 14th.  Reports right leg has been draining for a few days. Requests calamine unna boot to RLE.  Sharps debridment LLE wounds. Added Santyl to LLE dressing changes and initiated calamine unna boot to RLE. Updated home health with new orders. Wounds were relatively unchanged.    Review of Systems    All systems were reviewed and are negative, except that mentioned in the HPI.    Objective:      Temp:  [97.9 °F (36.6 °C)]   Pulse:  [125]   BP: (109)/(51)   Physical Exam  Constitutional:       Appearance: He is well-developed.   HENT:      Head: Normocephalic and atraumatic.   Eyes:      Pupils: Pupils are equal, round, and reactive to light.   Cardiovascular:      Rate and Rhythm: Normal rate.   Pulmonary:      Effort: Pulmonary effort is normal. No respiratory distress.      Breath sounds: No stridor.   Abdominal:      General: There is no distension.   Musculoskeletal:      Cervical back: Normal range of motion.   Skin:     Comments: See Synopsis for wound details   Neurological:      Mental Status: He is alert and oriented to person, place, and time.            Altered Skin Integrity 04/04/22 1310 Right anterior;lower Leg Venous Ulcer Partial thickness tissue loss. Shallow  open ulcer with a red or pink wound bed, without slough. Intact or Open/Ruptured Serum-filled blister. (Active)   04/04/22 1310   Altered Skin Integrity Present on Admission:    Side: Right   Orientation: anterior;lower   Location: Leg   Wound Number:    Is this injury device related?:    Primary Wound Type: Venous ulcer   Description of Altered Skin Integrity: Partial thickness tissue loss. Shallow open ulcer with a red or pink wound bed, without slough. Intact or Open/Ruptured Serum-filled blister.   Removal Indication and Assessment:    Wound Outcome:    (Retired) Wound Length (cm):    (Retired) Wound Width (cm):    (Retired) Depth (cm):    Wound Description (Comments):    Removal Indications:    Wound Image    04/04/22 1300   Description of Altered Skin Integrity Partial thickness tissue loss. Shallow open ulcer with a red or pink wound bed, without slough. Intact or Open/Ruptured Serum-filled blister. 04/04/22 1300   Dressing Appearance Open to air;Dried drainage 04/04/22 1300   Drainage Amount Small 04/04/22 1300   Drainage Characteristics/Odor Bleeding controlled 04/04/22 1300   Appearance [place] 04/04/22 1300   Tissue loss description Partial thickness 04/04/22 1300   Red (%), Wound Tissue Color 100 % 04/04/22 1300   Periwound Area Edematous 04/04/22 1300   Wound Edges Undefined 04/04/22 1300   Care Cleansed with:;Antimicrobial agent;Sterile normal saline 04/04/22 1300   Dressing Applied;Compression wrap 04/04/22 1300   Compression Unna's Boot 04/04/22 1300   Off Loading Off loading shoe 04/04/22 1300   Dressing Change Due 04/07/22 04/04/22 1300            Wound 06/28/21 1311 Venous Ulcer Left medial Malleolus/Ankle (Active)   06/28/21 1311    Pre-existing: Yes   Primary Wound Type: Venous ulcer   Side: Left   Orientation: medial   Location: Malleolus/Ankle   Wound Number:    Ankle-Brachial Index:    Pulses:    Removal Indication and Assessment:    Wound Outcome:    (Retired) Wound Type:    (Retired) Wound  Length (cm):    (Retired) Wound Width (cm):    (Retired) Depth (cm):    Wound Description (Comments):    Removal Indications:    Wound Image   04/04/22 1300   Dressing Appearance Intact;Moist drainage 04/04/22 1300   Drainage Amount Moderate 04/04/22 1300   Drainage Characteristics/Odor Serosanguineous 04/04/22 1300   Appearance Yellow;Fibrin;Moist 04/04/22 1300   Tissue loss description Full thickness 04/04/22 1300   Yellow (%), Wound Tissue Color 100 % 04/04/22 1300   Periwound Area Dry;Pink;Edematous 04/04/22 1300   Wound Edges Irregular 04/04/22 1300   Wound Length (cm) 3.4 cm 04/04/22 1300   Wound Width (cm) 2.4 cm 04/04/22 1300   Wound Depth (cm) 0.2 cm 04/04/22 1300   Wound Volume (cm^3) 1.632 cm^3 04/04/22 1300   Wound Surface Area (cm^2) 8.16 cm^2 04/04/22 1300   Care Cleansed with:;Sterile normal saline;Debrided 04/04/22 1300   Dressing Applied;Hydrofiber;Absorptive Pad;Compression wrap 04/04/22 1300   Periwound Care Absorptive dressing applied;Dry periwound area maintained 04/04/22 1300   Compression Three layer compression 04/04/22 1300   Off Loading Off loading shoe 04/04/22 1300   Dressing Change Due 04/07/22 04/04/22 1300            Wound 10/21/21 1508 Venous Ulcer Left lateral;proximal Foot (Active)   10/21/21 1508    Pre-existing: Yes   Primary Wound Type: Venous ulcer   Side: Left   Orientation: lateral;proximal   Location: Foot   Wound Number:    Ankle-Brachial Index:    Pulses:    Removal Indication and Assessment:    Wound Outcome:    (Retired) Wound Type:    (Retired) Wound Length (cm):    (Retired) Wound Width (cm):    (Retired) Depth (cm):    Wound Description (Comments):    Removal Indications:    Wound Image   04/04/22 1300   Dressing Appearance Intact;Moist drainage 04/04/22 1300   Drainage Amount Small 04/04/22 1300   Drainage Characteristics/Odor Serosanguineous 04/04/22 1300   Appearance Yellow;Pink;Fibrin;Moist 04/04/22 1300   Tissue loss description Full thickness 04/04/22 1300   Red  (%), Wound Tissue Color 10 % 04/04/22 1300   Yellow (%), Wound Tissue Color 90 % 04/04/22 1300   Periwound Area Dry;Pink;Edematous 04/04/22 1300   Wound Edges Irregular 04/04/22 1300   Wound Length (cm) 1.1 cm 04/04/22 1300   Wound Width (cm) 1.8 cm 04/04/22 1300   Wound Depth (cm) 0.2 cm 04/04/22 1300   Wound Volume (cm^3) 0.396 cm^3 04/04/22 1300   Wound Surface Area (cm^2) 1.98 cm^2 04/04/22 1300   Care Cleansed with:;Sterile normal saline;Debrided;Antimicrobial agent 04/04/22 1300   Dressing Applied;Hydrofiber;Absorptive Pad;Compression wrap 04/04/22 1300   Periwound Care Absorptive dressing applied;Dry periwound area maintained 04/04/22 1300   Compression Three layer compression 04/04/22 1300   Off Loading Off loading shoe 04/04/22 1300   Dressing Change Due 04/07/22 04/04/22 1300            Wound 10/21/21 1509 Venous Ulcer Left lateral;medial Foot (Active)   10/21/21 1509    Pre-existing: Yes   Primary Wound Type: Venous ulcer   Side: Left   Orientation: lateral;medial   Location: Foot   Wound Number:    Ankle-Brachial Index:    Pulses:    Removal Indication and Assessment:    Wound Outcome:    (Retired) Wound Type:    (Retired) Wound Length (cm):    (Retired) Wound Width (cm):    (Retired) Depth (cm):    Wound Description (Comments):    Removal Indications:    Dressing Appearance Intact;Moist drainage 04/04/22 1300   Drainage Amount Small 04/04/22 1300   Drainage Characteristics/Odor Serosanguineous 04/04/22 1300   Appearance Pink;Yellow;Fibrin;Moist 04/04/22 1300   Tissue loss description Full thickness 04/04/22 1300   Red (%), Wound Tissue Color 10 % 04/04/22 1300   Yellow (%), Wound Tissue Color 90 % 04/04/22 1300   Periwound Area Intact;Dry;Edematous 04/04/22 1300   Wound Edges Irregular 04/04/22 1300   Wound Length (cm) 0.9 cm 04/04/22 1300   Wound Width (cm) 1.4 cm 04/04/22 1300   Wound Depth (cm) 0.2 cm 04/04/22 1300   Wound Volume (cm^3) 0.252 cm^3 04/04/22 1300   Wound Surface Area (cm^2) 1.26 cm^2  04/04/22 1300   Care Cleansed with:;Antimicrobial agent;Sterile normal saline;Debrided 04/04/22 1300   Dressing Applied;Hydrofiber;Absorptive Pad;Compression wrap 04/04/22 1300   Periwound Care Absorptive dressing applied 04/04/22 1300   Compression Three layer compression 04/04/22 1300   Off Loading Off loading shoe 04/04/22 1300   Dressing Change Due 04/07/22 04/04/22 1300            Wound 10/21/21 1510 Venous Ulcer Left lateral;distal Foot (Active)   10/21/21 1510    Pre-existing: Yes   Primary Wound Type: Venous ulcer   Side: Left   Orientation: lateral;distal   Location: Foot   Wound Number:    Ankle-Brachial Index:    Pulses:    Removal Indication and Assessment:    Wound Outcome:    (Retired) Wound Type:    (Retired) Wound Length (cm):    (Retired) Wound Width (cm):    (Retired) Depth (cm):    Wound Description (Comments):    Removal Indications:    Dressing Appearance Intact;Moist drainage 04/04/22 1300   Drainage Amount Small 04/04/22 1300   Drainage Characteristics/Odor Serosanguineous 04/04/22 1300   Appearance Yellow;Fibrin;Moist 04/04/22 1300   Tissue loss description Full thickness 04/04/22 1300   Yellow (%), Wound Tissue Color 100 % 04/04/22 1300   Periwound Area Intact;Dry;[place] 04/04/22 1300   Wound Edges Irregular 04/04/22 1300   Wound Length (cm) 2 cm 04/04/22 1300   Wound Width (cm) 0.4 cm 04/04/22 1300   Wound Depth (cm) 0.2 cm 04/04/22 1300   Wound Volume (cm^3) 0.16 cm^3 04/04/22 1300   Wound Surface Area (cm^2) 0.8 cm^2 04/04/22 1300   Care Cleansed with:;Antimicrobial agent;Sterile normal saline;Debrided 04/04/22 1300   Dressing Applied;Hydrofiber;Absorptive Pad;Compression wrap 04/04/22 1300   Periwound Care Absorptive dressing applied 04/04/22 1300   Compression Three layer compression 04/04/22 1300   Off Loading Off loading shoe 04/04/22 1300   Dressing Change Due 04/07/22 04/04/22 1300         Assessment:         ICD-10-CM ICD-9-CM   1. Venous stasis ulcer of left ankle with fat layer  exposed with varicose veins  I83.023 454.0    L97.322    2. Venous stasis ulcer of other part of left foot with fat layer exposed with varicose veins  I83.025 454.0    L97.522    3. Venous stasis dermatitis of both lower extremities  I87.2 454.1         Plan:   Tissue pathology and/or culture taken:  [] Yes [x] No   Sharp debridement performed:   [x] Yes [] No   Labs ordered this visit:   [] Yes [x] No   Imaging ordered this visit:   [] Yes [x] No           Orders Placed This Encounter   Procedures    Change dressing     Left Medial Ankle &Left Lateral Foot wounds   Cleanse wound with: Acetic Acid, rinse with  Normal Saline.   Lidocaine: Prn Debridement   Periwound care: Sween to dry skin Prn, Betamethasone and Calmoseptine to vick wound and dorsal foot rash prn. Gentian violet in clinic only if needed.   Primary dressing: Santyl, Hydrofera Ready and Tobramycin/Colistimethate 15/10% antibiotic powder.--- In clinic apply Santyl and Hydrofera Ready if patient forgets antibiotic powder.   Secondary dressing: Small ABD pad to both wounds prn increased drainage   Offloading: [place] shoe left foot   Edema control: Profore 3 layer compression toes to knee --Home Health to use Kerlix instead of cast padding and Ace wrap instead of COBAN. Avoid prolonged standing in one place. Elevate leg(s) as much as possible.   Frequency:  Mondays and Thursdays   Follow-up Dr. Quiros and ID in 2 weeks 4/18/22     Home health: Continue Home Health as ordered per . [facility] to do wound care on Monday and Thursday except when scheduled in clinic.     Other: Patient to use Lymphedema pumps daily.        Follow up in about 2 weeks (around 4/18/2022) for .

## 2022-04-04 NOTE — PROCEDURES
"Debridement    Date/Time: 4/4/2022 12:41 PM  Performed by: Kristine Quiros DO  Authorized by: Kristine Quiros DO     Time out: Immediately prior to procedure a "time out" was called to verify the correct patient, procedure, equipment, support staff and site/side marked as required.    Consent Done?:  Yes (Written)  Local anesthesia used?: Yes    Local anesthetic:  Topical anesthetic    Debridement - 1st Wound - General Location: left medial ankle.    Type of Debridement:  Excisional       Length (cm):  3.4       Area (sq cm):  8.16       Width (cm):  2.4       Percent Debrided (%):  100       Depth (cm):  0.2       Total Area Debrided (sq cm):  8.16    Depth of debridement:  Subcutaneous tissue    Tissue debrided:  Subcutaneous    Devitalized tissue debrided:  Slough, Fibrin, Exudate and Biofilm    Instruments:  Curette    2nd Wound Details:     Debridement - 2nd Wound - General Location: left lateral foot.    Type of Debridement:  Excisional       Length (cm):  1.1       Area (sq cm):  1.98       Width (cm):  1.8       Percent Debrided (%):  100       Depth (cm):  0.2       Total Area Debrided (sq cm):  1.98    Depth of debridement:  Subcutaneous tissue    Tissue debrided:  Subcutaneous    Devitalized tissue debrided:  Slough, Fibrin, Exudate and Biofilm    Instruments:  Curette    Bleeding:  Minimal  Hemostasis Achieved: Yes    Method Used:  Pressure  Patient tolerance:  Patient tolerated the procedure well with no immediate complications      "

## 2022-04-12 NOTE — TELEPHONE ENCOUNTER
----- Message from Nicole Herbert PA-C sent at 4/12/2022 11:44 AM CDT -----  Contact: PATIENT  [phone]  You can next week but make sure that's the one he wants a MRI one. The call center may have put down the wrong shoulder. I would confirm with patient first.   ----- Message -----  From: Katherine Ohara MA  Sent: 4/12/2022  11:34 AM CDT  To: Nicole Herbert PA-C    Would you like for me to schedule him to see you for this?  ----- Message -----  From: Nicole Herbert PA-C  Sent: 4/12/2022   8:29 AM CDT  To: Katherine Ohara MA    Patient was never seen for his left shoulder. Only his right. And Dr. Hightower note states his symptoms were more related to his neck.   ----- Message -----  From: Katherine Ohara MA  Sent: 4/11/2022   4:53 PM CDT  To: Nicole Herbert PA-C      ----- Message -----  From: Paco Martinez  Sent: 4/11/2022  12:27 PM CDT  To: Campbell Melissa Staff    Patient requesting MRI for left shoulder pain  Please advise

## 2022-04-12 NOTE — TELEPHONE ENCOUNTER
I spoke w/ Pt. He agrees to see Nicole Herbert. PT recommends pt get a MRI because during PT his pain is worsening and now radiating to his left elbow.

## 2022-05-03 PROBLEM — R10.11 RUQ PAIN: Status: ACTIVE | Noted: 2022-01-01

## 2022-05-03 NOTE — SUBJECTIVE & OBJECTIVE
Past Medical History:   Diagnosis Date    *Atrial fibrillation     Anticoagulant long-term use     Arthritis     Atrial fibrillation     Atrial fibrillation Feb 23, 2016    Bipolar disorder     Carpal tunnel syndrome on right 2/10/2022    CHF (congestive heart failure)     Congenital heart disease     s/p surgical intervention 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 of age    Deep vein thrombosis     DVT of leg (deep venous thrombosis)     left leg    History of prior ablation treatment     10/9/13    Hypertension     Obesity     Stroke     Thyroid disease     Venous stasis ulcer of lower extremity, unspecified laterality 12/14/2012    Venous ulcer        Past Surgical History:   Procedure Laterality Date    ABLATION Left 1/20/2022    Procedure: Ablation;  Surgeon: Gomez Lantigua MD;  Location: [facility] CATH LAB/EP;  Service: Cardiology;  Laterality: Left;    ANGIOPLASTY      CARDIAC SURGERY      open heart surgery 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 old    EYE SURGERY      left eye cataract/right eye glaucoma    TIMO FILTER PLACEMENT      Dr Calix ([facility])    KNEE SURGERY      l and r     MULTIPLE TOOTH EXTRACTIONS      SKIN GRAFT      left leg       Review of patient's allergies indicates:   Allergen Reactions    Contrast media Other (See Comments)     Severe chest pain    Food allergy formula [glutamine-c-quercet-selen-brom]      Allergic to green peas; Heart failure.    Iodinated contrast media Other (See Comments)     Chest pain    Peas Hives    Amiodarone analogues Other (See Comments)     passed out in ER    Ibuprofen Swelling    Latex, natural rubber Hives    Pcn [penicillins] Hives    Quercetin     Butisol [butabarbital] Rash     Peeling skin       No current facility-administered medications on file prior to encounter.     Current Outpatient Medications on File Prior to Encounter   Medication Sig    acetaminophen (TYLENOL) 650 MG TbSR Take 650 mg by mouth as needed.    albuterol (PROVENTIL/VENTOLIN HFA) 90 mcg/actuation inhaler Inhale 2 puffs  into the lungs every 6 (six) hours as needed for Wheezing. Rescue    atorvastatin (LIPITOR) 40 MG tablet Take 1 tablet (40 mg total) by mouth every evening.    lisinopriL (PRINIVIL,ZESTRIL) 20 MG tablet Take 1 tablet (20 mg total) by mouth once daily.    methocarbamoL (ROBAXIN) 500 MG Tab Take 1 tablet (500 mg total) by mouth 2 (two) times daily as needed (muscle discomfort).    metoprolol succinate (TOPROL-XL) 200 MG 24 hr tablet Take 1 tablet (200 mg total) by mouth once daily. (Patient taking differently: Take 50 mg by mouth once daily.)    rivaroxaban (XARELTO) 20 mg Tab Take 1 tablet (20 mg total) by mouth daily with dinner or evening meal.    sumatriptan (IMITREX) 50 MG tablet Take 50 mg for headache. If headache does not resolve, take another 50mg two hours after initial dose. Do not exceed 200mg in 24 hours.    torsemide (DEMADEX) 20 MG Tab Take 2 tablets (40 mg total) by mouth once daily.     Family History       Problem Relation (Age of Onset)    Diabetes Father, Maternal Grandfather    Heart disease Father, Maternal Grandmother, Maternal Grandfather    Stroke Maternal Grandfather          Tobacco Use    Smoking status: Former Smoker     Packs/day: 1.00     Years: 6.00     Pack years: 6.00     Types: Cigarettes    Smokeless tobacco: Former User    Tobacco comment: quit by age 27yrs old   Substance and Sexual Activity    Alcohol use: Yes     Alcohol/week: 1.0 standard drink     Types: 1 Glasses of wine per week     Comment: occasionally    Drug use: No    Sexual activity: Yes     Partners: Female     Birth control/protection: None     Review of Systems   Constitutional:  Positive for fatigue.   HENT:  Negative for congestion and sore throat.    Eyes:  Negative for visual disturbance.   Respiratory:  Positive for shortness of breath.    Cardiovascular:  Positive for chest pain and leg swelling.   Gastrointestinal:  Positive for abdominal pain, nausea and vomiting.   Genitourinary: Negative.     Musculoskeletal:  Positive for back pain.   Neurological:  Positive for dizziness.   Psychiatric/Behavioral: Negative.     Objective:     Vital Signs (Most Recent):  Temp: 97.9 °F (36.6 °C) (05/03/22 0309)  Pulse: 98 (05/03/22 0712)  Resp: (!) 22 (05/03/22 0712)  BP: (!) 107/51 (05/03/22 0711)  SpO2: 99 % (05/03/22 0712)   Vital Signs (24h Range):  Temp:  [97.9 °F (36.6 °C)] 97.9 °F (36.6 °C)  Pulse:  [] 98  Resp:  [18-25] 22  SpO2:  [95 %-100 %] 99 %  BP: ()/(36-74) 107/51     Weight: (!) 169.6 kg (374 lb)  Body mass index is 41.09 kg/m².    Physical Exam  Constitutional:       Appearance: He is obese.   HENT:      Head: Normocephalic.      Right Ear: External ear normal.      Left Ear: External ear normal.      Nose: Nose normal.      Mouth/Throat:      Mouth: Mucous membranes are moist.   Cardiovascular:      Rate and Rhythm: Normal rate. Rhythm irregular.      Pulses: Normal pulses.   Pulmonary:      Effort: Pulmonary effort is normal.   Abdominal:      Tenderness: There is abdominal tenderness. There is no rebound.      Comments: Positive florez's sign   Musculoskeletal:         General: Normal range of motion.      Comments: Patient has midsternal stabbing chest pain, that is reproducible with palpation.  Bilateral lower extremities show significant texture and coloration changes   Skin:     General: Skin is warm.   Neurological:      General: No focal deficit present.      Mental Status: He is alert and oriented to person, place, and time.   Psychiatric:         Mood and Affect: Mood normal.           Significant Labs: All pertinent labs within the past 24 hours have been reviewed.  CBC:   Recent Labs   Lab 05/03/22  0337   WBC 6.29   HGB 9.5*   HCT 30.5*        CMP:   Recent Labs   Lab 05/03/22  0337      K 5.3*      CO2 18*   GLU 97   BUN 67*   CREATININE 1.6*   CALCIUM 10.8*   PROT 8.4   ALBUMIN 3.5   BILITOT 1.1*   ALKPHOS 336*   AST 62*   ALT 72*   ANIONGAP 12   EGFRNONAA  48*     Troponin:   Recent Labs   Lab 05/03/22  0337 05/03/22  0638   TROPONINI 0.036* 0.030*       Significant Imaging: I have reviewed all pertinent imaging results/findings within the past 24 hours.  CXR: I have reviewed all pertinent results/findings within the past 24 hours and my personal findings are:  No acute cardiopulmonary pathology visualized

## 2022-05-03 NOTE — Clinical Note
The right brachial was prepped. The site was prepped with ChloraPrep. The site was clipped. The patient was draped. The patient was positioned supine.

## 2022-05-03 NOTE — ED PROVIDER NOTES
Encounter Date: 5/3/2022       History     Chief Complaint   Patient presents with    Chest Pain     Patient presents to the ED via  EMS with reports of having chest pain that started yesterday at 5 pm. Hx A-Fib with RVR.    56-year-old  56-year-old male history of AFib on Xarelto, CHF, DVT, hypertension presents for chest pain.  Began around 5:00 p.m..  Mostly right-sided sharp and radiating to the back.  Patient felt his AFib developed rapid ventricular response.  It is associated with shortness of breath, diaphoresis.    The history is provided by the patient.     Review of patient's allergies indicates:   Allergen Reactions    Contrast media Other (See Comments)     Severe chest pain    Food allergy formula [glutamine-c-quercet-selen-brom]      Allergic to green peas; Heart failure.    Iodinated contrast media Other (See Comments)     Chest pain    Peas Hives    Amiodarone analogues Other (See Comments)     passed out in ER    Ibuprofen Swelling    Latex, natural rubber Hives    Pcn [penicillins] Hives    Quercetin     Butisol [butabarbital] Rash     Peeling skin     Past Medical History:   Diagnosis Date    *Atrial fibrillation     Anticoagulant long-term use     Arthritis     Atrial fibrillation     Atrial fibrillation Feb 23, 2016    Bipolar disorder     Carpal tunnel syndrome on right 2/10/2022    CHF (congestive heart failure)     Congenital heart disease     s/p surgical intervention 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 of age    Deep vein thrombosis     DVT of leg (deep venous thrombosis)     left leg    History of prior ablation treatment     10/9/13    Hypertension     Obesity     Stroke     Thyroid disease     Venous stasis ulcer of lower extremity, unspecified laterality 12/14/2012    Venous ulcer      Past Surgical History:   Procedure Laterality Date    ABLATION Left 1/20/2022    Procedure: Ablation;  Surgeon: Gomez Lantigua MD;  Location: [facility] CATH LAB/EP;  Service: Cardiology;   Laterality: Left;    ANGIOPLASTY      CARDIAC SURGERY      open heart surgery 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 old    EYE SURGERY      left eye cataract/right eye glaucoma    TIMO FILTER PLACEMENT      Dr Calix ([facility])    KNEE SURGERY      l and r     MULTIPLE TOOTH EXTRACTIONS      SKIN GRAFT      left leg     Family History   Problem Relation Age of Onset    Diabetes Father     Heart disease Father     Heart disease Maternal Grandmother     Diabetes Maternal Grandfather     Heart disease Maternal Grandfather     Stroke Maternal Grandfather      Social History     Tobacco Use    Smoking status: Former Smoker     Packs/day: 1.00     Years: 6.00     Pack years: 6.00     Types: Cigarettes    Smokeless tobacco: Former User    Tobacco comment: quit by age 27yrs old   Substance Use Topics    Alcohol use: Yes     Alcohol/week: 1.0 standard drink     Types: 1 Glasses of wine per week     Comment: occasionally    Drug use: No     Review of Systems   Constitutional: Negative for chills and fever.   HENT: Negative for congestion, rhinorrhea and sore throat.    Eyes: Negative for visual disturbance.   Respiratory: Positive for shortness of breath. Negative for cough.    Cardiovascular: Positive for chest pain and palpitations.   Gastrointestinal: Negative for abdominal pain, diarrhea, nausea and vomiting.   Genitourinary: Negative for dysuria and hematuria.   Musculoskeletal: Negative for back pain and myalgias.   Skin: Negative for pallor and rash.   Neurological: Negative for dizziness and weakness.   All other systems reviewed and are negative.      Physical Exam     Initial Vitals [05/03/22 0309]   BP Pulse Resp Temp SpO2   134/74 (!) 120 (!) 22 97.9 °F (36.6 °C) 95 %      MAP       --         Physical Exam    Nursing note and vitals reviewed.  Constitutional: He appears well-developed and well-nourished. No distress.   HENT:   Head: Normocephalic and atraumatic.   Mouth/Throat: Oropharynx is clear and moist.    Eyes: Conjunctivae are normal. Pupils are equal, round, and reactive to light.   Strabismus   Neck: Neck supple.   Normal range of motion.  Cardiovascular: Intact distal pulses.   Distal pulses equal bilaterally.  Irregularly irregular tachycardic rhythm   Pulmonary/Chest: No stridor. No respiratory distress.   Abdominal: Abdomen is soft. He exhibits no distension. There is abdominal tenderness.   Right upper quadrant tenderness with Frederick sign   Musculoskeletal:         General: Normal range of motion.      Cervical back: Normal range of motion and neck supple.      Comments: Moving all extremities with grossly normal strength     Neurological: He is alert and oriented to person, place, and time.   CN 2-12 appear grossly intact   Skin: Skin is warm and dry.   Psychiatric: He has a normal mood and affect.         ED Course   Procedures  Labs Reviewed   CBC W/ AUTO DIFFERENTIAL - Abnormal; Notable for the following components:       Result Value    RBC 3.73 (*)     Hemoglobin 9.5 (*)     Hematocrit 30.5 (*)     MCH 25.5 (*)     MCHC 31.1 (*)     RDW 16.0 (*)     All other components within normal limits   COMPREHENSIVE METABOLIC PANEL - Abnormal; Notable for the following components:    Potassium 5.3 (*)     CO2 18 (*)     BUN 67 (*)     Creatinine 1.6 (*)     Calcium 10.8 (*)     Total Bilirubin 1.1 (*)     Alkaline Phosphatase 336 (*)     AST 62 (*)     ALT 72 (*)     eGFR if  56 (*)     eGFR if non  48 (*)     All other components within normal limits   TROPONIN I - Abnormal; Notable for the following components:    Troponin I 0.036 (*)     All other components within normal limits   TROPONIN I - Abnormal; Notable for the following components:    Troponin I 0.030 (*)     All other components within normal limits    Narrative:     Collection has been rescheduled by MARKOS at 05/03/2022 06:08 Reason:   Patient getting an ultrasound   PHOSPHORUS - Abnormal; Notable for the following  components:    Phosphorus 4.9 (*)     All other components within normal limits    Narrative:     Collection has been rescheduled by SAB4 at 05/03/2022 06:08 Reason:   Patient getting an ultrasound   BILIRUBIN, DIRECT - Abnormal; Notable for the following components:    Bilirubin, Direct 0.5 (*)     All other components within normal limits    Narrative:     Collection has been rescheduled by SAB4 at 05/03/2022 06:08 Reason:   Patient getting an ultrasound   B-TYPE NATRIURETIC PEPTIDE   LIPASE   LIPASE   MAGNESIUM    Narrative:     Collection has been rescheduled by SAB4 at 05/03/2022 06:08 Reason:   Patient getting an ultrasound   SARS-COV-2 RDRP GENE    Narrative:     This test utilizes isothermal nucleic acid amplification   technology to detect the SARS-CoV-2 RdRp nucleic acid segment.   The analytical sensitivity (limit of detection) is 125 genome   equivalents/mL.   A POSITIVE result implies infection with the SARS-CoV-2 virus;   the patient is presumed to be contagious.     A NEGATIVE result means that SARS-CoV-2 nucleic acids are not   present above the limit of detection. A NEGATIVE result should be   treated as presumptive. It does not rule out the possibility of   COVID-19 and should not be the sole basis for treatment decisions.   If COVID-19 is strongly suspected based on clinical and exposure   history, re-testing using an alternate molecular assay should be   considered.   This test is only for use under the Food and Drug   Administration s Emergency Use Authorization (EUA).   Commercial kits are provided by [employer].   Performance characteristics of the EUA have been independently   verified by [facility] Department of   Pathology and Laboratory Medicine.   _________________________________________________________________   The authorized Fact Sheet for Healthcare Providers and the authorized Fact   Sheet for Patients of the ID NOW COVID-19 are available on the FDA   website:      https://www.fda.gov/media/498856/download  https://www.fda.gov/media/092697/download          POCT GLUCOSE, HAND-HELD DEVICE   POCT GLUCOSE   POCT GLUCOSE   POCT GLUCOSE MONITORING CONTINUOUS     EKG Readings: (Independently Interpreted)   Rhythm: Atrial Fibrillation. Heart Rate: 126. Ectopy: PVCs. Conduction: Normal. ST Segments: Normal ST Segments. T Waves: Normal. Axis: Normal. Clinical Impression: Atrial Fibrillation with RVR with PVCs   Other EKG Interpretations: Repeat EKG shows AFib with RVR rate of 120 normal axis normal intervals no obvious ischemic ST changes, PVCs present     ECG Results          Repeat EKG 12-lead (In process)  Result time 05/03/22 14:31:16    In process by Interface, Lab In [facility] (05/03/22 14:31:16)                 Narrative:    Test Reason : R07.9,    Vent. Rate : 120 BPM     Atrial Rate : 125 BPM     P-R Int : 000 ms          QRS Dur : 104 ms      QT Int : 306 ms       P-R-T Axes : 000 055 084 degrees     QTc Int : 432 ms    Atrial fibrillation with rapid ventricular response  Nonspecific ST and T wave abnormality  Abnormal ECG  When compared with ECG of 03-MAY-2022 03:15,  Atrial fibrillation has replaced Junctional rhythm    Referred By: [email or web address]   SELF           Confirmed By:                              EKG 12-lead (In process)  Result time 05/03/22 14:31:10    In process by Interface, Lab In [facility] (05/03/22 14:31:10)                 Narrative:    Test Reason : R07.9,    Vent. Rate : 126 BPM     Atrial Rate : 126 BPM     P-R Int : 000 ms          QRS Dur : 102 ms      QT Int : 312 ms       P-R-T Axes : 000 048 075 degrees     QTc Int : 451 ms    Accelerated Junctional rhythm with Premature supraventricular complexes  Nonspecific ST and T wave abnormality  Abnormal ECG  When compared with ECG of 28-MAR-2022 12:06,  Junctional rhythm has replaced Atrial fibrillation  Nonspecific T wave abnormality, worse in Lateral leads    Referred By: [email or web address]   SELF           Confirmed  By:                             Imaging Results          US Abdomen Limited (Final result)  Result time 05/03/22 07:04:40    Final result by Terrance Penaloza MD (05/03/22 07:04:40)                 Impression:      1. Limited examination as above.  2. No definite acute sonographic findings identified.  3. Additional details, as provided in the body of report.      Electronically signed by: Terrance Penaloza  Date:    05/03/2022  Time:    07:04             Narrative:    EXAMINATION:  US ABDOMEN LIMITED    CLINICAL HISTORY:  RUQ pain, elevated lft;    TECHNIQUE:  Limited ultrasound of the right upper quadrant of the abdomen (including pancreas, liver, gallbladder, common bile duct, and spleen) was performed.    COMPARISON:  None.    FINDINGS:  Additional comment: Technically limited examination due to body habitus and patient positioning.    Liver: Unable to obtain reliable measurements.  Fatty liver infiltration. No focal hepatic lesions.    Gallbladder: No calculi, wall thickening, or pericholecystic fluid.  No sonographic Frederick's sign.    Biliary system: The common duct is not dilated, measuring 5.3 mm.  No intrahepatic ductal dilatation.    Spleen: Normal in size and echotexture, measuring 13.1 x 6.6 cm.    Miscellaneous: No upper abdominal ascites.                               X-Ray Chest AP Portable (Final result)  Result time 05/03/22 05:26:45    Final result by Rony Huerta MD (05/03/22 05:26:45)                 Impression:      Interval improvement without radiographic evidence for superimposed acute intrathoracic process at this time.      Electronically signed by: Rony Huerta  Date:    05/03/2022  Time:    05:26             Narrative:    EXAMINATION:  XR CHEST AP PORTABLE    CLINICAL HISTORY:  Chest pain;    TECHNIQUE:  Single frontal view of the chest was performed.    COMPARISON:  Chest radiograph March 14, 2022    FINDINGS:  Single portable chest view is submitted.  Mild diminished depth of  inspiration and minimal rotation noted, when accounting for these factors the cardiomediastinal silhouette appears stable, underlying cardiac enlargement noted.    Accentuation of pulmonary bronchovascular markings consistent with diminished depth of inspiration noted.  There is improvement when compared to the prior study, improved aeration bilaterally.  There is no evidence for superimposed confluent infiltrate or consolidation, significant pleural effusion or pneumothorax.  The visualized osseous structures appear intact.                                 Medications   albuterol inhaler 2 puff (has no administration in time range)   atorvastatin tablet 40 mg (40 mg Oral Given 5/3/22 2030)   lisinopriL tablet 20 mg (20 mg Oral Given 5/3/22 0931)   methocarbamoL tablet 500 mg (has no administration in time range)   dextrose 10% bolus 250 mL (0 g Intravenous Stopped 5/3/22 0603)   rivaroxaban tablet 20 mg (20 mg Oral Given 5/3/22 1652)   sumatriptan tablet 50 mg (has no administration in time range)   torsemide tablet 40 mg (40 mg Oral Given 5/3/22 0930)   sodium chloride 0.9% flush 5 mL (has no administration in time range)   melatonin tablet 9 mg (has no administration in time range)   senna-docusate 8.6-50 mg per tablet 1 tablet (1 tablet Oral Given 5/3/22 2030)   acetaminophen tablet 650 mg (has no administration in time range)   morphine injection 4 mg (4 mg Intravenous Given 5/3/22 2029)   naloxone 0.4 mg/mL injection 0.02 mg (has no administration in time range)   LIDOcaine 5 % patch 1 patch (has no administration in time range)   insulin aspart U-100 pen 1-10 Units (has no administration in time range)   glucose chewable tablet 16 g (has no administration in time range)   glucose chewable tablet 24 g (has no administration in time range)   glucagon (human recombinant) injection 1 mg (has no administration in time range)   sars-cov-2 (covid-19) (MODERNA COVID-19) 100 mcg/0.5 ml injection 0.25 mL (has no  administration in time range)   metoprolol succinate (TOPROL-XL) 24 hr tablet 50 mg (50 mg Oral Given 5/4/22 0314)   ondansetron injection 4 mg (4 mg Intravenous Given 5/4/22 0314)   aspirin tablet 325 mg (325 mg Oral Given 5/3/22 0343)   metoprolol injection 5 mg (5 mg Intravenous Given 5/3/22 0519)   sodium chloride 0.9% bolus 1,000 mL (0 mLs Intravenous Stopped 5/3/22 0606)   morphine injection 4 mg (4 mg Intravenous Given 5/3/22 0506)   digoxin Soln 125 mcg (125 mcg Intravenous Given 5/3/22 0511)   sodium bicarbonate solution 50 mEq (50 mEq Intravenous Given 5/3/22 0547)   insulin regular injection 5 Units (5 Units Intravenous Given 5/3/22 0558)   albuterol sulfate nebulizer solution 10 mg (10 mg Nebulization Given 5/3/22 0539)   morphine injection 4 mg (4 mg Intravenous Given 5/3/22 0559)   ondansetron injection 4 mg (4 mg Intravenous Given 5/3/22 0900)     Medical Decision Making:   History:   Old Records Summarized: records from previous admission(s).       <> Summary of Records: · The left ventricle is severely enlarged with moderately decreased systolic function.  · The estimated ejection fraction is 40%.  · There is moderate left ventricular global hypokinesis.  · Mild-to-moderate mitral regurgitation.  · A diastolic pattern consistent with atrial fibrillation observed.  · Mild aortic regurgitation.  · Mild to moderate tricuspid regurgitation.  · Mild to moderate pulmonic regurgitation.  · Mild right ventricular enlargement with mildly reduced right ventricular systolic function.  · Elevated central venous pressure (15 mmHg).  · The estimated PA systolic pressure is 52 mmHg.  · There is moderate pulmonary hypertension.      Initial Assessment:   Uncomfortable appearing, tachycardic AFib with RVR, borderline hypotensive  Differential Diagnosis:   Cholecystitis, choledocholithiasis, ACS, AFib with RVR, less likely PE given the patient's anticoagulated status  Independently Interpreted Test(s):   I have  ordered and independently interpreted EKG Reading(s) - see prior notes  Clinical Tests:   Lab Tests: Ordered and Reviewed  The following lab test(s) were unremarkable: CBC       <> Summary of Lab: Elevated LFTs noted.  NARESH noted as well as mild hyperkalemia  Radiological Study: Reviewed and Ordered  Medical Tests: Ordered and Reviewed  ED Management:  Patient has NARESH with hyperkalemia although no EKG changes.  He remains in atrial fibrillation with RVR.  Fluid boluses administered for borderline blood pressure.  Attempting rate control if blood pressure can be stabilized.  Hyperkalemia medications ordered as potassium is expected to worsen without clear etiology of his NARESH.  Patient's troponin borderline elevated likely due to rate and demand.  Case discussed with [facility] family medicine for admission pending ultrasound to rule out cholecystitis and need for surgical consult                      Clinical Impression:   Final diagnoses:  [R07.9] Chest pain          ED Disposition Condition    Admit               Foster Jane DO  05/04/22 0559

## 2022-05-03 NOTE — ASSESSMENT & PLAN NOTE
Positive Frederick sign's day of acute nausea and vomiting  Plan  FU Abdomen US  Zofran to control nausea

## 2022-05-03 NOTE — ASSESSMENT & PLAN NOTE
Elevated BUN/creatine   Plan  Encourage PO fluid intake  Considering past history of CHF avoid IVF  Monitor Urine out put

## 2022-05-03 NOTE — H&P
[facility]  History & Physical    Patient Name: Drew Farrar  MRN: [id number]  Patient Class: IP- Inpatient  Admission Date: 5/3/2022  Attending Physician: Garrison Roach MD  Primary Care Provider: Garrison Roach MD         Patient information was obtained from patient, spouse/SO, past medical records and ER records.     Subjective:     Principal Problem:Chest pain    Chief Complaint:   Chief Complaint   Patient presents with    Chest Pain     Patient presents to the ED via  EMS with reports of having chest pain that started yesterday at 5 pm. Hx A-Fib with RVR.         HPI: Patient is a 56 yr old male with Pmhx history of AFib on Xarelto, CHF, DVT, hypertension presenting to the ED for one day of nausea and vomiting with severe chest pain. Patient states that he has been vomiting all day ans his been unable to hold down any food. Patient states the chest pain does not radiate to his shoulder and feels like stabbing, it is severe and constant.  In the ED patient was found to have mildly elevated troponin and an ekg showing Afib with RVR and tachycardia. Physical exam in ED also showed positive florez sign. Concern for cholecystitis lead to an order of an US abdomen that is pending. Patient is afebrile and shows no other signs of infection. Patient given labetalol in ED, which improved cardiac rate and rhythm  Patient to be admitted to [facility] to continue work-up      Past Medical History:   Diagnosis Date    *Atrial fibrillation     Anticoagulant long-term use     Arthritis     Atrial fibrillation     Atrial fibrillation Feb 23, 2016    Bipolar disorder     Carpal tunnel syndrome on right 2/10/2022    CHF (congestive heart failure)     Congenital heart disease     s/p surgical intervention 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 of age    Deep vein thrombosis     DVT of leg (deep venous thrombosis)     left leg    History of prior ablation treatment     10/9/13    Hypertension     Obesity      Stroke     Thyroid disease     Venous stasis ulcer of lower extremity, unspecified laterality 12/14/2012    Venous ulcer        Past Surgical History:   Procedure Laterality Date    ABLATION Left 1/20/2022    Procedure: Ablation;  Surgeon: Gomez Lantigua MD;  Location: [facility] CATH LAB/EP;  Service: Cardiology;  Laterality: Left;    ANGIOPLASTY      CARDIAC SURGERY      open heart surgery 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 old    EYE SURGERY      left eye cataract/right eye glaucoma    TIMO FILTER PLACEMENT      Dr Calix ([facility])    KNEE SURGERY      l and r     MULTIPLE TOOTH EXTRACTIONS      SKIN GRAFT      left leg       Review of patient's allergies indicates:   Allergen Reactions    Contrast media Other (See Comments)     Severe chest pain    Food allergy formula [glutamine-c-quercet-selen-brom]      Allergic to green peas; Heart failure.    Iodinated contrast media Other (See Comments)     Chest pain    Peas Hives    Amiodarone analogues Other (See Comments)     passed out in ER    Ibuprofen Swelling    Latex, natural rubber Hives    Pcn [penicillins] Hives    Quercetin     Butisol [butabarbital] Rash     Peeling skin       No current facility-administered medications on file prior to encounter.     Current Outpatient Medications on File Prior to Encounter   Medication Sig    acetaminophen (TYLENOL) 650 MG TbSR Take 650 mg by mouth as needed.    albuterol (PROVENTIL/VENTOLIN HFA) 90 mcg/actuation inhaler Inhale 2 puffs into the lungs every 6 (six) hours as needed for Wheezing. Rescue    atorvastatin (LIPITOR) 40 MG tablet Take 1 tablet (40 mg total) by mouth every evening.    lisinopriL (PRINIVIL,ZESTRIL) 20 MG tablet Take 1 tablet (20 mg total) by mouth once daily.    methocarbamoL (ROBAXIN) 500 MG Tab Take 1 tablet (500 mg total) by mouth 2 (two) times daily as needed (muscle discomfort).    metoprolol succinate (TOPROL-XL) 200 MG 24 hr tablet Take 1 tablet (200 mg total) by mouth once  daily. (Patient taking differently: Take 50 mg by mouth once daily.)    rivaroxaban (XARELTO) 20 mg Tab Take 1 tablet (20 mg total) by mouth daily with dinner or evening meal.    sumatriptan (IMITREX) 50 MG tablet Take 50 mg for headache. If headache does not resolve, take another 50mg two hours after initial dose. Do not exceed 200mg in 24 hours.    torsemide (DEMADEX) 20 MG Tab Take 2 tablets (40 mg total) by mouth once daily.     Family History       Problem Relation (Age of Onset)    Diabetes Father, Maternal Grandfather    Heart disease Father, Maternal Grandmother, Maternal Grandfather    Stroke Maternal Grandfather          Tobacco Use    Smoking status: Former Smoker     Packs/day: 1.00     Years: 6.00     Pack years: 6.00     Types: Cigarettes    Smokeless tobacco: Former User    Tobacco comment: quit by age 27yrs old   Substance and Sexual Activity    Alcohol use: Yes     Alcohol/week: 1.0 standard drink     Types: 1 Glasses of wine per week     Comment: occasionally    Drug use: No    Sexual activity: Yes     Partners: Female     Birth control/protection: None     Review of Systems   Constitutional:  Positive for fatigue.   HENT:  Negative for congestion and sore throat.    Eyes:  Negative for visual disturbance.   Respiratory:  Positive for shortness of breath.    Cardiovascular:  Positive for chest pain and leg swelling.   Gastrointestinal:  Positive for abdominal pain, nausea and vomiting.   Genitourinary: Negative.    Musculoskeletal:  Positive for back pain.   Neurological:  Positive for dizziness.   Psychiatric/Behavioral: Negative.     Objective:     Vital Signs (Most Recent):  Temp: 97.9 °F (36.6 °C) (05/03/22 0309)  Pulse: 98 (05/03/22 0712)  Resp: (!) 22 (05/03/22 0712)  BP: (!) 107/51 (05/03/22 0711)  SpO2: 99 % (05/03/22 0712)   Vital Signs (24h Range):  Temp:  [97.9 °F (36.6 °C)] 97.9 °F (36.6 °C)  Pulse:  [] 98  Resp:  [18-25] 22  SpO2:  [95 %-100 %] 99 %  BP: ()/(36-74)  107/51     Weight: (!) 169.6 kg (374 lb)  Body mass index is 41.09 kg/m².    Physical Exam  Constitutional:       Appearance: He is obese.   HENT:      Head: Normocephalic.      Right Ear: External ear normal.      Left Ear: External ear normal.      Nose: Nose normal.      Mouth/Throat:      Mouth: Mucous membranes are moist.   Cardiovascular:      Rate and Rhythm: Normal rate. Rhythm irregular.      Pulses: Normal pulses.   Pulmonary:      Effort: Pulmonary effort is normal.   Abdominal:      Tenderness: There is abdominal tenderness. There is no rebound.      Comments: Positive frederick's sign   Musculoskeletal:         General: Normal range of motion.      Comments: Patient has midsternal stabbing chest pain, that is reproducible with palpation.  Bilateral lower extremities show significant texture and coloration changes   Skin:     General: Skin is warm.   Neurological:      General: No focal deficit present.      Mental Status: He is alert and oriented to person, place, and time.   Psychiatric:         Mood and Affect: Mood normal.           Significant Labs: All pertinent labs within the past 24 hours have been reviewed.  CBC:   Recent Labs   Lab 05/03/22  0337   WBC 6.29   HGB 9.5*   HCT 30.5*        CMP:   Recent Labs   Lab 05/03/22  0337      K 5.3*      CO2 18*   GLU 97   BUN 67*   CREATININE 1.6*   CALCIUM 10.8*   PROT 8.4   ALBUMIN 3.5   BILITOT 1.1*   ALKPHOS 336*   AST 62*   ALT 72*   ANIONGAP 12   EGFRNONAA 48*     Troponin:   Recent Labs   Lab 05/03/22  0337 05/03/22  0638   TROPONINI 0.036* 0.030*       Significant Imaging: I have reviewed all pertinent imaging results/findings within the past 24 hours.  CXR: I have reviewed all pertinent results/findings within the past 24 hours and my personal findings are:  No acute cardiopulmonary pathology visualized    Assessment/Plan:     RUQ pain  Positive Frederick sign's day of acute nausea and vomiting  Plan  FU Abdomen US  Zofran to  control nausea      Chest pain  Patient reports 2 hours of stabbing chest pain  Pain is midsternal and reproducible on exam, does not radiate  ED echo showed no signs of acute CHF or pericarditis   Plan  Repeat Troponin in 4 hours, with EKG if needed  Continue Tele monitoring           Elevated troponin  Initial troponin .036  Repeat .030    Atrial fibrillation with RVR  Noted on EKG  ED gave labetalol ant metoprolol   Tachycardia improved  Plan  Continue metoprolol  Monitor on tele      NARESH (acute kidney injury)  Elevated BUN/creatine   Plan  Encourage PO fluid intake  Considering past history of CHF avoid IVF  Monitor Urine out put      Chronic atrial fibrillation  Plan  Continue home Metoprolol  Monitor on tele      Venous stasis dermatitis of both lower extremities  On Xeralto for venus status with past history DVT  Plan   Continue home dose of Xarelto        VTE Risk Mitigation (From admission, onward)         Ordered     rivaroxaban tablet 20 mg  With dinner         05/03/22 0602     Reason for No Pharmacological VTE Prophylaxis  Once        Question:  Reasons:  Answer:  Already adequately anticoagulated on oral Anticoagulants    05/03/22 0602     IP VTE HIGH RISK PATIENT  Once         05/03/22 0602     Place sequential compression device  Until discontinued         05/03/22 0602                   Andrae Bustamante MD  [facility] PGY-1  05/03/2022

## 2022-05-03 NOTE — PROGRESS NOTES
IP Liaison - Initial Visit Note    Patient: Drew Farrar  MRN:  [id number]  Date of Service:  5/3/2022  Completed by:  DENNIS Simeon    Reason for Visit   Patient presents with    IP Liaison Initial Visit       RSW met with patient at bedside in order to complete SDOH questionnaire and liaison assessment.  Pt has identified no social barriers to care. Per pt, pt is not in need of resources at this time.    The following were addressed during this visit:  - Review SDOH Questions   - Complete patient assessment   - Complete initial visit with patient        Patient Summary     IP Liaison Patient Assessment    General  Level of Caregiver support: Member independent and does not need caregiver assistance  Have you had to make a decision between paying for any of the following in the last 2 months?: None  Transportation means: Family  Assessments  Was the PHQ Depression Screening completed this visit?: No  Was the KAMLESH-7 Screening completed this visit?: No       DENNIS Simeon

## 2022-05-03 NOTE — Clinical Note
The PA catheter was repositioned to the right ventricle. Hemodynamics were performed. O2 saturation was measured at 63%.

## 2022-05-03 NOTE — Clinical Note
The PA catheter was repositioned to the right atrium. Hemodynamics were performed. O2 saturation was measured at 57%.

## 2022-05-03 NOTE — Clinical Note
The PA catheter was repositioned to the main pulmonary artery. Hemodynamics were performed. O2 saturation was measured at 67%.

## 2022-05-03 NOTE — PLAN OF CARE
TEVIN met with pt and pt's S.O. Karen [phone] at bedside to complete DCA. Pt lives at home with his wife Karen and will have his father in law Leonel [phone] help transport him home at time of d/c. Pt is current with [facility]. Pt reported having WC, RW, SC, BSC, Crutches, and Hurricane cane at home. SW will request f/u appts. White board updated with CM name and contact information.  Pt encouraged to call with any questions or concerns.  Cm will continue to follow pt through transitions of care and assist with any discharge needs.    Jeovanny Felipe, MSW  [phone]    Future Appointments   Date Time Provider Department Center   6/27/2022  9:20 AM Declan Faria MD [facility]   6/27/2022 11:20 AM Gomez Lantigua MD [facility]        05/03/22 0819   Discharge Assessment   Assessment Type Discharge Planning Assessment   Confirmed/corrected address, phone number and insurance Yes   Confirmed Demographics Correct on Facesheet   Source of Information patient;family   When was your last doctors appointment?   (Per pt 6 months)   Reason For Admission CHest Pain   Lives With significant other   Facility Arrived From: Home   Do you expect to return to your current living situation? Yes   Do you have help at home or someone to help you manage your care at home? Yes   Who are your caregiver(s) and their phone number(s)? S.O. Karen [phone]   Prior to hospitilization cognitive status: Alert/Oriented   Current cognitive status: Alert/Oriented   Walking or Climbing Stairs Difficulty ambulation difficulty, requires equipment   Dressing/Bathing Difficulty bathing difficulty, requires equipment   Home Accessibility wheelchair accessible   Home Layout Able to live on 1st floor   Equipment Currently Used at Home walker, rolling;wheelchair;cane, quad;crutches;bedside commode   Patient currently being followed by outpatient case management? No   Do you currently have service(s) that help  you manage your care at home? Yes   Name and Contact number of agency [facility]   Is the pt/caregiver preference to resume services with current agency Yes   Do you take prescription medications? Yes   Do you have prescription coverage? Yes   Coverage [employer]   Do you have any problems affording any of your prescribed medications? No   Is the patient taking medications as prescribed? yes   Who is going to help you get home at discharge? DINORA Jones [phone], father in law Leonel [phone]   How do you get to doctors appointments? health plan transportation   Are you on dialysis? No   Do you take coumadin? No   Discharge Plan A Home Health   DME Needed Upon Discharge  none   Discharge Plan discussed with: Patient   Discharge Barriers Identified None   Relationship/Environment   Name(s) of Who Lives With Patient .MELISSA Jones [phone], father in law Leonel [phone]

## 2022-05-03 NOTE — Clinical Note
The sheath was evaluated for a closure device, with angiography, in the right femoral artery. Secured in place with IABP with tegaderms

## 2022-05-03 NOTE — ED TRIAGE NOTES
Pt comes into the ED via EMS with complaints of chest pain that began approximately at 1700 yesterday. Pt states OTC meds not working. States it feels like a stabbing pain under right breast when he breathes in. Endorses nausea and vomiting x1.

## 2022-05-03 NOTE — PHARMACY MED REC
"Admission Medication History     The home medication history was taken by Myron Tracy PharmD.    Medication history obtained from patient    You may go to "Admission" then "Reconcile Home Medications" tabs to review and/or act upon these items.      The home medication list has been updated by the Pharmacy department.    Please read ALL comments highlighted in yellow.    Please address this information as you see fit.     Feel free to contact us if you have any questions or require assistance.      The medications listed below were removed from the home medication list.  Please reorder if appropriate:    o Patient reports he/she IS TAKING the following DIFFERENTLY   o Patient reports taking Metoprolol 50mg daily        Myron Tracy PharmD.                  .          "

## 2022-05-03 NOTE — Clinical Note
90 ml of contrast were injected throughout the case. 60 mL of contrast was the total wasted during the case. 150 mL was the total amount used during the case.

## 2022-05-03 NOTE — ASSESSMENT & PLAN NOTE
Noted on EKG  ED gave labetalol ant metoprolol   Tachycardia improved  Plan  Continue metoprolol  Monitor on tele

## 2022-05-03 NOTE — ASSESSMENT & PLAN NOTE
Patient reports 2 hours of stabbing chest pain  Pain is midsternal and reproducible on exam, does not radiate  ED echo showed no signs of acute CHF or pericarditis   Plan  Repeat Troponin in 4 hours, with EKG if needed  Continue Tele monitoring

## 2022-05-03 NOTE — Clinical Note
The catheter was inserted into the ostium   right coronary artery. An angiography was performed of the right coronary arteries. Multiple views were taken. The angiography was performed via hand injection with 10 mL of contrast.

## 2022-05-03 NOTE — HPI
Patient is a 56 yr old male with Pmhx history of AFib on Xarelto, CHF, DVT, hypertension presenting to the ED for one day of nausea and vomiting with severe chest pain. Patient states that he has been vomiting all day ans his been unable to hold down any food. Patient states the chest pain does not radiate to his shoulder and feels like stabbing, it is severe and constant.  In the ED patient was found to have mildly elevated troponin and an ekg showing Afib with RVR and tachycardia. Physical exam in ED also showed positive florez sign. Concern for cholecystitis lead to an order of an US abdomen that is pending. Patient is afebrile and shows no other signs of infection. Patient given labetalol in ED, which improved cardiac rate and rhythm  Patient to be admitted to [facility]  to continue work-up

## 2022-05-03 NOTE — PLAN OF CARE
TEVIN sent  referral to [facility]. TEVIN will follow.     Jeovanny Felipe, MSW  [phone]    Future Appointments   Date Time Provider Department Center   5/17/2022 10:00 AM Kayla Carrillo NP [facility]   6/27/2022  9:20 AM Declan Faria MD [facility]   6/27/2022 11:20 AM Gomez Lantigua MD [facility]        05/03/22 0956   Post-Acute Status   Post-Acute Authorization [facility]

## 2022-05-03 NOTE — Clinical Note
A percutaneous stick to the right brachial vein was performed. Ultrasound guidance was used to obtain access.

## 2022-05-03 NOTE — ED NOTES
Per charge, call and attempt to give report to charge due to amount of time patient has been assigned a ready bed.

## 2022-05-03 NOTE — NURSING
Pt has arrived on the unit from ER , report received from Lesia . Pt & wife  has been oriented to the room and unit .     IV: Patent 22G R wrist & 20G L upper arm   Skin: Venous Ulcer on  Bilateral lower extremities   Neuro : AOX4   Heart Sounds : S1 & S2 on tele-monitor V-tach/ Sinus Arrhythmia   Lungs: On room Air     Bed in lowest position , belongings , call light and urinal in reach . Pt is stable and has no signs or symptoms of distress and tele-monitor in place. Safety/fall precautions in place .

## 2022-05-04 NOTE — CONSULTS
[facility] - Telemetry  Wound Care    Patient Name:  Drew Farrar   MRN:  [id number]  Date: 5/4/2022  Diagnosis: Chest pain    History:     Past Medical History:   Diagnosis Date    *Atrial fibrillation     Anticoagulant long-term use     Arthritis     Atrial fibrillation     Atrial fibrillation Feb 23, 2016    Bipolar disorder     Carpal tunnel syndrome on right 2/10/2022    CHF (congestive heart failure)     Congenital heart disease     s/p surgical intervention 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 of age    Deep vein thrombosis     DVT of leg (deep venous thrombosis)     left leg    History of prior ablation treatment     10/9/13    Hypertension     Obesity     Stroke     Thyroid disease     Venous stasis ulcer of lower extremity, unspecified laterality 12/14/2012    Venous ulcer        Social History     Socioeconomic History    Marital status: Single    Number of children: 2   Tobacco Use    Smoking status: Former Smoker     Packs/day: 1.00     Years: 6.00     Pack years: 6.00     Types: Cigarettes    Smokeless tobacco: Former User    Tobacco comment: quit by age 27yrs old   Substance and Sexual Activity    Alcohol use: Yes     Alcohol/week: 1.0 standard drink     Types: 1 Glasses of wine per week     Comment: occasionally    Drug use: No    Sexual activity: Yes     Partners: Female     Birth control/protection: None     Social Determinants of Health     Financial Resource Strain: Low Risk     Difficulty of Paying Living Expenses: Not hard at all   Food Insecurity: No Food Insecurity    Worried About Running Out of Food in the Last Year: Never true    Ran Out of Food in the Last Year: Never true   Transportation Needs: No Transportation Needs    Lack of Transportation (Medical): No    Lack of Transportation (Non-Medical): No   Stress: No Stress Concern Present    Feeling of Stress : Not at all   Housing Stability: Low Risk     Unable to Pay for Housing in the Last Year: No    Number of Places Lived in the  Last Year: 1    Unstable Housing in the Last Year: No       Precautions:     Allergies as of 05/03/2022 - Reviewed 05/03/2022   Allergen Reaction Noted    Contrast media Other (See Comments) 04/25/2019    Food allergy formula [glutamine-c-quercet-selen-brom]  04/25/2013    Iodinated contrast media Other (See Comments) 04/30/2019    Peas Hives 10/12/2015    Amiodarone analogues Other (See Comments) 03/15/2022    Ibuprofen Swelling 04/25/2016    Latex, natural rubber Hives 12/14/2012    Pcn [penicillins] Hives 12/14/2012    Quercetin  03/16/2021    Butisol [butabarbital] Rash 05/13/2013       WOC Assessment Details/Treatment     Pt known to wound care nurse from previous admission and pt is established with outpt wound clinic with .    BLE- venous dermatitis  L lateral foot- venous stasis ulceration- 1x5x0.2cm- full thickness- slough/granulation- moderate serosanguinous drainage- no odor        L medial ankle- venous stasis ulceration- 1x5x0.2- full thickness- 100% slough- moderate serosanguinous drainage- no odor      Recommendations discussed with pt, nurse and Dr. Declan Faria-  - Santyl to LLE wounds- hydrofera blue ready- abd pad and resume modified profore compression wrap- change 2x/week Mon/Thurs    05/04/2022

## 2022-05-04 NOTE — PROGRESS NOTES
[facility] Medicine  Progress Note    Patient Name: Drew Farrar  MRN: [id number]  Patient Class: IP- Inpatient   Admission Date: 5/3/2022  Length of Stay: 1 days  Attending Physician: Garrison Roach MD  Primary Care Provider: Garrison Roach MD        Subjective:     Principal Problem:Chest pain        HPI:  Patient is a 56 yr old male with Pmhx history of AFib on Xarelto, CHF, DVT, hypertension presenting to the ED for one day of nausea and vomiting with severe chest pain. Patient states that he has been vomiting all day ans his been unable to hold down any food. Patient states the chest pain does not radiate to his shoulder and feels like stabbing, it is severe and constant.  In the ED patient was found to have mildly elevated troponin and an ekg showing Afib with RVR and tachycardia. Physical exam in ED also showed positive florez sign. Concern for cholecystitis lead to an order of an US abdomen that is pending. Patient is afebrile and shows no other signs of infection. Patient given labetalol in ED, which improved cardiac rate and rhythm  Patient to be admitted to [facility] to continue work-up      Overview/Hospital Course:  No notes on file    Interval History: Pt with some N/V after dinner yesterday but no longer N this AM and able to tolerate breakfast.    Review of Systems   Constitutional:  Negative for fatigue.   HENT:  Negative for congestion and sore throat.    Eyes:  Negative for visual disturbance.   Respiratory:  Negative for shortness of breath.    Cardiovascular:  Negative for chest pain and leg swelling.   Gastrointestinal:  Positive for nausea and vomiting. Negative for abdominal pain.   Genitourinary: Negative.    Musculoskeletal:  Negative for back pain.   Neurological:  Negative for dizziness.   Psychiatric/Behavioral: Negative.     Objective:     Vital Signs (Most Recent):  Temp: 97.8 °F (36.6 °C) (05/04/22 0745)  Pulse: (!) 115 (05/04/22 1122)  Resp: 18 (05/04/22  1122)  BP: (!) 132/59 (05/04/22 1122)  SpO2: (!) 94 % (05/04/22 1122)   Vital Signs (24h Range):  Temp:  [96 °F (35.6 °C)-98.3 °F (36.8 °C)] 97.8 °F (36.6 °C)  Pulse:  [] 115  Resp:  [15-20] 18  SpO2:  [77 %-99 %] 94 %  BP: ()/(50-73) 132/59     Weight: (!) 169.6 kg (374 lb)  Body mass index is 41.09 kg/m².    Intake/Output Summary (Last 24 hours) at 5/4/2022 1149  Last data filed at 5/4/2022 1030  Gross per 24 hour   Intake 100 ml   Output 2450 ml   Net -2350 ml      Physical Exam  Constitutional:       Appearance: He is obese.   HENT:      Head: Normocephalic.      Right Ear: External ear normal.      Left Ear: External ear normal.      Nose: Nose normal.      Mouth/Throat:      Mouth: Mucous membranes are moist.   Cardiovascular:      Rate and Rhythm: Normal rate and regular rhythm.      Pulses: Normal pulses.   Pulmonary:      Effort: Pulmonary effort is normal.   Abdominal:      Tenderness: There is no abdominal tenderness. There is no rebound.   Musculoskeletal:         General: Normal range of motion.   Skin:     General: Skin is warm.   Neurological:      General: No focal deficit present.      Mental Status: He is alert and oriented to person, place, and time.   Psychiatric:         Mood and Affect: Mood normal.     Recent Labs   Lab 05/03/22  0337   WBC 6.29   HGB 9.5*   HCT 30.5*   MCV 82   RBC 3.73*   MCH 25.5*   MCHC 31.1*   RDW 16.0*      MPV 9.8   GRAN 68.4  4.3   LYMPH 19.4  1.2   MONO 10.0  0.6   EOSINOPHIL 1.6   BASOPHIL 0.3     Recent Labs   Lab 05/03/22  0337 05/03/22  0638 05/03/22  1450 05/04/22  0407     --  141 141   K 5.3*  --  4.8 5.6*     --  109 110   CO2 18*  --  20* 12*   ANIONGAP 12  --  12 19*   BUN 67*  --  68* 69*   CREATININE 1.6*  --  1.5* 1.9*   GLU 97  --  95 88   CALCIUM 10.8*  --  10.2 10.3   PROT 8.4  --   --   --    ALBUMIN 3.5  --   --   --    ALKPHOS 336*  --   --   --    BILITOT 1.1*  --   --   --    ALT 72*  --   --   --    AST 62*  --    --   --    ESTGFRAFRICA 56*  --  >60 45*   EGFRNONAA 48*  --  52* 39*   MG  --  1.7  --   --    PHOS  --  4.9*  --   --      No results for input(s): COLORU, APPEARANCEUA, PHUR, SPECGRAV, PROTEINUA, GLUCUA, KETONESU, BILIRUBINUA, OCCULTUA, UROBILINOGEN, NITRITE, LEUKOCYTESUR, RBCUA, WBCUA, BACTERIA, SQUAMEPITHEL, HYALINECASTS, GRANULARCAST, MICROCMT in the last 168 hours.    Invalid input(s): SPECIMENU, AMORPHOUSU  Recent Labs   Lab 05/03/22  0337 05/03/22  0638 05/03/22  0804 05/03/22  1236   TROPONINI 0.036* 0.030* 0.034* 0.033*     No results for input(s): PT, INR, APTT in the last 168 hours.  No results for input(s): TSH, T7PPMSF, O6PVUZC, THYROIDAB, FREET4 in the last 168 hours.  X-Ray Chest AP Portable    Result Date: 5/3/2022  EXAMINATION: XR CHEST AP PORTABLE CLINICAL HISTORY: Chest pain; TECHNIQUE: Single frontal view of the chest was performed. COMPARISON: Chest radiograph March 14, 2022 FINDINGS: Single portable chest view is submitted.  Mild diminished depth of inspiration and minimal rotation noted, when accounting for these factors the cardiomediastinal silhouette appears stable, underlying cardiac enlargement noted. Accentuation of pulmonary bronchovascular markings consistent with diminished depth of inspiration noted.  There is improvement when compared to the prior study, improved aeration bilaterally.  There is no evidence for superimposed confluent infiltrate or consolidation, significant pleural effusion or pneumothorax.  The visualized osseous structures appear intact.     Interval improvement without radiographic evidence for superimposed acute intrathoracic process at this time. Electronically signed by: Rony Huerta Date:    05/03/2022 Time:    05:26    US Abdomen Limited    Result Date: 5/3/2022  EXAMINATION: US ABDOMEN LIMITED CLINICAL HISTORY: RUQ pain, elevated lft; TECHNIQUE: Limited ultrasound of the right upper quadrant of the abdomen (including pancreas, liver, gallbladder, common  bile duct, and spleen) was performed. COMPARISON: None. FINDINGS: Additional comment: Technically limited examination due to body habitus and patient positioning. Liver: Unable to obtain reliable measurements.  Fatty liver infiltration. No focal hepatic lesions. Gallbladder: No calculi, wall thickening, or pericholecystic fluid.  No sonographic Frederick's sign. Biliary system: The common duct is not dilated, measuring 5.3 mm.  No intrahepatic ductal dilatation. Spleen: Normal in size and echotexture, measuring 13.1 x 6.6 cm. Miscellaneous: No upper abdominal ascites.     1. Limited examination as above. 2. No definite acute sonographic findings identified. 3. Additional details, as provided in the body of report. Electronically signed by: Terrance Penaloza Date:    05/03/2022 Time:    07:04    Microbiology Results (last 7 days)       ** No results found for the last 168 hours. **                Assessment/Plan:      * Chest pain  Patient reports 2 hours of stabbing chest pain  Pain is midsternal and reproducible on exam, does not radiate  ED echo showed no signs of acute CHF or pericarditis   Plan  Repeat Troponin in 4 hours, with EKG if needed  Continue Tele monitoring           RUQ pain  Positive Frederick sign's day of acute nausea and vomiting  Plan  FU Abdomen US  Zofran to control nausea      Elevated troponin  Initial troponin .036  Repeat .030    Atrial fibrillation with RVR  Noted on EKG  ED gave labetalol ant metoprolol   Tachycardia improved  Plan  Continue metoprolol  Monitor on tele      NARESH (acute kidney injury)  Elevated BUN/creatine   Plan  Encourage PO fluid intake  Considering past history of CHF avoid IVF  Monitor Urine out put      Chronic atrial fibrillation  Plan  Continue home Metoprolol  Monitor on tele      Venous stasis dermatitis of both lower extremities  On Xeralto for venus status with past history DVT  Plan   Continue home dose of Xarelto        VTE Risk Mitigation (From admission, onward)          Ordered     rivaroxaban tablet 20 mg  With dinner         05/03/22 0602     Reason for No Pharmacological VTE Prophylaxis  Once        Question:  Reasons:  Answer:  Already adequately anticoagulated on oral Anticoagulants    05/03/22 0602     IP VTE HIGH RISK PATIENT  Once         05/03/22 0602     Place sequential compression device  Until discontinued         05/03/22 0602                Discharge Planning   NUHA:      Code Status: Full Code   Is the patient medically ready for discharge?:     Reason for patient still in hospital (select all that apply): Patient trending condition  Discharge Plan A: Home with family                  Declan Faria MD  Department of Hospital Medicine   [facility]

## 2022-05-04 NOTE — NURSING
Report received from Winifred BONNER, care assumed. Pt up in bed watching TV, respiration unlabored , patent IV , and no signs or symptoms of distress . Pt board is updated and understands the plan for today . Pt has no questions or concerns at this time . Safety/fall precautions in place.

## 2022-05-04 NOTE — PLAN OF CARE
TEVIN called [facility] [phone] pt's assisted living nurse. Pt does live at home but receives care with [facility] with supportive living. Hiral stated that she would be the one to help transport pt home tomorrow at d/c. TEVIN will follow.     Jeovanny Felipe, MSWINSTON  [phone]    Future Appointments   Date Time Provider Department Center   5/17/2022 10:00 AM Kayla Carrillo NP [facility]   6/27/2022  9:20 AM Declan Faria MD [facility]   6/27/2022 11:20 AM Gomez Lantigua MD [facility]        05/04/22 1259   Post-Acute Status   Coverage [employer]   Discharge Plan   Discharge Plan A Home with family

## 2022-05-04 NOTE — SUBJECTIVE & OBJECTIVE
Interval History: Pt with some N/V after dinner yesterday but no longer N this AM and able to tolerate breakfast.    Review of Systems   Constitutional:  Negative for fatigue.   HENT:  Negative for congestion and sore throat.    Eyes:  Negative for visual disturbance.   Respiratory:  Negative for shortness of breath.    Cardiovascular:  Negative for chest pain and leg swelling.   Gastrointestinal:  Positive for nausea and vomiting. Negative for abdominal pain.   Genitourinary: Negative.    Musculoskeletal:  Negative for back pain.   Neurological:  Negative for dizziness.   Psychiatric/Behavioral: Negative.     Objective:     Vital Signs (Most Recent):  Temp: 97.8 °F (36.6 °C) (05/04/22 0745)  Pulse: (!) 115 (05/04/22 1122)  Resp: 18 (05/04/22 1122)  BP: (!) 132/59 (05/04/22 1122)  SpO2: (!) 94 % (05/04/22 1122)   Vital Signs (24h Range):  Temp:  [96 °F (35.6 °C)-98.3 °F (36.8 °C)] 97.8 °F (36.6 °C)  Pulse:  [] 115  Resp:  [15-20] 18  SpO2:  [77 %-99 %] 94 %  BP: ()/(50-73) 132/59     Weight: (!) 169.6 kg (374 lb)  Body mass index is 41.09 kg/m².    Intake/Output Summary (Last 24 hours) at 5/4/2022 1149  Last data filed at 5/4/2022 1030  Gross per 24 hour   Intake 100 ml   Output 2450 ml   Net -2350 ml      Physical Exam  Constitutional:       Appearance: He is obese.   HENT:      Head: Normocephalic.      Right Ear: External ear normal.      Left Ear: External ear normal.      Nose: Nose normal.      Mouth/Throat:      Mouth: Mucous membranes are moist.   Cardiovascular:      Rate and Rhythm: Normal rate and regular rhythm.      Pulses: Normal pulses.   Pulmonary:      Effort: Pulmonary effort is normal.   Abdominal:      Tenderness: There is no abdominal tenderness. There is no rebound.   Musculoskeletal:         General: Normal range of motion.   Skin:     General: Skin is warm.   Neurological:      General: No focal deficit present.      Mental Status: He is alert and oriented to person, place, and  time.   Psychiatric:         Mood and Affect: Mood normal.     Recent Labs   Lab 05/03/22  0337   WBC 6.29   HGB 9.5*   HCT 30.5*   MCV 82   RBC 3.73*   MCH 25.5*   MCHC 31.1*   RDW 16.0*      MPV 9.8   GRAN 68.4  4.3   LYMPH 19.4  1.2   MONO 10.0  0.6   EOSINOPHIL 1.6   BASOPHIL 0.3     Recent Labs   Lab 05/03/22  0337 05/03/22  0638 05/03/22  1450 05/04/22  0407     --  141 141   K 5.3*  --  4.8 5.6*     --  109 110   CO2 18*  --  20* 12*   ANIONGAP 12  --  12 19*   BUN 67*  --  68* 69*   CREATININE 1.6*  --  1.5* 1.9*   GLU 97  --  95 88   CALCIUM 10.8*  --  10.2 10.3   PROT 8.4  --   --   --    ALBUMIN 3.5  --   --   --    ALKPHOS 336*  --   --   --    BILITOT 1.1*  --   --   --    ALT 72*  --   --   --    AST 62*  --   --   --    ESTGFRAFRICA 56*  --  >60 45*   EGFRNONAA 48*  --  52* 39*   MG  --  1.7  --   --    PHOS  --  4.9*  --   --      No results for input(s): COLORU, APPEARANCEUA, PHUR, SPECGRAV, PROTEINUA, GLUCUA, KETONESU, BILIRUBINUA, OCCULTUA, UROBILINOGEN, NITRITE, LEUKOCYTESUR, RBCUA, WBCUA, BACTERIA, SQUAMEPITHEL, HYALINECASTS, GRANULARCAST, MICROCMT in the last 168 hours.    Invalid input(s): SPECIMENU, AMORPHOUSU  Recent Labs   Lab 05/03/22 0337 05/03/22  0638 05/03/22  0804 05/03/22  1236   TROPONINI 0.036* 0.030* 0.034* 0.033*     No results for input(s): PT, INR, APTT in the last 168 hours.  No results for input(s): TSH, F0KWKNU, B1NHAKN, THYROIDAB, FREET4 in the last 168 hours.  X-Ray Chest AP Portable    Result Date: 5/3/2022  EXAMINATION: XR CHEST AP PORTABLE CLINICAL HISTORY: Chest pain; TECHNIQUE: Single frontal view of the chest was performed. COMPARISON: Chest radiograph March 14, 2022 FINDINGS: Single portable chest view is submitted.  Mild diminished depth of inspiration and minimal rotation noted, when accounting for these factors the cardiomediastinal silhouette appears stable, underlying cardiac enlargement noted. Accentuation of pulmonary bronchovascular  markings consistent with diminished depth of inspiration noted.  There is improvement when compared to the prior study, improved aeration bilaterally.  There is no evidence for superimposed confluent infiltrate or consolidation, significant pleural effusion or pneumothorax.  The visualized osseous structures appear intact.     Interval improvement without radiographic evidence for superimposed acute intrathoracic process at this time. Electronically signed by: Rony Huerta Date:    05/03/2022 Time:    05:26    US Abdomen Limited    Result Date: 5/3/2022  EXAMINATION: US ABDOMEN LIMITED CLINICAL HISTORY: RUQ pain, elevated lft; TECHNIQUE: Limited ultrasound of the right upper quadrant of the abdomen (including pancreas, liver, gallbladder, common bile duct, and spleen) was performed. COMPARISON: None. FINDINGS: Additional comment: Technically limited examination due to body habitus and patient positioning. Liver: Unable to obtain reliable measurements.  Fatty liver infiltration. No focal hepatic lesions. Gallbladder: No calculi, wall thickening, or pericholecystic fluid.  No sonographic Frederick's sign. Biliary system: The common duct is not dilated, measuring 5.3 mm.  No intrahepatic ductal dilatation. Spleen: Normal in size and echotexture, measuring 13.1 x 6.6 cm. Miscellaneous: No upper abdominal ascites.     1. Limited examination as above. 2. No definite acute sonographic findings identified. 3. Additional details, as provided in the body of report. Electronically signed by: Terrance Penaloza Date:    05/03/2022 Time:    07:04    Microbiology Results (last 7 days)       ** No results found for the last 168 hours. **

## 2022-05-04 NOTE — NURSING
Pt complained of left side stabbing chest pain, score of 8.5 of 10. Pt stated not radiating. V/S: BP 99/53, , Sats 98% RA. Dr. Allen notified, said can give Morphine. Pt had 7runs of VTach, MD notified.  Pt refusing blood sugar taking, pt said he's not diabetic, MD informed.

## 2022-05-04 NOTE — NURSING
Pt had 24 runs of Vtach. /52, , O2 Santino 95% RA. Pt vomited x1 with small blood streak.Pt seen by Dr. Bustamante. Meds orders given as per MAR. Afib on continous telemetry. Safety maintained at all times. Call bell within reach. Bed on low position. Bed alarm on. Will continue to monitor.

## 2022-05-04 NOTE — PLAN OF CARE
Pt has follow up appts scheduled on avs. Pt plans on having his wife Halima's father [phone] help transport pt home at d/c. Also, pt has [facility] [phone] his supportive living nurse available to help with transportation home. Once pt is medically ready for d/c he will just need his transportation notified.     Father in law Leonel [phone]  Wife Halima [phone]  [facility] [phone]    Jeovanny Felipe, [facility]  [phone]    Future Appointments   Date Time Provider Department Center   5/17/2022 10:00 AM Kayla Carrillo NP [facility] [place] [facility]   5/26/2022  1:00 PM Kristine Quiros DO [facility]   6/27/2022  9:20 AM Declan Faria MD [facility] [facility]   6/27/2022 11:20 AM Gomez Lantigua MD [facility]        05/04/22 1620   Post-Acute Status   Coverage [employer]   Discharge Plan   Discharge Plan A Home

## 2022-05-04 NOTE — NURSING
Spoke with Dr. Dumas concerning pt being hypotensive 93/54 , 120/50 . Physician ok to give Blood pressure medications .

## 2022-05-05 PROBLEM — I95.9 HYPOTENSION: Status: ACTIVE | Noted: 2020-02-18

## 2022-05-05 PROBLEM — E87.5 HYPERKALEMIA: Status: ACTIVE | Noted: 2022-01-01

## 2022-05-05 PROBLEM — I47.20 VT (VENTRICULAR TACHYCARDIA): Status: ACTIVE | Noted: 2022-01-01

## 2022-05-05 PROBLEM — I21.4 NSTEMI (NON-ST ELEVATED MYOCARDIAL INFARCTION): Status: ACTIVE | Noted: 2022-01-01

## 2022-05-05 PROBLEM — I95.9 HYPOTENSION: Status: RESOLVED | Noted: 2020-02-18 | Resolved: 2022-01-01

## 2022-05-05 PROBLEM — E87.20 METABOLIC ACIDOSIS: Status: ACTIVE | Noted: 2022-01-01

## 2022-05-05 NOTE — ASSESSMENT & PLAN NOTE
On Xarelto (dosed last on 05/04/2022)- hold for now pending [facility]   BB on hold given hypotension and pressor use  On Amio gtt for VT

## 2022-05-05 NOTE — ANESTHESIA PROCEDURE NOTES
Intubation    Date/Time: 5/5/2022 1:00 PM  Performed by: Jarrell Portillo MD  Authorized by: Yulisa Merritt MD     Intubation:     Intubated:  Postinduction    Mask Ventilation:  N/a    Attempts:  1    Attempted By:  Resident anesthesiologist    Method of Intubation:  Video laryngoscopy    Blade:  Almanzar 4    Laryngeal View Grade: Grade IIA - cords partially seen      Difficult Airway Encountered?: No      Complications:  None    Airway Device:  Oral endotracheal tube    Airway Device Size:  7.5    Style/Cuff Inflation:  Cuffed    Tube secured:  24    Placement Verified By:  Colorimetric ETCO2 device and Revisualization with laryngoscopy    Complicating Factors:  None    Findings Post-Intubation:  BS equal bilateral  Notes:      20mg Etomidate given.

## 2022-05-05 NOTE — SIGNIFICANT EVENT
MET called on pt by his nurse immediately following placement of his nuñez catheter as he was unresponsive, tachycardic, and hypotensive. Pt had been seen earlier in the morning complaining of intermittent chest and abdominal pain which was reproducible with palpation as it was similarly throughout his admission though this morning was found to have an elevated troponin to 0.502 from previous 0.033. At the time of the MET pt was found unresponsive with no detectable pulses and decreased respiratory drive. He was given a push of atropine, a 250ml LR bolus, and started on levo. Initially pads were placed for possible defibrillation though he began to become more response and BP stabilized. An EKG was performed and showed he was still Afib w/ RVR and additionally new T wave inversions as well as possible ST elevation. He was then moved to the ICU for further level of care.      Declan Faria MD  [facility], PGY-1  12:34 PM, 5/5/22

## 2022-05-05 NOTE — HOSPITAL COURSE
05/05/2022 Per HPI   05/06/2022 s/p LHC, RHC, IABP insertion. Diuresing, intubated, sedated, on Levophed with increased requirements overnight. IABP 1:1.   LM:  PONCHO III flow normal   LAD: PONCHO III flow normal   LCx:  PONCHO- flow normal   RCA: PONCHO- flow normal   LVgram: LVEDP 35 mmHg, No LV gram due to NARESH      RHC   PCWP  40/54/36  PA  73/36/47  RV 68/7/24  RA 25/26/24     SATS  AO sat 100% on 100%  PA 67%  RV 63%  RA 57%     CO 7.34  CI 2.45    5/7/2022  Overnight no acute events. UOP dropping. Increased pressure requirements. Net positive fluid balance. CXR, clinically remains volume overloaded. K remains elevated. Remains in afib and IABP at 1:1 working appropriately. WBC rising. HR better controlled and no VT on tele. .     5/8/2022  Overnight no VT. Remains on AMio. On pressors. Decreased UOP and hyperkalemia.     05/09/2022 Increased pressor requirements overnight. UO minimal. On CRRT this AM. IABP remains 1:1. + 1.6 L in past 24 hours. No VT on tele. AF RVR rate 110s-120s.

## 2022-05-05 NOTE — PROGRESS NOTES
[facility] Medicine  Progress Note    Patient Name: Drew Farrar  MRN: [id number]  Patient Class: IP- Inpatient   Admission Date: 5/3/2022  Length of Stay: 2 days  Attending Physician: Garrison Roach MD  Primary Care Provider: Garrison Roach MD        Subjective:     Principal Problem:Chest pain        HPI:  Patient is a 56 yr old male with Pmhx history of AFib on Xarelto, CHF, DVT, hypertension presenting to the ED for one day of nausea and vomiting with severe chest pain. Patient states that he has been vomiting all day ans his been unable to hold down any food. Patient states the chest pain does not radiate to his shoulder and feels like stabbing, it is severe and constant.  In the ED patient was found to have mildly elevated troponin and an ekg showing Afib with RVR and tachycardia. Physical exam in ED also showed positive florez sign. Concern for cholecystitis lead to an order of an US abdomen that is pending. Patient is afebrile and shows no other signs of infection. Patient given labetalol in ED, which improved cardiac rate and rhythm  Patient to be admitted to [facility] to continue work-up      Overview/Hospital Course:  No notes on file    Interval History: Pt with more episodes of CP ON more frequently than he was previously experiencing. He was given morphine as well as tylenol and a lidocaine patch with resolution of his pain. This AM he is not complaining of any pain and was able to tolerate dinner last night with no N/V.     Review of Systems   Constitutional:  Negative for fatigue.   HENT:  Negative for congestion and sore throat.    Eyes:  Negative for visual disturbance.   Respiratory:  Negative for shortness of breath.    Cardiovascular:  Negative for chest pain and leg swelling.   Gastrointestinal:  Negative for abdominal pain, nausea and vomiting.   Genitourinary: Negative.    Musculoskeletal:  Negative for back pain.   Neurological:  Negative for dizziness.    Psychiatric/Behavioral: Negative.     Objective:     Vital Signs (Most Recent):  Temp: 97 °F (36.1 °C) (05/05/22 0823)  Pulse: (!) 119 (05/05/22 1315)  Resp: (!) 26 (05/05/22 1315)  BP: (!) 148/58 (05/05/22 1315)  SpO2: (!) 90 % (05/05/22 1315)   Vital Signs (24h Range):  Temp:  [96.9 °F (36.1 °C)-97.1 °F (36.2 °C)] 97 °F (36.1 °C)  Pulse:  [] 119  Resp:  [15-46] 26  SpO2:  [79 %-100 %] 90 %  BP: ()/(39-74) 148/58     Weight: (!) 169.6 kg (374 lb)  Body mass index is 41.09 kg/m².  No intake or output data in the 24 hours ending 05/05/22 1521   Physical Exam  Constitutional:       Appearance: He is obese.   HENT:      Head: Normocephalic.      Right Ear: External ear normal.      Left Ear: External ear normal.      Nose: Nose normal.      Mouth/Throat:      Mouth: Mucous membranes are moist.   Cardiovascular:      Rate and Rhythm: Normal rate and regular rhythm.      Pulses: Normal pulses.   Pulmonary:      Effort: Pulmonary effort is normal.   Abdominal:      Tenderness: There is no abdominal tenderness. There is no rebound.   Musculoskeletal:         General: Normal range of motion.   Skin:     General: Skin is warm.   Neurological:      General: No focal deficit present.      Mental Status: He is alert and oriented to person, place, and time.   Psychiatric:         Mood and Affect: Mood normal.       Recent Labs   Lab 05/03/22  0337 05/05/22  1018   WBC 6.29 7.42   HGB 9.5* 9.3*   HCT 30.5* 32.1*   MCV 82 86   RBC 3.73* 3.73*   MCH 25.5* 24.9*   MCHC 31.1* 29.0*   RDW 16.0* 16.6*    201   MPV 9.8 9.7   GRAN 68.4  4.3 69.8  5.2   LYMPH 19.4  1.2 17.5*  1.3   MONO 10.0  0.6 10.0  0.7   EOSINOPHIL 1.6 2.0   BASOPHIL 0.3 0.4     Recent Labs   Lab 05/03/22  0638 05/03/22  1450 05/04/22  1533 05/05/22  0552 05/05/22  0732 05/05/22  1308   NA  --    < > 140 138 138 139   K  --    < > 5.4* 5.8* 5.4* 5.8*   CL  --    < > 107 107 107 105   CO2  --    < > 20* 13* 18* 15*   ANIONGAP  --    < > 13  18* 13 19*   BUN  --    < > 78* 85* 87* 88*   CREATININE  --    < > 2.8* 3.4* 3.5* 3.7*   GLU  --    < > 99 96 96 132*   CALCIUM  --    < > 10.6* 9.9 9.9 9.9   PROT  --   --  8.0  --  7.5 7.9   ALBUMIN  --   --  3.3*  --  3.2* 3.3*   ALKPHOS  --   --  280*  --  252* 264*   BILITOT  --   --  0.8  --  1.0 1.2*   ALT  --   --  61*  --  50* 53*   AST  --   --  57*  --  52* 65*   ESTGFRAFRICA  --    < > 28* 22* 22* 20*   EGFRNONAA  --    < > 24* 19* 19* 17*   MG 1.7  --   --   --   --  1.9   PHOS 4.9*  --   --   --   --   --     < > = values in this interval not displayed.     Recent Labs   Lab 05/05/22  1349   COLORU Yellow   APPEARANCEUA Clear   PHUR 5.0   SPECGRAV 1.020   PROTEINUA Negative   GLUCUA Negative   KETONESU Negative   BILIRUBINUA Negative   OCCULTUA Negative   UROBILINOGEN Negative   NITRITE Negative   LEUKOCYTESUR Negative     Recent Labs   Lab 05/03/22  0804 05/03/22  1236 05/05/22  0552 05/05/22  1018 05/05/22  1308   TROPONINI 0.034* 0.033* 0.502* 0.773* 1.420*     No results for input(s): PT, INR, APTT in the last 168 hours.  No results for input(s): TSH, D6BDHWH, E2CJKAI, THYROIDAB, FREET4 in the last 168 hours.  X-Ray Chest AP Portable    Result Date: 5/4/2022  EXAMINATION: XR CHEST AP PORTABLE CLINICAL HISTORY: Severe Chest pain; TECHNIQUE: Single frontal view of the chest was performed. COMPARISON: 05/03/2022. FINDINGS: The lungs are hypoexpanded.  There is no new focal consolidation.  There is mild background interstitial prominence, unchanged.  The pleural spaces are clear. The cardiac silhouette is enlarged, unchanged. The visualized osseous structures are intact.     No significant detrimental change in comparison with radiograph from 05/03/2022.  Continued cardiomegaly and mild interstitial prominence. Electronically signed by: Raphael Whelan Date:    05/04/2022 Time:    19:45    X-Ray Chest AP Portable    Result Date: 5/3/2022  EXAMINATION: XR CHEST AP PORTABLE CLINICAL HISTORY: Chest pain;  TECHNIQUE: Single frontal view of the chest was performed. COMPARISON: Chest radiograph March 14, 2022 FINDINGS: Single portable chest view is submitted.  Mild diminished depth of inspiration and minimal rotation noted, when accounting for these factors the cardiomediastinal silhouette appears stable, underlying cardiac enlargement noted. Accentuation of pulmonary bronchovascular markings consistent with diminished depth of inspiration noted.  There is improvement when compared to the prior study, improved aeration bilaterally.  There is no evidence for superimposed confluent infiltrate or consolidation, significant pleural effusion or pneumothorax.  The visualized osseous structures appear intact.     Interval improvement without radiographic evidence for superimposed acute intrathoracic process at this time. Electronically signed by: Rony Huerta Date:    05/03/2022 Time:    05:26    Echo    Result Date: 5/5/2022  · The left ventricle is moderately enlarged with severely decreased systolic function. · The estimated ejection fraction is 20%. · A diastolic pattern consistent with atrial fibrillation observed. · Normal right ventricular size with normal right ventricular systolic function. · Mild tricuspid regurgitation. · Severe left atrial enlargement. · Right atrial enlargement. · Mild mitral regurgitation. · Mechanically ventilated; cannot use inferior caval vein diameter to estimate central venous pressure.      US Abdomen Limited    Result Date: 5/3/2022  EXAMINATION: US ABDOMEN LIMITED CLINICAL HISTORY: RUQ pain, elevated lft; TECHNIQUE: Limited ultrasound of the right upper quadrant of the abdomen (including pancreas, liver, gallbladder, common bile duct, and spleen) was performed. COMPARISON: None. FINDINGS: Additional comment: Technically limited examination due to body habitus and patient positioning. Liver: Unable to obtain reliable measurements.  Fatty liver infiltration. No focal hepatic lesions.  Gallbladder: No calculi, wall thickening, or pericholecystic fluid.  No sonographic Frederick's sign. Biliary system: The common duct is not dilated, measuring 5.3 mm.  No intrahepatic ductal dilatation. Spleen: Normal in size and echotexture, measuring 13.1 x 6.6 cm. Miscellaneous: No upper abdominal ascites.     1. Limited examination as above. 2. No definite acute sonographic findings identified. 3. Additional details, as provided in the body of report. Electronically signed by: Terrance Penaloza Date:    05/03/2022 Time:    07:04    Microbiology Results (last 7 days)       ** No results found for the last 168 hours. **                Assessment/Plan:      * Chest pain  See elevated troponin          RUQ pain  Positive Frederick sign's day of acute nausea and vomiting  Plan  FU Abdomen US  Zofran to control nausea      Elevated troponin  Patient reports 1 wk intermittent stabbing chest pain  Pain is midsternal and reproducible on exam, does not radiate  ED echo showed no signs of acute CHF or pericarditis    Initial troponin .036  Repeat .030 though Trop again elevated to 0.5 overnight on 5/5  EKG with Afib although now showing possible T wave inversions    Plan  Consult cardiology recs appreciated  Asa and Plavix loaded   Trend Troponin in 4 hours, with EKG if needed  Continue Tele monitoring    Atrial fibrillation with RVR  Noted on EKG  ED gave labetalol ant metoprolol   Tachycardia improved  Plan  Continue metoprolol  Monitor on tele      NARESH (acute kidney injury)  Elevated BUN/creatine   Plan  Nephrology consulted recs appreciated  Encourage PO fluid intake  Considering past history of CHF avoid IVF  Monitor Urine out put      Chronic atrial fibrillation  Plan  Continue home Metoprolol  Monitor on tele      Venous stasis dermatitis of both lower extremities  On Xeralto for venus status with past history DVT  Plan   Continue home dose of Xarelto      VTE Risk Mitigation (From admission, onward)         Ordered      heparin infusion 1,000 units/500 ml in 0.9% NaCl (pressure line flush)  Intra-op continuous PRN         05/05/22 1352     Reason for No Pharmacological VTE Prophylaxis  Once        Question:  Reasons:  Answer:  Already adequately anticoagulated on oral Anticoagulants    05/03/22 0602     IP VTE HIGH RISK PATIENT  Once         05/03/22 0602     Place sequential compression device  Until discontinued         05/03/22 0602                Discharge Planning   NUHA:      Code Status: Full Code   Is the patient medically ready for discharge?:     Reason for patient still in hospital (select all that apply): Patient trending condition  Discharge Plan A: Home          Declan Faria MD  Department of Hospital Medicine   [facility] ([facility])

## 2022-05-05 NOTE — SUBJECTIVE & OBJECTIVE
Interval History: Pt with more episodes of CP ON more frequently than he was previously experiencing. He was given morphine as well as tylenol and a lidocaine patch with resolution of his pain. This AM he is not complaining of any pain and was able to tolerate dinner last night with no N/V.     Review of Systems   Constitutional:  Negative for fatigue.   HENT:  Negative for congestion and sore throat.    Eyes:  Negative for visual disturbance.   Respiratory:  Negative for shortness of breath.    Cardiovascular:  Negative for chest pain and leg swelling.   Gastrointestinal:  Negative for abdominal pain, nausea and vomiting.   Genitourinary: Negative.    Musculoskeletal:  Negative for back pain.   Neurological:  Negative for dizziness.   Psychiatric/Behavioral: Negative.     Objective:     Vital Signs (Most Recent):  Temp: 97 °F (36.1 °C) (05/05/22 0823)  Pulse: (!) 119 (05/05/22 1315)  Resp: (!) 26 (05/05/22 1315)  BP: (!) 148/58 (05/05/22 1315)  SpO2: (!) 90 % (05/05/22 1315)   Vital Signs (24h Range):  Temp:  [96.9 °F (36.1 °C)-97.1 °F (36.2 °C)] 97 °F (36.1 °C)  Pulse:  [] 119  Resp:  [15-46] 26  SpO2:  [79 %-100 %] 90 %  BP: ()/(39-74) 148/58     Weight: (!) 169.6 kg (374 lb)  Body mass index is 41.09 kg/m².  No intake or output data in the 24 hours ending 05/05/22 1521   Physical Exam  Constitutional:       Appearance: He is obese.   HENT:      Head: Normocephalic.      Right Ear: External ear normal.      Left Ear: External ear normal.      Nose: Nose normal.      Mouth/Throat:      Mouth: Mucous membranes are moist.   Cardiovascular:      Rate and Rhythm: Normal rate and regular rhythm.      Pulses: Normal pulses.   Pulmonary:      Effort: Pulmonary effort is normal.   Abdominal:      Tenderness: There is no abdominal tenderness. There is no rebound.   Musculoskeletal:         General: Normal range of motion.   Skin:     General: Skin is warm.   Neurological:      General: No focal deficit present.       Mental Status: He is alert and oriented to person, place, and time.   Psychiatric:         Mood and Affect: Mood normal.       Recent Labs   Lab 05/03/22  0337 05/05/22  1018   WBC 6.29 7.42   HGB 9.5* 9.3*   HCT 30.5* 32.1*   MCV 82 86   RBC 3.73* 3.73*   MCH 25.5* 24.9*   MCHC 31.1* 29.0*   RDW 16.0* 16.6*    201   MPV 9.8 9.7   GRAN 68.4  4.3 69.8  5.2   LYMPH 19.4  1.2 17.5*  1.3   MONO 10.0  0.6 10.0  0.7   EOSINOPHIL 1.6 2.0   BASOPHIL 0.3 0.4     Recent Labs   Lab 05/03/22  0638 05/03/22  1450 05/04/22  1533 05/05/22  0552 05/05/22  0732 05/05/22  1308   NA  --    < > 140 138 138 139   K  --    < > 5.4* 5.8* 5.4* 5.8*   CL  --    < > 107 107 107 105   CO2  --    < > 20* 13* 18* 15*   ANIONGAP  --    < > 13 18* 13 19*   BUN  --    < > 78* 85* 87* 88*   CREATININE  --    < > 2.8* 3.4* 3.5* 3.7*   GLU  --    < > 99 96 96 132*   CALCIUM  --    < > 10.6* 9.9 9.9 9.9   PROT  --   --  8.0  --  7.5 7.9   ALBUMIN  --   --  3.3*  --  3.2* 3.3*   ALKPHOS  --   --  280*  --  252* 264*   BILITOT  --   --  0.8  --  1.0 1.2*   ALT  --   --  61*  --  50* 53*   AST  --   --  57*  --  52* 65*   ESTGFRAFRICA  --    < > 28* 22* 22* 20*   EGFRNONAA  --    < > 24* 19* 19* 17*   MG 1.7  --   --   --   --  1.9   PHOS 4.9*  --   --   --   --   --     < > = values in this interval not displayed.     Recent Labs   Lab 05/05/22  1349   COLORU Yellow   APPEARANCEUA Clear   PHUR 5.0   SPECGRAV 1.020   PROTEINUA Negative   GLUCUA Negative   KETONESU Negative   BILIRUBINUA Negative   OCCULTUA Negative   UROBILINOGEN Negative   NITRITE Negative   LEUKOCYTESUR Negative     Recent Labs   Lab 05/03/22  0804 05/03/22  1236 05/05/22  0552 05/05/22  1018 05/05/22  1308   TROPONINI 0.034* 0.033* 0.502* 0.773* 1.420*     No results for input(s): PT, INR, APTT in the last 168 hours.  No results for input(s): TSH, H7KZSVR, C0OKFCN, THYROIDAB, FREET4 in the last 168 hours.  X-Ray Chest AP Portable    Result Date:  5/4/2022  EXAMINATION: XR CHEST AP PORTABLE CLINICAL HISTORY: Severe Chest pain; TECHNIQUE: Single frontal view of the chest was performed. COMPARISON: 05/03/2022. FINDINGS: The lungs are hypoexpanded.  There is no new focal consolidation.  There is mild background interstitial prominence, unchanged.  The pleural spaces are clear. The cardiac silhouette is enlarged, unchanged. The visualized osseous structures are intact.     No significant detrimental change in comparison with radiograph from 05/03/2022.  Continued cardiomegaly and mild interstitial prominence. Electronically signed by: Raphael Whelan Date:    05/04/2022 Time:    19:45    X-Ray Chest AP Portable    Result Date: 5/3/2022  EXAMINATION: XR CHEST AP PORTABLE CLINICAL HISTORY: Chest pain; TECHNIQUE: Single frontal view of the chest was performed. COMPARISON: Chest radiograph March 14, 2022 FINDINGS: Single portable chest view is submitted.  Mild diminished depth of inspiration and minimal rotation noted, when accounting for these factors the cardiomediastinal silhouette appears stable, underlying cardiac enlargement noted. Accentuation of pulmonary bronchovascular markings consistent with diminished depth of inspiration noted.  There is improvement when compared to the prior study, improved aeration bilaterally.  There is no evidence for superimposed confluent infiltrate or consolidation, significant pleural effusion or pneumothorax.  The visualized osseous structures appear intact.     Interval improvement without radiographic evidence for superimposed acute intrathoracic process at this time. Electronically signed by: Rony Huerta Date:    05/03/2022 Time:    05:26    Echo    Result Date: 5/5/2022  · The left ventricle is moderately enlarged with severely decreased systolic function. · The estimated ejection fraction is 20%. · A diastolic pattern consistent with atrial fibrillation observed. · Normal right ventricular size with normal right  ventricular systolic function. · Mild tricuspid regurgitation. · Severe left atrial enlargement. · Right atrial enlargement. · Mild mitral regurgitation. · Mechanically ventilated; cannot use inferior caval vein diameter to estimate central venous pressure.      US Abdomen Limited    Result Date: 5/3/2022  EXAMINATION: US ABDOMEN LIMITED CLINICAL HISTORY: RUQ pain, elevated lft; TECHNIQUE: Limited ultrasound of the right upper quadrant of the abdomen (including pancreas, liver, gallbladder, common bile duct, and spleen) was performed. COMPARISON: None. FINDINGS: Additional comment: Technically limited examination due to body habitus and patient positioning. Liver: Unable to obtain reliable measurements.  Fatty liver infiltration. No focal hepatic lesions. Gallbladder: No calculi, wall thickening, or pericholecystic fluid.  No sonographic Frederick's sign. Biliary system: The common duct is not dilated, measuring 5.3 mm.  No intrahepatic ductal dilatation. Spleen: Normal in size and echotexture, measuring 13.1 x 6.6 cm. Miscellaneous: No upper abdominal ascites.     1. Limited examination as above. 2. No definite acute sonographic findings identified. 3. Additional details, as provided in the body of report. Electronically signed by: Terrance Penaloza Date:    05/03/2022 Time:    07:04    Microbiology Results (last 7 days)       ** No results found for the last 168 hours. **

## 2022-05-05 NOTE — BRIEF OP NOTE
"POST CATH NOTE    HPI:     s/p catheterization secondary to:  VT cardiac arrest       Cath Results:  Access: Rt CFA     LM:  PONCHO III flow normal   LAD: PONCHO III flow normal   LCx:  PONCHO- flow normal   RCA: PONCHO- flow normal   LVgram: LVEDP 35 mmHg, No LV gram due to NARESH     RHC   PCWP  40/54/36  PA  73/36/47  RV 68/7/24  RA 25/26/24    SATS  AO sat 100% on 100%  PA 67%  RV 63%  RA 57%    CO 7.34  CI 2.45    Intervention:   - S/P IABP placement for shock and heart failure      Closure device:  Patient tolerated procedure well, no complications    Post Cath Exam:  BP (!) 148/58   Pulse (!) 119   Temp 97 °F (36.1 °C)   Resp (!) 26   Ht 6' 8" (2.032 m)   Wt (!) 169.6 kg (374 lb)   SpO2 (!) 90%   BMI 41.09 kg/m²     Assessment:   - Normal coronary arteries   - Cardiogenic shock   - Acute decompensated heart failure  - VT  - A. Fib     Plan:  - IABP placement  - IV diurese. Significant elevated high filling pressures likely contributed for the decompensation   - Continue Amiodarone   - Heparin gtt  - Will need AICD for 2ry prevention     "

## 2022-05-05 NOTE — NURSING
Upon assuming care of this patient at change of shift, RN notified by patient's wife that he was experiencing chest pain 10/10. HR was elevated in 120's but other vitals wnl. 1mg morphine administered and family practice physicians notified. Patient reports moderate relief of pain after administration of morphine.

## 2022-05-05 NOTE — PROGRESS NOTES
Once patient was transferred to ICU, pt began having runs of v-tach on monitor. Pt awake and with a pulse during these events-complaining of chest pain. Cardiology and pulmonary at bedside during events. Pt on zoll monitor with chest pads present throughout. 150mg lidocaine, 125mg solumedrol, 50mg benadryl, 2g calcium, 400mcg elias given per Dr. Whitten verbal orders. Pt intubated and central line placed in left fem. Taken to cath lab with levo, amio, and propofol infusing.

## 2022-05-05 NOTE — HPI
Chest Pain        Patient presents to the ED via  EMS with reports of having chest pain that started yesterday at 5 pm. Hx A-Fib with RVR.    56-year-old  56-year-old male history of AFib on Xarelto, CHF, DVT, hypertension presents for chest pain.  Began around 5:00 p.m..  Mostly right-sided sharp and radiating to the back.  Patient felt his AFib developed rapid ventricular response.  It is associated with shortness of breath, diaphoresis.     The history is provided by the patient.            Review of patient's allergies indicates:   Allergen Reactions    Contrast media Other (See Comments)       Severe chest pain    Food allergy formula [glutamine-c-quercet-selen-brom]         Allergic to green peas; Heart failure.    Iodinated contrast media Other (See Comments)       Chest pain    Peas Hives    Amiodarone analogues Other (See Comments)       passed out in ER    Ibuprofen Swelling    Latex, natural rubber Hives    Pcn [penicillins] Hives    Quercetin      Butisol [butabarbital] Rash       Peeling skin           Past Medical History:   Diagnosis Date    *Atrial fibrillation      Anticoagulant long-term use      Arthritis      Atrial fibrillation      Atrial fibrillation Feb 23, 2016    Bipolar disorder      Carpal tunnel syndrome on right 2/10/2022    CHF (congestive heart failure)      Congenital heart disease       s/p surgical intervention 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 of age    Deep vein thrombosis      DVT of leg (deep venous thrombosis)       left leg    History of prior ablation treatment       10/9/13    Hypertension      Obesity      Stroke      Thyroid disease      Venous stasis ulcer of lower extremity, unspecified laterality 12/14/2012    Venous ulcer              Past Surgical History:   Procedure Laterality Date    ABLATION Left 1/20/2022     Procedure: Ablation;  Surgeon: Gomez Lantigua MD;  Location: [facility] CATH LAB/EP;  Service: Cardiology;  Laterality: Left;    ANGIOPLASTY        CARDIAC SURGERY         open  heart surgery 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 old    EYE SURGERY         left eye cataract/right eye glaucoma    TIMO FILTER PLACEMENT         Dr Calix ([facility])    KNEE SURGERY         l and r     MULTIPLE TOOTH EXTRACTIONS        SKIN GRAFT         left leg            Family History   Problem Relation Age of Onset    Diabetes Father      Heart disease Father      Heart disease Maternal Grandmother      Diabetes Maternal Grandfather      Heart disease Maternal Grandfather      Stroke Maternal Grandfather        Social History            Tobacco Use    Smoking status: Former Smoker       Packs/day: 1.00       Years: 6.00       Pack years: 6.00       Types: Cigarettes    Smokeless tobacco: Former User    Tobacco comment: quit by age 27yrs old   Substance Use Topics    Alcohol use: Yes       Alcohol/week: 1.0 standard drink       Types: 1 Glasses of wine per week       Comment: occasionally    Drug use: No      Review of Systems   Constitutional: Negative for chills and fever.   HENT: Negative for congestion, rhinorrhea and sore throat.    Eyes: Negative for visual disturbance.   Respiratory: Positive for shortness of breath. Negative for cough.    Cardiovascular: Positive for chest pain and palpitations.   Gastrointestinal: Negative for abdominal pain, diarrhea, nausea and vomiting.   Genitourinary: Negative for dysuria and hematuria.   Musculoskeletal: Negative for back pain and myalgias.   Skin: negative for pallor and rash.   Neurological: Negative for dizziness and weakness.   All other systems reviewed and are negative.       Pt seen prior to transfer to ICU this am- was hypotensive and c/o lower abdominal pain radiating to R flank/back.  Denies any kidney problems or difficulty urinating but has been weak and having low BP.  Pt was getting NS bolus before nuñez cath placed for urinary retention- over 400 cc on bladder scan.        Pt started on CRRT yesterday, not tolerating net fluid removal due to hypotension this  am and on pressors support -only tolerated net  cc/hr since started CRRT. Remains on balloon pump and urine output decreasing. Hyperkalemia corrected.

## 2022-05-05 NOTE — ASSESSMENT & PLAN NOTE
Patient with sustained VT after transfer to ICU  Started on Amio gtt, Lidocaine given, calcium gluconate, and Magnesium  K+ 5.8 this AM- repeat pending   Mg level pending   Emergently intubated, cath lab at bedside to transport

## 2022-05-05 NOTE — CARE UPDATE
Cardiology called because of chest pain, ekg changes  Patient started c/o chest pain few hours back. EKG with  Worsening IVCD and ischemic changes. Persistent chest pain. He is going into VT. Vomiting  Will plan for emergent LHC, Amiodarone started. Patient is hypotensive. Levophed started. Plan to intubate to protect airway due to vomiting and will take emergently to the cath lab after intubation and central line    At risk for ESRD and dialysis

## 2022-05-05 NOTE — PROGRESS NOTES
Patient post MET, arrived to room 568. AAOX, complaining of 8/10 CP. Afib 1teens-120s noted on monitor. Levo infusing. Pulm CC at bedside, [facility] Cards consulted, awaiting Cards assessment. EKG and Trop done in [facility].    Shortly after arriving to ICU, patient has approx 30 beat run of VTACH, symptomatic, grunting of CP. Cards NP at bedside, Printout of run and EKG taken to Dr Whitten. Patient now have persistent, long runs of  VTACH. Pulm CC at bedside and Cards notified. Orders to give Amio loading dose, remains in VTach. Limited IV access only 1 PIV. Now, hypotensive, Levo infusing. Dr Whitten at bedside, orders for Lidocaine IVP. Patient briefly out of Vtach, remains sustaining. Patient vomiting. Decision to intubate before angiogram made. Anesthesia team at bedside for intubation, patient successfully intubated. Pulm CC at bedside for CL insertion. EICU in room charting and monitoring. Patient stabilized. Cath Lab RN at bedside. Report given. Patient transported to Cath Lab by RT and Cath Lab team. Amio, Levo and Propofol infusing. [facility] Cath in place.

## 2022-05-05 NOTE — CARE UPDATE
"Patient has listed allergy to amiodarone as that he has "passed out in the ER". Given he is in recurrent and more persistent Vtach, decision was made to give IV amio bolus and infusion.     Bre Day MD  Pulm/CC Fellow  "

## 2022-05-05 NOTE — SUBJECTIVE & OBJECTIVE
Past Medical History:   Diagnosis Date    *Atrial fibrillation     Anticoagulant long-term use     Arthritis     Atrial fibrillation     Atrial fibrillation Feb 23, 2016    Bipolar disorder     Carpal tunnel syndrome on right 2/10/2022    CHF (congestive heart failure)     Congenital heart disease     s/p surgical intervention 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 of age    Deep vein thrombosis     DVT of leg (deep venous thrombosis)     left leg    History of prior ablation treatment     10/9/13    Hypertension     Obesity     Stroke     Thyroid disease     Venous stasis ulcer of lower extremity, unspecified laterality 12/14/2012    Venous ulcer        Past Surgical History:   Procedure Laterality Date    ABLATION Left 1/20/2022    Procedure: Ablation;  Surgeon: Gomez Lantigua MD;  Location: [facility] CATH LAB/EP;  Service: Cardiology;  Laterality: Left;    ANGIOPLASTY      CARDIAC SURGERY      open heart surgery 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 old    EYE SURGERY      left eye cataract/right eye glaucoma    TIMO FILTER PLACEMENT      Dr Calix ([facility])    KNEE SURGERY      l and r     MULTIPLE TOOTH EXTRACTIONS      SKIN GRAFT      left leg       Review of patient's allergies indicates:   Allergen Reactions    Contrast media Other (See Comments)     Severe chest pain    Food allergy formula [glutamine-c-quercet-selen-brom]      Allergic to green peas; Heart failure.    Iodinated contrast media Other (See Comments)     Chest pain    Peas Hives    Amiodarone analogues Other (See Comments)     passed out in ER    Ibuprofen Swelling    Latex, natural rubber Hives    Pcn [penicillins] Hives    Quercetin     Butisol [butabarbital] Rash     Peeling skin       No current facility-administered medications on file prior to encounter.     Current Outpatient Medications on File Prior to Encounter   Medication Sig    acetaminophen (TYLENOL) 650 MG TbSR Take 650 mg by mouth as needed.    albuterol (PROVENTIL/VENTOLIN HFA) 90 mcg/actuation inhaler Inhale 2 puffs  into the lungs every 6 (six) hours as needed for Wheezing. Rescue    atorvastatin (LIPITOR) 40 MG tablet Take 1 tablet (40 mg total) by mouth every evening.    lisinopriL (PRINIVIL,ZESTRIL) 20 MG tablet Take 1 tablet (20 mg total) by mouth once daily.    methocarbamoL (ROBAXIN) 500 MG Tab Take 1 tablet (500 mg total) by mouth 2 (two) times daily as needed (muscle discomfort).    metoprolol succinate (TOPROL-XL) 200 MG 24 hr tablet Take 1 tablet (200 mg total) by mouth once daily. (Patient taking differently: Take 50 mg by mouth once daily.)    rivaroxaban (XARELTO) 20 mg Tab Take 1 tablet (20 mg total) by mouth daily with dinner or evening meal.    sumatriptan (IMITREX) 50 MG tablet Take 50 mg for headache. If headache does not resolve, take another 50mg two hours after initial dose. Do not exceed 200mg in 24 hours.    torsemide (DEMADEX) 20 MG Tab Take 2 tablets (40 mg total) by mouth once daily.     Family History       Problem Relation (Age of Onset)    Diabetes Father, Maternal Grandfather    Heart disease Father, Maternal Grandmother, Maternal Grandfather    Stroke Maternal Grandfather          Tobacco Use    Smoking status: Former Smoker     Packs/day: 1.00     Years: 6.00     Pack years: 6.00     Types: Cigarettes    Smokeless tobacco: Former User    Tobacco comment: quit by age 27yrs old   Substance and Sexual Activity    Alcohol use: Yes     Alcohol/week: 1.0 standard drink     Types: 1 Glasses of wine per week     Comment: occasionally    Drug use: No    Sexual activity: Yes     Partners: Female     Birth control/protection: None     Review of Systems   Constitutional: Negative for diaphoresis.   Cardiovascular:  Positive for chest pain, irregular heartbeat, near-syncope and palpitations. Negative for orthopnea.   Respiratory:  Positive for shortness of breath.    Gastrointestinal:  Positive for nausea and vomiting.   Neurological:  Positive for dizziness.   Objective:     Vital Signs (Most Recent):  Temp:  97 °F (36.1 °C) (05/05/22 0823)  Pulse: (!) 112 (05/05/22 1108)  Resp: 16 (05/05/22 0959)  BP: (!) 93/57 (05/05/22 1036)  SpO2: 99 % (05/05/22 1029)   Vital Signs (24h Range):  Temp:  [96.9 °F (36.1 °C)-97.1 °F (36.2 °C)] 97 °F (36.1 °C)  Pulse:  [] 112  Resp:  [16-20] 16  SpO2:  [92 %-100 %] 99 %  BP: ()/(44-64) 93/57     Weight: (!) 169.6 kg (374 lb)  Body mass index is 41.09 kg/m².    SpO2: 99 %  O2 Device (Oxygen Therapy): room air    No intake or output data in the 24 hours ending 05/05/22 1319    Lines/Drains/Airways       Drain  Duration                  Urethral Catheter 05/05/22 1100 18 Fr. <1 day              Airway  Duration                  Airway - Non-Surgical 05/05/22 1306 <1 day              Peripheral Intravenous Line  Duration                  Peripheral IV - Single Lumen 05/04/22 1800 20 G Anterior;Distal;Right Forearm <1 day                    Physical Exam  Constitutional:       General: He is in acute distress.      Appearance: He is ill-appearing.   HENT:      Head: Atraumatic.   Cardiovascular:      Rate and Rhythm: Regular rhythm. Tachycardia present.      Heart sounds: No murmur heard.    No friction rub. No gallop.   Pulmonary:      Breath sounds: No rhonchi or rales.   Abdominal:      General: Bowel sounds are normal.      Palpations: Abdomen is soft.   Musculoskeletal:      Right lower leg: Edema present.      Left lower leg: Edema present.   Skin:     General: Skin is warm and dry.      Capillary Refill: Capillary refill takes 2 to 3 seconds.   Neurological:      Mental Status: He is alert. Mental status is at baseline.       Significant Labs: BMP:   Recent Labs   Lab 05/04/22  1533 05/05/22  0552 05/05/22  0732   GLU 99 96 96    138 138   K 5.4* 5.8* 5.4*    107 107   CO2 20* 13* 18*   BUN 78* 85* 87*   CREATININE 2.8* 3.4* 3.5*   CALCIUM 10.6* 9.9 9.9   , CMP   Recent Labs   Lab 05/04/22  1533 05/05/22  0552 05/05/22  0732    138 138   K 5.4* 5.8* 5.4*     107 107   CO2 20* 13* 18*   GLU 99 96 96   BUN 78* 85* 87*   CREATININE 2.8* 3.4* 3.5*   CALCIUM 10.6* 9.9 9.9   PROT 8.0  --  7.5   ALBUMIN 3.3*  --  3.2*   BILITOT 0.8  --  1.0   ALKPHOS 280*  --  252*   AST 57*  --  52*   ALT 61*  --  50*   ANIONGAP 13 18* 13   ESTGFRAFRICA 28* 22* 22*   EGFRNONAA 24* 19* 19*   , CBC   Recent Labs   Lab 05/05/22  1018   WBC 7.42   HGB 9.3*   HCT 32.1*      , INR No results for input(s): INR, PROTIME in the last 48 hours., Lipid Panel No results for input(s): CHOL, HDL, LDLCALC, TRIG, CHOLHDL in the last 48 hours., Troponin   Recent Labs   Lab 05/05/22  0552 05/05/22  1018   TROPONINI 0.502* 0.773*   , and All pertinent lab results from the last 24 hours have been reviewed.    Significant Imaging: Echocardiogram: Transthoracic echo (TTE) complete (Cupid Only):   Results for orders placed or performed during the hospital encounter of 05/03/22   Echo   Result Value Ref Range    IVS 0.96 0.6 - 1.1 cm    LA size 4.98 cm    LVIDd 7.26 (A) 3.5 - 6.0 cm    LVIDs 5.97 (A) 2.1 - 4.0 cm    Posterior Wall 0.91 0.6 - 1.1 cm    RVDD 2.28 cm    TAPSE 2.42 cm    TR Max Rufus 2.90 m/s    LV Diastolic Volume 277.71 mL    LV Systolic Volume 177.92 mL    FS 18 %    LV mass 315.42 g    Left Ventricle Relative Wall Thickness 0.25 cm    LV Systolic Volume Index 59.3 mL/m2    LV Diastolic Volume Index 92.57 mL/m2    LV Mass Index 105 g/m2    Triscuspid Valve Regurgitation Peak Gradient 34 mmHg    BSA 3.09 m2

## 2022-05-05 NOTE — SUBJECTIVE & OBJECTIVE
Past Medical History:   Diagnosis Date    *Atrial fibrillation     Anticoagulant long-term use     Arthritis     Atrial fibrillation     Atrial fibrillation Feb 23, 2016    Bipolar disorder     Carpal tunnel syndrome on right 2/10/2022    CHF (congestive heart failure)     Congenital heart disease     s/p surgical intervention 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 of age    Deep vein thrombosis     DVT of leg (deep venous thrombosis)     left leg    History of prior ablation treatment     10/9/13    Hypertension     Obesity     Stroke     Thyroid disease     Venous stasis ulcer of lower extremity, unspecified laterality 12/14/2012    Venous ulcer        Past Surgical History:   Procedure Laterality Date    ABLATION Left 1/20/2022    Procedure: Ablation;  Surgeon: Gomez Lantigua MD;  Location: [facility] CATH LAB/EP;  Service: Cardiology;  Laterality: Left;    ANGIOPLASTY      CARDIAC SURGERY      open heart surgery 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 old    EYE SURGERY      left eye cataract/right eye glaucoma    TIMO FILTER PLAC[place]      [place] Calix ([place]    KNEE SURGERY      l and r     MULTIPLE TOOTH EXTRACTIONS      SKIN GRAFT      left leg       Review of patient's allergies indicates:   Allergen Reactions    Contrast media Other (See Comments)     Severe chest pain    Food allergy formula [glutamine-c-quercet-selen-brom]      Allergic to green peas; Heart failure.    Iodinated contrast media Other (See Comments)     Chest pain    Peas Hives    Amiodarone analogues Other (See Comments)     passed out in ER    Ibuprofen Swelling    Latex, natural rubber Hives    Pcn [penicillins] Hives    Quercetin     Butisol [butabarbital] Rash     Peeling skin     Current Facility-Administered Medications   Medication Frequency    0.9%  NaCl infusion Continuous PRN    acetaminophen tablet 650 mg Q4H PRN    albuterol inhaler 2 puff Q6H PRN    aluminum-magnesium hydroxide-simethicone 200-200-20 mg/5 mL suspension 30 mL Once    And    LIDOcaine HCl 2% oral solution  10 mL Once    amiodarone 360 mg/200 mL (1.8 mg/mL) infusion Continuous    amiodarone 360 mg/200 mL (1.8 mg/mL) infusion Continuous PRN    atorvastatin tablet 40 mg QHS    chlorhexidine 0.12 % solution 15 mL BID    [START ON 5/9/2022] collagenase ointment Every Mon, Thurs    dextrose 10% bolus 250 mL PRN    [START ON 5/6/2022] famotidine (PF) injection 20 mg Daily    fentaNYL 2500 mcg in 0.9% sodium chloride 250 mL infusion premix (titrating) Continuous    furosemide injection 80 mg Q12H    glucagon (human recombinant) injection 1 mg PRN    glucose chewable tablet 16 g PRN    glucose chewable tablet 24 g PRN    heparin 25,000 units in dextrose 5% 250 mL (100 units/mL) infusion (heparin infusion - NO NOMOGRAM) Continuous    heparin infusion 1,000 units/500 ml in 0.9% NaCl (pressure line flush) Continuous PRN    insulin aspart U-100 pen 1-10 Units QID (AC + HS) PRN    LIDOcaine 5 % patch 1 patch Daily PRN    melatonin tablet 9 mg Nightly PRN    methocarbamoL tablet 500 mg BID PRN    metoprolol succinate (TOPROL-XL) 24 hr tablet 50 mg Daily    morphine injection 1 mg Q4H PRN    mupirocin 2 % ointment BID    naloxone 0.4 mg/mL injection 0.02 mg PRN    NORepinephrine 4 mg in dextrose 5% 250 mL infusion (premix) (titrating) Continuous    NORepinephrine bitartrate-D5W 4 mg/250 mL (16 mcg/mL) infusion Soln     NORepinephrine injection Continuous PRN    ondansetron injection 4 mg TID PRN    propofoL (DIPRIVAN) 10 mg/mL infusion     propofol (DIPRIVAN) 10 mg/mL infusion Continuous    propofol (DIPRIVAN) 10 mg/mL IVP Continuous PRN    sars-cov-2 (covid-19) (MODERNA COVID-19) 100 mcg/0.5 ml injection 0.25 mL vaccine x 1 dose    senna-docusate 8.6-50 mg per tablet 1 tablet BID    sodium chloride 0.9% flush 5 mL PRN    succinylcholine (ANECTINE) 20 mg/mL injection      Family History       Problem Relation (Age of Onset)    Diabetes Father, Maternal Grandfather    Heart disease Father, Maternal Grandmother, Maternal Grandfather     Stroke Maternal Grandfather          Tobacco Use    Smoking status: Former Smoker     Packs/day: 1.00     Years: 6.00     Pack years: 6.00     Types: Cigarettes    Smokeless tobacco: Former User    Tobacco comment: quit by age 27yrs old   Substance and Sexual Activity    Alcohol use: Yes     Alcohol/week: 1.0 standard drink     Types: 1 Glasses of wine per week     Comment: occasionally    Drug use: No    Sexual activity: Yes     Partners: Female     Birth control/protection: None     Review of Systems   Constitutional:  Positive for activity change and fatigue. Negative for diaphoresis.   Respiratory:  Positive for chest tightness.    Cardiovascular:  Positive for leg swelling.   Genitourinary:  Positive for decreased urine volume, flank pain and urgency.   Musculoskeletal:  Positive for back pain.   Objective:     Vital Signs (Most Recent):  Temp: 96.7 °F (35.9 °C) (05/05/22 1645)  Pulse: 88 (05/05/22 1830)  Resp: 20 (05/05/22 1830)  BP: 95/69 (05/05/22 1830)  SpO2: 100 % (05/05/22 1830)  O2 Device (Oxygen Therapy): ventilator (05/05/22 1800) Vital Signs (24h Range):  Temp:  [96.7 °F (35.9 °C)-97.1 °F (36.2 °C)] 96.7 °F (35.9 °C)  Pulse:  [] 88  Resp:  [15-46] 20  SpO2:  [79 %-100 %] 100 %  BP: ()/(39-80) 95/69     Weight: (!) 169.6 kg (374 lb) (05/05/22 1212)  Body mass index is 41.09 kg/m².  Body surface area is 3.09 meters squared.    I/O last 3 completed shifts:  In: 100 [P.O.:100]  Out: 900 [Urine:800; Emesis/NG output:100]    Physical Exam  Vitals reviewed.   Constitutional:       General: He is not in acute distress.     Appearance: Normal appearance. He is ill-appearing.   HENT:      Head: Normocephalic and atraumatic.   Cardiovascular:      Rate and Rhythm: Normal rate and regular rhythm.   Pulmonary:      Effort: Pulmonary effort is normal.      Breath sounds: Normal breath sounds.   Abdominal:      General: Bowel sounds are normal.      Palpations: Abdomen is soft.   Musculoskeletal:          General: Swelling and deformity present.      Right lower leg: Edema present.   Skin:     General: Skin is warm and dry.   Neurological:      General: No focal deficit present.      Mental Status: Mental status is at baseline.   Psychiatric:         Mood and Affect: Mood normal.         Behavior: Behavior normal.         Thought Content: Thought content normal.       Significant Labs:  ABGs:   Recent Labs   Lab 05/05/22  1720   PH 7.200*   PCO2 51.6*   HCO3 20.2*   POCSATURATED 82*   BE -8     Cardiac Markers: No results for input(s): CKMB, TROPONINT, MYOGLOBIN in the last 168 hours.  CBC:   Recent Labs   Lab 05/05/22  1018   WBC 7.42   RBC 3.73*   HGB 9.3*   HCT 32.1*      MCV 86   MCH 24.9*   MCHC 29.0*     CMP:   Recent Labs   Lab 05/05/22  1308   *   CALCIUM 9.9   ALBUMIN 3.3*   PROT 7.9      K 5.8*   CO2 15*      BUN 88*   CREATININE 3.7*   ALKPHOS 264*   ALT 53*   AST 65*   BILITOT 1.2*     Microbiology Results (last 7 days)       ** No results found for the last 168 hours. **          Recent Labs   Lab 05/05/22  1349   COLORU Yellow   SPECGRAV 1.020   PHUR 5.0   PROTEINUA Negative   NITRITE Negative   LEUKOCYTESUR Negative   UROBILINOGEN Negative       Significant Imaging:  Labs: Reviewed  X-Ray: Reviewed  US: Reviewed

## 2022-05-05 NOTE — ASSESSMENT & PLAN NOTE
Patient has had intermittent chest pain since admission which became constant overnight.  Patient became unstable on the floor today - bradycardia, pulseless, and apnea reported by ICU RN   EKG with worsening ischemic changes and VT noted on arrival to ICU. VT was initially intermittent, self limited runs of 10-30 beats. Amiodarone was ordered. VT became more persistent prior to amiodarone bolus being given and broke with cough. He received emergent Amio bolus and was given Lidocaine with improvement in VT.  He is being emergently intubated at this time and will be taken to the cath lab emergently for LHC.   Phone consent was obtained by his mother (Josey Solis [phone]). Risks/benefits were discussed up to and including death  AM labs significant for K+ 5.8, BUN 87, Cr 3.5 (baseline 0.8).   Troponin up to 0.7  Bedside TTE with global hypokinesis and LVEF 35%  STAT CMP, Mg pending   Patient loaded with ASA and Plavix this AM  Emergently premedicated for contrast allergy   Further recs to follow

## 2022-05-05 NOTE — PROGRESS NOTES
RAPID RESPONSE NURSE NOTE        Admit Date: 5/3/2022  LOS: 2  Code Status: Full Code   Date of Consult: 2022  : 1965  Age: 56 y.o.  Weight:   Wt Readings from Last 1 Encounters:   22 (!) 169.6 kg (374 lb)     Sex: male  Race: White   Bed: [facility] Card Cath Pool/NONE:   MRN: [id number]  Time Rapid Response Team page Received: 1055  Time Rapid Response Team at Bedside: 1056  Time Rapid Response Team left Bedside: 1130  Was the patient discharged from an ICU this admission? Yes   Was the patient discharged from a PACU within last 24 hours? No   Did the patient receive conscious sedation/general anesthesia in last 24 hours? No   Was the patient in the ED within the past 24 hours? No   Was the patient on NIPPV within the past 24 hours? No   Did this progress into an ARC or CPA: No  Attending Physician: Garrison Roach MD  Primary Service: Family Medicine     SITUATION     Notified by overhead page.  Reason for alert: unable to obtain BP, pt unresponsive  Called to evaluate the patient for hypotension    Why is the patient in the hospital?: Chest pain    Patient has a past medical history of *Atrial fibrillation, Anticoagulant long-term use, Arthritis, Atrial fibrillation, Atrial fibrillation, Bipolar disorder, Carpal tunnel syndrome on right, CHF (congestive heart failure), Congenital heart disease, Deep vein thrombosis, DVT of leg (deep venous thrombosis), History of prior ablation treatment, Hypertension, Obesity, Stroke, Thyroid disease, Venous stasis ulcer of lower extremity, unspecified laterality, and Venous ulcer.    Last Vitals:  Temp: 97 °F (36.1 °C) ( 0823)  Pulse: 112 ( 1108)  Resp: 16 ( 0959)  BP: 93/57 ( 1036)  SpO2: 99 % ( 1029)    24 Hours Vitals Range:  Temp:  [96.9 °F (36.1 °C)-97.1 °F (36.2 °C)]   Pulse:  []   Resp:  [16-20]   BP: ()/(44-64)   SpO2:  [92 %-100 %]     Labs:  Recent Labs     22  0337 22  1018   WBC 6.29 7.42   HGB 9.5*  9.3*   HCT 30.5* 32.1*    201       Recent Labs     05/03/22  0638 05/03/22  1450 05/05/22  0552 05/05/22  0732 05/05/22  1308   NA  --    < > 138 138 139   K  --    < > 5.8* 5.4* 5.8*   CL  --    < > 107 107 105   CO2  --    < > 13* 18* 15*   CREATININE  --    < > 3.4* 3.5* 3.7*   GLU  --    < > 96 96 132*   PHOS 4.9*  --   --   --   --    MG 1.7  --   --   --  1.9    < > = values in this interval not displayed.        No results for input(s): PH, PCO2, PO2, HCO3, POCSATURATED, BE in the last 72 hours.     ASSESSMENT/INTERVENTIONS    The patient was seen for a Cardiac problem. Staff concerns included bradycardia and hypotension. The following interventions were performed: EKG and cardiology consult.     Pt was hypotensive and minimally responsive during MET. Pt in a-fib with rate of 110s throughout hospital stay. Became meet as low as 40 during MET. Atropine given with no real response. Levo started at 0.1mcg/kg/min. Pt stabilized and more awake. Transferred to ICU.    RECOMMENDATIONS    Discussed plan of care with bedside RNEstefani    PROVIDER ESCALATION    Orders received and case discussed with Dr. Day.    Disposition: Tx in ICU bed 568

## 2022-05-05 NOTE — CONSULTS
"[facility] - Intensive Care  Cardiology  Consult Note    Patient Name: Drew Farrar  MRN: [id number]  Admission Date: 5/3/2022  Hospital Length of Stay: 2 days  Code Status: Full Code   Attending Provider: Garrison Roach MD   Consulting Provider: Tom Dong NP  Primary Care Physician: Garrison Roach MD  Principal Problem:Chest pain    Patient information was obtained from patient, spouse/SO and ER records.     [facility]  Consult performed by: Tom Dong NP  Consult ordered by: Stefan Francisco MD        Subjective:     Chief Complaint:  Chest pain      HPI:   Patient is a 56 yr old male with AFib on Xarelto, CHF, DVT, hypertension presented to the ED on 05/03/2022 with one day of nausea and vomiting with severe chest pain. Patient has had intermittent chest pain since admission which became constant overnight. Pain is described as "lightening bolt" to the center of his chest, worse with deep inspiration, better with sitting up. He reports associated SOB, NV. Patient became unstable on the floor today with bradycardia, pulseless, and apnea. He was given atropine and transferred to ICU. EKG with worsening ischemic changes and VT noted on arrival to ICU. VT was initially intermittent, self limited runs of 10-30 beats. Amiodarone was ordered. VT became more persistent prior to amiodarone bolus being given and broke with cough. He received emergent Amio bolus and was given Lidocaine with improvement in VT. Patient was started on Levophed for BP support. He is being emergently intubated at this time and will be taken to the cath lab emergently for LHC. Phone consent was obtained by his mother (Josey Solis [phone]).  AM labs significant for K+ 5.8, BUN 87, Cr 3.5 (baseline 0.8).   Troponin up to 0.7  Bedside TTE with global hypokinesis and LVEF 35%  STAT CMP, Mg pending       Past Medical History:   Diagnosis Date    *Atrial fibrillation     Anticoagulant " long-term use     Arthritis     Atrial fibrillation     Atrial fibrillation Feb 23, 2016    Bipolar disorder     Carpal tunnel syndrome on right 2/10/2022    CHF (congestive heart failure)     Congenital heart disease     s/p surgical intervention 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 of age    Deep vein thrombosis     DVT of leg (deep venous thrombosis)     left leg    History of prior ablation treatment     10/9/13    Hypertension     Obesity     Stroke     Thyroid disease     Venous stasis ulcer of lower extremity, unspecified laterality 12/14/2012    Venous ulcer        Past Surgical History:   Procedure Laterality Date    ABLATION Left 1/20/2022    Procedure: Ablation;  Surgeon: Gomez Lantigua MD;  [facility] CATH LAB[facility]EP;  Service: Cardiology;  Laterality: Left;    ANGIOPLASTY      CARDIAC SURGERY      open heart surgery 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 old    EYE SURGERY      left eye cataract/right eye glaucoma    TIMO FILTER PLACEMENT      Dr [facility])    KNEE SURGERY      l and r     MULTIPLE TOOTH EXTRACTIONS      SKIN GRAFT      left leg       Review of patient's allergies indicates:   Allergen Reactions    Contrast media Other (See Comments)     Severe chest pain    Food allergy formula [glutamine-c-quercet-selen-brom]      Allergic to green peas; Heart failure.    Iodinated contrast media Other (See Comments)     Chest pain    Peas Hives    Amiodarone analogues Other (See Comments)     passed out in ER    Ibuprofen Swelling    Latex, natural rubber Hives    Pcn [penicillins] Hives    Quercetin     Butisol [butabarbital] Rash     Peeling skin       No current facility-administered medications on file prior to encounter.     Current Outpatient Medications on File Prior to Encounter   Medication Sig    acetaminophen (TYLENOL) 650 MG TbSR Take 650 mg by mouth as needed.    albuterol (PROVENTIL/VENTOLIN HFA) 90 mcg/actuation inhaler Inhale 2 puffs into the lungs every 6 (six) hours as needed  for Wheezing. Rescue    atorvastatin (LIPITOR) 40 MG tablet Take 1 tablet (40 mg total) by mouth every evening.    lisinopriL (PRINIVIL,ZESTRIL) 20 MG tablet Take 1 tablet (20 mg total) by mouth once daily.    methocarbamoL (ROBAXIN) 500 MG Tab Take 1 tablet (500 mg total) by mouth 2 (two) times daily as needed (muscle discomfort).    metoprolol succinate (TOPROL-XL) 200 MG 24 hr tablet Take 1 tablet (200 mg total) by mouth once daily. (Patient taking differently: Take 50 mg by mouth once daily.)    rivaroxaban (XARELTO) 20 mg Tab Take 1 tablet (20 mg total) by mouth daily with dinner or evening meal.    sumatriptan (IMITREX) 50 MG tablet Take 50 mg for headache. If headache does not resolve, take another 50mg two hours after initial dose. Do not exceed 200mg in 24 hours.    torsemide (DEMADEX) 20 MG Tab Take 2 tablets (40 mg total) by mouth once daily.     Family History       Problem Relation (Age of Onset)    Diabetes Father, Maternal Grandfather    Heart disease Father, Maternal Grandmother, Maternal Grandfather    Stroke Maternal Grandfather          Tobacco Use    Smoking status: Former Smoker     Packs/day: 1.00     Years: 6.00     Pack years: 6.00     Types: Cigarettes    Smokeless tobacco: Former User    Tobacco comment: quit by age 27yrs old   Substance and Sexual Activity    Alcohol use: Yes     Alcohol/week: 1.0 standard drink     Types: 1 Glasses of wine per week     Comment: occasionally    Drug use: No    Sexual activity: Yes     Partners: Female     Birth control/protection: None     Review of Systems   Constitutional: Negative for diaphoresis.   Cardiovascular:  Positive for chest pain, irregular heartbeat, near-syncope and palpitations. Negative for orthopnea.   Respiratory:  Positive for shortness of breath.    Gastrointestinal:  Positive for nausea and vomiting.   Neurological:  Positive for dizziness.   Objective:     Vital Signs (Most Recent):  Temp: 97 °F (36.1 °C) (05/05/22  0823)  Pulse: (!) 112 (05/05/22 1108)  Resp: 16 (05/05/22 0959)  BP: (!) 93/57 (05/05/22 1036)  SpO2: 99 % (05/05/22 1029)   Vital Signs (24h Range):  Temp:  [96.9 °F (36.1 °C)-97.1 °F (36.2 °C)] 97 °F (36.1 °C)  Pulse:  [] 112  Resp:  [16-20] 16  SpO2:  [92 %-100 %] 99 %  BP: ()/(44-64) 93/57     Weight: (!) 169.6 kg (374 lb)  Body mass index is 41.09 kg/m².    SpO2: 99 %  O2 Device (Oxygen Therapy): room air    No intake or output data in the 24 hours ending 05/05/22 1319    Lines/Drains/Airways       Drain  Duration                  Urethral Catheter 05/05/22 1100 18 Fr. <1 day              Airway  Duration                  Airway - Non-Surgical 05/05/22 1306 <1 day              Peripheral Intravenous Line  Duration                  Peripheral IV - Single Lumen 05/04/22 1800 20 G Anterior;Distal;Right Forearm <1 day                    Physical Exam  Constitutional:       General: He is in acute distress.      Appearance: He is ill-appearing.   HENT:      Head: Atraumatic.   Cardiovascular:      Rate and Rhythm: Regular rhythm. Tachycardia present.      Heart sounds: No murmur heard.    No friction rub. No gallop.   Pulmonary:      Breath sounds: No rhonchi or rales.   Abdominal:      General: Bowel sounds are normal.      Palpations: Abdomen is soft.   Musculoskeletal:      Right lower leg: Edema present.      Left lower leg: Edema present.   Skin:     General: Skin is warm and dry.      Capillary Refill: Capillary refill takes 2 to 3 seconds.   Neurological:      Mental Status: He is alert. Mental status is at baseline.       Significant Labs: BMP:   Recent Labs   Lab 05/04/22  1533 05/05/22  0552 05/05/22  0732   GLU 99 96 96    138 138   K 5.4* 5.8* 5.4*    107 107   CO2 20* 13* 18*   BUN 78* 85* 87*   CREATININE 2.8* 3.4* 3.5*   CALCIUM 10.6* 9.9 9.9   , CMP   Recent Labs   Lab 05/04/22  1533 05/05/22  0552 05/05/22  0732    138 138   K 5.4* 5.8* 5.4*    107 107   CO2 20*  13* 18*   GLU 99 96 96   BUN 78* 85* 87*   CREATININE 2.8* 3.4* 3.5*   CALCIUM 10.6* 9.9 9.9   PROT 8.0  --  7.5   ALBUMIN 3.3*  --  3.2*   BILITOT 0.8  --  1.0   ALKPHOS 280*  --  252*   AST 57*  --  52*   ALT 61*  --  50*   ANIONGAP 13 18* 13   ESTGFRAFRICA 28* 22* 22*   EGFRNONAA 24* 19* 19*   , CBC   Recent Labs   Lab 05/05/22  1018   WBC 7.42   HGB 9.3*   HCT 32.1*      , INR No results for input(s): INR, PROTIME in the last 48 hours., Lipid Panel No results for input(s): CHOL, HDL, LDLCALC, TRIG, CHOLHDL in the last 48 hours., Troponin   Recent Labs   Lab 05/05/22  0552 05/05/22  1018   TROPONINI 0.502* 0.773*   , and All pertinent lab results from the last 24 hours have been reviewed.    Significant Imaging: Echocardiogram: Transthoracic echo (TTE) complete (Cupid Only):   Results for orders placed or performed during the hospital encounter of 05/03/22   Echo   Result Value Ref Range    IVS 0.96 0.6 - 1.1 cm    LA size 4.98 cm    LVIDd 7.26 (A) 3.5 - 6.0 cm    LVIDs 5.97 (A) 2.1 - 4.0 cm    Posterior Wall 0.91 0.6 - 1.1 cm    RVDD 2.28 cm    TAPSE 2.42 cm    TR Max Rufus 2.90 m/s    LV Diastolic Volume 277.71 mL    LV Systolic Volume 177.92 mL    FS 18 %    LV mass 315.42 g    Left Ventricle Relative Wall Thickness 0.25 cm    LV Systolic Volume Index 59.3 mL/m2    LV Diastolic Volume Index 92.57 mL/m2    LV Mass Index 105 g/m2    Triscuspid Valve Regurgitation Peak Gradient 34 mmHg    BSA 3.09 m2     Assessment and Plan:     * Chest pain  Per NSTEMI     NSTEMI (non-ST elevated myocardial infarction)  Patient has had intermittent chest pain since admission which became constant overnight.  Patient became unstable on the floor today - bradycardia, pulseless, and apnea reported by ICU RN   EKG with worsening ischemic changes and VT noted on arrival to ICU. VT was initially intermittent, self limited runs of 10-30 beats. Amiodarone was ordered. VT became more persistent prior to amiodarone bolus being given  and broke with cough. He received emergent Amio bolus and was given Lidocaine with improvement in VT.  He is being emergently intubated at this time and will be taken to the cath lab emergently for LHC.   Phone consent was obtained by his mother (Josey Solis [phone]). Risks/benefits were discussed up to and including death  AM labs significant for K+ 5.8, BUN 87, Cr 3.5 (baseline 0.8).   Troponin up to 0.7  Bedside TTE with global hypokinesis and LVEF 35%  STAT CMP, Mg pending   Patient loaded with ASA and Plavix this AM  Emergently premedicated for contrast allergy   Further recs to follow     VT (ventricular tachycardia)  Patient with sustained VT after transfer to ICU  Started on Amio gtt, Lidocaine given, calcium gluconate, and Magnesium  K+ 5.8 this AM- repeat pending   Mg level pending   Emergently intubated, cath lab at bedside to transport     Hypotension  Hypotensive this AM, now on levophed  Will consider switching to Oliverio post cath given tach      NARESH (acute kidney injury)  Baseline Cr 0.8, 3.5 this AM  Nephrology consult pending     Chronic atrial fibrillation  On Xarelto (dosed last on 05/04/2022)- hold for now pending [facility]   BB on hold given hypotension and pressor use  On Amio gtt for VT        VTE Risk Mitigation (From admission, onward)         Ordered     Reason for No Pharmacological VTE Prophylaxis  Once        Question:  Reasons:  Answer:  Already adequately anticoagulated on oral Anticoagulants    05/03/22 0602     IP VTE HIGH RISK PATIENT  Once         05/03/22 0602     Place sequential compression device  Until discontinued         05/03/22 0602                Thank you for your consult. I will follow-up with patient. Please contact us if you have any additional questions.    Tom Dong, EMILE  Cardiology   [place] - Intensive Care

## 2022-05-05 NOTE — HPI
"Patient is a 56 yr old male with AFib on Xarelto, CHF, DVT, hypertension presented to the ED on 05/03/2022 with one day of nausea and vomiting with severe chest pain. Patient has had intermittent chest pain since admission which became constant overnight. Pain is described as "lightening bolt" to the center of his chest, worse with deep inspiration, better with sitting up. He reports associated SOB, NV. Patient became unstable on the floor today with bradycardia, pulseless, and apnea. He was given atropine and transferred to ICU. EKG with worsening ischemic changes and VT noted on arrival to ICU. VT was initially intermittent, self limited runs of 10-30 beats. Amiodarone was ordered. VT became more persistent prior to amiodarone bolus being given and broke with cough. He received emergent Amio bolus and was given Lidocaine with improvement in VT. Patient was started on Levophed for BP support. He is being emergently intubated at this time and will be taken to the cath lab emergently for LHC. Phone consent was obtained by his mother (Josey Solis [phone]).  AM labs significant for K+ 5.8, BUN 87, Cr 3.5 (baseline 0.8).   Troponin up to 0.7  Bedside TTE with global hypokinesis and LVEF 35%  STAT CMP, Mg pending   "

## 2022-05-05 NOTE — NURSING
Shortly after lab left patient's room for AM draws, patient's wife informed RN that patient was experiencing chest pain. Patient reports pain is 9/10 and stabbing in nature on rt side of chest. IV morphine administered and MD made aware.

## 2022-05-05 NOTE — ASSESSMENT & PLAN NOTE
Elevated BUN/creatine   Plan  Nephrology consulted recs appreciated  Encourage PO fluid intake  Considering past history of CHF avoid IVF  Monitor Urine out put

## 2022-05-05 NOTE — PLAN OF CARE
Consult noted- chart/ labs reviewed.  Would hold ACEI for now until hyperkalemia and NARESH improved.  Full consult to follow.

## 2022-05-05 NOTE — PROGRESS NOTES
Progress Note  [facility]    Attending: Dr. Garrison Roach  Admit Date: 5/3/2022  Today's Date: 05/06/2022     Patient is a 56 yr old male with Pmhx history of AFib on Xarelto, CHF, DVT, hypertension presenting to the ED for one day of nausea and vomiting with severe chest pain. Patient states that he has been vomiting all day ans his been unable to hold down any food. Patient states the chest pain does not radiate to his shoulder and feels like stabbing, it is severe and constant.  In the ED patient was found to have mildly elevated troponin and an ekg showing Afib with RVR and tachycardia. Patient is afebrile and shows no other signs of infection. Patient given labetalol in ED, which improved cardiac rate and rhythm  Patient to be admitted to [facility] to continue work-up  SUBJECTIVE:     Patient seen and examined this morning. Throughout his admission he has complained of intermittent abdominal and reproducible chest pain. His initial elevated troponin had resolved and repeated EKG and CXR from the evening of 5/4 were unchanged from admission; however, ON he again complained of CP at which point a new troponin was collected and was elevated to 0.5. His EKG at which point showed T wave inversions and ST depressions. He was Asa and Plavix loaded and Cardiology was consulted for further evaluation. While Cardiology was evaluating, prior to the MET we were called to his room as his nurse was unable to get a BP reading. He was alert at this time and with Trendelenburg positioning a BP was acquired. After stabilizing him, his nurse was placing a nuñez per Nephrology's workup for his unresolving NARESH the MET was called as he became unresponsive, hypotensive, and with faintly palpable pulses. He was given a 250ml bolus, push of atropine, and started on a levophed drip. An EKG at that time showed continued Afib RVR with ST elevations. He became more responsive and was transferred to the ICU. After  moving to the ICU he again was complaining of CP and shortly thereafter had ~30 beat run of VTACH. He was then given an Amnio loading dose as well as Lidocaine, benadryl, calcium gluconate, and solumedrol at which point he began to vomit and the decsion was made to intubate and take him to the cath lab. An Echo was redone and showed EF of 20%. His cath demonstrated normal coronary arteries and an IABP was placed for HF and shock. ON he remained in Afib requiring increased sedation and pressers.       Review of Systems   Unable to perform ROS: Intubated       OBJECTIVE:     Vital Signs Trends/Hx Reviewed  Vitals:    05/06/22 0615 05/06/22 0630 05/06/22 0645 05/06/22 0700   BP: (!) 103/58 (!) 82/50 115/73 115/61   BP Location:       Patient Position:       Pulse: 103 (!) 112 98 110   Resp: 20 20 20 20   Temp:       TempSrc:       SpO2: 98% 98% 98% 98%   Weight:       Height:           Vent Mode: A/CMV-VC  Oxygen Concentration (%):  [] 50  Resp Rate Total:  [18 br/min-35 br/min] 20 br/min  Vt Set:  [420 mL-550 mL] 550 mL  PEEP/CPAP:  [4.9 cmH20-5.4 cmH20] 5.3 cmH20  Mean Airway Pressure:  [10.1 piO18-85.3 cmH20] 10.3 cmH20    Physical Exam  Vitals and nursing note reviewed.   HENT:      Head: Normocephalic.      Right Ear: External ear normal.      Left Ear: External ear normal.      Nose: Nose normal. No congestion or rhinorrhea.      Mouth/Throat:      Pharynx: Oropharynx is clear.   Eyes:      General: No scleral icterus.     Conjunctiva/sclera: Conjunctivae normal.   Cardiovascular:      Rate and Rhythm: Tachycardia present. Rhythm irregular.   Pulmonary:      Breath sounds: No rhonchi or rales.   Abdominal:      General: Bowel sounds are normal. There is no distension.      Palpations: Abdomen is soft.   Musculoskeletal:      Cervical back: Normal range of motion.   Lymphadenopathy:      Cervical: No cervical adenopathy.   Skin:     General: Skin is warm and dry.      Capillary Refill: Capillary refill takes  less than 2 seconds.         Laboratory:  Recent Labs   Lab 05/06/22  0416   WBC 14.96*   RBC 4.27*   HGB 10.8*   HCT 35.0*      MCV 82   MCH 25.3*   MCHC 30.9*     Recent Labs   Lab 05/05/22  1308 05/06/22  0416    136   K 5.8* 5.7*    102   CO2 15* 16*   BUN 88* 92*   CREATININE 3.7* 3.8*   MG 1.9  --      Recent Labs   Lab 05/05/22  1720   PH 7.200*   PCO2 51.6*   PO2 57   HCO3 20.2*   POCSATURATED 82*   BE -8         Chest Imaging:   CXR this AM:      ASSESSMENT & RECOMMENDATIONS     Neuro/Psych  #Intubated and sedated   -fentanyl breifly but d/c now just propofol running at 45  -RAAS this AM -3 to -1     CV  #Cardiogeneic Shock and CHF  -MET called pt hypotensive, tachycardic, and with CP  -Pt w/ intermittent CP and multiple runs of sustained Vtach and EKGs with T wave inversion and ST elevations concerning for STEMI as well as elevated troponin to 0.5 from 0.3 last was 1.4  -Asa and plavix loaded as well as Amiodarone gtt at 0.5  -[facility] demonstrated clean coronary arteries but IABP was placed   -CXR this AM shows IABP...  -ECHO from procedure w/ EF 20% from 40 in 3/22  -Cardiology rec IV diuresis for significantly elevated filling pressures   -Lasix 80mg BID initially but now 40mg       #Afib w/ RVR  -xeralto and metoprolol at home   -Last HR: 103 though variable   -heparin and amiodarone gtt currently    #HTN  -lisinopril at home but held in setting of NARESH  -BP this /81  -Currently on levophed at 0.12    Pulm  Intubated 2/2 airway protection and AMS  Vent settings as above  Most recent ABG 1730 as above 7.2 CO2 51.6 O2 57 Bicarb 20.2  CXR this AM  Wean vent settings as tolerated and SBT once appropriate      FEN/GI  Diet NPO  GI prophylaxis Famotidine    #Transaminitis  AST/ALT 62/72 on admission and had been down trending though AM are....  Alk phos 336 on admit and down trending to   T bili 1.1 on admit and down trended though AM is   US abdomen from admit is  unremarkable    Electrolytes this AM are  K 5.7 (given albuterol as well as insulin and dextrose) Cl 102 CO2 16 Mg Phos     RENAL  UOP: 4395cc , In: 1996.5cc, net -2395cc in last 24 hrs    BUN/Cr: 92/3.8, baseline 20/1.0    #NARESH  -Crt on admit 1.6 but was elevated to 3.7 yesterday   -Initial urine studies likely prerenal in nature  -Nephrology consulted   -Kerns placed 5/5  -UA with 2+ protein and 1+ occult blood  -Retroperitoneal US for possible obstruction pending    #HAGMA  -AG this AM 18  -Sodium Bicarb 650mg TID     Heme  H/H 10.8/35.0  WBC 14.96  DVT prophylaxis: Heparin (gtt)    Endo  No issues at this time    ID  No issues at this time  Blood cultures collected 5/6 still in process      Declan Faria M.D.  [facility]   05/06/2022 3:42 PM

## 2022-05-05 NOTE — NURSING
RN notified for the 3rd time this evening that patient is reporting chest pain, 10/10 in nature. Morphine administered 2 hours previously, patient given tylenol and lidocaine patch placed to right chest. Physician at bedside now.

## 2022-05-05 NOTE — ASSESSMENT & PLAN NOTE
Patient reports 1 wk intermittent stabbing chest pain  Pain is midsternal and reproducible on exam, does not radiate  ED echo showed no signs of acute CHF or pericarditis    Initial troponin .036  Repeat .030 though Trop again elevated to 0.5 overnight on 5/5  EKG with Afib although now showing possible T wave inversions    Plan  Consult cardiology recs appreciated  Asa and Plavix loaded   Trend Troponin in 4 hours, with EKG if needed  Continue Tele monitoring

## 2022-05-05 NOTE — PROCEDURES
CENTRAL VENOUS LINE INSERTION:    Indications: vascular access and med administration    Antisepsis: sterile gloves, mask, gown, and drape.  Skin prepped with chlorhexidine.  Catheter: 7 Fr x 3 lumen x 20cm via left femoral.  Insertion directed by ultrasound.  Heme positive aspiration all ports.  Secured with sutures at the skin.  Dressing: dry sterile gauze and bio occlusive dressing.  Complications: none      Bre Day

## 2022-05-05 NOTE — CONSULTS
Consult Note  [facility]    Attending: Garrison Roach MD  Fellow: Dr Day  Admit Date: 5/3/2022  Today's Date: 05/05/2022  Reason for Consult:  Intubation, ventricular tachycardia    SUBJECTIVE:     HPI: patient is a 56 year old male with PMHx afib on xarelto, combined CHF, DVT, hypertension presenting with one day of nausea and vomiting with severe chest pain on admission. Patients chest pain worsened with deep inspiration and alleviated with sitting up. Chest pain workup included troponin which trended down. Patient as well developed NARESH during admission. Patients chest pain resolved on Day 2. On day 3 of admission, patients chest pain reoccurred. troponins trended 0.5>0.77. after placement of nuñez urethral catheter, patient developed dizziness and became minimally responsive. MET called and patient placed in trendelenburg. Patient hypotensive and tachycardic. Patient initiated on levo drip and transitioned to ICU. EKG showed possible STEMI, cardiology notified. Patient was found to have intermittent VT , requiring amiodarone drip. Decision for emergent LHC made, and patient was intubated for airway protection. Patient currently undergoing LHC. Patient transitioned to ICU for STEMI and shock.     Review of patient's allergies indicates:   Allergen Reactions    Contrast media Other (See Comments)     Severe chest pain    Food allergy formula [glutamine-c-quercet-selen-brom]      Allergic to green peas; Heart failure.    Iodinated contrast media Other (See Comments)     Chest pain    Peas Hives    Amiodarone analogues Other (See Comments)     passed out in ER    Ibuprofen Swelling    Latex, natural rubber Hives    Pcn [penicillins] Hives    Quercetin     Butisol [butabarbital] Rash     Peeling skin       Past Medical History:   Diagnosis Date    *Atrial fibrillation     Anticoagulant long-term use     Arthritis     Atrial fibrillation     Atrial fibrillation Feb 23,  2016    Bipolar disorder     Carpal tunnel syndrome on right 2/10/2022    CHF (congestive heart failure)     Congenital heart disease     s/p surgical intervention 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 of age    Deep vein thrombosis     DVT of leg (deep venous thrombosis)     left leg    History of prior ablation treatment     10/9/13    Hypertension     Obesity     Stroke     Thyroid disease     Venous stasis ulcer of lower extremity, unspecified laterality 12/14/2012    Venous ulcer      Past Surgical History:   Procedure Laterality Date    ABLATION Left 1/20/2022    Procedure: Ablation;  Surgeon: Gomez Latnigua MD;  Location: [facility] CATH LAB/EP;  Service: Cardiology;  Laterality: Left;    ANGIOPLASTY      CARDIAC SURGERY      open heart surgery 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 old    EYE SURGERY      left eye cataract/right eye glaucoma    TIMO FILTER PLACEMENT      Dr Calix ([facility])    KNEE SURGERY      l and r     MULTIPLE TOOTH EXTRACTIONS      SKIN GRAFT      left leg     Family History   Problem Relation Age of Onset    Diabetes Father     Heart disease Father     Heart disease Maternal Grandmother     Diabetes Maternal Grandfather     Heart disease Maternal Grandfather     Stroke Maternal Grandfather      Social History     Tobacco Use    Smoking status: Former Smoker     Packs/day: 1.00     Years: 6.00     Pack years: 6.00     Types: Cigarettes    Smokeless tobacco: Former User    Tobacco comment: quit by age 27yrs old   Substance Use Topics    Alcohol use: Yes     Alcohol/week: 1.0 standard drink     Types: 1 Glasses of wine per week     Comment: occasionally    Drug use: No       All medications reviewed.    Review of Systems   Constitutional: Positive for diaphoresis.   Eyes: Negative for blurred vision.   Respiratory: Positive for shortness of breath.    Cardiovascular: Positive for chest pain.   Gastrointestinal: Positive for abdominal pain.   Neurological: Positive for dizziness and weakness.        OBJECTIVE:     Vital Signs Trends/Hx Reviewed  Vitals:    05/05/22 1230 05/05/22 1245 05/05/22 1300 05/05/22 1315   BP: 113/61 (!) 87/49 (!) 91/43 (!) 148/58   BP Location:       Patient Position:       Pulse: 106 (!) 133 99 (!) 119   Resp: 19 (!) 46 (!) 26 (!) 26   Temp:       TempSrc:       SpO2: 100% (!) 84% (!) 79% (!) 90%   Weight:       Height:           Ventilator settings: AC  Physical Exam:  General: Intubated and sedated  HEENT: AT/NC, PERRL, EOMI  Neck: Supple without JVD or palpable LAD.   Cardiac: tachycardic, irregular rhythm  Respiratory: Normal inspection. Symmetric chest rise. Normal palpation and percussion. Auscultation clear bilaterally. Increased work of breathing  Abdomen: distended abdomen, tender to palpation epigastric  Extremities: lower extremity skin changes; LLE bandaged; RLE with thick skin texture. See media tab        Laboratory:  No results for input(s): PH, PCO2, PO2, HCO3, POCSATURATED, BE in the last 24 hours.  Recent Labs   Lab 05/05/22  1018   WBC 7.42   RBC 3.73*   HGB 9.3*   HCT 32.1*      MCV 86   MCH 24.9*   MCHC 29.0*     Recent Labs   Lab 05/05/22  1308      K 5.8*      CO2 15*   BUN 88*   CREATININE 3.7*   MG 1.9       Microbiology Data:   Microbiology Results (last 7 days)     ** No results found for the last 168 hours. **           Chest Imaging:   No new imaging.     Infusions:     sodium chloride 0.9% 100 mL/hr (05/05/22 1345)    amiodarone in dextrose 5%      amiodarone in dextrose 5%      amiodarone 1 mg/min (05/05/22 1344)    fentanyl      heparin (porcine) 1,500 Units/hr (05/05/22 1342)    NORepinephrine 0.18 mcg/kg/min (05/05/22 1344)    propofoL 5 mcg/kg/min (05/05/22 1358)    propofol 35 mcg/kg/min (05/05/22 1344)       Scheduled Medications:    aluminum-magnesium hydroxide-simethicone  30 mL Oral Once    And    LIDOcaine HCl 2%  10 mL Oral Once    amiodarone        atorvastatin  40 mg Oral QHS    calcium gluconate IVPB  1  g Intravenous Q1H    [START ON 5/9/2022] collagenase   Topical (Top) Every Mon, Thurs    diphenhydrAMINE  50 mg Intravenous Once    lactated ringers  250 mL Intravenous Once    LIDOcaine (cardiac)  100 mg Intravenous Once    metoprolol succinate  50 mg Oral Daily    mupirocin   Nasal BID    NORepinephrine bitartrate-D5W        propofoL        senna-docusate 8.6-50 mg  1 tablet Oral BID    succinylcholine        torsemide  40 mg Oral Daily       PRN Medications:   sodium chloride 0.9%, acetaminophen, albuterol, amiodarone, calcium gluconate, dextrose 10%, furosemide, glucagon (human recombinant), glucose, glucose, heparin (porcine), insulin aspart U-100, LIDOcaine (PF) 10 mg/ml (1%), LIDOcaine, melatonin, methocarbamoL, morphine, naloxone, NORepinephrine, ondansetron, propofol, sars-cov-2 (covid-19), sodium bicarbonate, sodium chloride 0.9%    Assessment & Plan:   Patient Active Problem List   Diagnosis    Venous stasis dermatitis of both lower extremities    Ulcer - lesion    Chronic atrial fibrillation    Venous stasis ulcer of left midfoot limited to breakdown of skin    Venous (peripheral) insufficiency    Edema    Chronic venous hypertension with ulcer    Cellulitis    Skin ulcer of left foot, limited to breakdown of skin    May-Thurner syndrome    Stenosis of right iliac vein    Elevated BP    Ulcer of ankle, left, limited to breakdown of skin    Depression    NARESH (acute kidney injury)    Group A streptococcal infection    Tinea pedis of both feet    Atrial fibrillation with RVR    Bilateral edema of lower extremity    Morbid obesity    Permanent atrial fibrillation    Right knee pain    Knee pain, right    Difficulty walking    History of DVT (deep vein thrombosis)    HTN (hypertension)    Acute pulmonary embolism    Recurrent pulmonary embolism    Other pulmonary embolism without acute cor pulmonale, unspecified chronicity    Long term (current) use of anticoagulants     Chronic combined systolic and diastolic congestive heart failure    Hyperthyroidism    Elevated troponin    Non-rheumatic mitral regurgitation    Non-pressure chronic ulcer of left ankle, with unspecified severity    Cellulitis of right lower leg    Candida infection of genital region    Varicose ulcer of lower extremity    Hypomagnesemia    Chronic venous insufficiency    Pulmonary embolism    Onychomycosis    Wound of foot    Clostridium difficile infection    Acute deep vein thrombosis (DVT) of lower extremity    Erythema    Obesity, Class I, BMI 30.0-34.9 (see actual BMI)    Advance care planning    Venous stasis ulcer of lower extremity, unspecified laterality    Iron deficiency anemia    Anemia of chronic disease    Unstable angina    Chest pain    Hypotension    Complicated migraine    Open wound of left foot    Proteus infection    MRSA cellulitis of left foot    Wound infection    PAD (peripheral artery disease)    Alteration in skin integrity    Rash of back    Ringworm    Class 2 severe obesity due to excess calories with serious comorbidity and body mass index (BMI) of 39.0 to 39.9 in adult    Stasis ulcer    Obesity, morbid (more than 100 lbs over ideal weight or BMI > 40)    Open wound of right lower leg    Cellulitis of both lower extremities    Skin ulcer of left foot including toes with fat layer exposed    Class 3 severe obesity due to excess calories with serious comorbidity and body mass index (BMI) of 45.0 to 49.9 in adult    Pseudomonas infection    Carpal tunnel syndrome on right    Rotator cuff impingement syndrome of right shoulder    Dizziness    Multiple and open wound of lower limb    RUQ pain    VT (ventricular tachycardia)    NSTEMI (non-ST elevated myocardial infarction)       ASSESSMENT & RECOMMENDATIONS   Patient is a 56 year old male presenting with chest pain on admission with worsening pain. MET called due to patients worsening  mentation and difficulty obtaining VS after nuñez placement. Concern for STEMI, patient transferred to ICU, intubated, started on pressor support and emergently underwent LHC, intra-aortic balloon pump placed    CNS/Neuro:  - intubated and sedated  - on fentanyl and propofol    Cardiovascular:  Ventricular Tachycardia  - new onset ventricular tachyardia  - symptomatic with chest pain, and altered mental status, MET called  - EKG concerning for STEMI  - troponin 0.036>0.03>0.5>0.7>1.4  - amiodarone gtt with ASA, plavix load  - cardiology consulted, emergent LHC  - IABP placed, continue heparin gtt, and amiodarone    Cardiogenic shock  - MET called on patient due to change in mentation and VS instability  - hypotensive with chest pain, tachycardia  - EKG showing concerns for SEBLE  - elevated troponin, emergent LHC     afib with RVR  - afib on xarelto and metoprolol at home  - admitted for chest pain, given labetalol and metoprolol in ED  - trop on admission 0.36>0.3  - chest pain resolved and returned in <24 hrs      Combined systolic and diastolic CHF:  - ECHO 4/2022 EF of 20%; mild TR with severe LA enlargement  - symptomatic     HTN:  - home meds lisinopril and torsemide  - hold in setting of NARESH and hypotension    Respiratory:  Intubated 2/2 to airway protection  Worsening mental status      GI/Metabolic:  Elevated LFTs  - presented with positive murphys sign on admission, t bili on admission 1.1  - elevated LFTs, trending down  - US unremarkable     Hyperkalemia:  - potassium elevated  - trending upwards  - nephrology consulted     GI Ppx: famotidine  Diet: NPO  Keep Mg>2 K>4    Renal:  NARESH  - baseline BUN/Cr 20/1.0  - on admission worsening Cr 1.6>3.7  - nephrology consulted, recommended holding ACEi in setting of NARESH  - UOP decreased output today from yesterday, nuñez in placed prior to MET called  - strict I/O's    Heme:  DVT Ppx: heparin drip    Endo:   Strict Glucose control with goal 140-180    ID:  No acute  issues at this time    Code Status: Full code    Thank you for allowing us to participate in the care of this patient. We will follow up with this patient. Please call with questions.    Jose Polanco MD  [facility] HO-2      Pulmonary/Critical Care Attending with Team    Patient seen and examined on rounds with team after review of PMH, reason for admission, hospital course, labs and xrays.  I have reviewed, we discussed, and I have verified Dr. Polanco's findings, assessment and recommendations.  The patient was on the floor with recurrent CP and worsened after a nuñez catheter was placed.  He he dropped his BP and looked in distress. He was brought to the ICU and there was determined that his main problem was likely related to his heart.  Cardiology was consulted and took him for a cardiac cath performed by Dr. Whitten.   His EF was 40% and he has severe MR and moderate TR.  Found to have normal cardiac arteries, was in cardiogenic shock, and had acute decompensated heart failure.  He is on an IABP, is being diuresed, and will need an AICD.     Critical care time 36 min for review of chart, lab data and images, examination of the patient, monitoring, and adjusting as necessary the management plan for support of vital organ system (cardiac), and also for discussion with other caregivers.      Gina Zapien MD  5/5/2022.

## 2022-05-05 NOTE — PLAN OF CARE
Patient on vent with documented settings.  Alarms are set and functioning with adequate volumes.  AMBU bag and mask at bedside.  Will continue to monitor.

## 2022-05-06 PROBLEM — I42.8 NICM (NONISCHEMIC CARDIOMYOPATHY): Status: ACTIVE | Noted: 2022-01-01

## 2022-05-06 NOTE — CONSULTS
[facility] - Intensive Care  Nephrology  Consult Note    Patient Name: Drew Farrar  MRN: [id number]  Admission Date: 5/3/2022  Hospital Length of Stay: 2 days  Attending Provider: Garrison Roach MD   Primary Care Physician: Garrison Roach MD  Principal Problem:Chest pain    Consults  Subjective:     HPI:   Chest Pain        Patient presents to the ED via  EMS with reports of having chest pain that started yesterday at 5 pm. Hx A-Fib with RVR.    56-year-old  56-year-old male history of AFib on Xarelto, CHF, DVT, hypertension presents for chest pain.  Began around 5:00 p.m..  Mostly right-sided sharp and radiating to the back.  Patient felt his AFib developed rapid ventricular response.  It is associated with shortness of breath, diaphoresis.     The history is provided by the patient.            Review of patient's allergies indicates:   Allergen Reactions    Contrast media Other (See Comments)       Severe chest pain    Food allergy formula [glutamine-c-quercet-selen-brom]         Allergic to green peas; Heart failure.    Iodinated contrast media Other (See Comments)       Chest pain    Peas Hives    Amiodarone analogues Other (See Comments)       passed out in ER    Ibuprofen Swelling    Latex, natural rubber Hives    Pcn [penicillins] Hives    Quercetin      Butisol [butabarbital] Rash       Peeling skin           Past Medical History:   Diagnosis Date    *Atrial fibrillation      Anticoagulant long-term use      Arthritis      Atrial fibrillation      Atrial fibrillation Feb 23, 2016    Bipolar disorder      Carpal tunnel syndrome on right 2/10/2022    CHF (congestive heart failure)      Congenital heart disease       s/p surgical intervention 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 of age    Deep vein thrombosis      DVT of leg (deep venous thrombosis)       left leg    History of prior ablation treatment       10/9/13    Hypertension      Obesity      Stroke      Thyroid disease      Venous stasis ulcer  of lower extremity, unspecified laterality 12/14/2012    Venous ulcer              Past Surgical History:   Procedure Laterality Date    ABLATION Left 1/20/2022     Procedure: Ablation;  Surgeon: Gomez Lantigua MD;  Location: [facility] CATH LAB/EP;  Service: Cardiology;  Laterality: Left;    ANGIOPLASTY        CARDIAC SURGERY         open heart surgery 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 old    EYE SURGERY         left eye cataract/right eye glaucoma    TIMO FILTER PLACEMENT         Dr Calix ([facility])    KNEE SURGERY         l and r     MULTIPLE TOOTH EXTRACTIONS        SKIN GRAFT         left leg            Family History   Problem Relation Age of Onset    Diabetes Father      Heart disease Father      Heart disease Maternal Grandmother      Diabetes Maternal Grandfather      Heart disease Maternal Grandfather      Stroke Maternal Grandfather        Social History            Tobacco Use    Smoking status: Former Smoker       Packs/day: 1.00       Years: 6.00       Pack years: 6.00       Types: Cigarettes    Smokeless tobacco: Former User    Tobacco comment: quit by age 27yrs old   Substance Use Topics    Alcohol use: Yes       Alcohol/week: 1.0 standard drink       Types: 1 Glasses of wine per week       Comment: occasionally    Drug use: No      Review of Systems   Constitutional: Negative for chills and fever.   HENT: Negative for congestion, rhinorrhea and sore throat.    Eyes: Negative for visual disturbance.   Respiratory: Positive for shortness of breath. Negative for cough.    Cardiovascular: Positive for chest pain and palpitations.   Gastrointestinal: Negative for abdominal pain, diarrhea, nausea and vomiting.   Genitourinary: Negative for dysuria and hematuria.   Musculoskeletal: Negative for back pain and myalgias.   Skin: negative for pallor and rash.   Neurological: Negative for dizziness and weakness.   All other systems reviewed and are negative.       Pt seen prior to transfer to ICU this am-  was hypotensive and c/o lower abdominal pain radiating to R flank/back.  Denies any kidney problems or difficulty urinating but has been weak and having low BP.  Pt was getting NS bolus before nuñez cath placed for urinary retention- over 400 cc on bladder scan.      Past Medical History:   Diagnosis Date    *Atrial fibrillation     Anticoagulant long-term use     Arthritis     Atrial fibrillation     Atrial fibrillation Feb 23, 2016    Bipolar disorder     Carpal tunnel syndrome on right 2/10/2022    CHF (congestive heart failure)     Congenital heart disease     s/p surgical intervention 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 of age    Deep vein thrombosis     DVT of leg (deep venous thrombosis)     left leg    History of prior ablation treatment     10/9/13    Hypertension     Obesity     Stroke     Thyroid disease     Venous stasis ulcer of lower extremity, unspecified laterality 12/14/2012    Venous ulcer        Past Surgical History:   Procedure Laterality Date    ABLATION Left 1/20/2022    Procedure: Ablation;  Surgeon: Gomez Lantigua MD;  Location: [facility] CATH LAB/EP;  Service: Cardiology;  Laterality: Left;    ANGIOPLASTY      CARDIAC SURGERY      open heart surgery 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 old    EYE SURGERY      left eye cataract/right eye glaucoma    TIMO FILTER PLACEMENT      Dr Calix ([facility])    KNEE SURGERY      l and r     MULTIPLE TOOTH EXTRACTIONS      SKIN GRAFT      left leg       Review of patient's allergies indicates:   Allergen Reactions    Contrast media Other (See Comments)     Severe chest pain    Food allergy formula [glutamine-c-quercet-selen-brom]      Allergic to green peas; Heart failure.    Iodinated contrast media Other (See Comments)     Chest pain    Peas Hives    Amiodarone analogues Other (See Comments)     passed out in ER    Ibuprofen Swelling    Latex, natural rubber Hives    Pcn [penicillins] Hives    Quercetin     Butisol [butabarbital] Rash     Peeling skin      Current Facility-Administered Medications   Medication Frequency    0.9%  NaCl infusion Continuous PRN    acetaminophen tablet 650 mg Q4H PRN    albuterol inhaler 2 puff Q6H PRN    aluminum-magnesium hydroxide-simethicone 200-200-20 mg/5 mL suspension 30 mL Once    And    LIDOcaine HCl 2% oral solution 10 mL Once    amiodarone 360 mg/200 mL (1.8 mg/mL) infusion Continuous    amiodarone 360 mg/200 mL (1.8 mg/mL) infusion Continuous PRN    atorvastatin tablet 40 mg QHS    chlorhexidine 0.12 % solution 15 mL BID    [START ON 5/9/2022] collagenase ointment Every Mon, Thurs    dextrose 10% bolus 250 mL PRN    [START ON 5/6/2022] famotidine (PF) injection 20 mg Daily    fentaNYL 2500 mcg in 0.9% sodium chloride 250 mL infusion premix (titrating) Continuous    furosemide injection 80 mg Q12H    glucagon (human recombinant) injection 1 mg PRN    glucose chewable tablet 16 g PRN    glucose chewable tablet 24 g PRN    heparin 25,000 units in dextrose 5% 250 mL (100 units/mL) infusion (heparin infusion - NO NOMOGRAM) Continuous    heparin infusion 1,000 units/500 ml in 0.9% NaCl (pressure line flush) Continuous PRN    insulin aspart U-100 pen 1-10 Units QID (AC + HS) PRN    LIDOcaine 5 % patch 1 patch Daily PRN    melatonin tablet 9 mg Nightly PRN    methocarbamoL tablet 500 mg BID PRN    metoprolol succinate (TOPROL-XL) 24 hr tablet 50 mg Daily    morphine injection 1 mg Q4H PRN    mupirocin 2 % ointment BID    naloxone 0.4 mg/mL injection 0.02 mg PRN    NORepinephrine 4 mg in dextrose 5% 250 mL infusion (premix) (titrating) Continuous    NORepinephrine bitartrate-D5W 4 mg/250 mL (16 mcg/mL) infusion Soln     NORepinephrine injection Continuous PRN    ondansetron injection 4 mg TID PRN    propofoL (DIPRIVAN) 10 mg/mL infusion     propofol (DIPRIVAN) 10 mg/mL infusion Continuous    propofol (DIPRIVAN) 10 mg/mL IVP Continuous PRN    sars-cov-2 (covid-19) (MODERNA COVID-19) 100 mcg/0.5 ml  injection 0.25 mL vaccine x 1 dose    senna-docusate 8.6-50 mg per tablet 1 tablet BID    sodium chloride 0.9% flush 5 mL PRN    succinylcholine (ANECTINE) 20 mg/mL injection      Family History       Problem Relation (Age of Onset)    Diabetes Father, Maternal Grandfather    Heart disease Father, Maternal Grandmother, Maternal Grandfather    Stroke Maternal Grandfather          Tobacco Use    Smoking status: Former Smoker     Packs/day: 1.00     Years: 6.00     Pack years: 6.00     Types: Cigarettes    Smokeless tobacco: Former User    Tobacco comment: quit by age 27yrs old   Substance and Sexual Activity    Alcohol use: Yes     Alcohol/week: 1.0 standard drink     Types: 1 Glasses of wine per week     Comment: occasionally    Drug use: No    Sexual activity: Yes     Partners: Female     Birth control/protection: None     Review of Systems   Constitutional:  Positive for activity change and fatigue. Negative for diaphoresis.   Respiratory:  Positive for chest tightness.    Cardiovascular:  Positive for leg swelling.   Genitourinary:  Positive for decreased urine volume, flank pain and urgency.   Musculoskeletal:  Positive for back pain.   Objective:     Vital Signs (Most Recent):  Temp: 96.7 °F (35.9 °C) (05/05/22 1645)  Pulse: 88 (05/05/22 1830)  Resp: 20 (05/05/22 1830)  BP: 95/69 (05/05/22 1830)  SpO2: 100 % (05/05/22 1830)  O2 Device (Oxygen Therapy): ventilator (05/05/22 1800) Vital Signs (24h Range):  Temp:  [96.7 °F (35.9 °C)-97.1 °F (36.2 °C)] 96.7 °F (35.9 °C)  Pulse:  [] 88  Resp:  [15-46] 20  SpO2:  [79 %-100 %] 100 %  BP: ()/(39-80) 95/69     Weight: (!) 169.6 kg (374 lb) (05/05/22 1212)  Body mass index is 41.09 kg/m².  Body surface area is 3.09 meters squared.    I/O last 3 completed shifts:  In: 100 [P.O.:100]  Out: 900 [Urine:800; Emesis/NG output:100]    Physical Exam  Vitals reviewed.   Constitutional:       General: He is not in acute distress.     Appearance: Normal  appearance. He is ill-appearing.   HENT:      Head: Normocephalic and atraumatic.   Cardiovascular:      Rate and Rhythm: Normal rate and regular rhythm.   Pulmonary:      Effort: Pulmonary effort is normal.      Breath sounds: Normal breath sounds.   Abdominal:      General: Bowel sounds are normal.      Palpations: Abdomen is soft.   Musculoskeletal:         General: Swelling and deformity present.      Right lower leg: Edema present.   Skin:     General: Skin is warm and dry.   Neurological:      General: No focal deficit present.      Mental Status: Mental status is at baseline.   Psychiatric:         Mood and Affect: Mood normal.         Behavior: Behavior normal.         Thought Content: Thought content normal.       Significant Labs:  ABGs:   Recent Labs   Lab 05/05/22  1720   PH 7.200*   PCO2 51.6*   HCO3 20.2*   POCSATURATED 82*   BE -8     Cardiac Markers: No results for input(s): CKMB, TROPONINT, MYOGLOBIN in the last 168 hours.  CBC:   Recent Labs   Lab 05/05/22  1018   WBC 7.42   RBC 3.73*   HGB 9.3*   HCT 32.1*      MCV 86   MCH 24.9*   MCHC 29.0*     CMP:   Recent Labs   Lab 05/05/22  1308   *   CALCIUM 9.9   ALBUMIN 3.3*   PROT 7.9      K 5.8*   CO2 15*      BUN 88*   CREATININE 3.7*   ALKPHOS 264*   ALT 53*   AST 65*   BILITOT 1.2*     Microbiology Results (last 7 days)       ** No results found for the last 168 hours. **          Recent Labs   Lab 05/05/22  1349   COLORU Yellow   SPECGRAV 1.020   PHUR 5.0   PROTEINUA Negative   NITRITE Negative   LEUKOCYTESUR Negative   UROBILINOGEN Negative       Significant Imaging:  Labs: Reviewed  X-Ray: Reviewed  US: Reviewed    Assessment/Plan:     Metabolic acidosis  High AG metabolic acidosis- add oral sodium bicarbonate supp ie. 650 mg po tid - if tolerating oral meds. If not can start slow bicarbonate drip @ 50-75 cc/hr to correct.  Etiology may be due to sepsis- urine/ blood cultures pending.    Hyperkalemia  Hyperkalemia- d/c  hold ACEI/ ARB if on any.  Medically shift with lokelma as needed.  Repeat labs in am    NARESH (acute kidney injury)  NARESH- nonoliguric- would cont slow IV fluids to match urine output.  Renal US r/o obstruction if not done this admit.  Correct metabolic acidosis with sodium bicarbonate supp.  No indication for dialysis yet.  Repeat labs in am.        Thank you for your consult. I will follow-up with patient. Please contact us if you have any additional questions.    Suzie Pham MD  Nephrology  [place] - Intensive Care

## 2022-05-06 NOTE — ASSESSMENT & PLAN NOTE
Patient has had intermittent chest pain since admission which became constant on 05/05  Patient became unstable on the floor 05/05- bradycardia, pulseless, and apnea reported by ICU RN   EKG with worsening ischemic changes and VT noted on arrival to ICU. VT was initially intermittent, self limited runs of 10-30 beats. Amiodarone was ordered. VT became more persistent prior to amiodarone bolus being given and broke with cough. He received emergent Amio bolus and was given Lidocaine with improvement in VT.  LHC/RHC/IABP 05/05/2022:  LM:  PONCHO III flow normal   LAD: PONCHO III flow normal   LCx:  PONCHO- flow normal   RCA: PONCHO- flow normal   LVgram: LVEDP 35 mmHg, No LV gram due to NARESH      RHC   PCWP  40/54/36  PA  73/36/47  RV 68/7/24  RA 25/26/24     SATS  AO sat 100% on 100%  PA 67%  RV 63%  RA 57%     CO 7.34  CI 2.45  Continue heparin gtt. BB on hold given pressor use  NSTEMI Type II- demand 2/2 cardiogenic shock, NARESH, hypotension

## 2022-05-06 NOTE — PLAN OF CARE
Patient on ventilator with documented settings.  Alarms are set and functioning with adequate volumes.  AMBU bag and mask at bedside.

## 2022-05-06 NOTE — SUBJECTIVE & OBJECTIVE
Past Medical History:   Diagnosis Date    *Atrial fibrillation     Anticoagulant long-term use     Arthritis     Atrial fibrillation     Atrial fibrillation Feb 23, 2016    Bipolar disorder     Carpal tunnel syndrome on right 2/10/2022    CHF (congestive heart failure)     Congenital heart disease     s/p surgical intervention 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 of age    Deep vein thrombosis     DVT of leg (deep venous thrombosis)     left leg    History of prior ablation treatment     10/9/13    Hypertension     Obesity     Stroke     Thyroid disease     Venous stasis ulcer of lower extremity, unspecified laterality 12/14/2012    Venous ulcer        Past Surgical History:   Procedure Laterality Date    ABLATION Left 1/20/2022    Procedure: Ablation;  Surgeon: Gomez Lantigua MD;  Location: [facility] CATH LAB/EP;  Service: Cardiology;  Laterality: Left;    ANGIOPLASTY      CARDIAC SURGERY      open heart surgery 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 old    EYE SURGERY      left eye cataract/right eye glaucoma    TIMO FILTER PLACEMENT      Dr Calix ([facility])    INSERTION OF INTRA-AORTIC BALLOON ASSIST DEVICE  5/5/2022    Procedure: INSERTION, INTRA-AORTIC BALLOON PUMP;  Surgeon: Moreno Whitten MD;  Location: [facility] CATH LAB/EP;  Service: Cardiology;;    KNEE SURGERY      l and r     LEFT HEART CATHETERIZATION Left 5/5/2022    Procedure: Left heart cath;  Surgeon: Moreno Whitten MD;  Location: [facility] CATH LAB/EP;  Service: Cardiology;  Laterality: Left;    MULTIPLE TOOTH EXTRACTIONS      RIGHT HEART CATHETERIZATION Right 5/5/2022    Procedure: INSERTION, CATHETER, RIGHT HEART;  Surgeon: Moreno Whitten MD;  Location: [facility] CATH LAB/EP;  Service: Cardiology;  Laterality: Right;    SKIN GRAFT      left leg       Review of patient's allergies indicates:   Allergen Reactions    Contrast media Other (See Comments)     Severe chest pain    Food allergy formula [glutamine-c-quercet-selen-brom]      Allergic to green peas; Heart failure.    Iodinated contrast  media Other (See Comments)     Chest pain    Peas Hives    Amiodarone analogues Other (See Comments)     passed out in ER    Ibuprofen Swelling    Latex, natural rubber Hives    Pcn [penicillins] Hives    Quercetin     Butisol [butabarbital] Rash     Peeling skin       No current facility-administered medications on file prior to encounter.     Current Outpatient Medications on File Prior to Encounter   Medication Sig    acetaminophen (TYLENOL) 650 MG TbSR Take 650 mg by mouth as needed.    albuterol (PROVENTIL/VENTOLIN HFA) 90 mcg/actuation inhaler Inhale 2 puffs into the lungs every 6 (six) hours as needed for Wheezing. Rescue    atorvastatin (LIPITOR) 40 MG tablet Take 1 tablet (40 mg total) by mouth every evening.    lisinopriL (PRINIVIL,ZESTRIL) 20 MG tablet Take 1 tablet (20 mg total) by mouth once daily.    [] methocarbamoL (ROBAXIN) 500 MG Tab Take 1 tablet (500 mg total) by mouth 2 (two) times daily as needed (muscle discomfort).    metoprolol succinate (TOPROL-XL) 200 MG 24 hr tablet Take 1 tablet (200 mg total) by mouth once daily. (Patient taking differently: Take 50 mg by mouth once daily.)    rivaroxaban (XARELTO) 20 mg Tab Take 1 tablet (20 mg total) by mouth daily with dinner or evening meal.    sumatriptan (IMITREX) 50 MG tablet Take 50 mg for headache. If headache does not resolve, take another 50mg two hours after initial dose. Do not exceed 200mg in 24 hours.    torsemide (DEMADEX) 20 MG Tab Take 2 tablets (40 mg total) by mouth once daily.     Family History       Problem Relation (Age of Onset)    Diabetes Father, Maternal Grandfather    Heart disease Father, Maternal Grandmother, Maternal Grandfather    Stroke Maternal Grandfather          Tobacco Use    Smoking status: Former Smoker     Packs/day: 1.00     Years: 6.00     Pack years: 6.00     Types: Cigarettes    Smokeless tobacco: Former User    Tobacco comment: quit by age 27yrs old   Substance and Sexual Activity    Alcohol use: Yes      Alcohol/week: 1.0 standard drink     Types: 1 Glasses of wine per week     Comment: occasionally    Drug use: No    Sexual activity: Yes     Partners: Female     Birth control/protection: None     Review of Systems   Unable to perform ROS: Intubated   Objective:     Vital Signs (Most Recent):  Temp: 98.9 °F (37.2 °C) (05/06/22 1200)  Pulse: (!) 128 (05/06/22 1445)  Resp: 20 (05/06/22 1445)  BP: (!) 107/55 (05/06/22 1445)  SpO2: 96 % (05/06/22 1445)   Vital Signs (24h Range):  Temp:  [96.7 °F (35.9 °C)-98.9 °F (37.2 °C)] 98.9 °F (37.2 °C)  Pulse:  [] 128  Resp:  [9-36] 20  SpO2:  [93 %-100 %] 96 %  BP: ()/(44-94) 107/55     Weight: (!) 169.6 kg (374 lb)  Body mass index is 41.09 kg/m².    SpO2: 96 %  O2 Device (Oxygen Therapy): ventilator      Intake/Output Summary (Last 24 hours) at 5/6/2022 1452  Last data filed at 5/6/2022 1400  Gross per 24 hour   Intake 2952.82 ml   Output 5755 ml   Net -2802.18 ml       Lines/Drains/Airways       Central Venous Catheter Line  Duration             Percutaneous Central Line Insertion/Assessment - Triple Lumen  05/05/22 1323 left femoral vein 1 day              Drain  Duration                  Urethral Catheter 05/05/22 1100 18 Fr. 1 day         NG/OG Tube 05/05/22 1600 [place] sump Right mouth <1 day              Airway  Duration                  Airway - Non-Surgical 05/05/22 1306 1 day              Peripheral Intravenous Line  Duration                  Peripheral IV - Single Lumen 05/04/22 1800 20 G Anterior;Distal;Right Forearm 1 day                    Physical Exam  Constitutional:       Appearance: He is ill-appearing.   HENT:      Head: Atraumatic.   Cardiovascular:      Rate and Rhythm: Tachycardia present. Rhythm irregular.      Heart sounds: No murmur heard.    No friction rub. No gallop.   Pulmonary:      Breath sounds: Wheezing present. No rhonchi or rales.   Abdominal:      General: Bowel sounds are normal.      Palpations: Abdomen is soft.    Musculoskeletal:      Right lower leg: Edema present.      Left lower leg: Edema present.   Skin:     General: Skin is warm and dry.      Capillary Refill: Capillary refill takes 2 to 3 seconds.   Neurological:      Mental Status: He is alert. Mental status is at baseline.       Significant Labs: BMP:   Recent Labs   Lab 05/05/22  1308 05/06/22  0416 05/06/22  0819 05/06/22  1215   * 161* 186* 199*    136 137 136   K 5.8* 5.7* 5.4*  5.4* 5.3*  5.3*    102 101 101   CO2 15* 16* 17* 18*   BUN 88* 92* 92* 92*   CREATININE 3.7* 3.8* 3.8* 3.8*   CALCIUM 9.9 10.2 9.7 9.7   MG 1.9  --   --   --      , CMP   Recent Labs   Lab 05/04/22  1533 05/05/22  0552 05/05/22  0732 05/05/22  1308 05/06/22  0416 05/06/22  0819 05/06/22  1215      < > 138 139 136 137 136   K 5.4*   < > 5.4* 5.8* 5.7* 5.4*  5.4* 5.3*  5.3*      < > 107 105 102 101 101   CO2 20*   < > 18* 15* 16* 17* 18*   GLU 99   < > 96 132* 161* 186* 199*   BUN 78*   < > 87* 88* 92* 92* 92*   CREATININE 2.8*   < > 3.5* 3.7* 3.8* 3.8* 3.8*   CALCIUM 10.6*   < > 9.9 9.9 10.2 9.7 9.7   PROT 8.0  --  7.5 7.9  --   --   --    ALBUMIN 3.3*  --  3.2* 3.3*  --   --   --    BILITOT 0.8  --  1.0 1.2*  --   --   --    ALKPHOS 280*  --  252* 264*  --   --   --    AST 57*  --  52* 65*  --   --   --    ALT 61*  --  50* 53*  --   --   --    ANIONGAP 13   < > 13 19* 18* 19* 17*   ESTGFRAFRICA 28*   < > 22* 20* 20* 20* 20*   EGFRNONAA 24*   < > 19* 17* 17* 17* 17*    < > = values in this interval not displayed.     , CBC   Recent Labs   Lab 05/05/22  1018 05/06/22  0416   WBC 7.42 14.96*   HGB 9.3* 10.8*   HCT 32.1* 35.0*    248     , INR   Recent Labs   Lab 05/06/22  0819   INR 1.1     , Lipid Panel No results for input(s): CHOL, HDL, LDLCALC, TRIG, CHOLHDL in the last 48 hours., Troponin   Recent Labs   Lab 05/05/22  0552 05/05/22  1018 05/05/22  1308   TROPONINI 0.502* 0.773* 1.420*     , and All pertinent lab results from the last 24 hours  have been reviewed.    Significant Imaging: Echocardiogram: Transthoracic echo (TTE) complete (Cupid Only):   Results for orders placed or performed during the hospital encounter of 05/03/22   Echo   Result Value Ref Range    IVS 0.96 0.6 - 1.1 cm    LA size 4.98 cm    LVIDd 5.90 (A) 3.5 - 6.0 cm    LVIDs 5.00 (A) 2.1 - 4.0 cm    Posterior Wall 0.91 0.6 - 1.1 cm    RVDD 2.28 cm    TAPSE 2.42 cm    TR Max Rufus 2.90 m/s    LV Diastolic Volume 277.71 mL    LV Systolic Volume 177.92 mL    FS 15 %    LV mass 220.01 g    Left Ventricle Relative Wall Thickness 0.31 cm    LV Systolic Volume Index 59.3 mL/m2    LV Diastolic Volume Index 92.57 mL/m2    LV Mass Index 73 g/m2    Triscuspid Valve Regurgitation Peak Gradient 34 mmHg    BSA 3.09 m2    TDI SEPTAL 0.07 m/s    LA WIDTH 6.45 cm    EF 20 %    TDI LATERAL 0.12 m/s    PV PEAK VELOCITY 0.94 cm/s    LA volume 190.72 cm3    AV mean gradient 3 mmHg    AV Velocity Ratio 0.61     AV index (prosthetic) 0.68     MV mean gradient 1 mmHg    Mean e' 0.10 m/s    IVRT 29.50 msec    LVOT peak rufus 0.75 m/s    LVOT peak VTI 10.57 cm    Ao peak rufus 1.23 m/s    Ao VTI 15.59 cm    Mr max rufus 0.04 m/s    AV peak gradient 6 mmHg    MV peak gradient 8 mmHg    MV VTI 21.93 cm    LA Volume Index 63.6 mL/m2    RA Major Axis 6.68 cm    Left Atrium Minor Axis 7.59 cm    Left Atrium Major Axis 6.47 cm    LA Volume Index (Mod) 29.2 mL/m2    LA volume (mod) 87.74 cm3    RA Width 4.64 cm    Narrative    · The left ventricle is moderately enlarged with severely decreased   systolic function.  · The estimated ejection fraction is 20%.  · A diastolic pattern consistent with atrial fibrillation observed.  · Normal right ventricular size with normal right ventricular systolic   function.  · Mild tricuspid regurgitation.  · Severe left atrial enlargement.  · Right atrial enlargement.  · Mild mitral regurgitation.  · Mechanically ventilated; cannot use inferior caval vein diameter to   estimate central venous  pressure.

## 2022-05-06 NOTE — PROGRESS NOTES
Progress Note  [facility]    Attending: Dr. Zapien  Fellow: Dr. Day  Admit Date: 5/3/2022  Today's Date: 05/06/2022      SUBJECTIVE:     Interval Events:  Currently intubated & sedated. Emergent LHC performed yesterday as well as intra-aortic balloon pump placement.     OBJECTIVE:     Vital Signs Trends/Hx Reviewed  Vitals:    05/06/22 0745 05/06/22 0800 05/06/22 0815 05/06/22 0830   BP: 110/66 116/81 108/83 (!) 121/94   BP Location:       Patient Position:       Pulse: (!) 118 103 (!) 120 (!) 116   Resp: 20 (!) 21 20 (!) 22   Temp:  98.8 °F (37.1 °C)     TempSrc:  Axillary     SpO2: 98% 98% 98% 98%   Weight:       Height:           Ventilator settings:   Vent Type: NKV-550 (5/6/2022  8:00 AM)    Vent Mode: A/CMV-VC  Oxygen Concentration (%):  [] 50  Resp Rate Total:  [18 br/min-35 br/min] 22 br/min  Vt Set:  [420 mL-550 mL] 550 mL  PEEP/CPAP:  [4.8 cmH20-5.4 cmH20] 4.8 cmH20  Mean Airway Pressure:  [10.1 umK67-55.3 cmH20] 10.6 cmH20    Physical Exam:  General: Intubated and sedated  HEENT: AT/NC, PERRL, EOMI  Neck: Supple without JVD or palpable LAD.   Cardiac: tachycardic, irregular rhythm  Respiratory: Normal inspection. Symmetric chest rise. Normal palpation and percussion. Auscultation clear bilaterally. Increased work of breathing  Abdomen: distended abdomen, tender to palpation epigastric  Extremities: lower extremity skin changes; LLE bandaged; RLE with thick skin texture. See media tab    Laboratory:  Recent Labs   Lab 05/05/22  1720   PH 7.200*   PCO2 51.6*   PO2 57   HCO3 20.2*   POCSATURATED 82*   BE -8     Recent Labs   Lab 05/06/22  0416   WBC 14.96*   RBC 4.27*   HGB 10.8*   HCT 35.0*      MCV 82   MCH 25.3*   MCHC 30.9*     Recent Labs   Lab 05/05/22  1308 05/06/22  0416    136   K 5.8* 5.7*    102   CO2 15* 16*   BUN 88* 92*   CREATININE 3.7* 3.8*   MG 1.9  --        Microbiology Data:   Microbiology Results (last 7 days)     Procedure Component  Value Units Date/Time    Blood culture [[id number]] Collected: 05/05/22 1957    Order Status: Sent Specimen: Blood Updated: 05/06/22 0225    Blood culture [[id number]] Collected: 05/05/22 1957    Order Status: Sent Specimen: Blood Updated: 05/06/22 0225           Chest Imaging:   X-Ray Chest AP Portable    Result Date: 5/5/2022  Worsening interstitial opacities in bilateral lungs suspicious for edema. Support apparatus as above. Electronically signed by: Raphael Whelan Date:    05/05/2022 Time:    18:14        Infusions:     sodium chloride 0.9% Stopped (05/05/22 1900)    amiodarone in dextrose 5% 0.5 mg/min (05/06/22 0800)    amiodarone 33.3 mL/hr at 05/05/22 1800    fentanyl Stopped (05/05/22 1415)    heparin (porcine) in D5W      heparin (porcine) Stopped (05/05/22 1900)    NORepinephrine bitartrate-D5W 0.12 mcg/kg/min (05/06/22 0800)    NORepinephrine Stopped (05/05/22 1900)    propofoL 45 mcg/kg/min (05/06/22 0800)    propofol Stopped (05/05/22 1900)       Scheduled Medications:    atorvastatin  40 mg Oral QHS    chlorhexidine  15 mL Mouth/Throat BID    [START ON 5/9/2022] collagenase   Topical (Top) Every Mon, Thurs    famotidine (PF)  20 mg Intravenous Daily    furosemide (LASIX) injection  40 mg Intravenous Q12H    metoprolol succinate  50 mg Oral Daily    mupirocin   Nasal BID    senna-docusate 8.6-50 mg  1 tablet Oral BID    sodium bicarbonate  650 mg Oral TID       PRN Medications:   sodium chloride 0.9%, acetaminophen, albuterol, amiodarone, dextrose 10%, dextrose 10%, dextrose 10%, glucagon (human recombinant), glucose, glucose, heparin (PORCINE), heparin (PORCINE), heparin (porcine), insulin aspart U-100, LIDOcaine, melatonin, methocarbamoL, morphine, naloxone, NORepinephrine, ondansetron, propofol, sars-cov-2 (covid-19), sodium chloride 0.9%    Problem List:   Patient Active Problem List   Diagnosis    Venous stasis dermatitis of both lower extremities    Ulcer - lesion    Chronic  atrial fibrillation    Venous stasis ulcer of left midfoot limited to breakdown of skin    Venous (peripheral) insufficiency    Edema    Chronic venous hypertension with ulcer    Cellulitis    Skin ulcer of left foot, limited to breakdown of skin    May-Thurner syndrome    Stenosis of right iliac vein    Elevated BP    Ulcer of ankle, left, limited to breakdown of skin    Depression    NARESH (acute kidney injury)    Group A streptococcal infection    Tinea pedis of both feet    Atrial fibrillation with RVR    Bilateral edema of lower extremity    Morbid obesity    Permanent atrial fibrillation    Right knee pain    Knee pain, right    Difficulty walking    History of DVT (deep vein thrombosis)    HTN (hypertension)    Acute pulmonary embolism    Recurrent pulmonary embolism    Other pulmonary embolism without acute cor pulmonale, unspecified chronicity    Long term (current) use of anticoagulants    Chronic combined systolic and diastolic congestive heart failure    Hyperthyroidism    Elevated troponin    Non-rheumatic mitral regurgitation    Non-pressure chronic ulcer of left ankle, with unspecified severity    Cellulitis of right lower leg    Candida infection of genital region    Varicose ulcer of lower extremity    Hypomagnesemia    Chronic venous insufficiency    Pulmonary embolism    Onychomycosis    Wound of foot    Clostridium difficile infection    Acute deep vein thrombosis (DVT) of lower extremity    Erythema    Obesity, Class I, BMI 30.0-34.9 (see actual BMI)    Advance care planning    Venous stasis ulcer of lower extremity, unspecified laterality    Iron deficiency anemia    Anemia of chronic disease    Unstable angina    Chest pain    Complicated migraine    Open wound of left foot    Proteus infection    MRSA cellulitis of left foot    Wound infection    PAD (peripheral artery disease)    Alteration in skin integrity    Rash of back    Ringworm     Class 2 severe obesity due to excess calories with serious comorbidity and body mass index (BMI) of 39.0 to 39.9 in adult    Stasis ulcer    Obesity, morbid (more than 100 lbs over ideal weight or BMI > 40)    Open wound of right lower leg    Cellulitis of both lower extremities    Skin ulcer of left foot including toes with fat layer exposed    Class 3 severe obesity due to excess calories with serious comorbidity and body mass index (BMI) of 45.0 to 49.9 in adult    Pseudomonas infection    Carpal tunnel syndrome on right    Rotator cuff impingement syndrome of right shoulder    Dizziness    Multiple and open wound of lower limb    RUQ pain    VT (ventricular tachycardia)    NSTEMI (non-ST elevated myocardial infarction)    CHF (congestive heart failure)    Hyperkalemia    Metabolic acidosis       ASSESSMENT & RECOMMENDATIONS   Patient is a 56 year old male presenting with chest pain on admission with worsening pain. MET called due to patients worsening mentation and difficulty obtaining VS after nuñez placement. Concern for STEMI, patient transferred to ICU, intubated, started on pressor support and emergently underwent LHC, intra-aortic balloon pump placed. Remains critically ill     CNS/Neuro:  - intubated and sedated  - on propofol 40     Cardiovascular:  #Ventricular Tachycardia  #Cardiogenic shock  - new onset ventricular tachyardia  - symptomatic with chest pain, and altered mental status, MET called  - EKG with chronic LBBB, possible STEMI  - troponin 0.036>0.03>0.5>0.7>1.4  - amiodarone gtt with ASA, plavix load  - cardiology consulted, emergent LHC performed on 05May22. No evidence of obstructive CAD. Very poor LV systolic function, EF 20%  Cardiology suspects pt's high filling pressures were contributing to his acute decompensation. Recommend continue IV diureses.  - IABP placed  continue heparin gtt, and amiodarone     #Permanent Afib, now with RVR  - afib on xarelto and  metoprolol at home  - admitted for chest pain, given labetalol and metoprolol in ED  - trop on admission 0.36>0.3  - chest pain resolved and returned in <24 hrs     #Combined systolic and diastolic CHF:  - ECHO 4/2022 EF of 20%; mild TR with severe LA enlargement  - symptomatic      #HTN:  - home meds lisinopril and torsemide  - hold in setting of NARESH and hypotension     Respiratory:  #Intubated 2/2 to airway protection  - wean sedation & vent as patient tolerates.        GI/Metabolic:  #Elevated LFTs  - presented with positive murphys sign on admission, t bili on admission 1.1  - elevated LFTs, trending down  - US unremarkable      #Hyperkalemia:  - potassium elevated  - trending upwards  - nephrology consulted      GI Ppx: famotidine  Diet: NPO  Keep Mg>2 K>4     Renal:  #NARESH  - baseline BUN/Cr 20/1.0  - on admission worsening Cr 1.6>3.7  - nephrology consulted, recommended holding ACEi in setting of NARSEH  - UOP decreased output today from yesterday, nuñez in placed prior to MET called  - strict I/O's     Heme:  DVT Ppx: heparin drip     Endo:   Strict Glucose control with goal 140-180     ID:  No acute issues at this time     Code Status: Full code     Thank you for allowing us to participate in the care of this patient. We will follow up with this patient. Please call with questions.      Al Giang MD  [facility], PGY-2      Pulmonary/Critical Care Attending with Team    Patient seen and examined on rounds with team after review of ON hospital course, labs and xrays.  I have reviewed, we discussed, and I have verified Dr. Day's findings, assessment and recommendations.  The patient presented with chest pain and became hypotensive.  He was transferred to the ICU and seen by Cardiology.  He went to the cath lab and was found to have severe cardiomyopathy with an EF 20%.  He was placed in an IABP and is awaiting an AICD.  Critical care time 36 min for review of chart, lab data and images,  examination of the patient,  monitoring, and adjusting as necessary the management plan for support of vital organ system (cardiac), and also for discussion with other caregivers.    Gina Zapien MD  P/CCM

## 2022-05-06 NOTE — PROGRESS NOTES
Order clarification for Heparin gtt needed. Per Night RNDr Whitten order to change Heparin to weight based follow nomogram. Pharm called to verify intensity.    Tom CLEMONS NP notified of order clarification, new order placed for heparin placed (see MAR) APTT not therapeutic, upon initiating drip, Tom BAPTISTE notified, orders given to bolus patient with nomogram protocol, follow protocol of adjustments. Order carried out, will monitor.

## 2022-05-06 NOTE — PLAN OF CARE
Pt remains intubated/sedated in ICU at this time. Pt arouses to pain. All pulses intact and palpable. PERRLA. Groin site CDI. No acute issues w/ IABP. Propofol @45mcg, heparin @ 500U, levophed @0.12mcg, and amiodarone @ 0.5mg remain infusing. Potassium shifted this morning. Safety and infection precautions in place. See flowsheets for full assessment.

## 2022-05-06 NOTE — NURSING
Dr. Whitten called the unit to speak about the Heparin gtt. Verbal orders to titrate heparin gtt based on nomogram. Orders placed for titratable high intensity heparin gtt w/o initial bolus per Dr. Whitten.

## 2022-05-06 NOTE — PROGRESS NOTES
[facility] - Intensive Care  Cardiology  Progress Note    Patient Name: Drew Farrar  MRN: [id number]  Admission Date: 5/3/2022  Hospital Length of Stay: 3 days  Code Status: Full Code   Attending Physician: Garrison Roach MD   Primary Care Physician: Garrison Roach MD  Expected Discharge Date:   Principal Problem:Chest pain    Subjective:     Hospital Course:   05/05/2022 Per HPI   05/06/2022 s/p LHC, RHC, IABP insertion. Diuresing, intubated, sedated, on Levophed with increased requirements overnight. IABP 1:1.   LM:  PONCHO III flow normal   LAD: PONCHO III flow normal   LCx:  PONCHO- flow normal   RCA: PONCHO- flow normal   LVgram: LVEDP 35 mmHg, No LV gram due to NARESH      RHC   PCWP  40/54/36  PA  73/36/47  RV 68/7/24  RA 25/26/24     SATS  AO sat 100% on 100%  PA 67%  RV 63%  RA 57%     CO 7.34  CI 2.45        Past Medical History:   Diagnosis Date    *Atrial fibrillation     Anticoagulant long-term use     Arthritis     Atrial fibrillation     Atrial fibrillation Feb 23, 2016    Bipolar disorder     Carpal tunnel syndrome on right 2/10/2022    CHF (congestive heart failure)     Congenital heart disease     s/p surgical intervention 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 of age    Deep vein thrombosis     DVT of leg (deep venous thrombosis)     left leg    History of prior ablation treatment     10/9/13    Hypertension     Obesity     Stroke     Thyroid disease     Venous stasis ulcer of lower extremity, unspecified laterality 12/14/2012    Venous ulcer        Past Surgical History:   Procedure Laterality Date    ABLATION Left 1/20/2022    Procedure: Ablation;  Surgeon: Gomez Lantigua MD;  Location: [facility] CATH LAB/EP;  Service: Cardiology;  Laterality: Left;    ANGIOPLASTY      CARDIAC SURGERY      open heart surgery at 18 months old    EYE SURGERY      left eye cataract/right eye glaucoma    TIMO FILTER PLACEMENT      Dr Calix ([facility])    INSERTION OF INTRA-AORTIC BALLOON ASSIST DEVICE  5/5/2022     Procedure: INSERTION, INTRA-AORTIC BALLOON PUMP;  Surgeon: Moreno Whitten MD;  Location: [facility] CATH LAB/EP;  Service: Cardiology;;    KNEE SURGERY      l and r     LEFT HEART CATHETERIZATION Left 2022    Procedure: Left heart cath;  Surgeon: Moreno Whitten MD;  Location: [facility] CATH LAB/EP;  Service: Cardiology;  Laterality: Left;    MULTIPLE TOOTH EXTRACTIONS      RIGHT HEART CATHETERIZATION Right 2022    Procedure: INSERTION, CATHETER, RIGHT HEART;  Surgeon: Moreno Whitten MD;  Location: [facility] CATH LAB/EP;  Service: Cardiology;  Laterality: Right;    SKIN GRAFT      left leg       Review of patient's allergies indicates:   Allergen Reactions    Contrast media Other (See Comments)     Severe chest pain    Food allergy formula [glutamine-c-quercet-selen-brom]      Allergic to green peas; Heart failure.    Iodinated contrast media Other (See Comments)     Chest pain    Peas Hives    Amiodarone analogues Other (See Comments)     passed out in ER    Ibuprofen Swelling    Latex, natural rubber Hives    Pcn [penicillins] Hives    Quercetin     Butisol [butabarbital] Rash     Peeling skin       No current facility-administered medications on file prior to encounter.     Current Outpatient Medications on File Prior to Encounter   Medication Sig    acetaminophen (TYLENOL) 650 MG TbSR Take 650 mg by mouth as needed.    albuterol (PROVENTIL/VENTOLIN HFA) 90 mcg/actuation inhaler Inhale 2 puffs into the lungs every 6 (six) hours as needed for Wheezing. Rescue    atorvastatin (LIPITOR) 40 MG tablet Take 1 tablet (40 mg total) by mouth every evening.    lisinopriL (PRINIVIL,ZESTRIL) 20 MG tablet Take 1 tablet (20 mg total) by mouth once daily.    [] methocarbamoL (ROBAXIN) 500 MG Tab Take 1 tablet (500 mg total) by mouth 2 (two) times daily as needed (muscle discomfort).    metoprolol succinate (TOPROL-XL) 200 MG 24 hr tablet Take 1 tablet (200 mg total) by mouth once daily. (Patient  taking differently: Take 50 mg by mouth once daily.)    rivaroxaban (XARELTO) 20 mg Tab Take 1 tablet (20 mg total) by mouth daily with dinner or evening meal.    sumatriptan (IMITREX) 50 MG tablet Take 50 mg for headache. If headache does not resolve, take another 50mg two hours after initial dose. Do not exceed 200mg in 24 hours.    torsemide (DEMADEX) 20 MG Tab Take 2 tablets (40 mg total) by mouth once daily.     Family History       Problem Relation (Age of Onset)    Diabetes Father, Maternal Grandfather    Heart disease Father, Maternal Grandmother, Maternal Grandfather    Stroke Maternal Grandfather          Tobacco Use    Smoking status: Former Smoker     Packs/day: 1.00     Years: 6.00     Pack years: 6.00     Types: Cigarettes    Smokeless tobacco: Former User    Tobacco comment: quit by age 27yrs old   Substance and Sexual Activity    Alcohol use: Yes     Alcohol/week: 1.0 standard drink     Types: 1 Glasses of wine per week     Comment: occasionally    Drug use: No    Sexual activity: Yes     Partners: Female     Birth control/protection: None     Review of Systems   Unable to perform ROS: Intubated   Objective:     Vital Signs (Most Recent):  Temp: 98.9 °F (37.2 °C) (05/06/22 1200)  Pulse: (!) 128 (05/06/22 1445)  Resp: 20 (05/06/22 1445)  BP: (!) 107/55 (05/06/22 1445)  SpO2: 96 % (05/06/22 1445)   Vital Signs (24h Range):  Temp:  [96.7 °F (35.9 °C)-98.9 °F (37.2 °C)] 98.9 °F (37.2 °C)  Pulse:  [] 128  Resp:  [9-36] 20  SpO2:  [93 %-100 %] 96 %  BP: ()/(44-94) 107/55     Weight: (!) 169.6 kg (374 lb)  Body mass index is 41.09 kg/m².    SpO2: 96 %  O2 Device (Oxygen Therapy): ventilator      Intake/Output Summary (Last 24 hours) at 5/6/2022 1452  Last data filed at 5/6/2022 1400  Gross per 24 hour   Intake 2952.82 ml   Output 5755 ml   Net -2802.18 ml       Lines/Drains/Airways       Central Venous Catheter Line  Duration             Percutaneous Central Line Insertion/Assessment -  Triple Lumen  05/05/22 1323 left femoral vein 1 day              Drain  Duration                  Urethral Catheter 05/05/22 1100 18 Fr. 1 day         NG/OG Tube 05/05/22 1600 Edmunds sump Right mouth <1 day              Airway  Duration                  Airway - Non-Surgical 05/05/22 1306 1 day              Peripheral Intravenous Line  Duration                  Peripheral IV - Single Lumen 05/04/22 1800 20 G Anterior;Distal;Right Forearm 1 day                    Physical Exam  Constitutional:       Appearance: He is ill-appearing.   HENT:      Head: Atraumatic.   Cardiovascular:      Rate and Rhythm: Tachycardia present. Rhythm irregular.      Heart sounds: No murmur heard.    No friction rub. No gallop.   Pulmonary:      Breath sounds: Wheezing present. No rhonchi or rales.   Abdominal:      General: Bowel sounds are normal.      Palpations: Abdomen is soft.   Musculoskeletal:      Right lower leg: Edema present.      Left lower leg: Edema present.   Skin:     General: Skin is warm and dry.      Capillary Refill: Capillary refill takes 2 to 3 seconds.   Neurological:      Mental Status: He is alert. Mental status is at baseline.       Significant Labs: BMP:   Recent Labs   Lab 05/05/22  1308 05/06/22  0416 05/06/22  0819 05/06/22  1215   * 161* 186* 199*    136 137 136   K 5.8* 5.7* 5.4*  5.4* 5.3*  5.3*    102 101 101   CO2 15* 16* 17* 18*   BUN 88* 92* 92* 92*   CREATININE 3.7* 3.8* 3.8* 3.8*   CALCIUM 9.9 10.2 9.7 9.7   MG 1.9  --   --   --      , CMP   Recent Labs   Lab 05/04/22  1533 05/05/22  0552 05/05/22  0732 05/05/22  1308 05/06/22  0416 05/06/22  0819 05/06/22  1215      < > 138 139 136 137 136   K 5.4*   < > 5.4* 5.8* 5.7* 5.4*  5.4* 5.3*  5.3*      < > 107 105 102 101 101   CO2 20*   < > 18* 15* 16* 17* 18*   GLU 99   < > 96 132* 161* 186* 199*   BUN 78*   < > 87* 88* 92* 92* 92*   CREATININE 2.8*   < > 3.5* 3.7* 3.8* 3.8* 3.8*   CALCIUM 10.6*   < > 9.9 9.9 10.2 9.7  9.7   PROT 8.0  --  7.5 7.9  --   --   --    ALBUMIN 3.3*  --  3.2* 3.3*  --   --   --    BILITOT 0.8  --  1.0 1.2*  --   --   --    ALKPHOS 280*  --  252* 264*  --   --   --    AST 57*  --  52* 65*  --   --   --    ALT 61*  --  50* 53*  --   --   --    ANIONGAP 13   < > 13 19* 18* 19* 17*   ESTGFRAFRICA 28*   < > 22* 20* 20* 20* 20*   EGFRNONAA 24*   < > 19* 17* 17* 17* 17*    < > = values in this interval not displayed.     , CBC   Recent Labs   Lab 05/05/22  1018 05/06/22  0416   WBC 7.42 14.96*   HGB 9.3* 10.8*   HCT 32.1* 35.0*    248     , INR   Recent Labs   Lab 05/06/22  0819   INR 1.1     , Lipid Panel No results for input(s): CHOL, HDL, LDLCALC, TRIG, CHOLHDL in the last 48 hours., Troponin   Recent Labs   Lab 05/05/22  0552 05/05/22  1018 05/05/22  1308   TROPONINI 0.502* 0.773* 1.420*     , and All pertinent lab results from the last 24 hours have been reviewed.    Significant Imaging: Echocardiogram: Transthoracic echo (TTE) complete ([facility] Only):   Results for orders placed or performed during the hospital encounter of 05/03/22   Echo   Result Value Ref Range    IVS 0.96 0.6 - 1.1 cm    LA size 4.98 cm    LVIDd 5.90 (A) 3.5 - 6.0 cm    LVIDs 5.00 (A) 2.1 - 4.0 cm    Posterior Wall 0.91 0.6 - 1.1 cm    RVDD 2.28 cm    TAPSE 2.42 cm    TR Max Rufus 2.90 m/s    LV Diastolic Volume 277.71 mL    LV Systolic Volume 177.92 mL    FS 15 %    LV mass 220.01 g    Left Ventricle Relative Wall Thickness 0.31 cm    LV Systolic Volume Index 59.3 mL/m2    LV Diastolic Volume Index 92.57 mL/m2    LV Mass Index 73 g/m2    Triscuspid Valve Regurgitation Peak Gradient 34 mmHg    BSA 3.09 m2    TDI SEPTAL 0.07 m/s    LA WIDTH 6.45 cm    EF 20 %    TDI LATERAL 0.12 m/s    PV PEAK VELOCITY 0.94 cm/s    LA volume 190.72 cm3    AV mean gradient 3 mmHg    AV Velocity Ratio 0.61     AV index (prosthetic) 0.68     MV mean gradient 1 mmHg    Mean e' 0.10 m/s    IVRT 29.50 msec    LVOT peak rufus 0.75 m/s    LVOT peak VTI 10.57  cm    Ao peak lucio 1.23 m/s    Ao VTI 15.59 cm    Mr max lucio 0.04 m/s    AV peak gradient 6 mmHg    MV peak gradient 8 mmHg    MV VTI 21.93 cm    LA Volume Index 63.6 mL/m2    RA Major Axis 6.68 cm    Left Atrium Minor Axis 7.59 cm    Left Atrium Major Axis 6.47 cm    LA Volume Index (Mod) 29.2 mL/m2    LA volume (mod) 87.74 cm3    RA Width 4.64 cm    Narrative    · The left ventricle is moderately enlarged with severely decreased   systolic function.  · The estimated ejection fraction is 20%.  · A diastolic pattern consistent with atrial fibrillation observed.  · Normal right ventricular size with normal right ventricular systolic   function.  · Mild tricuspid regurgitation.  · Severe left atrial enlargement.  · Right atrial enlargement.  · Mild mitral regurgitation.  · Mechanically ventilated; cannot use inferior caval vein diameter to   estimate central venous pressure.        Assessment and Plan:     Brief HPI: Patient seen this morning on rounds, remains intubated and sedated. Diuresing with IV Lasix. VT improved. IABP 1:1.     * Chest pain  Per NSTEMI     NICM (nonischemic cardiomyopathy)  TTE:  The left ventricle is moderately enlarged with severely decreased   systolic function.  · The estimated ejection fraction is 20%.  · A diastolic pattern consistent with atrial fibrillation observed.  · Normal right ventricular size with normal right ventricular systolic   function.  · Mild tricuspid regurgitation.  · Severe left atrial enlargement.  · Right atrial enlargement.  · Mild mitral regurgitation.  · Mechanically ventilated; cannot use inferior caval vein diameter to   estimate central venous pressure.    Patient overloaded on exam  See RHC below  Diuresing with IV Lasix  Accurate intake and output  Intubated, sedated with IABP. Keep intubated for now  No BB/ACEI given pressor use      NSTEMI (non-ST elevated myocardial infarction)  Patient has had intermittent chest pain since admission which became constant  on 05/05  Patient became unstable on the floor 05/05- bradycardia, pulseless, and apnea reported by ICU RN   EKG with worsening ischemic changes and VT noted on arrival to ICU. VT was initially intermittent, self limited runs of 10-30 beats. Amiodarone was ordered. VT became more persistent prior to amiodarone bolus being given and broke with cough. He received emergent Amio bolus and was given Lidocaine with improvement in VT.  LHC/RHC/IABP 05/05/2022:  LM:  PONCHO III flow normal   LAD: PONCHO III flow normal   LCx:  PONCHO- flow normal   RCA: PONCHO- flow normal   LVgram: LVEDP 35 mmHg, No LV gram due to NARESH      RHC   PCWP  40/54/36  PA  73/36/47  RV 68/7/24  RA 25/26/24     SATS  AO sat 100% on 100%  PA 67%  RV 63%  RA 57%     CO 7.34  CI 2.45  Continue heparin gtt. BB on hold given pressor use  NSTEMI Type II- demand 2/2 cardiogenic shock, NARESH, hypotension    VT (ventricular tachycardia)  Patient with sustained VT after transfer to ICU  Continue Amio gtt, keep intubated  ICD planned for next week  LVEF 20%    NARESH (acute kidney injury)  Baseline Cr 0.8, 3.7 this AM  Nephrology following  Overloaded per CXR, RHC  Diuresing     Chronic atrial fibrillation  On Xarelto (dosed last on 05/04/2022)- hold for now- high intensity heparin gtt given PE/DVT hx and IABP  BB on hold given hypotension and pressor use  On Amio gtt for VT        VTE Risk Mitigation (From admission, onward)         Ordered     heparin 25,000 units in dextrose 5% 250 mL (100 units/mL) infusion HIGH INTENSITY nomogram - [facility]  Continuous        Question Answer Comment   Heparin Infusion Adjustment (DO NOT MODIFY ANSWER) \\[email or web address]\epic\Images\Pharmacy\HeparinInfusions\heparin HIGH INTENSITY nomogram for [facility] [id number]    Begin at (in units/kg/hr) 18        05/06/22 0826     heparin 25,000 units in dextrose 5% (100 units/ml) IV bolus from bag - ADDITIONAL PRN BOLUS - 60 units/kg  As needed (PRN)        Question:  Heparin Infusion Adjustment (DO NOT MODIFY  ANSWER)  Answer:  [email or web address]\epic\Images\Pharmacy\HeparinInfusions\heparin HIGH INTENSITY nomogram for [facility] [id number].pdf    05/06/22 0705     heparin 25,000 units in dextrose 5% (100 units/ml) IV bolus from bag - ADDITIONAL PRN BOLUS - 30 units/kg  As needed (PRN)        Question:  Heparin Infusion Adjustment (DO NOT MODIFY ANSWER)  Answer:  [email or web address]\epic\Images\Pharmacy\HeparinInfusions\heparin HIGH INTENSITY nomogram for [facility] [id number].pdf    05/06/22 0705     heparin infusion 1,000 units/500 ml in 0.9% NaCl (pressure line flush)  Intra-op continuous PRN         05/05/22 1352     Reason for No Pharmacological VTE Prophylaxis  Once        Question:  Reasons:  Answer:  Already adequately anticoagulated on oral Anticoagulants    05/03/22 0602     IP VTE HIGH RISK PATIENT  Once         05/03/22 0602     Place sequential compression device  Until discontinued         05/03/22 0602                Tom Dong NP  Cardiology  [facility] - Intensive Care

## 2022-05-06 NOTE — PROGRESS NOTES
Patient more tachy,  Afib with HR 120s, jumping to 140s-150s. Tom Dong, hugo NP notified. Orders to increase   Amio gtt  back to 1mg until Dr Guajardo see the patient in the AM. Orders carried out. Will monitor.

## 2022-05-06 NOTE — PLAN OF CARE
05/06/22 1243   Discharge Reassessment   Assessment Type Discharge Planning Reassessment   Did the patient's condition or plan change since previous assessment? Yes  (pt's now intubated in the ICU)   Communicated [facility]UA with patient/caregiver Date not available/Unable to determine   Discharge Plan A Home Health   Discharge Plan B Rehab   DME Needed Upon Discharge  other (see comments)  (TBD)   Discharge Barriers Identified None   Why the patient remains in the hospital Requires continued medical care   Post-Acute Status   Discharge Delays (!) Change in Medical Condition  (intubated in the ICU)

## 2022-05-06 NOTE — ASSESSMENT & PLAN NOTE
NARESH- nonoliguric- would cont slow IV fluids to match urine output.  Renal US r/o obstruction if not done this admit.  Correct metabolic acidosis with sodium bicarbonate supp.  No indication for dialysis yet.  Repeat labs in am.

## 2022-05-06 NOTE — PLAN OF CARE
Recommendation:   1. As medically acceptable initiate TF regimen of Peptamen Intense VHP at 10 mL/hr and advance as tolerated to goal rate of 65 mL/hr to provide 1560 kcal, 143 gm pro, and 1310 mL free water. Additional FWF per MD.   2. Monitor weight/labs.   3. RD to follow and monitor intake    Goals:   Pt to receive nutrition by RD follow up    Nutrition Goal Status: new  Communication of RD Recs: other (comment) (POC)      Problem: Oral Intake Inadequate (Acute Kidney Injury/Impairment)  Goal: Optimal Nutrition Intake  Outcome: Ongoing, Progressing     Problem: Impaired Wound Healing  Goal: Optimal Wound Healing  Outcome: Ongoing, Progressing     Problem: Nutrition Impairment (Mechanical Ventilation, Invasive)  Goal: Optimal Nutrition Delivery  Outcome: Ongoing, Progressing

## 2022-05-06 NOTE — NURSING
Contacted SULAIMAN Bustamante MD about AM labs and APTT. Verbal orders to get AM CBC and and APTT 6 hours after start of heparin gtt.   Also inquired about holding PM stool softener to minimize movement w/ IABP in place. Stated that it was okay to hold.     Will continue to monitor.

## 2022-05-06 NOTE — NURSING
Contacted Dr. Willoughbysef to update him on pt's status.   Verbal orders given to decrease daily Lasix to 40mg, shift K, increase sedation, and get AM CXray. Stated that he was okay w/ APTT of 29.4 and not to give any fluid at this time.     Will update primary and continue to monitor.

## 2022-05-06 NOTE — ASSESSMENT & PLAN NOTE
TTE:  The left ventricle is moderately enlarged with severely decreased   systolic function.  · The estimated ejection fraction is 20%.  · A diastolic pattern consistent with atrial fibrillation observed.  · Normal right ventricular size with normal right ventricular systolic   function.  · Mild tricuspid regurgitation.  · Severe left atrial enlargement.  · Right atrial enlargement.  · Mild mitral regurgitation.  · Mechanically ventilated; cannot use inferior caval vein diameter to   estimate central venous pressure.    Patient overloaded on exam  See RHC below  Diuresing with IV Lasix  Accurate intake and output  Intubated, sedated with IABP. Keep intubated for now  No BB/ACEI given pressor use     Severe protein-calorie malnutrition

## 2022-05-06 NOTE — ASSESSMENT & PLAN NOTE
Patient with sustained VT after transfer to ICU  Continue Amio gtt, keep intubated  ICD planned for next week  LVEF 20%

## 2022-05-06 NOTE — ASSESSMENT & PLAN NOTE
Contributing Nutrition Diagnosis  Inadequate energy intake    Related to (etiology):   Physiological state    Signs and Symptoms (as evidenced by):   Pt intubated and sedated, receiving high rate of propofol, and no nutrition support at this time.      Interventions:  Collaboration with other providers    Nutrition Diagnosis Status:   New

## 2022-05-06 NOTE — NURSING
SULAIMAN Bustamante to bedside to round on pt. Notified that telemetry monitor is it translating incorrect HR into epic. Troubleshooting by primary RN and charge RN unsuccessful, so HR should be manually documented.

## 2022-05-06 NOTE — PROGRESS NOTES
Spoke with Dr Guajardo in regards to patient now AFib RVR, HR 120s.Hypotension noted via IABP and cuff, BP 90s/50 and augmenting 60s-70s. Levo titrated up. EKG resulting in AFib RVR. Orders to start Oliverio and titrate Levo off. Also made aware of recent K 5.6. No new orders. and  PTT >150 per nomogram hold infusion for 2hr and recheck PTT. Per MD okay to follow nomogram and hold Heprain gtt. Orders carried out. Will monitor.

## 2022-05-06 NOTE — ASSESSMENT & PLAN NOTE
On Xarelto (dosed last on 05/04/2022)- hold for now- high intensity heparin gtt given PE/DVT hx and IABP  BB on hold given hypotension and pressor use  On Amio gtt for VT

## 2022-05-06 NOTE — NURSING
SULAIMAN Bustamante notified of pt's HR beginning to sustain 90's-120, increasing pressor requirements, and UOP of >3L over night. Also notified of K 5.7 and APTT 29.4    No new orders at this time. MD stated that he would evaluate chart and place orders if necessary.     Will continue to monitor.

## 2022-05-06 NOTE — CONSULTS
[facility] - Intensive Care  Adult Nutrition  Consult Note    SUMMARY     Recommendations    Recommendation:   1. As medically acceptable initiate TF regimen of Peptamen Intense VHP at 10 mL/hr and advance as tolerated to goal rate of 65 mL/hr to provide 1560 kcal, 143 gm pro, and 1310 mL free water. Additional FWF per MD.   2. Monitor weight/labs.   3. RD to follow and monitor intake    Goals:   Pt to receive nutrition by RD follow up    Nutrition Goal Status: new  Communication of RD Recs: other (comment) (POC)    Assessment and Plan    VT (ventricular tachycardia)  Contributing Nutrition Diagnosis  Inadequate energy intake    Related to (etiology):   Physiological state    Signs and Symptoms (as evidenced by):   Pt intubated and sedated, receiving high rate of propofol, and no nutrition support at this time.      Interventions:  Collaboration with other providers    Nutrition Diagnosis Status:   New           Malnutrition Assessment                 Orbital Region (Subcutaneous Fat Loss): well nourished  Upper Arm Region (Subcutaneous Fat Loss): well nourished   [place] Region (Muscle Loss): well nourished  Clavicle Bone Region (Muscle Loss): well nourished  Clavicle and Acromion Bone Region (Muscle Loss): well nourished  Scapular Bone Region (Muscle Loss): well nourished  Dorsal Hand (Muscle Loss): well nourished       Subcutaneous Fat Loss (Final Summary): well nourished  Muscle Loss Evaluation (Final Summary): well nourished         Reason for Assessment    Reason For Assessment: consult (tube feeds uf bit extubated today)  Diagnosis: other (see comments) (chest pain)  Relevant Medical History: a fib, HTN, DVT, obesity, congenital heart disease, thyroid disease, stroke, bipolar disorder, CHF, cardiac surgery as infant  Interdisciplinary Rounds: did not attend  General Information Comments: Pt intubated and sedated, not currently receiving nutrition support. NARESH. Receiving propofol. PIV, PICC, NGT. Delta Score: 12  "wound ulcer left foot, left ankle, altered skin integrity right leg, incision right groin angiogram puncture, venogram puncture. NFPE completed today 5/6: pt is well nourished  Nutrition Discharge Planning: d/c to be determined    Nutrition Risk Screen    Nutrition Risk Screen: no indicators present    Nutrition/Diet History    Food Preferences: [place]  Food Allergies: NKFA  Factors Affecting Nutritional Intake: NPO, on mechanical ventilation    Anthropometrics    Temp: 98.9 °F (37.2 °C)  Height Method: Stated  Height: 6' 8" (203.2 cm)  Height (inches): 80 in  Weight Method: Bed Scale  Weight: (!) 169.6 kg (374 lb)  Weight (lb): (!) 374 lb  Ideal Body Weight (IBW), Male: 226 lb  % Ideal Body Weight, Male (lb): 165.49 %  BMI (Calculated): 41.1  BMI Grade: greater than 40 - morbid obesity       Lab/Procedures/Meds    Pertinent Labs Reviewed: reviewed  Pertinent Labs Comments: K 5.4, CO2 17, BUN 92, Cr 3.8, eGFR 17, Glu 186  Pertinent Medications Reviewed: reviewed  Pertinent Medications Comments: statin, famotidine, furosemide, senna-docusate, Nabicarb, amiodarone, fentanul, heparin, norepinephrine, propofol        Estimated/Assessed Needs    Weight Used For Calorie Calculations: (!) 169.6 kg (373 lb 14.4 oz)  Energy Calorie Requirements (kcal): 3016  Energy Need Method: [facility]  Protein Requirements: 205 gm pro (2.0 gm/kg IBW NARESH in mind)  Weight Used For Protein Calculations: 102.5 kg (226 lb) (IBW)     Estimated Fluid Requirement Method: RDA Method (or PER MD)  RDA Method (mL): 3016         Nutrition Prescription Ordered    Current Diet Order: NPO    Evaluation of Received Nutrient/Fluid Intake    Other Calories (kcal): 940 (propofol)  % Kcal Needs: 32  I/O: 1996.6/4395  Energy Calories Required: not meeting needs  Protein Required: not meeting needs  Fluid Required: not meeting needs  Comments: LBM 5/4  % Intake of Estimated Energy Needs: 25 - 50 %  % Meal Intake: NPO    Nutrition Risk    Level of Risk/Frequency " of Follow-up:  (2x/week)       Monitor and Evaluation    Food and Nutrient Intake: energy intake, food and beverage intake, enteral nutrition intake  Food and Nutrient Adminstration: diet order, enteral and parenteral nutrition administration  Anthropometric Measurements: weight, weight change, body mass index  Biochemical Data, Medical Tests and Procedures: electrolyte and renal panel, gastrointestinal profile, glucose/endocrine profile, inflammatory profile, lipid profile  Nutrition-Focused Physical Findings: overall appearance       Nutrition Follow-Up    RD Follow-up?: Yes

## 2022-05-06 NOTE — ASSESSMENT & PLAN NOTE
High AG metabolic acidosis- add oral sodium bicarbonate supp ie. 650 mg po tid - if tolerating oral meds. If not can start slow bicarbonate drip @ 50-75 cc/hr to correct.  Etiology may be due to sepsis- urine/ blood cultures pending.

## 2022-05-07 NOTE — PROGRESS NOTES
[facility] - Intensive Care  Nephrology  Progress Note    Patient Name: Drew Farrar  MRN: [id number]  Admission Date: 5/3/2022  Hospital Length of Stay: 3 days  Attending Provider: Garrison Roach MD   Primary Care Physician: Garrison Roach MD  Principal Problem:Chest pain    Subjective:     HPI:   Chest Pain        Patient presents to the ED via  EMS with reports of having chest pain that started yesterday at 5 pm. Hx A-Fib with RVR.    56-year-old  56-year-old male history of AFib on Xarelto, CHF, DVT, hypertension presents for chest pain.  Began around 5:00 p.m..  Mostly right-sided sharp and radiating to the back.  Patient felt his AFib developed rapid ventricular response.  It is associated with shortness of breath, diaphoresis.     The history is provided by the patient.            Review of patient's allergies indicates:   Allergen Reactions    Contrast media Other (See Comments)       Severe chest pain    Food allergy formula [glutamine-c-quercet-selen-brom]         Allergic to green peas; Heart failure.    Iodinated contrast media Other (See Comments)       Chest pain    Peas Hives    Amiodarone analogues Other (See Comments)       passed out in ER    Ibuprofen Swelling    Latex, natural rubber Hives    Pcn [penicillins] Hives    Quercetin      Butisol [butabarbital] Rash       Peeling skin           Past Medical History:   Diagnosis Date    *Atrial fibrillation      Anticoagulant long-term use      Arthritis      Atrial fibrillation      Atrial fibrillation Feb 23, 2016    Bipolar disorder      Carpal tunnel syndrome on right 2/10/2022    CHF (congestive heart failure)      Congenital heart disease       s/p surgical intervention at 18 months of age    Deep vein thrombosis      DVT of leg (deep venous thrombosis)       left leg    History of prior ablation treatment       10/9/13    Hypertension      Obesity      Stroke      Thyroid disease      Venous stasis ulcer of lower  extremity, unspecified laterality 12/14/2012    Venous ulcer              Past Surgical History:   Procedure Laterality Date    ABLATION Left 1/20/2022     Procedure: Ablation;  Surgeon: Gomez Lantigua MD;  Location: [facility] CATH LAB/EP;  Service: Cardiology;  Laterality: Left;    ANGIOPLASTY        CARDIAC SURGERY         open heart surgery at 18 months old    EYE SURGERY         left eye cataract/right eye glaucoma    TIMO FILTER PLACEMENT         Dr Calix ([facility])    KNEE SURGERY         l and r     MULTIPLE TOOTH EXTRACTIONS        SKIN GRAFT         left leg            Family History   Problem Relation Age of Onset    Diabetes Father      Heart disease Father      Heart disease Maternal Grandmother      Diabetes Maternal Grandfather      Heart disease Maternal Grandfather      Stroke Maternal Grandfather        Social History            Tobacco Use    Smoking status: Former Smoker       Packs/day: 1.00       Years: 6.00       Pack years: 6.00       Types: Cigarettes    Smokeless tobacco: Former User    Tobacco comment: quit by age 27yrs old   Substance Use Topics    Alcohol use: Yes       Alcohol/week: 1.0 standard drink       Types: 1 Glasses of wine per week       Comment: occasionally    Drug use: No      Review of Systems   Constitutional: Negative for chills and fever.   HENT: Negative for congestion, rhinorrhea and sore throat.    Eyes: Negative for visual disturbance.   Respiratory: Positive for shortness of breath. Negative for cough.    Cardiovascular: Positive for chest pain and palpitations.   Gastrointestinal: Negative for abdominal pain, diarrhea, nausea and vomiting.   Genitourinary: Negative for dysuria and hematuria.   Musculoskeletal: Negative for back pain and myalgias.   Skin: negative for pallor and rash.   Neurological: Negative for dizziness and weakness.   All other systems reviewed and are negative.       Pt seen prior to transfer to ICU this am- was  hypotensive and c/o lower abdominal pain radiating to R flank/back.  Denies any kidney problems or difficulty urinating but has been weak and having low BP.  Pt was getting NS bolus before nuñez cath placed for urinary retention- over 400 cc on bladder scan.      Interval History:   Pt remains in ICU- diuresing well with over 5 liters neg fluid balance.  Labs stable with creat continuing to rise-and pt on diuretics.      Review of patient's allergies indicates:   Allergen Reactions    Contrast media Other (See Comments)     Severe chest pain    Food allergy formula [glutamine-c-quercet-selen-brom]      Allergic to green peas; Heart failure.    Iodinated contrast media Other (See Comments)     Chest pain    Peas Hives    Amiodarone analogues Other (See Comments)     passed out in ER    Ibuprofen Swelling    Latex, natural rubber Hives    Pcn [penicillins] Hives    Quercetin     Butisol [butabarbital] Rash     Peeling skin     Current Facility-Administered Medications   Medication Frequency    0.9%  NaCl infusion Continuous PRN    acetaminophen tablet 650 mg Q4H PRN    albuterol inhaler 2 puff Q6H PRN    amiodarone 360 mg/200 mL (1.8 mg/mL) infusion Continuous    amiodarone 360 mg/200 mL (1.8 mg/mL) infusion Continuous PRN    atorvastatin tablet 40 mg QHS    chlorhexidine 0.12 % solution 15 mL BID    [START ON 5/9/2022] collagenase ointment Every Mon, Thurs    dextrose 10% bolus 125 mL PRN    dextrose 10% bolus 250 mL PRN    dextrose 10% bolus 250 mL PRN    famotidine (PF) injection 20 mg Daily    fentaNYL 2500 mcg in 0.9% sodium chloride 250 mL infusion premix (titrating) Continuous    furosemide injection 40 mg Q12H    glucagon (human recombinant) injection 1 mg PRN    glucose chewable tablet 16 g PRN    glucose chewable tablet 24 g PRN    heparin 25,000 units in dextrose 5% (100 units/ml) IV bolus from bag - ADDITIONAL PRN BOLUS - 30 units/kg PRN    heparin 25,000 units in dextrose 5%  (100 units/ml) IV bolus from bag - ADDITIONAL PRN BOLUS - 60 units/kg PRN    heparin 25,000 units in dextrose 5% 250 mL (100 units/mL) infusion HIGH INTENSITY nomogram - [facility] Continuous    heparin infusion 1,000 units/500 ml in 0.9% NaCl (pressure line flush) Continuous PRN    insulin aspart U-100 pen 1-10 Units Q6H PRN    LIDOcaine 5 % patch 1 patch Daily PRN    melatonin tablet 9 mg Nightly PRN    methocarbamoL tablet 500 mg BID PRN    morphine injection 1 mg Q4H PRN    mupirocin 2 % ointment BID    naloxone 0.4 mg/mL injection 0.02 mg PRN    NORepinephrine 32 mg in dextrose 5 % 250 mL infusion Continuous    NORepinephrine injection Continuous PRN    ondansetron injection 4 mg TID PRN    phenylephrine (ASTRID-SYNEPHRINE) 100 mg in sodium chloride 0.9% 250 mL infusion Continuous    propofol (DIPRIVAN) 10 mg/mL infusion Continuous    propofol (DIPRIVAN) 10 mg/mL IVP Continuous PRN    sars-cov-2 (covid-19) (MODERNA COVID-19) 100 mcg/0.5 ml injection 0.25 mL vaccine x 1 dose    senna-docusate 8.6-50 mg per tablet 1 tablet BID    sodium bicarbonate tablet 650 mg TID    sodium chloride 0.9% flush 5 mL PRN       Objective:     Vital Signs (Most Recent):  Temp: 100.1 °F (37.8 °C) (05/06/22 1915)  Pulse: (!) 205 (05/06/22 2133)  Resp: 20 (05/06/22 2133)  BP: 115/68 (05/06/22 2133)  SpO2: 98 % (05/06/22 2133)  O2 Device (Oxygen Therapy): ventilator (05/06/22 2133)   Vital Signs (24h Range):  Temp:  [97.7 °F (36.5 °C)-100.1 °F (37.8 °C)] 100.1 °F (37.8 °C)  Pulse:  [] 205  Resp:  [20-41] 20  SpO2:  [92 %-100 %] 98 %  BP: ()/(50-94) 115/68     Weight: (!) 169.6 kg (374 lb) (05/06/22 1231)  Body mass index is 41.09 kg/m².  Body surface area is 3.09 meters squared.    I/O last 3 completed shifts:  In: 3624.1 [I.V.:2667.9; NG/GT:280; IV Piggyback:676.2]  Out: 6890 [Urine:6890]    Physical Exam  Vitals reviewed.   Constitutional:       Appearance: Normal appearance.   HENT:      Head: Normocephalic and  atraumatic.   Cardiovascular:      Rate and Rhythm: Normal rate and regular rhythm.      Heart sounds: Normal heart sounds.   Pulmonary:      Breath sounds: Normal breath sounds.   Abdominal:      General: Bowel sounds are normal.      Palpations: Abdomen is soft.   Musculoskeletal:         General: No swelling.   Skin:     General: Skin is warm and dry.   Neurological:      General: No focal deficit present.      Mental Status: He is alert.       Significant Labs:  Cardiac Markers: No results for input(s): CKMB, TROPONINT, MYOGLOBIN in the last 168 hours.  CBC:   Recent Labs   Lab 05/06/22  0416   WBC 14.96*   RBC 4.27*   HGB 10.8*   HCT 35.0*      MCV 82   MCH 25.3*   MCHC 30.9*     CMP:   Recent Labs   Lab 05/05/22  1308 05/06/22  0416 05/06/22 2024   *   < > 175*   CALCIUM 9.9   < > 9.6   ALBUMIN 3.3*  --   --    PROT 7.9  --   --       < > 138   K 5.8*   < > 5.7*  5.7*   CO2 15*   < > 19*      < > 102   BUN 88*   < > 97*   CREATININE 3.7*   < > 3.9*   ALKPHOS 264*  --   --    ALT 53*  --   --    AST 65*  --   --    BILITOT 1.2*  --   --     < > = values in this interval not displayed.     Recent Labs   Lab 05/05/22  1349   COLORU Yellow   SPECGRAV 1.020   PHUR 5.0   PROTEINUA Negative   NITRITE Negative   LEUKOCYTESUR Negative   UROBILINOGEN Negative        Significant Imaging:  Labs: Reviewed  X-Ray: Reviewed    Assessment/Plan:     Metabolic acidosis  High AG metabolic acidosis- improved- cont to monitor labs and correct with bicarbonate supp as needed.    Hyperkalemia  Hyperkalemia- mild- can cont to shift medically- may need RRT if worsens.    NARESH (acute kidney injury)  NARESH- nonoliguric- would cont slow IV fluids to match urine output and hold diuretics for now.  Renal US r/o obstruction if not done this admi  No indication for dialysis @ this time, however will need RRT if labs cont to worsen ad creat rises.        Thank you for your consult. I will follow-up with patient.  Please contact us if you have any additional questions.    Suzie Pham MD  Nephrology  [place] - Intensive Care

## 2022-05-07 NOTE — PLAN OF CARE
[facility] Plan of Care    Patient has persistent hyperkalemia despite bicarb starting to improve. NARESH persistent as well, remains on lasix 40 BID.     Shift K (5.7) with insulin tonight, I ordered insulin + D50 to shift. Ordered one time dose lokelma 10g tonight for hyperkalemia. His heparin drip is likely also contributing as heparin can cause hyperkalemia. Will discuss with medicine team in am if this can be changed. Otherwise will continue to manage hyperkalemia medically.    April Chaudhary, DO  [facility]

## 2022-05-07 NOTE — PROGRESS NOTES
"[facility] Progress Note    Subjective:      Drew Farrar remains intubated, remains in A fib with RVR this am but this afternoon better controlled rate. Overnight shifted potassium with insulin and gave lokelma. Today still hyperkalemic. No acute decompensation or worsening status in last 24 hours.      Objective:   Last 24 Hour Vital Signs:  BP  Min: 89/66  Max: 149/57  Temp  Av.8 °F (37.1 °C)  Min: 97.7 °F (36.5 °C)  Max: 100.1 °F (37.8 °C)  Pulse  Av.9  Min: 83  Max: 219  Resp  Av.1  Min: 20  Max: 43  SpO2  Av %  Min: 92 %  Max: 98 %  Height  Av' 8" (203.2 cm)  Min: 6' 8" (203.2 cm)  Max: 6' 8" (203.2 cm)  Weight  Av.8 kg (345 lb 11.7 oz)  Min: 144 kg (317 lb 7.4 oz)  Max: 169.6 kg (374 lb)  I/O last 3 completed shifts:  In: 4773.6 [I.V.:3867.2; NG/GT:330; IV Piggyback:576.4]  Out: 6840 [Urine:6840]    Physical Examination:  Gen: obese, intubated, sedated   HEENT: normocephalic, atraumatic, PERRL, EOMI, MMM  CV: irregular, tachycardic to high 190s, no murmur appreciated   Lungs: symmetric chest rise, mechanical breath sounds  GI: Soft, non tender, non distended, normoactive bowel sounds  Extrem: chronic edema and changes consistent with chronic venous insufficiency. No pitting. No cyanosis.   Skin: dry, warm, intact. Dark skin changes on LE due to venous insufficiency        Laboratory:  Recent Labs   Lab 22  1533 22  0552 22  0732 22  1018 22  1308 22  0416 22  0819 22  0016 22  0408 22  0916   WBC  --   --   --  7.42  --  14.96*  --   --  25.13*  --    HGB  --   --   --  9.3*  --  10.8*  --   --  10.1*  --    HCT  --   --   --  32.1*  --  35.0*  --   --  31.8*  --    PLT  --   --   --  201  --  248  --   --  233  --       < > 138  --  139 136   < > 137 137  137 135*   K 5.4*   < > 5.4*  --  5.8* 5.7*   < > 5.5*  5.5* 5.5*  5.5*  5.5* 5.3*      < > 107  --  105 102   < > 101 100  100 100 "   CREATININE 2.8*   < > 3.5*  --  3.7* 3.8*   < > 4.5* 4.0*  4.0* 3.9*   BUN 78*   < > 87*  --  88* 92*   < > @*  102* 103*   CO2 20*   < > 18*  --  15* 16*   < > 19* 20*  20* 18*   ALT 61*  --  50*  --  53*  --   --   --   --   --    AST 57*  --  52*  --  65*  --   --   --   --   --     < > = values in this interval not displayed.         Microbiology   5/5 BCx NGTD    Radiology:   Imaging has been reviewed. CXR with pulmonary edema still present     Current Medications:     Infusions:   sodium chloride 0.9% Stopped (05/05/22 1900)    amiodarone in dextrose 5% 1 mg/min (05/07/22 0900)    amiodarone 33.3 mL/hr at 05/05/22 1800    fentanyl Stopped (05/05/22 1415)    heparin (porcine) in D5W 11 Units/kg/hr (05/07/22 0900)    heparin (porcine) Stopped (05/05/22 1900)    NORepinephrine bitartrate-D5W Stopped (05/06/22 2018)    NORepinephrine Stopped (05/05/22 1900)    phenylephrine 2 mcg/kg/min (05/07/22 0900)    propofoL 35 mcg/kg/min (05/07/22 0923)    propofol Stopped (05/05/22 1900)        Scheduled:   atorvastatin  40 mg Oral QHS    chlorhexidine  15 mL Mouth/Throat BID    [START ON 5/9/2022] collagenase   Topical (Top) Every Mon, Thurs    famotidine (PF)  20 mg Intravenous Daily    furosemide (LASIX) injection  40 mg Intravenous Q12H    mupirocin   Nasal BID    senna-docusate 8.6-50 mg  1 tablet Oral BID    sodium bicarbonate  650 mg Oral TID        PRN:  sodium chloride 0.9%, acetaminophen, albuterol, amiodarone, dextrose 10%, dextrose 10%, glucagon (human recombinant), glucose, glucose, heparin (PORCINE), heparin (PORCINE), heparin (porcine), insulin aspart U-100, LIDOcaine, melatonin, methocarbamoL, morphine, naloxone, NORepinephrine, ondansetron, propofol, sars-cov-2 (covid-19), sodium chloride 0.9%        Assessment and Plan:     Drew Farrar is a 56 y.o.male with HF, obesity, A fib, HTN who presented with chest pain, noted to have A fib with RVR. He is currently intubated and  sedated and still in A fib with RVR. His renal function has been declining this admission, and [facility] is consulted for NARESH. He has normal baseline renal function, with baseline creatinine of 0.8. He presented with creatinine 1.6 and has climbed to a peak of 4.5. He has been responsive to lasix and is negative 4L this admission.    NARESH  - normal baseline renal function. Presenting with creatinine 1.6, peaked at 4.5  - etiology likely ATN from poor perfusion with persistent RVR and volume overload.  His edema is chronic in nature however recent wedge pressure of 40 and CXR with edema   - , Cr 4.0 today  - had been on lasix 80 BID then 40 BID yesterday  - discussed with ICU team, will receive lasix 120 BID today for further diuresis  - with ATN and HR still nearly 200 this morning, he will likely continue to have worsening NARESH regardless of volume status. Plan to dialyze if needed, will continue to monitor labs and evaluate      Hyperkalemia  - K has been elevated around 5.5-5.8  - shifted with insulin last night and given lokelma 10g once  - can continue lokelma daily for now    Metabolic Acidosis  - bicarb now up to 20 this am, now at 17 this afternoon   - continue sodium bicarb 650 TID for now   - further diuresis today and will monitor       This note has been addended after discussion with ICU team in care of the patient. Plan has been updated with pertinent changes. Thank you for allowing us to participate in the care of this patient. Please call with any questions.     Discussed with Dr. Ankit Chaudhary, DO  [facility]

## 2022-05-07 NOTE — ASSESSMENT & PLAN NOTE
NARESH- nonoliguric- would cont slow IV fluids to match urine output and hold diuretics for now.  Renal US r/o obstruction if not done this admi  No indication for dialysis @ this time, however will need RRT if labs cont to worsen ad creat rises.

## 2022-05-07 NOTE — PROGRESS NOTES
[facility] - Intensive Care  Cardiology  Progress Note    Patient Name: Drew Farrar  MRN: [id number]  Admission Date: 5/3/2022  Hospital Length of Stay: 4 days  Code Status: Full Code   Attending Physician: Gino Ji III, MD   Primary Care Physician: Garrison Roach MD  Expected Discharge Date:   Principal Problem:Chest pain    Subjective:     Hospital Course:   05/05/2022 Per HPI   05/06/2022 s/p LHC, RHC, IABP insertion. Diuresing, intubated, sedated, on Levophed with increased requirements overnight. IABP 1:1.   LM:  PONCHO III flow normal   LAD: PONCHO III flow normal   LCx:  PONCHO- flow normal   RCA: PONCHO- flow normal   LVgram: LVEDP 35 mmHg, No LV gram due to NARESH      RHC   PCWP  40/54/36  PA  73/36/47  RV 68/7/24  RA 25/26/24     SATS  AO sat 100% on 100%  PA 67%  RV 63%  RA 57%     CO 7.34  CI 2.45      5/7/2022  Overnight no acute events. UOP dropping. Increased pressure requirements. Net positive fluid balance. CXR, clinically remains volume overloaded. K remains elevated. Remains in afib and IABP at 1:1 working appropriately. WBC rising. HR better controlled and no VT on tele. .    Review of Systems   Unable to perform ROS: Intubated   Objective:      Vitals:    05/07/22 1230 05/07/22 1245 05/07/22 1249 05/07/22 1300   BP: (!) 88/61 (!) 97/55 97/65    BP Location:       Patient Position:       Pulse: 88 98 97 86   Resp: 20 20 20 20   Temp:   98.24 °F (36.8 °C)    TempSrc:       SpO2: (!) 90% (!) 90% (!) 90% (!) 90%   Weight:       Height:         Intake/Output - Last 3 Shifts       05/05 0700 05/06 0659 05/06 0700 05/07 0659 05/07 0700 05/08 0659    I.V. (mL/kg) 1450.3 (8.6) 2191.3 (15.2) 1528.2 (10.6)    NG/GT 60 270 120    IV Piggyback 486.3 440.6     Total Intake(mL/kg) 1996.5 (11.8) 2901.9 (20.2) 1648.2 (11.4)    Urine (mL/kg/hr) 4395 (1.1) 3355 (1) 295 (0.3)    Total Output 4395 3355 295    Net -2398.5 -453.1 +1353.2           Urine Occurrence 1 x              Physical Exam  Constitutional:        Appearance: He is ill-appearing.   HENT:      Head: Atraumatic.   Cardiovascular:      Rate and Rhythm: Tachycardia present. Rhythm irregular.      Heart sounds: No murmur heard.    No friction rub. No gallop.   Pulmonary:      Breath sounds: Wheezing present. No rhonchi or rales.   Abdominal:      General: Bowel sounds are normal.      Palpations: Abdomen is soft.   Musculoskeletal:      Right lower leg: Edema present.      Left lower leg: Edema present.   Skin:     General: Skin is warm and dry.      Capillary Refill: Capillary refill takes 2 to 3 seconds.   Neurological:      Mental Status: He is alert. Mental status is at baseline.      Recent Labs     05/07/22  0408 05/07/22  0916 05/07/22  1234   HGB 10.1*  --   --    HCT 31.8*  --   --      137   < > 135*   K 5.5*  5.5*  5.5*   < > 5.3*  5.3*   CREATININE 4.0*  4.0*   < > 4.1*    < > = values in this interval not displayed.          Assessment and Plan:       * Chest pain  Per NSTEMI     NICM (nonischemic cardiomyopathy)  TTE:  The left ventricle is moderately enlarged with severely decreased   systolic function.  · The estimated ejection fraction is 20%.  · A diastolic pattern consistent with atrial fibrillation observed.  · Normal right ventricular size with normal right ventricular systolic   function.  · Mild tricuspid regurgitation.  · Severe left atrial enlargement.  · Right atrial enlargement.  · Mild mitral regurgitation.  · Mechanically ventilated; cannot use inferior caval vein diameter to   estimate central venous pressure.    Patient overloaded on exam  See RHC below  Diuresing with IV Lasix, although UOP has dropped  Accurate intake and output  Intubated, sedated with IABP. Keep intubated for now  No BB/ACEI given pressor use  Patient remains volume overloaded with worsening BUN/Cr and K+ being shifted, remains elevated. Will have primary place HD cath for possible CRRT.    NSTEMI (non-ST elevated myocardial infarction)  Patient has  had intermittent chest pain since admission which became constant on 05/05  Patient became unstable on the floor 05/05- bradycardia, pulseless, and apnea reported by ICU RN   EKG with worsening ischemic changes and VT noted on arrival to ICU. VT was initially intermittent, self limited runs of 10-30 beats. Amiodarone was ordered. VT became more persistent prior to amiodarone bolus being given and broke with cough. He received emergent Amio bolus and was given Lidocaine with improvement in VT.  LHC/RHC/IABP 05/05/2022:  LM:  PONCHO III flow normal   LAD: PONCHO III flow normal   LCx:  PONCHO- flow normal   RCA: PONCHO- flow normal   LVgram: LVEDP 35 mmHg, No LV gram due to NARESH      RHC   PCWP  40/54/36  PA  73/36/47  RV 68/7/24  RA 25/26/24     SATS  AO sat 100% on 100%  PA 67%  RV 63%  RA 57%     CO 7.34  CI 2.45  Continue heparin gtt. BB on hold given pressor use  NSTEMI Type II- demand 2/2 cardiogenic shock, NARESH, hypotension    VT (ventricular tachycardia)  Patient with sustained VT after transfer to ICU  Continue Amio gtt, keep intubated  ICD planned for next week, when clinically improves  LVEF 20%    NARESH (acute kidney injury)  Baseline Cr 0.8, 4.0 this AM  Nephrology following  Overloaded per CXR, RHC, overall clinical picture  Diuresing, however, UOP has decreased and net positive fluid balance overnight    Chronic atrial fibrillation  On Xarelto (dosed last on 05/04/2022)- hold for now- continue high intensity heparin gtt given PE/DVT hx and IABP  BB on hold given hypotension and pressor use  On Amio gtt for VT        VTE Risk Mitigation (From admission, onward)         Ordered     heparin 25,000 units in dextrose 5% 250 mL (100 units/mL) infusion HIGH INTENSITY nomogram - [facility]  Continuous        Question Answer Comment   Heparin Infusion Adjustment (DO NOT MODIFY ANSWER) [email or web address]\epic\Images\Pharmacy\HeparinInfusions\heparin HIGH INTENSITY nomogram for [facility] [id number]    Begin at (in units/kg/hr) 18         05/06/22 0826     heparin 25,000 units in dextrose 5% (100 units/ml) IV bolus from bag - ADDITIONAL PRN BOLUS - 60 units/kg  As needed (PRN)        Question:  Heparin Infusion Adjustment (DO NOT MODIFY ANSWER)  Answer:  [email or web address]\epic\Images\Pharmacy\HeparinInfusions\heparin HIGH INTENSITY nomogram for [facility] [id number]    05/06/22 0705     heparin 25,000 units in dextrose 5% (100 units/ml) IV bolus from bag - ADDITIONAL PRN BOLUS - 30 units/kg  As needed (PRN)        Question:  Heparin Infusion Adjustment (DO NOT MODIFY ANSWER)  Answer:  [email or web address]\epic\Images\Pharmacy\HeparinInfusions\heparin HIGH INTENSITY nomogram for [facility] [id number]    05/06/22 0705     heparin infusion 1,000 units/500 ml in 0.9% NaCl (pressure line flush)  Intra-op continuous PRN         05/05/22 1352     Reason for No Pharmacological VTE Prophylaxis  Once        Question:  Reasons:  Answer:  Already adequately anticoagulated on oral Anticoagulants    05/03/22 0602     IP VTE HIGH RISK PATIENT  Once         05/03/22 0602     Place sequential compression device  Until discontinued         05/03/22 0602                Moises Guajardo MD  Cardiology  [facility] - Intensive Care

## 2022-05-07 NOTE — PROGRESS NOTES
Progress Note  [facility]    Attending: Dr Lopez Ji  Admit Date: 5/3/2022  Today's Date: 05/07/2022    Patient is a 56 yr old male with Pmhx history of AFib on Xarelto, CHF, DVT, hypertension presenting to the ED for one day of nausea and vomiting with severe chest pain. Patient states that he has been vomiting all day ans his been unable to hold down any food. Patient states the chest pain does not radiate to his shoulder and feels like stabbing, it is severe and constant.  In the ED patient was found to have mildly elevated troponin and an ekg showing Afib with RVR and tachycardia. Patient is afebrile and shows no other signs of infection. Patient given labetalol in ED, which improved cardiac rate and rhythm  Patient to be admitted to [facility] to continue work-up    SUBJECTIVE:     Patient seen and examined this morning. Yesterday remained intubated and sedated still in Afib RVR w/o any other episodes of VTach. Amio drip was increased as pt HR to sustained upper 120's to 140's. Levo initally uptitrated 2/2 hypotension but d/c'd and changed to Oliverio. Heparin drip held briefly 2/2 elevated PTT >150 but restarted this AM. Hyperkalemia noted ON to 5/7 w/ worsening BUN/Crt Nephrology called and K shifted w/ insulin D10 and 1 packet lokelma given. 2/2 continuously rising BUN/Crt 102/4 and K 5.5 despite shifting pt will have line placed and HD.    Review of Systems   Unable to perform ROS: Intubated       OBJECTIVE:     Vital Signs Trends/Hx Reviewed  Vitals:    05/07/22 0736 05/07/22 0745 05/07/22 0800 05/07/22 0815   BP: (!) 110/56 99/65 101/62 103/66   BP Location:       Patient Position:       Pulse: 83 87 96 100   Resp: 20 (!) 22 20 20   Temp: 98.24 °F (36.8 °C)  97.88 °F (36.6 °C)    TempSrc:   Core Esophageal    SpO2: 95% (!) 94% 95% 95%   Weight:       Height:           Vent Mode: A/CMV-VC  Oxygen Concentration (%):  [] 30  Resp Rate Total:  [20 br/min-33 br/min] 20 br/min  Vt  Set:  [550 mL] 550 mL  PEEP/CPAP:  [5 cmH20-5.4 cmH20] 5 cmH20  Mean Airway Pressure:  [10.2 ckF70-95.4 cmH20] 11.2 cmH20    Physical Exam  Vitals and nursing note reviewed.   Constitutional:       Comments: Intubated and sedated   HENT:      Head: Normocephalic.      Right Ear: External ear normal.      Left Ear: External ear normal.      Nose: Nose normal. No congestion or rhinorrhea.      Mouth/Throat:      Pharynx: Oropharynx is clear.   Eyes:      General: No scleral icterus.     Conjunctiva/sclera: Conjunctivae normal.   Cardiovascular:      Rate and Rhythm: Tachycardia present. Rhythm irregular.   Pulmonary:      Breath sounds: No rhonchi or rales.   Abdominal:      General: Bowel sounds are normal. There is no distension.      Palpations: Abdomen is soft.   Musculoskeletal:      Cervical back: Normal range of motion.   Lymphadenopathy:      Cervical: No cervical adenopathy.   Skin:     General: Skin is warm and dry.      Capillary Refill: Capillary refill takes less than 2 seconds.      Comments: Balloon pump tubing run into R groin and dressing c/d/i  Chronic venous stasis dermatitis and skin changes.         Laboratory:  Recent Labs   Lab 05/07/22  0408   WBC 25.13*   RBC 3.95*   HGB 10.1*   HCT 31.8*      MCV 81*   MCH 25.6*   MCHC 31.8*     Recent Labs   Lab 05/07/22  0408     137   K 5.5*  5.5*  5.5*     100   CO2 20*  20*   *  102*   CREATININE 4.0*  4.0*     No results for input(s): PH, PCO2, PO2, HCO3, POCSATURATED, BE in the last 24 hours.      Chest Imaging:   Endotracheal tube and enteric tubes are stable in position.  The heart is enlarged. There is pulmonary vascular congestion with suspected interstitial edema.  Probable small bilateral pleural effusions.  Tip of the intra-aortic balloon pump terminates in the inferior aspect of the aortic arch, stable.  Skeletal structures are intact    ASSESSMENT & RECOMMENDATIONS     Neuro/Psych  #Intubated and sedated    -fentanyl breifly but d/c now just propofol running at 35  -RAAS this AM -1     CV  #Cardiogeneic Shock and CHF  -MET called pt hypotensive, tachycardic, and with CP  -Pt w/ intermittent CP and multiple runs of sustained Vtach and EKGs with T wave inversion and ST elevations concerning for STEMI as well as elevated troponin to 0.5 from 0.3 last was 1.4  -Asa and plavix loaded as well as Amiodarone gtt at   -[facility] demonstrated clean coronary arteries but IABP was placed   -CXR 5/6 w/ IABP in place  -ECHO from procedure w/ EF 20% from 40 in 3/22  -Cardiology rec IV diuresis for significantly elevated filling pressures   -Lasix 80mg BID initially but now 40mg  -Requiring Oliverio for pressure support at 2  -Heparin gtt held breifly 2/2 elevated PTT but restarted   -AICD placement planned for next week    #Afib w/ RVR  -xeralto and metoprolol at home   -Last HR: 100 though tachycardic to 150's yesterday  -heparin and amiodarone gtt currently    #HTN  -lisinopril at home but held in setting of NARESH  -BP this /66  -Previously on levophed but transitioned to oliverio          Pulm  Intubated 2/2 airway protection and AMS  Vent settings as above  Most recent ABG  as above  CO2  O2 Bicarb   CXR this AM  Will remain intubated for planned procedure next week  Wean vent settings as tolerated and SBT once appropriate    FEN/GI  Diet: Nutrition consulted with Tube feeds started 5/6 w/ Peptamen intense VHP w/ goal 65ml/hr  GI prophylaxis Famotidine     #Transaminitis  AST/ALT 62/72 on admission and had been down trending   Alk phos 336 on admit and down trending to   T bili 1.1 on admit and down trended though AM is   US abdomen from admit is unremarkable     Electrolytes this AM are  K 5.7 (given albuterol as well as insulin and dextrose) Cl 102 CO2 16 Mg Phos     RENAL  UOP: 3995cc , In: 3599cc, net +204cc in last 24 hrs    BUN/Cr: 102/4, baseline 20/1.0  Continue to monitor renal status and urine output     #NARESH  -Crt on admit  1.6 but was elevated to 4 57  -Initial urine studies likely prerenal in nature  -Nephrology consulted   -Kerns placed 5/5  -UA with 2+ protein and 1+ occult blood  -Retroperitoneal US for possible obstruction w/o any signs of obstruction   -Plan to place line and start dialysis 5/7     #HAGMA  -AG this AM  -LA yesterday wnl  -Sodium Bicarb 650mg TID     Heme  H/H 10/31; stable  WBC 25.13  DVT prophylaxis: Heparin (gtt)    Endo  No known issues at this time.    ID  No known issues at this time.  TMax 100.1    Declan Faria M.D.  [facility]   05/07/2022 10:27 AM

## 2022-05-07 NOTE — ASSESSMENT & PLAN NOTE
High AG metabolic acidosis- improved- cont to monitor labs and correct with bicarbonate supp as needed.

## 2022-05-07 NOTE — NURSING
Updated Dr Guajardo of continued Afib on monitor, and discussed current Amiodarone gtt dose and if it should be continued. MD has ordered for Amiodarone current dose to be continued overnight. MD also notified of Potassium (5.7) and BUN/Creatinine continuing to be elevated, with MD verbalizing that pt should have trialysis line placed, and to speak with nephrology on shifting potassium or needing to start CRRT.     2328: Called and spoke with Dr Chaudhary with Nephrology, updated MD on current Potassium, BUN/Creatinine. MD to review chart.    0000: Spoke with Dr Chaudhary in regards to Potassium shifting orders placed and no calcium gluconate ordered. MD verbalized that she did not feel that calcium gluconate was necessary d/t potassium level 5.7, and that shifting could be done without the calcium gluconate at this time.

## 2022-05-07 NOTE — PROGRESS NOTES
[facility]           Pharmacy        Current Drug Shortage     Due to national backorder and [facility] is critically low on inventory of Dextrose 50% (D50) Syringes and Vials, pharmacy has automatically switched from D50% to D10% IVPB at the equivalent dose until resolution of the shortage per P&T approved protocol.               Farzana Daniels, PharmD  [phone]

## 2022-05-07 NOTE — PROGRESS NOTES
Progress Note  [facility]    Attending: Dr. Zapien  Fellow: Dr. Day  Admit Date: 5/3/2022  Today's Date: 05/07/2022      SUBJECTIVE:     Interval Events:  Repeat hyperkalemia, requiring to be shifted and given lokelma. Still with IABP on amiodarone and propofol. Nephrology pending possible HD    OBJECTIVE:     Vital Signs Trends/Hx Reviewed  Vitals:    05/07/22 0736 05/07/22 0745 05/07/22 0800 05/07/22 0815   BP: (!) 110/56 99/65 101/62 103/66   BP Location:       Patient Position:       Pulse: 83 87 96 100   Resp: 20 (!) 22 20 20   Temp: 98.24 °F (36.8 °C)  97.88 °F (36.6 °C)    TempSrc:   Core Esophageal    SpO2: 95% (!) 94% 95% 95%   Weight:       Height:           Ventilator settings:   Vent Type: NKV-550 (5/7/2022  7:36 AM)    Vent Mode: A/CMV-VC  Oxygen Concentration (%):  [] 30  Resp Rate Total:  [20 br/min-33 br/min] 20 br/min  Vt Set:  [550 mL] 550 mL  PEEP/CPAP:  [5 cmH20-5.4 cmH20] 5 cmH20  Mean Airway Pressure:  [10.2 oaY10-32.4 cmH20] 11.2 cmH20    Physical Exam:  General: Intubated and sedated  HEENT: AT/NC, PERRL, EOMI  Neck: Supple without JVD or palpable LAD.   Cardiac: regular rate, irregular rhythm; IABP in place  Respiratory: Normal inspection. Symmetric chest rise. Normal palpation and percussion. Course breath sounds  Abdomen: soft abdomen, nondistended; nuñez in place  Extremities: lower extremity skin changes; LLE bandaged; RLE with thick skin texture. See media tab  PICC in LLE    Laboratory:  No results for input(s): PH, PCO2, PO2, HCO3, POCSATURATED, BE in the last 24 hours.  Recent Labs   Lab 05/07/22  0408   WBC 25.13*   RBC 3.95*   HGB 10.1*   HCT 31.8*      MCV 81*   MCH 25.6*   MCHC 31.8*     Recent Labs   Lab 05/07/22  0408     137   K 5.5*  5.5*  5.5*     100   CO2 20*  20*   *  102*   CREATININE 4.0*  4.0*       Microbiology Data:   Microbiology Results (last 7 days)     Procedure Component Value Units Date/Time     Blood culture [[id number]] Collected: 05/05/22 1957    Order Status: Completed Specimen: Blood Updated: 05/07/22 0613     Blood Culture, Routine No Growth to date      No Growth to date    Blood culture [[id number]] Collected: 05/05/22 1957    Order Status: Completed Specimen: Blood Updated: 05/07/22 0613     Blood Culture, Routine No Growth to date      No Growth to date           Chest Imaging:   X-Ray Chest AP Portable    Result Date: 5/5/2022  Worsening interstitial opacities in bilateral lungs suspicious for edema. Support apparatus as above. Electronically signed by: Raphael Whelan Date:    05/05/2022 Time:    18:14        Infusions:     sodium chloride 0.9% Stopped (05/05/22 1900)    amiodarone in dextrose 5% 1 mg/min (05/07/22 0800)    amiodarone 33.3 mL/hr at 05/05/22 1800    fentanyl Stopped (05/05/22 1415)    heparin (porcine) in D5W 11 Units/kg/hr (05/07/22 0811)    heparin (porcine) Stopped (05/05/22 1900)    NORepinephrine bitartrate-D5W Stopped (05/06/22 2018)    NORepinephrine Stopped (05/05/22 1900)    phenylephrine 2 mcg/kg/min (05/07/22 0800)    propofoL 35 mcg/kg/min (05/07/22 0800)    propofol Stopped (05/05/22 1900)       Scheduled Medications:    atorvastatin  40 mg Oral QHS    chlorhexidine  15 mL Mouth/Throat BID    [START ON 5/9/2022] collagenase   Topical (Top) Every Mon, Thurs    famotidine (PF)  20 mg Intravenous Daily    furosemide (LASIX) injection  40 mg Intravenous Q12H    mupirocin   Nasal BID    senna-docusate 8.6-50 mg  1 tablet Oral BID    sodium bicarbonate  650 mg Oral TID       PRN Medications:   sodium chloride 0.9%, acetaminophen, albuterol, amiodarone, dextrose 10%, dextrose 10%, glucagon (human recombinant), glucose, glucose, heparin (PORCINE), heparin (PORCINE), heparin (porcine), insulin aspart U-100, LIDOcaine, melatonin, methocarbamoL, morphine, naloxone, NORepinephrine, ondansetron, propofol, sars-cov-2 (covid-19), sodium chloride 0.9%    Problem List:    Patient Active Problem List   Diagnosis    Venous stasis dermatitis of both lower extremities    Ulcer - lesion    Chronic atrial fibrillation    Venous stasis ulcer of left midfoot limited to breakdown of skin    Venous (peripheral) insufficiency    Edema    Chronic venous hypertension with ulcer    Cellulitis    Skin ulcer of left foot, limited to breakdown of skin    May-Thurner syndrome    Stenosis of right iliac vein    Elevated BP    Ulcer of ankle, left, limited to breakdown of skin    Depression    NARESH (acute kidney injury)    Group A streptococcal infection    Tinea pedis of both feet    Atrial fibrillation with RVR    Bilateral edema of lower extremity    Morbid obesity    Permanent atrial fibrillation    Right knee pain    Knee pain, right    Difficulty walking    History of DVT (deep vein thrombosis)    HTN (hypertension)    Acute pulmonary embolism    Recurrent pulmonary embolism    Other pulmonary embolism without acute cor pulmonale, unspecified chronicity    Long term (current) use of anticoagulants    Chronic combined systolic and diastolic congestive heart failure    Hyperthyroidism    Elevated troponin    Non-rheumatic mitral regurgitation    Non-pressure chronic ulcer of left ankle, with unspecified severity    Cellulitis of right lower leg    Candida infection of genital region    Varicose ulcer of lower extremity    Hypomagnesemia    Chronic venous insufficiency    Pulmonary embolism    Onychomycosis    Wound of foot    Clostridium difficile infection    Acute deep vein thrombosis (DVT) of lower extremity    Erythema    Obesity, Class I, BMI 30.0-34.9 (see actual BMI)    Advance care planning    Venous stasis ulcer of lower extremity, unspecified laterality    Iron deficiency anemia    Anemia of chronic disease    Unstable angina    Chest pain    Complicated migraine    Open wound of left foot    Proteus infection    MRSA cellulitis of  left foot    Wound infection    PAD (peripheral artery disease)    Alteration in skin integrity    Rash of back    Ringworm    Class 2 severe obesity due to excess calories with serious comorbidity and body mass index (BMI) of 39.0 to 39.9 in adult    Stasis ulcer    Obesity, morbid (more than 100 lbs over ideal weight or BMI > 40)    Open wound of right lower leg    Cellulitis of both lower extremities    Skin ulcer of left foot including toes with fat layer exposed    Class 3 severe obesity due to excess calories with serious comorbidity and body mass index (BMI) of 45.0 to 49.9 in adult    Pseudomonas infection    Carpal tunnel syndrome on right    Rotator cuff impingement syndrome of right shoulder    Dizziness    Multiple and open wound of lower limb    RUQ pain    VT (ventricular tachycardia)    NSTEMI (non-ST elevated myocardial infarction)    CHF (congestive heart failure)    Hyperkalemia    Metabolic acidosis    NICM (nonischemic cardiomyopathy)       ASSESSMENT & RECOMMENDATIONS   Patient is a 56 year old male presenting with chest pain on admission with worsening pain. MET called due to patients worsening mentation and difficulty obtaining VS after nuñez placement. Concern for STEMI, patient transferred to ICU, intubated, started on pressor support and emergently underwent LHC, intra-aortic balloon pump placed on 5/5/2022. Remains critically ill with worsening renal failure     CNS/Neuro:  - intubated and sedated  - on propofol      Cardiovascular:  #Ventricular Tachycardia  #Cardiogenic shock s/p IABP placment  - new onset ventricular tachyardia  - symptomatic with chest pain, and altered mental status, MET called  - EKG with chronic LBBB, possible STEMI  - troponin 0.036>0.03>0.5>0.7>1.4  - amiodarone gtt with ASA, plavix load  - cardiology consulted, emergent LHC performed on 5/5/2022. No evidence of obstructive CAD. Very poor LV systolic function, EF 20%. Wedge pressure  elevated  - Cardiology suspects pt's high filling pressures were contributing to his acute decompensation. IABP placed 5/52022   - worsening cardiac status 2/2 to volume overload. Recommend aggressive diuresis  - on elias  - recommend daily CXR for IABP placement confirmation     #Permanent Afib, now with RVR  - afib on xarelto and metoprolol at home  - admitted for chest pain, given labetalol and metoprolol in ED  - trop on admission 0.36>0.3  - chest pain resolved and returned in <24 hrs  - currently on amio drip, rate controlled; heparin drip  - cardiology consulted     #Combined systolic and diastolic CHF:  - ECHO 4/2022 EF of 20%; mild TR with severe LA enlargement  - IABP in placed     #HTN:  - home meds lisinopril and torsemide  - hold     Respiratory:  #Intubated 2/2 to airway protection  - wean sedation & vent as patient tolerates.        GI/Metabolic:  #Elevated LFTs  - presented with positive murphys sign on admission, t bili on admission 1.1  - elevated LFTs, trending down  - US unremarkable      #Hyperkalemia:  - potassium elevated  - nephrology consulted, recommend bicarb and lokelma with minimal improvement  - shifted overnight  - lasix 40 BID  - pending line placement and HD        GI Ppx: famotidine  Diet: NPO  Keep Mg>2 K>4     Renal:  #ARF  - baseline BUN/Cr 20/1.0  - on admission worsening Cr 1.6>3.7  - nephrology consulted, recommended holding ACEi in setting of NARESH  - UOP decreased output today from yesterday, nuñez in placed prior to MET called  - Renal US negative  - recommend aggressive diuresis  - strict I/O's    Elevated AG  - could be due to the renal failure     Heme:  DVT Ppx: heparin drip     Endo:   Strict Glucose control with goal 140-180     ID:  Leukocytosis, afebrile  - Likely reactive  - obtain blood cultures and resp cx  - no indication to start abx at this point     Code Status: Full code     Thank you for allowing us to participate in the care of this patient. We will follow up  with this patient. Please call with questions.    Jose Polanco MD  [facility] HO[facility]

## 2022-05-07 NOTE — PLAN OF CARE
Patient on ventilator with documented settings.  Patient intubated with 7.5 ETT secured at 23 cm at the lip.  Alarms are adequately set and functioning properly.  AMBU bag and mask at bedside.  Will continue to monitor.

## 2022-05-07 NOTE — SUBJECTIVE & OBJECTIVE
Interval History:   Pt remains in ICU- diuresing well with over 5 liters neg fluid balance.  Labs stable with creat continuing to rise-and pt on diuretics.      Review of patient's allergies indicates:   Allergen Reactions    Contrast media Other (See Comments)     Severe chest pain    Food allergy formula [glutamine-c-quercet-selen-brom]      Allergic to green peas; Heart failure.    Iodinated contrast media Other (See Comments)     Chest pain    Peas Hives    Amiodarone analogues Other (See Comments)     passed out in ER    Ibuprofen Swelling    Latex, natural rubber Hives    Pcn [penicillins] Hives    Quercetin     Butisol [butabarbital] Rash     Peeling skin     Current Facility-Administered Medications   Medication Frequency    0.9%  NaCl infusion Continuous PRN    acetaminophen tablet 650 mg Q4H PRN    albuterol inhaler 2 puff Q6H PRN    amiodarone 360 mg/200 mL (1.8 mg/mL) infusion Continuous    amiodarone 360 mg/200 mL (1.8 mg/mL) infusion Continuous PRN    atorvastatin tablet 40 mg QHS    chlorhexidine 0.12 % solution 15 mL BID    [START ON 5/9/2022] collagenase ointment Every Mon, Thurs    dextrose 10% bolus 125 mL PRN    dextrose 10% bolus 250 mL PRN    dextrose 10% bolus 250 mL PRN    famotidine (PF) injection 20 mg Daily    fentaNYL 2500 mcg in 0.9% sodium chloride 250 mL infusion premix (titrating) Continuous    furosemide injection 40 mg Q12H    glucagon (human recombinant) injection 1 mg PRN    glucose chewable tablet 16 g PRN    glucose chewable tablet 24 g PRN    heparin 25,000 units in dextrose 5% (100 units/ml) IV bolus from bag - ADDITIONAL PRN BOLUS - 30 units/kg PRN    heparin 25,000 units in dextrose 5% (100 units/ml) IV bolus from bag - ADDITIONAL PRN BOLUS - 60 units/kg PRN    heparin 25,000 units in dextrose 5% 250 mL (100 units/mL) infusion HIGH INTENSITY nomogram - [facility] Continuous    heparin infusion 1,000 units/500 ml in 0.9% NaCl (pressure line flush) Continuous PRN    insulin aspart  U-100 pen 1-10 Units Q6H PRN    LIDOcaine 5 % patch 1 patch Daily PRN    melatonin tablet 9 mg Nightly PRN    methocarbamoL tablet 500 mg BID PRN    morphine injection 1 mg Q4H PRN    mupirocin 2 % ointment BID    naloxone 0.4 mg/mL injection 0.02 mg PRN    NORepinephrine 32 mg in dextrose 5 % 250 mL infusion Continuous    NORepinephrine injection Continuous PRN    ondansetron injection 4 mg TID PRN    phenylephrine (ASTRID-SYNEPHRINE) 100 mg in sodium chloride 0.9% 250 mL infusion Continuous    propofol (DIPRIVAN) 10 mg/mL infusion Continuous    propofol (DIPRIVAN) 10 mg/mL IVP Continuous PRN    sars-cov-2 (covid-19) (MODERNA COVID-19) 100 mcg/0.5 ml injection 0.25 mL vaccine x 1 dose    senna-docusate 8.6-50 mg per tablet 1 tablet BID    sodium bicarbonate tablet 650 mg TID    sodium chloride 0.9% flush 5 mL PRN       Objective:     Vital Signs (Most Recent):  Temp: 100.1 °F (37.8 °C) (05/06/22 1915)  Pulse: (!) 205 (05/06/22 2133)  Resp: 20 (05/06/22 2133)  BP: 115/68 (05/06/22 2133)  SpO2: 98 % (05/06/22 2133)  O2 Device (Oxygen Therapy): ventilator (05/06/22 2133)   Vital Signs (24h Range):  Temp:  [97.7 °F (36.5 °C)-100.1 °F (37.8 °C)] 100.1 °F (37.8 °C)  Pulse:  [] 205  Resp:  [20-41] 20  SpO2:  [92 %-100 %] 98 %  BP: ()/(50-94) 115/68     Weight: (!) 169.6 kg (374 lb) (05/06/22 1231)  Body mass index is 41.09 kg/m².  Body surface area is 3.09 meters squared.    I/O last 3 completed shifts:  In: 3624.1 [I.V.:2667.9; NG/GT:280; IV Piggyback:676.2]  Out: 6890 [Urine:6890]    Physical Exam  Vitals reviewed.   Constitutional:       Appearance: Normal appearance.   HENT:      Head: Normocephalic and atraumatic.   Cardiovascular:      Rate and Rhythm: Normal rate and regular rhythm.      Heart sounds: Normal heart sounds.   Pulmonary:      Breath sounds: Normal breath sounds.   Abdominal:      General: Bowel sounds are normal.      Palpations: Abdomen is soft.   Musculoskeletal:         General: No  swelling.   Skin:     General: Skin is warm and dry.   Neurological:      General: No focal deficit present.      Mental Status: He is alert.       Significant Labs:  Cardiac Markers: No results for input(s): CKMB, TROPONINT, MYOGLOBIN in the last 168 hours.  CBC:   Recent Labs   Lab 05/06/22  0416   WBC 14.96*   RBC 4.27*   HGB 10.8*   HCT 35.0*      MCV 82   MCH 25.3*   MCHC 30.9*     CMP:   Recent Labs   Lab 05/05/22  1308 05/06/22  0416 05/06/22 2024   *   < > 175*   CALCIUM 9.9   < > 9.6   ALBUMIN 3.3*  --   --    PROT 7.9  --   --       < > 138   K 5.8*   < > 5.7*  5.7*   CO2 15*   < > 19*      < > 102   BUN 88*   < > 97*   CREATININE 3.7*   < > 3.9*   ALKPHOS 264*  --   --    ALT 53*  --   --    AST 65*  --   --    BILITOT 1.2*  --   --     < > = values in this interval not displayed.     Recent Labs   Lab 05/05/22  1349   COLORU Yellow   SPECGRAV 1.020   PHUR 5.0   PROTEINUA Negative   NITRITE Negative   LEUKOCYTESUR Negative   UROBILINOGEN Negative        Significant Imaging:  Labs: Reviewed  X-Ray: Reviewed

## 2022-05-07 NOTE — PLAN OF CARE
Problem: Adult Inpatient Plan of Care  Goal: Plan of Care Review  Outcome: Ongoing, Progressing  Goal: Patient-Specific Goal (Individualized)  Outcome: Ongoing, Progressing  Goal: Absence of Hospital-Acquired Illness or Injury  Outcome: Ongoing, Progressing  Goal: Optimal Comfort and Wellbeing  Outcome: Ongoing, Progressing     Problem: Fluid and Electrolyte Imbalance (Acute Kidney Injury/Impairment)  Goal: Fluid and Electrolyte Balance  Outcome: Ongoing, Progressing     Problem: Renal Function Impairment (Acute Kidney Injury/Impairment)  Goal: Effective Renal Function  Outcome: Ongoing, Progressing     Plan of care reviewed with patient and family, all questions answered. Remains intubated and sedated. Vasopressor requirements increased. IABP remains in place 1:1 ECG Trigger, augmenting 70s-80s. UO minimal despite 120mg of Lasix only approx 300UO, MD made aware. Remains on in Afib. Amio, Heparin, Propofol and Oliverio infusing.

## 2022-05-07 NOTE — PLAN OF CARE
Pt remains intubated, sedated, and IABP in place. Withdraws to painful stimuli.  Tmax: 100.1, with esophageal probe in place for closer monitoring. Afib continued on monitor, HR 90's-110's. Vent settings: A/C TV: 550/R: 20/P: 5/F: 30% with POX maintained >95%. Total UOP: 860ml via nuñez. Neosynephrine, Amiodarone, Heparin, and Propofol gtt remain infusing, see MAR for titrations. Safety and skin precautions in place and maintained during shift.   Problem: Adult Inpatient Plan of Care  Goal: Plan of Care Review  Outcome: Ongoing, Progressing  Goal: Absence of Hospital-Acquired Illness or Injury  Outcome: Ongoing, Progressing  Goal: Optimal Comfort and Wellbeing  Outcome: Ongoing, Progressing     Problem: Bariatric Environmental Safety  Goal: Safety Maintained with Care  Outcome: Ongoing, Progressing     Problem: Fall Injury Risk  Goal: Absence of Fall and Fall-Related Injury  Outcome: Ongoing, Progressing     Problem: Hypertension Comorbidity  Goal: Blood Pressure in Desired Range  Outcome: Ongoing, Progressing     Problem: Restraint, Nonbehavioral (Nonviolent)  Goal: Absence of Harm or Injury  Outcome: Ongoing, Progressing

## 2022-05-08 NOTE — PROCEDURES
"Drew Farrar is a 56 y.o. male patient.    Temp: 98.06 °F (36.7 °C) (05/08/22 1540)  Pulse: 96 (05/08/22 1540)  Resp: (!) 32 (05/08/22 1540)  BP: (!) 121/55 (05/08/22 1531)  SpO2: (!) 91 % (05/08/22 1540)  Weight: (!) 147 kg (324 lb 1.2 oz) (05/08/22 0700)  Height: 6' 8" (203.2 cm) (05/06/22 1231)       Central Line    Date/Time: 5/8/2022 4:30 PM  Performed by: Al Giang MD  Authorized by: Al Giang MD     Supervisor Name:  Moises Escamilla MD  Location procedure was performed:  [facility] ICU 5TH FLOOR  Indications:  Hemodialysis  Anesthesia:  Local infiltration  Local anesthetic:  Lidocaine 1% with epinephrine  Preparation:  Skin prepped with ChloraPrep  Location:  Right internal jugular  Catheter type:  Trialysis  Catheter size:  13 Fr  Manometry: No    Number of attempts:  1  Securement:  Line sutured, chlorhexidine patch, sterile dressing applied and blood return through all ports  Complications: No    Estimated blood loss (mL):  10  Specimens: No    Implants: No    XRay:  Placement verified by x-ray, no pneumothorax on x-ray and successful placement  Adverse Events:  NoneTermination Site: superior vena cava        5/8/2022  "

## 2022-05-08 NOTE — PROGRESS NOTES
Progress Note  [facility]    Attending: Dr Lopez Ji  Admit Date: 5/3/2022  Today's Date: 05/08/2022    Patient is a 56 yr old male with Pmhx history of AFib on Xarelto, CHF, DVT, hypertension presenting to the ED for one day of nausea and vomiting with severe chest pain. Patient states that he has been vomiting all day ans his been unable to hold down any food. Patient states the chest pain does not radiate to his shoulder and feels like stabbing, it is severe and constant.  In the ED patient was found to have mildly elevated troponin and an ekg showing Afib with RVR and tachycardia. Patient is afebrile and shows no other signs of infection. Patient given labetalol in ED, which improved cardiac rate and rhythm  Patient to be admitted to [facility] to continue work-up    SUBJECTIVE:     Patient seen and examined this morning. Has remained intubated and sedated, withdraws to painful stimuli. Continues on Afib with improved rate (70's-90's) w/o any other episodes of VTach. Continues on Amnio drip. Pressures have been stable with MAP >65, currently on Oliverio. Hyperkalemia noted ON 6.2, s/p insulin w/ D10 shift. Worsening renal function but stable ON BUN/Cr 109/4.5. .    Review of Systems   Unable to perform ROS: Intubated       OBJECTIVE:     Vital Signs Trends/Hx Reviewed  Vitals:    05/08/22 0645 05/08/22 0700 05/08/22 0715 05/08/22 0740   BP: (!) 96/52 96/65 (!) 96/51    BP Location:  Left arm     Patient Position:  Lying     Pulse: 85 87 93 88   Resp: (!) 23 (!) 22 (!) 22 (!) 22   Temp: 97.52 °F (36.4 °C) 97.7 °F (36.5 °C) 97.7 °F (36.5 °C) 97.7 °F (36.5 °C)   TempSrc:  Core Esophageal     SpO2: 96% 96% 96% 95%   Weight:  (!) 147 kg (324 lb 1.2 oz)     Height:           Vent Mode: A/CMV-VC  Oxygen Concentration (%):  [] 30  Resp Rate Total:  [20 br/min-27 br/min] 22 br/min  Vt Set:  [550 mL] 550 mL  PEEP/CPAP:  [0 cmH20-5.8 cmH20] 5 cmH20  Mean Airway Pressure:  [11.2  zgB46-21.1 cmH20] 11.7 cmH20    Physical Exam  Vitals and nursing note reviewed.   Constitutional:       Comments: Intubated and sedated   HENT:      Head: Normocephalic.      Right Ear: External ear normal.      Left Ear: External ear normal.      Nose: Nose normal. No congestion or rhinorrhea.      Mouth/Throat:      Pharynx: Oropharynx is clear.   Eyes:      General: No scleral icterus.     Conjunctiva/sclera: Conjunctivae normal.   Cardiovascular:      Rate and Rhythm: Tachycardia present. Rhythm irregular.   Pulmonary:      Breath sounds: No rhonchi or rales.   Abdominal:      General: Bowel sounds are normal. There is no distension.      Palpations: Abdomen is soft.   Musculoskeletal:      Cervical back: Normal range of motion.   Lymphadenopathy:      Cervical: No cervical adenopathy.   Skin:     General: Skin is warm and dry.      Capillary Refill: Capillary refill takes less than 2 seconds.      Comments: Balloon pump tubing run into R groin and dressing c/d/i  Chronic venous stasis dermatitis and skin changes.         Laboratory:  Recent Labs   Lab 05/08/22  0339   WBC 28.59*   RBC 3.77*   HGB 9.7*   HCT 30.4*      MCV 81*   MCH 25.7*   MCHC 31.9*     Recent Labs   Lab 05/08/22  0339   *  134*   K 6.2*  6.2*  6.2*   CL 98  98   CO2 17*  17*   *  112*   CREATININE 4.7*  4.7*     Recent Labs   Lab 05/08/22  0548   PH 7.299*   PCO2 38.4   PO2 81   HCO3 18.8*   POCSATURATED 95   BE -8         Chest Imaging:   Endotracheal tube and enteric tubes are stable in position.  The heart is enlarged. There is pulmonary vascular congestion with suspected interstitial edema.  Probable small bilateral pleural effusions.  Tip of the intra-aortic balloon pump terminates in the inferior aspect of the aortic arch, stable.  Skeletal structures are intact    ASSESSMENT & RECOMMENDATIONS     Neuro/Psych  #Intubated and sedated   -fentanyl breifly but d/c now just propofol running at 35  -RAAS this AM  -5     CV  #Cardiogeneic Shock and CHF  -MET called pt hypotensive, tachycardic, and with CP  -Pt w/ intermittent CP and multiple runs of sustained Vtach and EKGs with T wave inversion and ST elevations concerning for STEMI as well as elevated troponin to 0.5 from 0.3 last was 1.4  -Asa and plavix loaded as well as Amiodarone gtt at   -[facility] demonstrated clean coronary arteries but IABP was placed   -CXR 5/6 w/ IABP in place (1:1)  -ECHO from procedure w/ EF 20% from 40 in 3/22  -Cardiology rec IV diuresis for significantly elevated filling pressures   -S/p Lasix 200 Last night, continue BID  -Started on Metolazone 5 mg QD  -Requiring Oliverio for pressure support at 3  -Heparin gtt held breifly 2/2 elevated PTT but restarted  -Daily CXR (check for IABP placement)   -AICD placement planned for next week    #Afib w/ RVR  -xeralto and metoprolol at home   -Last HR: 70's - 90's ON  -heparin and amiodarone gtt currently    #HTN  -lisinopril at home but held in setting of NARESH  -BP this /66  -Previously on levophed but transitioned to oliverio          Pulm  Intubated 2/2 airway protection and AMS  Vent settings as above  Most recent ABG  as above, follow   CXR this AM  Will remain intubated for planned procedure next week  Wean vent settings as tolerated and SBT once appropriate    FEN/GI  Diet: Nutrition consulted with Tube feeds started 5/6 w/ Peptamen intense VHP w/ goal 65ml/hr  GI prophylaxis Famotidine     #Transaminitis  AST/ALT 62/72 on admission and had been down trending   Alk phos 336 on admit and down trending to   T bili 1.1 on admit and down trended though AM is   US abdomen from admit is unremarkable     Electrolytes this AM are  K 6.2 (s/p insulin w/ D10 shift, no albuterol given Afib w/ RVR) Cl 102 CO2 16 Mg Phos     RENAL  UOP: 646cc , In: 4530cc, net 3884cc in last 24 hrs    BUN/Cr: 112/4.7, baseline 20/1.0  Continue to monitor renal status and urine output     #NARESH  -Crt on admit 1.6 but was elevated  to 4.7  -Initial urine studies likely prerenal in nature  -Nephrology consulted   -Kerns placed 5/5  -UA with 2+ protein and 1+ occult blood  -Retroperitoneal US for possible obstruction w/o any signs of obstruction  -Continue heavy diuresis, Lasix 200 mg BID and Metolazone 5mg QD  -Monitor UOP   -line placement and CRRT     #HAGMA  -AG this AM  -LA yesterday wnl  -Sodium Bicarb 650mg TID     Heme  H/H 10/31; stable  WBC 25.13 > 28.59  DVT prophylaxis: Heparin (gtt)    Endo  No known issues at this time.    ID  Increasing WBC likely 2/2 to stress response  TMax 100.1  F/u Blood Cx  F/u Sputum Cx  If spikes fever, low threshold to start Abx     Arian Beckman M.D.  [facility]   05/08/2022 10:27 AM

## 2022-05-08 NOTE — PROGRESS NOTES
Progress Note  [facility]    Attending: Dr. Zapien  Fellow: Dr. Day  Admit Date: 5/3/2022  Today's Date: 05/08/2022      SUBJECTIVE:     Interval Events:  Pt remains intubated and sedated. No acute events overnight. Shifted this AM due to hyperkalemia.     OBJECTIVE:     Vital Signs Trends/Hx Reviewed  Vitals:    05/08/22 0745 05/08/22 0800 05/08/22 0815 05/08/22 0830   BP: (!) 115/52 (!) 99/52 (!) 116/52 (!) 113/57   BP Location:       Patient Position:       Pulse: 81 82 80 86   Resp: 20 (!) 21 (!) 22 (!) 23   Temp: 97.7 °F (36.5 °C) 97.88 °F (36.6 °C) 97.88 °F (36.6 °C) 97.88 °F (36.6 °C)   TempSrc:       SpO2: (!) 94% (!) 93% (!) 90% (!) 91%   Weight:       Height:           Ventilator settings:   Vent Type: NKV-550 (5/8/2022  7:40 AM)    Vent Mode: A/CMV-VC  Oxygen Concentration (%):  [] 30  Resp Rate Total:  [20 br/min-27 br/min] 22 br/min  Vt Set:  [550 mL] 550 mL  PEEP/CPAP:  [0 cmH20-5.8 cmH20] 5 cmH20  Mean Airway Pressure:  [11.2 enJ56-30.1 cmH20] 11.7 cmH20    Physical Exam:  General: Intubated and sedated  HEENT: AT/NC, PERRL, EOMI  Neck: Supple without JVD or palpable LAD.   Cardiac: regular rate, irregular rhythm; IABP in place  Respiratory: Normal inspection. Symmetric chest rise. Normal palpation and percussion. Course breath sounds  Abdomen: soft abdomen, nondistended; nuñez in place  Extremities: lower extremity skin changes; LLE bandaged; RLE with thick skin texture. See media tab  PICC in LLE    Laboratory:  Recent Labs   Lab 05/08/22  0548   PH 7.299*   PCO2 38.4   PO2 81   HCO3 18.8*   POCSATURATED 95   BE -8     Recent Labs   Lab 05/08/22  0339   WBC 28.59*   RBC 3.77*   HGB 9.7*   HCT 30.4*      MCV 81*   MCH 25.7*   MCHC 31.9*     Recent Labs   Lab 05/08/22 0339   *  134*   K 6.2*  6.2*  6.2*   CL 98  98   CO2 17*  17*   *  112*   CREATININE 4.7*  4.7*       Microbiology Data:   Microbiology Results (last 7 days)     Procedure  Component Value Units Date/Time    Blood culture [[id number]] Collected: 05/05/22 1957    Order Status: Completed Specimen: Blood Updated: 05/08/22 0613     Blood Culture, Routine No Growth to date      No Growth to date      No Growth to date    Blood culture [[id number]] Collected: 05/05/22 1957    Order Status: Completed Specimen: Blood Updated: 05/08/22 0613     Blood Culture, Routine No Growth to date      No Growth to date      No Growth to date    Blood culture [[id number]] Collected: 05/07/22 1131    Order Status: Completed Specimen: Blood Updated: 05/08/22 0115     Blood Culture, Routine No Growth to date    Narrative:      #1 - Obtain prior to starting antibiotics    Culture, Respiratory with Gram Stain [[id number]] Collected: 05/07/22 1122    Order Status: Completed Specimen: Respiratory from Tracheal Aspirate Updated: 05/07/22 2119     Gram Stain (Respiratory) <10 epithelial cells per low power field.     Gram Stain (Respiratory) Many WBC's     Gram Stain (Respiratory) Many Gram positive cocci      Gram Stain (Respiratory) Few Gram positive rods     Gram Stain (Respiratory) Rare budding yeast    Blood culture [[id number]]     Order Status: Sent Specimen: Blood            Chest Imaging:   X-Ray Chest AP Portable    Result Date: 5/5/2022  Worsening interstitial opacities in bilateral lungs suspicious for edema. Support apparatus as above. Electronically signed by: Raphael Whelan Date:    05/05/2022 Time:    18:14        Infusions:     sodium chloride 0.9% Stopped (05/05/22 1900)    amiodarone in dextrose 5% 1 mg/min (05/08/22 0834)    amiodarone 33.3 mL/hr at 05/05/22 1800    fentanyl Stopped (05/05/22 1415)    heparin (porcine) in D5W 9 Units/kg/hr (05/08/22 0834)    heparin (porcine) Stopped (05/05/22 1900)    NORepinephrine Stopped (05/05/22 1900)    phenylephrine 3.5 mcg/kg/min (05/08/22 0834)    propofoL 35 mcg/kg/min (05/08/22 0834)    propofol Stopped (05/05/22 1900)       Scheduled Medications:     atorvastatin  40 mg Oral QHS    chlorhexidine  15 mL Mouth/Throat BID    [START ON 5/9/2022] collagenase   Topical (Top) Every Mon, Thurs    famotidine (PF)  20 mg Intravenous Daily    furosemide (LASIX) injection  200 mg Intravenous BID    metOLazone  5 mg Oral Daily    mupirocin   Nasal BID    senna-docusate 8.6-50 mg  1 tablet Oral BID    sodium bicarbonate  650 mg Oral TID       PRN Medications:   sodium chloride 0.9%, acetaminophen, amiodarone, dextrose 10%, dextrose 10%, dextrose 10%, glucagon (human recombinant), glucose, glucose, heparin (PORCINE), heparin (PORCINE), heparin (porcine), insulin aspart U-100, LIDOcaine, melatonin, methocarbamoL, morphine, naloxone, NORepinephrine, ondansetron, propofol, sars-cov-2 (covid-19), sodium chloride 0.9%    Problem List:   Patient Active Problem List   Diagnosis    Venous stasis dermatitis of both lower extremities    Ulcer - lesion    Chronic atrial fibrillation    Venous stasis ulcer of left midfoot limited to breakdown of skin    Venous (peripheral) insufficiency    Edema    Chronic venous hypertension with ulcer    Cellulitis    Skin ulcer of left foot, limited to breakdown of skin    May-Thurner syndrome    Stenosis of right iliac vein    Elevated BP    Ulcer of ankle, left, limited to breakdown of skin    Depression    NARESH (acute kidney injury)    Group A streptococcal infection    Tinea pedis of both feet    Atrial fibrillation with RVR    Bilateral edema of lower extremity    Morbid obesity    Permanent atrial fibrillation    Right knee pain    Knee pain, right    Difficulty walking    History of DVT (deep vein thrombosis)    HTN (hypertension)    Acute pulmonary embolism    Recurrent pulmonary embolism    Other pulmonary embolism without acute cor pulmonale, unspecified chronicity    Long term (current) use of anticoagulants    Chronic combined systolic and diastolic congestive heart failure    Hyperthyroidism     Elevated troponin    Non-rheumatic mitral regurgitation    Non-pressure chronic ulcer of left ankle, with unspecified severity    Cellulitis of right lower leg    Candida infection of genital region    Varicose ulcer of lower extremity    Hypomagnesemia    Chronic venous insufficiency    Pulmonary embolism    Onychomycosis    Wound of foot    Clostridium difficile infection    Acute deep vein thrombosis (DVT) of lower extremity    Erythema    Obesity, Class I, BMI 30.0-34.9 (see actual BMI)    Advance care planning    Venous stasis ulcer of lower extremity, unspecified laterality    Iron deficiency anemia    Anemia of chronic disease    Unstable angina    Chest pain    Complicated migraine    Open wound of left foot    Proteus infection    MRSA cellulitis of left foot    Wound infection    PAD (peripheral artery disease)    Alteration in skin integrity    Rash of back    Ringworm    Class 2 severe obesity due to excess calories with serious comorbidity and body mass index (BMI) of 39.0 to 39.9 in adult    Stasis ulcer    Obesity, morbid (more than 100 lbs over ideal weight or BMI > 40)    Open wound of right lower leg    Cellulitis of both lower extremities    Skin ulcer of left foot including toes with fat layer exposed    Class 3 severe obesity due to excess calories with serious comorbidity and body mass index (BMI) of 45.0 to 49.9 in adult    Pseudomonas infection    Carpal tunnel syndrome on right    Rotator cuff impingement syndrome of right shoulder    Dizziness    Multiple and open wound of lower limb    RUQ pain    VT (ventricular tachycardia)    NSTEMI (non-ST elevated myocardial infarction)    CHF (congestive heart failure)    Hyperkalemia    Metabolic acidosis    NICM (nonischemic cardiomyopathy)    Cardiogenic shock    Hypervolemia    Acute hypoxemic respiratory failure    Ventricular tachycardia       ASSESSMENT & RECOMMENDATIONS   Patient is a 56  year old male presenting with chest pain on admission with worsening pain. MET called due to patients worsening mentation and difficulty obtaining VS after nuñez placement. Concern for STEMI, patient transferred to ICU, intubated, started on pressor support and emergently underwent LHC, intra-aortic balloon pump placed on 5/5/2022. Remains critically ill with worsening renal failure     CNS/Neuro:  - intubated and sedated  - on propofol      Cardiovascular:  #Ventricular Tachycardia  #Cardiogenic shock s/p IABP placment  - new onset ventricular tachyardia  - symptomatic with chest pain, and altered mental status, MET called  - EKG with chronic LBBB, possible STEMI  - troponin 0.036>0.03>0.5>0.7>1.4  - amiodarone gtt with ASA, plavix load  - cardiology consulted, emergent LHC performed on 5/5/2022. No evidence of obstructive CAD. Very poor LV systolic function, EF 20%. Wedge pressure elevated  - Cardiology suspects pt's high filling pressures were contributing to his acute decompensation. IABP placed 5/52022   - worsening cardiac status 2/2 to volume overload. Recommend aggressive diuresis  - on elias  - recommend daily CXR for IABP placement confirmation     #Permanent Afib, now with RVR  - afib on xarelto and metoprolol at home  - admitted for chest pain, given labetalol and metoprolol in ED  - trop on admission 0.36>0.3  - chest pain resolved and returned in <24 hrs  - currently on amio drip, rate controlled; heparin drip  - cardiology consulted     #Combined systolic and diastolic CHF:  - ECHO 4/2022 EF of 20%; mild TR with severe LA enlargement  - IABP in placed     #HTN:  - home meds lisinopril and torsemide  - hold     Respiratory:  #Intubated 2/2 to airway protection  - wean sedation & vent as patient tolerates.        GI/Metabolic:  #Elevated LFTs  - presented with positive murphys sign on admission, t bili on admission 1.1  - elevated LFTs, trending down  - US unremarkable      #Hyperkalemia:  - potassium  elevated  - nephrology consulted, recommend bicarb and lokelma with minimal improvement  - shifted overnight  - lasix 40 BID  - will place trialysis line today for CRRT       GI Ppx: famotidine  Diet: NPO  Keep Mg>2 K>4     Renal:  #ARF  - baseline BUN/Cr 20/1.0  - on admission worsening Cr 1.6>3.7  - nephrology consulted, recommended holding ACEi in setting of NARESH  - UOP minimal. Kerns in place prior to MET called  - Renal US negative  - recommend aggressive diuresis  - strict I/O's    Elevated AG  - could be due to the renal failure     Heme:  DVT Ppx: heparin drip     Endo:   Strict Glucose control with goal 140-180     ID:  Leukocytosis, afebrile  - Likely reactive  - obtain blood cultures and resp cx  - no indication to start abx at this point     Code Status: Full code     Thank you for allowing us to participate in the care of this patient. We will follow up with this patient. Please call with questions.    Al Giang MD  [facility]

## 2022-05-08 NOTE — PROGRESS NOTES
[facility]           Pharmacy        Current Drug Shortage     Due to national backorder and [facility] is critically low on inventory of Dextrose 50% (D50) Syringes and Vials, pharmacy has automatically switched from D50% to D10% IVPB at the equivalent dose until resolution of the shortage per P&T approved protocol.               Farzana Daniels, PharmD  [phone]

## 2022-05-08 NOTE — PROGRESS NOTES
[facility] Progress Note    Subjective:      Drew Frarar remains intubated and now having much less urine output and persistent hyperkalemia. Discussed dialysis with his mother this morning and she is ok with starting. Consent obtained and placed in chart. No new problems overnight.      Objective:   Last 24 Hour Vital Signs:  BP  Min: 80/51  Max: 128/69  Temp  Av °F (36.7 °C)  Min: 97.34 °F (36.3 °C)  Max: 98.6 °F (37 °C)  Pulse  Av.1  Min: 70  Max: 193  Resp  Av.5  Min: 20  Max: 28  SpO2  Av.3 %  Min: 89 %  Max: 98 %  Weight  Av.7 kg (323 lb 8.4 oz)  Min: 146.5 kg (322 lb 15.6 oz)  Max: 147 kg (324 lb 1.2 oz)  I/O last 3 completed shifts:  In: 6502.8 [I.V.:5394.3; NG/GT:300; IV Piggyback:808.5]  Out: 1546 [Urine:1546]    Physical Examination:  Gen: obese, intubated, sedated   HEENT: normocephalic, atraumatic, MMM, ETT in place  CV: rate back down to 80s   Lungs: symmetric chest rise, slight wheezing on right   Extrem: chronic edema and changes consistent with chronic venous insufficiency. No pitting. No cyanosis.   Skin: dry, warm, intact. Dark skin changes on LE due to venous insufficiency        Laboratory:  Recent Labs   Lab 22  1533 22  0552 22  0732 22  1018 22  1308 22  0416 22  0819 22  0408 22  0916 22  0339 22  0820 22  1125   WBC  --   --   --    < >  --  14.96*  --  25.13*  --  28.59*  --   --    HGB  --   --   --    < >  --  10.8*  --  10.1*  --  9.7*  --   --    HCT  --   --   --    < >  --  35.0*  --  31.8*  --  30.4*  --   --    PLT  --   --   --    < >  --  248  --  233  --  188  --   --       < > 138  --  139 136   < > 137  137   < > 134*  134* 130* 133*   K 5.4*   < > 5.4*  --  5.8* 5.7*   < > 5.5*  5.5*  5.5*   < > 6.2*  6.2*  6.2* 5.8* 6.1*      < > 107  --  105 102   < > 100  100   < > 98  98 95 97   CREATININE 2.8*   < > 3.5*  --  3.7* 3.8*   < > 4.0*  4.0*   < > 4.7*   4.7* 4.9* 4.8*   BUN 78*   < > 87*  --  88* 92*   < > 102*  102*   < > 112*  112* 110* 112*   CO2 20*   < > 18*  --  15* 16*   < > 20*  20*   < > 17*  17* 15* 18*   ALT 61*  --  50*  --  53*  --   --   --   --   --   --   --    AST 57*  --  52*  --  65*  --   --   --   --   --   --   --     < > = values in this interval not displayed.       Microbiology   5/5 BCx NGTD    Radiology:   Imaging has been reviewed.    Current Medications:     Infusions:   sodium chloride 0.9%      sodium chloride 0.9% Stopped (05/05/22 1900)    amiodarone in dextrose 5% 1 mg/min (05/08/22 1503)    amiodarone 33.3 mL/hr at 05/05/22 1800    fentanyl Stopped (05/05/22 1415)    heparin (porcine) in D5W 9 Units/kg/hr (05/08/22 1503)    heparin (porcine) Stopped (05/05/22 1900)    NORepinephrine Stopped (05/05/22 1900)    phenylephrine 3.5 mcg/kg/min (05/08/22 1503)    propofoL 35 mcg/kg/min (05/08/22 1503)    propofol Stopped (05/05/22 1900)        Scheduled:   sodium chloride 0.9%   Intravenous Once    atorvastatin  40 mg Oral QHS    chlorhexidine  15 mL Mouth/Throat BID    [START ON 5/9/2022] collagenase   Topical (Top) Every Mon, Thurs    famotidine (PF)  20 mg Intravenous Daily    furosemide (LASIX) injection  200 mg Intravenous BID    metOLazone  5 mg Oral Daily    mupirocin   Nasal BID    senna-docusate 8.6-50 mg  1 tablet Oral BID    sodium bicarbonate  650 mg Oral TID        PRN:  sodium chloride 0.9%, sodium chloride 0.9%, acetaminophen, amiodarone, dextrose 10%, dextrose 10%, dextrose 10%, glucagon (human recombinant), glucose, glucose, heparin (PORCINE), heparin (PORCINE), heparin (porcine), insulin aspart U-100, LIDOcaine, magnesium sulfate IVPB, melatonin, methocarbamoL, morphine, naloxone, NORepinephrine, ondansetron, propofol, sars-cov-2 (covid-19), sodium chloride 0.9%, sodium chloride 0.9%        Assessment and Plan:     Drew CONCEPCION Farrar is a 56 y.o.male with HF, obesity, A fib, HTN who presented  with chest pain, noted to have A fib with RVR. He is currently intubated and sedated and still in A fib with RVR. His renal function has been declining this admission, and [facility] is consulted for NARESH. He has normal baseline renal function, with baseline creatinine of 0.8. He presented with creatinine 1.6 and has climbed to a peak of 4.5. He has been responsive to lasix but now having less urine output.    Stage III NARESH  - normal baseline renal function. Presenting with creatinine 1.6, peaked at 4.5  - etiology likely ATN from poor perfusion with persistent RVR and volume overload. Recent wedge pressure 40 and imaging consistent with pulmonary edema  - , Cr 4.7 today  - lasix 120mg BID yesterday with decreased output; potassium not responding well to shifting   - consented mother for dialysis. ICU team to place trialysis line today. Will start dialyzing. Given his cardiac status and large volume UF needed, will opt for CVVHD to start today  - net UF goal up to 200 ml/hr as tolerating     Hyperkalemia  - K has been staying at 6 now despite shifting and lokelma  - will resolve with initiation of CVVHD    Metabolic Acidosis  - bicarb now 18, improved from 15  - continue sodium bicarb 650 TID for now but can discontinue after dialysis is started     Thank you for allowing us to participate in the care of this patient. Please call with any questions.     Discussed with Dr. Ankit Chaudhary, DO  [facility]

## 2022-05-08 NOTE — PLAN OF CARE
Patient on ventilator with documented settings.  Patient intubated with 7.5 ETT secured at 22 cm at the lip.  Alarms are adequately set and functioning properly.  AMBU bag and mask at bedside.  Will continue to monitor.

## 2022-05-08 NOTE — CARE UPDATE
Pt seen and evaluated, full note to follow    VT  ADHF  NICM (new with EF 20%) with severe volume overload  Afib  Hyperkalemia  UOP decreased  Shock - IABP  Pressor support   Intubated  ICD once clinically stable  Would benefit from CRRT

## 2022-05-08 NOTE — EICU
Rounding (Video Assessment):  No    Intervention Initiated From:  Bedside    Lamin Communicated with Bedside Nurse regarding:  Time-Out    Nurse Notified:  Yes    Doctor Notified:  Yes    Comments: Called into patients room remotely for a time out for a R Ij Triaysis line placement.  The line was placed successfully by Dr. Giang

## 2022-05-08 NOTE — PLAN OF CARE
Pt remains intubated, and sedated in ICU. Withdraws to painful stimuli. Afib continued on monitor, HR 70's-90's. Vent settings: A/C TV: 550/R: 20/P: 5/F: 30% with POX maintained >95%. Total UOP: 251 ml via nuñez. Neosynephrine, Amiodarone, Heparin, and Propofol gtt remain infusing, see MAR for titrations. IABP in place, with Trigger set to ECG, and Frequency 1:1, with augmentation 80's-90's, and MAP maintained >65. Safety and skin precautions in place and maintained during shift.   Problem: Adult Inpatient Plan of Care  Goal: Absence of Hospital-Acquired Illness or Injury  Outcome: Ongoing, Progressing  Goal: Optimal Comfort and Wellbeing  Outcome: Ongoing, Progressing     Problem: Bariatric Environmental Safety  Goal: Safety Maintained with Care  Outcome: Ongoing, Progressing     Problem: Fall Injury Risk  Goal: Absence of Fall and Fall-Related Injury  Outcome: Ongoing, Progressing     Problem: Restraint, Nonbehavioral (Nonviolent)  Goal: Absence of Harm or Injury  Outcome: Ongoing, Progressing

## 2022-05-09 PROBLEM — Z51.5 COMFORT MEASURES ONLY STATUS: Status: ACTIVE | Noted: 2022-01-01

## 2022-05-09 NOTE — CONSULTS
Consult Note  Palliative Care    Consult Requested By: Venice Storey MD  Reason for Consult: Goals of care    SUBJECTIVE:     History of Present Illness:  Disease Process: Advanced Cardiac Disease    56M with PMH of morbid obesity c/b HTN, HLD, PVD, CAD, CHF, Afib and DVT on AC, multiple nonhealing venous insufficiency ulcers with multiple prior admits for wound infections. Comes from home with home health svcs through [facility].    Pt presented to ED on 5/3 for one day of severe nonradiating substernal chest pain associated with n/v progressed to total PO intolerance. Arrived with mildly ^ troponin, EKG showing AF/RVR, and +Frederick sign. Rate controlled with labetalol. Admitted to  service for further mgmt of RVR. Overnight had recurrent RVR, reportedly runs of VT, and vomiting x 1 with small blood.    5/4: Hypotensive to 90s/50s in AM. Nausea resolved. Developed 10/10 CP in evening responsive to 1mg IV morphine. Had frequent recurrent CP two more times overnight. In daytime immediately after Kerns placement pt had 30 seconds of VT, became unresponsive, MET called and pt was found pulseless, pushed atropine, did not warrant shock, pt began vomiting and was intubated, stepped up to ICU. EKG at that time showed continued AF/RVR with new TWIs and possible SEBLE. Troponin rising 0.5 -> 0.7. Cr 3.5. Started on levophed and amiodarone gtt's. Bedside TTE showed global LV hypokinesis and EF 35%.    5/5: Taken emergently to cath lab which showed grossly patent coronaries with high filling pressures. IABP placed and cardiology recommended IV diuresis, continued amio and heparin, and advised that ultimately pt would need AICD for secondary prevention. Nephro consulted for NARESH with high anion gap metabolic acidosis, noted preserved UOP, recommended renal sono to r/o obstruction. High UOP overnight but with increasing heart rate and pressor requirements.    5/6: Worsening hyperkalemia, medically managed. Decreasing UOP in  response to lasix.    5/7: Line placed for HD access due to steadily worsening BUN/Cr (102/4.0) and K 5.5 refractory to multiple attempts at K+ shifting.    5/8: Mother consented to CRRT which was initiated for hyperkalemia and oliguria. Developed hypotension requiring decreased amio gtt, resumed levophed.    5/9: CRRT held per nephro recs due to pt not tolerating pulling fluid and no concrete indication on labs. Cardiology feels pt would benefit from further volume reduction. CRRT held to facilitate goals of care discussion.    Palliative has been consulted for goals of care.    At time of interview pt is intubated, sedated and unresponsive to any stimuli with rapid irregularly irregular HR and mechanical sounds from IABP. Anasarcic with rare punctate lesions on fingerpads bilaterally; no splinter hemorrhages appreciated. Profound venous stasis dermatitis bilaterally.    Pt's mother, father-in-law and domestic partner presented to bedside for goals of care discussion. Reviewed clinical course and educated that unfortunately pt's MI was due to insufficient cardiac output and not atherosclerosis which leaves us with no interventions beyond IABP placement which was optimistically meant to bootstrap pt's heart back into sufficient output to sustain his life however pt's IABP treatment has been prolonged well beyond a typical max duration and he is still requiring 1:1 support and steadily increasing pressors.    Unfortunately all available evidence is that pt's cardiogenic shock is not recoverable and he will not survive this admission. Informed all family present that pt's prognosis is measured in hours even if current measures are sustained based on his worsening RVR and hypotension despite maximal therapy with no further escalations available. Extensive emotional support provided by writer and primary RN.    All family present accept that pt is at end of life and agree that they do not wish for him to suffer. Confirm  DNR status (ordered by writer). Pt's mother will remain with him and his partner will also stay at bedside to speak to him for as long as they have left together. Pt is [place] and requires anointing of the sick which RN has requested.    Family agrees to discontinue CRRT at this time given inability to achieve net negative volume and agrees to a gradual wean-down off pressors over several hours once pt is anointed. Advised family that pt is anticipated to enter cardiac arrest at some unknown level of pressor support, likely before the drips are completely withdrawn, and that pt will have sufficient analgosedation with propofol and fentanyl to ensure he does not suffer. Terminal extubation is felt unwarranted given grim cardiac prognosis and per discussion with RN and cardiology the IABP can be withdrawn when pressor support is lower.    Unable to formally enroll pt in hospice-in-place overnight and comfort measures will be managed by the on-call  resident.    Pt's mother will keep cole at bedside along with his partner. Pt's father has not seen pt in years and he will be notified that pt is at end of life by his partner.    Past Medical History:   Diagnosis Date    *Atrial fibrillation     Anticoagulant long-term use     Arthritis     Atrial fibrillation     Atrial fibrillation Feb 23, 2016    Bipolar disorder     Carpal tunnel syndrome on right 2/10/2022    CHF (congestive heart failure)     Congenital heart disease     s/p surgical intervention at 18 months of age    Deep vein thrombosis     DVT of leg (deep venous thrombosis)     left leg    History of prior ablation treatment     10/9/13    Hypertension     Obesity     Stroke     Thyroid disease     Venous stasis ulcer of lower extremity, unspecified laterality 12/14/2012    Venous ulcer      Past Surgical History:   Procedure Laterality Date    ABLATION Left 1/20/2022    Procedure: Ablation;  Surgeon: Gomez Lantigua MD;  Location: [facility]  CATH LAB/EP;  Service: Cardiology;  Laterality: Left;    ANGIOPLASTY      CARDIAC SURGERY      open heart surgery at 18 months old    EYE SURGERY      left eye cataract/right eye glaucoma    TIMO FILTER PLACEMENT      Dr Calix ([facility])    INSERTION OF INTRA-AORTIC BALLOON ASSIST DEVICE  5/5/2022    Procedure: INSERTION, INTRA-AORTIC BALLOON PUMP;  Surgeon: Moreno Whitten MD;  Location: [facility] CATH LAB/EP;  Service: Cardiology;;    KNEE SURGERY      l and r     LEFT HEART CATHETERIZATION Left 5/5/2022    Procedure: Left heart cath;  Surgeon: Moreno Whitten MD;  Location: [facility] CATH LAB/EP;  Service: Cardiology;  Laterality: Left;    MULTIPLE TOOTH EXTRACTIONS      RIGHT HEART CATHETERIZATION Right 5/5/2022    Procedure: INSERTION, CATHETER, RIGHT HEART;  Surgeon: Moreno Whitten MD;  Location: [facility] CATH LAB/EP;  Service: Cardiology;  Laterality: Right;    SKIN GRAFT      left leg     Family History   Problem Relation Age of Onset    Diabetes Father     Heart disease Father     Heart disease Maternal Grandmother     Diabetes Maternal Grandfather     Heart disease Maternal Grandfather     Stroke Maternal Grandfather      Social History     Tobacco Use    Smoking status: Former Smoker     Packs/day: 1.00     Years: 6.00     Pack years: 6.00     Types: Cigarettes    Smokeless tobacco: Former User    Tobacco comment: quit by age 27yrs old   Substance Use Topics    Alcohol use: Yes     Alcohol/week: 1.0 standard drink     Types: 1 Glasses of wine per week     Comment: occasionally    Drug use: No       Mental Status: Non-responsive    ECOG Performance Status Grade: 4 - Completely disabled    Review of Systems:  Review of systems not obtained due to patient factors intubated, unresponsive.    Review of Symptoms    Symptom Assessment (ESAS 0-10 Scale)  Unable to complete assessment due to Dementia     CAM / Delirium:  Negative  Constipation:  Negative  Diarrhea:  Negative    Bowel  Management Plan (BMP):  No      Pain Assessment in Advanced Demential Scale (PAINAD)   Breathing - Independent of vocalization:  0  Negative vocalization:  0  Facial expression:  0  Body language:  0  Consolability:  0  Total:  0    ECOG Performance Status thGthrthathdtheth:th th5th Living Arrangements:  Lives in home and Lives with family    Psychosocial/Cultural: Lifelong mild MR, was gainfully employed until lost ambulation 2/2 PVD    Spiritual:  F - Lucita and Belief:  Caodaism  I - Importance:  Moderate  C - Community:  [facility]  A - Address in Care:   en route for anointing of the sick; pastoral visits prn     Time-Based Charting:  Yes  Chart Review: 25 minutes  Face to Face: 15 minutes  Symptom Assessment: 15 minutes  Coordination of Care: 10 minutes  Discharge Planning: 10 minutes  Advance Care Plannin minutes  Goals of Care: 35 minutes    Total Time Spent: 140 minutes      Advance Care Planning   Advance Directives:   Living Will: No        Oral Declaration: No    Medical Power of : Yes    Agent's Name:  Josey Cota   Agent's Contact Number:  [phone]    Decision Making:  Family answered questions and Patient unable to communicate due to disease severity/cognitive impairment         OBJECTIVE:     Physical Exam  Constitutional:       Appearance: He is obese. He is toxic-appearing.      Interventions: He is intubated.   Eyes:      General:         Right eye: Discharge present.         Left eye: Discharge present.     Conjunctiva/sclera:      Right eye: Exudate present.      Left eye: Exudate present.      Comments: Conjunctival edema   Cardiovascular:      Rate and Rhythm: Tachycardia present. Rhythm irregular.      Pulses: Decreased pulses.      Heart sounds: Heart sounds are distant.      Comments: Mechanical sound from IABP  Pulmonary:      Effort: Pulmonary effort is normal. He is intubated.      Breath sounds: Normal breath sounds.   Abdominal:      General: Abdomen is flat.       Palpations: Abdomen is soft.   Musculoskeletal:      Right lower leg: Edema present.      Left lower leg: Edema present.      Comments: Hands cool to touch   Skin:     Capillary Refill: Capillary refill takes more than 3 seconds.      Coloration: Skin is pale.      Comments: Profound venous stasis dermatitis of BLEs   Neurological:      Mental Status: He is unresponsive.      Motor: Abnormal muscle tone (hypotonic) present.      Comments: Disconjugate gaze         ASSESSMENT/PLAN:     56M with PMH of morbid obesity c/b HTN, HLD, PVD, CAD, CHF, Afib and DVT on AC, multiple nonhealing venous insufficiency ulcers with multiple prior admits for wound infections. Admitted for Afib/RVR with course c/b pulseless VT arrest, s/p emergent LHC with clean coronaries but markedly elevated filling pressures 2/2 chronic vascular disease. IABP placed for management of cardiogenic shock with no improvement to date, now in multi-organ failure on CRRT with rapidly worsening Afib refractory to amiodarone and 3 pressors. Palliative consulted for end of life care.    Pt is actively dying and family accepts DNR status and transition to comfort focused care with family cole at bedside. Maintain MV; IABP to be withdrawn during pressor wean.    Recommendations:  Medical: CRRT to be withdrawn now and pressors will be weaned down after pt receives [employer] anointing.  Symptom Management: Fentanyl gtt added to propofol gtt for comfort. Further adjustments by on call resident PRN if indicated however do not anticipate pt will regain alertness.  Psychosocial: Family dynamic is healthy and pt's partner is participating in care decisions and notifying other family members. Will notify [facility] to contact family to arrange bereavement support.  Legal: Pt's mother, Josey, is MPOA. Pt's father is estranged.  Prognosis: 1-6 hours once pressors are being weaned    Garrison Sosa MD  Hospice and Palliative Medicine  Palliative Care Pager:  [phone]    Advance Care Planning     Date: 05/09/2022    Code Status  In light of the patients advanced and life limiting illness,I engaged the the family and healthcare power of   in a conversation about the patient's preferences for care  at the very end of life. The patient wishes to have a natural, peaceful death.  Along those lines, the patient does not wish to have CPR or other invasive treatments performed when his heart and/or breathing stops. I communicated to the family and healthcare power of   that a DNR order would be placed in his medical record to reflect this preference.  I spent a total of 65 minutes engaging the patient in this advance care planning discussion.       [facility]  I engaged the family and healthcare power of   in a conversation about advance care planning and we specifically addressed what the goals of care would be moving forward, in light of the patient's change in clinical status, specifically refractory cardiogenic shock.  We did specifically address the patient's likely prognosis, which is grave.  We explored the patient's values and preferences for future care.  The family and healthcare power of   endorses that what is most important right now is to focus on symptom/pain control and quality of life, even if it means sacrificing a little time    Accordingly, we have decided that the best plan to meet the patient's goals includes comfort focused care under the  service.    I did explain the role for hospice care at this stage of the patient's illness, including its ability to help the patient live with the best quality of life possible.  We will not be making a hospice referral.    I spent a total of 65 minutes engaging the patient in this advance care planning discussion.

## 2022-05-09 NOTE — SUBJECTIVE & OBJECTIVE
Interval History:  Pt seen earlier on CRRT- not tolerating any net fluid removal, requiring multiple pressors for support BP.  Still on balloon pump as well.  Palliative care consult placed.        Review of patient's allergies indicates:   Allergen Reactions    Contrast media Other (See Comments)     Severe chest pain    Food allergy formula [glutamine-c-quercet-selen-brom]      Allergic to green peas; Heart failure.    Iodinated contrast media Other (See Comments)     Chest pain    Peas Hives    Amiodarone analogues Other (See Comments)     passed out in ER    Ibuprofen Swelling    Latex, natural rubber Hives    Pcn [penicillins] Hives    Quercetin     Butisol [butabarbital] Rash     Peeling skin     Current Facility-Administered Medications   Medication Frequency    0.9%  NaCl infusion PRN    0.9%  NaCl infusion Once    acetaminophen tablet 650 mg Q4H PRN    amiodarone 360 mg/200 mL (1.8 mg/mL) infusion Continuous    atorvastatin tablet 40 mg QHS    chlorhexidine 0.12 % solution 15 mL BID    collagenase ointment Every Mon, Thurs    dextrose 10% bolus 125 mL PRN    dextrose 10% bolus 250 mL PRN    famotidine (PF) injection 20 mg Daily    fentaNYL 2500 mcg in 0.9% sodium chloride 250 mL infusion premix (titrating) Continuous    glucagon (human recombinant) injection 1 mg PRN    glucose chewable tablet 16 g PRN    glucose chewable tablet 24 g PRN    heparin (porcine) injection 2,400 Units PRN    heparin 25,000 units in dextrose 5% (100 units/ml) IV bolus from bag - ADDITIONAL PRN BOLUS - 30 units/kg PRN    heparin 25,000 units in dextrose 5% (100 units/ml) IV bolus from bag - ADDITIONAL PRN BOLUS - 60 units/kg PRN    heparin 25,000 units in dextrose 5% 250 mL (100 units/mL) infusion HIGH INTENSITY nomogram - [facility] Continuous    insulin aspart U-100 pen 1-10 Units Q6H PRN    LIDOcaine 5 % patch 1 patch Daily PRN    melatonin tablet 9 mg Nightly PRN    methocarbamoL tablet 500 mg BID PRN    morphine injection 1 mg Q4H  PRN    naloxone 0.4 mg/mL injection 0.02 mg PRN    NORepinephrine 32 mg in dextrose 5 % 250 mL infusion Continuous    ondansetron injection 4 mg TID PRN    phenylephrine (ASTRID-SYNEPHRINE) 100 mg in sodium chloride 0.9% 250 mL infusion Continuous    propofol (DIPRIVAN) 10 mg/mL infusion Continuous    sars-cov-2 (covid-19) (MODERNA COVID-19) 100 mcg/0.5 ml injection 0.25 mL vaccine x 1 dose    senna-docusate 8.6-50 mg per tablet 1 tablet BID    sodium chloride 0.9% bolus 250 mL PRN    sodium chloride 0.9% flush 5 mL PRN    vasopressin (PITRESSIN) 0.2 Units/mL in dextrose 5 % 100 mL infusion Continuous       Objective:     Vital Signs (Most Recent):  Temp: 97.88 °F (36.6 °C) (05/09/22 1400)  Pulse: 106 (05/09/22 1400)  Resp: (!) 33 (05/09/22 1400)  BP: (!) 123/58 (05/09/22 1400)  SpO2: 95 % (05/09/22 1400)  O2 Device (Oxygen Therapy): ventilator (05/09/22 1326)   Vital Signs (24h Range):  Temp:  [95.18 °F (35.1 °C)-98.24 °F (36.8 °C)] 97.88 °F (36.6 °C)  Pulse:  [] 106  Resp:  [20-35] 33  SpO2:  [90 %-98 %] 95 %  BP: ()/(44-84) 123/58     Weight: (!) 147 kg (324 lb 1.2 oz) (05/08/22 0700)  Body mass index is 35.6 kg/m².  Body surface area is 2.88 meters squared.    I/O last 3 completed shifts:  In: 7374.4 [I.V.:6517.8; NG/GT:270; IV Piggyback:586.7]  Out: 3231 [Urine:491; Other:2740]    Physical Exam  Vitals reviewed.   Constitutional:       Appearance: Normal appearance.   HENT:      Head: Normocephalic and atraumatic.   Cardiovascular:      Rate and Rhythm: Regular rhythm. Tachycardia present.      Heart sounds: Normal heart sounds.   Pulmonary:      Effort: Pulmonary effort is normal.      Breath sounds: Normal breath sounds.   Abdominal:      General: Bowel sounds are normal.      Palpations: Abdomen is soft.   Musculoskeletal:         General: Swelling present.      Right lower leg: Edema present.   Skin:     General: Skin is warm and dry.   Neurological:      General: No focal deficit present.       Mental Status: He is alert.       Significant Labs:  Cardiac Markers: No results for input(s): CKMB, TROPONINT, MYOGLOBIN in the last 168 hours.  CBC:   Recent Labs   Lab 05/09/22  0333   WBC 15.73*   RBC 3.44*   HGB 8.8*   HCT 27.4*   *   MCV 80*   MCH 25.6*   MCHC 32.1     CMP:   Recent Labs   Lab 05/05/22  1308 05/06/22  0416 05/09/22  1303   *   < > 68*   CALCIUM 9.9   < > 8.0*   ALBUMIN 3.3*   < > 2.0*   PROT 7.9  --   --       < > 134*   K 5.8*   < > 4.3   CO2 15*   < > 19*      < > 100   BUN 88*   < > 65*   CREATININE 3.7*   < > 2.9*   ALKPHOS 264*  --   --    ALT 53*  --   --    AST 65*  --   --    BILITOT 1.2*  --   --     < > = values in this interval not displayed.     Recent Labs   Lab 05/05/22  1349   COLORU Yellow   SPECGRAV 1.020   PHUR 5.0   PROTEINUA Negative   NITRITE Negative   LEUKOCYTESUR Negative   UROBILINOGEN Negative        Significant Imaging:  Labs: Reviewed  X-Ray: Reviewed  US: Reviewed  CT: Reviewed

## 2022-05-09 NOTE — PROGRESS NOTES
[facility] - Intensive Care  Nephrology  Progress Note    Patient Name: Drew Farrar  MRN: [id number]  Admission Date: 5/3/2022  Hospital Length of Stay: 6 days  Attending Provider: Venice Storey MD   Primary Care Physician: Garrison Roach MD  Principal Problem:Chest pain    Subjective:     HPI:   Chest Pain        Patient presents to the ED via  EMS with reports of having chest pain that started yesterday at 5 pm. Hx A-Fib with RVR.    56-year-old  56-year-old male history of AFib on Xarelto, CHF, DVT, hypertension presents for chest pain.  Began around 5:00 p.m..  Mostly right-sided sharp and radiating to the back.  Patient felt his AFib developed rapid ventricular response.  It is associated with shortness of breath, diaphoresis.     The history is provided by the patient.            Review of patient's allergies indicates:   Allergen Reactions    Contrast media Other (See Comments)       Severe chest pain    Food allergy formula [glutamine-c-quercet-selen-brom]         Allergic to green peas; Heart failure.    Iodinated contrast media Other (See Comments)       Chest pain    Peas Hives    Amiodarone analogues Other (See Comments)       passed out in ER    Ibuprofen Swelling    Latex, natural rubber Hives    Pcn [penicillins] Hives    Quercetin      Butisol [butabarbital] Rash       Peeling skin           Past Medical History:   Diagnosis Date    *Atrial fibrillation      Anticoagulant long-term use      Arthritis      Atrial fibrillation      Atrial fibrillation Feb 23, 2016    Bipolar disorder      Carpal tunnel syndrome on right 2/10/2022    CHF (congestive heart failure)      Congenital heart disease       s/p surgical intervention at 18 months of age    Deep vein thrombosis      DVT of leg (deep venous thrombosis)       left leg    History of prior ablation treatment       10/9/13    Hypertension      Obesity      Stroke      Thyroid disease      Venous stasis ulcer of lower  extremity, unspecified laterality 12/14/2012    Venous ulcer              Past Surgical History:   Procedure Laterality Date    ABLATION Left 1/20/2022     Procedure: Ablation;  Surgeon: Gomez Lantigua MD;  Location: [facility] CATH LAB/EP;  Service: Cardiology;  Laterality: Left;    ANGIOPLASTY        CARDIAC SURGERY         open heart surgery at 18 months old    EYE SURGERY         left eye cataract/right eye glaucoma    TIMO FILTER PLACEMENT         Dr Calix ([facility])    KNEE SURGERY         l and r     MULTIPLE TOOTH EXTRACTIONS        SKIN GRAFT         left leg            Family History   Problem Relation Age of Onset    Diabetes Father      Heart disease Father      Heart disease Maternal Grandmother      Diabetes Maternal Grandfather      Heart disease Maternal Grandfather      Stroke Maternal Grandfather        Social History            Tobacco Use    Smoking status: Former Smoker       Packs/day: 1.00       Years: 6.00       Pack years: 6.00       Types: Cigarettes    Smokeless tobacco: Former User    Tobacco comment: quit by age 27yrs old   Substance Use Topics    Alcohol use: Yes       Alcohol/week: 1.0 standard drink       Types: 1 Glasses of wine per week       Comment: occasionally    Drug use: No      Review of Systems   Constitutional: Negative for chills and fever.   HENT: Negative for congestion, rhinorrhea and sore throat.    Eyes: Negative for visual disturbance.   Respiratory: Positive for shortness of breath. Negative for cough.    Cardiovascular: Positive for chest pain and palpitations.   Gastrointestinal: Negative for abdominal pain, diarrhea, nausea and vomiting.   Genitourinary: Negative for dysuria and hematuria.   Musculoskeletal: Negative for back pain and myalgias.   Skin: negative for pallor and rash.   Neurological: Negative for dizziness and weakness.   All other systems reviewed and are negative.       Pt seen prior to transfer to ICU this am- was  hypotensive and c/o lower abdominal pain radiating to R flank/back.  Denies any kidney problems or difficulty urinating but has been weak and having low BP.  Pt was getting NS bolus before nuñez cath placed for urinary retention- over 400 cc on bladder scan.        Pt started on CRRT yesterday, not tolerating net fluid removal due to hypotension this am and on pressors support -only tolerated net  cc/hr since started CRRT. Remains on balloon pump and urine output decreasing. Hyperkalemia corrected.      Interval History:  Pt seen earlier on CRRT- not tolerating any net fluid removal, requiring multiple pressors for support BP.  Still on balloon pump as well.  Palliative care consult placed.        Review of patient's allergies indicates:   Allergen Reactions    Contrast media Other (See Comments)     Severe chest pain    Food allergy formula [glutamine-c-quercet-selen-brom]      Allergic to green peas; Heart failure.    Iodinated contrast media Other (See Comments)     Chest pain    Peas Hives    Amiodarone analogues Other (See Comments)     passed out in ER    Ibuprofen Swelling    Latex, natural rubber Hives    Pcn [penicillins] Hives    Quercetin     Butisol [butabarbital] Rash     Peeling skin     Current Facility-Administered Medications   Medication Frequency    0.9%  NaCl infusion PRN    0.9%  NaCl infusion Once    acetaminophen tablet 650 mg Q4H PRN    amiodarone 360 mg/200 mL (1.8 mg/mL) infusion Continuous    atorvastatin tablet 40 mg QHS    chlorhexidine 0.12 % solution 15 mL BID    collagenase ointment Every Mon, Thurs    dextrose 10% bolus 125 mL PRN    dextrose 10% bolus 250 mL PRN    famotidine (PF) injection 20 mg Daily    fentaNYL 2500 mcg in 0.9% sodium chloride 250 mL infusion premix (titrating) Continuous    glucagon (human recombinant) injection 1 mg PRN    glucose chewable tablet 16 g PRN    glucose chewable tablet 24 g PRN    heparin (porcine) injection 2,400  Units PRN    heparin 25,000 units in dextrose 5% (100 units/ml) IV bolus from bag - ADDITIONAL PRN BOLUS - 30 units/kg PRN    heparin 25,000 units in dextrose 5% (100 units/ml) IV bolus from bag - ADDITIONAL PRN BOLUS - 60 units/kg PRN    heparin 25,000 units in dextrose 5% 250 mL (100 units/mL) infusion HIGH INTENSITY nomogram - [facility] Continuous    insulin aspart U-100 pen 1-10 Units Q6H PRN    LIDOcaine 5 % patch 1 patch Daily PRN    melatonin tablet 9 mg Nightly PRN    methocarbamoL tablet 500 mg BID PRN    morphine injection 1 mg Q4H PRN    naloxone 0.4 mg/mL injection 0.02 mg PRN    NORepinephrine 32 mg in dextrose 5 % 250 mL infusion Continuous    ondansetron injection 4 mg TID PRN    phenylephrine (ASTRID-SYNEPHRINE) 100 mg in sodium chloride 0.9% 250 mL infusion Continuous    propofol (DIPRIVAN) 10 mg/mL infusion Continuous    sars-cov-2 (covid-19) (MODERNA COVID-19) 100 mcg/0.5 ml injection 0.25 mL vaccine x 1 dose    senna-docusate 8.6-50 mg per tablet 1 tablet BID    sodium chloride 0.9% bolus 250 mL PRN    sodium chloride 0.9% flush 5 mL PRN    vasopressin (PITRESSIN) 0.2 Units/mL in dextrose 5 % 100 mL infusion Continuous       Objective:     Vital Signs (Most Recent):  Temp: 97.88 °F (36.6 °C) (05/09/22 1400)  Pulse: 106 (05/09/22 1400)  Resp: (!) 33 (05/09/22 1400)  BP: (!) 123/58 (05/09/22 1400)  SpO2: 95 % (05/09/22 1400)  O2 Device (Oxygen Therapy): ventilator (05/09/22 1326)   Vital Signs (24h Range):  Temp:  [95.18 °F (35.1 °C)-98.24 °F (36.8 °C)] 97.88 °F (36.6 °C)  Pulse:  [] 106  Resp:  [20-35] 33  SpO2:  [90 %-98 %] 95 %  BP: ()/(44-84) 123/58     Weight: (!) 147 kg (324 lb 1.2 oz) (05/08/22 0700)  Body mass index is 35.6 kg/m².  Body surface area is 2.88 meters squared.    I/O last 3 completed shifts:  In: 7374.4 [I.V.:6517.8; NG/GT:270; IV Piggyback:586.7]  Out: 3231 [Urine:491; Other:2740]    Physical Exam  Vitals reviewed.   Constitutional:       Appearance:  Normal appearance.   HENT:      Head: Normocephalic and atraumatic.   Cardiovascular:      Rate and Rhythm: Regular rhythm. Tachycardia present.      Heart sounds: Normal heart sounds.   Pulmonary:      Effort: Pulmonary effort is normal.      Breath sounds: Normal breath sounds.   Abdominal:      General: Bowel sounds are normal.      Palpations: Abdomen is soft.   Musculoskeletal:         General: Swelling present.      Right lower leg: Edema present.   Skin:     General: Skin is warm and dry.   Neurological:      General: No focal deficit present.      Mental Status: He is alert.       Significant Labs:  Cardiac Markers: No results for input(s): CKMB, TROPONINT, MYOGLOBIN in the last 168 hours.  CBC:   Recent Labs   Lab 05/09/22  0333   WBC 15.73*   RBC 3.44*   HGB 8.8*   HCT 27.4*   *   MCV 80*   MCH 25.6*   MCHC 32.1     CMP:   Recent Labs   Lab 05/05/22  1308 05/06/22  0416 05/09/22  1303   *   < > 68*   CALCIUM 9.9   < > 8.0*   ALBUMIN 3.3*   < > 2.0*   PROT 7.9  --   --       < > 134*   K 5.8*   < > 4.3   CO2 15*   < > 19*      < > 100   BUN 88*   < > 65*   CREATININE 3.7*   < > 2.9*   ALKPHOS 264*  --   --    ALT 53*  --   --    AST 65*  --   --    BILITOT 1.2*  --   --     < > = values in this interval not displayed.     Recent Labs   Lab 05/05/22  1349   COLORU Yellow   SPECGRAV 1.020   PHUR 5.0   PROTEINUA Negative   NITRITE Negative   LEUKOCYTESUR Negative   UROBILINOGEN Negative        Significant Imaging:  Labs: Reviewed  X-Ray: Reviewed  US: Reviewed  CT: Reviewed    Assessment/Plan:     Hyperkalemia  Hyperkalemia- corrected with CRRT- cont to monitor labs.    NARESH (acute kidney injury)  NARESH- oliguric- tolerating CRRT- however not tolerating net fluid removal and remains hypotensive on multiple pressors.  Would continue diuretics and monitor urine output.  Hyperkalemia corrected with CRRT.  Metabolic acidosis- high AG improved with CRRT.  Overall clinical prognosis poor as pt  now on multiple pressors, remains on vent support and unable to gave balloon pump removed.        Thank you for your consult. I will follow-up with patient. Please contact us if you have any additional questions.    Suzie Pham MD  Nephrology  [place] - Intensive Care

## 2022-05-09 NOTE — ASSESSMENT & PLAN NOTE
Patient with VT storm last week   Continue Amio gtt, keep intubated  ICD planned once stable, currently on 2 pressors with increased requirement overnight and IABP 1:1, on CRRT   LVEF 20%

## 2022-05-09 NOTE — PROGRESS NOTES
[facility] - Intensive Care  Cardiology  Progress Note    Patient Name: Drew Farrar  MRN: [id number]  Admission Date: 5/3/2022  Hospital Length of Stay: 5 days  Code Status: Full Code   Attending Physician: Gino Ji III, MD   Primary Care Physician: Garrison Roach MD  Expected Discharge Date:   Principal Problem:Chest pain    Subjective:     Hospital Course:   05/05/2022 Per HPI   05/06/2022 s/p LHC, RHC, IABP insertion. Diuresing, intubated, sedated, on Levophed with increased requirements overnight. IABP 1:1.   LM:  PONCHO III flow normal   LAD: PONCHO III flow normal   LCx:  PONCHO- flow normal   RCA: PONCHO- flow normal   LVgram: LVEDP 35 mmHg, No LV gram due to NARESH      RHC   PCWP  40/54/36  PA  73/36/47  RV 68/7/24  RA 25/26/24     SATS  AO sat 100% on 100%  PA 67%  RV 63%  RA 57%     CO 7.34  CI 2.45      5/7/2022  Overnight no acute events. UOP dropping. Increased pressure requirements. Net positive fluid balance. CXR, clinically remains volume overloaded. K remains elevated. Remains in afib and IABP at 1:1 working appropriately. WBC rising. HR better controlled and no VT on tele. .    5/8/2022  Overnight no VT. Remains on AMio. On pressors. Decreased UOP and hyperkalemia.     Review of Systems   Unable to perform ROS: Intubated   Objective:      Vitals:    05/08/22 1830 05/08/22 1839 05/08/22 1845 05/08/22 1900   BP:  (!) 94/57 (!) 93/51 (!) 101/50   BP Location:    Right arm   Patient Position:    Lying   Pulse: 87 (!) 155 (!) 156 (!) 150   Resp: (!) 23 (!) 25 (!) 22 (!) 22   Temp: 96.44 °F (35.8 °C) 96.44 °F (35.8 °C) 96.44 °F (35.8 °C) 96.26 °F (35.7 °C)   TempSrc:       SpO2: 95% 96% 96% 95%   Weight:       Height:         Intake/Output - Last 3 Shifts       05/07 0700  05/08 0659 05/08 0700  05/09 0659    I.V. (mL/kg) 4420.6 (30.2) 2117.6 (14.4)    NG/ 170    IV Piggyback 557.9 28.8    Total Intake(mL/kg) 5228.5 (35.7) 2316.4 (15.8)    Urine (mL/kg/hr) 686 (0.2) 175 (0.1)    Other  359    Total Output  686 534    Net +4542.5 +1782.4                  Physical Exam  Constitutional:       Appearance: He is ill-appearing.   HENT:      Head: Atraumatic.   Cardiovascular:      Rate and Rhythm: Tachycardia present. Rhythm irregular.      Heart sounds: No murmur heard.    No friction rub. No gallop.   Pulmonary:      Breath sounds: Wheezing present. No rhonchi or rales.   Abdominal:      General: Bowel sounds are normal.      Palpations: Abdomen is soft.   Musculoskeletal:      Right lower leg: Edema present.      Left lower leg: Edema present.   Skin:     General: Skin is warm and dry.      Capillary Refill: Capillary refill takes 2 to 3 seconds.   Neurological:      Mental Status: He is alert. Mental status is at baseline.      Recent Labs     05/08/22  0339 05/08/22  0820 05/08/22  1549   HGB 9.7*  --   --    HCT 30.4*  --   --    *  134*   < > 134*  133*   K 6.2*  6.2*  6.2*   < > 6.8*  6.9*   CREATININE 4.7*  4.7*   < > 5.1*  5.2*    < > = values in this interval not displayed.          Assessment and Plan:       * Chest pain  Per NSTEMI     NICM (nonischemic cardiomyopathy)  TTE:  The left ventricle is moderately enlarged with severely decreased   systolic function.  · The estimated ejection fraction is 20%.  · A diastolic pattern consistent with atrial fibrillation observed.  · Normal right ventricular size with normal right ventricular systolic   function.  · Mild tricuspid regurgitation.  · Severe left atrial enlargement.  · Right atrial enlargement.  · Mild mitral regurgitation.  · Mechanically ventilated; cannot use inferior caval vein diameter to   estimate central venous pressure.    Patient overloaded on exam  See RHC below  Diuresing with IV Lasix, although UOP has dropped  Accurate intake and output  Intubated, sedated with IABP. Keep intubated for now  No BB/ACEI given pressor use  Patient remains volume overloaded with worsening BUN/Cr and K+ being shifted, remains elevated.   Would benefit  from CRRT    NSTEMI (non-ST elevated myocardial infarction)  Patient has had intermittent chest pain since admission which became constant on 05/05  Patient became unstable on the floor 05/05- bradycardia, pulseless, and apnea reported by ICU RN   EKG with worsening ischemic changes and VT noted on arrival to ICU. VT was initially intermittent, self limited runs of 10-30 beats. Amiodarone was ordered. VT became more persistent prior to amiodarone bolus being given and broke with cough. He received emergent Amio bolus and was given Lidocaine with improvement in VT.  LHC/RHC/IABP 05/05/2022:  LM:  PONCHO III flow normal   LAD: PONCHO III flow normal   LCx:  PONCHO- flow normal   RCA: PONCHO- flow normal   LVgram: LVEDP 35 mmHg, No LV gram due to NARESH      RHC   PCWP  40/54/36  PA  73/36/47  RV 68/7/24  RA 25/26/24     SATS  AO sat 100% on 100%  PA 67%  RV 63%  RA 57%     CO 7.34  CI 2.45  Continue heparin gtt. BB on hold given pressor use  NSTEMI Type II- demand 2/2 cardiogenic shock, NARESH, hypotension    VT (ventricular tachycardia)  Patient with sustained VT after transfer to ICU  Continue Amio gtt, keep intubated  ICD planned for next week, when clinically improves  LVEF 20%    NARESH (acute kidney injury)  Baseline Cr 0.8, 5.2 this AM  Nephrology following  Overloaded per CXR, RHC, overall clinical picture  Diuresing, however, UOP has decreased and net positive fluid balance overnight    Chronic atrial fibrillation  On Xarelto (dosed last on 05/04/2022)- hold for now- continue high intensity heparin gtt given PE/DVT hx and IABP  BB on hold given hypotension and pressor use  On Amio gtt for VT        VTE Risk Mitigation (From admission, onward)         Ordered     heparin 25,000 units in dextrose 5% 250 mL (100 units/mL) infusion HIGH INTENSITY nomogram - [facility]  Continuous        Question Answer Comment   Heparin Infusion Adjustment (DO NOT MODIFY ANSWER) [email or web address]\[email or web address]\Images\Pharmacy\HeparinInfusions\heparin HIGH INTENSITY  nomogram for [facility] [id number]    Begin at (in units/kg/hr) 18        05/06/22 0826     heparin 25,000 units in dextrose 5% (100 units/ml) IV bolus from bag - ADDITIONAL PRN BOLUS - 60 units/kg  As needed (PRN)        Question:  Heparin Infusion Adjustment (DO NOT MODIFY ANSWER)  Answer:  [email or web address]\Images\Pharmacy\HeparinInfusions\heparin HIGH INTENSITY nomogram for [facility] [id number]    05/06/22 0705     heparin 25,000 units in dextrose 5% (100 units/ml) IV bolus from bag - ADDITIONAL PRN BOLUS - 30 units/kg  As needed (PRN)        Question:  Heparin Infusion Adjustment (DO NOT MODIFY ANSWER)  Answer:  [email or web address]\epic\Images\Pharmacy\HeparinInfusions\heparin HIGH INTENSITY nomogram for [facility] [id number]    05/06/22 0705     heparin infusion 1,000 units/500 ml in 0.9% NaCl (pressure line flush)  Intra-op continuous PRN         05/05/22 1352     Reason for No Pharmacological VTE Prophylaxis  Once        Question:  Reasons:  Answer:  Already adequately anticoagulated on oral Anticoagulants    05/03/22 0602     IP VTE HIGH RISK PATIENT  Once         05/03/22 0602     Place sequential compression device  Until discontinued         05/03/22 0602                Moises Guajardo MD  Cardiology  [facility] - Intensive Care

## 2022-05-09 NOTE — PLAN OF CARE
Patient remains intubated with IABP in place. Very labile HR- remains in afib with runs of RVR and ectopy. Balloon pump map goal 65 and augmentation goal 76 per Dr Guajardo. Currently on 3 pressors to maintain pressure requirements. Attempting to wean levophed. Propofol gtt providing adequate sedation. Awaiting arrival of mother to bedside- family meeting with Dr Sosa planned.

## 2022-05-09 NOTE — ASSESSMENT & PLAN NOTE
Baseline Cr 0.8, 3.7 this AM  Nephrology following, on CRRT  Overloaded per CXR, RHC  +1.6L in past 24 hours

## 2022-05-09 NOTE — ASSESSMENT & PLAN NOTE
NARESH- oliguric- tolerating CRRT- however not tolerating net fluid removal and remains hypotensive on multiple pressors.  Would continue diuretics and monitor urine output.  Hyperkalemia corrected with CRRT.  Metabolic acidosis- high AG improved with CRRT.  Overall clinical prognosis poor as pt now on multiple pressors, remains on vent support and unable to gave balloon pump removed.

## 2022-05-09 NOTE — NURSING
Called Dr Pham with recent RFP to change K bath orders. Per labs patient does not need to be dialyzed and was not tolerating pulling fluid. Ordered to hold off now and allow palliative to speak to family. Dr Faria in agreement with Dr Pham and speaking to family now on phone and Dr Sosa notified- will meet with mother at 4 pm. Cardiology would like more fluid removed with CRRT (Dr Pham aware)- will attempt to restart this afternoon.

## 2022-05-09 NOTE — ASSESSMENT & PLAN NOTE
S/p LHC/RHC/IABP 05/05/2022:  LM:  PONCHO III flow normal   LAD: PONCHO III flow normal   LCx:  PONCHO- flow normal   RCA: PONCOH- flow normal   LVgram: LVEDP 35 mmHg, No LV gram due to NARESH      RHC   PCWP  40/54/36  PA  73/36/47  RV 68/7/24  RA 25/26/24     SATS  AO sat 100% on 100%  PA 67%  RV 63%  RA 57%     CO 7.34  CI 2.45  Continue heparin gtt. BB on hold given pressor use  NSTEMI Type II- demand 2/2 cardiogenic shock, NARESH, hypotension

## 2022-05-09 NOTE — PROGRESS NOTES
[facility] - Intensive Care  Cardiology  Progress Note    Patient Name: Drew Farrar  MRN: [id number]  Admission Date: 5/3/2022  Hospital Length of Stay: 6 days  Code Status: Full Code   Attending Physician: Venice Storey MD   Primary Care Physician: Garrison Roach MD  Expected Discharge Date:   Principal Problem:Chest pain    Subjective:     Hospital Course:   05/05/2022 Per HPI   05/06/2022 s/p LHC, RHC, IABP insertion. Diuresing, intubated, sedated, on Levophed with increased requirements overnight. IABP 1:1.   LM:  PONCHO III flow normal   LAD: PONCHO III flow normal   LCx:  PONCHO- flow normal   RCA: PONCHO- flow normal   LVgram: LVEDP 35 mmHg, No LV gram due to NARESH      RHC   PCWP  40/54/36  PA  73/36/47  RV 68/7/24  RA 25/26/24     SATS  AO sat 100% on 100%  PA 67%  RV 63%  RA 57%     CO 7.34  CI 2.45    5/7/2022  Overnight no acute events. UOP dropping. Increased pressure requirements. Net positive fluid balance. CXR, clinically remains volume overloaded. K remains elevated. Remains in afib and IABP at 1:1 working appropriately. WBC rising. HR better controlled and no VT on tele. .     5/8/2022  Overnight no VT. Remains on AMio. On pressors. Decreased UOP and hyperkalemia.     05/09/2022 Increased pressor requirements overnight. UO minimal. On CRRT this AM. IABP remains 1:1. + 1.6 L in past 24 hours. No VT on tele. AF RVR rate 110s-120s.       Past Medical History:   Diagnosis Date    *Atrial fibrillation     Anticoagulant long-term use     Arthritis     Atrial fibrillation     Atrial fibrillation Feb 23, 2016    Bipolar disorder     Carpal tunnel syndrome on right 2/10/2022    CHF (congestive heart failure)     Congenital heart disease     s/p surgical intervention at 18 months of age    Deep vein thrombosis     DVT of leg (deep venous thrombosis)     left leg    History of prior ablation treatment     10/9/13    Hypertension     Obesity     Stroke     Thyroid disease     Venous stasis ulcer of  lower extremity, unspecified laterality 12/14/2012    Venous ulcer        Past Surgical History:   Procedure Laterality Date    ABLATION Left 1/20/2022    Procedure: Ablation;  Surgeon: Gomez Lantigua MD;  Location: [facility] CATH LAB/EP;  Service: Cardiology;  Laterality: Left;    ANGIOPLASTY      CARDIAC SURGERY      open heart surgery at 18 months old    EYE SURGERY      left eye cataract/right eye glaucoma    TIMO FILTER PLACEMENT      Dr Calix ([facility])    INSERTION OF INTRA-AORTIC BALLOON ASSIST DEVICE  5/5/2022    Procedure: INSERTION, INTRA-AORTIC BALLOON PUMP;  Surgeon: Moreno Whitten MD;  Location: [facility] CATH LAB/EP;  Service: Cardiology;;    KNEE SURGERY      l and r     LEFT HEART CATHETERIZATION Left 5/5/2022    Procedure: Left heart cath;  Surgeon: Moreno Whitten MD;  Location: [facility] CATH LAB/EP;  Service: Cardiology;  Laterality: Left;    MULTIPLE TOOTH EXTRACTIONS      RIGHT HEART CATHETERIZATION Right 5/5/2022    Procedure: INSERTION, CATHETER, RIGHT HEART;  Surgeon: Moreno Whitten MD;  Location: [facility] CATH LAB/EP;  Service: Cardiology;  Laterality: Right;    SKIN GRAFT      left leg       Review of patient's allergies indicates:   Allergen Reactions    Contrast media Other (See Comments)     Severe chest pain    Food allergy formula [glutamine-c-quercet-selen-brom]      Allergic to green peas; Heart failure.    Iodinated contrast media Other (See Comments)     Chest pain    Peas Hives    Amiodarone analogues Other (See Comments)     passed out in ER    Ibuprofen Swelling    Latex, natural rubber Hives    Pcn [penicillins] Hives    Quercetin     Butisol [butabarbital] Rash     Peeling skin       No current facility-administered medications on file prior to encounter.     Current Outpatient Medications on File Prior to Encounter   Medication Sig    acetaminophen (TYLENOL) 650 MG TbSR Take 650 mg by mouth as needed.    albuterol (PROVENTIL/VENTOLIN HFA) 90  mcg/actuation inhaler Inhale 2 puffs into the lungs every 6 (six) hours as needed for Wheezing. Rescue    atorvastatin (LIPITOR) 40 MG tablet Take 1 tablet (40 mg total) by mouth every evening.    lisinopriL (PRINIVIL,ZESTRIL) 20 MG tablet Take 1 tablet (20 mg total) by mouth once daily.    metoprolol succinate (TOPROL-XL) 200 MG 24 hr tablet Take 1 tablet (200 mg total) by mouth once daily. (Patient taking differently: Take 50 mg by mouth once daily.)    rivaroxaban (XARELTO) 20 mg Tab Take 1 tablet (20 mg total) by mouth daily with dinner or evening meal.    sumatriptan (IMITREX) 50 MG tablet Take 50 mg for headache. If headache does not resolve, take another 50mg two hours after initial dose. Do not exceed 200mg in 24 hours.    torsemide (DEMADEX) 20 MG Tab Take 2 tablets (40 mg total) by mouth once daily.     Family History       Problem Relation (Age of Onset)    Diabetes Father, Maternal Grandfather    Heart disease Father, Maternal Grandmother, Maternal Grandfather    Stroke Maternal Grandfather          Tobacco Use    Smoking status: Former Smoker     Packs/day: 1.00     Years: 6.00     Pack years: 6.00     Types: Cigarettes    Smokeless tobacco: Former User    Tobacco comment: quit by age 27yrs old   Substance and Sexual Activity    Alcohol use: Yes     Alcohol/week: 1.0 standard drink     Types: 1 Glasses of wine per week     Comment: occasionally    Drug use: No    Sexual activity: Yes     Partners: Female     Birth control/protection: None     Review of Systems   Unable to perform ROS: Intubated   Objective:     Vital Signs (Most Recent):  Temp: 96.44 °F (35.8 °C) (05/09/22 0947)  Pulse: (!) 218 (05/09/22 0947)  Resp: (!) 31 (05/09/22 0947)  BP: (!) 115/57 (05/09/22 0947)  SpO2: (!) 92 % (05/09/22 0947)   Vital Signs (24h Range):  Temp:  [95.18 °F (35.1 °C)-98.24 °F (36.8 °C)] 96.44 °F (35.8 °C)  Pulse:  [] 218  Resp:  [20-35] 31  SpO2:  [90 %-98 %] 92 %  BP: ()/(44-72) 115/57      Weight: (!) 147 kg (324 lb 1.2 oz)  Body mass index is 35.6 kg/m².    SpO2: (!) 92 %  O2 Device (Oxygen Therapy): ventilator      Intake/Output Summary (Last 24 hours) at 5/9/2022 1035  Last data filed at 5/9/2022 0900  Gross per 24 hour   Intake 4429.53 ml   Output 2945 ml   Net 1484.53 ml         Lines/Drains/Airways       Central Venous Catheter Line  Duration             Trialysis (Dialysis) Catheter 05/08/22 1532 right internal jugular <1 day              Drain  Duration                  NG/OG Tube 05/05/22 1600 [place] sump Right mouth 3 days         Urethral Catheter 05/05/22 1100 18 Fr. 3 days              Airway  Duration                  Airway - Non-Surgical 05/05/22 1306 3 days              Peripheral Intravenous Line  Duration                  Peripheral IV - Single Lumen 05/04/22 1800 20 G Anterior;Distal;Right Forearm 4 days         Peripheral IV - Single Lumen 05/08/22 1838 18 G Left Antecubital <1 day         Peripheral IV - Single Lumen 05/08/22 1839 18 G Anterior;Distal;Right Upper Arm <1 day                    Physical Exam  Constitutional:       Appearance: He is ill-appearing.   HENT:      Head: Atraumatic.   Cardiovascular:      Rate and Rhythm: Tachycardia present. Rhythm irregular.      Heart sounds: No murmur heard.    No friction rub. No gallop.   Pulmonary:      Breath sounds: No rhonchi or rales.   Abdominal:      General: Bowel sounds are normal.      Palpations: Abdomen is soft.   Musculoskeletal:      Right lower leg: Edema present.      Left lower leg: Edema present.   Skin:     General: Skin is warm and dry.      Capillary Refill: Capillary refill takes 2 to 3 seconds.   Neurological:      Mental Status: He is alert. Mental status is at baseline.       Significant Labs: BMP:   Recent Labs   Lab 05/08/22  1549 05/08/22  1917 05/08/22  2332 05/09/22  0333   GLU 90  90 92 97 98  98   *  133* 135* 134* 135*  135*   K 6.8*  6.9* 6.3* 5.5* 5.4*  5.4*   CL 97  97 99 98 101   101   CO2 15*  15* 26 18* 21*  21*   *  113* 98* 87* 80*  80*   CREATININE 5.1*  5.2* 4.5* 4.0* 3.7*  3.7*   CALCIUM 8.1*  8.1* 8.2* 8.0* 8.7  8.7   MG 2.0  --  1.8 1.9     , CMP   Recent Labs   Lab 05/08/22  1549 05/08/22  1917 05/08/22  2332 05/09/22  0333   *  133* 135* 134* 135*  135*   K 6.8*  6.9* 6.3* 5.5* 5.4*  5.4*   CL 97  97 99 98 101  101   CO2 15*  15* 26 18* 21*  21*   GLU 90  90 92 97 98  98   *  113* 98* 87* 80*  80*   CREATININE 5.1*  5.2* 4.5* 4.0* 3.7*  3.7*   CALCIUM 8.1*  8.1* 8.2* 8.0* 8.7  8.7   ALBUMIN 2.2*  --  2.1* 2.0*   ANIONGAP 22*  21* 10 18* 13  13   ESTGFRAFRICA 14*  13* 16* 18* 20*  20*   EGFRNONAA 12*  12* 14* 16* 17*  17*     , CBC   Recent Labs   Lab 05/08/22  0339 05/09/22  0333   WBC 28.59* 15.73*   HGB 9.7* 8.8*   HCT 30.4* 27.4*    124*     , INR No results for input(s): INR, PROTIME in the last 48 hours.  , Lipid Panel No results for input(s): CHOL, HDL, LDLCALC, TRIG, CHOLHDL in the last 48 hours., Troponin   No results for input(s): TROPONINI in the last 48 hours.  , and All pertinent lab results from the last 24 hours have been reviewed.    Significant Imaging: Echocardiogram: Transthoracic echo (TTE) complete (Cupid Only):   Results for orders placed or performed during the hospital encounter of 05/03/22   Echo   Result Value Ref Range    IVS 0.96 0.6 - 1.1 cm    LA size 4.98 cm    LVIDd 5.90 (A) 3.5 - 6.0 cm    LVIDs 5.00 (A) 2.1 - 4.0 cm    Posterior Wall 0.91 0.6 - 1.1 cm    RVDD 2.28 cm    TAPSE 2.42 cm    TR Max Rufus 2.90 m/s    LV Diastolic Volume 277.71 mL    LV Systolic Volume 177.92 mL    FS 15 %    LV mass 220.01 g    Left Ventricle Relative Wall Thickness 0.31 cm    LV Systolic Volume Index 59.3 mL/m2    LV Diastolic Volume Index 92.57 mL/m2    LV Mass Index 73 g/m2    Triscuspid Valve Regurgitation Peak Gradient 34 mmHg    BSA 3.09 m2    TDI SEPTAL 0.07 m/s    LA WIDTH 6.45 cm    EF 20 %    TDI LATERAL  0.12 m/s    PV PEAK VELOCITY 0.94 cm/s    LA volume 190.72 cm3    AV mean gradient 3 mmHg    AV Velocity Ratio 0.61     AV index (prosthetic) 0.68     MV mean gradient 1 mmHg    Mean e' 0.10 m/s    IVRT 29.50 msec    LVOT peak lucio 0.75 m/s    LVOT peak VTI 10.57 cm    Ao peak lucio 1.23 m/s    Ao VTI 15.59 cm    Mr max lucio 0.04 m/s    AV peak gradient 6 mmHg    MV peak gradient 8 mmHg    MV VTI 21.93 cm    LA Volume Index 63.6 mL/m2    RA Major Axis 6.68 cm    Left Atrium Minor Axis 7.59 cm    Left Atrium Major Axis 6.47 cm    LA Volume Index (Mod) 29.2 mL/m2    LA volume (mod) 87.74 cm3    RA Width 4.64 cm    Narrative    · The left ventricle is moderately enlarged with severely decreased   systolic function.  · The estimated ejection fraction is 20%.  · A diastolic pattern consistent with atrial fibrillation observed.  · Normal right ventricular size with normal right ventricular systolic   function.  · Mild tricuspid regurgitation.  · Severe left atrial enlargement.  · Right atrial enlargement.  · Mild mitral regurgitation.  · Mechanically ventilated; cannot use inferior caval vein diameter to   estimate central venous pressure.        Assessment and Plan:     Brief HPI: Patient seen this morning on rounds, remains critically ill with IABP 1:1 on 2 pressors and CRRT. No family at bedside to update.     * Chest pain  Per NSTEMI     NICM (nonischemic cardiomyopathy)  TTE:  The left ventricle is moderately enlarged with severely decreased   systolic function.  · The estimated ejection fraction is 20%.  · A diastolic pattern consistent with atrial fibrillation observed.  · Normal right ventricular size with normal right ventricular systolic   function.  · Mild tricuspid regurgitation.  · Severe left atrial enlargement.  · Right atrial enlargement.  · Mild mitral regurgitation.  · Mechanically ventilated; cannot use inferior caval vein diameter to   estimate central venous pressure.    Patient overloaded on exam  See  RHC below  Diuresing with IV Lasix  Accurate intake and output  Intubated, sedated with IABP. Keep intubated for now  No BB/ACEI given pressor use      NSTEMI (non-ST elevated myocardial infarction)  S/p LHC/RHC/IABP 05/05/2022:  LM:  PONCHO III flow normal   LAD: PONCHO III flow normal   LCx:  PONCHO- flow normal   RCA: PONCHO- flow normal   LVgram: LVEDP 35 mmHg, No LV gram due to NARESH      RHC   PCWP  40/54/36  PA  73/36/47  RV 68/7/24  RA 25/26/24     SATS  AO sat 100% on 100%  PA 67%  RV 63%  RA 57%     CO 7.34  CI 2.45  Continue heparin gtt. BB on hold given pressor use  NSTEMI Type II- demand 2/2 cardiogenic shock, NARESH, hypotension    VT (ventricular tachycardia)  Patient with VT storm last week   Continue Amio gtt, keep intubated  ICD planned once stable, currently on 2 pressors with increased requirement overnight and IABP 1:1, on CRRT   LVEF 20%    NARESH (acute kidney injury)  Baseline Cr 0.8, 3.7 this AM  Nephrology following, on CRRT  Overloaded per CXR, RHC  +1.6L in past 24 hours      Chronic atrial fibrillation  On Xarelto (dosed last on 05/04/2022)- hold for now- high intensity heparin gtt given PE/DVT hx and IABP  BB on hold given hypotension and pressor use  On Amio gtt for VT        VTE Risk Mitigation (From admission, onward)         Ordered     heparin (porcine) injection 2,400 Units  As needed (PRN)         05/09/22 0639     heparin 25,000 units in dextrose 5% 250 mL (100 units/mL) infusion HIGH INTENSITY nomogram - [facility]  Continuous        Question Answer Comment   Heparin Infusion Adjustment (DO NOT MODIFY ANSWER) \\[email or web address]\epic\Images\Pharmacy\HeparinInfusions\heparin HIGH INTENSITY nomogram for [facility] [id number]    Begin at (in units/kg/hr) 18        05/06/22 0826     heparin 25,000 units in dextrose 5% (100 units/ml) IV bolus from bag - ADDITIONAL PRN BOLUS - 60 units/kg  As needed (PRN)        Question:  Heparin Infusion Adjustment (DO NOT MODIFY ANSWER)  Answer:   \\[email or web address]\epic\Images\Pharmacy\HeparinInfusions\heparin HIGH INTENSITY nomogram for [facility] [id number].pdf    05/06/22 0705     heparin 25,000 units in dextrose 5% (100 units/ml) IV bolus from bag - ADDITIONAL PRN BOLUS - 30 units/kg  As needed (PRN)        Question:  Heparin Infusion Adjustment (DO NOT MODIFY ANSWER)  Answer:  \\[email or web address]\epic\Images\Pharmacy\HeparinInfusions\heparin HIGH INTENSITY nomogram for [facility] [id number]    05/06/22 0705     Reason for No Pharmacological VTE Prophylaxis  Once        Question:  Reasons:  Answer:  Already adequately anticoagulated on oral Anticoagulants    05/03/22 0602     IP VTE HIGH RISK PATIENT  Once         05/03/22 0602     Place sequential compression device  Until discontinued         05/03/22 0602                Tom Dong NP  Cardiology  [facility] - Intensive Care

## 2022-05-09 NOTE — SUBJECTIVE & OBJECTIVE
Past Medical History:   Diagnosis Date    *Atrial fibrillation     Anticoagulant long-term use     Arthritis     Atrial fibrillation     Atrial fibrillation Feb 23, 2016    Bipolar disorder     Carpal tunnel syndrome on right 2/10/2022    CHF (congestive heart failure)     Congenital heart disease     s/p surgical intervention at 18 months of age    Deep vein thrombosis     DVT of leg (deep venous thrombosis)     left leg    History of prior ablation treatment     10/9/13    Hypertension     Obesity     Stroke     Thyroid disease     Venous stasis ulcer of lower extremity, unspecified laterality 12/14/2012    Venous ulcer        Past Surgical History:   Procedure Laterality Date    ABLATION Left 1/20/2022    Procedure: Ablation;  Surgeon: Gomez Lantigua MD;  Location: [facility] CATH LAB/EP;  Service: Cardiology;  Laterality: Left;    ANGIOPLASTY      CARDIAC SURGERY      open heart surgery at 18 months old    EYE SURGERY      left eye cataract/right eye glaucoma    TIMO FILTER PLACEMENT      Dr Calix ([facility])    INSERTION OF INTRA-AORTIC BALLOON ASSIST DEVICE  5/5/2022    Procedure: INSERTION, INTRA-AORTIC BALLOON PUMP;  Surgeon: Moreno Whitten MD;  Location: [facility] CATH LAB/EP;  Service: Cardiology;;    KNEE SURGERY      l and r     LEFT HEART CATHETERIZATION Left 5/5/2022    Procedure: Left heart cath;  Surgeon: Moreno Whitten MD;  Location: [facility] CATH LAB/EP;  Service: Cardiology;  Laterality: Left;    MULTIPLE TOOTH EXTRACTIONS      RIGHT HEART CATHETERIZATION Right 5/5/2022    Procedure: INSERTION, CATHETER, RIGHT HEART;  Surgeon: Moreno Whitten MD;  Location: [facility] CATH LAB/EP;  Service: Cardiology;  Laterality: Right;    SKIN GRAFT      left leg       Review of patient's allergies indicates:   Allergen Reactions    Contrast media Other (See Comments)     Severe chest pain    Food allergy formula [glutamine-c-quercet-selen-brom]      Allergic to green peas; Heart failure.    Iodinated contrast  media Other (See Comments)     Chest pain    Peas Hives    Amiodarone analogues Other (See Comments)     passed out in ER    Ibuprofen Swelling    Latex, natural rubber Hives    Pcn [penicillins] Hives    Quercetin     Butisol [butabarbital] Rash     Peeling skin       No current facility-administered medications on file prior to encounter.     Current Outpatient Medications on File Prior to Encounter   Medication Sig    acetaminophen (TYLENOL) 650 MG TbSR Take 650 mg by mouth as needed.    albuterol (PROVENTIL/VENTOLIN HFA) 90 mcg/actuation inhaler Inhale 2 puffs into the lungs every 6 (six) hours as needed for Wheezing. Rescue    atorvastatin (LIPITOR) 40 MG tablet Take 1 tablet (40 mg total) by mouth every evening.    lisinopriL (PRINIVIL,ZESTRIL) 20 MG tablet Take 1 tablet (20 mg total) by mouth once daily.    metoprolol succinate (TOPROL-XL) 200 MG 24 hr tablet Take 1 tablet (200 mg total) by mouth once daily. (Patient taking differently: Take 50 mg by mouth once daily.)    rivaroxaban (XARELTO) 20 mg Tab Take 1 tablet (20 mg total) by mouth daily with dinner or evening meal.    sumatriptan (IMITREX) 50 MG tablet Take 50 mg for headache. If headache does not resolve, take another 50mg two hours after initial dose. Do not exceed 200mg in 24 hours.    torsemide (DEMADEX) 20 MG Tab Take 2 tablets (40 mg total) by mouth once daily.     Family History       Problem Relation (Age of Onset)    Diabetes Father, Maternal Grandfather    Heart disease Father, Maternal Grandmother, Maternal Grandfather    Stroke Maternal Grandfather          Tobacco Use    Smoking status: Former Smoker     Packs/day: 1.00     Years: 6.00     Pack years: 6.00     Types: Cigarettes    Smokeless tobacco: Former User    Tobacco comment: quit by age 27yrs old   Substance and Sexual Activity    Alcohol use: Yes     Alcohol/week: 1.0 standard drink     Types: 1 Glasses of wine per week     Comment: occasionally    Drug use: No    Sexual activity:  Yes     Partners: Female     Birth control/protection: None     Review of Systems   Unable to perform ROS: Intubated   Objective:     Vital Signs (Most Recent):  Temp: 96.44 °F (35.8 °C) (05/09/22 0947)  Pulse: (!) 218 (05/09/22 0947)  Resp: (!) 31 (05/09/22 0947)  BP: (!) 115/57 (05/09/22 0947)  SpO2: (!) 92 % (05/09/22 0947)   Vital Signs (24h Range):  Temp:  [95.18 °F (35.1 °C)-98.24 °F (36.8 °C)] 96.44 °F (35.8 °C)  Pulse:  [] 218  Resp:  [20-35] 31  SpO2:  [90 %-98 %] 92 %  BP: ()/(44-72) 115/57     Weight: (!) 147 kg (324 lb 1.2 oz)  Body mass index is 35.6 kg/m².    SpO2: (!) 92 %  O2 Device (Oxygen Therapy): ventilator      Intake/Output Summary (Last 24 hours) at 5/9/2022 1035  Last data filed at 5/9/2022 0900  Gross per 24 hour   Intake 4429.53 ml   Output 2945 ml   Net 1484.53 ml         Lines/Drains/Airways       Central Venous Catheter Line  Duration             Trialysis (Dialysis) Catheter 05/08/22 1532 right internal jugular <1 day              Drain  Duration                  NG/OG Tube 05/05/22 1600 [place] sump Right mouth 3 days         Urethral Catheter 05/05/22 1100 18 Fr. 3 days              Airway  Duration                  Airway - Non-Surgical 05/05/22 1306 3 days              Peripheral Intravenous Line  Duration                  Peripheral IV - Single Lumen 05/04/22 1800 20 G Anterior;Distal;Right Forearm 4 days         Peripheral IV - Single Lumen 05/08/22 1838 18 G Left Antecubital <1 day         Peripheral IV - Single Lumen 05/08/22 1839 18 G Anterior;Distal;Right Upper Arm <1 day                    Physical Exam  Constitutional:       Appearance: He is ill-appearing.   HENT:      Head: Atraumatic.   Cardiovascular:      Rate and Rhythm: Tachycardia present. Rhythm irregular.      Heart sounds: No murmur heard.    No friction rub. No gallop.   Pulmonary:      Breath sounds: No rhonchi or rales.   Abdominal:      General: Bowel sounds are normal.      Palpations: Abdomen is  soft.   Musculoskeletal:      Right lower leg: Edema present.      Left lower leg: Edema present.   Skin:     General: Skin is warm and dry.      Capillary Refill: Capillary refill takes 2 to 3 seconds.   Neurological:      Mental Status: He is alert. Mental status is at baseline.       Significant Labs: BMP:   Recent Labs   Lab 05/08/22 1549 05/08/22 1917 05/08/22 2332 05/09/22  0333   GLU 90  90 92 97 98  98   *  133* 135* 134* 135*  135*   K 6.8*  6.9* 6.3* 5.5* 5.4*  5.4*   CL 97  97 99 98 101  101   CO2 15*  15* 26 18* 21*  21*   *  113* 98* 87* 80*  80*   CREATININE 5.1*  5.2* 4.5* 4.0* 3.7*  3.7*   CALCIUM 8.1*  8.1* 8.2* 8.0* 8.7  8.7   MG 2.0  --  1.8 1.9     , CMP   Recent Labs   Lab 05/08/22 1549 05/08/22 1917 05/08/22 2332 05/09/22  0333   *  133* 135* 134* 135*  135*   K 6.8*  6.9* 6.3* 5.5* 5.4*  5.4*   CL 97  97 99 98 101  101   CO2 15*  15* 26 18* 21*  21*   GLU 90  90 92 97 98  98   *  113* 98* 87* 80*  80*   CREATININE 5.1*  5.2* 4.5* 4.0* 3.7*  3.7*   CALCIUM 8.1*  8.1* 8.2* 8.0* 8.7  8.7   ALBUMIN 2.2*  --  2.1* 2.0*   ANIONGAP 22*  21* 10 18* 13  13   ESTGFRAFRICA 14*  13* 16* 18* 20*  20*   EGFRNONAA 12*  12* 14* 16* 17*  17*     , CBC   Recent Labs   Lab 05/08/22 0339 05/09/22  0333   WBC 28.59* 15.73*   HGB 9.7* 8.8*   HCT 30.4* 27.4*    124*     , INR No results for input(s): INR, PROTIME in the last 48 hours.  , Lipid Panel No results for input(s): CHOL, HDL, LDLCALC, TRIG, CHOLHDL in the last 48 hours., Troponin   No results for input(s): TROPONINI in the last 48 hours.  , and All pertinent lab results from the last 24 hours have been reviewed.    Significant Imaging: Echocardiogram: Transthoracic echo (TTE) complete (Cupid Only):   Results for orders placed or performed during the hospital encounter of 05/03/22   Echo   Result Value Ref Range    IVS 0.96 0.6 - 1.1 cm    LA size 4.98 cm    LVIDd 5.90 (A) 3.5 -  6.0 cm    LVIDs 5.00 (A) 2.1 - 4.0 cm    Posterior Wall 0.91 0.6 - 1.1 cm    RVDD 2.28 cm    TAPSE 2.42 cm    TR Max Rufus 2.90 m/s    LV Diastolic Volume 277.71 mL    LV Systolic Volume 177.92 mL    FS 15 %    LV mass 220.01 g    Left Ventricle Relative Wall Thickness 0.31 cm    LV Systolic Volume Index 59.3 mL/m2    LV Diastolic Volume Index 92.57 mL/m2    LV Mass Index 73 g/m2    Triscuspid Valve Regurgitation Peak Gradient 34 mmHg    BSA 3.09 m2    TDI SEPTAL 0.07 m/s    LA WIDTH 6.45 cm    EF 20 %    TDI LATERAL 0.12 m/s    PV PEAK VELOCITY 0.94 cm/s    LA volume 190.72 cm3    AV mean gradient 3 mmHg    AV Velocity Ratio 0.61     AV index (prosthetic) 0.68     MV mean gradient 1 mmHg    Mean e' 0.10 m/s    IVRT 29.50 msec    LVOT peak rufus 0.75 m/s    LVOT peak VTI 10.57 cm    Ao peak rufus 1.23 m/s    Ao VTI 15.59 cm    Mr max rufus 0.04 m/s    AV peak gradient 6 mmHg    MV peak gradient 8 mmHg    MV VTI 21.93 cm    LA Volume Index 63.6 mL/m2    RA Major Axis 6.68 cm    Left Atrium Minor Axis 7.59 cm    Left Atrium Major Axis 6.47 cm    LA Volume Index (Mod) 29.2 mL/m2    LA volume (mod) 87.74 cm3    RA Width 4.64 cm    Narrative    · The left ventricle is moderately enlarged with severely decreased   systolic function.  · The estimated ejection fraction is 20%.  · A diastolic pattern consistent with atrial fibrillation observed.  · Normal right ventricular size with normal right ventricular systolic   function.  · Mild tricuspid regurgitation.  · Severe left atrial enlargement.  · Right atrial enlargement.  · Mild mitral regurgitation.  · Mechanically ventilated; cannot use inferior caval vein diameter to   estimate central venous pressure.

## 2022-05-09 NOTE — NURSING
Dr Guajardo notified of Augmentation pressure <76 and MAP <65 on IABP, orders given to restart levophed gtt, and decrease Amiodarone from 1mg/min to 0.5mg/min.

## 2022-05-09 NOTE — PROGRESS NOTES
Progress Note  [facility]    Attending: Dr. Zapien  Fellow: Dr. Day  Admit Date: 5/3/2022  Today's Date: 05/09/2022      SUBJECTIVE:     Interval Events:  Pt remains intubated and sedated. Trialysis line placed yesterday & CRRT initiated. Patient also had soft pressures overnight, amio was decresed and levophed restarted.     OBJECTIVE:     Vital Signs Trends/Hx Reviewed  Vitals:    05/09/22 1000 05/09/22 1015 05/09/22 1030 05/09/22 1045   BP: 121/74 (!) 119/58 (!) 115/56    BP Location: Left arm Left arm Left arm    Patient Position: Lying Lying Lying    Pulse: (!) 133 106 102 90   Resp: (!) 31 (!) 34 (!) 29 (!) 33   Temp: 96.44 °F (35.8 °C) 96.44 °F (35.8 °C) 96.62 °F (35.9 °C) 96.62 °F (35.9 °C)   TempSrc: Core Esophageal Core Esophageal Core Esophageal Core Esophageal   SpO2: (!) 92% (!) 92% 98% 96%   Weight:       Height:           Ventilator settings:   Vent Type: NKV-550 (5/9/2022  8:18 AM)    Vent Mode: A/CMV-VC  Oxygen Concentration (%):  [] 30  Resp Rate Total:  [20 br/min-30 br/min] 25 br/min  Vt Set:  [550 mL] 550 mL  PEEP/CPAP:  [4.6 cmH20-5.5 cmH20] 5.2 cmH20  Mean Airway Pressure:  [11.5 xmX10-53.9 cmH20] 14.9 cmH20    Physical Exam:  General: Intubated and sedated  HEENT: AT/NC, PERRL, EOMI  Neck: Supple without JVD or palpable LAD.   Cardiac: regular rate, irregular rhythm; IABP in place  Respiratory: Normal inspection. Symmetric chest rise. Normal palpation and percussion. Course breath sounds  Abdomen: soft abdomen, nondistended; nuñez in place  Extremities: lower extremity skin changes; LLE bandaged; RLE with thick skin texture. See media tab  PICC in LLE    Laboratory:  Recent Labs   Lab 05/09/22  0528   PH 7.372   PCO2 37.6   PO2 90   HCO3 21.8*   POCSATURATED 97   BE -3     Recent Labs   Lab 05/09/22 0333   WBC 15.73*   RBC 3.44*   HGB 8.8*   HCT 27.4*   *   MCV 80*   MCH 25.6*   MCHC 32.1     Recent Labs   Lab 05/09/22 0333   *  135*   K 5.4*   5.4*     101   CO2 21*  21*   BUN 80*  80*   CREATININE 3.7*  3.7*   MG 1.9       Microbiology Data:   Microbiology Results (last 7 days)     Procedure Component Value Units Date/Time    Blood culture [[id number]] Collected: 05/08/22 1610    Order Status: Sent Specimen: Blood Updated: 05/09/22 1008    Narrative:      Collection has been rescheduled by SAB4 at 05/07/2022 11:33 Reason:   Only able to collect 1set of cultures veins blowing notified Nurse   Darren  Collection has been rescheduled by SAB4 at 05/07/2022 11:33 Reason:   Only able to collect 1set of cultures veins blowing notified Nurse   Darren    Culture, Respiratory with Gram Stain [[id number]] Collected: 05/07/22 1122    Order Status: Completed Specimen: Respiratory from Tracheal Aspirate Updated: 05/09/22 0909     Respiratory Culture Further report to follow     Gram Stain (Respiratory) <10 epithelial cells per low power field.     Gram Stain (Respiratory) Many WBC's     Gram Stain (Respiratory) Many Gram positive cocci      Gram Stain (Respiratory) Few Gram positive rods     Gram Stain (Respiratory) Rare budding yeast    Blood culture [[id number]] Collected: 05/05/22 1957    Order Status: Completed Specimen: Blood Updated: 05/09/22 0612     Blood Culture, Routine No Growth to date      No Growth to date      No Growth to date      No Growth to date    Blood culture [[id number]] Collected: 05/05/22 1957    Order Status: Completed Specimen: Blood Updated: 05/09/22 0612     Blood Culture, Routine No Growth to date      No Growth to date      No Growth to date      No Growth to date    Blood culture [[id number]] Collected: 05/07/22 1131    Order Status: Completed Specimen: Blood Updated: 05/08/22 1622     Blood Culture, Routine No Growth to date      No Growth to date    Narrative:      #1 - Obtain prior to starting antibiotics           Chest Imaging:   X-Ray Chest AP Portable    Result Date: 5/5/2022  Worsening interstitial opacities in bilateral  lungs suspicious for edema. Support apparatus as above. Electronically signed by: Raphael Whelan Date:    05/05/2022 Time:    18:14        Infusions:     sodium chloride 0.9%      amiodarone in dextrose 5% 0.5 mg/min (05/09/22 1000)    fentanyl Stopped (05/05/22 1415)    heparin (porcine) in D5W 9 Units/kg/hr (05/09/22 1000)    NORepinephrine bitartrate-D5W 0.1 mcg/kg/min (05/09/22 1000)    phenylephrine 5 mcg/kg/min (05/09/22 1000)    propofoL 40 mcg/kg/min (05/09/22 1000)       Scheduled Medications:    sodium chloride 0.9%   Intravenous Once    atorvastatin  40 mg Oral QHS    chlorhexidine  15 mL Mouth/Throat BID    collagenase   Topical (Top) Every Mon, Thurs    famotidine (PF)  20 mg Intravenous Daily    senna-docusate 8.6-50 mg  1 tablet Oral BID       PRN Medications:   sodium chloride 0.9%, acetaminophen, dextrose 10%, dextrose 10%, glucagon (human recombinant), glucose, glucose, heparin (porcine), heparin (PORCINE), heparin (PORCINE), insulin aspart U-100, LIDOcaine, magnesium sulfate IVPB, melatonin, methocarbamoL, morphine, naloxone, ondansetron, sars-cov-2 (covid-19), sodium chloride 0.9%, sodium chloride 0.9%    Problem List:   Patient Active Problem List   Diagnosis    Venous stasis dermatitis of both lower extremities    Ulcer - lesion    Chronic atrial fibrillation    Venous stasis ulcer of left midfoot limited to breakdown of skin    Venous (peripheral) insufficiency    Edema    Chronic venous hypertension with ulcer    Cellulitis    Skin ulcer of left foot, limited to breakdown of skin    May-Thurner syndrome    Stenosis of right iliac vein    Elevated BP    Ulcer of ankle, left, limited to breakdown of skin    Depression    NARESH (acute kidney injury)    Group A streptococcal infection    Tinea pedis of both feet    Atrial fibrillation with RVR    Bilateral edema of lower extremity    Morbid obesity    Permanent atrial fibrillation    Right knee pain    Knee pain,  right    Difficulty walking    History of DVT (deep vein thrombosis)    HTN (hypertension)    Acute pulmonary embolism    Recurrent pulmonary embolism    Other pulmonary embolism without acute cor pulmonale, unspecified chronicity    Long term (current) use of anticoagulants    Chronic combined systolic and diastolic congestive heart failure    Hyperthyroidism    Elevated troponin    Non-rheumatic mitral regurgitation    Non-pressure chronic ulcer of left ankle, with unspecified severity    Cellulitis of right lower leg    Candida infection of genital region    Varicose ulcer of lower extremity    Hypomagnesemia    Chronic venous insufficiency    Pulmonary embolism    Onychomycosis    Wound of foot    Clostridium difficile infection    Acute deep vein thrombosis (DVT) of lower extremity    Erythema    Obesity, Class I, BMI 30.0-34.9 (see actual BMI)    Advance care planning    Venous stasis ulcer of lower extremity, unspecified laterality    Iron deficiency anemia    Anemia of chronic disease    Unstable angina    Chest pain    Complicated migraine    Open wound of left foot    Proteus infection    MRSA cellulitis of left foot    Wound infection    PAD (peripheral artery disease)    Alteration in skin integrity    Rash of back    Ringworm    Class 2 severe obesity due to excess calories with serious comorbidity and body mass index (BMI) of 39.0 to 39.9 in adult    Stasis ulcer    Obesity, morbid (more than 100 lbs over ideal weight or BMI > 40)    Open wound of right lower leg    Cellulitis of both lower extremities    Skin ulcer of left foot including toes with fat layer exposed    Class 3 severe obesity due to excess calories with serious comorbidity and body mass index (BMI) of 45.0 to 49.9 in adult    Pseudomonas infection    Carpal tunnel syndrome on right    Rotator cuff impingement syndrome of right shoulder    Dizziness    Multiple and open wound of lower  limb    RUQ pain    VT (ventricular tachycardia)    NSTEMI (non-ST elevated myocardial infarction)    CHF (congestive heart failure)    Hyperkalemia    Metabolic acidosis    NICM (nonischemic cardiomyopathy)    Cardiogenic shock    Hypervolemia    Acute hypoxemic respiratory failure    Ventricular tachycardia       ASSESSMENT & RECOMMENDATIONS   Patient is a 56 year old male presenting with chest pain on admission with worsening pain. MET called due to patients worsening mentation and difficulty obtaining VS after nuñez placement. Concern for STEMI, patient transferred to ICU, intubated, started on pressor support and emergently underwent LHC, intra-aortic balloon pump placed on 5/5/2022. Remains critically ill with worsening renal failure, now initiated on CRRT.      CNS/Neuro:  - intubated and sedated  - on propofol      Cardiovascular:  #Ventricular Tachycardia  #Cardiogenic shock s/p IABP placment  - new onset ventricular tachyardia  - symptomatic with chest pain, and altered mental status, MET called  - EKG with chronic LBBB, possible STEMI  - troponin 0.036>0.03>0.5>0.7>1.4  - amiodarone gtt with ASA, plavix load  - cardiology consulted, emergent LHC performed on 5/5/2022. No evidence of obstructive CAD. Very poor LV systolic function, EF 20%. Wedge pressure elevated  - Cardiology suspects pt's high filling pressures were contributing to his acute decompensation. IABP placed 5/52022   - worsening cardiac status 2/2 to volume overload. Failed aggressive diuresis with lasix and metolazone. Trialysis line placed and pt initated on CRRT.   - recommend daily CXR for IABP placement confirmation     #Permanent Afib, now with RVR  - afib on xarelto and metoprolol at home  - admitted for chest pain, given labetalol and metoprolol in ED  - trop on admission 0.36>0.3  - chest pain resolved and returned in <24 hrs  - currently on amio drip, rate controlled; heparin drip  - cardiology consulted     #Combined  systolic and diastolic CHF:  - ECHO 4/2022 EF of 20%; mild TR with severe LA enlargement  - IABP in placed     #HTN:  - home meds lisinopril and torsemide  - hold in setting of shock.      Respiratory:  #Intubated 2/2 to airway protection  - wean sedation & vent as patient tolerates.     GI/Metabolic:  #Elevated LFTs  - presented with positive murphys sign on admission, t bili on admission 1.1  - elevated LFTs, trending down  - US unremarkable      #Hyperkalemia:  - potassium elevated  - nephrology consulted, recommend bicarb and lokelma with minimal improvement  - remains hyperkalemic despite multiple shifts.  - trialysis line placed for CRRT initiation.     GI Ppx: famotidine  Diet: NPO  Keep Mg>2 K>4     Renal:  #ARF  - baseline BUN/Cr 20/1.0  - on admission worsening Cr 1.6>3.7  - nephrology consulted, recommended holding ACEi in setting of NARESH  - UOP minimal. Kerns in place prior to MET called  - Renal US negative  - CRRT initiated on 08May22  - strict I/O's    Elevated AG  - could be due to the renal failure     Heme:  DVT Ppx: heparin drip     Endo:   Strict Glucose control with goal 140-180     ID:  Leukocytosis, afebrile  - Likely reactive  - obtain blood cultures and resp cx  - no indication to start abx at this point     Code Status: Full code     Thank you for allowing us to participate in the care of this patient. We will follow up with this patient. Please call with questions.    Al Giang MD  [facility]      Pulmonary/Critical Care Attending with Team    Patient seen and examined on rounds with team after review of ON hospital course, labs and xrays.  I have reviewed, we discussed, and I have verified Dr. Almazan's findings, assessment and recommendations.  The patient remains on the vent with an intra-aortic balloon pump but has not shown any positive changes.  Will continue all care.  Critical care time 35 min for review of chart, lab data and images, examination of the  patient,  monitoring, and adjusting as necessary the management plan for support of vital organ system (cardiac), and also for discussion with other caregivers.    Gina Zapien MD  5/9/2022  11:42 AM

## 2022-05-09 NOTE — NURSING
Notified Dr Guajardo of augmenting pressure in the 60s and MAP <65 on IABP. Run of afib RVR in the 150s. Now sustaining 110s-130s in afib. Increased pressor requirements with inability to pull volume on CRRT without dropping IABP numbers. IABP also auto shutting off due to high heart rate and difficulty following QRS pattern. Had to restart pump x2. New order for vasopressin in an attempt to lower levo requirements.

## 2022-05-09 NOTE — NURSING
Family at bedside. Mother has POA paperwork in hand. Dr Sosa had family discussion. Family desires withdrawal of care. Ordered per Dr Sosa to slowly wean pressors with goal to be off by midnight. Notified Dr Guajardo. Ordered to leave IABP in place until pressors are off, then turn IABP off. Leave heparin gtt on so balloon doesn't clot. Dr Faria with primary team at bedside and aware of plan of care. Parents and significant other in agreement, not wanting patient to suffer.

## 2022-05-09 NOTE — PROGRESS NOTES
Progress Note  [facility]    Attending: Dr Storey   Admit Date: 5/3/2022  Today's Date: 05/09/2022    Patient is a 56 yr old male with Pmhx history of AFib on Xarelto, CHF, DVT, hypertension presenting to the ED for one day of nausea and vomiting with severe chest pain. Patient states that he has been vomiting all day ans his been unable to hold down any food. Patient states the chest pain does not radiate to his shoulder and feels like stabbing, it is severe and constant.  In the ED patient was found to have mildly elevated troponin and an ekg showing Afib with RVR and tachycardia. Patient is afebrile and shows no other signs of infection. Patient given labetalol in ED, which improved cardiac rate and rhythm  Patient to be admitted to [facility] to continue work-up  SUBJECTIVE:     Patient seen and examined this morning. Pt has had increasing pressor as well as O2 requirements over the weekend. A trialysis line was placed yesterday for planned CRRT as UOP has decreased despite increasing lasix and metolazone. He has also remained hyperkalemic despite multiple shifts. ON pt hypothermic to 96.1 and decreasing BP and cardiology notified w/ recs to decrease amio and restart levo.     Review of Systems   Unable to perform ROS: Intubated       OBJECTIVE:     Vital Signs Trends/Hx Reviewed  Vitals:    05/09/22 0700 05/09/22 0715 05/09/22 0730 05/09/22 0745   BP: (!) 97/50 (!) 103/59 (!) 112/57 (!) 111/58   BP Location: Left arm Left arm Left arm Left arm   Patient Position: Lying Lying Lying Lying   Pulse: 104 105 102 106   Resp: (!) 23 (!) 22 (!) 28 (!) 26   Temp: 96.62 °F (35.9 °C) 96.8 °F (36 °C) 96.98 °F (36.1 °C) 96.98 °F (36.1 °C)   TempSrc: Core Esophageal Core Esophageal Core Esophageal Core Esophageal   SpO2: 95% (!) 94% (!) 93% (!) 94%   Weight:       Height:           Vent Mode: A/CMV-VC  Oxygen Concentration (%):  [] 30  Resp Rate Total:  [20 br/min-30 br/min] 24 br/min  Vt Set:   [550 mL] 550 mL  PEEP/CPAP:  [4.6 cmH20-5.5 cmH20] 5 cmH20  Mean Airway Pressure:  [11.5 bpB04-14 cmH20] 13.2 cmH20    Physical Exam  Vitals and nursing note reviewed.   Constitutional:       Comments: Intubated and sedated   HENT:      Head: Normocephalic.      Right Ear: External ear normal.      Left Ear: External ear normal.      Nose: Nose normal. No congestion or rhinorrhea.      Mouth/Throat:      Pharynx: Oropharynx is clear.   Eyes:      General: No scleral icterus.     Conjunctiva/sclera: Conjunctivae normal.   Cardiovascular:      Rate and Rhythm: Tachycardia present. Rhythm irregular.   Pulmonary:      Breath sounds: No rhonchi or rales.   Abdominal:      General: Bowel sounds are normal. There is no distension.      Palpations: Abdomen is soft.   Musculoskeletal:      Cervical back: Normal range of motion.   Lymphadenopathy:      Cervical: No cervical adenopathy.   Skin:     General: Skin is warm and dry.      Capillary Refill: Capillary refill takes less than 2 seconds.      Comments: Balloon pump tubing run into R groin and dressing c/d/i  Chronic venous stasis dermatitis and skin changes.         Laboratory:  Recent Labs   Lab 05/09/22  0333   WBC 15.73*   RBC 3.44*   HGB 8.8*   HCT 27.4*   *   MCV 80*   MCH 25.6*   MCHC 32.1     Recent Labs   Lab 05/09/22  0333   *  135*   K 5.4*  5.4*     101   CO2 21*  21*   BUN 80*  80*   CREATININE 3.7*  3.7*   MG 1.9     Recent Labs   Lab 05/09/22  0528   PH 7.372   PCO2 37.6   PO2 90   HCO3 21.8*   POCSATURATED 97   BE -3         Chest Imaging:   The endotracheal tube tip is 5 cm from the lia.  The right-sided IJ catheter and enteric tubes remain in stable position.  There is a esophageal probe within the midthoracic esophagus.  The intra-aortic balloon pump tip is unchanged with its tip just beneath the level of the aortic knob.  There is a pacer pad overlying the left hemithorax.     The cardiomediastinal silhouette is unchanged.   There is no evidence of free air beneath the hemidiaphragms.  There is a probable trace left-sided pleural effusion.  There is no evidence of a pneumothorax.  The aeration of the lung fields are unchanged with pulmonary interstitial edema.  There are degenerative changes in the osseous structures.    ASSESSMENT & RECOMMENDATIONS     Neuro/Psych  #Intubated and sedated   -fentanyl breifly but d/c now just propofol running at 35   -RAAS this AM -2     CV  #Cardiogeneic Shock and CHF  -MET called pt hypotensive, tachycardic, and with CP  -Pt w/ intermittent CP and multiple runs of sustained Vtach and EKGs with T wave inversion and ST elevations concerning for STEMI as well as elevated troponin to 0.5 from 0.3 last was 1.4  -Asa and plavix loaded as well as Amiodarone gtt at 0.5  -[facility] [place] demonstrated clean coronary arteries but IABP was placed on 5/4  -CXR daily for IABP placement  -ECHO from procedure w/ EF 20% from 40 in 3/22  -Cardiology rec IV diuresis for significantly elevated filling pressures but would benefit from CRRT  -S/p trial of Lasix 200 and Metolazone 5 mg QD UOP decreased requiring line placement for CRRT  -Requiring Oliverio for pressure support at 5 and now levo at 0.09  -Heparin gtt held breifly 2/2 elevated PTT but restarted  -Daily CXR (check for IABP placement)   -AICD placement planned for next week when stabilized      #Afib w/ RVR  -xeralto and metoprolol at home   -Last HR: 106  -heparin and amiodarone gtt currently     #HTN  -lisinopril at home but held in setting of NARESH  -BP this AM 111/58  -On oliverio and levo    Pulm  Intubated 2/2 airway protection and AMS  Vent settings as above  Most recent ABG  as above, follow   Increasing O2 requirement likely from volume overload   CXR this AM Trace L sided PE   ABG pH 7.37 CO2 37 PO2 90 HCO3 21.8  Wean vent settings as tolerated and SBT once appropriate    FEN/GI  Diet: Nutrition consulted with Tube feeds started 5/6 w/ Peptamen intense VHP w/ goal 65ml/hr but  running at 10 now   GI prophylaxis Famotidine     #Transaminitis  AST/ALT 62/72 on admission and had been down trending   Alk phos 336 on admit and down trending to   T bili 1.1 on admit and down trended though AM is   US abdomen from admit is unremarkable     Electrolytes this AM are NA 135 K 5.4 (s/p insulin w/ D10 shift, no albuterol given Afib w/ RVR) Cl 101 CO2 21 Mg 1.9 Phos 5.8     #Hyperkalemia  Nephrology consulted  Required multiple shifts   Trialysis line placed 5/8 for CRRT    RENAL  UOP: 240cc , In: 4653cc, net 2980cc in last 24 hrs    BUN/Cr: 80/3.7, baseline 20/1.0  Continue to monitor renal status and urine output     #ARF  -Crt on admit 1.6 but was elevated to 4.7  -Initial urine studies likely prerenal in nature  -Nephrology consulted   -Kerns placed 5/5  -UA with 2+ protein and 1+ occult blood  -Retroperitoneal US for possible obstruction w/o any signs of obstruction  -Trial of heavy diuresis with Lasix 200 mg BID and Metolazone 5mg QD though decreased UOP  -Monitor UOP   -line placement and CRRT     #HAGMA  -Likely related to renal failure  -AG this AM 13  -LA yesterday wnl  -Sodium Bicarb 650mg TID    Heme  H/H 8.8/27; stable  WBC 15  DVT prophylaxis: Heparin (gtt)    Endo  Strict Glucose control with goal 140-180  AM 98    ID  Increasing WBC likely 2/2 to stress response  TMax 98.2  F/u Blood Cx  F/u Sputum Cx  If spikes fever, low threshold to start Abx       Declan Faria M.D.  [facility]   05/09/2022 8:02 AM

## 2022-05-09 NOTE — PLAN OF CARE
Pt remains intubated and sedated in ICU. IABP(Trigger set to ECG, Frequency 1:1) in place and continued per orders, with Augmentation pressure and MAP lower than ordered ranges this AM and requiring additional pressor. POX maintained >95% via vent (A/C TV: 550/R: 20/P: 5/F: 30%). Amiodarone, Neosynephrine, Levophed, Heparin, and Propofol remain infusing, see MAR for titrations. CRRT continued per orders, with machine clotting x2 during shift. Hourly checks, safety, and skin precautions in place and maintained during shift.   Problem: Adult Inpatient Plan of Care  Goal: Plan of Care Review  Outcome: Ongoing, Progressing  Goal: Absence of Hospital-Acquired Illness or Injury  5/9/2022 0023 by Casandra Albarado RN  Outcome: Ongoing, Progressing  5/9/2022 0022 by Casandra Albarado RN  Outcome: Ongoing, Progressing  Goal: Optimal Comfort and Wellbeing  5/9/2022 0023 by Casandra Albarado RN  Outcome: Ongoing, Progressing  5/9/2022 0022 by Casandra Albarado RN  Outcome: Ongoing, Progressing  Goal: Readiness for Transition of Care  Outcome: Ongoing, Progressing     Problem: Bariatric Environmental Safety  Goal: Safety Maintained with Care  Outcome: Ongoing, Progressing     Problem: Fluid and Electrolyte Imbalance (Acute Kidney Injury/Impairment)  Goal: Fluid and Electrolyte Balance  5/9/2022 0023 by Casandra Albarado RN  Outcome: Ongoing, Progressing  5/9/2022 0022 by Casandra Albarado RN  Outcome: Ongoing, Progressing     Problem: Fall Injury Risk  Goal: Absence of Fall and Fall-Related Injury  Outcome: Ongoing, Progressing     Problem: Restraint, Nonbehavioral (Nonviolent)  Goal: Absence of Harm or Injury  5/9/2022 0023 by Casandra Albarado RN  Outcome: Ongoing, Progressing  5/9/2022 0022 by Casandra Albarado RN  Outcome: Ongoing, Progressing     Problem: Skin and Tissue Injury (Mechanical Ventilation, Invasive)  Goal: Absence of Device-Related Skin and Tissue Injury  Outcome: Ongoing, Progressing     Problem: Device-Related Complication Risk (CRRT  (Continuous Renal Replacement Therapy))  Goal: Safe, Effective Therapy Delivery  5/9/2022 0023 by Casandra Albarado RN  Outcome: Ongoing, Progressing  5/9/2022 0022 by Casandra Albarado RN  Outcome: Ongoing, Progressing     Problem: Hypothermia (CRRT (Continuous Renal Replacement Therapy))  Goal: Body Temperature Maintained in Desired Range  5/9/2022 0023 by Casandra Albarado RN  Outcome: Ongoing, Progressing  5/9/2022 0022 by Casandra Albarado RN  Outcome: Ongoing, Progressing     Problem: Infection (CRRT (Continuous Renal Replacement Therapy))  Goal: Absence of Infection Signs and Symptoms  Outcome: Ongoing, Progressing

## 2022-05-09 NOTE — NURSING
Leak in IAB circuit warning alarming on IABP screen, all tubing checked, with no blood noted in tubing. All connections intact. Machine automatically switched to standby, per Help prompt on IABP screen: reset of IAB pressure done, and helium tank open to increase helium delivery, and machine switched from standby to start, with treatment continued, and alarm stopped. VSS, pulses present in all extremities, and balloon pump audible on auscultation. Dr Guajardo called and notified of alarm, with MD verbalizing to reset machine again if warning alarms, and if trouble shooting is unsuccessful to notify MD, and to have chest xray done at ordered time.

## 2022-05-10 ENCOUNTER — PATIENT OUTREACH (OUTPATIENT)
Dept: ADMINISTRATIVE | Facility: OTHER | Age: 57
End: 2022-05-10
Payer: MEDICARE

## 2022-05-10 NOTE — SIGNIFICANT EVENT
Called to see patient for unresponsiveness. On exam the patient did not respond to verbal or physical stimuli.  Absent heart sounds, ventilator sounds present.  Absent peripheral pulses. Pupils are fixed and dilated, Lack of corneal reflex, lack of response to touch and nociception. Patient pronounced dead at 10:07PM.  Next of kin/family present at bedside.  Declined autopsy.        Andrae Bustamante MD  [facility] PGY-1  05/09/2022

## 2022-05-10 NOTE — NURSING
Family at bedside. Family does not have a  home at this time.  was called multiple times and left messages; still awaiting return call. Pt on Levo, Oliverio, Vasopressin, Heparin, and Propofol. Slowly began weaning off medications. Vaso was weaned off at , then began weaning levo at  then titrated back up to 0.22 in effort to give  time to arrive.    - Pt in asystole. Dr. Bustamante notified and to bedside to perform death exam. Time of Death announced at . Family was at bedside during assessment and notified at bedside of pt's death. All drips discontinued. Ventilator turned off. IABP turned off. Telemetry strips printed.  - family requested removal of all line and ETT in order visit with patient. Post mortem care done. R IJ trialysis remains in place.   - ASAEL notified. Stated possible candidate of tissue/eye donation, but more screening needs to be performed. ASAEL to contact w/in the hour for confirmation of donation.   - family updated on pt disposition. Notified pt will be at [facility] and to call when  home has been decided.   235- [facility]  Office contacted. Body released by Tamica Boyd at 2357. No case number provided by .    5/10  0020- ASAEL called confirming pt not candidate for tissue/eye donation due to extensive history. [place] [employer] screeners: Viry Tran and Ishaan Carranza.     0210- Security and transport team arrived to bedside. Pt body being transported to [facility].

## 2022-05-11 LAB
BACTERIA BLD CULT: NORMAL
BACTERIA BLD CULT: NORMAL
BACTERIA SPEC AEROBE CULT: ABNORMAL
BACTERIA SPEC AEROBE CULT: ABNORMAL
GRAM STN SPEC: ABNORMAL

## 2022-05-12 LAB — BACTERIA BLD CULT: NORMAL

## 2022-05-14 LAB — BACTERIA BLD CULT: NORMAL

## 2022-05-20 NOTE — HOSPITAL COURSE
Pt was admitted to the  service for an NARESH and tropenemia w/ chest pain. Pt's troponin was trended along with serial EKGs to its peak of 0.034. On admission his urine studies were collected which demonstrated prerenal etiology. After no improvment with PO hydration and resolution of his N/V Nephrologyy was consulted and recommended IV hydration as pt was thought to be volume down and so pt was given a small 250ml bolus. Overnight 5/4 pt developed new significant CP which was worse than previous. A new troponin was collected and elevated as well as an EKG which showed new ST changes. The pt was asa and statin loaded and Cardiology was consulted. A MET was called on 5/5 as pt was found to be unresponisve, tachycardic, and hypotensive. He was given a push of atropine and an additional bolus of fluids to which he became more responsive though still in AfibRVR and new T wave inversions and ST elevations. He was moved to the ICU where he had an epsiode of Vtach and required an amio bolus  as well as drip after which he broke the arrythmia but began vomitting and had to be emergently intubated for airway protection. He was then taken to the cath lab which showed clean coronary arteries but an extremly poor LV Sys fxn which lead to his acute decompenssation. An IABP was placed during this cath but pt noted to be extremely volume overloaded. He was given a lasix trial with metolazone however he became anuric and required CRRT. After time on IABP pt began to have increasing pressor requirments. His mother was called to discuss goals of care as his condition was not improving. It was at that time that she decided to discuss with the palliative team to discuss end of life care. She was in agreement with withdrawl of care and pt's drips were slowly down titrated before he passed on 5/10,

## 2022-05-20 NOTE — DISCHARGE SUMMARY
[facility]  Discharge Summary      Patient Name: Drew Farrar  MRN: [id number]  Patient Class: IP- Inpatient  Admission Date: 5/3/2022  Hospital Length of Stay: 7 days  Discharge Date and Time:      Attending Physician: No att. providers found   Discharging Provider: Declan Faria MD  Primary Care Provider: Garrison Roach MD      HPI:   Patient is a 56 yr old male with Pmhx history of AFib on Xarelto, CHF, DVT, hypertension presenting to the ED for one day of nausea and vomiting with severe chest pain. Patient states that he has been vomiting all day ans his been unable to hold down any food. Patient states the chest pain does not radiate to his shoulder and feels like stabbing, it is severe and constant.  In the ED patient was found to have mildly elevated troponin and an ekg showing Afib with RVR and tachycardia. Physical exam in ED also showed positive florez sign. Concern for cholecystitis lead to an order of an US abdomen that is pending. Patient is afebrile and shows no other signs of infection. Patient given labetalol in ED, which improved cardiac rate and rhythm  Patient to be admitted to [facility]  to continue work-up      Procedure(s) (LRB):  Left heart cath (Left)  INSERTION, CATHETER, RIGHT HEART (Right)  INSERTION, INTRA-AORTIC BALLOON PUMP      Hospital Course:   Pt was admitted to the FM service for an NARESH and tropenemia w/ chest pain. Pt's troponin was trended along with serial EKGs to its peak of 0.034. On admission his urine studies were collected which demonstrated prerenal etiology. After no improvment with PO hydration and resolution of his N/V Nephrologyy was consulted and recommended IV hydration as pt was thought to be volume down and so pt was given a small 250ml bolus. Overnight 5/4 pt developed new significant CP which was worse than previous. A new troponin was collected and elevated as well as an EKG which showed new ST changes. The pt was asa and statin  loaded and Cardiology was consulted. A MET was called on 5/5 as pt was found to be unresponisve, tachycardic, and hypotensive. He was given a push of atropine and an additional bolus of fluids to which he became more responsive though still in AfibRVR and new T wave inversions and ST elevations. He was moved to the ICU where he had an epsiode of Vtach and required an amio bolus  as well as drip after which he broke the arrythmia but began vomitting and had to be emergently intubated for airway protection. He was then taken to the cath lab which showed clean coronary arteries but an extremly poor LV Sys fxn which lead to his acute decompenssation. An IABP was placed during this cath but pt noted to be extremely volume overloaded. He was given a lasix trial with metolazone however he became anuric and required CRRT. After time on IABP pt began to have increasing pressor requirments. His mother was called to discuss goals of care as his condition was not improving. It was at that time that she decided to discuss with the palliative team to discuss end of life care. She was in agreement with withdrawl of care and pt's drips were slowly down titrated before he passed on 5/10,        Goals of Care Treatment Preferences:  Code Status: DNR    Health care agent: Josey [facility] agent number: [phone]          What is most important right now is to focus on symptom/pain control, quality of life, even if it means sacrificing a little time.         Consults:   Consults (From admission, onward)        Status Ordering Provider     Inpatient consult to Palliative Care  Once        Provider:  (Not yet assigned)    Completed ROZINA ORTEGA     Inpatient consult to Registered Dietitian/Nutritionist  Once        Provider:  (Not yet assigned)    Completed BLAYNE FAJARDO     Inpatient consult to Cardiology-[facility]  Once        Provider:  (Not yet assigned)    Completed MACIE SNOWDEN  Assessment & Plan notes have been filed under this hospital service since the last note was generated.  Service: Hospital Medicine    Final Active Diagnoses:    Diagnosis Date Noted POA    PRINCIPAL PROBLEM:  Chest pain [R07.9] 2019 Yes    Comfort measures only status [Z51.5] 2022 Not Applicable    Cardiogenic shock [R57.0]  No    Hypervolemia [E87.70]  No    Acute hypoxemic respiratory failure [J96.01]  No    Ventricular tachycardia [I47.2]  No    NICM (nonischemic cardiomyopathy) [I42.8] 2022 Yes    VT (ventricular tachycardia) [I47.2] 2022 No    NSTEMI (non-ST elevated myocardial infarction) [I21.4] 2022 Yes    Hyperkalemia [E87.5] 2022 Yes    Metabolic acidosis [E87.2] 2022 Yes    CHF (congestive heart failure) [I50.9]  Yes    RUQ pain [R10.11] 2022 Yes    Elevated troponin [R77.8] 2017 Yes    Atrial fibrillation with RVR [I48.91] 2016 Yes    NARESH (acute kidney injury) [N17.9] 2016 Yes    Chronic atrial fibrillation [I48.20] 2012 Yes    Venous stasis dermatitis of both lower extremities [I87.2] 2012 Yes      Problems Resolved During this Admission:    Diagnosis Date Noted Date Resolved POA    Hypotension [I95.9] 2020 Unknown       Discharged Condition:     Disposition:     Follow Up:    Patient Instructions:      Ambulatory referral/consult to Wound Clinic   Standing Status: Future   Referral Priority: Routine Referral Type: Consultation   Referral Reason: Specialty Services Required   Requested Specialty: Wound Care   Number of Visits Requested: 1       Significant Diagnostic Studies:   No results for input(s): WBC, HGB, HCT, MCV, RBC, MCH, MCHC, RDW, PLT, MPV, GRAN, LYMPH, MONO, EOSINOPHIL, BASOPHIL, BANDS, SEGS in the last 168 hours.  No results for input(s): NA, K, CL, CO2, ANIONGAP, BUN, CREATININE, GLU, CALCIUM, PROT, ALBUMIN, ALKPHOS, BILITOT, ALT, AST, ESTGFRAFRICA, EGFRNONAA,  MG, PHOS in the last 168 hours.  No results for input(s): COLORU, APPEARANCEUA, PHUR, SPECGRAV, PROTEINUA, GLUCUA, KETONESU, BILIRUBINUA, OCCULTUA, UROBILINOGEN, NITRITE, LEUKOCYTESUR, RBCUA, WBCUA, BACTERIA, SQUAMEPITHEL, HYALINECASTS, GRANULARCAST, MICROCMT in the last 168 hours.    Invalid input(s): SPECIMENU, AMORPHOUSU  No results for input(s): TROPONINI, CPK, CPKMB in the last 168 hours.  No results for input(s): PT, INR, APTT in the last 168 hours.  No results for input(s): TSH, B2SBLHK, U2SCICV, THYROIDAB, FREET4 in the last 168 hours.  X-Ray Chest 1 View    Result Date: 5/8/2022  EXAMINATION: XR CHEST 1 VIEW CLINICAL HISTORY: cxr; TECHNIQUE: Single frontal view of the chest was performed. COMPARISON: 05/08/2022 FINDINGS: IJ catheter terminates in the proximal SVC.  Endotracheal tube terminates at the level of the clavicles.  Enteric tube terminates below the level of the diaphragms.  Intra-aortic balloon pump with its tip just past the aortic knob.  The heart is enlarged.  There is pulmonary vascular congestion with pulmonary edema.  Probable small bilateral pleural effusions.     As above. Electronically signed by: Selam Damon MD Date:    05/08/2022 Time:    16:13    X-Ray Chest 1 View    Result Date: 5/7/2022  EXAMINATION: XR CHEST 1 VIEW CLINICAL HISTORY: IABP; TECHNIQUE: Single frontal view of the chest was performed. COMPARISON: 05/06/2022 FINDINGS: Endotracheal tube and enteric tubes are stable in position.  The heart is enlarged.  There is pulmonary vascular congestion with suspected interstitial edema.  Probable small bilateral pleural effusions.  Tip of the intra-aortic balloon pump terminates in the inferior aspect of the aortic arch, stable.  Skeletal structures are intact.     As above. Electronically signed by: Selam Damon MD Date:    05/07/2022 Time:    08:03    X-Ray Chest 1 View    Result Date: 5/6/2022  EXAMINATION: XR CHEST 1 VIEW CLINICAL HISTORY: IABP; TECHNIQUE: Single frontal  view of the chest was performed. COMPARISON: 05/05/2022 FINDINGS: There is an ET tube distal tip within the trachea, enteric tube distal tip crossing the body of the stomach.  Multiple wires overlie the chest.  Monitoring device also noted.  Aortic balloon pump device distal tip overlying the inferior aspect of the aortic arch, unchanged.  The cardiac silhouette appears prominent.  The pulmonary vascularity is increased centrally.  No large focal area of airspace consolidation, no large pleural effusion or pneumothorax.  The osseous structures appear normal.     Cardiomegaly with mild increased pulmonary venous pressure. Electronically signed by: Constanza David MD Date:    05/06/2022 Time:    10:58    US Retroperitoneal Complete    Result Date: 5/6/2022  EXAMINATION: US RETROPERITONEAL COMPLETE CLINICAL HISTORY: manuela; TECHNIQUE: Ultrasound of the kidneys and urinary bladder was performed including color flow and Doppler evaluation of the kidneys. COMPARISON: CT abdomen pelvis without contrast dated 06/06/2021 FINDINGS: Right kidney measures 10.8 cm.  Resistive index of 0.76.  No hydronephrosis. Left kidney measures 10.3 cm with limited parenchymal assessment.  Difficulty obtaining resistive index.  No convincing hydronephrosis. Urinary bladder is mostly decompressed about a Kerns catheter.     No hydronephrosis. Elevated right renal resistive index, a nonspecific indicator of underlying medical renal disease.  Limited assessment of the left kidney. Electronically signed by: Bladimir Thomas Date:    05/06/2022 Time:    16:12    X-Ray Chest AP Portable    Result Date: 5/9/2022  EXAMINATION: XR CHEST AP PORTABLE CLINICAL HISTORY: IABP; TECHNIQUE: Single frontal view of the chest was performed. COMPARISON: 05/09/2022. FINDINGS: The intra-aortic balloon pump tip is within the proximal descending thoracic aorta below the aortic knob.  The remainder of the support devices are unchanged. The cardiomediastinal  silhouette is enlarged.  There is no evidence of free air beneath the hemidiaphragms.  There is no pleural effusion right.  There is a probable trace left-sided pleural effusion.  There is increase in appearance of bilateral airspace opacities worse in the left hemithorax.  There are degenerative changes in the osseous structures.     Intra-aortic balloon pump tip within the proximal descending thoracic aorta. Worsening bilateral airspace opacities. Electronically signed by: Ray Andino MD Date:    05/09/2022 Time:    14:04    X-Ray Chest AP Portable    Result Date: 5/9/2022  EXAMINATION: XR CHEST AP PORTABLE CLINICAL HISTORY: IABP; TECHNIQUE: Single frontal view of the chest was performed. COMPARISON: 05/08/2022 at 15:47 hours. FINDINGS: The endotracheal tube tip is 5 cm from the lia.  The right-sided IJ catheter and enteric tubes remain in stable position.  There is a esophageal probe within the midthoracic esophagus.  The intra-aortic balloon pump tip is unchanged with its tip just beneath the level of the aortic knob.  There is a pacer pad overlying the left hemithorax. The cardiomediastinal silhouette is unchanged.  There is no evidence of free air beneath the hemidiaphragms.  There is a probable trace left-sided pleural effusion.  There is no evidence of a pneumothorax.  The aeration of the lung fields are unchanged with pulmonary interstitial edema.  There are degenerative changes in the osseous structures.     Stable position of intra-aortic balloon pump tip just below the level of the aortic knob. Additional stable findings as above. Electronically signed by: Ray Andino MD Date:    05/09/2022 Time:    08:08    X-Ray Chest AP Portable    Result Date: 5/8/2022  EXAMINATION: XR CHEST AP PORTABLE CLINICAL HISTORY: IABP; TECHNIQUE: Single frontal view of the chest was performed. COMPARISON: None FINDINGS: Endotracheal tube terminates at the level of the clavicles.  Enteric tube terminates in the gastric  body with the side port in the stomach.  Cardiopulmonary findings are unchanged.  The intra-aortic balloon pump 0 is not clearly visualized secondary to overlying pacer pads.     As above. Electronically signed by: Selam Damon MD Date:    05/08/2022 Time:    08:04    X-Ray Chest AP Portable    Result Date: 5/5/2022  EXAMINATION: XR CHEST AP PORTABLE CLINICAL HISTORY: ET tube placement; TECHNIQUE: Single frontal view of the chest was performed. COMPARISON: 05/04/2022. FINDINGS: There is an IABP with the tip overlying the inferior aspect of the aortic arch.  There is an endotracheal tube with the tip approximately 6.5 cm proximal to the lia.  There is a nasogastric tube with the tip and side-port in the stomach.  There are overlying defibrillator pads. The lungs are hypoexpanded.  There are worsening interstitial opacities in bilateral lungs, suspicious for edema.  The pleural spaces are clear. The cardiac silhouette is enlarged. The visualized osseous structures are intact.     Worsening interstitial opacities in bilateral lungs suspicious for edema. Support apparatus as above. Electronically signed by: Raphael Whelan Date:    05/05/2022 Time:    18:14    X-Ray Chest AP Portable    Result Date: 5/4/2022  EXAMINATION: XR CHEST AP PORTABLE CLINICAL HISTORY: Severe Chest pain; TECHNIQUE: Single frontal view of the chest was performed. COMPARISON: 05/03/2022. FINDINGS: The lungs are hypoexpanded.  There is no new focal consolidation.  There is mild background interstitial prominence, unchanged.  The pleural spaces are clear. The cardiac silhouette is enlarged, unchanged. The visualized osseous structures are intact.     No significant detrimental change in comparison with radiograph from 05/03/2022.  Continued cardiomegaly and mild interstitial prominence. Electronically signed by: Raphael Whelan Date:    05/04/2022 Time:    19:45    X-Ray Chest AP Portable    Result Date: 5/3/2022  EXAMINATION: XR CHEST AP PORTABLE  CLINICAL HISTORY: Chest pain; TECHNIQUE: Single frontal view of the chest was performed. COMPARISON: Chest radiograph March 14, 2022 FINDINGS: Single portable chest view is submitted.  Mild diminished depth of inspiration and minimal rotation noted, when accounting for these factors the cardiomediastinal silhouette appears stable, underlying cardiac enlargement noted. Accentuation of pulmonary bronchovascular markings consistent with diminished depth of inspiration noted.  There is improvement when compared to the prior study, improved aeration bilaterally.  There is no evidence for superimposed confluent infiltrate or consolidation, significant pleural effusion or pneumothorax.  The visualized osseous structures appear intact.     Interval improvement without radiographic evidence for superimposed acute intrathoracic process at this time. Electronically signed by: Rony Huerta Date:    05/03/2022 Time:    05:26    Echo    Result Date: 5/5/2022  · The left ventricle is moderately enlarged with severely decreased systolic function. · The estimated ejection fraction is 20%. · A diastolic pattern consistent with atrial fibrillation observed. · Normal right ventricular size with normal right ventricular systolic function. · Mild tricuspid regurgitation. · Severe left atrial enlargement. · Right atrial enlargement. · Mild mitral regurgitation. · Mechanically ventilated; cannot use inferior caval vein diameter to estimate central venous pressure.      Cardiac catheterization    Result Date: 5/6/2022  · The pre-procedure left ventricular end diastolic pressure was 35. · No LV gram due to NARESH · The estimated blood loss was none. · The coronary arteries were normal..  - Normal coronary arteries - Cardiogenic shock - Acute decompensated heart failure - VT - A. Fib - DVT/PE Plan: - IABP - IV diurese. Significant elevated high filling pressures by RHC likely contributed to the decompensation - Continue Amiodarone - Heparin  gtt - Will need AICD for 2ry prevention The procedure log was documented by Documenter: RT Madan and verified by Moreno Whitten MD. Date: 5/6/2022  Time: 8:13 AM     US Abdomen Limited    Result Date: 5/3/2022  EXAMINATION: US ABDOMEN LIMITED CLINICAL HISTORY: RUQ pain, elevated lft; TECHNIQUE: Limited ultrasound of the right upper quadrant of the abdomen (including pancreas, liver, gallbladder, common bile duct, and spleen) was performed. COMPARISON: None. FINDINGS: Additional comment: Technically limited examination due to body habitus and patient positioning. Liver: Unable to obtain reliable measurements.  Fatty liver infiltration. No focal hepatic lesions. Gallbladder: No calculi, wall thickening, or pericholecystic fluid.  No sonographic Frederick's sign. Biliary system: The common duct is not dilated, measuring 5.3 mm.  No intrahepatic ductal dilatation. Spleen: Normal in size and echotexture, measuring 13.1 x 6.6 cm. Miscellaneous: No upper abdominal ascites.     1. Limited examination as above. 2. No definite acute sonographic findings identified. 3. Additional details, as provided in the body of report. Electronically signed by: Terrance Penaloza Date:    05/03/2022 Time:    07:04    Microbiology Results (last 7 days)     ** No results found for the last 168 hours. **            Pending Diagnostic Studies:     None         Medications:  Reconciled Home Medications:      Medication List      ASK your doctor about these medications    acetaminophen 650 MG Tbsr  Commonly known as: TYLENOL  Take 650 mg by mouth as needed.     albuterol 90 mcg/actuation inhaler  Commonly known as: PROVENTIL/VENTOLIN HFA  Inhale 2 puffs into the lungs every 6 (six) hours as needed for Wheezing. Rescue     atorvastatin 40 MG tablet  Commonly known as: LIPITOR  Take 1 tablet (40 mg total) by mouth every evening.     lisinopriL 20 MG tablet  Commonly known as: PRINIVIL,ZESTRIL  Take 1 tablet (20 mg total) by mouth once daily.      methocarbamoL 500 MG Tab  Commonly known as: ROBAXIN  Take 1 tablet (500 mg total) by mouth 2 (two) times daily as needed (muscle discomfort).  Ask about: Should I take this medication?     metoprolol succinate 200 MG 24 hr tablet  Commonly known as: TOPROL-XL  Take 1 tablet (200 mg total) by mouth once daily.     sumatriptan 50 MG tablet  Commonly known as: IMITREX  Take 50 mg for headache. If headache does not resolve, take another 50mg two hours after initial dose. Do not exceed 200mg in 24 hours.     torsemide 20 MG Tab  Commonly known as: DEMADEX  Take 2 tablets (40 mg total) by mouth once daily.     XARELTO 20 mg Tab  Generic drug: rivaroxaban  Take 1 tablet (20 mg total) by mouth daily with dinner or evening meal.            Indwelling Lines/Drains at time of discharge:   Lines/Drains/Airways     Central Venous Catheter Line  Duration           Trialysis (Dialysis) Catheter 05/08/22 1532 right internal jugular 11 days          Drain  Duration                Urethral Catheter 05/05/22 1100 18 Fr. 15 days         NG/OG Tube 05/05/22 1600 Pettis sump Right mouth 14 days          Airway  Duration                Airway - Non-Surgical 05/05/22 1306 15 days                Time spent on the discharge of patient: 35 minutes    Critical care time spent on the evaluation and treatment of severe organ dysfunction, review of pertinent labs and imaging studies, discussions with consulting providers and discussions with patient/family: 35 minutes.     Declan Faria MD  Department of Hospital Medicine  [facility] - Intensive Care

## 2023-01-01 NOTE — PROGRESS NOTES
[facility] Critical Care/Pulmonology Consult Note    Admitting Team: Family Medicine  Attending Physician: Severyn Yaroshevsky, MD     Consult Attending: MD Jody  Consult Fellow: MD Max      Date of Admit: 9/10/2019      Subjective:   No acute events overnight, he remains afebrile. Weaned off pressors since ~ 3pm yesterday. States he feels better.      Objective:   Last 24 Hour Vital Signs:  BP  Min: 66/30  Max: 150/65  Temp  Av.2 °F (36.8 °C)  Min: 97.8 °F (36.6 °C)  Max: 98.6 °F (37 °C)  Pulse  Av.6  Min: 76  Max: 144  Resp  Av  Min: 23  Max: 40  SpO2  Av.5 %  Min: 89 %  Max: 100 %  Body mass index is 39.06 kg/m².  I/O last 3 completed shifts:  In: 4737.9 [P.O.:570; I.V.:1567.9; IV Piggyback:2600]  Out: 2175 [Urine:2175]     /    Intake/Output Summary (Last 24 hours) at 2019 0717  Last data filed at 2019 0700  Gross per 24 hour   Intake 3631.85 ml   Output 2075 ml   Net 1556.85 ml         Physical Examination:  Constitutional: Oriented to person, place, and time. No acute distress.  HENT:  Head: Normocephalic and atraumatic.  Throat: Oropharynx clear and moist, no oropharyngeal erythema or exudate.  Eyes: Pupils equal, round, and reactive to light. EOM are normal. No sclera icterus, erythema, or discharge.  Neck: Neck supple. No tenderness on palpation. No carotid bruit.  Cardiovascular: Irregular rate and rhythm. Tachycardic. No murmurs, gallops, or friction rub.  Pulmonary/Chest: Normal breath sounds bilaterally. Effort normal, no respiratory distress. No wheezes or rales. No tenderness.  Abdominal: Bowel sounds normal. No tenderness or rebound.  Musculoskeletal: Right leg and calf swelling, tenderness, erythema, and warmth. Decreased range of motion in right knee. No purulence. Ulcer on lateral left ankle.  Neurological: Alert. No motor deficits. No sensory deficits.  Skin: Skin is warm and dry, no rash noted. Chronic skin changes and thickening of bilateral LEs.        On Call Note:  Will give 3 ml/kg IV D10W b/o serum glucose by I-STAT of <20.  Will also order blood for chromosomes and micro-array analysis.        Laboratory:Trended Lab Data:  Recent Labs   Lab 09/10/19  1442 09/11/19  0800 09/12/19  0359   WBC 12.59 10.96 6.48   HGB 9.5* 8.2* 8.0*   HCT 30.7* 26.8* 26.1*    151 124*   MCV 81* 82 81*   RDW 16.6* 17.2* 17.3*    135* 135*   K 5.6* 4.8 4.2    107 106   CO2 17* 22* 21*   BUN 36* 42* 47*   CREATININE 1.4 1.6* 1.6*   GLU 92 114* 110   PROT 7.7 6.7 6.6   ALBUMIN 3.5 2.9* 2.7*   BILITOT 1.0 1.1* 0.8   AST 35 35 30   ALKPHOS 141* 100 89   ALT 28 25 24         Microbiology Data:  Blood Cx:     Assessment:     Drew Farrar is a 53 y.o. male who  has a past medical history of atrial fibrillation, HFpEF (EF 60% on 08/03), DVT, PE, hypertension, hyperthyroidism, and venous stasis ulcers. The patient presented to [facility] on 9/10/2019 with a primary complaint of fever and right LE pain X1 day. Patient found to be hypoxic to 68% on room air while on the floor, with low blood pressures and stepped up to the ICU. Pulmonary/critical care consulted to assist in management.    Plan:       #Septic Shock 2/2 Cellulitis with GNR bacteremia: improving  -Patient with cellulitis of right LE, found to have low blood pressures after admission and requiring pressors after 5 Liters of IVF  - off vasopressors since 3pm yesterday, stable to step down to floor  - surgery  ortho following, planning for ct leg today to ensure no localized area fluid collection  - on vanc, aztreonam, and flagyl, ID following    #Pulmonary Embolism and bilateral DVT: PE and DVTs, was previously on coumadin but switched to xarelto in August 2019, at that time, INR was difficult to control and he developed a new PE while on coumadin (though he was subtherapeutic at the time). So, in august he was switched to xarelto. He reports taking it every day and not missing any doses.   -  LE duplex this admission shows NEW left LLE thrombus in left femoral vein and continued thrombus in right femoral vein  - this means that he  has failed Xarelto therapy.   - he is currently on lovenox 130mg bid  - Patient is morbidly obese, and thus use of DOAC have not been well studied in this area. Warfarin and LMWH have been better studied in morbidly obese patient and he will likely need one of these at discharge.   - due to his weight, need to ensure that lovenox dose is therapeutic, can check anti-factor Xa levels to ensure in therapeutic range.   - you would need to draw anti-Xa level 4 hours after the dose of enoxaparin  - additionally, would consider hematology consult for their opinion. Also discuss with patient if he would be amendable to self injections at discharge.        #NSTEMI  -Troponin 0.165 on admission -> 0.168 repeat  -Initial EKG with T-wave flattening in V5-6  Repeat shows normal T-waves in lead V5-6  -Trend tropnins and EKG  -Suspect Type 2 NSTEMI in setting of demand from shock    #Chronic Atrial Fibrillation  -CHADSVasc >2  -Currently in atrial fibrillation, rate controlled at this time  -Home regimen includes Xarelto for AC and Toprol-XL for rate control  -Hold home Toprol-XL given shock  - can consider resuming BB at much lower dose if HR increasing.     #Chronic HFpEF  -EF 60% from 8/2019  -Home regimen includes Toprol-XL and Torsemide 40 mg daily  -BNP elevated to 656  CXR with no pulmonary edema  Repeat TTE pending      #Untreated Hyperthyroidism  -Patient with history of untreated hyperthyoidism, lost to follow up with endocrine  -TSH <0.010 with elevated T4  -Patient has outpatient follow up with endocrine booked    Code: Full code    Dispo:okay to step down to floor    We will sign off at this time, do note hesitate to contact with questions or change in clinical status.       Jaclyn Santana MD  Pulmonary / Critical Care Fellow, PGY4  #[phone]  09/12/2019  7:19 AM

## 2023-01-03 NOTE — TELEPHONE ENCOUNTER
"  Reason for Disposition   [1] SEVERE headache (e.g., excruciating) AND [2] "worst headache" of life    Additional Information   Negative: Difficult to awaken or acting confused (e.g., disoriented, slurred speech)   Negative: [1] Weakness of the face, arm or leg on one side of the body AND [2] new onset   Negative: [1] Numbness of the face, arm or leg on one side of the body AND [2] new onset   Negative: [1] Loss of speech or garbled speech AND [2] new onset   Negative: Passed out (i.e., lost consciousness, collapsed and was not responding)   Negative: Sounds like a life-threatening emergency to the triager   Negative: Followed a head injury   Negative: Postpartum (from 0 to 6 weeks after delivery)   Negative: Traumatic Brain Injury (TBI) is suspected   Negative: Pregnant   Negative: Unable to walk, or can only walk with assistance (e.g., requires support)   Negative: Stiff neck (can't touch chin to chest)   Negative: Severe pain in one eye   Negative: [1] Other family members (or roommates) with headaches AND [2] possibility of carbon monoxide exposure    Protocols used: HEADACHE-A-AH    " Rifampin Counseling: I discussed with the patient the risks of rifampin including but not limited to liver damage, kidney damage, red-orange body fluids, nausea/vomiting and severe allergy.

## 2023-01-12 NOTE — PROGRESS NOTES
Subjective:       Patient ID: Drew Farrar is a 53 y.o. male.    Chief Complaint: Pre-op Exam (eye surgery)    HPI   54 y/o M with PMH of CHF, HTN, A-fib, chronic venous stasis of BLE's, and May-Thurner syndrome  presents for preoperative clearance for cataract surgery. Patient will be having eye surgery with Dr. Echols. He is compliant with his medications and doctor visits. Denies fever, chills, HA, SOB, CP, n/v/d.     Review of Systems   Constitutional: Negative for chills and fever.   HENT: Negative for congestion, rhinorrhea and sore throat.    Eyes: Negative for discharge and redness.   Respiratory: Negative for cough, shortness of breath, wheezing and stridor.    Cardiovascular: Positive for leg swelling. Negative for chest pain and palpitations.   Gastrointestinal: Negative for abdominal distention, abdominal pain, blood in stool, constipation, diarrhea, nausea and vomiting.   Genitourinary: Negative for difficulty urinating, dysuria and hematuria.   Musculoskeletal: Negative for back pain and neck pain.   Skin: Positive for color change (BLE's). Negative for rash.   Neurological: Negative for dizziness, seizures, light-headedness and headaches.   Psychiatric/Behavioral: Negative for agitation, behavioral problems and confusion.   All other systems reviewed and are negative.      Objective:      Vitals:    01/29/19 1630   BP: (!) 132/53   Pulse: 85   Resp: 20     Physical Exam   Constitutional: He is oriented to person, place, and time. He appears well-developed and well-nourished. No distress.   HENT:   Head: Normocephalic and atraumatic.   Eyes: Conjunctivae and EOM are normal. Pupils are equal, round, and reactive to light. Right eye exhibits no discharge. Left eye exhibits no discharge. No scleral icterus.   Neck: Normal range of motion. Neck supple. No tracheal deviation present. No thyromegaly present.   Cardiovascular: Normal rate, regular rhythm, normal heart sounds and intact distal pulses. Exam  reveals no gallop and no friction rub.   No murmur heard.  Pulmonary/Chest: Effort normal and breath sounds normal. No respiratory distress. He has no wheezes. He has no rales. He exhibits no tenderness.   Abdominal: Soft. Bowel sounds are normal. He exhibits no distension and no mass. There is no tenderness.   Musculoskeletal: Normal range of motion. He exhibits edema (BLE's but right more than left). He exhibits no tenderness.   Lymphadenopathy:     He has no cervical adenopathy.   Neurological: He is alert and oriented to person, place, and time.   Skin: Skin is warm. He is not diaphoretic.   Venous stasis changes to BLE's with skin color changes and lichenification    Psychiatric: He has a normal mood and affect. His behavior is normal. Judgment and thought content normal.   Nursing note and vitals reviewed.      Assessment:       1. Preoperative clearance    2. Chronic combined systolic and diastolic congestive heart failure    3. Permanent atrial fibrillation    4. Essential hypertension        Plan:       Preoperative clearance    Chronic combined systolic and diastolic congestive heart failure    Permanent atrial fibrillation    Essential hypertension    Patient is cleared for cataract surgery. No cardiac testing required for this type of surgery. Filled out paperwork for patient to take to Dr. Echols's office. Continue all current medications without changes.     Follow-up in about 2 months (around 3/29/2019) for check up.      1/29/2019 David Larsen M.D.  [facility] Resident PGY3     medication therapy

## 2023-09-16 NOTE — PROGRESS NOTES
I spoke with patient's wife and patient to take potassium supplement, advised of result, verbalized understanding and need to check lab work in a week. Quick follow-up for elevated INR and decreased dose 9/25. INR low today. Patient states that he ran out of coumadin so he missed the last 2 days. He has bruises from use, denies any bleeding or other changes. Will boost dose today and then resume weekly dose until follow-up in 2 weeks. Advised him to call with any changes or concerns. MATEO Bustillo D assisted with dosing.

## 2024-10-23 NOTE — HPI
55yo male with depression, hyperthyroidism, venous insufficiency s/p EVLT s/p venous stenting, chronic afib on Xarelto, edema, PE/DVT and chronic systolic and diastolic heart failure who presented to the ER with complaints of dizziness. He was seen yesterday in Cardiology clinic and noted to be in afib with HR 120s- asymptomatic at that time. His Toprol XL was stopped during one of his previous hospitalization for staph infection. He proceeded to his wound care appt with complaints of dizzness and was sent to the ER for evaluation. He denies any chest pain or palpitations. He complains of SOB but reports it is chronic and does not feel it has worsened. Upon arrival to ER he was noted to be in Afib with RVR, and was given Amiodarone 150mg IV and his HR dropped to SB and became unresponsive with return to baseline about 15 minutes later.  He was admitted to [facility] and was started on oral Metoprolol. Cardiology consulted for afib management    Detail Level: Zone Detail Level: Detailed Detail Level: Simple
